# Patient Record
Sex: FEMALE | Race: WHITE | NOT HISPANIC OR LATINO | URBAN - METROPOLITAN AREA
[De-identification: names, ages, dates, MRNs, and addresses within clinical notes are randomized per-mention and may not be internally consistent; named-entity substitution may affect disease eponyms.]

---

## 2017-06-16 ENCOUNTER — OUTPATIENT (OUTPATIENT)
Dept: OUTPATIENT SERVICES | Facility: HOSPITAL | Age: 82
LOS: 1 days | Discharge: HOME | End: 2017-06-16

## 2017-06-16 DIAGNOSIS — I10 ESSENTIAL (PRIMARY) HYPERTENSION: ICD-10-CM

## 2017-06-16 DIAGNOSIS — I48.91 UNSPECIFIED ATRIAL FIBRILLATION: ICD-10-CM

## 2017-06-16 DIAGNOSIS — L03.119 CELLULITIS OF UNSPECIFIED PART OF LIMB: ICD-10-CM

## 2017-06-16 DIAGNOSIS — J44.9 CHRONIC OBSTRUCTIVE PULMONARY DISEASE, UNSPECIFIED: ICD-10-CM

## 2017-06-16 DIAGNOSIS — I89.0 LYMPHEDEMA, NOT ELSEWHERE CLASSIFIED: ICD-10-CM

## 2017-06-23 ENCOUNTER — OUTPATIENT (OUTPATIENT)
Dept: OUTPATIENT SERVICES | Facility: HOSPITAL | Age: 82
LOS: 1 days | Discharge: HOME | End: 2017-06-23

## 2017-06-23 DIAGNOSIS — I89.0 LYMPHEDEMA, NOT ELSEWHERE CLASSIFIED: ICD-10-CM

## 2017-06-23 DIAGNOSIS — I10 ESSENTIAL (PRIMARY) HYPERTENSION: ICD-10-CM

## 2017-06-23 DIAGNOSIS — L03.119 CELLULITIS OF UNSPECIFIED PART OF LIMB: ICD-10-CM

## 2017-06-23 DIAGNOSIS — I48.91 UNSPECIFIED ATRIAL FIBRILLATION: ICD-10-CM

## 2017-06-23 DIAGNOSIS — J44.9 CHRONIC OBSTRUCTIVE PULMONARY DISEASE, UNSPECIFIED: ICD-10-CM

## 2017-06-28 DIAGNOSIS — J44.9 CHRONIC OBSTRUCTIVE PULMONARY DISEASE, UNSPECIFIED: ICD-10-CM

## 2017-06-28 DIAGNOSIS — H25.89 OTHER AGE-RELATED CATARACT: ICD-10-CM

## 2017-06-28 DIAGNOSIS — N18.9 CHRONIC KIDNEY DISEASE, UNSPECIFIED: ICD-10-CM

## 2017-06-28 DIAGNOSIS — E66.9 OBESITY, UNSPECIFIED: ICD-10-CM

## 2017-06-28 DIAGNOSIS — I12.9 HYPERTENSIVE CHRONIC KIDNEY DISEASE WITH STAGE 1 THROUGH STAGE 4 CHRONIC KIDNEY DISEASE, OR UNSPECIFIED CHRONIC KIDNEY DISEASE: ICD-10-CM

## 2017-06-28 DIAGNOSIS — I50.9 HEART FAILURE, UNSPECIFIED: ICD-10-CM

## 2018-08-17 ENCOUNTER — OUTPATIENT (OUTPATIENT)
Dept: OUTPATIENT SERVICES | Facility: HOSPITAL | Age: 83
LOS: 1 days | Discharge: HOME | End: 2018-08-17

## 2018-08-17 VITALS
HEART RATE: 78 BPM | OXYGEN SATURATION: 97 % | RESPIRATION RATE: 16 BRPM | TEMPERATURE: 98 F | WEIGHT: 268.96 LBS | DIASTOLIC BLOOD PRESSURE: 75 MMHG | SYSTOLIC BLOOD PRESSURE: 186 MMHG | HEIGHT: 61 IN

## 2018-08-17 DIAGNOSIS — E01.8 OTHER IODINE-DEFICIENCY RELATED THYROID DISORDERS AND ALLIED CONDITIONS: ICD-10-CM

## 2018-08-17 DIAGNOSIS — D21.6 BENIGN NEOPLASM OF CONNECTIVE AND OTHER SOFT TISSUE OF TRUNK, UNSPECIFIED: ICD-10-CM

## 2018-08-17 DIAGNOSIS — F41.9 ANXIETY DISORDER, UNSPECIFIED: ICD-10-CM

## 2018-08-17 DIAGNOSIS — Z98.890 OTHER SPECIFIED POSTPROCEDURAL STATES: Chronic | ICD-10-CM

## 2018-08-17 DIAGNOSIS — Z01.818 ENCOUNTER FOR OTHER PREPROCEDURAL EXAMINATION: ICD-10-CM

## 2018-08-17 DIAGNOSIS — Z90.710 ACQUIRED ABSENCE OF BOTH CERVIX AND UTERUS: Chronic | ICD-10-CM

## 2018-08-17 DIAGNOSIS — J44.9 CHRONIC OBSTRUCTIVE PULMONARY DISEASE, UNSPECIFIED: ICD-10-CM

## 2018-08-17 DIAGNOSIS — I48.91 UNSPECIFIED ATRIAL FIBRILLATION: ICD-10-CM

## 2018-08-17 DIAGNOSIS — E66.9 OBESITY, UNSPECIFIED: ICD-10-CM

## 2018-08-17 DIAGNOSIS — I73.9 PERIPHERAL VASCULAR DISEASE, UNSPECIFIED: ICD-10-CM

## 2018-08-17 DIAGNOSIS — I10 ESSENTIAL (PRIMARY) HYPERTENSION: ICD-10-CM

## 2018-08-17 DIAGNOSIS — Z96.653 PRESENCE OF ARTIFICIAL KNEE JOINT, BILATERAL: Chronic | ICD-10-CM

## 2018-08-17 LAB
ALBUMIN SERPL ELPH-MCNC: 4.1 G/DL — SIGNIFICANT CHANGE UP (ref 3.5–5.2)
ALP SERPL-CCNC: 119 U/L — HIGH (ref 30–115)
ALT FLD-CCNC: 12 U/L — SIGNIFICANT CHANGE UP (ref 0–41)
ANION GAP SERPL CALC-SCNC: 16 MMOL/L — HIGH (ref 7–14)
APTT BLD: 32.6 SEC — SIGNIFICANT CHANGE UP (ref 27–39.2)
AST SERPL-CCNC: 18 U/L — SIGNIFICANT CHANGE UP (ref 0–41)
BASOPHILS # BLD AUTO: 0.04 K/UL — SIGNIFICANT CHANGE UP (ref 0–0.2)
BASOPHILS NFR BLD AUTO: 0.5 % — SIGNIFICANT CHANGE UP (ref 0–1)
BILIRUB SERPL-MCNC: 0.4 MG/DL — SIGNIFICANT CHANGE UP (ref 0.2–1.2)
BUN SERPL-MCNC: 23 MG/DL — HIGH (ref 10–20)
CALCIUM SERPL-MCNC: 9.3 MG/DL — SIGNIFICANT CHANGE UP (ref 8.5–10.1)
CHLORIDE SERPL-SCNC: 97 MMOL/L — LOW (ref 98–110)
CO2 SERPL-SCNC: 27 MMOL/L — SIGNIFICANT CHANGE UP (ref 17–32)
CREAT SERPL-MCNC: 1.2 MG/DL — SIGNIFICANT CHANGE UP (ref 0.7–1.5)
EOSINOPHIL # BLD AUTO: 0.09 K/UL — SIGNIFICANT CHANGE UP (ref 0–0.7)
EOSINOPHIL NFR BLD AUTO: 1.1 % — SIGNIFICANT CHANGE UP (ref 0–8)
GLUCOSE SERPL-MCNC: 93 MG/DL — SIGNIFICANT CHANGE UP (ref 70–99)
HCT VFR BLD CALC: 32.5 % — LOW (ref 37–47)
HGB BLD-MCNC: 10.3 G/DL — LOW (ref 12–16)
IMM GRANULOCYTES NFR BLD AUTO: 0.5 % — HIGH (ref 0.1–0.3)
INR BLD: 1.06 RATIO — SIGNIFICANT CHANGE UP (ref 0.65–1.3)
LYMPHOCYTES # BLD AUTO: 1.13 K/UL — LOW (ref 1.2–3.4)
LYMPHOCYTES # BLD AUTO: 14 % — LOW (ref 20.5–51.1)
MCHC RBC-ENTMCNC: 27 PG — SIGNIFICANT CHANGE UP (ref 27–31)
MCHC RBC-ENTMCNC: 31.7 G/DL — LOW (ref 32–37)
MCV RBC AUTO: 85.3 FL — SIGNIFICANT CHANGE UP (ref 81–99)
MONOCYTES # BLD AUTO: 0.71 K/UL — HIGH (ref 0.1–0.6)
MONOCYTES NFR BLD AUTO: 8.8 % — SIGNIFICANT CHANGE UP (ref 1.7–9.3)
NEUTROPHILS # BLD AUTO: 6.05 K/UL — SIGNIFICANT CHANGE UP (ref 1.4–6.5)
NEUTROPHILS NFR BLD AUTO: 75.1 % — SIGNIFICANT CHANGE UP (ref 42.2–75.2)
NRBC # BLD: 0 /100 WBCS — SIGNIFICANT CHANGE UP (ref 0–0)
PLATELET # BLD AUTO: 213 K/UL — SIGNIFICANT CHANGE UP (ref 130–400)
POTASSIUM SERPL-MCNC: 5 MMOL/L — SIGNIFICANT CHANGE UP (ref 3.5–5)
POTASSIUM SERPL-SCNC: 5 MMOL/L — SIGNIFICANT CHANGE UP (ref 3.5–5)
PROT SERPL-MCNC: 7.5 G/DL — SIGNIFICANT CHANGE UP (ref 6–8)
PROTHROM AB SERPL-ACNC: 11.4 SEC — SIGNIFICANT CHANGE UP (ref 9.95–12.87)
RBC # BLD: 3.81 M/UL — LOW (ref 4.2–5.4)
RBC # FLD: 13.8 % — SIGNIFICANT CHANGE UP (ref 11.5–14.5)
SODIUM SERPL-SCNC: 140 MMOL/L — SIGNIFICANT CHANGE UP (ref 135–146)
WBC # BLD: 8.06 K/UL — SIGNIFICANT CHANGE UP (ref 4.8–10.8)
WBC # FLD AUTO: 8.06 K/UL — SIGNIFICANT CHANGE UP (ref 4.8–10.8)

## 2018-08-17 NOTE — H&P PST ADULT - REASON FOR ADMISSION
84 yo female presents w/ c/o "cyst on my buttock" when pt asked to confirm side, she states its in the middle. advised to f/u w/ dr. luke prior to surgery. last abx ~3-4 mo ago  denies chest pain, palpitations, shortness of breath, dyspnea, or dysuria. exercise tolerance: 1/2  blocks/ flights of stairs w/o so, per pt limited dt leg pain (cellulitis) per pt has home nursing 3x/wk for drsg changes denies any open areas; "just a couple blisters"

## 2018-08-17 NOTE — H&P PST ADULT - EXTREMITIES COMMENTS
b/l lower extremity chronic cellulitis& vascular changes noted, no open areas, a couple blisters noter right, per pt vna treating 3x/week& no worsening has been noted

## 2018-08-17 NOTE — H&P PST ADULT - PMH
Afib  s/p ablation 2001  Anxiety    Cellulitis  chronic  COPD (chronic obstructive pulmonary disease)    Goiter  no meds  HTN (hypertension)    OA (osteoarthritis)    Obesity    PVD (peripheral vascular disease)

## 2018-08-18 PROBLEM — E04.9 NONTOXIC GOITER, UNSPECIFIED: Chronic | Status: ACTIVE | Noted: 2018-08-17

## 2018-08-18 PROBLEM — L03.90 CELLULITIS, UNSPECIFIED: Chronic | Status: ACTIVE | Noted: 2018-08-17

## 2018-08-18 LAB — TSH SERPL-MCNC: 1.33 UIU/ML — SIGNIFICANT CHANGE UP (ref 0.27–4.2)

## 2018-08-24 ENCOUNTER — RESULT REVIEW (OUTPATIENT)
Age: 83
End: 2018-08-24

## 2018-08-24 ENCOUNTER — OUTPATIENT (OUTPATIENT)
Dept: OUTPATIENT SERVICES | Facility: HOSPITAL | Age: 83
LOS: 1 days | Discharge: HOME | End: 2018-08-24

## 2018-08-24 VITALS — RESPIRATION RATE: 18 BRPM | HEART RATE: 60 BPM | SYSTOLIC BLOOD PRESSURE: 148 MMHG | DIASTOLIC BLOOD PRESSURE: 69 MMHG

## 2018-08-24 VITALS
TEMPERATURE: 99 F | WEIGHT: 268.08 LBS | RESPIRATION RATE: 20 BRPM | SYSTOLIC BLOOD PRESSURE: 151 MMHG | HEART RATE: 76 BPM | HEIGHT: 62 IN | DIASTOLIC BLOOD PRESSURE: 78 MMHG

## 2018-08-24 DIAGNOSIS — Z98.890 OTHER SPECIFIED POSTPROCEDURAL STATES: Chronic | ICD-10-CM

## 2018-08-24 DIAGNOSIS — Z90.710 ACQUIRED ABSENCE OF BOTH CERVIX AND UTERUS: Chronic | ICD-10-CM

## 2018-08-24 DIAGNOSIS — Z96.653 PRESENCE OF ARTIFICIAL KNEE JOINT, BILATERAL: Chronic | ICD-10-CM

## 2018-08-24 RX ORDER — SODIUM CHLORIDE 9 MG/ML
1000 INJECTION, SOLUTION INTRAVENOUS
Qty: 0 | Refills: 0 | Status: DISCONTINUED | OUTPATIENT
Start: 2018-08-24 | End: 2018-08-24

## 2018-08-24 RX ORDER — ACETAMINOPHEN 500 MG
325 TABLET ORAL EVERY 4 HOURS
Qty: 0 | Refills: 0 | Status: DISCONTINUED | OUTPATIENT
Start: 2018-08-24 | End: 2018-08-24

## 2018-08-24 NOTE — CHART NOTE - NSCHARTNOTEFT_GEN_A_CORE
PACU ANESTHESIA ADMISSION NOTE      Procedure: Excision    Post op diagnosis:  Cyst of buttocks      ____  Intubated  TV:______       Rate: ______      FiO2: ______    ____  Patent Airway    ____  Full return of protective reflexes    ___x_  Full recovery from anesthesia / back to baseline     Vitals:   T: 97.9          R:   10               /85:                  Sat:   96                P: 71      Mental Status:  _x___ Awake   ____x_ Alert   _____ Drowsy   _____ Sedated    Nausea/Vomiting:  __x__ NO  ______Yes,   See Post - Op Orders          Pain Scale (0-10):  _____    Treatment: ____ None    _x___ See Post - Op/PCA Orders    Post - Operative Fluids:   ____ Oral   x____ See Post - Op Orders    Plan: Discharge:   ___x_Home       _____Floor     _____Critical Care    _____  Other:_________________    Comments:

## 2018-08-27 LAB — SURGICAL PATHOLOGY STUDY: SIGNIFICANT CHANGE UP

## 2018-08-29 DIAGNOSIS — L72.0 EPIDERMAL CYST: ICD-10-CM

## 2018-08-29 DIAGNOSIS — Z88.0 ALLERGY STATUS TO PENICILLIN: ICD-10-CM

## 2018-08-29 DIAGNOSIS — Z88.2 ALLERGY STATUS TO SULFONAMIDES: ICD-10-CM

## 2018-08-29 DIAGNOSIS — I10 ESSENTIAL (PRIMARY) HYPERTENSION: ICD-10-CM

## 2018-08-29 DIAGNOSIS — E66.9 OBESITY, UNSPECIFIED: ICD-10-CM

## 2018-08-29 DIAGNOSIS — F41.9 ANXIETY DISORDER, UNSPECIFIED: ICD-10-CM

## 2018-08-29 DIAGNOSIS — I48.91 UNSPECIFIED ATRIAL FIBRILLATION: ICD-10-CM

## 2019-01-16 ENCOUNTER — TRANSCRIPTION ENCOUNTER (OUTPATIENT)
Age: 84
End: 2019-01-16

## 2019-05-23 ENCOUNTER — TRANSCRIPTION ENCOUNTER (OUTPATIENT)
Age: 84
End: 2019-05-23

## 2019-07-16 NOTE — H&P PST ADULT - PSH
Action 2: Continue
Detail Level: Detailed
Samples Given: Duobrii QD until clear
H/O abdominal hysterectomy    H/O discectomy    H/O total knee replacement, bilateral

## 2019-09-20 ENCOUNTER — TRANSCRIPTION ENCOUNTER (OUTPATIENT)
Age: 84
End: 2019-09-20

## 2020-05-18 ENCOUNTER — INPATIENT (INPATIENT)
Facility: HOSPITAL | Age: 85
LOS: 3 days | Discharge: HOME HEALTH | End: 2020-05-22
Payer: MEDICARE

## 2020-05-18 ENCOUNTER — TRANSCRIPTION ENCOUNTER (OUTPATIENT)
Age: 85
End: 2020-05-18

## 2020-05-18 VITALS
HEART RATE: 108 BPM | SYSTOLIC BLOOD PRESSURE: 144 MMHG | DIASTOLIC BLOOD PRESSURE: 81 MMHG | RESPIRATION RATE: 19 BRPM | TEMPERATURE: 98 F | WEIGHT: 270.07 LBS | OXYGEN SATURATION: 97 %

## 2020-05-18 DIAGNOSIS — I10 ESSENTIAL (PRIMARY) HYPERTENSION: ICD-10-CM

## 2020-05-18 DIAGNOSIS — Z90.710 ACQUIRED ABSENCE OF BOTH CERVIX AND UTERUS: Chronic | ICD-10-CM

## 2020-05-18 DIAGNOSIS — Z96.653 PRESENCE OF ARTIFICIAL KNEE JOINT, BILATERAL: Chronic | ICD-10-CM

## 2020-05-18 DIAGNOSIS — I48.91 UNSPECIFIED ATRIAL FIBRILLATION: ICD-10-CM

## 2020-05-18 DIAGNOSIS — J44.9 CHRONIC OBSTRUCTIVE PULMONARY DISEASE, UNSPECIFIED: ICD-10-CM

## 2020-05-18 DIAGNOSIS — L02.91 CUTANEOUS ABSCESS, UNSPECIFIED: ICD-10-CM

## 2020-05-18 DIAGNOSIS — Z98.890 OTHER SPECIFIED POSTPROCEDURAL STATES: Chronic | ICD-10-CM

## 2020-05-18 PROBLEM — I73.9 PERIPHERAL VASCULAR DISEASE, UNSPECIFIED: Chronic | Status: ACTIVE | Noted: 2018-08-17

## 2020-05-18 PROBLEM — F41.9 ANXIETY DISORDER, UNSPECIFIED: Chronic | Status: ACTIVE | Noted: 2018-08-17

## 2020-05-18 PROBLEM — E66.9 OBESITY, UNSPECIFIED: Chronic | Status: ACTIVE | Noted: 2018-08-17

## 2020-05-18 PROBLEM — M19.90 UNSPECIFIED OSTEOARTHRITIS, UNSPECIFIED SITE: Chronic | Status: ACTIVE | Noted: 2018-08-17

## 2020-05-18 LAB
ALBUMIN SERPL ELPH-MCNC: 3.8 G/DL — SIGNIFICANT CHANGE UP (ref 3.5–5.2)
ALP SERPL-CCNC: 122 U/L — HIGH (ref 30–115)
ALT FLD-CCNC: 10 U/L — SIGNIFICANT CHANGE UP (ref 0–41)
ANION GAP SERPL CALC-SCNC: 14 MMOL/L — SIGNIFICANT CHANGE UP (ref 7–14)
APTT BLD: 30 SEC — SIGNIFICANT CHANGE UP (ref 27–39.2)
AST SERPL-CCNC: 18 U/L — SIGNIFICANT CHANGE UP (ref 0–41)
BASOPHILS # BLD AUTO: 0.03 K/UL — SIGNIFICANT CHANGE UP (ref 0–0.2)
BASOPHILS NFR BLD AUTO: 0.4 % — SIGNIFICANT CHANGE UP (ref 0–1)
BILIRUB SERPL-MCNC: 0.4 MG/DL — SIGNIFICANT CHANGE UP (ref 0.2–1.2)
BUN SERPL-MCNC: 17 MG/DL — SIGNIFICANT CHANGE UP (ref 10–20)
CALCIUM SERPL-MCNC: 9.4 MG/DL — SIGNIFICANT CHANGE UP (ref 8.5–10.1)
CHLORIDE SERPL-SCNC: 100 MMOL/L — SIGNIFICANT CHANGE UP (ref 98–110)
CO2 SERPL-SCNC: 27 MMOL/L — SIGNIFICANT CHANGE UP (ref 17–32)
CREAT SERPL-MCNC: 1.4 MG/DL — SIGNIFICANT CHANGE UP (ref 0.7–1.5)
EOSINOPHIL # BLD AUTO: 0.04 K/UL — SIGNIFICANT CHANGE UP (ref 0–0.7)
EOSINOPHIL NFR BLD AUTO: 0.5 % — SIGNIFICANT CHANGE UP (ref 0–8)
GLUCOSE SERPL-MCNC: 106 MG/DL — HIGH (ref 70–99)
HCT VFR BLD CALC: 33.6 % — LOW (ref 37–47)
HGB BLD-MCNC: 10.6 G/DL — LOW (ref 12–16)
IMM GRANULOCYTES NFR BLD AUTO: 0.4 % — HIGH (ref 0.1–0.3)
INR BLD: 1.77 RATIO — HIGH (ref 0.65–1.3)
LACTATE SERPL-SCNC: 1.7 MMOL/L — SIGNIFICANT CHANGE UP (ref 0.7–2)
LYMPHOCYTES # BLD AUTO: 1.04 K/UL — LOW (ref 1.2–3.4)
LYMPHOCYTES # BLD AUTO: 12.3 % — LOW (ref 20.5–51.1)
MCHC RBC-ENTMCNC: 26.4 PG — LOW (ref 27–31)
MCHC RBC-ENTMCNC: 31.5 G/DL — LOW (ref 32–37)
MCV RBC AUTO: 83.6 FL — SIGNIFICANT CHANGE UP (ref 81–99)
MONOCYTES # BLD AUTO: 0.71 K/UL — HIGH (ref 0.1–0.6)
MONOCYTES NFR BLD AUTO: 8.4 % — SIGNIFICANT CHANGE UP (ref 1.7–9.3)
NEUTROPHILS # BLD AUTO: 6.63 K/UL — HIGH (ref 1.4–6.5)
NEUTROPHILS NFR BLD AUTO: 78 % — HIGH (ref 42.2–75.2)
NRBC # BLD: 0 /100 WBCS — SIGNIFICANT CHANGE UP (ref 0–0)
PLATELET # BLD AUTO: 238 K/UL — SIGNIFICANT CHANGE UP (ref 130–400)
POTASSIUM SERPL-MCNC: 4.6 MMOL/L — SIGNIFICANT CHANGE UP (ref 3.5–5)
POTASSIUM SERPL-SCNC: 4.6 MMOL/L — SIGNIFICANT CHANGE UP (ref 3.5–5)
PROT SERPL-MCNC: 7.3 G/DL — SIGNIFICANT CHANGE UP (ref 6–8)
PROTHROM AB SERPL-ACNC: 20.3 SEC — HIGH (ref 9.95–12.87)
RBC # BLD: 4.02 M/UL — LOW (ref 4.2–5.4)
RBC # FLD: 14 % — SIGNIFICANT CHANGE UP (ref 11.5–14.5)
SARS-COV-2 RNA SPEC QL NAA+PROBE: SIGNIFICANT CHANGE UP
SODIUM SERPL-SCNC: 141 MMOL/L — SIGNIFICANT CHANGE UP (ref 135–146)
WBC # BLD: 8.48 K/UL — SIGNIFICANT CHANGE UP (ref 4.8–10.8)
WBC # FLD AUTO: 8.48 K/UL — SIGNIFICANT CHANGE UP (ref 4.8–10.8)

## 2020-05-18 PROCEDURE — 99221 1ST HOSP IP/OBS SF/LOW 40: CPT

## 2020-05-18 PROCEDURE — 99285 EMERGENCY DEPT VISIT HI MDM: CPT

## 2020-05-18 PROCEDURE — 72192 CT PELVIS W/O DYE: CPT | Mod: 26

## 2020-05-18 RX ORDER — AMIODARONE HYDROCHLORIDE 400 MG/1
100 TABLET ORAL DAILY
Refills: 0 | Status: DISCONTINUED | OUTPATIENT
Start: 2020-05-18 | End: 2020-05-19

## 2020-05-18 RX ORDER — CHOLECALCIFEROL (VITAMIN D3) 125 MCG
1000 CAPSULE ORAL DAILY
Refills: 0 | Status: DISCONTINUED | OUTPATIENT
Start: 2020-05-18 | End: 2020-05-22

## 2020-05-18 RX ORDER — APIXABAN 2.5 MG/1
5 TABLET, FILM COATED ORAL EVERY 12 HOURS
Refills: 0 | Status: DISCONTINUED | OUTPATIENT
Start: 2020-05-19 | End: 2020-05-22

## 2020-05-18 RX ORDER — VANCOMYCIN HCL 1 G
1000 VIAL (EA) INTRAVENOUS ONCE
Refills: 0 | Status: COMPLETED | OUTPATIENT
Start: 2020-05-18 | End: 2020-05-18

## 2020-05-18 RX ORDER — ALBUTEROL 90 UG/1
1 AEROSOL, METERED ORAL
Refills: 0 | Status: DISCONTINUED | OUTPATIENT
Start: 2020-05-18 | End: 2020-05-22

## 2020-05-18 RX ORDER — FUROSEMIDE 40 MG
20 TABLET ORAL DAILY
Refills: 0 | Status: DISCONTINUED | OUTPATIENT
Start: 2020-05-18 | End: 2020-05-19

## 2020-05-18 RX ORDER — DILTIAZEM HCL 120 MG
180 CAPSULE, EXT RELEASE 24 HR ORAL DAILY
Refills: 0 | Status: DISCONTINUED | OUTPATIENT
Start: 2020-05-18 | End: 2020-05-22

## 2020-05-18 RX ADMIN — Medication 250 MILLIGRAM(S): at 16:46

## 2020-05-18 NOTE — CONSULT NOTE ADULT - ASSESSMENT
---------------------------------------------------------------------------------------    ASSESSMENT:  84y Female with extensive PMH significant for morbid obesity, anxiety, COPD, PVD, OA, atrial fibrillation (on eliquis), HTN, s/p excision of cystic mass of buttock on 08/2018 by Dr. Bean, presents with chronic left gluteal abscess, subcutaneous inflammation    PLAN:   ·	Left gluteal abscess appears chronic  ·	No appreciable drainable collection  ·	Given comorbidities, elevated INR, recommend conservative management  ·	Continue IV antibiotics  ·	Sitz baths  ·	Surgery will follow

## 2020-05-18 NOTE — ED PROVIDER NOTE - PROGRESS NOTE DETAILS
surgery saw pt, will follow, states admit to medical service and will follow. stephanie aware. roshan aware, mindy gonzáles aware.

## 2020-05-18 NOTE — ED ADULT NURSE NOTE - NSIMPLEMENTINTERV_GEN_ALL_ED
Implemented All Fall Risk Interventions:  [place] to call system. Call bell, personal items and telephone within reach. Instruct patient to call for assistance. Room bathroom lighting operational. Non-slip footwear when patient is off stretcher. Physically safe environment: no spills, clutter or unnecessary equipment. Stretcher in lowest position, wheels locked, appropriate side rails in place. Provide visual cue, wrist band, yellow gown, etc. Monitor gait and stability. Monitor for mental status changes and reorient to person, place, and time. Review medications for side effects contributing to fall risk. Reinforce activity limits and safety measures with patient and family.

## 2020-05-18 NOTE — H&P ADULT - ASSESSMENT
84y Female with extensive PMH significant for morbid obesity, anxiety, COPD, PVD, OA, atrial fibrillation (on eliquis), HTN, s/p excision of cystic mass of buttock on 08/2018 by Dr. Bean, presents with complaints of left gluteal pain, rubor, and induration x 6 months. Patient states that it has been progressively uncomfortable which prompted her to come to the ED for evaluation. She endorses purulent drainage from the indurated area. It is located kelly-rectally along the inferior portion of the gluteal cleft. She denies chest pain, shortness of breath, abdominal pain, changes in bowel movements, diarrhea or constipation, nausea or vomiting.

## 2020-05-18 NOTE — ED PROVIDER NOTE - OBJECTIVE STATEMENT
83 yo female, pmh of afib on eliquis, htn, hld, copd, presents to ed for abscess to left buttocks. Pt states on and off issue for several years, had surgery in past by ti, most recent abscess to left buttock present for several days, mild, aching, no specific radiation. Denies fever, chills, cp, sob, le swelling, abd pain, nvd.

## 2020-05-18 NOTE — H&P ADULT - NSICDXPASTSURGICALHX_GEN_ALL_CORE_FT
PAST SURGICAL HISTORY:  H/O abdominal hysterectomy     H/O discectomy     H/O total knee replacement, bilateral

## 2020-05-18 NOTE — CONSULT NOTE ADULT - ATTENDING COMMENTS
Pt seen and examined in ED with surgical team and daughter present.  H&P as noted above.  Process is chronic, and she is stable and afebrile, no leukocystosis.  Area of interest in indurated but not fluctuant, no streaking or crepitus noted, no regional adenopathy.  Several small and superficial sinus tracts with some purulent d/c.  Especially in light of chronicity, would Rx with IV axbx and warm soaks/sitz baths, etc.  No plans for surgery now, especially in light of multiple comorbidities, but will follow with you

## 2020-05-18 NOTE — ED PROVIDER NOTE - PHYSICAL EXAMINATION
Problem: Patient Care Overview  Goal: Plan of Care Review  Outcome: Ongoing (interventions implemented as appropriate)   10/19/19 7871   Coping/Psychosocial   Plan of Care Reviewed With patient   Plan of Care Review   Progress no change   OTHER   Outcome Summary Treatment for hyperkalemia, continue to monitor labs, continue steroids and hydration. No further complaints.      Goal: Individualization and Mutuality  Outcome: Ongoing (interventions implemented as appropriate)    Goal: Discharge Needs Assessment  Outcome: Ongoing (interventions implemented as appropriate)    Goal: Interprofessional Rounds/Family Conf  Outcome: Ongoing (interventions implemented as appropriate)      Problem: Oncology Care (Adult)  Goal: Signs and Symptoms of Listed Potential Problems Will be Absent, Minimized or Managed (Oncology Care)  Outcome: Ongoing (interventions implemented as appropriate)      Problem: Kidney Disease, Chronic/End Stage Renal Disease (Adult)  Goal: Signs and Symptoms of Listed Potential Problems Will be Absent, Minimized or Managed (Kidney Disease, Chronic/End Stage Renal Disease)  Outcome: Ongoing (interventions implemented as appropriate)         Physical Exam    Vital Signs: I have reviewed the initial vital signs.  Constitutional: well-nourished, appears stated age, no acute distress  Eyes: Conjunctiva pink, Sclera clear,   Cardiovascular: S1 and S2, regular rate, regular rhythm, well-perfused extremities, radial pulses equal and 2+  Respiratory: unlabored respiratory effort, clear to auscultation bilaterally no wheezing, rales and rhonchi  Gastrointestinal: soft, non-tender abdomen, no pulsatile mass, normal bowl sounds  Musculoskeletal: supple neck, no lower extremity edema, no midline tenderness  Integumentary: larger indurated abscess to left buttock extends to gluteal cleft and questionable into rectum.   Neurologic: awake, alert, nvi

## 2020-05-18 NOTE — H&P ADULT - NSICDXPASTMEDICALHX_GEN_ALL_CORE_FT
PAST MEDICAL HISTORY:  Afib s/p ablation 2001    Anxiety     Cellulitis chronic    COPD (chronic obstructive pulmonary disease)     Goiter no meds    HTN (hypertension)     OA (osteoarthritis)     Obesity     PVD (peripheral vascular disease)

## 2020-05-18 NOTE — ED PROVIDER NOTE - ATTENDING CONTRIBUTION TO CARE
84 F to ED with buttock redness.  ? fevers, no sick contacts, no travels, no trauma.  ho I/D by TJ and over last few months sx worsening.  At home pain worsening an unable to sit or lie on bottom so to ED for eval.

## 2020-05-18 NOTE — ED PROVIDER NOTE - NS ED ROS FT
Constitutional: (-) fever, (-) chills  Eyes: (-) visual changes  ENT: (-) nasal congestions  Cardiovascular: (-) chest pain, (-) syncope  Respiratory: (-) cough, (-) shortness of breath, (-) dyspnea,   Gastrointestinal: (-) vomiting, (-) diarrhea, (-)nausea,  Musculoskeletal: (-) neck pain, (-) back pain, (-) joint pain,  Integumentary: (-) rash, (-) edema, (-) bruises, (+) abscess  Neurological: (-) headache, (-) loc, (-) dizziness, (-) tingling, (-)numbness,  Peripheral Vascular: (-) leg swelling  :  (-)dysuria,  (-) hematuria  Allergic/Immunologic: (-) pruritus

## 2020-05-18 NOTE — CONSULT NOTE ADULT - SUBJECTIVE AND OBJECTIVE BOX
Consultation Note  =====================================================    HPI: 84y Female with extensive PMH significant for morbid obesity, anxiety, COPD, PVD, OA, atrial fibrillation (on eliquis), HTN, s/p excision of cystic mass of buttock on 08/2018 by Dr. Bean, presents with complaints of left gluteal pain, rubor, and induration x 6 months. Patient states that it has been progressively uncomfortable which prompted her to come to the ED for evaluation. She endorses purulent drainage from the indurated area. It is located kelly-rectally along the inferior portion of the gluteal cleft. She denies chest pain, shortness of breath, abdominal pain, changes in bowel movements, diarrhea or constipation, nausea or vomiting.     PAST MEDICAL & SURGICAL HISTORY:  Obesity  Anxiety  COPD (chronic obstructive pulmonary disease)  PVD (peripheral vascular disease)  OA (osteoarthritis)  Cellulitis: chronic  Goiter: no meds  Afib: s/p ablation 2001  HTN (hypertension)  H/O total knee replacement, bilateral  H/O discectomy  H/O abdominal hysterectomy    Home Meds: Home Medications:  Align 4 mg oral capsule: 1 cap(s) orally once a day (18 May 2020 16:57)  amiodarone 100 mg oral tablet:  (18 May 2020 16:57)  dilTIAZem 180 mg/24 hours oral capsule, extended release: 1 cap(s) orally once a day (18 May 2020 16:57)  furosemide 20 mg oral tablet: 1 tab(s) orally once a day (18 May 2020 16:57)  ProAir HFA 90 mcg/inh inhalation aerosol: 2 puff(s) inhaled 2 times a day (18 May 2020 16:57)  Vitamin D3 5000 intl units oral capsule: 1 cap(s) orally once a day (18 May 2020 16:57)    Allergies: Allergies  aspirin (Other)  codeine (Other)  contrast media (iodine-based) (Other)  penicillin (Other)  sulfa drugs (Hives; Other)    ROS:    REVIEW OF SYSTEMS    [X] A ten-point review of systems was otherwise negative except as noted.  [ ] Due to altered mental status/intubation, subjective information were not able to be obtained from the patient. History was obtained, to the extent possible, from review of the chart and collateral sources of information.    --------------------------------------------------------------------------------------  VITAL SIGNS, INS/OUTS (last 24 hours):  --------------------------------------------------------------------------------------  T(C): 37.8 (18 May 2020 19:10), Max: 37.8 (18 May 2020 19:10)  T(F): 100 (18 May 2020 19:10), Max: 100 (18 May 2020 19:10)  HR: 107 (18 May 2020 19:10) (107 - 108)  BP: 143/63 (18 May 2020 19:10) (143/63 - 144/81)  RR: 18 (18 May 2020 19:10) (18 - 19)  SpO2: 96% (18 May 2020 19:10) (96% - 97%)    --------------------------------------------------------------------------------------  PHYSICAL EXAM  General: NAD, AAOx3, calm and cooperative  HEENT: NCAT, KAT, EOMI, Trachea ML, Neck supple  Cardiac: RRR S1, S2, no Murmurs, rubs or gallops  Respiratory: CTAB, normal respiratory effort, breath sounds equal BL, no wheeze, rhonchi or crackles  Abdomen: Soft, non-distended, non-tender, +bowel sounds  Rectal: Kelly rectal gluteal subcutaneous inflammation on the left, indurated, painful to touch, no drainable collection   Vascular: Pulses 2+ throughout, extremities well perfused  Skin: Warm/dry, normal color, no jaundice      LABS  --------------------------------------------------------------------------------------  Labs:  CAPILLARY BLOOD GLUCOSE                              10.6   8.48  )-----------( 238      ( 18 May 2020 16:34 )             33.6       Auto Neutrophil %: 78.0 % (05-18-20 @ 16:34)  Auto Immature Granulocyte %: 0.4 % (05-18-20 @ 16:34)    05-18    141  |  100  |  17  ----------------------------<  106<H>  4.6   |  27  |  1.4      Calcium, Total Serum: 9.4 mg/dL (05-18-20 @ 16:34)      LFTs:             7.3  | 0.4  | 18       ------------------[122     ( 18 May 2020 16:34 )  3.8  | x    | 10           Lactate, Blood: 1.7 mmol/L (05-18-20 @ 16:34)      Coags:     20.30  ----< 1.77    ( 18 May 2020 16:34 )     30.0     --------------------------------------------------------------------------------------  IMAGING RESULTS    < from: CT Pelvis No Cont (05.18.20 @ 17:17) >  1. Subcutaneous inflammation in the left posterior gluteal subcutaneous with more focal collection in the left gluteal cleft as detailed above. No drainable collection.  2. Questionable communication to the rectum. If clinically warranted, nonemergent MRI may be obtained for further evaluation.    < end of copied text >        --------------------------------------------------------------------------------------

## 2020-05-19 LAB
ALBUMIN SERPL ELPH-MCNC: 3.5 G/DL — SIGNIFICANT CHANGE UP (ref 3.5–5.2)
ALP SERPL-CCNC: 109 U/L — SIGNIFICANT CHANGE UP (ref 30–115)
ALT FLD-CCNC: 8 U/L — SIGNIFICANT CHANGE UP (ref 0–41)
ANION GAP SERPL CALC-SCNC: 12 MMOL/L — SIGNIFICANT CHANGE UP (ref 7–14)
AST SERPL-CCNC: 16 U/L — SIGNIFICANT CHANGE UP (ref 0–41)
BILIRUB SERPL-MCNC: 0.5 MG/DL — SIGNIFICANT CHANGE UP (ref 0.2–1.2)
BUN SERPL-MCNC: 15 MG/DL — SIGNIFICANT CHANGE UP (ref 10–20)
CALCIUM SERPL-MCNC: 8.8 MG/DL — SIGNIFICANT CHANGE UP (ref 8.5–10.1)
CHLORIDE SERPL-SCNC: 98 MMOL/L — SIGNIFICANT CHANGE UP (ref 98–110)
CO2 SERPL-SCNC: 26 MMOL/L — SIGNIFICANT CHANGE UP (ref 17–32)
CREAT SERPL-MCNC: 1.1 MG/DL — SIGNIFICANT CHANGE UP (ref 0.7–1.5)
GLUCOSE SERPL-MCNC: 106 MG/DL — HIGH (ref 70–99)
HCT VFR BLD CALC: 31 % — LOW (ref 37–47)
HGB BLD-MCNC: 9.9 G/DL — LOW (ref 12–16)
MCHC RBC-ENTMCNC: 26.6 PG — LOW (ref 27–31)
MCHC RBC-ENTMCNC: 31.9 G/DL — LOW (ref 32–37)
MCV RBC AUTO: 83.3 FL — SIGNIFICANT CHANGE UP (ref 81–99)
NRBC # BLD: 0 /100 WBCS — SIGNIFICANT CHANGE UP (ref 0–0)
PLATELET # BLD AUTO: 224 K/UL — SIGNIFICANT CHANGE UP (ref 130–400)
POTASSIUM SERPL-MCNC: 3.9 MMOL/L — SIGNIFICANT CHANGE UP (ref 3.5–5)
POTASSIUM SERPL-SCNC: 3.9 MMOL/L — SIGNIFICANT CHANGE UP (ref 3.5–5)
PROT SERPL-MCNC: 6.5 G/DL — SIGNIFICANT CHANGE UP (ref 6–8)
RBC # BLD: 3.72 M/UL — LOW (ref 4.2–5.4)
RBC # FLD: 14.1 % — SIGNIFICANT CHANGE UP (ref 11.5–14.5)
SODIUM SERPL-SCNC: 136 MMOL/L — SIGNIFICANT CHANGE UP (ref 135–146)
WBC # BLD: 8.14 K/UL — SIGNIFICANT CHANGE UP (ref 4.8–10.8)
WBC # FLD AUTO: 8.14 K/UL — SIGNIFICANT CHANGE UP (ref 4.8–10.8)

## 2020-05-19 PROCEDURE — 99231 SBSQ HOSP IP/OBS SF/LOW 25: CPT

## 2020-05-19 RX ORDER — FUROSEMIDE 40 MG
20 TABLET ORAL
Refills: 0 | Status: DISCONTINUED | OUTPATIENT
Start: 2020-05-19 | End: 2020-05-22

## 2020-05-19 RX ORDER — ACETAMINOPHEN 500 MG
650 TABLET ORAL EVERY 6 HOURS
Refills: 0 | Status: DISCONTINUED | OUTPATIENT
Start: 2020-05-19 | End: 2020-05-22

## 2020-05-19 RX ORDER — LORATADINE 10 MG/1
10 TABLET ORAL DAILY
Refills: 0 | Status: DISCONTINUED | OUTPATIENT
Start: 2020-05-19 | End: 2020-05-22

## 2020-05-19 RX ORDER — LACTOBACILLUS ACIDOPHILUS 100MM CELL
1 CAPSULE ORAL DAILY
Refills: 0 | Status: DISCONTINUED | OUTPATIENT
Start: 2020-05-19 | End: 2020-05-22

## 2020-05-19 RX ORDER — TEMAZEPAM 15 MG/1
15 CAPSULE ORAL AT BEDTIME
Refills: 0 | Status: DISCONTINUED | OUTPATIENT
Start: 2020-05-19 | End: 2020-05-22

## 2020-05-19 RX ORDER — VANCOMYCIN HCL 1 G
1000 VIAL (EA) INTRAVENOUS EVERY 12 HOURS
Refills: 0 | Status: DISCONTINUED | OUTPATIENT
Start: 2020-05-18 | End: 2020-05-20

## 2020-05-19 RX ADMIN — APIXABAN 5 MILLIGRAM(S): 2.5 TABLET, FILM COATED ORAL at 17:08

## 2020-05-19 RX ADMIN — Medication 250 MILLIGRAM(S): at 05:12

## 2020-05-19 RX ADMIN — LORATADINE 10 MILLIGRAM(S): 10 TABLET ORAL at 11:04

## 2020-05-19 RX ADMIN — Medication 1 TABLET(S): at 11:04

## 2020-05-19 RX ADMIN — APIXABAN 5 MILLIGRAM(S): 2.5 TABLET, FILM COATED ORAL at 05:12

## 2020-05-19 RX ADMIN — TEMAZEPAM 15 MILLIGRAM(S): 15 CAPSULE ORAL at 00:39

## 2020-05-19 RX ADMIN — Medication 250 MILLIGRAM(S): at 17:08

## 2020-05-19 RX ADMIN — ALBUTEROL 1 PUFF(S): 90 AEROSOL, METERED ORAL at 07:52

## 2020-05-19 RX ADMIN — Medication 1000 UNIT(S): at 11:04

## 2020-05-19 RX ADMIN — Medication 650 MILLIGRAM(S): at 21:17

## 2020-05-19 RX ADMIN — Medication 180 MILLIGRAM(S): at 05:12

## 2020-05-19 NOTE — PROGRESS NOTE ADULT - SUBJECTIVE AND OBJECTIVE BOX
Name: GIOVANNY BUCK  Age: 84y  Gender: Female    Pt was seen and examined.   c/o:  feels better    Allergies:  aspirin (Other)  codeine (Other)  contrast media (iodine-based) (Other)  penicillin (Other)  sulfa drugs (Hives; Other)      PHYSICAL EXAM:    Vitals:  T(C): 36.9 (05-19-20 @ 20:54), Max: 37.3 (05-19-20 @ 05:06)  HR: 96 (05-19-20 @ 20:54) (89 - 98)  BP: 134/59 (05-19-20 @ 20:54) (134/59 - 143/59)  RR: 16 (05-19-20 @ 20:54) (16 - 18)  SpO2: --  Wt(kg): --Vital Signs Last 24 Hrs  T(C): 36.9 (19 May 2020 20:54), Max: 37.3 (19 May 2020 05:06)  T(F): 98.4 (19 May 2020 20:54), Max: 99.1 (19 May 2020 05:06)  HR: 96 (19 May 2020 20:54) (89 - 98)  BP: 134/59 (19 May 2020 20:54) (134/59 - 143/59)  BP(mean): --  RR: 16 (19 May 2020 20:54) (16 - 18)  SpO2: --      NECK: Supple, No JVD  CHEST/LUNG: CTA, B/L, No rales, rhonchi, wheezing, or rubs  HEART: S1,S2, N1 Regular rate and rhythm; No murmurs, rubs, or gallops  ABDOMEN: Soft, Nontender, Nondistended; Bowel sounds present  EXTREMITIES:  2+ Peripheral Pulses, No clubbing, cyanosis, or edema  L gluteal area about same      LABS:                        9.9    8.14  )-----------( 224      ( 19 May 2020 11:00 )             31.0     05-19    136  |  98  |  15  ----------------------------<  106<H>  3.9   |  26  |  1.1    Ca    8.8      19 May 2020 11:00    TPro  6.5  /  Alb  3.5  /  TBili  0.5  /  DBili  x   /  AST  16  /  ALT  8   /  AlkPhos  109  05-19    LIVER FUNCTIONS - ( 19 May 2020 11:00 )  Alb: 3.5 g/dL / Pro: 6.5 g/dL / ALK PHOS: 109 U/L / ALT: 8 U/L / AST: 16 U/L / GGT: x                 MEDICATIONS  (STANDING):  ALBUTerol    90 MICROgram(s) HFA Inhaler 1 Puff(s) Inhalation two times a day  apixaban 5 milliGRAM(s) Oral every 12 hours  cholecalciferol 1000 Unit(s) Oral daily  diltiazem    milliGRAM(s) Oral daily  furosemide    Tablet 20 milliGRAM(s) Oral <User Schedule>  lactobacillus acidophilus 1 Tablet(s) Oral daily  loratadine 10 milliGRAM(s) Oral daily  vancomycin  IVPB 1000 milliGRAM(s) IV Intermittent every 12 hours        RADIOLOGY & ADDITIONAL TESTS:    Imaging Personally Reviewed:  [ ] YES  [ ] NO    A/P:  Assessment and Plan:    Assessment:  · Assessment		  84y Female with extensive PMH significant for morbid obesity, anxiety, COPD, PVD, OA, atrial fibrillation (on eliquis), HTN, s/p excision of cystic mass of buttock on 08/2018 by Dr. Bean, presents with complaints of left gluteal pain, rubor, and induration x 6 months. Patient states that it has been progressively uncomfortable which prompted her to come to the ED for evaluation. She endorses purulent drainage from the indurated area. It is located kelly-rectally along the inferior portion of the gluteal cleft. She denies chest pain, shortness of breath, abdominal pain, changes in bowel movements, diarrhea or constipation, nausea or vomiting.      Problem/Plan - 1:  ·  Problem: Abscess.   Surgical Consult appreciated  cont abx for now  will cont to monitor and hope to d/c in 1 to 2 days     Problem/Plan - 2:  ·  Problem: COPD (chronic obstructive pulmonary disease).  Plan: cont MDI  no evidence of exacerbation.     Problem/Plan - 3:  ·  Problem: Afib.  Plan: Cont med; On Eliquis 5mg.      Problem/Plan - 4:  ·  Problem: HTN (hypertension).  Plan: VS  cont meds.

## 2020-05-19 NOTE — CHART NOTE - NSCHARTNOTEFT_GEN_A_CORE
Patient seen at bedside - resting comfortably.    Pt seen by surgery team again this AM. They are recommending to continue w/ IV abx treatment as there is no drainable collection. Information explained to patient.     Patient without questions or concerns at this time.     Patient encouraged to contact PA with any further questions or concerns.

## 2020-05-19 NOTE — PROGRESS NOTE ADULT - SUBJECTIVE AND OBJECTIVE BOX
S: Pt stated that buttock pain is minimally improved  O; Vital Signs Last 24 Hrs  T(C): 37.3 (19 May 2020 05:06), Max: 37.8 (18 May 2020 19:10)  T(F): 99.1 (19 May 2020 05:06), Max: 100 (18 May 2020 19:10)  HR: 98 (19 May 2020 05:06) (98 - 108)  BP: 135/61 (19 May 2020 05:06) (135/61 - 149/67)  BP(mean): --  RR: 18 (19 May 2020 05:06) (18 - 19)  SpO2: 96% (18 May 2020 19:10) (96% - 97%)    EXAM:    Rectal: Ivy rectal gluteal subcutaneous inflammation on the left, mildly indurated and tender with multiple draining sinuses (seropurulent); no fluctuance appreciated    Labs:  CAPILLARY BLOOD GLUCOSE                          9.9    8.14  )-----------( 224      ( 19 May 2020 11:00 )             31.0       Auto Neutrophil %: 78.0 % (05-18-20 @ 16:34)  Auto Immature Granulocyte %: 0.4 % (05-18-20 @ 16:34)    05-18    141  |  100  |  17  ----------------------------<  106<H>  4.6   |  27  |  1.4      Calcium, Total Serum: 9.4 mg/dL (05-18-20 @ 16:34)      LFTs:             7.3  | 0.4  | 18       ------------------[122     ( 18 May 2020 16:34 )  3.8  | x    | 10          Lipase:x      Amylase:x         Lactate, Blood: 1.7 mmol/L (05-18-20 @ 16:34)      Coags:     20.30  ----< 1.77    ( 18 May 2020 16:34 )     30.0

## 2020-05-19 NOTE — PROGRESS NOTE ADULT - ASSESSMENT
84y Female with extensive PMH significant for morbid obesity, anxiety, COPD, PVD, OA, atrial fibrillation (on eliquis), HTN, s/p excision of cystic mass of buttock on 08/2018 by Dr. Bean, presents with chronic left gluteal abscess, subcutaneous inflammation    PLAN:   ·	No appreciable drainable collection; + draining SQ sinuses  ·	Continue IV antibiotics  ·	Sitz baths  ·	Offered burn consult to patient but she prefers to stay with Dr. Bean. Will D/W with Dr. Bean re: outpt f/u

## 2020-05-19 NOTE — PROGRESS NOTE ADULT - ATTENDING COMMENTS
Patient seen and examined on a.m. rounds with surgical PA.  She feels somewhat better compared to yesterday but still has pain when she puts weight on the left buttock.  Does not complain of fevers or chills.  Indurated area of medial aspect of left buttock seems less tender but is otherwise unchanged on exam and not fluctuant. No cellulitis or lymphangitic streaking. No crepitus. Scant seropurulent drainage from multiple small sinus tracts as noted previously. No obvious perianal disease otherwise.    The patient will continue on IV antibiotics for now and be discharged home on oral meds. She prefers to followup with her prior treating surgeon, Dr. Bean, but was also offered the option of wound care via the burn service. She also understands the need for a pelvic MRI to evaluate for a perianal fistula as a point of origin.  All her questions were answered.

## 2020-05-20 LAB
ALBUMIN SERPL ELPH-MCNC: 3.7 G/DL — SIGNIFICANT CHANGE UP (ref 3.5–5.2)
ALP SERPL-CCNC: 112 U/L — SIGNIFICANT CHANGE UP (ref 30–115)
ALT FLD-CCNC: 8 U/L — SIGNIFICANT CHANGE UP (ref 0–41)
ANION GAP SERPL CALC-SCNC: 12 MMOL/L — SIGNIFICANT CHANGE UP (ref 7–14)
AST SERPL-CCNC: 16 U/L — SIGNIFICANT CHANGE UP (ref 0–41)
BILIRUB SERPL-MCNC: 0.4 MG/DL — SIGNIFICANT CHANGE UP (ref 0.2–1.2)
BUN SERPL-MCNC: 17 MG/DL — SIGNIFICANT CHANGE UP (ref 10–20)
CALCIUM SERPL-MCNC: 9.1 MG/DL — SIGNIFICANT CHANGE UP (ref 8.5–10.1)
CHLORIDE SERPL-SCNC: 100 MMOL/L — SIGNIFICANT CHANGE UP (ref 98–110)
CO2 SERPL-SCNC: 28 MMOL/L — SIGNIFICANT CHANGE UP (ref 17–32)
CREAT SERPL-MCNC: 1.2 MG/DL — SIGNIFICANT CHANGE UP (ref 0.7–1.5)
GLUCOSE SERPL-MCNC: 110 MG/DL — HIGH (ref 70–99)
HCT VFR BLD CALC: 32.5 % — LOW (ref 37–47)
HGB BLD-MCNC: 10.4 G/DL — LOW (ref 12–16)
MCHC RBC-ENTMCNC: 26.5 PG — LOW (ref 27–31)
MCHC RBC-ENTMCNC: 32 G/DL — SIGNIFICANT CHANGE UP (ref 32–37)
MCV RBC AUTO: 82.9 FL — SIGNIFICANT CHANGE UP (ref 81–99)
NRBC # BLD: 0 /100 WBCS — SIGNIFICANT CHANGE UP (ref 0–0)
PLATELET # BLD AUTO: 234 K/UL — SIGNIFICANT CHANGE UP (ref 130–400)
POTASSIUM SERPL-MCNC: 4.3 MMOL/L — SIGNIFICANT CHANGE UP (ref 3.5–5)
POTASSIUM SERPL-SCNC: 4.3 MMOL/L — SIGNIFICANT CHANGE UP (ref 3.5–5)
PROT SERPL-MCNC: 6.9 G/DL — SIGNIFICANT CHANGE UP (ref 6–8)
RBC # BLD: 3.92 M/UL — LOW (ref 4.2–5.4)
RBC # FLD: 14.1 % — SIGNIFICANT CHANGE UP (ref 11.5–14.5)
SODIUM SERPL-SCNC: 140 MMOL/L — SIGNIFICANT CHANGE UP (ref 135–146)
VANCOMYCIN TROUGH SERPL-MCNC: 17.7 UG/ML — HIGH (ref 5–10)
WBC # BLD: 7.36 K/UL — SIGNIFICANT CHANGE UP (ref 4.8–10.8)
WBC # FLD AUTO: 7.36 K/UL — SIGNIFICANT CHANGE UP (ref 4.8–10.8)

## 2020-05-20 RX ADMIN — Medication 20 MILLIGRAM(S): at 05:34

## 2020-05-20 RX ADMIN — APIXABAN 5 MILLIGRAM(S): 2.5 TABLET, FILM COATED ORAL at 05:34

## 2020-05-20 RX ADMIN — Medication 180 MILLIGRAM(S): at 05:34

## 2020-05-20 RX ADMIN — LORATADINE 10 MILLIGRAM(S): 10 TABLET ORAL at 11:20

## 2020-05-20 RX ADMIN — Medication 250 MILLIGRAM(S): at 05:34

## 2020-05-20 RX ADMIN — APIXABAN 5 MILLIGRAM(S): 2.5 TABLET, FILM COATED ORAL at 17:13

## 2020-05-20 RX ADMIN — Medication 1 TABLET(S): at 11:20

## 2020-05-20 RX ADMIN — ALBUTEROL 1 PUFF(S): 90 AEROSOL, METERED ORAL at 19:33

## 2020-05-20 RX ADMIN — Medication 1000 UNIT(S): at 11:20

## 2020-05-20 RX ADMIN — Medication 250 MILLIGRAM(S): at 17:30

## 2020-05-20 RX ADMIN — ALBUTEROL 1 PUFF(S): 90 AEROSOL, METERED ORAL at 07:27

## 2020-05-20 NOTE — PHYSICAL THERAPY INITIAL EVALUATION ADULT - ADDITIONAL COMMENTS
Pt lives alone in private home, +3 steps to enter, stays on 1st floor in recliner chair, +chair lift to 2nd floor if needed, uses SC

## 2020-05-20 NOTE — PHYSICAL THERAPY INITIAL EVALUATION ADULT - CRITERIA FOR SKILLED THERAPEUTIC INTERVENTIONS
therapy frequency/rehab potential/functional limitations in following categories/predicted duration of therapy intervention/impairments found

## 2020-05-20 NOTE — CHART NOTE - NSCHARTNOTEFT_GEN_A_CORE
Patient seen and examined throughout the course of the day.    Results of Labs/Imaging discussed as well as patient's plan.      -wbc 7    -hgb stable 10.4  -pt afebrile   -pt tender in left gluteal area  -continue IV abx  -case discussed with the attending   All patient's/family questions answered.  Patient and family encouraged to contact PA with any further issues. Patient seen and examined throughout the course of the day.    Results of Labs/Imaging discussed as well as patient's plan.      -wbc 7    -hgb stable 10.4  -pt afebrile   -pt tender in left gluteal area  -continue IV vanco, vanco trough tonight before 4 th dose   -case discussed with the attending   All patient's/family questions answered.  Patient and family encouraged to contact PA with any further issues.

## 2020-05-20 NOTE — PHYSICAL THERAPY INITIAL EVALUATION ADULT - PLANNED THERAPY INTERVENTIONS, PT EVAL
strengthening/bed mobility training/gait training/postural re-education/balance training/transfer training

## 2020-05-20 NOTE — PROGRESS NOTE ADULT - SUBJECTIVE AND OBJECTIVE BOX
Name: GIOVANNY BUCK  Age: 84y  Gender: Female    Pt was seen and examined.   c/o:  left buttock pain    Allergies:  aspirin (Other)  codeine (Other)  contrast media (iodine-based) (Other)  penicillin (Other)  sulfa drugs (Hives; Other)      PHYSICAL EXAM:    Vitals:  T(C): 37 (05-20-20 @ 21:35), Max: 37 (05-20-20 @ 21:35)  HR: 95 (05-20-20 @ 21:35) (92 - 98)  BP: 139/65 (05-20-20 @ 21:35) (134/61 - 139/65)  RR: 16 (05-20-20 @ 21:35) (16 - 16)  SpO2: --  Wt(kg): --Vital Signs Last 24 Hrs  T(C): 37 (20 May 2020 21:35), Max: 37 (20 May 2020 21:35)  T(F): 98.6 (20 May 2020 21:35), Max: 98.6 (20 May 2020 21:35)  HR: 95 (20 May 2020 21:35) (92 - 98)  BP: 139/65 (20 May 2020 21:35) (134/61 - 139/65)  BP(mean): --  RR: 16 (20 May 2020 21:35) (16 - 16)  SpO2: --      NECK: Supple, No JVD  CHEST/LUNG: CTA, B/L, No rales, rhonchi, wheezing, or rubs  HEART: S1,S2, N1 Regular rate and rhythm; No murmurs, rubs, or gallops  ABDOMEN: Soft, Nontender, Nondistended; Bowel sounds present  EXTREMITIES:  2+ Peripheral Pulses, No clubbing, cyanosis, or edema  L buttock eryth, induration      LABS:                        10.4   7.36  )-----------( 234      ( 20 May 2020 05:45 )             32.5     05-20    140  |  100  |  17  ----------------------------<  110<H>  4.3   |  28  |  1.2    Ca    9.1      20 May 2020 05:45    TPro  6.9  /  Alb  3.7  /  TBili  0.4  /  DBili  x   /  AST  16  /  ALT  8   /  AlkPhos  112  05-20    LIVER FUNCTIONS - ( 20 May 2020 05:45 )  Alb: 3.7 g/dL / Pro: 6.9 g/dL / ALK PHOS: 112 U/L / ALT: 8 U/L / AST: 16 U/L / GGT: x                 MEDICATIONS  (STANDING):  ALBUTerol    90 MICROgram(s) HFA Inhaler 1 Puff(s) Inhalation two times a day  apixaban 5 milliGRAM(s) Oral every 12 hours  cholecalciferol 1000 Unit(s) Oral daily  diltiazem    milliGRAM(s) Oral daily  furosemide    Tablet 20 milliGRAM(s) Oral <User Schedule>  lactobacillus acidophilus 1 Tablet(s) Oral daily  loratadine 10 milliGRAM(s) Oral daily  vancomycin  IVPB 1000 milliGRAM(s) IV Intermittent every 12 hours        RADIOLOGY & ADDITIONAL TESTS:    Imaging Personally Reviewed:  [ ] YES  [ ] NO    A/P:  Assessment and Plan:    Assessment:  · Assessment		  84y Female with extensive PMH significant for morbid obesity, anxiety, COPD, PVD, OA, atrial fibrillation (on eliquis), HTN, s/p excision of cystic mass of buttock on 08/2018 by Dr. Bean, presents with complaints of left gluteal pain, rubor, and induration x 6 months. Patient states that it has been progressively uncomfortable which prompted her to come to the ED for evaluation. She endorses purulent drainage from the indurated area. It is located kelly-rectally along the inferior portion of the gluteal cleft. She denies chest pain, shortness of breath, abdominal pain, changes in bowel movements, diarrhea or constipation, nausea or vomiting.      Problem/Plan - 1:  ·  Problem: Abscess.   Surgical Consult appreciated  cont abx for now, check Vanco trough  will start on Clindamycin 300mg po q8 in AM  will cont to monitor and hope to d/c in 1 to 2 days     Problem/Plan - 2:  ·  Problem: COPD (chronic obstructive pulmonary disease).  Plan: cont MDI  no evidence of exacerbation.     Problem/Plan - 3:  ·  Problem: Afib.  Plan: Cont med; On Eliquis 5mg.      Problem/Plan - 4:  ·  Problem: HTN (hypertension).  Plan: VS  cont meds.

## 2020-05-21 ENCOUNTER — TRANSCRIPTION ENCOUNTER (OUTPATIENT)
Age: 85
End: 2020-05-21

## 2020-05-21 LAB
ANION GAP SERPL CALC-SCNC: 11 MMOL/L — SIGNIFICANT CHANGE UP (ref 7–14)
BUN SERPL-MCNC: 22 MG/DL — HIGH (ref 10–20)
CALCIUM SERPL-MCNC: 8.9 MG/DL — SIGNIFICANT CHANGE UP (ref 8.5–10.1)
CHLORIDE SERPL-SCNC: 102 MMOL/L — SIGNIFICANT CHANGE UP (ref 98–110)
CO2 SERPL-SCNC: 27 MMOL/L — SIGNIFICANT CHANGE UP (ref 17–32)
CREAT SERPL-MCNC: 1.4 MG/DL — SIGNIFICANT CHANGE UP (ref 0.7–1.5)
GLUCOSE SERPL-MCNC: 100 MG/DL — HIGH (ref 70–99)
HCT VFR BLD CALC: 29.9 % — LOW (ref 37–47)
HGB BLD-MCNC: 9.5 G/DL — LOW (ref 12–16)
MCHC RBC-ENTMCNC: 26.2 PG — LOW (ref 27–31)
MCHC RBC-ENTMCNC: 31.8 G/DL — LOW (ref 32–37)
MCV RBC AUTO: 82.4 FL — SIGNIFICANT CHANGE UP (ref 81–99)
NRBC # BLD: 0 /100 WBCS — SIGNIFICANT CHANGE UP (ref 0–0)
PLATELET # BLD AUTO: 225 K/UL — SIGNIFICANT CHANGE UP (ref 130–400)
POTASSIUM SERPL-MCNC: 4.2 MMOL/L — SIGNIFICANT CHANGE UP (ref 3.5–5)
POTASSIUM SERPL-SCNC: 4.2 MMOL/L — SIGNIFICANT CHANGE UP (ref 3.5–5)
RBC # BLD: 3.63 M/UL — LOW (ref 4.2–5.4)
RBC # FLD: 14.2 % — SIGNIFICANT CHANGE UP (ref 11.5–14.5)
SODIUM SERPL-SCNC: 140 MMOL/L — SIGNIFICANT CHANGE UP (ref 135–146)
WBC # BLD: 7.8 K/UL — SIGNIFICANT CHANGE UP (ref 4.8–10.8)
WBC # FLD AUTO: 7.8 K/UL — SIGNIFICANT CHANGE UP (ref 4.8–10.8)

## 2020-05-21 RX ORDER — HYDROCORTISONE 1 %
1 OINTMENT (GRAM) TOPICAL THREE TIMES A DAY
Refills: 0 | Status: DISCONTINUED | OUTPATIENT
Start: 2020-05-21 | End: 2020-05-22

## 2020-05-21 RX ADMIN — APIXABAN 5 MILLIGRAM(S): 2.5 TABLET, FILM COATED ORAL at 17:10

## 2020-05-21 RX ADMIN — Medication 300 MILLIGRAM(S): at 21:03

## 2020-05-21 RX ADMIN — APIXABAN 5 MILLIGRAM(S): 2.5 TABLET, FILM COATED ORAL at 05:38

## 2020-05-21 RX ADMIN — Medication 300 MILLIGRAM(S): at 14:22

## 2020-05-21 RX ADMIN — Medication 180 MILLIGRAM(S): at 05:38

## 2020-05-21 RX ADMIN — ALBUTEROL 1 PUFF(S): 90 AEROSOL, METERED ORAL at 06:43

## 2020-05-21 RX ADMIN — Medication 650 MILLIGRAM(S): at 02:16

## 2020-05-21 RX ADMIN — ALBUTEROL 1 PUFF(S): 90 AEROSOL, METERED ORAL at 21:02

## 2020-05-21 RX ADMIN — Medication 1 APPLICATION(S): at 21:03

## 2020-05-21 RX ADMIN — Medication 5 MILLIGRAM(S): at 11:04

## 2020-05-21 RX ADMIN — Medication 1 TABLET(S): at 10:40

## 2020-05-21 RX ADMIN — Medication 300 MILLIGRAM(S): at 10:37

## 2020-05-21 RX ADMIN — LORATADINE 10 MILLIGRAM(S): 10 TABLET ORAL at 10:40

## 2020-05-21 RX ADMIN — Medication 1000 UNIT(S): at 11:04

## 2020-05-21 NOTE — CHART NOTE - NSCHARTNOTEFT_GEN_A_CORE
Patient seen and examined throughout the course of the day.    Results of Labs/Imaging discussed as well as patient's plan.      -pt afebrile, wbc 7.8  -vanco trough elevated yesterday 17.7 , IV vanco discontinued, pt started on PO clinda  -pt anticipated for discharge for tomorrow   All patient's/family questions answered.  Patient and family encouraged to contact PA with any further issues.

## 2020-05-21 NOTE — DISCHARGE NOTE PROVIDER - NSDCCPCAREPLAN_GEN_ALL_CORE_FT
PRINCIPAL DISCHARGE DIAGNOSIS  Diagnosis: Abscess  Assessment and Plan of Treatment:       SECONDARY DISCHARGE DIAGNOSES  Diagnosis: Cellulitis  Assessment and Plan of Treatment: chronic

## 2020-05-21 NOTE — PROGRESS NOTE ADULT - SUBJECTIVE AND OBJECTIVE BOX
Name: GIOVANNY BUCK  Age: 84y  Gender: Female    Pt was seen and examined.   c/o:  feels much better she says.    Allergies:  aspirin (Other)  codeine (Other)  contrast media (iodine-based) (Other)  penicillin (Other)  sulfa drugs (Hives; Other)      PHYSICAL EXAM:    Vitals:  T(C): 36.7 (05-21-20 @ 20:40), Max: 36.7 (05-21-20 @ 20:40)  HR: 89 (05-21-20 @ 20:40) (89 - 92)  BP: 135/71 (05-21-20 @ 20:40) (131/62 - 135/71)  RR: 16 (05-21-20 @ 20:40) (16 - 16)  SpO2: 97% (05-21-20 @ 06:47) (97% - 97%)  Wt(kg): --Vital Signs Last 24 Hrs  T(C): 36.7 (21 May 2020 20:40), Max: 36.7 (21 May 2020 20:40)  T(F): 98.1 (21 May 2020 20:40), Max: 98.1 (21 May 2020 20:40)  HR: 89 (21 May 2020 20:40) (89 - 92)  BP: 135/71 (21 May 2020 20:40) (131/62 - 135/71)  BP(mean): --  RR: 16 (21 May 2020 20:40) (16 - 16)  SpO2: 97% (21 May 2020 06:47) (97% - 97%)      NECK: Supple, No JVD  CHEST/LUNG: CTA, B/L, No rales, rhonchi, wheezing, or rubs  HEART: S1,S2, N1 Regular rate and rhythm; No murmurs, rubs, or gallops  ABDOMEN: Soft, Nontender, Nondistended; Bowel sounds present  EXTREMITIES:  2+ Peripheral Pulses, No clubbing, cyanosis, or edema      LABS:                        9.5    7.80  )-----------( 225      ( 21 May 2020 06:05 )             29.9     05-21    140  |  102  |  22<H>  ----------------------------<  100<H>  4.2   |  27  |  1.4    Ca    8.9      21 May 2020 06:05    TPro  6.9  /  Alb  3.7  /  TBili  0.4  /  DBili  x   /  AST  16  /  ALT  8   /  AlkPhos  112  05-20    LIVER FUNCTIONS - ( 20 May 2020 05:45 )  Alb: 3.7 g/dL / Pro: 6.9 g/dL / ALK PHOS: 112 U/L / ALT: 8 U/L / AST: 16 U/L / GGT: x                 MEDICATIONS  (STANDING):  ALBUTerol    90 MICROgram(s) HFA Inhaler 1 Puff(s) Inhalation two times a day  apixaban 5 milliGRAM(s) Oral every 12 hours  cholecalciferol 1000 Unit(s) Oral daily  clindamycin   Capsule 300 milliGRAM(s) Oral every 8 hours  diltiazem    milliGRAM(s) Oral daily  furosemide    Tablet 20 milliGRAM(s) Oral <User Schedule>  hydrocortisone 1% Cream 1 Application(s) Topical three times a day  lactobacillus acidophilus 1 Tablet(s) Oral daily  loratadine 10 milliGRAM(s) Oral daily        RADIOLOGY & ADDITIONAL TESTS:    Imaging Personally Reviewed:  [ ] YES  [ ] NO    A/P:  Assessment and Plan:    Assessment:  · Assessment		  84y Female with extensive PMH significant for morbid obesity, anxiety, COPD, PVD, OA, atrial fibrillation (on eliquis), HTN, s/p excision of cystic mass of buttock on 08/2018 by Dr. Bean, presents with complaints of left gluteal pain, rubor, and induration x 6 months. Patient states that it has been progressively uncomfortable which prompted her to come to the ED for evaluation. She endorses purulent drainage from the indurated area. It is located kelly-rectally along the inferior portion of the gluteal cleft. She denies chest pain, shortness of breath, abdominal pain, changes in bowel movements, diarrhea or constipation, nausea or vomiting.      Problem/Plan - 1:  ·  Problem: Abscess.   Surgical Consult appreciated  cont abx for now, check Vanco trough  will start on Clindamycin 300mg po q8 in AM  will cont to monitor and hope to d/c in 1 to 2 days     Problem/Plan - 2:  ·  Problem: COPD (chronic obstructive pulmonary disease).  Plan: cont MDI  no evidence of exacerbation.     Problem/Plan - 3:  ·  Problem: Afib.  Plan: Cont med; On Eliquis 5mg.      Problem/Plan - 4:  ·  Problem: HTN (hypertension).  Plan: VS  cont meds.     Case d/w pt and her daughter at length, will d/c in AM.

## 2020-05-21 NOTE — DISCHARGE NOTE PROVIDER - HOSPITAL COURSE
84y Female with extensive PMH significant for morbid obesity, anxiety, COPD, PVD, OA, atrial fibrillation (on eliquis), HTN, s/p excision of cystic mass of buttock on 08/2018 by Dr. Bean, presents with complaints of left gluteal pain, rubor, and induration x 6 months. Patient states that it has been progressively uncomfortable which prompted her to come to the ED for evaluation. She endorses purulent drainage from the indurated area. It is located kelly-rectally along the inferior portion of the gluteal cleft. She denies chest pain, shortness of breath, abdominal pain, changes in bowel movements, diarrhea or constipation, nausea or vomiting.     Pt was seen by surgery - no acute surgical intervention     pt to follow up with surgery as outpt with Dr. Bean    Pt was treated with IV vanco then switched to PO clinda , pt given acidophilus with abx    pt seen by PT     pt to follow up with PCP as outpt     continue sitz bath at home     pt afebrile, wbc wnl

## 2020-05-21 NOTE — DISCHARGE NOTE PROVIDER - PROVIDER TOKENS
PROVIDER:[TOKEN:[[id number]],FOLLOWUP:[1 week]],PROVIDER:[TOKEN:[[id number]],FOLLOWUP:[1 week]]

## 2020-05-21 NOTE — DISCHARGE NOTE PROVIDER - NSDCMRMEDTOKEN_GEN_ALL_CORE_FT
Align 4 mg oral capsule: 1 cap(s) orally once a day  amiodarone 100 mg oral tablet:   dilTIAZem 180 mg/24 hours oral capsule, extended release: 1 cap(s) orally once a day  furosemide 20 mg oral tablet: 1 tab(s) orally once a day  ProAir HFA 90 mcg/inh inhalation aerosol: 2 puff(s) inhaled 2 times a day  Vitamin D3 5000 intl units oral capsule: 1 cap(s) orally once a day Align 4 mg oral capsule: 1 cap(s) orally once a day  apixaban 5 mg oral tablet: 1 tab(s) orally every 12 hours  clindamycin 300 mg oral capsule: 1 cap(s) orally every 8 hours  dilTIAZem 180 mg/24 hours oral capsule, extended release: 1 cap(s) orally once a day  furosemide 20 mg oral tablet: 1 tab(s) orally once a day  ProAir HFA 90 mcg/inh inhalation aerosol: 2 puff(s) inhaled 2 times a day  Vitamin D3 5000 intl units oral capsule: 1 cap(s) orally once a day

## 2020-05-21 NOTE — DISCHARGE NOTE PROVIDER - CARE PROVIDER_API CALL
John Lezama [facility]  [address] [place] [address]  Phone: [phone]  Fax: [phone]  Follow Up Time: 1 week    Nohemi Bean  Surgery  [address] [place] [address]  Phone: [phone]  Fax: [phone]  Follow Up Time: 1 week

## 2020-05-22 ENCOUNTER — TRANSCRIPTION ENCOUNTER (OUTPATIENT)
Age: 85
End: 2020-05-22

## 2020-05-22 VITALS
RESPIRATION RATE: 16 BRPM | SYSTOLIC BLOOD PRESSURE: 139 MMHG | DIASTOLIC BLOOD PRESSURE: 74 MMHG | TEMPERATURE: 97 F | HEART RATE: 92 BPM

## 2020-05-22 LAB
ANION GAP SERPL CALC-SCNC: 11 MMOL/L — SIGNIFICANT CHANGE UP (ref 7–14)
BUN SERPL-MCNC: 22 MG/DL — HIGH (ref 10–20)
CALCIUM SERPL-MCNC: 8.8 MG/DL — SIGNIFICANT CHANGE UP (ref 8.5–10.1)
CHLORIDE SERPL-SCNC: 102 MMOL/L — SIGNIFICANT CHANGE UP (ref 98–110)
CO2 SERPL-SCNC: 27 MMOL/L — SIGNIFICANT CHANGE UP (ref 17–32)
CREAT SERPL-MCNC: 1.1 MG/DL — SIGNIFICANT CHANGE UP (ref 0.7–1.5)
GLUCOSE SERPL-MCNC: 104 MG/DL — HIGH (ref 70–99)
HCT VFR BLD CALC: 30.1 % — LOW (ref 37–47)
HGB BLD-MCNC: 9.5 G/DL — LOW (ref 12–16)
MCHC RBC-ENTMCNC: 26 PG — LOW (ref 27–31)
MCHC RBC-ENTMCNC: 31.6 G/DL — LOW (ref 32–37)
MCV RBC AUTO: 82.5 FL — SIGNIFICANT CHANGE UP (ref 81–99)
NRBC # BLD: 0 /100 WBCS — SIGNIFICANT CHANGE UP (ref 0–0)
PLATELET # BLD AUTO: 210 K/UL — SIGNIFICANT CHANGE UP (ref 130–400)
POTASSIUM SERPL-MCNC: 4.3 MMOL/L — SIGNIFICANT CHANGE UP (ref 3.5–5)
POTASSIUM SERPL-SCNC: 4.3 MMOL/L — SIGNIFICANT CHANGE UP (ref 3.5–5)
RBC # BLD: 3.65 M/UL — LOW (ref 4.2–5.4)
RBC # FLD: 14.1 % — SIGNIFICANT CHANGE UP (ref 11.5–14.5)
SODIUM SERPL-SCNC: 140 MMOL/L — SIGNIFICANT CHANGE UP (ref 135–146)
WBC # BLD: 7.01 K/UL — SIGNIFICANT CHANGE UP (ref 4.8–10.8)
WBC # FLD AUTO: 7.01 K/UL — SIGNIFICANT CHANGE UP (ref 4.8–10.8)

## 2020-05-22 RX ORDER — APIXABAN 2.5 MG/1
1 TABLET, FILM COATED ORAL
Qty: 0 | Refills: 0 | DISCHARGE
Start: 2020-05-22

## 2020-05-22 RX ORDER — AMIODARONE HYDROCHLORIDE 400 MG/1
0 TABLET ORAL
Qty: 0 | Refills: 0 | DISCHARGE

## 2020-05-22 RX ADMIN — Medication 180 MILLIGRAM(S): at 05:32

## 2020-05-22 RX ADMIN — Medication 300 MILLIGRAM(S): at 05:32

## 2020-05-22 RX ADMIN — ALBUTEROL 1 PUFF(S): 90 AEROSOL, METERED ORAL at 10:00

## 2020-05-22 RX ADMIN — Medication 20 MILLIGRAM(S): at 05:32

## 2020-05-22 RX ADMIN — APIXABAN 5 MILLIGRAM(S): 2.5 TABLET, FILM COATED ORAL at 05:32

## 2020-05-22 RX ADMIN — Medication 1 APPLICATION(S): at 05:32

## 2020-05-22 NOTE — CHART NOTE - NSCHARTNOTEFT_GEN_A_CORE
Patient seen and examined this morning.    Results of Labs/Imaging discussed as well as patient's plan.      -pt afebrile, wbc 7.1  -patient tolerating PO Clindamycin  -discharge today will outpatient PMD and general surgery follow up  All patient's/family questions answered.  Patient and family encouraged to contact PA with any further issues. PA notified to discharge patient as per attending.  Case discussed with Dr. Lezama  Patient stable no changes.  Patient agreeable to discharge and family notified.   Patient discharged with PO Clindamycin.  Outpatient PMD and general surgery follow up.        T(C): 36.3 (05-22-20 @ 05:53), Max: 36.7 (05-21-20 @ 20:40)  HR: 92 (05-22-20 @ 05:53) (89 - 92)  BP: 139/74 (05-22-20 @ 05:53) (133/77 - 139/74)  RR: 16 (05-22-20 @ 05:53) (16 - 16)  SpO2: --        Patient discharged home. RN aware and will manage.

## 2020-05-22 NOTE — DISCHARGE NOTE NURSING/CASE MANAGEMENT/SOCIAL WORK - PATIENT PORTAL LINK FT
You can access the FollowMyHealth Patient Portal offered by [facility] by registering at the following website: [email or web address]. By joining [facility]’s FollowMyHealth portal, you will also be able to view your health information using other applications (apps) compatible with our system.

## 2020-05-24 LAB
CULTURE RESULTS: SIGNIFICANT CHANGE UP
CULTURE RESULTS: SIGNIFICANT CHANGE UP
SPECIMEN SOURCE: SIGNIFICANT CHANGE UP
SPECIMEN SOURCE: SIGNIFICANT CHANGE UP

## 2020-05-26 DIAGNOSIS — E66.01 MORBID (SEVERE) OBESITY DUE TO EXCESS CALORIES: ICD-10-CM

## 2020-05-26 DIAGNOSIS — Z90.710 ACQUIRED ABSENCE OF BOTH CERVIX AND UTERUS: ICD-10-CM

## 2020-05-26 DIAGNOSIS — Z88.5 ALLERGY STATUS TO NARCOTIC AGENT: ICD-10-CM

## 2020-05-26 DIAGNOSIS — J44.9 CHRONIC OBSTRUCTIVE PULMONARY DISEASE, UNSPECIFIED: ICD-10-CM

## 2020-05-26 DIAGNOSIS — Z91.041 RADIOGRAPHIC DYE ALLERGY STATUS: ICD-10-CM

## 2020-05-26 DIAGNOSIS — L02.31 CUTANEOUS ABSCESS OF BUTTOCK: ICD-10-CM

## 2020-05-26 DIAGNOSIS — M19.90 UNSPECIFIED OSTEOARTHRITIS, UNSPECIFIED SITE: ICD-10-CM

## 2020-05-26 DIAGNOSIS — I10 ESSENTIAL (PRIMARY) HYPERTENSION: ICD-10-CM

## 2020-05-26 DIAGNOSIS — Z96.653 PRESENCE OF ARTIFICIAL KNEE JOINT, BILATERAL: ICD-10-CM

## 2020-05-26 DIAGNOSIS — Z88.0 ALLERGY STATUS TO PENICILLIN: ICD-10-CM

## 2020-05-26 DIAGNOSIS — I73.9 PERIPHERAL VASCULAR DISEASE, UNSPECIFIED: ICD-10-CM

## 2020-05-26 DIAGNOSIS — Z79.01 LONG TERM (CURRENT) USE OF ANTICOAGULANTS: ICD-10-CM

## 2020-05-26 DIAGNOSIS — Z88.6 ALLERGY STATUS TO ANALGESIC AGENT: ICD-10-CM

## 2020-05-26 DIAGNOSIS — I48.91 UNSPECIFIED ATRIAL FIBRILLATION: ICD-10-CM

## 2020-05-26 DIAGNOSIS — Z88.2 ALLERGY STATUS TO SULFONAMIDES: ICD-10-CM

## 2020-05-26 DIAGNOSIS — F41.9 ANXIETY DISORDER, UNSPECIFIED: ICD-10-CM

## 2020-06-23 NOTE — DISCHARGE NOTE PROVIDER - NSDCQMSTROKE_NEU_ALL_CORE
Received call from BOBBI Nunez asking after patient's visit today with Dr. Patterson should patient continue with patient's warfarin, reviewed Dr. Patterson's office note and yes patient should continue. Conveyed to Mayra to please contact clinic if patient has another nose bleed.    No

## 2020-07-14 NOTE — BRIEF OPERATIVE NOTE - OPERATION/FINDINGS
excision cystic mass of buttock will pus Simple / Intermediate / Complex Repair - Final Wound Length In Cm: 4

## 2020-10-12 ENCOUNTER — APPOINTMENT (OUTPATIENT)
Dept: VASCULAR SURGERY | Facility: CLINIC | Age: 85
End: 2020-10-12
Payer: MEDICARE

## 2020-10-12 VITALS
DIASTOLIC BLOOD PRESSURE: 83 MMHG | SYSTOLIC BLOOD PRESSURE: 132 MMHG | TEMPERATURE: 97.5 F | HEIGHT: 62 IN | WEIGHT: 252 LBS | BODY MASS INDEX: 46.38 KG/M2

## 2020-10-12 DIAGNOSIS — Z87.891 PERSONAL HISTORY OF NICOTINE DEPENDENCE: ICD-10-CM

## 2020-10-12 PROCEDURE — 99203 OFFICE O/P NEW LOW 30 MIN: CPT

## 2020-10-12 RX ORDER — MUPIROCIN 20 MG/G
2 OINTMENT TOPICAL
Qty: 66 | Refills: 0 | Status: DISCONTINUED | COMMUNITY
Start: 2020-10-11

## 2020-10-12 RX ORDER — TRIAMCINOLONE ACETONIDE 1 MG/G
0.1 OINTMENT TOPICAL
Qty: 80 | Refills: 0 | Status: DISCONTINUED | COMMUNITY
Start: 2020-10-11

## 2020-10-12 RX ORDER — DOXYCYCLINE HYCLATE 100 MG/1
100 CAPSULE ORAL
Qty: 60 | Refills: 0 | Status: DISCONTINUED | COMMUNITY
Start: 2020-08-07

## 2020-10-12 RX ORDER — LIDOCAINE AND PRILOCAINE 25; 25 MG/G; MG/G
2.5-2.5 CREAM TOPICAL
Qty: 60 | Refills: 0 | Status: DISCONTINUED | COMMUNITY
Start: 2020-10-12

## 2020-10-12 RX ORDER — CLINDAMYCIN PHOSPHATE 10 MG/ML
1 SOLUTION TOPICAL
Qty: 60 | Refills: 0 | Status: DISCONTINUED | COMMUNITY
Start: 2020-10-12 | End: 2020-10-12

## 2020-10-12 RX ORDER — NYSTATIN 100000 [USP'U]/G
100000 CREAM TOPICAL
Qty: 30 | Refills: 0 | Status: DISCONTINUED | COMMUNITY
Start: 2020-05-07

## 2020-10-12 NOTE — ASSESSMENT
[FreeTextEntry1] : 86 y/o female with h/o left buttock abscess, drained by Dr. Bean 2 years ago, never healed and now developed new lesions around the left buttock, also has h/o chronic lymphedema, and now with draining wounds.\par \par I am referring her to [facility] for daily wound care with Mupirocin, Adaptic, Kerlix and ace bandage daily. I am also referring her to Dr. Saeed and Dr. Glass (Colorectal surgeon) for further evaluation and treatment of the left buttock chronic abscess.\par \par I will see her back in 4 weeks for follow up of the lower extremity wounds.

## 2020-10-12 NOTE — HISTORY OF PRESENT ILLNESS
[FreeTextEntry1] : 84 y/o female with h/o left buttock abscess, drained by Dr. Bean 2 years ago, never healed and now developed new lesions around the left buttock, also has h/o chronic lymphedema, and now with draining wounds.

## 2020-10-29 ENCOUNTER — OUTPATIENT (OUTPATIENT)
Dept: OUTPATIENT SERVICES | Facility: HOSPITAL | Age: 85
LOS: 1 days | Discharge: HOME | End: 2020-10-29

## 2020-10-29 ENCOUNTER — APPOINTMENT (OUTPATIENT)
Dept: BURN CARE | Facility: CLINIC | Age: 85
End: 2020-10-29
Payer: MEDICARE

## 2020-10-29 DIAGNOSIS — Z90.710 ACQUIRED ABSENCE OF BOTH CERVIX AND UTERUS: Chronic | ICD-10-CM

## 2020-10-29 DIAGNOSIS — Z98.890 OTHER SPECIFIED POSTPROCEDURAL STATES: Chronic | ICD-10-CM

## 2020-10-29 DIAGNOSIS — Z96.653 PRESENCE OF ARTIFICIAL KNEE JOINT, BILATERAL: Chronic | ICD-10-CM

## 2020-10-29 PROCEDURE — 99202 OFFICE O/P NEW SF 15 MIN: CPT

## 2020-11-01 LAB — BACTERIA SPEC CULT: ABNORMAL

## 2020-11-12 ENCOUNTER — APPOINTMENT (OUTPATIENT)
Dept: BURN CARE | Facility: CLINIC | Age: 85
End: 2020-11-12
Payer: MEDICARE

## 2020-11-12 ENCOUNTER — OUTPATIENT (OUTPATIENT)
Dept: OUTPATIENT SERVICES | Facility: HOSPITAL | Age: 85
LOS: 1 days | Discharge: HOME | End: 2020-11-12

## 2020-11-12 DIAGNOSIS — Z96.653 PRESENCE OF ARTIFICIAL KNEE JOINT, BILATERAL: Chronic | ICD-10-CM

## 2020-11-12 DIAGNOSIS — Z90.710 ACQUIRED ABSENCE OF BOTH CERVIX AND UTERUS: Chronic | ICD-10-CM

## 2020-11-12 DIAGNOSIS — Z98.890 OTHER SPECIFIED POSTPROCEDURAL STATES: Chronic | ICD-10-CM

## 2020-11-12 PROCEDURE — 99213 OFFICE O/P EST LOW 20 MIN: CPT | Mod: 25

## 2020-11-12 PROCEDURE — 16025 DRESS/DEBRID P-THICK BURN M: CPT

## 2020-11-14 LAB — BACTERIA SPEC CULT: ABNORMAL

## 2020-11-23 ENCOUNTER — APPOINTMENT (OUTPATIENT)
Dept: VASCULAR SURGERY | Facility: CLINIC | Age: 85
End: 2020-11-23
Payer: MEDICARE

## 2020-11-23 VITALS — TEMPERATURE: 97.6 F

## 2020-11-23 PROCEDURE — 99212 OFFICE O/P EST SF 10 MIN: CPT

## 2020-11-23 NOTE — ASSESSMENT
[FreeTextEntry1] : 86 y/o female with h/o chronic left buttock abscess, drained by Dr. Saeed few weeks ago, has bilateral lower extremity lymphedema with draining wounds, has [facility] for wound care daily and the wounds are almost healed.\par \par She should continue on daily wound care until the wounds are completely healed.\par \par I will see her back in 6 months time for follow up.

## 2020-11-23 NOTE — HISTORY OF PRESENT ILLNESS
[FreeTextEntry1] : 86 y/o female with h/o chronic left buttock abscess, drained by Dr. Saeed few weeks ago, has bilateral lower extremity lymphedema with draining wounds, has [facility] for wound care daily and the wounds are almost healed.

## 2020-12-03 ENCOUNTER — APPOINTMENT (OUTPATIENT)
Dept: BURN CARE | Facility: CLINIC | Age: 85
End: 2020-12-03
Payer: MEDICARE

## 2020-12-03 ENCOUNTER — OUTPATIENT (OUTPATIENT)
Dept: OUTPATIENT SERVICES | Facility: HOSPITAL | Age: 85
LOS: 1 days | Discharge: HOME | End: 2020-12-03

## 2020-12-03 DIAGNOSIS — Z98.890 OTHER SPECIFIED POSTPROCEDURAL STATES: Chronic | ICD-10-CM

## 2020-12-03 DIAGNOSIS — Z96.653 PRESENCE OF ARTIFICIAL KNEE JOINT, BILATERAL: Chronic | ICD-10-CM

## 2020-12-03 DIAGNOSIS — Z90.710 ACQUIRED ABSENCE OF BOTH CERVIX AND UTERUS: Chronic | ICD-10-CM

## 2020-12-03 PROCEDURE — 99213 OFFICE O/P EST LOW 20 MIN: CPT | Mod: 25

## 2020-12-03 PROCEDURE — 97597 DBRDMT OPN WND 1ST 20 CM/<: CPT

## 2020-12-08 LAB — BACTERIA SPEC CULT: ABNORMAL
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
## 2020-12-09 DIAGNOSIS — S31.829A UNSPECIFIED OPEN WOUND OF LEFT BUTTOCK, INITIAL ENCOUNTER: ICD-10-CM
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
## 2021-01-07 ENCOUNTER — APPOINTMENT (OUTPATIENT)
Dept: BURN CARE | Facility: CLINIC | Age: 86
End: 2021-01-07
Payer: MEDICARE

## 2021-01-07 ENCOUNTER — OUTPATIENT (OUTPATIENT)
Dept: OUTPATIENT SERVICES | Facility: HOSPITAL | Age: 86
LOS: 1 days | Discharge: HOME | End: 2021-01-07

## 2021-01-07 DIAGNOSIS — Z98.890 OTHER SPECIFIED POSTPROCEDURAL STATES: Chronic | ICD-10-CM

## 2021-01-07 DIAGNOSIS — Z96.653 PRESENCE OF ARTIFICIAL KNEE JOINT, BILATERAL: Chronic | ICD-10-CM

## 2021-01-07 DIAGNOSIS — Z90.710 ACQUIRED ABSENCE OF BOTH CERVIX AND UTERUS: Chronic | ICD-10-CM

## 2021-01-07 PROCEDURE — 97597 DBRDMT OPN WND 1ST 20 CM/<: CPT

## 2021-01-07 PROCEDURE — 99213 OFFICE O/P EST LOW 20 MIN: CPT | Mod: 25

## 2021-01-09 LAB — BACTERIA SPEC CULT: NORMAL

## 2021-02-04 ENCOUNTER — OUTPATIENT (OUTPATIENT)
Dept: OUTPATIENT SERVICES | Facility: HOSPITAL | Age: 86
LOS: 1 days | Discharge: HOME | End: 2021-02-04

## 2021-02-04 ENCOUNTER — APPOINTMENT (OUTPATIENT)
Dept: BURN CARE | Facility: CLINIC | Age: 86
End: 2021-02-04
Payer: MEDICARE

## 2021-02-04 DIAGNOSIS — Z90.710 ACQUIRED ABSENCE OF BOTH CERVIX AND UTERUS: Chronic | ICD-10-CM

## 2021-02-04 DIAGNOSIS — Z98.890 OTHER SPECIFIED POSTPROCEDURAL STATES: Chronic | ICD-10-CM

## 2021-02-04 DIAGNOSIS — Z96.653 PRESENCE OF ARTIFICIAL KNEE JOINT, BILATERAL: Chronic | ICD-10-CM

## 2021-02-04 PROCEDURE — 99213 OFFICE O/P EST LOW 20 MIN: CPT | Mod: 25

## 2021-02-04 PROCEDURE — 97597 DBRDMT OPN WND 1ST 20 CM/<: CPT

## 2021-02-06 LAB — BACTERIA SPEC CULT: ABNORMAL

## 2021-02-07 ENCOUNTER — INPATIENT (INPATIENT)
Facility: HOSPITAL | Age: 86
LOS: 15 days | Discharge: HOME HEALTH | End: 2021-02-23
Attending: PLASTIC SURGERY | Admitting: PLASTIC SURGERY
Payer: MEDICARE

## 2021-02-07 VITALS
HEART RATE: 98 BPM | SYSTOLIC BLOOD PRESSURE: 137 MMHG | HEIGHT: 62 IN | DIASTOLIC BLOOD PRESSURE: 65 MMHG | WEIGHT: 248.02 LBS | OXYGEN SATURATION: 99 % | RESPIRATION RATE: 20 BRPM | TEMPERATURE: 98 F

## 2021-02-07 DIAGNOSIS — L72.0 EPIDERMAL CYST: ICD-10-CM

## 2021-02-07 DIAGNOSIS — I10 ESSENTIAL (PRIMARY) HYPERTENSION: ICD-10-CM

## 2021-02-07 DIAGNOSIS — L02.31 CUTANEOUS ABSCESS OF BUTTOCK: ICD-10-CM

## 2021-02-07 DIAGNOSIS — Z96.653 PRESENCE OF ARTIFICIAL KNEE JOINT, BILATERAL: Chronic | ICD-10-CM

## 2021-02-07 DIAGNOSIS — Z79.01 LONG TERM (CURRENT) USE OF ANTICOAGULANTS: ICD-10-CM

## 2021-02-07 DIAGNOSIS — E83.42 HYPOMAGNESEMIA: ICD-10-CM

## 2021-02-07 DIAGNOSIS — Z88.2 ALLERGY STATUS TO SULFONAMIDES: ICD-10-CM

## 2021-02-07 DIAGNOSIS — Z90.710 ACQUIRED ABSENCE OF BOTH CERVIX AND UTERUS: Chronic | ICD-10-CM

## 2021-02-07 DIAGNOSIS — I34.0 NONRHEUMATIC MITRAL (VALVE) INSUFFICIENCY: ICD-10-CM

## 2021-02-07 DIAGNOSIS — Z88.0 ALLERGY STATUS TO PENICILLIN: ICD-10-CM

## 2021-02-07 DIAGNOSIS — E04.9 NONTOXIC GOITER, UNSPECIFIED: ICD-10-CM

## 2021-02-07 DIAGNOSIS — Z88.5 ALLERGY STATUS TO NARCOTIC AGENT: ICD-10-CM

## 2021-02-07 DIAGNOSIS — R19.7 DIARRHEA, UNSPECIFIED: ICD-10-CM

## 2021-02-07 DIAGNOSIS — Z96.653 PRESENCE OF ARTIFICIAL KNEE JOINT, BILATERAL: ICD-10-CM

## 2021-02-07 DIAGNOSIS — Z88.6 ALLERGY STATUS TO ANALGESIC AGENT: ICD-10-CM

## 2021-02-07 DIAGNOSIS — K59.00 CONSTIPATION, UNSPECIFIED: ICD-10-CM

## 2021-02-07 DIAGNOSIS — E66.01 MORBID (SEVERE) OBESITY DUE TO EXCESS CALORIES: ICD-10-CM

## 2021-02-07 DIAGNOSIS — J44.9 CHRONIC OBSTRUCTIVE PULMONARY DISEASE, UNSPECIFIED: ICD-10-CM

## 2021-02-07 DIAGNOSIS — B96.6 BACTEROIDES FRAGILIS [B. FRAGILIS] AS THE CAUSE OF DISEASES CLASSIFIED ELSEWHERE: ICD-10-CM

## 2021-02-07 DIAGNOSIS — F41.9 ANXIETY DISORDER, UNSPECIFIED: ICD-10-CM

## 2021-02-07 DIAGNOSIS — Z98.890 OTHER SPECIFIED POSTPROCEDURAL STATES: Chronic | ICD-10-CM

## 2021-02-07 DIAGNOSIS — Z87.891 PERSONAL HISTORY OF NICOTINE DEPENDENCE: ICD-10-CM

## 2021-02-07 DIAGNOSIS — I48.92 UNSPECIFIED ATRIAL FLUTTER: ICD-10-CM

## 2021-02-07 DIAGNOSIS — I48.91 UNSPECIFIED ATRIAL FIBRILLATION: ICD-10-CM

## 2021-02-07 DIAGNOSIS — Z90.710 ACQUIRED ABSENCE OF BOTH CERVIX AND UTERUS: ICD-10-CM

## 2021-02-07 DIAGNOSIS — I73.9 PERIPHERAL VASCULAR DISEASE, UNSPECIFIED: ICD-10-CM

## 2021-02-07 DIAGNOSIS — H02.531 EYELID RETRACTION RIGHT UPPER EYELID: ICD-10-CM

## 2021-02-07 DIAGNOSIS — M19.90 UNSPECIFIED OSTEOARTHRITIS, UNSPECIFIED SITE: ICD-10-CM

## 2021-02-07 DIAGNOSIS — Z91.041 RADIOGRAPHIC DYE ALLERGY STATUS: ICD-10-CM

## 2021-02-07 DIAGNOSIS — T14.90XA INJURY, UNSPECIFIED, INITIAL ENCOUNTER: ICD-10-CM

## 2021-02-07 LAB
ALBUMIN SERPL ELPH-MCNC: 3.4 G/DL — LOW (ref 3.5–5.2)
ALP SERPL-CCNC: 129 U/L — HIGH (ref 30–115)
ALT FLD-CCNC: 8 U/L — SIGNIFICANT CHANGE UP (ref 0–41)
ANION GAP SERPL CALC-SCNC: 11 MMOL/L — SIGNIFICANT CHANGE UP (ref 7–14)
APTT BLD: 32.7 SEC — SIGNIFICANT CHANGE UP (ref 27–39.2)
AST SERPL-CCNC: 15 U/L — SIGNIFICANT CHANGE UP (ref 0–41)
BASOPHILS # BLD AUTO: 0.03 K/UL — SIGNIFICANT CHANGE UP (ref 0–0.2)
BASOPHILS NFR BLD AUTO: 0.3 % — SIGNIFICANT CHANGE UP (ref 0–1)
BILIRUB SERPL-MCNC: 0.5 MG/DL — SIGNIFICANT CHANGE UP (ref 0.2–1.2)
BLD GP AB SCN SERPL QL: SIGNIFICANT CHANGE UP
BLD GP AB SCN SERPL QL: SIGNIFICANT CHANGE UP
BUN SERPL-MCNC: 19 MG/DL — SIGNIFICANT CHANGE UP (ref 10–20)
CALCIUM SERPL-MCNC: 8.7 MG/DL — SIGNIFICANT CHANGE UP (ref 8.5–10.1)
CHLORIDE SERPL-SCNC: 100 MMOL/L — SIGNIFICANT CHANGE UP (ref 98–110)
CO2 SERPL-SCNC: 25 MMOL/L — SIGNIFICANT CHANGE UP (ref 17–32)
CREAT SERPL-MCNC: 1.2 MG/DL — SIGNIFICANT CHANGE UP (ref 0.7–1.5)
EOSINOPHIL # BLD AUTO: 0.06 K/UL — SIGNIFICANT CHANGE UP (ref 0–0.7)
EOSINOPHIL NFR BLD AUTO: 0.6 % — SIGNIFICANT CHANGE UP (ref 0–8)
GLUCOSE SERPL-MCNC: 99 MG/DL — SIGNIFICANT CHANGE UP (ref 70–99)
HCT VFR BLD CALC: 29.6 % — LOW (ref 37–47)
HGB BLD-MCNC: 8.9 G/DL — LOW (ref 12–16)
IMM GRANULOCYTES NFR BLD AUTO: 0.5 % — HIGH (ref 0.1–0.3)
INR BLD: 1.23 RATIO — SIGNIFICANT CHANGE UP (ref 0.65–1.3)
LYMPHOCYTES # BLD AUTO: 1.07 K/UL — LOW (ref 1.2–3.4)
LYMPHOCYTES # BLD AUTO: 11.5 % — LOW (ref 20.5–51.1)
MAGNESIUM SERPL-MCNC: 1.9 MG/DL — SIGNIFICANT CHANGE UP (ref 1.8–2.4)
MCHC RBC-ENTMCNC: 24.1 PG — LOW (ref 27–31)
MCHC RBC-ENTMCNC: 30.1 G/DL — LOW (ref 32–37)
MCV RBC AUTO: 80 FL — LOW (ref 81–99)
MONOCYTES # BLD AUTO: 0.72 K/UL — HIGH (ref 0.1–0.6)
MONOCYTES NFR BLD AUTO: 7.8 % — SIGNIFICANT CHANGE UP (ref 1.7–9.3)
NEUTROPHILS # BLD AUTO: 7.36 K/UL — HIGH (ref 1.4–6.5)
NEUTROPHILS NFR BLD AUTO: 79.3 % — HIGH (ref 42.2–75.2)
NRBC # BLD: 0 /100 WBCS — SIGNIFICANT CHANGE UP (ref 0–0)
PLATELET # BLD AUTO: 242 K/UL — SIGNIFICANT CHANGE UP (ref 130–400)
POTASSIUM SERPL-MCNC: 4.1 MMOL/L — SIGNIFICANT CHANGE UP (ref 3.5–5)
POTASSIUM SERPL-SCNC: 4.1 MMOL/L — SIGNIFICANT CHANGE UP (ref 3.5–5)
PROT SERPL-MCNC: 6.7 G/DL — SIGNIFICANT CHANGE UP (ref 6–8)
PROTHROM AB SERPL-ACNC: 14.1 SEC — HIGH (ref 9.95–12.87)
RBC # BLD: 3.7 M/UL — LOW (ref 4.2–5.4)
RBC # FLD: 15.5 % — HIGH (ref 11.5–14.5)
SARS-COV-2 RNA SPEC QL NAA+PROBE: SIGNIFICANT CHANGE UP
SODIUM SERPL-SCNC: 136 MMOL/L — SIGNIFICANT CHANGE UP (ref 135–146)
WBC # BLD: 9.29 K/UL — SIGNIFICANT CHANGE UP (ref 4.8–10.8)
WBC # FLD AUTO: 9.29 K/UL — SIGNIFICANT CHANGE UP (ref 4.8–10.8)

## 2021-02-07 PROCEDURE — 93010 ELECTROCARDIOGRAM REPORT: CPT

## 2021-02-07 PROCEDURE — 99285 EMERGENCY DEPT VISIT HI MDM: CPT

## 2021-02-07 PROCEDURE — 71045 X-RAY EXAM CHEST 1 VIEW: CPT | Mod: 26

## 2021-02-07 RX ORDER — DILTIAZEM HCL 120 MG
180 CAPSULE, EXT RELEASE 24 HR ORAL DAILY
Refills: 0 | Status: DISCONTINUED | OUTPATIENT
Start: 2021-02-07 | End: 2021-02-08

## 2021-02-07 RX ORDER — HEPARIN SODIUM 5000 [USP'U]/ML
5000 INJECTION INTRAVENOUS; SUBCUTANEOUS EVERY 8 HOURS
Refills: 0 | Status: DISCONTINUED | OUTPATIENT
Start: 2021-02-07 | End: 2021-02-08

## 2021-02-07 RX ORDER — ALBUTEROL 90 UG/1
2 AEROSOL, METERED ORAL EVERY 6 HOURS
Refills: 0 | Status: DISCONTINUED | OUTPATIENT
Start: 2021-02-07 | End: 2021-02-08

## 2021-02-07 RX ORDER — CHLORHEXIDINE GLUCONATE 213 G/1000ML
1 SOLUTION TOPICAL
Refills: 0 | Status: DISCONTINUED | OUTPATIENT
Start: 2021-02-07 | End: 2021-02-08

## 2021-02-07 RX ORDER — ACETAMINOPHEN 500 MG
650 TABLET ORAL EVERY 6 HOURS
Refills: 0 | Status: DISCONTINUED | OUTPATIENT
Start: 2021-02-07 | End: 2021-02-08

## 2021-02-07 RX ORDER — ENOXAPARIN SODIUM 100 MG/ML
40 INJECTION SUBCUTANEOUS DAILY
Refills: 0 | Status: DISCONTINUED | OUTPATIENT
Start: 2021-02-07 | End: 2021-02-07

## 2021-02-07 RX ORDER — ALBUTEROL 90 UG/1
1.25 AEROSOL, METERED ORAL EVERY 4 HOURS
Refills: 0 | Status: DISCONTINUED | OUTPATIENT
Start: 2021-02-07 | End: 2021-02-08

## 2021-02-07 RX ORDER — SODIUM CHLORIDE 9 MG/ML
1000 INJECTION INTRAMUSCULAR; INTRAVENOUS; SUBCUTANEOUS
Refills: 0 | Status: DISCONTINUED | OUTPATIENT
Start: 2021-02-08 | End: 2021-02-08

## 2021-02-07 RX ORDER — PANTOPRAZOLE SODIUM 20 MG/1
40 TABLET, DELAYED RELEASE ORAL
Refills: 0 | Status: DISCONTINUED | OUTPATIENT
Start: 2021-02-07 | End: 2021-02-08

## 2021-02-07 RX ORDER — SENNA PLUS 8.6 MG/1
2 TABLET ORAL AT BEDTIME
Refills: 0 | Status: DISCONTINUED | OUTPATIENT
Start: 2021-02-07 | End: 2021-02-08

## 2021-02-07 RX ORDER — ALBUTEROL 90 UG/1
2 AEROSOL, METERED ORAL
Refills: 0 | Status: DISCONTINUED | OUTPATIENT
Start: 2021-02-07 | End: 2021-02-07

## 2021-02-07 RX ORDER — SODIUM CHLORIDE 9 MG/ML
1000 INJECTION INTRAMUSCULAR; INTRAVENOUS; SUBCUTANEOUS
Refills: 0 | Status: DISCONTINUED | OUTPATIENT
Start: 2021-02-07 | End: 2021-02-07

## 2021-02-07 RX ORDER — FUROSEMIDE 40 MG
20 TABLET ORAL DAILY
Refills: 0 | Status: DISCONTINUED | OUTPATIENT
Start: 2021-02-08 | End: 2021-02-08

## 2021-02-07 RX ORDER — VANCOMYCIN HCL 1 G
1000 VIAL (EA) INTRAVENOUS ONCE
Refills: 0 | Status: COMPLETED | OUTPATIENT
Start: 2021-02-07 | End: 2021-02-07

## 2021-02-07 RX ORDER — AZTREONAM 2 G
2000 VIAL (EA) INJECTION ONCE
Refills: 0 | Status: COMPLETED | OUTPATIENT
Start: 2021-02-07 | End: 2021-02-07

## 2021-02-07 RX ADMIN — Medication 250 MILLIGRAM(S): at 13:29

## 2021-02-07 RX ADMIN — Medication 100 MILLIGRAM(S): at 22:01

## 2021-02-07 RX ADMIN — Medication 650 MILLIGRAM(S): at 22:02

## 2021-02-07 RX ADMIN — SODIUM CHLORIDE 50 MILLILITER(S): 9 INJECTION INTRAMUSCULAR; INTRAVENOUS; SUBCUTANEOUS at 15:39

## 2021-02-07 RX ADMIN — Medication 100 MILLIGRAM(S): at 15:39

## 2021-02-07 RX ADMIN — HEPARIN SODIUM 5000 UNIT(S): 5000 INJECTION INTRAVENOUS; SUBCUTANEOUS at 22:02

## 2021-02-07 NOTE — H&P ADULT - NSHPPHYSICALEXAM_GEN_ALL_CORE
PHYSICAL EXAM:  GENERAL: NAD, well-developed   Left Buttock wound: Swelling and Erythema noted to left Buttock, Partial thickness wound to left buttock with some serous drainage noted on draining, no cosmo blood or purulent drainage noted.

## 2021-02-07 NOTE — H&P ADULT - HISTORY OF PRESENT ILLNESS
84y Female PMH of morbid obesity, anxiety, COPD, PVD, OA, atrial fibrillation (on eliquis), HTN, s/p excision of cystic mass of buttock on 08/2018 with Dr. Bean, presents with complaints of non healing left buttock wound. Patient has had wound to left Buttock since surgery in 2018. Patient lives alone and has been following up at outpatient Burn clinic with Dr. Saeed for wound care. Patient was seen by Dr. Saeed on Thursday 2/4 and was recommended to come in for surgical debridement of Left buttock with possible skin grafting. Patient has visiting nurse to do wound care and has been treating wound with lidocaine cream, antibiotic ointment and Abdominal Pads. Patient now complains of pain and swelling to Left buttock but denies fevers, chills, HA, dizziness, SOB, CP, N/V/D.

## 2021-02-07 NOTE — ED PROVIDER NOTE - PHYSICAL EXAMINATION
CONST: NAD  EYES: Sclera and conjunctiva clear.   ENT: No nasal discharge. Oropharynx normal appearing  NECK: Non-tender, no meningeal signs. normal ROM. supple   CARD: S1 S2; No jvd  RESP: Equal BS B/L, No wheezes, rhonchi or rales. No distress  GI: Soft, non-tender, non-distended. no cva tenderness. normal BS  MS: Normal ROM in all extremities. pulses 2 +. no calf tenderness or swelling  SKIN: Large area of erythema, induration and dc to L buttock  NEURO: A&Ox3, No focal deficits. Strength 5/5 with no sensory deficits.

## 2021-02-07 NOTE — PHYSICAL THERAPY INITIAL EVALUATION ADULT - RANGE OF MOTION EXAMINATION, REHAB EVAL
B/L knee flexion < WFL causing waddling gait pattern, L shoulder < WFL 2/2 frozen shoulder/deficits as listed below

## 2021-02-07 NOTE — PHYSICAL THERAPY INITIAL EVALUATION ADULT - ADDITIONAL COMMENTS
PTA: pt lives in [facility] (but has a chair lift). Everything is on one level. Pt used SC and RW PTA, IND. Pt lives alone, and is able to perform most ADL's and IADL's. Children assist c cooking. Pt cleans, grooms, toilets herself. Does her own laundry. Does not drive. Has tub c tub bench and 5 grab bars. Does not have a leg - would benefit, but did not seem interested when PT showed pt what it looks like.

## 2021-02-07 NOTE — ED PROVIDER NOTE - CLINICAL SUMMARY MEDICAL DECISION MAKING FREE TEXT BOX
85yoF with h/o afib on Eliquis, COPD, obesity, PVD, presents with L buttock growth x 2 yrs that has worsened x 3-4 months, has seen multiple doctors, seen in office by ?Dr. Saeed on Thursday and told to come for admission for debridement and IV abx. Some discharge from area. Denies fever and all other symptoms. On exam, afebrile, hemodynamically stable, saturating well, NAD, well appearing, laying comfortably in bed, head NCAT, EOMI grossly, anicteric, breathing comfortably on RA, AAO, CN's 3-12 grossly intact, LAY spontaneously, no leg cyanosis or edema, skin warm, well perfused, noted L buttock heaped-up growth with superficial medial ulceration and overlying warmth, erythema. Localized to buttock. Appearance of cellulitis. Low suspicion for nec fasc. Patient well appearing, hemodynamically stable. Given abx. Admitted to burn for further monitoring, w/u, and care.

## 2021-02-07 NOTE — PHYSICAL THERAPY INITIAL EVALUATION ADULT - IMPAIRMENTS FOUND, PT EVAL
aerobic capacity/endurance/ergonomics and body mechanics/gait, locomotion, and balance/gross motor/integumentary integrity/muscle strength/posture/ROM

## 2021-02-07 NOTE — ED ADULT NURSE NOTE - OBJECTIVE STATEMENT
Patient reports she has had a left butt wound for 2 years the last 3 months it was worsened and began to leak. Dr. Saeed sent patient in for debridement tomorrow.

## 2021-02-07 NOTE — PHYSICAL THERAPY INITIAL EVALUATION ADULT - GENERAL OBSERVATIONS, REHAB EVAL
Pt laying semifowler in bed in NAD. Agreeable to PT IE. + IV locked to LUE. Disconnected by RN Mimi.

## 2021-02-07 NOTE — ED ADULT TRIAGE NOTE - CHIEF COMPLAINT QUOTE
pt sent to ED by MD Saeed for wound to her left buttock     Karishma Lili (daughter) [phone]   Erlinda Vanessa (daughter) [phone]

## 2021-02-07 NOTE — ED PROVIDER NOTE - OBJECTIVE STATEMENT
85y F pmh COPD, PVD, OA, HTN, Afib on eliquis, L buttock abscess presents for eval of abscess. Pt states she has visiting wound care at home for L buttocks abscess that she has had x2yrs but continues to progressive with moderate discomfort. No aggravating or relieving factors. Pt followed by Dr. Saeed with plan for surgical intervention. Denies fever, ha, cp, sob, weakness, numbness, dysuria, hematuria, n/v/d/c

## 2021-02-07 NOTE — PHYSICAL THERAPY INITIAL EVALUATION ADULT - PLANNED THERAPY INTERVENTIONS, PT EVAL
balance training/bed mobility training/gait training/lumbar stabilization/manual therapy techniques/neuromuscular re-education/postural re-education/ROM/strengthening/stretching/transfer training

## 2021-02-07 NOTE — H&P ADULT - ASSESSMENT
84y Female PMH of morbid obesity, anxiety, COPD, PVD, OA, atrial fibrillation (on eliquis), HTN, s/p excision of cystic mass of buttock on 08/2018 with Dr. Bean, presents with complaints of non healing left buttock wound.     Left Buttock Wound   - Admit to Burn for Iv abx and LWC  - Plan for OR tomorrow for debridement   - LWC: baci/adaptic/ABD   - DVT ppx  -GI ppx  - PT/OT     HTN  - cont w/ diltiazem     A fib  - cont w/ Eliquis   - eliquis recently held by cardiologist per patient, will f/u     COPD  - cont w/ inhaler   84y Female PMH of morbid obesity, anxiety, COPD, PVD, OA, atrial fibrillation (on eliquis), HTN, s/p excision of cystic mass of buttock on 08/2018 with Dr. Bean, presents with complaints of non healing left buttock wound.     Left Buttock Wound   - Admit to Burn for Iv abx and LWC  - Plan for OR tomorrow for debridement   - LWC: baci/adaptic/ABD   - DVT ppx  -GI ppx  - PT/OT     HTN  - cont w/ diltiazem     A fib  - cont w/ Eliquis   - eliquis currently held for OR    COPD  - cont w/ inhaler   84y Female PMH of morbid obesity, anxiety, COPD, PVD, OA, atrial fibrillation (on eliquis), HTN, s/p excision of cystic mass of buttock on 08/2018 with Dr. Bean, presents with complaints of non healing left buttock wound.     Left Buttock Wound   - Admit to Burn for Iv abx and LWC  - Plan for OR tomorrow for debridement   - LWC: baci/adaptic/ABD   - DVT ppx  -GI ppx  - PT/OT     HTN  - cont w/ diltiazem   - cont lasix     A fib  - cont w/ Eliquis   - eliquis currently held for OR    COPD  - cont w/ inhaler

## 2021-02-07 NOTE — ED ADULT NURSE NOTE - NSIMPLEMENTINTERV_GEN_ALL_ED
Implemented All Fall with Harm Risk Interventions:  Kim to call system. Call bell, personal items and telephone within reach. Instruct patient to call for assistance. Room bathroom lighting operational. Non-slip footwear when patient is off stretcher. Physically safe environment: no spills, clutter or unnecessary equipment. Stretcher in lowest position, wheels locked, appropriate side rails in place. Provide visual cue, wrist band, yellow gown, etc. Monitor gait and stability. Monitor for mental status changes and reorient to person, place, and time. Review medications for side effects contributing to fall risk. Reinforce activity limits and safety measures with patient and family. Provide visual clues: red socks.

## 2021-02-07 NOTE — ED ADULT NURSE NOTE - CHIEF COMPLAINT QUOTE
pt sent to ED by MD Saeed for wound to her left buttock     Karishma Lili (daughter) [phone]   Erlinda Vanessa (daughter) [phone]

## 2021-02-07 NOTE — ED PROVIDER NOTE - NS ED ROS FT
Constitutional: (-) fever  Eyes/ENT: (-) blurry vision, (-) epistaxis  Cardiovascular: (-) chest pain, (-) syncope  Respiratory: (-) cough, (-) shortness of breath  Gastrointestinal: (-) vomiting, (-) diarrhea  : (-) dysuria, (-) hematuria  Musculoskeletal: (-) neck pain, (-) back pain, (-) joint pain  Integumentary: (+) abscess, (-) rash, (-) edema  Neurological: (-) headache, (-) altered mental status  Allergic/Immunologic: (-) pruritus

## 2021-02-08 ENCOUNTER — RESULT REVIEW (OUTPATIENT)
Age: 86
End: 2021-02-08

## 2021-02-08 LAB
ANION GAP SERPL CALC-SCNC: 7 MMOL/L — SIGNIFICANT CHANGE UP (ref 7–14)
BASOPHILS # BLD AUTO: 0.03 K/UL — SIGNIFICANT CHANGE UP (ref 0–0.2)
BASOPHILS NFR BLD AUTO: 0.4 % — SIGNIFICANT CHANGE UP (ref 0–1)
BUN SERPL-MCNC: 16 MG/DL — SIGNIFICANT CHANGE UP (ref 10–20)
CALCIUM SERPL-MCNC: 8.5 MG/DL — SIGNIFICANT CHANGE UP (ref 8.5–10.1)
CHLORIDE SERPL-SCNC: 103 MMOL/L — SIGNIFICANT CHANGE UP (ref 98–110)
CO2 SERPL-SCNC: 28 MMOL/L — SIGNIFICANT CHANGE UP (ref 17–32)
CREAT SERPL-MCNC: 1.2 MG/DL — SIGNIFICANT CHANGE UP (ref 0.7–1.5)
EOSINOPHIL # BLD AUTO: 0.05 K/UL — SIGNIFICANT CHANGE UP (ref 0–0.7)
EOSINOPHIL NFR BLD AUTO: 0.6 % — SIGNIFICANT CHANGE UP (ref 0–8)
GLUCOSE SERPL-MCNC: 110 MG/DL — HIGH (ref 70–99)
HCT VFR BLD CALC: 28.4 % — LOW (ref 37–47)
HGB BLD-MCNC: 8.4 G/DL — LOW (ref 12–16)
IMM GRANULOCYTES NFR BLD AUTO: 0.4 % — HIGH (ref 0.1–0.3)
LYMPHOCYTES # BLD AUTO: 0.86 K/UL — LOW (ref 1.2–3.4)
LYMPHOCYTES # BLD AUTO: 11.1 % — LOW (ref 20.5–51.1)
MAGNESIUM SERPL-MCNC: 1.9 MG/DL — SIGNIFICANT CHANGE UP (ref 1.8–2.4)
MCHC RBC-ENTMCNC: 24.1 PG — LOW (ref 27–31)
MCHC RBC-ENTMCNC: 29.6 G/DL — LOW (ref 32–37)
MCV RBC AUTO: 81.4 FL — SIGNIFICANT CHANGE UP (ref 81–99)
MONOCYTES # BLD AUTO: 0.8 K/UL — HIGH (ref 0.1–0.6)
MONOCYTES NFR BLD AUTO: 10.3 % — HIGH (ref 1.7–9.3)
NEUTROPHILS # BLD AUTO: 5.96 K/UL — SIGNIFICANT CHANGE UP (ref 1.4–6.5)
NEUTROPHILS NFR BLD AUTO: 77.2 % — HIGH (ref 42.2–75.2)
NRBC # BLD: 0 /100 WBCS — SIGNIFICANT CHANGE UP (ref 0–0)
PHOSPHATE SERPL-MCNC: 4.3 MG/DL — SIGNIFICANT CHANGE UP (ref 2.1–4.9)
PLATELET # BLD AUTO: 223 K/UL — SIGNIFICANT CHANGE UP (ref 130–400)
POTASSIUM SERPL-MCNC: 4.3 MMOL/L — SIGNIFICANT CHANGE UP (ref 3.5–5)
POTASSIUM SERPL-SCNC: 4.3 MMOL/L — SIGNIFICANT CHANGE UP (ref 3.5–5)
RBC # BLD: 3.49 M/UL — LOW (ref 4.2–5.4)
RBC # FLD: 15.6 % — HIGH (ref 11.5–14.5)
SODIUM SERPL-SCNC: 138 MMOL/L — SIGNIFICANT CHANGE UP (ref 135–146)
WBC # BLD: 7.73 K/UL — SIGNIFICANT CHANGE UP (ref 4.8–10.8)
WBC # FLD AUTO: 7.73 K/UL — SIGNIFICANT CHANGE UP (ref 4.8–10.8)

## 2021-02-08 PROCEDURE — 88304 TISSUE EXAM BY PATHOLOGIST: CPT | Mod: 26

## 2021-02-08 PROCEDURE — 11042 DBRDMT SUBQ TIS 1ST 20SQCM/<: CPT

## 2021-02-08 PROCEDURE — 10061 I&D ABSCESS COMP/MULTIPLE: CPT | Mod: 59

## 2021-02-08 PROCEDURE — 11045 DBRDMT SUBQ TISS EACH ADDL: CPT

## 2021-02-08 RX ORDER — HEPARIN SODIUM 5000 [USP'U]/ML
5000 INJECTION INTRAVENOUS; SUBCUTANEOUS EVERY 8 HOURS
Refills: 0 | Status: DISCONTINUED | OUTPATIENT
Start: 2021-02-08 | End: 2021-02-08

## 2021-02-08 RX ORDER — PANTOPRAZOLE SODIUM 20 MG/1
40 TABLET, DELAYED RELEASE ORAL
Refills: 0 | Status: DISCONTINUED | OUTPATIENT
Start: 2021-02-08 | End: 2021-02-11

## 2021-02-08 RX ORDER — APIXABAN 2.5 MG/1
2.5 TABLET, FILM COATED ORAL
Refills: 0 | Status: COMPLETED | OUTPATIENT
Start: 2021-02-08 | End: 2021-02-09

## 2021-02-08 RX ORDER — CHLORHEXIDINE GLUCONATE 213 G/1000ML
1 SOLUTION TOPICAL
Refills: 0 | Status: DISCONTINUED | OUTPATIENT
Start: 2021-02-08 | End: 2021-02-11

## 2021-02-08 RX ORDER — FUROSEMIDE 40 MG
20 TABLET ORAL DAILY
Refills: 0 | Status: DISCONTINUED | OUTPATIENT
Start: 2021-02-08 | End: 2021-02-11

## 2021-02-08 RX ORDER — SENNA PLUS 8.6 MG/1
2 TABLET ORAL AT BEDTIME
Refills: 0 | Status: DISCONTINUED | OUTPATIENT
Start: 2021-02-08 | End: 2021-02-11

## 2021-02-08 RX ORDER — DILTIAZEM HCL 120 MG
180 CAPSULE, EXT RELEASE 24 HR ORAL DAILY
Refills: 0 | Status: DISCONTINUED | OUTPATIENT
Start: 2021-02-08 | End: 2021-02-11

## 2021-02-08 RX ORDER — ONDANSETRON 8 MG/1
4 TABLET, FILM COATED ORAL ONCE
Refills: 0 | Status: DISCONTINUED | OUTPATIENT
Start: 2021-02-08 | End: 2021-02-08

## 2021-02-08 RX ORDER — MORPHINE SULFATE 50 MG/1
2 CAPSULE, EXTENDED RELEASE ORAL EVERY 6 HOURS
Refills: 0 | Status: DISCONTINUED | OUTPATIENT
Start: 2021-02-08 | End: 2021-02-10

## 2021-02-08 RX ORDER — GENTAMICIN SULFATE 0.1 %
1 OINTMENT (GRAM) TOPICAL
Refills: 0 | Status: DISCONTINUED | OUTPATIENT
Start: 2021-02-08 | End: 2021-02-11

## 2021-02-08 RX ORDER — ALBUTEROL 90 UG/1
1.25 AEROSOL, METERED ORAL EVERY 4 HOURS
Refills: 0 | Status: DISCONTINUED | OUTPATIENT
Start: 2021-02-08 | End: 2021-02-09

## 2021-02-08 RX ORDER — SODIUM CHLORIDE 9 MG/ML
1000 INJECTION, SOLUTION INTRAVENOUS
Refills: 0 | Status: DISCONTINUED | OUTPATIENT
Start: 2021-02-08 | End: 2021-02-08

## 2021-02-08 RX ORDER — ACETAMINOPHEN 500 MG
650 TABLET ORAL EVERY 6 HOURS
Refills: 0 | Status: DISCONTINUED | OUTPATIENT
Start: 2021-02-08 | End: 2021-02-09

## 2021-02-08 RX ORDER — MORPHINE SULFATE 50 MG/1
2 CAPSULE, EXTENDED RELEASE ORAL
Refills: 0 | Status: DISCONTINUED | OUTPATIENT
Start: 2021-02-08 | End: 2021-02-10

## 2021-02-08 RX ORDER — ALBUTEROL 90 UG/1
2 AEROSOL, METERED ORAL EVERY 6 HOURS
Refills: 0 | Status: DISCONTINUED | OUTPATIENT
Start: 2021-02-08 | End: 2021-02-11

## 2021-02-08 RX ORDER — SODIUM CHLORIDE 9 MG/ML
1000 INJECTION INTRAMUSCULAR; INTRAVENOUS; SUBCUTANEOUS
Refills: 0 | Status: DISCONTINUED | OUTPATIENT
Start: 2021-02-08 | End: 2021-02-09

## 2021-02-08 RX ADMIN — Medication 180 MILLIGRAM(S): at 05:32

## 2021-02-08 RX ADMIN — Medication 100 MILLIGRAM(S): at 05:31

## 2021-02-08 RX ADMIN — SODIUM CHLORIDE 50 MILLILITER(S): 9 INJECTION INTRAMUSCULAR; INTRAVENOUS; SUBCUTANEOUS at 00:01

## 2021-02-08 RX ADMIN — SENNA PLUS 2 TABLET(S): 8.6 TABLET ORAL at 22:44

## 2021-02-08 RX ADMIN — PANTOPRAZOLE SODIUM 40 MILLIGRAM(S): 20 TABLET, DELAYED RELEASE ORAL at 05:32

## 2021-02-08 RX ADMIN — APIXABAN 2.5 MILLIGRAM(S): 2.5 TABLET, FILM COATED ORAL at 19:04

## 2021-02-08 RX ADMIN — Medication 650 MILLIGRAM(S): at 22:53

## 2021-02-08 RX ADMIN — HEPARIN SODIUM 5000 UNIT(S): 5000 INJECTION INTRAVENOUS; SUBCUTANEOUS at 05:32

## 2021-02-08 RX ADMIN — Medication 100 MILLIGRAM(S): at 22:43

## 2021-02-08 RX ADMIN — SODIUM CHLORIDE 50 MILLILITER(S): 9 INJECTION INTRAMUSCULAR; INTRAVENOUS; SUBCUTANEOUS at 22:43

## 2021-02-08 RX ADMIN — SODIUM CHLORIDE 75 MILLILITER(S): 9 INJECTION, SOLUTION INTRAVENOUS at 17:00

## 2021-02-08 RX ADMIN — HEPARIN SODIUM 5000 UNIT(S): 5000 INJECTION INTRAVENOUS; SUBCUTANEOUS at 12:33

## 2021-02-08 RX ADMIN — Medication 20 MILLIGRAM(S): at 05:32

## 2021-02-08 NOTE — CHART NOTE - NSCHARTNOTEFT_GEN_A_CORE
Spoke with pt's cardiologist, Dr. Kaushik Pacheco [phone] who states that he has cleared patient for surgery since last week, and asks that patient's eliquis be held since the night before OR.     will fax over pts clearance and copy of most recent echo to burn unit. Spoke with pt's cardiologist, Dr. Kaushik Pacheco [phone] who states that he has cleared patient for surgery since last week, and asks that patient's Eliquis be held since the night before OR.    Clearance form faxed to burn unit and received. Will place in pt's chart. Echo report from 7/28/2020 also faxed and reads as follows:  1. EF 60-65%  2. Mild mitral valve regurgitation  3. No aortic valve stenosis  4. No aortic valve regurgitation

## 2021-02-08 NOTE — BRIEF OPERATIVE NOTE - NSICDXBRIEFPROCEDURE_GEN_ALL_CORE_FT
PROCEDURES:  Selective debridement 08-Feb-2021 15:57:01 debridement left buttock and abscess drainage Seng Saeed

## 2021-02-08 NOTE — CHART NOTE - NSCHARTNOTEFT_GEN_A_CORE
PACU ANESTHESIA ADMISSION NOTE      Procedure: Selective debridement  debridement left buttock and abscess drainage      Post op diagnosis:  Abscess        ____  Intubated  TV:______       Rate: ______      FiO2: ______    __x__  Patent Airway    __x__  Full return of protective reflexes    __x__  Full recovery from anesthesia / back to baseline     Vitals:   See Anesthesia record  T- 97.7 P - 99 R-18 B/P- 119/66 SpO2- 95% on 2L NC    Mental Status:  __x__ Awake   __x___ Alert   _____ Drowsy   _____ Sedated    Nausea/Vomiting:  __x__ NO  ______Yes,   See Post - Op Orders          Pain Scale (0-10):  ___0__    Treatment: ____ None    ____ See Post - Op/PCA Orders    Post - Operative Fluids:   ____ Oral   __x__ See Post - Op Orders    Plan: Discharge:   ____Home       __x___Floor     _____Critical Care    _____  Other:_________________    Comments: No anesthesia complications/issues noted. Discharge to floor when criteria met.

## 2021-02-09 LAB
GRAM STN FLD: SIGNIFICANT CHANGE UP
SPECIMEN SOURCE: SIGNIFICANT CHANGE UP

## 2021-02-09 RX ORDER — METRONIDAZOLE 500 MG
500 TABLET ORAL EVERY 8 HOURS
Refills: 0 | Status: DISCONTINUED | OUTPATIENT
Start: 2021-02-09 | End: 2021-02-11

## 2021-02-09 RX ORDER — SODIUM CHLORIDE 9 MG/ML
1000 INJECTION INTRAMUSCULAR; INTRAVENOUS; SUBCUTANEOUS
Refills: 0 | Status: DISCONTINUED | OUTPATIENT
Start: 2021-02-09 | End: 2021-02-10

## 2021-02-09 RX ORDER — AZTREONAM 2 G
1000 VIAL (EA) INJECTION EVERY 8 HOURS
Refills: 0 | Status: DISCONTINUED | OUTPATIENT
Start: 2021-02-09 | End: 2021-02-11

## 2021-02-09 RX ORDER — ACETAMINOPHEN 500 MG
650 TABLET ORAL EVERY 4 HOURS
Refills: 0 | Status: DISCONTINUED | OUTPATIENT
Start: 2021-02-09 | End: 2021-02-11

## 2021-02-09 RX ORDER — HEPARIN SODIUM 5000 [USP'U]/ML
800 INJECTION INTRAVENOUS; SUBCUTANEOUS
Qty: 25000 | Refills: 0 | Status: DISCONTINUED | OUTPATIENT
Start: 2021-02-09 | End: 2021-02-09

## 2021-02-09 RX ORDER — LINEZOLID 600 MG/300ML
600 INJECTION, SOLUTION INTRAVENOUS EVERY 12 HOURS
Refills: 0 | Status: DISCONTINUED | OUTPATIENT
Start: 2021-02-09 | End: 2021-02-11

## 2021-02-09 RX ADMIN — Medication 1 APPLICATION(S): at 06:17

## 2021-02-09 RX ADMIN — Medication 650 MILLIGRAM(S): at 14:24

## 2021-02-09 RX ADMIN — APIXABAN 2.5 MILLIGRAM(S): 2.5 TABLET, FILM COATED ORAL at 05:37

## 2021-02-09 RX ADMIN — ALBUTEROL 2 PUFF(S): 90 AEROSOL, METERED ORAL at 22:53

## 2021-02-09 RX ADMIN — MORPHINE SULFATE 2 MILLIGRAM(S): 50 CAPSULE, EXTENDED RELEASE ORAL at 08:17

## 2021-02-09 RX ADMIN — Medication 1 APPLICATION(S): at 19:18

## 2021-02-09 RX ADMIN — Medication 650 MILLIGRAM(S): at 10:35

## 2021-02-09 RX ADMIN — ALBUTEROL 2 PUFF(S): 90 AEROSOL, METERED ORAL at 05:39

## 2021-02-09 RX ADMIN — Medication 20 MILLIGRAM(S): at 05:36

## 2021-02-09 RX ADMIN — Medication 50 MILLIGRAM(S): at 21:13

## 2021-02-09 RX ADMIN — CHLORHEXIDINE GLUCONATE 1 APPLICATION(S): 213 SOLUTION TOPICAL at 05:30

## 2021-02-09 RX ADMIN — PANTOPRAZOLE SODIUM 40 MILLIGRAM(S): 20 TABLET, DELAYED RELEASE ORAL at 08:04

## 2021-02-09 RX ADMIN — Medication 100 MILLIGRAM(S): at 21:11

## 2021-02-09 RX ADMIN — Medication 180 MILLIGRAM(S): at 05:36

## 2021-02-09 RX ADMIN — Medication 100 MILLIGRAM(S): at 14:21

## 2021-02-09 RX ADMIN — Medication 100 MILLIGRAM(S): at 05:29

## 2021-02-09 RX ADMIN — SODIUM CHLORIDE 50 MILLILITER(S): 9 INJECTION INTRAMUSCULAR; INTRAVENOUS; SUBCUTANEOUS at 20:41

## 2021-02-09 RX ADMIN — LINEZOLID 300 MILLIGRAM(S): 600 INJECTION, SOLUTION INTRAVENOUS at 19:19

## 2021-02-09 NOTE — PROGRESS NOTE ADULT - ASSESSMENT
84y Female PMH of morbid obesity, anxiety, COPD, PVD, OA, atrial fibrillation (on eliquis), HTN, s/p excision of cystic mass of buttock on 08/2018 with Dr. Bean, presents with complaints of non healing left buttock wound.     Left Buttock Wound   - POD #1 s/p maday L buttock  - IIv abx and LWC  - Plan for OR tomorrow 2/10 for more debridement   - ID c/s placed, f/u  - Wcx sent 2/9- f/u results  - LWC: baci/adaptic/ABD   - DVT ppx  -GI ppx  - PT/OT     HTN  - cont w/ diltiazem   - cont lasix     A fib  - cont w/ Eliquis   - eliquis currently held for OR  - Need to discuss with outpatient cardiologist plan for anticoagulation as pt may require multiple surgeries    COPD  - cont w/ inhaler   - Will discuss with outpatient pulmonologist as pt may require multiple debridements

## 2021-02-09 NOTE — PROVIDER CONTACT NOTE (OTHER) - BACKGROUND
pt's vital  signs   after transfusion  started are  temp  of 100.6     pulse 111      bp is  105/53 -  pt feels "fine"

## 2021-02-09 NOTE — PROGRESS NOTE ADULT - SUBJECTIVE AND OBJECTIVE BOX
Patient is a 85y old  Female who presents with a chief complaint of Left Buttock Wound. (07 Feb 2021 13:24)      INTERVAL HPI/OVERNIGHT EVENTS:    -POD#1 s/p maday left buttock  - no complaints    Vital Signs Last 24 Hrs  T(C): 38.1 (09 Feb 2021 09:35), Max: 38.1 (09 Feb 2021 09:35)  T(F): 100.6 (09 Feb 2021 09:35), Max: 100.6 (09 Feb 2021 09:35)  HR: 113 (09 Feb 2021 09:35) (92 - 113)  BP: 105/53 (09 Feb 2021 09:35) (96/60 - 166/71)  RR: 18 (09 Feb 2021 09:35) (18 - 27)  SpO2: 95% (09 Feb 2021 05:38) (93% - 100%)    I&O's Summary    08 Feb 2021 07:01  -  09 Feb 2021 07:00  --------------------------------------------------------  IN: 1125 mL / OUT: 650 mL / NET: 475 mL    09 Feb 2021 07:01  -  09 Feb 2021 10:48  --------------------------------------------------------  IN: 0 mL / OUT: 500 mL / NET: -500 mL      Allergies    aspirin (Other)  codeine (Other)  contrast media (iodine-based) (Other)  penicillin (Other)  sulfa drugs (Hives; Other)    Intolerances        Lab Results:                        8.4    7.73  )-----------( 223      ( 08 Feb 2021 20:00 )             28.4     02-08    138  |  103  |  16  ----------------------------<  110<H>  4.3   |  28  |  1.2    Ca    8.5      08 Feb 2021 20:00  Phos  4.3     02-08  Mg     1.9     02-08    TPro  6.7  /  Alb  3.4<L>  /  TBili  0.5  /  DBili  x   /  AST  15  /  ALT  8   /  AlkPhos  129<H>  02-07    PT/INR - ( 07 Feb 2021 12:30 )   PT: 14.10 sec;   INR: 1.23 ratio         PTT - ( 07 Feb 2021 12:30 )  PTT:32.7 sec    LIVER FUNCTIONS - ( 07 Feb 2021 12:30 )  Alb: 3.4 g/dL / Pro: 6.7 g/dL / ALK PHOS: 129 U/L / ALT: 8 U/L / AST: 15 U/L / GGT: x           CAPILLARY BLOOD GLUCOSE                MICROBIOLOGY:      02-08 @ 15:00  Culture  Gram stain   Rare polymorphonuclear leukocytes per low power field  Few Gram Negative Rods per oil power field  Rare Gram Variable Cocci per oil power field      IMAGING STUDIES:     Patient discussed with ICU team,] for []minutes.    Consultant(s) Notes Reviewed:  [x ] YES  [ ] NO      MEDICATIONS  (STANDING):  chlorhexidine 4% Liquid 1 Application(s) Topical <User Schedule>  clindamycin IVPB 300 milliGRAM(s) IV Intermittent every 8 hours  diltiazem    milliGRAM(s) Oral daily  furosemide    Tablet 20 milliGRAM(s) Oral daily  gentamicin 0.1% Ointment 1 Application(s) Topical two times a day  pantoprazole    Tablet 40 milliGRAM(s) Oral before breakfast  senna 2 Tablet(s) Oral at bedtime  sodium chloride 0.9%. 1000 milliLiter(s) (50 mL/Hr) IV Continuous <Continuous>    MEDICATIONS  (PRN):  acetaminophen   Tablet .. 650 milliGRAM(s) Oral every 6 hours PRN Mild Pain (1 - 3)  ALBUTerol    90 MICROgram(s) HFA Inhaler 2 Puff(s) Inhalation every 6 hours PRN Shortness of Breath  ALBUTerol   0.042% 1.25 milliGRAM(s) Nebulizer every 4 hours PRN Shortness of Breath  morphine  - Injectable 2 milliGRAM(s) IV Push every 6 hours PRN Severe Pain (7 - 10)  morphine  - Injectable 2 milliGRAM(s) IV Push two times a day PRN wound care        PHYSICAL EXAM:  GENERAL: well appearing, NAD, lying comfortably in bed  Wound: left buttock with large open wound down to subq fat, clean, no obvious purulent drainage, some evidence of fat necrosis; no bleeding

## 2021-02-09 NOTE — CONSULT NOTE ADULT - SUBJECTIVE AND OBJECTIVE BOX
GIOVANNY BUCK  85y, Female  Allergy: aspirin (Other)  codeine (Other)  contrast media (iodine-based) (Other)  penicillin (Other)  sulfa drugs (Hives; Other)      CHIEF COMPLAINT: Left Buttock Wound. (09 Feb 2021 10:47)      HPI:  85yFemale with a past medical history of morbid obesity, anxiety, COPD, PVD, OA, atrial fibrillation (on eliquis), HTN, s/p excision of cystic mass of buttock on 08/2018 with Dr. Bean, presents with complaints of non healing left buttock wound. Patient has had wound to left Buttock since surgery in 2018. Patient lives alone and has been following up at outpatient Burn clinic with Dr. Saeed for wound care. Patient was seen by Dr. Saeed on Thursday 2/4 and was recommended to come in for surgical debridement of Left buttock and abcess drainage which was done on 2/8/21. When at home, Patient has visiting nurse that does wound care and has been treating her wound with lidocaine cream, antibiotic ointment and Abdominal Pads.  The patient currently denies any left buttock pain, but endorses fatigue and fever.           Infectious Diseases History:  Old Micro Data/Cultures:     FAMILY HISTORY:    PAST MEDICAL & SURGICAL HISTORY:  Obesity    Anxiety    COPD (chronic obstructive pulmonary disease)    PVD (peripheral vascular disease)    OA (osteoarthritis)    Cellulitis  chronic    Goiter  no meds    Afib  s/p ablation 2001    HTN (hypertension)    H/O total knee replacement, bilateral    H/O discectomy    H/O abdominal hysterectomy        SOCIAL HISTORY  Social History:  Smoke, drug, etoh: Denies (18 May 2020 20:30)      Recent Travel:  Other Exposures:     ROS  General: fatigue, fever  HEENT: Denies headache, rhinorrhea, sore throat, eye pain  CV: Denies CP, palpitations  PULM: Denies wheezing, hemoptysis  GI: Denies hematemesis, hematochezia, melena  : Denies discharge, hematuria  MSK: Denies arthralgias, myalgias  SKIN: b/l lower leg cellulitis   NEURO: Denies paresthesias, weakness  PSYCH: Denies depression, anxiety    VITALS:  T(F): 100.1, Max: 100.7 (02-09-21 @ 13:25)  HR: 107  BP: 99/52  RR: 18Vital Signs Last 24 Hrs  T(C): 37.8 (09 Feb 2021 15:03), Max: 38.2 (09 Feb 2021 13:25)  T(F): 100.1 (09 Feb 2021 15:03), Max: 100.7 (09 Feb 2021 13:25)  HR: 107 (09 Feb 2021 13:25) (92 - 113)  BP: 99/52 (09 Feb 2021 13:25) (96/60 - 166/71)  BP(mean): --  RR: 18 (09 Feb 2021 13:25) (18 - 27)  SpO2: 95% (09 Feb 2021 05:38) (93% - 100%)    PHYSICAL EXAM:  Gen: NAD, slightly fatigues   HEENT: Normocephalic, atraumatic  Neck: supple, no lymphadenopathy  CV: Regular rate & regular rhythm  Lungs: CTAB  Abdomen: Soft, BS present  Ext: b/l lower leg edema   Neuro: non focal, awake  Skin: b/l lower leg cellulitis, painful upon palpation    Lines: no phlebitis    TESTS & MEASUREMENTS:                        8.4    7.73  )-----------( 223      ( 08 Feb 2021 20:00 )             28.4     02-08    138  |  103  |  16  ----------------------------<  110<H>  4.3   |  28  |  1.2    Ca    8.5      08 Feb 2021 20:00  Phos  4.3     02-08  Mg     1.9     02-08      eGFR if Non African American: 41 mL/min/1.73M2 (02-08-21 @ 20:00)  eGFR if : 48 mL/min/1.73M2 (02-08-21 @ 20:00)          Culture - Tissue with Gram Stain (collected 02-08-21 @ 15:00)  Source: .Tissue None  Gram Stain (02-09-21 @ 04:09):    Rare polymorphonuclear leukocytes per low power field    Few Gram Negative Rods per oil power field    Rare Gram Variable Cocci per oil power field    Culture - Blood (collected 02-07-21 @ 12:30)  Source: .Blood Blood  Preliminary Report (02-08-21 @ 18:02):    No growth to date.    Culture - Blood (collected 02-07-21 @ 12:30)  Source: .Blood Blood  Preliminary Report (02-08-21 @ 18:02):    No growth to date.            INFECTIOUS DISEASES TESTING      RADIOLOGY & ADDITIONAL TESTS:  I have personally reviewed the last available Chest xray  CXR  Xray Chest 1 View- PORTABLE-Urgent:   EXAM:  XR CHEST PORTABLE URGENT 1V            PROCEDURE DATE:  02/07/2021            INTERPRETATION:  Clinical History/Reason For Exam: preop    Technique: XR CHEST URGENT    Comparison: XR CHEST 9/20/2019.    Findings:    Support devices: None.    Cardiac/mediastinum/hilum: Stable cardiomegaly. Aortic calcifications.    Lung parenchyma/Pleura: Stable prominent interstitial markings. No focal consolidation, effusion, or pneumothorax.    Skeleton/soft tissues: Degenerative changes of the spine and shoulders.    Impression:    Stable prominent interstitial markings. No focal consolidation, effusion, or pneumothorax.    Stable cardiomegaly.              MYRA GARCIA MD; Attending Radiologist  This document has been electronically signed. Feb 7 2021  2:32PM (02-07-21 @ 14:30)      CT      CARDIOLOGY TESTING  12 Lead ECG:   Ventricular Rate 99 BPM    Atrial Rate 115 BPM    QRS Duration 74 ms    Q-T Interval 340 ms    QTC Calculation(Bazett) 436 ms    R Axis 85 degrees    T Axis 62 degrees    Diagnosis Line Atrial fibrillation  Low voltage QRS  Septal infarct , age undetermined  Abnormal ECG    Confirmed by Imani Schumacher MD ([id number]) on 2/7/2021 12:58:43 PM (02-07-21 @ 12:26)      All available historical records have been reviewed    MEDICATIONS  chlorhexidine 4% Liquid 1  clindamycin IVPB 300  diltiazem     furosemide    Tablet 20  gentamicin 0.1% Ointment 1  pantoprazole    Tablet 40  senna 2  sodium chloride 0.9%. 1000      ANTIBIOTICS:  clindamycin IVPB 300 milliGRAM(s) IV Intermittent every 8 hours      All available historical data has been reviewed    ASSESSMENT  85yFemale with a PMH of morbid obesity, anxiety, COPD, PVD, OA, atrial fibrillation (on eliquis), HTN, s/p excision of cystic mass of buttock on 08/2018 with Dr. Bean, presents with complaints of non healing left buttock wound. Patient has had wound to left Buttock since surgery in 2018. Patient lives alone and has been following up at outpatient Burn clinic with Dr. Saeed for wound care. Patient was seen by Dr. Saeed on Thursday 2/4 and was recommended to come in for surgical debridement of Left buttock and abcess drainage which was done on 2/8/21. When at home, Patient has visiting nurse that does wound care and has been treating her wound with lidocaine cream, antibiotic ointment and Abdominal Pads.  The patient currently denies any left buttock pain, but endorses fatigue and fever.     IMPRESSION  #large rectal mass starting from left gluteal fold. r/o rectal cutaneus fistula, r/o malignancy    - recurring large mass w/ erythematous base with clearly demarcated borders, no surrounding erythema, w/ purulent drainage     RECOMMENDATIONS  -zivox 600mg q 12, cefepime 1g q 12, flagyl 500mg q 8  - pelvic MRI  - recommend diverting colostomy     This is a pended note. All final recommendations to follow pending discussion with ID Attending    GIOVANNY BUCK  85y, Female  Allergy: aspirin (Other)  codeine (Other)  contrast media (iodine-based) (Other)  penicillin (Other)  sulfa drugs (Hives; Other)      CHIEF COMPLAINT: Left Buttock Wound. (09 Feb 2021 10:47)      HPI:  85yFemale with a past medical history of morbid obesity, anxiety, COPD, PVD, OA, atrial fibrillation (on eliquis), HTN, s/p excision of cystic mass of buttock on 08/2018 with Dr. Bean, presents with complaints of non healing left buttock wound. Patient has had wound to left Buttock since surgery in 2018. Patient lives alone and has been following up at outpatient Burn clinic with Dr. Saeed for wound care. Patient was seen by Dr. Saeed on Thursday 2/4 and was recommended to come in for surgical debridement of Left buttock and abcess drainage which was done on 2/8/21. When at home, Patient has visiting nurse that does wound care and has been treating her wound with lidocaine cream, antibiotic ointment and Abdominal Pads.  The patient currently denies any left buttock pain, but endorses fatigue and fever.           Infectious Diseases History:  Old Micro Data/Cultures:     FAMILY HISTORY:    PAST MEDICAL & SURGICAL HISTORY:  Obesity    Anxiety    COPD (chronic obstructive pulmonary disease)    PVD (peripheral vascular disease)    OA (osteoarthritis)    Cellulitis  chronic    Goiter  no meds    Afib  s/p ablation 2001    HTN (hypertension)    H/O total knee replacement, bilateral    H/O discectomy    H/O abdominal hysterectomy        SOCIAL HISTORY  Social History:  Smoke, drug, etoh: Denies (18 May 2020 20:30)      Recent Travel:  Other Exposures:     ROS  General: fatigue, fever  HEENT: Denies headache, rhinorrhea, sore throat, eye pain  CV: Denies CP, palpitations  PULM: Denies wheezing, hemoptysis  GI: Denies hematemesis, hematochezia, melena  : Denies discharge, hematuria  MSK: Denies arthralgias, myalgias  SKIN: b/l lower leg cellulitis   NEURO: Denies paresthesias, weakness  PSYCH: Denies depression, anxiety    VITALS:  T(F): 100.1, Max: 100.7 (02-09-21 @ 13:25)  HR: 107  BP: 99/52  RR: 18Vital Signs Last 24 Hrs  T(C): 37.8 (09 Feb 2021 15:03), Max: 38.2 (09 Feb 2021 13:25)  T(F): 100.1 (09 Feb 2021 15:03), Max: 100.7 (09 Feb 2021 13:25)  HR: 107 (09 Feb 2021 13:25) (92 - 113)  BP: 99/52 (09 Feb 2021 13:25) (96/60 - 166/71)  BP(mean): --  RR: 18 (09 Feb 2021 13:25) (18 - 27)  SpO2: 95% (09 Feb 2021 05:38) (93% - 100%)    PHYSICAL EXAM:  Gen: NAD, slightly fatigues   HEENT: Normocephalic, atraumatic  Neck: supple, no lymphadenopathy  CV: Regular rate & regular rhythm  Lungs: CTAB  Abdomen: Soft, BS present  Ext: b/l lower leg edema   Neuro: non focal, awake  Skin: b/l lower leg cellulitis, painful upon palpation    Lines: no phlebitis    TESTS & MEASUREMENTS:                        8.4    7.73  )-----------( 223      ( 08 Feb 2021 20:00 )             28.4     02-08    138  |  103  |  16  ----------------------------<  110<H>  4.3   |  28  |  1.2    Ca    8.5      08 Feb 2021 20:00  Phos  4.3     02-08  Mg     1.9     02-08      eGFR if Non African American: 41 mL/min/1.73M2 (02-08-21 @ 20:00)  eGFR if : 48 mL/min/1.73M2 (02-08-21 @ 20:00)          Culture - Tissue with Gram Stain (collected 02-08-21 @ 15:00)  Source: .Tissue None  Gram Stain (02-09-21 @ 04:09):    Rare polymorphonuclear leukocytes per low power field    Few Gram Negative Rods per oil power field    Rare Gram Variable Cocci per oil power field    Culture - Blood (collected 02-07-21 @ 12:30)  Source: .Blood Blood  Preliminary Report (02-08-21 @ 18:02):    No growth to date.    Culture - Blood (collected 02-07-21 @ 12:30)  Source: .Blood Blood  Preliminary Report (02-08-21 @ 18:02):    No growth to date.            INFECTIOUS DISEASES TESTING      RADIOLOGY & ADDITIONAL TESTS:  I have personally reviewed the last available Chest xray  CXR  Xray Chest 1 View- PORTABLE-Urgent:   EXAM:  XR CHEST PORTABLE URGENT 1V            PROCEDURE DATE:  02/07/2021            INTERPRETATION:  Clinical History/Reason For Exam: preop    Technique: XR CHEST URGENT    Comparison: XR CHEST 9/20/2019.    Findings:    Support devices: None.    Cardiac/mediastinum/hilum: Stable cardiomegaly. Aortic calcifications.    Lung parenchyma/Pleura: Stable prominent interstitial markings. No focal consolidation, effusion, or pneumothorax.    Skeleton/soft tissues: Degenerative changes of the spine and shoulders.    Impression:    Stable prominent interstitial markings. No focal consolidation, effusion, or pneumothorax.    Stable cardiomegaly.              MYRA GARCIA MD; Attending Radiologist  This document has been electronically signed. Feb 7 2021  2:32PM (02-07-21 @ 14:30)      CT      CARDIOLOGY TESTING  12 Lead ECG:   Ventricular Rate 99 BPM    Atrial Rate 115 BPM    QRS Duration 74 ms    Q-T Interval 340 ms    QTC Calculation(Bazett) 436 ms    R Axis 85 degrees    T Axis 62 degrees    Diagnosis Line Atrial fibrillation  Low voltage QRS  Septal infarct , age undetermined  Abnormal ECG    Confirmed by Imani Schumacher MD ([id number]) on 2/7/2021 12:58:43 PM (02-07-21 @ 12:26)      All available historical records have been reviewed    MEDICATIONS  chlorhexidine 4% Liquid 1  clindamycin IVPB 300  diltiazem     furosemide    Tablet 20  gentamicin 0.1% Ointment 1  pantoprazole    Tablet 40  senna 2  sodium chloride 0.9%. 1000      ANTIBIOTICS:  clindamycin IVPB 300 milliGRAM(s) IV Intermittent every 8 hours      All available historical data has been reviewed    ASSESSMENT  85yFemale with a PMH of morbid obesity, anxiety, COPD, PVD, OA, atrial fibrillation (on eliquis), HTN, s/p excision of cystic mass of buttock on 08/2018 with Dr. Bean, presents with complaints of non healing left buttock wound. Patient has had wound to left Buttock since surgery in 2018. Patient lives alone and has been following up at outpatient Burn clinic with Dr. Saeed for wound care. Patient was seen by Dr. Saeed on Thursday 2/4 and was recommended to come in for surgical debridement of Left buttock and abcess drainage which was done on 2/8/21. When at home, Patient has visiting nurse that does wound care and has been treating her wound with lidocaine cream, antibiotic ointment and Abdominal Pads.  The patient currently denies any left buttock pain, but endorses fatigue and fever.     IMPRESSION  #large rectal mass starting from left gluteal fold. r/o rectal cutaneus fistula, r/o malignancy    - recurring (since 2018 excision of left buttock cystic mass ) large left buttock mass w/ erythematous base with clearly demarcated borders, no surrounding erythema, w/ purulent drainage   - tissue cx grew : Rare polymorphonuclear leukocytes,  Few Gram Negative Rods, & Rare Gram Variable Cocci       RECOMMENDATIONS  -zivox 600mg q 12, cefepime 1g q 12, flagyl 500mg q 8  - pelvic MRI  , consult radiology   - recommend diverting colostomy     This is a pended note. All final recommendations to follow pending discussion with ID Attending    GIOVANNY BUCK  85y, Female  Allergy: aspirin (Other)  codeine (Other)  contrast media (iodine-based) (Other)  penicillin (Other)  sulfa drugs (Hives; Other)      CHIEF COMPLAINT: Left Buttock Wound. (09 Feb 2021 10:47)      HPI:  85yFemale with a past medical history of morbid obesity, anxiety, COPD, PVD, OA, atrial fibrillation (on eliquis), HTN, s/p excision of cystic mass of buttock on 08/2018 with Dr. Bean, presents with complaints of non healing left buttock wound. Patient has had wound to left Buttock since surgery in 2018. Patient lives alone and has been following up at outpatient Burn clinic with Dr. Saeed for wound care. Patient was seen by Dr. Saeed on Thursday 2/4 and was recommended to come in for surgical debridement of Left buttock and abcess drainage which was done on 2/8/21. When at home, Patient has visiting nurse that does wound care and has been treating her wound with lidocaine cream, antibiotic ointment and Abdominal Pads.  The patient currently denies any left buttock pain, but endorses fatigue and fever.           Infectious Diseases History:  Old Micro Data/Cultures:     FAMILY HISTORY:    PAST MEDICAL & SURGICAL HISTORY:  Obesity    Anxiety    COPD (chronic obstructive pulmonary disease)    PVD (peripheral vascular disease)    OA (osteoarthritis)    Cellulitis  chronic    Goiter  no meds    Afib  s/p ablation 2001    HTN (hypertension)    H/O total knee replacement, bilateral    H/O discectomy    H/O abdominal hysterectomy        SOCIAL HISTORY  Social History:  Smoke, drug, etoh: Denies (18 May 2020 20:30)      Recent Travel:  Other Exposures:     ROS  General: fatigue, fever  HEENT: Denies headache, rhinorrhea, sore throat, eye pain  CV: Denies CP, palpitations  PULM: Denies wheezing, hemoptysis  GI: Denies hematemesis, hematochezia, melena  : Denies discharge, hematuria  MSK: Denies arthralgias, myalgias  SKIN: b/l lower leg cellulitis   NEURO: Denies paresthesias, weakness  PSYCH: Denies depression, anxiety    VITALS:  T(F): 100.1, Max: 100.7 (02-09-21 @ 13:25)  HR: 107  BP: 99/52  RR: 18Vital Signs Last 24 Hrs  T(C): 37.8 (09 Feb 2021 15:03), Max: 38.2 (09 Feb 2021 13:25)  T(F): 100.1 (09 Feb 2021 15:03), Max: 100.7 (09 Feb 2021 13:25)  HR: 107 (09 Feb 2021 13:25) (92 - 113)  BP: 99/52 (09 Feb 2021 13:25) (96/60 - 166/71)  BP(mean): --  RR: 18 (09 Feb 2021 13:25) (18 - 27)  SpO2: 95% (09 Feb 2021 05:38) (93% - 100%)    PHYSICAL EXAM:  Gen: NAD, slightly fatigues   HEENT: Normocephalic, atraumatic  Neck: supple, no lymphadenopathy  CV: Regular rate & regular rhythm  Lungs: CTAB  Abdomen: Soft, BS present  Ext: b/l lower leg edema   Neuro: non focal, awake  Skin: b/l lower leg cellulitis, painful upon palpation    Lines: no phlebitis    TESTS & MEASUREMENTS:                        8.4    7.73  )-----------( 223      ( 08 Feb 2021 20:00 )             28.4     02-08    138  |  103  |  16  ----------------------------<  110<H>  4.3   |  28  |  1.2    Ca    8.5      08 Feb 2021 20:00  Phos  4.3     02-08  Mg     1.9     02-08      eGFR if Non African American: 41 mL/min/1.73M2 (02-08-21 @ 20:00)  eGFR if : 48 mL/min/1.73M2 (02-08-21 @ 20:00)          Culture - Tissue with Gram Stain (collected 02-08-21 @ 15:00)  Source: .Tissue None  Gram Stain (02-09-21 @ 04:09):    Rare polymorphonuclear leukocytes per low power field    Few Gram Negative Rods per oil power field    Rare Gram Variable Cocci per oil power field    Culture - Blood (collected 02-07-21 @ 12:30)  Source: .Blood Blood  Preliminary Report (02-08-21 @ 18:02):    No growth to date.    Culture - Blood (collected 02-07-21 @ 12:30)  Source: .Blood Blood  Preliminary Report (02-08-21 @ 18:02):    No growth to date.            INFECTIOUS DISEASES TESTING      RADIOLOGY & ADDITIONAL TESTS:  I have personally reviewed the last available Chest xray  CXR  Xray Chest 1 View- PORTABLE-Urgent:   EXAM:  XR CHEST PORTABLE URGENT 1V            PROCEDURE DATE:  02/07/2021            INTERPRETATION:  Clinical History/Reason For Exam: preop    Technique: XR CHEST URGENT    Comparison: XR CHEST 9/20/2019.    Findings:    Support devices: None.    Cardiac/mediastinum/hilum: Stable cardiomegaly. Aortic calcifications.    Lung parenchyma/Pleura: Stable prominent interstitial markings. No focal consolidation, effusion, or pneumothorax.    Skeleton/soft tissues: Degenerative changes of the spine and shoulders.    Impression:    Stable prominent interstitial markings. No focal consolidation, effusion, or pneumothorax.    Stable cardiomegaly.              MYRA GARCIA MD; Attending Radiologist  This document has been electronically signed. Feb 7 2021  2:32PM (02-07-21 @ 14:30)      CT      CARDIOLOGY TESTING  12 Lead ECG:   Ventricular Rate 99 BPM    Atrial Rate 115 BPM    QRS Duration 74 ms    Q-T Interval 340 ms    QTC Calculation(Bazett) 436 ms    R Axis 85 degrees    T Axis 62 degrees    Diagnosis Line Atrial fibrillation  Low voltage QRS  Septal infarct , age undetermined  Abnormal ECG    Confirmed by Imani Schumacher MD ([id number]) on 2/7/2021 12:58:43 PM (02-07-21 @ 12:26)      All available historical records have been reviewed    MEDICATIONS  chlorhexidine 4% Liquid 1  clindamycin IVPB 300  diltiazem     furosemide    Tablet 20  gentamicin 0.1% Ointment 1  pantoprazole    Tablet 40  senna 2  sodium chloride 0.9%. 1000      ANTIBIOTICS:  clindamycin IVPB 300 milliGRAM(s) IV Intermittent every 8 hours      All available historical data has been reviewed    ASSESSMENT  85yFemale with a PMH of morbid obesity, anxiety, COPD, PVD, OA, atrial fibrillation (on eliquis), HTN, s/p excision of cystic mass of buttock on 08/2018 with Dr. Bean, presents with complaints of non healing left buttock wound. Patient has had wound to left Buttock since surgery in 2018. Patient lives alone and has been following up at outpatient Burn clinic with Dr. Saeed for wound care. Patient was seen by Dr. Saeed on Thursday 2/4 and was recommended to come in for surgical debridement of Left buttock and abcess drainage which was done on 2/8/21. When at home, Patient has visiting nurse that does wound care and has been treating her wound with lidocaine cream, antibiotic ointment and Abdominal Pads.  The patient currently denies any left buttock pain, but endorses fatigue and fever.     IMPRESSION  Left gluteus with large abscess  S/p debridement  Would be concerned about a possible rectal malignancy with a possible fistula  Recurring (since 2018 excision of left buttock cystic mass )  Tissue cx grew : Rare polymorphonuclear leukocytes,  Few Gram Negative Rods, & Rare Gram Variable Cocci       RECOMMENDATIONS  Zyvox 600 mg iv q12h  cefepime 1g iv q12h  flagyl 500mg q 8  pelvic MRI  , consult radiology   Consider a temporary diverting colostomy     This is a pended note. All final recommendations to follow pending discussion with ID Attending

## 2021-02-10 LAB
ANION GAP SERPL CALC-SCNC: 10 MMOL/L — SIGNIFICANT CHANGE UP (ref 7–14)
ANION GAP SERPL CALC-SCNC: 11 MMOL/L — SIGNIFICANT CHANGE UP (ref 7–14)
APPEARANCE UR: CLEAR — SIGNIFICANT CHANGE UP
BACTERIA # UR AUTO: NEGATIVE — SIGNIFICANT CHANGE UP
BILIRUB UR-MCNC: NEGATIVE — SIGNIFICANT CHANGE UP
BUN SERPL-MCNC: 16 MG/DL — SIGNIFICANT CHANGE UP (ref 10–20)
BUN SERPL-MCNC: 17 MG/DL — SIGNIFICANT CHANGE UP (ref 10–20)
CALCIUM SERPL-MCNC: 8.1 MG/DL — LOW (ref 8.5–10.1)
CALCIUM SERPL-MCNC: 8.2 MG/DL — LOW (ref 8.5–10.1)
CHLORIDE SERPL-SCNC: 101 MMOL/L — SIGNIFICANT CHANGE UP (ref 98–110)
CHLORIDE SERPL-SCNC: 103 MMOL/L — SIGNIFICANT CHANGE UP (ref 98–110)
CO2 SERPL-SCNC: 23 MMOL/L — SIGNIFICANT CHANGE UP (ref 17–32)
CO2 SERPL-SCNC: 26 MMOL/L — SIGNIFICANT CHANGE UP (ref 17–32)
COLOR SPEC: YELLOW — SIGNIFICANT CHANGE UP
CREAT SERPL-MCNC: 1.2 MG/DL — SIGNIFICANT CHANGE UP (ref 0.7–1.5)
CREAT SERPL-MCNC: 1.3 MG/DL — SIGNIFICANT CHANGE UP (ref 0.7–1.5)
CULTURE RESULTS: NO GROWTH — SIGNIFICANT CHANGE UP
CULTURE RESULTS: SIGNIFICANT CHANGE UP
DIFF PNL FLD: ABNORMAL
EPI CELLS # UR: 1 /HPF — SIGNIFICANT CHANGE UP (ref 0–5)
GLUCOSE SERPL-MCNC: 101 MG/DL — HIGH (ref 70–99)
GLUCOSE SERPL-MCNC: 89 MG/DL — SIGNIFICANT CHANGE UP (ref 70–99)
GLUCOSE UR QL: NEGATIVE — SIGNIFICANT CHANGE UP
HCT VFR BLD CALC: 32.9 % — LOW (ref 37–47)
HCT VFR BLD CALC: 33.8 % — LOW (ref 37–47)
HGB BLD-MCNC: 10 G/DL — LOW (ref 12–16)
HGB BLD-MCNC: 9.8 G/DL — LOW (ref 12–16)
HYALINE CASTS # UR AUTO: 2 /LPF — SIGNIFICANT CHANGE UP (ref 0–7)
KETONES UR-MCNC: NEGATIVE — SIGNIFICANT CHANGE UP
LEUKOCYTE ESTERASE UR-ACNC: ABNORMAL
MAGNESIUM SERPL-MCNC: 1.8 MG/DL — SIGNIFICANT CHANGE UP (ref 1.8–2.4)
MAGNESIUM SERPL-MCNC: 1.9 MG/DL — SIGNIFICANT CHANGE UP (ref 1.8–2.4)
MCHC RBC-ENTMCNC: 24.4 PG — LOW (ref 27–31)
MCHC RBC-ENTMCNC: 24.4 PG — LOW (ref 27–31)
MCHC RBC-ENTMCNC: 29.6 G/DL — LOW (ref 32–37)
MCHC RBC-ENTMCNC: 29.8 G/DL — LOW (ref 32–37)
MCV RBC AUTO: 81.8 FL — SIGNIFICANT CHANGE UP (ref 81–99)
MCV RBC AUTO: 82.4 FL — SIGNIFICANT CHANGE UP (ref 81–99)
NITRITE UR-MCNC: NEGATIVE — SIGNIFICANT CHANGE UP
NRBC # BLD: 0 /100 WBCS — SIGNIFICANT CHANGE UP (ref 0–0)
NRBC # BLD: 0 /100 WBCS — SIGNIFICANT CHANGE UP (ref 0–0)
PH UR: 6 — SIGNIFICANT CHANGE UP (ref 5–8)
PHOSPHATE SERPL-MCNC: 3.1 MG/DL — SIGNIFICANT CHANGE UP (ref 2.1–4.9)
PHOSPHATE SERPL-MCNC: 3.5 MG/DL — SIGNIFICANT CHANGE UP (ref 2.1–4.9)
PLATELET # BLD AUTO: 224 K/UL — SIGNIFICANT CHANGE UP (ref 130–400)
PLATELET # BLD AUTO: 234 K/UL — SIGNIFICANT CHANGE UP (ref 130–400)
POTASSIUM SERPL-MCNC: 4 MMOL/L — SIGNIFICANT CHANGE UP (ref 3.5–5)
POTASSIUM SERPL-MCNC: 4.1 MMOL/L — SIGNIFICANT CHANGE UP (ref 3.5–5)
POTASSIUM SERPL-SCNC: 4 MMOL/L — SIGNIFICANT CHANGE UP (ref 3.5–5)
POTASSIUM SERPL-SCNC: 4.1 MMOL/L — SIGNIFICANT CHANGE UP (ref 3.5–5)
PROT UR-MCNC: ABNORMAL
RBC # BLD: 4.02 M/UL — LOW (ref 4.2–5.4)
RBC # BLD: 4.1 M/UL — LOW (ref 4.2–5.4)
RBC # FLD: 15.9 % — HIGH (ref 11.5–14.5)
RBC # FLD: 15.9 % — HIGH (ref 11.5–14.5)
RBC CASTS # UR COMP ASSIST: 4 /HPF — SIGNIFICANT CHANGE UP (ref 0–4)
SODIUM SERPL-SCNC: 137 MMOL/L — SIGNIFICANT CHANGE UP (ref 135–146)
SODIUM SERPL-SCNC: 137 MMOL/L — SIGNIFICANT CHANGE UP (ref 135–146)
SP GR SPEC: 1.02 — SIGNIFICANT CHANGE UP (ref 1.01–1.03)
SPECIMEN SOURCE: SIGNIFICANT CHANGE UP
SPECIMEN SOURCE: SIGNIFICANT CHANGE UP
UROBILINOGEN FLD QL: SIGNIFICANT CHANGE UP
WBC # BLD: 8.05 K/UL — SIGNIFICANT CHANGE UP (ref 4.8–10.8)
WBC # BLD: 8.67 K/UL — SIGNIFICANT CHANGE UP (ref 4.8–10.8)
WBC # FLD AUTO: 8.05 K/UL — SIGNIFICANT CHANGE UP (ref 4.8–10.8)
WBC # FLD AUTO: 8.67 K/UL — SIGNIFICANT CHANGE UP (ref 4.8–10.8)
WBC UR QL: 4 /HPF — SIGNIFICANT CHANGE UP (ref 0–5)

## 2021-02-10 RX ORDER — ALPRAZOLAM 0.25 MG
0.5 TABLET ORAL ONCE
Refills: 0 | Status: DISCONTINUED | OUTPATIENT
Start: 2021-02-10 | End: 2021-02-10

## 2021-02-10 RX ORDER — OFLOXACIN 0.3 %
1 DROPS OPHTHALMIC (EYE)
Refills: 0 | Status: DISCONTINUED | OUTPATIENT
Start: 2021-02-10 | End: 2021-02-11

## 2021-02-10 RX ADMIN — Medication 650 MILLIGRAM(S): at 05:50

## 2021-02-10 RX ADMIN — Medication 100 MILLIGRAM(S): at 14:22

## 2021-02-10 RX ADMIN — Medication 100 MILLIGRAM(S): at 05:49

## 2021-02-10 RX ADMIN — Medication 50 MILLIGRAM(S): at 13:02

## 2021-02-10 RX ADMIN — Medication 20 MILLIGRAM(S): at 05:49

## 2021-02-10 RX ADMIN — SENNA PLUS 2 TABLET(S): 8.6 TABLET ORAL at 22:07

## 2021-02-10 RX ADMIN — LINEZOLID 300 MILLIGRAM(S): 600 INJECTION, SOLUTION INTRAVENOUS at 18:08

## 2021-02-10 RX ADMIN — Medication 1 DROP(S): at 22:07

## 2021-02-10 RX ADMIN — CHLORHEXIDINE GLUCONATE 1 APPLICATION(S): 213 SOLUTION TOPICAL at 05:50

## 2021-02-10 RX ADMIN — Medication 50 MILLIGRAM(S): at 22:06

## 2021-02-10 RX ADMIN — Medication 100 MILLIGRAM(S): at 22:06

## 2021-02-10 RX ADMIN — Medication 1 APPLICATION(S): at 18:06

## 2021-02-10 RX ADMIN — Medication 1 APPLICATION(S): at 06:01

## 2021-02-10 RX ADMIN — LINEZOLID 300 MILLIGRAM(S): 600 INJECTION, SOLUTION INTRAVENOUS at 05:50

## 2021-02-10 RX ADMIN — PANTOPRAZOLE SODIUM 40 MILLIGRAM(S): 20 TABLET, DELAYED RELEASE ORAL at 05:49

## 2021-02-10 RX ADMIN — Medication 50 MILLIGRAM(S): at 06:00

## 2021-02-10 RX ADMIN — Medication 0.5 MILLIGRAM(S): at 22:06

## 2021-02-10 RX ADMIN — Medication 180 MILLIGRAM(S): at 05:49

## 2021-02-10 NOTE — PROGRESS NOTE ADULT - ASSESSMENT
ASSESSMENT  85yFemale with a PMH of morbid obesity, anxiety, COPD, PVD, OA, atrial fibrillation (on eliquis), HTN, s/p excision of cystic mass of buttock on 08/2018 with Dr. Bean, presents with complaints of non healing left buttock wound. Patient has had wound to left Buttock since surgery in 2018. Patient lives alone and has been following up at outpatient Burn clinic with Dr. Saeed for wound care. Patient was seen by Dr. Saeed on Thursday 2/4 and was recommended to come in for surgical debridement of Left buttock and abcess drainage which was done on 2/8/21. When at home, Patient has visiting nurse that does wound care and has been treating her wound with lidocaine cream, antibiotic ointment and Abdominal Pads.  The patient currently denies any left buttock pain, but endorses fatigue and fever.     IMPRESSION  Left gluteus with large abscess  S/p debridement  Concerned about a possible rectal malignancy with a possible fistula  Recurring (since 2018 excision of left buttock cystic mass )  Tissue cx  : Rare polymorphonuclear leukocytes,  Few Gram Negative Rods, & Rare Gram Variable Cocci       RECOMMENDATIONS  Zyvox 600 mg iv q12h  cefepime 1g iv q12h  flagyl 500mg q 8  Diagnostic imaging to r/o fistula / underlying malignancy

## 2021-02-10 NOTE — PROGRESS NOTE ADULT - SUBJECTIVE AND OBJECTIVE BOX
GIOVANNY BUCK  85y, Female    All available historical data reviewed    OVERNIGHT EVENTS:  fevers  feels well and has no complaints    ROS:  General: Denies rigors, nightsweats  HEENT: Denies headache, rhinorrhea, sore throat, eye pain  CV: Denies CP, palpitations  PULM: Denies wheezing, hemoptysis  GI: Denies hematemesis, hematochezia, melena  : Denies discharge, hematuria  MSK: Denies arthralgias, myalgias  SKIN: Denies rash, lesions  NEURO: Denies paresthesias, weakness  PSYCH: Denies depression, anxiety    VITALS:  T(F): 98.4, Max: 102 (02-10-21 @ 04:56)  HR: 107  BP: 106/59  RR: 18Vital Signs Last 24 Hrs  T(C): 36.9 (10 Feb 2021 13:40), Max: 38.9 (10 Feb 2021 04:56)  T(F): 98.4 (10 Feb 2021 13:40), Max: 102 (10 Feb 2021 04:56)  HR: 107 (10 Feb 2021 13:40) (98 - 115)  BP: 106/59 (10 Feb 2021 13:40) (105/53 - 131/61)  BP(mean): --  RR: 18 (10 Feb 2021 13:40) (18 - 19)  SpO2: 96% (09 Feb 2021 20:01) (96% - 96%)    TESTS & MEASUREMENTS:                        9.8    8.05  )-----------( 234      ( 10 Feb 2021 11:36 )             32.9     02-10    137  |  101  |  17  ----------------------------<  101<H>  4.0   |  26  |  1.3    Ca    8.2<L>      10 Feb 2021 11:36  Phos  3.5     02-10  Mg     1.9     02-10          Culture - Other (collected 02-08-21 @ 15:00)  Source: .Other None  Preliminary Report (02-09-21 @ 19:44):    No growth    Culture - Tissue with Gram Stain (collected 02-08-21 @ 15:00)  Source: .Tissue None  Gram Stain (02-09-21 @ 04:09):    Rare polymorphonuclear leukocytes per low power field    Few Gram Negative Rods per oil power field    Rare Gram Variable Cocci per oil power field  Preliminary Report (02-09-21 @ 20:39):    Insufficient growth-culture reincubated    Culture - Blood (collected 02-07-21 @ 12:30)  Source: .Blood Blood  Preliminary Report (02-08-21 @ 18:02):    No growth to date.    Culture - Blood (collected 02-07-21 @ 12:30)  Source: .Blood Blood  Preliminary Report (02-08-21 @ 18:02):    No growth to date.            RADIOLOGY & ADDITIONAL TESTS:  Personal review of radiological diagnostics performed  Echo and EKG results noted when applicable.     MEDICATIONS:  acetaminophen   Tablet .. 650 milliGRAM(s) Oral every 4 hours PRN  ALBUTerol    90 MICROgram(s) HFA Inhaler 2 Puff(s) Inhalation every 6 hours PRN  aztreonam  IVPB 1000 milliGRAM(s) IV Intermittent every 8 hours  chlorhexidine 4% Liquid 1 Application(s) Topical <User Schedule>  diltiazem    milliGRAM(s) Oral daily  furosemide    Tablet 20 milliGRAM(s) Oral daily  gentamicin 0.1% Ointment 1 Application(s) Topical two times a day  linezolid  IVPB 600 milliGRAM(s) IV Intermittent every 12 hours  metroNIDAZOLE  IVPB 500 milliGRAM(s) IV Intermittent every 8 hours  morphine  - Injectable 2 milliGRAM(s) IV Push every 6 hours PRN  morphine  - Injectable 2 milliGRAM(s) IV Push two times a day PRN  pantoprazole    Tablet 40 milliGRAM(s) Oral before breakfast  senna 2 Tablet(s) Oral at bedtime  sodium chloride 0.9%. 1000 milliLiter(s) IV Continuous <Continuous>      ANTIBIOTICS:  aztreonam  IVPB 1000 milliGRAM(s) IV Intermittent every 8 hours  linezolid  IVPB 600 milliGRAM(s) IV Intermittent every 12 hours  metroNIDAZOLE  IVPB 500 milliGRAM(s) IV Intermittent every 8 hours

## 2021-02-11 LAB
ANION GAP SERPL CALC-SCNC: 13 MMOL/L — SIGNIFICANT CHANGE UP (ref 7–14)
BUN SERPL-MCNC: 18 MG/DL — SIGNIFICANT CHANGE UP (ref 10–20)
CALCIUM SERPL-MCNC: 9.5 MG/DL — SIGNIFICANT CHANGE UP (ref 8.5–10.1)
CHLORIDE SERPL-SCNC: 98 MMOL/L — SIGNIFICANT CHANGE UP (ref 98–110)
CO2 SERPL-SCNC: 26 MMOL/L — SIGNIFICANT CHANGE UP (ref 17–32)
CREAT SERPL-MCNC: 1.2 MG/DL — SIGNIFICANT CHANGE UP (ref 0.7–1.5)
GLUCOSE SERPL-MCNC: 95 MG/DL — SIGNIFICANT CHANGE UP (ref 70–99)
HCT VFR BLD CALC: 34.7 % — LOW (ref 37–47)
HGB BLD-MCNC: 10.6 G/DL — LOW (ref 12–16)
MAGNESIUM SERPL-MCNC: 1.6 MG/DL — LOW (ref 1.8–2.4)
MCHC RBC-ENTMCNC: 24.9 PG — LOW (ref 27–31)
MCHC RBC-ENTMCNC: 30.5 G/DL — LOW (ref 32–37)
MCV RBC AUTO: 81.5 FL — SIGNIFICANT CHANGE UP (ref 81–99)
NRBC # BLD: 0 /100 WBCS — SIGNIFICANT CHANGE UP (ref 0–0)
PHOSPHATE SERPL-MCNC: 3 MG/DL — SIGNIFICANT CHANGE UP (ref 2.1–4.9)
PLATELET # BLD AUTO: 241 K/UL — SIGNIFICANT CHANGE UP (ref 130–400)
POTASSIUM SERPL-MCNC: 4 MMOL/L — SIGNIFICANT CHANGE UP (ref 3.5–5)
POTASSIUM SERPL-SCNC: 4 MMOL/L — SIGNIFICANT CHANGE UP (ref 3.5–5)
RAPID RVP RESULT: SIGNIFICANT CHANGE UP
RBC # BLD: 4.26 M/UL — SIGNIFICANT CHANGE UP (ref 4.2–5.4)
RBC # FLD: 15.9 % — HIGH (ref 11.5–14.5)
SARS-COV-2 RNA SPEC QL NAA+PROBE: SIGNIFICANT CHANGE UP
SODIUM SERPL-SCNC: 137 MMOL/L — SIGNIFICANT CHANGE UP (ref 135–146)
SURGICAL PATHOLOGY STUDY: SIGNIFICANT CHANGE UP
WBC # BLD: 12.31 K/UL — HIGH (ref 4.8–10.8)
WBC # FLD AUTO: 12.31 K/UL — HIGH (ref 4.8–10.8)

## 2021-02-11 PROCEDURE — 11042 DBRDMT SUBQ TIS 1ST 20SQCM/<: CPT

## 2021-02-11 PROCEDURE — 99222 1ST HOSP IP/OBS MODERATE 55: CPT

## 2021-02-11 PROCEDURE — 11045 DBRDMT SUBQ TISS EACH ADDL: CPT

## 2021-02-11 RX ORDER — ACETAMINOPHEN 500 MG
650 TABLET ORAL EVERY 6 HOURS
Refills: 0 | Status: DISCONTINUED | OUTPATIENT
Start: 2021-02-11 | End: 2021-02-16

## 2021-02-11 RX ORDER — DILTIAZEM HCL 120 MG
180 CAPSULE, EXT RELEASE 24 HR ORAL DAILY
Refills: 0 | Status: DISCONTINUED | OUTPATIENT
Start: 2021-02-11 | End: 2021-02-16

## 2021-02-11 RX ORDER — SENNA PLUS 8.6 MG/1
2 TABLET ORAL AT BEDTIME
Refills: 0 | Status: DISCONTINUED | OUTPATIENT
Start: 2021-02-11 | End: 2021-02-16

## 2021-02-11 RX ORDER — CEFTRIAXONE 500 MG/1
2000 INJECTION, POWDER, FOR SOLUTION INTRAMUSCULAR; INTRAVENOUS EVERY 24 HOURS
Refills: 0 | Status: DISCONTINUED | OUTPATIENT
Start: 2021-02-11 | End: 2021-02-11

## 2021-02-11 RX ORDER — FUROSEMIDE 40 MG
20 TABLET ORAL DAILY
Refills: 0 | Status: DISCONTINUED | OUTPATIENT
Start: 2021-02-11 | End: 2021-02-16

## 2021-02-11 RX ORDER — SODIUM CHLORIDE 9 MG/ML
1000 INJECTION INTRAMUSCULAR; INTRAVENOUS; SUBCUTANEOUS
Refills: 0 | Status: DISCONTINUED | OUTPATIENT
Start: 2021-02-11 | End: 2021-02-11

## 2021-02-11 RX ORDER — PANTOPRAZOLE SODIUM 20 MG/1
40 TABLET, DELAYED RELEASE ORAL
Refills: 0 | Status: DISCONTINUED | OUTPATIENT
Start: 2021-02-11 | End: 2021-02-16

## 2021-02-11 RX ORDER — OFLOXACIN 0.3 %
1 DROPS OPHTHALMIC (EYE)
Refills: 0 | Status: DISCONTINUED | OUTPATIENT
Start: 2021-02-11 | End: 2021-02-16

## 2021-02-11 RX ORDER — OFLOXACIN 0.3 %
1 DROPS OPHTHALMIC (EYE)
Refills: 0 | Status: DISCONTINUED | OUTPATIENT
Start: 2021-02-11 | End: 2021-02-11

## 2021-02-11 RX ORDER — APIXABAN 2.5 MG/1
2.5 TABLET, FILM COATED ORAL EVERY 12 HOURS
Refills: 0 | Status: DISCONTINUED | OUTPATIENT
Start: 2021-02-11 | End: 2021-02-15

## 2021-02-11 RX ORDER — CHLORHEXIDINE GLUCONATE 213 G/1000ML
1 SOLUTION TOPICAL
Refills: 0 | Status: DISCONTINUED | OUTPATIENT
Start: 2021-02-11 | End: 2021-02-16

## 2021-02-11 RX ORDER — METRONIDAZOLE 500 MG
500 TABLET ORAL EVERY 8 HOURS
Refills: 0 | Status: DISCONTINUED | OUTPATIENT
Start: 2021-02-11 | End: 2021-02-16

## 2021-02-11 RX ORDER — CEFTRIAXONE 500 MG/1
2000 INJECTION, POWDER, FOR SOLUTION INTRAMUSCULAR; INTRAVENOUS EVERY 24 HOURS
Refills: 0 | Status: DISCONTINUED | OUTPATIENT
Start: 2021-02-11 | End: 2021-02-16

## 2021-02-11 RX ORDER — LINEZOLID 600 MG/300ML
600 INJECTION, SOLUTION INTRAVENOUS EVERY 12 HOURS
Refills: 0 | Status: DISCONTINUED | OUTPATIENT
Start: 2021-02-11 | End: 2021-02-16

## 2021-02-11 RX ORDER — ERYTHROMYCIN BASE 5 MG/GRAM
1 OINTMENT (GRAM) OPHTHALMIC (EYE)
Refills: 0 | Status: DISCONTINUED | OUTPATIENT
Start: 2021-02-11 | End: 2021-02-16

## 2021-02-11 RX ORDER — SODIUM CHLORIDE 9 MG/ML
1000 INJECTION, SOLUTION INTRAVENOUS
Refills: 0 | Status: DISCONTINUED | OUTPATIENT
Start: 2021-02-11 | End: 2021-02-11

## 2021-02-11 RX ORDER — ACETAMINOPHEN 500 MG
650 TABLET ORAL EVERY 4 HOURS
Refills: 0 | Status: DISCONTINUED | OUTPATIENT
Start: 2021-02-11 | End: 2021-02-16

## 2021-02-11 RX ORDER — CEFTRIAXONE 500 MG/1
2 INJECTION, POWDER, FOR SOLUTION INTRAMUSCULAR; INTRAVENOUS EVERY 24 HOURS
Refills: 0 | Status: DISCONTINUED | OUTPATIENT
Start: 2021-02-11 | End: 2021-02-11

## 2021-02-11 RX ORDER — ALBUTEROL 90 UG/1
2 AEROSOL, METERED ORAL EVERY 6 HOURS
Refills: 0 | Status: DISCONTINUED | OUTPATIENT
Start: 2021-02-11 | End: 2021-02-16

## 2021-02-11 RX ORDER — MAGNESIUM SULFATE 500 MG/ML
2 VIAL (ML) INJECTION ONCE
Refills: 0 | Status: COMPLETED | OUTPATIENT
Start: 2021-02-11 | End: 2021-02-11

## 2021-02-11 RX ORDER — GENTAMICIN SULFATE 0.1 %
1 OINTMENT (GRAM) TOPICAL
Refills: 0 | Status: DISCONTINUED | OUTPATIENT
Start: 2021-02-11 | End: 2021-02-11

## 2021-02-11 RX ADMIN — CEFTRIAXONE 100 MILLIGRAM(S): 500 INJECTION, POWDER, FOR SOLUTION INTRAMUSCULAR; INTRAVENOUS at 18:36

## 2021-02-11 RX ADMIN — LINEZOLID 300 MILLIGRAM(S): 600 INJECTION, SOLUTION INTRAVENOUS at 17:13

## 2021-02-11 RX ADMIN — Medication 100 MILLIGRAM(S): at 04:59

## 2021-02-11 RX ADMIN — Medication 20 MILLIGRAM(S): at 05:00

## 2021-02-11 RX ADMIN — Medication 100 MILLIGRAM(S): at 21:33

## 2021-02-11 RX ADMIN — Medication 650 MILLIGRAM(S): at 22:00

## 2021-02-11 RX ADMIN — Medication 1 DROP(S): at 05:02

## 2021-02-11 RX ADMIN — CHLORHEXIDINE GLUCONATE 1 APPLICATION(S): 213 SOLUTION TOPICAL at 05:02

## 2021-02-11 RX ADMIN — SENNA PLUS 2 TABLET(S): 8.6 TABLET ORAL at 21:33

## 2021-02-11 RX ADMIN — Medication 180 MILLIGRAM(S): at 05:00

## 2021-02-11 RX ADMIN — PANTOPRAZOLE SODIUM 40 MILLIGRAM(S): 20 TABLET, DELAYED RELEASE ORAL at 05:00

## 2021-02-11 RX ADMIN — Medication 100 MILLIGRAM(S): at 15:28

## 2021-02-11 RX ADMIN — APIXABAN 2.5 MILLIGRAM(S): 2.5 TABLET, FILM COATED ORAL at 18:00

## 2021-02-11 RX ADMIN — Medication 1 DROP(S): at 17:13

## 2021-02-11 RX ADMIN — Medication 1 APPLICATION(S): at 05:02

## 2021-02-11 RX ADMIN — Medication 50 MILLIGRAM(S): at 05:00

## 2021-02-11 RX ADMIN — LINEZOLID 300 MILLIGRAM(S): 600 INJECTION, SOLUTION INTRAVENOUS at 05:00

## 2021-02-11 NOTE — CONSULT NOTE ADULT - ASSESSMENT
1. Right upper lid retraction with corneal punctate staining 2/2 exposure   - recommend decrease ofloxacin to bid OD, start preservative free artificial tears q2h OD and erythromycin ointment bid OD 1. 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

## 2021-02-11 NOTE — CONSULT NOTE ADULT - SUBJECTIVE AND OBJECTIVE BOX
GIOVANNY BUCK  MRN-[id number]  85y Female      Patient is a 85y old  Female who presents with a chief complaint of Left Buttock Wound. (11 Feb 2021 08:58). Ophthalmology consulted for right eye swollen, red, tearing. Patient reports right eye irritation, gritty sensation. She reports this has been longstanding for ~2 years since she had cataract surgery OU. Denies h/o bell's palsy, no visual changes or diplopia, no variability in lid position throughout the day or with fatigue. Lids have been stable for several years. History of thyroid disease.    HEALTH ISSUES - R/O PROBLEM Dx:    HPI:  84y Female PMH of morbid obesity, anxiety, COPD, PVD, OA, atrial fibrillation (on eliquis), HTN, s/p excision of cystic mass of buttock on 08/2018 with Dr. Bean, presents with complaints of non healing left buttock wound. Patient has had wound to left Buttock since surgery in 2018. Patient lives alone and has been following up at outpatient Burn clinic with Dr. Saeed for wound care. Patient was seen by Dr. Saeed on Thursday 2/4 and was recommended to come in for surgical debridement of Left buttock with possible skin grafting. Patient has visiting nurse to do wound care and has been treating wound with lidocaine cream, antibiotic ointment and Abdominal Pads. Patient now complains of pain and swelling to Left buttock but denies fevers, chills, HA, dizziness, SOB, CP, N/V/D.  (07 Feb 2021 13:24)      Extended HPI:  (-)burning, itching, redness, tearing OD/OS  (-)flashes, floaters, eye pain OD/OS    PAST MEDICAL & SURGICAL HISTORY:  Obesity    Anxiety    COPD (chronic obstructive pulmonary disease)    PVD (peripheral vascular disease)    OA (osteoarthritis)    Cellulitis  chronic    Goiter  no meds    Afib  s/p ablation 2001    HTN (hypertension)    H/O total knee replacement, bilateral    H/O discectomy    H/O abdominal hysterectomy      MEDICATIONS  (STANDING):  apixaban 2.5 milliGRAM(s) Oral every 12 hours  cefTRIAXone   IVPB 2 milliGRAM(s) IV Intermittent every 24 hours  chlorhexidine 4% Liquid 1 Application(s) Topical <User Schedule>  diltiazem    milliGRAM(s) Oral daily  furosemide    Tablet 20 milliGRAM(s) Oral daily  linezolid  IVPB 600 milliGRAM(s) IV Intermittent every 12 hours  metroNIDAZOLE  IVPB 500 milliGRAM(s) IV Intermittent every 8 hours  ofloxacin 0.3% Solution 1 Drop(s) Right EYE four times a day  pantoprazole    Tablet 40 milliGRAM(s) Oral before breakfast  senna 2 Tablet(s) Oral at bedtime    MEDICATIONS  (PRN):  acetaminophen   Tablet .. 650 milliGRAM(s) Oral every 4 hours PRN Temp greater or equal to 38C (100.4F)  ALBUTerol    90 MICROgram(s) HFA Inhaler 2 Puff(s) Inhalation every 6 hours PRN Shortness of Breath    Allergies    aspirin (Other)  codeine (Other)  contrast media (iodine-based) (Other)  penicillin (Other)  sulfa drugs (Hives; Other)    Intolerances        POHx:  GET: 2 years ago   (+) s/p CE/PCIOL OU   (-)injuries/diseases    Ocular Medications: ofloxacin qid OD     FOHx: Non-contributory    VISUAL ACUITY  OD: 20/40 sc   OS 20/30 sc    NEURO TESTING  Pupils: 	PERRL, (-) APD OD/OS  EOMS: 	FULL OD/OS      ANTERIOR SEGMENT EVALUATION:   lids/lashes: 	upper lid retraction, capped glands OD; capped glands OS (aperture asymmetry OS>OD with RUL appearing retracted and ANGELITO appearing ptotic however ANGELITO ptosis likely 2/2 age-related changes, no drooping of lid with sustained upgaze, )  conjunctiva: 	tr injection OD; clear OS  cornea: 	diffuse punctate staining inferior>superior OD; clear OS  anterior chamber: deep & quiet OD/OS  iris: 	flat and even OD/OS    INTRAOCULAR PRESSURE  [employer]  OD: 17 mmHg  OS: 20 mmHg  @ 4:50pm     Negative

## 2021-02-11 NOTE — PROGRESS NOTE ADULT - SUBJECTIVE AND OBJECTIVE BOX
GIOVANNY BUCK  85y, Female    All available historical data reviewed    OVERNIGHT EVENTS:  no fevers      ROS:  General: Denies rigors, nightsweats  HEENT: Denies headache, rhinorrhea, sore throat, eye pain  CV: Denies CP, palpitations  PULM: Denies wheezing, hemoptysis  GI: Denies hematemesis, hematochezia, melena  : Denies discharge, hematuria  MSK: Denies arthralgias, myalgias  SKIN: Denies rash, lesions  NEURO: Denies paresthesias, weakness  PSYCH: Denies depression, anxiety    VITALS:  T(F): 99.2, Max: 99.6 (21 @ 01:00)  HR: 100  BP: 142/65  RR: 18Vital Signs Last 24 Hrs  T(C): 37.3 (2021 05:04), Max: 37.6 (2021 01:00)  T(F): 99.2 (2021 05:04), Max: 99.6 (2021 01:00)  HR: 100 (2021 05:04) (96 - 113)  BP: 142/65 (2021 05:04) (106/59 - 142/65)  BP(mean): --  RR: 18 (2021 05:04) (18 - 20)  SpO2: 93% (2021 01:32) (93% - 93%)    TESTS & MEASUREMENTS:                        10.0   8.67  )-----------( 224      ( 10 Feb 2021 16:00 )             33.8     02-10    137  |  103  |  16  ----------------------------<  89  4.1   |  23  |  1.2    Ca    8.1<L>      10 Feb 2021 16:00  Phos  3.1     02-10  Mg     1.8     02-10          Culture - Other (collected 21 @ 15:00)  Source: .Other None  Final Report (02-10-21 @ 19:21):    No growth    Culture - Tissue with Gram Stain (collected 21 @ 15:00)  Source: .Tissue None  Gram Stain (21 @ 04:09):    Rare polymorphonuclear leukocytes per low power field    Few Gram Negative Rods per oil power field    Rare Gram Variable Cocci per oil power field  Final Report (02-10-21 @ 19:54):    Moderate Bacteroides thetaiotamcron group "Susceptibilities not performed"    Few Corynebacterium species "Susceptibilities not performed"    Rare Alpha hemolytic strep "Susceptibilities not performed"    Culture - Blood (collected 21 @ 12:30)  Source: .Blood Blood  Preliminary Report (21 @ 18:02):    No growth to date.    Culture - Blood (collected 21 @ 12:30)  Source: .Blood Blood  Preliminary Report (21 @ 18:02):    No growth to date.      Urinalysis Basic - ( 10 Feb 2021 21:00 )    Color: Yellow / Appearance: Clear / S.022 / pH: x  Gluc: x / Ketone: Negative  / Bili: Negative / Urobili: <2 mg/dL   Blood: x / Protein: 30 mg/dL / Nitrite: Negative   Leuk Esterase: Small / RBC: 4 /HPF / WBC 4 /HPF   Sq Epi: x / Non Sq Epi: 1 /HPF / Bacteria: Negative          RADIOLOGY & ADDITIONAL TESTS:  Personal review of radiological diagnostics performed  Echo and EKG results noted when applicable.     MEDICATIONS:  acetaminophen   Tablet .. 650 milliGRAM(s) Oral every 4 hours PRN  ALBUTerol    90 MICROgram(s) HFA Inhaler 2 Puff(s) Inhalation every 6 hours PRN  aztreonam  IVPB 1000 milliGRAM(s) IV Intermittent every 8 hours  chlorhexidine 4% Liquid 1 Application(s) Topical <User Schedule>  diltiazem    milliGRAM(s) Oral daily  furosemide    Tablet 20 milliGRAM(s) Oral daily  gentamicin 0.1% Ointment 1 Application(s) Topical two times a day  linezolid  IVPB 600 milliGRAM(s) IV Intermittent every 12 hours  metroNIDAZOLE  IVPB 500 milliGRAM(s) IV Intermittent every 8 hours  ofloxacin 0.3% Solution 1 Drop(s) Right EYE four times a day  pantoprazole    Tablet 40 milliGRAM(s) Oral before breakfast  senna 2 Tablet(s) Oral at bedtime  sodium chloride 0.9%. 1000 milliLiter(s) IV Continuous <Continuous>      ANTIBIOTICS:  aztreonam  IVPB 1000 milliGRAM(s) IV Intermittent every 8 hours  linezolid  IVPB 600 milliGRAM(s) IV Intermittent every 12 hours  metroNIDAZOLE  IVPB 500 milliGRAM(s) IV Intermittent every 8 hours

## 2021-02-11 NOTE — PROGRESS NOTE ADULT - ASSESSMENT
ASSESSMENT  85yFemale with a PMH of morbid obesity, anxiety, COPD, PVD, OA, atrial fibrillation (on eliquis), HTN, s/p excision of cystic mass of buttock on 08/2018 with Dr. Bean, presents with complaints of non healing left buttock wound. Patient has had wound to left Buttock since surgery in 2018. Patient lives alone and has been following up at outpatient Burn clinic with Dr. Saeed for wound care. Patient was seen by Dr. Saeed on Thursday 2/4 and was recommended to come in for surgical debridement of Left buttock and abcess drainage which was done on 2/8/21. When at home, Patient has visiting nurse that does wound care and has been treating her wound with lidocaine cream, antibiotic ointment and Abdominal Pads.  The patient currently denies any left buttock pain, but endorses fatigue and fever.     IMPRESSION  Left gluteus with large abscess  S/p debridement  Concerned about a possible rectal malignancy with a possible fistula  Recurring (since 2018 excision of left buttock cystic mass )  Tissue cx  : Rare polymorphonuclear leukocytes,  Bacteroides alpha hem strep, corynebacterium  Sx fot today      RECOMMENDATIONS  Zyvox 600 mg iv q12h  d/c cefepime   rocephin 2 gm iv q24h  flagyl 500mg q 8  Diagnostic imaging to r/o fistula / underlying malignancy pending

## 2021-02-11 NOTE — BRIEF OPERATIVE NOTE - NSICDXBRIEFPROCEDURE_GEN_ALL_CORE_FT
PROCEDURES:  Selective debridement 08-Feb-2021 15:57:01 debridement left buttock and abscess drainage Seng Saeed   PROCEDURES:  Negative pressure wound therapy, greater than 50 sq cm 11-Feb-2021 14:01:55 left buttock Long, Jw  Closure, wound, torso, delayed, primary 11-Feb-2021 14:00:45 left buttock, layered closure 20 x 16 cm Long, Jw  Irrigation and debridement of perineum or buttock 11-Feb-2021 13:58:37 excisional debridement and pulsatile irrigation left buttock including subcutis ~ 28 x 16 x 4 cm Long, Jw

## 2021-02-12 PROCEDURE — 73030 X-RAY EXAM OF SHOULDER: CPT | Mod: 26,RT

## 2021-02-12 PROCEDURE — 99231 SBSQ HOSP IP/OBS SF/LOW 25: CPT

## 2021-02-12 PROCEDURE — 73080 X-RAY EXAM OF ELBOW: CPT | Mod: 26,RT

## 2021-02-12 RX ORDER — LIDOCAINE 4 G/100G
1 CREAM TOPICAL EVERY 24 HOURS
Refills: 0 | Status: DISCONTINUED | OUTPATIENT
Start: 2021-02-12 | End: 2021-02-16

## 2021-02-12 RX ORDER — POLYETHYLENE GLYCOL 3350 17 G/17G
17 POWDER, FOR SOLUTION ORAL ONCE
Refills: 0 | Status: COMPLETED | OUTPATIENT
Start: 2021-02-12 | End: 2021-02-12

## 2021-02-12 RX ADMIN — Medication 20 MILLIGRAM(S): at 05:41

## 2021-02-12 RX ADMIN — Medication 180 MILLIGRAM(S): at 05:41

## 2021-02-12 RX ADMIN — SENNA PLUS 2 TABLET(S): 8.6 TABLET ORAL at 21:26

## 2021-02-12 RX ADMIN — APIXABAN 2.5 MILLIGRAM(S): 2.5 TABLET, FILM COATED ORAL at 17:53

## 2021-02-12 RX ADMIN — APIXABAN 2.5 MILLIGRAM(S): 2.5 TABLET, FILM COATED ORAL at 05:41

## 2021-02-12 RX ADMIN — Medication 100 MILLIGRAM(S): at 21:18

## 2021-02-12 RX ADMIN — Medication 1 DROP(S): at 14:58

## 2021-02-12 RX ADMIN — Medication 650 MILLIGRAM(S): at 14:58

## 2021-02-12 RX ADMIN — CHLORHEXIDINE GLUCONATE 1 APPLICATION(S): 213 SOLUTION TOPICAL at 11:06

## 2021-02-12 RX ADMIN — Medication 50 GRAM(S): at 00:17

## 2021-02-12 RX ADMIN — Medication 1 APPLICATION(S): at 17:52

## 2021-02-12 RX ADMIN — Medication 1 DROP(S): at 11:05

## 2021-02-12 RX ADMIN — Medication 1 DROP(S): at 16:20

## 2021-02-12 RX ADMIN — Medication 1 DROP(S): at 11:08

## 2021-02-12 RX ADMIN — Medication 100 MILLIGRAM(S): at 15:04

## 2021-02-12 RX ADMIN — Medication 1 DROP(S): at 23:39

## 2021-02-12 RX ADMIN — PANTOPRAZOLE SODIUM 40 MILLIGRAM(S): 20 TABLET, DELAYED RELEASE ORAL at 05:41

## 2021-02-12 RX ADMIN — POLYETHYLENE GLYCOL 3350 17 GRAM(S): 17 POWDER, FOR SOLUTION ORAL at 11:09

## 2021-02-12 RX ADMIN — Medication 1 DROP(S): at 21:22

## 2021-02-12 RX ADMIN — Medication 1 DROP(S): at 05:43

## 2021-02-12 RX ADMIN — Medication 1 APPLICATION(S): at 05:42

## 2021-02-12 RX ADMIN — Medication 1 DROP(S): at 04:00

## 2021-02-12 RX ADMIN — Medication 1 DROP(S): at 11:06

## 2021-02-12 RX ADMIN — Medication 1 DROP(S): at 17:51

## 2021-02-12 RX ADMIN — Medication 1 DROP(S): at 02:02

## 2021-02-12 RX ADMIN — Medication 100 MILLIGRAM(S): at 05:40

## 2021-02-12 RX ADMIN — Medication 650 MILLIGRAM(S): at 21:26

## 2021-02-12 RX ADMIN — Medication 1 DROP(S): at 17:52

## 2021-02-12 RX ADMIN — CEFTRIAXONE 100 MILLIGRAM(S): 500 INJECTION, POWDER, FOR SOLUTION INTRAMUSCULAR; INTRAVENOUS at 17:53

## 2021-02-12 RX ADMIN — LINEZOLID 300 MILLIGRAM(S): 600 INJECTION, SOLUTION INTRAVENOUS at 05:40

## 2021-02-12 RX ADMIN — LIDOCAINE 1 PATCH: 4 CREAM TOPICAL at 21:17

## 2021-02-12 RX ADMIN — Medication 1 DROP(S): at 05:41

## 2021-02-12 RX ADMIN — LINEZOLID 300 MILLIGRAM(S): 600 INJECTION, SOLUTION INTRAVENOUS at 17:51

## 2021-02-12 NOTE — PROGRESS NOTE ADULT - SUBJECTIVE AND OBJECTIVE BOX
GIOVANNY BUCK  85y, Female    All available historical data reviewed    OVERNIGHT EVENTS:  no fevers  constipated  right arm pain    ROS:  General: Denies rigors, nightsweats  HEENT: Denies headache, rhinorrhea, sore throat, eye pain  CV: Denies CP, palpitations  PULM: Denies wheezing, hemoptysis  GI: Denies hematemesis, hematochezia, melena  : Denies discharge, hematuria  MSK: Denies arthralgias, myalgias  SKIN: Denies rash, lesions  NEURO: Denies paresthesias, weakness  PSYCH: Denies depression, anxiety    VITALS:  T(F): 97, Max: 99.9 (21 @ 21:02)  HR: 103  BP: 121/61  RR: 20Vital Signs Last 24 Hrs  T(C): 36.1 (2021 05:00), Max: 37.7 (2021 21:02)  T(F): 97 (2021 05:00), Max: 99.9 (2021 21:02)  HR: 103 (2021 05:38) (100 - 123)  BP: 121/61 (2021 05:00) (98/57 - 151/67)  BP(mean): --  RR: 20 (2021 05:38) (18 - 26)  SpO2: 93% (2021 05:38) (88% - 97%)    TESTS & MEASUREMENTS:                        10.6   12.31 )-----------( 241      ( 2021 17:07 )             34.7         137  |  98  |  18  ----------------------------<  95  4.0   |  26  |  1.2    Ca    9.5      2021 17:07  Phos  3.0       Mg     1.6               Culture - Blood (collected 02-10-21 @ 11:36)  Source: .Blood None  Preliminary Report (21 @ 23:02):    No growth to date.    Culture - Blood (collected 02-10-21 @ 11:36)  Source: .Blood None  Preliminary Report (21 @ 23:02):    No growth to date.    Culture - Other (collected 21 @ 15:00)  Source: .Other None  Final Report (02-10-21 @ 19:21):    No growth    Culture - Tissue with Gram Stain (collected 21 @ 15:00)  Source: .Tissue None  Gram Stain (21 @ 04:09):    Rare polymorphonuclear leukocytes per low power field    Few Gram Negative Rods per oil power field    Rare Gram Variable Cocci per oil power field  Final Report (02-10-21 @ 19:54):    Moderate Bacteroides thetaiotamcron group "Susceptibilities not performed"    Few Corynebacterium species "Susceptibilities not performed"    Rare Alpha hemolytic strep "Susceptibilities not performed"    Culture - Blood (collected 21 @ 12:30)  Source: .Blood Blood  Preliminary Report (21 @ 18:02):    No growth to date.    Culture - Blood (collected 21 @ 12:30)  Source: .Blood Blood  Preliminary Report (21 @ 18:02):    No growth to date.      Urinalysis Basic - ( 10 Feb 2021 21:00 )    Color: Yellow / Appearance: Clear / S.022 / pH: x  Gluc: x / Ketone: Negative  / Bili: Negative / Urobili: <2 mg/dL   Blood: x / Protein: 30 mg/dL / Nitrite: Negative   Leuk Esterase: Small / RBC: 4 /HPF / WBC 4 /HPF   Sq Epi: x / Non Sq Epi: 1 /HPF / Bacteria: Negative          RADIOLOGY & ADDITIONAL TESTS:  Personal review of radiological diagnostics performed  Echo and EKG results noted when applicable.     MEDICATIONS:  acetaminophen   Tablet .. 650 milliGRAM(s) Oral every 4 hours PRN  acetaminophen   Tablet .. 650 milliGRAM(s) Oral every 6 hours PRN  ALBUTerol    90 MICROgram(s) HFA Inhaler 2 Puff(s) Inhalation every 6 hours PRN  apixaban 2.5 milliGRAM(s) Oral every 12 hours  artificial tears (preservative free) Ophthalmic Solution 1 Drop(s) Right EYE every 2 hours  cefTRIAXone   IVPB 2000 milliGRAM(s) IV Intermittent every 24 hours  chlorhexidine 4% Liquid 1 Application(s) Topical <User Schedule>  diltiazem    milliGRAM(s) Oral daily  erythromycin   Ointment 1 Application(s) Right EYE two times a day  furosemide    Tablet 20 milliGRAM(s) Oral daily  linezolid  IVPB 600 milliGRAM(s) IV Intermittent every 12 hours  metroNIDAZOLE  IVPB 500 milliGRAM(s) IV Intermittent every 8 hours  ofloxacin 0.3% Solution 1 Drop(s) Right EYE two times a day  pantoprazole    Tablet 40 milliGRAM(s) Oral before breakfast  polyethylene glycol 3350 17 Gram(s) Oral once  senna 2 Tablet(s) Oral at bedtime      ANTIBIOTICS:  cefTRIAXone   IVPB 2000 milliGRAM(s) IV Intermittent every 24 hours  linezolid  IVPB 600 milliGRAM(s) IV Intermittent every 12 hours  metroNIDAZOLE  IVPB 500 milliGRAM(s) IV Intermittent every 8 hours

## 2021-02-12 NOTE — PROGRESS NOTE ADULT - SUBJECTIVE AND OBJECTIVE BOX
Patient is a 85y old  Female who presents with a chief complaint of Left Buttock non healing Wound. (2021 08:35)  POD#1 s/p debridement left buttock and partial closure with wound vac placement.      AM rounds:  pt seen on bedside, alert and oriented, not in distress, cooperative, afebrile overnight    Vital Signs Last 24 Hrs  T(C): 37.3 (2021 13:17), Max: 37.7 (2021 21:02)  T(F): 99.1 (2021 13:17), Max: 99.9 (2021 21:02)  HR: 110 (2021 13:17) (100 - 123)  BP: 111/65 (2021 13:17) (108/51 - 146/67)  RR: 20 (2021 13:17) (18 - 20)  SpO2: 93% (2021 05:38) (93% - 93%)    I&O's Summary    2021 07:01  -  2021 07:00  --------------------------------------------------------  IN: 70 mL / OUT: 1710 mL / NET: -1640 mL    2021 07:01  -  2021 16:39  --------------------------------------------------------  IN: 0 mL / OUT: 605 mL / NET: -605 mL      Allergies  aspirin (Other)  codeine (Other)  contrast media (iodine-based) (Other)  penicillin (Other)  sulfa drugs (Hives; Other)      Lab Results:                        10.6   12.31 )-----------( 241      ( 2021 17:07 )             34.7     02-11    137  |  98  |  18  ----------------------------<  95  4.0   |  26  |  1.2    Ca    9.5      2021 17:07  Phos  3.0     02-11  Mg     1.6     02-11    Urinalysis Basic - ( 10 Feb 2021 21:00 )  Color: Yellow / Appearance: Clear / S.022 / pH: x  Gluc: x / Ketone: Negative  / Bili: Negative / Urobili: <2 mg/dL   Blood: x / Protein: 30 mg/dL / Nitrite: Negative   Leuk Esterase: Small / RBC: 4 /HPF / WBC 4 /HPF   Sq Epi: x / Non Sq Epi: 1 /HPF / Bacteria: Negative      MEDICATIONS  (STANDING):  apixaban 2.5 milliGRAM(s) Oral every 12 hours  artificial tears (preservative free) Ophthalmic Solution 1 Drop(s) Right EYE every 2 hours  cefTRIAXone   IVPB 2000 milliGRAM(s) IV Intermittent every 24 hours  chlorhexidine 4% Liquid 1 Application(s) Topical <User Schedule>  diltiazem    milliGRAM(s) Oral daily  erythromycin   Ointment 1 Application(s) Right EYE two times a day  furosemide    Tablet 20 milliGRAM(s) Oral daily  linezolid  IVPB 600 milliGRAM(s) IV Intermittent every 12 hours  metroNIDAZOLE  IVPB 500 milliGRAM(s) IV Intermittent every 8 hours  ofloxacin 0.3% Solution 1 Drop(s) Right EYE two times a day  pantoprazole    Tablet 40 milliGRAM(s) Oral before breakfast  senna 2 Tablet(s) Oral at bedtime    MEDICATIONS  (PRN):  acetaminophen   Tablet .. 650 milliGRAM(s) Oral every 4 hours PRN Temp greater or equal to 38C (100.4F)  acetaminophen   Tablet .. 650 milliGRAM(s) Oral every 6 hours PRN Mild Pain (1 - 3)  ALBUTerol    90 MICROgram(s) HFA Inhaler 2 Puff(s) Inhalation every 6 hours PRN Shortness of Breath        PHYSICAL EXAM:  GENERAL: well built, well nourished, not in distress, alert and oriented, cooperative  Wound:   Left buttock with wound vac in place, intact dressing, no bleeding,    Patient is a 85y old  Female who presents with a chief complaint of Left Buttock non healing Wound. (2021 08:35)  POD#1 s/p debridement left buttock and partial closure with wound vac placement.      AM rounds:  pt seen at bedside, alert and oriented, not in distress, cooperative, afebrile overnight    Vital Signs Last 24 Hrs  T(C): 37.3 (2021 13:17), Max: 37.7 (2021 21:02)  T(F): 99.1 (2021 13:17), Max: 99.9 (2021 21:02)  HR: 110 (2021 13:17) (100 - 123)  BP: 111/65 (2021 13:17) (108/51 - 146/67)  RR: 20 (2021 13:17) (18 - 20)  SpO2: 93% (2021 05:38) (93% - 93%)    I&O's Summary    2021 07:01  -  2021 07:00  --------------------------------------------------------  IN: 70 mL / OUT: 1710 mL / NET: -1640 mL    2021 07:01  -  2021 16:39  --------------------------------------------------------  IN: 0 mL / OUT: 605 mL / NET: -605 mL      Allergies  aspirin (Other)  codeine (Other)  contrast media (iodine-based) (Other)  penicillin (Other)  sulfa drugs (Hives; Other)      Lab Results:                        10.6   12.31 )-----------( 241      ( 2021 17:07 )             34.7     02-11    137  |  98  |  18  ----------------------------<  95  4.0   |  26  |  1.2    Ca    9.5      2021 17:07  Phos  3.0     02-11  Mg     1.6     02-11    Urinalysis Basic - ( 10 Feb 2021 21:00 )  Color: Yellow / Appearance: Clear / S.022 / pH: x  Gluc: x / Ketone: Negative  / Bili: Negative / Urobili: <2 mg/dL   Blood: x / Protein: 30 mg/dL / Nitrite: Negative   Leuk Esterase: Small / RBC: 4 /HPF / WBC 4 /HPF   Sq Epi: x / Non Sq Epi: 1 /HPF / Bacteria: Negative      MEDICATIONS  (STANDING):  apixaban 2.5 milliGRAM(s) Oral every 12 hours  artificial tears (preservative free) Ophthalmic Solution 1 Drop(s) Right EYE every 2 hours  cefTRIAXone   IVPB 2000 milliGRAM(s) IV Intermittent every 24 hours  chlorhexidine 4% Liquid 1 Application(s) Topical <User Schedule>  diltiazem    milliGRAM(s) Oral daily  erythromycin   Ointment 1 Application(s) Right EYE two times a day  furosemide    Tablet 20 milliGRAM(s) Oral daily  linezolid  IVPB 600 milliGRAM(s) IV Intermittent every 12 hours  metroNIDAZOLE  IVPB 500 milliGRAM(s) IV Intermittent every 8 hours  ofloxacin 0.3% Solution 1 Drop(s) Right EYE two times a day  pantoprazole    Tablet 40 milliGRAM(s) Oral before breakfast  senna 2 Tablet(s) Oral at bedtime    MEDICATIONS  (PRN):  acetaminophen   Tablet .. 650 milliGRAM(s) Oral every 4 hours PRN Temp greater or equal to 38C (100.4F)  acetaminophen   Tablet .. 650 milliGRAM(s) Oral every 6 hours PRN Mild Pain (1 - 3)  ALBUTerol    90 MICROgram(s) HFA Inhaler 2 Puff(s) Inhalation every 6 hours PRN Shortness of Breath        PHYSICAL EXAM:  GENERAL: well built, well nourished, not in distress, alert and oriented, cooperative  Wound:   Left buttock with wound vac in place- minimal drainage, intact dressing, no bleeding,   LAWANDA drain - serosanguineous output - 15cc

## 2021-02-12 NOTE — PROGRESS NOTE ADULT - ASSESSMENT
ASSESSMENT  85yFemale with a PMH of morbid obesity, anxiety, COPD, PVD, OA, atrial fibrillation (on eliquis), HTN, s/p excision of cystic mass of buttock on 08/2018 with Dr. Bean, presents with complaints of non healing left buttock wound. Patient has had wound to left Buttock since surgery in 2018. Patient lives alone and has been following up at outpatient Burn clinic with Dr. Saeed for wound care. Patient was seen by Dr. Saeed on Thursday 2/4 and was recommended to come in for surgical debridement of Left buttock and abcess drainage which was done on 2/8/21. When at home, Patient has visiting nurse that does wound care and has been treating her wound with lidocaine cream, antibiotic ointment and Abdominal Pads.  The patient currently denies any left buttock pain, but endorses fatigue and fever.     IMPRESSION  Left gluteus with large abscess  S/p debridement  Concerned about a possible rectal malignancy with a possible fistula  Recurring (since 2018 excision of left buttock cystic mass )  Tissue cx  : Rare polymorphonuclear leukocytes,  Bacteroides alpha hem strep, corynebacterium  Sx 2/11 : wound vac  Constipation : no BM since admission  R arm pain ( trauma )      RECOMMENDATIONS  Zyvox 600 mg iv q12h  rocephin 2 gm iv q24h  flagyl 500mg q 8  Diagnostic imaging to r/o fistula / underlying malignancy pending  XR right arm  Treat constipation  OOB> chair if possible

## 2021-02-12 NOTE — PROGRESS NOTE ADULT - ASSESSMENT
84y Female PMH of morbid obesity, anxiety, COPD, PVD, OA, atrial fibrillation (on eliquis), HTN, s/p excision of cystic mass of buttock on 08/2018 with Dr. Bean, presents with complaints of non healing left buttock wound.   POD#1 s/p debridement left buttock and partial closure with wound vac placement.      1. Left Buttock Wound   - POD#1 s/p debridement left buttock and partial closure with wound vac placement.  - continue wound vac therapy-> plan to change wound vac dressing tomorrow Sat 2/13  - WCx 2/8: mod bacteroides, few corynebacter, rare alpha hemolytic strep, f/u WCx 2/9  - MRI of pelvic pending   - as per ID recommendations  Zyvox 600 mg iv q12h, Rocephin 2 gm iv q24h, Flagyl 500mg q 8  - pain management  - Plan for skin graft on Tuesday 2/16, need COVID test pre-op, hold Eliquis Monday 2/15 hs    2. Ophthalmology:   - seen by ophtho for Right upper lid retraction with corneal punctate staining 2/2 long standing exposure   - Recommend order acetylcholine receptor antibodies (binding, blocking, and modulating)  - Recommend ofloxacin bid OD, start preservative free artificial tears q2h OD and erythromycin ointment bid OD  - Follow-up as outpatient for dilated fundus examination     3.  Hx HTN  - monitor vitals  - cont w/ Diltiazem   - cont home dose Lasix     4. Hx A fib  - cont Diltiazem 180 daily  - cont w/ Eliquis 2.5mg bid    5. hx COPD  - stable  - cont w/ inhaler       - DVT ppx with   -GI ppx  - PT/OT

## 2021-02-13 LAB
ANION GAP SERPL CALC-SCNC: 10 MMOL/L — SIGNIFICANT CHANGE UP (ref 7–14)
ANION GAP SERPL CALC-SCNC: 10 MMOL/L — SIGNIFICANT CHANGE UP (ref 7–14)
ANION GAP SERPL CALC-SCNC: 9 MMOL/L — SIGNIFICANT CHANGE UP (ref 7–14)
BUN SERPL-MCNC: 18 MG/DL — SIGNIFICANT CHANGE UP (ref 10–20)
BUN SERPL-MCNC: 19 MG/DL — SIGNIFICANT CHANGE UP (ref 10–20)
BUN SERPL-MCNC: 20 MG/DL — SIGNIFICANT CHANGE UP (ref 10–20)
CALCIUM SERPL-MCNC: 8.2 MG/DL — LOW (ref 8.5–10.1)
CALCIUM SERPL-MCNC: 8.4 MG/DL — LOW (ref 8.5–10.1)
CALCIUM SERPL-MCNC: 8.5 MG/DL — SIGNIFICANT CHANGE UP (ref 8.5–10.1)
CHLORIDE SERPL-SCNC: 100 MMOL/L — SIGNIFICANT CHANGE UP (ref 98–110)
CHLORIDE SERPL-SCNC: 96 MMOL/L — LOW (ref 98–110)
CHLORIDE SERPL-SCNC: 96 MMOL/L — LOW (ref 98–110)
CO2 SERPL-SCNC: 25 MMOL/L — SIGNIFICANT CHANGE UP (ref 17–32)
CO2 SERPL-SCNC: 27 MMOL/L — SIGNIFICANT CHANGE UP (ref 17–32)
CO2 SERPL-SCNC: 28 MMOL/L — SIGNIFICANT CHANGE UP (ref 17–32)
CREAT SERPL-MCNC: 1 MG/DL — SIGNIFICANT CHANGE UP (ref 0.7–1.5)
CREAT SERPL-MCNC: 1 MG/DL — SIGNIFICANT CHANGE UP (ref 0.7–1.5)
CREAT SERPL-MCNC: 1.1 MG/DL — SIGNIFICANT CHANGE UP (ref 0.7–1.5)
GLUCOSE SERPL-MCNC: 118 MG/DL — HIGH (ref 70–99)
GLUCOSE SERPL-MCNC: 138 MG/DL — HIGH (ref 70–99)
GLUCOSE SERPL-MCNC: 94 MG/DL — SIGNIFICANT CHANGE UP (ref 70–99)
HCT VFR BLD CALC: 31.5 % — LOW (ref 37–47)
HCT VFR BLD CALC: 32 % — LOW (ref 37–47)
HGB BLD-MCNC: 10 G/DL — LOW (ref 12–16)
HGB BLD-MCNC: 10.1 G/DL — LOW (ref 12–16)
MAGNESIUM SERPL-MCNC: 1.8 MG/DL — SIGNIFICANT CHANGE UP (ref 1.8–2.4)
MAGNESIUM SERPL-MCNC: 1.9 MG/DL — SIGNIFICANT CHANGE UP (ref 1.8–2.4)
MCHC RBC-ENTMCNC: 24.6 PG — LOW (ref 27–31)
MCHC RBC-ENTMCNC: 24.8 PG — LOW (ref 27–31)
MCHC RBC-ENTMCNC: 31.6 G/DL — LOW (ref 32–37)
MCHC RBC-ENTMCNC: 31.7 G/DL — LOW (ref 32–37)
MCV RBC AUTO: 77.9 FL — LOW (ref 81–99)
MCV RBC AUTO: 78.2 FL — LOW (ref 81–99)
NRBC # BLD: 0 /100 WBCS — SIGNIFICANT CHANGE UP (ref 0–0)
NRBC # BLD: 0 /100 WBCS — SIGNIFICANT CHANGE UP (ref 0–0)
PHOSPHATE SERPL-MCNC: 2.7 MG/DL — SIGNIFICANT CHANGE UP (ref 2.1–4.9)
PHOSPHATE SERPL-MCNC: 2.8 MG/DL — SIGNIFICANT CHANGE UP (ref 2.1–4.9)
PLATELET # BLD AUTO: 244 K/UL — SIGNIFICANT CHANGE UP (ref 130–400)
PLATELET # BLD AUTO: 274 K/UL — SIGNIFICANT CHANGE UP (ref 130–400)
POTASSIUM SERPL-MCNC: 3.3 MMOL/L — LOW (ref 3.5–5)
POTASSIUM SERPL-MCNC: 3.7 MMOL/L — SIGNIFICANT CHANGE UP (ref 3.5–5)
POTASSIUM SERPL-MCNC: 4 MMOL/L — SIGNIFICANT CHANGE UP (ref 3.5–5)
POTASSIUM SERPL-SCNC: 3.3 MMOL/L — LOW (ref 3.5–5)
POTASSIUM SERPL-SCNC: 3.7 MMOL/L — SIGNIFICANT CHANGE UP (ref 3.5–5)
POTASSIUM SERPL-SCNC: 4 MMOL/L — SIGNIFICANT CHANGE UP (ref 3.5–5)
RBC # BLD: 4.03 M/UL — LOW (ref 4.2–5.4)
RBC # BLD: 4.11 M/UL — LOW (ref 4.2–5.4)
RBC # FLD: 16.1 % — HIGH (ref 11.5–14.5)
RBC # FLD: 16.6 % — HIGH (ref 11.5–14.5)
SODIUM SERPL-SCNC: 131 MMOL/L — LOW (ref 135–146)
SODIUM SERPL-SCNC: 132 MMOL/L — LOW (ref 135–146)
SODIUM SERPL-SCNC: 138 MMOL/L — SIGNIFICANT CHANGE UP (ref 135–146)
WBC # BLD: 9.05 K/UL — SIGNIFICANT CHANGE UP (ref 4.8–10.8)
WBC # BLD: 9.74 K/UL — SIGNIFICANT CHANGE UP (ref 4.8–10.8)
WBC # FLD AUTO: 9.05 K/UL — SIGNIFICANT CHANGE UP (ref 4.8–10.8)
WBC # FLD AUTO: 9.74 K/UL — SIGNIFICANT CHANGE UP (ref 4.8–10.8)

## 2021-02-13 PROCEDURE — 99231 SBSQ HOSP IP/OBS SF/LOW 25: CPT

## 2021-02-13 RX ORDER — POTASSIUM CHLORIDE 20 MEQ
10 PACKET (EA) ORAL
Refills: 0 | Status: DISCONTINUED | OUTPATIENT
Start: 2021-02-13 | End: 2021-02-13

## 2021-02-13 RX ORDER — POTASSIUM CHLORIDE 20 MEQ
20 PACKET (EA) ORAL
Refills: 0 | Status: COMPLETED | OUTPATIENT
Start: 2021-02-13 | End: 2021-02-13

## 2021-02-13 RX ORDER — ALPRAZOLAM 0.25 MG
0.5 TABLET ORAL ONCE
Refills: 0 | Status: DISCONTINUED | OUTPATIENT
Start: 2021-02-13 | End: 2021-02-13

## 2021-02-13 RX ADMIN — APIXABAN 2.5 MILLIGRAM(S): 2.5 TABLET, FILM COATED ORAL at 06:32

## 2021-02-13 RX ADMIN — LIDOCAINE 1 PATCH: 4 CREAM TOPICAL at 07:39

## 2021-02-13 RX ADMIN — CHLORHEXIDINE GLUCONATE 1 APPLICATION(S): 213 SOLUTION TOPICAL at 05:34

## 2021-02-13 RX ADMIN — Medication 0.5 MILLIGRAM(S): at 22:35

## 2021-02-13 RX ADMIN — Medication 1 DROP(S): at 21:26

## 2021-02-13 RX ADMIN — Medication 100 MILLIGRAM(S): at 05:38

## 2021-02-13 RX ADMIN — SENNA PLUS 2 TABLET(S): 8.6 TABLET ORAL at 21:26

## 2021-02-13 RX ADMIN — Medication 1 DROP(S): at 09:04

## 2021-02-13 RX ADMIN — Medication 1 DROP(S): at 03:59

## 2021-02-13 RX ADMIN — LINEZOLID 300 MILLIGRAM(S): 600 INJECTION, SOLUTION INTRAVENOUS at 05:33

## 2021-02-13 RX ADMIN — Medication 1 DROP(S): at 06:32

## 2021-02-13 RX ADMIN — Medication 50 MILLIEQUIVALENT(S): at 10:59

## 2021-02-13 RX ADMIN — Medication 180 MILLIGRAM(S): at 05:36

## 2021-02-13 RX ADMIN — Medication 100 MILLIGRAM(S): at 13:08

## 2021-02-13 RX ADMIN — Medication 1 APPLICATION(S): at 17:34

## 2021-02-13 RX ADMIN — Medication 1 DROP(S): at 15:25

## 2021-02-13 RX ADMIN — Medication 100 MILLIGRAM(S): at 21:24

## 2021-02-13 RX ADMIN — Medication 1 DROP(S): at 11:00

## 2021-02-13 RX ADMIN — LINEZOLID 300 MILLIGRAM(S): 600 INJECTION, SOLUTION INTRAVENOUS at 17:34

## 2021-02-13 RX ADMIN — Medication 1 DROP(S): at 17:32

## 2021-02-13 RX ADMIN — Medication 1 DROP(S): at 08:15

## 2021-02-13 RX ADMIN — PANTOPRAZOLE SODIUM 40 MILLIGRAM(S): 20 TABLET, DELAYED RELEASE ORAL at 05:38

## 2021-02-13 RX ADMIN — LIDOCAINE 1 PATCH: 4 CREAM TOPICAL at 09:10

## 2021-02-13 RX ADMIN — CEFTRIAXONE 100 MILLIGRAM(S): 500 INJECTION, POWDER, FOR SOLUTION INTRAMUSCULAR; INTRAVENOUS at 18:35

## 2021-02-13 RX ADMIN — APIXABAN 2.5 MILLIGRAM(S): 2.5 TABLET, FILM COATED ORAL at 17:32

## 2021-02-13 RX ADMIN — Medication 1 APPLICATION(S): at 05:36

## 2021-02-13 RX ADMIN — Medication 50 MILLIEQUIVALENT(S): at 09:04

## 2021-02-13 RX ADMIN — Medication 1 DROP(S): at 05:33

## 2021-02-13 RX ADMIN — LIDOCAINE 1 PATCH: 4 CREAM TOPICAL at 21:25

## 2021-02-13 RX ADMIN — Medication 1 DROP(S): at 05:38

## 2021-02-13 RX ADMIN — Medication 1 DROP(S): at 20:39

## 2021-02-13 RX ADMIN — Medication 1 DROP(S): at 13:09

## 2021-02-13 NOTE — PROGRESS NOTE ADULT - SUBJECTIVE AND OBJECTIVE BOX
Patient is a 85y old  Female who presents with a chief complaint of Left Buttock Wound. (12 Feb 2021 16:39)    No acute events overnight    Vital Signs Last 24 Hrs  T(C): 37 (13 Feb 2021 12:01), Max: 37.1 (12 Feb 2021 17:05)  T(F): 98.6 (13 Feb 2021 12:01), Max: 98.8 (12 Feb 2021 17:05)  HR: 96 (13 Feb 2021 12:01) (96 - 114)  BP: 150/72 (13 Feb 2021 12:01) (117/58 - 150/72)  BP(mean): --  RR: 20 (13 Feb 2021 12:01) (18 - 20)  SpO2: --    I&O's Summary    12 Feb 2021 07:01  -  13 Feb 2021 07:00  --------------------------------------------------------  IN: 0 mL / OUT: 605 mL / NET: -605 mL    13 Feb 2021 07:01  -  13 Feb 2021 15:24  --------------------------------------------------------  IN: 300 mL / OUT: 1 mL / NET: 299 mL        Meds:  MEDICATIONS  (STANDING):  apixaban 2.5 milliGRAM(s) Oral every 12 hours  artificial tears (preservative free) Ophthalmic Solution 1 Drop(s) Right EYE every 2 hours  cefTRIAXone   IVPB 2000 milliGRAM(s) IV Intermittent every 24 hours  chlorhexidine 4% Liquid 1 Application(s) Topical <User Schedule>  diltiazem    milliGRAM(s) Oral daily  erythromycin   Ointment 1 Application(s) Right EYE two times a day  furosemide    Tablet 20 milliGRAM(s) Oral daily  lidocaine   Patch 1 Patch Transdermal every 24 hours  linezolid  IVPB 600 milliGRAM(s) IV Intermittent every 12 hours  metroNIDAZOLE  IVPB 500 milliGRAM(s) IV Intermittent every 8 hours  ofloxacin 0.3% Solution 1 Drop(s) Right EYE two times a day  pantoprazole    Tablet 40 milliGRAM(s) Oral before breakfast  senna 2 Tablet(s) Oral at bedtime    MEDICATIONS  (PRN):  acetaminophen   Tablet .. 650 milliGRAM(s) Oral every 4 hours PRN Temp greater or equal to 38C (100.4F)  acetaminophen   Tablet .. 650 milliGRAM(s) Oral every 6 hours PRN Mild Pain (1 - 3)  ALBUTerol    90 MICROgram(s) HFA Inhaler 2 Puff(s) Inhalation every 6 hours PRN Shortness of Breath    Labs:                        10.0   9.05  )-----------( 244      ( 12 Feb 2021 23:30 )             31.5     02-13    131<L>  |  96<L>  |  19  ----------------------------<  118<H>  4.0   |  25  |  1.0    Ca    8.5      13 Feb 2021 12:28  Phos  2.7     02-12  Mg     1.8     02-12          PE: AAO x 3  left buttock VAC dressing in place, pt was on chair so refused VAC dressing change for now, will attempt later

## 2021-02-13 NOTE — PROVIDER CONTACT NOTE (OTHER) - BACKGROUND
Dressing to be changed when patient back in bed, per burn team, instructed to turn off wound vac until then, called burn to notify that patient was back in bed at 3pm, burn aware that dressing needs

## 2021-02-13 NOTE — PROGRESS NOTE ADULT - ASSESSMENT
84y Female PMH of morbid obesity, anxiety, COPD, PVD, OA, atrial fibrillation (on eliquis), HTN, s/p excision of cystic mass of buttock on 08/2018 with Dr. Bean, presents with complaints of non healing left buttock wound.   POD#2 s/p debridement left buttock and partial closure with wound vac placement.      1. Left Buttock Wound   - POD#2 s/p debridement left buttock and partial closure with wound vac placement.  - continue wound vac therapy-> plan to change wound vac dressing today  - WCx 2/8: mod bacteroides, few corynebacter, rare alpha hemolytic strep, f/u WCx 2/9  - MRI of pelvic pending   - as per ID recommendations  Zyvox 600 mg iv q12h, Rocephin 2 gm iv q24h, Flagyl 500mg q 8  - pain management  - Plan for skin graft on Tuesday 2/16, need COVID test pre-op, hold Eliquis Monday 2/15 hs    2. Ophthalmology:   - seen by ophtho for Right upper lid retraction with corneal punctate staining 2/2 long standing exposure   - Recommend order acetylcholine receptor antibodies (binding, blocking, and modulating)  - Recommend ofloxacin bid OD, start preservative free artificial tears q2h OD and erythromycin ointment bid OD  - Follow-up as outpatient for dilated fundus examination     3.  Hx HTN  - monitor vitals  - cont w/ Diltiazem   - cont home dose Lasix     4. Hx A fib  - cont Diltiazem 180 daily  - cont w/ Eliquis 2.5mg bid    5. hx COPD  - stable  - cont w/ inhaler       - DVT ppx with   -GI ppx  - PT/OT

## 2021-02-14 LAB
ANION GAP SERPL CALC-SCNC: 11 MMOL/L — SIGNIFICANT CHANGE UP (ref 7–14)
BUN SERPL-MCNC: 16 MG/DL — SIGNIFICANT CHANGE UP (ref 10–20)
CALCIUM SERPL-MCNC: 8.6 MG/DL — SIGNIFICANT CHANGE UP (ref 8.5–10.1)
CHLORIDE SERPL-SCNC: 99 MMOL/L — SIGNIFICANT CHANGE UP (ref 98–110)
CO2 SERPL-SCNC: 26 MMOL/L — SIGNIFICANT CHANGE UP (ref 17–32)
CREAT SERPL-MCNC: 1 MG/DL — SIGNIFICANT CHANGE UP (ref 0.7–1.5)
GLUCOSE SERPL-MCNC: 126 MG/DL — HIGH (ref 70–99)
HCT VFR BLD CALC: 31.6 % — LOW (ref 37–47)
HGB BLD-MCNC: 9.9 G/DL — LOW (ref 12–16)
MAGNESIUM SERPL-MCNC: 1.7 MG/DL — LOW (ref 1.8–2.4)
MCHC RBC-ENTMCNC: 24.9 PG — LOW (ref 27–31)
MCHC RBC-ENTMCNC: 31.3 G/DL — LOW (ref 32–37)
MCV RBC AUTO: 79.4 FL — LOW (ref 81–99)
NRBC # BLD: 0 /100 WBCS — SIGNIFICANT CHANGE UP (ref 0–0)
PHOSPHATE SERPL-MCNC: 3.1 MG/DL — SIGNIFICANT CHANGE UP (ref 2.1–4.9)
PLATELET # BLD AUTO: 268 K/UL — SIGNIFICANT CHANGE UP (ref 130–400)
POTASSIUM SERPL-MCNC: 3.9 MMOL/L — SIGNIFICANT CHANGE UP (ref 3.5–5)
POTASSIUM SERPL-SCNC: 3.9 MMOL/L — SIGNIFICANT CHANGE UP (ref 3.5–5)
RBC # BLD: 3.98 M/UL — LOW (ref 4.2–5.4)
RBC # FLD: 16.7 % — HIGH (ref 11.5–14.5)
SODIUM SERPL-SCNC: 136 MMOL/L — SIGNIFICANT CHANGE UP (ref 135–146)
WBC # BLD: 8.54 K/UL — SIGNIFICANT CHANGE UP (ref 4.8–10.8)
WBC # FLD AUTO: 8.54 K/UL — SIGNIFICANT CHANGE UP (ref 4.8–10.8)

## 2021-02-14 PROCEDURE — 72197 MRI PELVIS W/O & W/DYE: CPT | Mod: 26

## 2021-02-14 PROCEDURE — 99231 SBSQ HOSP IP/OBS SF/LOW 25: CPT

## 2021-02-14 RX ORDER — ALPRAZOLAM 0.25 MG
0.25 TABLET ORAL ONCE
Refills: 0 | Status: DISCONTINUED | OUTPATIENT
Start: 2021-02-14 | End: 2021-02-14

## 2021-02-14 RX ORDER — MAGNESIUM SULFATE 500 MG/ML
2 VIAL (ML) INJECTION ONCE
Refills: 0 | Status: COMPLETED | OUTPATIENT
Start: 2021-02-14 | End: 2021-02-15

## 2021-02-14 RX ADMIN — Medication 1 DROP(S): at 13:05

## 2021-02-14 RX ADMIN — Medication 1 DROP(S): at 23:41

## 2021-02-14 RX ADMIN — Medication 1 DROP(S): at 17:03

## 2021-02-14 RX ADMIN — APIXABAN 2.5 MILLIGRAM(S): 2.5 TABLET, FILM COATED ORAL at 05:39

## 2021-02-14 RX ADMIN — Medication 20 MILLIGRAM(S): at 05:39

## 2021-02-14 RX ADMIN — CHLORHEXIDINE GLUCONATE 1 APPLICATION(S): 213 SOLUTION TOPICAL at 05:39

## 2021-02-14 RX ADMIN — Medication 1 DROP(S): at 07:48

## 2021-02-14 RX ADMIN — Medication 1 APPLICATION(S): at 05:39

## 2021-02-14 RX ADMIN — Medication 1 APPLICATION(S): at 17:03

## 2021-02-14 RX ADMIN — Medication 100 MILLIGRAM(S): at 21:56

## 2021-02-14 RX ADMIN — Medication 1 DROP(S): at 05:41

## 2021-02-14 RX ADMIN — Medication 100 MILLIGRAM(S): at 05:40

## 2021-02-14 RX ADMIN — SENNA PLUS 2 TABLET(S): 8.6 TABLET ORAL at 21:56

## 2021-02-14 RX ADMIN — LIDOCAINE 1 PATCH: 4 CREAM TOPICAL at 21:56

## 2021-02-14 RX ADMIN — LINEZOLID 300 MILLIGRAM(S): 600 INJECTION, SOLUTION INTRAVENOUS at 05:40

## 2021-02-14 RX ADMIN — APIXABAN 2.5 MILLIGRAM(S): 2.5 TABLET, FILM COATED ORAL at 17:03

## 2021-02-14 RX ADMIN — Medication 1 DROP(S): at 05:40

## 2021-02-14 RX ADMIN — Medication 1 DROP(S): at 16:04

## 2021-02-14 RX ADMIN — Medication 0.25 MILLIGRAM(S): at 20:05

## 2021-02-14 RX ADMIN — LINEZOLID 300 MILLIGRAM(S): 600 INJECTION, SOLUTION INTRAVENOUS at 17:02

## 2021-02-14 RX ADMIN — Medication 1 DROP(S): at 11:01

## 2021-02-14 RX ADMIN — CEFTRIAXONE 100 MILLIGRAM(S): 500 INJECTION, POWDER, FOR SOLUTION INTRAMUSCULAR; INTRAVENOUS at 18:05

## 2021-02-14 RX ADMIN — LIDOCAINE 1 PATCH: 4 CREAM TOPICAL at 09:22

## 2021-02-14 RX ADMIN — Medication 180 MILLIGRAM(S): at 05:39

## 2021-02-14 RX ADMIN — Medication 1 DROP(S): at 21:57

## 2021-02-14 RX ADMIN — Medication 1 DROP(S): at 09:24

## 2021-02-14 RX ADMIN — Medication 100 MILLIGRAM(S): at 13:05

## 2021-02-14 RX ADMIN — LIDOCAINE 1 PATCH: 4 CREAM TOPICAL at 07:42

## 2021-02-14 RX ADMIN — PANTOPRAZOLE SODIUM 40 MILLIGRAM(S): 20 TABLET, DELAYED RELEASE ORAL at 05:39

## 2021-02-14 RX ADMIN — Medication 1 DROP(S): at 05:11

## 2021-02-14 NOTE — DIETITIAN INITIAL EVALUATION ADULT. - RD TO REMAIN AVAILABLE
Intervention: 1.Meals and Snacks 2.Medical Food Supplement    Monitor/Evaluate: Diet order, energy intake, nutrition focused physical findings, Na, CL, renal and anemia profile/yes

## 2021-02-14 NOTE — DIETITIAN INITIAL EVALUATION ADULT. - FACTORS AFF FOOD INTAKE
The patient reports consuming <50% of meals secondary to poor appetite and swallowing difficulty due to an enlarged thyroid. However, the patient declined an offer of an oral supplement and a downgrade in textured food items. The patient reports having a bowel movement (2/13).

## 2021-02-14 NOTE — PROGRESS NOTE ADULT - SUBJECTIVE AND OBJECTIVE BOX
Patient is a 85y old  Female who presents with a chief complaint of Left Buttock Wound. (13 Feb 2021 15:24)    No acute events overnight. VAC placed yesterday    Vital Signs Last 24 Hrs  T(C): 37 (14 Feb 2021 12:12), Max: 37.3 (13 Feb 2021 20:34)  T(F): 98.6 (14 Feb 2021 12:12), Max: 99.1 (13 Feb 2021 20:34)  HR: 95 (14 Feb 2021 12:12) (95 - 110)  BP: 141/87 (14 Feb 2021 12:12) (137/65 - 141/87)  BP(mean): --  RR: 18 (14 Feb 2021 12:12) (18 - 18)  SpO2: --    I&O's Summary    13 Feb 2021 07:01  -  14 Feb 2021 07:00  --------------------------------------------------------  IN: 650 mL / OUT: 2078 mL / NET: -1428 mL    14 Feb 2021 07:01  -  14 Feb 2021 12:36  --------------------------------------------------------  IN: 360 mL / OUT: 0 mL / NET: 360 mL        Meds:  MEDICATIONS  (STANDING):  apixaban 2.5 milliGRAM(s) Oral every 12 hours  artificial tears (preservative free) Ophthalmic Solution 1 Drop(s) Right EYE every 2 hours  cefTRIAXone   IVPB 2000 milliGRAM(s) IV Intermittent every 24 hours  chlorhexidine 4% Liquid 1 Application(s) Topical <User Schedule>  diltiazem    milliGRAM(s) Oral daily  erythromycin   Ointment 1 Application(s) Right EYE two times a day  furosemide    Tablet 20 milliGRAM(s) Oral daily  lidocaine   Patch 1 Patch Transdermal every 24 hours  linezolid  IVPB 600 milliGRAM(s) IV Intermittent every 12 hours  metroNIDAZOLE  IVPB 500 milliGRAM(s) IV Intermittent every 8 hours  ofloxacin 0.3% Solution 1 Drop(s) Right EYE two times a day  pantoprazole    Tablet 40 milliGRAM(s) Oral before breakfast  senna 2 Tablet(s) Oral at bedtime    MEDICATIONS  (PRN):  acetaminophen   Tablet .. 650 milliGRAM(s) Oral every 4 hours PRN Temp greater or equal to 38C (100.4F)  acetaminophen   Tablet .. 650 milliGRAM(s) Oral every 6 hours PRN Mild Pain (1 - 3)  ALBUTerol    90 MICROgram(s) HFA Inhaler 2 Puff(s) Inhalation every 6 hours PRN Shortness of Breath          Labs:                        10.1   9.74  )-----------( 274      ( 13 Feb 2021 21:31 )             32.0     02-13    132<L>  |  96<L>  |  18  ----------------------------<  94  3.7   |  27  |  1.0    Ca    8.2<L>      13 Feb 2021 21:31  Phos  2.8     02-13  Mg     1.9     02-13          PE: AAO x 3    VAC in place, serosang dc

## 2021-02-14 NOTE — PROGRESS NOTE ADULT - ASSESSMENT
84y Female PMH of morbid obesity, anxiety, COPD, PVD, OA, atrial fibrillation (on eliquis), HTN, s/p excision of cystic mass of buttock on 08/2018 with Dr. Bean, presents with complaints of non healing left buttock wound.   POD#2 s/p debridement left buttock and partial closure with wound vac placement.      1. Left Buttock Wound   - POD#3 s/p debridement left buttock and partial closure with wound vac placement.  - continue wound vac therapy-> VAC Placed yesterday  - WCx 2/8: mod bacteroides, few corynebacter, rare alpha hemolytic strep, f/u WCx 2/9  - MRI of pelvic pending   - as per ID recommendations  Zyvox 600 mg iv q12h, Rocephin 2 gm iv q24h, Flagyl 500mg q 8  - pain management  - Plan for skin graft on Tuesday 2/16, need COVID test pre-op, hold Eliquis Monday 2/15 hs    2. Ophthalmology:   - seen by ophtho for Right upper lid retraction with corneal punctate staining 2/2 long standing exposure   - Recommend order acetylcholine receptor antibodies (binding, blocking, and modulating)  - Recommend ofloxacin bid OD, start preservative free artificial tears q2h OD and erythromycin ointment bid OD  - Follow-up as outpatient for dilated fundus examination     3.  Hx HTN  - monitor vitals  - cont w/ Diltiazem   - cont home dose Lasix     4. Hx A fib  - cont Diltiazem 180 daily  - cont w/ Eliquis 2.5mg bid    5. hx COPD  - stable  - cont w/ inhaler       - DVT ppx with   -GI ppx  - PT/OT

## 2021-02-14 NOTE — DIETITIAN INITIAL EVALUATION ADULT. - OTHER INFO
84y/o female with pmhx noted above presented with complaints of non healing left buttock wound. Admitted with left gluteus large abscess. S/p debridement and abscess drainage of the left buttock (2/8). No pressure injury is noted (Misael Score-17).

## 2021-02-14 NOTE — DIETITIAN INITIAL EVALUATION ADULT. - WEIGHT CHANGE
Marquis Dia   MRN: [id number]    Department:  [facility] Emergency Department   Date of Visit:  10/13/2017           Disclosure     Insurance plans vary and the physician(s) referred by the ER may not be covered by your plan.  Please contact your in If you have been prescribed any medication(s), please fill your prescription right away and begin taking the medication(s) as directed    If the emergency physician has read X-rays, these will be re-interpreted by a radiologist.  If there is a significant yes

## 2021-02-14 NOTE — DIETITIAN INITIAL EVALUATION ADULT. - PERTINENT LABORATORY DATA
(2/13) Na-132, K-3.7, CL-96, BUN-18, Cr-1, GFR-52, Glucose-94mg/dL, Corrected Ca-8.7, H/H-10.1/32, MCV-77.9

## 2021-02-14 NOTE — DIETITIAN INITIAL EVALUATION ADULT. - OTHER CALCULATIONS
Estimated Calorie Needs: [id number] AF 1-1.2=[id number]/day -Due to obesity;  Estimated Protein Needs: 62-73grams/day (1-1.2grams/kg of IBW-61kg) -Due to age and obesity;  Estimated Fluid Needs: 1592-1751mL/day (1mL/kcal)

## 2021-02-15 LAB
ANION GAP SERPL CALC-SCNC: 12 MMOL/L — SIGNIFICANT CHANGE UP (ref 7–14)
BLD GP AB SCN SERPL QL: SIGNIFICANT CHANGE UP
BUN SERPL-MCNC: 17 MG/DL — SIGNIFICANT CHANGE UP (ref 10–20)
CALCIUM SERPL-MCNC: 8.4 MG/DL — LOW (ref 8.5–10.1)
CHLORIDE SERPL-SCNC: 98 MMOL/L — SIGNIFICANT CHANGE UP (ref 98–110)
CO2 SERPL-SCNC: 26 MMOL/L — SIGNIFICANT CHANGE UP (ref 17–32)
CREAT SERPL-MCNC: 1 MG/DL — SIGNIFICANT CHANGE UP (ref 0.7–1.5)
CULTURE RESULTS: SIGNIFICANT CHANGE UP
CULTURE RESULTS: SIGNIFICANT CHANGE UP
GLUCOSE SERPL-MCNC: 97 MG/DL — SIGNIFICANT CHANGE UP (ref 70–99)
HCT VFR BLD CALC: 32.9 % — LOW (ref 37–47)
HGB BLD-MCNC: 10.1 G/DL — LOW (ref 12–16)
MAGNESIUM SERPL-MCNC: 1.9 MG/DL — SIGNIFICANT CHANGE UP (ref 1.8–2.4)
MCHC RBC-ENTMCNC: 24.2 PG — LOW (ref 27–31)
MCHC RBC-ENTMCNC: 30.7 G/DL — LOW (ref 32–37)
MCV RBC AUTO: 78.9 FL — LOW (ref 81–99)
NRBC # BLD: 0 /100 WBCS — SIGNIFICANT CHANGE UP (ref 0–0)
PHOSPHATE SERPL-MCNC: 3.3 MG/DL — SIGNIFICANT CHANGE UP (ref 2.1–4.9)
PLATELET # BLD AUTO: 262 K/UL — SIGNIFICANT CHANGE UP (ref 130–400)
POTASSIUM SERPL-MCNC: 4.1 MMOL/L — SIGNIFICANT CHANGE UP (ref 3.5–5)
POTASSIUM SERPL-SCNC: 4.1 MMOL/L — SIGNIFICANT CHANGE UP (ref 3.5–5)
RBC # BLD: 4.17 M/UL — LOW (ref 4.2–5.4)
RBC # FLD: 16.7 % — HIGH (ref 11.5–14.5)
SARS-COV-2 RNA SPEC QL NAA+PROBE: SIGNIFICANT CHANGE UP
SODIUM SERPL-SCNC: 136 MMOL/L — SIGNIFICANT CHANGE UP (ref 135–146)
SPECIMEN SOURCE: SIGNIFICANT CHANGE UP
SPECIMEN SOURCE: SIGNIFICANT CHANGE UP
WBC # BLD: 11.36 K/UL — HIGH (ref 4.8–10.8)
WBC # FLD AUTO: 11.36 K/UL — HIGH (ref 4.8–10.8)

## 2021-02-15 PROCEDURE — 99231 SBSQ HOSP IP/OBS SF/LOW 25: CPT

## 2021-02-15 RX ORDER — SODIUM CHLORIDE 9 MG/ML
1000 INJECTION INTRAMUSCULAR; INTRAVENOUS; SUBCUTANEOUS
Refills: 0 | Status: DISCONTINUED | OUTPATIENT
Start: 2021-02-15 | End: 2021-02-16

## 2021-02-15 RX ADMIN — Medication 1 DROP(S): at 13:19

## 2021-02-15 RX ADMIN — Medication 1 APPLICATION(S): at 04:54

## 2021-02-15 RX ADMIN — CHLORHEXIDINE GLUCONATE 1 APPLICATION(S): 213 SOLUTION TOPICAL at 08:47

## 2021-02-15 RX ADMIN — Medication 1 DROP(S): at 18:20

## 2021-02-15 RX ADMIN — LIDOCAINE 1 PATCH: 4 CREAM TOPICAL at 08:46

## 2021-02-15 RX ADMIN — Medication 1 APPLICATION(S): at 18:17

## 2021-02-15 RX ADMIN — Medication 1 DROP(S): at 18:18

## 2021-02-15 RX ADMIN — Medication 100 MILLIGRAM(S): at 13:23

## 2021-02-15 RX ADMIN — CEFTRIAXONE 100 MILLIGRAM(S): 500 INJECTION, POWDER, FOR SOLUTION INTRAMUSCULAR; INTRAVENOUS at 20:10

## 2021-02-15 RX ADMIN — LIDOCAINE 1 PATCH: 4 CREAM TOPICAL at 21:01

## 2021-02-15 RX ADMIN — Medication 20 MILLIGRAM(S): at 04:52

## 2021-02-15 RX ADMIN — Medication 1 DROP(S): at 08:01

## 2021-02-15 RX ADMIN — Medication 1 DROP(S): at 05:05

## 2021-02-15 RX ADMIN — Medication 1 DROP(S): at 04:55

## 2021-02-15 RX ADMIN — Medication 100 MILLIGRAM(S): at 04:51

## 2021-02-15 RX ADMIN — PANTOPRAZOLE SODIUM 40 MILLIGRAM(S): 20 TABLET, DELAYED RELEASE ORAL at 05:05

## 2021-02-15 RX ADMIN — Medication 1 DROP(S): at 18:17

## 2021-02-15 RX ADMIN — Medication 1 DROP(S): at 00:20

## 2021-02-15 RX ADMIN — LINEZOLID 300 MILLIGRAM(S): 600 INJECTION, SOLUTION INTRAVENOUS at 04:51

## 2021-02-15 RX ADMIN — Medication 50 GRAM(S): at 00:19

## 2021-02-15 RX ADMIN — Medication 1 DROP(S): at 21:01

## 2021-02-15 RX ADMIN — SODIUM CHLORIDE 50 MILLILITER(S): 9 INJECTION INTRAMUSCULAR; INTRAVENOUS; SUBCUTANEOUS at 23:56

## 2021-02-15 RX ADMIN — Medication 1 DROP(S): at 23:42

## 2021-02-15 RX ADMIN — Medication 1 DROP(S): at 14:27

## 2021-02-15 RX ADMIN — APIXABAN 2.5 MILLIGRAM(S): 2.5 TABLET, FILM COATED ORAL at 04:51

## 2021-02-15 RX ADMIN — LINEZOLID 300 MILLIGRAM(S): 600 INJECTION, SOLUTION INTRAVENOUS at 18:18

## 2021-02-15 RX ADMIN — Medication 1 DROP(S): at 20:10

## 2021-02-15 RX ADMIN — Medication 180 MILLIGRAM(S): at 04:54

## 2021-02-15 RX ADMIN — Medication 100 MILLIGRAM(S): at 21:01

## 2021-02-15 RX ADMIN — Medication 1 DROP(S): at 10:06

## 2021-02-15 RX ADMIN — Medication 1 DROP(S): at 02:00

## 2021-02-15 RX ADMIN — LIDOCAINE 1 PATCH: 4 CREAM TOPICAL at 08:27

## 2021-02-15 NOTE — PROGRESS NOTE ADULT - SUBJECTIVE AND OBJECTIVE BOX
Patient is a 85y old  Female who presents with a chief complaint of Left Buttock Wound. (14 Feb 2021 16:24)  AM Rounds     Vital Signs Last 24 Hrs  T(C): 37.3 (15 Feb 2021 04:54), Max: 37.3 (15 Feb 2021 04:54)  T(F): 99.2 (15 Feb 2021 04:54), Max: 99.2 (15 Feb 2021 04:54)  HR: 94 (15 Feb 2021 04:54) (94 - 95)  BP: 114/59 (15 Feb 2021 04:54) (114/59 - 141/87)  RR: 19 (15 Feb 2021 04:54) (18 - 19)  SpO2: 93% (15 Feb 2021 08:48) (93% - 93%)    LABS:                        9.9    8.54  )-----------( 268      ( 14 Feb 2021 16:00 )             31.6     02-14    136  |  99  |  16  ----------------------------<  126<H>  3.9   |  26  |  1.0    Ca    8.6      14 Feb 2021 16:00  Phos  3.1     02-14  Mg     1.7     02-14    MEDICATIONS  (STANDING):  artificial tears (preservative free) Ophthalmic Solution 1 Drop(s) Right EYE every 2 hours  cefTRIAXone   IVPB 2000 milliGRAM(s) IV Intermittent every 24 hours  chlorhexidine 4% Liquid 1 Application(s) Topical <User Schedule>  diltiazem    milliGRAM(s) Oral daily  erythromycin   Ointment 1 Application(s) Right EYE two times a day  furosemide    Tablet 20 milliGRAM(s) Oral daily  lidocaine   Patch 1 Patch Transdermal every 24 hours  linezolid  IVPB 600 milliGRAM(s) IV Intermittent every 12 hours  metroNIDAZOLE  IVPB 500 milliGRAM(s) IV Intermittent every 8 hours  ofloxacin 0.3% Solution 1 Drop(s) Right EYE two times a day  pantoprazole    Tablet 40 milliGRAM(s) Oral before breakfast  senna 2 Tablet(s) Oral at bedtime    MEDICATIONS  (PRN):  acetaminophen   Tablet .. 650 milliGRAM(s) Oral every 4 hours PRN Temp greater or equal to 38C (100.4F)  acetaminophen   Tablet .. 650 milliGRAM(s) Oral every 6 hours PRN Mild Pain (1 - 3)  ALBUTerol    90 MICROgram(s) HFA Inhaler 2 Puff(s) Inhalation every 6 hours PRN Shortness of Breath    Physical Exam:   General:   Skin:       Patient is a 85y old  Female who presents with a chief complaint of Left Buttock Wound. (14 Feb 2021 16:24)  AM Rounds     Vital Signs Last 24 Hrs  T(C): 37.3 (15 Feb 2021 04:54), Max: 37.3 (15 Feb 2021 04:54)  T(F): 99.2 (15 Feb 2021 04:54), Max: 99.2 (15 Feb 2021 04:54)  HR: 94 (15 Feb 2021 04:54) (94 - 95)  BP: 114/59 (15 Feb 2021 04:54) (114/59 - 141/87)  RR: 19 (15 Feb 2021 04:54) (18 - 19)  SpO2: 93% (15 Feb 2021 08:48) (93% - 93%)    LABS:                        9.9    8.54  )-----------( 268      ( 14 Feb 2021 16:00 )             31.6     02-14    136  |  99  |  16  ----------------------------<  126<H>  3.9   |  26  |  1.0    Ca    8.6      14 Feb 2021 16:00  Phos  3.1     02-14  Mg     1.7     02-14    MEDICATIONS  (STANDING):  artificial tears (preservative free) Ophthalmic Solution 1 Drop(s) Right EYE every 2 hours  cefTRIAXone   IVPB 2000 milliGRAM(s) IV Intermittent every 24 hours  chlorhexidine 4% Liquid 1 Application(s) Topical <User Schedule>  diltiazem    milliGRAM(s) Oral daily  erythromycin   Ointment 1 Application(s) Right EYE two times a day  furosemide    Tablet 20 milliGRAM(s) Oral daily  lidocaine   Patch 1 Patch Transdermal every 24 hours  linezolid  IVPB 600 milliGRAM(s) IV Intermittent every 12 hours  metroNIDAZOLE  IVPB 500 milliGRAM(s) IV Intermittent every 8 hours  ofloxacin 0.3% Solution 1 Drop(s) Right EYE two times a day  pantoprazole    Tablet 40 milliGRAM(s) Oral before breakfast  senna 2 Tablet(s) Oral at bedtime    MEDICATIONS  (PRN):  acetaminophen   Tablet .. 650 milliGRAM(s) Oral every 4 hours PRN Temp greater or equal to 38C (100.4F)  acetaminophen   Tablet .. 650 milliGRAM(s) Oral every 6 hours PRN Mild Pain (1 - 3)  ALBUTerol    90 MICROgram(s) HFA Inhaler 2 Puff(s) Inhalation every 6 hours PRN Shortness of Breath    Physical Exam:   General: Pt sitting in chair comfortable, A&O x3  Skin: Wound VAC in place, wound bed not visualized       Patient is a 85y old  Female who presents with a chief complaint of Left Buttock Wound. (14 Feb 2021 16:24)  AM Rounds   Pt OOBin chair . No complaints     Vital Signs Last 24 Hrs  T(C): 37.3 (15 Feb 2021 04:54), Max: 37.3 (15 Feb 2021 04:54)  T(F): 99.2 (15 Feb 2021 04:54), Max: 99.2 (15 Feb 2021 04:54)  HR: 94 (15 Feb 2021 04:54) (94 - 95)  BP: 114/59 (15 Feb 2021 04:54) (114/59 - 141/87)  RR: 19 (15 Feb 2021 04:54) (18 - 19)  SpO2: 93% (15 Feb 2021 08:48) (93% - 93%)    LABS:                        9.9    8.54  )-----------( 268      ( 14 Feb 2021 16:00 )             31.6     02-14    136  |  99  |  16  ----------------------------<  126<H>  3.9   |  26  |  1.0    Ca    8.6      14 Feb 2021 16:00  Phos  3.1     02-14  Mg     1.7     02-14    MEDICATIONS  (STANDING):  artificial tears (preservative free) Ophthalmic Solution 1 Drop(s) Right EYE every 2 hours  cefTRIAXone   IVPB 2000 milliGRAM(s) IV Intermittent every 24 hours  chlorhexidine 4% Liquid 1 Application(s) Topical <User Schedule>  diltiazem    milliGRAM(s) Oral daily  erythromycin   Ointment 1 Application(s) Right EYE two times a day  furosemide    Tablet 20 milliGRAM(s) Oral daily  lidocaine   Patch 1 Patch Transdermal every 24 hours  linezolid  IVPB 600 milliGRAM(s) IV Intermittent every 12 hours  metroNIDAZOLE  IVPB 500 milliGRAM(s) IV Intermittent every 8 hours  ofloxacin 0.3% Solution 1 Drop(s) Right EYE two times a day  pantoprazole    Tablet 40 milliGRAM(s) Oral before breakfast  senna 2 Tablet(s) Oral at bedtime    MEDICATIONS  (PRN):  acetaminophen   Tablet .. 650 milliGRAM(s) Oral every 4 hours PRN Temp greater or equal to 38C (100.4F)  acetaminophen   Tablet .. 650 milliGRAM(s) Oral every 6 hours PRN Mild Pain (1 - 3)  ALBUTerol    90 MICROgram(s) HFA Inhaler 2 Puff(s) Inhalation every 6 hours PRN Shortness of Breath    Physical Exam:   General: Pt sitting in chair comfortable, A&O x3  Skin: Wound VAC in place, small amount of bloody drainage noted.

## 2021-02-15 NOTE — PROGRESS NOTE ADULT - ASSESSMENT
84y Female PMH of morbid obesity, anxiety, COPD, PVD, OA, atrial fibrillation (on eliquis), HTN, s/p excision of cystic mass of buttock on 08/2018 with Dr. Bean, presents with complaints of non healing left buttock wound.   POD#4 s/p debridement left buttock and partial closure with wound vac placement.      1. Left Buttock Wound   - POD#4 debridement left buttock and partial closure with wound vac placement.  - continue wound vac therapy  - WCx 2/8: mod bacteroides, few corynebacter, rare alpha hemolytic strep, WCx 2/9  - MRI of pelvic pending   - as per ID recommendations  Zyvox 600 mg iv q12h, Rocephin 2 gm iv q24h, Flagyl 500mg q 8  - pain management  - Plan for skin graft on Tuesday 2/16, need COVID test pre-op, hold Eliquis Monday 2/15 hs    2. Ophthalmology:   - seen by ophtho for Right upper lid retraction with corneal punctate staining 2/2 long standing exposure   - Recommend order acetylcholine receptor antibodies (binding, blocking, and modulating)  - Recommend ofloxacin bid OD, start preservative free artificial tears q2h OD and erythromycin ointment bid OD  - Follow-up as outpatient for dilated fundus examination     3.  Hx HTN  - monitor vitals  - cont w/ Diltiazem   - cont home dose Lasix     4. Hx A fib  - cont Diltiazem 180 daily  - cont w/ Eliquis 2.5mg bid    5. hx COPD  - stable  - cont w/ inhaler       - DVT ppx with   -GI ppx  - PT/OT  84y Female PMH of morbid obesity, anxiety, COPD, PVD, OA, atrial fibrillation (on eliquis), HTN, s/p excision of cystic mass of buttock on 08/2018 with Dr. Bean, presents with complaints of non healing left buttock wound.   POD#4 s/p debridement left buttock and partial closure with wound vac placement.      1. Left Buttock Wound   - POD#4 debridement left buttock and partial closure with wound vac placement.  - VAC d/c'ed as patient became soiled, WTD kerlix as pt is going to OR 2/16  - WCx 2/8: mod bacteroides, few corynebacter, rare alpha hemolytic strep, WCx 2/9  - MRI of pelvis- no acute findings, post debridement  - as per ID recommendations  Zyvox 600 mg iv q12h, Rocephin 2 gm iv q24h, Flagyl 500mg q 8  - pain management  - Plan for skin graft on Tuesday 2/16, COVID negative 2/14, Eliquis held    2. Ophthalmology:   - seen by ophtho for Right upper lid retraction with corneal punctate staining 2/2 long standing exposure   - Recommend order acetylcholine receptor antibodies (binding, blocking, and modulating)  - Recommend ofloxacin bid OD, start preservative free artificial tears q2h OD and erythromycin ointment bid OD  - Follow-up as outpatient for dilated fundus examination     3.  Hx HTN  - monitor vitals  - cont w/ Diltiazem   - cont home dose Lasix     4. Hx A fib  - cont Diltiazem 180 daily  - cont w/ Eliquis 2.5mg bid    5. hx COPD  - stable  - cont w/ inhaler       - DVT ppx with   -GI ppx  - PT/OT

## 2021-02-16 LAB
ANION GAP SERPL CALC-SCNC: 13 MMOL/L — SIGNIFICANT CHANGE UP (ref 7–14)
BUN SERPL-MCNC: 18 MG/DL — SIGNIFICANT CHANGE UP (ref 10–20)
CALCIUM SERPL-MCNC: 8.4 MG/DL — LOW (ref 8.5–10.1)
CHLORIDE SERPL-SCNC: 97 MMOL/L — LOW (ref 98–110)
CO2 SERPL-SCNC: 27 MMOL/L — SIGNIFICANT CHANGE UP (ref 17–32)
CREAT SERPL-MCNC: 1 MG/DL — SIGNIFICANT CHANGE UP (ref 0.7–1.5)
GLUCOSE SERPL-MCNC: 145 MG/DL — HIGH (ref 70–99)
HCT VFR BLD CALC: 34.7 % — LOW (ref 37–47)
HGB BLD-MCNC: 10.7 G/DL — LOW (ref 12–16)
MAGNESIUM SERPL-MCNC: 1.8 MG/DL — SIGNIFICANT CHANGE UP (ref 1.8–2.4)
MCHC RBC-ENTMCNC: 24.5 PG — LOW (ref 27–31)
MCHC RBC-ENTMCNC: 30.8 G/DL — LOW (ref 32–37)
MCV RBC AUTO: 79.6 FL — LOW (ref 81–99)
NRBC # BLD: 0 /100 WBCS — SIGNIFICANT CHANGE UP (ref 0–0)
PHOSPHATE SERPL-MCNC: 4.7 MG/DL — SIGNIFICANT CHANGE UP (ref 2.1–4.9)
PLATELET # BLD AUTO: 248 K/UL — SIGNIFICANT CHANGE UP (ref 130–400)
POTASSIUM SERPL-MCNC: 4.1 MMOL/L — SIGNIFICANT CHANGE UP (ref 3.5–5)
POTASSIUM SERPL-SCNC: 4.1 MMOL/L — SIGNIFICANT CHANGE UP (ref 3.5–5)
RBC # BLD: 4.36 M/UL — SIGNIFICANT CHANGE UP (ref 4.2–5.4)
RBC # FLD: 16.6 % — HIGH (ref 11.5–14.5)
SODIUM SERPL-SCNC: 137 MMOL/L — SIGNIFICANT CHANGE UP (ref 135–146)
WBC # BLD: 11.32 K/UL — HIGH (ref 4.8–10.8)
WBC # FLD AUTO: 11.32 K/UL — HIGH (ref 4.8–10.8)

## 2021-02-16 PROCEDURE — 15100 SPLT AGRFT T/A/L 1ST 100SQCM: CPT

## 2021-02-16 PROCEDURE — 15002 WOUND PREP TRK/ARM/LEG: CPT

## 2021-02-16 RX ORDER — APIXABAN 2.5 MG/1
2.5 TABLET, FILM COATED ORAL
Refills: 0 | Status: DISCONTINUED | OUTPATIENT
Start: 2021-02-16 | End: 2021-02-18

## 2021-02-16 RX ORDER — ACETAMINOPHEN 500 MG
650 TABLET ORAL EVERY 4 HOURS
Refills: 0 | Status: DISCONTINUED | OUTPATIENT
Start: 2021-02-16 | End: 2021-02-23

## 2021-02-16 RX ORDER — ONDANSETRON 8 MG/1
4 TABLET, FILM COATED ORAL ONCE
Refills: 0 | Status: DISCONTINUED | OUTPATIENT
Start: 2021-02-16 | End: 2021-02-16

## 2021-02-16 RX ORDER — CEFTRIAXONE 500 MG/1
1000 INJECTION, POWDER, FOR SOLUTION INTRAMUSCULAR; INTRAVENOUS EVERY 24 HOURS
Refills: 0 | Status: DISCONTINUED | OUTPATIENT
Start: 2021-02-16 | End: 2021-02-19

## 2021-02-16 RX ORDER — BUPIVACAINE 13.3 MG/ML
20 INJECTION, SUSPENSION, LIPOSOMAL INFILTRATION ONCE
Refills: 0 | Status: COMPLETED | OUTPATIENT
Start: 2021-02-16 | End: 2021-02-16

## 2021-02-16 RX ORDER — ACETAMINOPHEN 500 MG
650 TABLET ORAL EVERY 6 HOURS
Refills: 0 | Status: DISCONTINUED | OUTPATIENT
Start: 2021-02-16 | End: 2021-02-23

## 2021-02-16 RX ORDER — SODIUM CHLORIDE 9 MG/ML
1000 INJECTION INTRAMUSCULAR; INTRAVENOUS; SUBCUTANEOUS
Refills: 0 | Status: DISCONTINUED | OUTPATIENT
Start: 2021-02-16 | End: 2021-02-16

## 2021-02-16 RX ORDER — DILTIAZEM HCL 120 MG
180 CAPSULE, EXT RELEASE 24 HR ORAL DAILY
Refills: 0 | Status: DISCONTINUED | OUTPATIENT
Start: 2021-02-16 | End: 2021-02-23

## 2021-02-16 RX ORDER — PANTOPRAZOLE SODIUM 20 MG/1
40 TABLET, DELAYED RELEASE ORAL
Refills: 0 | Status: DISCONTINUED | OUTPATIENT
Start: 2021-02-16 | End: 2021-02-23

## 2021-02-16 RX ORDER — LIDOCAINE 4 G/100G
1 CREAM TOPICAL EVERY 24 HOURS
Refills: 0 | Status: DISCONTINUED | OUTPATIENT
Start: 2021-02-16 | End: 2021-02-23

## 2021-02-16 RX ORDER — ACETAMINOPHEN 500 MG
1000 TABLET ORAL ONCE
Refills: 0 | Status: COMPLETED | OUTPATIENT
Start: 2021-02-16 | End: 2021-02-16

## 2021-02-16 RX ORDER — OFLOXACIN 0.3 %
1 DROPS OPHTHALMIC (EYE)
Refills: 0 | Status: DISCONTINUED | OUTPATIENT
Start: 2021-02-16 | End: 2021-02-23

## 2021-02-16 RX ORDER — BUPIVACAINE 13.3 MG/ML
20 INJECTION, SUSPENSION, LIPOSOMAL INFILTRATION ONCE
Refills: 0 | Status: DISCONTINUED | OUTPATIENT
Start: 2021-02-16 | End: 2021-02-16

## 2021-02-16 RX ORDER — ERYTHROMYCIN BASE 5 MG/GRAM
1 OINTMENT (GRAM) OPHTHALMIC (EYE)
Refills: 0 | Status: DISCONTINUED | OUTPATIENT
Start: 2021-02-16 | End: 2021-02-23

## 2021-02-16 RX ORDER — ALBUTEROL 90 UG/1
2 AEROSOL, METERED ORAL EVERY 6 HOURS
Refills: 0 | Status: DISCONTINUED | OUTPATIENT
Start: 2021-02-16 | End: 2021-02-23

## 2021-02-16 RX ORDER — FUROSEMIDE 40 MG
20 TABLET ORAL DAILY
Refills: 0 | Status: DISCONTINUED | OUTPATIENT
Start: 2021-02-16 | End: 2021-02-23

## 2021-02-16 RX ORDER — METRONIDAZOLE 500 MG
500 TABLET ORAL EVERY 8 HOURS
Refills: 0 | Status: DISCONTINUED | OUTPATIENT
Start: 2021-02-16 | End: 2021-02-23

## 2021-02-16 RX ORDER — SENNA PLUS 8.6 MG/1
2 TABLET ORAL AT BEDTIME
Refills: 0 | Status: DISCONTINUED | OUTPATIENT
Start: 2021-02-16 | End: 2021-02-21

## 2021-02-16 RX ADMIN — Medication 100 MILLIGRAM(S): at 21:38

## 2021-02-16 RX ADMIN — Medication 1 DROP(S): at 18:55

## 2021-02-16 RX ADMIN — Medication 400 MILLIGRAM(S): at 14:00

## 2021-02-16 RX ADMIN — Medication 1 DROP(S): at 21:39

## 2021-02-16 RX ADMIN — Medication 1 DROP(S): at 05:33

## 2021-02-16 RX ADMIN — PANTOPRAZOLE SODIUM 40 MILLIGRAM(S): 20 TABLET, DELAYED RELEASE ORAL at 05:29

## 2021-02-16 RX ADMIN — SODIUM CHLORIDE 50 MILLILITER(S): 9 INJECTION INTRAMUSCULAR; INTRAVENOUS; SUBCUTANEOUS at 14:27

## 2021-02-16 RX ADMIN — CEFTRIAXONE 100 MILLIGRAM(S): 500 INJECTION, POWDER, FOR SOLUTION INTRAMUSCULAR; INTRAVENOUS at 21:39

## 2021-02-16 RX ADMIN — PANTOPRAZOLE SODIUM 40 MILLIGRAM(S): 20 TABLET, DELAYED RELEASE ORAL at 21:37

## 2021-02-16 RX ADMIN — Medication 1 DROP(S): at 04:00

## 2021-02-16 RX ADMIN — BUPIVACAINE 20 MILLILITER(S): 13.3 INJECTION, SUSPENSION, LIPOSOMAL INFILTRATION at 11:30

## 2021-02-16 RX ADMIN — LIDOCAINE 1 PATCH: 4 CREAM TOPICAL at 21:38

## 2021-02-16 RX ADMIN — Medication 1 DROP(S): at 17:22

## 2021-02-16 RX ADMIN — Medication 1 APPLICATION(S): at 05:29

## 2021-02-16 RX ADMIN — Medication 1 DROP(S): at 02:00

## 2021-02-16 RX ADMIN — APIXABAN 2.5 MILLIGRAM(S): 2.5 TABLET, FILM COATED ORAL at 17:21

## 2021-02-16 RX ADMIN — Medication 100 MILLIGRAM(S): at 05:32

## 2021-02-16 RX ADMIN — Medication 1 DROP(S): at 10:00

## 2021-02-16 RX ADMIN — SENNA PLUS 2 TABLET(S): 8.6 TABLET ORAL at 21:37

## 2021-02-16 RX ADMIN — LINEZOLID 300 MILLIGRAM(S): 600 INJECTION, SOLUTION INTRAVENOUS at 05:32

## 2021-02-16 RX ADMIN — Medication 180 MILLIGRAM(S): at 21:37

## 2021-02-16 RX ADMIN — Medication 1 DROP(S): at 17:23

## 2021-02-16 RX ADMIN — Medication 1 DROP(S): at 20:00

## 2021-02-16 RX ADMIN — Medication 20 MILLIGRAM(S): at 21:37

## 2021-02-16 RX ADMIN — Medication 180 MILLIGRAM(S): at 05:29

## 2021-02-16 RX ADMIN — Medication 20 MILLIGRAM(S): at 05:29

## 2021-02-16 RX ADMIN — Medication 1 DROP(S): at 05:29

## 2021-02-16 RX ADMIN — Medication 1 DROP(S): at 08:08

## 2021-02-16 RX ADMIN — Medication 1 APPLICATION(S): at 17:21

## 2021-02-16 NOTE — CHART NOTE - NSCHARTNOTEFT_GEN_A_CORE
PACU ANESTHESIA ADMISSION NOTE      Procedure: Split-thickness skin graft to trunk    Negative pressure wound therapy, greater than 50 sq cm  left buttock    Closure, wound, torso, delayed, primary  left buttock, layered closure 20 x 16 cm    Irrigation and debridement of perineum or buttock  excisional debridement and pulsatile irrigation left buttock including subcutis ~ 28 x 16 x 4 cm    Selective debridement  debridement left buttock and abscess drainage      Post op diagnosis:  Open wound of left buttock    Abscess        ____  Intubated  TV:______       Rate: ______      FiO2: ______    _x___  Patent Airway    _x___  Full return of protective reflexes    ___  Full recovery from anesthesia / back to baseline status    Vitals:  T(C): 36.8 (02-16-21 @ 11:03), Max: 37.6 (02-15-21 @ 20:30)  HR: 97 (02-16-21 @ 11:03) (93 - 108)  BP: 146/66 (02-16-21 @ 11:03) (109/63 - 146/66)  RR: 14 (02-16-21 @ 11:03) (14 - 18)  SpO2: 95% (02-16-21 @ 11:03) (95% - 97%)    Mental Status:  _x___ Awake   _____ Alert   _____ Drowsy   _____ Sedated    Nausea/Vomiting:  _x___  NO       ______Yes,   See Post - Op Orders         Pain Scale (0-10):  __0___    Treatment: _x___ None    ____ See Post - Op/PCA Orders    Post - Operative Fluids:   __x__ Oral   ____ See Post - Op Orders    Plan: Discharge: ___Home       __x___Floor     _____Critical Care    _____  Other:_________________    Comments:  No anesthesia issues or complications noted.  Discharge when criteria met.

## 2021-02-16 NOTE — BRIEF OPERATIVE NOTE - NSICDXBRIEFPREOP_GEN_ALL_CORE_FT
PRE-OP DIAGNOSIS:  Abscess 08-Feb-2021 15:58:01 abscess and lesion left buttock Seng Saeed  
PRE-OP DIAGNOSIS:  Open wound of left buttock 11-Feb-2021 14:02:37 s/p abscess draiange and debridement Jw Solorzano  
PRE-OP DIAGNOSIS:  Open wound of left buttock 11-Feb-2021 14:02:37 s/p abscess draiange and debridement Jw Solorzano

## 2021-02-16 NOTE — BRIEF OPERATIVE NOTE - NSICDXBRIEFPOSTOP_GEN_ALL_CORE_FT
POST-OP DIAGNOSIS:  Abscess 08-Feb-2021 16:00:10 absess and lesion left buttock Seng Saeed  
POST-OP DIAGNOSIS:  Open wound of left buttock 11-Feb-2021 14:03:05 s/p drainage of abscess and debridement Jw Solorzano  
POST-OP DIAGNOSIS:  Open wound of left buttock 11-Feb-2021 14:03:05 s/p drainage of abscess and debridement Jw Solorzano

## 2021-02-16 NOTE — PRE-OP CHECKLIST - SELECT TESTS ORDERED
BMP/CBC/CMP/PT/PTT/INR/Type and Cross/Type and Screen/UCG/CXR/Results in MD note/COVID
Results in MD note
COVID

## 2021-02-16 NOTE — PROGRESS NOTE ADULT - SUBJECTIVE AND OBJECTIVE BOX
GIOVANNY BUCK  85y, Female    All available historical data reviewed    OVERNIGHT EVENTS:    none  ROS:  General: Denies rigors, nightsweats  HEENT: Denies headache, rhinorrhea, sore throat, eye pain  CV: Denies CP, palpitations  PULM: Denies wheezing, hemoptysis  GI: Denies hematemesis, hematochezia, melena  : Denies discharge, hematuria  MSK: Denies arthralgias, myalgias  SKIN: Denies rash, lesions  NEURO: Denies paresthesias, weakness  PSYCH: Denies depression, anxiety    VITALS:  T(F): 97.9, Max: 99.6 (02-15-21 @ 20:30)  HR: 93  BP: 134/62  RR: 18Vital Signs Last 24 Hrs  T(C): 36.6 (16 Feb 2021 05:33), Max: 37.6 (15 Feb 2021 20:30)  T(F): 97.9 (16 Feb 2021 05:33), Max: 99.6 (15 Feb 2021 20:30)  HR: 93 (16 Feb 2021 05:33) (93 - 110)  BP: 134/62 (16 Feb 2021 05:33) (115/54 - 137/66)  BP(mean): --  RR: 18 (16 Feb 2021 05:33) (18 - 18)  SpO2: 97% (15 Feb 2021 15:08) (93% - 97%)    TESTS & MEASUREMENTS:                        10.1   11.36 )-----------( 262      ( 15 Feb 2021 16:00 )             32.9     02-15    136  |  98  |  17  ----------------------------<  97  4.1   |  26  |  1.0    Ca    8.4<L>      15 Feb 2021 16:00  Phos  3.3     02-15  Mg     1.9     02-15          Culture - Blood (collected 02-10-21 @ 11:36)  Source: .Blood None  Final Report (02-15-21 @ 23:01):    No Growth Final    Culture - Blood (collected 02-10-21 @ 11:36)  Source: .Blood None  Final Report (02-15-21 @ 23:01):    No Growth Final            RADIOLOGY & ADDITIONAL TESTS:  Personal review of radiological diagnostics performed  Echo and EKG results noted when applicable.     MEDICATIONS:  acetaminophen   Tablet .. 650 milliGRAM(s) Oral every 4 hours PRN  acetaminophen   Tablet .. 650 milliGRAM(s) Oral every 6 hours PRN  ALBUTerol    90 MICROgram(s) HFA Inhaler 2 Puff(s) Inhalation every 6 hours PRN  artificial tears (preservative free) Ophthalmic Solution 1 Drop(s) Right EYE every 2 hours  cefTRIAXone   IVPB 2000 milliGRAM(s) IV Intermittent every 24 hours  chlorhexidine 4% Liquid 1 Application(s) Topical <User Schedule>  diltiazem    milliGRAM(s) Oral daily  erythromycin   Ointment 1 Application(s) Right EYE two times a day  furosemide    Tablet 20 milliGRAM(s) Oral daily  lidocaine   Patch 1 Patch Transdermal every 24 hours  linezolid  IVPB 600 milliGRAM(s) IV Intermittent every 12 hours  metroNIDAZOLE  IVPB 500 milliGRAM(s) IV Intermittent every 8 hours  ofloxacin 0.3% Solution 1 Drop(s) Right EYE two times a day  pantoprazole    Tablet 40 milliGRAM(s) Oral before breakfast  senna 2 Tablet(s) Oral at bedtime  sodium chloride 0.9%. 1000 milliLiter(s) IV Continuous <Continuous>      ANTIBIOTICS:  cefTRIAXone   IVPB 2000 milliGRAM(s) IV Intermittent every 24 hours  linezolid  IVPB 600 milliGRAM(s) IV Intermittent every 12 hours  metroNIDAZOLE  IVPB 500 milliGRAM(s) IV Intermittent every 8 hours

## 2021-02-16 NOTE — PRE-ANESTHESIA EVALUATION ADULT - MALLAMPATI CLASS
Class II - visualization of the soft palate, fauces, and uvula
Class II - visualization of the soft palate, fauces, and uvula
Class III - visualization of the soft palate and the base of the uvula
Class III - visualization of the soft palate and the base of the uvula

## 2021-02-16 NOTE — BRIEF OPERATIVE NOTE - OPERATION/FINDINGS
Incision proximal and distal to open portion of wound healing appropriately with staples in, placement of split thickness skin graft from left thigh harvest site on open portion of wound, held with staples, placement of negative pressure wound vac Incision proximal and distal to open portion of wound healing appropriately with staples in,  clean open wound with healthy early granulation and thin exudate

## 2021-02-16 NOTE — PROGRESS NOTE ADULT - ASSESSMENT
85yFemale with a PMH of morbid obesity, anxiety, COPD, PVD, OA, atrial fibrillation (on eliquis), HTN, s/p excision of cystic mass of buttock on 08/2018 with Dr. Bean, presents with complaints of non healing left buttock wound. Patient has had wound to left Buttock since surgery in 2018. Patient lives alone and has been following up at outpatient Burn clinic with Dr. Saeed for wound care. Patient was seen by Dr. Saeed on Thursday 2/4 and was recommended to come in for surgical debridement of Left buttock and abcess drainage which was done on 2/8/21. When at home, Patient has visiting nurse that does wound care and has been treating her wound with lidocaine cream, antibiotic ointment and Abdominal Pads.  The patient currently denies any left buttock pain, but endorses fatigue and fever.     IMPRESSION  Left gluteus with resolved  large abscess  S/p debridement   Tissue cx  : Rare polymorphonuclear leukocytes,  Bacteroides alpha hem strep, corynebacterium  Sx 2/11 : wound vac with partial closure  MRI 2/14 : no fistula, no underlying malignancy      RECOMMENDATIONS  D/c Zyvox 600 mg iv q12h  rocephin 2 gm iv q24h  flagyl 500mg q 8

## 2021-02-16 NOTE — BRIEF OPERATIVE NOTE - NSICDXBRIEFPROCEDURE_GEN_ALL_CORE_FT
PROCEDURES:  Split-thickness skin graft to trunk 16-Feb-2021 13:19:45  Natalia Chris  Selective debridement 08-Feb-2021 15:57:01 debridement left buttock and abscess drainage Seng Saeed   PROCEDURES:  Negative pressure wound therapy, greater than 50 sq cm 11-Feb-2021 14:01:55 left buttock skin graft site Long, Jw  Irrigation and debridement of perineum or buttock 11-Feb-2021 13:58:37 excisional debridement and pulsatile irrigation left buttock including subcutis  10 x 8 cm Long, Jw  Split-thickness skin graft to trunk 16-Feb-2021 13:19:45 left buttock 84 sq cm Natalia Chris

## 2021-02-17 LAB
ANION GAP SERPL CALC-SCNC: 14 MMOL/L — SIGNIFICANT CHANGE UP (ref 7–14)
BUN SERPL-MCNC: 34 MG/DL — HIGH (ref 10–20)
CALCIUM SERPL-MCNC: 8.7 MG/DL — SIGNIFICANT CHANGE UP (ref 8.5–10.1)
CHLORIDE SERPL-SCNC: 99 MMOL/L — SIGNIFICANT CHANGE UP (ref 98–110)
CO2 SERPL-SCNC: 24 MMOL/L — SIGNIFICANT CHANGE UP (ref 17–32)
CREAT SERPL-MCNC: 1.3 MG/DL — SIGNIFICANT CHANGE UP (ref 0.7–1.5)
GLUCOSE SERPL-MCNC: 128 MG/DL — HIGH (ref 70–99)
HCT VFR BLD CALC: 32.3 % — LOW (ref 37–47)
HGB BLD-MCNC: 10.2 G/DL — LOW (ref 12–16)
MAGNESIUM SERPL-MCNC: 2 MG/DL — SIGNIFICANT CHANGE UP (ref 1.8–2.4)
MCHC RBC-ENTMCNC: 25.1 PG — LOW (ref 27–31)
MCHC RBC-ENTMCNC: 31.6 G/DL — LOW (ref 32–37)
MCV RBC AUTO: 79.4 FL — LOW (ref 81–99)
NRBC # BLD: 0 /100 WBCS — SIGNIFICANT CHANGE UP (ref 0–0)
PHOSPHATE SERPL-MCNC: 4 MG/DL — SIGNIFICANT CHANGE UP (ref 2.1–4.9)
PLATELET # BLD AUTO: 304 K/UL — SIGNIFICANT CHANGE UP (ref 130–400)
POTASSIUM SERPL-MCNC: 4.4 MMOL/L — SIGNIFICANT CHANGE UP (ref 3.5–5)
POTASSIUM SERPL-SCNC: 4.4 MMOL/L — SIGNIFICANT CHANGE UP (ref 3.5–5)
RBC # BLD: 4.07 M/UL — LOW (ref 4.2–5.4)
RBC # FLD: 16.6 % — HIGH (ref 11.5–14.5)
SODIUM SERPL-SCNC: 137 MMOL/L — SIGNIFICANT CHANGE UP (ref 135–146)
WBC # BLD: 13.39 K/UL — HIGH (ref 4.8–10.8)
WBC # FLD AUTO: 13.39 K/UL — HIGH (ref 4.8–10.8)

## 2021-02-17 RX ADMIN — Medication 100 MILLIGRAM(S): at 04:34

## 2021-02-17 RX ADMIN — Medication 1 DROP(S): at 02:00

## 2021-02-17 RX ADMIN — LIDOCAINE 1 PATCH: 4 CREAM TOPICAL at 07:31

## 2021-02-17 RX ADMIN — Medication 1 DROP(S): at 22:02

## 2021-02-17 RX ADMIN — Medication 1 DROP(S): at 13:01

## 2021-02-17 RX ADMIN — Medication 1 APPLICATION(S): at 04:33

## 2021-02-17 RX ADMIN — Medication 1 DROP(S): at 04:32

## 2021-02-17 RX ADMIN — PANTOPRAZOLE SODIUM 40 MILLIGRAM(S): 20 TABLET, DELAYED RELEASE ORAL at 04:35

## 2021-02-17 RX ADMIN — Medication 650 MILLIGRAM(S): at 11:40

## 2021-02-17 RX ADMIN — Medication 1 DROP(S): at 20:00

## 2021-02-17 RX ADMIN — Medication 1 DROP(S): at 10:32

## 2021-02-17 RX ADMIN — Medication 1 DROP(S): at 06:39

## 2021-02-17 RX ADMIN — Medication 1 DROP(S): at 11:40

## 2021-02-17 RX ADMIN — LIDOCAINE 1 PATCH: 4 CREAM TOPICAL at 22:01

## 2021-02-17 RX ADMIN — Medication 1 APPLICATION(S): at 17:01

## 2021-02-17 RX ADMIN — Medication 180 MILLIGRAM(S): at 04:33

## 2021-02-17 RX ADMIN — Medication 1 DROP(S): at 17:00

## 2021-02-17 RX ADMIN — LIDOCAINE 1 PATCH: 4 CREAM TOPICAL at 10:51

## 2021-02-17 RX ADMIN — Medication 1 DROP(S): at 08:07

## 2021-02-17 RX ADMIN — SENNA PLUS 2 TABLET(S): 8.6 TABLET ORAL at 22:02

## 2021-02-17 RX ADMIN — Medication 100 MILLIGRAM(S): at 13:01

## 2021-02-17 RX ADMIN — Medication 1 DROP(S): at 04:34

## 2021-02-17 RX ADMIN — Medication 1 DROP(S): at 01:38

## 2021-02-17 RX ADMIN — Medication 1 DROP(S): at 16:02

## 2021-02-17 RX ADMIN — APIXABAN 2.5 MILLIGRAM(S): 2.5 TABLET, FILM COATED ORAL at 04:32

## 2021-02-17 RX ADMIN — CEFTRIAXONE 100 MILLIGRAM(S): 500 INJECTION, POWDER, FOR SOLUTION INTRAMUSCULAR; INTRAVENOUS at 22:00

## 2021-02-17 RX ADMIN — APIXABAN 2.5 MILLIGRAM(S): 2.5 TABLET, FILM COATED ORAL at 17:00

## 2021-02-17 RX ADMIN — Medication 20 MILLIGRAM(S): at 04:35

## 2021-02-17 NOTE — PROGRESS NOTE ADULT - SUBJECTIVE AND OBJECTIVE BOX
AM rounds     POD # 1 s/p debridement + STSG to left buttock   POD # 6 s/p debridement + partial closure + LAWANDA drain to left buttock    Pt: no complaints  No acute events o/n    Vital Signs Last 24 Hrs  T(C): 35.7 (17 Feb 2021 05:18), Max: 36.8 (16 Feb 2021 10:57)  T(F): 96.3 (17 Feb 2021 05:18), Max: 98.2 (16 Feb 2021 10:57)  HR: 88 (17 Feb 2021 05:18) (86 - 100)  BP: 116/56 (17 Feb 2021 05:18) (109/53 - 146/66)  RR: 18 (17 Feb 2021 05:18) (14 - 19)  SpO2: 98% (16 Feb 2021 14:50) (95% - 98%)        02-16    137  |  97<L>  |  18  ----------------------------<  145<H>  4.1   |  27  |  1.0    Ca    8.4<L>      16 Feb 2021 17:04  Phos  4.7     02-16  Mg     1.8     02-16                            10.7   11.32 )-----------( 248      ( 16 Feb 2021 17:04 )             34.7         EXAM:   General: NAD  Neuro: awake, a&ox3, speaking in full sentences  Resp: Breathing comfortably on room air, equal chest rise and fall bilaterally  Wound: Left buttock partial closure site dry, clean and intact   VAC to left buttock in place with good seal, minimal serosanguinous drainage   Donor site to left thigh with duoderm in place --> minimal serosanguinous drainge

## 2021-02-17 NOTE — PROGRESS NOTE ADULT - ASSESSMENT
84y Female PMH of morbid obesity, anxiety, COPD, PVD, OA, atrial fibrillation (on eliquis), HTN, s/p excision of cystic mass of buttock on 08/2018 with Dr. Bean, presents with complaints of non healing left buttock wound.     POD #1  s/p debridement + STSG to left buttock   POD#6 s/p debridement left buttock and partial closure with wound vac placement.      1. Left Buttock Wound , s/p partial closure + STSG + LAWANDA drain  - VAC to be removed on Friday 2/19 for first take down of STSG  - LAWANDA drain 3cc overnight  - WCx 2/8 #1: mod bacteroides, few corynebacter, rare alpha hemolytic strep, WCx #2 2/8 NGTD  - MRI of pelvis- no acute findings, post debridement changes  - as per ID recommendations  c/w Rocephin 2 gm iv q24h, Flagyl 500mg q 8. Stop Zyvox 600 mg iv q12h,.  Need to f/u for DC recommendations   - pain management  - Eliquis restarted 2/16 post op    2. Ophthalmology:   - seen by ophtho for Right upper lid retraction with corneal punctate staining 2/2 long standing exposure   - Recommend order acetylcholine receptor antibodies (binding, blocking, and modulating) --> sent, f/u results  - Recommend ofloxacin bid OD, start preservative free artificial tears q2h OD and erythromycin ointment bid OD  - Follow-up as outpatient for dilated fundus examination     3.  Hx HTN  - monitor vitals  - cont w/ Diltiazem   - cont home dose Lasix     4. Hx A fib  - cont Diltiazem 180 daily  - cont w/ Eliquis 2.5mg bid    5. hx COPD  - stable  - cont w/ inhaler       - DVT ppx with   -GI ppx  - PT/OT

## 2021-02-17 NOTE — CHART NOTE - NSCHARTNOTEFT_GEN_A_CORE
Registered Dietitian Follow-Up     Patient Profile Reviewed                           Yes [x]   No []     Nutrition History Previously Obtained        Yes [x]  No []       Pertinent Subjective Information: Spoke w/ pt who reports significant improvement in appetite/po intake, consuming most of her meals w/o any issues. Swallowing function has also improved and reports no issues currently.      Pertinent Medical Interventions: Left buttock wound s/p partial closure + STSG + LAWANDA drain. VAC to be removed on Friday 2/19 for first take down of STSG.      Diet order: DASH/TLC      Anthropometrics:  - Ht. 67"   - Wt. 243#/110kg--no new wts   - %wt change  - BMI: 38.1   - IBW: 135#      Pertinent Lab Data: (2/16): H/H 10.7/34.7, Gluc 145, GFR 52      Pertinent Meds: eliquis, abx, albuterol, cardizem, lasix, protonix, senna     Physical Findings:  - Appearance: alert and oriented; no edema noted   - GI function: LBM 2/15   - Oral/Mouth cavity: significant improvement in swallowing, currently no complaints   - Skin: surgical incision      Nutrition Requirements  Weight Used: 243#/110kg; needs continued from RD note on 2/14      Estimated Calorie Needs: [id number] AF 1-1.0=2295-7080tedj/day -Due to obesity Estimated Protein Needs: 62-73grams/day (1-1.2grams/kg of IBW-61kg) -Due to age and obesity  Estimated Fluid Needs: 1592-1751mL/day (1mL/kcal)     Nutrient Intake: meeting kcal/pro needs at this time      Previous Nutrition Diagnosis: Inadequate Oral Intake (resolved)     Nutrition Intervention: meals and snacks     Recommendations:  1. Continue current diet order      Goal/Expected Outcome: Pt to maintain >75% po intake of meals and snacks over the next 7 days      Indicator/Monitoring: RD to monitor energy intake, glucose profile, nutrition focused physical findings, body composition

## 2021-02-18 LAB
ANION GAP SERPL CALC-SCNC: 10 MMOL/L — SIGNIFICANT CHANGE UP (ref 7–14)
BUN SERPL-MCNC: 27 MG/DL — HIGH (ref 10–20)
CALCIUM SERPL-MCNC: 8.6 MG/DL — SIGNIFICANT CHANGE UP (ref 8.5–10.1)
CHLORIDE SERPL-SCNC: 100 MMOL/L — SIGNIFICANT CHANGE UP (ref 98–110)
CO2 SERPL-SCNC: 28 MMOL/L — SIGNIFICANT CHANGE UP (ref 17–32)
CREAT SERPL-MCNC: 1 MG/DL — SIGNIFICANT CHANGE UP (ref 0.7–1.5)
GLUCOSE SERPL-MCNC: 99 MG/DL — SIGNIFICANT CHANGE UP (ref 70–99)
HCT VFR BLD CALC: 31.8 % — LOW (ref 37–47)
HGB BLD-MCNC: 9.7 G/DL — LOW (ref 12–16)
MAGNESIUM SERPL-MCNC: 1.9 MG/DL — SIGNIFICANT CHANGE UP (ref 1.8–2.4)
MCHC RBC-ENTMCNC: 24.7 PG — LOW (ref 27–31)
MCHC RBC-ENTMCNC: 30.5 G/DL — LOW (ref 32–37)
MCV RBC AUTO: 80.9 FL — LOW (ref 81–99)
NRBC # BLD: 0 /100 WBCS — SIGNIFICANT CHANGE UP (ref 0–0)
PHOSPHATE SERPL-MCNC: 3.5 MG/DL — SIGNIFICANT CHANGE UP (ref 2.1–4.9)
PLATELET # BLD AUTO: 274 K/UL — SIGNIFICANT CHANGE UP (ref 130–400)
POTASSIUM SERPL-MCNC: 3.8 MMOL/L — SIGNIFICANT CHANGE UP (ref 3.5–5)
POTASSIUM SERPL-SCNC: 3.8 MMOL/L — SIGNIFICANT CHANGE UP (ref 3.5–5)
RBC # BLD: 3.93 M/UL — LOW (ref 4.2–5.4)
RBC # FLD: 16.5 % — HIGH (ref 11.5–14.5)
SODIUM SERPL-SCNC: 138 MMOL/L — SIGNIFICANT CHANGE UP (ref 135–146)
WBC # BLD: 10.23 K/UL — SIGNIFICANT CHANGE UP (ref 4.8–10.8)
WBC # FLD AUTO: 10.23 K/UL — SIGNIFICANT CHANGE UP (ref 4.8–10.8)

## 2021-02-18 RX ORDER — APIXABAN 2.5 MG/1
5 TABLET, FILM COATED ORAL
Refills: 0 | Status: DISCONTINUED | OUTPATIENT
Start: 2021-02-19 | End: 2021-02-19

## 2021-02-18 RX ADMIN — Medication 100 MILLIGRAM(S): at 05:23

## 2021-02-18 RX ADMIN — APIXABAN 2.5 MILLIGRAM(S): 2.5 TABLET, FILM COATED ORAL at 06:13

## 2021-02-18 RX ADMIN — LIDOCAINE 1 PATCH: 4 CREAM TOPICAL at 07:50

## 2021-02-18 RX ADMIN — Medication 1 DROP(S): at 21:29

## 2021-02-18 RX ADMIN — Medication 100 MILLIGRAM(S): at 14:14

## 2021-02-18 RX ADMIN — PANTOPRAZOLE SODIUM 40 MILLIGRAM(S): 20 TABLET, DELAYED RELEASE ORAL at 05:23

## 2021-02-18 RX ADMIN — Medication 180 MILLIGRAM(S): at 05:24

## 2021-02-18 RX ADMIN — Medication 20 MILLIGRAM(S): at 05:23

## 2021-02-18 RX ADMIN — Medication 100 MILLIGRAM(S): at 20:37

## 2021-02-18 RX ADMIN — CEFTRIAXONE 100 MILLIGRAM(S): 500 INJECTION, POWDER, FOR SOLUTION INTRAMUSCULAR; INTRAVENOUS at 20:39

## 2021-02-18 RX ADMIN — Medication 1 DROP(S): at 08:08

## 2021-02-18 RX ADMIN — Medication 1 DROP(S): at 14:14

## 2021-02-18 RX ADMIN — Medication 1 DROP(S): at 17:48

## 2021-02-18 RX ADMIN — Medication 100 MILLIGRAM(S): at 00:10

## 2021-02-18 RX ADMIN — LIDOCAINE 1 PATCH: 4 CREAM TOPICAL at 20:39

## 2021-02-18 RX ADMIN — APIXABAN 2.5 MILLIGRAM(S): 2.5 TABLET, FILM COATED ORAL at 17:48

## 2021-02-18 RX ADMIN — Medication 1 DROP(S): at 11:27

## 2021-02-18 RX ADMIN — Medication 650 MILLIGRAM(S): at 01:13

## 2021-02-18 RX ADMIN — LIDOCAINE 1 PATCH: 4 CREAM TOPICAL at 10:41

## 2021-02-18 RX ADMIN — Medication 1 DROP(S): at 10:41

## 2021-02-18 RX ADMIN — Medication 1 DROP(S): at 00:10

## 2021-02-18 RX ADMIN — Medication 1 APPLICATION(S): at 05:24

## 2021-02-18 RX ADMIN — Medication 1 DROP(S): at 20:38

## 2021-02-18 RX ADMIN — Medication 1 DROP(S): at 05:25

## 2021-02-18 RX ADMIN — Medication 1 DROP(S): at 17:44

## 2021-02-18 NOTE — PROGRESS NOTE ADULT - ASSESSMENT
· Assessment	  85yFemale with a PMH of morbid obesity, anxiety, COPD, PVD, OA, atrial fibrillation (on eliquis), HTN, s/p excision of cystic mass of buttock on 08/2018 with Dr. Bean, presents with complaints of non healing left buttock wound. Patient has had wound to left Buttock since surgery in 2018. Patient lives alone and has been following up at outpatient Burn clinic with Dr. Saeed for wound care. Patient was seen by Dr. Saeed on Thursday 2/4 and was recommended to come in for surgical debridement of Left buttock and abcess drainage which was done on 2/8/21. When at home, Patient has visiting nurse that does wound care and has been treating her wound with lidocaine cream, antibiotic ointment and Abdominal Pads.  The patient currently denies any left buttock pain, but endorses fatigue and fever.     IMPRESSION  Left gluteus with resolved  large abscess  S/p debridement   Tissue cx  : Rare polymorphonuclear leukocytes,  Bacteroides alpha hem strep, corynebacterium  Sx 2/11 : wound vac with partial closure  MRI 2/14 : no fistula, no underlying malignancy      RECOMMENDATIONS  rocephin 2 gm iv q24h  flagyl 500mg q 8Once cleared by BURN d/c on  Rocephin 2 gm iv q24h  Po Flagyl 500 mg q8h  Duration 2 more weeks starting 2/18  f/u with Dr Salgado [id number] at [address] [facility] on tuesday via telehealth on 3/3   between 10-12 am  recall prn please

## 2021-02-18 NOTE — PROGRESS NOTE ADULT - SUBJECTIVE AND OBJECTIVE BOX
GIOVANNY BUCK  85y, Female    All available historical data reviewed    OVERNIGHT EVENTS:  no fevers      ROS:  General: Denies rigors, nightsweats  HEENT: Denies headache, rhinorrhea, sore throat, eye pain  CV: Denies CP, palpitations  PULM: Denies wheezing, hemoptysis  GI: Denies hematemesis, hematochezia, melena  : Denies discharge, hematuria  MSK: Denies arthralgias, myalgias  SKIN: Denies rash, lesions  NEURO: Denies paresthesias, weakness  PSYCH: Denies depression, anxiety    VITALS:  T(F): 96.6, Max: 97.8 (02-17-21 @ 17:00)  HR: 84  BP: 128/59  RR: 18Vital Signs Last 24 Hrs  T(C): 35.9 (18 Feb 2021 05:10), Max: 36.6 (17 Feb 2021 17:00)  T(F): 96.6 (18 Feb 2021 05:10), Max: 97.8 (17 Feb 2021 17:00)  HR: 84 (18 Feb 2021 05:10) (80 - 107)  BP: 128/59 (18 Feb 2021 05:10) (107/65 - 128/59)  BP(mean): --  RR: 18 (18 Feb 2021 05:10) (18 - 18)  SpO2: --    TESTS & MEASUREMENTS:                        10.2   13.39 )-----------( 304      ( 17 Feb 2021 16:00 )             32.3     02-17    137  |  99  |  34<H>  ----------------------------<  128<H>  4.4   |  24  |  1.3    Ca    8.7      17 Feb 2021 16:00  Phos  4.0     02-17  Mg     2.0     02-17                RADIOLOGY & ADDITIONAL TESTS:  Personal review of radiological diagnostics performed  Echo and EKG results noted when applicable.     MEDICATIONS:  acetaminophen   Tablet .. 650 milliGRAM(s) Oral every 4 hours PRN  acetaminophen   Tablet .. 650 milliGRAM(s) Oral every 6 hours PRN  ALBUTerol    90 MICROgram(s) HFA Inhaler 2 Puff(s) Inhalation every 6 hours PRN  apixaban 2.5 milliGRAM(s) Oral two times a day  artificial tears (preservative free) Ophthalmic Solution 1 Drop(s) Right EYE every 2 hours  cefTRIAXone   IVPB 1000 milliGRAM(s) IV Intermittent every 24 hours  diltiazem    milliGRAM(s) Oral daily  erythromycin   Ointment 1 Application(s) Right EYE two times a day  furosemide    Tablet 20 milliGRAM(s) Oral daily  lidocaine   Patch 1 Patch Transdermal every 24 hours  metroNIDAZOLE  IVPB 500 milliGRAM(s) IV Intermittent every 8 hours  ofloxacin 0.3% Solution 1 Drop(s) Right EYE two times a day  pantoprazole    Tablet 40 milliGRAM(s) Oral before breakfast  senna 2 Tablet(s) Oral at bedtime      ANTIBIOTICS:  cefTRIAXone   IVPB 1000 milliGRAM(s) IV Intermittent every 24 hours  metroNIDAZOLE  IVPB 500 milliGRAM(s) IV Intermittent every 8 hours

## 2021-02-18 NOTE — PROGRESS NOTE ADULT - SUBJECTIVE AND OBJECTIVE BOX
AM rounds     POD # 2 s/p debridement + STSG to left buttock   POD # 7 s/p debridement + partial closure + LAWANDA drain to left buttock      Pt: no complaints  No acute events o/n    Vital Signs Last 24 Hrs  T(C): 35.9 (18 Feb 2021 05:10), Max: 36.6 (17 Feb 2021 17:00)  T(F): 96.6 (18 Feb 2021 05:10), Max: 97.8 (17 Feb 2021 17:00)  HR: 84 (18 Feb 2021 05:10) (80 - 107)  BP: 128/59 (18 Feb 2021 05:10) (107/65 - 128/59)  RR: 18 (18 Feb 2021 05:10) (18 - 18)          02-17    137  |  99  |  34<H>  ----------------------------<  128<H>  4.4   |  24  |  1.3    Ca    8.7      17 Feb 2021 16:00  Phos  4.0     02-17  Mg     2.0     02-17                            10.2   13.39 )-----------( 304      ( 17 Feb 2021 16:00 )             32.3       EXAM:   General: NAD  Neuro: awake, a&ox3, speaking in full sentences  Resp: Breathing comfortably on room air, equal chest rise and fall bilaterally  Wound: Left buttock partial closure site dry, clean and intact   VAC to left buttock in place with good seal, minimal serosanguinous drainage   Donor site to left thigh with duoderm in place --> minimal serosanguinous drainge  Wound

## 2021-02-19 LAB
ACRM MODULATING ANTIBODY: 0.07 NMOL/L — SIGNIFICANT CHANGE UP (ref 0–0.24)
ANION GAP SERPL CALC-SCNC: 12 MMOL/L — SIGNIFICANT CHANGE UP (ref 7–14)
BUN SERPL-MCNC: 28 MG/DL — HIGH (ref 10–20)
CALCIUM SERPL-MCNC: 8.5 MG/DL — SIGNIFICANT CHANGE UP (ref 8.5–10.1)
CHLORIDE SERPL-SCNC: 98 MMOL/L — SIGNIFICANT CHANGE UP (ref 98–110)
CO2 SERPL-SCNC: 27 MMOL/L — SIGNIFICANT CHANGE UP (ref 17–32)
CREAT SERPL-MCNC: 1.1 MG/DL — SIGNIFICANT CHANGE UP (ref 0.7–1.5)
GLUCOSE SERPL-MCNC: 92 MG/DL — SIGNIFICANT CHANGE UP (ref 70–99)
HCT VFR BLD CALC: 32.5 % — LOW (ref 37–47)
HGB BLD-MCNC: 10.3 G/DL — LOW (ref 12–16)
MAGNESIUM SERPL-MCNC: 1.7 MG/DL — LOW (ref 1.8–2.4)
MCHC RBC-ENTMCNC: 25.4 PG — LOW (ref 27–31)
MCHC RBC-ENTMCNC: 31.7 G/DL — LOW (ref 32–37)
MCV RBC AUTO: 80 FL — LOW (ref 81–99)
NRBC # BLD: 0 /100 WBCS — SIGNIFICANT CHANGE UP (ref 0–0)
PHOSPHATE SERPL-MCNC: 2.8 MG/DL — SIGNIFICANT CHANGE UP (ref 2.1–4.9)
PLATELET # BLD AUTO: 290 K/UL — SIGNIFICANT CHANGE UP (ref 130–400)
POTASSIUM SERPL-MCNC: 4 MMOL/L — SIGNIFICANT CHANGE UP (ref 3.5–5)
POTASSIUM SERPL-SCNC: 4 MMOL/L — SIGNIFICANT CHANGE UP (ref 3.5–5)
RBC # BLD: 4.06 M/UL — LOW (ref 4.2–5.4)
RBC # FLD: 17 % — HIGH (ref 11.5–14.5)
SODIUM SERPL-SCNC: 137 MMOL/L — SIGNIFICANT CHANGE UP (ref 135–146)
WBC # BLD: 11.42 K/UL — HIGH (ref 4.8–10.8)
WBC # FLD AUTO: 11.42 K/UL — HIGH (ref 4.8–10.8)

## 2021-02-19 RX ORDER — QUETIAPINE FUMARATE 200 MG/1
25 TABLET, FILM COATED ORAL ONCE
Refills: 0 | Status: COMPLETED | OUTPATIENT
Start: 2021-02-19 | End: 2021-02-19

## 2021-02-19 RX ORDER — NYSTATIN CREAM 100000 [USP'U]/G
1 CREAM TOPICAL
Refills: 0 | Status: DISCONTINUED | OUTPATIENT
Start: 2021-02-19 | End: 2021-02-23

## 2021-02-19 RX ORDER — APIXABAN 2.5 MG/1
5 TABLET, FILM COATED ORAL EVERY 12 HOURS
Refills: 0 | Status: COMPLETED | OUTPATIENT
Start: 2021-02-19 | End: 2021-02-21

## 2021-02-19 RX ORDER — APIXABAN 2.5 MG/1
2.5 TABLET, FILM COATED ORAL EVERY 12 HOURS
Refills: 0 | Status: DISCONTINUED | OUTPATIENT
Start: 2021-02-21 | End: 2021-02-21

## 2021-02-19 RX ORDER — DIAZEPAM 5 MG
2 TABLET ORAL ONCE
Refills: 0 | Status: DISCONTINUED | OUTPATIENT
Start: 2021-02-19 | End: 2021-02-19

## 2021-02-19 RX ORDER — CEFTRIAXONE 500 MG/1
2 INJECTION, POWDER, FOR SOLUTION INTRAMUSCULAR; INTRAVENOUS EVERY 24 HOURS
Refills: 0 | Status: DISCONTINUED | OUTPATIENT
Start: 2021-02-19 | End: 2021-02-19

## 2021-02-19 RX ORDER — CEFTRIAXONE 500 MG/1
2000 INJECTION, POWDER, FOR SOLUTION INTRAMUSCULAR; INTRAVENOUS EVERY 24 HOURS
Refills: 0 | Status: DISCONTINUED | OUTPATIENT
Start: 2021-02-19 | End: 2021-02-23

## 2021-02-19 RX ADMIN — Medication 180 MILLIGRAM(S): at 04:58

## 2021-02-19 RX ADMIN — APIXABAN 5 MILLIGRAM(S): 2.5 TABLET, FILM COATED ORAL at 16:46

## 2021-02-19 RX ADMIN — Medication 100 MILLIGRAM(S): at 04:59

## 2021-02-19 RX ADMIN — Medication 1 APPLICATION(S): at 04:56

## 2021-02-19 RX ADMIN — Medication 1 DROP(S): at 04:56

## 2021-02-19 RX ADMIN — Medication 1 DROP(S): at 03:23

## 2021-02-19 RX ADMIN — Medication 1 DROP(S): at 16:46

## 2021-02-19 RX ADMIN — CEFTRIAXONE 100 MILLIGRAM(S): 500 INJECTION, POWDER, FOR SOLUTION INTRAMUSCULAR; INTRAVENOUS at 16:45

## 2021-02-19 RX ADMIN — Medication 100 MILLIGRAM(S): at 22:43

## 2021-02-19 RX ADMIN — PANTOPRAZOLE SODIUM 40 MILLIGRAM(S): 20 TABLET, DELAYED RELEASE ORAL at 04:57

## 2021-02-19 RX ADMIN — Medication 1 DROP(S): at 11:37

## 2021-02-19 RX ADMIN — Medication 1 DROP(S): at 11:46

## 2021-02-19 RX ADMIN — Medication 1 DROP(S): at 05:00

## 2021-02-19 RX ADMIN — LIDOCAINE 1 PATCH: 4 CREAM TOPICAL at 22:38

## 2021-02-19 RX ADMIN — APIXABAN 5 MILLIGRAM(S): 2.5 TABLET, FILM COATED ORAL at 04:57

## 2021-02-19 RX ADMIN — Medication 1 DROP(S): at 20:04

## 2021-02-19 RX ADMIN — Medication 20 MILLIGRAM(S): at 04:58

## 2021-02-19 RX ADMIN — Medication 1 DROP(S): at 18:27

## 2021-02-19 RX ADMIN — Medication 1 DROP(S): at 02:10

## 2021-02-19 RX ADMIN — Medication 1 DROP(S): at 15:35

## 2021-02-19 RX ADMIN — NYSTATIN CREAM 1 APPLICATION(S): 100000 CREAM TOPICAL at 16:48

## 2021-02-19 RX ADMIN — LIDOCAINE 1 PATCH: 4 CREAM TOPICAL at 08:10

## 2021-02-19 RX ADMIN — Medication 1 APPLICATION(S): at 16:47

## 2021-02-19 RX ADMIN — Medication 1 DROP(S): at 16:45

## 2021-02-19 RX ADMIN — Medication 100 MILLIGRAM(S): at 16:44

## 2021-02-19 RX ADMIN — LIDOCAINE 1 PATCH: 4 CREAM TOPICAL at 16:50

## 2021-02-19 RX ADMIN — Medication 650 MILLIGRAM(S): at 00:46

## 2021-02-19 RX ADMIN — SENNA PLUS 2 TABLET(S): 8.6 TABLET ORAL at 22:38

## 2021-02-19 RX ADMIN — Medication 1 DROP(S): at 13:18

## 2021-02-19 NOTE — PROGRESS NOTE ADULT - SUBJECTIVE AND OBJECTIVE BOX
GIOVANNY BUCK  85y, Female    All available historical data reviewed    OVERNIGHT EVENTS:    no fevers    ROS:  General: Denies rigors, nightsweats  HEENT: Denies headache, rhinorrhea, sore throat, eye pain  CV: Denies CP, palpitations  PULM: Denies wheezing, hemoptysis  GI: Denies hematemesis, hematochezia, melena  : Denies discharge, hematuria  MSK: Denies arthralgias, myalgias  SKIN: Denies rash, lesions  NEURO: Denies paresthesias, weakness  PSYCH: Denies depression, anxiety    VITALS:  T(F): 97.9, Max: 97.9 (02-19-21 @ 04:54)  HR: 97  BP: 136/61  RR: 18Vital Signs Last 24 Hrs  T(C): 36.6 (19 Feb 2021 04:54), Max: 36.6 (18 Feb 2021 13:52)  T(F): 97.9 (19 Feb 2021 04:54), Max: 97.9 (19 Feb 2021 04:54)  HR: 97 (19 Feb 2021 04:54) (74 - 97)  BP: 136/61 (19 Feb 2021 04:54) (122/57 - 136/61)  BP(mean): --  RR: 18 (19 Feb 2021 04:54) (18 - 18)  SpO2: --    TESTS & MEASUREMENTS:                        9.7    10.23 )-----------( 274      ( 18 Feb 2021 17:04 )             31.8     02-18    138  |  100  |  27<H>  ----------------------------<  99  3.8   |  28  |  1.0    Ca    8.6      18 Feb 2021 17:04  Phos  3.5     02-18  Mg     1.9     02-18                RADIOLOGY & ADDITIONAL TESTS:  Personal review of radiological diagnostics performed  Echo and EKG results noted when applicable.     MEDICATIONS:  acetaminophen   Tablet .. 650 milliGRAM(s) Oral every 4 hours PRN  acetaminophen   Tablet .. 650 milliGRAM(s) Oral every 6 hours PRN  ALBUTerol    90 MICROgram(s) HFA Inhaler 2 Puff(s) Inhalation every 6 hours PRN  apixaban 5 milliGRAM(s) Oral two times a day  artificial tears (preservative free) Ophthalmic Solution 1 Drop(s) Right EYE every 2 hours  cefTRIAXone   IVPB 1000 milliGRAM(s) IV Intermittent every 24 hours  diltiazem    milliGRAM(s) Oral daily  erythromycin   Ointment 1 Application(s) Right EYE two times a day  furosemide    Tablet 20 milliGRAM(s) Oral daily  lidocaine   Patch 1 Patch Transdermal every 24 hours  metroNIDAZOLE  IVPB 500 milliGRAM(s) IV Intermittent every 8 hours  ofloxacin 0.3% Solution 1 Drop(s) Right EYE two times a day  pantoprazole    Tablet 40 milliGRAM(s) Oral before breakfast  senna 2 Tablet(s) Oral at bedtime      ANTIBIOTICS:  cefTRIAXone   IVPB 1000 milliGRAM(s) IV Intermittent every 24 hours  metroNIDAZOLE  IVPB 500 milliGRAM(s) IV Intermittent every 8 hours

## 2021-02-19 NOTE — PROGRESS NOTE ADULT - ASSESSMENT
· Assessment	  85yFemale with a PMH of morbid obesity, anxiety, COPD, PVD, OA, atrial fibrillation (on eliquis), HTN, s/p excision of cystic mass of buttock on 08/2018 with Dr. Bean, presents with complaints of non healing left buttock wound. Patient has had wound to left Buttock since surgery in 2018. Patient lives alone and has been following up at outpatient Burn clinic with Dr. Saeed for wound care. Patient was seen by Dr. Saeed on Thursday 2/4 and was recommended to come in for surgical debridement of Left buttock and abcess drainage which was done on 2/8/21. When at home, Patient has visiting nurse that does wound care and has been treating her wound with lidocaine cream, antibiotic ointment and Abdominal Pads.  The patient currently denies any left buttock pain, but endorses fatigue and fever.     IMPRESSION  Left gluteus with resolved  large abscess  S/p debridement   Tissue cx  : Rare polymorphonuclear leukocytes,  Bacteroides alpha hem strep, corynebacterium  Sx 2/11 : wound vac with partial closure  Graft not taking  MRI 2/14 : no fistula, no underlying malignancy      RECOMMENDATIONS  rocephin 2 gm iv q24h  flagyl 500mg q 8  Pt adamnt on not getting iv ABx  Graft is rejected and will need iv ABx

## 2021-02-19 NOTE — PROGRESS NOTE ADULT - ASSESSMENT
84y Female PMH of morbid obesity, anxiety, COPD, PVD, OA, atrial fibrillation (on eliquis), HTN, s/p excision of cystic mass of buttock on 08/2018 with Dr. Bean, presents with complaints of non healing left buttock wound.     2/8 s/p maday of L buttock  2/11 s/p Left buttock debridement and partial closure + LAWANDA + NPWT   2/16 maday + STSG + VAC      1. Left Buttock Wound , s/p partial closure + STSG + Wound Vac placement  - VAC removed today for first take down of STSG, skin graft looks pale, will start dressing with Aquacel ag dressing bid.   - WCx 2/8 #1: mod bacteroides, few corynebacter, rare alpha hemolytic strep, WCx #2 2/8 NGTD, Bcx 2/10 NGTD  - MRI of pelvis- no acute findings, post debridement changes  - as per ID recommendations  c/w Rocephin 2 gm iv q24h, Flagyl 500mg q 8,  - plan to discharge home on following abx:              * Rocephin 2 gm iv q24h --> will need PICC or midline depending on home care agency --> will f/u              * Po Flagyl 500 mg q8h             *  Duration 2 more weeks starting 2/18              * f/u with Dr Salgado [id number] at [address] [facility] on tuesday via telehealth on 3/3   between 10-12 am  - pain management  - Eliquis restarted 2/16 post op    2. Ophthalmology:   - seen by ophtho for Right upper lid retraction with corneal punctate staining 2/2 long standing exposure   - Recommend order acetylcholine receptor antibodies (binding, blocking, and modulating) --> sent, f/u results  - Recommend ofloxacin bid OD, start preservative free artificial tears q2h OD and erythromycin ointment bid OD  - Follow-up as outpatient for dilated fundus examination     3.  Hx HTN  - monitor vitals  - cont w/ Diltiazem   - cont home dose Lasix     4. Hx A fib  - cont Diltiazem 180 daily  - cont w/ Eliquis 2.5mg bid    5. hx COPD  - stable  - cont w/ inhaler       - DVT ppx with   -GI ppx  - PT/OT      84y Female PMH of morbid obesity, anxiety, COPD, PVD, OA, atrial fibrillation (on eliquis), HTN, s/p excision of cystic mass of buttock on 08/2018 with Dr. Bean, presents with complaints of non healing left buttock wound.     2/8 s/p maday of L buttock  2/11 s/p Left buttock debridement and partial closure + LAWANDA + NPWT   2/16 maday + STSG + VAC      1. Left Buttock Wound , s/p partial closure + STSG + Wound Vac placement  - VAC removed today for first take down of STSG, skin graft looks pale, will start dressing with Aquacel ag dressing bid.   - WCx 2/8 #1: mod bacteroides, few corynebacter, rare alpha hemolytic strep, WCx #2 2/8 NGTD, Bcx 2/10 NGTD  - MRI of pelvis- no acute findings, post debridement changes  - as per ID recommendations  c/w Rocephin 2 gm iv q24h, Flagyl 500mg q 8,  - plan to discharge home on following abx:              * Rocephin 2 gm iv q24h --> will need PICC or midline depending on home care agency --> will f/u              * Po Flagyl 500 mg q8h             * Duration 2 more weeks starting 2/18             * f/u with Dr Salgado [id number] at [address] [place] [address] on tuesday via telehealth on 3/3   between 10-12 am  - pain management  - Eliquis restarted 2/16 post op    2. Ophthalmology:   - seen by ophtho for Right upper lid retraction with corneal punctate staining 2/2 long standing exposure   - Recommend order acetylcholine receptor antibodies (binding, blocking, and modulating) --> sent, f/u results  - Recommend ofloxacin bid OD, start preservative free artificial tears q2h OD and erythromycin ointment bid OD  - Follow-up as outpatient for dilated fundus examination     3.  Hx HTN  - monitor vitals  - cont w/ Diltiazem   - cont home dose Lasix     4. Hx A fib  - cont Diltiazem 180 daily  - cont w/ Eliquis 2.5mg bid    5. hx COPD  - stable  - cont w/ inhaler       - DVT ppx ->pt on Eliquis  -GI ppx  - PT/OT   Discharge planning with [facility] and on IV abx.  Plan discussed with pt's daughter Miranda.

## 2021-02-19 NOTE — PROGRESS NOTE ADULT - SUBJECTIVE AND OBJECTIVE BOX
Patient is a 85y old  Female who presents with a chief complaint of Left Buttock Wound. (19 Feb 2021 09:38)  2/8 s/p maday of L buttock  2/11 s/p Left buttock debridement and partial closure + LAWANDA + NPWT   2/16 maday + STSG + VAC    INTERVAL HPI/OVERNIGHT EVENTS:  seen on bedside, not in distress, afebrile overnight    Vital Signs Last 24 Hrs  T(C): 37 (19 Feb 2021 13:03), Max: 37 (19 Feb 2021 13:03)  T(F): 98.6 (19 Feb 2021 13:03), Max: 98.6 (19 Feb 2021 13:03)  HR: 101 (19 Feb 2021 13:03) (89 - 101)  BP: 134/63 (19 Feb 2021 13:03) (127/57 - 136/61)  RR: 18 (19 Feb 2021 13:03) (18 - 18)      I&O's Summary    18 Feb 2021 07:01  -  19 Feb 2021 07:00  --------------------------------------------------------  IN: 670 mL / OUT: 1800 mL / NET: -1130 mL      Allergies  aspirin (Other)  codeine (Other)  contrast media (iodine-based) (Other)  penicillin (Other)  sulfa drugs (Hives; Other)      Lab Results:                        9.7    10.23 )-----------( 274      ( 18 Feb 2021 17:04 )             31.8     02-18    138  |  100  |  27<H>  ----------------------------<  99  3.8   |  28  |  1.0    Ca    8.6      18 Feb 2021 17:04  Phos  3.5     02-18  Mg     1.9     02-18      MEDICATIONS  (STANDING):  apixaban 5 milliGRAM(s) Oral every 12 hours  artificial tears (preservative free) Ophthalmic Solution 1 Drop(s) Right EYE every 2 hours  cefTRIAXone   IVPB 2000 milliGRAM(s) IV Intermittent every 24 hours  diltiazem    milliGRAM(s) Oral daily  erythromycin   Ointment 1 Application(s) Right EYE two times a day  furosemide    Tablet 20 milliGRAM(s) Oral daily  lidocaine   Patch 1 Patch Transdermal every 24 hours  metroNIDAZOLE  IVPB 500 milliGRAM(s) IV Intermittent every 8 hours  nystatin Cream 1 Application(s) Topical two times a day  ofloxacin 0.3% Solution 1 Drop(s) Right EYE two times a day  pantoprazole    Tablet 40 milliGRAM(s) Oral before breakfast  senna 2 Tablet(s) Oral at bedtime    MEDICATIONS  (PRN):  acetaminophen   Tablet .. 650 milliGRAM(s) Oral every 4 hours PRN Temp greater or equal to 38C (100.4F)  acetaminophen   Tablet .. 650 milliGRAM(s) Oral every 6 hours PRN Mild Pain (1 - 3)  ALBUTerol    90 MICROgram(s) HFA Inhaler 2 Puff(s) Inhalation every 6 hours PRN Shortness of Breath    PHYSICAL EXAM:  General: seen on bedside, not in distress, cooperative   Neuro: awake, a&ox3, speaking in full sentences  Resp: Breathing comfortably on room air, equal chest rise and fall bilaterally  Wound: Left buttock with partial closure site dry, VAC to left buttock removed, skin graft looks pale, staples in place, no bleeding, no pus. Dressing with Allevyn applied.     Donor site to left thigh with duoderm in place Patient is a 85y old  Female who presents with a chief complaint of Left Buttock Wound. (19 Feb 2021 09:38)    2/8 s/p maday of L buttock  2/11 s/p Left buttock debridement and partial closure + LAWANDA + NPWT   2/16 maday + STSG + VAC    INTERVAL HPI/OVERNIGHT EVENTS:  seen on bedside, not in distress, afebrile overnight    Vital Signs Last 24 Hrs  T(C): 37 (19 Feb 2021 13:03), Max: 37 (19 Feb 2021 13:03)  T(F): 98.6 (19 Feb 2021 13:03), Max: 98.6 (19 Feb 2021 13:03)  HR: 101 (19 Feb 2021 13:03) (89 - 101)  BP: 134/63 (19 Feb 2021 13:03) (127/57 - 136/61)  RR: 18 (19 Feb 2021 13:03) (18 - 18)      I&O's Summary    18 Feb 2021 07:01  -  19 Feb 2021 07:00  --------------------------------------------------------  IN: 670 mL / OUT: 1800 mL / NET: -1130 mL      Allergies  aspirin (Other)  codeine (Other)  contrast media (iodine-based) (Other)  penicillin (Other)  sulfa drugs (Hives; Other)      Lab Results:                        9.7    10.23 )-----------( 274      ( 18 Feb 2021 17:04 )             31.8     02-18    138  |  100  |  27<H>  ----------------------------<  99  3.8   |  28  |  1.0    Ca    8.6      18 Feb 2021 17:04  Phos  3.5     02-18  Mg     1.9     02-18      MEDICATIONS  (STANDING):  apixaban 5 milliGRAM(s) Oral every 12 hours  artificial tears (preservative free) Ophthalmic Solution 1 Drop(s) Right EYE every 2 hours  cefTRIAXone   IVPB 2000 milliGRAM(s) IV Intermittent every 24 hours  diltiazem    milliGRAM(s) Oral daily  erythromycin   Ointment 1 Application(s) Right EYE two times a day  furosemide    Tablet 20 milliGRAM(s) Oral daily  lidocaine   Patch 1 Patch Transdermal every 24 hours  metroNIDAZOLE  IVPB 500 milliGRAM(s) IV Intermittent every 8 hours  nystatin Cream 1 Application(s) Topical two times a day  ofloxacin 0.3% Solution 1 Drop(s) Right EYE two times a day  pantoprazole    Tablet 40 milliGRAM(s) Oral before breakfast  senna 2 Tablet(s) Oral at bedtime    MEDICATIONS  (PRN):  acetaminophen   Tablet .. 650 milliGRAM(s) Oral every 4 hours PRN Temp greater or equal to 38C (100.4F)  acetaminophen   Tablet .. 650 milliGRAM(s) Oral every 6 hours PRN Mild Pain (1 - 3)  ALBUTerol    90 MICROgram(s) HFA Inhaler 2 Puff(s) Inhalation every 6 hours PRN Shortness of Breath    PHYSICAL EXAM:  General: seen on bedside, not in distress, cooperative   Neuro: awake, a&ox3, speaking in full sentences  Resp: Breathing comfortably on room air, equal chest rise and fall bilaterally  Wound: Left buttock with partial closure site dry, VAC to left buttock removed, skin graft looks pale, staples in place, no bleeding, no pus. Dressing with Allevyn applied.     Donor site to left thigh with duoderm in place Patient is a 85y old  Female who presents with a chief complaint of Left Buttock Wound. (19 Feb 2021 09:38)    2/8 s/p maday of L buttock  2/11 s/p Left buttock debridement and partial closure + LAWANDA + NPWT   2/16 maday + STSG + VAC    INTERVAL HPI/OVERNIGHT EVENTS:  seen at bedside, not in distress, afebrile overnight  POD # 3     Vital Signs Last 24 Hrs  T(C): 37 (19 Feb 2021 13:03), Max: 37 (19 Feb 2021 13:03)  T(F): 98.6 (19 Feb 2021 13:03), Max: 98.6 (19 Feb 2021 13:03)  HR: 101 (19 Feb 2021 13:03) (89 - 101)  BP: 134/63 (19 Feb 2021 13:03) (127/57 - 136/61)  RR: 18 (19 Feb 2021 13:03) (18 - 18)      I&O's Summary    18 Feb 2021 07:01  -  19 Feb 2021 07:00  --------------------------------------------------------  IN: 670 mL / OUT: 1800 mL / NET: -1130 mL      Allergies  aspirin (Other)  codeine (Other)  contrast media (iodine-based) (Other)  penicillin (Other)  sulfa drugs (Hives; Other)      Lab Results:                        9.7    10.23 )-----------( 274      ( 18 Feb 2021 17:04 )             31.8     02-18    138  |  100  |  27<H>  ----------------------------<  99  3.8   |  28  |  1.0    Ca    8.6      18 Feb 2021 17:04  Phos  3.5     02-18  Mg     1.9     02-18      MEDICATIONS  (STANDING):  apixaban 5 milliGRAM(s) Oral every 12 hours  artificial tears (preservative free) Ophthalmic Solution 1 Drop(s) Right EYE every 2 hours  cefTRIAXone   IVPB 2000 milliGRAM(s) IV Intermittent every 24 hours  diltiazem    milliGRAM(s) Oral daily  erythromycin   Ointment 1 Application(s) Right EYE two times a day  furosemide    Tablet 20 milliGRAM(s) Oral daily  lidocaine   Patch 1 Patch Transdermal every 24 hours  metroNIDAZOLE  IVPB 500 milliGRAM(s) IV Intermittent every 8 hours  nystatin Cream 1 Application(s) Topical two times a day  ofloxacin 0.3% Solution 1 Drop(s) Right EYE two times a day  pantoprazole    Tablet 40 milliGRAM(s) Oral before breakfast  senna 2 Tablet(s) Oral at bedtime    MEDICATIONS  (PRN):  acetaminophen   Tablet .. 650 milliGRAM(s) Oral every 4 hours PRN Temp greater or equal to 38C (100.4F)  acetaminophen   Tablet .. 650 milliGRAM(s) Oral every 6 hours PRN Mild Pain (1 - 3)  ALBUTerol    90 MICROgram(s) HFA Inhaler 2 Puff(s) Inhalation every 6 hours PRN Shortness of Breath    PHYSICAL EXAM:  General: seen on bedside, not in distress, cooperative   Neuro: awake, a&ox3, speaking in full sentences  Resp: Breathing comfortably on room air, equal chest rise and fall bilaterally  Wound: Left buttock with partial closure sites dry, VAC to left buttock removed, skin graft looks pale and mostly non adherent ; most staples in place, no bleeding, no pus. Dressing with Allevyn applied.     Donor site to left thigh with duoderm in place

## 2021-02-20 LAB
ACHR BLOCK AB SER-ACNC: 16 % — SIGNIFICANT CHANGE UP (ref 0–25)
ANION GAP SERPL CALC-SCNC: 13 MMOL/L — SIGNIFICANT CHANGE UP (ref 7–14)
APTT BLD: 35.5 SEC — SIGNIFICANT CHANGE UP (ref 27–39.2)
BUN SERPL-MCNC: 23 MG/DL — HIGH (ref 10–20)
CALCIUM SERPL-MCNC: 9.1 MG/DL — SIGNIFICANT CHANGE UP (ref 8.5–10.1)
CHLORIDE SERPL-SCNC: 97 MMOL/L — LOW (ref 98–110)
CO2 SERPL-SCNC: 28 MMOL/L — SIGNIFICANT CHANGE UP (ref 17–32)
CREAT SERPL-MCNC: 1.1 MG/DL — SIGNIFICANT CHANGE UP (ref 0.7–1.5)
GLUCOSE SERPL-MCNC: 129 MG/DL — HIGH (ref 70–99)
HCT VFR BLD CALC: 38.9 % — SIGNIFICANT CHANGE UP (ref 37–47)
HGB BLD-MCNC: 11.8 G/DL — LOW (ref 12–16)
INR BLD: 1.45 RATIO — HIGH (ref 0.65–1.3)
MAGNESIUM SERPL-MCNC: 1.8 MG/DL — SIGNIFICANT CHANGE UP (ref 1.8–2.4)
MCHC RBC-ENTMCNC: 24.8 PG — LOW (ref 27–31)
MCHC RBC-ENTMCNC: 30.3 G/DL — LOW (ref 32–37)
MCV RBC AUTO: 81.7 FL — SIGNIFICANT CHANGE UP (ref 81–99)
NRBC # BLD: 0 /100 WBCS — SIGNIFICANT CHANGE UP (ref 0–0)
PHOSPHATE SERPL-MCNC: 2.9 MG/DL — SIGNIFICANT CHANGE UP (ref 2.1–4.9)
PLATELET # BLD AUTO: 340 K/UL — SIGNIFICANT CHANGE UP (ref 130–400)
POTASSIUM SERPL-MCNC: 4.4 MMOL/L — SIGNIFICANT CHANGE UP (ref 3.5–5)
POTASSIUM SERPL-SCNC: 4.4 MMOL/L — SIGNIFICANT CHANGE UP (ref 3.5–5)
PROTHROM AB SERPL-ACNC: 16.7 SEC — HIGH (ref 9.95–12.87)
RBC # BLD: 4.76 M/UL — SIGNIFICANT CHANGE UP (ref 4.2–5.4)
RBC # FLD: 17.1 % — HIGH (ref 11.5–14.5)
SODIUM SERPL-SCNC: 138 MMOL/L — SIGNIFICANT CHANGE UP (ref 135–146)
WBC # BLD: 12.26 K/UL — HIGH (ref 4.8–10.8)
WBC # FLD AUTO: 12.26 K/UL — HIGH (ref 4.8–10.8)

## 2021-02-20 PROCEDURE — 99231 SBSQ HOSP IP/OBS SF/LOW 25: CPT

## 2021-02-20 RX ORDER — MAGNESIUM SULFATE 500 MG/ML
2 VIAL (ML) INJECTION ONCE
Refills: 0 | Status: COMPLETED | OUTPATIENT
Start: 2021-02-20 | End: 2021-02-21

## 2021-02-20 RX ADMIN — SENNA PLUS 2 TABLET(S): 8.6 TABLET ORAL at 21:22

## 2021-02-20 RX ADMIN — Medication 1 DROP(S): at 13:18

## 2021-02-20 RX ADMIN — Medication 180 MILLIGRAM(S): at 05:18

## 2021-02-20 RX ADMIN — NYSTATIN CREAM 1 APPLICATION(S): 100000 CREAM TOPICAL at 07:30

## 2021-02-20 RX ADMIN — Medication 1 DROP(S): at 08:08

## 2021-02-20 RX ADMIN — Medication 1 DROP(S): at 17:03

## 2021-02-20 RX ADMIN — APIXABAN 5 MILLIGRAM(S): 2.5 TABLET, FILM COATED ORAL at 17:10

## 2021-02-20 RX ADMIN — Medication 1 DROP(S): at 05:17

## 2021-02-20 RX ADMIN — Medication 20 MILLIGRAM(S): at 05:18

## 2021-02-20 RX ADMIN — LIDOCAINE 1 PATCH: 4 CREAM TOPICAL at 08:08

## 2021-02-20 RX ADMIN — Medication 1 DROP(S): at 11:22

## 2021-02-20 RX ADMIN — Medication 100 MILLIGRAM(S): at 05:19

## 2021-02-20 RX ADMIN — PANTOPRAZOLE SODIUM 40 MILLIGRAM(S): 20 TABLET, DELAYED RELEASE ORAL at 05:18

## 2021-02-20 RX ADMIN — APIXABAN 5 MILLIGRAM(S): 2.5 TABLET, FILM COATED ORAL at 05:18

## 2021-02-20 RX ADMIN — Medication 1 DROP(S): at 10:51

## 2021-02-20 RX ADMIN — LIDOCAINE 1 PATCH: 4 CREAM TOPICAL at 11:22

## 2021-02-20 RX ADMIN — Medication 1 DROP(S): at 00:30

## 2021-02-20 RX ADMIN — NYSTATIN CREAM 1 APPLICATION(S): 100000 CREAM TOPICAL at 17:12

## 2021-02-20 RX ADMIN — Medication 1 DROP(S): at 01:52

## 2021-02-20 RX ADMIN — Medication 100 MILLIGRAM(S): at 21:22

## 2021-02-20 RX ADMIN — Medication 100 MILLIGRAM(S): at 13:50

## 2021-02-20 RX ADMIN — LIDOCAINE 1 PATCH: 4 CREAM TOPICAL at 21:21

## 2021-02-20 RX ADMIN — Medication 1 DROP(S): at 05:19

## 2021-02-20 RX ADMIN — Medication 1 APPLICATION(S): at 05:18

## 2021-02-20 RX ADMIN — QUETIAPINE FUMARATE 25 MILLIGRAM(S): 200 TABLET, FILM COATED ORAL at 00:06

## 2021-02-20 RX ADMIN — CEFTRIAXONE 100 MILLIGRAM(S): 500 INJECTION, POWDER, FOR SOLUTION INTRAMUSCULAR; INTRAVENOUS at 17:10

## 2021-02-20 NOTE — PROGRESS NOTE ADULT - ASSESSMENT
84y Female PMH of morbid obesity, anxiety, COPD, PVD, OA, atrial fibrillation (on eliquis), HTN, s/p excision of cystic mass of buttock on 08/2018 with Dr. Bean, presents with complaints of non healing left buttock wound.     2/8 s/p maday of L buttock  2/11 s/p Left buttock debridement and partial closure + LAWANDA + NPWT   2/16 maday + STSG + VAC      1. Left Buttock Wound , s/p partial closure + STSG + Wound Vac placement  - FTD yesterday, looks pale, c/w dressing with Aquacel ag dressing bid.   - WCx 2/8 #1: mod bacteroides, few corynebacter, rare alpha hemolytic strep, WCx #2 2/8 NGTD, Bcx 2/10 NGTD  - MRI of pelvis- no acute findings, post debridement changes  - as per ID recommendations  c/w Rocephin 2 gm iv q24h, Flagyl 500mg q 8,  - plan to discharge home on following abx:              * Rocephin 2 gm iv q24h --> will need PICC or midline depending on home care agency --> will f/u              * Po Flagyl 500 mg q8h             * Duration 2 more weeks starting 2/18             * f/u with Dr Salgado [id number] at [address] [facility] [place] on tuesday via telehealth on 3/3   between 10-12 am  - pain management  - Eliquis restarted 2/16 post op    2. Ophthalmology:   - seen by ophtho for Right upper lid retraction with corneal punctate staining 2/2 long standing exposure   - Recommend order acetylcholine receptor antibodies (binding, blocking, and modulating) --> sent, f/u results  - Recommend ofloxacin bid OD, start preservative free artificial tears q2h OD and erythromycin ointment bid OD  - Follow-up as outpatient for dilated fundus examination     3.  Hx HTN  - monitor vitals  - cont w/ Diltiazem   - cont home dose Lasix     4. Hx A fib  - cont Diltiazem 180 daily  - cont w/ Eliquis 2.5mg bid    5. hx COPD  - stable  - cont w/ inhaler       - DVT ppx ->pt on Eliquis (5mg until 2/21 since pt bedrest, then switch to home 2.5mg)  -GI ppx  - PT/OT   Discharge planning with [facility] and on IV abx.  Plan discussed with pt's daughter Miranda.      84y Female PMH of morbid obesity, anxiety, COPD, PVD, OA, atrial fibrillation (on eliquis), HTN, s/p excision of cystic mass of buttock on 08/2018 with Dr. Bean, presents with complaints of non healing left buttock wound.     2/8 s/p maday of L buttock  2/11 s/p Left buttock debridement and partial closure + LAWANDA + NPWT   2/16 maday + STSG + VAC      1. Left Buttock Wound , s/p partial closure + STSG + Wound Vac placement  - FTD yesterday, looks pale, c/w dressing with Aquacel ag dressing bid.   - WCx 2/8 #1: mod bacteroides, few corynebacter, rare alpha hemolytic strep, WCx #2 2/8 NGTD, Bcx 2/10 NGTD  - MRI of pelvis- no acute findings, post debridement changes  - as per ID recommendations  c/w Rocephin 2 gm iv q24h, Flagyl 500mg q 8,  - plan to discharge home on following abx:              * Rocephin 2 gm iv q24h --> will need PICC or midline depending on home care agency --> will f/u              * Po Flagyl 500 mg q8h             * Duration 2 more weeks starting 2/18             * f/u with Dr Salgado [phone] at [address] [facility] on tuesday via telehealth on 3/3   between 10-12 am  - pain management  - Eliquis restarted 2/16 post op    2. Ophthalmology:   - seen by ophtho for Right upper lid retraction with corneal punctate staining 2/2 long standing exposure   - Recommend order acetylcholine receptor antibodies (binding, blocking, and modulating) --> sent, f/u results  - Recommend ofloxacin bid OD, start preservative free artificial tears q2h OD and erythromycin ointment bid OD  - Follow-up as outpatient for dilated fundus examination     3.  Hx HTN  - monitor vitals  - cont w/ Diltiazem   - cont home dose Lasix     4. Hx A fib  - cont Diltiazem 180 daily  - cont w/ Eliquis 2.5mg bid    5. hx COPD  - stable  - cont w/ inhaler       - DVT ppx ->pt on Eliquis (5mg until 2/21 since pt bedrest, then switch to home 2.5mg)  -GI ppx  - PT/OT - pt ambulating with walker   Discharge planning with [facility] and on IV abx.  Plan discussed with pt's daughter Miranda.

## 2021-02-20 NOTE — PROGRESS NOTE ADULT - SUBJECTIVE AND OBJECTIVE BOX
Patient is a 85y old  Female who presents with a chief complaint of Left Buttock Wound. (19 Feb 2021 09:38)    2/8 s/p maday of L buttock  2/11 s/p Left buttock debridement and partial closure + LAWANDA + NPWT   2/16 maday + STSG + VAC    INTERVAL HPI/OVERNIGHT EVENTS:  - seen at bedside, not in distress, afebrile overnight  - repleted Mag with 2g Mg this morning  - POD # 4     Vital Signs Last 24 Hrs  T(C): 35.7 (20 Feb 2021 04:38), Max: 37 (19 Feb 2021 13:03)  T(F): 96.3 (20 Feb 2021 04:38), Max: 98.6 (19 Feb 2021 13:03)  HR: 99 (20 Feb 2021 04:38) (91 - 106)  BP: 142/81 (20 Feb 2021 04:38) (121/55 - 142/81)  RR: 18 (20 Feb 2021 04:38) (18 - 18)    I&O's Summary    19 Feb 2021 07:01  -  20 Feb 2021 07:00  --------------------------------------------------------  IN: 0 mL / OUT: 550 mL / NET: -550 mL    20 Feb 2021 07:01  -  20 Feb 2021 09:54  --------------------------------------------------------  IN: 0 mL / OUT: 1200 mL / NET: -1200 mL        Allergies  aspirin (Other)  codeine (Other)  contrast media (iodine-based) (Other)  penicillin (Other)  sulfa drugs (Hives; Other)      Lab Results:                                   10.3   11.42 )-----------( 290      ( 19 Feb 2021 16:00 )             32.5       02-19    137  |  98  |  28<H>  ----------------------------<  92  4.0   |  27  |  1.1    Ca    8.5      19 Feb 2021 16:00  Phos  2.8     02-19  Mg     1.7     02-19      MEDICATIONS  (STANDING):  apixaban 5 milliGRAM(s) Oral every 12 hours  artificial tears (preservative free) Ophthalmic Solution 1 Drop(s) Right EYE every 2 hours  cefTRIAXone   IVPB 2000 milliGRAM(s) IV Intermittent every 24 hours  diltiazem    milliGRAM(s) Oral daily  erythromycin   Ointment 1 Application(s) Right EYE two times a day  furosemide    Tablet 20 milliGRAM(s) Oral daily  lidocaine   Patch 1 Patch Transdermal every 24 hours  magnesium sulfate  IVPB 2 Gram(s) IV Intermittent once  metroNIDAZOLE  IVPB 500 milliGRAM(s) IV Intermittent every 8 hours  nystatin Cream 1 Application(s) Topical two times a day  ofloxacin 0.3% Solution 1 Drop(s) Right EYE two times a day  pantoprazole    Tablet 40 milliGRAM(s) Oral before breakfast  senna 2 Tablet(s) Oral at bedtime    MEDICATIONS  (PRN):  acetaminophen   Tablet .. 650 milliGRAM(s) Oral every 4 hours PRN Temp greater or equal to 38C (100.4F)  acetaminophen   Tablet .. 650 milliGRAM(s) Oral every 6 hours PRN Mild Pain (1 - 3)  ALBUTerol    90 MICROgram(s) HFA Inhaler 2 Puff(s) Inhalation every 6 hours PRN Shortness of Breath      PHYSICAL EXAM:  General: seen on bedside, not in distress, cooperative   Neuro: awake, a&ox3, speaking in full sentences  Resp: Breathing comfortably on room air, equal chest rise and fall bilaterally  Wound: *dressing in place, from prior*   Left buttock with partial closure sites dry; skin graft looks pale and mostly non adherent ; most staples in place, no bleeding, no pus. Dressing with Allevyn applied.     Donor site to left thigh with duoderm in place Patient is a 85y old  Female who presents with a chief complaint of Left Buttock Wound. (19 Feb 2021 09:38)    2/8 s/p maday of L buttock  2/11 s/p Left buttock debridement and partial closure + LAWANDA + NPWT   2/16 maday + STSG + VAC    INTERVAL HPI/OVERNIGHT EVENTS:  - seen at bedside, not in distress, afebrile overnight  - repleted Mag with 2g Mg this morning  - POD # 4     Vital Signs Last 24 Hrs  T(C): 35.7 (20 Feb 2021 04:38), Max: 37 (19 Feb 2021 13:03)  T(F): 96.3 (20 Feb 2021 04:38), Max: 98.6 (19 Feb 2021 13:03)  HR: 99 (20 Feb 2021 04:38) (91 - 106)  BP: 142/81 (20 Feb 2021 04:38) (121/55 - 142/81)  RR: 18 (20 Feb 2021 04:38) (18 - 18)    I&O's Summary    19 Feb 2021 07:01  -  20 Feb 2021 07:00  --------------------------------------------------------  IN: 0 mL / OUT: 550 mL / NET: -550 mL    20 Feb 2021 07:01  -  20 Feb 2021 09:54  --------------------------------------------------------  IN: 0 mL / OUT: 1200 mL / NET: -1200 mL        Allergies  aspirin (Other)  codeine (Other)  contrast media (iodine-based) (Other)  penicillin (Other)  sulfa drugs (Hives; Other)      Lab Results:                                   10.3   11.42 )-----------( 290      ( 19 Feb 2021 16:00 )             32.5       02-19    137  |  98  |  28<H>  ----------------------------<  92  4.0   |  27  |  1.1    Ca    8.5      19 Feb 2021 16:00  Phos  2.8     02-19  Mg     1.7     02-19      MEDICATIONS  (STANDING):  apixaban 5 milliGRAM(s) Oral every 12 hours  artificial tears (preservative free) Ophthalmic Solution 1 Drop(s) Right EYE every 2 hours  cefTRIAXone   IVPB 2000 milliGRAM(s) IV Intermittent every 24 hours  diltiazem    milliGRAM(s) Oral daily  erythromycin   Ointment 1 Application(s) Right EYE two times a day  furosemide    Tablet 20 milliGRAM(s) Oral daily  lidocaine   Patch 1 Patch Transdermal every 24 hours  magnesium sulfate  IVPB 2 Gram(s) IV Intermittent once  metroNIDAZOLE  IVPB 500 milliGRAM(s) IV Intermittent every 8 hours  nystatin Cream 1 Application(s) Topical two times a day  ofloxacin 0.3% Solution 1 Drop(s) Right EYE two times a day  pantoprazole    Tablet 40 milliGRAM(s) Oral before breakfast  senna 2 Tablet(s) Oral at bedtime    MEDICATIONS  (PRN):  acetaminophen   Tablet .. 650 milliGRAM(s) Oral every 4 hours PRN Temp greater or equal to 38C (100.4F)  acetaminophen   Tablet .. 650 milliGRAM(s) Oral every 6 hours PRN Mild Pain (1 - 3)  ALBUTerol    90 MICROgram(s) HFA Inhaler 2 Puff(s) Inhalation every 6 hours PRN Shortness of Breath      PHYSICAL EXAM:  General: sitting in chair, not in distress, cooperative   Neuro: awake, a&ox3, speaking in full sentences  Resp: Breathing comfortably on room air, equal chest rise and fall bilaterally  Wound: *dressing in place, from prior*   Left buttock with partial closure sites dry; skin graft looks pale and mostly non adherent ; most staples in place, no bleeding, no pus. Dressing with Allevyn applied.     Donor site to left thigh with duoderm in place  Nursing okay to do dressing change after pt moves to bed

## 2021-02-21 LAB
ANION GAP SERPL CALC-SCNC: 12 MMOL/L — SIGNIFICANT CHANGE UP (ref 7–14)
BUN SERPL-MCNC: 18 MG/DL — SIGNIFICANT CHANGE UP (ref 10–20)
CALCIUM SERPL-MCNC: 9.8 MG/DL — SIGNIFICANT CHANGE UP (ref 8.5–10.1)
CHLORIDE SERPL-SCNC: 95 MMOL/L — LOW (ref 98–110)
CO2 SERPL-SCNC: 31 MMOL/L — SIGNIFICANT CHANGE UP (ref 17–32)
CREAT SERPL-MCNC: 1.1 MG/DL — SIGNIFICANT CHANGE UP (ref 0.7–1.5)
GLUCOSE SERPL-MCNC: 132 MG/DL — HIGH (ref 70–99)
HCT VFR BLD CALC: 39.7 % — SIGNIFICANT CHANGE UP (ref 37–47)
HGB BLD-MCNC: 11.9 G/DL — LOW (ref 12–16)
MAGNESIUM SERPL-MCNC: 1.7 MG/DL — LOW (ref 1.8–2.4)
MCHC RBC-ENTMCNC: 24.4 PG — LOW (ref 27–31)
MCHC RBC-ENTMCNC: 30 G/DL — LOW (ref 32–37)
MCV RBC AUTO: 81.5 FL — SIGNIFICANT CHANGE UP (ref 81–99)
NRBC # BLD: 0 /100 WBCS — SIGNIFICANT CHANGE UP (ref 0–0)
PHOSPHATE SERPL-MCNC: 3.4 MG/DL — SIGNIFICANT CHANGE UP (ref 2.1–4.9)
PLATELET # BLD AUTO: 441 K/UL — HIGH (ref 130–400)
POTASSIUM SERPL-MCNC: 3.5 MMOL/L — SIGNIFICANT CHANGE UP (ref 3.5–5)
POTASSIUM SERPL-SCNC: 3.5 MMOL/L — SIGNIFICANT CHANGE UP (ref 3.5–5)
RBC # BLD: 4.87 M/UL — SIGNIFICANT CHANGE UP (ref 4.2–5.4)
RBC # FLD: 17.1 % — HIGH (ref 11.5–14.5)
SODIUM SERPL-SCNC: 138 MMOL/L — SIGNIFICANT CHANGE UP (ref 135–146)
WBC # BLD: 15 K/UL — HIGH (ref 4.8–10.8)
WBC # FLD AUTO: 15 K/UL — HIGH (ref 4.8–10.8)

## 2021-02-21 PROCEDURE — 99231 SBSQ HOSP IP/OBS SF/LOW 25: CPT

## 2021-02-21 RX ORDER — POTASSIUM CHLORIDE 20 MEQ
40 PACKET (EA) ORAL ONCE
Refills: 0 | Status: COMPLETED | OUTPATIENT
Start: 2021-02-21 | End: 2021-02-21

## 2021-02-21 RX ORDER — SACCHAROMYCES BOULARDII 250 MG
250 POWDER IN PACKET (EA) ORAL
Refills: 0 | Status: DISCONTINUED | OUTPATIENT
Start: 2021-02-21 | End: 2021-02-23

## 2021-02-21 RX ORDER — MAGNESIUM SULFATE 500 MG/ML
2 VIAL (ML) INJECTION ONCE
Refills: 0 | Status: COMPLETED | OUTPATIENT
Start: 2021-02-21 | End: 2021-02-21

## 2021-02-21 RX ADMIN — Medication 1 DROP(S): at 05:58

## 2021-02-21 RX ADMIN — LIDOCAINE 1 PATCH: 4 CREAM TOPICAL at 22:19

## 2021-02-21 RX ADMIN — Medication 1 DROP(S): at 09:46

## 2021-02-21 RX ADMIN — Medication 1 DROP(S): at 12:48

## 2021-02-21 RX ADMIN — Medication 1 DROP(S): at 06:31

## 2021-02-21 RX ADMIN — NYSTATIN CREAM 1 APPLICATION(S): 100000 CREAM TOPICAL at 17:31

## 2021-02-21 RX ADMIN — Medication 1 DROP(S): at 22:21

## 2021-02-21 RX ADMIN — NYSTATIN CREAM 1 APPLICATION(S): 100000 CREAM TOPICAL at 05:57

## 2021-02-21 RX ADMIN — Medication 1 DROP(S): at 11:31

## 2021-02-21 RX ADMIN — Medication 1 DROP(S): at 20:20

## 2021-02-21 RX ADMIN — PANTOPRAZOLE SODIUM 40 MILLIGRAM(S): 20 TABLET, DELAYED RELEASE ORAL at 05:56

## 2021-02-21 RX ADMIN — Medication 1 DROP(S): at 17:38

## 2021-02-21 RX ADMIN — Medication 100 MILLIGRAM(S): at 22:18

## 2021-02-21 RX ADMIN — Medication 650 MILLIGRAM(S): at 09:44

## 2021-02-21 RX ADMIN — Medication 250 MILLIGRAM(S): at 17:37

## 2021-02-21 RX ADMIN — LIDOCAINE 1 PATCH: 4 CREAM TOPICAL at 08:12

## 2021-02-21 RX ADMIN — Medication 180 MILLIGRAM(S): at 05:56

## 2021-02-21 RX ADMIN — Medication 100 MILLIGRAM(S): at 12:48

## 2021-02-21 RX ADMIN — Medication 20 MILLIGRAM(S): at 05:56

## 2021-02-21 RX ADMIN — LIDOCAINE 1 PATCH: 4 CREAM TOPICAL at 09:15

## 2021-02-21 RX ADMIN — Medication 1 DROP(S): at 14:12

## 2021-02-21 RX ADMIN — Medication 100 MILLIGRAM(S): at 05:57

## 2021-02-21 RX ADMIN — CEFTRIAXONE 100 MILLIGRAM(S): 500 INJECTION, POWDER, FOR SOLUTION INTRAMUSCULAR; INTRAVENOUS at 17:37

## 2021-02-21 RX ADMIN — APIXABAN 5 MILLIGRAM(S): 2.5 TABLET, FILM COATED ORAL at 05:56

## 2021-02-21 RX ADMIN — Medication 40 MILLIEQUIVALENT(S): at 22:19

## 2021-02-21 RX ADMIN — Medication 50 GRAM(S): at 22:48

## 2021-02-21 NOTE — PROGRESS NOTE ADULT - ASSESSMENT
84y Female with PMH of morbid obesity, anxiety, COPD, PVD, OA, atrial fibrillation (on eliquis), HTN, s/p excision and closure of left buttock wound.      1. Left Buttock Wound , partial closure with staple and skin graft  - Loss of skin graft  - Dressing changes with silvercel/allevyn twice a day  - WCx 2/8 #1: mod bacteroides, few corynebacter, rare alpha hemolytic strep, WCx #2 2/8 NGTD, Bcx 2/10 NGTD  - MRI of pelvis- no acute findings, post debridement changes  - as per ID recommendations  c/w Rocephin 2 gm iv q24h, Flagyl 500mg q 8,  - plan to discharge home on IV rocephin and PO flagyl for 2 weeks, f/u with ID outpt  - pain management  - Eliquis held 2/21 pm for midline or PICC placement 2/22/2021    2. Ophthalmology:   - seen by ophtho for Right upper lid retraction with corneal punctate staining 2/2 long standing exposure   - Recommend order acetylcholine receptor antibodies, results within normal limits  - Continue ofloxacin bid OD, artificial tears q2h OD and erythromycin ointment bid OD  - Follow-up as outpatient for dilated fundus examination     3.  Hx HTN  - monitor vitals  - cont w/ Diltiazem   - cont home dose Lasix     4. Hx A fib  - cont Diltiazem 180 daily  - cont w/ Eliquis 2.5mg bid    5. hx COPD  - stable  - cont w/ inhaler     6. GI  -Complaining of diarrheas, senna was discontinued  -Monitor      -DVT ppx ->pt on Eliquis  -GI ppx  - PT/OT   Discharge planning with [facility] and on IV abx.  Plan discussed with pt's daughter Miranda.  84y Female with PMH of morbid obesity, anxiety, COPD, PVD, OA, atrial fibrillation (on eliquis), HTN, s/p excision and closure of left buttock wound.      1. Left Buttock Wound , partial closure with staple and skin graft  - Loss of skin graft  - Dressing changes with silvercel/allevyn twice a day  - WCx 2/8 #1: mod bacteroides, few corynebacter, rare alpha hemolytic strep, WCx #2 2/8 NGTD, Bcx 2/10 NGTD  - MRI of pelvis- no acute findings, post debridement changes  - as per ID recommendations  c/w Rocephin 2 gm iv q24h, Flagyl 500mg q 8,  - plan to discharge home on IV rocephin and PO flagyl for 2 weeks, f/u with ID outpt  - pain management  - Eliquis held 2/21 pm for midline or PICC placement 2/22/2021    2. Ophthalmology:   - seen by ophtho for Right upper lid retraction with corneal punctate staining 2/2 long standing exposure   - Recommend order acetylcholine receptor antibodies, results within normal limits  - Continue ofloxacin bid OD, artificial tears q2h OD and erythromycin ointment bid OD  - Follow-up as outpatient for dilated fundus examination     3.  Hx HTN  - monitor vitals  - cont w/ Diltiazem   - cont home dose Lasix     4. Hx A fib  - cont Diltiazem 180 daily  - cont w/ Eliquis 2.5mg bid    5. hx COPD  - stable  - cont w/ inhaler     6. GI  -Complaining of diarrheas, senna was discontinued  -Monitor    7. Electrolytes  - hypomagnesemia corrected       -DVT ppx ->pt on Eliquis  -GI ppx  - PT/OT   Discharge planning with [facility] and on IV abx.  Plan discussed with pt's daughter Miranda.

## 2021-02-21 NOTE — PROGRESS NOTE ADULT - SUBJECTIVE AND OBJECTIVE BOX
Patient is a 85y old  Female who presents with a chief complaint of left buttock wound. Pt seen at bedside c/o frequent and loose bowel movements. No acute event overnight. POD #5, s/p debridement and skin graft of left buttock. Graft with poor take due to stool contamination.    2/8 s/p maday of L buttock  2/11 s/p Left buttock debridement and partial closure + LAWANDA + NPWT   2/16 maday + STSG + VAC      INTERVAL HPI/OVERNIGHT EVENTS:  ICU Vital Signs Last 24 Hrs  T(C): 36.1 (21 Feb 2021 12:00), Max: 36.6 (20 Feb 2021 21:00)  T(F): 97 (21 Feb 2021 12:00), Max: 97.9 (20 Feb 2021 21:00)  HR: 94 (21 Feb 2021 12:00) (94 - 103)  BP: 122/56 (21 Feb 2021 12:00) (114/53 - 153/73)  RR: 18 (21 Feb 2021 12:00) (18 - 19)      I&O's Summary    20 Feb 2021 07:01  -  21 Feb 2021 07:00  --------------------------------------------------------  IN: 360 mL / OUT: 1200 mL / NET: -840 mL    21 Feb 2021 07:01  -  21 Feb 2021 14:19  --------------------------------------------------------  IN: 0 mL / OUT: 1100 mL / NET: -1100 mL          LABS:                        11.8   12.26 )-----------( 340      ( 20 Feb 2021 16:00 )             38.9     02-20    138  |  97<L>  |  23<H>  ----------------------------<  129<H>  4.4   |  28  |  1.1    Ca    9.1      20 Feb 2021 16:00  Phos  2.9     02-20  Mg     1.8     02-20      PT/INR - ( 20 Feb 2021 16:00 )   PT: 16.70 sec;   INR: 1.45 ratio         PTT - ( 20 Feb 2021 16:00 )  PTT:35.5 sec    Left buttock wound cultures 2/8  Gram Stain:   Rare polymorphonuclear leukocytes per low power field  Few Gram Negative Rods per oil power field  Rare Gram Variable Cocci per oil power field (02.08.21 @ 15:00)      Culture Results:   Moderate Bacteroides thetaiotamcron group "Susceptibilities not performed"  Few Corynebacterium species "Susceptibilities not performed"  Rare Alpha hemolytic strep "Susceptibilities not performed" (02.08.21 @ 15:00)      MEDICATIONS  (STANDING):  artificial tears (preservative free) Ophthalmic Solution 1 Drop(s) Right EYE every 2 hours  cefTRIAXone   IVPB 2000 milliGRAM(s) IV Intermittent every 24 hours  diltiazem    milliGRAM(s) Oral daily  erythromycin   Ointment 1 Application(s) Right EYE two times a day  furosemide    Tablet 20 milliGRAM(s) Oral daily  lidocaine   Patch 1 Patch Transdermal every 24 hours  magnesium sulfate  IVPB 2 Gram(s) IV Intermittent once  metroNIDAZOLE  IVPB 500 milliGRAM(s) IV Intermittent every 8 hours  nystatin Cream 1 Application(s) Topical two times a day  ofloxacin 0.3% Solution 1 Drop(s) Right EYE two times a day  pantoprazole    Tablet 40 milliGRAM(s) Oral before breakfast    MEDICATIONS  (PRN):  acetaminophen   Tablet .. 650 milliGRAM(s) Oral every 4 hours PRN Temp greater or equal to 38C (100.4F)  acetaminophen   Tablet .. 650 milliGRAM(s) Oral every 6 hours PRN Mild Pain (1 - 3)  ALBUTerol    90 MICROgram(s) HFA Inhaler 2 Puff(s) Inhalation every 6 hours PRN Shortness of Breath      PHYSICAL EXAM:  General: seen at bedside, not in distress, cooperative   Neuro: A&Ox3, speaking in full sentences  Resp: Breathing comfortably on room air, equal chest rise and fall bilaterally  Wound: Left buttock with primarily closed areas, staples in place, skin graft looks is mostly gone now, staples form skin grafted site all removed. Stool noted in the open wound. Wound looks mostly pink with small area of yellow residue forming close to the rectum. Wound irrigated and dressing with silvercell/allevyn applied by burn team. Patient is a 85y old  Female who presents with a chief complaint of left buttock wound. Pt seen at bedside c/o frequent and loose bowel movements.   No acute event overnight.   POD #5, s/p debridement and skin graft of left buttock. Graft with poor take due to stool contamination/ and movement / shearing .    2/8 s/p maday of L buttock  2/11 s/p Left buttock debridement and partial closure + LAWANDA + NPWT   2/16 maday + STSG + VAC      INTERVAL HPI/OVERNIGHT EVENTS:  ICU Vital Signs Last 24 Hrs  T(C): 36.1 (21 Feb 2021 12:00), Max: 36.6 (20 Feb 2021 21:00)  T(F): 97 (21 Feb 2021 12:00), Max: 97.9 (20 Feb 2021 21:00)  HR: 94 (21 Feb 2021 12:00) (94 - 103)  BP: 122/56 (21 Feb 2021 12:00) (114/53 - 153/73)  RR: 18 (21 Feb 2021 12:00) (18 - 19)      I&O's Summary    20 Feb 2021 07:01  -  21 Feb 2021 07:00  --------------------------------------------------------  IN: 360 mL / OUT: 1200 mL / NET: -840 mL    21 Feb 2021 07:01  -  21 Feb 2021 14:19  --------------------------------------------------------  IN: 0 mL / OUT: 1100 mL / NET: -1100 mL          LABS:                        11.8   12.26 )-----------( 340      ( 20 Feb 2021 16:00 )             38.9     02-20    138  |  97<L>  |  23<H>  ----------------------------<  129<H>  4.4   |  28  |  1.1    Ca    9.1      20 Feb 2021 16:00  Phos  2.9     02-20  Mg     1.8     02-20      PT/INR - ( 20 Feb 2021 16:00 )   PT: 16.70 sec;   INR: 1.45 ratio  /PTT - ( 20 Feb 2021 16:00 )  PTT:35.5 sec    Left buttock wound cultures 2/8  Gram Stain:   Rare polymorphonuclear leukocytes per low power field  Few Gram Negative Rods per oil power field  Rare Gram Variable Cocci per oil power field (02.08.21 @ 15:00)      Culture Results:   Moderate Bacteroides thetaiotamcron group "Susceptibilities not performed"  Few Corynebacterium species "Susceptibilities not performed"  Rare Alpha hemolytic strep "Susceptibilities not performed" (02.08.21 @ 15:00)      MEDICATIONS  (STANDING):  artificial tears (preservative free) Ophthalmic Solution 1 Drop(s) Right EYE every 2 hours  cefTRIAXone   IVPB 2000 milliGRAM(s) IV Intermittent every 24 hours  diltiazem    milliGRAM(s) Oral daily  erythromycin   Ointment 1 Application(s) Right EYE two times a day  furosemide    Tablet 20 milliGRAM(s) Oral daily  lidocaine   Patch 1 Patch Transdermal every 24 hours  magnesium sulfate  IVPB 2 Gram(s) IV Intermittent once  metroNIDAZOLE  IVPB 500 milliGRAM(s) IV Intermittent every 8 hours  nystatin Cream 1 Application(s) Topical two times a day  ofloxacin 0.3% Solution 1 Drop(s) Right EYE two times a day  pantoprazole    Tablet 40 milliGRAM(s) Oral before breakfast    MEDICATIONS  (PRN):  acetaminophen   Tablet .. 650 milliGRAM(s) Oral every 4 hours PRN Temp greater or equal to 38C (100.4F)  acetaminophen   Tablet .. 650 milliGRAM(s) Oral every 6 hours PRN Mild Pain (1 - 3)  ALBUTerol    90 MICROgram(s) HFA Inhaler 2 Puff(s) Inhalation every 6 hours PRN Shortness of Breath      PHYSICAL EXAM:  General: seen at bedside, not in distress, cooperative   Neuro: A&Ox3, speaking in full sentences  Resp: Breathing comfortably on room air, equal chest rise and fall bilaterally  Wound: Left buttock with primarily closed areas- dry and healing    staples in place, staples form skin grafted site all removed. Stool noted in the open wound. Wound looks mostly pink with small area of yellow residue forming close to the rectum.   Wound irrigated and dressing with silvercel/allevyn applied by burn team.

## 2021-02-22 LAB
ANION GAP SERPL CALC-SCNC: 10 MMOL/L — SIGNIFICANT CHANGE UP (ref 7–14)
BUN SERPL-MCNC: 16 MG/DL — SIGNIFICANT CHANGE UP (ref 10–20)
CALCIUM SERPL-MCNC: 9 MG/DL — SIGNIFICANT CHANGE UP (ref 8.5–10.1)
CHLORIDE SERPL-SCNC: 99 MMOL/L — SIGNIFICANT CHANGE UP (ref 98–110)
CO2 SERPL-SCNC: 31 MMOL/L — SIGNIFICANT CHANGE UP (ref 17–32)
CREAT SERPL-MCNC: 1.1 MG/DL — SIGNIFICANT CHANGE UP (ref 0.7–1.5)
GLUCOSE SERPL-MCNC: 100 MG/DL — HIGH (ref 70–99)
HCT VFR BLD CALC: 34.8 % — LOW (ref 37–47)
HGB BLD-MCNC: 10.6 G/DL — LOW (ref 12–16)
MAGNESIUM SERPL-MCNC: 2 MG/DL — SIGNIFICANT CHANGE UP (ref 1.8–2.4)
MCHC RBC-ENTMCNC: 24.4 PG — LOW (ref 27–31)
MCHC RBC-ENTMCNC: 30.5 G/DL — LOW (ref 32–37)
MCV RBC AUTO: 80.2 FL — LOW (ref 81–99)
NRBC # BLD: 0 /100 WBCS — SIGNIFICANT CHANGE UP (ref 0–0)
PHOSPHATE SERPL-MCNC: 3.6 MG/DL — SIGNIFICANT CHANGE UP (ref 2.1–4.9)
PLATELET # BLD AUTO: 370 K/UL — SIGNIFICANT CHANGE UP (ref 130–400)
POTASSIUM SERPL-MCNC: 4.1 MMOL/L — SIGNIFICANT CHANGE UP (ref 3.5–5)
POTASSIUM SERPL-SCNC: 4.1 MMOL/L — SIGNIFICANT CHANGE UP (ref 3.5–5)
RBC # BLD: 4.34 M/UL — SIGNIFICANT CHANGE UP (ref 4.2–5.4)
RBC # FLD: 17 % — HIGH (ref 11.5–14.5)
SODIUM SERPL-SCNC: 140 MMOL/L — SIGNIFICANT CHANGE UP (ref 135–146)
URATE SERPL-MCNC: 8.1 MG/DL — HIGH (ref 2.5–7)
WBC # BLD: 14.38 K/UL — HIGH (ref 4.8–10.8)
WBC # FLD AUTO: 14.38 K/UL — HIGH (ref 4.8–10.8)

## 2021-02-22 PROCEDURE — 76937 US GUIDE VASCULAR ACCESS: CPT | Mod: 26

## 2021-02-22 PROCEDURE — 36410 VNPNXR 3YR/> PHY/QHP DX/THER: CPT

## 2021-02-22 RX ORDER — APIXABAN 2.5 MG/1
5 TABLET, FILM COATED ORAL EVERY 12 HOURS
Refills: 0 | Status: DISCONTINUED | OUTPATIENT
Start: 2021-02-22 | End: 2021-02-23

## 2021-02-22 RX ADMIN — Medication 1 DROP(S): at 21:00

## 2021-02-22 RX ADMIN — LIDOCAINE 1 PATCH: 4 CREAM TOPICAL at 23:05

## 2021-02-22 RX ADMIN — NYSTATIN CREAM 1 APPLICATION(S): 100000 CREAM TOPICAL at 17:01

## 2021-02-22 RX ADMIN — Medication 1 DROP(S): at 09:41

## 2021-02-22 RX ADMIN — Medication 20 MILLIGRAM(S): at 04:59

## 2021-02-22 RX ADMIN — Medication 1 DROP(S): at 04:58

## 2021-02-22 RX ADMIN — Medication 1 DROP(S): at 11:47

## 2021-02-22 RX ADMIN — Medication 1 DROP(S): at 16:05

## 2021-02-22 RX ADMIN — Medication 40 MILLIGRAM(S): at 23:04

## 2021-02-22 RX ADMIN — Medication 1 DROP(S): at 17:01

## 2021-02-22 RX ADMIN — Medication 1 DROP(S): at 22:00

## 2021-02-22 RX ADMIN — CEFTRIAXONE 100 MILLIGRAM(S): 500 INJECTION, POWDER, FOR SOLUTION INTRAMUSCULAR; INTRAVENOUS at 17:00

## 2021-02-22 RX ADMIN — Medication 100 MILLIGRAM(S): at 13:00

## 2021-02-22 RX ADMIN — Medication 100 MILLIGRAM(S): at 04:58

## 2021-02-22 RX ADMIN — Medication 1 DROP(S): at 04:59

## 2021-02-22 RX ADMIN — Medication 180 MILLIGRAM(S): at 04:59

## 2021-02-22 RX ADMIN — LIDOCAINE 1 PATCH: 4 CREAM TOPICAL at 07:56

## 2021-02-22 RX ADMIN — Medication 250 MILLIGRAM(S): at 04:58

## 2021-02-22 RX ADMIN — PANTOPRAZOLE SODIUM 40 MILLIGRAM(S): 20 TABLET, DELAYED RELEASE ORAL at 05:02

## 2021-02-22 RX ADMIN — LIDOCAINE 1 PATCH: 4 CREAM TOPICAL at 09:42

## 2021-02-22 RX ADMIN — Medication 1 DROP(S): at 08:11

## 2021-02-22 RX ADMIN — Medication 250 MILLIGRAM(S): at 17:00

## 2021-02-22 RX ADMIN — Medication 1 DROP(S): at 13:00

## 2021-02-22 RX ADMIN — Medication 100 MILLIGRAM(S): at 23:04

## 2021-02-22 RX ADMIN — APIXABAN 5 MILLIGRAM(S): 2.5 TABLET, FILM COATED ORAL at 17:01

## 2021-02-22 RX ADMIN — NYSTATIN CREAM 1 APPLICATION(S): 100000 CREAM TOPICAL at 05:00

## 2021-02-22 NOTE — PROGRESS NOTE ADULT - SUBJECTIVE AND OBJECTIVE BOX
GIOVANNY BUCK  85y, Female    All available historical data reviewed    OVERNIGHT EVENTS:  no fevers  buttock pain    ROS:  General: Denies rigors, nightsweats  HEENT: Denies headache, rhinorrhea, sore throat, eye pain  CV: Denies CP, palpitations  PULM: Denies wheezing, hemoptysis  GI: Denies hematemesis, hematochezia, melena  : Denies discharge, hematuria  MSK: Denies arthralgias, myalgias  SKIN: Denies rash, lesions  NEURO: Denies paresthesias, weakness  PSYCH: Denies depression, anxiety    VITALS:  T(F): 97.7, Max: 99.8 (02-21-21 @ 20:45)  HR: 98  BP: 118/64  RR: 18Vital Signs Last 24 Hrs  T(C): 36.5 (22 Feb 2021 04:43), Max: 37.7 (21 Feb 2021 20:45)  T(F): 97.7 (22 Feb 2021 04:43), Max: 99.8 (21 Feb 2021 20:45)  HR: 98 (22 Feb 2021 04:43) (94 - 101)  BP: 118/64 (22 Feb 2021 04:43) (110/66 - 122/56)  BP(mean): --  RR: 18 (22 Feb 2021 04:43) (18 - 18)  SpO2: --    TESTS & MEASUREMENTS:                        11.9   15.00 )-----------( 441      ( 21 Feb 2021 17:48 )             39.7     02-21    138  |  95<L>  |  18  ----------------------------<  132<H>  3.5   |  31  |  1.1    Ca    9.8      21 Feb 2021 17:48  Phos  3.4     02-21  Mg     1.7     02-21                RADIOLOGY & ADDITIONAL TESTS:  Personal review of radiological diagnostics performed  Echo and EKG results noted when applicable.     MEDICATIONS:  acetaminophen   Tablet .. 650 milliGRAM(s) Oral every 4 hours PRN  acetaminophen   Tablet .. 650 milliGRAM(s) Oral every 6 hours PRN  ALBUTerol    90 MICROgram(s) HFA Inhaler 2 Puff(s) Inhalation every 6 hours PRN  artificial tears (preservative free) Ophthalmic Solution 1 Drop(s) Right EYE every 2 hours  cefTRIAXone   IVPB 2000 milliGRAM(s) IV Intermittent every 24 hours  diltiazem    milliGRAM(s) Oral daily  erythromycin   Ointment 1 Application(s) Right EYE two times a day  furosemide    Tablet 20 milliGRAM(s) Oral daily  lidocaine   Patch 1 Patch Transdermal every 24 hours  metroNIDAZOLE  IVPB 500 milliGRAM(s) IV Intermittent every 8 hours  nystatin Cream 1 Application(s) Topical two times a day  ofloxacin 0.3% Solution 1 Drop(s) Right EYE two times a day  pantoprazole    Tablet 40 milliGRAM(s) Oral before breakfast  saccharomyces boulardii 250 milliGRAM(s) Oral two times a day      ANTIBIOTICS:  cefTRIAXone   IVPB 2000 milliGRAM(s) IV Intermittent every 24 hours  metroNIDAZOLE  IVPB 500 milliGRAM(s) IV Intermittent every 8 hours

## 2021-02-22 NOTE — PROGRESS NOTE ADULT - SUBJECTIVE AND OBJECTIVE BOX
Patient is a 85y old  Female who presents with a chief complaint of left buttock wound. Pt seen at bedside c/o frequent and loose bowel movements.   No acute event overnight.   POD #6, s/p debridement and skin graft of left buttock. Graft with poor take due to stool contamination/ and movement / shearing .    2/8 s/p maday of L buttock  2/11 s/p Left buttock debridement and partial closure + LAWANDA + NPWT   2/16 maday + STSG + VAC      PAST MEDICAL & SURGICAL HISTORY:  Obesity  Anxiety  COPD (chronic obstructive pulmonary disease)  PVD (peripheral vascular disease)  OA (osteoarthritis)  Cellulitis  chronic  Goiter  no meds  Afib  s/p ablation 2001  HTN (hypertension)  H/O total knee replacement, bilateral  H/O discectomy  H/O abdominal hysterectomy      Allergies  aspirin (Other)  codeine (Other)  contrast media (iodine-based) (Other)  penicillin (Other)  sulfa drugs (Hives; Other)    Intolerances      FAMILY HISTORY:    Medications:  cefTRIAXone   IVPB 2000 milliGRAM(s) IV Intermittent every 24 hours  metroNIDAZOLE  IVPB 500 milliGRAM(s) IV Intermittent every 8 hours  diltiazem    milliGRAM(s) Oral daily  furosemide    Tablet 20 milliGRAM(s) Oral daily  ALBUTerol    90 MICROgram(s) HFA Inhaler 2 Puff(s) Inhalation every 6 hours PRN  acetaminophen   Tablet .. 650 milliGRAM(s) Oral every 4 hours PRN  acetaminophen   Tablet .. 650 milliGRAM(s) Oral every 6 hours PRN  apixaban 5 milliGRAM(s) Oral every 12 hours  pantoprazole    Tablet 40 milliGRAM(s) Oral before breakfast  artificial tears (preservative free) Ophthalmic Solution 1 Drop(s) Right EYE every 2 hours  erythromycin   Ointment 1 Application(s) Right EYE two times a day  lidocaine   Patch 1 Patch Transdermal every 24 hours  nystatin Cream 1 Application(s) Topical two times a day  ofloxacin 0.3% Solution 1 Drop(s) Right EYE two times a day  saccharomyces boulardii 250 milliGRAM(s) Oral two times a day    Vital Signs Last 24 Hrs  T(C): 36.5 (22 Feb 2021 04:43), Max: 37.7 (21 Feb 2021 20:45)  T(F): 97.7 (22 Feb 2021 04:43), Max: 99.8 (21 Feb 2021 20:45)  HR: 98 (22 Feb 2021 04:43) (98 - 101)  BP: 118/64 (22 Feb 2021 04:43) (110/66 - 118/64)  RR: 18 (22 Feb 2021 04:43) (18 - 18)      I&O's Detail    21 Feb 2021 07:01  -  22 Feb 2021 07:00  --------------------------------------------------------  IN:  Total IN: 0 mL    OUT:    Voided (mL): 1400 mL  Total OUT: 1400 mL    Total NET: -1400 mL      LABS:                        11.9   15.00 )-----------( 441      ( 21 Feb 2021 17:48 )             39.7     02-21    138  |  95<L>  |  18  ----------------------------<  132<H>  3.5   |  31  |  1.1    Ca    9.8      21 Feb 2021 17:48  Phos  3.4     02-21  Mg     1.7     02-21      CAPILLARY BLOOD GLUCOSE        PT/INR - ( 20 Feb 2021 16:00 )   PT: 16.70 sec;   INR: 1.45 ratio    PTT - ( 20 Feb 2021 16:00 )  PTT:35.5 sec    CULTURES:      Exam:  General: seen at bedside, not in distress, cooperative   Neuro: A&Ox3, speaking in full sentences  Resp: Breathing comfortably on room air, equal chest rise and fall bilaterally  Wound: Left buttock with primarily closed areas- dry and healing    staples in place. Wound looks mostly pink with small area of yellow residue forming close to the rectum.   Wound irrigated and dressing with silvercel/allevyn applied by burn team.

## 2021-02-22 NOTE — PROGRESS NOTE ADULT - ASSESSMENT
· Assessment	  85yFemale with a PMH of morbid obesity, anxiety, COPD, PVD, OA, atrial fibrillation (on eliquis), HTN, s/p excision of cystic mass of buttock on 08/2018 with Dr. Bean, presents with complaints of non healing left buttock wound. Patient has had wound to left Buttock since surgery in 2018. Patient lives alone and has been following up at outpatient Burn clinic with Dr. Saeed for wound care. Patient was seen by Dr. Saeed on Thursday 2/4 and was recommended to come in for surgical debridement of Left buttock and abcess drainage which was done on 2/8/21. When at home, Patient has visiting nurse that does wound care and has been treating her wound with lidocaine cream, antibiotic ointment and Abdominal Pads.  The patient currently denies any left buttock pain, but endorses fatigue and fever.     IMPRESSION  Left gluteus graft failedent   Tissue cx  : Rare polymorphonuclear leukocytes,  Bacteroides alpha hem strep, corynebacterium  Sx 2/11 : wound vac with partial closure  MRI 2/14 : no fistula, no underlying malignancy      RECOMMENDATIONS  rocephin 2 gm iv q24h  flagyl 500mg q 8  Further Sx planned

## 2021-02-22 NOTE — PROGRESS NOTE ADULT - ASSESSMENT
84y Female with PMH of morbid obesity, anxiety, COPD, PVD, OA, atrial fibrillation (on eliquis), HTN, s/p excision and closure of left buttock wound.      1. Left Buttock Wound , partial closure with staple and skin graft  - Loss of skin graft  - Dressing changes with silvercel/allevyn twice a day  - WCx 2/8 #1: mod bacteroides, few corynebacter, rare alpha hemolytic strep, WCx #2 2/8 NGTD, Bcx 2/10 NGTD  - MRI of pelvis- no acute findings, post debridement changes  - as per ID recommendations  c/w Rocephin 2 gm iv q24h, Flagyl 500mg q 8,  - plan to discharge home on IV rocephin and PO flagyl for 2 weeks, f/u with ID outpt  - pain management  - Eliquis held 2/21 pm for midline placement on 2/22/2021  - Eliquis 5mg was restarted after placement today    2. Ophthalmology:   - seen by ophtho for Right upper lid retraction with corneal punctate staining 2/2 long standing exposure   - Recommend order acetylcholine receptor antibodies, results within normal limits  - Continue ofloxacin bid OD, artificial tears q2h OD and erythromycin ointment bid OD  - Follow-up as outpatient for dilated fundus examination     3.  Hx HTN  - monitor vitals  - cont w/ Diltiazem   - cont home dose Lasix     4. Hx A fib  - cont Diltiazem 180 daily  - cont w/ Eliquis 2.5mg bid @ home    5. hx COPD  - stable  - cont w/ inhaler     6. GI  -Complaining of diarrheas, senna was discontinued  -Monitor    7. Electrolytes  - hypomagnesemia corrected       -DVT ppx ->pt on Eliquis- dose increased from 2.5mg to 5mg  -GI ppx  - PT/OT   Discharge planning with [facility] and on IV abx.  Plan discussed with pt's daughter Miranda.

## 2021-02-22 NOTE — PROGRESS NOTE ADULT - NEUROLOGICAL
Alert & oriented; no sensory, motor or coordination deficits, normal reflexes

## 2021-02-23 ENCOUNTER — TRANSCRIPTION ENCOUNTER (OUTPATIENT)
Age: 86
End: 2021-02-23

## 2021-02-23 VITALS
RESPIRATION RATE: 18 BRPM | SYSTOLIC BLOOD PRESSURE: 136 MMHG | DIASTOLIC BLOOD PRESSURE: 62 MMHG | HEART RATE: 99 BPM | TEMPERATURE: 98 F

## 2021-02-23 RX ORDER — ACETAMINOPHEN 500 MG
2 TABLET ORAL
Qty: 0 | Refills: 0 | DISCHARGE
Start: 2021-02-23

## 2021-02-23 RX ORDER — CEFTRIAXONE 500 MG/1
2 INJECTION, POWDER, FOR SOLUTION INTRAMUSCULAR; INTRAVENOUS
Qty: 28 | Refills: 0
Start: 2021-02-23 | End: 2021-03-08

## 2021-02-23 RX ORDER — METRONIDAZOLE 500 MG
1 TABLET ORAL
Qty: 42 | Refills: 0
Start: 2021-02-23 | End: 2021-03-08

## 2021-02-23 RX ORDER — CEFTRIAXONE 500 MG/1
2000 INJECTION, POWDER, FOR SOLUTION INTRAMUSCULAR; INTRAVENOUS ONCE
Refills: 0 | Status: COMPLETED | OUTPATIENT
Start: 2021-02-23 | End: 2021-02-23

## 2021-02-23 RX ADMIN — Medication 1 DROP(S): at 00:00

## 2021-02-23 RX ADMIN — Medication 250 MILLIGRAM(S): at 05:58

## 2021-02-23 RX ADMIN — Medication 100 MILLIGRAM(S): at 13:14

## 2021-02-23 RX ADMIN — Medication 1 APPLICATION(S): at 05:58

## 2021-02-23 RX ADMIN — Medication 180 MILLIGRAM(S): at 05:58

## 2021-02-23 RX ADMIN — PANTOPRAZOLE SODIUM 40 MILLIGRAM(S): 20 TABLET, DELAYED RELEASE ORAL at 05:58

## 2021-02-23 RX ADMIN — Medication 1 DROP(S): at 15:04

## 2021-02-23 RX ADMIN — NYSTATIN CREAM 1 APPLICATION(S): 100000 CREAM TOPICAL at 06:00

## 2021-02-23 RX ADMIN — Medication 1 DROP(S): at 11:54

## 2021-02-23 RX ADMIN — APIXABAN 5 MILLIGRAM(S): 2.5 TABLET, FILM COATED ORAL at 05:58

## 2021-02-23 RX ADMIN — Medication 1 DROP(S): at 05:59

## 2021-02-23 RX ADMIN — Medication 100 MILLIGRAM(S): at 05:57

## 2021-02-23 RX ADMIN — Medication 1 DROP(S): at 12:00

## 2021-02-23 RX ADMIN — LIDOCAINE 1 PATCH: 4 CREAM TOPICAL at 11:54

## 2021-02-23 RX ADMIN — Medication 1 DROP(S): at 13:08

## 2021-02-23 RX ADMIN — Medication 1 DROP(S): at 02:00

## 2021-02-23 RX ADMIN — Medication 20 MILLIGRAM(S): at 05:58

## 2021-02-23 RX ADMIN — CEFTRIAXONE 100 MILLIGRAM(S): 500 INJECTION, POWDER, FOR SOLUTION INTRAMUSCULAR; INTRAVENOUS at 12:00

## 2021-02-23 RX ADMIN — LIDOCAINE 1 PATCH: 4 CREAM TOPICAL at 06:00

## 2021-02-23 NOTE — PROVIDER CONTACT NOTE (OTHER) - SITUATION
as above/ Pt refusing eye ointment "It makes my eye goopy all day" Pt educated in rationale for med but pt still refuses
spoke with PA to inform that pt has been tachycardic mostly all day and current hr is 106 with a pulse ox of 93% on room air. Pt has no complaints at this time
pt  as temp of  100. before  blood transfusion - transfusion started   per  TERRA Ibanez's instruction
Patient ambulated from bed to bathroom to chair, Wound vac seal alarm sounded, Assessed wound vac taping, and reset alarm per burn, burn instructed that they will come to change dressing today
patient tachycardic
patient tachycardic at 108. Blood pressure is 142/71

## 2021-02-23 NOTE — DISCHARGE NOTE NURSING/CASE MANAGEMENT/SOCIAL WORK - NSDPACMPNY_GEN_ALL_CORE
Constitutional: denies fevers, chills, night sweats, weight loss  HEENT: Blurry vision, hearing changes, rhinitis, odynophagia, or dysphagia  Cardiovascular: denies palpitations, chest pain, edema  Respiratory: denies SOB, wheezing  Gastrointestinal: denies N/V/D, abdominal pain, hematochezia, melena  : denies dysuria, hematuria  MSK: denies weakness, joint pain  Skin: denies new rashes or masses daughter taking patient home/Family Constitutional: denies fevers, chills, night sweats, weight loss  HEENT: Blurry vision, hearing changes, rhinitis, odynophagia, or dysphagia  Cardiovascular: denies palpitations, chest pain, edema  Respiratory: denies SOB, wheezing  Gastrointestinal: denies N/V/D, abdominal pain, hematochezia, melena  : denies dysuria, hematuria  MSK: denies weakness, joint pain  Skin: denies new rashes or masses  Psych- denies depression  Hem-denies nose or gum bleeds

## 2021-02-23 NOTE — PROVIDER CONTACT NOTE (OTHER) - RECOMMENDATIONS
continue to monitor pt
EKG
TERRA Martin [phone] made aware.
Assessed wound vac seal and reset still did not work

## 2021-02-23 NOTE — DISCHARGE NOTE PROVIDER - CARE PROVIDER_API CALL
Seng Saeed)  Plastic Surgery  [address] [place] [address]  Phone: [phone]  Fax: [phone]  Follow Up Time:     Jw Solorzano)  Plastic Surgery  [address] [facility] [address]  [place] [address]  Phone: [phone]  Fax: [phone]  Follow Up Time:     Cyril Salgado  Infectious Disease  [address] [place] [address]  Phone: [phone]  Fax: [phone]  Follow Up Time:

## 2021-02-23 NOTE — PROGRESS NOTE ADULT - SUBJECTIVE AND OBJECTIVE BOX
Patient is a 85y old  Female who presents with a chief complaint of left buttock wound. Pt seen at bedside c/o frequent and loose bowel movements.   No acute event overnight.   POD #7, s/p debridement and skin graft of left buttock. Graft with poor take due to stool contamination/ and movement / shearing .    2/8 s/p maday of L buttock  2/11 s/p Left buttock debridement and partial closure + LAWANDA + NPWT   2/16 maday + STSG + VAC    Interval HPI:  - Pt had no BMs throughout day/overnight; will dc C. diff order      Vital Signs Last 24 Hrs  T(C): 36.4 (23 Feb 2021 05:06), Max: 37.9 (22 Feb 2021 20:45)  T(F): 97.6 (23 Feb 2021 05:06), Max: 100.2 (22 Feb 2021 20:45)  HR: 99 (23 Feb 2021 05:06) (97 - 108)  BP: 136/62 (23 Feb 2021 05:06) (116/58 - 142/71)  RR: 18 (23 Feb 2021 05:06) (18 - 18)      I&O's Summary    22 Feb 2021 07:01  -  23 Feb 2021 07:00  --------------------------------------------------------  IN: 250 mL / OUT: 1200 mL / NET: -950 mL    23 Feb 2021 07:01  -  23 Feb 2021 12:16  --------------------------------------------------------  IN: 50 mL / OUT: 0 mL / NET: 50 mL        LABS:                                   10.6   14.38 )-----------( 370      ( 22 Feb 2021 17:08 )             34.8       02-22    140  |  99  |  16  ----------------------------<  100<H>  4.1   |  31  |  1.1    Ca    9.0      22 Feb 2021 17:08  Phos  3.6     02-22  Mg     2.0     02-22      CULTURES:  Culture Results:   Moderate Bacteroides thetaiotamcron group "Susceptibilities not performed"  Few Corynebacterium species "Susceptibilities not performed"  Rare Alpha hemolytic strep "Susceptibilities not performed" (02.08.21 @ 15:00)        MEDICATIONS  (STANDING):  apixaban 5 milliGRAM(s) Oral every 12 hours  artificial tears (preservative free) Ophthalmic Solution 1 Drop(s) Right EYE every 2 hours  diltiazem    milliGRAM(s) Oral daily  erythromycin   Ointment 1 Application(s) Right EYE two times a day  furosemide    Tablet 20 milliGRAM(s) Oral daily  lidocaine   Patch 1 Patch Transdermal every 24 hours  metroNIDAZOLE  IVPB 500 milliGRAM(s) IV Intermittent every 8 hours  nystatin Cream 1 Application(s) Topical two times a day  ofloxacin 0.3% Solution 1 Drop(s) Right EYE two times a day  pantoprazole    Tablet 40 milliGRAM(s) Oral before breakfast  predniSONE   Tablet 40 milliGRAM(s) Oral daily  saccharomyces boulardii 250 milliGRAM(s) Oral two times a day    MEDICATIONS  (PRN):  acetaminophen   Tablet .. 650 milliGRAM(s) Oral every 4 hours PRN Temp greater or equal to 38C (100.4F)  acetaminophen   Tablet .. 650 milliGRAM(s) Oral every 6 hours PRN Mild Pain (1 - 3)  ALBUTerol    90 MICROgram(s) HFA Inhaler 2 Puff(s) Inhalation every 6 hours PRN Shortness of Breath    Exam:  General: NAD, pt lying in bed comfortablly  Neuro: A&Ox3, speaking in full sentences  Resp: Breathing comfortably on room air, equal chest rise and fall bilaterally  Wound: Left buttock with primarily closed areas- dry and healing    staples in place. Wound looks mostly pink with scattered areas of yellow film over wound   Wound irrigated and dressing with silvercel/allevyn applied by burn team.

## 2021-02-23 NOTE — DISCHARGE NOTE PROVIDER - NSDCMRMEDTOKEN_GEN_ALL_CORE_FT
Align 4 mg oral capsule: 1 cap(s) orally once a day  clindamycin 300 mg oral capsule: 1 cap(s) orally every 8 hours  dilTIAZem 180 mg/24 hours oral capsule, extended release: 1 cap(s) orally once a day  Eliquis 2.5 mg oral tablet: 1 tab(s) orally 2 times a day  furosemide 20 mg oral tablet: 1 tab(s) orally once a day  ProAir HFA 90 mcg/inh inhalation aerosol: 2 puff(s) inhaled 2 times a day  Vitamin D3 5000 intl units oral capsule: 1 cap(s) orally once a day   acetaminophen 325 mg oral tablet: 2 tab(s) orally every 6 hours, As needed, Mild Pain (1 - 3)  Align 4 mg oral capsule: 1 cap(s) orally once a day  cefTRIAXone: 2 gram(s) intravenous once a day   dilTIAZem 180 mg/24 hours oral capsule, extended release: 1 cap(s) orally once a day  Eliquis 2.5 mg oral tablet: 1 tab(s) orally 2 times a day  furosemide 20 mg oral tablet: 1 tab(s) orally once a day  metroNIDAZOLE 500 mg oral tablet: 1 tab(s) orally every 8 hours   predniSONE 20 mg oral tablet: 2 tab(s) orally once a day  ProAir HFA 90 mcg/inh inhalation aerosol: 2 puff(s) inhaled 2 times a day  Vitamin D3 5000 intl units oral capsule: 1 cap(s) orally once a day

## 2021-02-23 NOTE — DISCHARGE NOTE PROVIDER - NSDCFUADDAPPT_GEN_ALL_CORE_FT
Please call [phone] to make a follow up appointment within 1 week with Dr. Saeed or Dr. Solorzano. Clinic is located at [address] [place] [address] on Tuesdays (2-4pm) or Thursdays (9am-1pm).

## 2021-02-23 NOTE — DISCHARGE NOTE NURSING/CASE MANAGEMENT/SOCIAL WORK - NSDCFUADDAPPT_GEN_ALL_CORE_FT
Please call [phone] to make a follow up appointment within 1 week with Dr. Saeed or Dr. Solorzano. Clinic is located at [address] [place] [address] on Tuesdays (2-4pm) or Thursdays (9am-1pm).

## 2021-02-23 NOTE — PROVIDER CONTACT NOTE (OTHER) - DATE AND TIME:
11-Feb-2021 01:34
13-Feb-2021 08:03
15-Feb-2021 15:12
21-Feb-2021 06:00
23-Feb-2021 01:30
09-Feb-2021 16:17

## 2021-02-23 NOTE — DISCHARGE NOTE PROVIDER - NSFOLLOWUPCLINICS_GEN_ALL_ED_FT
[facility]  Burn  [address], [address]  [place] [address]  Phone: [phone]  Fax:   Follow Up Time: 1 week    [facility] Infectious Disease Clinic  Infectious Disease  [address] [place] [id number]  Phone: [phone]  Fax:   Follow Up Time: 1 week     [facility]  Burn  [address], [address]  [place] [address]  Phone: [phone]  Fax:   Follow Up Time: 1 week    [facility] Infectious Disease Clinic  Infectious Disease  [address] [place] [address]  Phone: [phone]  Fax:   Follow Up Time: 1 week    [facility] Ophthalmolgy Clinic  Ophthalmolgy  [address], [address]  [place] [address]  Phone: [phone]  Fax:   Follow Up Time: 1 week
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
## 2021-02-23 NOTE — DISCHARGE NOTE NURSING/CASE MANAGEMENT/SOCIAL WORK - PATIENT PORTAL LINK FT
You can access the FollowMyHealth Patient Portal offered by [facility] by registering at the following website: [email or web address]. By joining [facility]’s FollowMyHealth portal, you will also be able to view your health information using other applications (apps) compatible with our system.

## 2021-02-23 NOTE — PROGRESS NOTE ADULT - REASON FOR ADMISSION
Left Buttock Wound.
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
## 2021-02-23 NOTE — PROVIDER CONTACT NOTE (OTHER) - REASON
Pt refusing Erthromycin eye ointment
Tachycardia MEWS 7
Wound vac Seal alarm
patient tachycardic
pt having elevated temp before blood transfusion
tachycardia

## 2021-02-23 NOTE — DISCHARGE NOTE PROVIDER - PROVIDER TOKENS
PROVIDER:[TOKEN:[[id number]]],PROVIDER:[TOKEN:[[id number]]],PROVIDER:[TOKEN:[[id number]]]
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
## 2021-02-24 LAB — ACHR MOD AB SER-ACNC: <12 % — SIGNIFICANT CHANGE UP (ref 0–20)

## 2021-03-02 ENCOUNTER — OUTPATIENT (OUTPATIENT)
Dept: OUTPATIENT SERVICES | Facility: HOSPITAL | Age: 86
LOS: 1 days | Discharge: HOME | End: 2021-03-02

## 2021-03-02 ENCOUNTER — APPOINTMENT (OUTPATIENT)
Dept: BURN CARE | Facility: CLINIC | Age: 86
End: 2021-03-02
Payer: MEDICARE

## 2021-03-02 DIAGNOSIS — Z90.710 ACQUIRED ABSENCE OF BOTH CERVIX AND UTERUS: Chronic | ICD-10-CM

## 2021-03-02 DIAGNOSIS — Z98.890 OTHER SPECIFIED POSTPROCEDURAL STATES: Chronic | ICD-10-CM

## 2021-03-02 DIAGNOSIS — Z96.653 PRESENCE OF ARTIFICIAL KNEE JOINT, BILATERAL: Chronic | ICD-10-CM

## 2021-03-02 PROCEDURE — 99213 OFFICE O/P EST LOW 20 MIN: CPT | Mod: 25

## 2021-03-02 PROCEDURE — 97597 DBRDMT OPN WND 1ST 20 CM/<: CPT
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
## 2021-03-04 LAB — BACTERIA SPEC CULT: ABNORMAL

## 2021-03-09 ENCOUNTER — OUTPATIENT (OUTPATIENT)
Dept: OUTPATIENT SERVICES | Facility: HOSPITAL | Age: 86
LOS: 1 days | Discharge: HOME | End: 2021-03-09

## 2021-03-09 ENCOUNTER — APPOINTMENT (OUTPATIENT)
Dept: BURN CARE | Facility: CLINIC | Age: 86
End: 2021-03-09
Payer: MEDICARE

## 2021-03-09 DIAGNOSIS — Z90.710 ACQUIRED ABSENCE OF BOTH CERVIX AND UTERUS: Chronic | ICD-10-CM

## 2021-03-09 DIAGNOSIS — Z96.653 PRESENCE OF ARTIFICIAL KNEE JOINT, BILATERAL: Chronic | ICD-10-CM

## 2021-03-09 DIAGNOSIS — Z98.890 OTHER SPECIFIED POSTPROCEDURAL STATES: Chronic | ICD-10-CM

## 2021-03-09 PROCEDURE — 99213 OFFICE O/P EST LOW 20 MIN: CPT | Mod: 25

## 2021-03-09 PROCEDURE — 97597 DBRDMT OPN WND 1ST 20 CM/<: CPT
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
## 2021-03-15 DIAGNOSIS — S31.829A UNSPECIFIED OPEN WOUND OF LEFT BUTTOCK, INITIAL ENCOUNTER: ICD-10-CM
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
## 2021-03-23 ENCOUNTER — APPOINTMENT (OUTPATIENT)
Dept: BURN CARE | Facility: CLINIC | Age: 86
End: 2021-03-23
Payer: MEDICARE

## 2021-03-23 ENCOUNTER — OUTPATIENT (OUTPATIENT)
Dept: OUTPATIENT SERVICES | Facility: HOSPITAL | Age: 86
LOS: 1 days | Discharge: HOME | End: 2021-03-23

## 2021-03-23 DIAGNOSIS — Z98.890 OTHER SPECIFIED POSTPROCEDURAL STATES: Chronic | ICD-10-CM

## 2021-03-23 DIAGNOSIS — Z96.653 PRESENCE OF ARTIFICIAL KNEE JOINT, BILATERAL: Chronic | ICD-10-CM

## 2021-03-23 DIAGNOSIS — Z90.710 ACQUIRED ABSENCE OF BOTH CERVIX AND UTERUS: Chronic | ICD-10-CM

## 2021-03-23 PROCEDURE — 99213 OFFICE O/P EST LOW 20 MIN: CPT | Mod: 25

## 2021-03-23 PROCEDURE — 97597 DBRDMT OPN WND 1ST 20 CM/<: CPT

## 2021-03-25 LAB — BACTERIA SPEC CULT: NORMAL

## 2021-04-06 ENCOUNTER — APPOINTMENT (OUTPATIENT)
Dept: BURN CARE | Facility: CLINIC | Age: 86
End: 2021-04-06
Payer: MEDICARE

## 2021-04-06 ENCOUNTER — OUTPATIENT (OUTPATIENT)
Dept: OUTPATIENT SERVICES | Facility: HOSPITAL | Age: 86
LOS: 1 days | Discharge: HOME | End: 2021-04-06

## 2021-04-06 DIAGNOSIS — Z90.710 ACQUIRED ABSENCE OF BOTH CERVIX AND UTERUS: Chronic | ICD-10-CM

## 2021-04-06 DIAGNOSIS — Z96.653 PRESENCE OF ARTIFICIAL KNEE JOINT, BILATERAL: Chronic | ICD-10-CM

## 2021-04-06 DIAGNOSIS — Z98.890 OTHER SPECIFIED POSTPROCEDURAL STATES: Chronic | ICD-10-CM

## 2021-04-06 PROCEDURE — 99213 OFFICE O/P EST LOW 20 MIN: CPT | Mod: 25

## 2021-04-06 PROCEDURE — 97597 DBRDMT OPN WND 1ST 20 CM/<: CPT | Mod: 78
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
## 2021-04-20 DIAGNOSIS — S31.829A UNSPECIFIED OPEN WOUND OF LEFT BUTTOCK, INITIAL ENCOUNTER: ICD-10-CM
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
## 2021-04-20 DIAGNOSIS — Z94.5 SKIN TRANSPLANT STATUS: ICD-10-CM

## 2021-04-27 ENCOUNTER — APPOINTMENT (OUTPATIENT)
Dept: BURN CARE | Facility: CLINIC | Age: 86
End: 2021-04-27
Payer: MEDICARE

## 2021-04-27 ENCOUNTER — OUTPATIENT (OUTPATIENT)
Dept: OUTPATIENT SERVICES | Facility: HOSPITAL | Age: 86
LOS: 1 days | Discharge: HOME | End: 2021-04-27

## 2021-04-27 DIAGNOSIS — Z90.710 ACQUIRED ABSENCE OF BOTH CERVIX AND UTERUS: Chronic | ICD-10-CM

## 2021-04-27 DIAGNOSIS — Z98.890 OTHER SPECIFIED POSTPROCEDURAL STATES: Chronic | ICD-10-CM

## 2021-04-27 DIAGNOSIS — Z96.653 PRESENCE OF ARTIFICIAL KNEE JOINT, BILATERAL: Chronic | ICD-10-CM

## 2021-04-27 PROCEDURE — 99213 OFFICE O/P EST LOW 20 MIN: CPT | Mod: 25,24

## 2021-04-27 PROCEDURE — 10160 PNXR ASPIR ABSC HMTMA BULLA: CPT | Mod: 58
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
## 2021-04-30 NOTE — PHYSICAL EXAM
[Healing] : healing [Size%: ______] : Size: [unfilled]% [3] : 3 out of 10 [Abnormal] : abnormal [Large] : medium [Infected?] : Infected: No [] : no [de-identified] : left buttock- small superficial open area pink wound \par sl ecchymotic fluctuant area with peeling epidermis \par \par 2x2cm area of fluctuance,\par  [TWNoteComboBox1] : ABD pad

## 2021-04-30 NOTE — ASSESSMENT
[Wound Care] : wound care [FreeTextEntry1] : left buttock wound site - healing --.  continue Gentamicin ointment to small open area\par \par with pt 's consent - needle aspiration performed in area of fluctuance \par after 2cc of 1 lidocaine with epinephrine injected\par   sanguinous aspirate -  no pus noted. \par \par advised pt and daughters present - likely hematoma due to friction or trauma with pt on AC \par \par Follow up in 3 weeks for wound care management
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
## 2021-05-06 DIAGNOSIS — S31.829A UNSPECIFIED OPEN WOUND OF LEFT BUTTOCK, INITIAL ENCOUNTER: ICD-10-CM
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
## 2021-05-06 DIAGNOSIS — Z94.5 SKIN TRANSPLANT STATUS: ICD-10-CM

## 2021-05-18 ENCOUNTER — OUTPATIENT (OUTPATIENT)
Dept: OUTPATIENT SERVICES | Facility: HOSPITAL | Age: 86
LOS: 1 days | Discharge: HOME | End: 2021-05-18

## 2021-05-18 ENCOUNTER — APPOINTMENT (OUTPATIENT)
Dept: BURN CARE | Facility: CLINIC | Age: 86
End: 2021-05-18
Payer: MEDICARE

## 2021-05-18 DIAGNOSIS — Z96.653 PRESENCE OF ARTIFICIAL KNEE JOINT, BILATERAL: Chronic | ICD-10-CM

## 2021-05-18 DIAGNOSIS — Z98.890 OTHER SPECIFIED POSTPROCEDURAL STATES: Chronic | ICD-10-CM

## 2021-05-18 DIAGNOSIS — Z90.710 ACQUIRED ABSENCE OF BOTH CERVIX AND UTERUS: Chronic | ICD-10-CM

## 2021-05-18 PROCEDURE — 99213 OFFICE O/P EST LOW 20 MIN: CPT | Mod: 25

## 2021-05-18 PROCEDURE — 97597 DBRDMT OPN WND 1ST 20 CM/<: CPT

## 2021-05-21 LAB — BACTERIA SPEC CULT: ABNORMAL

## 2021-06-08 ENCOUNTER — OUTPATIENT (OUTPATIENT)
Dept: OUTPATIENT SERVICES | Facility: HOSPITAL | Age: 86
LOS: 1 days | Discharge: HOME | End: 2021-06-08

## 2021-06-08 ENCOUNTER — APPOINTMENT (OUTPATIENT)
Dept: BURN CARE | Facility: CLINIC | Age: 86
End: 2021-06-08
Payer: MEDICARE
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
## 2021-06-08 DIAGNOSIS — L02.91 CUTANEOUS ABSCESS, UNSPECIFIED: ICD-10-CM

## 2021-06-08 DIAGNOSIS — Z96.653 PRESENCE OF ARTIFICIAL KNEE JOINT, BILATERAL: Chronic | ICD-10-CM

## 2021-06-08 DIAGNOSIS — Z90.710 ACQUIRED ABSENCE OF BOTH CERVIX AND UTERUS: Chronic | ICD-10-CM

## 2021-06-08 DIAGNOSIS — T14.90XA INJURY, UNSPECIFIED, INITIAL ENCOUNTER: ICD-10-CM

## 2021-06-08 DIAGNOSIS — Z98.890 OTHER SPECIFIED POSTPROCEDURAL STATES: Chronic | ICD-10-CM
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
## 2021-06-08 DIAGNOSIS — S31.809A UNSPECIFIED OPEN WOUND OF UNSPECIFIED BUTTOCK, INITIAL ENCOUNTER: ICD-10-CM

## 2021-06-08 PROCEDURE — 99213 OFFICE O/P EST LOW 20 MIN: CPT | Mod: 25

## 2021-06-12 LAB — BACTERIA SPEC CULT: ABNORMAL

## 2021-06-22 ENCOUNTER — APPOINTMENT (OUTPATIENT)
Dept: VASCULAR SURGERY | Facility: CLINIC | Age: 86
End: 2021-06-22

## 2021-07-06 ENCOUNTER — APPOINTMENT (OUTPATIENT)
Dept: VASCULAR SURGERY | Facility: CLINIC | Age: 86
End: 2021-07-06
Payer: MEDICARE

## 2021-07-06 PROCEDURE — 99213 OFFICE O/P EST LOW 20 MIN: CPT

## 2021-07-06 NOTE — HISTORY OF PRESENT ILLNESS
[FreeTextEntry1] : 85 y/o female with h/o chronic left buttock abscess, drained by Dr. Saeed few weeks ago, has bilateral lower extremity lymphedema with draining wounds, has VNS for wound care daily and the wounds are healed.

## 2021-07-06 NOTE — ASSESSMENT
[FreeTextEntry1] : 84 y/o female with h/o chronic left buttock abscess, drained by Dr. Saeed few weeks ago, has bilateral lower extremity lymphedema, now with healed lower extremity wounds.\par \par She should continue on compression therapy.\par \par I will see her back in 3 months time for follow up.

## 2021-07-13 ENCOUNTER — OUTPATIENT (OUTPATIENT)
Dept: OUTPATIENT SERVICES | Facility: HOSPITAL | Age: 86
LOS: 1 days | Discharge: HOME | End: 2021-07-13

## 2021-07-13 ENCOUNTER — APPOINTMENT (OUTPATIENT)
Dept: BURN CARE | Facility: CLINIC | Age: 86
End: 2021-07-13
Payer: MEDICARE

## 2021-07-13 DIAGNOSIS — Z90.710 ACQUIRED ABSENCE OF BOTH CERVIX AND UTERUS: Chronic | ICD-10-CM

## 2021-07-13 DIAGNOSIS — Z98.890 OTHER SPECIFIED POSTPROCEDURAL STATES: Chronic | ICD-10-CM

## 2021-07-13 DIAGNOSIS — Z96.653 PRESENCE OF ARTIFICIAL KNEE JOINT, BILATERAL: Chronic | ICD-10-CM

## 2021-07-13 PROCEDURE — 99213 OFFICE O/P EST LOW 20 MIN: CPT

## 2021-07-16 NOTE — PHYSICAL THERAPY INITIAL EVALUATION ADULT - GENERAL OBSERVATIONS, REHAB EVAL
Pt encountered sitting in recliner bedside, NAD, ok to be seen by PT as confirmed by RN and pt agreeable to PT.
show

## 2021-08-17 ENCOUNTER — OUTPATIENT (OUTPATIENT)
Dept: OUTPATIENT SERVICES | Facility: HOSPITAL | Age: 86
LOS: 1 days | Discharge: HOME | End: 2021-08-17

## 2021-08-17 ENCOUNTER — APPOINTMENT (OUTPATIENT)
Dept: BURN CARE | Facility: CLINIC | Age: 86
End: 2021-08-17
Payer: MEDICARE

## 2021-08-17 DIAGNOSIS — Z90.710 ACQUIRED ABSENCE OF BOTH CERVIX AND UTERUS: Chronic | ICD-10-CM

## 2021-08-17 DIAGNOSIS — Z96.653 PRESENCE OF ARTIFICIAL KNEE JOINT, BILATERAL: Chronic | ICD-10-CM

## 2021-08-17 DIAGNOSIS — Z98.890 OTHER SPECIFIED POSTPROCEDURAL STATES: Chronic | ICD-10-CM

## 2021-08-17 PROCEDURE — 99213 OFFICE O/P EST LOW 20 MIN: CPT

## 2021-10-05 ENCOUNTER — EMERGENCY (EMERGENCY)
Facility: HOSPITAL | Age: 86
LOS: 0 days | Discharge: HOME | End: 2021-10-05
Attending: EMERGENCY MEDICINE | Admitting: EMERGENCY MEDICINE
Payer: MEDICARE

## 2021-10-05 VITALS
HEART RATE: 122 BPM | SYSTOLIC BLOOD PRESSURE: 162 MMHG | OXYGEN SATURATION: 96 % | RESPIRATION RATE: 20 BRPM | DIASTOLIC BLOOD PRESSURE: 72 MMHG | HEIGHT: 62 IN | TEMPERATURE: 100 F

## 2021-10-05 VITALS
HEART RATE: 118 BPM | RESPIRATION RATE: 20 BRPM | TEMPERATURE: 101 F | DIASTOLIC BLOOD PRESSURE: 77 MMHG | SYSTOLIC BLOOD PRESSURE: 126 MMHG | OXYGEN SATURATION: 95 %

## 2021-10-05 DIAGNOSIS — Z88.0 ALLERGY STATUS TO PENICILLIN: ICD-10-CM

## 2021-10-05 DIAGNOSIS — Z91.041 RADIOGRAPHIC DYE ALLERGY STATUS: ICD-10-CM

## 2021-10-05 DIAGNOSIS — E66.2 MORBID (SEVERE) OBESITY WITH ALVEOLAR HYPOVENTILATION: ICD-10-CM

## 2021-10-05 DIAGNOSIS — Z20.822 CONTACT WITH AND (SUSPECTED) EXPOSURE TO COVID-19: ICD-10-CM

## 2021-10-05 DIAGNOSIS — Z88.6 ALLERGY STATUS TO ANALGESIC AGENT: ICD-10-CM

## 2021-10-05 DIAGNOSIS — Z96.653 PRESENCE OF ARTIFICIAL KNEE JOINT, BILATERAL: Chronic | ICD-10-CM

## 2021-10-05 DIAGNOSIS — M19.90 UNSPECIFIED OSTEOARTHRITIS, UNSPECIFIED SITE: ICD-10-CM

## 2021-10-05 DIAGNOSIS — I48.91 UNSPECIFIED ATRIAL FIBRILLATION: ICD-10-CM

## 2021-10-05 DIAGNOSIS — I73.9 PERIPHERAL VASCULAR DISEASE, UNSPECIFIED: ICD-10-CM

## 2021-10-05 DIAGNOSIS — F41.9 ANXIETY DISORDER, UNSPECIFIED: ICD-10-CM

## 2021-10-05 DIAGNOSIS — I10 ESSENTIAL (PRIMARY) HYPERTENSION: ICD-10-CM

## 2021-10-05 DIAGNOSIS — Z98.890 OTHER SPECIFIED POSTPROCEDURAL STATES: Chronic | ICD-10-CM

## 2021-10-05 DIAGNOSIS — Z88.8 ALLERGY STATUS TO OTHER DRUGS, MEDICAMENTS AND BIOLOGICAL SUBSTANCES: ICD-10-CM

## 2021-10-05 DIAGNOSIS — R19.7 DIARRHEA, UNSPECIFIED: ICD-10-CM

## 2021-10-05 DIAGNOSIS — Z90.710 ACQUIRED ABSENCE OF BOTH CERVIX AND UTERUS: Chronic | ICD-10-CM

## 2021-10-05 DIAGNOSIS — K52.9 NONINFECTIVE GASTROENTERITIS AND COLITIS, UNSPECIFIED: ICD-10-CM

## 2021-10-05 DIAGNOSIS — Z96.653 PRESENCE OF ARTIFICIAL KNEE JOINT, BILATERAL: ICD-10-CM

## 2021-10-05 DIAGNOSIS — J44.9 CHRONIC OBSTRUCTIVE PULMONARY DISEASE, UNSPECIFIED: ICD-10-CM

## 2021-10-05 DIAGNOSIS — Z90.710 ACQUIRED ABSENCE OF BOTH CERVIX AND UTERUS: ICD-10-CM

## 2021-10-05 DIAGNOSIS — Z79.01 LONG TERM (CURRENT) USE OF ANTICOAGULANTS: ICD-10-CM

## 2021-10-05 DIAGNOSIS — Z88.2 ALLERGY STATUS TO SULFONAMIDES: ICD-10-CM

## 2021-10-05 LAB
ALBUMIN SERPL ELPH-MCNC: 3.5 G/DL — SIGNIFICANT CHANGE UP (ref 3.5–5.2)
ALP SERPL-CCNC: 130 U/L — HIGH (ref 30–115)
ALT FLD-CCNC: 5 U/L — SIGNIFICANT CHANGE UP (ref 0–41)
ANION GAP SERPL CALC-SCNC: 16 MMOL/L — HIGH (ref 7–14)
AST SERPL-CCNC: 14 U/L — SIGNIFICANT CHANGE UP (ref 0–41)
BASOPHILS # BLD AUTO: 0.03 K/UL — SIGNIFICANT CHANGE UP (ref 0–0.2)
BASOPHILS NFR BLD AUTO: 0.3 % — SIGNIFICANT CHANGE UP (ref 0–1)
BILIRUB DIRECT SERPL-MCNC: 0.2 MG/DL — SIGNIFICANT CHANGE UP (ref 0–0.2)
BILIRUB INDIRECT FLD-MCNC: 0.4 MG/DL — SIGNIFICANT CHANGE UP (ref 0.2–1.2)
BILIRUB SERPL-MCNC: 0.6 MG/DL — SIGNIFICANT CHANGE UP (ref 0.2–1.2)
BUN SERPL-MCNC: 15 MG/DL — SIGNIFICANT CHANGE UP (ref 10–20)
C DIFF BY PCR RESULT: POSITIVE
C DIFF TOX GENS STL QL NAA+PROBE: SIGNIFICANT CHANGE UP
CALCIUM SERPL-MCNC: 8.4 MG/DL — LOW (ref 8.5–10.1)
CHLORIDE SERPL-SCNC: 101 MMOL/L — SIGNIFICANT CHANGE UP (ref 98–110)
CO2 SERPL-SCNC: 22 MMOL/L — SIGNIFICANT CHANGE UP (ref 17–32)
CREAT SERPL-MCNC: 1.1 MG/DL — SIGNIFICANT CHANGE UP (ref 0.7–1.5)
EOSINOPHIL # BLD AUTO: 0.01 K/UL — SIGNIFICANT CHANGE UP (ref 0–0.7)
EOSINOPHIL NFR BLD AUTO: 0.1 % — SIGNIFICANT CHANGE UP (ref 0–8)
GLUCOSE SERPL-MCNC: 95 MG/DL — SIGNIFICANT CHANGE UP (ref 70–99)
HCT VFR BLD CALC: 31.1 % — LOW (ref 37–47)
HGB BLD-MCNC: 9.9 G/DL — LOW (ref 12–16)
IMM GRANULOCYTES NFR BLD AUTO: 0.6 % — HIGH (ref 0.1–0.3)
LACTATE SERPL-SCNC: 1.1 MMOL/L — SIGNIFICANT CHANGE UP (ref 0.7–2)
LIDOCAIN IGE QN: 10 U/L — SIGNIFICANT CHANGE UP (ref 7–60)
LYMPHOCYTES # BLD AUTO: 0.55 K/UL — LOW (ref 1.2–3.4)
LYMPHOCYTES # BLD AUTO: 5.5 % — LOW (ref 20.5–51.1)
MCHC RBC-ENTMCNC: 26.3 PG — LOW (ref 27–31)
MCHC RBC-ENTMCNC: 31.8 G/DL — LOW (ref 32–37)
MCV RBC AUTO: 82.5 FL — SIGNIFICANT CHANGE UP (ref 81–99)
MONOCYTES # BLD AUTO: 0.56 K/UL — SIGNIFICANT CHANGE UP (ref 0.1–0.6)
MONOCYTES NFR BLD AUTO: 5.6 % — SIGNIFICANT CHANGE UP (ref 1.7–9.3)
NEUTROPHILS # BLD AUTO: 8.75 K/UL — HIGH (ref 1.4–6.5)
NEUTROPHILS NFR BLD AUTO: 87.9 % — HIGH (ref 42.2–75.2)
NRBC # BLD: 0 /100 WBCS — SIGNIFICANT CHANGE UP (ref 0–0)
PLATELET # BLD AUTO: 206 K/UL — SIGNIFICANT CHANGE UP (ref 130–400)
POTASSIUM SERPL-MCNC: 3.7 MMOL/L — SIGNIFICANT CHANGE UP (ref 3.5–5)
POTASSIUM SERPL-SCNC: 3.7 MMOL/L — SIGNIFICANT CHANGE UP (ref 3.5–5)
PROT SERPL-MCNC: 7.1 G/DL — SIGNIFICANT CHANGE UP (ref 6–8)
RBC # BLD: 3.77 M/UL — LOW (ref 4.2–5.4)
RBC # FLD: 16.5 % — HIGH (ref 11.5–14.5)
SARS-COV-2 RNA SPEC QL NAA+PROBE: SIGNIFICANT CHANGE UP
SODIUM SERPL-SCNC: 139 MMOL/L — SIGNIFICANT CHANGE UP (ref 135–146)
WBC # BLD: 9.96 K/UL — SIGNIFICANT CHANGE UP (ref 4.8–10.8)
WBC # FLD AUTO: 9.96 K/UL — SIGNIFICANT CHANGE UP (ref 4.8–10.8)

## 2021-10-05 PROCEDURE — 99285 EMERGENCY DEPT VISIT HI MDM: CPT

## 2021-10-05 PROCEDURE — 74176 CT ABD & PELVIS W/O CONTRAST: CPT | Mod: 26,MA

## 2021-10-05 RX ORDER — SACCHAROMYCES BOULARDII 250 MG
1 POWDER IN PACKET (EA) ORAL
Qty: 30 | Refills: 0
Start: 2021-10-05

## 2021-10-05 RX ORDER — ACETAMINOPHEN 500 MG
650 TABLET ORAL ONCE
Refills: 0 | Status: COMPLETED | OUTPATIENT
Start: 2021-10-05 | End: 2021-10-05

## 2021-10-05 RX ORDER — VANCOMYCIN HCL 1 G
1 VIAL (EA) INTRAVENOUS
Qty: 28 | Refills: 0
Start: 2021-10-05

## 2021-10-05 RX ORDER — SODIUM CHLORIDE 9 MG/ML
1000 INJECTION, SOLUTION INTRAVENOUS ONCE
Refills: 0 | Status: COMPLETED | OUTPATIENT
Start: 2021-10-05 | End: 2021-10-05

## 2021-10-05 RX ORDER — VANCOMYCIN HCL 1 G
125 VIAL (EA) INTRAVENOUS ONCE
Refills: 0 | Status: COMPLETED | OUTPATIENT
Start: 2021-10-05 | End: 2021-10-05

## 2021-10-05 RX ADMIN — Medication 650 MILLIGRAM(S): at 16:19

## 2021-10-05 RX ADMIN — SODIUM CHLORIDE 1000 MILLILITER(S): 9 INJECTION, SOLUTION INTRAVENOUS at 15:32

## 2021-10-05 RX ADMIN — SODIUM CHLORIDE 1000 MILLILITER(S): 9 INJECTION, SOLUTION INTRAVENOUS at 21:28

## 2021-10-05 RX ADMIN — Medication 650 MILLIGRAM(S): at 21:28

## 2021-10-05 RX ADMIN — Medication 125 MILLIGRAM(S): at 21:26

## 2021-10-05 NOTE — ED PROVIDER NOTE - CARE PROVIDER_API CALL
Sandra Mariscal  MEDICINE  [address] [place] [address]  Phone: [phone]  Fax: [phone]  Follow Up Time:     Kathy Foreman (DO)  Geriatric Medicine; Internal Medicine  [address] [place] [address]  Phone: [phone]  Fax: [phone]  Follow Up Time:

## 2021-10-05 NOTE — ED PROVIDER NOTE - PHYSICAL EXAMINATION
CONSTITUTIONAL: Well-developed; well-nourished; in no acute distress.   SKIN: warm, dry  HEAD: Normocephalic; atraumatic.  EYES: no conjunctival injection. EOMI.   ENT: No nasal discharge; airway clear.  NECK: Supple; non tender.  CARD: S1, S2 normal; Regular rate and rhythm.   RESP: No wheezes, rales or rhonchi.  ABD: soft, nondistended, +lower abd TTP, no CVA TTP   EXT: Normal ROM.  No lower extremity edema.   LYMPH: No acute cervical adenopathy.  NEURO: Alert, oriented, grossly unremarkable. No FND.   PSYCH: Cooperative, appropriate.

## 2021-10-05 NOTE — ED PROVIDER NOTE - NS ED ROS FT
Review of Systems:  CONSTITUTIONAL: No fever, No diaphoresis   SKIN: No rash  HEMATOLOGIC: No abnormal bleeding   EYES: No eye pain, No blurred vision  ENT: No sore throat, No neck pain, No rhinorrhea   RESPIRATORY: No shortness of breath, No cough  CARDIAC: No chest pain, No palpitations  GI: +abdominal pain, +nausea, No vomiting, +diarrhea, No constipation, No bright red blood per rectum or melena. No flank pain.   : No dysuria, frequency, hematuria.   MUSCULOSKELETAL: No joint paint, No swelling, No back pain  NEUROLOGIC: No numbness, No focal weakness, No headache, No dizziness  All other systems negative, unless specified in HPI

## 2021-10-05 NOTE — ED ADULT NURSE REASSESSMENT NOTE - NS ED NURSE REASSESS COMMENT FT1
Patient refusing to get out of her wheelchair, states she cannot get into stretcher. Fall risk education provided. daughter at bedside.

## 2021-10-05 NOTE — ED PROVIDER NOTE - CLINICAL SUMMARY MEDICAL DECISION MAKING FREE TEXT BOX
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  After long discussion with patient and family, they preferred a trial of outpatient treatment and oral rehydration.  She will return for any new or worsening symptoms.

## 2021-10-05 NOTE — ED PROVIDER NOTE - CARE PROVIDERS DIRECT ADDRESSES
[email or web address]
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
## 2021-10-05 NOTE — ED PROVIDER NOTE - PATIENT PORTAL LINK FT
You can access the FollowMyHealth Patient Portal offered by [facility] by registering at the following website: [email or web address]. By joining [facility]’s FollowMyHealth portal, you will also be able to view your health information using other applications (apps) compatible with our system.

## 2021-10-05 NOTE — ED PROVIDER NOTE - OBJECTIVE STATEMENT
87 y/o F PMHx morbid obesity, anxiety, COPD, PVD, OA, atrial fibrillation (on Eliquis), HTN presents to ED with intermittent diffuse watery non-bloody diarrhea x5 days. Pt reports recent use of Azithromycin last week for URI. Denies fevers. Reports mild nausea., no vomiting. 87 y/o F PMHx morbid obesity, anxiety, COPD, PVD, OA, atrial fibrillation (on Eliquis), HTN presents to ED with intermittent diffuse watery non-bloody diarrhea x5 days. Pt reports recent use of Azithromycin last week for URI. Denies fevers. Reports mild nausea, no vomiting. Endorses mild lower abdominal pain bilaterally. No CP or SOB.

## 2021-10-06 NOTE — ED POST DISCHARGE NOTE - DETAILS
PRIOR AUTHORIZATION DONE WITH [employer] FOR VANCOMYCIN 125 MG QID, #28 TABS:  [phone]: PATIENT ID # [id number]: REFERENCE # [id number]. STATUS CHECK tele- [phone]

## 2021-10-26 ENCOUNTER — APPOINTMENT (OUTPATIENT)
Dept: VASCULAR SURGERY | Facility: CLINIC | Age: 86
End: 2021-10-26

## 2021-11-02 ENCOUNTER — APPOINTMENT (OUTPATIENT)
Dept: VASCULAR SURGERY | Facility: CLINIC | Age: 86
End: 2021-11-02
Payer: MEDICARE

## 2021-11-02 PROCEDURE — 29580 STRAPPING UNNA BOOT: CPT | Mod: 50

## 2021-11-02 NOTE — HISTORY OF PRESENT ILLNESS
[FreeTextEntry1] : 87 y/o female with h/o  bilateral lower extremity lymphedema with draining wounds, her wounds have recurred. he has weeping edema present.

## 2021-11-02 NOTE — ASSESSMENT
[FreeTextEntry1] : .\par 87 y/o female with h/o  bilateral lower extremity lymphedema with draining wounds, her wounds have recurred. he has weeping edema present.\par I recommend reinstating bilateral ulna boot therapy. I will reinstate visiting nurse as well. The patient isn't limited in mobility. I would like to see her back in my office in one week's time for her initial followup visit from placement of unna boots

## 2021-11-09 ENCOUNTER — APPOINTMENT (OUTPATIENT)
Dept: VASCULAR SURGERY | Facility: CLINIC | Age: 86
End: 2021-11-09
Payer: MEDICARE

## 2021-11-09 PROCEDURE — 29580 STRAPPING UNNA BOOT: CPT | Mod: 50

## 2021-11-09 NOTE — HISTORY OF PRESENT ILLNESS
[FreeTextEntry1] : 85 y/o female with h/o  bilateral lower extremity lymphedema with draining wounds, her wounds have recurred. he has weeping edema present.

## 2021-11-09 NOTE — ASSESSMENT
[FreeTextEntry1] : .\par 85 y/o female with h/o  bilateral lower extremity lymphedema with draining wounds, her wounds have recurred. he has weeping edema present.\par I recommend reinstating bilateral ulna boot therapy. I will reinstate visiting nurse as well. The patient isn't limited in mobility. I would like to see her back in my office in one week's time for her initial followup visit from placement of unna boots

## 2021-12-07 ENCOUNTER — APPOINTMENT (OUTPATIENT)
Dept: VASCULAR SURGERY | Facility: CLINIC | Age: 86
End: 2021-12-07

## 2021-12-14 ENCOUNTER — APPOINTMENT (OUTPATIENT)
Age: 86
End: 2021-12-14
Payer: MEDICARE

## 2021-12-14 VITALS
DIASTOLIC BLOOD PRESSURE: 90 MMHG | WEIGHT: 213 LBS | HEIGHT: 62 IN | SYSTOLIC BLOOD PRESSURE: 134 MMHG | BODY MASS INDEX: 39.2 KG/M2 | HEART RATE: 129 BPM | RESPIRATION RATE: 14 BRPM | OXYGEN SATURATION: 98 %

## 2021-12-14 PROCEDURE — 99214 OFFICE O/P EST MOD 30 MIN: CPT | Mod: 25

## 2021-12-14 PROCEDURE — 71046 X-RAY EXAM CHEST 2 VIEWS: CPT

## 2021-12-14 NOTE — ASSESSMENT
[FreeTextEntry1] : MIld COPD stable\par Possible LAWRENCE refusing testing \par Small left pleural effusion likely volume overload

## 2021-12-14 NOTE — HISTORY OF PRESENT ILLNESS
[Stable] : stable [Adherent] : the patient is adherent with ~his/her~ medication regimen [Goals--Doing Well] : the patient is doing well with ~his/her~ COPD goals [PFTs] : pulmonary function tests [Follow-Up - Routine Clinic] : a routine clinic follow-up of [Snoring] : snoring [Unrefreshing Sleep] : unrefreshing sleep [Currently Experiencing] : The patient is currently experiencing symptoms. [None] : No associated symptoms are reported

## 2022-04-28 NOTE — DISCHARGE NOTE PROVIDER - DISCHARGE DATE
22-May-2020 Administered By (Optional): Zoe Young Detail Level: None Consent: The risks of atrophy were reviewed with the patient. Concentration (Mg/Ml) Of Additional Medication: 2.5 Add Option For Additional Mediation: No Total Volume (Ccs): 2 Kenalog Preparation: kenalog Concentration (Mg/Ml): 40.0

## 2022-06-14 ENCOUNTER — APPOINTMENT (OUTPATIENT)
Age: 87
End: 2022-06-14
Payer: MEDICARE

## 2022-06-14 VITALS
SYSTOLIC BLOOD PRESSURE: 120 MMHG | HEIGHT: 62 IN | WEIGHT: 217 LBS | OXYGEN SATURATION: 97 % | RESPIRATION RATE: 14 BRPM | HEART RATE: 92 BPM | DIASTOLIC BLOOD PRESSURE: 80 MMHG | BODY MASS INDEX: 39.93 KG/M2

## 2022-06-14 PROCEDURE — 99214 OFFICE O/P EST MOD 30 MIN: CPT | Mod: 25

## 2022-06-14 PROCEDURE — 71046 X-RAY EXAM CHEST 2 VIEWS: CPT

## 2022-06-14 NOTE — ASSESSMENT
[FreeTextEntry1] : MIld COPD stable\par Possible LAWRENCE refusing testing \par Small left pleural effusion likely volume overload \par SOB likely cardiac in origin ( walking few feet, pulse 150 and puilse ox 96%)\par RIght neck mass possibly thyroid cancer \par HO Afib\par

## 2022-06-14 NOTE — PROCEDURE
[FreeTextEntry1] : CXR PA and Lateral \par The costophrenic and cardiophrenic angles are sharp\par The fanny parenchyma shows no infiltrates, consolidations, or nodules \par The Mediastinum is within normal limits\par Small left pleural effusions\par Unchanged

## 2022-06-28 ENCOUNTER — OUTPATIENT (OUTPATIENT)
Dept: OUTPATIENT SERVICES | Facility: HOSPITAL | Age: 87
LOS: 1 days | Discharge: HOME | End: 2022-06-28

## 2022-06-28 ENCOUNTER — RESULT REVIEW (OUTPATIENT)
Age: 87
End: 2022-06-28

## 2022-06-28 DIAGNOSIS — Z96.653 PRESENCE OF ARTIFICIAL KNEE JOINT, BILATERAL: Chronic | ICD-10-CM

## 2022-06-28 DIAGNOSIS — R91.1 SOLITARY PULMONARY NODULE: ICD-10-CM

## 2022-06-28 DIAGNOSIS — Z98.890 OTHER SPECIFIED POSTPROCEDURAL STATES: Chronic | ICD-10-CM

## 2022-06-28 DIAGNOSIS — Z90.710 ACQUIRED ABSENCE OF BOTH CERVIX AND UTERUS: Chronic | ICD-10-CM

## 2022-06-28 PROCEDURE — 70490 CT SOFT TISSUE NECK W/O DYE: CPT | Mod: 26

## 2022-06-28 PROCEDURE — 71250 CT THORAX DX C-: CPT | Mod: 26

## 2022-07-27 ENCOUNTER — APPOINTMENT (OUTPATIENT)
Dept: OTOLARYNGOLOGY | Facility: CLINIC | Age: 87
End: 2022-07-27

## 2022-07-27 VITALS — WEIGHT: 217 LBS | BODY MASS INDEX: 39.69 KG/M2

## 2022-07-27 DIAGNOSIS — E04.1 NONTOXIC SINGLE THYROID NODULE: ICD-10-CM

## 2022-07-27 DIAGNOSIS — E04.9 NONTOXIC GOITER, UNSPECIFIED: ICD-10-CM

## 2022-07-27 DIAGNOSIS — R13.10 DYSPHAGIA, UNSPECIFIED: ICD-10-CM

## 2022-07-27 PROCEDURE — 31575 DIAGNOSTIC LARYNGOSCOPY: CPT

## 2022-07-27 PROCEDURE — 99204 OFFICE O/P NEW MOD 45 MIN: CPT | Mod: 25

## 2022-07-27 NOTE — PHYSICAL EXAM
[de-identified] : enlarged right thyroid lobe  [Normal] : mucosa is normal [Midline] : trachea located in midline position

## 2022-07-27 NOTE — HISTORY OF PRESENT ILLNESS
[de-identified] : Patient presents today c/o enlarged thyroid .  Patient states she had a CT chest done 06/28/22 .  It showed a enlarged thyroid.  Patient denies any pain.

## 2022-07-27 NOTE — DATA REVIEWED
[de-identified] : PROCEDURE DATE:  06/28/2022\par \par \par \par INTERPRETATION:  Clinical History / Reason for exam: Neck mass, upper airway\par \par TECHNIQUE: Axial noncontrast contiguous images obtained through the neck without intravenous contrast with sagittal and coronal reformations\par \par Comparison studies: None\par \par FINDINGS:\par \par There is a 6.5 x 5.5 x 8.5 cm mass in the right neck (ap x tvs x cc) extending from level 2 to the thoracic inlet. The mass is heterogeneous with areas of calcium interspersed near the periphery. The mass likely arises from the right lobe of the thyroid, displacing the thyroid medially. The airways displaced but widely patent.\par \par There is displacement of the supraglottic larynx without intrinsic mass.\par \par On this noncontrast study there is no evidence to suggest additional dominant mass or lymphadenopathy.\par \par Visualized brain parenchyma unremarkable.\par \par Upper lung fields demonstrate emphysematous changes with scattered areas of lucency.\par \par There is a small left pleural effusion\par \par Soft tissue opacification noted peripherally within the left sphenoid sinus. Paranasal sinuses, orbits and soft tissues of the skull base otherwise unremarkable.\par \par Parotid and submandibular glands are intrinsically normal. Right submandibular gland is displaced anteriorly by the mass.\par \par Cervical spine demonstrates degenerative changes with multilevel severe loss of disc space height. There is complete loss of the C3-4 disc space which may be congenital segmentation anomaly or fusion\par \par IMPRESSION:\par \par 1.  6.5 x 5.5 x 8.5 cm mass in the right neck, possibly thyroid in origin\par \par 2.  Airway displacement patent. Otherwise no imaging evidence for pathologic lymphadenopathy on this noncontrast study\par \par 3.  Consider a contrast follow-up study for better delineation\par \par 4.  Small left effusion\par

## 2022-08-01 ENCOUNTER — APPOINTMENT (OUTPATIENT)
Age: 87
End: 2022-08-01

## 2022-08-09 ENCOUNTER — LABORATORY RESULT (OUTPATIENT)
Age: 87
End: 2022-08-09

## 2022-08-09 LAB
T4 FREE SERPL-MCNC: 1.2 NG/DL
TSH SERPL-ACNC: 0.07 UIU/ML

## 2022-08-10 LAB
THYROGLOB AB SERPL-ACNC: <20 IU/ML
THYROPEROXIDASE AB SERPL IA-ACNC: <10 IU/ML

## 2023-01-01 ENCOUNTER — APPOINTMENT (OUTPATIENT)
Dept: HOME HEALTH SERVICES | Facility: HOME HEALTH | Age: 88
End: 2023-01-01
Payer: MEDICARE

## 2023-01-01 ENCOUNTER — INPATIENT (INPATIENT)
Facility: HOSPITAL | Age: 88
LOS: 10 days | Discharge: NURSING, REHAB OR LONG-TERM CARE | DRG: 871 | End: 2023-05-04
Attending: HOSPITALIST | Admitting: HOSPITALIST
Payer: MEDICARE

## 2023-01-01 ENCOUNTER — APPOINTMENT (OUTPATIENT)
Dept: HEMATOLOGY ONCOLOGY | Facility: CLINIC | Age: 88
End: 2023-01-01

## 2023-01-01 ENCOUNTER — APPOINTMENT (OUTPATIENT)
Dept: HOME HEALTH SERVICES | Facility: HOME HEALTH | Age: 88
End: 2023-01-01

## 2023-01-01 ENCOUNTER — NON-APPOINTMENT (OUTPATIENT)
Age: 88
End: 2023-01-01

## 2023-01-01 ENCOUNTER — TRANSCRIPTION ENCOUNTER (OUTPATIENT)
Age: 88
End: 2023-01-01

## 2023-01-01 ENCOUNTER — INPATIENT (INPATIENT)
Facility: HOSPITAL | Age: 88
LOS: 17 days | Discharge: NURSING, REHAB OR LONG-TERM CARE | DRG: 871 | End: 2023-11-10
Attending: STUDENT IN AN ORGANIZED HEALTH CARE EDUCATION/TRAINING PROGRAM | Admitting: FAMILY MEDICINE
Payer: MEDICARE

## 2023-01-01 ENCOUNTER — LABORATORY RESULT (OUTPATIENT)
Age: 88
End: 2023-01-01

## 2023-01-01 ENCOUNTER — INPATIENT (INPATIENT)
Facility: HOSPITAL | Age: 88
LOS: 16 days | Discharge: HOSPICE | DRG: 207 | End: 2023-07-19
Attending: HOSPITALIST | Admitting: INTERNAL MEDICINE
Payer: MEDICARE

## 2023-01-01 ENCOUNTER — OUTPATIENT (OUTPATIENT)
Dept: OUTPATIENT SERVICES | Facility: HOSPITAL | Age: 88
LOS: 1 days | End: 2023-01-01
Payer: MEDICARE

## 2023-01-01 ENCOUNTER — RESULT REVIEW (OUTPATIENT)
Age: 88
End: 2023-01-01

## 2023-01-01 ENCOUNTER — APPOINTMENT (OUTPATIENT)
Dept: PULMONOLOGY | Facility: CLINIC | Age: 88
End: 2023-01-01
Payer: MEDICARE

## 2023-01-01 ENCOUNTER — INPATIENT (INPATIENT)
Facility: HOSPITAL | Age: 88
LOS: 13 days | Discharge: NURSING, REHAB OR LONG-TERM CARE | DRG: 843 | End: 2023-03-13
Attending: INTERNAL MEDICINE | Admitting: INTERNAL MEDICINE
Payer: MEDICARE

## 2023-01-01 ENCOUNTER — FORM ENCOUNTER (OUTPATIENT)
Age: 88
End: 2023-01-01

## 2023-01-01 ENCOUNTER — APPOINTMENT (OUTPATIENT)
Dept: PULMONOLOGY | Facility: CLINIC | Age: 88
End: 2023-01-01

## 2023-01-01 ENCOUNTER — INPATIENT (INPATIENT)
Facility: HOSPITAL | Age: 88
LOS: 32 days | Discharge: NURSING, REHAB OR LONG-TERM CARE | DRG: 870 | End: 2023-09-30
Attending: INTERNAL MEDICINE | Admitting: FAMILY MEDICINE
Payer: MEDICARE

## 2023-01-01 ENCOUNTER — EMERGENCY (EMERGENCY)
Facility: HOSPITAL | Age: 88
LOS: 0 days | Discharge: HOME | End: 2023-08-21
Attending: EMERGENCY MEDICINE
Payer: MEDICARE

## 2023-01-01 ENCOUNTER — INPATIENT (INPATIENT)
Facility: HOSPITAL | Age: 88
LOS: 24 days | Discharge: NURSING, REHAB OR LONG-TERM CARE | DRG: 177 | End: 2023-12-19
Attending: HOSPITALIST | Admitting: INTERNAL MEDICINE
Payer: MEDICARE

## 2023-01-01 VITALS
WEIGHT: 160.06 LBS | RESPIRATION RATE: 24 BRPM | OXYGEN SATURATION: 98 % | HEART RATE: 91 BPM | RESPIRATION RATE: 18 BRPM | SYSTOLIC BLOOD PRESSURE: 141 MMHG | HEIGHT: 67 IN | DIASTOLIC BLOOD PRESSURE: 56 MMHG | DIASTOLIC BLOOD PRESSURE: 66 MMHG | OXYGEN SATURATION: 95 % | HEART RATE: 110 BPM | TEMPERATURE: 98 F | HEIGHT: 62 IN | TEMPERATURE: 98 F | SYSTOLIC BLOOD PRESSURE: 113 MMHG | WEIGHT: 190.04 LBS

## 2023-01-01 VITALS
DIASTOLIC BLOOD PRESSURE: 92 MMHG | SYSTOLIC BLOOD PRESSURE: 178 MMHG | HEART RATE: 100 BPM | RESPIRATION RATE: 20 BRPM | TEMPERATURE: 98 F | OXYGEN SATURATION: 93 %

## 2023-01-01 VITALS
RESPIRATION RATE: 18 BRPM | TEMPERATURE: 98 F | DIASTOLIC BLOOD PRESSURE: 66 MMHG | SYSTOLIC BLOOD PRESSURE: 140 MMHG | HEART RATE: 111 BPM | TEMPERATURE: 100 F | DIASTOLIC BLOOD PRESSURE: 68 MMHG | SYSTOLIC BLOOD PRESSURE: 116 MMHG | HEART RATE: 99 BPM | RESPIRATION RATE: 20 BRPM | OXYGEN SATURATION: 94 %

## 2023-01-01 VITALS
SYSTOLIC BLOOD PRESSURE: 168 MMHG | DIASTOLIC BLOOD PRESSURE: 67 MMHG | TEMPERATURE: 99 F | HEART RATE: 100 BPM | OXYGEN SATURATION: 96 % | RESPIRATION RATE: 14 BRPM

## 2023-01-01 VITALS
SYSTOLIC BLOOD PRESSURE: 120 MMHG | HEIGHT: 62 IN | DIASTOLIC BLOOD PRESSURE: 60 MMHG | OXYGEN SATURATION: 92 % | WEIGHT: 213 LBS | HEART RATE: 123 BPM | BODY MASS INDEX: 39.2 KG/M2 | RESPIRATION RATE: 12 BRPM

## 2023-01-01 VITALS
DIASTOLIC BLOOD PRESSURE: 68 MMHG | BODY MASS INDEX: 34.96 KG/M2 | HEART RATE: 97 BPM | OXYGEN SATURATION: 99 % | WEIGHT: 190 LBS | RESPIRATION RATE: 20 BRPM | SYSTOLIC BLOOD PRESSURE: 120 MMHG | TEMPERATURE: 99 F | HEIGHT: 62 IN

## 2023-01-01 VITALS
DIASTOLIC BLOOD PRESSURE: 71 MMHG | TEMPERATURE: 99 F | SYSTOLIC BLOOD PRESSURE: 131 MMHG | OXYGEN SATURATION: 100 % | HEART RATE: 87 BPM | RESPIRATION RATE: 18 BRPM

## 2023-01-01 VITALS
HEART RATE: 129 BPM | HEIGHT: 62 IN | SYSTOLIC BLOOD PRESSURE: 155 MMHG | TEMPERATURE: 99 F | DIASTOLIC BLOOD PRESSURE: 75 MMHG | OXYGEN SATURATION: 100 % | RESPIRATION RATE: 26 BRPM

## 2023-01-01 VITALS
SYSTOLIC BLOOD PRESSURE: 119 MMHG | RESPIRATION RATE: 18 BRPM | DIASTOLIC BLOOD PRESSURE: 56 MMHG | HEART RATE: 100 BPM | OXYGEN SATURATION: 99 % | TEMPERATURE: 96 F

## 2023-01-01 VITALS
DIASTOLIC BLOOD PRESSURE: 72 MMHG | TEMPERATURE: 97.6 F | SYSTOLIC BLOOD PRESSURE: 120 MMHG | OXYGEN SATURATION: 95 % | RESPIRATION RATE: 20 BRPM | HEART RATE: 89 BPM

## 2023-01-01 VITALS
HEIGHT: 62 IN | WEIGHT: 180 LBS | RESPIRATION RATE: 14 BRPM | HEART RATE: 121 BPM | BODY MASS INDEX: 33.13 KG/M2 | OXYGEN SATURATION: 92 %

## 2023-01-01 VITALS
OXYGEN SATURATION: 93 % | DIASTOLIC BLOOD PRESSURE: 59 MMHG | HEART RATE: 100 BPM | TEMPERATURE: 97 F | SYSTOLIC BLOOD PRESSURE: 117 MMHG | RESPIRATION RATE: 18 BRPM

## 2023-01-01 VITALS
SYSTOLIC BLOOD PRESSURE: 135 MMHG | TEMPERATURE: 97 F | OXYGEN SATURATION: 89 % | RESPIRATION RATE: 24 BRPM | WEIGHT: 199.08 LBS | DIASTOLIC BLOOD PRESSURE: 70 MMHG | HEART RATE: 120 BPM | HEIGHT: 62 IN

## 2023-01-01 VITALS
OXYGEN SATURATION: 99 % | DIASTOLIC BLOOD PRESSURE: 60 MMHG | HEART RATE: 71 BPM | RESPIRATION RATE: 20 BRPM | SYSTOLIC BLOOD PRESSURE: 110 MMHG | TEMPERATURE: 98.4 F

## 2023-01-01 VITALS
HEART RATE: 108 BPM | RESPIRATION RATE: 16 BRPM | TEMPERATURE: 97 F | OXYGEN SATURATION: 99 % | DIASTOLIC BLOOD PRESSURE: 67 MMHG | SYSTOLIC BLOOD PRESSURE: 111 MMHG

## 2023-01-01 VITALS
RESPIRATION RATE: 18 BRPM | DIASTOLIC BLOOD PRESSURE: 52 MMHG | SYSTOLIC BLOOD PRESSURE: 146 MMHG | HEART RATE: 89 BPM | OXYGEN SATURATION: 95 %

## 2023-01-01 VITALS
HEART RATE: 92 BPM | RESPIRATION RATE: 19 BRPM | DIASTOLIC BLOOD PRESSURE: 76 MMHG | OXYGEN SATURATION: 96 % | TEMPERATURE: 98.3 F | SYSTOLIC BLOOD PRESSURE: 124 MMHG

## 2023-01-01 VITALS
SYSTOLIC BLOOD PRESSURE: 138 MMHG | TEMPERATURE: 105 F | DIASTOLIC BLOOD PRESSURE: 87 MMHG | HEART RATE: 160 BPM | OXYGEN SATURATION: 89 % | RESPIRATION RATE: 26 BRPM | HEIGHT: 67 IN

## 2023-01-01 DIAGNOSIS — J90 PLEURAL EFFUSION, NOT ELSEWHERE CLASSIFIED: ICD-10-CM

## 2023-01-01 DIAGNOSIS — J96.21 ACUTE AND CHRONIC RESPIRATORY FAILURE WITH HYPOXIA: ICD-10-CM

## 2023-01-01 DIAGNOSIS — B96.89 OTHER SPECIFIED BACTERIAL AGENTS AS THE CAUSE OF DISEASES CLASSIFIED ELSEWHERE: ICD-10-CM

## 2023-01-01 DIAGNOSIS — Z98.890 OTHER SPECIFIED POSTPROCEDURAL STATES: Chronic | ICD-10-CM

## 2023-01-01 DIAGNOSIS — I73.9 PERIPHERAL VASCULAR DISEASE, UNSPECIFIED: ICD-10-CM

## 2023-01-01 DIAGNOSIS — J69.0 PNEUMONITIS DUE TO INHALATION OF FOOD AND VOMIT: ICD-10-CM

## 2023-01-01 DIAGNOSIS — Z90.710 ACQUIRED ABSENCE OF BOTH CERVIX AND UTERUS: Chronic | ICD-10-CM

## 2023-01-01 DIAGNOSIS — Z88.6 ALLERGY STATUS TO ANALGESIC AGENT: ICD-10-CM

## 2023-01-01 DIAGNOSIS — E83.42 HYPOMAGNESEMIA: ICD-10-CM

## 2023-01-01 DIAGNOSIS — N30.00 ACUTE CYSTITIS WITHOUT HEMATURIA: ICD-10-CM

## 2023-01-01 DIAGNOSIS — J91.0 MALIGNANT PLEURAL EFFUSION: ICD-10-CM

## 2023-01-01 DIAGNOSIS — D72.829 ELEVATED WHITE BLOOD CELL COUNT, UNSPECIFIED: ICD-10-CM

## 2023-01-01 DIAGNOSIS — R58 HEMORRHAGE, NOT ELSEWHERE CLASSIFIED: ICD-10-CM

## 2023-01-01 DIAGNOSIS — I50.22 CHRONIC SYSTOLIC (CONGESTIVE) HEART FAILURE: ICD-10-CM

## 2023-01-01 DIAGNOSIS — F41.9 ANXIETY DISORDER, UNSPECIFIED: ICD-10-CM

## 2023-01-01 DIAGNOSIS — I08.1 RHEUMATIC DISORDERS OF BOTH MITRAL AND TRICUSPID VALVES: ICD-10-CM

## 2023-01-01 DIAGNOSIS — E04.9 NONTOXIC GOITER, UNSPECIFIED: ICD-10-CM

## 2023-01-01 DIAGNOSIS — A41.9 SEPSIS, UNSPECIFIED ORGANISM: ICD-10-CM

## 2023-01-01 DIAGNOSIS — Z96.653 PRESENCE OF ARTIFICIAL KNEE JOINT, BILATERAL: Chronic | ICD-10-CM
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
## 2023-01-01 DIAGNOSIS — Z99.81 DEPENDENCE ON SUPPLEMENTAL OXYGEN: ICD-10-CM

## 2023-01-01 DIAGNOSIS — L03.116 CELLULITIS OF LEFT LOWER LIMB: ICD-10-CM

## 2023-01-01 DIAGNOSIS — E87.0 HYPEROSMOLALITY AND HYPERNATREMIA: ICD-10-CM

## 2023-01-01 DIAGNOSIS — G47.33 OBSTRUCTIVE SLEEP APNEA (ADULT) (PEDIATRIC): ICD-10-CM

## 2023-01-01 DIAGNOSIS — I89.0 LYMPHEDEMA, NOT ELSEWHERE CLASSIFIED: ICD-10-CM

## 2023-01-01 DIAGNOSIS — C80.1 MALIGNANT (PRIMARY) NEOPLASM, UNSPECIFIED: ICD-10-CM

## 2023-01-01 DIAGNOSIS — Z96.653 PRESENCE OF ARTIFICIAL KNEE JOINT, BILATERAL: ICD-10-CM

## 2023-01-01 DIAGNOSIS — L97.929 VARICOSE VEINS OF RIGHT LOWER EXTREMITY WITH ULCER OF UNSPECIFIED SITE: ICD-10-CM

## 2023-01-01 DIAGNOSIS — I83.029 VARICOSE VEINS OF RIGHT LOWER EXTREMITY WITH ULCER OF UNSPECIFIED SITE: ICD-10-CM

## 2023-01-01 DIAGNOSIS — J15.69 PNEUMONIA DUE TO OTHER GRAM-NEGATIVE BACTERIA: ICD-10-CM

## 2023-01-01 DIAGNOSIS — I11.0 HYPERTENSIVE HEART DISEASE WITH HEART FAILURE: ICD-10-CM

## 2023-01-01 DIAGNOSIS — G93.41 METABOLIC ENCEPHALOPATHY: ICD-10-CM

## 2023-01-01 DIAGNOSIS — I87.2 VENOUS INSUFFICIENCY (CHRONIC) (PERIPHERAL): ICD-10-CM

## 2023-01-01 DIAGNOSIS — E66.9 OBESITY, UNSPECIFIED: ICD-10-CM

## 2023-01-01 DIAGNOSIS — J18.9 PNEUMONIA, UNSPECIFIED ORGANISM: ICD-10-CM

## 2023-01-01 DIAGNOSIS — J44.1 CHRONIC OBSTRUCTIVE PULMONARY DISEASE WITH (ACUTE) EXACERBATION: ICD-10-CM

## 2023-01-01 DIAGNOSIS — J96.02 ACUTE RESPIRATORY FAILURE WITH HYPERCAPNIA: ICD-10-CM

## 2023-01-01 DIAGNOSIS — I87.8 OTHER SPECIFIED DISORDERS OF VEINS: ICD-10-CM

## 2023-01-01 DIAGNOSIS — Z71.89 OTHER SPECIFIED COUNSELING: ICD-10-CM

## 2023-01-01 DIAGNOSIS — D64.9 ANEMIA, UNSPECIFIED: ICD-10-CM

## 2023-01-01 DIAGNOSIS — L03.314 CELLULITIS OF GROIN: ICD-10-CM

## 2023-01-01 DIAGNOSIS — D50.9 IRON DEFICIENCY ANEMIA, UNSPECIFIED: ICD-10-CM

## 2023-01-01 DIAGNOSIS — Z85.3 PERSONAL HISTORY OF MALIGNANT NEOPLASM OF BREAST: ICD-10-CM

## 2023-01-01 DIAGNOSIS — I48.20 CHRONIC ATRIAL FIBRILLATION, UNSPECIFIED: ICD-10-CM

## 2023-01-01 DIAGNOSIS — Z16.12 EXTENDED SPECTRUM BETA LACTAMASE (ESBL) RESISTANCE: ICD-10-CM

## 2023-01-01 DIAGNOSIS — E11.621 TYPE 2 DIABETES MELLITUS WITH FOOT ULCER: ICD-10-CM

## 2023-01-01 DIAGNOSIS — I83.019 VARICOSE VEINS OF RIGHT LOWER EXTREMITY WITH ULCER OF UNSPECIFIED SITE: ICD-10-CM

## 2023-01-01 DIAGNOSIS — L97.919 VARICOSE VEINS OF RIGHT LOWER EXTREMITY WITH ULCER OF UNSPECIFIED SITE: ICD-10-CM

## 2023-01-01 DIAGNOSIS — K59.00 CONSTIPATION, UNSPECIFIED: ICD-10-CM

## 2023-01-01 DIAGNOSIS — Z16.35 RESISTANCE TO MULTIPLE ANTIMICROBIAL DRUGS: ICD-10-CM

## 2023-01-01 DIAGNOSIS — Z96.89 PRESENCE OF OTHER SPECIFIED FUNCTIONAL IMPLANTS: ICD-10-CM

## 2023-01-01 DIAGNOSIS — I70.245 ATHEROSCLEROSIS OF NATIVE ARTERIES OF LEFT LEG WITH ULCERATION OF OTHER PART OF FOOT: ICD-10-CM

## 2023-01-01 DIAGNOSIS — Z91.041 RADIOGRAPHIC DYE ALLERGY STATUS: ICD-10-CM

## 2023-01-01 DIAGNOSIS — Z90.710 ACQUIRED ABSENCE OF BOTH CERVIX AND UTERUS: ICD-10-CM

## 2023-01-01 DIAGNOSIS — S31.809A UNSPECIFIED OPEN WOUND OF UNSPECIFIED BUTTOCK, INITIAL ENCOUNTER: ICD-10-CM

## 2023-01-01 DIAGNOSIS — Z66 DO NOT RESUSCITATE: ICD-10-CM

## 2023-01-01 DIAGNOSIS — A41.51 SEPSIS DUE TO ESCHERICHIA COLI [E. COLI]: ICD-10-CM

## 2023-01-01 DIAGNOSIS — Z86.718 PERSONAL HISTORY OF OTHER VENOUS THROMBOSIS AND EMBOLISM: ICD-10-CM

## 2023-01-01 DIAGNOSIS — B95.62 METHICILLIN RESISTANT STAPHYLOCOCCUS AUREUS INFECTION AS THE CAUSE OF DISEASES CLASSIFIED ELSEWHERE: ICD-10-CM

## 2023-01-01 DIAGNOSIS — S30.0XXA CONTUSION OF LOWER BACK AND PELVIS, INITIAL ENCOUNTER: ICD-10-CM

## 2023-01-01 DIAGNOSIS — S40.022A CONTUSION OF LEFT UPPER ARM, INITIAL ENCOUNTER: ICD-10-CM

## 2023-01-01 DIAGNOSIS — I50.33 ACUTE ON CHRONIC DIASTOLIC (CONGESTIVE) HEART FAILURE: ICD-10-CM

## 2023-01-01 DIAGNOSIS — J96.01 ACUTE RESPIRATORY FAILURE WITH HYPOXIA: ICD-10-CM

## 2023-01-01 DIAGNOSIS — E87.3 ALKALOSIS: ICD-10-CM

## 2023-01-01 DIAGNOSIS — Z86.14 PERSONAL HISTORY OF METHICILLIN RESISTANT STAPHYLOCOCCUS AUREUS INFECTION: ICD-10-CM

## 2023-01-01 DIAGNOSIS — I48.91 UNSPECIFIED ATRIAL FIBRILLATION: ICD-10-CM

## 2023-01-01 DIAGNOSIS — Z87.09 PERSONAL HISTORY OF OTHER DISEASES OF THE RESPIRATORY SYSTEM: ICD-10-CM

## 2023-01-01 DIAGNOSIS — I13.0 HYPERTENSIVE HEART AND CHRONIC KIDNEY DISEASE WITH HEART FAILURE AND STAGE 1 THROUGH STAGE 4 CHRONIC KIDNEY DISEASE, OR UNSPECIFIED CHRONIC KIDNEY DISEASE: ICD-10-CM

## 2023-01-01 DIAGNOSIS — L89.321 PRESSURE ULCER OF LEFT BUTTOCK, STAGE 1: ICD-10-CM

## 2023-01-01 DIAGNOSIS — R93.89 ABNORMAL FINDINGS ON DIAGNOSTIC IMAGING OF OTHER SPECIFIED BODY STRUCTURES: ICD-10-CM

## 2023-01-01 DIAGNOSIS — I82.621 ACUTE EMBOLISM AND THROMBOSIS OF DEEP VEINS OF RIGHT UPPER EXTREMITY: ICD-10-CM

## 2023-01-01 DIAGNOSIS — N17.9 ACUTE KIDNEY FAILURE, UNSPECIFIED: ICD-10-CM

## 2023-01-01 DIAGNOSIS — Z88.5 ALLERGY STATUS TO NARCOTIC AGENT: ICD-10-CM

## 2023-01-01 DIAGNOSIS — E43 UNSPECIFIED SEVERE PROTEIN-CALORIE MALNUTRITION: ICD-10-CM

## 2023-01-01 DIAGNOSIS — R06.2 WHEEZING: ICD-10-CM

## 2023-01-01 DIAGNOSIS — M72.2 PLANTAR FASCIAL FIBROMATOSIS: ICD-10-CM

## 2023-01-01 DIAGNOSIS — N39.0 URINARY TRACT INFECTION, SITE NOT SPECIFIED: ICD-10-CM

## 2023-01-01 DIAGNOSIS — M19.072 PRIMARY OSTEOARTHRITIS, LEFT ANKLE AND FOOT: ICD-10-CM

## 2023-01-01 DIAGNOSIS — I10 ESSENTIAL (PRIMARY) HYPERTENSION: ICD-10-CM

## 2023-01-01 DIAGNOSIS — Z87.891 PERSONAL HISTORY OF NICOTINE DEPENDENCE: ICD-10-CM

## 2023-01-01 DIAGNOSIS — L97.529 NON-PRESSURE CHRONIC ULCER OF OTHER PART OF LEFT FOOT WITH UNSPECIFIED SEVERITY: ICD-10-CM

## 2023-01-01 DIAGNOSIS — Z88.2 ALLERGY STATUS TO SULFONAMIDES: ICD-10-CM

## 2023-01-01 DIAGNOSIS — Z79.01 LONG TERM (CURRENT) USE OF ANTICOAGULANTS: ICD-10-CM

## 2023-01-01 DIAGNOSIS — Z79.52 LONG TERM (CURRENT) USE OF SYSTEMIC STEROIDS: ICD-10-CM

## 2023-01-01 DIAGNOSIS — J44.0 CHRONIC OBSTRUCTIVE PULMONARY DISEASE WITH (ACUTE) LOWER RESPIRATORY INFECTION: ICD-10-CM

## 2023-01-01 DIAGNOSIS — J44.9 CHRONIC OBSTRUCTIVE PULMONARY DISEASE, UNSPECIFIED: ICD-10-CM

## 2023-01-01 DIAGNOSIS — R65.21 SEVERE SEPSIS WITH SEPTIC SHOCK: ICD-10-CM

## 2023-01-01 DIAGNOSIS — E87.6 HYPOKALEMIA: ICD-10-CM

## 2023-01-01 DIAGNOSIS — Z88.0 ALLERGY STATUS TO PENICILLIN: ICD-10-CM

## 2023-01-01 DIAGNOSIS — I50.23 ACUTE ON CHRONIC SYSTOLIC (CONGESTIVE) HEART FAILURE: ICD-10-CM

## 2023-01-01 DIAGNOSIS — I50.32 CHRONIC DIASTOLIC (CONGESTIVE) HEART FAILURE: ICD-10-CM

## 2023-01-01 DIAGNOSIS — Z00.8 ENCOUNTER FOR OTHER GENERAL EXAMINATION: ICD-10-CM

## 2023-01-01 DIAGNOSIS — Z51.5 ENCOUNTER FOR PALLIATIVE CARE: ICD-10-CM

## 2023-01-01 DIAGNOSIS — Z74.01 BED CONFINEMENT STATUS: ICD-10-CM

## 2023-01-01 DIAGNOSIS — Z88.8 ALLERGY STATUS TO OTHER DRUGS, MEDICAMENTS AND BIOLOGICAL SUBSTANCES: ICD-10-CM

## 2023-01-01 DIAGNOSIS — I27.20 PULMONARY HYPERTENSION, UNSPECIFIED: ICD-10-CM

## 2023-01-01 DIAGNOSIS — L03.115 CELLULITIS OF RIGHT LOWER LIMB: ICD-10-CM

## 2023-01-01 DIAGNOSIS — Z87.2 PERSONAL HISTORY OF DISEASES OF THE SKIN AND SUBCUTANEOUS TISSUE: ICD-10-CM

## 2023-01-01 DIAGNOSIS — R74.8 ABNORMAL LEVELS OF OTHER SERUM ENZYMES: ICD-10-CM

## 2023-01-01 DIAGNOSIS — R41.82 ALTERED MENTAL STATUS, UNSPECIFIED: ICD-10-CM

## 2023-01-01 DIAGNOSIS — D50.8 OTHER IRON DEFICIENCY ANEMIAS: ICD-10-CM

## 2023-01-01 DIAGNOSIS — R06.02 SHORTNESS OF BREATH: ICD-10-CM

## 2023-01-01 DIAGNOSIS — I24.8 OTHER FORMS OF ACUTE ISCHEMIC HEART DISEASE: ICD-10-CM

## 2023-01-01 DIAGNOSIS — R78.81 BACTEREMIA: ICD-10-CM

## 2023-01-01 DIAGNOSIS — R10.33 PERIUMBILICAL PAIN: ICD-10-CM

## 2023-01-01 DIAGNOSIS — A41.59 OTHER GRAM-NEGATIVE SEPSIS: ICD-10-CM

## 2023-01-01 DIAGNOSIS — Z16.24 RESISTANCE TO MULTIPLE ANTIBIOTICS: ICD-10-CM

## 2023-01-01 DIAGNOSIS — Z86.79 PERSONAL HISTORY OF OTHER DISEASES OF THE CIRCULATORY SYSTEM: ICD-10-CM

## 2023-01-01 DIAGNOSIS — E87.29 OTHER ACIDOSIS: ICD-10-CM

## 2023-01-01 DIAGNOSIS — N18.31 CHRONIC KIDNEY DISEASE, STAGE 3A: ICD-10-CM

## 2023-01-01 DIAGNOSIS — L02.612 CUTANEOUS ABSCESS OF LEFT FOOT: ICD-10-CM

## 2023-01-01 DIAGNOSIS — R65.20 SEVERE SEPSIS WITHOUT SEPTIC SHOCK: ICD-10-CM

## 2023-01-01 DIAGNOSIS — N18.30 CHRONIC KIDNEY DISEASE, STAGE 3 UNSPECIFIED: ICD-10-CM

## 2023-01-01 DIAGNOSIS — J95.851 VENTILATOR ASSOCIATED PNEUMONIA: ICD-10-CM

## 2023-01-01 DIAGNOSIS — C79.9 SECONDARY MALIGNANT NEOPLASM OF UNSPECIFIED SITE: ICD-10-CM

## 2023-01-01 DIAGNOSIS — L89.159 PRESSURE ULCER OF SACRAL REGION, UNSPECIFIED STAGE: ICD-10-CM

## 2023-01-01 DIAGNOSIS — E11.628 TYPE 2 DIABETES MELLITUS WITH OTHER SKIN COMPLICATIONS: ICD-10-CM

## 2023-01-01 DIAGNOSIS — R19.5 OTHER FECAL ABNORMALITIES: ICD-10-CM

## 2023-01-01 DIAGNOSIS — H57.89 OTHER SPECIFIED DISORDERS OF EYE AND ADNEXA: ICD-10-CM

## 2023-01-01 DIAGNOSIS — E11.51 TYPE 2 DIABETES MELLITUS WITH DIABETIC PERIPHERAL ANGIOPATHY WITHOUT GANGRENE: ICD-10-CM

## 2023-01-01 DIAGNOSIS — L02.214 CUTANEOUS ABSCESS OF GROIN: ICD-10-CM

## 2023-01-01 DIAGNOSIS — R06.89 OTHER ABNORMALITIES OF BREATHING: ICD-10-CM

## 2023-01-01 DIAGNOSIS — Z29.9 ENCOUNTER FOR PROPHYLACTIC MEASURES, UNSPECIFIED: ICD-10-CM

## 2023-01-01 DIAGNOSIS — Z98.1 ARTHRODESIS STATUS: ICD-10-CM
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
## 2023-01-01 DIAGNOSIS — J81.0 ACUTE PULMONARY EDEMA: ICD-10-CM

## 2023-01-01 DIAGNOSIS — J96.22 ACUTE AND CHRONIC RESPIRATORY FAILURE WITH HYPERCAPNIA: ICD-10-CM

## 2023-01-01 DIAGNOSIS — F32.A ANXIETY DISORDER, UNSPECIFIED: ICD-10-CM

## 2023-01-01 DIAGNOSIS — M19.90 UNSPECIFIED OSTEOARTHRITIS, UNSPECIFIED SITE: ICD-10-CM

## 2023-01-01 DIAGNOSIS — Z79.899 OTHER LONG TERM (CURRENT) DRUG THERAPY: ICD-10-CM

## 2023-01-01 DIAGNOSIS — D62 ACUTE POSTHEMORRHAGIC ANEMIA: ICD-10-CM

## 2023-01-01 DIAGNOSIS — J44.0 CHRONIC OBSTRUCTIVE PULMONARY DISEASE WITH ACUTE LOWER RESPIRATORY INFECTION: ICD-10-CM

## 2023-01-01 DIAGNOSIS — R33.9 RETENTION OF URINE, UNSPECIFIED: ICD-10-CM

## 2023-01-01 LAB
-  AMIKACIN: SIGNIFICANT CHANGE UP
-  AMOXICILLIN/CLAVULANIC ACID: SIGNIFICANT CHANGE UP
-  AMPICILLIN/SULBACTAM: SIGNIFICANT CHANGE UP
-  AMPICILLIN: SIGNIFICANT CHANGE UP
-  AZTREONAM: SIGNIFICANT CHANGE UP
-  CARBAPENEM RESISTANCE: SIGNIFICANT CHANGE UP
-  CARBAPENEM RESISTANCE: SIGNIFICANT CHANGE UP
-  CEFAZOLIN: SIGNIFICANT CHANGE UP
-  CEFEPIME: SIGNIFICANT CHANGE UP
-  CEFOXITIN: SIGNIFICANT CHANGE UP
-  CEFTAZIDIME/AVIBACTAM: SIGNIFICANT CHANGE UP
-  CEFTAZIDIME/AVIBACTAM: SIGNIFICANT CHANGE UP
-  CEFTOLOZANE/TAZOBACTAM: SIGNIFICANT CHANGE UP
-  CEFTOLOZANE/TAZOBACTAM: SIGNIFICANT CHANGE UP
-  CEFTRIAXONE: SIGNIFICANT CHANGE UP
-  CEFUROXIME: SIGNIFICANT CHANGE UP
-  CIPROFLOXACIN: SIGNIFICANT CHANGE UP
-  CLINDAMYCIN: SIGNIFICANT CHANGE UP
-  CLINDAMYCIN: SIGNIFICANT CHANGE UP
-  CTX-M RESISTANCE MARKER: SIGNIFICANT CHANGE UP
-  DAPTOMYCIN: SIGNIFICANT CHANGE UP
-  DAPTOMYCIN: SIGNIFICANT CHANGE UP
-  ERTAPENEM: SIGNIFICANT CHANGE UP
-  ERYTHROMYCIN: SIGNIFICANT CHANGE UP
-  ERYTHROMYCIN: SIGNIFICANT CHANGE UP
-  ESBL: SIGNIFICANT CHANGE UP
-  GENTAMICIN: SIGNIFICANT CHANGE UP
-  IMIPENEM: SIGNIFICANT CHANGE UP
-  K. PNEUMONIAE GROUP: SIGNIFICANT CHANGE UP
-  K. PNEUMONIAE GROUP: SIGNIFICANT CHANGE UP
-  KPC RESISTANCE GENE: SIGNIFICANT CHANGE UP
-  KPC RESISTANCE GENE: SIGNIFICANT CHANGE UP
-  LEVOFLOXACIN: SIGNIFICANT CHANGE UP
-  LINEZOLID: SIGNIFICANT CHANGE UP
-  LINEZOLID: SIGNIFICANT CHANGE UP
-  MEROPENEM/VABORBACTAM: SIGNIFICANT CHANGE UP
-  MEROPENEM/VABORBACTAM: SIGNIFICANT CHANGE UP
-  MEROPENEM: SIGNIFICANT CHANGE UP
-  NITROFURANTOIN: SIGNIFICANT CHANGE UP
-  OXACILLIN: SIGNIFICANT CHANGE UP
-  OXACILLIN: SIGNIFICANT CHANGE UP
-  PENICILLIN: SIGNIFICANT CHANGE UP
-  PENICILLIN: SIGNIFICANT CHANGE UP
-  PIPERACILLIN/TAZOBACTAM: SIGNIFICANT CHANGE UP
-  RESISTANCE GENE KPC: SIGNIFICANT CHANGE UP
-  RESISTANCE GENE KPC: SIGNIFICANT CHANGE UP
-  RIFAMPIN: SIGNIFICANT CHANGE UP
-  RIFAMPIN: SIGNIFICANT CHANGE UP
-  TETRACYCLINE: SIGNIFICANT CHANGE UP
-  TOBRAMYCIN: SIGNIFICANT CHANGE UP
-  TRIMETHOPRIM/SULFAMETHOXAZOLE: SIGNIFICANT CHANGE UP
-  VANCOMYCIN: SIGNIFICANT CHANGE UP
A1C WITH ESTIMATED AVERAGE GLUCOSE RESULT: 5.2 % — SIGNIFICANT CHANGE UP (ref 4–5.6)
A1C WITH ESTIMATED AVERAGE GLUCOSE RESULT: 6 % — HIGH (ref 4–5.6)
A1C WITH ESTIMATED AVERAGE GLUCOSE RESULT: 6.1 % — HIGH (ref 4–5.6)
ALBUMIN FLD-MCNC: 1.2 G/DL — SIGNIFICANT CHANGE UP
ALBUMIN FLD-MCNC: 1.2 G/DL — SIGNIFICANT CHANGE UP
ALBUMIN FLD-MCNC: 1.7 G/DL — SIGNIFICANT CHANGE UP
ALBUMIN FLD-MCNC: 1.7 G/DL — SIGNIFICANT CHANGE UP
ALBUMIN SERPL ELPH-MCNC: 2.6 G/DL — LOW (ref 3.5–5.2)
ALBUMIN SERPL ELPH-MCNC: 2.7 G/DL — LOW (ref 3.5–5.2)
ALBUMIN SERPL ELPH-MCNC: 2.8 G/DL — LOW (ref 3.5–5.2)
ALBUMIN SERPL ELPH-MCNC: 2.9 G/DL — LOW (ref 3.5–5.2)
ALBUMIN SERPL ELPH-MCNC: 3 G/DL — LOW (ref 3.5–5.2)
ALBUMIN SERPL ELPH-MCNC: 3.1 G/DL — LOW (ref 3.5–5.2)
ALBUMIN SERPL ELPH-MCNC: 3.2 G/DL
ALBUMIN SERPL ELPH-MCNC: 3.2 G/DL — LOW (ref 3.5–5.2)
ALBUMIN SERPL ELPH-MCNC: 3.3 G/DL
ALBUMIN SERPL ELPH-MCNC: 3.3 G/DL — LOW (ref 3.5–5.2)
ALBUMIN SERPL ELPH-MCNC: 3.3 G/DL — LOW (ref 3.5–5.2)
ALBUMIN SERPL ELPH-MCNC: 3.4 G/DL
ALBUMIN SERPL ELPH-MCNC: 3.4 G/DL — LOW (ref 3.5–5.2)
ALBUMIN SERPL ELPH-MCNC: 3.5 G/DL — SIGNIFICANT CHANGE UP (ref 3.5–5.2)
ALBUMIN SERPL ELPH-MCNC: 3.6 G/DL
ALBUMIN SERPL ELPH-MCNC: 3.7 G/DL — SIGNIFICANT CHANGE UP (ref 3.5–5.2)
ALBUMIN SERPL ELPH-MCNC: 3.8 G/DL
ALBUMIN SERPL ELPH-MCNC: 3.8 G/DL — SIGNIFICANT CHANGE UP (ref 3.5–5.2)
ALBUMIN SERPL ELPH-MCNC: 3.8 G/DL — SIGNIFICANT CHANGE UP (ref 3.5–5.2)
ALP BLD-CCNC: 147 U/L
ALP BLD-CCNC: 155 U/L
ALP BLD-CCNC: 163 U/L
ALP BLD-CCNC: 209 U/L
ALP BLD-CCNC: 224 U/L
ALP SERPL-CCNC: 110 U/L — SIGNIFICANT CHANGE UP (ref 30–115)
ALP SERPL-CCNC: 120 U/L — HIGH (ref 30–115)
ALP SERPL-CCNC: 123 U/L — HIGH (ref 30–115)
ALP SERPL-CCNC: 125 U/L — HIGH (ref 30–115)
ALP SERPL-CCNC: 129 U/L — HIGH (ref 30–115)
ALP SERPL-CCNC: 131 U/L — HIGH (ref 30–115)
ALP SERPL-CCNC: 137 U/L — HIGH (ref 30–115)
ALP SERPL-CCNC: 137 U/L — HIGH (ref 30–115)
ALP SERPL-CCNC: 138 U/L — HIGH (ref 30–115)
ALP SERPL-CCNC: 138 U/L — HIGH (ref 30–115)
ALP SERPL-CCNC: 141 U/L — HIGH (ref 30–115)
ALP SERPL-CCNC: 142 U/L — HIGH (ref 30–115)
ALP SERPL-CCNC: 143 U/L — HIGH (ref 30–115)
ALP SERPL-CCNC: 144 U/L — HIGH (ref 30–115)
ALP SERPL-CCNC: 145 U/L — HIGH (ref 30–115)
ALP SERPL-CCNC: 148 U/L — HIGH (ref 30–115)
ALP SERPL-CCNC: 152 U/L — HIGH (ref 30–115)
ALP SERPL-CCNC: 153 U/L — HIGH (ref 30–115)
ALP SERPL-CCNC: 154 U/L — HIGH (ref 30–115)
ALP SERPL-CCNC: 156 U/L — HIGH (ref 30–115)
ALP SERPL-CCNC: 157 U/L — HIGH (ref 30–115)
ALP SERPL-CCNC: 157 U/L — HIGH (ref 30–115)
ALP SERPL-CCNC: 159 U/L — HIGH (ref 30–115)
ALP SERPL-CCNC: 159 U/L — HIGH (ref 30–115)
ALP SERPL-CCNC: 160 U/L — HIGH (ref 30–115)
ALP SERPL-CCNC: 161 U/L — HIGH (ref 30–115)
ALP SERPL-CCNC: 162 U/L — HIGH (ref 30–115)
ALP SERPL-CCNC: 164 U/L — HIGH (ref 30–115)
ALP SERPL-CCNC: 165 U/L — HIGH (ref 30–115)
ALP SERPL-CCNC: 166 U/L — HIGH (ref 30–115)
ALP SERPL-CCNC: 166 U/L — HIGH (ref 30–115)
ALP SERPL-CCNC: 167 U/L — HIGH (ref 30–115)
ALP SERPL-CCNC: 168 U/L — HIGH (ref 30–115)
ALP SERPL-CCNC: 168 U/L — HIGH (ref 30–115)
ALP SERPL-CCNC: 170 U/L — HIGH (ref 30–115)
ALP SERPL-CCNC: 170 U/L — HIGH (ref 30–115)
ALP SERPL-CCNC: 174 U/L — HIGH (ref 30–115)
ALP SERPL-CCNC: 175 U/L — HIGH (ref 30–115)
ALP SERPL-CCNC: 176 U/L — HIGH (ref 30–115)
ALP SERPL-CCNC: 179 U/L — HIGH (ref 30–115)
ALP SERPL-CCNC: 179 U/L — HIGH (ref 30–115)
ALP SERPL-CCNC: 181 U/L — HIGH (ref 30–115)
ALP SERPL-CCNC: 181 U/L — HIGH (ref 30–115)
ALP SERPL-CCNC: 182 U/L — HIGH (ref 30–115)
ALP SERPL-CCNC: 184 U/L — HIGH (ref 30–115)
ALP SERPL-CCNC: 187 U/L — HIGH (ref 30–115)
ALP SERPL-CCNC: 188 U/L — HIGH (ref 30–115)
ALP SERPL-CCNC: 195 U/L — HIGH (ref 30–115)
ALP SERPL-CCNC: 195 U/L — HIGH (ref 30–115)
ALP SERPL-CCNC: 196 U/L — HIGH (ref 30–115)
ALP SERPL-CCNC: 196 U/L — HIGH (ref 30–115)
ALP SERPL-CCNC: 207 U/L — HIGH (ref 30–115)
ALP SERPL-CCNC: 212 U/L — HIGH (ref 30–115)
ALP SERPL-CCNC: 215 U/L — HIGH (ref 30–115)
ALP SERPL-CCNC: 222 U/L — HIGH (ref 30–115)
ALP SERPL-CCNC: 243 U/L — HIGH (ref 30–115)
ALP SERPL-CCNC: 246 U/L — HIGH (ref 30–115)
ALP SERPL-CCNC: 250 U/L — HIGH (ref 30–115)
ALP SERPL-CCNC: 289 U/L — HIGH (ref 30–115)
ALP SERPL-CCNC: 289 U/L — HIGH (ref 30–115)
ALP SERPL-CCNC: 297 U/L — HIGH (ref 30–115)
ALP SERPL-CCNC: 297 U/L — HIGH (ref 30–115)
ALP SERPL-CCNC: 321 U/L — HIGH (ref 30–115)
ALP SERPL-CCNC: 321 U/L — HIGH (ref 30–115)
ALP SERPL-CCNC: 330 U/L — HIGH (ref 30–115)
ALP SERPL-CCNC: 330 U/L — HIGH (ref 30–115)
ALP SERPL-CCNC: 333 U/L — HIGH (ref 30–115)
ALP SERPL-CCNC: 333 U/L — HIGH (ref 30–115)
ALP SERPL-CCNC: 337 U/L — HIGH (ref 30–115)
ALP SERPL-CCNC: 337 U/L — HIGH (ref 30–115)
ALP SERPL-CCNC: 345 U/L — HIGH (ref 30–115)
ALP SERPL-CCNC: 345 U/L — HIGH (ref 30–115)
ALP SERPL-CCNC: 359 U/L — HIGH (ref 30–115)
ALP SERPL-CCNC: 359 U/L — HIGH (ref 30–115)
ALP SERPL-CCNC: 363 U/L — HIGH (ref 30–115)
ALP SERPL-CCNC: 363 U/L — HIGH (ref 30–115)
ALP SERPL-CCNC: 364 U/L — HIGH (ref 30–115)
ALP SERPL-CCNC: 364 U/L — HIGH (ref 30–115)
ALP SERPL-CCNC: 370 U/L — HIGH (ref 30–115)
ALP SERPL-CCNC: 370 U/L — HIGH (ref 30–115)
ALP SERPL-CCNC: 373 U/L — HIGH (ref 30–115)
ALP SERPL-CCNC: 379 U/L — HIGH (ref 30–115)
ALP SERPL-CCNC: 379 U/L — HIGH (ref 30–115)
ALP SERPL-CCNC: 397 U/L — HIGH (ref 30–115)
ALP SERPL-CCNC: 397 U/L — HIGH (ref 30–115)
ALP SERPL-CCNC: 427 U/L — HIGH (ref 30–115)
ALP SERPL-CCNC: 427 U/L — HIGH (ref 30–115)
ALP SERPL-CCNC: 78 U/L — SIGNIFICANT CHANGE UP (ref 30–115)
ALP SERPL-CCNC: 78 U/L — SIGNIFICANT CHANGE UP (ref 30–115)
ALP SERPL-CCNC: 89 U/L — SIGNIFICANT CHANGE UP (ref 30–115)
ALP SERPL-CCNC: 94 U/L — SIGNIFICANT CHANGE UP (ref 30–115)
ALT FLD-CCNC: 10 U/L — SIGNIFICANT CHANGE UP (ref 0–41)
ALT FLD-CCNC: 11 U/L — SIGNIFICANT CHANGE UP (ref 0–41)
ALT FLD-CCNC: 12 U/L — SIGNIFICANT CHANGE UP (ref 0–41)
ALT FLD-CCNC: 13 U/L — SIGNIFICANT CHANGE UP (ref 0–41)
ALT FLD-CCNC: 14 U/L — SIGNIFICANT CHANGE UP (ref 0–41)
ALT FLD-CCNC: 15 U/L — SIGNIFICANT CHANGE UP (ref 0–41)
ALT FLD-CCNC: 16 U/L — SIGNIFICANT CHANGE UP (ref 0–41)
ALT FLD-CCNC: 16 U/L — SIGNIFICANT CHANGE UP (ref 0–41)
ALT FLD-CCNC: 17 U/L — SIGNIFICANT CHANGE UP (ref 0–41)
ALT FLD-CCNC: 17 U/L — SIGNIFICANT CHANGE UP (ref 0–41)
ALT FLD-CCNC: 18 U/L — SIGNIFICANT CHANGE UP (ref 0–41)
ALT FLD-CCNC: 18 U/L — SIGNIFICANT CHANGE UP (ref 0–41)
ALT FLD-CCNC: 19 U/L — SIGNIFICANT CHANGE UP (ref 0–41)
ALT FLD-CCNC: 20 U/L — SIGNIFICANT CHANGE UP (ref 0–41)
ALT FLD-CCNC: 22 U/L — SIGNIFICANT CHANGE UP (ref 0–41)
ALT FLD-CCNC: 23 U/L — SIGNIFICANT CHANGE UP (ref 0–41)
ALT FLD-CCNC: 24 U/L — SIGNIFICANT CHANGE UP (ref 0–41)
ALT FLD-CCNC: 25 U/L — SIGNIFICANT CHANGE UP (ref 0–41)
ALT FLD-CCNC: 27 U/L — SIGNIFICANT CHANGE UP (ref 0–41)
ALT FLD-CCNC: 27 U/L — SIGNIFICANT CHANGE UP (ref 0–41)
ALT FLD-CCNC: 30 U/L — SIGNIFICANT CHANGE UP (ref 0–41)
ALT FLD-CCNC: 30 U/L — SIGNIFICANT CHANGE UP (ref 0–41)
ALT FLD-CCNC: 31 U/L — SIGNIFICANT CHANGE UP (ref 0–41)
ALT FLD-CCNC: 32 U/L — SIGNIFICANT CHANGE UP (ref 0–41)
ALT FLD-CCNC: 33 U/L — SIGNIFICANT CHANGE UP (ref 0–41)
ALT FLD-CCNC: 37 U/L — SIGNIFICANT CHANGE UP (ref 0–41)
ALT FLD-CCNC: 37 U/L — SIGNIFICANT CHANGE UP (ref 0–41)
ALT FLD-CCNC: 38 U/L — SIGNIFICANT CHANGE UP (ref 0–41)
ALT FLD-CCNC: 38 U/L — SIGNIFICANT CHANGE UP (ref 0–41)
ALT FLD-CCNC: 40 U/L — SIGNIFICANT CHANGE UP (ref 0–41)
ALT FLD-CCNC: 42 U/L — HIGH (ref 0–41)
ALT FLD-CCNC: 43 U/L — HIGH (ref 0–41)
ALT FLD-CCNC: 43 U/L — HIGH (ref 0–41)
ALT FLD-CCNC: 49 U/L — HIGH (ref 0–41)
ALT FLD-CCNC: 49 U/L — HIGH (ref 0–41)
ALT FLD-CCNC: 5 U/L — SIGNIFICANT CHANGE UP (ref 0–41)
ALT FLD-CCNC: 6 U/L — SIGNIFICANT CHANGE UP (ref 0–41)
ALT FLD-CCNC: 7 U/L — SIGNIFICANT CHANGE UP (ref 0–41)
ALT FLD-CCNC: 8 U/L — SIGNIFICANT CHANGE UP (ref 0–41)
ALT FLD-CCNC: 9 U/L — SIGNIFICANT CHANGE UP (ref 0–41)
ALT FLD-CCNC: <5 U/L — SIGNIFICANT CHANGE UP (ref 0–41)
ALT SERPL-CCNC: 10 U/L
ALT SERPL-CCNC: 13 U/L
ALT SERPL-CCNC: 6 U/L
ANION GAP SERPL CALC-SCNC: 10 MMOL/L — SIGNIFICANT CHANGE UP (ref 7–14)
ANION GAP SERPL CALC-SCNC: 11 MMOL/L
ANION GAP SERPL CALC-SCNC: 11 MMOL/L — SIGNIFICANT CHANGE UP (ref 7–14)
ANION GAP SERPL CALC-SCNC: 12 MMOL/L — SIGNIFICANT CHANGE UP (ref 7–14)
ANION GAP SERPL CALC-SCNC: 13 MMOL/L
ANION GAP SERPL CALC-SCNC: 13 MMOL/L — SIGNIFICANT CHANGE UP (ref 7–14)
ANION GAP SERPL CALC-SCNC: 13 MMOL/L — SIGNIFICANT CHANGE UP (ref 7–14)
ANION GAP SERPL CALC-SCNC: 14 MMOL/L
ANION GAP SERPL CALC-SCNC: 14 MMOL/L
ANION GAP SERPL CALC-SCNC: 14 MMOL/L — SIGNIFICANT CHANGE UP (ref 7–14)
ANION GAP SERPL CALC-SCNC: 14 MMOL/L — SIGNIFICANT CHANGE UP (ref 7–14)
ANION GAP SERPL CALC-SCNC: 15 MMOL/L
ANION GAP SERPL CALC-SCNC: 15 MMOL/L — HIGH (ref 7–14)
ANION GAP SERPL CALC-SCNC: 17 MMOL/L — HIGH (ref 7–14)
ANION GAP SERPL CALC-SCNC: 17 MMOL/L — HIGH (ref 7–14)
ANION GAP SERPL CALC-SCNC: 18 MMOL/L — HIGH (ref 7–14)
ANION GAP SERPL CALC-SCNC: 18 MMOL/L — HIGH (ref 7–14)
ANION GAP SERPL CALC-SCNC: 2 MMOL/L — LOW (ref 7–14)
ANION GAP SERPL CALC-SCNC: 4 MMOL/L — LOW (ref 7–14)
ANION GAP SERPL CALC-SCNC: 5 MMOL/L — LOW (ref 7–14)
ANION GAP SERPL CALC-SCNC: 5 MMOL/L — LOW (ref 7–14)
ANION GAP SERPL CALC-SCNC: 6 MMOL/L — LOW (ref 7–14)
ANION GAP SERPL CALC-SCNC: 7 MMOL/L — SIGNIFICANT CHANGE UP (ref 7–14)
ANION GAP SERPL CALC-SCNC: 8 MMOL/L — SIGNIFICANT CHANGE UP (ref 7–14)
ANION GAP SERPL CALC-SCNC: 9 MMOL/L — SIGNIFICANT CHANGE UP (ref 7–14)
ANISOCYTOSIS BLD QL: SLIGHT — SIGNIFICANT CHANGE UP
APPEARANCE UR: ABNORMAL
APPEARANCE UR: CLEAR — SIGNIFICANT CHANGE UP
APTT BLD: 119.2 SEC — CRITICAL HIGH (ref 27–39.2)
APTT BLD: 124.7 SEC — CRITICAL HIGH (ref 27–39.2)
APTT BLD: 124.7 SEC — CRITICAL HIGH (ref 27–39.2)
APTT BLD: 133.3 SEC — CRITICAL HIGH (ref 27–39.2)
APTT BLD: 192.9 SEC — CRITICAL HIGH (ref 27–39.2)
APTT BLD: 25.4 SEC — LOW (ref 27–39.2)
APTT BLD: 27.2 SEC — SIGNIFICANT CHANGE UP (ref 27–39.2)
APTT BLD: 27.9 SEC — SIGNIFICANT CHANGE UP (ref 27–39.2)
APTT BLD: 31.1 SEC — SIGNIFICANT CHANGE UP (ref 27–39.2)
APTT BLD: 31.3 SEC — SIGNIFICANT CHANGE UP (ref 27–39.2)
APTT BLD: 31.8 SEC — SIGNIFICANT CHANGE UP (ref 27–39.2)
APTT BLD: 33.1 SEC — SIGNIFICANT CHANGE UP (ref 27–39.2)
APTT BLD: 33.5 SEC — SIGNIFICANT CHANGE UP (ref 27–39.2)
APTT BLD: 33.7 SEC — SIGNIFICANT CHANGE UP (ref 27–39.2)
APTT BLD: 33.9 SEC — SIGNIFICANT CHANGE UP (ref 27–39.2)
APTT BLD: 34.9 SEC — SIGNIFICANT CHANGE UP (ref 27–39.2)
APTT BLD: 34.9 SEC — SIGNIFICANT CHANGE UP (ref 27–39.2)
APTT BLD: 36.7 SEC — SIGNIFICANT CHANGE UP (ref 27–39.2)
APTT BLD: 38.1 SEC — SIGNIFICANT CHANGE UP (ref 27–39.2)
APTT BLD: 38.1 SEC — SIGNIFICANT CHANGE UP (ref 27–39.2)
APTT BLD: 38.2 SEC — SIGNIFICANT CHANGE UP (ref 27–39.2)
APTT BLD: 38.2 SEC — SIGNIFICANT CHANGE UP (ref 27–39.2)
APTT BLD: 53.8 SEC — HIGH (ref 27–39.2)
APTT BLD: 53.8 SEC — HIGH (ref 27–39.2)
APTT BLD: 55.6 SEC — HIGH (ref 27–39.2)
APTT BLD: 56 SEC — HIGH (ref 27–39.2)
APTT BLD: 56.8 SEC — HIGH (ref 27–39.2)
APTT BLD: 57.4 SEC — HIGH (ref 27–39.2)
APTT BLD: 57.7 SEC — HIGH (ref 27–39.2)
APTT BLD: 61.3 SEC — HIGH (ref 27–39.2)
APTT BLD: 61.3 SEC — HIGH (ref 27–39.2)
APTT BLD: 65 SEC — HIGH (ref 27–39.2)
APTT BLD: 70.3 SEC — CRITICAL HIGH (ref 27–39.2)
APTT BLD: 88.8 SEC — CRITICAL HIGH (ref 27–39.2)
APTT BLD: 88.8 SEC — CRITICAL HIGH (ref 27–39.2)
APTT BLD: 89.9 SEC — CRITICAL HIGH (ref 27–39.2)
AST SERPL-CCNC: 10 U/L — SIGNIFICANT CHANGE UP (ref 0–41)
AST SERPL-CCNC: 10 U/L — SIGNIFICANT CHANGE UP (ref 0–41)
AST SERPL-CCNC: 11 U/L — SIGNIFICANT CHANGE UP (ref 0–41)
AST SERPL-CCNC: 12 U/L — SIGNIFICANT CHANGE UP (ref 0–41)
AST SERPL-CCNC: 13 U/L — SIGNIFICANT CHANGE UP (ref 0–41)
AST SERPL-CCNC: 14 U/L
AST SERPL-CCNC: 14 U/L — SIGNIFICANT CHANGE UP (ref 0–41)
AST SERPL-CCNC: 15 U/L — SIGNIFICANT CHANGE UP (ref 0–41)
AST SERPL-CCNC: 15 U/L — SIGNIFICANT CHANGE UP (ref 0–41)
AST SERPL-CCNC: 16 U/L
AST SERPL-CCNC: 16 U/L — SIGNIFICANT CHANGE UP (ref 0–41)
AST SERPL-CCNC: 17 U/L
AST SERPL-CCNC: 17 U/L — SIGNIFICANT CHANGE UP (ref 0–41)
AST SERPL-CCNC: 18 U/L
AST SERPL-CCNC: 18 U/L — SIGNIFICANT CHANGE UP (ref 0–41)
AST SERPL-CCNC: 19 U/L — SIGNIFICANT CHANGE UP (ref 0–41)
AST SERPL-CCNC: 20 U/L — SIGNIFICANT CHANGE UP (ref 0–41)
AST SERPL-CCNC: 20 U/L — SIGNIFICANT CHANGE UP (ref 0–41)
AST SERPL-CCNC: 21 U/L
AST SERPL-CCNC: 21 U/L — SIGNIFICANT CHANGE UP (ref 0–41)
AST SERPL-CCNC: 22 U/L — SIGNIFICANT CHANGE UP (ref 0–41)
AST SERPL-CCNC: 23 U/L — SIGNIFICANT CHANGE UP (ref 0–41)
AST SERPL-CCNC: 24 U/L — SIGNIFICANT CHANGE UP (ref 0–41)
AST SERPL-CCNC: 24 U/L — SIGNIFICANT CHANGE UP (ref 0–41)
AST SERPL-CCNC: 25 U/L — SIGNIFICANT CHANGE UP (ref 0–41)
AST SERPL-CCNC: 25 U/L — SIGNIFICANT CHANGE UP (ref 0–41)
AST SERPL-CCNC: 28 U/L — SIGNIFICANT CHANGE UP (ref 0–41)
AST SERPL-CCNC: 28 U/L — SIGNIFICANT CHANGE UP (ref 0–41)
AST SERPL-CCNC: 29 U/L — SIGNIFICANT CHANGE UP (ref 0–41)
AST SERPL-CCNC: 29 U/L — SIGNIFICANT CHANGE UP (ref 0–41)
AST SERPL-CCNC: 30 U/L — SIGNIFICANT CHANGE UP (ref 0–41)
AST SERPL-CCNC: 30 U/L — SIGNIFICANT CHANGE UP (ref 0–41)
AST SERPL-CCNC: 32 U/L — SIGNIFICANT CHANGE UP (ref 0–41)
AST SERPL-CCNC: 33 U/L — SIGNIFICANT CHANGE UP (ref 0–41)
AST SERPL-CCNC: 35 U/L — SIGNIFICANT CHANGE UP (ref 0–41)
AST SERPL-CCNC: 35 U/L — SIGNIFICANT CHANGE UP (ref 0–41)
AST SERPL-CCNC: 37 U/L — SIGNIFICANT CHANGE UP (ref 0–41)
AST SERPL-CCNC: 38 U/L — SIGNIFICANT CHANGE UP (ref 0–41)
AST SERPL-CCNC: 40 U/L — SIGNIFICANT CHANGE UP (ref 0–41)
AST SERPL-CCNC: 40 U/L — SIGNIFICANT CHANGE UP (ref 0–41)
AST SERPL-CCNC: 45 U/L — HIGH (ref 0–41)
AST SERPL-CCNC: 46 U/L — HIGH (ref 0–41)
AST SERPL-CCNC: 50 U/L — HIGH (ref 0–41)
AST SERPL-CCNC: 50 U/L — HIGH (ref 0–41)
AST SERPL-CCNC: 51 U/L — HIGH (ref 0–41)
AST SERPL-CCNC: 53 U/L — HIGH (ref 0–41)
AST SERPL-CCNC: 54 U/L — HIGH (ref 0–41)
AST SERPL-CCNC: 81 U/L — HIGH (ref 0–41)
B PERT IGG+IGM PNL SER: ABNORMAL
B PERT IGG+IGM PNL SER: ABNORMAL
B PERT IGG+IGM PNL SER: CLEAR — SIGNIFICANT CHANGE UP
B PERT IGG+IGM PNL SER: CLEAR — SIGNIFICANT CHANGE UP
BACTERIA # UR AUTO: ABNORMAL
BACTERIA # UR AUTO: ABNORMAL /HPF
BASE EXCESS BLDA CALC-SCNC: 10.2 MMOL/L — HIGH (ref -2–3)
BASE EXCESS BLDA CALC-SCNC: 10.5 MMOL/L — HIGH (ref -2–3)
BASE EXCESS BLDA CALC-SCNC: 11 MMOL/L — HIGH (ref -2–3)
BASE EXCESS BLDA CALC-SCNC: 11 MMOL/L — HIGH (ref -2–3)
BASE EXCESS BLDA CALC-SCNC: 11.6 MMOL/L — HIGH (ref -2–3)
BASE EXCESS BLDA CALC-SCNC: 11.6 MMOL/L — HIGH (ref -2–3)
BASE EXCESS BLDA CALC-SCNC: 12.3 MMOL/L — HIGH (ref -2–3)
BASE EXCESS BLDA CALC-SCNC: 14.9 MMOL/L — HIGH (ref -2–3)
BASE EXCESS BLDA CALC-SCNC: 16 MMOL/L — HIGH (ref -2–3)
BASE EXCESS BLDA CALC-SCNC: 16.5 MMOL/L — HIGH (ref -2–3)
BASE EXCESS BLDA CALC-SCNC: 16.9 MMOL/L — HIGH (ref -2–3)
BASE EXCESS BLDA CALC-SCNC: 4 MMOL/L — HIGH (ref -2–3)
BASE EXCESS BLDA CALC-SCNC: 5 MMOL/L — HIGH (ref -2–3)
BASE EXCESS BLDA CALC-SCNC: 6 MMOL/L — HIGH (ref -2–3)
BASE EXCESS BLDA CALC-SCNC: 6.4 MMOL/L — HIGH (ref -2–3)
BASE EXCESS BLDA CALC-SCNC: 6.4 MMOL/L — HIGH (ref -2–3)
BASE EXCESS BLDA CALC-SCNC: 7.2 MMOL/L — HIGH (ref -2–3)
BASE EXCESS BLDA CALC-SCNC: 7.2 MMOL/L — HIGH (ref -2–3)
BASE EXCESS BLDA CALC-SCNC: 7.5 MMOL/L — HIGH (ref -2–3)
BASE EXCESS BLDA CALC-SCNC: 7.6 MMOL/L — HIGH (ref -2–3)
BASE EXCESS BLDA CALC-SCNC: 8.2 MMOL/L — HIGH (ref -2–3)
BASE EXCESS BLDA CALC-SCNC: 8.3 MMOL/L — HIGH (ref -2–3)
BASE EXCESS BLDA CALC-SCNC: 8.3 MMOL/L — HIGH (ref -2–3)
BASE EXCESS BLDA CALC-SCNC: 9.7 MMOL/L — HIGH (ref -2–3)
BASE EXCESS BLDA CALC-SCNC: 9.7 MMOL/L — HIGH (ref -2–3)
BASE EXCESS BLDV CALC-SCNC: 10.4 MMOL/L — HIGH (ref -2–3)
BASE EXCESS BLDV CALC-SCNC: 12.2 MMOL/L — HIGH (ref -2–3)
BASE EXCESS BLDV CALC-SCNC: 3.3 MMOL/L — HIGH (ref -2–3)
BASE EXCESS BLDV CALC-SCNC: 4.3 MMOL/L — HIGH (ref -2–3)
BASE EXCESS BLDV CALC-SCNC: 4.3 MMOL/L — HIGH (ref -2–3)
BASE EXCESS BLDV CALC-SCNC: 4.6 MMOL/L — HIGH (ref -2–3)
BASE EXCESS BLDV CALC-SCNC: 4.6 MMOL/L — HIGH (ref -2–3)
BASE EXCESS BLDV CALC-SCNC: 5.6 MMOL/L — HIGH (ref -2–3)
BASE EXCESS BLDV CALC-SCNC: 7.7 MMOL/L — HIGH (ref -2–3)
BASOPHILS # BLD AUTO: 0 K/UL — SIGNIFICANT CHANGE UP (ref 0–0.2)
BASOPHILS # BLD AUTO: 0.01 K/UL — SIGNIFICANT CHANGE UP (ref 0–0.2)
BASOPHILS # BLD AUTO: 0.02 K/UL — SIGNIFICANT CHANGE UP (ref 0–0.2)
BASOPHILS # BLD AUTO: 0.03 K/UL — SIGNIFICANT CHANGE UP (ref 0–0.2)
BASOPHILS # BLD AUTO: 0.04 K/UL — SIGNIFICANT CHANGE UP (ref 0–0.2)
BASOPHILS # BLD AUTO: 0.05 K/UL — SIGNIFICANT CHANGE UP (ref 0–0.2)
BASOPHILS # BLD AUTO: 0.06 K/UL — SIGNIFICANT CHANGE UP (ref 0–0.2)
BASOPHILS # BLD AUTO: 0.1 K/UL — SIGNIFICANT CHANGE UP (ref 0–0.2)
BASOPHILS # BLD AUTO: 0.1 K/UL — SIGNIFICANT CHANGE UP (ref 0–0.2)
BASOPHILS # BLD AUTO: 0.12 K/UL — SIGNIFICANT CHANGE UP (ref 0–0.2)
BASOPHILS # BLD AUTO: 0.12 K/UL — SIGNIFICANT CHANGE UP (ref 0–0.2)
BASOPHILS # BLD AUTO: 0.17 K/UL — SIGNIFICANT CHANGE UP (ref 0–0.2)
BASOPHILS # BLD AUTO: 0.17 K/UL — SIGNIFICANT CHANGE UP (ref 0–0.2)
BASOPHILS NFR BLD AUTO: 0 % — SIGNIFICANT CHANGE UP (ref 0–1)
BASOPHILS NFR BLD AUTO: 0.1 % — SIGNIFICANT CHANGE UP (ref 0–1)
BASOPHILS NFR BLD AUTO: 0.2 % — SIGNIFICANT CHANGE UP (ref 0–1)
BASOPHILS NFR BLD AUTO: 0.3 % — SIGNIFICANT CHANGE UP (ref 0–1)
BASOPHILS NFR BLD AUTO: 0.4 % — SIGNIFICANT CHANGE UP (ref 0–1)
BASOPHILS NFR BLD AUTO: 0.5 % — SIGNIFICANT CHANGE UP (ref 0–1)
BASOPHILS NFR BLD AUTO: 0.7 % — SIGNIFICANT CHANGE UP (ref 0–1)
BCR/ABL BY RT - PCR QUANTITATIVE: SIGNIFICANT CHANGE UP
BCR/ABL BY RT - PCR QUANTITATIVE: SIGNIFICANT CHANGE UP
BILIRUB DIRECT SERPL-MCNC: 0.2 MG/DL — SIGNIFICANT CHANGE UP (ref 0–0.3)
BILIRUB DIRECT SERPL-MCNC: 0.2 MG/DL — SIGNIFICANT CHANGE UP (ref 0–0.3)
BILIRUB DIRECT SERPL-MCNC: <0.2 MG/DL — SIGNIFICANT CHANGE UP (ref 0–0.3)
BILIRUB DIRECT SERPL-MCNC: <0.2 MG/DL — SIGNIFICANT CHANGE UP (ref 0–0.3)
BILIRUB INDIRECT FLD-MCNC: 0.3 MG/DL — SIGNIFICANT CHANGE UP (ref 0.2–1.2)
BILIRUB INDIRECT FLD-MCNC: 0.3 MG/DL — SIGNIFICANT CHANGE UP (ref 0.2–1.2)
BILIRUB INDIRECT FLD-MCNC: >0.1 MG/DL — LOW (ref 0.2–1.2)
BILIRUB INDIRECT FLD-MCNC: >0.1 MG/DL — LOW (ref 0.2–1.2)
BILIRUB SERPL-MCNC: 0.2 MG/DL — SIGNIFICANT CHANGE UP (ref 0.2–1.2)
BILIRUB SERPL-MCNC: 0.3 MG/DL
BILIRUB SERPL-MCNC: 0.3 MG/DL — SIGNIFICANT CHANGE UP (ref 0.2–1.2)
BILIRUB SERPL-MCNC: 0.4 MG/DL
BILIRUB SERPL-MCNC: 0.4 MG/DL
BILIRUB SERPL-MCNC: 0.4 MG/DL — SIGNIFICANT CHANGE UP (ref 0.2–1.2)
BILIRUB SERPL-MCNC: 0.5 MG/DL — SIGNIFICANT CHANGE UP (ref 0.2–1.2)
BILIRUB SERPL-MCNC: 0.6 MG/DL — SIGNIFICANT CHANGE UP (ref 0.2–1.2)
BILIRUB SERPL-MCNC: 0.7 MG/DL — SIGNIFICANT CHANGE UP (ref 0.2–1.2)
BILIRUB SERPL-MCNC: 0.8 MG/DL — SIGNIFICANT CHANGE UP (ref 0.2–1.2)
BILIRUB SERPL-MCNC: 0.9 MG/DL — SIGNIFICANT CHANGE UP (ref 0.2–1.2)
BILIRUB SERPL-MCNC: 1 MG/DL — SIGNIFICANT CHANGE UP (ref 0.2–1.2)
BILIRUB SERPL-MCNC: 1.2 MG/DL — SIGNIFICANT CHANGE UP (ref 0.2–1.2)
BILIRUB SERPL-MCNC: 6 MG/DL — HIGH (ref 0.2–1.2)
BILIRUB SERPL-MCNC: <0.2 MG/DL — SIGNIFICANT CHANGE UP (ref 0.2–1.2)
BILIRUB SERPL-MCNC: <0.2 MG/DL — SIGNIFICANT CHANGE UP (ref 0.2–1.2)
BILIRUB SERPL-MCNC: SIGNIFICANT CHANGE UP MG/DL (ref 0.2–1.2)
BILIRUB SERPL-MCNC: SIGNIFICANT CHANGE UP MG/DL (ref 0.2–1.2)
BILIRUB UR-MCNC: NEGATIVE — SIGNIFICANT CHANGE UP
BLANDM BLD POS QL PROBE: SIGNIFICANT CHANGE UP
BLANDM BLD POS QL PROBE: SIGNIFICANT CHANGE UP
BLD GP AB SCN SERPL QL: SIGNIFICANT CHANGE UP
BUN SERPL-MCNC: 19 MG/DL — SIGNIFICANT CHANGE UP (ref 10–20)
BUN SERPL-MCNC: 19 MG/DL — SIGNIFICANT CHANGE UP (ref 10–20)
BUN SERPL-MCNC: 20 MG/DL — SIGNIFICANT CHANGE UP (ref 10–20)
BUN SERPL-MCNC: 20 MG/DL — SIGNIFICANT CHANGE UP (ref 10–20)
BUN SERPL-MCNC: 21 MG/DL
BUN SERPL-MCNC: 21 MG/DL — HIGH (ref 10–20)
BUN SERPL-MCNC: 22 MG/DL — HIGH (ref 10–20)
BUN SERPL-MCNC: 22 MG/DL — HIGH (ref 10–20)
BUN SERPL-MCNC: 23 MG/DL
BUN SERPL-MCNC: 23 MG/DL
BUN SERPL-MCNC: 23 MG/DL — HIGH (ref 10–20)
BUN SERPL-MCNC: 24 MG/DL
BUN SERPL-MCNC: 24 MG/DL — HIGH (ref 10–20)
BUN SERPL-MCNC: 25 MG/DL — HIGH (ref 10–20)
BUN SERPL-MCNC: 26 MG/DL — HIGH (ref 10–20)
BUN SERPL-MCNC: 27 MG/DL — HIGH (ref 10–20)
BUN SERPL-MCNC: 28 MG/DL — HIGH (ref 10–20)
BUN SERPL-MCNC: 29 MG/DL — HIGH (ref 10–20)
BUN SERPL-MCNC: 30 MG/DL
BUN SERPL-MCNC: 30 MG/DL — HIGH (ref 10–20)
BUN SERPL-MCNC: 31 MG/DL — HIGH (ref 10–20)
BUN SERPL-MCNC: 32 MG/DL — HIGH (ref 10–20)
BUN SERPL-MCNC: 33 MG/DL — HIGH (ref 10–20)
BUN SERPL-MCNC: 34 MG/DL — HIGH (ref 10–20)
BUN SERPL-MCNC: 35 MG/DL — HIGH (ref 10–20)
BUN SERPL-MCNC: 36 MG/DL — HIGH (ref 10–20)
BUN SERPL-MCNC: 37 MG/DL — HIGH (ref 10–20)
BUN SERPL-MCNC: 38 MG/DL — HIGH (ref 10–20)
BUN SERPL-MCNC: 39 MG/DL — HIGH (ref 10–20)
BUN SERPL-MCNC: 39 MG/DL — HIGH (ref 10–20)
BUN SERPL-MCNC: 40 MG/DL — HIGH (ref 10–20)
BUN SERPL-MCNC: 41 MG/DL — HIGH (ref 10–20)
BUN SERPL-MCNC: 42 MG/DL — HIGH (ref 10–20)
BUN SERPL-MCNC: 42 MG/DL — HIGH (ref 10–20)
BUN SERPL-MCNC: 43 MG/DL — HIGH (ref 10–20)
BUN SERPL-MCNC: 43 MG/DL — HIGH (ref 10–20)
BUN SERPL-MCNC: 44 MG/DL — HIGH (ref 10–20)
BUN SERPL-MCNC: 45 MG/DL — HIGH (ref 10–20)
BUN SERPL-MCNC: 45 MG/DL — HIGH (ref 10–20)
BUN SERPL-MCNC: 46 MG/DL — HIGH (ref 10–20)
BUN SERPL-MCNC: 47 MG/DL — HIGH (ref 10–20)
BUN SERPL-MCNC: 48 MG/DL — HIGH (ref 10–20)
BUN SERPL-MCNC: 49 MG/DL — HIGH (ref 10–20)
BUN SERPL-MCNC: 49 MG/DL — HIGH (ref 10–20)
BUN SERPL-MCNC: 50 MG/DL — HIGH (ref 10–20)
BUN SERPL-MCNC: 54 MG/DL — HIGH (ref 10–20)
BUN SERPL-MCNC: 54 MG/DL — HIGH (ref 10–20)
BUN SERPL-MCNC: 55 MG/DL — HIGH (ref 10–20)
BUN SERPL-MCNC: 55 MG/DL — HIGH (ref 10–20)
BUN SERPL-MCNC: 56 MG/DL — HIGH (ref 10–20)
BUN SERPL-MCNC: 59 MG/DL — HIGH (ref 10–20)
BUN SERPL-MCNC: 60 MG/DL — HIGH (ref 10–20)
BUN SERPL-MCNC: 65 MG/DL — CRITICAL HIGH (ref 10–20)
BURR CELLS BLD QL SMEAR: PRESENT — SIGNIFICANT CHANGE UP
BURR CELLS BLD QL SMEAR: PRESENT — SIGNIFICANT CHANGE UP
CA-I SERPL-SCNC: 1.09 MMOL/L — LOW (ref 1.15–1.33)
CA-I SERPL-SCNC: 1.12 MMOL/L — LOW (ref 1.15–1.33)
CA-I SERPL-SCNC: 1.15 MMOL/L — SIGNIFICANT CHANGE UP (ref 1.15–1.33)
CA-I SERPL-SCNC: 1.16 MMOL/L — SIGNIFICANT CHANGE UP (ref 1.15–1.33)
CA-I SERPL-SCNC: 1.16 MMOL/L — SIGNIFICANT CHANGE UP (ref 1.15–1.33)
CA-I SERPL-SCNC: 1.19 MMOL/L — SIGNIFICANT CHANGE UP (ref 1.15–1.33)
CA-I SERPL-SCNC: 1.21 MMOL/L — SIGNIFICANT CHANGE UP (ref 1.15–1.33)
CALCIUM SERPL-MCNC: 8.2 MG/DL — LOW (ref 8.4–10.4)
CALCIUM SERPL-MCNC: 8.2 MG/DL — LOW (ref 8.4–10.4)
CALCIUM SERPL-MCNC: 8.2 MG/DL — LOW (ref 8.4–10.5)
CALCIUM SERPL-MCNC: 8.3 MG/DL — LOW (ref 8.4–10.4)
CALCIUM SERPL-MCNC: 8.3 MG/DL — LOW (ref 8.4–10.4)
CALCIUM SERPL-MCNC: 8.3 MG/DL — LOW (ref 8.4–10.5)
CALCIUM SERPL-MCNC: 8.4 MG/DL — SIGNIFICANT CHANGE UP (ref 8.4–10.5)
CALCIUM SERPL-MCNC: 8.5 MG/DL — SIGNIFICANT CHANGE UP (ref 8.4–10.4)
CALCIUM SERPL-MCNC: 8.5 MG/DL — SIGNIFICANT CHANGE UP (ref 8.4–10.5)
CALCIUM SERPL-MCNC: 8.6 MG/DL — SIGNIFICANT CHANGE UP (ref 8.4–10.4)
CALCIUM SERPL-MCNC: 8.6 MG/DL — SIGNIFICANT CHANGE UP (ref 8.4–10.4)
CALCIUM SERPL-MCNC: 8.6 MG/DL — SIGNIFICANT CHANGE UP (ref 8.4–10.5)
CALCIUM SERPL-MCNC: 8.7 MG/DL — SIGNIFICANT CHANGE UP (ref 8.4–10.5)
CALCIUM SERPL-MCNC: 8.8 MG/DL — SIGNIFICANT CHANGE UP (ref 8.4–10.4)
CALCIUM SERPL-MCNC: 8.8 MG/DL — SIGNIFICANT CHANGE UP (ref 8.4–10.5)
CALCIUM SERPL-MCNC: 8.9 MG/DL
CALCIUM SERPL-MCNC: 8.9 MG/DL — SIGNIFICANT CHANGE UP (ref 8.4–10.4)
CALCIUM SERPL-MCNC: 8.9 MG/DL — SIGNIFICANT CHANGE UP (ref 8.4–10.5)
CALCIUM SERPL-MCNC: 9 MG/DL
CALCIUM SERPL-MCNC: 9 MG/DL — SIGNIFICANT CHANGE UP (ref 8.4–10.4)
CALCIUM SERPL-MCNC: 9 MG/DL — SIGNIFICANT CHANGE UP (ref 8.4–10.5)
CALCIUM SERPL-MCNC: 9.1 MG/DL — SIGNIFICANT CHANGE UP (ref 8.4–10.5)
CALCIUM SERPL-MCNC: 9.2 MG/DL — SIGNIFICANT CHANGE UP (ref 8.4–10.4)
CALCIUM SERPL-MCNC: 9.2 MG/DL — SIGNIFICANT CHANGE UP (ref 8.4–10.5)
CALCIUM SERPL-MCNC: 9.3 MG/DL
CALCIUM SERPL-MCNC: 9.3 MG/DL — SIGNIFICANT CHANGE UP (ref 8.4–10.5)
CALCIUM SERPL-MCNC: 9.3 MG/DL — SIGNIFICANT CHANGE UP (ref 8.4–10.5)
CALCIUM SERPL-MCNC: 9.4 MG/DL — SIGNIFICANT CHANGE UP (ref 8.4–10.5)
CALCIUM SERPL-MCNC: 9.5 MG/DL — SIGNIFICANT CHANGE UP (ref 8.4–10.5)
CALCIUM SERPL-MCNC: 9.6 MG/DL
CALCIUM SERPL-MCNC: 9.7 MG/DL
CALCIUM SERPL-MCNC: 9.7 MG/DL — SIGNIFICANT CHANGE UP (ref 8.4–10.5)
CANCER AG27-29 SERPL-ACNC: 73.2 U/ML — HIGH (ref 0–38.6)
CAST: 30 /LPF — HIGH (ref 0–4)
CAST: 30 /LPF — HIGH (ref 0–4)
CHLORIDE SERPL-SCNC: 100 MMOL/L — SIGNIFICANT CHANGE UP (ref 98–110)
CHLORIDE SERPL-SCNC: 101 MMOL/L — SIGNIFICANT CHANGE UP (ref 98–110)
CHLORIDE SERPL-SCNC: 102 MMOL/L — SIGNIFICANT CHANGE UP (ref 98–110)
CHLORIDE SERPL-SCNC: 103 MMOL/L — SIGNIFICANT CHANGE UP (ref 98–110)
CHLORIDE SERPL-SCNC: 104 MMOL/L — SIGNIFICANT CHANGE UP (ref 98–110)
CHLORIDE SERPL-SCNC: 105 MMOL/L — SIGNIFICANT CHANGE UP (ref 98–110)
CHLORIDE SERPL-SCNC: 106 MMOL/L — SIGNIFICANT CHANGE UP (ref 98–110)
CHLORIDE SERPL-SCNC: 107 MMOL/L — SIGNIFICANT CHANGE UP (ref 98–110)
CHLORIDE SERPL-SCNC: 107 MMOL/L — SIGNIFICANT CHANGE UP (ref 98–110)
CHLORIDE SERPL-SCNC: 108 MMOL/L — SIGNIFICANT CHANGE UP (ref 98–110)
CHLORIDE SERPL-SCNC: 109 MMOL/L — SIGNIFICANT CHANGE UP (ref 98–110)
CHLORIDE SERPL-SCNC: 91 MMOL/L — LOW (ref 98–110)
CHLORIDE SERPL-SCNC: 91 MMOL/L — LOW (ref 98–110)
CHLORIDE SERPL-SCNC: 94 MMOL/L
CHLORIDE SERPL-SCNC: 94 MMOL/L — LOW (ref 98–110)
CHLORIDE SERPL-SCNC: 95 MMOL/L
CHLORIDE SERPL-SCNC: 95 MMOL/L — LOW (ref 98–110)
CHLORIDE SERPL-SCNC: 96 MMOL/L — LOW (ref 98–110)
CHLORIDE SERPL-SCNC: 97 MMOL/L
CHLORIDE SERPL-SCNC: 97 MMOL/L
CHLORIDE SERPL-SCNC: 97 MMOL/L — LOW (ref 98–110)
CHLORIDE SERPL-SCNC: 98 MMOL/L — SIGNIFICANT CHANGE UP (ref 98–110)
CHLORIDE SERPL-SCNC: 99 MMOL/L
CHLORIDE SERPL-SCNC: 99 MMOL/L — SIGNIFICANT CHANGE UP (ref 98–110)
CHOLEST SERPL-MCNC: 131 MG/DL — SIGNIFICANT CHANGE UP
CK MB CFR SERPL CALC: 1.3 NG/ML — SIGNIFICANT CHANGE UP (ref 0.6–6.3)
CK MB CFR SERPL CALC: 1.8 NG/ML — SIGNIFICANT CHANGE UP (ref 0.6–6.3)
CK SERPL-CCNC: 55 U/L — SIGNIFICANT CHANGE UP (ref 0–225)
CK SERPL-CCNC: 94 U/L — SIGNIFICANT CHANGE UP (ref 0–225)
CO2 SERPL-SCNC: 24 MMOL/L — SIGNIFICANT CHANGE UP (ref 17–32)
CO2 SERPL-SCNC: 25 MMOL/L — SIGNIFICANT CHANGE UP (ref 17–32)
CO2 SERPL-SCNC: 26 MMOL/L — SIGNIFICANT CHANGE UP (ref 17–32)
CO2 SERPL-SCNC: 27 MMOL/L — SIGNIFICANT CHANGE UP (ref 17–32)
CO2 SERPL-SCNC: 28 MMOL/L — SIGNIFICANT CHANGE UP (ref 17–32)
CO2 SERPL-SCNC: 29 MMOL/L
CO2 SERPL-SCNC: 29 MMOL/L — SIGNIFICANT CHANGE UP (ref 17–32)
CO2 SERPL-SCNC: 30 MMOL/L
CO2 SERPL-SCNC: 30 MMOL/L — SIGNIFICANT CHANGE UP (ref 17–32)
CO2 SERPL-SCNC: 31 MMOL/L
CO2 SERPL-SCNC: 31 MMOL/L — SIGNIFICANT CHANGE UP (ref 17–32)
CO2 SERPL-SCNC: 32 MMOL/L — SIGNIFICANT CHANGE UP (ref 17–32)
CO2 SERPL-SCNC: 33 MMOL/L — HIGH (ref 17–32)
CO2 SERPL-SCNC: 34 MMOL/L — HIGH (ref 17–32)
CO2 SERPL-SCNC: 35 MMOL/L — HIGH (ref 17–32)
CO2 SERPL-SCNC: 36 MMOL/L — HIGH (ref 17–32)
CO2 SERPL-SCNC: 37 MMOL/L — HIGH (ref 17–32)
CO2 SERPL-SCNC: 38 MMOL/L — HIGH (ref 17–32)
CO2 SERPL-SCNC: 39 MMOL/L — HIGH (ref 17–32)
CO2 SERPL-SCNC: 40 MMOL/L — HIGH (ref 17–32)
CO2 SERPL-SCNC: SIGNIFICANT CHANGE UP MMOL/L (ref 17–32)
CO2 SERPL-SCNC: SIGNIFICANT CHANGE UP MMOL/L (ref 17–32)
COLOR FLD: SIGNIFICANT CHANGE UP
COLOR FLD: YELLOW — SIGNIFICANT CHANGE UP
COLOR SPEC: ABNORMAL
COLOR SPEC: ABNORMAL
COLOR SPEC: SIGNIFICANT CHANGE UP
COLOR SPEC: SIGNIFICANT CHANGE UP
COLOR SPEC: YELLOW — SIGNIFICANT CHANGE UP
CREAT ?TM UR-MCNC: 29 MG/DL — SIGNIFICANT CHANGE UP
CREAT ?TM UR-MCNC: 29 MG/DL — SIGNIFICANT CHANGE UP
CREAT SERPL-MCNC: 0.7 MG/DL — SIGNIFICANT CHANGE UP (ref 0.7–1.5)
CREAT SERPL-MCNC: 0.8 MG/DL — SIGNIFICANT CHANGE UP (ref 0.7–1.5)
CREAT SERPL-MCNC: 0.9 MG/DL — SIGNIFICANT CHANGE UP (ref 0.7–1.5)
CREAT SERPL-MCNC: 0.91 MG/DL
CREAT SERPL-MCNC: 0.94 MG/DL
CREAT SERPL-MCNC: 0.97 MG/DL
CREAT SERPL-MCNC: 1 MG/DL
CREAT SERPL-MCNC: 1 MG/DL — SIGNIFICANT CHANGE UP (ref 0.7–1.5)
CREAT SERPL-MCNC: 1.07 MG/DL
CREAT SERPL-MCNC: 1.1 MG/DL — SIGNIFICANT CHANGE UP (ref 0.7–1.5)
CREAT SERPL-MCNC: 1.2 MG/DL — SIGNIFICANT CHANGE UP (ref 0.7–1.5)
CREAT SERPL-MCNC: 1.3 MG/DL — SIGNIFICANT CHANGE UP (ref 0.7–1.5)
CREAT SERPL-MCNC: 1.4 MG/DL — SIGNIFICANT CHANGE UP (ref 0.7–1.5)
CREAT SERPL-MCNC: 1.5 MG/DL — SIGNIFICANT CHANGE UP (ref 0.7–1.5)
CREAT SERPL-MCNC: 1.5 MG/DL — SIGNIFICANT CHANGE UP (ref 0.7–1.5)
CREAT SERPL-MCNC: 1.6 MG/DL — HIGH (ref 0.7–1.5)
CREAT SERPL-MCNC: 1.7 MG/DL — HIGH (ref 0.7–1.5)
CREAT SERPL-MCNC: 1.7 MG/DL — HIGH (ref 0.7–1.5)
CRP SERPL-MCNC: 31.5 MG/L — HIGH
CRP SERPL-MCNC: 31.5 MG/L — HIGH
CRP SERPL-MCNC: 65.9 MG/L — HIGH
CULTURE RESULTS: ABNORMAL
CULTURE RESULTS: NO GROWTH — SIGNIFICANT CHANGE UP
CULTURE RESULTS: NO GROWTH — SIGNIFICANT CHANGE UP
CULTURE RESULTS: SIGNIFICANT CHANGE UP
D DIMER BLD IA.RAPID-MCNC: 531 NG/ML DDU — HIGH
DEPRECATED S PNEUM 1 IGG SER-MCNC: 0.1 MCG/ML — SIGNIFICANT CHANGE UP
DEPRECATED S PNEUM 1 IGG SER-MCNC: 0.1 MCG/ML — SIGNIFICANT CHANGE UP
DEPRECATED S PNEUM12 IGG SER-MCNC: <0.1 MCG/ML — SIGNIFICANT CHANGE UP
DEPRECATED S PNEUM12 IGG SER-MCNC: <0.1 MCG/ML — SIGNIFICANT CHANGE UP
DEPRECATED S PNEUM14 IGG SER-MCNC: 0.1 MCG/ML — SIGNIFICANT CHANGE UP
DEPRECATED S PNEUM14 IGG SER-MCNC: 0.1 MCG/ML — SIGNIFICANT CHANGE UP
DEPRECATED S PNEUM17 IGG SER-MCNC: 1.7 MCG/ML — SIGNIFICANT CHANGE UP
DEPRECATED S PNEUM17 IGG SER-MCNC: 1.7 MCG/ML — SIGNIFICANT CHANGE UP
DEPRECATED S PNEUM19 IGG SER-MCNC: 0.3 MCG/ML — SIGNIFICANT CHANGE UP
DEPRECATED S PNEUM2 IGG SER-MCNC: 0.5 MCG/ML — SIGNIFICANT CHANGE UP
DEPRECATED S PNEUM2 IGG SER-MCNC: 0.5 MCG/ML — SIGNIFICANT CHANGE UP
DEPRECATED S PNEUM20 IGG SER-MCNC: 0.4 MCG/ML — SIGNIFICANT CHANGE UP
DEPRECATED S PNEUM20 IGG SER-MCNC: 0.4 MCG/ML — SIGNIFICANT CHANGE UP
DEPRECATED S PNEUM22 IGG SER-MCNC: 0.3 MCG/ML — SIGNIFICANT CHANGE UP
DEPRECATED S PNEUM22 IGG SER-MCNC: 0.3 MCG/ML — SIGNIFICANT CHANGE UP
DEPRECATED S PNEUM23 IGG SER-MCNC: 0.2 MCG/ML — SIGNIFICANT CHANGE UP
DEPRECATED S PNEUM23 IGG SER-MCNC: 0.2 MCG/ML — SIGNIFICANT CHANGE UP
DEPRECATED S PNEUM3 IGG SER-MCNC: <0.1 MCG/ML — SIGNIFICANT CHANGE UP
DEPRECATED S PNEUM3 IGG SER-MCNC: <0.1 MCG/ML — SIGNIFICANT CHANGE UP
DEPRECATED S PNEUM4 IGG SER-MCNC: 0.1 MCG/ML — SIGNIFICANT CHANGE UP
DEPRECATED S PNEUM4 IGG SER-MCNC: 0.1 MCG/ML — SIGNIFICANT CHANGE UP
DEPRECATED S PNEUM5 IGG SER-MCNC: <0.1 MCG/ML — SIGNIFICANT CHANGE UP
DEPRECATED S PNEUM5 IGG SER-MCNC: <0.1 MCG/ML — SIGNIFICANT CHANGE UP
DEPRECATED S PNEUM8 IGG SER-MCNC: 0.2 MCG/ML — SIGNIFICANT CHANGE UP
DEPRECATED S PNEUM8 IGG SER-MCNC: 0.2 MCG/ML — SIGNIFICANT CHANGE UP
DEPRECATED S PNEUM9 IGG SER-MCNC: 0.1 MCG/ML — SIGNIFICANT CHANGE UP
DEPRECATED S PNEUM9 IGG SER-MCNC: 0.1 MCG/ML — SIGNIFICANT CHANGE UP
DEPRECATED S PNEUM9 IGG SER-MCNC: <0.1 MCG/ML — SIGNIFICANT CHANGE UP
DEPRECATED S PNEUM9 IGG SER-MCNC: <0.1 MCG/ML — SIGNIFICANT CHANGE UP
DIFF PNL FLD: ABNORMAL
DIFF PNL FLD: NEGATIVE — SIGNIFICANT CHANGE UP
DIR ANTIGLOB POLYSPECIFIC INTERPRETATION: SIGNIFICANT CHANGE UP
E COLI DNA BLD POS QL NAA+NON-PROBE: SIGNIFICANT CHANGE UP
EGFR: 29 ML/MIN/1.73M2 — LOW
EGFR: 29 ML/MIN/1.73M2 — LOW
EGFR: 31 ML/MIN/1.73M2 — LOW
EGFR: 34 ML/MIN/1.73M2 — LOW
EGFR: 34 ML/MIN/1.73M2 — LOW
EGFR: 37 ML/MIN/1.73M2 — LOW
EGFR: 40 ML/MIN/1.73M2 — LOW
EGFR: 44 ML/MIN/1.73M2 — LOW
EGFR: 49 ML/MIN/1.73M2 — LOW
EGFR: 50 ML/MIN/1.73M2
EGFR: 55 ML/MIN/1.73M2
EGFR: 55 ML/MIN/1.73M2 — LOW
EGFR: 57 ML/MIN/1.73M2
EGFR: 59 ML/MIN/1.73M2
EGFR: 61 ML/MIN/1.73M2
EGFR: 62 ML/MIN/1.73M2 — SIGNIFICANT CHANGE UP
EGFR: 71 ML/MIN/1.73M2 — SIGNIFICANT CHANGE UP
EGFR: 72 ML/MIN/1.73M2 — SIGNIFICANT CHANGE UP
EGFR: 84 ML/MIN/1.73M2 — SIGNIFICANT CHANGE UP
EOSINOPHIL # BLD AUTO: 0 K/UL — SIGNIFICANT CHANGE UP (ref 0–0.7)
EOSINOPHIL # BLD AUTO: 0.01 K/UL — SIGNIFICANT CHANGE UP (ref 0–0.7)
EOSINOPHIL # BLD AUTO: 0.03 K/UL — SIGNIFICANT CHANGE UP (ref 0–0.7)
EOSINOPHIL # BLD AUTO: 0.04 K/UL — SIGNIFICANT CHANGE UP (ref 0–0.7)
EOSINOPHIL # BLD AUTO: 0.05 K/UL — SIGNIFICANT CHANGE UP (ref 0–0.7)
EOSINOPHIL # BLD AUTO: 0.06 K/UL — SIGNIFICANT CHANGE UP (ref 0–0.7)
EOSINOPHIL # BLD AUTO: 0.07 K/UL — SIGNIFICANT CHANGE UP (ref 0–0.7)
EOSINOPHIL # BLD AUTO: 0.07 K/UL — SIGNIFICANT CHANGE UP (ref 0–0.7)
EOSINOPHIL # BLD AUTO: 0.08 K/UL — SIGNIFICANT CHANGE UP (ref 0–0.7)
EOSINOPHIL # BLD AUTO: 0.09 K/UL — SIGNIFICANT CHANGE UP (ref 0–0.7)
EOSINOPHIL # BLD AUTO: 0.1 K/UL — SIGNIFICANT CHANGE UP (ref 0–0.7)
EOSINOPHIL # BLD AUTO: 0.11 K/UL — SIGNIFICANT CHANGE UP (ref 0–0.7)
EOSINOPHIL # BLD AUTO: 0.12 K/UL — SIGNIFICANT CHANGE UP (ref 0–0.7)
EOSINOPHIL # BLD AUTO: 0.13 K/UL — SIGNIFICANT CHANGE UP (ref 0–0.7)
EOSINOPHIL # BLD AUTO: 0.14 K/UL — SIGNIFICANT CHANGE UP (ref 0–0.7)
EOSINOPHIL # BLD AUTO: 0.15 K/UL — SIGNIFICANT CHANGE UP (ref 0–0.7)
EOSINOPHIL # BLD AUTO: 0.16 K/UL — SIGNIFICANT CHANGE UP (ref 0–0.7)
EOSINOPHIL # BLD AUTO: 0.17 K/UL — SIGNIFICANT CHANGE UP (ref 0–0.7)
EOSINOPHIL # BLD AUTO: 0.2 K/UL — SIGNIFICANT CHANGE UP (ref 0–0.7)
EOSINOPHIL # BLD AUTO: 0.21 K/UL — SIGNIFICANT CHANGE UP (ref 0–0.7)
EOSINOPHIL # BLD AUTO: 0.23 K/UL — SIGNIFICANT CHANGE UP (ref 0–0.7)
EOSINOPHIL # BLD AUTO: 0.23 K/UL — SIGNIFICANT CHANGE UP (ref 0–0.7)
EOSINOPHIL # BLD AUTO: 0.25 K/UL — SIGNIFICANT CHANGE UP (ref 0–0.7)
EOSINOPHIL # BLD AUTO: 0.31 K/UL — SIGNIFICANT CHANGE UP (ref 0–0.7)
EOSINOPHIL # BLD AUTO: 0.35 K/UL — SIGNIFICANT CHANGE UP (ref 0–0.7)
EOSINOPHIL # FLD: 5 % — SIGNIFICANT CHANGE UP
EOSINOPHIL NFR BLD AUTO: 0 % — SIGNIFICANT CHANGE UP (ref 0–8)
EOSINOPHIL NFR BLD AUTO: 0.1 % — SIGNIFICANT CHANGE UP (ref 0–8)
EOSINOPHIL NFR BLD AUTO: 0.2 % — SIGNIFICANT CHANGE UP (ref 0–8)
EOSINOPHIL NFR BLD AUTO: 0.3 % — SIGNIFICANT CHANGE UP (ref 0–8)
EOSINOPHIL NFR BLD AUTO: 0.4 % — SIGNIFICANT CHANGE UP (ref 0–8)
EOSINOPHIL NFR BLD AUTO: 0.5 % — SIGNIFICANT CHANGE UP (ref 0–8)
EOSINOPHIL NFR BLD AUTO: 0.6 % — SIGNIFICANT CHANGE UP (ref 0–8)
EOSINOPHIL NFR BLD AUTO: 0.7 % — SIGNIFICANT CHANGE UP (ref 0–8)
EOSINOPHIL NFR BLD AUTO: 0.8 % — SIGNIFICANT CHANGE UP (ref 0–8)
EOSINOPHIL NFR BLD AUTO: 0.8 % — SIGNIFICANT CHANGE UP (ref 0–8)
EOSINOPHIL NFR BLD AUTO: 0.9 % — SIGNIFICANT CHANGE UP (ref 0–8)
EOSINOPHIL NFR BLD AUTO: 1 % — SIGNIFICANT CHANGE UP (ref 0–8)
EOSINOPHIL NFR BLD AUTO: 1 % — SIGNIFICANT CHANGE UP (ref 0–8)
EOSINOPHIL NFR BLD AUTO: 1.1 % — SIGNIFICANT CHANGE UP (ref 0–8)
EOSINOPHIL NFR BLD AUTO: 1.1 % — SIGNIFICANT CHANGE UP (ref 0–8)
EOSINOPHIL NFR BLD AUTO: 1.2 % — SIGNIFICANT CHANGE UP (ref 0–8)
EOSINOPHIL NFR BLD AUTO: 1.3 % — SIGNIFICANT CHANGE UP (ref 0–8)
EOSINOPHIL NFR BLD AUTO: 1.5 % — SIGNIFICANT CHANGE UP (ref 0–8)
EOSINOPHIL NFR BLD AUTO: 1.5 % — SIGNIFICANT CHANGE UP (ref 0–8)
EOSINOPHIL NFR BLD AUTO: 1.6 % — SIGNIFICANT CHANGE UP (ref 0–8)
EOSINOPHIL NFR BLD AUTO: 1.7 % — SIGNIFICANT CHANGE UP (ref 0–8)
EOSINOPHIL NFR BLD AUTO: 1.7 % — SIGNIFICANT CHANGE UP (ref 0–8)
EOSINOPHIL NFR BLD AUTO: 2.1 % — SIGNIFICANT CHANGE UP (ref 0–8)
EOSINOPHIL NFR BLD AUTO: 2.1 % — SIGNIFICANT CHANGE UP (ref 0–8)
EOSINOPHIL NFR BLD AUTO: 2.2 % — SIGNIFICANT CHANGE UP (ref 0–8)
EOSINOPHIL NFR BLD AUTO: 2.7 % — SIGNIFICANT CHANGE UP (ref 0–8)
EOSINOPHIL NFR BLD AUTO: 3.2 % — SIGNIFICANT CHANGE UP (ref 0–8)
EPI CELLS # UR: NEGATIVE — SIGNIFICANT CHANGE UP
EPI CELLS # UR: SIGNIFICANT CHANGE UP
EPI CELLS # UR: SIGNIFICANT CHANGE UP
ERYTHROCYTE [SEDIMENTATION RATE] IN BLOOD BY WESTERGREN METHOD: 47 MM/HR
ERYTHROCYTE [SEDIMENTATION RATE] IN BLOOD: 56 MM/HR — HIGH (ref 0–20)
ERYTHROCYTE [SEDIMENTATION RATE] IN BLOOD: 56 MM/HR — HIGH (ref 0–20)
ESTIMATED AVERAGE GLUCOSE: 103 MG/DL — SIGNIFICANT CHANGE UP (ref 68–114)
ESTIMATED AVERAGE GLUCOSE: 126 MG/DL — HIGH (ref 68–114)
ESTIMATED AVERAGE GLUCOSE: 128 MG/DL — HIGH (ref 68–114)
FERRITIN SERPL-MCNC: 110 NG/ML
FERRITIN SERPL-MCNC: 130 NG/ML — SIGNIFICANT CHANGE UP (ref 13–330)
FIBRINOGEN PPP-MCNC: 550 MG/DL — SIGNIFICANT CHANGE UP (ref 204.4–570.6)
FLUAV AG NPH QL: SIGNIFICANT CHANGE UP
FLUBV AG NPH QL: SIGNIFICANT CHANGE UP
FLUID INTAKE SUBSTANCE CLASS: SIGNIFICANT CHANGE UP
FOLATE SERPL-MCNC: 5.8 NG/ML — SIGNIFICANT CHANGE UP
FOLATE SERPL-MCNC: 5.8 NG/ML — SIGNIFICANT CHANGE UP
FOLATE SERPL-MCNC: 8 NG/ML — SIGNIFICANT CHANGE UP
FOLATE+VIT B12 SERBLD-IMP: 7 % — SIGNIFICANT CHANGE UP
GAS PNL BLDA: SIGNIFICANT CHANGE UP
GAS PNL BLDV: 135 MMOL/L — LOW (ref 136–145)
GAS PNL BLDV: 136 MMOL/L — SIGNIFICANT CHANGE UP (ref 136–145)
GAS PNL BLDV: 137 MMOL/L — SIGNIFICANT CHANGE UP (ref 136–145)
GAS PNL BLDV: 138 MMOL/L — SIGNIFICANT CHANGE UP (ref 136–145)
GAS PNL BLDV: 143 MMOL/L — SIGNIFICANT CHANGE UP (ref 136–145)
GAS PNL BLDV: SIGNIFICANT CHANGE UP
GGT SERPL-CCNC: 372 U/L — HIGH (ref 1–40)
GGT SERPL-CCNC: 372 U/L — HIGH (ref 1–40)
GGT SERPL-CCNC: 98 U/L — HIGH (ref 1–40)
GLUCOSE BLDC GLUCOMTR-MCNC: 100 MG/DL — HIGH (ref 70–99)
GLUCOSE BLDC GLUCOMTR-MCNC: 101 MG/DL — HIGH (ref 70–99)
GLUCOSE BLDC GLUCOMTR-MCNC: 102 MG/DL — HIGH (ref 70–99)
GLUCOSE BLDC GLUCOMTR-MCNC: 103 MG/DL — HIGH (ref 70–99)
GLUCOSE BLDC GLUCOMTR-MCNC: 104 MG/DL — HIGH (ref 70–99)
GLUCOSE BLDC GLUCOMTR-MCNC: 104 MG/DL — HIGH (ref 70–99)
GLUCOSE BLDC GLUCOMTR-MCNC: 105 MG/DL — HIGH (ref 70–99)
GLUCOSE BLDC GLUCOMTR-MCNC: 106 MG/DL — HIGH (ref 70–99)
GLUCOSE BLDC GLUCOMTR-MCNC: 107 MG/DL — HIGH (ref 70–99)
GLUCOSE BLDC GLUCOMTR-MCNC: 108 MG/DL — HIGH (ref 70–99)
GLUCOSE BLDC GLUCOMTR-MCNC: 108 MG/DL — HIGH (ref 70–99)
GLUCOSE BLDC GLUCOMTR-MCNC: 109 MG/DL — HIGH (ref 70–99)
GLUCOSE BLDC GLUCOMTR-MCNC: 110 MG/DL — HIGH (ref 70–99)
GLUCOSE BLDC GLUCOMTR-MCNC: 112 MG/DL — HIGH (ref 70–99)
GLUCOSE BLDC GLUCOMTR-MCNC: 113 MG/DL — HIGH (ref 70–99)
GLUCOSE BLDC GLUCOMTR-MCNC: 113 MG/DL — HIGH (ref 70–99)
GLUCOSE BLDC GLUCOMTR-MCNC: 114 MG/DL — HIGH (ref 70–99)
GLUCOSE BLDC GLUCOMTR-MCNC: 115 MG/DL — HIGH (ref 70–99)
GLUCOSE BLDC GLUCOMTR-MCNC: 117 MG/DL — HIGH (ref 70–99)
GLUCOSE BLDC GLUCOMTR-MCNC: 118 MG/DL — HIGH (ref 70–99)
GLUCOSE BLDC GLUCOMTR-MCNC: 118 MG/DL — HIGH (ref 70–99)
GLUCOSE BLDC GLUCOMTR-MCNC: 119 MG/DL — HIGH (ref 70–99)
GLUCOSE BLDC GLUCOMTR-MCNC: 120 MG/DL — HIGH (ref 70–99)
GLUCOSE BLDC GLUCOMTR-MCNC: 121 MG/DL — HIGH (ref 70–99)
GLUCOSE BLDC GLUCOMTR-MCNC: 122 MG/DL — HIGH (ref 70–99)
GLUCOSE BLDC GLUCOMTR-MCNC: 123 MG/DL — HIGH (ref 70–99)
GLUCOSE BLDC GLUCOMTR-MCNC: 124 MG/DL — HIGH (ref 70–99)
GLUCOSE BLDC GLUCOMTR-MCNC: 124 MG/DL — HIGH (ref 70–99)
GLUCOSE BLDC GLUCOMTR-MCNC: 125 MG/DL — HIGH (ref 70–99)
GLUCOSE BLDC GLUCOMTR-MCNC: 125 MG/DL — HIGH (ref 70–99)
GLUCOSE BLDC GLUCOMTR-MCNC: 126 MG/DL — HIGH (ref 70–99)
GLUCOSE BLDC GLUCOMTR-MCNC: 126 MG/DL — HIGH (ref 70–99)
GLUCOSE BLDC GLUCOMTR-MCNC: 127 MG/DL — HIGH (ref 70–99)
GLUCOSE BLDC GLUCOMTR-MCNC: 128 MG/DL — HIGH (ref 70–99)
GLUCOSE BLDC GLUCOMTR-MCNC: 129 MG/DL — HIGH (ref 70–99)
GLUCOSE BLDC GLUCOMTR-MCNC: 131 MG/DL — HIGH (ref 70–99)
GLUCOSE BLDC GLUCOMTR-MCNC: 132 MG/DL — HIGH (ref 70–99)
GLUCOSE BLDC GLUCOMTR-MCNC: 132 MG/DL — HIGH (ref 70–99)
GLUCOSE BLDC GLUCOMTR-MCNC: 134 MG/DL — HIGH (ref 70–99)
GLUCOSE BLDC GLUCOMTR-MCNC: 135 MG/DL — HIGH (ref 70–99)
GLUCOSE BLDC GLUCOMTR-MCNC: 137 MG/DL — HIGH (ref 70–99)
GLUCOSE BLDC GLUCOMTR-MCNC: 138 MG/DL — HIGH (ref 70–99)
GLUCOSE BLDC GLUCOMTR-MCNC: 138 MG/DL — HIGH (ref 70–99)
GLUCOSE BLDC GLUCOMTR-MCNC: 140 MG/DL — HIGH (ref 70–99)
GLUCOSE BLDC GLUCOMTR-MCNC: 140 MG/DL — HIGH (ref 70–99)
GLUCOSE BLDC GLUCOMTR-MCNC: 141 MG/DL — HIGH (ref 70–99)
GLUCOSE BLDC GLUCOMTR-MCNC: 144 MG/DL — HIGH (ref 70–99)
GLUCOSE BLDC GLUCOMTR-MCNC: 145 MG/DL — HIGH (ref 70–99)
GLUCOSE BLDC GLUCOMTR-MCNC: 146 MG/DL — HIGH (ref 70–99)
GLUCOSE BLDC GLUCOMTR-MCNC: 146 MG/DL — HIGH (ref 70–99)
GLUCOSE BLDC GLUCOMTR-MCNC: 148 MG/DL — HIGH (ref 70–99)
GLUCOSE BLDC GLUCOMTR-MCNC: 148 MG/DL — HIGH (ref 70–99)
GLUCOSE BLDC GLUCOMTR-MCNC: 149 MG/DL — HIGH (ref 70–99)
GLUCOSE BLDC GLUCOMTR-MCNC: 149 MG/DL — HIGH (ref 70–99)
GLUCOSE BLDC GLUCOMTR-MCNC: 151 MG/DL — HIGH (ref 70–99)
GLUCOSE BLDC GLUCOMTR-MCNC: 152 MG/DL — HIGH (ref 70–99)
GLUCOSE BLDC GLUCOMTR-MCNC: 153 MG/DL — HIGH (ref 70–99)
GLUCOSE BLDC GLUCOMTR-MCNC: 153 MG/DL — HIGH (ref 70–99)
GLUCOSE BLDC GLUCOMTR-MCNC: 155 MG/DL — HIGH (ref 70–99)
GLUCOSE BLDC GLUCOMTR-MCNC: 157 MG/DL — HIGH (ref 70–99)
GLUCOSE BLDC GLUCOMTR-MCNC: 158 MG/DL — HIGH (ref 70–99)
GLUCOSE BLDC GLUCOMTR-MCNC: 160 MG/DL — HIGH (ref 70–99)
GLUCOSE BLDC GLUCOMTR-MCNC: 161 MG/DL — HIGH (ref 70–99)
GLUCOSE BLDC GLUCOMTR-MCNC: 162 MG/DL — HIGH (ref 70–99)
GLUCOSE BLDC GLUCOMTR-MCNC: 164 MG/DL — HIGH (ref 70–99)
GLUCOSE BLDC GLUCOMTR-MCNC: 165 MG/DL — HIGH (ref 70–99)
GLUCOSE BLDC GLUCOMTR-MCNC: 165 MG/DL — HIGH (ref 70–99)
GLUCOSE BLDC GLUCOMTR-MCNC: 166 MG/DL — HIGH (ref 70–99)
GLUCOSE BLDC GLUCOMTR-MCNC: 166 MG/DL — HIGH (ref 70–99)
GLUCOSE BLDC GLUCOMTR-MCNC: 169 MG/DL — HIGH (ref 70–99)
GLUCOSE BLDC GLUCOMTR-MCNC: 169 MG/DL — HIGH (ref 70–99)
GLUCOSE BLDC GLUCOMTR-MCNC: 172 MG/DL — HIGH (ref 70–99)
GLUCOSE BLDC GLUCOMTR-MCNC: 174 MG/DL — HIGH (ref 70–99)
GLUCOSE BLDC GLUCOMTR-MCNC: 174 MG/DL — HIGH (ref 70–99)
GLUCOSE BLDC GLUCOMTR-MCNC: 175 MG/DL — HIGH (ref 70–99)
GLUCOSE BLDC GLUCOMTR-MCNC: 178 MG/DL — HIGH (ref 70–99)
GLUCOSE BLDC GLUCOMTR-MCNC: 180 MG/DL — HIGH (ref 70–99)
GLUCOSE BLDC GLUCOMTR-MCNC: 184 MG/DL — HIGH (ref 70–99)
GLUCOSE BLDC GLUCOMTR-MCNC: 185 MG/DL — HIGH (ref 70–99)
GLUCOSE BLDC GLUCOMTR-MCNC: 197 MG/DL — HIGH (ref 70–99)
GLUCOSE BLDC GLUCOMTR-MCNC: 206 MG/DL — HIGH (ref 70–99)
GLUCOSE BLDC GLUCOMTR-MCNC: 207 MG/DL — HIGH (ref 70–99)
GLUCOSE BLDC GLUCOMTR-MCNC: 217 MG/DL — HIGH (ref 70–99)
GLUCOSE BLDC GLUCOMTR-MCNC: 227 MG/DL — HIGH (ref 70–99)
GLUCOSE BLDC GLUCOMTR-MCNC: 233 MG/DL — HIGH (ref 70–99)
GLUCOSE BLDC GLUCOMTR-MCNC: 247 MG/DL — HIGH (ref 70–99)
GLUCOSE BLDC GLUCOMTR-MCNC: 431 MG/DL — HIGH (ref 70–99)
GLUCOSE BLDC GLUCOMTR-MCNC: 83 MG/DL — SIGNIFICANT CHANGE UP (ref 70–99)
GLUCOSE BLDC GLUCOMTR-MCNC: 85 MG/DL — SIGNIFICANT CHANGE UP (ref 70–99)
GLUCOSE BLDC GLUCOMTR-MCNC: 87 MG/DL — SIGNIFICANT CHANGE UP (ref 70–99)
GLUCOSE BLDC GLUCOMTR-MCNC: 91 MG/DL — SIGNIFICANT CHANGE UP (ref 70–99)
GLUCOSE BLDC GLUCOMTR-MCNC: 93 MG/DL — SIGNIFICANT CHANGE UP (ref 70–99)
GLUCOSE BLDC GLUCOMTR-MCNC: 93 MG/DL — SIGNIFICANT CHANGE UP (ref 70–99)
GLUCOSE BLDC GLUCOMTR-MCNC: 94 MG/DL — SIGNIFICANT CHANGE UP (ref 70–99)
GLUCOSE BLDC GLUCOMTR-MCNC: 96 MG/DL — SIGNIFICANT CHANGE UP (ref 70–99)
GLUCOSE BLDC GLUCOMTR-MCNC: 96 MG/DL — SIGNIFICANT CHANGE UP (ref 70–99)
GLUCOSE BLDC GLUCOMTR-MCNC: 97 MG/DL — SIGNIFICANT CHANGE UP (ref 70–99)
GLUCOSE BLDC GLUCOMTR-MCNC: 97 MG/DL — SIGNIFICANT CHANGE UP (ref 70–99)
GLUCOSE BLDC GLUCOMTR-MCNC: 98 MG/DL — SIGNIFICANT CHANGE UP (ref 70–99)
GLUCOSE BLDC GLUCOMTR-MCNC: 98 MG/DL — SIGNIFICANT CHANGE UP (ref 70–99)
GLUCOSE BLDC GLUCOMTR-MCNC: 99 MG/DL — SIGNIFICANT CHANGE UP (ref 70–99)
GLUCOSE FLD-MCNC: 112 MG/DL — SIGNIFICANT CHANGE UP
GLUCOSE FLD-MCNC: 118 MG/DL — SIGNIFICANT CHANGE UP
GLUCOSE FLD-MCNC: 119 MG/DL — SIGNIFICANT CHANGE UP
GLUCOSE FLD-MCNC: 119 MG/DL — SIGNIFICANT CHANGE UP
GLUCOSE SERPL-MCNC: 100 MG/DL — HIGH (ref 70–99)
GLUCOSE SERPL-MCNC: 100 MG/DL — HIGH (ref 70–99)
GLUCOSE SERPL-MCNC: 103 MG/DL — HIGH (ref 70–99)
GLUCOSE SERPL-MCNC: 103 MG/DL — HIGH (ref 70–99)
GLUCOSE SERPL-MCNC: 104 MG/DL — HIGH (ref 70–99)
GLUCOSE SERPL-MCNC: 104 MG/DL — HIGH (ref 70–99)
GLUCOSE SERPL-MCNC: 105 MG/DL — HIGH (ref 70–99)
GLUCOSE SERPL-MCNC: 106 MG/DL — HIGH (ref 70–99)
GLUCOSE SERPL-MCNC: 106 MG/DL — HIGH (ref 70–99)
GLUCOSE SERPL-MCNC: 107 MG/DL — HIGH (ref 70–99)
GLUCOSE SERPL-MCNC: 108 MG/DL
GLUCOSE SERPL-MCNC: 108 MG/DL — HIGH (ref 70–99)
GLUCOSE SERPL-MCNC: 111 MG/DL — HIGH (ref 70–99)
GLUCOSE SERPL-MCNC: 112 MG/DL — HIGH (ref 70–99)
GLUCOSE SERPL-MCNC: 113 MG/DL — HIGH (ref 70–99)
GLUCOSE SERPL-MCNC: 114 MG/DL — HIGH (ref 70–99)
GLUCOSE SERPL-MCNC: 115 MG/DL — HIGH (ref 70–99)
GLUCOSE SERPL-MCNC: 115 MG/DL — HIGH (ref 70–99)
GLUCOSE SERPL-MCNC: 118 MG/DL — HIGH (ref 70–99)
GLUCOSE SERPL-MCNC: 119 MG/DL
GLUCOSE SERPL-MCNC: 119 MG/DL — HIGH (ref 70–99)
GLUCOSE SERPL-MCNC: 122 MG/DL — HIGH (ref 70–99)
GLUCOSE SERPL-MCNC: 122 MG/DL — HIGH (ref 70–99)
GLUCOSE SERPL-MCNC: 123 MG/DL — HIGH (ref 70–99)
GLUCOSE SERPL-MCNC: 125 MG/DL — HIGH (ref 70–99)
GLUCOSE SERPL-MCNC: 129 MG/DL — HIGH (ref 70–99)
GLUCOSE SERPL-MCNC: 129 MG/DL — HIGH (ref 70–99)
GLUCOSE SERPL-MCNC: 131 MG/DL — HIGH (ref 70–99)
GLUCOSE SERPL-MCNC: 132 MG/DL — HIGH (ref 70–99)
GLUCOSE SERPL-MCNC: 134 MG/DL — HIGH (ref 70–99)
GLUCOSE SERPL-MCNC: 134 MG/DL — HIGH (ref 70–99)
GLUCOSE SERPL-MCNC: 137 MG/DL — HIGH (ref 70–99)
GLUCOSE SERPL-MCNC: 138 MG/DL — HIGH (ref 70–99)
GLUCOSE SERPL-MCNC: 143 MG/DL — HIGH (ref 70–99)
GLUCOSE SERPL-MCNC: 143 MG/DL — HIGH (ref 70–99)
GLUCOSE SERPL-MCNC: 144 MG/DL — HIGH (ref 70–99)
GLUCOSE SERPL-MCNC: 145 MG/DL — HIGH (ref 70–99)
GLUCOSE SERPL-MCNC: 148 MG/DL — HIGH (ref 70–99)
GLUCOSE SERPL-MCNC: 149 MG/DL — HIGH (ref 70–99)
GLUCOSE SERPL-MCNC: 149 MG/DL — HIGH (ref 70–99)
GLUCOSE SERPL-MCNC: 151 MG/DL — HIGH (ref 70–99)
GLUCOSE SERPL-MCNC: 152 MG/DL — HIGH (ref 70–99)
GLUCOSE SERPL-MCNC: 155 MG/DL — HIGH (ref 70–99)
GLUCOSE SERPL-MCNC: 155 MG/DL — HIGH (ref 70–99)
GLUCOSE SERPL-MCNC: 157 MG/DL — HIGH (ref 70–99)
GLUCOSE SERPL-MCNC: 158 MG/DL — HIGH (ref 70–99)
GLUCOSE SERPL-MCNC: 162 MG/DL — HIGH (ref 70–99)
GLUCOSE SERPL-MCNC: 162 MG/DL — HIGH (ref 70–99)
GLUCOSE SERPL-MCNC: 182 MG/DL — HIGH (ref 70–99)
GLUCOSE SERPL-MCNC: 185 MG/DL — HIGH (ref 70–99)
GLUCOSE SERPL-MCNC: 188 MG/DL — HIGH (ref 70–99)
GLUCOSE SERPL-MCNC: 188 MG/DL — HIGH (ref 70–99)
GLUCOSE SERPL-MCNC: 220 MG/DL — HIGH (ref 70–99)
GLUCOSE SERPL-MCNC: 220 MG/DL — HIGH (ref 70–99)
GLUCOSE SERPL-MCNC: 233 MG/DL — HIGH (ref 70–99)
GLUCOSE SERPL-MCNC: 41 MG/DL — CRITICAL LOW (ref 70–99)
GLUCOSE SERPL-MCNC: 41 MG/DL — CRITICAL LOW (ref 70–99)
GLUCOSE SERPL-MCNC: 51 MG/DL — CRITICAL LOW (ref 70–99)
GLUCOSE SERPL-MCNC: 51 MG/DL — CRITICAL LOW (ref 70–99)
GLUCOSE SERPL-MCNC: 55 MG/DL — LOW (ref 70–99)
GLUCOSE SERPL-MCNC: 55 MG/DL — LOW (ref 70–99)
GLUCOSE SERPL-MCNC: 65 MG/DL — LOW (ref 70–99)
GLUCOSE SERPL-MCNC: 65 MG/DL — LOW (ref 70–99)
GLUCOSE SERPL-MCNC: 66 MG/DL — LOW (ref 70–99)
GLUCOSE SERPL-MCNC: 66 MG/DL — LOW (ref 70–99)
GLUCOSE SERPL-MCNC: 69 MG/DL — LOW (ref 70–99)
GLUCOSE SERPL-MCNC: 69 MG/DL — LOW (ref 70–99)
GLUCOSE SERPL-MCNC: 71 MG/DL — SIGNIFICANT CHANGE UP (ref 70–99)
GLUCOSE SERPL-MCNC: 72 MG/DL — SIGNIFICANT CHANGE UP (ref 70–99)
GLUCOSE SERPL-MCNC: 72 MG/DL — SIGNIFICANT CHANGE UP (ref 70–99)
GLUCOSE SERPL-MCNC: 73 MG/DL — SIGNIFICANT CHANGE UP (ref 70–99)
GLUCOSE SERPL-MCNC: 73 MG/DL — SIGNIFICANT CHANGE UP (ref 70–99)
GLUCOSE SERPL-MCNC: 75 MG/DL — SIGNIFICANT CHANGE UP (ref 70–99)
GLUCOSE SERPL-MCNC: 76 MG/DL — SIGNIFICANT CHANGE UP (ref 70–99)
GLUCOSE SERPL-MCNC: 76 MG/DL — SIGNIFICANT CHANGE UP (ref 70–99)
GLUCOSE SERPL-MCNC: 77 MG/DL — SIGNIFICANT CHANGE UP (ref 70–99)
GLUCOSE SERPL-MCNC: 77 MG/DL — SIGNIFICANT CHANGE UP (ref 70–99)
GLUCOSE SERPL-MCNC: 78 MG/DL — SIGNIFICANT CHANGE UP (ref 70–99)
GLUCOSE SERPL-MCNC: 78 MG/DL — SIGNIFICANT CHANGE UP (ref 70–99)
GLUCOSE SERPL-MCNC: 80 MG/DL — SIGNIFICANT CHANGE UP (ref 70–99)
GLUCOSE SERPL-MCNC: 81 MG/DL — SIGNIFICANT CHANGE UP (ref 70–99)
GLUCOSE SERPL-MCNC: 81 MG/DL — SIGNIFICANT CHANGE UP (ref 70–99)
GLUCOSE SERPL-MCNC: 82 MG/DL
GLUCOSE SERPL-MCNC: 82 MG/DL — SIGNIFICANT CHANGE UP (ref 70–99)
GLUCOSE SERPL-MCNC: 84 MG/DL — SIGNIFICANT CHANGE UP (ref 70–99)
GLUCOSE SERPL-MCNC: 84 MG/DL — SIGNIFICANT CHANGE UP (ref 70–99)
GLUCOSE SERPL-MCNC: 85 MG/DL — SIGNIFICANT CHANGE UP (ref 70–99)
GLUCOSE SERPL-MCNC: 85 MG/DL — SIGNIFICANT CHANGE UP (ref 70–99)
GLUCOSE SERPL-MCNC: 86 MG/DL — SIGNIFICANT CHANGE UP (ref 70–99)
GLUCOSE SERPL-MCNC: 86 MG/DL — SIGNIFICANT CHANGE UP (ref 70–99)
GLUCOSE SERPL-MCNC: 87 MG/DL — SIGNIFICANT CHANGE UP (ref 70–99)
GLUCOSE SERPL-MCNC: 88 MG/DL — SIGNIFICANT CHANGE UP (ref 70–99)
GLUCOSE SERPL-MCNC: 89 MG/DL — SIGNIFICANT CHANGE UP (ref 70–99)
GLUCOSE SERPL-MCNC: 89 MG/DL — SIGNIFICANT CHANGE UP (ref 70–99)
GLUCOSE SERPL-MCNC: 90 MG/DL — SIGNIFICANT CHANGE UP (ref 70–99)
GLUCOSE SERPL-MCNC: 91 MG/DL — SIGNIFICANT CHANGE UP (ref 70–99)
GLUCOSE SERPL-MCNC: 92 MG/DL — SIGNIFICANT CHANGE UP (ref 70–99)
GLUCOSE SERPL-MCNC: 94 MG/DL — SIGNIFICANT CHANGE UP (ref 70–99)
GLUCOSE SERPL-MCNC: 94 MG/DL — SIGNIFICANT CHANGE UP (ref 70–99)
GLUCOSE SERPL-MCNC: 95 MG/DL — SIGNIFICANT CHANGE UP (ref 70–99)
GLUCOSE SERPL-MCNC: 95 MG/DL — SIGNIFICANT CHANGE UP (ref 70–99)
GLUCOSE SERPL-MCNC: 96 MG/DL
GLUCOSE SERPL-MCNC: 96 MG/DL — SIGNIFICANT CHANGE UP (ref 70–99)
GLUCOSE SERPL-MCNC: 97 MG/DL — SIGNIFICANT CHANGE UP (ref 70–99)
GLUCOSE SERPL-MCNC: 98 MG/DL
GLUCOSE SERPL-MCNC: 99 MG/DL — SIGNIFICANT CHANGE UP (ref 70–99)
GLUCOSE SERPL-MCNC: SIGNIFICANT CHANGE UP MG/DL (ref 70–99)
GLUCOSE SERPL-MCNC: SIGNIFICANT CHANGE UP MG/DL (ref 70–99)
GLUCOSE UR QL: NEGATIVE MG/DL — SIGNIFICANT CHANGE UP
GRAM STN FLD: ABNORMAL
GRAM STN FLD: SIGNIFICANT CHANGE UP
HAPTOGLOB SERPL-MCNC: 120 MG/DL — SIGNIFICANT CHANGE UP (ref 34–200)
HAPTOGLOB SERPL-MCNC: 161 MG/DL — SIGNIFICANT CHANGE UP (ref 34–200)
HAPTOGLOB SERPL-MCNC: 161 MG/DL — SIGNIFICANT CHANGE UP (ref 34–200)
HCO3 BLDA-SCNC: 30 MMOL/L — HIGH (ref 21–28)
HCO3 BLDA-SCNC: 31 MMOL/L — HIGH (ref 21–28)
HCO3 BLDA-SCNC: 32 MMOL/L — HIGH (ref 21–28)
HCO3 BLDA-SCNC: 32 MMOL/L — HIGH (ref 21–28)
HCO3 BLDA-SCNC: 33 MMOL/L — HIGH (ref 21–28)
HCO3 BLDA-SCNC: 34 MMOL/L — HIGH (ref 21–28)
HCO3 BLDA-SCNC: 35 MMOL/L — HIGH (ref 21–28)
HCO3 BLDA-SCNC: 35 MMOL/L — HIGH (ref 21–28)
HCO3 BLDA-SCNC: 36 MMOL/L — HIGH (ref 21–28)
HCO3 BLDA-SCNC: 40 MMOL/L — HIGH (ref 21–28)
HCO3 BLDA-SCNC: 41 MMOL/L — HIGH (ref 21–28)
HCO3 BLDA-SCNC: 43 MMOL/L — HIGH (ref 21–28)
HCO3 BLDA-SCNC: 43 MMOL/L — HIGH (ref 21–28)
HCO3 BLDA-SCNC: 44 MMOL/L — HIGH (ref 21–28)
HCO3 BLDV-SCNC: 29 MMOL/L — SIGNIFICANT CHANGE UP (ref 22–29)
HCO3 BLDV-SCNC: 31 MMOL/L — HIGH (ref 22–29)
HCO3 BLDV-SCNC: 31 MMOL/L — HIGH (ref 22–29)
HCO3 BLDV-SCNC: 33 MMOL/L — HIGH (ref 22–29)
HCO3 BLDV-SCNC: 38 MMOL/L — HIGH (ref 22–29)
HCO3 BLDV-SCNC: 41 MMOL/L — HIGH (ref 22–29)
HCT VFR BLD CALC: 18.9 % — LOW (ref 37–47)
HCT VFR BLD CALC: 19.4 % — LOW (ref 37–47)
HCT VFR BLD CALC: 19.7 % — LOW (ref 37–47)
HCT VFR BLD CALC: 20.3 % — LOW (ref 37–47)
HCT VFR BLD CALC: 21.2 % — LOW (ref 37–47)
HCT VFR BLD CALC: 21.2 % — LOW (ref 37–47)
HCT VFR BLD CALC: 21.3 % — LOW (ref 37–47)
HCT VFR BLD CALC: 22.2 % — LOW (ref 37–47)
HCT VFR BLD CALC: 23.1 % — LOW (ref 37–47)
HCT VFR BLD CALC: 23.1 % — LOW (ref 37–47)
HCT VFR BLD CALC: 23.3 % — LOW (ref 37–47)
HCT VFR BLD CALC: 23.4 % — LOW (ref 37–47)
HCT VFR BLD CALC: 23.4 % — LOW (ref 37–47)
HCT VFR BLD CALC: 23.7 % — LOW (ref 37–47)
HCT VFR BLD CALC: 23.7 % — LOW (ref 37–47)
HCT VFR BLD CALC: 24.1 % — LOW (ref 37–47)
HCT VFR BLD CALC: 24.1 % — LOW (ref 37–47)
HCT VFR BLD CALC: 25.1 % — LOW (ref 37–47)
HCT VFR BLD CALC: 25.4 % — LOW (ref 37–47)
HCT VFR BLD CALC: 25.4 % — LOW (ref 37–47)
HCT VFR BLD CALC: 25.7 % — LOW (ref 37–47)
HCT VFR BLD CALC: 26.4 % — LOW (ref 37–47)
HCT VFR BLD CALC: 26.5 % — LOW (ref 37–47)
HCT VFR BLD CALC: 27 % — LOW (ref 37–47)
HCT VFR BLD CALC: 27.1 % — LOW (ref 37–47)
HCT VFR BLD CALC: 27.2 % — LOW (ref 37–47)
HCT VFR BLD CALC: 27.2 % — LOW (ref 37–47)
HCT VFR BLD CALC: 27.4 % — LOW (ref 37–47)
HCT VFR BLD CALC: 27.7 % — LOW (ref 37–47)
HCT VFR BLD CALC: 27.8 % — LOW (ref 37–47)
HCT VFR BLD CALC: 27.8 % — LOW (ref 37–47)
HCT VFR BLD CALC: 28 % — LOW (ref 37–47)
HCT VFR BLD CALC: 28.1 % — LOW (ref 37–47)
HCT VFR BLD CALC: 28.2 % — LOW (ref 37–47)
HCT VFR BLD CALC: 28.2 % — LOW (ref 37–47)
HCT VFR BLD CALC: 28.3 % — LOW (ref 37–47)
HCT VFR BLD CALC: 28.3 % — LOW (ref 37–47)
HCT VFR BLD CALC: 28.4 % — LOW (ref 37–47)
HCT VFR BLD CALC: 28.4 % — LOW (ref 37–47)
HCT VFR BLD CALC: 28.5 % — LOW (ref 37–47)
HCT VFR BLD CALC: 28.6 % — LOW (ref 37–47)
HCT VFR BLD CALC: 28.6 % — LOW (ref 37–47)
HCT VFR BLD CALC: 28.7 % — LOW (ref 37–47)
HCT VFR BLD CALC: 28.7 % — LOW (ref 37–47)
HCT VFR BLD CALC: 28.8 % — LOW (ref 37–47)
HCT VFR BLD CALC: 29 % — LOW (ref 37–47)
HCT VFR BLD CALC: 29.1 % — LOW (ref 37–47)
HCT VFR BLD CALC: 29.2 % — LOW (ref 37–47)
HCT VFR BLD CALC: 29.3 % — LOW (ref 37–47)
HCT VFR BLD CALC: 29.4 % — LOW (ref 37–47)
HCT VFR BLD CALC: 29.8 % — LOW (ref 37–47)
HCT VFR BLD CALC: 29.9 % — LOW (ref 37–47)
HCT VFR BLD CALC: 29.9 % — LOW (ref 37–47)
HCT VFR BLD CALC: 30 % — LOW (ref 37–47)
HCT VFR BLD CALC: 30.1 % — LOW (ref 37–47)
HCT VFR BLD CALC: 30.2 % — LOW (ref 37–47)
HCT VFR BLD CALC: 30.3 % — LOW (ref 37–47)
HCT VFR BLD CALC: 30.6 % — LOW (ref 37–47)
HCT VFR BLD CALC: 30.7 % — LOW (ref 37–47)
HCT VFR BLD CALC: 30.7 % — LOW (ref 37–47)
HCT VFR BLD CALC: 30.9 % — LOW (ref 37–47)
HCT VFR BLD CALC: 31.1 % — LOW (ref 37–47)
HCT VFR BLD CALC: 31.2 % — LOW (ref 37–47)
HCT VFR BLD CALC: 31.3 % — LOW (ref 37–47)
HCT VFR BLD CALC: 31.4 % — LOW (ref 37–47)
HCT VFR BLD CALC: 31.4 % — LOW (ref 37–47)
HCT VFR BLD CALC: 31.6 % — LOW (ref 37–47)
HCT VFR BLD CALC: 31.7 % — LOW (ref 37–47)
HCT VFR BLD CALC: 31.8 % — LOW (ref 37–47)
HCT VFR BLD CALC: 32 % — LOW (ref 37–47)
HCT VFR BLD CALC: 32 % — LOW (ref 37–47)
HCT VFR BLD CALC: 32.1 % — LOW (ref 37–47)
HCT VFR BLD CALC: 32.1 % — LOW (ref 37–47)
HCT VFR BLD CALC: 32.2 % — LOW (ref 37–47)
HCT VFR BLD CALC: 32.2 % — LOW (ref 37–47)
HCT VFR BLD CALC: 32.5 % — LOW (ref 37–47)
HCT VFR BLD CALC: 32.5 % — LOW (ref 37–47)
HCT VFR BLD CALC: 32.6 % — LOW (ref 37–47)
HCT VFR BLD CALC: 32.8 % — LOW (ref 37–47)
HCT VFR BLD CALC: 32.8 % — LOW (ref 37–47)
HCT VFR BLD CALC: 32.9 % — LOW (ref 37–47)
HCT VFR BLD CALC: 33 % — LOW (ref 37–47)
HCT VFR BLD CALC: 33.1 % — LOW (ref 37–47)
HCT VFR BLD CALC: 33.2 % — LOW (ref 37–47)
HCT VFR BLD CALC: 33.3 % — LOW (ref 37–47)
HCT VFR BLD CALC: 33.3 % — LOW (ref 37–47)
HCT VFR BLD CALC: 33.5 % — LOW (ref 37–47)
HCT VFR BLD CALC: 33.8 % — LOW (ref 37–47)
HCT VFR BLD CALC: 34 % — LOW (ref 37–47)
HCT VFR BLD CALC: 34.2 % — LOW (ref 37–47)
HCT VFR BLD CALC: 34.7 % — LOW (ref 37–47)
HCT VFR BLD CALC: 35.6 % — LOW (ref 37–47)
HCT VFR BLD CALC: 36.7 % — LOW (ref 37–47)
HCT VFR BLD CALC: 36.7 % — LOW (ref 37–47)
HCT VFR BLD CALC: 37 % — SIGNIFICANT CHANGE UP (ref 37–47)
HCT VFR BLD CALC: 37.2 % — SIGNIFICANT CHANGE UP (ref 37–47)
HCT VFR BLD CALC: 37.2 % — SIGNIFICANT CHANGE UP (ref 37–47)
HCT VFR BLD CALC: 37.4 % — SIGNIFICANT CHANGE UP (ref 37–47)
HCT VFR BLD CALC: 37.4 % — SIGNIFICANT CHANGE UP (ref 37–47)
HCT VFR BLDA CALC: 23 % — LOW (ref 39–51)
HCT VFR BLDA CALC: 26 % — LOW (ref 39–51)
HCT VFR BLDA CALC: 29 % — LOW (ref 39–51)
HCT VFR BLDA CALC: 30 % — LOW (ref 39–51)
HCT VFR BLDA CALC: 30 % — LOW (ref 39–51)
HCT VFR BLDA CALC: 32 % — LOW (ref 39–51)
HCT VFR BLDA CALC: 32 % — LOW (ref 39–51)
HCT VFR BLDA CALC: 44 % — SIGNIFICANT CHANGE UP (ref 39–51)
HCT VFR BLDA CALC: 47 % — SIGNIFICANT CHANGE UP (ref 39–51)
HCT VFR BLDA CALC: 49 % — SIGNIFICANT CHANGE UP (ref 39–51)
HCT VFR BLDA CALC: 49 % — SIGNIFICANT CHANGE UP (ref 39–51)
HDLC SERPL-MCNC: 58 MG/DL — SIGNIFICANT CHANGE UP
HEPARIN-PF4 AB RESULT: <0.6 U/ML — SIGNIFICANT CHANGE UP (ref 0–0.9)
HGB BLD CALC-MCNC: 10.1 G/DL — LOW (ref 12.6–17.4)
HGB BLD CALC-MCNC: 10.1 G/DL — LOW (ref 12.6–17.4)
HGB BLD CALC-MCNC: 10.6 G/DL — LOW (ref 12.6–17.4)
HGB BLD CALC-MCNC: 10.6 G/DL — LOW (ref 12.6–17.4)
HGB BLD CALC-MCNC: 14.6 G/DL — SIGNIFICANT CHANGE UP (ref 12.6–17.4)
HGB BLD CALC-MCNC: 15.8 G/DL — SIGNIFICANT CHANGE UP (ref 12.6–17.4)
HGB BLD CALC-MCNC: 16.3 G/DL — SIGNIFICANT CHANGE UP (ref 12.6–17.4)
HGB BLD CALC-MCNC: 16.3 G/DL — SIGNIFICANT CHANGE UP (ref 12.6–17.4)
HGB BLD CALC-MCNC: 7.8 G/DL — LOW (ref 12.6–17.4)
HGB BLD CALC-MCNC: 8.6 G/DL — LOW (ref 12.6–17.4)
HGB BLD CALC-MCNC: 9.6 G/DL — LOW (ref 12.6–17.4)
HGB BLD-MCNC: 10 G/DL — LOW (ref 12–16)
HGB BLD-MCNC: 10.1 G/DL — LOW (ref 12–16)
HGB BLD-MCNC: 10.2 G/DL — LOW (ref 12–16)
HGB BLD-MCNC: 10.2 G/DL — LOW (ref 12–16)
HGB BLD-MCNC: 10.3 G/DL — LOW (ref 12–16)
HGB BLD-MCNC: 10.3 G/DL — LOW (ref 12–16)
HGB BLD-MCNC: 10.4 G/DL — LOW (ref 12–16)
HGB BLD-MCNC: 10.6 G/DL — LOW (ref 12–16)
HGB BLD-MCNC: 10.8 G/DL — LOW (ref 12–16)
HGB BLD-MCNC: 10.8 G/DL — LOW (ref 12–16)
HGB BLD-MCNC: 11 G/DL — LOW (ref 12–16)
HGB BLD-MCNC: 11 G/DL — LOW (ref 12–16)
HGB BLD-MCNC: 5.5 G/DL — CRITICAL LOW (ref 12–16)
HGB BLD-MCNC: 6 G/DL — CRITICAL LOW (ref 12–16)
HGB BLD-MCNC: 6.1 G/DL — CRITICAL LOW (ref 12–16)
HGB BLD-MCNC: 6.5 G/DL — CRITICAL LOW (ref 12–16)
HGB BLD-MCNC: 6.7 G/DL — CRITICAL LOW (ref 12–16)
HGB BLD-MCNC: 6.7 G/DL — CRITICAL LOW (ref 12–16)
HGB BLD-MCNC: 7 G/DL — LOW (ref 12–16)
HGB BLD-MCNC: 7.1 G/DL — LOW (ref 12–16)
HGB BLD-MCNC: 7.2 G/DL — LOW (ref 12–16)
HGB BLD-MCNC: 7.3 G/DL — LOW (ref 12–16)
HGB BLD-MCNC: 7.4 G/DL — LOW (ref 12–16)
HGB BLD-MCNC: 7.4 G/DL — LOW (ref 12–16)
HGB BLD-MCNC: 7.5 G/DL — LOW (ref 12–16)
HGB BLD-MCNC: 7.7 G/DL — LOW (ref 12–16)
HGB BLD-MCNC: 7.8 G/DL — LOW (ref 12–16)
HGB BLD-MCNC: 7.8 G/DL — LOW (ref 12–16)
HGB BLD-MCNC: 8 G/DL — LOW (ref 12–16)
HGB BLD-MCNC: 8.1 G/DL — LOW (ref 12–16)
HGB BLD-MCNC: 8.2 G/DL — LOW (ref 12–16)
HGB BLD-MCNC: 8.3 G/DL — LOW (ref 12–16)
HGB BLD-MCNC: 8.4 G/DL — LOW (ref 12–16)
HGB BLD-MCNC: 8.5 G/DL — LOW (ref 12–16)
HGB BLD-MCNC: 8.6 G/DL — LOW (ref 12–16)
HGB BLD-MCNC: 8.7 G/DL — LOW (ref 12–16)
HGB BLD-MCNC: 8.8 G/DL — LOW (ref 12–16)
HGB BLD-MCNC: 8.9 G/DL — LOW (ref 12–16)
HGB BLD-MCNC: 9 G/DL — LOW (ref 12–16)
HGB BLD-MCNC: 9.1 G/DL — LOW (ref 12–16)
HGB BLD-MCNC: 9.1 G/DL — LOW (ref 12–16)
HGB BLD-MCNC: 9.2 G/DL — LOW (ref 12–16)
HGB BLD-MCNC: 9.3 G/DL — LOW (ref 12–16)
HGB BLD-MCNC: 9.4 G/DL — LOW (ref 12–16)
HGB BLD-MCNC: 9.5 G/DL — LOW (ref 12–16)
HGB BLD-MCNC: 9.6 G/DL — LOW (ref 12–16)
HGB BLD-MCNC: 9.7 G/DL — LOW (ref 12–16)
HGB BLD-MCNC: 9.8 G/DL — LOW (ref 12–16)
HGB BLD-MCNC: 9.9 G/DL — LOW (ref 12–16)
HOROWITZ INDEX BLDA+IHG-RTO: 100 — SIGNIFICANT CHANGE UP
HOROWITZ INDEX BLDA+IHG-RTO: 30 — SIGNIFICANT CHANGE UP
HOROWITZ INDEX BLDA+IHG-RTO: 30 — SIGNIFICANT CHANGE UP
HOROWITZ INDEX BLDA+IHG-RTO: 32 — SIGNIFICANT CHANGE UP
HOROWITZ INDEX BLDA+IHG-RTO: 35 — SIGNIFICANT CHANGE UP
HOROWITZ INDEX BLDA+IHG-RTO: 40 — SIGNIFICANT CHANGE UP
HOROWITZ INDEX BLDA+IHG-RTO: 50 — SIGNIFICANT CHANGE UP
HOROWITZ INDEX BLDA+IHG-RTO: 60 — SIGNIFICANT CHANGE UP
HYPOCHROMIA BLD QL: SLIGHT — SIGNIFICANT CHANGE UP
IMM GRANULOCYTES NFR BLD AUTO: 0.3 % — SIGNIFICANT CHANGE UP (ref 0.1–0.3)
IMM GRANULOCYTES NFR BLD AUTO: 0.4 % — HIGH (ref 0.1–0.3)
IMM GRANULOCYTES NFR BLD AUTO: 0.5 % — HIGH (ref 0.1–0.3)
IMM GRANULOCYTES NFR BLD AUTO: 0.6 % — HIGH (ref 0.1–0.3)
IMM GRANULOCYTES NFR BLD AUTO: 0.7 % — HIGH (ref 0.1–0.3)
IMM GRANULOCYTES NFR BLD AUTO: 0.8 % — HIGH (ref 0.1–0.3)
IMM GRANULOCYTES NFR BLD AUTO: 0.9 % — HIGH (ref 0.1–0.3)
IMM GRANULOCYTES NFR BLD AUTO: 1 % — HIGH (ref 0.1–0.3)
IMM GRANULOCYTES NFR BLD AUTO: 1.1 % — HIGH (ref 0.1–0.3)
IMM GRANULOCYTES NFR BLD AUTO: 1.1 % — HIGH (ref 0.1–0.3)
IMM GRANULOCYTES NFR BLD AUTO: 1.2 % — HIGH (ref 0.1–0.3)
IMM GRANULOCYTES NFR BLD AUTO: 1.3 % — HIGH (ref 0.1–0.3)
IMM GRANULOCYTES NFR BLD AUTO: 1.4 % — HIGH (ref 0.1–0.3)
IMM GRANULOCYTES NFR BLD AUTO: 1.4 % — HIGH (ref 0.1–0.3)
IMM GRANULOCYTES NFR BLD AUTO: 1.5 % — HIGH (ref 0.1–0.3)
IMM GRANULOCYTES NFR BLD AUTO: 1.6 % — HIGH (ref 0.1–0.3)
IMM GRANULOCYTES NFR BLD AUTO: 1.9 % — HIGH (ref 0.1–0.3)
IMM GRANULOCYTES NFR BLD AUTO: 1.9 % — HIGH (ref 0.1–0.3)
IMM GRANULOCYTES NFR BLD AUTO: 2.5 % — HIGH (ref 0.1–0.3)
IMM GRANULOCYTES NFR BLD AUTO: 2.6 % — HIGH (ref 0.1–0.3)
IMM GRANULOCYTES NFR BLD AUTO: 2.6 % — HIGH (ref 0.1–0.3)
IMM GRANULOCYTES NFR BLD AUTO: 5.2 % — HIGH (ref 0.1–0.3)
IMM GRANULOCYTES NFR BLD AUTO: 5.2 % — HIGH (ref 0.1–0.3)
IMM GRANULOCYTES NFR BLD AUTO: 6.9 % — HIGH (ref 0.1–0.3)
IMM GRANULOCYTES NFR BLD AUTO: 6.9 % — HIGH (ref 0.1–0.3)
IMM GRANULOCYTES NFR BLD AUTO: 8 % — HIGH (ref 0.1–0.3)
IMM GRANULOCYTES NFR BLD AUTO: 8 % — HIGH (ref 0.1–0.3)
IMM GRANULOCYTES NFR BLD AUTO: 8.1 % — HIGH (ref 0.1–0.3)
IMM GRANULOCYTES NFR BLD AUTO: 8.1 % — HIGH (ref 0.1–0.3)
IMMUNOLOGIST REVIEW: SIGNIFICANT CHANGE UP
IMMUNOLOGIST REVIEW: SIGNIFICANT CHANGE UP
INR BLD: 1.02 RATIO — SIGNIFICANT CHANGE UP (ref 0.65–1.3)
INR BLD: 1.11 RATIO — SIGNIFICANT CHANGE UP (ref 0.65–1.3)
INR BLD: 1.13 RATIO — SIGNIFICANT CHANGE UP (ref 0.65–1.3)
INR BLD: 1.13 RATIO — SIGNIFICANT CHANGE UP (ref 0.65–1.3)
INR BLD: 1.36 RATIO — HIGH (ref 0.65–1.3)
INR BLD: 1.37 RATIO — HIGH (ref 0.65–1.3)
INR BLD: 1.4 RATIO — HIGH (ref 0.65–1.3)
INR BLD: 1.42 RATIO — HIGH (ref 0.65–1.3)
INR BLD: 1.43 RATIO — HIGH (ref 0.65–1.3)
INR BLD: 1.46 RATIO — HIGH (ref 0.65–1.3)
INR BLD: 1.47 RATIO — HIGH (ref 0.65–1.3)
INR BLD: 1.81 RATIO — HIGH (ref 0.65–1.3)
INR BLD: 1.81 RATIO — HIGH (ref 0.65–1.3)
INR BLD: 1.87 RATIO — HIGH (ref 0.65–1.3)
INR BLD: 1.92 RATIO — HIGH (ref 0.65–1.3)
INR BLD: 2.32 RATIO — HIGH (ref 0.65–1.3)
INR BLD: 2.32 RATIO — HIGH (ref 0.65–1.3)
INR BLD: 3.18 RATIO — HIGH (ref 0.65–1.3)
INR BLD: 3.18 RATIO — HIGH (ref 0.65–1.3)
INR BLD: 3.8 RATIO — HIGH (ref 0.65–1.3)
INR BLD: 3.8 RATIO — HIGH (ref 0.65–1.3)
IRON SATN MFR SERPL: 10 %
IRON SATN MFR SERPL: 12 % — LOW (ref 15–50)
IRON SATN MFR SERPL: 32 UG/DL — LOW (ref 35–150)
IRON SATN MFR SERPL: 32 UG/DL — LOW (ref 35–150)
IRON SERPL-MCNC: 30 UG/DL
JAK2 P.V617F BLD/T QL: SIGNIFICANT CHANGE UP
JAK2 P.V617F BLD/T QL: SIGNIFICANT CHANGE UP
KETONES UR-MCNC: ABNORMAL MG/DL
KETONES UR-MCNC: ABNORMAL MG/DL
KETONES UR-MCNC: NEGATIVE MG/DL — SIGNIFICANT CHANGE UP
KETONES UR-MCNC: NEGATIVE — SIGNIFICANT CHANGE UP
LACTATE BLDV-MCNC: 1 MMOL/L — SIGNIFICANT CHANGE UP (ref 0.5–2)
LACTATE BLDV-MCNC: 1.1 MMOL/L — SIGNIFICANT CHANGE UP (ref 0.5–2)
LACTATE BLDV-MCNC: 1.2 MMOL/L — SIGNIFICANT CHANGE UP (ref 0.5–2)
LACTATE BLDV-MCNC: 1.2 MMOL/L — SIGNIFICANT CHANGE UP (ref 0.5–2)
LACTATE BLDV-MCNC: 1.3 MMOL/L — SIGNIFICANT CHANGE UP (ref 0.5–2)
LACTATE BLDV-MCNC: 1.8 MMOL/L — SIGNIFICANT CHANGE UP (ref 0.5–2)
LACTATE BLDV-MCNC: 1.8 MMOL/L — SIGNIFICANT CHANGE UP (ref 0.5–2)
LACTATE BLDV-MCNC: 4 MMOL/L — CRITICAL HIGH (ref 0.5–2)
LACTATE BLDV-MCNC: 4 MMOL/L — CRITICAL HIGH (ref 0.5–2)
LACTATE BLDV-MCNC: 4.4 MMOL/L — CRITICAL HIGH (ref 0.5–2)
LACTATE BLDV-MCNC: 4.4 MMOL/L — CRITICAL HIGH (ref 0.5–2)
LACTATE SERPL-SCNC: 0.9 MMOL/L — SIGNIFICANT CHANGE UP (ref 0.7–2)
LACTATE SERPL-SCNC: 1.1 MMOL/L — SIGNIFICANT CHANGE UP (ref 0.7–2)
LACTATE SERPL-SCNC: 1.1 MMOL/L — SIGNIFICANT CHANGE UP (ref 0.7–2)
LACTATE SERPL-SCNC: 1.3 MMOL/L — SIGNIFICANT CHANGE UP (ref 0.7–2)
LACTATE SERPL-SCNC: 1.3 MMOL/L — SIGNIFICANT CHANGE UP (ref 0.7–2)
LACTATE SERPL-SCNC: 1.7 MMOL/L — SIGNIFICANT CHANGE UP (ref 0.7–2)
LACTATE SERPL-SCNC: 1.7 MMOL/L — SIGNIFICANT CHANGE UP (ref 0.7–2)
LACTATE SERPL-SCNC: 4.2 MMOL/L — CRITICAL HIGH (ref 0.7–2)
LACTATE SERPL-SCNC: 4.2 MMOL/L — CRITICAL HIGH (ref 0.7–2)
LDH SERPL L TO P-CCNC: 108 U/L — SIGNIFICANT CHANGE UP
LDH SERPL L TO P-CCNC: 108 U/L — SIGNIFICANT CHANGE UP
LDH SERPL L TO P-CCNC: 158 U/L — SIGNIFICANT CHANGE UP
LDH SERPL L TO P-CCNC: 211 — SIGNIFICANT CHANGE UP (ref 50–242)
LDH SERPL L TO P-CCNC: 234 — SIGNIFICANT CHANGE UP (ref 50–242)
LDH SERPL L TO P-CCNC: 234 — SIGNIFICANT CHANGE UP (ref 50–242)
LDH SERPL L TO P-CCNC: 312 — HIGH (ref 50–242)
LDH SERPL L TO P-CCNC: 312 — HIGH (ref 50–242)
LDH SERPL L TO P-CCNC: 398 — HIGH (ref 50–242)
LDH SERPL L TO P-CCNC: 544 U/L — SIGNIFICANT CHANGE UP
LEGIONELLA AG UR QL: NEGATIVE — SIGNIFICANT CHANGE UP
LEUKOCYTE ESTERASE UR-ACNC: ABNORMAL
LEUKOCYTE ESTERASE UR-ACNC: NEGATIVE — SIGNIFICANT CHANGE UP
LG PLATELETS BLD QL AUTO: SLIGHT — SIGNIFICANT CHANGE UP
LG PLATELETS BLD QL AUTO: SLIGHT — SIGNIFICANT CHANGE UP
LIDOCAIN IGE QN: 7 U/L — SIGNIFICANT CHANGE UP (ref 7–60)
LIDOCAIN IGE QN: 7 U/L — SIGNIFICANT CHANGE UP (ref 7–60)
LIPID PNL WITH DIRECT LDL SERPL: 64 MG/DL — SIGNIFICANT CHANGE UP
LYMPHOCYTES # BLD AUTO: 0.27 K/UL — LOW (ref 1.2–3.4)
LYMPHOCYTES # BLD AUTO: 0.34 K/UL — LOW (ref 1.2–3.4)
LYMPHOCYTES # BLD AUTO: 0.38 K/UL — LOW (ref 1.2–3.4)
LYMPHOCYTES # BLD AUTO: 0.38 K/UL — LOW (ref 1.2–3.4)
LYMPHOCYTES # BLD AUTO: 0.41 K/UL — LOW (ref 1.2–3.4)
LYMPHOCYTES # BLD AUTO: 0.43 K/UL — LOW (ref 1.2–3.4)
LYMPHOCYTES # BLD AUTO: 0.45 K/UL — LOW (ref 1.2–3.4)
LYMPHOCYTES # BLD AUTO: 0.51 K/UL — LOW (ref 1.2–3.4)
LYMPHOCYTES # BLD AUTO: 0.52 K/UL — LOW (ref 1.2–3.4)
LYMPHOCYTES # BLD AUTO: 0.52 K/UL — LOW (ref 1.2–3.4)
LYMPHOCYTES # BLD AUTO: 0.53 K/UL — LOW (ref 1.2–3.4)
LYMPHOCYTES # BLD AUTO: 0.54 K/UL — LOW (ref 1.2–3.4)
LYMPHOCYTES # BLD AUTO: 0.57 K/UL — LOW (ref 1.2–3.4)
LYMPHOCYTES # BLD AUTO: 0.58 K/UL — LOW (ref 1.2–3.4)
LYMPHOCYTES # BLD AUTO: 0.59 K/UL — LOW (ref 1.2–3.4)
LYMPHOCYTES # BLD AUTO: 0.59 K/UL — LOW (ref 1.2–3.4)
LYMPHOCYTES # BLD AUTO: 0.64 K/UL — LOW (ref 1.2–3.4)
LYMPHOCYTES # BLD AUTO: 0.66 K/UL — LOW (ref 1.2–3.4)
LYMPHOCYTES # BLD AUTO: 0.7 K/UL — LOW (ref 1.2–3.4)
LYMPHOCYTES # BLD AUTO: 0.7 K/UL — LOW (ref 1.2–3.4)
LYMPHOCYTES # BLD AUTO: 0.72 K/UL — LOW (ref 1.2–3.4)
LYMPHOCYTES # BLD AUTO: 0.75 K/UL — LOW (ref 1.2–3.4)
LYMPHOCYTES # BLD AUTO: 0.76 K/UL — LOW (ref 1.2–3.4)
LYMPHOCYTES # BLD AUTO: 0.77 K/UL — LOW (ref 1.2–3.4)
LYMPHOCYTES # BLD AUTO: 0.81 K/UL — LOW (ref 1.2–3.4)
LYMPHOCYTES # BLD AUTO: 0.81 K/UL — LOW (ref 1.2–3.4)
LYMPHOCYTES # BLD AUTO: 0.83 K/UL — LOW (ref 1.2–3.4)
LYMPHOCYTES # BLD AUTO: 0.84 K/UL — LOW (ref 1.2–3.4)
LYMPHOCYTES # BLD AUTO: 0.85 K/UL — LOW (ref 1.2–3.4)
LYMPHOCYTES # BLD AUTO: 0.9 K/UL — LOW (ref 1.2–3.4)
LYMPHOCYTES # BLD AUTO: 0.91 K/UL — LOW (ref 1.2–3.4)
LYMPHOCYTES # BLD AUTO: 0.91 K/UL — LOW (ref 1.2–3.4)
LYMPHOCYTES # BLD AUTO: 0.92 K/UL — LOW (ref 1.2–3.4)
LYMPHOCYTES # BLD AUTO: 0.96 K/UL — LOW (ref 1.2–3.4)
LYMPHOCYTES # BLD AUTO: 1 K/UL — LOW (ref 1.2–3.4)
LYMPHOCYTES # BLD AUTO: 1 K/UL — LOW (ref 1.2–3.4)
LYMPHOCYTES # BLD AUTO: 1.02 K/UL — LOW (ref 1.2–3.4)
LYMPHOCYTES # BLD AUTO: 1.04 K/UL — LOW (ref 1.2–3.4)
LYMPHOCYTES # BLD AUTO: 1.04 K/UL — LOW (ref 1.2–3.4)
LYMPHOCYTES # BLD AUTO: 1.05 K/UL — LOW (ref 1.2–3.4)
LYMPHOCYTES # BLD AUTO: 1.07 K/UL — LOW (ref 1.2–3.4)
LYMPHOCYTES # BLD AUTO: 1.07 K/UL — LOW (ref 1.2–3.4)
LYMPHOCYTES # BLD AUTO: 1.08 K/UL — LOW (ref 1.2–3.4)
LYMPHOCYTES # BLD AUTO: 1.13 K/UL — LOW (ref 1.2–3.4)
LYMPHOCYTES # BLD AUTO: 1.13 K/UL — LOW (ref 1.2–3.4)
LYMPHOCYTES # BLD AUTO: 1.15 K/UL — LOW (ref 1.2–3.4)
LYMPHOCYTES # BLD AUTO: 1.16 K/UL — LOW (ref 1.2–3.4)
LYMPHOCYTES # BLD AUTO: 1.16 K/UL — LOW (ref 1.2–3.4)
LYMPHOCYTES # BLD AUTO: 1.17 K/UL — LOW (ref 1.2–3.4)
LYMPHOCYTES # BLD AUTO: 1.18 K/UL — LOW (ref 1.2–3.4)
LYMPHOCYTES # BLD AUTO: 1.18 K/UL — LOW (ref 1.2–3.4)
LYMPHOCYTES # BLD AUTO: 1.19 K/UL — LOW (ref 1.2–3.4)
LYMPHOCYTES # BLD AUTO: 1.2 K/UL — SIGNIFICANT CHANGE UP (ref 1.2–3.4)
LYMPHOCYTES # BLD AUTO: 1.23 K/UL — SIGNIFICANT CHANGE UP (ref 1.2–3.4)
LYMPHOCYTES # BLD AUTO: 1.27 K/UL — SIGNIFICANT CHANGE UP (ref 1.2–3.4)
LYMPHOCYTES # BLD AUTO: 1.27 K/UL — SIGNIFICANT CHANGE UP (ref 1.2–3.4)
LYMPHOCYTES # BLD AUTO: 1.3 K/UL — SIGNIFICANT CHANGE UP (ref 1.2–3.4)
LYMPHOCYTES # BLD AUTO: 1.34 K/UL — SIGNIFICANT CHANGE UP (ref 1.2–3.4)
LYMPHOCYTES # BLD AUTO: 1.35 K/UL — SIGNIFICANT CHANGE UP (ref 1.2–3.4)
LYMPHOCYTES # BLD AUTO: 1.37 K/UL — SIGNIFICANT CHANGE UP (ref 1.2–3.4)
LYMPHOCYTES # BLD AUTO: 1.37 K/UL — SIGNIFICANT CHANGE UP (ref 1.2–3.4)
LYMPHOCYTES # BLD AUTO: 1.39 K/UL — SIGNIFICANT CHANGE UP (ref 1.2–3.4)
LYMPHOCYTES # BLD AUTO: 1.4 K/UL — SIGNIFICANT CHANGE UP (ref 1.2–3.4)
LYMPHOCYTES # BLD AUTO: 1.4 K/UL — SIGNIFICANT CHANGE UP (ref 1.2–3.4)
LYMPHOCYTES # BLD AUTO: 1.48 K/UL — SIGNIFICANT CHANGE UP (ref 1.2–3.4)
LYMPHOCYTES # BLD AUTO: 1.5 K/UL — SIGNIFICANT CHANGE UP (ref 1.2–3.4)
LYMPHOCYTES # BLD AUTO: 1.56 K/UL — SIGNIFICANT CHANGE UP (ref 1.2–3.4)
LYMPHOCYTES # BLD AUTO: 1.57 K/UL — SIGNIFICANT CHANGE UP (ref 1.2–3.4)
LYMPHOCYTES # BLD AUTO: 1.58 K/UL — SIGNIFICANT CHANGE UP (ref 1.2–3.4)
LYMPHOCYTES # BLD AUTO: 1.65 K/UL — SIGNIFICANT CHANGE UP (ref 1.2–3.4)
LYMPHOCYTES # BLD AUTO: 1.65 K/UL — SIGNIFICANT CHANGE UP (ref 1.2–3.4)
LYMPHOCYTES # BLD AUTO: 1.67 K/UL — SIGNIFICANT CHANGE UP (ref 1.2–3.4)
LYMPHOCYTES # BLD AUTO: 1.67 K/UL — SIGNIFICANT CHANGE UP (ref 1.2–3.4)
LYMPHOCYTES # BLD AUTO: 1.72 K/UL — SIGNIFICANT CHANGE UP (ref 1.2–3.4)
LYMPHOCYTES # BLD AUTO: 1.82 K/UL — SIGNIFICANT CHANGE UP (ref 1.2–3.4)
LYMPHOCYTES # BLD AUTO: 10.1 % — LOW (ref 20.5–51.1)
LYMPHOCYTES # BLD AUTO: 10.5 % — LOW (ref 20.5–51.1)
LYMPHOCYTES # BLD AUTO: 10.5 % — LOW (ref 20.5–51.1)
LYMPHOCYTES # BLD AUTO: 10.7 % — LOW (ref 20.5–51.1)
LYMPHOCYTES # BLD AUTO: 10.9 % — LOW (ref 20.5–51.1)
LYMPHOCYTES # BLD AUTO: 11.5 % — LOW (ref 20.5–51.1)
LYMPHOCYTES # BLD AUTO: 11.6 % — LOW (ref 20.5–51.1)
LYMPHOCYTES # BLD AUTO: 11.6 % — LOW (ref 20.5–51.1)
LYMPHOCYTES # BLD AUTO: 12.3 % — LOW (ref 20.5–51.1)
LYMPHOCYTES # BLD AUTO: 13.4 % — LOW (ref 20.5–51.1)
LYMPHOCYTES # BLD AUTO: 13.5 % — LOW (ref 20.5–51.1)
LYMPHOCYTES # BLD AUTO: 13.5 % — LOW (ref 20.5–51.1)
LYMPHOCYTES # BLD AUTO: 15.6 % — LOW (ref 20.5–51.1)
LYMPHOCYTES # BLD AUTO: 2.1 % — LOW (ref 20.5–51.1)
LYMPHOCYTES # BLD AUTO: 2.12 K/UL — SIGNIFICANT CHANGE UP (ref 1.2–3.4)
LYMPHOCYTES # BLD AUTO: 2.12 K/UL — SIGNIFICANT CHANGE UP (ref 1.2–3.4)
LYMPHOCYTES # BLD AUTO: 2.5 % — LOW (ref 20.5–51.1)
LYMPHOCYTES # BLD AUTO: 2.5 % — LOW (ref 20.5–51.1)
LYMPHOCYTES # BLD AUTO: 2.8 % — LOW (ref 20.5–51.1)
LYMPHOCYTES # BLD AUTO: 2.8 K/UL — SIGNIFICANT CHANGE UP (ref 1.2–3.4)
LYMPHOCYTES # BLD AUTO: 2.8 K/UL — SIGNIFICANT CHANGE UP (ref 1.2–3.4)
LYMPHOCYTES # BLD AUTO: 3 % — LOW (ref 20.5–51.1)
LYMPHOCYTES # BLD AUTO: 3.2 % — LOW (ref 20.5–51.1)
LYMPHOCYTES # BLD AUTO: 3.3 % — LOW (ref 20.5–51.1)
LYMPHOCYTES # BLD AUTO: 3.6 % — LOW (ref 20.5–51.1)
LYMPHOCYTES # BLD AUTO: 3.9 % — LOW (ref 20.5–51.1)
LYMPHOCYTES # BLD AUTO: 4.3 % — LOW (ref 20.5–51.1)
LYMPHOCYTES # BLD AUTO: 4.3 % — LOW (ref 20.5–51.1)
LYMPHOCYTES # BLD AUTO: 4.9 % — LOW (ref 20.5–51.1)
LYMPHOCYTES # BLD AUTO: 5 % — LOW (ref 20.5–51.1)
LYMPHOCYTES # BLD AUTO: 5.2 % — LOW (ref 20.5–51.1)
LYMPHOCYTES # BLD AUTO: 5.4 % — LOW (ref 20.5–51.1)
LYMPHOCYTES # BLD AUTO: 5.5 % — LOW (ref 20.5–51.1)
LYMPHOCYTES # BLD AUTO: 5.7 % — LOW (ref 20.5–51.1)
LYMPHOCYTES # BLD AUTO: 5.8 % — LOW (ref 20.5–51.1)
LYMPHOCYTES # BLD AUTO: 6.2 % — LOW (ref 20.5–51.1)
LYMPHOCYTES # BLD AUTO: 6.2 % — LOW (ref 20.5–51.1)
LYMPHOCYTES # BLD AUTO: 6.4 % — LOW (ref 20.5–51.1)
LYMPHOCYTES # BLD AUTO: 6.7 % — LOW (ref 20.5–51.1)
LYMPHOCYTES # BLD AUTO: 6.7 % — LOW (ref 20.5–51.1)
LYMPHOCYTES # BLD AUTO: 6.8 % — LOW (ref 20.5–51.1)
LYMPHOCYTES # BLD AUTO: 6.9 % — LOW (ref 20.5–51.1)
LYMPHOCYTES # BLD AUTO: 7 % — LOW (ref 20.5–51.1)
LYMPHOCYTES # BLD AUTO: 7.1 % — LOW (ref 20.5–51.1)
LYMPHOCYTES # BLD AUTO: 7.1 % — LOW (ref 20.5–51.1)
LYMPHOCYTES # BLD AUTO: 7.4 % — LOW (ref 20.5–51.1)
LYMPHOCYTES # BLD AUTO: 7.4 % — LOW (ref 20.5–51.1)
LYMPHOCYTES # BLD AUTO: 7.5 % — LOW (ref 20.5–51.1)
LYMPHOCYTES # BLD AUTO: 7.8 % — LOW (ref 20.5–51.1)
LYMPHOCYTES # BLD AUTO: 7.8 % — LOW (ref 20.5–51.1)
LYMPHOCYTES # BLD AUTO: 8 % — LOW (ref 20.5–51.1)
LYMPHOCYTES # BLD AUTO: 8.1 % — LOW (ref 20.5–51.1)
LYMPHOCYTES # BLD AUTO: 8.1 % — LOW (ref 20.5–51.1)
LYMPHOCYTES # BLD AUTO: 8.3 % — LOW (ref 20.5–51.1)
LYMPHOCYTES # BLD AUTO: 8.4 % — LOW (ref 20.5–51.1)
LYMPHOCYTES # BLD AUTO: 8.9 % — LOW (ref 20.5–51.1)
LYMPHOCYTES # BLD AUTO: 8.9 % — LOW (ref 20.5–51.1)
LYMPHOCYTES # BLD AUTO: 9 % — LOW (ref 20.5–51.1)
LYMPHOCYTES # BLD AUTO: 9 % — LOW (ref 20.5–51.1)
LYMPHOCYTES # BLD AUTO: 9.4 % — LOW (ref 20.5–51.1)
LYMPHOCYTES # BLD AUTO: 9.6 % — LOW (ref 20.5–51.1)
LYMPHOCYTES # BLD AUTO: 9.8 % — LOW (ref 20.5–51.1)
LYMPHOCYTES # BLD AUTO: 9.9 % — LOW (ref 20.5–51.1)
LYMPHOCYTES # FLD: 10 — SIGNIFICANT CHANGE UP
LYMPHOCYTES # FLD: 10 — SIGNIFICANT CHANGE UP
LYMPHOCYTES # FLD: 14 — SIGNIFICANT CHANGE UP
LYMPHOCYTES # FLD: 50 % — SIGNIFICANT CHANGE UP
MACROCYTES BLD QL: SLIGHT — SIGNIFICANT CHANGE UP
MAGNESIUM SERPL-MCNC: 1.4 MG/DL — LOW (ref 1.8–2.4)
MAGNESIUM SERPL-MCNC: 1.4 MG/DL — LOW (ref 1.8–2.4)
MAGNESIUM SERPL-MCNC: 1.5 MG/DL — LOW (ref 1.8–2.4)
MAGNESIUM SERPL-MCNC: 1.6 MG/DL — LOW (ref 1.8–2.4)
MAGNESIUM SERPL-MCNC: 1.7 MG/DL — LOW (ref 1.8–2.4)
MAGNESIUM SERPL-MCNC: 1.8 MG/DL — SIGNIFICANT CHANGE UP (ref 1.8–2.4)
MAGNESIUM SERPL-MCNC: 1.9 MG/DL — SIGNIFICANT CHANGE UP (ref 1.8–2.4)
MAGNESIUM SERPL-MCNC: 2 MG/DL — SIGNIFICANT CHANGE UP (ref 1.8–2.4)
MAGNESIUM SERPL-MCNC: 2.1 MG/DL — SIGNIFICANT CHANGE UP (ref 1.8–2.4)
MAGNESIUM SERPL-MCNC: 2.2 MG/DL — SIGNIFICANT CHANGE UP (ref 1.8–2.4)
MAGNESIUM SERPL-MCNC: 2.3 MG/DL — SIGNIFICANT CHANGE UP (ref 1.8–2.4)
MAGNESIUM SERPL-MCNC: 2.4 MG/DL — SIGNIFICANT CHANGE UP (ref 1.8–2.4)
MAGNESIUM SERPL-MCNC: 2.4 MG/DL — SIGNIFICANT CHANGE UP (ref 1.8–2.4)
MAGNESIUM SERPL-MCNC: 2.5 MG/DL — HIGH (ref 1.8–2.4)
MAGNESIUM SERPL-MCNC: SIGNIFICANT CHANGE UP MG/DL (ref 1.8–2.4)
MAGNESIUM SERPL-MCNC: SIGNIFICANT CHANGE UP MG/DL (ref 1.8–2.4)
MANUAL SMEAR VERIFICATION: SIGNIFICANT CHANGE UP
MCHC RBC-ENTMCNC: 22.7 PG — LOW (ref 27–31)
MCHC RBC-ENTMCNC: 22.8 PG — LOW (ref 27–31)
MCHC RBC-ENTMCNC: 22.8 PG — LOW (ref 27–31)
MCHC RBC-ENTMCNC: 22.9 PG — LOW (ref 27–31)
MCHC RBC-ENTMCNC: 23 PG — LOW (ref 27–31)
MCHC RBC-ENTMCNC: 23.1 PG — LOW (ref 27–31)
MCHC RBC-ENTMCNC: 23.2 PG — LOW (ref 27–31)
MCHC RBC-ENTMCNC: 23.2 PG — LOW (ref 27–31)
MCHC RBC-ENTMCNC: 23.3 PG — LOW (ref 27–31)
MCHC RBC-ENTMCNC: 23.4 PG — LOW (ref 27–31)
MCHC RBC-ENTMCNC: 23.5 PG — LOW (ref 27–31)
MCHC RBC-ENTMCNC: 23.6 PG — LOW (ref 27–31)
MCHC RBC-ENTMCNC: 23.8 PG — LOW (ref 27–31)
MCHC RBC-ENTMCNC: 24.6 PG — LOW (ref 27–31)
MCHC RBC-ENTMCNC: 24.7 PG — LOW (ref 27–31)
MCHC RBC-ENTMCNC: 24.8 PG — LOW (ref 27–31)
MCHC RBC-ENTMCNC: 24.9 PG — LOW (ref 27–31)
MCHC RBC-ENTMCNC: 25 PG — LOW (ref 27–31)
MCHC RBC-ENTMCNC: 25.1 PG — LOW (ref 27–31)
MCHC RBC-ENTMCNC: 25.2 PG — LOW (ref 27–31)
MCHC RBC-ENTMCNC: 25.2 PG — LOW (ref 27–31)
MCHC RBC-ENTMCNC: 25.3 PG — LOW (ref 27–31)
MCHC RBC-ENTMCNC: 25.4 PG — LOW (ref 27–31)
MCHC RBC-ENTMCNC: 25.6 PG — LOW (ref 27–31)
MCHC RBC-ENTMCNC: 25.8 PG — LOW (ref 27–31)
MCHC RBC-ENTMCNC: 25.9 PG — LOW (ref 27–31)
MCHC RBC-ENTMCNC: 26 PG — LOW (ref 27–31)
MCHC RBC-ENTMCNC: 26.9 PG — LOW (ref 27–31)
MCHC RBC-ENTMCNC: 27.3 PG — SIGNIFICANT CHANGE UP (ref 27–31)
MCHC RBC-ENTMCNC: 27.4 PG — SIGNIFICANT CHANGE UP (ref 27–31)
MCHC RBC-ENTMCNC: 27.6 PG — SIGNIFICANT CHANGE UP (ref 27–31)
MCHC RBC-ENTMCNC: 27.7 PG — SIGNIFICANT CHANGE UP (ref 27–31)
MCHC RBC-ENTMCNC: 27.8 PG — SIGNIFICANT CHANGE UP (ref 27–31)
MCHC RBC-ENTMCNC: 27.8 PG — SIGNIFICANT CHANGE UP (ref 27–31)
MCHC RBC-ENTMCNC: 27.9 G/DL — LOW (ref 32–37)
MCHC RBC-ENTMCNC: 27.9 PG — SIGNIFICANT CHANGE UP (ref 27–31)
MCHC RBC-ENTMCNC: 28 G/DL — LOW (ref 32–37)
MCHC RBC-ENTMCNC: 28 G/DL — LOW (ref 32–37)
MCHC RBC-ENTMCNC: 28 PG — SIGNIFICANT CHANGE UP (ref 27–31)
MCHC RBC-ENTMCNC: 28.1 PG — SIGNIFICANT CHANGE UP (ref 27–31)
MCHC RBC-ENTMCNC: 28.2 PG — SIGNIFICANT CHANGE UP (ref 27–31)
MCHC RBC-ENTMCNC: 28.3 G/DL — LOW (ref 32–37)
MCHC RBC-ENTMCNC: 28.4 G/DL — LOW (ref 32–37)
MCHC RBC-ENTMCNC: 28.4 PG — SIGNIFICANT CHANGE UP (ref 27–31)
MCHC RBC-ENTMCNC: 28.5 PG — SIGNIFICANT CHANGE UP (ref 27–31)
MCHC RBC-ENTMCNC: 28.6 G/DL — LOW (ref 32–37)
MCHC RBC-ENTMCNC: 28.6 G/DL — LOW (ref 32–37)
MCHC RBC-ENTMCNC: 28.6 PG — SIGNIFICANT CHANGE UP (ref 27–31)
MCHC RBC-ENTMCNC: 28.7 G/DL — LOW (ref 32–37)
MCHC RBC-ENTMCNC: 28.7 PG — SIGNIFICANT CHANGE UP (ref 27–31)
MCHC RBC-ENTMCNC: 28.7 PG — SIGNIFICANT CHANGE UP (ref 27–31)
MCHC RBC-ENTMCNC: 28.8 G/DL — LOW (ref 32–37)
MCHC RBC-ENTMCNC: 28.8 PG — SIGNIFICANT CHANGE UP (ref 27–31)
MCHC RBC-ENTMCNC: 28.9 G/DL — LOW (ref 32–37)
MCHC RBC-ENTMCNC: 28.9 PG — SIGNIFICANT CHANGE UP (ref 27–31)
MCHC RBC-ENTMCNC: 28.9 PG — SIGNIFICANT CHANGE UP (ref 27–31)
MCHC RBC-ENTMCNC: 29 G/DL — LOW (ref 32–37)
MCHC RBC-ENTMCNC: 29.1 G/DL — LOW (ref 32–37)
MCHC RBC-ENTMCNC: 29.1 PG — SIGNIFICANT CHANGE UP (ref 27–31)
MCHC RBC-ENTMCNC: 29.2 G/DL — LOW (ref 32–37)
MCHC RBC-ENTMCNC: 29.3 G/DL — LOW (ref 32–37)
MCHC RBC-ENTMCNC: 29.3 PG — SIGNIFICANT CHANGE UP (ref 27–31)
MCHC RBC-ENTMCNC: 29.3 PG — SIGNIFICANT CHANGE UP (ref 27–31)
MCHC RBC-ENTMCNC: 29.4 G/DL — LOW (ref 32–37)
MCHC RBC-ENTMCNC: 29.4 PG — SIGNIFICANT CHANGE UP (ref 27–31)
MCHC RBC-ENTMCNC: 29.4 PG — SIGNIFICANT CHANGE UP (ref 27–31)
MCHC RBC-ENTMCNC: 29.5 G/DL — LOW (ref 32–37)
MCHC RBC-ENTMCNC: 29.5 PG — SIGNIFICANT CHANGE UP (ref 27–31)
MCHC RBC-ENTMCNC: 29.5 PG — SIGNIFICANT CHANGE UP (ref 27–31)
MCHC RBC-ENTMCNC: 29.6 G/DL — LOW (ref 32–37)
MCHC RBC-ENTMCNC: 29.6 PG — SIGNIFICANT CHANGE UP (ref 27–31)
MCHC RBC-ENTMCNC: 29.7 G/DL — LOW (ref 32–37)
MCHC RBC-ENTMCNC: 29.8 G/DL — LOW (ref 32–37)
MCHC RBC-ENTMCNC: 29.9 G/DL — LOW (ref 32–37)
MCHC RBC-ENTMCNC: 29.9 PG — SIGNIFICANT CHANGE UP (ref 27–31)
MCHC RBC-ENTMCNC: 30 G/DL — LOW (ref 32–37)
MCHC RBC-ENTMCNC: 30.1 G/DL — LOW (ref 32–37)
MCHC RBC-ENTMCNC: 30.1 G/DL — LOW (ref 32–37)
MCHC RBC-ENTMCNC: 30.2 G/DL — LOW (ref 32–37)
MCHC RBC-ENTMCNC: 30.2 PG — SIGNIFICANT CHANGE UP (ref 27–31)
MCHC RBC-ENTMCNC: 30.3 G/DL — LOW (ref 32–37)
MCHC RBC-ENTMCNC: 30.4 G/DL — LOW (ref 32–37)
MCHC RBC-ENTMCNC: 30.5 G/DL — LOW (ref 32–37)
MCHC RBC-ENTMCNC: 30.6 G/DL — LOW (ref 32–37)
MCHC RBC-ENTMCNC: 30.7 G/DL — LOW (ref 32–37)
MCHC RBC-ENTMCNC: 30.8 G/DL — LOW (ref 32–37)
MCHC RBC-ENTMCNC: 30.9 G/DL — LOW (ref 32–37)
MCHC RBC-ENTMCNC: 31 G/DL — LOW (ref 32–37)
MCHC RBC-ENTMCNC: 31.1 G/DL — LOW (ref 32–37)
MCHC RBC-ENTMCNC: 31.2 G/DL — LOW (ref 32–37)
MCHC RBC-ENTMCNC: 31.3 G/DL — LOW (ref 32–37)
MCHC RBC-ENTMCNC: 31.4 G/DL — LOW (ref 32–37)
MCHC RBC-ENTMCNC: 31.4 G/DL — LOW (ref 32–37)
MCHC RBC-ENTMCNC: 31.5 G/DL — LOW (ref 32–37)
MCHC RBC-ENTMCNC: 31.6 G/DL — LOW (ref 32–37)
MCHC RBC-ENTMCNC: 31.7 G/DL — LOW (ref 32–37)
MCHC RBC-ENTMCNC: 31.8 G/DL — LOW (ref 32–37)
MCHC RBC-ENTMCNC: 31.8 G/DL — LOW (ref 32–37)
MCHC RBC-ENTMCNC: 32 G/DL — SIGNIFICANT CHANGE UP (ref 32–37)
MCHC RBC-ENTMCNC: 32 G/DL — SIGNIFICANT CHANGE UP (ref 32–37)
MCHC RBC-ENTMCNC: 32.1 G/DL — SIGNIFICANT CHANGE UP (ref 32–37)
MCHC RBC-ENTMCNC: 32.1 G/DL — SIGNIFICANT CHANGE UP (ref 32–37)
MCHC RBC-ENTMCNC: 32.2 G/DL — SIGNIFICANT CHANGE UP (ref 32–37)
MCHC RBC-ENTMCNC: 32.2 G/DL — SIGNIFICANT CHANGE UP (ref 32–37)
MCHC RBC-ENTMCNC: 32.3 G/DL — SIGNIFICANT CHANGE UP (ref 32–37)
MCHC RBC-ENTMCNC: 32.3 G/DL — SIGNIFICANT CHANGE UP (ref 32–37)
MCHC RBC-ENTMCNC: 32.5 G/DL — SIGNIFICANT CHANGE UP (ref 32–37)
MCHC RBC-ENTMCNC: 32.5 G/DL — SIGNIFICANT CHANGE UP (ref 32–37)
MCV RBC AUTO: 77.2 FL — LOW (ref 81–99)
MCV RBC AUTO: 77.3 FL — LOW (ref 81–99)
MCV RBC AUTO: 78.1 FL — LOW (ref 81–99)
MCV RBC AUTO: 78.2 FL — LOW (ref 81–99)
MCV RBC AUTO: 78.2 FL — LOW (ref 81–99)
MCV RBC AUTO: 78.4 FL — LOW (ref 81–99)
MCV RBC AUTO: 78.6 FL — LOW (ref 81–99)
MCV RBC AUTO: 78.7 FL — LOW (ref 81–99)
MCV RBC AUTO: 78.8 FL — LOW (ref 81–99)
MCV RBC AUTO: 78.9 FL — LOW (ref 81–99)
MCV RBC AUTO: 79 FL — LOW (ref 81–99)
MCV RBC AUTO: 79.1 FL — LOW (ref 81–99)
MCV RBC AUTO: 79.3 FL — LOW (ref 81–99)
MCV RBC AUTO: 79.4 FL — LOW (ref 81–99)
MCV RBC AUTO: 79.7 FL — LOW (ref 81–99)
MCV RBC AUTO: 79.8 FL — LOW (ref 81–99)
MCV RBC AUTO: 79.8 FL — LOW (ref 81–99)
MCV RBC AUTO: 79.9 FL — LOW (ref 81–99)
MCV RBC AUTO: 79.9 FL — LOW (ref 81–99)
MCV RBC AUTO: 80 FL — LOW (ref 81–99)
MCV RBC AUTO: 80.3 FL — LOW (ref 81–99)
MCV RBC AUTO: 80.4 FL — LOW (ref 81–99)
MCV RBC AUTO: 80.6 FL — LOW (ref 81–99)
MCV RBC AUTO: 80.7 FL — LOW (ref 81–99)
MCV RBC AUTO: 80.9 FL — LOW (ref 81–99)
MCV RBC AUTO: 80.9 FL — LOW (ref 81–99)
MCV RBC AUTO: 81 FL — SIGNIFICANT CHANGE UP (ref 81–99)
MCV RBC AUTO: 81.3 FL — SIGNIFICANT CHANGE UP (ref 81–99)
MCV RBC AUTO: 81.4 FL — SIGNIFICANT CHANGE UP (ref 81–99)
MCV RBC AUTO: 81.4 FL — SIGNIFICANT CHANGE UP (ref 81–99)
MCV RBC AUTO: 81.5 FL — SIGNIFICANT CHANGE UP (ref 81–99)
MCV RBC AUTO: 81.5 FL — SIGNIFICANT CHANGE UP (ref 81–99)
MCV RBC AUTO: 81.6 FL — SIGNIFICANT CHANGE UP (ref 81–99)
MCV RBC AUTO: 82 FL — SIGNIFICANT CHANGE UP (ref 81–99)
MCV RBC AUTO: 82 FL — SIGNIFICANT CHANGE UP (ref 81–99)
MCV RBC AUTO: 82.1 FL — SIGNIFICANT CHANGE UP (ref 81–99)
MCV RBC AUTO: 82.1 FL — SIGNIFICANT CHANGE UP (ref 81–99)
MCV RBC AUTO: 82.3 FL — SIGNIFICANT CHANGE UP (ref 81–99)
MCV RBC AUTO: 82.5 FL — SIGNIFICANT CHANGE UP (ref 81–99)
MCV RBC AUTO: 82.9 FL — SIGNIFICANT CHANGE UP (ref 81–99)
MCV RBC AUTO: 83.1 FL — SIGNIFICANT CHANGE UP (ref 81–99)
MCV RBC AUTO: 83.2 FL — SIGNIFICANT CHANGE UP (ref 81–99)
MCV RBC AUTO: 83.3 FL — SIGNIFICANT CHANGE UP (ref 81–99)
MCV RBC AUTO: 83.5 FL — SIGNIFICANT CHANGE UP (ref 81–99)
MCV RBC AUTO: 83.6 FL — SIGNIFICANT CHANGE UP (ref 81–99)
MCV RBC AUTO: 83.7 FL — SIGNIFICANT CHANGE UP (ref 81–99)
MCV RBC AUTO: 83.8 FL — SIGNIFICANT CHANGE UP (ref 81–99)
MCV RBC AUTO: 83.9 FL — SIGNIFICANT CHANGE UP (ref 81–99)
MCV RBC AUTO: 84 FL — SIGNIFICANT CHANGE UP (ref 81–99)
MCV RBC AUTO: 84.1 FL — SIGNIFICANT CHANGE UP (ref 81–99)
MCV RBC AUTO: 84.2 FL — SIGNIFICANT CHANGE UP (ref 81–99)
MCV RBC AUTO: 84.3 FL — SIGNIFICANT CHANGE UP (ref 81–99)
MCV RBC AUTO: 84.4 FL — SIGNIFICANT CHANGE UP (ref 81–99)
MCV RBC AUTO: 84.5 FL — SIGNIFICANT CHANGE UP (ref 81–99)
MCV RBC AUTO: 84.9 FL — SIGNIFICANT CHANGE UP (ref 81–99)
MCV RBC AUTO: 85.1 FL — SIGNIFICANT CHANGE UP (ref 81–99)
MCV RBC AUTO: 85.3 FL — SIGNIFICANT CHANGE UP (ref 81–99)
MCV RBC AUTO: 85.4 FL — SIGNIFICANT CHANGE UP (ref 81–99)
MCV RBC AUTO: 85.7 FL — SIGNIFICANT CHANGE UP (ref 81–99)
MCV RBC AUTO: 86.7 FL — SIGNIFICANT CHANGE UP (ref 81–99)
MCV RBC AUTO: 87.3 FL — SIGNIFICANT CHANGE UP (ref 81–99)
MCV RBC AUTO: 87.3 FL — SIGNIFICANT CHANGE UP (ref 81–99)
MCV RBC AUTO: 87.5 FL — SIGNIFICANT CHANGE UP (ref 81–99)
MCV RBC AUTO: 89.2 FL — SIGNIFICANT CHANGE UP (ref 81–99)
MCV RBC AUTO: 89.7 FL — SIGNIFICANT CHANGE UP (ref 81–99)
MCV RBC AUTO: 89.7 FL — SIGNIFICANT CHANGE UP (ref 81–99)
MCV RBC AUTO: 89.8 FL — SIGNIFICANT CHANGE UP (ref 81–99)
MCV RBC AUTO: 90 FL — SIGNIFICANT CHANGE UP (ref 81–99)
MCV RBC AUTO: 90.9 FL — SIGNIFICANT CHANGE UP (ref 81–99)
MCV RBC AUTO: 91 FL — SIGNIFICANT CHANGE UP (ref 81–99)
MCV RBC AUTO: 91 FL — SIGNIFICANT CHANGE UP (ref 81–99)
MCV RBC AUTO: 91.2 FL — SIGNIFICANT CHANGE UP (ref 81–99)
MCV RBC AUTO: 91.4 FL — SIGNIFICANT CHANGE UP (ref 81–99)
MCV RBC AUTO: 91.4 FL — SIGNIFICANT CHANGE UP (ref 81–99)
MCV RBC AUTO: 91.5 FL — SIGNIFICANT CHANGE UP (ref 81–99)
MCV RBC AUTO: 91.6 FL — SIGNIFICANT CHANGE UP (ref 81–99)
MCV RBC AUTO: 91.9 FL — SIGNIFICANT CHANGE UP (ref 81–99)
MCV RBC AUTO: 91.9 FL — SIGNIFICANT CHANGE UP (ref 81–99)
MCV RBC AUTO: 92 FL — SIGNIFICANT CHANGE UP (ref 81–99)
MCV RBC AUTO: 92.1 FL — SIGNIFICANT CHANGE UP (ref 81–99)
MCV RBC AUTO: 92.1 FL — SIGNIFICANT CHANGE UP (ref 81–99)
MCV RBC AUTO: 92.2 FL — SIGNIFICANT CHANGE UP (ref 81–99)
MCV RBC AUTO: 92.2 FL — SIGNIFICANT CHANGE UP (ref 81–99)
MCV RBC AUTO: 92.6 FL — SIGNIFICANT CHANGE UP (ref 81–99)
MCV RBC AUTO: 92.7 FL — SIGNIFICANT CHANGE UP (ref 81–99)
MCV RBC AUTO: 92.7 FL — SIGNIFICANT CHANGE UP (ref 81–99)
MCV RBC AUTO: 92.9 FL — SIGNIFICANT CHANGE UP (ref 81–99)
MCV RBC AUTO: 92.9 FL — SIGNIFICANT CHANGE UP (ref 81–99)
MCV RBC AUTO: 93.1 FL — SIGNIFICANT CHANGE UP (ref 81–99)
MCV RBC AUTO: 93.1 FL — SIGNIFICANT CHANGE UP (ref 81–99)
MCV RBC AUTO: 93.2 FL — SIGNIFICANT CHANGE UP (ref 81–99)
MCV RBC AUTO: 93.3 FL — SIGNIFICANT CHANGE UP (ref 81–99)
MCV RBC AUTO: 93.5 FL — SIGNIFICANT CHANGE UP (ref 81–99)
MCV RBC AUTO: 93.6 FL — SIGNIFICANT CHANGE UP (ref 81–99)
MCV RBC AUTO: 93.6 FL — SIGNIFICANT CHANGE UP (ref 81–99)
MCV RBC AUTO: 93.7 FL — SIGNIFICANT CHANGE UP (ref 81–99)
MCV RBC AUTO: 93.7 FL — SIGNIFICANT CHANGE UP (ref 81–99)
MCV RBC AUTO: 93.8 FL — SIGNIFICANT CHANGE UP (ref 81–99)
MCV RBC AUTO: 94 FL — SIGNIFICANT CHANGE UP (ref 81–99)
MCV RBC AUTO: 94 FL — SIGNIFICANT CHANGE UP (ref 81–99)
MCV RBC AUTO: 94.4 FL — SIGNIFICANT CHANGE UP (ref 81–99)
MCV RBC AUTO: 94.7 FL — SIGNIFICANT CHANGE UP (ref 81–99)
MCV RBC AUTO: 94.7 FL — SIGNIFICANT CHANGE UP (ref 81–99)
MCV RBC AUTO: 94.8 FL — SIGNIFICANT CHANGE UP (ref 81–99)
MCV RBC AUTO: 94.8 FL — SIGNIFICANT CHANGE UP (ref 81–99)
MCV RBC AUTO: 95 FL — SIGNIFICANT CHANGE UP (ref 81–99)
MCV RBC AUTO: 95 FL — SIGNIFICANT CHANGE UP (ref 81–99)
MCV RBC AUTO: 95.1 FL — SIGNIFICANT CHANGE UP (ref 81–99)
MCV RBC AUTO: 95.4 FL — SIGNIFICANT CHANGE UP (ref 81–99)
MCV RBC AUTO: 95.7 FL — SIGNIFICANT CHANGE UP (ref 81–99)
MCV RBC AUTO: 95.7 FL — SIGNIFICANT CHANGE UP (ref 81–99)
MCV RBC AUTO: 95.9 FL — SIGNIFICANT CHANGE UP (ref 81–99)
MCV RBC AUTO: 95.9 FL — SIGNIFICANT CHANGE UP (ref 81–99)
MCV RBC AUTO: 96.1 FL — SIGNIFICANT CHANGE UP (ref 81–99)
MCV RBC AUTO: 96.1 FL — SIGNIFICANT CHANGE UP (ref 81–99)
MCV RBC AUTO: 96.2 FL — SIGNIFICANT CHANGE UP (ref 81–99)
MCV RBC AUTO: 96.2 FL — SIGNIFICANT CHANGE UP (ref 81–99)
MCV RBC AUTO: 96.5 FL — SIGNIFICANT CHANGE UP (ref 81–99)
MCV RBC AUTO: 96.5 FL — SIGNIFICANT CHANGE UP (ref 81–99)
MCV RBC AUTO: 96.6 FL — SIGNIFICANT CHANGE UP (ref 81–99)
MCV RBC AUTO: 96.6 FL — SIGNIFICANT CHANGE UP (ref 81–99)
MCV RBC AUTO: 97.1 FL — SIGNIFICANT CHANGE UP (ref 81–99)
MCV RBC AUTO: 97.1 FL — SIGNIFICANT CHANGE UP (ref 81–99)
MCV RBC AUTO: 98.2 FL — SIGNIFICANT CHANGE UP (ref 81–99)
MCV RBC AUTO: 98.2 FL — SIGNIFICANT CHANGE UP (ref 81–99)
MCV RBC AUTO: 99.7 FL — HIGH (ref 81–99)
MCV RBC AUTO: 99.7 FL — HIGH (ref 81–99)
MESOTHL CELL # FLD: 30 % — SIGNIFICANT CHANGE UP
MESOTHL CELL # FLD: 30 % — SIGNIFICANT CHANGE UP
MESOTHL CELL # FLD: 6 % — SIGNIFICANT CHANGE UP
MESOTHL CELL # FLD: 9 % — SIGNIFICANT CHANGE UP
METAMYELOCYTES # FLD: 1 % — HIGH (ref 0–0)
METAMYELOCYTES # FLD: 1 % — HIGH (ref 0–0)
METHOD TYPE: SIGNIFICANT CHANGE UP
MONOCYTES # BLD AUTO: 0.08 K/UL — LOW (ref 0.1–0.6)
MONOCYTES # BLD AUTO: 0.08 K/UL — LOW (ref 0.1–0.6)
MONOCYTES # BLD AUTO: 0.12 K/UL — SIGNIFICANT CHANGE UP (ref 0.1–0.6)
MONOCYTES # BLD AUTO: 0.16 K/UL — SIGNIFICANT CHANGE UP (ref 0.1–0.6)
MONOCYTES # BLD AUTO: 0.29 K/UL — SIGNIFICANT CHANGE UP (ref 0.1–0.6)
MONOCYTES # BLD AUTO: 0.32 K/UL — SIGNIFICANT CHANGE UP (ref 0.1–0.6)
MONOCYTES # BLD AUTO: 0.42 K/UL — SIGNIFICANT CHANGE UP (ref 0.1–0.6)
MONOCYTES # BLD AUTO: 0.43 K/UL — SIGNIFICANT CHANGE UP (ref 0.1–0.6)
MONOCYTES # BLD AUTO: 0.52 K/UL — SIGNIFICANT CHANGE UP (ref 0.1–0.6)
MONOCYTES # BLD AUTO: 0.52 K/UL — SIGNIFICANT CHANGE UP (ref 0.1–0.6)
MONOCYTES # BLD AUTO: 0.56 K/UL — SIGNIFICANT CHANGE UP (ref 0.1–0.6)
MONOCYTES # BLD AUTO: 0.59 K/UL — SIGNIFICANT CHANGE UP (ref 0.1–0.6)
MONOCYTES # BLD AUTO: 0.59 K/UL — SIGNIFICANT CHANGE UP (ref 0.1–0.6)
MONOCYTES # BLD AUTO: 0.6 K/UL — SIGNIFICANT CHANGE UP (ref 0.1–0.6)
MONOCYTES # BLD AUTO: 0.6 K/UL — SIGNIFICANT CHANGE UP (ref 0.1–0.6)
MONOCYTES # BLD AUTO: 0.66 K/UL — HIGH (ref 0.1–0.6)
MONOCYTES # BLD AUTO: 0.66 K/UL — HIGH (ref 0.1–0.6)
MONOCYTES # BLD AUTO: 0.7 K/UL — HIGH (ref 0.1–0.6)
MONOCYTES # BLD AUTO: 0.7 K/UL — HIGH (ref 0.1–0.6)
MONOCYTES # BLD AUTO: 0.74 K/UL — HIGH (ref 0.1–0.6)
MONOCYTES # BLD AUTO: 0.75 K/UL — HIGH (ref 0.1–0.6)
MONOCYTES # BLD AUTO: 0.76 K/UL — HIGH (ref 0.1–0.6)
MONOCYTES # BLD AUTO: 0.78 K/UL — HIGH (ref 0.1–0.6)
MONOCYTES # BLD AUTO: 0.79 K/UL — HIGH (ref 0.1–0.6)
MONOCYTES # BLD AUTO: 0.8 K/UL — HIGH (ref 0.1–0.6)
MONOCYTES # BLD AUTO: 0.8 K/UL — HIGH (ref 0.1–0.6)
MONOCYTES # BLD AUTO: 0.83 K/UL — HIGH (ref 0.1–0.6)
MONOCYTES # BLD AUTO: 0.83 K/UL — HIGH (ref 0.1–0.6)
MONOCYTES # BLD AUTO: 0.84 K/UL — HIGH (ref 0.1–0.6)
MONOCYTES # BLD AUTO: 0.84 K/UL — HIGH (ref 0.1–0.6)
MONOCYTES # BLD AUTO: 0.85 K/UL — HIGH (ref 0.1–0.6)
MONOCYTES # BLD AUTO: 0.86 K/UL — HIGH (ref 0.1–0.6)
MONOCYTES # BLD AUTO: 0.87 K/UL — HIGH (ref 0.1–0.6)
MONOCYTES # BLD AUTO: 0.88 K/UL — HIGH (ref 0.1–0.6)
MONOCYTES # BLD AUTO: 0.89 K/UL — HIGH (ref 0.1–0.6)
MONOCYTES # BLD AUTO: 0.92 K/UL — HIGH (ref 0.1–0.6)
MONOCYTES # BLD AUTO: 0.94 K/UL — HIGH (ref 0.1–0.6)
MONOCYTES # BLD AUTO: 0.94 K/UL — HIGH (ref 0.1–0.6)
MONOCYTES # BLD AUTO: 0.95 K/UL — HIGH (ref 0.1–0.6)
MONOCYTES # BLD AUTO: 0.95 K/UL — HIGH (ref 0.1–0.6)
MONOCYTES # BLD AUTO: 0.96 K/UL — HIGH (ref 0.1–0.6)
MONOCYTES # BLD AUTO: 0.98 K/UL — HIGH (ref 0.1–0.6)
MONOCYTES # BLD AUTO: 1 K/UL — HIGH (ref 0.1–0.6)
MONOCYTES # BLD AUTO: 1.04 K/UL — HIGH (ref 0.1–0.6)
MONOCYTES # BLD AUTO: 1.1 K/UL — HIGH (ref 0.1–0.6)
MONOCYTES # BLD AUTO: 1.11 K/UL — HIGH (ref 0.1–0.6)
MONOCYTES # BLD AUTO: 1.13 K/UL — HIGH (ref 0.1–0.6)
MONOCYTES # BLD AUTO: 1.14 K/UL — HIGH (ref 0.1–0.6)
MONOCYTES # BLD AUTO: 1.15 K/UL — HIGH (ref 0.1–0.6)
MONOCYTES # BLD AUTO: 1.16 K/UL — HIGH (ref 0.1–0.6)
MONOCYTES # BLD AUTO: 1.16 K/UL — HIGH (ref 0.1–0.6)
MONOCYTES # BLD AUTO: 1.19 K/UL — HIGH (ref 0.1–0.6)
MONOCYTES # BLD AUTO: 1.22 K/UL — HIGH (ref 0.1–0.6)
MONOCYTES # BLD AUTO: 1.26 K/UL — HIGH (ref 0.1–0.6)
MONOCYTES # BLD AUTO: 1.27 K/UL — HIGH (ref 0.1–0.6)
MONOCYTES # BLD AUTO: 1.31 K/UL — HIGH (ref 0.1–0.6)
MONOCYTES # BLD AUTO: 1.32 K/UL — HIGH (ref 0.1–0.6)
MONOCYTES # BLD AUTO: 1.34 K/UL — HIGH (ref 0.1–0.6)
MONOCYTES # BLD AUTO: 1.34 K/UL — HIGH (ref 0.1–0.6)
MONOCYTES # BLD AUTO: 1.38 K/UL — HIGH (ref 0.1–0.6)
MONOCYTES # BLD AUTO: 1.4 K/UL — HIGH (ref 0.1–0.6)
MONOCYTES # BLD AUTO: 1.41 K/UL — HIGH (ref 0.1–0.6)
MONOCYTES # BLD AUTO: 1.43 K/UL — HIGH (ref 0.1–0.6)
MONOCYTES # BLD AUTO: 1.47 K/UL — HIGH (ref 0.1–0.6)
MONOCYTES # BLD AUTO: 1.52 K/UL — HIGH (ref 0.1–0.6)
MONOCYTES # BLD AUTO: 1.52 K/UL — HIGH (ref 0.1–0.6)
MONOCYTES # BLD AUTO: 1.54 K/UL — HIGH (ref 0.1–0.6)
MONOCYTES # BLD AUTO: 1.58 K/UL — HIGH (ref 0.1–0.6)
MONOCYTES # BLD AUTO: 1.58 K/UL — HIGH (ref 0.1–0.6)
MONOCYTES # BLD AUTO: 1.59 K/UL — HIGH (ref 0.1–0.6)
MONOCYTES # BLD AUTO: 1.59 K/UL — HIGH (ref 0.1–0.6)
MONOCYTES # BLD AUTO: 1.71 K/UL — HIGH (ref 0.1–0.6)
MONOCYTES # BLD AUTO: 1.72 K/UL — HIGH (ref 0.1–0.6)
MONOCYTES # BLD AUTO: 1.81 K/UL — HIGH (ref 0.1–0.6)
MONOCYTES # BLD AUTO: 1.84 K/UL — HIGH (ref 0.1–0.6)
MONOCYTES # BLD AUTO: 1.84 K/UL — HIGH (ref 0.1–0.6)
MONOCYTES # BLD AUTO: 1.85 K/UL — HIGH (ref 0.1–0.6)
MONOCYTES # BLD AUTO: 1.98 K/UL — HIGH (ref 0.1–0.6)
MONOCYTES # BLD AUTO: 2.08 K/UL — HIGH (ref 0.1–0.6)
MONOCYTES # BLD AUTO: 2.1 K/UL — HIGH (ref 0.1–0.6)
MONOCYTES # BLD AUTO: 2.1 K/UL — HIGH (ref 0.1–0.6)
MONOCYTES # BLD AUTO: 2.38 K/UL — HIGH (ref 0.1–0.6)
MONOCYTES NFR BLD AUTO: 1 % — LOW (ref 1.7–9.3)
MONOCYTES NFR BLD AUTO: 1 % — LOW (ref 1.7–9.3)
MONOCYTES NFR BLD AUTO: 1.5 % — LOW (ref 1.7–9.3)
MONOCYTES NFR BLD AUTO: 1.8 % — SIGNIFICANT CHANGE UP (ref 1.7–9.3)
MONOCYTES NFR BLD AUTO: 10 % — HIGH (ref 1.7–9.3)
MONOCYTES NFR BLD AUTO: 10.1 % — HIGH (ref 1.7–9.3)
MONOCYTES NFR BLD AUTO: 10.2 % — HIGH (ref 1.7–9.3)
MONOCYTES NFR BLD AUTO: 10.2 % — HIGH (ref 1.7–9.3)
MONOCYTES NFR BLD AUTO: 10.4 % — HIGH (ref 1.7–9.3)
MONOCYTES NFR BLD AUTO: 10.7 % — HIGH (ref 1.7–9.3)
MONOCYTES NFR BLD AUTO: 10.7 % — HIGH (ref 1.7–9.3)
MONOCYTES NFR BLD AUTO: 10.8 % — HIGH (ref 1.7–9.3)
MONOCYTES NFR BLD AUTO: 10.9 % — HIGH (ref 1.7–9.3)
MONOCYTES NFR BLD AUTO: 11.2 % — HIGH (ref 1.7–9.3)
MONOCYTES NFR BLD AUTO: 11.2 % — HIGH (ref 1.7–9.3)
MONOCYTES NFR BLD AUTO: 11.3 % — HIGH (ref 1.7–9.3)
MONOCYTES NFR BLD AUTO: 11.6 % — HIGH (ref 1.7–9.3)
MONOCYTES NFR BLD AUTO: 11.8 % — HIGH (ref 1.7–9.3)
MONOCYTES NFR BLD AUTO: 11.9 % — HIGH (ref 1.7–9.3)
MONOCYTES NFR BLD AUTO: 12.5 % — HIGH (ref 1.7–9.3)
MONOCYTES NFR BLD AUTO: 14 % — HIGH (ref 1.7–9.3)
MONOCYTES NFR BLD AUTO: 2.3 % — SIGNIFICANT CHANGE UP (ref 1.7–9.3)
MONOCYTES NFR BLD AUTO: 2.3 % — SIGNIFICANT CHANGE UP (ref 1.7–9.3)
MONOCYTES NFR BLD AUTO: 2.9 % — SIGNIFICANT CHANGE UP (ref 1.7–9.3)
MONOCYTES NFR BLD AUTO: 3 % — SIGNIFICANT CHANGE UP (ref 1.7–9.3)
MONOCYTES NFR BLD AUTO: 3 % — SIGNIFICANT CHANGE UP (ref 1.7–9.3)
MONOCYTES NFR BLD AUTO: 3.5 % — SIGNIFICANT CHANGE UP (ref 1.7–9.3)
MONOCYTES NFR BLD AUTO: 3.6 % — SIGNIFICANT CHANGE UP (ref 1.7–9.3)
MONOCYTES NFR BLD AUTO: 3.7 % — SIGNIFICANT CHANGE UP (ref 1.7–9.3)
MONOCYTES NFR BLD AUTO: 3.7 % — SIGNIFICANT CHANGE UP (ref 1.7–9.3)
MONOCYTES NFR BLD AUTO: 4 % — SIGNIFICANT CHANGE UP (ref 1.7–9.3)
MONOCYTES NFR BLD AUTO: 4 % — SIGNIFICANT CHANGE UP (ref 1.7–9.3)
MONOCYTES NFR BLD AUTO: 4.2 % — SIGNIFICANT CHANGE UP (ref 1.7–9.3)
MONOCYTES NFR BLD AUTO: 4.2 % — SIGNIFICANT CHANGE UP (ref 1.7–9.3)
MONOCYTES NFR BLD AUTO: 4.9 % — SIGNIFICANT CHANGE UP (ref 1.7–9.3)
MONOCYTES NFR BLD AUTO: 5.1 % — SIGNIFICANT CHANGE UP (ref 1.7–9.3)
MONOCYTES NFR BLD AUTO: 5.1 % — SIGNIFICANT CHANGE UP (ref 1.7–9.3)
MONOCYTES NFR BLD AUTO: 5.5 % — SIGNIFICANT CHANGE UP (ref 1.7–9.3)
MONOCYTES NFR BLD AUTO: 5.7 % — SIGNIFICANT CHANGE UP (ref 1.7–9.3)
MONOCYTES NFR BLD AUTO: 5.8 % — SIGNIFICANT CHANGE UP (ref 1.7–9.3)
MONOCYTES NFR BLD AUTO: 6 % — SIGNIFICANT CHANGE UP (ref 1.7–9.3)
MONOCYTES NFR BLD AUTO: 6 % — SIGNIFICANT CHANGE UP (ref 1.7–9.3)
MONOCYTES NFR BLD AUTO: 6.2 % — SIGNIFICANT CHANGE UP (ref 1.7–9.3)
MONOCYTES NFR BLD AUTO: 6.2 % — SIGNIFICANT CHANGE UP (ref 1.7–9.3)
MONOCYTES NFR BLD AUTO: 6.3 % — SIGNIFICANT CHANGE UP (ref 1.7–9.3)
MONOCYTES NFR BLD AUTO: 6.6 % — SIGNIFICANT CHANGE UP (ref 1.7–9.3)
MONOCYTES NFR BLD AUTO: 6.8 % — SIGNIFICANT CHANGE UP (ref 1.7–9.3)
MONOCYTES NFR BLD AUTO: 6.8 % — SIGNIFICANT CHANGE UP (ref 1.7–9.3)
MONOCYTES NFR BLD AUTO: 6.9 % — SIGNIFICANT CHANGE UP (ref 1.7–9.3)
MONOCYTES NFR BLD AUTO: 7.1 % — SIGNIFICANT CHANGE UP (ref 1.7–9.3)
MONOCYTES NFR BLD AUTO: 7.2 % — SIGNIFICANT CHANGE UP (ref 1.7–9.3)
MONOCYTES NFR BLD AUTO: 7.3 % — SIGNIFICANT CHANGE UP (ref 1.7–9.3)
MONOCYTES NFR BLD AUTO: 7.3 % — SIGNIFICANT CHANGE UP (ref 1.7–9.3)
MONOCYTES NFR BLD AUTO: 7.5 % — SIGNIFICANT CHANGE UP (ref 1.7–9.3)
MONOCYTES NFR BLD AUTO: 7.6 % — SIGNIFICANT CHANGE UP (ref 1.7–9.3)
MONOCYTES NFR BLD AUTO: 7.6 % — SIGNIFICANT CHANGE UP (ref 1.7–9.3)
MONOCYTES NFR BLD AUTO: 8 % — SIGNIFICANT CHANGE UP (ref 1.7–9.3)
MONOCYTES NFR BLD AUTO: 8.2 % — SIGNIFICANT CHANGE UP (ref 1.7–9.3)
MONOCYTES NFR BLD AUTO: 8.4 % — SIGNIFICANT CHANGE UP (ref 1.7–9.3)
MONOCYTES NFR BLD AUTO: 8.5 % — SIGNIFICANT CHANGE UP (ref 1.7–9.3)
MONOCYTES NFR BLD AUTO: 8.6 % — SIGNIFICANT CHANGE UP (ref 1.7–9.3)
MONOCYTES NFR BLD AUTO: 8.7 % — SIGNIFICANT CHANGE UP (ref 1.7–9.3)
MONOCYTES NFR BLD AUTO: 8.8 % — SIGNIFICANT CHANGE UP (ref 1.7–9.3)
MONOCYTES NFR BLD AUTO: 9 % — SIGNIFICANT CHANGE UP (ref 1.7–9.3)
MONOCYTES NFR BLD AUTO: 9 % — SIGNIFICANT CHANGE UP (ref 1.7–9.3)
MONOCYTES NFR BLD AUTO: 9.3 % — SIGNIFICANT CHANGE UP (ref 1.7–9.3)
MONOCYTES NFR BLD AUTO: 9.4 % — HIGH (ref 1.7–9.3)
MONOCYTES NFR BLD AUTO: 9.5 % — HIGH (ref 1.7–9.3)
MONOCYTES NFR BLD AUTO: 9.6 % — HIGH (ref 1.7–9.3)
MONOCYTES NFR BLD AUTO: 9.7 % — HIGH (ref 1.7–9.3)
MONOCYTES NFR BLD AUTO: 9.8 % — HIGH (ref 1.7–9.3)
MONOCYTES NFR BLD AUTO: 9.9 % — HIGH (ref 1.7–9.3)
MONOCYTES NFR BLD AUTO: 9.9 % — HIGH (ref 1.7–9.3)
MONOS+MACROS # FLD: 10 % — SIGNIFICANT CHANGE UP
MONOS+MACROS # FLD: 56 % — SIGNIFICANT CHANGE UP
MONOS+MACROS # FLD: 56 % — SIGNIFICANT CHANGE UP
MONOS+MACROS # FLD: 65 % — SIGNIFICANT CHANGE UP
MRSA PCR RESULT.: NEGATIVE — SIGNIFICANT CHANGE UP
MRSA PCR RESULT.: POSITIVE
NEUTROPHILS # BLD AUTO: 10.06 K/UL — HIGH (ref 1.4–6.5)
NEUTROPHILS # BLD AUTO: 10.09 K/UL — HIGH (ref 1.4–6.5)
NEUTROPHILS # BLD AUTO: 10.12 K/UL — HIGH (ref 1.4–6.5)
NEUTROPHILS # BLD AUTO: 10.15 K/UL — HIGH (ref 1.4–6.5)
NEUTROPHILS # BLD AUTO: 10.24 K/UL — HIGH (ref 1.4–6.5)
NEUTROPHILS # BLD AUTO: 10.25 K/UL — HIGH (ref 1.4–6.5)
NEUTROPHILS # BLD AUTO: 10.5 K/UL — HIGH (ref 1.4–6.5)
NEUTROPHILS # BLD AUTO: 10.72 K/UL — HIGH (ref 1.4–6.5)
NEUTROPHILS # BLD AUTO: 10.73 K/UL — HIGH (ref 1.4–6.5)
NEUTROPHILS # BLD AUTO: 10.73 K/UL — HIGH (ref 1.4–6.5)
NEUTROPHILS # BLD AUTO: 10.84 K/UL — HIGH (ref 1.4–6.5)
NEUTROPHILS # BLD AUTO: 11.19 K/UL — HIGH (ref 1.4–6.5)
NEUTROPHILS # BLD AUTO: 11.19 K/UL — HIGH (ref 1.4–6.5)
NEUTROPHILS # BLD AUTO: 11.26 K/UL — HIGH (ref 1.4–6.5)
NEUTROPHILS # BLD AUTO: 11.59 K/UL — HIGH (ref 1.4–6.5)
NEUTROPHILS # BLD AUTO: 11.6 K/UL — HIGH (ref 1.4–6.5)
NEUTROPHILS # BLD AUTO: 11.8 K/UL — HIGH (ref 1.4–6.5)
NEUTROPHILS # BLD AUTO: 11.81 K/UL — HIGH (ref 1.4–6.5)
NEUTROPHILS # BLD AUTO: 11.86 K/UL — HIGH (ref 1.4–6.5)
NEUTROPHILS # BLD AUTO: 12.65 K/UL — HIGH (ref 1.4–6.5)
NEUTROPHILS # BLD AUTO: 12.65 K/UL — HIGH (ref 1.4–6.5)
NEUTROPHILS # BLD AUTO: 12.68 K/UL — HIGH (ref 1.4–6.5)
NEUTROPHILS # BLD AUTO: 12.79 K/UL — HIGH (ref 1.4–6.5)
NEUTROPHILS # BLD AUTO: 12.8 K/UL — HIGH (ref 1.4–6.5)
NEUTROPHILS # BLD AUTO: 12.8 K/UL — HIGH (ref 1.4–6.5)
NEUTROPHILS # BLD AUTO: 13.33 K/UL — HIGH (ref 1.4–6.5)
NEUTROPHILS # BLD AUTO: 13.44 K/UL — HIGH (ref 1.4–6.5)
NEUTROPHILS # BLD AUTO: 13.74 K/UL — HIGH (ref 1.4–6.5)
NEUTROPHILS # BLD AUTO: 14.49 K/UL — HIGH (ref 1.4–6.5)
NEUTROPHILS # BLD AUTO: 15.06 K/UL — HIGH (ref 1.4–6.5)
NEUTROPHILS # BLD AUTO: 15.06 K/UL — HIGH (ref 1.4–6.5)
NEUTROPHILS # BLD AUTO: 15.09 K/UL — HIGH (ref 1.4–6.5)
NEUTROPHILS # BLD AUTO: 15.18 K/UL — HIGH (ref 1.4–6.5)
NEUTROPHILS # BLD AUTO: 15.24 K/UL — HIGH (ref 1.4–6.5)
NEUTROPHILS # BLD AUTO: 15.65 K/UL — HIGH (ref 1.4–6.5)
NEUTROPHILS # BLD AUTO: 15.7 K/UL — HIGH (ref 1.4–6.5)
NEUTROPHILS # BLD AUTO: 16.21 K/UL — HIGH (ref 1.4–6.5)
NEUTROPHILS # BLD AUTO: 16.29 K/UL — HIGH (ref 1.4–6.5)
NEUTROPHILS # BLD AUTO: 16.29 K/UL — HIGH (ref 1.4–6.5)
NEUTROPHILS # BLD AUTO: 16.62 K/UL — HIGH (ref 1.4–6.5)
NEUTROPHILS # BLD AUTO: 16.62 K/UL — HIGH (ref 1.4–6.5)
NEUTROPHILS # BLD AUTO: 18.69 K/UL — HIGH (ref 1.4–6.5)
NEUTROPHILS # BLD AUTO: 19.1 K/UL — HIGH (ref 1.4–6.5)
NEUTROPHILS # BLD AUTO: 19.1 K/UL — HIGH (ref 1.4–6.5)
NEUTROPHILS # BLD AUTO: 19.17 K/UL — HIGH (ref 1.4–6.5)
NEUTROPHILS # BLD AUTO: 19.17 K/UL — HIGH (ref 1.4–6.5)
NEUTROPHILS # BLD AUTO: 19.49 K/UL — HIGH (ref 1.4–6.5)
NEUTROPHILS # BLD AUTO: 24.39 K/UL — HIGH (ref 1.4–6.5)
NEUTROPHILS # BLD AUTO: 24.39 K/UL — HIGH (ref 1.4–6.5)
NEUTROPHILS # BLD AUTO: 26.69 K/UL — HIGH (ref 1.4–6.5)
NEUTROPHILS # BLD AUTO: 26.69 K/UL — HIGH (ref 1.4–6.5)
NEUTROPHILS # BLD AUTO: 29.75 K/UL — HIGH (ref 1.4–6.5)
NEUTROPHILS # BLD AUTO: 29.75 K/UL — HIGH (ref 1.4–6.5)
NEUTROPHILS # BLD AUTO: 36.74 K/UL — HIGH (ref 1.4–6.5)
NEUTROPHILS # BLD AUTO: 36.74 K/UL — HIGH (ref 1.4–6.5)
NEUTROPHILS # BLD AUTO: 6.22 K/UL — SIGNIFICANT CHANGE UP (ref 1.4–6.5)
NEUTROPHILS # BLD AUTO: 6.22 K/UL — SIGNIFICANT CHANGE UP (ref 1.4–6.5)
NEUTROPHILS # BLD AUTO: 6.23 K/UL — SIGNIFICANT CHANGE UP (ref 1.4–6.5)
NEUTROPHILS # BLD AUTO: 6.42 K/UL — SIGNIFICANT CHANGE UP (ref 1.4–6.5)
NEUTROPHILS # BLD AUTO: 6.87 K/UL — HIGH (ref 1.4–6.5)
NEUTROPHILS # BLD AUTO: 6.87 K/UL — HIGH (ref 1.4–6.5)
NEUTROPHILS # BLD AUTO: 6.94 K/UL — HIGH (ref 1.4–6.5)
NEUTROPHILS # BLD AUTO: 7.02 K/UL — HIGH (ref 1.4–6.5)
NEUTROPHILS # BLD AUTO: 7.02 K/UL — HIGH (ref 1.4–6.5)
NEUTROPHILS # BLD AUTO: 7.03 K/UL — HIGH (ref 1.4–6.5)
NEUTROPHILS # BLD AUTO: 7.11 K/UL — HIGH (ref 1.4–6.5)
NEUTROPHILS # BLD AUTO: 7.31 K/UL — HIGH (ref 1.4–6.5)
NEUTROPHILS # BLD AUTO: 7.34 K/UL — HIGH (ref 1.4–6.5)
NEUTROPHILS # BLD AUTO: 7.36 K/UL — HIGH (ref 1.4–6.5)
NEUTROPHILS # BLD AUTO: 7.48 K/UL — HIGH (ref 1.4–6.5)
NEUTROPHILS # BLD AUTO: 7.49 K/UL — HIGH (ref 1.4–6.5)
NEUTROPHILS # BLD AUTO: 7.83 K/UL — HIGH (ref 1.4–6.5)
NEUTROPHILS # BLD AUTO: 7.98 K/UL — HIGH (ref 1.4–6.5)
NEUTROPHILS # BLD AUTO: 8.2 K/UL — HIGH (ref 1.4–6.5)
NEUTROPHILS # BLD AUTO: 8.3 K/UL — HIGH (ref 1.4–6.5)
NEUTROPHILS # BLD AUTO: 8.33 K/UL — HIGH (ref 1.4–6.5)
NEUTROPHILS # BLD AUTO: 8.39 K/UL — HIGH (ref 1.4–6.5)
NEUTROPHILS # BLD AUTO: 8.39 K/UL — HIGH (ref 1.4–6.5)
NEUTROPHILS # BLD AUTO: 8.41 K/UL — HIGH (ref 1.4–6.5)
NEUTROPHILS # BLD AUTO: 8.44 K/UL — HIGH (ref 1.4–6.5)
NEUTROPHILS # BLD AUTO: 8.44 K/UL — HIGH (ref 1.4–6.5)
NEUTROPHILS # BLD AUTO: 8.48 K/UL — HIGH (ref 1.4–6.5)
NEUTROPHILS # BLD AUTO: 8.49 K/UL — HIGH (ref 1.4–6.5)
NEUTROPHILS # BLD AUTO: 8.49 K/UL — HIGH (ref 1.4–6.5)
NEUTROPHILS # BLD AUTO: 8.64 K/UL — HIGH (ref 1.4–6.5)
NEUTROPHILS # BLD AUTO: 8.81 K/UL — HIGH (ref 1.4–6.5)
NEUTROPHILS # BLD AUTO: 8.84 K/UL — HIGH (ref 1.4–6.5)
NEUTROPHILS # BLD AUTO: 8.98 K/UL — HIGH (ref 1.4–6.5)
NEUTROPHILS # BLD AUTO: 9.02 K/UL — HIGH (ref 1.4–6.5)
NEUTROPHILS # BLD AUTO: 9.18 K/UL — HIGH (ref 1.4–6.5)
NEUTROPHILS # BLD AUTO: 9.26 K/UL — HIGH (ref 1.4–6.5)
NEUTROPHILS # BLD AUTO: 9.32 K/UL — HIGH (ref 1.4–6.5)
NEUTROPHILS # BLD AUTO: 9.35 K/UL — HIGH (ref 1.4–6.5)
NEUTROPHILS # BLD AUTO: 9.39 K/UL — HIGH (ref 1.4–6.5)
NEUTROPHILS # BLD AUTO: 9.51 K/UL — HIGH (ref 1.4–6.5)
NEUTROPHILS # BLD AUTO: 9.79 K/UL — HIGH (ref 1.4–6.5)
NEUTROPHILS # BLD AUTO: 9.84 K/UL — HIGH (ref 1.4–6.5)
NEUTROPHILS # BLD AUTO: 9.84 K/UL — HIGH (ref 1.4–6.5)
NEUTROPHILS NFR BLD AUTO: 38 % — LOW (ref 42.2–75.2)
NEUTROPHILS NFR BLD AUTO: 38 % — LOW (ref 42.2–75.2)
NEUTROPHILS NFR BLD AUTO: 72 % — SIGNIFICANT CHANGE UP (ref 42.2–75.2)
NEUTROPHILS NFR BLD AUTO: 72 % — SIGNIFICANT CHANGE UP (ref 42.2–75.2)
NEUTROPHILS NFR BLD AUTO: 73.1 % — SIGNIFICANT CHANGE UP (ref 42.2–75.2)
NEUTROPHILS NFR BLD AUTO: 73.2 % — SIGNIFICANT CHANGE UP (ref 42.2–75.2)
NEUTROPHILS NFR BLD AUTO: 74.1 % — SIGNIFICANT CHANGE UP (ref 42.2–75.2)
NEUTROPHILS NFR BLD AUTO: 74.3 % — SIGNIFICANT CHANGE UP (ref 42.2–75.2)
NEUTROPHILS NFR BLD AUTO: 74.4 % — SIGNIFICANT CHANGE UP (ref 42.2–75.2)
NEUTROPHILS NFR BLD AUTO: 74.4 % — SIGNIFICANT CHANGE UP (ref 42.2–75.2)
NEUTROPHILS NFR BLD AUTO: 74.9 % — SIGNIFICANT CHANGE UP (ref 42.2–75.2)
NEUTROPHILS NFR BLD AUTO: 75.4 % — HIGH (ref 42.2–75.2)
NEUTROPHILS NFR BLD AUTO: 75.9 % — HIGH (ref 42.2–75.2)
NEUTROPHILS NFR BLD AUTO: 76.3 % — HIGH (ref 42.2–75.2)
NEUTROPHILS NFR BLD AUTO: 76.9 % — HIGH (ref 42.2–75.2)
NEUTROPHILS NFR BLD AUTO: 77.6 % — HIGH (ref 42.2–75.2)
NEUTROPHILS NFR BLD AUTO: 77.8 % — HIGH (ref 42.2–75.2)
NEUTROPHILS NFR BLD AUTO: 78 % — HIGH (ref 42.2–75.2)
NEUTROPHILS NFR BLD AUTO: 78 % — HIGH (ref 42.2–75.2)
NEUTROPHILS NFR BLD AUTO: 78.1 % — HIGH (ref 42.2–75.2)
NEUTROPHILS NFR BLD AUTO: 78.2 % — HIGH (ref 42.2–75.2)
NEUTROPHILS NFR BLD AUTO: 78.2 % — HIGH (ref 42.2–75.2)
NEUTROPHILS NFR BLD AUTO: 78.4 % — HIGH (ref 42.2–75.2)
NEUTROPHILS NFR BLD AUTO: 78.7 % — HIGH (ref 42.2–75.2)
NEUTROPHILS NFR BLD AUTO: 78.9 % — HIGH (ref 42.2–75.2)
NEUTROPHILS NFR BLD AUTO: 78.9 % — HIGH (ref 42.2–75.2)
NEUTROPHILS NFR BLD AUTO: 79 % — HIGH (ref 42.2–75.2)
NEUTROPHILS NFR BLD AUTO: 79.2 % — HIGH (ref 42.2–75.2)
NEUTROPHILS NFR BLD AUTO: 79.3 % — HIGH (ref 42.2–75.2)
NEUTROPHILS NFR BLD AUTO: 79.4 % — HIGH (ref 42.2–75.2)
NEUTROPHILS NFR BLD AUTO: 79.6 % — HIGH (ref 42.2–75.2)
NEUTROPHILS NFR BLD AUTO: 79.6 % — HIGH (ref 42.2–75.2)
NEUTROPHILS NFR BLD AUTO: 79.7 % — HIGH (ref 42.2–75.2)
NEUTROPHILS NFR BLD AUTO: 80 % — HIGH (ref 42.2–75.2)
NEUTROPHILS NFR BLD AUTO: 80.2 % — HIGH (ref 42.2–75.2)
NEUTROPHILS NFR BLD AUTO: 80.4 % — HIGH (ref 42.2–75.2)
NEUTROPHILS NFR BLD AUTO: 80.5 % — HIGH (ref 42.2–75.2)
NEUTROPHILS NFR BLD AUTO: 80.5 % — HIGH (ref 42.2–75.2)
NEUTROPHILS NFR BLD AUTO: 80.7 % — HIGH (ref 42.2–75.2)
NEUTROPHILS NFR BLD AUTO: 80.9 % — HIGH (ref 42.2–75.2)
NEUTROPHILS NFR BLD AUTO: 80.9 % — HIGH (ref 42.2–75.2)
NEUTROPHILS NFR BLD AUTO: 81 % — HIGH (ref 42.2–75.2)
NEUTROPHILS NFR BLD AUTO: 81 % — HIGH (ref 42.2–75.2)
NEUTROPHILS NFR BLD AUTO: 81.1 % — HIGH (ref 42.2–75.2)
NEUTROPHILS NFR BLD AUTO: 81.4 % — HIGH (ref 42.2–75.2)
NEUTROPHILS NFR BLD AUTO: 81.4 % — HIGH (ref 42.2–75.2)
NEUTROPHILS NFR BLD AUTO: 81.5 % — HIGH (ref 42.2–75.2)
NEUTROPHILS NFR BLD AUTO: 81.6 % — HIGH (ref 42.2–75.2)
NEUTROPHILS NFR BLD AUTO: 81.7 % — HIGH (ref 42.2–75.2)
NEUTROPHILS NFR BLD AUTO: 82 % — HIGH (ref 42.2–75.2)
NEUTROPHILS NFR BLD AUTO: 82.4 % — HIGH (ref 42.2–75.2)
NEUTROPHILS NFR BLD AUTO: 82.5 % — HIGH (ref 42.2–75.2)
NEUTROPHILS NFR BLD AUTO: 82.5 % — HIGH (ref 42.2–75.2)
NEUTROPHILS NFR BLD AUTO: 83 % — HIGH (ref 42.2–75.2)
NEUTROPHILS NFR BLD AUTO: 83.1 % — HIGH (ref 42.2–75.2)
NEUTROPHILS NFR BLD AUTO: 83.1 % — HIGH (ref 42.2–75.2)
NEUTROPHILS NFR BLD AUTO: 83.3 % — HIGH (ref 42.2–75.2)
NEUTROPHILS NFR BLD AUTO: 84 % — HIGH (ref 42.2–75.2)
NEUTROPHILS NFR BLD AUTO: 84 % — HIGH (ref 42.2–75.2)
NEUTROPHILS NFR BLD AUTO: 84.3 % — HIGH (ref 42.2–75.2)
NEUTROPHILS NFR BLD AUTO: 85 % — HIGH (ref 42.2–75.2)
NEUTROPHILS NFR BLD AUTO: 85 % — HIGH (ref 42.2–75.2)
NEUTROPHILS NFR BLD AUTO: 85.1 % — HIGH (ref 42.2–75.2)
NEUTROPHILS NFR BLD AUTO: 85.8 % — HIGH (ref 42.2–75.2)
NEUTROPHILS NFR BLD AUTO: 86.1 % — HIGH (ref 42.2–75.2)
NEUTROPHILS NFR BLD AUTO: 86.2 % — HIGH (ref 42.2–75.2)
NEUTROPHILS NFR BLD AUTO: 87 % — HIGH (ref 42.2–75.2)
NEUTROPHILS NFR BLD AUTO: 87 % — HIGH (ref 42.2–75.2)
NEUTROPHILS NFR BLD AUTO: 87.5 % — HIGH (ref 42.2–75.2)
NEUTROPHILS NFR BLD AUTO: 87.6 % — HIGH (ref 42.2–75.2)
NEUTROPHILS NFR BLD AUTO: 87.8 % — HIGH (ref 42.2–75.2)
NEUTROPHILS NFR BLD AUTO: 88.4 % — HIGH (ref 42.2–75.2)
NEUTROPHILS NFR BLD AUTO: 89.1 % — HIGH (ref 42.2–75.2)
NEUTROPHILS NFR BLD AUTO: 89.2 % — HIGH (ref 42.2–75.2)
NEUTROPHILS NFR BLD AUTO: 89.8 % — HIGH (ref 42.2–75.2)
NEUTROPHILS NFR BLD AUTO: 91 % — HIGH (ref 42.2–75.2)
NEUTROPHILS NFR BLD AUTO: 91 % — HIGH (ref 42.2–75.2)
NEUTROPHILS NFR BLD AUTO: 91.2 % — HIGH (ref 42.2–75.2)
NEUTROPHILS NFR BLD AUTO: 91.3 % — HIGH (ref 42.2–75.2)
NEUTROPHILS NFR BLD AUTO: 92.5 % — HIGH (ref 42.2–75.2)
NEUTROPHILS NFR BLD AUTO: 92.5 % — HIGH (ref 42.2–75.2)
NEUTROPHILS NFR BLD AUTO: 92.6 % — HIGH (ref 42.2–75.2)
NEUTROPHILS NFR BLD AUTO: 92.6 % — HIGH (ref 42.2–75.2)
NEUTROPHILS NFR BLD AUTO: 92.7 % — HIGH (ref 42.2–75.2)
NEUTROPHILS NFR BLD AUTO: 93.5 % — HIGH (ref 42.2–75.2)
NEUTROPHILS NFR BLD AUTO: 93.9 % — HIGH (ref 42.2–75.2)
NEUTROPHILS NFR BLD AUTO: 93.9 % — HIGH (ref 42.2–75.2)
NEUTROPHILS-BODY FLUID: 10 % — SIGNIFICANT CHANGE UP
NEUTROPHILS-BODY FLUID: 24 % — SIGNIFICANT CHANGE UP
NEUTROPHILS-BODY FLUID: 4 % — SIGNIFICANT CHANGE UP
NEUTROPHILS-BODY FLUID: 4 % — SIGNIFICANT CHANGE UP
NEUTS BAND # BLD: 1 % — SIGNIFICANT CHANGE UP (ref 0–6)
NEUTS BAND # BLD: 21 % — HIGH (ref 0–6)
NEUTS BAND # BLD: 21 % — HIGH (ref 0–6)
NEUTS BAND # BLD: 54 % — HIGH (ref 0–6)
NEUTS BAND # BLD: 54 % — HIGH (ref 0–6)
NITRITE UR-MCNC: NEGATIVE — SIGNIFICANT CHANGE UP
NITRITE UR-MCNC: POSITIVE
NON HDL CHOLESTEROL: 73 MG/DL — SIGNIFICANT CHANGE UP
NON-GYNECOLOGICAL CYTOLOGY STUDY: SIGNIFICANT CHANGE UP
NRBC # BLD: 0 /100 WBCS — SIGNIFICANT CHANGE UP (ref 0–0)
NRBC # BLD: 0 /100 — SIGNIFICANT CHANGE UP (ref 0–0)
NRBC # BLD: SIGNIFICANT CHANGE UP /100 WBCS (ref 0–0)
NT-PROBNP SERPL-MCNC: 3262 PG/ML
NT-PROBNP SERPL-SCNC: 2953 PG/ML — HIGH (ref 0–300)
NT-PROBNP SERPL-SCNC: 3203 PG/ML — HIGH (ref 0–300)
NT-PROBNP SERPL-SCNC: 3955 PG/ML — HIGH (ref 0–300)
NT-PROBNP SERPL-SCNC: 9985 PG/ML — HIGH (ref 0–300)
NT-PROBNP SERPL-SCNC: HIGH PG/ML (ref 0–300)
OB PNL STL: NEGATIVE — SIGNIFICANT CHANGE UP
ORGANISM # SPEC MICROSCOPIC CNT: ABNORMAL
ORGANISM # SPEC MICROSCOPIC CNT: SIGNIFICANT CHANGE UP
OSMOLALITY UR: 326 MOS/KG — SIGNIFICANT CHANGE UP (ref 50–1200)
OSMOLALITY UR: 326 MOS/KG — SIGNIFICANT CHANGE UP (ref 50–1200)
OVALOCYTES BLD QL SMEAR: SLIGHT — SIGNIFICANT CHANGE UP
PCO2 BLDA: 41 MMHG — SIGNIFICANT CHANGE UP (ref 32–45)
PCO2 BLDA: 41 MMHG — SIGNIFICANT CHANGE UP (ref 32–45)
PCO2 BLDA: 41 MMHG — SIGNIFICANT CHANGE UP (ref 35–48)
PCO2 BLDA: 41 MMHG — SIGNIFICANT CHANGE UP (ref 35–48)
PCO2 BLDA: 43 MMHG — SIGNIFICANT CHANGE UP (ref 35–48)
PCO2 BLDA: 43 MMHG — SIGNIFICANT CHANGE UP (ref 35–48)
PCO2 BLDA: 44 MMHG — SIGNIFICANT CHANGE UP (ref 35–48)
PCO2 BLDA: 47 MMHG — SIGNIFICANT CHANGE UP (ref 35–48)
PCO2 BLDA: 48 MMHG — SIGNIFICANT CHANGE UP (ref 35–48)
PCO2 BLDA: 51 MMHG — HIGH (ref 35–48)
PCO2 BLDA: 51 MMHG — HIGH (ref 35–48)
PCO2 BLDA: 52 MMHG — HIGH (ref 35–48)
PCO2 BLDA: 55 MMHG — HIGH (ref 35–48)
PCO2 BLDA: 56 MMHG — HIGH (ref 35–48)
PCO2 BLDA: 66 MMHG — HIGH (ref 25–48)
PCO2 BLDA: 66 MMHG — HIGH (ref 25–48)
PCO2 BLDA: 66 MMHG — HIGH (ref 35–48)
PCO2 BLDA: 69 MMHG — HIGH (ref 25–48)
PCO2 BLDA: 69 MMHG — HIGH (ref 25–48)
PCO2 BLDA: 70 MMHG — CRITICAL HIGH (ref 35–48)
PCO2 BLDA: 70 MMHG — CRITICAL HIGH (ref 35–48)
PCO2 BLDA: 72 MMHG — CRITICAL HIGH (ref 25–48)
PCO2 BLDA: 72 MMHG — CRITICAL HIGH (ref 25–48)
PCO2 BLDV: 47 MMHG — HIGH (ref 39–42)
PCO2 BLDV: 51 MMHG — HIGH (ref 39–42)
PCO2 BLDV: 56 MMHG — HIGH (ref 39–42)
PCO2 BLDV: 56 MMHG — HIGH (ref 39–42)
PCO2 BLDV: 70 MMHG — HIGH (ref 39–42)
PCO2 BLDV: 81 MMHG — HIGH (ref 39–42)
PCO2 BLDV: 96 MMHG — HIGH (ref 39–42)
PCO2 BLDV: 98 MMHG — HIGH (ref 39–42)
PCO2 BLDV: 98 MMHG — HIGH (ref 39–42)
PF4 HEPARIN CMPLX AB SER-ACNC: NEGATIVE — SIGNIFICANT CHANGE UP
PH BLDA: 7.31 — LOW (ref 7.35–7.45)
PH BLDA: 7.31 — LOW (ref 7.35–7.45)
PH BLDA: 7.35 — SIGNIFICANT CHANGE UP (ref 7.35–7.45)
PH BLDA: 7.37 — SIGNIFICANT CHANGE UP (ref 7.35–7.45)
PH BLDA: 7.4 — SIGNIFICANT CHANGE UP (ref 7.35–7.45)
PH BLDA: 7.42 — SIGNIFICANT CHANGE UP (ref 7.35–7.45)
PH BLDA: 7.42 — SIGNIFICANT CHANGE UP (ref 7.35–7.45)
PH BLDA: 7.43 — SIGNIFICANT CHANGE UP (ref 7.35–7.45)
PH BLDA: 7.45 — SIGNIFICANT CHANGE UP (ref 7.35–7.45)
PH BLDA: 7.45 — SIGNIFICANT CHANGE UP (ref 7.35–7.45)
PH BLDA: 7.46 — HIGH (ref 7.35–7.45)
PH BLDA: 7.48 — HIGH (ref 7.35–7.45)
PH BLDA: 7.49 — HIGH (ref 7.35–7.45)
PH BLDA: 7.5 — HIGH (ref 7.35–7.45)
PH BLDA: 7.52 — HIGH (ref 7.35–7.45)
PH BLDA: 7.52 — HIGH (ref 7.35–7.45)
PH BLDA: 7.53 — HIGH (ref 7.35–7.45)
PH BLDA: 7.55 — HIGH (ref 7.35–7.45)
PH BLDV: 7.2 — LOW (ref 7.32–7.43)
PH BLDV: 7.28 — LOW (ref 7.32–7.43)
PH BLDV: 7.28 — LOW (ref 7.32–7.43)
PH BLDV: 7.31 — LOW (ref 7.32–7.43)
PH BLDV: 7.34 — SIGNIFICANT CHANGE UP (ref 7.32–7.43)
PH BLDV: 7.35 — SIGNIFICANT CHANGE UP (ref 7.32–7.43)
PH BLDV: 7.35 — SIGNIFICANT CHANGE UP (ref 7.32–7.43)
PH BLDV: 7.4 — SIGNIFICANT CHANGE UP (ref 7.32–7.43)
PH BLDV: 7.42 — SIGNIFICANT CHANGE UP (ref 7.32–7.43)
PH FLD: 7.7 — SIGNIFICANT CHANGE UP
PH FLD: 7.8 — SIGNIFICANT CHANGE UP
PH FLD: 8.4 — SIGNIFICANT CHANGE UP
PH FLD: 8.4 — SIGNIFICANT CHANGE UP
PH UR: 6 — SIGNIFICANT CHANGE UP (ref 5–8)
PH UR: 6.5 — SIGNIFICANT CHANGE UP (ref 5–8)
PH UR: 7 — SIGNIFICANT CHANGE UP (ref 5–8)
PH UR: 7 — SIGNIFICANT CHANGE UP (ref 5–8)
PHOSPHATE SERPL-MCNC: 1.9 MG/DL — LOW (ref 2.1–4.9)
PHOSPHATE SERPL-MCNC: 1.9 MG/DL — LOW (ref 2.1–4.9)
PHOSPHATE SERPL-MCNC: 2.2 MG/DL — SIGNIFICANT CHANGE UP (ref 2.1–4.9)
PHOSPHATE SERPL-MCNC: 2.2 MG/DL — SIGNIFICANT CHANGE UP (ref 2.1–4.9)
PHOSPHATE SERPL-MCNC: 2.4 MG/DL — SIGNIFICANT CHANGE UP (ref 2.1–4.9)
PHOSPHATE SERPL-MCNC: 2.7 MG/DL — SIGNIFICANT CHANGE UP (ref 2.1–4.9)
PHOSPHATE SERPL-MCNC: 2.7 MG/DL — SIGNIFICANT CHANGE UP (ref 2.1–4.9)
PHOSPHATE SERPL-MCNC: 2.9 MG/DL — SIGNIFICANT CHANGE UP (ref 2.1–4.9)
PHOSPHATE SERPL-MCNC: 3 MG/DL — SIGNIFICANT CHANGE UP (ref 2.1–4.9)
PHOSPHATE SERPL-MCNC: 3.1 MG/DL — SIGNIFICANT CHANGE UP (ref 2.1–4.9)
PHOSPHATE SERPL-MCNC: 3.1 MG/DL — SIGNIFICANT CHANGE UP (ref 2.1–4.9)
PHOSPHATE SERPL-MCNC: 3.2 MG/DL — SIGNIFICANT CHANGE UP (ref 2.1–4.9)
PHOSPHATE SERPL-MCNC: 3.3 MG/DL — SIGNIFICANT CHANGE UP (ref 2.1–4.9)
PHOSPHATE SERPL-MCNC: 3.3 MG/DL — SIGNIFICANT CHANGE UP (ref 2.1–4.9)
PHOSPHATE SERPL-MCNC: 3.4 MG/DL — SIGNIFICANT CHANGE UP (ref 2.1–4.9)
PHOSPHATE SERPL-MCNC: 3.5 MG/DL — SIGNIFICANT CHANGE UP (ref 2.1–4.9)
PHOSPHATE SERPL-MCNC: 3.5 MG/DL — SIGNIFICANT CHANGE UP (ref 2.1–4.9)
PHOSPHATE SERPL-MCNC: 3.6 MG/DL — SIGNIFICANT CHANGE UP (ref 2.1–4.9)
PHOSPHATE SERPL-MCNC: 3.7 MG/DL — SIGNIFICANT CHANGE UP (ref 2.1–4.9)
PHOSPHATE SERPL-MCNC: 4 MG/DL — SIGNIFICANT CHANGE UP (ref 2.1–4.9)
PLAT MORPH BLD: NORMAL — SIGNIFICANT CHANGE UP
PLATELET # BLD AUTO: 130 K/UL — SIGNIFICANT CHANGE UP (ref 130–400)
PLATELET # BLD AUTO: 130 K/UL — SIGNIFICANT CHANGE UP (ref 130–400)
PLATELET # BLD AUTO: 135 K/UL — SIGNIFICANT CHANGE UP (ref 130–400)
PLATELET # BLD AUTO: 142 K/UL — SIGNIFICANT CHANGE UP (ref 130–400)
PLATELET # BLD AUTO: 142 K/UL — SIGNIFICANT CHANGE UP (ref 130–400)
PLATELET # BLD AUTO: 161 K/UL — SIGNIFICANT CHANGE UP (ref 130–400)
PLATELET # BLD AUTO: 163 K/UL — SIGNIFICANT CHANGE UP (ref 130–400)
PLATELET # BLD AUTO: 166 K/UL — SIGNIFICANT CHANGE UP (ref 130–400)
PLATELET # BLD AUTO: 170 K/UL — SIGNIFICANT CHANGE UP (ref 130–400)
PLATELET # BLD AUTO: 175 K/UL — SIGNIFICANT CHANGE UP (ref 130–400)
PLATELET # BLD AUTO: 184 K/UL — SIGNIFICANT CHANGE UP (ref 130–400)
PLATELET # BLD AUTO: 185 K/UL — SIGNIFICANT CHANGE UP (ref 130–400)
PLATELET # BLD AUTO: 191 K/UL — SIGNIFICANT CHANGE UP (ref 130–400)
PLATELET # BLD AUTO: 192 K/UL — SIGNIFICANT CHANGE UP (ref 130–400)
PLATELET # BLD AUTO: 193 K/UL — SIGNIFICANT CHANGE UP (ref 130–400)
PLATELET # BLD AUTO: 194 K/UL — SIGNIFICANT CHANGE UP (ref 130–400)
PLATELET # BLD AUTO: 195 K/UL — SIGNIFICANT CHANGE UP (ref 130–400)
PLATELET # BLD AUTO: 202 K/UL — SIGNIFICANT CHANGE UP (ref 130–400)
PLATELET # BLD AUTO: 203 K/UL — SIGNIFICANT CHANGE UP (ref 130–400)
PLATELET # BLD AUTO: 204 K/UL — SIGNIFICANT CHANGE UP (ref 130–400)
PLATELET # BLD AUTO: 206 K/UL — SIGNIFICANT CHANGE UP (ref 130–400)
PLATELET # BLD AUTO: 206 K/UL — SIGNIFICANT CHANGE UP (ref 130–400)
PLATELET # BLD AUTO: 208 K/UL — SIGNIFICANT CHANGE UP (ref 130–400)
PLATELET # BLD AUTO: 208 K/UL — SIGNIFICANT CHANGE UP (ref 130–400)
PLATELET # BLD AUTO: 210 K/UL — SIGNIFICANT CHANGE UP (ref 130–400)
PLATELET # BLD AUTO: 210 K/UL — SIGNIFICANT CHANGE UP (ref 130–400)
PLATELET # BLD AUTO: 216 K/UL — SIGNIFICANT CHANGE UP (ref 130–400)
PLATELET # BLD AUTO: 218 K/UL — SIGNIFICANT CHANGE UP (ref 130–400)
PLATELET # BLD AUTO: 219 K/UL — SIGNIFICANT CHANGE UP (ref 130–400)
PLATELET # BLD AUTO: 220 K/UL — SIGNIFICANT CHANGE UP (ref 130–400)
PLATELET # BLD AUTO: 221 K/UL — SIGNIFICANT CHANGE UP (ref 130–400)
PLATELET # BLD AUTO: 221 K/UL — SIGNIFICANT CHANGE UP (ref 130–400)
PLATELET # BLD AUTO: 222 K/UL — SIGNIFICANT CHANGE UP (ref 130–400)
PLATELET # BLD AUTO: 223 K/UL — SIGNIFICANT CHANGE UP (ref 130–400)
PLATELET # BLD AUTO: 224 K/UL — SIGNIFICANT CHANGE UP (ref 130–400)
PLATELET # BLD AUTO: 225 K/UL — SIGNIFICANT CHANGE UP (ref 130–400)
PLATELET # BLD AUTO: 226 K/UL — SIGNIFICANT CHANGE UP (ref 130–400)
PLATELET # BLD AUTO: 227 K/UL — SIGNIFICANT CHANGE UP (ref 130–400)
PLATELET # BLD AUTO: 227 K/UL — SIGNIFICANT CHANGE UP (ref 130–400)
PLATELET # BLD AUTO: 231 K/UL — SIGNIFICANT CHANGE UP (ref 130–400)
PLATELET # BLD AUTO: 231 K/UL — SIGNIFICANT CHANGE UP (ref 130–400)
PLATELET # BLD AUTO: 233 K/UL — SIGNIFICANT CHANGE UP (ref 130–400)
PLATELET # BLD AUTO: 233 K/UL — SIGNIFICANT CHANGE UP (ref 130–400)
PLATELET # BLD AUTO: 238 K/UL — SIGNIFICANT CHANGE UP (ref 130–400)
PLATELET # BLD AUTO: 238 K/UL — SIGNIFICANT CHANGE UP (ref 130–400)
PLATELET # BLD AUTO: 239 K/UL — SIGNIFICANT CHANGE UP (ref 130–400)
PLATELET # BLD AUTO: 239 K/UL — SIGNIFICANT CHANGE UP (ref 130–400)
PLATELET # BLD AUTO: 243 K/UL — SIGNIFICANT CHANGE UP (ref 130–400)
PLATELET # BLD AUTO: 243 K/UL — SIGNIFICANT CHANGE UP (ref 130–400)
PLATELET # BLD AUTO: 245 K/UL — SIGNIFICANT CHANGE UP (ref 130–400)
PLATELET # BLD AUTO: 247 K/UL — SIGNIFICANT CHANGE UP (ref 130–400)
PLATELET # BLD AUTO: 247 K/UL — SIGNIFICANT CHANGE UP (ref 130–400)
PLATELET # BLD AUTO: 249 K/UL — SIGNIFICANT CHANGE UP (ref 130–400)
PLATELET # BLD AUTO: 249 K/UL — SIGNIFICANT CHANGE UP (ref 130–400)
PLATELET # BLD AUTO: 251 K/UL — SIGNIFICANT CHANGE UP (ref 130–400)
PLATELET # BLD AUTO: 252 K/UL — SIGNIFICANT CHANGE UP (ref 130–400)
PLATELET # BLD AUTO: 255 K/UL — SIGNIFICANT CHANGE UP (ref 130–400)
PLATELET # BLD AUTO: 258 K/UL — SIGNIFICANT CHANGE UP (ref 130–400)
PLATELET # BLD AUTO: 259 K/UL — SIGNIFICANT CHANGE UP (ref 130–400)
PLATELET # BLD AUTO: 261 K/UL — SIGNIFICANT CHANGE UP (ref 130–400)
PLATELET # BLD AUTO: 261 K/UL — SIGNIFICANT CHANGE UP (ref 130–400)
PLATELET # BLD AUTO: 263 K/UL — SIGNIFICANT CHANGE UP (ref 130–400)
PLATELET # BLD AUTO: 265 K/UL — SIGNIFICANT CHANGE UP (ref 130–400)
PLATELET # BLD AUTO: 266 K/UL — SIGNIFICANT CHANGE UP (ref 130–400)
PLATELET # BLD AUTO: 266 K/UL — SIGNIFICANT CHANGE UP (ref 130–400)
PLATELET # BLD AUTO: 272 K/UL — SIGNIFICANT CHANGE UP (ref 130–400)
PLATELET # BLD AUTO: 273 K/UL — SIGNIFICANT CHANGE UP (ref 130–400)
PLATELET # BLD AUTO: 274 K/UL — SIGNIFICANT CHANGE UP (ref 130–400)
PLATELET # BLD AUTO: 277 K/UL — SIGNIFICANT CHANGE UP (ref 130–400)
PLATELET # BLD AUTO: 278 K/UL — SIGNIFICANT CHANGE UP (ref 130–400)
PLATELET # BLD AUTO: 282 K/UL — SIGNIFICANT CHANGE UP (ref 130–400)
PLATELET # BLD AUTO: 282 K/UL — SIGNIFICANT CHANGE UP (ref 130–400)
PLATELET # BLD AUTO: 283 K/UL — SIGNIFICANT CHANGE UP (ref 130–400)
PLATELET # BLD AUTO: 287 K/UL — SIGNIFICANT CHANGE UP (ref 130–400)
PLATELET # BLD AUTO: 288 K/UL — SIGNIFICANT CHANGE UP (ref 130–400)
PLATELET # BLD AUTO: 291 K/UL — SIGNIFICANT CHANGE UP (ref 130–400)
PLATELET # BLD AUTO: 295 K/UL — SIGNIFICANT CHANGE UP (ref 130–400)
PLATELET # BLD AUTO: 298 K/UL — SIGNIFICANT CHANGE UP (ref 130–400)
PLATELET # BLD AUTO: 298 K/UL — SIGNIFICANT CHANGE UP (ref 130–400)
PLATELET # BLD AUTO: 299 K/UL — SIGNIFICANT CHANGE UP (ref 130–400)
PLATELET # BLD AUTO: 299 K/UL — SIGNIFICANT CHANGE UP (ref 130–400)
PLATELET # BLD AUTO: 301 K/UL — SIGNIFICANT CHANGE UP (ref 130–400)
PLATELET # BLD AUTO: 304 K/UL — SIGNIFICANT CHANGE UP (ref 130–400)
PLATELET # BLD AUTO: 306 K/UL — SIGNIFICANT CHANGE UP (ref 130–400)
PLATELET # BLD AUTO: 306 K/UL — SIGNIFICANT CHANGE UP (ref 130–400)
PLATELET # BLD AUTO: 307 K/UL — SIGNIFICANT CHANGE UP (ref 130–400)
PLATELET # BLD AUTO: 308 K/UL — SIGNIFICANT CHANGE UP (ref 130–400)
PLATELET # BLD AUTO: 309 K/UL — SIGNIFICANT CHANGE UP (ref 130–400)
PLATELET # BLD AUTO: 315 K/UL — SIGNIFICANT CHANGE UP (ref 130–400)
PLATELET # BLD AUTO: 316 K/UL — SIGNIFICANT CHANGE UP (ref 130–400)
PLATELET # BLD AUTO: 320 K/UL — SIGNIFICANT CHANGE UP (ref 130–400)
PLATELET # BLD AUTO: 330 K/UL — SIGNIFICANT CHANGE UP (ref 130–400)
PLATELET # BLD AUTO: 334 K/UL — SIGNIFICANT CHANGE UP (ref 130–400)
PLATELET # BLD AUTO: 336 K/UL — SIGNIFICANT CHANGE UP (ref 130–400)
PLATELET # BLD AUTO: 338 K/UL — SIGNIFICANT CHANGE UP (ref 130–400)
PLATELET # BLD AUTO: 343 K/UL — SIGNIFICANT CHANGE UP (ref 130–400)
PLATELET # BLD AUTO: 345 K/UL — SIGNIFICANT CHANGE UP (ref 130–400)
PLATELET # BLD AUTO: 350 K/UL — SIGNIFICANT CHANGE UP (ref 130–400)
PLATELET # BLD AUTO: 352 K/UL — SIGNIFICANT CHANGE UP (ref 130–400)
PLATELET # BLD AUTO: 359 K/UL — SIGNIFICANT CHANGE UP (ref 130–400)
PLATELET # BLD AUTO: 359 K/UL — SIGNIFICANT CHANGE UP (ref 130–400)
PLATELET # BLD AUTO: 364 K/UL — SIGNIFICANT CHANGE UP (ref 130–400)
PLATELET # BLD AUTO: 366 K/UL — SIGNIFICANT CHANGE UP (ref 130–400)
PLATELET # BLD AUTO: 372 K/UL — SIGNIFICANT CHANGE UP (ref 130–400)
PLATELET # BLD AUTO: 377 K/UL — SIGNIFICANT CHANGE UP (ref 130–400)
PLATELET # BLD AUTO: 378 K/UL — SIGNIFICANT CHANGE UP (ref 130–400)
PLATELET # BLD AUTO: 378 K/UL — SIGNIFICANT CHANGE UP (ref 130–400)
PLATELET # BLD AUTO: 380 K/UL — SIGNIFICANT CHANGE UP (ref 130–400)
PLATELET # BLD AUTO: 380 K/UL — SIGNIFICANT CHANGE UP (ref 130–400)
PLATELET # BLD AUTO: 384 K/UL — SIGNIFICANT CHANGE UP (ref 130–400)
PLATELET # BLD AUTO: 386 K/UL — SIGNIFICANT CHANGE UP (ref 130–400)
PLATELET # BLD AUTO: 386 K/UL — SIGNIFICANT CHANGE UP (ref 130–400)
PLATELET # BLD AUTO: 391 K/UL — SIGNIFICANT CHANGE UP (ref 130–400)
PLATELET # BLD AUTO: 429 K/UL — HIGH (ref 130–400)
PLATELET CLUMP BLD QL SMEAR: ABNORMAL
PLATELET CLUMP BLD QL SMEAR: ABNORMAL
PLATELET COUNT - ESTIMATE: NORMAL — SIGNIFICANT CHANGE UP
PMV BLD: 10 FL — SIGNIFICANT CHANGE UP (ref 7.4–10.4)
PMV BLD: 10.1 FL — SIGNIFICANT CHANGE UP (ref 7.4–10.4)
PMV BLD: 10.2 FL — SIGNIFICANT CHANGE UP (ref 7.4–10.4)
PMV BLD: 10.3 FL — SIGNIFICANT CHANGE UP (ref 7.4–10.4)
PMV BLD: 10.4 FL — SIGNIFICANT CHANGE UP (ref 7.4–10.4)
PMV BLD: 10.5 FL — HIGH (ref 7.4–10.4)
PMV BLD: 10.6 FL — HIGH (ref 7.4–10.4)
PMV BLD: 10.7 FL — HIGH (ref 7.4–10.4)
PMV BLD: 10.8 FL — HIGH (ref 7.4–10.4)
PMV BLD: 10.9 FL — HIGH (ref 7.4–10.4)
PMV BLD: 11 FL — HIGH (ref 7.4–10.4)
PMV BLD: 11.1 FL — HIGH (ref 7.4–10.4)
PMV BLD: 11.2 FL — HIGH (ref 7.4–10.4)
PMV BLD: 11.3 FL — HIGH (ref 7.4–10.4)
PMV BLD: 11.4 FL — HIGH (ref 7.4–10.4)
PMV BLD: 11.4 FL — HIGH (ref 7.4–10.4)
PMV BLD: 11.5 FL — HIGH (ref 7.4–10.4)
PMV BLD: 11.6 FL — HIGH (ref 7.4–10.4)
PMV BLD: 11.7 FL — HIGH (ref 7.4–10.4)
PMV BLD: 11.8 FL — HIGH (ref 7.4–10.4)
PMV BLD: 11.8 FL — HIGH (ref 7.4–10.4)
PMV BLD: 11.9 FL — HIGH (ref 7.4–10.4)
PMV BLD: 12 FL — HIGH (ref 7.4–10.4)
PMV BLD: 12.1 FL — HIGH (ref 7.4–10.4)
PMV BLD: 9.5 FL — SIGNIFICANT CHANGE UP (ref 7.4–10.4)
PMV BLD: 9.7 FL — SIGNIFICANT CHANGE UP (ref 7.4–10.4)
PMV BLD: 9.7 FL — SIGNIFICANT CHANGE UP (ref 7.4–10.4)
PMV BLD: 9.8 FL — SIGNIFICANT CHANGE UP (ref 7.4–10.4)
PO2 BLDA: 102 MMHG — SIGNIFICANT CHANGE UP (ref 83–108)
PO2 BLDA: 108 MMHG — SIGNIFICANT CHANGE UP (ref 83–108)
PO2 BLDA: 118 MMHG — HIGH (ref 83–108)
PO2 BLDA: 123 MMHG — HIGH (ref 83–108)
PO2 BLDA: 123 MMHG — HIGH (ref 83–108)
PO2 BLDA: 126 MMHG — HIGH (ref 83–108)
PO2 BLDA: 129 MMHG — HIGH (ref 83–108)
PO2 BLDA: 129 MMHG — HIGH (ref 83–108)
PO2 BLDA: 135 MMHG — HIGH (ref 83–108)
PO2 BLDA: 149 MMHG — HIGH (ref 83–108)
PO2 BLDA: 150 MMHG — HIGH (ref 83–108)
PO2 BLDA: 154 MMHG — HIGH (ref 83–108)
PO2 BLDA: 156 MMHG — HIGH (ref 83–108)
PO2 BLDA: 156 MMHG — HIGH (ref 83–108)
PO2 BLDA: 171 MMHG — HIGH (ref 83–108)
PO2 BLDA: 171 MMHG — HIGH (ref 83–108)
PO2 BLDA: 199 MMHG — HIGH (ref 83–108)
PO2 BLDA: 68 MMHG — LOW (ref 83–108)
PO2 BLDA: 79 MMHG — LOW (ref 83–108)
PO2 BLDA: 79 MMHG — LOW (ref 83–108)
PO2 BLDA: 83 MMHG — SIGNIFICANT CHANGE UP (ref 83–108)
PO2 BLDA: 84 MMHG — SIGNIFICANT CHANGE UP (ref 83–108)
PO2 BLDA: 96 MMHG — SIGNIFICANT CHANGE UP (ref 83–108)
PO2 BLDA: 96 MMHG — SIGNIFICANT CHANGE UP (ref 83–108)
PO2 BLDA: 99 MMHG — SIGNIFICANT CHANGE UP (ref 83–108)
PO2 BLDV: 28 MMHG — SIGNIFICANT CHANGE UP
PO2 BLDV: 28 MMHG — SIGNIFICANT CHANGE UP
PO2 BLDV: 31 MMHG — SIGNIFICANT CHANGE UP
PO2 BLDV: 31 MMHG — SIGNIFICANT CHANGE UP
PO2 BLDV: 34 MMHG — SIGNIFICANT CHANGE UP
PO2 BLDV: 34 MMHG — SIGNIFICANT CHANGE UP
PO2 BLDV: 35 MMHG — SIGNIFICANT CHANGE UP
PO2 BLDV: 35 MMHG — SIGNIFICANT CHANGE UP
PO2 BLDV: 43 MMHG — SIGNIFICANT CHANGE UP
PO2 BLDV: 43 MMHG — SIGNIFICANT CHANGE UP
PO2 BLDV: 45 MMHG — SIGNIFICANT CHANGE UP
POLYCHROMASIA BLD QL SMEAR: SLIGHT — SIGNIFICANT CHANGE UP
POLYCHROMASIA BLD QL SMEAR: SLIGHT — SIGNIFICANT CHANGE UP
POTASSIUM BLDV-SCNC: 2.9 MMOL/L — CRITICAL LOW (ref 3.5–5.1)
POTASSIUM BLDV-SCNC: 3.1 MMOL/L — LOW (ref 3.5–5.1)
POTASSIUM BLDV-SCNC: 3.4 MMOL/L — LOW (ref 3.5–5.1)
POTASSIUM BLDV-SCNC: 3.7 MMOL/L — SIGNIFICANT CHANGE UP (ref 3.5–5.1)
POTASSIUM BLDV-SCNC: 3.8 MMOL/L — SIGNIFICANT CHANGE UP (ref 3.5–5.1)
POTASSIUM BLDV-SCNC: 3.9 MMOL/L — SIGNIFICANT CHANGE UP (ref 3.5–5.1)
POTASSIUM BLDV-SCNC: 4.4 MMOL/L — SIGNIFICANT CHANGE UP (ref 3.5–5.1)
POTASSIUM BLDV-SCNC: 4.4 MMOL/L — SIGNIFICANT CHANGE UP (ref 3.5–5.1)
POTASSIUM SERPL-MCNC: 2.7 MMOL/L — CRITICAL LOW (ref 3.5–5)
POTASSIUM SERPL-MCNC: 2.7 MMOL/L — CRITICAL LOW (ref 3.5–5)
POTASSIUM SERPL-MCNC: 3.2 MMOL/L — LOW (ref 3.5–5)
POTASSIUM SERPL-MCNC: 3.2 MMOL/L — LOW (ref 3.5–5)
POTASSIUM SERPL-MCNC: 3.3 MMOL/L — LOW (ref 3.5–5)
POTASSIUM SERPL-MCNC: 3.4 MMOL/L — LOW (ref 3.5–5)
POTASSIUM SERPL-MCNC: 3.5 MMOL/L — SIGNIFICANT CHANGE UP (ref 3.5–5)
POTASSIUM SERPL-MCNC: 3.6 MMOL/L — SIGNIFICANT CHANGE UP (ref 3.5–5)
POTASSIUM SERPL-MCNC: 3.7 MMOL/L — SIGNIFICANT CHANGE UP (ref 3.5–5)
POTASSIUM SERPL-MCNC: 3.8 MMOL/L — SIGNIFICANT CHANGE UP (ref 3.5–5)
POTASSIUM SERPL-MCNC: 3.9 MMOL/L — SIGNIFICANT CHANGE UP (ref 3.5–5)
POTASSIUM SERPL-MCNC: 4 MMOL/L — SIGNIFICANT CHANGE UP (ref 3.5–5)
POTASSIUM SERPL-MCNC: 4.1 MMOL/L — SIGNIFICANT CHANGE UP (ref 3.5–5)
POTASSIUM SERPL-MCNC: 4.2 MMOL/L — SIGNIFICANT CHANGE UP (ref 3.5–5)
POTASSIUM SERPL-MCNC: 4.2 MMOL/L — SIGNIFICANT CHANGE UP (ref 3.5–5)
POTASSIUM SERPL-MCNC: 4.3 MMOL/L — SIGNIFICANT CHANGE UP (ref 3.5–5)
POTASSIUM SERPL-MCNC: 4.4 MMOL/L — SIGNIFICANT CHANGE UP (ref 3.5–5)
POTASSIUM SERPL-MCNC: 4.5 MMOL/L — SIGNIFICANT CHANGE UP (ref 3.5–5)
POTASSIUM SERPL-MCNC: 4.6 MMOL/L — SIGNIFICANT CHANGE UP (ref 3.5–5)
POTASSIUM SERPL-MCNC: 4.7 MMOL/L — SIGNIFICANT CHANGE UP (ref 3.5–5)
POTASSIUM SERPL-MCNC: 4.8 MMOL/L — SIGNIFICANT CHANGE UP (ref 3.5–5)
POTASSIUM SERPL-MCNC: 4.8 MMOL/L — SIGNIFICANT CHANGE UP (ref 3.5–5)
POTASSIUM SERPL-MCNC: 4.9 MMOL/L — SIGNIFICANT CHANGE UP (ref 3.5–5)
POTASSIUM SERPL-MCNC: 5 MMOL/L — SIGNIFICANT CHANGE UP (ref 3.5–5)
POTASSIUM SERPL-MCNC: 5.1 MMOL/L — HIGH (ref 3.5–5)
POTASSIUM SERPL-MCNC: 5.2 MMOL/L — HIGH (ref 3.5–5)
POTASSIUM SERPL-MCNC: 5.3 MMOL/L — HIGH (ref 3.5–5)
POTASSIUM SERPL-MCNC: 5.4 MMOL/L — HIGH (ref 3.5–5)
POTASSIUM SERPL-MCNC: 5.5 MMOL/L — HIGH (ref 3.5–5)
POTASSIUM SERPL-MCNC: 5.9 MMOL/L — HIGH (ref 3.5–5)
POTASSIUM SERPL-MCNC: 5.9 MMOL/L — HIGH (ref 3.5–5)
POTASSIUM SERPL-SCNC: 2.7 MMOL/L — CRITICAL LOW (ref 3.5–5)
POTASSIUM SERPL-SCNC: 2.7 MMOL/L — CRITICAL LOW (ref 3.5–5)
POTASSIUM SERPL-SCNC: 3 MMOL/L
POTASSIUM SERPL-SCNC: 3.2 MMOL/L — LOW (ref 3.5–5)
POTASSIUM SERPL-SCNC: 3.2 MMOL/L — LOW (ref 3.5–5)
POTASSIUM SERPL-SCNC: 3.3 MMOL/L — LOW (ref 3.5–5)
POTASSIUM SERPL-SCNC: 3.4 MMOL/L
POTASSIUM SERPL-SCNC: 3.4 MMOL/L
POTASSIUM SERPL-SCNC: 3.4 MMOL/L — LOW (ref 3.5–5)
POTASSIUM SERPL-SCNC: 3.5 MMOL/L
POTASSIUM SERPL-SCNC: 3.5 MMOL/L — SIGNIFICANT CHANGE UP (ref 3.5–5)
POTASSIUM SERPL-SCNC: 3.6 MMOL/L — SIGNIFICANT CHANGE UP (ref 3.5–5)
POTASSIUM SERPL-SCNC: 3.7 MMOL/L
POTASSIUM SERPL-SCNC: 3.7 MMOL/L — SIGNIFICANT CHANGE UP (ref 3.5–5)
POTASSIUM SERPL-SCNC: 3.8 MMOL/L — SIGNIFICANT CHANGE UP (ref 3.5–5)
POTASSIUM SERPL-SCNC: 3.9 MMOL/L — SIGNIFICANT CHANGE UP (ref 3.5–5)
POTASSIUM SERPL-SCNC: 4 MMOL/L — SIGNIFICANT CHANGE UP (ref 3.5–5)
POTASSIUM SERPL-SCNC: 4.1 MMOL/L — SIGNIFICANT CHANGE UP (ref 3.5–5)
POTASSIUM SERPL-SCNC: 4.2 MMOL/L — SIGNIFICANT CHANGE UP (ref 3.5–5)
POTASSIUM SERPL-SCNC: 4.2 MMOL/L — SIGNIFICANT CHANGE UP (ref 3.5–5)
POTASSIUM SERPL-SCNC: 4.3 MMOL/L — SIGNIFICANT CHANGE UP (ref 3.5–5)
POTASSIUM SERPL-SCNC: 4.4 MMOL/L — SIGNIFICANT CHANGE UP (ref 3.5–5)
POTASSIUM SERPL-SCNC: 4.5 MMOL/L — SIGNIFICANT CHANGE UP (ref 3.5–5)
POTASSIUM SERPL-SCNC: 4.6 MMOL/L — SIGNIFICANT CHANGE UP (ref 3.5–5)
POTASSIUM SERPL-SCNC: 4.7 MMOL/L — SIGNIFICANT CHANGE UP (ref 3.5–5)
POTASSIUM SERPL-SCNC: 4.8 MMOL/L — SIGNIFICANT CHANGE UP (ref 3.5–5)
POTASSIUM SERPL-SCNC: 4.8 MMOL/L — SIGNIFICANT CHANGE UP (ref 3.5–5)
POTASSIUM SERPL-SCNC: 4.9 MMOL/L — SIGNIFICANT CHANGE UP (ref 3.5–5)
POTASSIUM SERPL-SCNC: 5 MMOL/L — SIGNIFICANT CHANGE UP (ref 3.5–5)
POTASSIUM SERPL-SCNC: 5.1 MMOL/L — HIGH (ref 3.5–5)
POTASSIUM SERPL-SCNC: 5.2 MMOL/L — HIGH (ref 3.5–5)
POTASSIUM SERPL-SCNC: 5.3 MMOL/L — HIGH (ref 3.5–5)
POTASSIUM SERPL-SCNC: 5.4 MMOL/L — HIGH (ref 3.5–5)
POTASSIUM SERPL-SCNC: 5.5 MMOL/L — HIGH (ref 3.5–5)
POTASSIUM SERPL-SCNC: 5.9 MMOL/L — HIGH (ref 3.5–5)
POTASSIUM SERPL-SCNC: 5.9 MMOL/L — HIGH (ref 3.5–5)
PROCALCITONIN SERPL-MCNC: 0.17 NG/ML — HIGH (ref 0.02–0.1)
PROCALCITONIN SERPL-MCNC: 0.19 NG/ML — HIGH (ref 0.02–0.1)
PROCALCITONIN SERPL-MCNC: 0.2 NG/ML — HIGH (ref 0.02–0.1)
PROCALCITONIN SERPL-MCNC: 0.2 NG/ML — HIGH (ref 0.02–0.1)
PROCALCITONIN SERPL-MCNC: 0.34 NG/ML — HIGH (ref 0.02–0.1)
PROCALCITONIN SERPL-MCNC: 0.34 NG/ML — HIGH (ref 0.02–0.1)
PROCALCITONIN SERPL-MCNC: 0.57 NG/ML — HIGH (ref 0.02–0.1)
PROCALCITONIN SERPL-MCNC: 0.68 NG/ML — HIGH (ref 0.02–0.1)
PROCALCITONIN SERPL-MCNC: 0.9 NG/ML — HIGH (ref 0.02–0.1)
PROCALCITONIN SERPL-MCNC: 8.81 NG/ML — HIGH (ref 0.02–0.1)
PROT FLD-MCNC: 2.4 G/DL — SIGNIFICANT CHANGE UP
PROT FLD-MCNC: 2.4 G/DL — SIGNIFICANT CHANGE UP
PROT FLD-MCNC: 3.3 G/DL — SIGNIFICANT CHANGE UP
PROT FLD-MCNC: 3.5 G/DL — SIGNIFICANT CHANGE UP
PROT SERPL-MCNC: 4.4 G/DL — LOW (ref 6–8)
PROT SERPL-MCNC: 4.8 G/DL — LOW (ref 6–8)
PROT SERPL-MCNC: 4.9 G/DL — LOW (ref 6–8)
PROT SERPL-MCNC: 5 G/DL — LOW (ref 6–8)
PROT SERPL-MCNC: 5.1 G/DL — LOW (ref 6–8)
PROT SERPL-MCNC: 5.2 G/DL — LOW (ref 6–8)
PROT SERPL-MCNC: 5.3 G/DL — LOW (ref 6–8)
PROT SERPL-MCNC: 5.4 G/DL — LOW (ref 6–8)
PROT SERPL-MCNC: 5.4 G/DL — LOW (ref 6–8)
PROT SERPL-MCNC: 5.5 G/DL — LOW (ref 6–8)
PROT SERPL-MCNC: 5.6 G/DL — LOW (ref 6–8)
PROT SERPL-MCNC: 5.7 G/DL — LOW (ref 6–8)
PROT SERPL-MCNC: 5.7 G/DL — LOW (ref 6–8)
PROT SERPL-MCNC: 5.8 G/DL — LOW (ref 6–8)
PROT SERPL-MCNC: 5.9 G/DL — LOW (ref 6–8)
PROT SERPL-MCNC: 6 G/DL — SIGNIFICANT CHANGE UP (ref 6–8)
PROT SERPL-MCNC: 6.1 G/DL
PROT SERPL-MCNC: 6.1 G/DL — SIGNIFICANT CHANGE UP (ref 6–8)
PROT SERPL-MCNC: 6.2 G/DL
PROT SERPL-MCNC: 6.2 G/DL — SIGNIFICANT CHANGE UP (ref 6–8)
PROT SERPL-MCNC: 6.2 G/DL — SIGNIFICANT CHANGE UP (ref 6–8)
PROT SERPL-MCNC: 6.3 G/DL — SIGNIFICANT CHANGE UP (ref 6–8)
PROT SERPL-MCNC: 6.4 G/DL — SIGNIFICANT CHANGE UP (ref 6–8)
PROT SERPL-MCNC: 6.5 G/DL — SIGNIFICANT CHANGE UP (ref 6–8)
PROT SERPL-MCNC: 6.6 G/DL — SIGNIFICANT CHANGE UP (ref 6–8)
PROT SERPL-MCNC: 6.7 G/DL — SIGNIFICANT CHANGE UP (ref 6–8)
PROT SERPL-MCNC: 6.8 G/DL
PROT SERPL-MCNC: 6.8 G/DL — SIGNIFICANT CHANGE UP (ref 6–8)
PROT SERPL-MCNC: 6.9 G/DL — SIGNIFICANT CHANGE UP (ref 6–8)
PROT SERPL-MCNC: 7 G/DL — SIGNIFICANT CHANGE UP (ref 6–8)
PROT SERPL-MCNC: 7 G/DL — SIGNIFICANT CHANGE UP (ref 6–8)
PROT SERPL-MCNC: 7.1 G/DL — SIGNIFICANT CHANGE UP (ref 6–8)
PROT SERPL-MCNC: 7.1 G/DL — SIGNIFICANT CHANGE UP (ref 6–8)
PROT SERPL-MCNC: 7.2 G/DL
PROT SERPL-MCNC: 7.2 G/DL — SIGNIFICANT CHANGE UP (ref 6–8)
PROT SERPL-MCNC: 7.5 G/DL — SIGNIFICANT CHANGE UP (ref 6–8)
PROT SERPL-MCNC: 7.7 G/DL
PROT SERPL-MCNC: SIGNIFICANT CHANGE UP G/DL (ref 6–8)
PROT SERPL-MCNC: SIGNIFICANT CHANGE UP G/DL (ref 6–8)
PROT UR-MCNC: 100 MG/DL
PROT UR-MCNC: 300 MG/DL
PROT UR-MCNC: NEGATIVE MG/DL — SIGNIFICANT CHANGE UP
PROTHROM AB SERPL-ACNC: 11.6 SEC — SIGNIFICANT CHANGE UP (ref 9.95–12.87)
PROTHROM AB SERPL-ACNC: 12.7 SEC — SIGNIFICANT CHANGE UP (ref 9.95–12.87)
PROTHROM AB SERPL-ACNC: 12.9 SEC — HIGH (ref 9.95–12.87)
PROTHROM AB SERPL-ACNC: 12.9 SEC — HIGH (ref 9.95–12.87)
PROTHROM AB SERPL-ACNC: 15.6 SEC — HIGH (ref 9.95–12.87)
PROTHROM AB SERPL-ACNC: 15.8 SEC — HIGH (ref 9.95–12.87)
PROTHROM AB SERPL-ACNC: 16.1 SEC — HIGH (ref 9.95–12.87)
PROTHROM AB SERPL-ACNC: 16.4 SEC — HIGH (ref 9.95–12.87)
PROTHROM AB SERPL-ACNC: 16.5 SEC — HIGH (ref 9.95–12.87)
PROTHROM AB SERPL-ACNC: 16.8 SEC — HIGH (ref 9.95–12.87)
PROTHROM AB SERPL-ACNC: 17 SEC — HIGH (ref 9.95–12.87)
PROTHROM AB SERPL-ACNC: 21 SEC — HIGH (ref 9.95–12.87)
PROTHROM AB SERPL-ACNC: 21 SEC — HIGH (ref 9.95–12.87)
PROTHROM AB SERPL-ACNC: 21.7 SEC — HIGH (ref 9.95–12.87)
PROTHROM AB SERPL-ACNC: 22.3 SEC — HIGH (ref 9.95–12.87)
PROTHROM AB SERPL-ACNC: 27.2 SEC — HIGH (ref 9.95–12.87)
PROTHROM AB SERPL-ACNC: 27.2 SEC — HIGH (ref 9.95–12.87)
PROTHROM AB SERPL-ACNC: 37.6 SEC — HIGH (ref 9.95–12.87)
PROTHROM AB SERPL-ACNC: 37.6 SEC — HIGH (ref 9.95–12.87)
PROTHROM AB SERPL-ACNC: >40 SEC — HIGH (ref 9.95–12.87)
PROTHROM AB SERPL-ACNC: >40 SEC — HIGH (ref 9.95–12.87)
RAPID RVP RESULT: SIGNIFICANT CHANGE UP
RBC # BLD: 2.26 M/UL — LOW (ref 4.2–5.4)
RBC # BLD: 2.3 M/UL — LOW (ref 4.2–5.4)
RBC # BLD: 2.3 M/UL — LOW (ref 4.2–5.4)
RBC # BLD: 2.32 M/UL — LOW (ref 4.2–5.4)
RBC # BLD: 2.36 M/UL — LOW (ref 4.2–5.4)
RBC # BLD: 2.37 M/UL — LOW (ref 4.2–5.4)
RBC # BLD: 2.42 M/UL — LOW (ref 4.2–5.4)
RBC # BLD: 2.46 M/UL — LOW (ref 4.2–5.4)
RBC # BLD: 2.46 M/UL — LOW (ref 4.2–5.4)
RBC # BLD: 2.51 M/UL — LOW (ref 4.2–5.4)
RBC # BLD: 2.51 M/UL — LOW (ref 4.2–5.4)
RBC # BLD: 2.52 M/UL — LOW (ref 4.2–5.4)
RBC # BLD: 2.59 M/UL — LOW (ref 4.2–5.4)
RBC # BLD: 2.6 M/UL — LOW (ref 4.2–5.4)
RBC # BLD: 2.64 M/UL — LOW (ref 4.2–5.4)
RBC # BLD: 2.67 M/UL — LOW (ref 4.2–5.4)
RBC # BLD: 2.78 M/UL — LOW (ref 4.2–5.4)
RBC # BLD: 2.83 M/UL — LOW (ref 4.2–5.4)
RBC # BLD: 2.88 M/UL — LOW (ref 4.2–5.4)
RBC # BLD: 2.88 M/UL — LOW (ref 4.2–5.4)
RBC # BLD: 2.9 M/UL — LOW (ref 4.2–5.4)
RBC # BLD: 2.9 M/UL — LOW (ref 4.2–5.4)
RBC # BLD: 2.93 M/UL — LOW (ref 4.2–5.4)
RBC # BLD: 2.98 M/UL — LOW (ref 4.2–5.4)
RBC # BLD: 2.98 M/UL — LOW (ref 4.2–5.4)
RBC # BLD: 2.99 M/UL — LOW (ref 4.2–5.4)
RBC # BLD: 2.99 M/UL — LOW (ref 4.2–5.4)
RBC # BLD: 3 M/UL — LOW (ref 4.2–5.4)
RBC # BLD: 3.01 M/UL — LOW (ref 4.2–5.4)
RBC # BLD: 3.01 M/UL — LOW (ref 4.2–5.4)
RBC # BLD: 3.02 M/UL — LOW (ref 4.2–5.4)
RBC # BLD: 3.03 M/UL — LOW (ref 4.2–5.4)
RBC # BLD: 3.03 M/UL — LOW (ref 4.2–5.4)
RBC # BLD: 3.08 M/UL — LOW (ref 4.2–5.4)
RBC # BLD: 3.1 M/UL — LOW (ref 4.2–5.4)
RBC # BLD: 3.1 M/UL — LOW (ref 4.2–5.4)
RBC # BLD: 3.12 M/UL — LOW (ref 4.2–5.4)
RBC # BLD: 3.16 M/UL — LOW (ref 4.2–5.4)
RBC # BLD: 3.17 M/UL — LOW (ref 4.2–5.4)
RBC # BLD: 3.19 M/UL — LOW (ref 4.2–5.4)
RBC # BLD: 3.2 M/UL — LOW (ref 4.2–5.4)
RBC # BLD: 3.21 M/UL — LOW (ref 4.2–5.4)
RBC # BLD: 3.21 M/UL — LOW (ref 4.2–5.4)
RBC # BLD: 3.23 M/UL — LOW (ref 4.2–5.4)
RBC # BLD: 3.23 M/UL — LOW (ref 4.2–5.4)
RBC # BLD: 3.24 M/UL — LOW (ref 4.2–5.4)
RBC # BLD: 3.24 M/UL — LOW (ref 4.2–5.4)
RBC # BLD: 3.25 M/UL — LOW (ref 4.2–5.4)
RBC # BLD: 3.28 M/UL — LOW (ref 4.2–5.4)
RBC # BLD: 3.3 M/UL — LOW (ref 4.2–5.4)
RBC # BLD: 3.3 M/UL — LOW (ref 4.2–5.4)
RBC # BLD: 3.33 M/UL — LOW (ref 4.2–5.4)
RBC # BLD: 3.34 M/UL — LOW (ref 4.2–5.4)
RBC # BLD: 3.34 M/UL — LOW (ref 4.2–5.4)
RBC # BLD: 3.35 M/UL — LOW (ref 4.2–5.4)
RBC # BLD: 3.35 M/UL — LOW (ref 4.2–5.4)
RBC # BLD: 3.38 M/UL — LOW (ref 4.2–5.4)
RBC # BLD: 3.39 M/UL — LOW (ref 4.2–5.4)
RBC # BLD: 3.41 M/UL — LOW (ref 4.2–5.4)
RBC # BLD: 3.44 M/UL — LOW (ref 4.2–5.4)
RBC # BLD: 3.44 M/UL — LOW (ref 4.2–5.4)
RBC # BLD: 3.45 M/UL — LOW (ref 4.2–5.4)
RBC # BLD: 3.46 M/UL — LOW (ref 4.2–5.4)
RBC # BLD: 3.47 M/UL — LOW (ref 4.2–5.4)
RBC # BLD: 3.47 M/UL — LOW (ref 4.2–5.4)
RBC # BLD: 3.48 M/UL — LOW (ref 4.2–5.4)
RBC # BLD: 3.52 M/UL — LOW (ref 4.2–5.4)
RBC # BLD: 3.53 M/UL — LOW (ref 4.2–5.4)
RBC # BLD: 3.54 M/UL — LOW (ref 4.2–5.4)
RBC # BLD: 3.54 M/UL — LOW (ref 4.2–5.4)
RBC # BLD: 3.58 M/UL — LOW (ref 4.2–5.4)
RBC # BLD: 3.58 M/UL — LOW (ref 4.2–5.4)
RBC # BLD: 3.59 M/UL — LOW (ref 4.2–5.4)
RBC # BLD: 3.6 M/UL — LOW (ref 4.2–5.4)
RBC # BLD: 3.6 M/UL — LOW (ref 4.2–5.4)
RBC # BLD: 3.63 M/UL — LOW (ref 4.2–5.4)
RBC # BLD: 3.64 M/UL — LOW (ref 4.2–5.4)
RBC # BLD: 3.65 M/UL — LOW (ref 4.2–5.4)
RBC # BLD: 3.66 M/UL — LOW (ref 4.2–5.4)
RBC # BLD: 3.68 M/UL — LOW (ref 4.2–5.4)
RBC # BLD: 3.68 M/UL — LOW (ref 4.2–5.4)
RBC # BLD: 3.7 M/UL — LOW (ref 4.2–5.4)
RBC # BLD: 3.7 M/UL — LOW (ref 4.2–5.4)
RBC # BLD: 3.71 M/UL — LOW (ref 4.2–5.4)
RBC # BLD: 3.75 M/UL — LOW (ref 4.2–5.4)
RBC # BLD: 3.76 M/UL — LOW (ref 4.2–5.4)
RBC # BLD: 3.77 M/UL — LOW (ref 4.2–5.4)
RBC # BLD: 3.79 M/UL — LOW (ref 4.2–5.4)
RBC # BLD: 3.81 M/UL — LOW (ref 4.2–5.4)
RBC # BLD: 3.83 M/UL — LOW (ref 4.2–5.4)
RBC # BLD: 3.83 M/UL — LOW (ref 4.2–5.4)
RBC # BLD: 3.85 M/UL — LOW (ref 4.2–5.4)
RBC # BLD: 3.87 M/UL — LOW (ref 4.2–5.4)
RBC # BLD: 3.88 M/UL — LOW (ref 4.2–5.4)
RBC # BLD: 3.89 M/UL — LOW (ref 4.2–5.4)
RBC # BLD: 3.91 M/UL — LOW (ref 4.2–5.4)
RBC # BLD: 3.92 M/UL — LOW (ref 4.2–5.4)
RBC # BLD: 3.93 M/UL — LOW (ref 4.2–5.4)
RBC # BLD: 3.95 M/UL — LOW (ref 4.2–5.4)
RBC # BLD: 3.96 M/UL — LOW (ref 4.2–5.4)
RBC # BLD: 3.98 M/UL — LOW (ref 4.2–5.4)
RBC # BLD: 3.99 M/UL — LOW (ref 4.2–5.4)
RBC # BLD: 4 M/UL — LOW (ref 4.2–5.4)
RBC # BLD: 4.01 M/UL — LOW (ref 4.2–5.4)
RBC # BLD: 4.02 M/UL — LOW (ref 4.2–5.4)
RBC # BLD: 4.03 M/UL — LOW (ref 4.2–5.4)
RBC # BLD: 4.06 M/UL — LOW (ref 4.2–5.4)
RBC # BLD: 4.07 M/UL — LOW (ref 4.2–5.4)
RBC # BLD: 4.07 M/UL — LOW (ref 4.2–5.4)
RBC # BLD: 4.14 M/UL — LOW (ref 4.2–5.4)
RBC # BLD: 4.15 M/UL — LOW (ref 4.2–5.4)
RBC # BLD: 4.19 M/UL — LOW (ref 4.2–5.4)
RBC # BLD: 4.24 M/UL — SIGNIFICANT CHANGE UP (ref 4.2–5.4)
RBC # BLD: 4.35 M/UL — SIGNIFICANT CHANGE UP (ref 4.2–5.4)
RBC # BLD: 4.51 M/UL — SIGNIFICANT CHANGE UP (ref 4.2–5.4)
RBC # FLD: 15.5 % — HIGH (ref 11.5–14.5)
RBC # FLD: 15.9 % — HIGH (ref 11.5–14.5)
RBC # FLD: 16 % — HIGH (ref 11.5–14.5)
RBC # FLD: 16.1 % — HIGH (ref 11.5–14.5)
RBC # FLD: 16.1 % — HIGH (ref 11.5–14.5)
RBC # FLD: 16.2 % — HIGH (ref 11.5–14.5)
RBC # FLD: 16.4 % — HIGH (ref 11.5–14.5)
RBC # FLD: 16.5 % — HIGH (ref 11.5–14.5)
RBC # FLD: 16.6 % — HIGH (ref 11.5–14.5)
RBC # FLD: 16.6 % — HIGH (ref 11.5–14.5)
RBC # FLD: 16.7 % — HIGH (ref 11.5–14.5)
RBC # FLD: 16.8 % — HIGH (ref 11.5–14.5)
RBC # FLD: 16.9 % — HIGH (ref 11.5–14.5)
RBC # FLD: 17 % — HIGH (ref 11.5–14.5)
RBC # FLD: 17.1 % — HIGH (ref 11.5–14.5)
RBC # FLD: 17.2 % — HIGH (ref 11.5–14.5)
RBC # FLD: 17.3 % — HIGH (ref 11.5–14.5)
RBC # FLD: 17.4 % — HIGH (ref 11.5–14.5)
RBC # FLD: 17.5 % — HIGH (ref 11.5–14.5)
RBC # FLD: 17.6 % — HIGH (ref 11.5–14.5)
RBC # FLD: 17.7 % — HIGH (ref 11.5–14.5)
RBC # FLD: 17.8 % — HIGH (ref 11.5–14.5)
RBC # FLD: 17.9 % — HIGH (ref 11.5–14.5)
RBC # FLD: 18 % — HIGH (ref 11.5–14.5)
RBC # FLD: 18.1 % — HIGH (ref 11.5–14.5)
RBC # FLD: 18.2 % — HIGH (ref 11.5–14.5)
RBC # FLD: 18.3 % — HIGH (ref 11.5–14.5)
RBC # FLD: 18.5 % — HIGH (ref 11.5–14.5)
RBC # FLD: 18.5 % — HIGH (ref 11.5–14.5)
RBC # FLD: 18.6 % — HIGH (ref 11.5–14.5)
RBC # FLD: 18.7 % — HIGH (ref 11.5–14.5)
RBC # FLD: 18.9 % — HIGH (ref 11.5–14.5)
RBC # FLD: 19 % — HIGH (ref 11.5–14.5)
RBC # FLD: 19.1 % — HIGH (ref 11.5–14.5)
RBC # FLD: 19.1 % — HIGH (ref 11.5–14.5)
RBC # FLD: 19.7 % — HIGH (ref 11.5–14.5)
RBC # FLD: 19.7 % — HIGH (ref 11.5–14.5)
RBC # FLD: 19.8 % — HIGH (ref 11.5–14.5)
RBC # FLD: 19.8 % — HIGH (ref 11.5–14.5)
RBC # FLD: 19.9 % — HIGH (ref 11.5–14.5)
RBC # FLD: 19.9 % — HIGH (ref 11.5–14.5)
RBC # FLD: 20 % — HIGH (ref 11.5–14.5)
RBC # FLD: 20 % — HIGH (ref 11.5–14.5)
RBC # FLD: 20.3 % — HIGH (ref 11.5–14.5)
RBC # FLD: 20.7 % — HIGH (ref 11.5–14.5)
RBC # FLD: 20.8 % — HIGH (ref 11.5–14.5)
RBC # FLD: 21.1 % — HIGH (ref 11.5–14.5)
RBC # FLD: 21.1 % — HIGH (ref 11.5–14.5)
RBC # FLD: 21.3 % — HIGH (ref 11.5–14.5)
RBC BLD AUTO: ABNORMAL
RBC BLD AUTO: NORMAL — SIGNIFICANT CHANGE UP
RBC CASTS # UR COMP ASSIST: 364 /HPF — HIGH (ref 0–4)
RBC CASTS # UR COMP ASSIST: 364 /HPF — HIGH (ref 0–4)
RBC CASTS # UR COMP ASSIST: 8 /HPF — HIGH (ref 0–4)
RBC CASTS # UR COMP ASSIST: 8 /HPF — HIGH (ref 0–4)
RBC CASTS # UR COMP ASSIST: ABNORMAL /HPF
RCV VOL RI: 0 /UL — SIGNIFICANT CHANGE UP (ref 0–0)
RCV VOL RI: 0 /UL — SIGNIFICANT CHANGE UP (ref 0–0)
RCV VOL RI: 4000 /UL — HIGH (ref 0–0)
RCV VOL RI: HIGH /UL (ref 0–0)
RETICS #: 66.2 K/UL — SIGNIFICANT CHANGE UP (ref 25–125)
RETICS #: 66.2 K/UL — SIGNIFICANT CHANGE UP (ref 25–125)
RETICS #: 77.2 K/UL — SIGNIFICANT CHANGE UP (ref 25–125)
RETICS/RBC NFR: 2.7 % — HIGH (ref 0.5–1.5)
RETICS/RBC NFR: 2.9 % — HIGH (ref 0.5–1.5)
RETICS/RBC NFR: 2.9 % — HIGH (ref 0.5–1.5)
RHEUMATOID FACT SERPL-ACNC: <10 IU/ML — SIGNIFICANT CHANGE UP (ref 0–13)
RSV RNA NPH QL NAA+NON-PROBE: SIGNIFICANT CHANGE UP
S PNEUM SEROTYPE IGG SER-IMP: 0.1 MCG/ML — SIGNIFICANT CHANGE UP
S PNEUM SEROTYPE IGG SER-IMP: 0.2 MCG/ML — SIGNIFICANT CHANGE UP
S PNEUM SEROTYPE IGG SER-IMP: 0.2 MCG/ML — SIGNIFICANT CHANGE UP
S PNEUM SEROTYPE IGG SER-IMP: 0.4 MCG/ML — SIGNIFICANT CHANGE UP
S PNEUM SEROTYPE IGG SER-IMP: 0.4 MCG/ML — SIGNIFICANT CHANGE UP
S PNEUM SEROTYPE IGG SER-IMP: 0.6 MCG/ML — SIGNIFICANT CHANGE UP
S PNEUM SEROTYPE IGG SER-IMP: 0.6 MCG/ML — SIGNIFICANT CHANGE UP
S PNEUM SEROTYPE IGG SER-IMP: 2 MCG/ML — SIGNIFICANT CHANGE UP
S PNEUM SEROTYPE IGG SER-IMP: 2 MCG/ML — SIGNIFICANT CHANGE UP
S PNEUM SEROTYPE IGG SER-IMP: <0.1 MCG/ML — SIGNIFICANT CHANGE UP
S PNEUM SEROTYPE IGG SER-IMP: <0.1 MCG/ML — SIGNIFICANT CHANGE UP
SAO2 % BLDA: 100 % — HIGH (ref 94–98)
SAO2 % BLDA: 95.8 % — SIGNIFICANT CHANGE UP (ref 94–98)
SAO2 % BLDA: 97.4 % — SIGNIFICANT CHANGE UP (ref 94–98)
SAO2 % BLDA: 97.7 % — SIGNIFICANT CHANGE UP (ref 94–98)
SAO2 % BLDA: 97.7 % — SIGNIFICANT CHANGE UP (ref 94–98)
SAO2 % BLDA: 98.9 % — HIGH (ref 94–98)
SAO2 % BLDA: 99.1 % — HIGH (ref 94–98)
SAO2 % BLDA: 99.1 % — HIGH (ref 94–98)
SAO2 % BLDA: 99.2 % — HIGH (ref 94–98)
SAO2 % BLDA: 99.2 % — HIGH (ref 94–98)
SAO2 % BLDA: 99.4 % — HIGH (ref 94–98)
SAO2 % BLDA: 99.4 % — HIGH (ref 94–98)
SAO2 % BLDA: 99.5 % — HIGH (ref 94–98)
SAO2 % BLDA: 99.8 % — HIGH (ref 94–98)
SAO2 % BLDA: 99.8 % — HIGH (ref 94–98)
SAO2 % BLDA: 99.9 % — HIGH (ref 94–98)
SAO2 % BLDV: 40.2 % — SIGNIFICANT CHANGE UP
SAO2 % BLDV: 40.2 % — SIGNIFICANT CHANGE UP
SAO2 % BLDV: 41.6 % — SIGNIFICANT CHANGE UP
SAO2 % BLDV: 45.7 % — SIGNIFICANT CHANGE UP
SAO2 % BLDV: 46.7 % — SIGNIFICANT CHANGE UP
SAO2 % BLDV: 46.7 % — SIGNIFICANT CHANGE UP
SAO2 % BLDV: 57.4 % — SIGNIFICANT CHANGE UP
SAO2 % BLDV: 58.2 % — SIGNIFICANT CHANGE UP
SAO2 % BLDV: 58.2 % — SIGNIFICANT CHANGE UP
SAO2 % BLDV: 68.3 % — SIGNIFICANT CHANGE UP
SAO2 % BLDV: 72.8 % — SIGNIFICANT CHANGE UP
SARS-COV-2 RNA SPEC QL NAA+PROBE: SIGNIFICANT CHANGE UP
SCHISTOCYTES BLD QL AUTO: SIGNIFICANT CHANGE UP
SCHISTOCYTES BLD QL AUTO: SIGNIFICANT CHANGE UP
SODIUM SERPL-SCNC: 135 MMOL/L — SIGNIFICANT CHANGE UP (ref 135–146)
SODIUM SERPL-SCNC: 135 MMOL/L — SIGNIFICANT CHANGE UP (ref 135–146)
SODIUM SERPL-SCNC: 137 MMOL/L
SODIUM SERPL-SCNC: 137 MMOL/L — SIGNIFICANT CHANGE UP (ref 135–146)
SODIUM SERPL-SCNC: 138 MMOL/L
SODIUM SERPL-SCNC: 138 MMOL/L — SIGNIFICANT CHANGE UP (ref 135–146)
SODIUM SERPL-SCNC: 139 MMOL/L
SODIUM SERPL-SCNC: 139 MMOL/L
SODIUM SERPL-SCNC: 139 MMOL/L — SIGNIFICANT CHANGE UP (ref 135–146)
SODIUM SERPL-SCNC: 140 MMOL/L — SIGNIFICANT CHANGE UP (ref 135–146)
SODIUM SERPL-SCNC: 141 MMOL/L — SIGNIFICANT CHANGE UP (ref 135–146)
SODIUM SERPL-SCNC: 142 MMOL/L — SIGNIFICANT CHANGE UP (ref 135–146)
SODIUM SERPL-SCNC: 143 MMOL/L — SIGNIFICANT CHANGE UP (ref 135–146)
SODIUM SERPL-SCNC: 144 MMOL/L — SIGNIFICANT CHANGE UP (ref 135–146)
SODIUM SERPL-SCNC: 145 MMOL/L
SODIUM SERPL-SCNC: 145 MMOL/L — SIGNIFICANT CHANGE UP (ref 135–146)
SODIUM SERPL-SCNC: 146 MMOL/L — SIGNIFICANT CHANGE UP (ref 135–146)
SODIUM SERPL-SCNC: 147 MMOL/L — HIGH (ref 135–146)
SODIUM SERPL-SCNC: 148 MMOL/L — HIGH (ref 135–146)
SODIUM UR-SCNC: 100 MMOL/L — SIGNIFICANT CHANGE UP
SODIUM UR-SCNC: 100 MMOL/L — SIGNIFICANT CHANGE UP
SP GR SPEC: 1.01 — SIGNIFICANT CHANGE UP (ref 1–1.03)
SP GR SPEC: 1.02 — SIGNIFICANT CHANGE UP (ref 1.01–1.03)
SP GR SPEC: 1.02 — SIGNIFICANT CHANGE UP (ref 1–1.03)
SPECIMEN SOURCE: SIGNIFICANT CHANGE UP
SQUAMOUS # UR AUTO: 3 /HPF — SIGNIFICANT CHANGE UP (ref 0–5)
SQUAMOUS # UR AUTO: 3 /HPF — SIGNIFICANT CHANGE UP (ref 0–5)
STREPTOCOCCUS PNEUMONIAE IGG SEROTYPE INTERPRETATION: SIGNIFICANT CHANGE UP
STREPTOCOCCUS PNEUMONIAE IGG SEROTYPE INTERPRETATION: SIGNIFICANT CHANGE UP
T4 FREE SERPL-MCNC: 1.2 NG/DL — SIGNIFICANT CHANGE UP (ref 0.9–1.8)
TARGETS BLD QL SMEAR: SLIGHT — SIGNIFICANT CHANGE UP
TARGETS BLD QL SMEAR: SLIGHT — SIGNIFICANT CHANGE UP
TIBC SERPL-MCNC: 273 UG/DL — SIGNIFICANT CHANGE UP (ref 220–430)
TIBC SERPL-MCNC: 301 UG/DL
TOTAL NUCLEATED CELL COUNT, BODY FLUID: 3362 /UL — SIGNIFICANT CHANGE UP
TOTAL NUCLEATED CELL COUNT, BODY FLUID: 355 /UL — SIGNIFICANT CHANGE UP
TOTAL NUCLEATED CELL COUNT, BODY FLUID: 98 /UL — SIGNIFICANT CHANGE UP
TOTAL NUCLEATED CELL COUNT, BODY FLUID: 98 /UL — SIGNIFICANT CHANGE UP
TOXIC GRANULES BLD QL SMEAR: PRESENT — SIGNIFICANT CHANGE UP
TRIGL SERPL-MCNC: 43 MG/DL — SIGNIFICANT CHANGE UP
TROPONIN T SERPL-MCNC: 0.06 NG/ML — CRITICAL HIGH
TROPONIN T SERPL-MCNC: 0.06 NG/ML — CRITICAL HIGH
TROPONIN T SERPL-MCNC: 0.08 NG/ML — CRITICAL HIGH
TROPONIN T SERPL-MCNC: 0.08 NG/ML — CRITICAL HIGH
TROPONIN T SERPL-MCNC: 0.1 NG/ML — CRITICAL HIGH
TROPONIN T SERPL-MCNC: 0.1 NG/ML — CRITICAL HIGH
TROPONIN T SERPL-MCNC: 0.15 NG/ML — CRITICAL HIGH
TROPONIN T SERPL-MCNC: 0.17 NG/ML — CRITICAL HIGH
TROPONIN T SERPL-MCNC: 0.17 NG/ML — CRITICAL HIGH
TROPONIN T SERPL-MCNC: <0.01 NG/ML — SIGNIFICANT CHANGE UP
TSH SERPL-MCNC: 1.51 UIU/ML — SIGNIFICANT CHANGE UP (ref 0.27–4.2)
TUBE TYPE: SIGNIFICANT CHANGE UP
UIBC SERPL-MCNC: 241 UG/DL — SIGNIFICANT CHANGE UP (ref 110–370)
UIBC SERPL-MCNC: 271 UG/DL
URATE SERPL-MCNC: 8.7 MG/DL — HIGH (ref 2.5–7)
UROBILINOGEN FLD QL: 0.2 MG/DL — SIGNIFICANT CHANGE UP
UROBILINOGEN FLD QL: 0.2 MG/DL — SIGNIFICANT CHANGE UP (ref 0.2–1)
UROBILINOGEN FLD QL: 1 MG/DL — SIGNIFICANT CHANGE UP (ref 0.2–1)
UROBILINOGEN FLD QL: 1 MG/DL — SIGNIFICANT CHANGE UP (ref 0.2–1)
VANCOMYCIN FLD-MCNC: 36.7 UG/ML — HIGH (ref 5–10)
VANCOMYCIN TROUGH SERPL-MCNC: 14 UG/ML — HIGH (ref 5–10)
VANCOMYCIN TROUGH SERPL-MCNC: 21 UG/ML — HIGH (ref 5–10)
VANCOMYCIN TROUGH SERPL-MCNC: 21.8 UG/ML — HIGH (ref 5–10)
VANCOMYCIN TROUGH SERPL-MCNC: 22.6 UG/ML — HIGH (ref 5–10)
VANCOMYCIN TROUGH SERPL-MCNC: 34.5 UG/ML — HIGH (ref 5–10)
VANCOMYCIN TROUGH SERPL-MCNC: 35.1 UG/ML — HIGH (ref 5–10)
VANCOMYCIN TROUGH SERPL-MCNC: 9.4 UG/ML — SIGNIFICANT CHANGE UP (ref 5–10)
VARIANT LYMPHS # BLD: 2 % — SIGNIFICANT CHANGE UP (ref 0–5)
VARIANT LYMPHS # BLD: 2 % — SIGNIFICANT CHANGE UP (ref 0–5)
VIT B12 SERPL-MCNC: 1549 PG/ML — HIGH (ref 232–1245)
VIT B12 SERPL-MCNC: 1549 PG/ML — HIGH (ref 232–1245)
VIT B12 SERPL-MCNC: 849 PG/ML — SIGNIFICANT CHANGE UP (ref 232–1245)
WBC # BLD: 10.02 K/UL — SIGNIFICANT CHANGE UP (ref 4.8–10.8)
WBC # BLD: 10.05 K/UL — SIGNIFICANT CHANGE UP (ref 4.8–10.8)
WBC # BLD: 10.09 K/UL — SIGNIFICANT CHANGE UP (ref 4.8–10.8)
WBC # BLD: 10.11 K/UL — SIGNIFICANT CHANGE UP (ref 4.8–10.8)
WBC # BLD: 10.2 K/UL — SIGNIFICANT CHANGE UP (ref 4.8–10.8)
WBC # BLD: 10.22 K/UL — SIGNIFICANT CHANGE UP (ref 4.8–10.8)
WBC # BLD: 10.39 K/UL — SIGNIFICANT CHANGE UP (ref 4.8–10.8)
WBC # BLD: 10.39 K/UL — SIGNIFICANT CHANGE UP (ref 4.8–10.8)
WBC # BLD: 10.44 K/UL — SIGNIFICANT CHANGE UP (ref 4.8–10.8)
WBC # BLD: 10.44 K/UL — SIGNIFICANT CHANGE UP (ref 4.8–10.8)
WBC # BLD: 10.49 K/UL — SIGNIFICANT CHANGE UP (ref 4.8–10.8)
WBC # BLD: 10.49 K/UL — SIGNIFICANT CHANGE UP (ref 4.8–10.8)
WBC # BLD: 10.69 K/UL — SIGNIFICANT CHANGE UP (ref 4.8–10.8)
WBC # BLD: 10.69 K/UL — SIGNIFICANT CHANGE UP (ref 4.8–10.8)
WBC # BLD: 10.82 K/UL — HIGH (ref 4.8–10.8)
WBC # BLD: 10.82 K/UL — HIGH (ref 4.8–10.8)
WBC # BLD: 10.85 K/UL — HIGH (ref 4.8–10.8)
WBC # BLD: 10.92 K/UL — HIGH (ref 4.8–10.8)
WBC # BLD: 10.95 K/UL — HIGH (ref 4.8–10.8)
WBC # BLD: 10.96 K/UL — HIGH (ref 4.8–10.8)
WBC # BLD: 10.98 K/UL — HIGH (ref 4.8–10.8)
WBC # BLD: 11.02 K/UL — HIGH (ref 4.8–10.8)
WBC # BLD: 11.17 K/UL — HIGH (ref 4.8–10.8)
WBC # BLD: 11.21 K/UL — HIGH (ref 4.8–10.8)
WBC # BLD: 11.21 K/UL — HIGH (ref 4.8–10.8)
WBC # BLD: 11.32 K/UL — HIGH (ref 4.8–10.8)
WBC # BLD: 11.43 K/UL — HIGH (ref 4.8–10.8)
WBC # BLD: 11.44 K/UL — HIGH (ref 4.8–10.8)
WBC # BLD: 11.51 K/UL — HIGH (ref 4.8–10.8)
WBC # BLD: 11.53 K/UL — HIGH (ref 4.8–10.8)
WBC # BLD: 11.57 K/UL — HIGH (ref 4.8–10.8)
WBC # BLD: 11.58 K/UL — HIGH (ref 4.8–10.8)
WBC # BLD: 11.65 K/UL — HIGH (ref 4.8–10.8)
WBC # BLD: 11.69 K/UL — HIGH (ref 4.8–10.8)
WBC # BLD: 11.73 K/UL — HIGH (ref 4.8–10.8)
WBC # BLD: 11.79 K/UL — HIGH (ref 4.8–10.8)
WBC # BLD: 11.89 K/UL — HIGH (ref 4.8–10.8)
WBC # BLD: 11.92 K/UL — HIGH (ref 4.8–10.8)
WBC # BLD: 11.92 K/UL — HIGH (ref 4.8–10.8)
WBC # BLD: 11.95 K/UL — HIGH (ref 4.8–10.8)
WBC # BLD: 12.1 K/UL — HIGH (ref 4.8–10.8)
WBC # BLD: 12.17 K/UL — HIGH (ref 4.8–10.8)
WBC # BLD: 12.21 K/UL — HIGH (ref 4.8–10.8)
WBC # BLD: 12.3 K/UL — HIGH (ref 4.8–10.8)
WBC # BLD: 12.4 K/UL — HIGH (ref 4.8–10.8)
WBC # BLD: 12.54 K/UL — HIGH (ref 4.8–10.8)
WBC # BLD: 13.03 K/UL — HIGH (ref 4.8–10.8)
WBC # BLD: 13.28 K/UL — HIGH (ref 4.8–10.8)
WBC # BLD: 13.33 K/UL — HIGH (ref 4.8–10.8)
WBC # BLD: 13.33 K/UL — HIGH (ref 4.8–10.8)
WBC # BLD: 13.42 K/UL — HIGH (ref 4.8–10.8)
WBC # BLD: 13.42 K/UL — HIGH (ref 4.8–10.8)
WBC # BLD: 13.46 K/UL — HIGH (ref 4.8–10.8)
WBC # BLD: 13.46 K/UL — HIGH (ref 4.8–10.8)
WBC # BLD: 13.64 K/UL — HIGH (ref 4.8–10.8)
WBC # BLD: 13.68 K/UL — HIGH (ref 4.8–10.8)
WBC # BLD: 13.77 K/UL — HIGH (ref 4.8–10.8)
WBC # BLD: 13.8 K/UL — HIGH (ref 4.8–10.8)
WBC # BLD: 14.11 K/UL — HIGH (ref 4.8–10.8)
WBC # BLD: 14.11 K/UL — HIGH (ref 4.8–10.8)
WBC # BLD: 14.15 K/UL — HIGH (ref 4.8–10.8)
WBC # BLD: 14.42 K/UL — HIGH (ref 4.8–10.8)
WBC # BLD: 14.42 K/UL — HIGH (ref 4.8–10.8)
WBC # BLD: 14.49 K/UL — HIGH (ref 4.8–10.8)
WBC # BLD: 14.49 K/UL — HIGH (ref 4.8–10.8)
WBC # BLD: 14.55 K/UL — HIGH (ref 4.8–10.8)
WBC # BLD: 14.7 K/UL — HIGH (ref 4.8–10.8)
WBC # BLD: 14.7 K/UL — HIGH (ref 4.8–10.8)
WBC # BLD: 14.73 K/UL — HIGH (ref 4.8–10.8)
WBC # BLD: 14.73 K/UL — HIGH (ref 4.8–10.8)
WBC # BLD: 14.76 K/UL — HIGH (ref 4.8–10.8)
WBC # BLD: 15.3 K/UL — HIGH (ref 4.8–10.8)
WBC # BLD: 15.6 K/UL — HIGH (ref 4.8–10.8)
WBC # BLD: 15.74 K/UL — HIGH (ref 4.8–10.8)
WBC # BLD: 15.78 K/UL — HIGH (ref 4.8–10.8)
WBC # BLD: 15.85 K/UL — HIGH (ref 4.8–10.8)
WBC # BLD: 15.89 K/UL — HIGH (ref 4.8–10.8)
WBC # BLD: 15.89 K/UL — HIGH (ref 4.8–10.8)
WBC # BLD: 15.97 K/UL — HIGH (ref 4.8–10.8)
WBC # BLD: 16.14 K/UL — HIGH (ref 4.8–10.8)
WBC # BLD: 16.56 K/UL — HIGH (ref 4.8–10.8)
WBC # BLD: 16.56 K/UL — HIGH (ref 4.8–10.8)
WBC # BLD: 16.81 K/UL — HIGH (ref 4.8–10.8)
WBC # BLD: 17.59 K/UL — HIGH (ref 4.8–10.8)
WBC # BLD: 17.59 K/UL — HIGH (ref 4.8–10.8)
WBC # BLD: 17.69 K/UL — HIGH (ref 4.8–10.8)
WBC # BLD: 18.19 K/UL — HIGH (ref 4.8–10.8)
WBC # BLD: 18.28 K/UL — HIGH (ref 4.8–10.8)
WBC # BLD: 18.48 K/UL — HIGH (ref 4.8–10.8)
WBC # BLD: 18.63 K/UL — HIGH (ref 4.8–10.8)
WBC # BLD: 18.65 K/UL — HIGH (ref 4.8–10.8)
WBC # BLD: 18.72 K/UL — HIGH (ref 4.8–10.8)
WBC # BLD: 18.72 K/UL — HIGH (ref 4.8–10.8)
WBC # BLD: 19.09 K/UL — HIGH (ref 4.8–10.8)
WBC # BLD: 19.23 K/UL — HIGH (ref 4.8–10.8)
WBC # BLD: 19.23 K/UL — HIGH (ref 4.8–10.8)
WBC # BLD: 19.25 K/UL — HIGH (ref 4.8–10.8)
WBC # BLD: 19.25 K/UL — HIGH (ref 4.8–10.8)
WBC # BLD: 19.49 K/UL — HIGH (ref 4.8–10.8)
WBC # BLD: 20.44 K/UL — HIGH (ref 4.8–10.8)
WBC # BLD: 20.44 K/UL — HIGH (ref 4.8–10.8)
WBC # BLD: 21.34 K/UL — HIGH (ref 4.8–10.8)
WBC # BLD: 23.28 K/UL — HIGH (ref 4.8–10.8)
WBC # BLD: 23.79 K/UL — HIGH (ref 4.8–10.8)
WBC # BLD: 24.2 K/UL — HIGH (ref 4.8–10.8)
WBC # BLD: 24.2 K/UL — HIGH (ref 4.8–10.8)
WBC # BLD: 24.49 K/UL — HIGH (ref 4.8–10.8)
WBC # BLD: 24.49 K/UL — HIGH (ref 4.8–10.8)
WBC # BLD: 25.67 K/UL — HIGH (ref 4.8–10.8)
WBC # BLD: 25.97 K/UL — HIGH (ref 4.8–10.8)
WBC # BLD: 25.97 K/UL — HIGH (ref 4.8–10.8)
WBC # BLD: 28.26 K/UL — HIGH (ref 4.8–10.8)
WBC # BLD: 29 K/UL — HIGH (ref 4.8–10.8)
WBC # BLD: 32.52 K/UL — HIGH (ref 4.8–10.8)
WBC # BLD: 32.52 K/UL — HIGH (ref 4.8–10.8)
WBC # BLD: 35 K/UL — HIGH (ref 4.8–10.8)
WBC # BLD: 35 K/UL — HIGH (ref 4.8–10.8)
WBC # BLD: 39.51 K/UL — HIGH (ref 4.8–10.8)
WBC # BLD: 39.51 K/UL — HIGH (ref 4.8–10.8)
WBC # BLD: 6.69 K/UL — SIGNIFICANT CHANGE UP (ref 4.8–10.8)
WBC # BLD: 7.63 K/UL — SIGNIFICANT CHANGE UP (ref 4.8–10.8)
WBC # BLD: 7.63 K/UL — SIGNIFICANT CHANGE UP (ref 4.8–10.8)
WBC # BLD: 7.92 K/UL — SIGNIFICANT CHANGE UP (ref 4.8–10.8)
WBC # BLD: 8.02 K/UL — SIGNIFICANT CHANGE UP (ref 4.8–10.8)
WBC # BLD: 8.02 K/UL — SIGNIFICANT CHANGE UP (ref 4.8–10.8)
WBC # BLD: 8.09 K/UL — SIGNIFICANT CHANGE UP (ref 4.8–10.8)
WBC # BLD: 8.14 K/UL — SIGNIFICANT CHANGE UP (ref 4.8–10.8)
WBC # BLD: 8.25 K/UL — SIGNIFICANT CHANGE UP (ref 4.8–10.8)
WBC # BLD: 8.36 K/UL — SIGNIFICANT CHANGE UP (ref 4.8–10.8)
WBC # BLD: 8.36 K/UL — SIGNIFICANT CHANGE UP (ref 4.8–10.8)
WBC # BLD: 8.37 K/UL — SIGNIFICANT CHANGE UP (ref 4.8–10.8)
WBC # BLD: 8.42 K/UL — SIGNIFICANT CHANGE UP (ref 4.8–10.8)
WBC # BLD: 8.59 K/UL — SIGNIFICANT CHANGE UP (ref 4.8–10.8)
WBC # BLD: 8.71 K/UL — SIGNIFICANT CHANGE UP (ref 4.8–10.8)
WBC # BLD: 8.71 K/UL — SIGNIFICANT CHANGE UP (ref 4.8–10.8)
WBC # BLD: 8.93 K/UL — SIGNIFICANT CHANGE UP (ref 4.8–10.8)
WBC # BLD: 8.96 K/UL — SIGNIFICANT CHANGE UP (ref 4.8–10.8)
WBC # BLD: 9.23 K/UL — SIGNIFICANT CHANGE UP (ref 4.8–10.8)
WBC # BLD: 9.29 K/UL — SIGNIFICANT CHANGE UP (ref 4.8–10.8)
WBC # BLD: 9.36 K/UL — SIGNIFICANT CHANGE UP (ref 4.8–10.8)
WBC # BLD: 9.55 K/UL — SIGNIFICANT CHANGE UP (ref 4.8–10.8)
WBC # BLD: 9.55 K/UL — SIGNIFICANT CHANGE UP (ref 4.8–10.8)
WBC # BLD: 9.56 K/UL — SIGNIFICANT CHANGE UP (ref 4.8–10.8)
WBC # BLD: 9.59 K/UL — SIGNIFICANT CHANGE UP (ref 4.8–10.8)
WBC # BLD: 9.71 K/UL — SIGNIFICANT CHANGE UP (ref 4.8–10.8)
WBC # BLD: 9.71 K/UL — SIGNIFICANT CHANGE UP (ref 4.8–10.8)
WBC # BLD: 9.89 K/UL — SIGNIFICANT CHANGE UP (ref 4.8–10.8)
WBC # BLD: 9.99 K/UL — SIGNIFICANT CHANGE UP (ref 4.8–10.8)
WBC # FLD AUTO: 10.02 K/UL — SIGNIFICANT CHANGE UP (ref 4.8–10.8)
WBC # FLD AUTO: 10.05 K/UL — SIGNIFICANT CHANGE UP (ref 4.8–10.8)
WBC # FLD AUTO: 10.09 K/UL — SIGNIFICANT CHANGE UP (ref 4.8–10.8)
WBC # FLD AUTO: 10.11 K/UL — SIGNIFICANT CHANGE UP (ref 4.8–10.8)
WBC # FLD AUTO: 10.2 K/UL — SIGNIFICANT CHANGE UP (ref 4.8–10.8)
WBC # FLD AUTO: 10.22 K/UL — SIGNIFICANT CHANGE UP (ref 4.8–10.8)
WBC # FLD AUTO: 10.39 K/UL — SIGNIFICANT CHANGE UP (ref 4.8–10.8)
WBC # FLD AUTO: 10.39 K/UL — SIGNIFICANT CHANGE UP (ref 4.8–10.8)
WBC # FLD AUTO: 10.44 K/UL — SIGNIFICANT CHANGE UP (ref 4.8–10.8)
WBC # FLD AUTO: 10.44 K/UL — SIGNIFICANT CHANGE UP (ref 4.8–10.8)
WBC # FLD AUTO: 10.49 K/UL — SIGNIFICANT CHANGE UP (ref 4.8–10.8)
WBC # FLD AUTO: 10.49 K/UL — SIGNIFICANT CHANGE UP (ref 4.8–10.8)
WBC # FLD AUTO: 10.69 K/UL — SIGNIFICANT CHANGE UP (ref 4.8–10.8)
WBC # FLD AUTO: 10.69 K/UL — SIGNIFICANT CHANGE UP (ref 4.8–10.8)
WBC # FLD AUTO: 10.82 K/UL — HIGH (ref 4.8–10.8)
WBC # FLD AUTO: 10.82 K/UL — HIGH (ref 4.8–10.8)
WBC # FLD AUTO: 10.85 K/UL — HIGH (ref 4.8–10.8)
WBC # FLD AUTO: 10.92 K/UL — HIGH (ref 4.8–10.8)
WBC # FLD AUTO: 10.95 K/UL — HIGH (ref 4.8–10.8)
WBC # FLD AUTO: 10.96 K/UL — HIGH (ref 4.8–10.8)
WBC # FLD AUTO: 10.98 K/UL — HIGH (ref 4.8–10.8)
WBC # FLD AUTO: 11.02 K/UL — HIGH (ref 4.8–10.8)
WBC # FLD AUTO: 11.17 K/UL — HIGH (ref 4.8–10.8)
WBC # FLD AUTO: 11.21 K/UL — HIGH (ref 4.8–10.8)
WBC # FLD AUTO: 11.21 K/UL — HIGH (ref 4.8–10.8)
WBC # FLD AUTO: 11.32 K/UL — HIGH (ref 4.8–10.8)
WBC # FLD AUTO: 11.43 K/UL — HIGH (ref 4.8–10.8)
WBC # FLD AUTO: 11.44 K/UL — HIGH (ref 4.8–10.8)
WBC # FLD AUTO: 11.51 K/UL — HIGH (ref 4.8–10.8)
WBC # FLD AUTO: 11.53 K/UL — HIGH (ref 4.8–10.8)
WBC # FLD AUTO: 11.57 K/UL — HIGH (ref 4.8–10.8)
WBC # FLD AUTO: 11.58 K/UL — HIGH (ref 4.8–10.8)
WBC # FLD AUTO: 11.65 K/UL — HIGH (ref 4.8–10.8)
WBC # FLD AUTO: 11.69 K/UL — HIGH (ref 4.8–10.8)
WBC # FLD AUTO: 11.73 K/UL — HIGH (ref 4.8–10.8)
WBC # FLD AUTO: 11.79 K/UL — HIGH (ref 4.8–10.8)
WBC # FLD AUTO: 11.89 K/UL — HIGH (ref 4.8–10.8)
WBC # FLD AUTO: 11.92 K/UL — HIGH (ref 4.8–10.8)
WBC # FLD AUTO: 11.92 K/UL — HIGH (ref 4.8–10.8)
WBC # FLD AUTO: 11.95 K/UL — HIGH (ref 4.8–10.8)
WBC # FLD AUTO: 12.1 K/UL — HIGH (ref 4.8–10.8)
WBC # FLD AUTO: 12.17 K/UL — HIGH (ref 4.8–10.8)
WBC # FLD AUTO: 12.21 K/UL — HIGH (ref 4.8–10.8)
WBC # FLD AUTO: 12.3 K/UL — HIGH (ref 4.8–10.8)
WBC # FLD AUTO: 12.4 K/UL — HIGH (ref 4.8–10.8)
WBC # FLD AUTO: 12.54 K/UL — HIGH (ref 4.8–10.8)
WBC # FLD AUTO: 13.03 K/UL — HIGH (ref 4.8–10.8)
WBC # FLD AUTO: 13.28 K/UL — HIGH (ref 4.8–10.8)
WBC # FLD AUTO: 13.33 K/UL — HIGH (ref 4.8–10.8)
WBC # FLD AUTO: 13.33 K/UL — HIGH (ref 4.8–10.8)
WBC # FLD AUTO: 13.42 K/UL — HIGH (ref 4.8–10.8)
WBC # FLD AUTO: 13.42 K/UL — HIGH (ref 4.8–10.8)
WBC # FLD AUTO: 13.46 K/UL — HIGH (ref 4.8–10.8)
WBC # FLD AUTO: 13.46 K/UL — HIGH (ref 4.8–10.8)
WBC # FLD AUTO: 13.64 K/UL — HIGH (ref 4.8–10.8)
WBC # FLD AUTO: 13.68 K/UL — HIGH (ref 4.8–10.8)
WBC # FLD AUTO: 13.77 K/UL — HIGH (ref 4.8–10.8)
WBC # FLD AUTO: 13.8 K/UL — HIGH (ref 4.8–10.8)
WBC # FLD AUTO: 14.11 K/UL — HIGH (ref 4.8–10.8)
WBC # FLD AUTO: 14.11 K/UL — HIGH (ref 4.8–10.8)
WBC # FLD AUTO: 14.15 K/UL — HIGH (ref 4.8–10.8)
WBC # FLD AUTO: 14.42 K/UL — HIGH (ref 4.8–10.8)
WBC # FLD AUTO: 14.42 K/UL — HIGH (ref 4.8–10.8)
WBC # FLD AUTO: 14.49 K/UL — HIGH (ref 4.8–10.8)
WBC # FLD AUTO: 14.49 K/UL — HIGH (ref 4.8–10.8)
WBC # FLD AUTO: 14.55 K/UL — HIGH (ref 4.8–10.8)
WBC # FLD AUTO: 14.7 K/UL — HIGH (ref 4.8–10.8)
WBC # FLD AUTO: 14.7 K/UL — HIGH (ref 4.8–10.8)
WBC # FLD AUTO: 14.73 K/UL — HIGH (ref 4.8–10.8)
WBC # FLD AUTO: 14.73 K/UL — HIGH (ref 4.8–10.8)
WBC # FLD AUTO: 14.76 K/UL — HIGH (ref 4.8–10.8)
WBC # FLD AUTO: 15.3 K/UL — HIGH (ref 4.8–10.8)
WBC # FLD AUTO: 15.6 K/UL — HIGH (ref 4.8–10.8)
WBC # FLD AUTO: 15.74 K/UL — HIGH (ref 4.8–10.8)
WBC # FLD AUTO: 15.78 K/UL — HIGH (ref 4.8–10.8)
WBC # FLD AUTO: 15.85 K/UL — HIGH (ref 4.8–10.8)
WBC # FLD AUTO: 15.89 K/UL — HIGH (ref 4.8–10.8)
WBC # FLD AUTO: 15.89 K/UL — HIGH (ref 4.8–10.8)
WBC # FLD AUTO: 15.97 K/UL — HIGH (ref 4.8–10.8)
WBC # FLD AUTO: 16.14 K/UL — HIGH (ref 4.8–10.8)
WBC # FLD AUTO: 16.56 K/UL — HIGH (ref 4.8–10.8)
WBC # FLD AUTO: 16.56 K/UL — HIGH (ref 4.8–10.8)
WBC # FLD AUTO: 16.81 K/UL — HIGH (ref 4.8–10.8)
WBC # FLD AUTO: 17.59 K/UL — HIGH (ref 4.8–10.8)
WBC # FLD AUTO: 17.59 K/UL — HIGH (ref 4.8–10.8)
WBC # FLD AUTO: 17.69 K/UL — HIGH (ref 4.8–10.8)
WBC # FLD AUTO: 18.19 K/UL — HIGH (ref 4.8–10.8)
WBC # FLD AUTO: 18.28 K/UL — HIGH (ref 4.8–10.8)
WBC # FLD AUTO: 18.48 K/UL — HIGH (ref 4.8–10.8)
WBC # FLD AUTO: 18.63 K/UL — HIGH (ref 4.8–10.8)
WBC # FLD AUTO: 18.65 K/UL — HIGH (ref 4.8–10.8)
WBC # FLD AUTO: 18.72 K/UL — HIGH (ref 4.8–10.8)
WBC # FLD AUTO: 18.72 K/UL — HIGH (ref 4.8–10.8)
WBC # FLD AUTO: 19.09 K/UL — HIGH (ref 4.8–10.8)
WBC # FLD AUTO: 19.23 K/UL — HIGH (ref 4.8–10.8)
WBC # FLD AUTO: 19.23 K/UL — HIGH (ref 4.8–10.8)
WBC # FLD AUTO: 19.25 K/UL — HIGH (ref 4.8–10.8)
WBC # FLD AUTO: 19.25 K/UL — HIGH (ref 4.8–10.8)
WBC # FLD AUTO: 19.49 K/UL — HIGH (ref 4.8–10.8)
WBC # FLD AUTO: 20.44 K/UL — HIGH (ref 4.8–10.8)
WBC # FLD AUTO: 20.44 K/UL — HIGH (ref 4.8–10.8)
WBC # FLD AUTO: 21.34 K/UL — HIGH (ref 4.8–10.8)
WBC # FLD AUTO: 23.28 K/UL — HIGH (ref 4.8–10.8)
WBC # FLD AUTO: 23.79 K/UL — HIGH (ref 4.8–10.8)
WBC # FLD AUTO: 24.2 K/UL — HIGH (ref 4.8–10.8)
WBC # FLD AUTO: 24.2 K/UL — HIGH (ref 4.8–10.8)
WBC # FLD AUTO: 24.49 K/UL — HIGH (ref 4.8–10.8)
WBC # FLD AUTO: 24.49 K/UL — HIGH (ref 4.8–10.8)
WBC # FLD AUTO: 25.67 K/UL — HIGH (ref 4.8–10.8)
WBC # FLD AUTO: 25.97 K/UL — HIGH (ref 4.8–10.8)
WBC # FLD AUTO: 25.97 K/UL — HIGH (ref 4.8–10.8)
WBC # FLD AUTO: 28.26 K/UL — HIGH (ref 4.8–10.8)
WBC # FLD AUTO: 29 K/UL — HIGH (ref 4.8–10.8)
WBC # FLD AUTO: 32.52 K/UL — HIGH (ref 4.8–10.8)
WBC # FLD AUTO: 32.52 K/UL — HIGH (ref 4.8–10.8)
WBC # FLD AUTO: 35 K/UL — HIGH (ref 4.8–10.8)
WBC # FLD AUTO: 35 K/UL — HIGH (ref 4.8–10.8)
WBC # FLD AUTO: 39.51 K/UL — HIGH (ref 4.8–10.8)
WBC # FLD AUTO: 39.51 K/UL — HIGH (ref 4.8–10.8)
WBC # FLD AUTO: 6.69 K/UL — SIGNIFICANT CHANGE UP (ref 4.8–10.8)
WBC # FLD AUTO: 7.63 K/UL — SIGNIFICANT CHANGE UP (ref 4.8–10.8)
WBC # FLD AUTO: 7.63 K/UL — SIGNIFICANT CHANGE UP (ref 4.8–10.8)
WBC # FLD AUTO: 7.92 K/UL — SIGNIFICANT CHANGE UP (ref 4.8–10.8)
WBC # FLD AUTO: 8.02 K/UL — SIGNIFICANT CHANGE UP (ref 4.8–10.8)
WBC # FLD AUTO: 8.02 K/UL — SIGNIFICANT CHANGE UP (ref 4.8–10.8)
WBC # FLD AUTO: 8.09 K/UL — SIGNIFICANT CHANGE UP (ref 4.8–10.8)
WBC # FLD AUTO: 8.14 K/UL — SIGNIFICANT CHANGE UP (ref 4.8–10.8)
WBC # FLD AUTO: 8.25 K/UL — SIGNIFICANT CHANGE UP (ref 4.8–10.8)
WBC # FLD AUTO: 8.36 K/UL — SIGNIFICANT CHANGE UP (ref 4.8–10.8)
WBC # FLD AUTO: 8.36 K/UL — SIGNIFICANT CHANGE UP (ref 4.8–10.8)
WBC # FLD AUTO: 8.37 K/UL — SIGNIFICANT CHANGE UP (ref 4.8–10.8)
WBC # FLD AUTO: 8.42 K/UL — SIGNIFICANT CHANGE UP (ref 4.8–10.8)
WBC # FLD AUTO: 8.59 K/UL — SIGNIFICANT CHANGE UP (ref 4.8–10.8)
WBC # FLD AUTO: 8.71 K/UL — SIGNIFICANT CHANGE UP (ref 4.8–10.8)
WBC # FLD AUTO: 8.71 K/UL — SIGNIFICANT CHANGE UP (ref 4.8–10.8)
WBC # FLD AUTO: 8.93 K/UL — SIGNIFICANT CHANGE UP (ref 4.8–10.8)
WBC # FLD AUTO: 8.96 K/UL — SIGNIFICANT CHANGE UP (ref 4.8–10.8)
WBC # FLD AUTO: 9.23 K/UL — SIGNIFICANT CHANGE UP (ref 4.8–10.8)
WBC # FLD AUTO: 9.29 K/UL — SIGNIFICANT CHANGE UP (ref 4.8–10.8)
WBC # FLD AUTO: 9.36 K/UL — SIGNIFICANT CHANGE UP (ref 4.8–10.8)
WBC # FLD AUTO: 9.55 K/UL — SIGNIFICANT CHANGE UP (ref 4.8–10.8)
WBC # FLD AUTO: 9.55 K/UL — SIGNIFICANT CHANGE UP (ref 4.8–10.8)
WBC # FLD AUTO: 9.56 K/UL — SIGNIFICANT CHANGE UP (ref 4.8–10.8)
WBC # FLD AUTO: 9.59 K/UL — SIGNIFICANT CHANGE UP (ref 4.8–10.8)
WBC # FLD AUTO: 9.71 K/UL — SIGNIFICANT CHANGE UP (ref 4.8–10.8)
WBC # FLD AUTO: 9.71 K/UL — SIGNIFICANT CHANGE UP (ref 4.8–10.8)
WBC # FLD AUTO: 9.89 K/UL — SIGNIFICANT CHANGE UP (ref 4.8–10.8)
WBC # FLD AUTO: 9.99 K/UL — SIGNIFICANT CHANGE UP (ref 4.8–10.8)
WBC CASTS UR QL COMP ASSIST: PRESENT
WBC CASTS UR QL COMP ASSIST: PRESENT
WBC UR QL: 30 /HPF — HIGH (ref 0–5)
WBC UR QL: 30 /HPF — HIGH (ref 0–5)
WBC UR QL: >50 /HPF
WBC UR QL: >998 /HPF — HIGH (ref 0–5)
WBC UR QL: >998 /HPF — HIGH (ref 0–5)

## 2023-01-01 PROCEDURE — 97535 SELF CARE MNGMENT TRAINING: CPT | Mod: GO

## 2023-01-01 PROCEDURE — 83880 ASSAY OF NATRIURETIC PEPTIDE: CPT

## 2023-01-01 PROCEDURE — 84157 ASSAY OF PROTEIN OTHER: CPT

## 2023-01-01 PROCEDURE — 99223 1ST HOSP IP/OBS HIGH 75: CPT | Mod: 95

## 2023-01-01 PROCEDURE — 82042 OTHER SOURCE ALBUMIN QUAN EA: CPT

## 2023-01-01 PROCEDURE — 99233 SBSQ HOSP IP/OBS HIGH 50: CPT

## 2023-01-01 PROCEDURE — 88342 IMHCHEM/IMCYTCHM 1ST ANTB: CPT

## 2023-01-01 PROCEDURE — 71045 X-RAY EXAM CHEST 1 VIEW: CPT | Mod: 26

## 2023-01-01 PROCEDURE — 36556 INSERT NON-TUNNEL CV CATH: CPT

## 2023-01-01 PROCEDURE — 71045 X-RAY EXAM CHEST 1 VIEW: CPT

## 2023-01-01 PROCEDURE — 87640 STAPH A DNA AMP PROBE: CPT

## 2023-01-01 PROCEDURE — 83605 ASSAY OF LACTIC ACID: CPT

## 2023-01-01 PROCEDURE — 85610 PROTHROMBIN TIME: CPT

## 2023-01-01 PROCEDURE — 94760 N-INVAS EAR/PLS OXIMETRY 1: CPT

## 2023-01-01 PROCEDURE — 99497 ADVNCD CARE PLAN 30 MIN: CPT | Mod: 25

## 2023-01-01 PROCEDURE — 99231 SBSQ HOSP IP/OBS SF/LOW 25: CPT

## 2023-01-01 PROCEDURE — 84295 ASSAY OF SERUM SODIUM: CPT

## 2023-01-01 PROCEDURE — 99291 CRITICAL CARE FIRST HOUR: CPT

## 2023-01-01 PROCEDURE — 85025 COMPLETE CBC W/AUTO DIFF WBC: CPT

## 2023-01-01 PROCEDURE — 99232 SBSQ HOSP IP/OBS MODERATE 35: CPT

## 2023-01-01 PROCEDURE — 99285 EMERGENCY DEPT VISIT HI MDM: CPT | Mod: 25

## 2023-01-01 PROCEDURE — 93010 ELECTROCARDIOGRAM REPORT: CPT

## 2023-01-01 PROCEDURE — 99239 HOSP IP/OBS DSCHRG MGMT >30: CPT

## 2023-01-01 PROCEDURE — 84100 ASSAY OF PHOSPHORUS: CPT

## 2023-01-01 PROCEDURE — 87641 MR-STAPH DNA AMP PROBE: CPT

## 2023-01-01 PROCEDURE — 31500 INSERT EMERGENCY AIRWAY: CPT

## 2023-01-01 PROCEDURE — P9040: CPT

## 2023-01-01 PROCEDURE — 93970 EXTREMITY STUDY: CPT | Mod: 26

## 2023-01-01 PROCEDURE — 99222 1ST HOSP IP/OBS MODERATE 55: CPT

## 2023-01-01 PROCEDURE — 87205 SMEAR GRAM STAIN: CPT

## 2023-01-01 PROCEDURE — 82962 GLUCOSE BLOOD TEST: CPT

## 2023-01-01 PROCEDURE — 71250 CT THORAX DX C-: CPT | Mod: 26

## 2023-01-01 PROCEDURE — 82330 ASSAY OF CALCIUM: CPT

## 2023-01-01 PROCEDURE — 36430 TRANSFUSION BLD/BLD COMPNT: CPT

## 2023-01-01 PROCEDURE — 93971 EXTREMITY STUDY: CPT | Mod: 26,LT

## 2023-01-01 PROCEDURE — 82746 ASSAY OF FOLIC ACID SERUM: CPT

## 2023-01-01 PROCEDURE — 85027 COMPLETE CBC AUTOMATED: CPT

## 2023-01-01 PROCEDURE — 71250 CT THORAX DX C-: CPT

## 2023-01-01 PROCEDURE — 76705 ECHO EXAM OF ABDOMEN: CPT

## 2023-01-01 PROCEDURE — 93005 ELECTROCARDIOGRAM TRACING: CPT

## 2023-01-01 PROCEDURE — 71045 X-RAY EXAM CHEST 1 VIEW: CPT | Mod: 26,77

## 2023-01-01 PROCEDURE — 99214 OFFICE O/P EST MOD 30 MIN: CPT | Mod: 25

## 2023-01-01 PROCEDURE — 88341 IMHCHEM/IMCYTCHM EA ADD ANTB: CPT

## 2023-01-01 PROCEDURE — 73630 X-RAY EXAM OF FOOT: CPT | Mod: 26,LT

## 2023-01-01 PROCEDURE — 85014 HEMATOCRIT: CPT

## 2023-01-01 PROCEDURE — 84132 ASSAY OF SERUM POTASSIUM: CPT

## 2023-01-01 PROCEDURE — 86431 RHEUMATOID FACTOR QUANT: CPT

## 2023-01-01 PROCEDURE — 83010 ASSAY OF HAPTOGLOBIN QUANT: CPT

## 2023-01-01 PROCEDURE — 87086 URINE CULTURE/COLONY COUNT: CPT

## 2023-01-01 PROCEDURE — 80048 BASIC METABOLIC PNL TOTAL CA: CPT

## 2023-01-01 PROCEDURE — 71045 X-RAY EXAM CHEST 1 VIEW: CPT | Mod: 26,76

## 2023-01-01 PROCEDURE — 86850 RBC ANTIBODY SCREEN: CPT

## 2023-01-01 PROCEDURE — 87070 CULTURE OTHR SPECIMN AEROBIC: CPT

## 2023-01-01 PROCEDURE — 92610 EVALUATE SWALLOWING FUNCTION: CPT | Mod: GN

## 2023-01-01 PROCEDURE — 80053 COMPREHEN METABOLIC PANEL: CPT

## 2023-01-01 PROCEDURE — 93306 TTE W/DOPPLER COMPLETE: CPT | Mod: 26

## 2023-01-01 PROCEDURE — 87040 BLOOD CULTURE FOR BACTERIA: CPT

## 2023-01-01 PROCEDURE — 82945 GLUCOSE OTHER FLUID: CPT

## 2023-01-01 PROCEDURE — 93925 LOWER EXTREMITY STUDY: CPT | Mod: 26

## 2023-01-01 PROCEDURE — 88305 TISSUE EXAM BY PATHOLOGIST: CPT

## 2023-01-01 PROCEDURE — 87077 CULTURE AEROBIC IDENTIFY: CPT

## 2023-01-01 PROCEDURE — 99223 1ST HOSP IP/OBS HIGH 75: CPT

## 2023-01-01 PROCEDURE — 0225U NFCT DS DNA&RNA 21 SARSCOV2: CPT

## 2023-01-01 PROCEDURE — 74018 RADEX ABDOMEN 1 VIEW: CPT

## 2023-01-01 PROCEDURE — 86022 PLATELET ANTIBODIES: CPT

## 2023-01-01 PROCEDURE — 83615 LACTATE (LD) (LDH) ENZYME: CPT

## 2023-01-01 PROCEDURE — 97166 OT EVAL MOD COMPLEX 45 MIN: CPT | Mod: GO

## 2023-01-01 PROCEDURE — 87075 CULTR BACTERIA EXCEPT BLOOD: CPT

## 2023-01-01 PROCEDURE — 84145 PROCALCITONIN (PCT): CPT

## 2023-01-01 PROCEDURE — 82977 ASSAY OF GGT: CPT

## 2023-01-01 PROCEDURE — 83540 ASSAY OF IRON: CPT

## 2023-01-01 PROCEDURE — C9113: CPT

## 2023-01-01 PROCEDURE — 92526 ORAL FUNCTION THERAPY: CPT | Mod: GN

## 2023-01-01 PROCEDURE — 74176 CT ABD & PELVIS W/O CONTRAST: CPT

## 2023-01-01 PROCEDURE — 82570 ASSAY OF URINE CREATININE: CPT

## 2023-01-01 PROCEDURE — 83036 HEMOGLOBIN GLYCOSYLATED A1C: CPT

## 2023-01-01 PROCEDURE — 99291 CRITICAL CARE FIRST HOUR: CPT | Mod: 25

## 2023-01-01 PROCEDURE — 73701 CT LOWER EXTREMITY W/DYE: CPT | Mod: 50

## 2023-01-01 PROCEDURE — 73200 CT UPPER EXTREMITY W/O DYE: CPT | Mod: LT

## 2023-01-01 PROCEDURE — 80061 LIPID PANEL: CPT

## 2023-01-01 PROCEDURE — 88112 CYTOPATH CELL ENHANCE TECH: CPT

## 2023-01-01 PROCEDURE — 99221 1ST HOSP IP/OBS SF/LOW 40: CPT

## 2023-01-01 PROCEDURE — 97110 THERAPEUTIC EXERCISES: CPT | Mod: GO

## 2023-01-01 PROCEDURE — 88112 CYTOPATH CELL ENHANCE TECH: CPT | Mod: 26

## 2023-01-01 PROCEDURE — 80202 ASSAY OF VANCOMYCIN: CPT

## 2023-01-01 PROCEDURE — 85045 AUTOMATED RETICULOCYTE COUNT: CPT

## 2023-01-01 PROCEDURE — 85730 THROMBOPLASTIN TIME PARTIAL: CPT

## 2023-01-01 PROCEDURE — 99285 EMERGENCY DEPT VISIT HI MDM: CPT

## 2023-01-01 PROCEDURE — 88305 TISSUE EXAM BY PATHOLOGIST: CPT | Mod: 26

## 2023-01-01 PROCEDURE — 83550 IRON BINDING TEST: CPT

## 2023-01-01 PROCEDURE — 36415 COLL VENOUS BLD VENIPUNCTURE: CPT

## 2023-01-01 PROCEDURE — 82803 BLOOD GASES ANY COMBINATION: CPT

## 2023-01-01 PROCEDURE — 97165 OT EVAL LOW COMPLEX 30 MIN: CPT | Mod: GO

## 2023-01-01 PROCEDURE — 94003 VENT MGMT INPAT SUBQ DAY: CPT

## 2023-01-01 PROCEDURE — 87186 SC STD MICRODIL/AGAR DIL: CPT

## 2023-01-01 PROCEDURE — 83735 ASSAY OF MAGNESIUM: CPT

## 2023-01-01 PROCEDURE — 87102 FUNGUS ISOLATION CULTURE: CPT

## 2023-01-01 PROCEDURE — 93971 EXTREMITY STUDY: CPT | Mod: RT

## 2023-01-01 PROCEDURE — 81003 URINALYSIS AUTO W/O SCOPE: CPT

## 2023-01-01 PROCEDURE — 89051 BODY FLUID CELL COUNT: CPT

## 2023-01-01 PROCEDURE — 81270 JAK2 GENE: CPT

## 2023-01-01 PROCEDURE — 88341 IMHCHEM/IMCYTCHM EA ADD ANTB: CPT | Mod: 26

## 2023-01-01 PROCEDURE — G0452: CPT | Mod: 26

## 2023-01-01 PROCEDURE — 92612 ENDOSCOPY SWALLOW (FEES) VID: CPT | Mod: GN

## 2023-01-01 PROCEDURE — 97163 PT EVAL HIGH COMPLEX 45 MIN: CPT | Mod: GP

## 2023-01-01 PROCEDURE — 97110 THERAPEUTIC EXERCISES: CPT | Mod: GP

## 2023-01-01 PROCEDURE — 76705 ECHO EXAM OF ABDOMEN: CPT | Mod: 26

## 2023-01-01 PROCEDURE — 84484 ASSAY OF TROPONIN QUANT: CPT

## 2023-01-01 PROCEDURE — 86901 BLOOD TYPING SEROLOGIC RH(D): CPT

## 2023-01-01 PROCEDURE — 85018 HEMOGLOBIN: CPT

## 2023-01-01 PROCEDURE — 87635 SARS-COV-2 COVID-19 AMP PRB: CPT

## 2023-01-01 PROCEDURE — 88313 SPECIAL STAINS GROUP 2: CPT | Mod: 26

## 2023-01-01 PROCEDURE — 93970 EXTREMITY STUDY: CPT

## 2023-01-01 PROCEDURE — 86300 IMMUNOASSAY TUMOR CA 15-3: CPT

## 2023-01-01 PROCEDURE — 94002 VENT MGMT INPAT INIT DAY: CPT

## 2023-01-01 PROCEDURE — 76937 US GUIDE VASCULAR ACCESS: CPT | Mod: 26,59

## 2023-01-01 PROCEDURE — 73701 CT LOWER EXTREMITY W/DYE: CPT | Mod: 26,50

## 2023-01-01 PROCEDURE — 86880 COOMBS TEST DIRECT: CPT

## 2023-01-01 PROCEDURE — 99497 ADVNCD CARE PLAN 30 MIN: CPT | Mod: 95,25

## 2023-01-01 PROCEDURE — 82728 ASSAY OF FERRITIN: CPT

## 2023-01-01 PROCEDURE — 99291 CRITICAL CARE FIRST HOUR: CPT | Mod: GC

## 2023-01-01 PROCEDURE — 32554 ASPIRATE PLEURA W/O IMAGING: CPT | Mod: RT

## 2023-01-01 PROCEDURE — 93971 EXTREMITY STUDY: CPT | Mod: LT

## 2023-01-01 PROCEDURE — 81001 URINALYSIS AUTO W/SCOPE: CPT

## 2023-01-01 PROCEDURE — 99497 ADVNCD CARE PLAN 30 MIN: CPT

## 2023-01-01 PROCEDURE — 74018 RADEX ABDOMEN 1 VIEW: CPT | Mod: 26

## 2023-01-01 PROCEDURE — 99349 HOME/RES VST EST MOD MDM 40: CPT

## 2023-01-01 PROCEDURE — 86900 BLOOD TYPING SEROLOGIC ABO: CPT

## 2023-01-01 PROCEDURE — 99214 OFFICE O/P EST MOD 30 MIN: CPT

## 2023-01-01 PROCEDURE — 99232 SBSQ HOSP IP/OBS MODERATE 35: CPT | Mod: GC

## 2023-01-01 PROCEDURE — 93306 TTE W/DOPPLER COMPLETE: CPT

## 2023-01-01 PROCEDURE — 86923 COMPATIBILITY TEST ELECTRIC: CPT

## 2023-01-01 PROCEDURE — 93931 UPPER EXTREMITY STUDY: CPT | Mod: 26,LT

## 2023-01-01 PROCEDURE — 73721 MRI JNT OF LWR EXTRE W/O DYE: CPT | Mod: 26,LT

## 2023-01-01 PROCEDURE — 82270 OCCULT BLOOD FECES: CPT

## 2023-01-01 PROCEDURE — 88342 IMHCHEM/IMCYTCHM 1ST ANTB: CPT | Mod: 26

## 2023-01-01 PROCEDURE — 83986 ASSAY PH BODY FLUID NOS: CPT

## 2023-01-01 PROCEDURE — 84439 ASSAY OF FREE THYROXINE: CPT

## 2023-01-01 PROCEDURE — 99231 SBSQ HOSP IP/OBS SF/LOW 25: CPT | Mod: GC

## 2023-01-01 PROCEDURE — 83935 ASSAY OF URINE OSMOLALITY: CPT

## 2023-01-01 PROCEDURE — 76770 US EXAM ABDO BACK WALL COMP: CPT

## 2023-01-01 PROCEDURE — 95819 EEG AWAKE AND ASLEEP: CPT

## 2023-01-01 PROCEDURE — 95816 EEG AWAKE AND DROWSY: CPT | Mod: 26

## 2023-01-01 PROCEDURE — 94640 AIRWAY INHALATION TREATMENT: CPT

## 2023-01-01 PROCEDURE — 93931 UPPER EXTREMITY STUDY: CPT | Mod: RT

## 2023-01-01 PROCEDURE — 97116 GAIT TRAINING THERAPY: CPT | Mod: GP

## 2023-01-01 PROCEDURE — 99344 HOME/RES VST NEW MOD MDM 60: CPT | Mod: 25

## 2023-01-01 PROCEDURE — 84443 ASSAY THYROID STIM HORMONE: CPT

## 2023-01-01 PROCEDURE — 73200 CT UPPER EXTREMITY W/O DYE: CPT | Mod: 26,LT

## 2023-01-01 PROCEDURE — P9016: CPT

## 2023-01-01 PROCEDURE — 93971 EXTREMITY STUDY: CPT | Mod: 26,RT

## 2023-01-01 PROCEDURE — 97530 THERAPEUTIC ACTIVITIES: CPT | Mod: GP

## 2023-01-01 PROCEDURE — 74176 CT ABD & PELVIS W/O CONTRAST: CPT | Mod: 26

## 2023-01-01 PROCEDURE — 86609 BACTERIUM ANTIBODY: CPT

## 2023-01-01 PROCEDURE — 94660 CPAP INITIATION&MGMT: CPT

## 2023-01-01 PROCEDURE — 84300 ASSAY OF URINE SODIUM: CPT

## 2023-01-01 PROCEDURE — 82248 BILIRUBIN DIRECT: CPT

## 2023-01-01 PROCEDURE — 86140 C-REACTIVE PROTEIN: CPT

## 2023-01-01 PROCEDURE — 71046 X-RAY EXAM CHEST 2 VIEWS: CPT

## 2023-01-01 PROCEDURE — 82550 ASSAY OF CK (CPK): CPT

## 2023-01-01 PROCEDURE — 99233 SBSQ HOSP IP/OBS HIGH 50: CPT | Mod: 95

## 2023-01-01 PROCEDURE — 84550 ASSAY OF BLOOD/URIC ACID: CPT

## 2023-01-01 PROCEDURE — U0003: CPT

## 2023-01-01 PROCEDURE — 73630 X-RAY EXAM OF FOOT: CPT | Mod: LT

## 2023-01-01 PROCEDURE — 93925 LOWER EXTREMITY STUDY: CPT

## 2023-01-01 PROCEDURE — 71275 CT ANGIOGRAPHY CHEST: CPT | Mod: 26,MA

## 2023-01-01 PROCEDURE — 70450 CT HEAD/BRAIN W/O DYE: CPT | Mod: 26

## 2023-01-01 PROCEDURE — 81207 BCR/ABL1 GENE MINOR BP: CPT

## 2023-01-01 PROCEDURE — 88360 TUMOR IMMUNOHISTOCHEM/MANUAL: CPT | Mod: 26,59

## 2023-01-01 PROCEDURE — 99292 CRITICAL CARE ADDL 30 MIN: CPT | Mod: 25

## 2023-01-01 PROCEDURE — 73721 MRI JNT OF LWR EXTRE W/O DYE: CPT | Mod: LT

## 2023-01-01 PROCEDURE — ZZZZZ: CPT

## 2023-01-01 PROCEDURE — 88360 TUMOR IMMUNOHISTOCHEM/MANUAL: CPT

## 2023-01-01 PROCEDURE — 32550 INSERT PLEURAL CATH: CPT

## 2023-01-01 PROCEDURE — 81206 BCR/ABL1 GENE MAJOR BP: CPT

## 2023-01-01 PROCEDURE — 74178 CT ABD&PLV WO CNTR FLWD CNTR: CPT

## 2023-01-01 PROCEDURE — 97162 PT EVAL MOD COMPLEX 30 MIN: CPT | Mod: GP

## 2023-01-01 PROCEDURE — 36556 INSERT NON-TUNNEL CV CATH: CPT | Mod: RT

## 2023-01-01 PROCEDURE — 85379 FIBRIN DEGRADATION QUANT: CPT

## 2023-01-01 PROCEDURE — 80076 HEPATIC FUNCTION PANEL: CPT

## 2023-01-01 PROCEDURE — U0005: CPT

## 2023-01-01 PROCEDURE — 76770 US EXAM ABDO BACK WALL COMP: CPT | Mod: 26

## 2023-01-01 PROCEDURE — 86317 IMMUNOASSAY INFECTIOUS AGENT: CPT

## 2023-01-01 PROCEDURE — 87449 NOS EACH ORGANISM AG IA: CPT

## 2023-01-01 PROCEDURE — 43753 TX GASTRO INTUB W/ASP: CPT

## 2023-01-01 PROCEDURE — 85384 FIBRINOGEN ACTIVITY: CPT

## 2023-01-01 PROCEDURE — 85652 RBC SED RATE AUTOMATED: CPT

## 2023-01-01 PROCEDURE — C1729: CPT

## 2023-01-01 PROCEDURE — 70450 CT HEAD/BRAIN W/O DYE: CPT

## 2023-01-01 PROCEDURE — 82607 VITAMIN B-12: CPT

## 2023-01-01 PROCEDURE — 82247 BILIRUBIN TOTAL: CPT

## 2023-01-01 PROCEDURE — 88344 IMHCHEM/IMCYTCHM EA MLT ANTB: CPT

## 2023-01-01 PROCEDURE — 82553 CREATINE MB FRACTION: CPT

## 2023-01-01 PROCEDURE — 74178 CT ABD&PLV WO CNTR FLWD CNTR: CPT | Mod: 26

## 2023-01-01 RX ORDER — CEFEPIME 1 G/1
1000 INJECTION, POWDER, FOR SOLUTION INTRAMUSCULAR; INTRAVENOUS EVERY 12 HOURS
Refills: 0 | Status: DISCONTINUED | OUTPATIENT
Start: 2023-01-01 | End: 2023-01-01

## 2023-01-01 RX ORDER — NYSTATIN CREAM 100000 [USP'U]/G
1 CREAM TOPICAL
Qty: 2 | Refills: 0
Start: 2023-01-01 | End: 2023-12-27

## 2023-01-01 RX ORDER — METOPROLOL TARTRATE 50 MG
12.5 TABLET ORAL ONCE
Refills: 0 | Status: COMPLETED | OUTPATIENT
Start: 2023-01-01 | End: 2023-01-01

## 2023-01-01 RX ORDER — GLUCAGON INJECTION, SOLUTION 0.5 MG/.1ML
1 INJECTION, SOLUTION SUBCUTANEOUS ONCE
Refills: 0 | Status: DISCONTINUED | OUTPATIENT
Start: 2023-01-01 | End: 2023-01-01

## 2023-01-01 RX ORDER — MAGNESIUM SULFATE 500 MG/ML
1 VIAL (ML) INJECTION ONCE
Refills: 0 | Status: COMPLETED | OUTPATIENT
Start: 2023-01-01 | End: 2023-01-01

## 2023-01-01 RX ORDER — ENOXAPARIN SODIUM 100 MG/ML
70 INJECTION SUBCUTANEOUS EVERY 12 HOURS
Refills: 0 | Status: DISCONTINUED | OUTPATIENT
Start: 2023-01-01 | End: 2023-01-01

## 2023-01-01 RX ORDER — CHLORHEXIDINE GLUCONATE 213 G/1000ML
15 SOLUTION TOPICAL EVERY 12 HOURS
Refills: 0 | Status: DISCONTINUED | OUTPATIENT
Start: 2023-01-01 | End: 2023-01-01

## 2023-01-01 RX ORDER — FUROSEMIDE 40 MG
20 TABLET ORAL DAILY
Refills: 0 | Status: DISCONTINUED | OUTPATIENT
Start: 2023-01-01 | End: 2023-01-01

## 2023-01-01 RX ORDER — ALBUTEROL 90 UG/1
2 AEROSOL, METERED ORAL EVERY 6 HOURS
Refills: 0 | Status: DISCONTINUED | OUTPATIENT
Start: 2023-01-01 | End: 2023-01-01

## 2023-01-01 RX ORDER — GENTAMICIN SULFATE 1 MG/G
0.1 OINTMENT TOPICAL DAILY
Qty: 3 | Refills: 0 | Status: DISCONTINUED | COMMUNITY
Start: 2021-03-10 | End: 2023-01-01

## 2023-01-01 RX ORDER — VANCOMYCIN HCL 1 G
1000 VIAL (EA) INTRAVENOUS ONCE
Refills: 0 | Status: COMPLETED | OUTPATIENT
Start: 2023-01-01 | End: 2023-01-01

## 2023-01-01 RX ORDER — METOPROLOL TARTRATE 50 MG
25 TABLET ORAL ONCE
Refills: 0 | Status: COMPLETED | OUTPATIENT
Start: 2023-01-01 | End: 2023-01-01

## 2023-01-01 RX ORDER — ALBUTEROL 90 UG/1
2 AEROSOL, METERED ORAL
Qty: 0 | Refills: 0 | DISCHARGE

## 2023-01-01 RX ORDER — FUROSEMIDE 40 MG
40 TABLET ORAL ONCE
Refills: 0 | Status: COMPLETED | OUTPATIENT
Start: 2023-01-01 | End: 2023-01-01

## 2023-01-01 RX ORDER — METOPROLOL TARTRATE 50 MG
2.5 TABLET ORAL EVERY 6 HOURS
Refills: 0 | Status: DISCONTINUED | OUTPATIENT
Start: 2023-01-01 | End: 2023-01-01

## 2023-01-01 RX ORDER — SACCHAROMYCES BOULARDII 250 MG
250 POWDER IN PACKET (EA) ORAL
Refills: 0 | Status: DISCONTINUED | OUTPATIENT
Start: 2023-01-01 | End: 2023-01-01

## 2023-01-01 RX ORDER — DIPHENHYDRAMINE HCL 50 MG
50 CAPSULE ORAL ONCE
Refills: 0 | Status: COMPLETED | OUTPATIENT
Start: 2023-01-01 | End: 2023-01-01

## 2023-01-01 RX ORDER — METOPROLOL TARTRATE 50 MG
25 TABLET ORAL EVERY 8 HOURS
Refills: 0 | Status: DISCONTINUED | OUTPATIENT
Start: 2023-01-01 | End: 2023-01-01

## 2023-01-01 RX ORDER — HEPARIN SODIUM 5000 [USP'U]/ML
5000 INJECTION INTRAVENOUS; SUBCUTANEOUS EVERY 8 HOURS
Refills: 0 | Status: DISCONTINUED | OUTPATIENT
Start: 2023-01-01 | End: 2023-01-01

## 2023-01-01 RX ORDER — AZITHROMYCIN 500 MG/1
500 TABLET, FILM COATED ORAL ONCE
Refills: 0 | Status: COMPLETED | OUTPATIENT
Start: 2023-01-01 | End: 2023-01-01

## 2023-01-01 RX ORDER — CEFEPIME 1 G/1
INJECTION, POWDER, FOR SOLUTION INTRAMUSCULAR; INTRAVENOUS
Refills: 0 | Status: DISCONTINUED | OUTPATIENT
Start: 2023-01-01 | End: 2023-01-01

## 2023-01-01 RX ORDER — ACETAMINOPHEN 500 MG
1000 TABLET ORAL ONCE
Refills: 0 | Status: COMPLETED | OUTPATIENT
Start: 2023-01-01 | End: 2023-01-01

## 2023-01-01 RX ORDER — DEXTROSE 50 % IN WATER 50 %
25 SYRINGE (ML) INTRAVENOUS ONCE
Refills: 0 | Status: DISCONTINUED | OUTPATIENT
Start: 2023-01-01 | End: 2023-01-01

## 2023-01-01 RX ORDER — DEXTROSE 50 % IN WATER 50 %
15 SYRINGE (ML) INTRAVENOUS ONCE
Refills: 0 | Status: DISCONTINUED | OUTPATIENT
Start: 2023-01-01 | End: 2023-01-01

## 2023-01-01 RX ORDER — ACETAZOLAMIDE 250 MG/1
500 TABLET ORAL DAILY
Refills: 0 | Status: DISCONTINUED | OUTPATIENT
Start: 2023-01-01 | End: 2023-01-01

## 2023-01-01 RX ORDER — PROPOFOL 10 MG/ML
10 INJECTION, EMULSION INTRAVENOUS
Qty: 500 | Refills: 0 | Status: DISCONTINUED | OUTPATIENT
Start: 2023-01-01 | End: 2023-01-01

## 2023-01-01 RX ORDER — CEFEPIME 1 G/1
2000 INJECTION, POWDER, FOR SOLUTION INTRAMUSCULAR; INTRAVENOUS ONCE
Refills: 0 | Status: COMPLETED | OUTPATIENT
Start: 2023-01-01 | End: 2023-01-01

## 2023-01-01 RX ORDER — SODIUM ZIRCONIUM CYCLOSILICATE 10 G/10G
10 POWDER, FOR SUSPENSION ORAL ONCE
Refills: 0 | Status: COMPLETED | OUTPATIENT
Start: 2023-01-01 | End: 2023-01-01

## 2023-01-01 RX ORDER — BUDESONIDE AND FORMOTEROL FUMARATE DIHYDRATE 160; 4.5 UG/1; UG/1
2 AEROSOL RESPIRATORY (INHALATION)
Refills: 0 | Status: DISCONTINUED | OUTPATIENT
Start: 2023-01-01 | End: 2023-01-01

## 2023-01-01 RX ORDER — FENTANYL CITRATE 50 UG/ML
0.5 INJECTION INTRAVENOUS
Qty: 2500 | Refills: 0 | Status: DISCONTINUED | OUTPATIENT
Start: 2023-01-01 | End: 2023-01-01

## 2023-01-01 RX ORDER — SODIUM CHLORIDE 9 MG/ML
500 INJECTION INTRAMUSCULAR; INTRAVENOUS; SUBCUTANEOUS ONCE
Refills: 0 | Status: COMPLETED | OUTPATIENT
Start: 2023-01-01 | End: 2023-01-01

## 2023-01-01 RX ORDER — APIXABAN 2.5 MG/1
2.5 TABLET, FILM COATED ORAL
Refills: 0 | Status: DISCONTINUED | OUTPATIENT
Start: 2023-01-01 | End: 2023-01-01

## 2023-01-01 RX ORDER — CEFEPIME 1 G/1
2000 INJECTION, POWDER, FOR SOLUTION INTRAMUSCULAR; INTRAVENOUS EVERY 8 HOURS
Refills: 0 | Status: DISCONTINUED | OUTPATIENT
Start: 2023-01-01 | End: 2023-01-01

## 2023-01-01 RX ORDER — TIOTROPIUM BROMIDE 18 UG/1
1 CAPSULE ORAL; RESPIRATORY (INHALATION)
Qty: 1 | Refills: 0
Start: 2023-01-01 | End: 2023-01-01

## 2023-01-01 RX ORDER — HYDROCORTISONE 20 MG
100 TABLET ORAL EVERY 8 HOURS
Refills: 0 | Status: DISCONTINUED | OUTPATIENT
Start: 2023-01-01 | End: 2023-01-01

## 2023-01-01 RX ORDER — MAGNESIUM SULFATE 500 MG/ML
2 VIAL (ML) INJECTION
Refills: 0 | Status: COMPLETED | OUTPATIENT
Start: 2023-01-01 | End: 2023-01-01

## 2023-01-01 RX ORDER — MAGNESIUM OXIDE 400 MG ORAL TABLET 241.3 MG
400 TABLET ORAL
Refills: 0 | Status: DISCONTINUED | OUTPATIENT
Start: 2023-01-01 | End: 2023-01-01

## 2023-01-01 RX ORDER — METOPROLOL TARTRATE 50 MG
1 TABLET ORAL
Qty: 60 | Refills: 0
Start: 2023-01-01 | End: 2024-01-17

## 2023-01-01 RX ORDER — FUROSEMIDE 40 MG
20 TABLET ORAL ONCE
Refills: 0 | Status: COMPLETED | OUTPATIENT
Start: 2023-01-01 | End: 2023-01-01

## 2023-01-01 RX ORDER — FUROSEMIDE 40 MG
40 TABLET ORAL DAILY
Refills: 0 | Status: DISCONTINUED | OUTPATIENT
Start: 2023-01-01 | End: 2023-01-01

## 2023-01-01 RX ORDER — POTASSIUM CHLORIDE 20 MEQ
40 PACKET (EA) ORAL ONCE
Refills: 0 | Status: COMPLETED | OUTPATIENT
Start: 2023-01-01 | End: 2023-01-01

## 2023-01-01 RX ORDER — CEFTAZIDIME, AVIBACTAM 2; .5 G/1; G/1
0 POWDER, FOR SOLUTION INTRAVENOUS
Qty: 0 | Refills: 0 | DISCHARGE
Start: 2023-01-01 | End: 2023-01-01

## 2023-01-01 RX ORDER — FUROSEMIDE 20 MG/1
20 TABLET ORAL DAILY
Qty: 60 | Refills: 0 | Status: ACTIVE | COMMUNITY
Start: 2020-06-14

## 2023-01-01 RX ORDER — MUPIROCIN 20 MG/G
2 OINTMENT TOPICAL TWICE DAILY
Qty: 2 | Refills: 3 | Status: DISCONTINUED | COMMUNITY
Start: 2021-01-07 | End: 2023-01-01

## 2023-01-01 RX ORDER — CEFEPIME 1 G/1
INJECTION, POWDER, FOR SOLUTION INTRAMUSCULAR; INTRAVENOUS
Refills: 0 | Status: COMPLETED | OUTPATIENT
Start: 2023-01-01 | End: 2023-01-01

## 2023-01-01 RX ORDER — MIDAZOLAM HYDROCHLORIDE 1 MG/ML
4 INJECTION, SOLUTION INTRAMUSCULAR; INTRAVENOUS ONCE
Refills: 0 | Status: DISCONTINUED | OUTPATIENT
Start: 2023-01-01 | End: 2023-01-01

## 2023-01-01 RX ORDER — HEPARIN SODIUM 5000 [USP'U]/ML
5000 INJECTION INTRAVENOUS; SUBCUTANEOUS ONCE
Refills: 0 | Status: DISCONTINUED | OUTPATIENT
Start: 2023-01-01 | End: 2023-01-01

## 2023-01-01 RX ORDER — CEFTRIAXONE 500 MG/1
1000 INJECTION, POWDER, FOR SOLUTION INTRAMUSCULAR; INTRAVENOUS EVERY 24 HOURS
Refills: 0 | Status: DISCONTINUED | OUTPATIENT
Start: 2023-01-01 | End: 2023-01-01

## 2023-01-01 RX ORDER — ALBUTEROL 90 UG/1
2.5 AEROSOL, METERED ORAL EVERY 6 HOURS
Refills: 0 | Status: DISCONTINUED | OUTPATIENT
Start: 2023-01-01 | End: 2023-01-01

## 2023-01-01 RX ORDER — MIDAZOLAM HYDROCHLORIDE 1 MG/ML
0.02 INJECTION, SOLUTION INTRAMUSCULAR; INTRAVENOUS
Qty: 100 | Refills: 0 | Status: DISCONTINUED | OUTPATIENT
Start: 2023-01-01 | End: 2023-01-01

## 2023-01-01 RX ORDER — ACETAZOLAMIDE 250 MG/1
250 TABLET ORAL DAILY
Refills: 0 | Status: DISCONTINUED | OUTPATIENT
Start: 2023-01-01 | End: 2023-01-01

## 2023-01-01 RX ORDER — FUROSEMIDE 40 MG
40 TABLET ORAL
Refills: 0 | Status: DISCONTINUED | OUTPATIENT
Start: 2023-01-01 | End: 2023-01-01

## 2023-01-01 RX ORDER — LIDOCAINE 4 G/100G
1 CREAM TOPICAL
Qty: 15 | Refills: 0
Start: 2023-01-01 | End: 2023-01-01

## 2023-01-01 RX ORDER — POTASSIUM CHLORIDE 20 MEQ
20 PACKET (EA) ORAL
Refills: 0 | Status: COMPLETED | OUTPATIENT
Start: 2023-01-01 | End: 2023-01-01

## 2023-01-01 RX ORDER — LANOLIN ALCOHOL/MO/W.PET/CERES
3 CREAM (GRAM) TOPICAL AT BEDTIME
Refills: 0 | Status: DISCONTINUED | OUTPATIENT
Start: 2023-01-01 | End: 2023-01-01

## 2023-01-01 RX ORDER — MAGNESIUM SULFATE 500 MG/ML
2 VIAL (ML) INJECTION ONCE
Refills: 0 | Status: COMPLETED | OUTPATIENT
Start: 2023-01-01 | End: 2023-01-01

## 2023-01-01 RX ORDER — METOPROLOL TARTRATE 50 MG
1 TABLET ORAL
Refills: 0 | DISCHARGE

## 2023-01-01 RX ORDER — PANTOPRAZOLE SODIUM 20 MG/1
40 TABLET, DELAYED RELEASE ORAL
Refills: 0 | Status: DISCONTINUED | OUTPATIENT
Start: 2023-01-01 | End: 2023-01-01

## 2023-01-01 RX ORDER — APIXABAN 2.5 MG/1
2.5 TABLET, FILM COATED ORAL
Qty: 180 | Refills: 0 | Status: ACTIVE | COMMUNITY
Start: 2020-07-27

## 2023-01-01 RX ORDER — HYDROXYZINE HCL 10 MG
2 TABLET ORAL
Qty: 0 | Refills: 0 | DISCHARGE

## 2023-01-01 RX ORDER — LACTULOSE 10 G/15ML
20 SOLUTION ORAL THREE TIMES A DAY
Refills: 0 | Status: DISCONTINUED | OUTPATIENT
Start: 2023-01-01 | End: 2023-01-01

## 2023-01-01 RX ORDER — FUROSEMIDE 40 MG
60 TABLET ORAL
Refills: 0 | Status: DISCONTINUED | OUTPATIENT
Start: 2023-01-01 | End: 2023-01-01

## 2023-01-01 RX ORDER — ACETAMINOPHEN 500 MG
650 TABLET ORAL EVERY 6 HOURS
Refills: 0 | Status: DISCONTINUED | OUTPATIENT
Start: 2023-01-01 | End: 2023-01-01

## 2023-01-01 RX ORDER — TIOTROPIUM BROMIDE 18 UG/1
2 CAPSULE ORAL; RESPIRATORY (INHALATION) DAILY
Refills: 0 | Status: DISCONTINUED | OUTPATIENT
Start: 2023-01-01 | End: 2023-01-01

## 2023-01-01 RX ORDER — VANCOMYCIN HCL 1 G
750 VIAL (EA) INTRAVENOUS EVERY 12 HOURS
Refills: 0 | Status: DISCONTINUED | OUTPATIENT
Start: 2023-01-01 | End: 2023-01-01

## 2023-01-01 RX ORDER — GENTAMICIN SULFATE 1 MG/G
0.1 OINTMENT TOPICAL DAILY
Qty: 3 | Refills: 0 | Status: DISCONTINUED | COMMUNITY
Start: 2021-04-27 | End: 2023-01-01

## 2023-01-01 RX ORDER — CEFEPIME 1 G/1
2000 INJECTION, POWDER, FOR SOLUTION INTRAMUSCULAR; INTRAVENOUS EVERY 12 HOURS
Refills: 0 | Status: DISCONTINUED | OUTPATIENT
Start: 2023-01-01 | End: 2023-01-01

## 2023-01-01 RX ORDER — BISMUTH SUBSALICYLATE 525 MG/1
TABLET ORAL
Refills: 0 | Status: ACTIVE | COMMUNITY

## 2023-01-01 RX ORDER — POLYETHYLENE GLYCOL 3350 17 G/17G
17 POWDER, FOR SOLUTION ORAL EVERY 12 HOURS
Refills: 0 | Status: DISCONTINUED | OUTPATIENT
Start: 2023-01-01 | End: 2023-01-01

## 2023-01-01 RX ORDER — PROPOFOL 10 MG/ML
10 INJECTION, EMULSION INTRAVENOUS
Qty: 1000 | Refills: 0 | Status: DISCONTINUED | OUTPATIENT
Start: 2023-01-01 | End: 2023-01-01

## 2023-01-01 RX ORDER — CEFEPIME 1 G/1
1000 INJECTION, POWDER, FOR SOLUTION INTRAMUSCULAR; INTRAVENOUS DAILY
Refills: 0 | Status: DISCONTINUED | OUTPATIENT
Start: 2023-01-01 | End: 2023-01-01

## 2023-01-01 RX ORDER — MAGNESIUM OXIDE 400 MG ORAL TABLET 241.3 MG
400 TABLET ORAL ONCE
Refills: 0 | Status: COMPLETED | OUTPATIENT
Start: 2023-01-01 | End: 2023-01-01

## 2023-01-01 RX ORDER — MEROPENEM 1 G/30ML
1000 INJECTION INTRAVENOUS EVERY 8 HOURS
Refills: 0 | Status: COMPLETED | OUTPATIENT
Start: 2023-01-01 | End: 2023-01-01

## 2023-01-01 RX ORDER — ALBUTEROL 90 UG/1
2 AEROSOL, METERED ORAL
Refills: 0 | Status: DISCONTINUED | OUTPATIENT
Start: 2023-01-01 | End: 2023-01-01

## 2023-01-01 RX ORDER — NYSTATIN CREAM 100000 [USP'U]/G
1 CREAM TOPICAL
Refills: 0 | Status: DISCONTINUED | OUTPATIENT
Start: 2023-01-01 | End: 2023-01-01

## 2023-01-01 RX ORDER — PROPOFOL 10 MG/ML
29.99 INJECTION, EMULSION INTRAVENOUS
Qty: 1000 | Refills: 0 | Status: DISCONTINUED | OUTPATIENT
Start: 2023-01-01 | End: 2023-01-01

## 2023-01-01 RX ORDER — GABAPENTIN 400 MG/1
100 CAPSULE ORAL THREE TIMES A DAY
Refills: 0 | Status: DISCONTINUED | OUTPATIENT
Start: 2023-01-01 | End: 2023-01-01

## 2023-01-01 RX ORDER — APIXABAN 2.5 MG/1
5 TABLET, FILM COATED ORAL EVERY 12 HOURS
Refills: 0 | Status: DISCONTINUED | OUTPATIENT
Start: 2023-01-01 | End: 2023-01-01

## 2023-01-01 RX ORDER — METOPROLOL TARTRATE 50 MG
25 TABLET ORAL
Refills: 0 | Status: DISCONTINUED | OUTPATIENT
Start: 2023-01-01 | End: 2023-01-01

## 2023-01-01 RX ORDER — AZTREONAM 2 G
2000 VIAL (EA) INJECTION ONCE
Refills: 0 | Status: COMPLETED | OUTPATIENT
Start: 2023-01-01 | End: 2023-01-01

## 2023-01-01 RX ORDER — INSULIN LISPRO 100/ML
VIAL (ML) SUBCUTANEOUS
Refills: 0 | Status: DISCONTINUED | OUTPATIENT
Start: 2023-01-01 | End: 2023-01-01

## 2023-01-01 RX ORDER — FERROUS SULFATE 325(65) MG
325 TABLET ORAL DAILY
Refills: 0 | Status: DISCONTINUED | OUTPATIENT
Start: 2023-01-01 | End: 2023-01-01

## 2023-01-01 RX ORDER — ALPRAZOLAM 0.5 MG/1
0.5 TABLET ORAL
Qty: 30 | Refills: 0 | Status: DISCONTINUED | COMMUNITY
Start: 2020-05-28 | End: 2023-01-01

## 2023-01-01 RX ORDER — POTASSIUM CHLORIDE 20 MEQ
20 PACKET (EA) ORAL ONCE
Refills: 0 | Status: COMPLETED | OUTPATIENT
Start: 2023-01-01 | End: 2023-01-01

## 2023-01-01 RX ORDER — HEPARIN SODIUM 5000 [USP'U]/ML
950 INJECTION INTRAVENOUS; SUBCUTANEOUS
Qty: 25000 | Refills: 0 | Status: DISCONTINUED | OUTPATIENT
Start: 2023-01-01 | End: 2023-01-01

## 2023-01-01 RX ORDER — SODIUM CHLORIDE 9 MG/ML
10 INJECTION INTRAMUSCULAR; INTRAVENOUS; SUBCUTANEOUS
Refills: 0 | Status: DISCONTINUED | OUTPATIENT
Start: 2023-01-01 | End: 2023-01-01

## 2023-01-01 RX ORDER — HEPARIN SODIUM 5000 [USP'U]/ML
1400 INJECTION INTRAVENOUS; SUBCUTANEOUS
Qty: 25000 | Refills: 0 | Status: DISCONTINUED | OUTPATIENT
Start: 2023-01-01 | End: 2023-01-01

## 2023-01-01 RX ORDER — FUROSEMIDE 40 MG
1 TABLET ORAL
Qty: 0 | Refills: 0 | DISCHARGE

## 2023-01-01 RX ORDER — VANCOMYCIN HCL 1 G
1250 VIAL (EA) INTRAVENOUS ONCE
Refills: 0 | Status: COMPLETED | OUTPATIENT
Start: 2023-01-01 | End: 2023-01-01

## 2023-01-01 RX ORDER — ALUMINUM ZIRCONIUM TRICHLOROHYDREX GLY 0.2 G/G
1 STICK TOPICAL
Qty: 0 | Refills: 0 | DISCHARGE

## 2023-01-01 RX ORDER — CHLORHEXIDINE GLUCONATE 4 %
325 (65 FE) LIQUID (ML) TOPICAL DAILY
Qty: 90 | Refills: 3 | Status: DISCONTINUED | COMMUNITY
Start: 2023-01-01 | End: 2023-01-01

## 2023-01-01 RX ORDER — MIDAZOLAM HYDROCHLORIDE 1 MG/ML
2 INJECTION, SOLUTION INTRAMUSCULAR; INTRAVENOUS DAILY
Refills: 0 | Status: DISCONTINUED | OUTPATIENT
Start: 2023-01-01 | End: 2023-01-01

## 2023-01-01 RX ORDER — HEPARIN SODIUM 5000 [USP'U]/ML
3500 INJECTION INTRAVENOUS; SUBCUTANEOUS EVERY 6 HOURS
Refills: 0 | Status: DISCONTINUED | OUTPATIENT
Start: 2023-01-01 | End: 2023-01-01

## 2023-01-01 RX ORDER — ACETAZOLAMIDE 250 MG/1
250 TABLET ORAL ONCE
Refills: 0 | Status: COMPLETED | OUTPATIENT
Start: 2023-01-01 | End: 2023-01-01

## 2023-01-01 RX ORDER — FUROSEMIDE 40 MG
1 TABLET ORAL
Qty: 0 | Refills: 0 | DISCHARGE
Start: 2023-01-01

## 2023-01-01 RX ORDER — SENNA PLUS 8.6 MG/1
2 TABLET ORAL AT BEDTIME
Refills: 0 | Status: DISCONTINUED | OUTPATIENT
Start: 2023-01-01 | End: 2023-01-01

## 2023-01-01 RX ORDER — CIPROFLOXACIN HYDROCHLORIDE 500 MG/1
500 TABLET, FILM COATED ORAL
Qty: 14 | Refills: 0 | Status: DISCONTINUED | COMMUNITY
Start: 2021-06-15 | End: 2023-01-01

## 2023-01-01 RX ORDER — POLYETHYLENE GLYCOL 3350 17 G/17G
17 POWDER, FOR SOLUTION ORAL DAILY
Refills: 0 | Status: DISCONTINUED | OUTPATIENT
Start: 2023-01-01 | End: 2023-01-01

## 2023-01-01 RX ORDER — DILTIAZEM HCL 120 MG
10 CAPSULE, EXT RELEASE 24 HR ORAL ONCE
Refills: 0 | Status: COMPLETED | OUTPATIENT
Start: 2023-01-01 | End: 2023-01-01

## 2023-01-01 RX ORDER — SODIUM CHLORIDE 9 MG/ML
1000 INJECTION, SOLUTION INTRAVENOUS
Refills: 0 | Status: DISCONTINUED | OUTPATIENT
Start: 2023-01-01 | End: 2023-01-01

## 2023-01-01 RX ORDER — METOPROLOL TARTRATE 50 MG
5 TABLET ORAL ONCE
Refills: 0 | Status: COMPLETED | OUTPATIENT
Start: 2023-01-01 | End: 2023-01-01

## 2023-01-01 RX ORDER — POTASSIUM CHLORIDE 20 MEQ
40 PACKET (EA) ORAL ONCE
Refills: 0 | Status: DISCONTINUED | OUTPATIENT
Start: 2023-01-01 | End: 2023-01-01

## 2023-01-01 RX ORDER — ROCURONIUM BROMIDE 10 MG/ML
100 VIAL (ML) INTRAVENOUS ONCE
Refills: 0 | Status: COMPLETED | OUTPATIENT
Start: 2023-01-01 | End: 2023-01-01

## 2023-01-01 RX ORDER — ALBUTEROL 90 UG/1
2 AEROSOL, METERED ORAL EVERY 4 HOURS
Refills: 0 | Status: DISCONTINUED | OUTPATIENT
Start: 2023-01-01 | End: 2023-01-01

## 2023-01-01 RX ORDER — VANCOMYCIN HCL 1 G
750 VIAL (EA) INTRAVENOUS ONCE
Refills: 0 | Status: COMPLETED | OUTPATIENT
Start: 2023-01-01 | End: 2023-01-01

## 2023-01-01 RX ORDER — SODIUM CHLORIDE 9 MG/ML
500 INJECTION, SOLUTION INTRAVENOUS ONCE
Refills: 0 | Status: COMPLETED | OUTPATIENT
Start: 2023-01-01 | End: 2023-01-01

## 2023-01-01 RX ORDER — SENNA PLUS 8.6 MG/1
2 TABLET ORAL
Qty: 0 | Refills: 0 | DISCHARGE
Start: 2023-01-01

## 2023-01-01 RX ORDER — NOREPINEPHRINE BITARTRATE/D5W 8 MG/250ML
0.05 PLASTIC BAG, INJECTION (ML) INTRAVENOUS
Qty: 16 | Refills: 0 | Status: DISCONTINUED | OUTPATIENT
Start: 2023-01-01 | End: 2023-01-01

## 2023-01-01 RX ORDER — ENOXAPARIN SODIUM 100 MG/ML
90 INJECTION SUBCUTANEOUS EVERY 12 HOURS
Refills: 0 | Status: DISCONTINUED | OUTPATIENT
Start: 2023-01-01 | End: 2023-01-01

## 2023-01-01 RX ORDER — CLINDAMYCIN PHOSPHATE 10 MG/ML
1 SOLUTION TOPICAL TWICE DAILY
Qty: 1 | Refills: 0 | Status: DISCONTINUED | COMMUNITY
Start: 2020-12-03 | End: 2023-01-01

## 2023-01-01 RX ORDER — VANCOMYCIN HCL 1 G
1000 VIAL (EA) INTRAVENOUS EVERY 24 HOURS
Refills: 0 | Status: DISCONTINUED | OUTPATIENT
Start: 2023-01-01 | End: 2023-01-01

## 2023-01-01 RX ORDER — ALPRAZOLAM 0.25 MG
0.25 TABLET ORAL ONCE
Refills: 0 | Status: DISCONTINUED | OUTPATIENT
Start: 2023-01-01 | End: 2023-01-01

## 2023-01-01 RX ORDER — CHOLECALCIFEROL (VITAMIN D3) 125 MCG
1 CAPSULE ORAL
Qty: 0 | Refills: 0 | DISCHARGE

## 2023-01-01 RX ORDER — IPRATROPIUM/ALBUTEROL SULFATE 18-103MCG
3 AEROSOL WITH ADAPTER (GRAM) INHALATION EVERY 6 HOURS
Refills: 0 | Status: DISCONTINUED | OUTPATIENT
Start: 2023-01-01 | End: 2023-01-01

## 2023-01-01 RX ORDER — METOPROLOL SUCCINATE 25 MG/1
25 TABLET, EXTENDED RELEASE ORAL
Qty: 90 | Refills: 0 | Status: DISCONTINUED | COMMUNITY
Start: 2020-09-22 | End: 2023-01-01

## 2023-01-01 RX ORDER — POTASSIUM CHLORIDE 20 MEQ
40 PACKET (EA) ORAL EVERY 4 HOURS
Refills: 0 | Status: COMPLETED | OUTPATIENT
Start: 2023-01-01 | End: 2023-01-01

## 2023-01-01 RX ORDER — AZTREONAM 2 G
2000 VIAL (EA) INJECTION EVERY 8 HOURS
Refills: 0 | Status: DISCONTINUED | OUTPATIENT
Start: 2023-01-01 | End: 2023-01-01

## 2023-01-01 RX ORDER — METOPROLOL TARTRATE 50 MG
1 TABLET ORAL
Qty: 60 | Refills: 0
Start: 2023-01-01 | End: 2023-01-01

## 2023-01-01 RX ORDER — TIOTROPIUM BROMIDE 18 UG/1
1 CAPSULE ORAL; RESPIRATORY (INHALATION)
Refills: 0 | DISCHARGE

## 2023-01-01 RX ORDER — ACETAMINOPHEN 500 MG
975 TABLET ORAL ONCE
Refills: 0 | Status: COMPLETED | OUTPATIENT
Start: 2023-01-01 | End: 2023-01-01

## 2023-01-01 RX ORDER — KETAMINE HYDROCHLORIDE 100 MG/ML
100 INJECTION INTRAMUSCULAR; INTRAVENOUS ONCE
Refills: 0 | Status: DISCONTINUED | OUTPATIENT
Start: 2023-01-01 | End: 2023-01-01

## 2023-01-01 RX ORDER — CHLORHEXIDINE GLUCONATE 213 G/1000ML
1 SOLUTION TOPICAL
Refills: 0 | Status: DISCONTINUED | OUTPATIENT
Start: 2023-01-01 | End: 2023-01-01

## 2023-01-01 RX ORDER — POTASSIUM CHLORIDE 1.5 G/1.58G
20 POWDER, FOR SOLUTION ORAL
Qty: 6 | Refills: 0 | Status: ACTIVE | COMMUNITY
Start: 2023-01-01

## 2023-01-01 RX ORDER — IPRATROPIUM/ALBUTEROL SULFATE 18-103MCG
3 AEROSOL WITH ADAPTER (GRAM) INHALATION ONCE
Refills: 0 | Status: COMPLETED | OUTPATIENT
Start: 2023-01-01 | End: 2023-01-01

## 2023-01-01 RX ORDER — FENTANYL 50 UG/H
50 PATCH, EXTENDED RELEASE TRANSDERMAL
Qty: 5 | Refills: 0 | Status: DISCONTINUED | COMMUNITY
Start: 2021-03-09 | End: 2023-01-01

## 2023-01-01 RX ORDER — ALBUTEROL 90 UG/1
2 AEROSOL, METERED ORAL
Qty: 1 | Refills: 0
Start: 2023-01-01 | End: 2023-01-01

## 2023-01-01 RX ORDER — CEFTRIAXONE 500 MG/1
INJECTION, POWDER, FOR SOLUTION INTRAMUSCULAR; INTRAVENOUS
Refills: 0 | Status: DISCONTINUED | OUTPATIENT
Start: 2023-01-01 | End: 2023-01-01

## 2023-01-01 RX ORDER — GUAIFENESIN/DEXTROMETHORPHAN 600MG-30MG
15 TABLET, EXTENDED RELEASE 12 HR ORAL EVERY 8 HOURS
Refills: 0 | Status: DISCONTINUED | OUTPATIENT
Start: 2023-01-01 | End: 2023-01-01

## 2023-01-01 RX ORDER — GABAPENTIN 400 MG/1
1 CAPSULE ORAL
Qty: 0 | Refills: 0 | DISCHARGE

## 2023-01-01 RX ORDER — PROPOFOL 10 MG/ML
30 INJECTION, EMULSION INTRAVENOUS
Qty: 1000 | Refills: 0 | Status: DISCONTINUED | OUTPATIENT
Start: 2023-01-01 | End: 2023-01-01

## 2023-01-01 RX ORDER — ETOMIDATE 2 MG/ML
20 INJECTION INTRAVENOUS ONCE
Refills: 0 | Status: COMPLETED | OUTPATIENT
Start: 2023-01-01 | End: 2023-01-01

## 2023-01-01 RX ORDER — APIXABAN 2.5 MG/1
5 TABLET, FILM COATED ORAL
Refills: 0 | Status: DISCONTINUED | OUTPATIENT
Start: 2023-01-01 | End: 2023-01-01

## 2023-01-01 RX ORDER — NYSTATIN CREAM 100000 [USP'U]/G
1 CREAM TOPICAL
Qty: 1 | Refills: 0
Start: 2023-01-01 | End: 2023-01-01

## 2023-01-01 RX ORDER — PIPERACILLIN AND TAZOBACTAM 4; .5 G/20ML; G/20ML
3.38 INJECTION, POWDER, LYOPHILIZED, FOR SOLUTION INTRAVENOUS ONCE
Refills: 0 | Status: COMPLETED | OUTPATIENT
Start: 2023-01-01 | End: 2023-01-01

## 2023-01-01 RX ORDER — NYSTATIN CREAM 100000 [USP'U]/G
1 CREAM TOPICAL EVERY 12 HOURS
Refills: 0 | Status: DISCONTINUED | OUTPATIENT
Start: 2023-01-01 | End: 2023-01-01

## 2023-01-01 RX ORDER — FUROSEMIDE 40 MG
1 TABLET ORAL
Qty: 30 | Refills: 0
Start: 2023-01-01 | End: 2023-01-01

## 2023-01-01 RX ORDER — VANCOMYCIN HCL 1 G
VIAL (EA) INTRAVENOUS
Refills: 0 | Status: DISCONTINUED | OUTPATIENT
Start: 2023-01-01 | End: 2023-01-01

## 2023-01-01 RX ORDER — PANTOPRAZOLE SODIUM 20 MG/1
40 TABLET, DELAYED RELEASE ORAL DAILY
Refills: 0 | Status: DISCONTINUED | OUTPATIENT
Start: 2023-01-01 | End: 2023-01-01

## 2023-01-01 RX ORDER — DILTIAZEM HCL 120 MG
120 CAPSULE, EXT RELEASE 24 HR ORAL DAILY
Refills: 0 | Status: DISCONTINUED | OUTPATIENT
Start: 2023-01-01 | End: 2023-01-01

## 2023-01-01 RX ORDER — DILTIAZEM HCL 120 MG
1 CAPSULE, EXT RELEASE 24 HR ORAL
Qty: 30 | Refills: 0
Start: 2023-01-01 | End: 2023-01-01

## 2023-01-01 RX ORDER — DOXYCYCLINE HYCLATE 100 MG/1
100 CAPSULE ORAL DAILY
Qty: 7 | Refills: 0 | Status: DISCONTINUED | COMMUNITY
Start: 2020-11-12 | End: 2023-01-01

## 2023-01-01 RX ORDER — HYDROCORTISONE 20 MG
100 TABLET ORAL ONCE
Refills: 0 | Status: COMPLETED | OUTPATIENT
Start: 2023-01-01 | End: 2023-01-01

## 2023-01-01 RX ORDER — HEPARIN SODIUM 5000 [USP'U]/ML
7000 INJECTION INTRAVENOUS; SUBCUTANEOUS EVERY 6 HOURS
Refills: 0 | Status: DISCONTINUED | OUTPATIENT
Start: 2023-01-01 | End: 2023-01-01

## 2023-01-01 RX ORDER — CEFEPIME 1 G/1
2000 INJECTION, POWDER, FOR SOLUTION INTRAMUSCULAR; INTRAVENOUS EVERY 8 HOURS
Refills: 0 | Status: COMPLETED | OUTPATIENT
Start: 2023-01-01 | End: 2023-01-01

## 2023-01-01 RX ORDER — MAGNESIUM SULFATE 500 MG/ML
1 VIAL (ML) INJECTION ONCE
Refills: 0 | Status: DISCONTINUED | OUTPATIENT
Start: 2023-01-01 | End: 2023-01-01

## 2023-01-01 RX ORDER — APIXABAN 2.5 MG/1
2.5 TABLET, FILM COATED ORAL EVERY 12 HOURS
Refills: 0 | Status: DISCONTINUED | OUTPATIENT
Start: 2023-01-01 | End: 2023-01-01

## 2023-01-01 RX ORDER — MUPIROCIN 20 MG/G
1 OINTMENT TOPICAL
Refills: 0 | Status: COMPLETED | OUTPATIENT
Start: 2023-01-01 | End: 2023-01-01

## 2023-01-01 RX ORDER — FUROSEMIDE 40 MG
40 TABLET ORAL EVERY 12 HOURS
Refills: 0 | Status: DISCONTINUED | OUTPATIENT
Start: 2023-01-01 | End: 2023-01-01

## 2023-01-01 RX ORDER — AZITHROMYCIN 500 MG/1
500 TABLET, FILM COATED ORAL EVERY 24 HOURS
Refills: 0 | Status: DISCONTINUED | OUTPATIENT
Start: 2023-01-01 | End: 2023-01-01

## 2023-01-01 RX ORDER — BUDESONIDE AND FORMOTEROL FUMARATE DIHYDRATE 160; 4.5 UG/1; UG/1
2 AEROSOL RESPIRATORY (INHALATION)
Qty: 1 | Refills: 0
Start: 2023-01-01 | End: 2023-01-01

## 2023-01-01 RX ORDER — LIDOCAINE AND PRILOCAINE 25; 25 MG/G; MG/G
2.5-2.5 CREAM TOPICAL
Qty: 3 | Refills: 1 | Status: DISCONTINUED | COMMUNITY
Start: 2020-12-03 | End: 2023-01-01

## 2023-01-01 RX ORDER — HEPARIN SODIUM 5000 [USP'U]/ML
6500 INJECTION INTRAVENOUS; SUBCUTANEOUS EVERY 6 HOURS
Refills: 0 | Status: DISCONTINUED | OUTPATIENT
Start: 2023-01-01 | End: 2023-01-01

## 2023-01-01 RX ORDER — DEXTROSE 50 % IN WATER 50 %
12.5 SYRINGE (ML) INTRAVENOUS ONCE
Refills: 0 | Status: DISCONTINUED | OUTPATIENT
Start: 2023-01-01 | End: 2023-01-01

## 2023-01-01 RX ORDER — AZTREONAM 2 G
1000 VIAL (EA) INJECTION ONCE
Refills: 0 | Status: COMPLETED | OUTPATIENT
Start: 2023-01-01 | End: 2023-01-01

## 2023-01-01 RX ORDER — GABAPENTIN 400 MG/1
1 CAPSULE ORAL
Qty: 90 | Refills: 0
Start: 2023-01-01 | End: 2023-01-01

## 2023-01-01 RX ORDER — POTASSIUM CHLORIDE 20 MEQ
20 PACKET (EA) ORAL ONCE
Refills: 0 | Status: DISCONTINUED | OUTPATIENT
Start: 2023-01-01 | End: 2023-01-01

## 2023-01-01 RX ORDER — GABAPENTIN 400 MG/1
1 CAPSULE ORAL
Refills: 0 | DISCHARGE

## 2023-01-01 RX ORDER — MIDAZOLAM HYDROCHLORIDE 1 MG/ML
4 INJECTION, SOLUTION INTRAMUSCULAR; INTRAVENOUS EVERY 4 HOURS
Refills: 0 | Status: DISCONTINUED | OUTPATIENT
Start: 2023-01-01 | End: 2023-01-01

## 2023-01-01 RX ORDER — SODIUM CHLORIDE 9 MG/ML
500 INJECTION, SOLUTION INTRAVENOUS ONCE
Refills: 0 | Status: DISCONTINUED | OUTPATIENT
Start: 2023-01-01 | End: 2023-01-01

## 2023-01-01 RX ORDER — IRON SUCROSE 20 MG/ML
200 INJECTION, SOLUTION INTRAVENOUS EVERY 24 HOURS
Refills: 0 | Status: COMPLETED | OUTPATIENT
Start: 2023-01-01 | End: 2023-01-01

## 2023-01-01 RX ORDER — MEROPENEM 1 G/30ML
1000 INJECTION INTRAVENOUS EVERY 8 HOURS
Refills: 0 | Status: DISCONTINUED | OUTPATIENT
Start: 2023-01-01 | End: 2023-01-01

## 2023-01-01 RX ORDER — SACCHAROMYCES BOULARDII 250 MG
1 POWDER IN PACKET (EA) ORAL
Qty: 60 | Refills: 0
Start: 2023-01-01 | End: 2024-01-16

## 2023-01-01 RX ORDER — ENOXAPARIN SODIUM 100 MG/ML
80 INJECTION SUBCUTANEOUS ONCE
Refills: 0 | Status: COMPLETED | OUTPATIENT
Start: 2023-01-01 | End: 2023-01-01

## 2023-01-01 RX ORDER — METOPROLOL TARTRATE 50 MG
75 TABLET ORAL
Refills: 0 | Status: DISCONTINUED | OUTPATIENT
Start: 2023-01-01 | End: 2023-01-01

## 2023-01-01 RX ORDER — METOCLOPRAMIDE HCL 10 MG
10 TABLET ORAL ONCE
Refills: 0 | Status: COMPLETED | OUTPATIENT
Start: 2023-01-01 | End: 2023-01-01

## 2023-01-01 RX ORDER — FENTANYL CITRATE 50 UG/ML
0.5 INJECTION INTRAVENOUS
Qty: 5000 | Refills: 0 | Status: DISCONTINUED | OUTPATIENT
Start: 2023-01-01 | End: 2023-01-01

## 2023-01-01 RX ORDER — DOXYCYCLINE HYCLATE 100 MG/1
100 CAPSULE ORAL DAILY
Qty: 7 | Refills: 0 | Status: COMPLETED | COMMUNITY
Start: 2023-01-01 | End: 2023-01-01

## 2023-01-01 RX ORDER — POTASSIUM CHLORIDE 1.5 G/1.58G
20 POWDER, FOR SOLUTION ORAL TWICE DAILY
Qty: 4 | Refills: 0 | Status: ACTIVE | COMMUNITY
Start: 2023-01-01

## 2023-01-01 RX ORDER — METOPROLOL TARTRATE 50 MG
1 TABLET ORAL
Qty: 60 | Refills: 0
Start: 2023-01-01 | End: 2024-01-16

## 2023-01-01 RX ORDER — GUAIFENESIN/DEXTROMETHORPHAN 600MG-30MG
10 TABLET, EXTENDED RELEASE 12 HR ORAL EVERY 6 HOURS
Refills: 0 | Status: DISCONTINUED | OUTPATIENT
Start: 2023-01-01 | End: 2023-01-01

## 2023-01-01 RX ORDER — SODIUM CHLORIDE 9 MG/ML
1000 INJECTION INTRAMUSCULAR; INTRAVENOUS; SUBCUTANEOUS
Refills: 0 | Status: DISCONTINUED | OUTPATIENT
Start: 2023-01-01 | End: 2023-01-01

## 2023-01-01 RX ORDER — FERROUS SULFATE 325(65) MG
1 TABLET ORAL
Qty: 30 | Refills: 0
Start: 2023-01-01 | End: 2024-01-16

## 2023-01-01 RX ORDER — DIPHENHYDRAMINE HCL 50 MG
25 CAPSULE ORAL EVERY 6 HOURS
Refills: 0 | Status: DISCONTINUED | OUTPATIENT
Start: 2023-01-01 | End: 2023-01-01

## 2023-01-01 RX ORDER — POLYETHYLENE GLYCOL 3350 17 G/17G
17 POWDER, FOR SOLUTION ORAL
Qty: 0 | Refills: 0 | DISCHARGE
Start: 2023-01-01

## 2023-01-01 RX ORDER — FERROUS SULFATE 325(65) MG
1 TABLET ORAL
Qty: 30 | Refills: 0
Start: 2023-01-01 | End: 2023-01-01

## 2023-01-01 RX ORDER — LANOLIN ALCOHOL/MO/W.PET/CERES
5 CREAM (GRAM) TOPICAL AT BEDTIME
Refills: 0 | Status: DISCONTINUED | OUTPATIENT
Start: 2023-01-01 | End: 2023-01-01

## 2023-01-01 RX ORDER — MUPIROCIN 20 MG/G
2 OINTMENT TOPICAL DAILY
Qty: 1 | Refills: 2 | Status: DISCONTINUED | COMMUNITY
Start: 2020-10-12 | End: 2023-01-01

## 2023-01-01 RX ORDER — FAMOTIDINE 10 MG/ML
20 INJECTION INTRAVENOUS ONCE
Refills: 0 | Status: COMPLETED | OUTPATIENT
Start: 2023-01-01 | End: 2023-01-01

## 2023-01-01 RX ORDER — DILTIAZEM HCL 120 MG
180 CAPSULE, EXT RELEASE 24 HR ORAL DAILY
Refills: 0 | Status: DISCONTINUED | OUTPATIENT
Start: 2023-01-01 | End: 2023-01-01

## 2023-01-01 RX ORDER — VANCOMYCIN HCL 1 G
1000 VIAL (EA) INTRAVENOUS EVERY 12 HOURS
Refills: 0 | Status: DISCONTINUED | OUTPATIENT
Start: 2023-01-01 | End: 2023-01-01

## 2023-01-01 RX ORDER — VANCOMYCIN HCL 1 G
1250 VIAL (EA) INTRAVENOUS EVERY 12 HOURS
Refills: 0 | Status: DISCONTINUED | OUTPATIENT
Start: 2023-01-01 | End: 2023-01-01

## 2023-01-01 RX ORDER — HEPARIN SODIUM 5000 [USP'U]/ML
7000 INJECTION INTRAVENOUS; SUBCUTANEOUS ONCE
Refills: 0 | Status: COMPLETED | OUTPATIENT
Start: 2023-01-01 | End: 2023-01-01

## 2023-01-01 RX ORDER — HEPARIN SODIUM 5000 [USP'U]/ML
1300 INJECTION INTRAVENOUS; SUBCUTANEOUS
Qty: 25000 | Refills: 0 | Status: DISCONTINUED | OUTPATIENT
Start: 2023-01-01 | End: 2023-01-01

## 2023-01-01 RX ORDER — HEPARIN SODIUM 5000 [USP'U]/ML
INJECTION INTRAVENOUS; SUBCUTANEOUS
Qty: 25000 | Refills: 0 | Status: DISCONTINUED | OUTPATIENT
Start: 2023-01-01 | End: 2023-01-01

## 2023-01-01 RX ORDER — ERYTHROMYCIN BASE 5 MG/GRAM
1 OINTMENT (GRAM) OPHTHALMIC (EYE)
Refills: 0 | Status: DISCONTINUED | OUTPATIENT
Start: 2023-01-01 | End: 2023-01-01

## 2023-01-01 RX ORDER — POTASSIUM CHLORIDE 20 MEQ
40 PACKET (EA) ORAL EVERY 4 HOURS
Refills: 0 | Status: DISCONTINUED | OUTPATIENT
Start: 2023-01-01 | End: 2023-01-01

## 2023-01-01 RX ORDER — VASOPRESSIN 20 [USP'U]/ML
0.04 INJECTION INTRAVENOUS
Qty: 40 | Refills: 0 | Status: DISCONTINUED | OUTPATIENT
Start: 2023-01-01 | End: 2023-01-01

## 2023-01-01 RX ORDER — MEROPENEM 1 G/30ML
1000 INJECTION INTRAVENOUS EVERY 12 HOURS
Refills: 0 | Status: DISCONTINUED | OUTPATIENT
Start: 2023-01-01 | End: 2023-01-01

## 2023-01-01 RX ORDER — APIXABAN 2.5 MG/1
2 TABLET, FILM COATED ORAL
Qty: 0 | Refills: 0 | DISCHARGE
Start: 2023-01-01

## 2023-01-01 RX ORDER — POLYETHYLENE GLYCOL 3350 17 G/17G
17 POWDER, FOR SOLUTION ORAL
Refills: 0 | Status: DISCONTINUED | OUTPATIENT
Start: 2023-01-01 | End: 2023-01-01

## 2023-01-01 RX ORDER — DEXMEDETOMIDINE HYDROCHLORIDE IN 0.9% SODIUM CHLORIDE 4 UG/ML
0.05 INJECTION INTRAVENOUS
Qty: 400 | Refills: 0 | Status: DISCONTINUED | OUTPATIENT
Start: 2023-01-01 | End: 2023-01-01

## 2023-01-01 RX ORDER — ALBUTEROL 90 UG/1
2 AEROSOL, METERED ORAL EVERY 4 HOURS
Refills: 0 | Status: COMPLETED | OUTPATIENT
Start: 2023-01-01 | End: 2024-06-01

## 2023-01-01 RX ORDER — DILTIAZEM HCL 120 MG
1 CAPSULE, EXT RELEASE 24 HR ORAL
Qty: 0 | Refills: 0 | DISCHARGE

## 2023-01-01 RX ORDER — MIDAZOLAM HYDROCHLORIDE 1 MG/ML
2 INJECTION, SOLUTION INTRAMUSCULAR; INTRAVENOUS EVERY 4 HOURS
Refills: 0 | Status: DISCONTINUED | OUTPATIENT
Start: 2023-01-01 | End: 2023-01-01

## 2023-01-01 RX ORDER — ACETAMINOPHEN 500 MG
650 TABLET ORAL ONCE
Refills: 0 | Status: COMPLETED | OUTPATIENT
Start: 2023-01-01 | End: 2023-01-01

## 2023-01-01 RX ORDER — BUDESONIDE AND FORMOTEROL FUMARATE DIHYDRATE 160; 4.5 UG/1; UG/1
2 AEROSOL RESPIRATORY (INHALATION)
Qty: 0 | Refills: 0 | DISCHARGE
Start: 2023-01-01

## 2023-01-01 RX ORDER — ATROPINE SULFATE 0.1 MG/ML
0.5 SYRINGE (ML) INJECTION ONCE
Refills: 0 | Status: DISCONTINUED | OUTPATIENT
Start: 2023-01-01 | End: 2023-01-01

## 2023-01-01 RX ORDER — MAGNESIUM SULFATE 500 MG/ML
2 VIAL (ML) INJECTION
Refills: 0 | Status: DISCONTINUED | OUTPATIENT
Start: 2023-01-01 | End: 2023-01-01

## 2023-01-01 RX ORDER — POLYETHYLENE GLYCOL 3350 17 G/17G
17 POWDER, FOR SOLUTION ORAL DAILY
Qty: 10 | Refills: 0 | Status: ACTIVE | COMMUNITY
Start: 2023-01-01

## 2023-01-01 RX ORDER — HYDROXYZINE HCL 10 MG
25 TABLET ORAL ONCE
Refills: 0 | Status: COMPLETED | OUTPATIENT
Start: 2023-01-01 | End: 2023-01-01

## 2023-01-01 RX ORDER — ONDANSETRON 8 MG/1
4 TABLET, FILM COATED ORAL EVERY 8 HOURS
Refills: 0 | Status: DISCONTINUED | OUTPATIENT
Start: 2023-01-01 | End: 2023-01-01

## 2023-01-01 RX ORDER — HEPARIN SODIUM 5000 [USP'U]/ML
3500 INJECTION INTRAVENOUS; SUBCUTANEOUS ONCE
Refills: 0 | Status: COMPLETED | OUTPATIENT
Start: 2023-01-01 | End: 2023-01-01

## 2023-01-01 RX ORDER — METOPROLOL TARTRATE 50 MG
50 TABLET ORAL
Refills: 0 | Status: DISCONTINUED | OUTPATIENT
Start: 2023-01-01 | End: 2023-01-01

## 2023-01-01 RX ORDER — PIPERACILLIN AND TAZOBACTAM 4; .5 G/20ML; G/20ML
3.38 INJECTION, POWDER, LYOPHILIZED, FOR SOLUTION INTRAVENOUS EVERY 8 HOURS
Refills: 0 | Status: DISCONTINUED | OUTPATIENT
Start: 2023-01-01 | End: 2023-01-01

## 2023-01-01 RX ORDER — LANOLIN ALCOHOL/MO/W.PET/CERES
3 CREAM (GRAM) TOPICAL ONCE
Refills: 0 | Status: COMPLETED | OUTPATIENT
Start: 2023-01-01 | End: 2023-01-01

## 2023-01-01 RX ORDER — BUMETANIDE 0.25 MG/ML
2 INJECTION INTRAMUSCULAR; INTRAVENOUS DAILY
Refills: 0 | Status: DISCONTINUED | OUTPATIENT
Start: 2023-01-01 | End: 2023-01-01

## 2023-01-01 RX ORDER — HEPARIN SODIUM 5000 [USP'U]/ML
3000 INJECTION INTRAVENOUS; SUBCUTANEOUS EVERY 6 HOURS
Refills: 0 | Status: DISCONTINUED | OUTPATIENT
Start: 2023-01-01 | End: 2023-01-01

## 2023-01-01 RX ORDER — GABAPENTIN 400 MG/1
1 CAPSULE ORAL
Qty: 90 | Refills: 0
Start: 2023-01-01 | End: 2024-01-16

## 2023-01-01 RX ORDER — CIPROFLOXACIN HYDROCHLORIDE 500 MG/1
500 TABLET, FILM COATED ORAL
Qty: 20 | Refills: 0 | Status: DISCONTINUED | COMMUNITY
Start: 2020-11-03 | End: 2023-01-01

## 2023-01-01 RX ORDER — SACCHAROMYCES BOULARDII 250 MG
1 POWDER IN PACKET (EA) ORAL
Qty: 60 | Refills: 0
Start: 2023-01-01 | End: 2023-01-01

## 2023-01-01 RX ORDER — ALBUTEROL SULFATE 90 UG/1
108 (90 BASE) INHALANT RESPIRATORY (INHALATION)
Qty: 1 | Refills: 3 | Status: ACTIVE | COMMUNITY
Start: 2021-12-14

## 2023-01-01 RX ORDER — HYDROMORPHONE HYDROCHLORIDE 2 MG/ML
2 INJECTION INTRAMUSCULAR; INTRAVENOUS; SUBCUTANEOUS
Refills: 0 | Status: DISCONTINUED | OUTPATIENT
Start: 2023-01-01 | End: 2023-01-01

## 2023-01-01 RX ORDER — METOPROLOL TARTRATE 50 MG
5 TABLET ORAL EVERY 6 HOURS
Refills: 0 | Status: DISCONTINUED | OUTPATIENT
Start: 2023-01-01 | End: 2023-01-01

## 2023-01-01 RX ORDER — HEPARIN SODIUM 5000 [USP'U]/ML
2500 INJECTION INTRAVENOUS; SUBCUTANEOUS ONCE
Refills: 0 | Status: COMPLETED | OUTPATIENT
Start: 2023-01-01 | End: 2023-01-01

## 2023-01-01 RX ORDER — CEFEPIME 1 G/1
2000 INJECTION, POWDER, FOR SOLUTION INTRAMUSCULAR; INTRAVENOUS EVERY 24 HOURS
Refills: 0 | Status: DISCONTINUED | OUTPATIENT
Start: 2023-01-01 | End: 2023-01-01

## 2023-01-01 RX ORDER — CEFEPIME 1 G/1
2000 INJECTION, POWDER, FOR SOLUTION INTRAMUSCULAR; INTRAVENOUS EVERY 12 HOURS
Refills: 0 | Status: COMPLETED | OUTPATIENT
Start: 2023-01-01 | End: 2023-01-01

## 2023-01-01 RX ORDER — ALBUTEROL 90 UG/1
2 AEROSOL, METERED ORAL
Qty: 0 | Refills: 0 | DISCHARGE
Start: 2023-01-01

## 2023-01-01 RX ORDER — HEPARIN SODIUM 5000 [USP'U]/ML
5000 INJECTION INTRAVENOUS; SUBCUTANEOUS EVERY 12 HOURS
Refills: 0 | Status: DISCONTINUED | OUTPATIENT
Start: 2023-01-01 | End: 2023-01-01

## 2023-01-01 RX ORDER — IPRATROPIUM/ALBUTEROL SULFATE 18-103MCG
3 AEROSOL WITH ADAPTER (GRAM) INHALATION EVERY 4 HOURS
Refills: 0 | Status: DISCONTINUED | OUTPATIENT
Start: 2023-01-01 | End: 2023-01-01

## 2023-01-01 RX ORDER — FLUTICASONE FUROATE AND VILANTEROL TRIFENATATE 100; 25 UG/1; UG/1
100-25 POWDER RESPIRATORY (INHALATION)
Refills: 0 | Status: COMPLETED | COMMUNITY
Start: 2023-01-01 | End: 2023-01-01

## 2023-01-01 RX ORDER — VANCOMYCIN HCL 1 G
500 VIAL (EA) INTRAVENOUS EVERY 12 HOURS
Refills: 0 | Status: DISCONTINUED | OUTPATIENT
Start: 2023-01-01 | End: 2023-01-01

## 2023-01-01 RX ORDER — DILTIAZEM HCL 120 MG
1 CAPSULE, EXT RELEASE 24 HR ORAL
Qty: 0 | Refills: 0 | DISCHARGE
Start: 2023-01-01

## 2023-01-01 RX ORDER — APIXABAN 2.5 MG/1
1 TABLET, FILM COATED ORAL
Qty: 0 | Refills: 0 | DISCHARGE

## 2023-01-01 RX ORDER — DILTIAZEM HYDROCHLORIDE 180 MG/1
180 TABLET, EXTENDED RELEASE ORAL
Refills: 0 | Status: ACTIVE | COMMUNITY
Start: 2023-01-01

## 2023-01-01 RX ORDER — ENOXAPARIN SODIUM 100 MG/ML
80 INJECTION SUBCUTANEOUS EVERY 12 HOURS
Refills: 0 | Status: DISCONTINUED | OUTPATIENT
Start: 2023-01-01 | End: 2023-01-01

## 2023-01-01 RX ORDER — IBUPROFEN 200 MG
400 TABLET ORAL ONCE
Refills: 0 | Status: COMPLETED | OUTPATIENT
Start: 2023-01-01 | End: 2023-01-01

## 2023-01-01 RX ORDER — APIXABAN 2.5 MG/1
10 TABLET, FILM COATED ORAL EVERY 12 HOURS
Refills: 0 | Status: DISCONTINUED | OUTPATIENT
Start: 2023-01-01 | End: 2023-01-01

## 2023-01-01 RX ORDER — FUROSEMIDE 40 MG
40 TABLET ORAL ONCE
Refills: 0 | Status: DISCONTINUED | OUTPATIENT
Start: 2023-01-01 | End: 2023-01-01

## 2023-01-01 RX ORDER — TIOTROPIUM BROMIDE 18 UG/1
1 CAPSULE ORAL; RESPIRATORY (INHALATION)
Qty: 1 | Refills: 0
Start: 2023-01-01 | End: 2024-01-16

## 2023-01-01 RX ORDER — PANTOPRAZOLE SODIUM 20 MG/1
1 TABLET, DELAYED RELEASE ORAL
Qty: 30 | Refills: 0
Start: 2023-01-01 | End: 2024-01-16

## 2023-01-01 RX ORDER — INSULIN LISPRO 100/ML
VIAL (ML) SUBCUTANEOUS AT BEDTIME
Refills: 0 | Status: DISCONTINUED | OUTPATIENT
Start: 2023-01-01 | End: 2023-01-01

## 2023-01-01 RX ORDER — SODIUM CHLORIDE 9 MG/ML
1000 INJECTION INTRAMUSCULAR; INTRAVENOUS; SUBCUTANEOUS ONCE
Refills: 0 | Status: COMPLETED | OUTPATIENT
Start: 2023-01-01 | End: 2023-01-01

## 2023-01-01 RX ORDER — SENNA PLUS 8.6 MG/1
2 TABLET ORAL
Qty: 60 | Refills: 0
Start: 2023-01-01 | End: 2023-01-01

## 2023-01-01 RX ORDER — SACCHAROMYCES BOULARDII 50 MG
CAPSULE ORAL
Refills: 0 | Status: ACTIVE | COMMUNITY
Start: 2023-01-01

## 2023-01-01 RX ORDER — AZITHROMYCIN 500 MG/1
TABLET, FILM COATED ORAL
Refills: 0 | Status: DISCONTINUED | OUTPATIENT
Start: 2023-01-01 | End: 2023-01-01

## 2023-01-01 RX ORDER — ONDANSETRON 8 MG/1
4 TABLET, FILM COATED ORAL ONCE
Refills: 0 | Status: COMPLETED | OUTPATIENT
Start: 2023-01-01 | End: 2023-01-01

## 2023-01-01 RX ORDER — SODIUM CHLORIDE 9 MG/ML
2500 INJECTION INTRAMUSCULAR; INTRAVENOUS; SUBCUTANEOUS ONCE
Refills: 0 | Status: COMPLETED | OUTPATIENT
Start: 2023-01-01 | End: 2023-01-01

## 2023-01-01 RX ORDER — DOXYCYCLINE HYCLATE 100 MG/1
100 CAPSULE ORAL
Qty: 14 | Refills: 0 | Status: DISCONTINUED | COMMUNITY
Start: 2023-01-01 | End: 2023-01-01

## 2023-01-01 RX ORDER — APIXABAN 2.5 MG/1
1 TABLET, FILM COATED ORAL
Refills: 0 | DISCHARGE

## 2023-01-01 RX ORDER — MIDAZOLAM HYDROCHLORIDE 1 MG/ML
2 INJECTION, SOLUTION INTRAMUSCULAR; INTRAVENOUS
Refills: 0 | Status: DISCONTINUED | OUTPATIENT
Start: 2023-01-01 | End: 2023-01-01

## 2023-01-01 RX ORDER — DILTIAZEM HYDROCHLORIDE 180 MG/1
180 CAPSULE, EXTENDED RELEASE ORAL
Qty: 90 | Refills: 0 | Status: DISCONTINUED | COMMUNITY
Start: 2020-08-17 | End: 2023-01-01

## 2023-01-01 RX ORDER — GUAIFENESIN/DEXTROMETHORPHAN 600MG-30MG
10 TABLET, EXTENDED RELEASE 12 HR ORAL ONCE
Refills: 0 | Status: COMPLETED | OUTPATIENT
Start: 2023-01-01 | End: 2023-01-01

## 2023-01-01 RX ORDER — HEPARIN SODIUM 5000 [USP'U]/ML
1000 INJECTION INTRAVENOUS; SUBCUTANEOUS
Qty: 25000 | Refills: 0 | Status: DISCONTINUED | OUTPATIENT
Start: 2023-01-01 | End: 2023-01-01

## 2023-01-01 RX ORDER — FENTANYL CITRATE 50 UG/ML
0.47 INJECTION INTRAVENOUS
Qty: 2500 | Refills: 0 | Status: DISCONTINUED | OUTPATIENT
Start: 2023-01-01 | End: 2023-01-01

## 2023-01-01 RX ORDER — KETOROLAC TROMETHAMINE 30 MG/ML
15 SYRINGE (ML) INJECTION ONCE
Refills: 0 | Status: DISCONTINUED | OUTPATIENT
Start: 2023-01-01 | End: 2023-01-01

## 2023-01-01 RX ORDER — TRIAMCINOLONE ACETONIDE 1 MG/G
0.1 CREAM TOPICAL TWICE DAILY
Qty: 2 | Refills: 0 | Status: DISCONTINUED | COMMUNITY
Start: 2021-01-07 | End: 2023-01-01

## 2023-01-01 RX ORDER — MEROPENEM 1 G/30ML
1000 INJECTION INTRAVENOUS ONCE
Refills: 0 | Status: COMPLETED | OUTPATIENT
Start: 2023-01-01 | End: 2023-01-01

## 2023-01-01 RX ORDER — VANCOMYCIN HCL 1 G
1500 VIAL (EA) INTRAVENOUS ONCE
Refills: 0 | Status: COMPLETED | OUTPATIENT
Start: 2023-01-01 | End: 2023-01-01

## 2023-01-01 RX ORDER — SODIUM CHLORIDE 9 MG/ML
1500 INJECTION, SOLUTION INTRAVENOUS ONCE
Refills: 0 | Status: COMPLETED | OUTPATIENT
Start: 2023-01-01 | End: 2023-01-01

## 2023-01-01 RX ORDER — LIDOCAINE 4 G/100G
1 CREAM TOPICAL DAILY
Refills: 0 | Status: DISCONTINUED | OUTPATIENT
Start: 2023-01-01 | End: 2023-01-01

## 2023-01-01 RX ORDER — MAGNESIUM OXIDE 400 MG ORAL TABLET 241.3 MG
1 TABLET ORAL
Qty: 45 | Refills: 0
Start: 2023-01-01 | End: 2024-01-01

## 2023-01-01 RX ORDER — ALPRAZOLAM 0.25 MG/1
0.25 TABLET ORAL
Qty: 30 | Refills: 0 | Status: ACTIVE | COMMUNITY
Start: 2023-01-01

## 2023-01-01 RX ORDER — CEFTRIAXONE 500 MG/1
1000 INJECTION, POWDER, FOR SOLUTION INTRAMUSCULAR; INTRAVENOUS ONCE
Refills: 0 | Status: COMPLETED | OUTPATIENT
Start: 2023-01-01 | End: 2023-01-01

## 2023-01-01 RX ORDER — SODIUM CHLORIDE 9 MG/ML
1000 INJECTION, SOLUTION INTRAVENOUS ONCE
Refills: 0 | Status: COMPLETED | OUTPATIENT
Start: 2023-01-01 | End: 2023-01-01

## 2023-01-01 RX ADMIN — Medication 25 MILLIGRAM(S): at 05:07

## 2023-01-01 RX ADMIN — Medication 25 MILLIGRAM(S): at 20:49

## 2023-01-01 RX ADMIN — GABAPENTIN 100 MILLIGRAM(S): 400 CAPSULE ORAL at 06:47

## 2023-01-01 RX ADMIN — PANTOPRAZOLE SODIUM 40 MILLIGRAM(S): 20 TABLET, DELAYED RELEASE ORAL at 06:38

## 2023-01-01 RX ADMIN — GABAPENTIN 100 MILLIGRAM(S): 400 CAPSULE ORAL at 05:51

## 2023-01-01 RX ADMIN — PANTOPRAZOLE SODIUM 40 MILLIGRAM(S): 20 TABLET, DELAYED RELEASE ORAL at 06:08

## 2023-01-01 RX ADMIN — ALBUTEROL 2 PUFF(S): 90 AEROSOL, METERED ORAL at 11:03

## 2023-01-01 RX ADMIN — Medication 650 MILLIGRAM(S): at 01:09

## 2023-01-01 RX ADMIN — ACETAZOLAMIDE 250 MILLIGRAM(S): 250 TABLET ORAL at 12:51

## 2023-01-01 RX ADMIN — Medication 30 MILLILITER(S): at 05:47

## 2023-01-01 RX ADMIN — APIXABAN 5 MILLIGRAM(S): 2.5 TABLET, FILM COATED ORAL at 05:45

## 2023-01-01 RX ADMIN — Medication 40 MILLIGRAM(S): at 05:35

## 2023-01-01 RX ADMIN — Medication 650 MILLIGRAM(S): at 20:00

## 2023-01-01 RX ADMIN — Medication 1 APPLICATION(S): at 05:46

## 2023-01-01 RX ADMIN — Medication 650 MILLIGRAM(S): at 18:32

## 2023-01-01 RX ADMIN — Medication 40 MILLIGRAM(S): at 05:41

## 2023-01-01 RX ADMIN — ALBUTEROL 2 PUFF(S): 90 AEROSOL, METERED ORAL at 04:23

## 2023-01-01 RX ADMIN — Medication 325 MILLIGRAM(S): at 14:07

## 2023-01-01 RX ADMIN — GABAPENTIN 100 MILLIGRAM(S): 400 CAPSULE ORAL at 21:45

## 2023-01-01 RX ADMIN — FENTANYL CITRATE 4.1 MICROGRAM(S)/KG/HR: 50 INJECTION INTRAVENOUS at 13:11

## 2023-01-01 RX ADMIN — GABAPENTIN 100 MILLIGRAM(S): 400 CAPSULE ORAL at 17:25

## 2023-01-01 RX ADMIN — Medication 40 MILLIGRAM(S): at 21:24

## 2023-01-01 RX ADMIN — CHLORHEXIDINE GLUCONATE 1 APPLICATION(S): 213 SOLUTION TOPICAL at 06:48

## 2023-01-01 RX ADMIN — HEPARIN SODIUM 1600 UNIT(S)/HR: 5000 INJECTION INTRAVENOUS; SUBCUTANEOUS at 14:21

## 2023-01-01 RX ADMIN — Medication 100 MILLIGRAM(S): at 05:04

## 2023-01-01 RX ADMIN — Medication 25 MILLIGRAM(S): at 18:40

## 2023-01-01 RX ADMIN — DEXMEDETOMIDINE HYDROCHLORIDE IN 0.9% SODIUM CHLORIDE 1.1 MICROGRAM(S)/KG/HR: 4 INJECTION INTRAVENOUS at 21:50

## 2023-01-01 RX ADMIN — CHLORHEXIDINE GLUCONATE 1 APPLICATION(S): 213 SOLUTION TOPICAL at 05:14

## 2023-01-01 RX ADMIN — Medication 25 MILLIGRAM(S): at 05:38

## 2023-01-01 RX ADMIN — Medication 25 GRAM(S): at 12:08

## 2023-01-01 RX ADMIN — Medication 250 MILLIGRAM(S): at 18:11

## 2023-01-01 RX ADMIN — GABAPENTIN 100 MILLIGRAM(S): 400 CAPSULE ORAL at 05:03

## 2023-01-01 RX ADMIN — Medication 40 MILLIGRAM(S): at 11:39

## 2023-01-01 RX ADMIN — CHLORHEXIDINE GLUCONATE 1 APPLICATION(S): 213 SOLUTION TOPICAL at 05:16

## 2023-01-01 RX ADMIN — GABAPENTIN 100 MILLIGRAM(S): 400 CAPSULE ORAL at 06:35

## 2023-01-01 RX ADMIN — Medication 3 MILLILITER(S): at 14:30

## 2023-01-01 RX ADMIN — Medication 250 MILLIGRAM(S): at 05:51

## 2023-01-01 RX ADMIN — BUDESONIDE AND FORMOTEROL FUMARATE DIHYDRATE 2 PUFF(S): 160; 4.5 AEROSOL RESPIRATORY (INHALATION) at 19:48

## 2023-01-01 RX ADMIN — ONDANSETRON 4 MILLIGRAM(S): 8 TABLET, FILM COATED ORAL at 06:25

## 2023-01-01 RX ADMIN — BUDESONIDE AND FORMOTEROL FUMARATE DIHYDRATE 2 PUFF(S): 160; 4.5 AEROSOL RESPIRATORY (INHALATION) at 11:54

## 2023-01-01 RX ADMIN — Medication 30 MILLILITER(S): at 16:09

## 2023-01-01 RX ADMIN — Medication 125 MILLIGRAM(S): at 18:06

## 2023-01-01 RX ADMIN — Medication 250 MILLIGRAM(S): at 17:51

## 2023-01-01 RX ADMIN — MUPIROCIN 1 APPLICATION(S): 20 OINTMENT TOPICAL at 18:53

## 2023-01-01 RX ADMIN — Medication 250 MILLIGRAM(S): at 17:40

## 2023-01-01 RX ADMIN — MAGNESIUM OXIDE 400 MG ORAL TABLET 400 MILLIGRAM(S): 241.3 TABLET ORAL at 09:34

## 2023-01-01 RX ADMIN — Medication 120 MILLIGRAM(S): at 05:50

## 2023-01-01 RX ADMIN — DEXMEDETOMIDINE HYDROCHLORIDE IN 0.9% SODIUM CHLORIDE 1.1 MICROGRAM(S)/KG/HR: 4 INJECTION INTRAVENOUS at 21:19

## 2023-01-01 RX ADMIN — FENTANYL CITRATE 4.1 MICROGRAM(S)/KG/HR: 50 INJECTION INTRAVENOUS at 16:41

## 2023-01-01 RX ADMIN — BUDESONIDE AND FORMOTEROL FUMARATE DIHYDRATE 2 PUFF(S): 160; 4.5 AEROSOL RESPIRATORY (INHALATION) at 08:11

## 2023-01-01 RX ADMIN — Medication 40 MILLIGRAM(S): at 05:12

## 2023-01-01 RX ADMIN — Medication 1 APPLICATION(S): at 05:31

## 2023-01-01 RX ADMIN — Medication 25 MILLIGRAM(S): at 17:52

## 2023-01-01 RX ADMIN — Medication 4.04 MICROGRAM(S)/KG/MIN: at 00:09

## 2023-01-01 RX ADMIN — GABAPENTIN 100 MILLIGRAM(S): 400 CAPSULE ORAL at 05:22

## 2023-01-01 RX ADMIN — PANTOPRAZOLE SODIUM 40 MILLIGRAM(S): 20 TABLET, DELAYED RELEASE ORAL at 06:30

## 2023-01-01 RX ADMIN — PANTOPRAZOLE SODIUM 40 MILLIGRAM(S): 20 TABLET, DELAYED RELEASE ORAL at 17:12

## 2023-01-01 RX ADMIN — NYSTATIN CREAM 1 APPLICATION(S): 100000 CREAM TOPICAL at 06:09

## 2023-01-01 RX ADMIN — APIXABAN 2.5 MILLIGRAM(S): 2.5 TABLET, FILM COATED ORAL at 18:17

## 2023-01-01 RX ADMIN — Medication 40 MILLIGRAM(S): at 05:56

## 2023-01-01 RX ADMIN — Medication 1: at 08:00

## 2023-01-01 RX ADMIN — Medication 3 MILLILITER(S): at 07:58

## 2023-01-01 RX ADMIN — PANTOPRAZOLE SODIUM 40 MILLIGRAM(S): 20 TABLET, DELAYED RELEASE ORAL at 17:36

## 2023-01-01 RX ADMIN — PROPOFOL 4.62 MICROGRAM(S)/KG/MIN: 10 INJECTION, EMULSION INTRAVENOUS at 10:05

## 2023-01-01 RX ADMIN — GABAPENTIN 100 MILLIGRAM(S): 400 CAPSULE ORAL at 21:34

## 2023-01-01 RX ADMIN — CHLORHEXIDINE GLUCONATE 1 APPLICATION(S): 213 SOLUTION TOPICAL at 07:09

## 2023-01-01 RX ADMIN — ENOXAPARIN SODIUM 80 MILLIGRAM(S): 100 INJECTION SUBCUTANEOUS at 05:29

## 2023-01-01 RX ADMIN — Medication 25 MILLIGRAM(S): at 05:50

## 2023-01-01 RX ADMIN — ACETAZOLAMIDE 250 MILLIGRAM(S): 250 TABLET ORAL at 13:12

## 2023-01-01 RX ADMIN — GABAPENTIN 100 MILLIGRAM(S): 400 CAPSULE ORAL at 12:52

## 2023-01-01 RX ADMIN — Medication 25 MILLIGRAM(S): at 12:21

## 2023-01-01 RX ADMIN — Medication 40 MILLIGRAM(S): at 05:24

## 2023-01-01 RX ADMIN — PANTOPRAZOLE SODIUM 40 MILLIGRAM(S): 20 TABLET, DELAYED RELEASE ORAL at 05:57

## 2023-01-01 RX ADMIN — Medication 40 MILLIGRAM(S): at 06:15

## 2023-01-01 RX ADMIN — NYSTATIN CREAM 1 APPLICATION(S): 100000 CREAM TOPICAL at 17:18

## 2023-01-01 RX ADMIN — ENOXAPARIN SODIUM 90 MILLIGRAM(S): 100 INJECTION SUBCUTANEOUS at 06:35

## 2023-01-01 RX ADMIN — MIDAZOLAM HYDROCHLORIDE 4 MILLIGRAM(S): 1 INJECTION, SOLUTION INTRAMUSCULAR; INTRAVENOUS at 04:25

## 2023-01-01 RX ADMIN — Medication 3 MILLILITER(S): at 13:17

## 2023-01-01 RX ADMIN — ALBUTEROL 2 PUFF(S): 90 AEROSOL, METERED ORAL at 03:00

## 2023-01-01 RX ADMIN — GABAPENTIN 100 MILLIGRAM(S): 400 CAPSULE ORAL at 21:13

## 2023-01-01 RX ADMIN — TIOTROPIUM BROMIDE 2 PUFF(S): 18 CAPSULE ORAL; RESPIRATORY (INHALATION) at 08:05

## 2023-01-01 RX ADMIN — DEXMEDETOMIDINE HYDROCHLORIDE IN 0.9% SODIUM CHLORIDE 1.1 MICROGRAM(S)/KG/HR: 4 INJECTION INTRAVENOUS at 10:30

## 2023-01-01 RX ADMIN — GABAPENTIN 100 MILLIGRAM(S): 400 CAPSULE ORAL at 21:10

## 2023-01-01 RX ADMIN — Medication 650 MILLIGRAM(S): at 11:23

## 2023-01-01 RX ADMIN — Medication 25 MILLIGRAM(S): at 05:39

## 2023-01-01 RX ADMIN — CHLORHEXIDINE GLUCONATE 15 MILLILITER(S): 213 SOLUTION TOPICAL at 18:36

## 2023-01-01 RX ADMIN — MAGNESIUM OXIDE 400 MG ORAL TABLET 400 MILLIGRAM(S): 241.3 TABLET ORAL at 17:51

## 2023-01-01 RX ADMIN — BUDESONIDE AND FORMOTEROL FUMARATE DIHYDRATE 2 PUFF(S): 160; 4.5 AEROSOL RESPIRATORY (INHALATION) at 21:45

## 2023-01-01 RX ADMIN — Medication 25 MILLIGRAM(S): at 05:23

## 2023-01-01 RX ADMIN — SENNA PLUS 2 TABLET(S): 8.6 TABLET ORAL at 21:24

## 2023-01-01 RX ADMIN — Medication 25 MILLIGRAM(S): at 21:35

## 2023-01-01 RX ADMIN — Medication 10 MILLIGRAM(S): at 04:39

## 2023-01-01 RX ADMIN — ENOXAPARIN SODIUM 90 MILLIGRAM(S): 100 INJECTION SUBCUTANEOUS at 17:57

## 2023-01-01 RX ADMIN — ALBUTEROL 2 PUFF(S): 90 AEROSOL, METERED ORAL at 03:29

## 2023-01-01 RX ADMIN — Medication 250 MILLIGRAM(S): at 05:10

## 2023-01-01 RX ADMIN — Medication 20 MILLIGRAM(S): at 17:00

## 2023-01-01 RX ADMIN — SODIUM CHLORIDE 1000 MILLILITER(S): 9 INJECTION, SOLUTION INTRAVENOUS at 14:51

## 2023-01-01 RX ADMIN — Medication 40 MILLIGRAM(S): at 17:13

## 2023-01-01 RX ADMIN — Medication 40 MILLIGRAM(S): at 05:37

## 2023-01-01 RX ADMIN — SODIUM CHLORIDE 500 MILLILITER(S): 9 INJECTION, SOLUTION INTRAVENOUS at 05:25

## 2023-01-01 RX ADMIN — APIXABAN 5 MILLIGRAM(S): 2.5 TABLET, FILM COATED ORAL at 05:12

## 2023-01-01 RX ADMIN — BUDESONIDE AND FORMOTEROL FUMARATE DIHYDRATE 2 PUFF(S): 160; 4.5 AEROSOL RESPIRATORY (INHALATION) at 21:58

## 2023-01-01 RX ADMIN — FENTANYL CITRATE 4.1 MICROGRAM(S)/KG/HR: 50 INJECTION INTRAVENOUS at 21:02

## 2023-01-01 RX ADMIN — ENOXAPARIN SODIUM 80 MILLIGRAM(S): 100 INJECTION SUBCUTANEOUS at 17:36

## 2023-01-01 RX ADMIN — GABAPENTIN 100 MILLIGRAM(S): 400 CAPSULE ORAL at 21:11

## 2023-01-01 RX ADMIN — APIXABAN 2.5 MILLIGRAM(S): 2.5 TABLET, FILM COATED ORAL at 01:39

## 2023-01-01 RX ADMIN — CHLORHEXIDINE GLUCONATE 1 APPLICATION(S): 213 SOLUTION TOPICAL at 05:17

## 2023-01-01 RX ADMIN — Medication 100 MILLIGRAM(S): at 17:59

## 2023-01-01 RX ADMIN — APIXABAN 2.5 MILLIGRAM(S): 2.5 TABLET, FILM COATED ORAL at 06:22

## 2023-01-01 RX ADMIN — PROPOFOL 15.8 MICROGRAM(S)/KG/MIN: 10 INJECTION, EMULSION INTRAVENOUS at 20:20

## 2023-01-01 RX ADMIN — CHLORHEXIDINE GLUCONATE 1 APPLICATION(S): 213 SOLUTION TOPICAL at 05:50

## 2023-01-01 RX ADMIN — PANTOPRAZOLE SODIUM 40 MILLIGRAM(S): 20 TABLET, DELAYED RELEASE ORAL at 05:22

## 2023-01-01 RX ADMIN — GABAPENTIN 100 MILLIGRAM(S): 400 CAPSULE ORAL at 13:12

## 2023-01-01 RX ADMIN — PANTOPRAZOLE SODIUM 40 MILLIGRAM(S): 20 TABLET, DELAYED RELEASE ORAL at 05:15

## 2023-01-01 RX ADMIN — Medication 100 MILLIGRAM(S): at 21:14

## 2023-01-01 RX ADMIN — PROPOFOL 5.27 MICROGRAM(S)/KG/MIN: 10 INJECTION, EMULSION INTRAVENOUS at 13:33

## 2023-01-01 RX ADMIN — PIPERACILLIN AND TAZOBACTAM 25 GRAM(S): 4; .5 INJECTION, POWDER, LYOPHILIZED, FOR SOLUTION INTRAVENOUS at 05:37

## 2023-01-01 RX ADMIN — Medication 100 MILLIGRAM(S): at 05:14

## 2023-01-01 RX ADMIN — Medication 3 MILLILITER(S): at 15:31

## 2023-01-01 RX ADMIN — Medication 60 MILLIGRAM(S): at 19:42

## 2023-01-01 RX ADMIN — Medication 25 MILLIGRAM(S): at 17:11

## 2023-01-01 RX ADMIN — GABAPENTIN 100 MILLIGRAM(S): 400 CAPSULE ORAL at 21:19

## 2023-01-01 RX ADMIN — NYSTATIN CREAM 1 APPLICATION(S): 100000 CREAM TOPICAL at 17:14

## 2023-01-01 RX ADMIN — PANTOPRAZOLE SODIUM 40 MILLIGRAM(S): 20 TABLET, DELAYED RELEASE ORAL at 06:01

## 2023-01-01 RX ADMIN — Medication 40 MILLIGRAM(S): at 05:34

## 2023-01-01 RX ADMIN — Medication 40 MILLIEQUIVALENT(S): at 10:30

## 2023-01-01 RX ADMIN — ALBUTEROL 2 PUFF(S): 90 AEROSOL, METERED ORAL at 13:35

## 2023-01-01 RX ADMIN — Medication 650 MILLIGRAM(S): at 21:47

## 2023-01-01 RX ADMIN — GABAPENTIN 100 MILLIGRAM(S): 400 CAPSULE ORAL at 13:17

## 2023-01-01 RX ADMIN — LACTULOSE 20 GRAM(S): 10 SOLUTION ORAL at 22:55

## 2023-01-01 RX ADMIN — Medication 650 MILLIGRAM(S): at 06:30

## 2023-01-01 RX ADMIN — Medication 40 MILLIGRAM(S): at 06:49

## 2023-01-01 RX ADMIN — Medication 40 MILLIGRAM(S): at 06:21

## 2023-01-01 RX ADMIN — Medication 3 MILLILITER(S): at 21:11

## 2023-01-01 RX ADMIN — APIXABAN 2.5 MILLIGRAM(S): 2.5 TABLET, FILM COATED ORAL at 05:38

## 2023-01-01 RX ADMIN — Medication 3 MILLILITER(S): at 07:29

## 2023-01-01 RX ADMIN — Medication 25 MILLIGRAM(S): at 17:39

## 2023-01-01 RX ADMIN — Medication 250 MILLIGRAM(S): at 17:09

## 2023-01-01 RX ADMIN — Medication 100 MILLIGRAM(S): at 22:46

## 2023-01-01 RX ADMIN — BUDESONIDE AND FORMOTEROL FUMARATE DIHYDRATE 2 PUFF(S): 160; 4.5 AEROSOL RESPIRATORY (INHALATION) at 08:50

## 2023-01-01 RX ADMIN — Medication 40 MILLIGRAM(S): at 05:15

## 2023-01-01 RX ADMIN — Medication 25 MILLIGRAM(S): at 17:42

## 2023-01-01 RX ADMIN — CEFEPIME 100 MILLIGRAM(S): 1 INJECTION, POWDER, FOR SOLUTION INTRAMUSCULAR; INTRAVENOUS at 17:40

## 2023-01-01 RX ADMIN — Medication 650 MILLIGRAM(S): at 05:51

## 2023-01-01 RX ADMIN — GABAPENTIN 100 MILLIGRAM(S): 400 CAPSULE ORAL at 05:41

## 2023-01-01 RX ADMIN — GABAPENTIN 100 MILLIGRAM(S): 400 CAPSULE ORAL at 05:12

## 2023-01-01 RX ADMIN — ENOXAPARIN SODIUM 80 MILLIGRAM(S): 100 INJECTION SUBCUTANEOUS at 17:21

## 2023-01-01 RX ADMIN — APIXABAN 10 MILLIGRAM(S): 2.5 TABLET, FILM COATED ORAL at 05:41

## 2023-01-01 RX ADMIN — Medication 650 MILLIGRAM(S): at 06:21

## 2023-01-01 RX ADMIN — POLYETHYLENE GLYCOL 3350 17 GRAM(S): 17 POWDER, FOR SOLUTION ORAL at 11:32

## 2023-01-01 RX ADMIN — Medication 40 MILLIGRAM(S): at 06:00

## 2023-01-01 RX ADMIN — Medication 25 MILLIGRAM(S): at 05:54

## 2023-01-01 RX ADMIN — Medication 40 MILLIGRAM(S): at 05:22

## 2023-01-01 RX ADMIN — Medication 25 MILLIGRAM(S): at 17:54

## 2023-01-01 RX ADMIN — SENNA PLUS 2 TABLET(S): 8.6 TABLET ORAL at 21:12

## 2023-01-01 RX ADMIN — HEPARIN SODIUM 5000 UNIT(S): 5000 INJECTION INTRAVENOUS; SUBCUTANEOUS at 23:29

## 2023-01-01 RX ADMIN — CHLORHEXIDINE GLUCONATE 1 APPLICATION(S): 213 SOLUTION TOPICAL at 06:49

## 2023-01-01 RX ADMIN — AZITHROMYCIN 255 MILLIGRAM(S): 500 TABLET, FILM COATED ORAL at 11:50

## 2023-01-01 RX ADMIN — ENOXAPARIN SODIUM 90 MILLIGRAM(S): 100 INJECTION SUBCUTANEOUS at 01:00

## 2023-01-01 RX ADMIN — AZITHROMYCIN 255 MILLIGRAM(S): 500 TABLET, FILM COATED ORAL at 12:05

## 2023-01-01 RX ADMIN — GABAPENTIN 100 MILLIGRAM(S): 400 CAPSULE ORAL at 21:53

## 2023-01-01 RX ADMIN — Medication 3 MILLILITER(S): at 09:29

## 2023-01-01 RX ADMIN — BUDESONIDE AND FORMOTEROL FUMARATE DIHYDRATE 2 PUFF(S): 160; 4.5 AEROSOL RESPIRATORY (INHALATION) at 08:54

## 2023-01-01 RX ADMIN — Medication 25 GRAM(S): at 11:33

## 2023-01-01 RX ADMIN — HEPARIN SODIUM 1600 UNIT(S)/HR: 5000 INJECTION INTRAVENOUS; SUBCUTANEOUS at 09:21

## 2023-01-01 RX ADMIN — CHLORHEXIDINE GLUCONATE 1 APPLICATION(S): 213 SOLUTION TOPICAL at 05:28

## 2023-01-01 RX ADMIN — Medication 25 GRAM(S): at 15:06

## 2023-01-01 RX ADMIN — Medication 3 MILLILITER(S): at 19:34

## 2023-01-01 RX ADMIN — CHLORHEXIDINE GLUCONATE 15 MILLILITER(S): 213 SOLUTION TOPICAL at 18:41

## 2023-01-01 RX ADMIN — ENOXAPARIN SODIUM 80 MILLIGRAM(S): 100 INJECTION SUBCUTANEOUS at 05:49

## 2023-01-01 RX ADMIN — CHLORHEXIDINE GLUCONATE 1 APPLICATION(S): 213 SOLUTION TOPICAL at 05:01

## 2023-01-01 RX ADMIN — HEPARIN SODIUM 1400 UNIT(S)/HR: 5000 INJECTION INTRAVENOUS; SUBCUTANEOUS at 10:10

## 2023-01-01 RX ADMIN — Medication 100 MILLIGRAM(S): at 06:12

## 2023-01-01 RX ADMIN — IRON SUCROSE 110 MILLIGRAM(S): 20 INJECTION, SOLUTION INTRAVENOUS at 08:30

## 2023-01-01 RX ADMIN — PANTOPRAZOLE SODIUM 40 MILLIGRAM(S): 20 TABLET, DELAYED RELEASE ORAL at 11:56

## 2023-01-01 RX ADMIN — Medication 25 MILLIGRAM(S): at 05:22

## 2023-01-01 RX ADMIN — Medication 40 MILLIGRAM(S): at 05:10

## 2023-01-01 RX ADMIN — NYSTATIN CREAM 1 APPLICATION(S): 100000 CREAM TOPICAL at 17:53

## 2023-01-01 RX ADMIN — ALBUTEROL 2 PUFF(S): 90 AEROSOL, METERED ORAL at 09:40

## 2023-01-01 RX ADMIN — PANTOPRAZOLE SODIUM 40 MILLIGRAM(S): 20 TABLET, DELAYED RELEASE ORAL at 11:37

## 2023-01-01 RX ADMIN — Medication 25 MILLIGRAM(S): at 05:34

## 2023-01-01 RX ADMIN — Medication 125 MILLIGRAM(S): at 13:33

## 2023-01-01 RX ADMIN — MUPIROCIN 1 APPLICATION(S): 20 OINTMENT TOPICAL at 05:29

## 2023-01-01 RX ADMIN — Medication 50 MILLIGRAM(S): at 21:54

## 2023-01-01 RX ADMIN — HEPARIN SODIUM 1400 UNIT(S)/HR: 5000 INJECTION INTRAVENOUS; SUBCUTANEOUS at 16:40

## 2023-01-01 RX ADMIN — APIXABAN 2.5 MILLIGRAM(S): 2.5 TABLET, FILM COATED ORAL at 23:00

## 2023-01-01 RX ADMIN — PIPERACILLIN AND TAZOBACTAM 25 GRAM(S): 4; .5 INJECTION, POWDER, LYOPHILIZED, FOR SOLUTION INTRAVENOUS at 21:24

## 2023-01-01 RX ADMIN — GABAPENTIN 100 MILLIGRAM(S): 400 CAPSULE ORAL at 15:58

## 2023-01-01 RX ADMIN — Medication 40 MILLIGRAM(S): at 05:23

## 2023-01-01 RX ADMIN — Medication 40 MILLIGRAM(S): at 08:24

## 2023-01-01 RX ADMIN — SODIUM ZIRCONIUM CYCLOSILICATE 10 GRAM(S): 10 POWDER, FOR SUSPENSION ORAL at 14:10

## 2023-01-01 RX ADMIN — GABAPENTIN 100 MILLIGRAM(S): 400 CAPSULE ORAL at 14:27

## 2023-01-01 RX ADMIN — CHLORHEXIDINE GLUCONATE 15 MILLILITER(S): 213 SOLUTION TOPICAL at 05:27

## 2023-01-01 RX ADMIN — Medication 250 MILLIGRAM(S): at 17:42

## 2023-01-01 RX ADMIN — ALBUTEROL 2 PUFF(S): 90 AEROSOL, METERED ORAL at 20:12

## 2023-01-01 RX ADMIN — GABAPENTIN 100 MILLIGRAM(S): 400 CAPSULE ORAL at 15:00

## 2023-01-01 RX ADMIN — PANTOPRAZOLE SODIUM 40 MILLIGRAM(S): 20 TABLET, DELAYED RELEASE ORAL at 12:40

## 2023-01-01 RX ADMIN — SENNA PLUS 2 TABLET(S): 8.6 TABLET ORAL at 22:00

## 2023-01-01 RX ADMIN — CHLORHEXIDINE GLUCONATE 1 APPLICATION(S): 213 SOLUTION TOPICAL at 11:56

## 2023-01-01 RX ADMIN — NYSTATIN CREAM 1 APPLICATION(S): 100000 CREAM TOPICAL at 05:14

## 2023-01-01 RX ADMIN — CHLORHEXIDINE GLUCONATE 1 APPLICATION(S): 213 SOLUTION TOPICAL at 06:11

## 2023-01-01 RX ADMIN — MAGNESIUM OXIDE 400 MG ORAL TABLET 400 MILLIGRAM(S): 241.3 TABLET ORAL at 17:50

## 2023-01-01 RX ADMIN — Medication 650 MILLIGRAM(S): at 13:33

## 2023-01-01 RX ADMIN — Medication 40 MILLIGRAM(S): at 17:07

## 2023-01-01 RX ADMIN — APIXABAN 2.5 MILLIGRAM(S): 2.5 TABLET, FILM COATED ORAL at 22:52

## 2023-01-01 RX ADMIN — APIXABAN 2.5 MILLIGRAM(S): 2.5 TABLET, FILM COATED ORAL at 06:10

## 2023-01-01 RX ADMIN — Medication 100 MILLIEQUIVALENT(S): at 17:37

## 2023-01-01 RX ADMIN — PANTOPRAZOLE SODIUM 40 MILLIGRAM(S): 20 TABLET, DELAYED RELEASE ORAL at 05:39

## 2023-01-01 RX ADMIN — PANTOPRAZOLE SODIUM 40 MILLIGRAM(S): 20 TABLET, DELAYED RELEASE ORAL at 17:51

## 2023-01-01 RX ADMIN — Medication 650 MILLIGRAM(S): at 21:54

## 2023-01-01 RX ADMIN — Medication 40 MILLIGRAM(S): at 06:50

## 2023-01-01 RX ADMIN — Medication 50 MILLIGRAM(S): at 09:27

## 2023-01-01 RX ADMIN — DEXMEDETOMIDINE HYDROCHLORIDE IN 0.9% SODIUM CHLORIDE 1.1 MICROGRAM(S)/KG/HR: 4 INJECTION INTRAVENOUS at 14:38

## 2023-01-01 RX ADMIN — BUDESONIDE AND FORMOTEROL FUMARATE DIHYDRATE 2 PUFF(S): 160; 4.5 AEROSOL RESPIRATORY (INHALATION) at 10:37

## 2023-01-01 RX ADMIN — Medication 25 MILLIGRAM(S): at 05:15

## 2023-01-01 RX ADMIN — Medication 3 MILLILITER(S): at 19:18

## 2023-01-01 RX ADMIN — Medication 3 MILLILITER(S): at 08:07

## 2023-01-01 RX ADMIN — DEXMEDETOMIDINE HYDROCHLORIDE IN 0.9% SODIUM CHLORIDE 1.1 MICROGRAM(S)/KG/HR: 4 INJECTION INTRAVENOUS at 05:12

## 2023-01-01 RX ADMIN — Medication 250 MILLIGRAM(S): at 05:48

## 2023-01-01 RX ADMIN — Medication 650 MILLIGRAM(S): at 19:42

## 2023-01-01 RX ADMIN — CHLORHEXIDINE GLUCONATE 15 MILLILITER(S): 213 SOLUTION TOPICAL at 18:20

## 2023-01-01 RX ADMIN — LACTULOSE 20 GRAM(S): 10 SOLUTION ORAL at 14:13

## 2023-01-01 RX ADMIN — ENOXAPARIN SODIUM 70 MILLIGRAM(S): 100 INJECTION SUBCUTANEOUS at 23:02

## 2023-01-01 RX ADMIN — Medication 40 MILLIGRAM(S): at 18:07

## 2023-01-01 RX ADMIN — MEROPENEM 100 MILLIGRAM(S): 1 INJECTION INTRAVENOUS at 17:39

## 2023-01-01 RX ADMIN — MEROPENEM 100 MILLIGRAM(S): 1 INJECTION INTRAVENOUS at 05:34

## 2023-01-01 RX ADMIN — Medication 975 MILLIGRAM(S): at 10:09

## 2023-01-01 RX ADMIN — POLYETHYLENE GLYCOL 3350 17 GRAM(S): 17 POWDER, FOR SOLUTION ORAL at 17:31

## 2023-01-01 RX ADMIN — Medication 650 MILLIGRAM(S): at 12:04

## 2023-01-01 RX ADMIN — POLYETHYLENE GLYCOL 3350 17 GRAM(S): 17 POWDER, FOR SOLUTION ORAL at 05:13

## 2023-01-01 RX ADMIN — Medication 300 MILLIGRAM(S): at 12:17

## 2023-01-01 RX ADMIN — Medication 40 MILLIGRAM(S): at 05:46

## 2023-01-01 RX ADMIN — NYSTATIN CREAM 1 APPLICATION(S): 100000 CREAM TOPICAL at 05:56

## 2023-01-01 RX ADMIN — Medication 166.67 MILLIGRAM(S): at 23:04

## 2023-01-01 RX ADMIN — HEPARIN SODIUM 2500 UNIT(S): 5000 INJECTION INTRAVENOUS; SUBCUTANEOUS at 01:31

## 2023-01-01 RX ADMIN — Medication 40 MILLIGRAM(S): at 05:32

## 2023-01-01 RX ADMIN — Medication 20 MILLIEQUIVALENT(S): at 13:54

## 2023-01-01 RX ADMIN — PROPOFOL 15.8 MICROGRAM(S)/KG/MIN: 10 INJECTION, EMULSION INTRAVENOUS at 22:02

## 2023-01-01 RX ADMIN — POLYETHYLENE GLYCOL 3350 17 GRAM(S): 17 POWDER, FOR SOLUTION ORAL at 18:07

## 2023-01-01 RX ADMIN — MAGNESIUM OXIDE 400 MG ORAL TABLET 400 MILLIGRAM(S): 241.3 TABLET ORAL at 08:57

## 2023-01-01 RX ADMIN — Medication 40 MILLIGRAM(S): at 10:01

## 2023-01-01 RX ADMIN — ACETAZOLAMIDE 110 MILLIGRAM(S): 250 TABLET ORAL at 11:46

## 2023-01-01 RX ADMIN — Medication 4: at 17:28

## 2023-01-01 RX ADMIN — ENOXAPARIN SODIUM 90 MILLIGRAM(S): 100 INJECTION SUBCUTANEOUS at 12:41

## 2023-01-01 RX ADMIN — Medication 650 MILLIGRAM(S): at 10:59

## 2023-01-01 RX ADMIN — Medication 25 MILLIGRAM(S): at 17:57

## 2023-01-01 RX ADMIN — Medication 40 MILLIGRAM(S): at 05:45

## 2023-01-01 RX ADMIN — Medication 100 MILLIGRAM(S): at 06:11

## 2023-01-01 RX ADMIN — Medication 400 MILLIGRAM(S): at 10:55

## 2023-01-01 RX ADMIN — Medication 250 MILLIGRAM(S): at 06:07

## 2023-01-01 RX ADMIN — Medication 325 MILLIGRAM(S): at 13:14

## 2023-01-01 RX ADMIN — SODIUM CHLORIDE 1500 MILLILITER(S): 9 INJECTION INTRAMUSCULAR; INTRAVENOUS; SUBCUTANEOUS at 11:15

## 2023-01-01 RX ADMIN — Medication 180 MILLIGRAM(S): at 05:33

## 2023-01-01 RX ADMIN — PANTOPRAZOLE SODIUM 40 MILLIGRAM(S): 20 TABLET, DELAYED RELEASE ORAL at 11:40

## 2023-01-01 RX ADMIN — APIXABAN 2.5 MILLIGRAM(S): 2.5 TABLET, FILM COATED ORAL at 23:20

## 2023-01-01 RX ADMIN — MAGNESIUM OXIDE 400 MG ORAL TABLET 400 MILLIGRAM(S): 241.3 TABLET ORAL at 11:49

## 2023-01-01 RX ADMIN — APIXABAN 2.5 MILLIGRAM(S): 2.5 TABLET, FILM COATED ORAL at 17:40

## 2023-01-01 RX ADMIN — Medication 3 MILLILITER(S): at 13:34

## 2023-01-01 RX ADMIN — ENOXAPARIN SODIUM 90 MILLIGRAM(S): 100 INJECTION SUBCUTANEOUS at 17:38

## 2023-01-01 RX ADMIN — CEFEPIME 100 MILLIGRAM(S): 1 INJECTION, POWDER, FOR SOLUTION INTRAMUSCULAR; INTRAVENOUS at 17:23

## 2023-01-01 RX ADMIN — GABAPENTIN 100 MILLIGRAM(S): 400 CAPSULE ORAL at 21:40

## 2023-01-01 RX ADMIN — Medication 40 MILLIGRAM(S): at 14:04

## 2023-01-01 RX ADMIN — SODIUM CHLORIDE 500 MILLILITER(S): 9 INJECTION INTRAMUSCULAR; INTRAVENOUS; SUBCUTANEOUS at 23:23

## 2023-01-01 RX ADMIN — CHLORHEXIDINE GLUCONATE 1 APPLICATION(S): 213 SOLUTION TOPICAL at 10:51

## 2023-01-01 RX ADMIN — ENOXAPARIN SODIUM 70 MILLIGRAM(S): 100 INJECTION SUBCUTANEOUS at 21:57

## 2023-01-01 RX ADMIN — Medication 40 MILLIGRAM(S): at 06:22

## 2023-01-01 RX ADMIN — Medication 25 GRAM(S): at 09:53

## 2023-01-01 RX ADMIN — Medication 3 MILLIGRAM(S): at 21:33

## 2023-01-01 RX ADMIN — ENOXAPARIN SODIUM 80 MILLIGRAM(S): 100 INJECTION SUBCUTANEOUS at 18:26

## 2023-01-01 RX ADMIN — CHLORHEXIDINE GLUCONATE 1 APPLICATION(S): 213 SOLUTION TOPICAL at 05:40

## 2023-01-01 RX ADMIN — Medication 3 MILLILITER(S): at 14:01

## 2023-01-01 RX ADMIN — Medication 100 GRAM(S): at 17:43

## 2023-01-01 RX ADMIN — Medication 250 MILLIGRAM(S): at 17:35

## 2023-01-01 RX ADMIN — Medication 40 MILLIGRAM(S): at 05:04

## 2023-01-01 RX ADMIN — Medication 25 GRAM(S): at 10:40

## 2023-01-01 RX ADMIN — SENNA PLUS 2 TABLET(S): 8.6 TABLET ORAL at 21:44

## 2023-01-01 RX ADMIN — GABAPENTIN 100 MILLIGRAM(S): 400 CAPSULE ORAL at 21:17

## 2023-01-01 RX ADMIN — Medication 120 MILLIGRAM(S): at 05:04

## 2023-01-01 RX ADMIN — Medication 25 MILLIGRAM(S): at 05:14

## 2023-01-01 RX ADMIN — Medication 20 MILLIGRAM(S): at 15:37

## 2023-01-01 RX ADMIN — Medication 3 MILLILITER(S): at 14:52

## 2023-01-01 RX ADMIN — Medication 100 MILLIGRAM(S): at 21:48

## 2023-01-01 RX ADMIN — POLYETHYLENE GLYCOL 3350 17 GRAM(S): 17 POWDER, FOR SOLUTION ORAL at 06:50

## 2023-01-01 RX ADMIN — Medication 25 MILLIGRAM(S): at 17:05

## 2023-01-01 RX ADMIN — CHLORHEXIDINE GLUCONATE 1 APPLICATION(S): 213 SOLUTION TOPICAL at 05:27

## 2023-01-01 RX ADMIN — NYSTATIN CREAM 1 APPLICATION(S): 100000 CREAM TOPICAL at 17:26

## 2023-01-01 RX ADMIN — GABAPENTIN 100 MILLIGRAM(S): 400 CAPSULE ORAL at 22:12

## 2023-01-01 RX ADMIN — Medication 250 MILLIGRAM(S): at 05:15

## 2023-01-01 RX ADMIN — POLYETHYLENE GLYCOL 3350 17 GRAM(S): 17 POWDER, FOR SOLUTION ORAL at 07:06

## 2023-01-01 RX ADMIN — Medication 3 MILLILITER(S): at 13:55

## 2023-01-01 RX ADMIN — Medication 25 MILLIGRAM(S): at 06:49

## 2023-01-01 RX ADMIN — Medication 650 MILLIGRAM(S): at 05:43

## 2023-01-01 RX ADMIN — GABAPENTIN 100 MILLIGRAM(S): 400 CAPSULE ORAL at 13:28

## 2023-01-01 RX ADMIN — MAGNESIUM OXIDE 400 MG ORAL TABLET 400 MILLIGRAM(S): 241.3 TABLET ORAL at 08:46

## 2023-01-01 RX ADMIN — Medication 1: at 18:25

## 2023-01-01 RX ADMIN — ALBUTEROL 2 PUFF(S): 90 AEROSOL, METERED ORAL at 23:11

## 2023-01-01 RX ADMIN — Medication 650 MILLIGRAM(S): at 00:47

## 2023-01-01 RX ADMIN — GABAPENTIN 100 MILLIGRAM(S): 400 CAPSULE ORAL at 00:01

## 2023-01-01 RX ADMIN — GABAPENTIN 100 MILLIGRAM(S): 400 CAPSULE ORAL at 21:05

## 2023-01-01 RX ADMIN — Medication 25 MILLIGRAM(S): at 17:27

## 2023-01-01 RX ADMIN — Medication 650 MILLIGRAM(S): at 18:25

## 2023-01-01 RX ADMIN — CHLORHEXIDINE GLUCONATE 1 APPLICATION(S): 213 SOLUTION TOPICAL at 05:49

## 2023-01-01 RX ADMIN — PANTOPRAZOLE SODIUM 40 MILLIGRAM(S): 20 TABLET, DELAYED RELEASE ORAL at 17:05

## 2023-01-01 RX ADMIN — MAGNESIUM OXIDE 400 MG ORAL TABLET 400 MILLIGRAM(S): 241.3 TABLET ORAL at 01:47

## 2023-01-01 RX ADMIN — NYSTATIN CREAM 1 APPLICATION(S): 100000 CREAM TOPICAL at 05:59

## 2023-01-01 RX ADMIN — ENOXAPARIN SODIUM 70 MILLIGRAM(S): 100 INJECTION SUBCUTANEOUS at 19:08

## 2023-01-01 RX ADMIN — NYSTATIN CREAM 1 APPLICATION(S): 100000 CREAM TOPICAL at 05:22

## 2023-01-01 RX ADMIN — PROPOFOL 4.62 MICROGRAM(S)/KG/MIN: 10 INJECTION, EMULSION INTRAVENOUS at 00:36

## 2023-01-01 RX ADMIN — GABAPENTIN 100 MILLIGRAM(S): 400 CAPSULE ORAL at 06:50

## 2023-01-01 RX ADMIN — Medication 1: at 11:38

## 2023-01-01 RX ADMIN — NYSTATIN CREAM 1 APPLICATION(S): 100000 CREAM TOPICAL at 17:12

## 2023-01-01 RX ADMIN — Medication 25 MILLIGRAM(S): at 05:44

## 2023-01-01 RX ADMIN — MIDAZOLAM HYDROCHLORIDE 2 MILLIGRAM(S): 1 INJECTION, SOLUTION INTRAMUSCULAR; INTRAVENOUS at 08:53

## 2023-01-01 RX ADMIN — FAMOTIDINE 100 MILLIGRAM(S): 10 INJECTION INTRAVENOUS at 21:20

## 2023-01-01 RX ADMIN — CHLORHEXIDINE GLUCONATE 1 APPLICATION(S): 213 SOLUTION TOPICAL at 05:22

## 2023-01-01 RX ADMIN — MIDAZOLAM HYDROCHLORIDE 2 MILLIGRAM(S): 1 INJECTION, SOLUTION INTRAMUSCULAR; INTRAVENOUS at 05:34

## 2023-01-01 RX ADMIN — POLYETHYLENE GLYCOL 3350 17 GRAM(S): 17 POWDER, FOR SOLUTION ORAL at 11:52

## 2023-01-01 RX ADMIN — LACTULOSE 20 GRAM(S): 10 SOLUTION ORAL at 13:13

## 2023-01-01 RX ADMIN — Medication 40 MILLIGRAM(S): at 05:13

## 2023-01-01 RX ADMIN — ACETAZOLAMIDE 250 MILLIGRAM(S): 250 TABLET ORAL at 18:02

## 2023-01-01 RX ADMIN — PANTOPRAZOLE SODIUM 40 MILLIGRAM(S): 20 TABLET, DELAYED RELEASE ORAL at 12:18

## 2023-01-01 RX ADMIN — PANTOPRAZOLE SODIUM 40 MILLIGRAM(S): 20 TABLET, DELAYED RELEASE ORAL at 12:41

## 2023-01-01 RX ADMIN — GABAPENTIN 100 MILLIGRAM(S): 400 CAPSULE ORAL at 22:03

## 2023-01-01 RX ADMIN — GABAPENTIN 100 MILLIGRAM(S): 400 CAPSULE ORAL at 13:49

## 2023-01-01 RX ADMIN — CHLORHEXIDINE GLUCONATE 1 APPLICATION(S): 213 SOLUTION TOPICAL at 06:04

## 2023-01-01 RX ADMIN — PANTOPRAZOLE SODIUM 40 MILLIGRAM(S): 20 TABLET, DELAYED RELEASE ORAL at 05:20

## 2023-01-01 RX ADMIN — ALBUTEROL 2 PUFF(S): 90 AEROSOL, METERED ORAL at 08:07

## 2023-01-01 RX ADMIN — Medication 3 MILLILITER(S): at 08:55

## 2023-01-01 RX ADMIN — Medication 250 MILLIGRAM(S): at 05:21

## 2023-01-01 RX ADMIN — HEPARIN SODIUM 1400 UNIT(S)/HR: 5000 INJECTION INTRAVENOUS; SUBCUTANEOUS at 20:54

## 2023-01-01 RX ADMIN — CEFEPIME 100 MILLIGRAM(S): 1 INJECTION, POWDER, FOR SOLUTION INTRAMUSCULAR; INTRAVENOUS at 05:21

## 2023-01-01 RX ADMIN — Medication 100 MILLIGRAM(S): at 05:03

## 2023-01-01 RX ADMIN — GABAPENTIN 100 MILLIGRAM(S): 400 CAPSULE ORAL at 13:30

## 2023-01-01 RX ADMIN — BUDESONIDE AND FORMOTEROL FUMARATE DIHYDRATE 2 PUFF(S): 160; 4.5 AEROSOL RESPIRATORY (INHALATION) at 08:58

## 2023-01-01 RX ADMIN — Medication 25 MILLIGRAM(S): at 07:04

## 2023-01-01 RX ADMIN — NYSTATIN CREAM 1 APPLICATION(S): 100000 CREAM TOPICAL at 17:52

## 2023-01-01 RX ADMIN — GABAPENTIN 100 MILLIGRAM(S): 400 CAPSULE ORAL at 06:40

## 2023-01-01 RX ADMIN — CHLORHEXIDINE GLUCONATE 1 APPLICATION(S): 213 SOLUTION TOPICAL at 05:24

## 2023-01-01 RX ADMIN — Medication 100 MILLIGRAM(S): at 17:50

## 2023-01-01 RX ADMIN — Medication 25 MILLIGRAM(S): at 17:30

## 2023-01-01 RX ADMIN — PANTOPRAZOLE SODIUM 40 MILLIGRAM(S): 20 TABLET, DELAYED RELEASE ORAL at 13:31

## 2023-01-01 RX ADMIN — SENNA PLUS 2 TABLET(S): 8.6 TABLET ORAL at 21:59

## 2023-01-01 RX ADMIN — Medication 3 MILLILITER(S): at 20:13

## 2023-01-01 RX ADMIN — MAGNESIUM OXIDE 400 MG ORAL TABLET 400 MILLIGRAM(S): 241.3 TABLET ORAL at 12:35

## 2023-01-01 RX ADMIN — APIXABAN 5 MILLIGRAM(S): 2.5 TABLET, FILM COATED ORAL at 06:08

## 2023-01-01 RX ADMIN — ENOXAPARIN SODIUM 90 MILLIGRAM(S): 100 INJECTION SUBCUTANEOUS at 13:50

## 2023-01-01 RX ADMIN — CHLORHEXIDINE GLUCONATE 1 APPLICATION(S): 213 SOLUTION TOPICAL at 22:40

## 2023-01-01 RX ADMIN — Medication 40 MILLIGRAM(S): at 06:36

## 2023-01-01 RX ADMIN — IRON SUCROSE 110 MILLIGRAM(S): 20 INJECTION, SOLUTION INTRAVENOUS at 11:56

## 2023-01-01 RX ADMIN — Medication 5 MILLIGRAM(S): at 21:43

## 2023-01-01 RX ADMIN — SENNA PLUS 2 TABLET(S): 8.6 TABLET ORAL at 22:10

## 2023-01-01 RX ADMIN — GABAPENTIN 100 MILLIGRAM(S): 400 CAPSULE ORAL at 14:07

## 2023-01-01 RX ADMIN — Medication 25 GRAM(S): at 12:30

## 2023-01-01 RX ADMIN — ENOXAPARIN SODIUM 90 MILLIGRAM(S): 100 INJECTION SUBCUTANEOUS at 01:01

## 2023-01-01 RX ADMIN — CEFEPIME 100 MILLIGRAM(S): 1 INJECTION, POWDER, FOR SOLUTION INTRAMUSCULAR; INTRAVENOUS at 05:23

## 2023-01-01 RX ADMIN — Medication 40 MILLIGRAM(S): at 05:27

## 2023-01-01 RX ADMIN — SENNA PLUS 2 TABLET(S): 8.6 TABLET ORAL at 22:51

## 2023-01-01 RX ADMIN — NYSTATIN CREAM 1 APPLICATION(S): 100000 CREAM TOPICAL at 05:18

## 2023-01-01 RX ADMIN — Medication 100 MILLIGRAM(S): at 05:29

## 2023-01-01 RX ADMIN — GABAPENTIN 100 MILLIGRAM(S): 400 CAPSULE ORAL at 05:01

## 2023-01-01 RX ADMIN — NYSTATIN CREAM 1 APPLICATION(S): 100000 CREAM TOPICAL at 17:55

## 2023-01-01 RX ADMIN — MEROPENEM 100 MILLIGRAM(S): 1 INJECTION INTRAVENOUS at 05:06

## 2023-01-01 RX ADMIN — PANTOPRAZOLE SODIUM 40 MILLIGRAM(S): 20 TABLET, DELAYED RELEASE ORAL at 12:35

## 2023-01-01 RX ADMIN — MAGNESIUM OXIDE 400 MG ORAL TABLET 400 MILLIGRAM(S): 241.3 TABLET ORAL at 12:10

## 2023-01-01 RX ADMIN — Medication 3 MILLILITER(S): at 16:39

## 2023-01-01 RX ADMIN — Medication 250 MILLIGRAM(S): at 17:39

## 2023-01-01 RX ADMIN — POLYETHYLENE GLYCOL 3350 17 GRAM(S): 17 POWDER, FOR SOLUTION ORAL at 06:41

## 2023-01-01 RX ADMIN — GABAPENTIN 100 MILLIGRAM(S): 400 CAPSULE ORAL at 21:38

## 2023-01-01 RX ADMIN — Medication 250 MILLIGRAM(S): at 22:03

## 2023-01-01 RX ADMIN — Medication 50 MILLIGRAM(S): at 05:47

## 2023-01-01 RX ADMIN — Medication 650 MILLIGRAM(S): at 06:13

## 2023-01-01 RX ADMIN — GABAPENTIN 100 MILLIGRAM(S): 400 CAPSULE ORAL at 04:57

## 2023-01-01 RX ADMIN — BUDESONIDE AND FORMOTEROL FUMARATE DIHYDRATE 2 PUFF(S): 160; 4.5 AEROSOL RESPIRATORY (INHALATION) at 21:50

## 2023-01-01 RX ADMIN — CHLORHEXIDINE GLUCONATE 1 APPLICATION(S): 213 SOLUTION TOPICAL at 06:43

## 2023-01-01 RX ADMIN — PANTOPRAZOLE SODIUM 40 MILLIGRAM(S): 20 TABLET, DELAYED RELEASE ORAL at 11:38

## 2023-01-01 RX ADMIN — PANTOPRAZOLE SODIUM 40 MILLIGRAM(S): 20 TABLET, DELAYED RELEASE ORAL at 17:16

## 2023-01-01 RX ADMIN — Medication 3 MILLIGRAM(S): at 21:16

## 2023-01-01 RX ADMIN — Medication 3 MILLILITER(S): at 08:06

## 2023-01-01 RX ADMIN — BUDESONIDE AND FORMOTEROL FUMARATE DIHYDRATE 2 PUFF(S): 160; 4.5 AEROSOL RESPIRATORY (INHALATION) at 21:29

## 2023-01-01 RX ADMIN — Medication 100 MILLIGRAM(S): at 23:23

## 2023-01-01 RX ADMIN — PANTOPRAZOLE SODIUM 40 MILLIGRAM(S): 20 TABLET, DELAYED RELEASE ORAL at 05:32

## 2023-01-01 RX ADMIN — PANTOPRAZOLE SODIUM 40 MILLIGRAM(S): 20 TABLET, DELAYED RELEASE ORAL at 05:13

## 2023-01-01 RX ADMIN — Medication 3 MILLIGRAM(S): at 02:33

## 2023-01-01 RX ADMIN — FENTANYL CITRATE 4.1 MICROGRAM(S)/KG/HR: 50 INJECTION INTRAVENOUS at 07:58

## 2023-01-01 RX ADMIN — POLYETHYLENE GLYCOL 3350 17 GRAM(S): 17 POWDER, FOR SOLUTION ORAL at 13:27

## 2023-01-01 RX ADMIN — Medication 25 MILLIGRAM(S): at 05:41

## 2023-01-01 RX ADMIN — CHLORHEXIDINE GLUCONATE 15 MILLILITER(S): 213 SOLUTION TOPICAL at 06:11

## 2023-01-01 RX ADMIN — Medication 25 MILLIGRAM(S): at 18:03

## 2023-01-01 RX ADMIN — Medication 3 MILLILITER(S): at 13:53

## 2023-01-01 RX ADMIN — Medication 2.5 MILLIGRAM(S): at 12:57

## 2023-01-01 RX ADMIN — GABAPENTIN 100 MILLIGRAM(S): 400 CAPSULE ORAL at 05:21

## 2023-01-01 RX ADMIN — CHLORHEXIDINE GLUCONATE 15 MILLILITER(S): 213 SOLUTION TOPICAL at 06:04

## 2023-01-01 RX ADMIN — Medication 25 MILLIGRAM(S): at 17:29

## 2023-01-01 RX ADMIN — POLYETHYLENE GLYCOL 3350 17 GRAM(S): 17 POWDER, FOR SOLUTION ORAL at 18:16

## 2023-01-01 RX ADMIN — Medication 40 MILLIGRAM(S): at 05:18

## 2023-01-01 RX ADMIN — SENNA PLUS 2 TABLET(S): 8.6 TABLET ORAL at 21:39

## 2023-01-01 RX ADMIN — MAGNESIUM OXIDE 400 MG ORAL TABLET 400 MILLIGRAM(S): 241.3 TABLET ORAL at 12:33

## 2023-01-01 RX ADMIN — Medication 40 MILLIGRAM(S): at 18:08

## 2023-01-01 RX ADMIN — Medication 25 MILLIGRAM(S): at 17:49

## 2023-01-01 RX ADMIN — Medication 25 MILLIGRAM(S): at 06:08

## 2023-01-01 RX ADMIN — DEXMEDETOMIDINE HYDROCHLORIDE IN 0.9% SODIUM CHLORIDE 1.1 MICROGRAM(S)/KG/HR: 4 INJECTION INTRAVENOUS at 06:34

## 2023-01-01 RX ADMIN — DEXMEDETOMIDINE HYDROCHLORIDE IN 0.9% SODIUM CHLORIDE 1.1 MICROGRAM(S)/KG/HR: 4 INJECTION INTRAVENOUS at 18:31

## 2023-01-01 RX ADMIN — SENNA PLUS 2 TABLET(S): 8.6 TABLET ORAL at 23:02

## 2023-01-01 RX ADMIN — POLYETHYLENE GLYCOL 3350 17 GRAM(S): 17 POWDER, FOR SOLUTION ORAL at 12:03

## 2023-01-01 RX ADMIN — CEFEPIME 100 MILLIGRAM(S): 1 INJECTION, POWDER, FOR SOLUTION INTRAMUSCULAR; INTRAVENOUS at 14:12

## 2023-01-01 RX ADMIN — CHLORHEXIDINE GLUCONATE 1 APPLICATION(S): 213 SOLUTION TOPICAL at 06:38

## 2023-01-01 RX ADMIN — CEFEPIME 100 MILLIGRAM(S): 1 INJECTION, POWDER, FOR SOLUTION INTRAMUSCULAR; INTRAVENOUS at 21:39

## 2023-01-01 RX ADMIN — Medication 25 MILLIGRAM(S): at 05:43

## 2023-01-01 RX ADMIN — CHLORHEXIDINE GLUCONATE 15 MILLILITER(S): 213 SOLUTION TOPICAL at 05:12

## 2023-01-01 RX ADMIN — APIXABAN 2.5 MILLIGRAM(S): 2.5 TABLET, FILM COATED ORAL at 17:11

## 2023-01-01 RX ADMIN — Medication 40 MILLIEQUIVALENT(S): at 14:21

## 2023-01-01 RX ADMIN — Medication 25 MILLIGRAM(S): at 17:46

## 2023-01-01 RX ADMIN — BUDESONIDE AND FORMOTEROL FUMARATE DIHYDRATE 2 PUFF(S): 160; 4.5 AEROSOL RESPIRATORY (INHALATION) at 08:07

## 2023-01-01 RX ADMIN — MEROPENEM 100 MILLIGRAM(S): 1 INJECTION INTRAVENOUS at 18:26

## 2023-01-01 RX ADMIN — MEROPENEM 100 MILLIGRAM(S): 1 INJECTION INTRAVENOUS at 16:52

## 2023-01-01 RX ADMIN — MIDAZOLAM HYDROCHLORIDE 2 MILLIGRAM(S): 1 INJECTION, SOLUTION INTRAMUSCULAR; INTRAVENOUS at 16:30

## 2023-01-01 RX ADMIN — POLYETHYLENE GLYCOL 3350 17 GRAM(S): 17 POWDER, FOR SOLUTION ORAL at 06:13

## 2023-01-01 RX ADMIN — Medication 25 MILLIGRAM(S): at 17:55

## 2023-01-01 RX ADMIN — POLYETHYLENE GLYCOL 3350 17 GRAM(S): 17 POWDER, FOR SOLUTION ORAL at 17:48

## 2023-01-01 RX ADMIN — CHLORHEXIDINE GLUCONATE 1 APPLICATION(S): 213 SOLUTION TOPICAL at 05:32

## 2023-01-01 RX ADMIN — Medication 25 MILLIGRAM(S): at 05:57

## 2023-01-01 RX ADMIN — NYSTATIN CREAM 1 APPLICATION(S): 100000 CREAM TOPICAL at 05:52

## 2023-01-01 RX ADMIN — APIXABAN 2.5 MILLIGRAM(S): 2.5 TABLET, FILM COATED ORAL at 06:50

## 2023-01-01 RX ADMIN — CHLORHEXIDINE GLUCONATE 1 APPLICATION(S): 213 SOLUTION TOPICAL at 06:01

## 2023-01-01 RX ADMIN — PANTOPRAZOLE SODIUM 40 MILLIGRAM(S): 20 TABLET, DELAYED RELEASE ORAL at 06:35

## 2023-01-01 RX ADMIN — Medication 180 MILLIGRAM(S): at 05:18

## 2023-01-01 RX ADMIN — Medication 25 GRAM(S): at 13:45

## 2023-01-01 RX ADMIN — APIXABAN 5 MILLIGRAM(S): 2.5 TABLET, FILM COATED ORAL at 05:13

## 2023-01-01 RX ADMIN — Medication 40 MILLIGRAM(S): at 11:36

## 2023-01-01 RX ADMIN — Medication 30 MILLIGRAM(S): at 05:04

## 2023-01-01 RX ADMIN — HYDROMORPHONE HYDROCHLORIDE 2 MILLIGRAM(S): 2 INJECTION INTRAMUSCULAR; INTRAVENOUS; SUBCUTANEOUS at 10:38

## 2023-01-01 RX ADMIN — Medication 1: at 12:19

## 2023-01-01 RX ADMIN — LACTULOSE 20 GRAM(S): 10 SOLUTION ORAL at 11:36

## 2023-01-01 RX ADMIN — PROPOFOL 4.62 MICROGRAM(S)/KG/MIN: 10 INJECTION, EMULSION INTRAVENOUS at 09:02

## 2023-01-01 RX ADMIN — GABAPENTIN 100 MILLIGRAM(S): 400 CAPSULE ORAL at 21:36

## 2023-01-01 RX ADMIN — Medication 3 MILLILITER(S): at 15:26

## 2023-01-01 RX ADMIN — Medication 40 MILLIGRAM(S): at 05:20

## 2023-01-01 RX ADMIN — Medication 50 MILLIEQUIVALENT(S): at 08:47

## 2023-01-01 RX ADMIN — GABAPENTIN 100 MILLIGRAM(S): 400 CAPSULE ORAL at 21:27

## 2023-01-01 RX ADMIN — NYSTATIN CREAM 1 APPLICATION(S): 100000 CREAM TOPICAL at 18:26

## 2023-01-01 RX ADMIN — AZITHROMYCIN 255 MILLIGRAM(S): 500 TABLET, FILM COATED ORAL at 13:35

## 2023-01-01 RX ADMIN — CHLORHEXIDINE GLUCONATE 15 MILLILITER(S): 213 SOLUTION TOPICAL at 18:07

## 2023-01-01 RX ADMIN — MUPIROCIN 1 APPLICATION(S): 20 OINTMENT TOPICAL at 11:35

## 2023-01-01 RX ADMIN — GABAPENTIN 100 MILLIGRAM(S): 400 CAPSULE ORAL at 13:27

## 2023-01-01 RX ADMIN — PANTOPRAZOLE SODIUM 40 MILLIGRAM(S): 20 TABLET, DELAYED RELEASE ORAL at 05:01

## 2023-01-01 RX ADMIN — NYSTATIN CREAM 1 APPLICATION(S): 100000 CREAM TOPICAL at 05:21

## 2023-01-01 RX ADMIN — ENOXAPARIN SODIUM 80 MILLIGRAM(S): 100 INJECTION SUBCUTANEOUS at 17:39

## 2023-01-01 RX ADMIN — CEFEPIME 100 MILLIGRAM(S): 1 INJECTION, POWDER, FOR SOLUTION INTRAMUSCULAR; INTRAVENOUS at 13:33

## 2023-01-01 RX ADMIN — BUDESONIDE AND FORMOTEROL FUMARATE DIHYDRATE 2 PUFF(S): 160; 4.5 AEROSOL RESPIRATORY (INHALATION) at 09:06

## 2023-01-01 RX ADMIN — Medication 180 MILLIGRAM(S): at 05:48

## 2023-01-01 RX ADMIN — Medication 25 MILLIGRAM(S): at 18:07

## 2023-01-01 RX ADMIN — GABAPENTIN 100 MILLIGRAM(S): 400 CAPSULE ORAL at 15:42

## 2023-01-01 RX ADMIN — Medication 250 MILLIGRAM(S): at 02:08

## 2023-01-01 RX ADMIN — Medication 25 MILLIGRAM(S): at 18:24

## 2023-01-01 RX ADMIN — Medication 40 MILLIGRAM(S): at 06:12

## 2023-01-01 RX ADMIN — Medication 25 MILLIGRAM(S): at 05:20

## 2023-01-01 RX ADMIN — Medication 25 MILLIGRAM(S): at 17:13

## 2023-01-01 RX ADMIN — APIXABAN 5 MILLIGRAM(S): 2.5 TABLET, FILM COATED ORAL at 05:37

## 2023-01-01 RX ADMIN — GABAPENTIN 100 MILLIGRAM(S): 400 CAPSULE ORAL at 21:52

## 2023-01-01 RX ADMIN — ENOXAPARIN SODIUM 90 MILLIGRAM(S): 100 INJECTION SUBCUTANEOUS at 18:20

## 2023-01-01 RX ADMIN — Medication 650 MILLIGRAM(S): at 15:49

## 2023-01-01 RX ADMIN — Medication 100 GRAM(S): at 11:22

## 2023-01-01 RX ADMIN — Medication 40 MILLIGRAM(S): at 10:29

## 2023-01-01 RX ADMIN — GABAPENTIN 100 MILLIGRAM(S): 400 CAPSULE ORAL at 06:34

## 2023-01-01 RX ADMIN — APIXABAN 5 MILLIGRAM(S): 2.5 TABLET, FILM COATED ORAL at 16:42

## 2023-01-01 RX ADMIN — APIXABAN 10 MILLIGRAM(S): 2.5 TABLET, FILM COATED ORAL at 05:57

## 2023-01-01 RX ADMIN — Medication 650 MILLIGRAM(S): at 09:33

## 2023-01-01 RX ADMIN — APIXABAN 2.5 MILLIGRAM(S): 2.5 TABLET, FILM COATED ORAL at 05:06

## 2023-01-01 RX ADMIN — SENNA PLUS 2 TABLET(S): 8.6 TABLET ORAL at 21:34

## 2023-01-01 RX ADMIN — CHLORHEXIDINE GLUCONATE 1 APPLICATION(S): 213 SOLUTION TOPICAL at 05:05

## 2023-01-01 RX ADMIN — PANTOPRAZOLE SODIUM 40 MILLIGRAM(S): 20 TABLET, DELAYED RELEASE ORAL at 05:35

## 2023-01-01 RX ADMIN — Medication 25 MILLIGRAM(S): at 05:51

## 2023-01-01 RX ADMIN — SENNA PLUS 2 TABLET(S): 8.6 TABLET ORAL at 21:07

## 2023-01-01 RX ADMIN — KETAMINE HYDROCHLORIDE 100 MILLIGRAM(S): 100 INJECTION INTRAMUSCULAR; INTRAVENOUS at 19:20

## 2023-01-01 RX ADMIN — Medication 3 MILLILITER(S): at 14:35

## 2023-01-01 RX ADMIN — Medication 3 MILLIGRAM(S): at 21:23

## 2023-01-01 RX ADMIN — CHLORHEXIDINE GLUCONATE 1 APPLICATION(S): 213 SOLUTION TOPICAL at 06:44

## 2023-01-01 RX ADMIN — Medication 250 MILLIGRAM(S): at 05:55

## 2023-01-01 RX ADMIN — PROPOFOL 15.8 MICROGRAM(S)/KG/MIN: 10 INJECTION, EMULSION INTRAVENOUS at 05:13

## 2023-01-01 RX ADMIN — Medication 1: at 06:07

## 2023-01-01 RX ADMIN — PANTOPRAZOLE SODIUM 40 MILLIGRAM(S): 20 TABLET, DELAYED RELEASE ORAL at 05:38

## 2023-01-01 RX ADMIN — NYSTATIN CREAM 1 APPLICATION(S): 100000 CREAM TOPICAL at 17:58

## 2023-01-01 RX ADMIN — POLYETHYLENE GLYCOL 3350 17 GRAM(S): 17 POWDER, FOR SOLUTION ORAL at 05:35

## 2023-01-01 RX ADMIN — Medication 40 MILLIGRAM(S): at 21:30

## 2023-01-01 RX ADMIN — Medication 250 MILLIGRAM(S): at 06:49

## 2023-01-01 RX ADMIN — Medication 650 MILLIGRAM(S): at 16:11

## 2023-01-01 RX ADMIN — APIXABAN 2.5 MILLIGRAM(S): 2.5 TABLET, FILM COATED ORAL at 13:32

## 2023-01-01 RX ADMIN — MIDAZOLAM HYDROCHLORIDE 4 MILLIGRAM(S): 1 INJECTION, SOLUTION INTRAMUSCULAR; INTRAVENOUS at 22:29

## 2023-01-01 RX ADMIN — GABAPENTIN 100 MILLIGRAM(S): 400 CAPSULE ORAL at 23:02

## 2023-01-01 RX ADMIN — GABAPENTIN 100 MILLIGRAM(S): 400 CAPSULE ORAL at 13:21

## 2023-01-01 RX ADMIN — FENTANYL CITRATE 4.1 MICROGRAM(S)/KG/HR: 50 INJECTION INTRAVENOUS at 02:20

## 2023-01-01 RX ADMIN — GABAPENTIN 100 MILLIGRAM(S): 400 CAPSULE ORAL at 14:18

## 2023-01-01 RX ADMIN — CEFEPIME 100 MILLIGRAM(S): 1 INJECTION, POWDER, FOR SOLUTION INTRAMUSCULAR; INTRAVENOUS at 18:07

## 2023-01-01 RX ADMIN — SENNA PLUS 2 TABLET(S): 8.6 TABLET ORAL at 21:17

## 2023-01-01 RX ADMIN — Medication 100 MILLIGRAM(S): at 19:58

## 2023-01-01 RX ADMIN — POLYETHYLENE GLYCOL 3350 17 GRAM(S): 17 POWDER, FOR SOLUTION ORAL at 05:12

## 2023-01-01 RX ADMIN — Medication 3 MILLILITER(S): at 20:03

## 2023-01-01 RX ADMIN — PROPOFOL 4.62 MICROGRAM(S)/KG/MIN: 10 INJECTION, EMULSION INTRAVENOUS at 15:57

## 2023-01-01 RX ADMIN — Medication 250 MILLIGRAM(S): at 11:28

## 2023-01-01 RX ADMIN — PANTOPRAZOLE SODIUM 40 MILLIGRAM(S): 20 TABLET, DELAYED RELEASE ORAL at 17:07

## 2023-01-01 RX ADMIN — GABAPENTIN 100 MILLIGRAM(S): 400 CAPSULE ORAL at 05:44

## 2023-01-01 RX ADMIN — Medication 40 MILLIGRAM(S): at 17:40

## 2023-01-01 RX ADMIN — HEPARIN SODIUM 5000 UNIT(S): 5000 INJECTION INTRAVENOUS; SUBCUTANEOUS at 17:27

## 2023-01-01 RX ADMIN — Medication 25 MILLIGRAM(S): at 14:21

## 2023-01-01 RX ADMIN — Medication 250 MILLIGRAM(S): at 18:25

## 2023-01-01 RX ADMIN — CEFEPIME 100 MILLIGRAM(S): 1 INJECTION, POWDER, FOR SOLUTION INTRAMUSCULAR; INTRAVENOUS at 12:58

## 2023-01-01 RX ADMIN — Medication 5 MILLIGRAM(S): at 21:21

## 2023-01-01 RX ADMIN — GABAPENTIN 100 MILLIGRAM(S): 400 CAPSULE ORAL at 21:49

## 2023-01-01 RX ADMIN — TIOTROPIUM BROMIDE 2 PUFF(S): 18 CAPSULE ORAL; RESPIRATORY (INHALATION) at 08:54

## 2023-01-01 RX ADMIN — GABAPENTIN 100 MILLIGRAM(S): 400 CAPSULE ORAL at 16:17

## 2023-01-01 RX ADMIN — Medication 10 MILLIGRAM(S): at 06:18

## 2023-01-01 RX ADMIN — FENTANYL CITRATE 4.1 MICROGRAM(S)/KG/HR: 50 INJECTION INTRAVENOUS at 20:25

## 2023-01-01 RX ADMIN — ACETAZOLAMIDE 110 MILLIGRAM(S): 250 TABLET ORAL at 12:55

## 2023-01-01 RX ADMIN — Medication 40 MILLIGRAM(S): at 14:46

## 2023-01-01 RX ADMIN — POLYETHYLENE GLYCOL 3350 17 GRAM(S): 17 POWDER, FOR SOLUTION ORAL at 18:19

## 2023-01-01 RX ADMIN — Medication 25 MILLIGRAM(S): at 05:12

## 2023-01-01 RX ADMIN — CHLORHEXIDINE GLUCONATE 1 APPLICATION(S): 213 SOLUTION TOPICAL at 05:26

## 2023-01-01 RX ADMIN — CHLORHEXIDINE GLUCONATE 15 MILLILITER(S): 213 SOLUTION TOPICAL at 18:35

## 2023-01-01 RX ADMIN — ENOXAPARIN SODIUM 70 MILLIGRAM(S): 100 INJECTION SUBCUTANEOUS at 17:24

## 2023-01-01 RX ADMIN — Medication 25 MILLIGRAM(S): at 06:09

## 2023-01-01 RX ADMIN — PANTOPRAZOLE SODIUM 40 MILLIGRAM(S): 20 TABLET, DELAYED RELEASE ORAL at 13:03

## 2023-01-01 RX ADMIN — DEXMEDETOMIDINE HYDROCHLORIDE IN 0.9% SODIUM CHLORIDE 1.1 MICROGRAM(S)/KG/HR: 4 INJECTION INTRAVENOUS at 18:28

## 2023-01-01 RX ADMIN — Medication 100 MILLIGRAM(S): at 05:39

## 2023-01-01 RX ADMIN — Medication 650 MILLIGRAM(S): at 16:41

## 2023-01-01 RX ADMIN — PROPOFOL 5.5 MICROGRAM(S)/KG/MIN: 10 INJECTION, EMULSION INTRAVENOUS at 00:00

## 2023-01-01 RX ADMIN — Medication 100 MILLIGRAM(S): at 18:36

## 2023-01-01 RX ADMIN — Medication 25 MILLIGRAM(S): at 08:45

## 2023-01-01 RX ADMIN — CEFTRIAXONE 100 MILLIGRAM(S): 500 INJECTION, POWDER, FOR SOLUTION INTRAMUSCULAR; INTRAVENOUS at 14:26

## 2023-01-01 RX ADMIN — Medication 5 MILLIGRAM(S): at 15:38

## 2023-01-01 RX ADMIN — ENOXAPARIN SODIUM 90 MILLIGRAM(S): 100 INJECTION SUBCUTANEOUS at 06:06

## 2023-01-01 RX ADMIN — PANTOPRAZOLE SODIUM 40 MILLIGRAM(S): 20 TABLET, DELAYED RELEASE ORAL at 08:47

## 2023-01-01 RX ADMIN — ETOMIDATE 20 MILLIGRAM(S): 2 INJECTION INTRAVENOUS at 23:22

## 2023-01-01 RX ADMIN — HEPARIN SODIUM 1600 UNIT(S)/HR: 5000 INJECTION INTRAVENOUS; SUBCUTANEOUS at 10:29

## 2023-01-01 RX ADMIN — Medication 100 MILLIGRAM(S): at 18:42

## 2023-01-01 RX ADMIN — NYSTATIN CREAM 1 APPLICATION(S): 100000 CREAM TOPICAL at 16:43

## 2023-01-01 RX ADMIN — CHLORHEXIDINE GLUCONATE 1 APPLICATION(S): 213 SOLUTION TOPICAL at 05:31

## 2023-01-01 RX ADMIN — Medication 40 MILLIGRAM(S): at 07:04

## 2023-01-01 RX ADMIN — APIXABAN 5 MILLIGRAM(S): 2.5 TABLET, FILM COATED ORAL at 17:55

## 2023-01-01 RX ADMIN — APIXABAN 2.5 MILLIGRAM(S): 2.5 TABLET, FILM COATED ORAL at 18:46

## 2023-01-01 RX ADMIN — GABAPENTIN 100 MILLIGRAM(S): 400 CAPSULE ORAL at 21:44

## 2023-01-01 RX ADMIN — Medication 325 MILLIGRAM(S): at 11:32

## 2023-01-01 RX ADMIN — Medication 30 MILLIGRAM(S): at 05:11

## 2023-01-01 RX ADMIN — CHLORHEXIDINE GLUCONATE 1 APPLICATION(S): 213 SOLUTION TOPICAL at 07:08

## 2023-01-01 RX ADMIN — Medication 100 MILLIGRAM(S): at 05:48

## 2023-01-01 RX ADMIN — APIXABAN 2.5 MILLIGRAM(S): 2.5 TABLET, FILM COATED ORAL at 14:44

## 2023-01-01 RX ADMIN — Medication 3 MILLILITER(S): at 13:29

## 2023-01-01 RX ADMIN — POLYETHYLENE GLYCOL 3350 17 GRAM(S): 17 POWDER, FOR SOLUTION ORAL at 18:40

## 2023-01-01 RX ADMIN — ENOXAPARIN SODIUM 90 MILLIGRAM(S): 100 INJECTION SUBCUTANEOUS at 05:11

## 2023-01-01 RX ADMIN — GABAPENTIN 100 MILLIGRAM(S): 400 CAPSULE ORAL at 15:05

## 2023-01-01 RX ADMIN — Medication 3 MILLILITER(S): at 20:21

## 2023-01-01 RX ADMIN — CEFEPIME 100 MILLIGRAM(S): 1 INJECTION, POWDER, FOR SOLUTION INTRAMUSCULAR; INTRAVENOUS at 22:16

## 2023-01-01 RX ADMIN — BUDESONIDE AND FORMOTEROL FUMARATE DIHYDRATE 2 PUFF(S): 160; 4.5 AEROSOL RESPIRATORY (INHALATION) at 07:51

## 2023-01-01 RX ADMIN — SENNA PLUS 2 TABLET(S): 8.6 TABLET ORAL at 21:21

## 2023-01-01 RX ADMIN — CHLORHEXIDINE GLUCONATE 15 MILLILITER(S): 213 SOLUTION TOPICAL at 17:06

## 2023-01-01 RX ADMIN — ENOXAPARIN SODIUM 80 MILLIGRAM(S): 100 INJECTION SUBCUTANEOUS at 17:47

## 2023-01-01 RX ADMIN — ENOXAPARIN SODIUM 80 MILLIGRAM(S): 100 INJECTION SUBCUTANEOUS at 05:50

## 2023-01-01 RX ADMIN — HEPARIN SODIUM 1600 UNIT(S)/HR: 5000 INJECTION INTRAVENOUS; SUBCUTANEOUS at 04:52

## 2023-01-01 RX ADMIN — SENNA PLUS 2 TABLET(S): 8.6 TABLET ORAL at 21:16

## 2023-01-01 RX ADMIN — APIXABAN 10 MILLIGRAM(S): 2.5 TABLET, FILM COATED ORAL at 17:51

## 2023-01-01 RX ADMIN — GABAPENTIN 100 MILLIGRAM(S): 400 CAPSULE ORAL at 21:16

## 2023-01-01 RX ADMIN — CHLORHEXIDINE GLUCONATE 15 MILLILITER(S): 213 SOLUTION TOPICAL at 05:13

## 2023-01-01 RX ADMIN — APIXABAN 5 MILLIGRAM(S): 2.5 TABLET, FILM COATED ORAL at 17:21

## 2023-01-01 RX ADMIN — CHLORHEXIDINE GLUCONATE 1 APPLICATION(S): 213 SOLUTION TOPICAL at 05:20

## 2023-01-01 RX ADMIN — ALBUTEROL 2 PUFF(S): 90 AEROSOL, METERED ORAL at 13:33

## 2023-01-01 RX ADMIN — PANTOPRAZOLE SODIUM 40 MILLIGRAM(S): 20 TABLET, DELAYED RELEASE ORAL at 18:24

## 2023-01-01 RX ADMIN — CEFEPIME 100 MILLIGRAM(S): 1 INJECTION, POWDER, FOR SOLUTION INTRAMUSCULAR; INTRAVENOUS at 05:00

## 2023-01-01 RX ADMIN — PROPOFOL 4.62 MICROGRAM(S)/KG/MIN: 10 INJECTION, EMULSION INTRAVENOUS at 05:14

## 2023-01-01 RX ADMIN — BUDESONIDE AND FORMOTEROL FUMARATE DIHYDRATE 2 PUFF(S): 160; 4.5 AEROSOL RESPIRATORY (INHALATION) at 09:28

## 2023-01-01 RX ADMIN — NYSTATIN CREAM 1 APPLICATION(S): 100000 CREAM TOPICAL at 05:26

## 2023-01-01 RX ADMIN — NYSTATIN CREAM 1 APPLICATION(S): 100000 CREAM TOPICAL at 17:27

## 2023-01-01 RX ADMIN — Medication 100 MILLIGRAM(S): at 13:33

## 2023-01-01 RX ADMIN — FENTANYL CITRATE 4.1 MICROGRAM(S)/KG/HR: 50 INJECTION INTRAVENOUS at 21:15

## 2023-01-01 RX ADMIN — GABAPENTIN 100 MILLIGRAM(S): 400 CAPSULE ORAL at 14:00

## 2023-01-01 RX ADMIN — GABAPENTIN 100 MILLIGRAM(S): 400 CAPSULE ORAL at 05:45

## 2023-01-01 RX ADMIN — GABAPENTIN 100 MILLIGRAM(S): 400 CAPSULE ORAL at 22:00

## 2023-01-01 RX ADMIN — Medication 40 MILLIGRAM(S): at 05:40

## 2023-01-01 RX ADMIN — ALBUTEROL 2 PUFF(S): 90 AEROSOL, METERED ORAL at 02:07

## 2023-01-01 RX ADMIN — MEROPENEM 100 MILLIGRAM(S): 1 INJECTION INTRAVENOUS at 21:36

## 2023-01-01 RX ADMIN — SODIUM CHLORIDE 500 MILLILITER(S): 9 INJECTION INTRAMUSCULAR; INTRAVENOUS; SUBCUTANEOUS at 20:48

## 2023-01-01 RX ADMIN — Medication 325 MILLIGRAM(S): at 12:27

## 2023-01-01 RX ADMIN — PANTOPRAZOLE SODIUM 40 MILLIGRAM(S): 20 TABLET, DELAYED RELEASE ORAL at 05:50

## 2023-01-01 RX ADMIN — MUPIROCIN 1 APPLICATION(S): 20 OINTMENT TOPICAL at 05:26

## 2023-01-01 RX ADMIN — Medication 2: at 17:08

## 2023-01-01 RX ADMIN — Medication 40 MILLIGRAM(S): at 05:38

## 2023-01-01 RX ADMIN — HEPARIN SODIUM 5000 UNIT(S): 5000 INJECTION INTRAVENOUS; SUBCUTANEOUS at 00:52

## 2023-01-01 RX ADMIN — APIXABAN 2.5 MILLIGRAM(S): 2.5 TABLET, FILM COATED ORAL at 05:11

## 2023-01-01 RX ADMIN — Medication 50 MILLIGRAM(S): at 12:28

## 2023-01-01 RX ADMIN — SENNA PLUS 2 TABLET(S): 8.6 TABLET ORAL at 22:56

## 2023-01-01 RX ADMIN — MAGNESIUM OXIDE 400 MG ORAL TABLET 400 MILLIGRAM(S): 241.3 TABLET ORAL at 08:54

## 2023-01-01 RX ADMIN — IRON SUCROSE 110 MILLIGRAM(S): 20 INJECTION, SOLUTION INTRAVENOUS at 08:23

## 2023-01-01 RX ADMIN — Medication 1 APPLICATION(S): at 17:49

## 2023-01-01 RX ADMIN — Medication 100 MILLIGRAM(S): at 17:58

## 2023-01-01 RX ADMIN — Medication 25 MILLIGRAM(S): at 17:51

## 2023-01-01 RX ADMIN — Medication 2.5 MILLIGRAM(S): at 17:58

## 2023-01-01 RX ADMIN — Medication 100 MILLIGRAM(S): at 06:50

## 2023-01-01 RX ADMIN — PIPERACILLIN AND TAZOBACTAM 25 GRAM(S): 4; .5 INJECTION, POWDER, LYOPHILIZED, FOR SOLUTION INTRAVENOUS at 21:57

## 2023-01-01 RX ADMIN — BUDESONIDE AND FORMOTEROL FUMARATE DIHYDRATE 2 PUFF(S): 160; 4.5 AEROSOL RESPIRATORY (INHALATION) at 12:56

## 2023-01-01 RX ADMIN — PIPERACILLIN AND TAZOBACTAM 25 GRAM(S): 4; .5 INJECTION, POWDER, LYOPHILIZED, FOR SOLUTION INTRAVENOUS at 05:57

## 2023-01-01 RX ADMIN — GABAPENTIN 100 MILLIGRAM(S): 400 CAPSULE ORAL at 05:16

## 2023-01-01 RX ADMIN — Medication 3 MILLILITER(S): at 09:04

## 2023-01-01 RX ADMIN — Medication 40 MILLIGRAM(S): at 13:15

## 2023-01-01 RX ADMIN — DEXMEDETOMIDINE HYDROCHLORIDE IN 0.9% SODIUM CHLORIDE 1.1 MICROGRAM(S)/KG/HR: 4 INJECTION INTRAVENOUS at 08:40

## 2023-01-01 RX ADMIN — Medication 3 MILLIGRAM(S): at 20:00

## 2023-01-01 RX ADMIN — HEPARIN SODIUM 13 UNIT(S)/HR: 5000 INJECTION INTRAVENOUS; SUBCUTANEOUS at 01:22

## 2023-01-01 RX ADMIN — Medication 3 MILLILITER(S): at 19:57

## 2023-01-01 RX ADMIN — Medication 650 MILLIGRAM(S): at 03:54

## 2023-01-01 RX ADMIN — BUDESONIDE AND FORMOTEROL FUMARATE DIHYDRATE 2 PUFF(S): 160; 4.5 AEROSOL RESPIRATORY (INHALATION) at 21:02

## 2023-01-01 RX ADMIN — Medication 250 MILLIGRAM(S): at 05:16

## 2023-01-01 RX ADMIN — Medication 250 MILLIGRAM(S): at 17:50

## 2023-01-01 RX ADMIN — Medication 100 MILLIGRAM(S): at 06:42

## 2023-01-01 RX ADMIN — FENTANYL CITRATE 4.1 MICROGRAM(S)/KG/HR: 50 INJECTION INTRAVENOUS at 20:20

## 2023-01-01 RX ADMIN — Medication 40 MILLIGRAM(S): at 14:28

## 2023-01-01 RX ADMIN — GABAPENTIN 100 MILLIGRAM(S): 400 CAPSULE ORAL at 21:15

## 2023-01-01 RX ADMIN — Medication 25 MILLIGRAM(S): at 17:41

## 2023-01-01 RX ADMIN — Medication 50 MILLIGRAM(S): at 05:03

## 2023-01-01 RX ADMIN — Medication 40 MILLIGRAM(S): at 05:30

## 2023-01-01 RX ADMIN — GABAPENTIN 100 MILLIGRAM(S): 400 CAPSULE ORAL at 13:52

## 2023-01-01 RX ADMIN — APIXABAN 2.5 MILLIGRAM(S): 2.5 TABLET, FILM COATED ORAL at 05:47

## 2023-01-01 RX ADMIN — GABAPENTIN 100 MILLIGRAM(S): 400 CAPSULE ORAL at 06:06

## 2023-01-01 RX ADMIN — GABAPENTIN 100 MILLIGRAM(S): 400 CAPSULE ORAL at 06:31

## 2023-01-01 RX ADMIN — PANTOPRAZOLE SODIUM 40 MILLIGRAM(S): 20 TABLET, DELAYED RELEASE ORAL at 12:15

## 2023-01-01 RX ADMIN — GABAPENTIN 100 MILLIGRAM(S): 400 CAPSULE ORAL at 14:11

## 2023-01-01 RX ADMIN — POLYETHYLENE GLYCOL 3350 17 GRAM(S): 17 POWDER, FOR SOLUTION ORAL at 17:19

## 2023-01-01 RX ADMIN — GABAPENTIN 100 MILLIGRAM(S): 400 CAPSULE ORAL at 05:50

## 2023-01-01 RX ADMIN — FENTANYL CITRATE 4.1 MICROGRAM(S)/KG/HR: 50 INJECTION INTRAVENOUS at 12:56

## 2023-01-01 RX ADMIN — Medication 2: at 12:14

## 2023-01-01 RX ADMIN — CHLORHEXIDINE GLUCONATE 15 MILLILITER(S): 213 SOLUTION TOPICAL at 05:15

## 2023-01-01 RX ADMIN — ACETAZOLAMIDE 110 MILLIGRAM(S): 250 TABLET ORAL at 13:32

## 2023-01-01 RX ADMIN — DEXMEDETOMIDINE HYDROCHLORIDE IN 0.9% SODIUM CHLORIDE 1.1 MICROGRAM(S)/KG/HR: 4 INJECTION INTRAVENOUS at 00:45

## 2023-01-01 RX ADMIN — APIXABAN 5 MILLIGRAM(S): 2.5 TABLET, FILM COATED ORAL at 06:11

## 2023-01-01 RX ADMIN — Medication 325 MILLIGRAM(S): at 11:52

## 2023-01-01 RX ADMIN — APIXABAN 2.5 MILLIGRAM(S): 2.5 TABLET, FILM COATED ORAL at 17:43

## 2023-01-01 RX ADMIN — ENOXAPARIN SODIUM 90 MILLIGRAM(S): 100 INJECTION SUBCUTANEOUS at 05:28

## 2023-01-01 RX ADMIN — TIOTROPIUM BROMIDE 2 PUFF(S): 18 CAPSULE ORAL; RESPIRATORY (INHALATION) at 09:33

## 2023-01-01 RX ADMIN — PANTOPRAZOLE SODIUM 40 MILLIGRAM(S): 20 TABLET, DELAYED RELEASE ORAL at 06:49

## 2023-01-01 RX ADMIN — HEPARIN SODIUM 5000 UNIT(S): 5000 INJECTION INTRAVENOUS; SUBCUTANEOUS at 23:51

## 2023-01-01 RX ADMIN — CHLORHEXIDINE GLUCONATE 1 APPLICATION(S): 213 SOLUTION TOPICAL at 06:15

## 2023-01-01 RX ADMIN — PANTOPRAZOLE SODIUM 40 MILLIGRAM(S): 20 TABLET, DELAYED RELEASE ORAL at 05:00

## 2023-01-01 RX ADMIN — APIXABAN 2.5 MILLIGRAM(S): 2.5 TABLET, FILM COATED ORAL at 23:17

## 2023-01-01 RX ADMIN — DEXMEDETOMIDINE HYDROCHLORIDE IN 0.9% SODIUM CHLORIDE 1.1 MICROGRAM(S)/KG/HR: 4 INJECTION INTRAVENOUS at 20:00

## 2023-01-01 RX ADMIN — MAGNESIUM OXIDE 400 MG ORAL TABLET 400 MILLIGRAM(S): 241.3 TABLET ORAL at 13:40

## 2023-01-01 RX ADMIN — Medication 3 MILLILITER(S): at 20:15

## 2023-01-01 RX ADMIN — BUDESONIDE AND FORMOTEROL FUMARATE DIHYDRATE 2 PUFF(S): 160; 4.5 AEROSOL RESPIRATORY (INHALATION) at 11:29

## 2023-01-01 RX ADMIN — POLYETHYLENE GLYCOL 3350 17 GRAM(S): 17 POWDER, FOR SOLUTION ORAL at 05:52

## 2023-01-01 RX ADMIN — PANTOPRAZOLE SODIUM 40 MILLIGRAM(S): 20 TABLET, DELAYED RELEASE ORAL at 05:52

## 2023-01-01 RX ADMIN — Medication 120 MILLIGRAM(S): at 05:03

## 2023-01-01 RX ADMIN — Medication 25 GRAM(S): at 16:41

## 2023-01-01 RX ADMIN — PROPOFOL 15.8 MICROGRAM(S)/KG/MIN: 10 INJECTION, EMULSION INTRAVENOUS at 17:38

## 2023-01-01 RX ADMIN — Medication 25 MILLIGRAM(S): at 06:36

## 2023-01-01 RX ADMIN — LACTULOSE 20 GRAM(S): 10 SOLUTION ORAL at 06:50

## 2023-01-01 RX ADMIN — Medication 40 MILLIGRAM(S): at 05:47

## 2023-01-01 RX ADMIN — GABAPENTIN 100 MILLIGRAM(S): 400 CAPSULE ORAL at 13:25

## 2023-01-01 RX ADMIN — Medication 40 MILLIGRAM(S): at 06:10

## 2023-01-01 RX ADMIN — FENTANYL CITRATE 4.1 MICROGRAM(S)/KG/HR: 50 INJECTION INTRAVENOUS at 06:10

## 2023-01-01 RX ADMIN — Medication 25 MILLIGRAM(S): at 17:23

## 2023-01-01 RX ADMIN — Medication 100 MILLIGRAM(S): at 18:45

## 2023-01-01 RX ADMIN — CHLORHEXIDINE GLUCONATE 15 MILLILITER(S): 213 SOLUTION TOPICAL at 06:15

## 2023-01-01 RX ADMIN — TIOTROPIUM BROMIDE 2 PUFF(S): 18 CAPSULE ORAL; RESPIRATORY (INHALATION) at 08:08

## 2023-01-01 RX ADMIN — Medication 650 MILLIGRAM(S): at 15:56

## 2023-01-01 RX ADMIN — Medication 20 MILLIEQUIVALENT(S): at 14:02

## 2023-01-01 RX ADMIN — Medication 100 MILLIGRAM(S): at 18:27

## 2023-01-01 RX ADMIN — POLYETHYLENE GLYCOL 3350 17 GRAM(S): 17 POWDER, FOR SOLUTION ORAL at 17:29

## 2023-01-01 RX ADMIN — HEPARIN SODIUM 5000 UNIT(S): 5000 INJECTION INTRAVENOUS; SUBCUTANEOUS at 01:05

## 2023-01-01 RX ADMIN — CEFEPIME 100 MILLIGRAM(S): 1 INJECTION, POWDER, FOR SOLUTION INTRAMUSCULAR; INTRAVENOUS at 06:11

## 2023-01-01 RX ADMIN — APIXABAN 5 MILLIGRAM(S): 2.5 TABLET, FILM COATED ORAL at 17:26

## 2023-01-01 RX ADMIN — Medication 2.5 MILLIGRAM(S): at 23:48

## 2023-01-01 RX ADMIN — Medication 30 MILLILITER(S): at 08:09

## 2023-01-01 RX ADMIN — ALBUTEROL 2 PUFF(S): 90 AEROSOL, METERED ORAL at 23:26

## 2023-01-01 RX ADMIN — GABAPENTIN 100 MILLIGRAM(S): 400 CAPSULE ORAL at 22:15

## 2023-01-01 RX ADMIN — POLYETHYLENE GLYCOL 3350 17 GRAM(S): 17 POWDER, FOR SOLUTION ORAL at 18:25

## 2023-01-01 RX ADMIN — SENNA PLUS 2 TABLET(S): 8.6 TABLET ORAL at 21:40

## 2023-01-01 RX ADMIN — NYSTATIN CREAM 1 APPLICATION(S): 100000 CREAM TOPICAL at 06:49

## 2023-01-01 RX ADMIN — GABAPENTIN 100 MILLIGRAM(S): 400 CAPSULE ORAL at 07:04

## 2023-01-01 RX ADMIN — APIXABAN 2.5 MILLIGRAM(S): 2.5 TABLET, FILM COATED ORAL at 11:34

## 2023-01-01 RX ADMIN — GABAPENTIN 100 MILLIGRAM(S): 400 CAPSULE ORAL at 15:41

## 2023-01-01 RX ADMIN — NYSTATIN CREAM 1 APPLICATION(S): 100000 CREAM TOPICAL at 18:08

## 2023-01-01 RX ADMIN — Medication 100 MILLIGRAM(S): at 05:28

## 2023-01-01 RX ADMIN — Medication 40 MILLIGRAM(S): at 06:30

## 2023-01-01 RX ADMIN — Medication 50 MILLIGRAM(S): at 13:13

## 2023-01-01 RX ADMIN — BUDESONIDE AND FORMOTEROL FUMARATE DIHYDRATE 2 PUFF(S): 160; 4.5 AEROSOL RESPIRATORY (INHALATION) at 08:47

## 2023-01-01 RX ADMIN — Medication 50 MILLIEQUIVALENT(S): at 14:09

## 2023-01-01 RX ADMIN — APIXABAN 2.5 MILLIGRAM(S): 2.5 TABLET, FILM COATED ORAL at 11:47

## 2023-01-01 RX ADMIN — MIDAZOLAM HYDROCHLORIDE 1.64 MG/KG/HR: 1 INJECTION, SOLUTION INTRAMUSCULAR; INTRAVENOUS at 06:03

## 2023-01-01 RX ADMIN — Medication 40 MILLIGRAM(S): at 17:51

## 2023-01-01 RX ADMIN — Medication 5 MILLIGRAM(S): at 05:15

## 2023-01-01 RX ADMIN — POLYETHYLENE GLYCOL 3350 17 GRAM(S): 17 POWDER, FOR SOLUTION ORAL at 17:20

## 2023-01-01 RX ADMIN — BUDESONIDE AND FORMOTEROL FUMARATE DIHYDRATE 2 PUFF(S): 160; 4.5 AEROSOL RESPIRATORY (INHALATION) at 08:46

## 2023-01-01 RX ADMIN — ALBUTEROL 2 PUFF(S): 90 AEROSOL, METERED ORAL at 20:00

## 2023-01-01 RX ADMIN — BUDESONIDE AND FORMOTEROL FUMARATE DIHYDRATE 2 PUFF(S): 160; 4.5 AEROSOL RESPIRATORY (INHALATION) at 07:52

## 2023-01-01 RX ADMIN — Medication 3 MILLILITER(S): at 07:39

## 2023-01-01 RX ADMIN — HEPARIN SODIUM 1400 UNIT(S)/HR: 5000 INJECTION INTRAVENOUS; SUBCUTANEOUS at 21:37

## 2023-01-01 RX ADMIN — Medication 3 MILLILITER(S): at 16:41

## 2023-01-01 RX ADMIN — MAGNESIUM OXIDE 400 MG ORAL TABLET 400 MILLIGRAM(S): 241.3 TABLET ORAL at 11:32

## 2023-01-01 RX ADMIN — Medication 3 MILLILITER(S): at 20:51

## 2023-01-01 RX ADMIN — Medication 1 APPLICATION(S): at 21:49

## 2023-01-01 RX ADMIN — BUDESONIDE AND FORMOTEROL FUMARATE DIHYDRATE 2 PUFF(S): 160; 4.5 AEROSOL RESPIRATORY (INHALATION) at 09:49

## 2023-01-01 RX ADMIN — GABAPENTIN 100 MILLIGRAM(S): 400 CAPSULE ORAL at 06:12

## 2023-01-01 RX ADMIN — CHLORHEXIDINE GLUCONATE 1 APPLICATION(S): 213 SOLUTION TOPICAL at 05:15

## 2023-01-01 RX ADMIN — Medication 15 MILLILITER(S): at 19:53

## 2023-01-01 RX ADMIN — DEXMEDETOMIDINE HYDROCHLORIDE IN 0.9% SODIUM CHLORIDE 1.1 MICROGRAM(S)/KG/HR: 4 INJECTION INTRAVENOUS at 06:08

## 2023-01-01 RX ADMIN — GABAPENTIN 100 MILLIGRAM(S): 400 CAPSULE ORAL at 06:51

## 2023-01-01 RX ADMIN — CEFTRIAXONE 100 MILLIGRAM(S): 500 INJECTION, POWDER, FOR SOLUTION INTRAMUSCULAR; INTRAVENOUS at 05:18

## 2023-01-01 RX ADMIN — GABAPENTIN 100 MILLIGRAM(S): 400 CAPSULE ORAL at 21:41

## 2023-01-01 RX ADMIN — SENNA PLUS 2 TABLET(S): 8.6 TABLET ORAL at 21:31

## 2023-01-01 RX ADMIN — GABAPENTIN 100 MILLIGRAM(S): 400 CAPSULE ORAL at 13:37

## 2023-01-01 RX ADMIN — Medication 3 MILLILITER(S): at 07:31

## 2023-01-01 RX ADMIN — Medication 120 MILLIGRAM(S): at 05:30

## 2023-01-01 RX ADMIN — Medication 25 MILLIGRAM(S): at 17:36

## 2023-01-01 RX ADMIN — BUDESONIDE AND FORMOTEROL FUMARATE DIHYDRATE 2 PUFF(S): 160; 4.5 AEROSOL RESPIRATORY (INHALATION) at 22:38

## 2023-01-01 RX ADMIN — Medication 60 MILLIGRAM(S): at 05:21

## 2023-01-01 RX ADMIN — MUPIROCIN 1 APPLICATION(S): 20 OINTMENT TOPICAL at 06:19

## 2023-01-01 RX ADMIN — NYSTATIN CREAM 1 APPLICATION(S): 100000 CREAM TOPICAL at 05:06

## 2023-01-01 RX ADMIN — Medication 40 MILLIGRAM(S): at 06:47

## 2023-01-01 RX ADMIN — Medication 180 MILLIGRAM(S): at 05:30

## 2023-01-01 RX ADMIN — Medication 3 MILLILITER(S): at 02:51

## 2023-01-01 RX ADMIN — SENNA PLUS 2 TABLET(S): 8.6 TABLET ORAL at 21:20

## 2023-01-01 RX ADMIN — CHLORHEXIDINE GLUCONATE 15 MILLILITER(S): 213 SOLUTION TOPICAL at 17:53

## 2023-01-01 RX ADMIN — APIXABAN 10 MILLIGRAM(S): 2.5 TABLET, FILM COATED ORAL at 17:54

## 2023-01-01 RX ADMIN — APIXABAN 2.5 MILLIGRAM(S): 2.5 TABLET, FILM COATED ORAL at 05:50

## 2023-01-01 RX ADMIN — Medication 100 MILLIGRAM(S): at 21:58

## 2023-01-01 RX ADMIN — GABAPENTIN 100 MILLIGRAM(S): 400 CAPSULE ORAL at 05:23

## 2023-01-01 RX ADMIN — Medication 650 MILLIGRAM(S): at 00:16

## 2023-01-01 RX ADMIN — GABAPENTIN 100 MILLIGRAM(S): 400 CAPSULE ORAL at 07:09

## 2023-01-01 RX ADMIN — PIPERACILLIN AND TAZOBACTAM 25 GRAM(S): 4; .5 INJECTION, POWDER, LYOPHILIZED, FOR SOLUTION INTRAVENOUS at 14:18

## 2023-01-01 RX ADMIN — APIXABAN 2.5 MILLIGRAM(S): 2.5 TABLET, FILM COATED ORAL at 23:09

## 2023-01-01 RX ADMIN — DEXMEDETOMIDINE HYDROCHLORIDE IN 0.9% SODIUM CHLORIDE 1.1 MICROGRAM(S)/KG/HR: 4 INJECTION INTRAVENOUS at 12:56

## 2023-01-01 RX ADMIN — GABAPENTIN 100 MILLIGRAM(S): 400 CAPSULE ORAL at 05:40

## 2023-01-01 RX ADMIN — Medication 100 MILLIGRAM(S): at 13:13

## 2023-01-01 RX ADMIN — GABAPENTIN 100 MILLIGRAM(S): 400 CAPSULE ORAL at 22:39

## 2023-01-01 RX ADMIN — TIOTROPIUM BROMIDE 2 PUFF(S): 18 CAPSULE ORAL; RESPIRATORY (INHALATION) at 08:46

## 2023-01-01 RX ADMIN — Medication 3 MILLILITER(S): at 08:03

## 2023-01-01 RX ADMIN — GABAPENTIN 100 MILLIGRAM(S): 400 CAPSULE ORAL at 05:56

## 2023-01-01 RX ADMIN — Medication 3 MILLILITER(S): at 08:19

## 2023-01-01 RX ADMIN — Medication 100 MILLIGRAM(S): at 21:52

## 2023-01-01 RX ADMIN — Medication 25 MILLIGRAM(S): at 17:50

## 2023-01-01 RX ADMIN — CHLORHEXIDINE GLUCONATE 1 APPLICATION(S): 213 SOLUTION TOPICAL at 06:08

## 2023-01-01 RX ADMIN — SENNA PLUS 2 TABLET(S): 8.6 TABLET ORAL at 21:45

## 2023-01-01 RX ADMIN — Medication 250 MILLIGRAM(S): at 05:36

## 2023-01-01 RX ADMIN — Medication 166.67 MILLIGRAM(S): at 09:47

## 2023-01-01 RX ADMIN — Medication 5 MILLIGRAM(S): at 00:16

## 2023-01-01 RX ADMIN — CHLORHEXIDINE GLUCONATE 1 APPLICATION(S): 213 SOLUTION TOPICAL at 04:58

## 2023-01-01 RX ADMIN — Medication 120 MILLIGRAM(S): at 05:47

## 2023-01-01 RX ADMIN — CHLORHEXIDINE GLUCONATE 1 APPLICATION(S): 213 SOLUTION TOPICAL at 05:13

## 2023-01-01 RX ADMIN — Medication 3 MILLILITER(S): at 13:56

## 2023-01-01 RX ADMIN — ALBUTEROL 2 PUFF(S): 90 AEROSOL, METERED ORAL at 11:58

## 2023-01-01 RX ADMIN — DEXMEDETOMIDINE HYDROCHLORIDE IN 0.9% SODIUM CHLORIDE 1.1 MICROGRAM(S)/KG/HR: 4 INJECTION INTRAVENOUS at 20:19

## 2023-01-01 RX ADMIN — NYSTATIN CREAM 1 APPLICATION(S): 100000 CREAM TOPICAL at 06:14

## 2023-01-01 RX ADMIN — APIXABAN 10 MILLIGRAM(S): 2.5 TABLET, FILM COATED ORAL at 05:46

## 2023-01-01 RX ADMIN — BUDESONIDE AND FORMOTEROL FUMARATE DIHYDRATE 2 PUFF(S): 160; 4.5 AEROSOL RESPIRATORY (INHALATION) at 08:00

## 2023-01-01 RX ADMIN — SENNA PLUS 2 TABLET(S): 8.6 TABLET ORAL at 21:41

## 2023-01-01 RX ADMIN — Medication 40 MILLIGRAM(S): at 06:07

## 2023-01-01 RX ADMIN — NYSTATIN CREAM 1 APPLICATION(S): 100000 CREAM TOPICAL at 06:12

## 2023-01-01 RX ADMIN — GABAPENTIN 100 MILLIGRAM(S): 400 CAPSULE ORAL at 06:44

## 2023-01-01 RX ADMIN — Medication 250 MILLIGRAM(S): at 17:24

## 2023-01-01 RX ADMIN — Medication 180 MILLIGRAM(S): at 05:06

## 2023-01-01 RX ADMIN — PANTOPRAZOLE SODIUM 40 MILLIGRAM(S): 20 TABLET, DELAYED RELEASE ORAL at 07:08

## 2023-01-01 RX ADMIN — Medication 100 MILLIGRAM(S): at 15:52

## 2023-01-01 RX ADMIN — Medication 25 GRAM(S): at 17:37

## 2023-01-01 RX ADMIN — MAGNESIUM OXIDE 400 MG ORAL TABLET 400 MILLIGRAM(S): 241.3 TABLET ORAL at 08:52

## 2023-01-01 RX ADMIN — Medication 650 MILLIGRAM(S): at 21:23

## 2023-01-01 RX ADMIN — Medication 30 MILLIGRAM(S): at 06:22

## 2023-01-01 RX ADMIN — FENTANYL CITRATE 4.1 MICROGRAM(S)/KG/HR: 50 INJECTION INTRAVENOUS at 06:24

## 2023-01-01 RX ADMIN — POLYETHYLENE GLYCOL 3350 17 GRAM(S): 17 POWDER, FOR SOLUTION ORAL at 05:43

## 2023-01-01 RX ADMIN — GABAPENTIN 100 MILLIGRAM(S): 400 CAPSULE ORAL at 13:15

## 2023-01-01 RX ADMIN — PIPERACILLIN AND TAZOBACTAM 200 GRAM(S): 4; .5 INJECTION, POWDER, LYOPHILIZED, FOR SOLUTION INTRAVENOUS at 18:56

## 2023-01-01 RX ADMIN — Medication 650 MILLIGRAM(S): at 14:02

## 2023-01-01 RX ADMIN — Medication 20 MILLIGRAM(S): at 05:59

## 2023-01-01 RX ADMIN — PANTOPRAZOLE SODIUM 40 MILLIGRAM(S): 20 TABLET, DELAYED RELEASE ORAL at 11:36

## 2023-01-01 RX ADMIN — Medication 1: at 06:27

## 2023-01-01 RX ADMIN — Medication 25 MILLIGRAM(S): at 05:55

## 2023-01-01 RX ADMIN — GABAPENTIN 100 MILLIGRAM(S): 400 CAPSULE ORAL at 14:44

## 2023-01-01 RX ADMIN — CHLORHEXIDINE GLUCONATE 15 MILLILITER(S): 213 SOLUTION TOPICAL at 17:48

## 2023-01-01 RX ADMIN — CHLORHEXIDINE GLUCONATE 15 MILLILITER(S): 213 SOLUTION TOPICAL at 17:18

## 2023-01-01 RX ADMIN — CHLORHEXIDINE GLUCONATE 15 MILLILITER(S): 213 SOLUTION TOPICAL at 05:57

## 2023-01-01 RX ADMIN — GABAPENTIN 100 MILLIGRAM(S): 400 CAPSULE ORAL at 13:35

## 2023-01-01 RX ADMIN — CHLORHEXIDINE GLUCONATE 1 APPLICATION(S): 213 SOLUTION TOPICAL at 05:35

## 2023-01-01 RX ADMIN — Medication 25 MILLIGRAM(S): at 07:06

## 2023-01-01 RX ADMIN — Medication 100 MILLIGRAM(S): at 17:40

## 2023-01-01 RX ADMIN — CEFEPIME 100 MILLIGRAM(S): 1 INJECTION, POWDER, FOR SOLUTION INTRAMUSCULAR; INTRAVENOUS at 14:04

## 2023-01-01 RX ADMIN — Medication 1: at 06:18

## 2023-01-01 RX ADMIN — APIXABAN 2.5 MILLIGRAM(S): 2.5 TABLET, FILM COATED ORAL at 21:41

## 2023-01-01 RX ADMIN — MAGNESIUM OXIDE 400 MG ORAL TABLET 400 MILLIGRAM(S): 241.3 TABLET ORAL at 09:02

## 2023-01-01 RX ADMIN — Medication 3 MILLILITER(S): at 22:50

## 2023-01-01 RX ADMIN — PANTOPRAZOLE SODIUM 40 MILLIGRAM(S): 20 TABLET, DELAYED RELEASE ORAL at 11:50

## 2023-01-01 RX ADMIN — Medication 1: at 18:08

## 2023-01-01 RX ADMIN — Medication 100 MILLIGRAM(S): at 14:13

## 2023-01-01 RX ADMIN — SODIUM ZIRCONIUM CYCLOSILICATE 10 GRAM(S): 10 POWDER, FOR SUSPENSION ORAL at 10:43

## 2023-01-01 RX ADMIN — Medication 100 MILLIGRAM(S): at 17:18

## 2023-01-01 RX ADMIN — ENOXAPARIN SODIUM 80 MILLIGRAM(S): 100 INJECTION SUBCUTANEOUS at 05:24

## 2023-01-01 RX ADMIN — MAGNESIUM OXIDE 400 MG ORAL TABLET 400 MILLIGRAM(S): 241.3 TABLET ORAL at 12:54

## 2023-01-01 RX ADMIN — Medication 20 MILLIGRAM(S): at 05:45

## 2023-01-01 RX ADMIN — NYSTATIN CREAM 1 APPLICATION(S): 100000 CREAM TOPICAL at 18:17

## 2023-01-01 RX ADMIN — CEFEPIME 100 MILLIGRAM(S): 1 INJECTION, POWDER, FOR SOLUTION INTRAMUSCULAR; INTRAVENOUS at 12:31

## 2023-01-01 RX ADMIN — Medication 25 MILLIGRAM(S): at 17:21

## 2023-01-01 RX ADMIN — GABAPENTIN 100 MILLIGRAM(S): 400 CAPSULE ORAL at 14:13

## 2023-01-01 RX ADMIN — FENTANYL CITRATE 4.1 MICROGRAM(S)/KG/HR: 50 INJECTION INTRAVENOUS at 15:43

## 2023-01-01 RX ADMIN — CHLORHEXIDINE GLUCONATE 1 APPLICATION(S): 213 SOLUTION TOPICAL at 06:12

## 2023-01-01 RX ADMIN — Medication 3 MILLILITER(S): at 07:53

## 2023-01-01 RX ADMIN — CEFEPIME 100 MILLIGRAM(S): 1 INJECTION, POWDER, FOR SOLUTION INTRAMUSCULAR; INTRAVENOUS at 17:52

## 2023-01-01 RX ADMIN — BUDESONIDE AND FORMOTEROL FUMARATE DIHYDRATE 2 PUFF(S): 160; 4.5 AEROSOL RESPIRATORY (INHALATION) at 21:04

## 2023-01-01 RX ADMIN — ACETAZOLAMIDE 110 MILLIGRAM(S): 250 TABLET ORAL at 12:41

## 2023-01-01 RX ADMIN — PANTOPRAZOLE SODIUM 40 MILLIGRAM(S): 20 TABLET, DELAYED RELEASE ORAL at 17:30

## 2023-01-01 RX ADMIN — Medication 3 MILLILITER(S): at 07:20

## 2023-01-01 RX ADMIN — NYSTATIN CREAM 1 APPLICATION(S): 100000 CREAM TOPICAL at 17:25

## 2023-01-01 RX ADMIN — Medication 3 MILLILITER(S): at 20:46

## 2023-01-01 RX ADMIN — Medication 180 MILLIGRAM(S): at 05:34

## 2023-01-01 RX ADMIN — PANTOPRAZOLE SODIUM 40 MILLIGRAM(S): 20 TABLET, DELAYED RELEASE ORAL at 17:41

## 2023-01-01 RX ADMIN — Medication 100 MILLIGRAM(S): at 06:14

## 2023-01-01 RX ADMIN — GABAPENTIN 100 MILLIGRAM(S): 400 CAPSULE ORAL at 21:50

## 2023-01-01 RX ADMIN — Medication 400 MILLIGRAM(S): at 14:11

## 2023-01-01 RX ADMIN — Medication 25 GRAM(S): at 16:22

## 2023-01-01 RX ADMIN — Medication 3 MILLILITER(S): at 09:19

## 2023-01-01 RX ADMIN — ENOXAPARIN SODIUM 90 MILLIGRAM(S): 100 INJECTION SUBCUTANEOUS at 13:36

## 2023-01-01 RX ADMIN — GABAPENTIN 100 MILLIGRAM(S): 400 CAPSULE ORAL at 22:33

## 2023-01-01 RX ADMIN — Medication 100 MILLIGRAM(S): at 05:02

## 2023-01-01 RX ADMIN — Medication 250 MILLIGRAM(S): at 14:24

## 2023-01-01 RX ADMIN — Medication 50 MILLIGRAM(S): at 18:17

## 2023-01-01 RX ADMIN — POLYETHYLENE GLYCOL 3350 17 GRAM(S): 17 POWDER, FOR SOLUTION ORAL at 12:29

## 2023-01-01 RX ADMIN — GABAPENTIN 100 MILLIGRAM(S): 400 CAPSULE ORAL at 14:34

## 2023-01-01 RX ADMIN — Medication 40 MILLIGRAM(S): at 05:33

## 2023-01-01 RX ADMIN — Medication 60 MILLIGRAM(S): at 13:25

## 2023-01-01 RX ADMIN — FENTANYL CITRATE 4.1 MICROGRAM(S)/KG/HR: 50 INJECTION INTRAVENOUS at 19:36

## 2023-01-01 RX ADMIN — Medication 1 APPLICATION(S): at 06:11

## 2023-01-01 RX ADMIN — SENNA PLUS 2 TABLET(S): 8.6 TABLET ORAL at 22:15

## 2023-01-01 RX ADMIN — Medication 40 MILLIGRAM(S): at 05:17

## 2023-01-01 RX ADMIN — Medication 1000 MILLIGRAM(S): at 15:00

## 2023-01-01 RX ADMIN — Medication 25 MILLIGRAM(S): at 17:26

## 2023-01-01 RX ADMIN — ALBUTEROL 2 PUFF(S): 90 AEROSOL, METERED ORAL at 08:58

## 2023-01-01 RX ADMIN — CHLORHEXIDINE GLUCONATE 1 APPLICATION(S): 213 SOLUTION TOPICAL at 06:10

## 2023-01-01 RX ADMIN — MAGNESIUM OXIDE 400 MG ORAL TABLET 400 MILLIGRAM(S): 241.3 TABLET ORAL at 08:48

## 2023-01-01 RX ADMIN — PANTOPRAZOLE SODIUM 40 MILLIGRAM(S): 20 TABLET, DELAYED RELEASE ORAL at 05:14

## 2023-01-01 RX ADMIN — MAGNESIUM OXIDE 400 MG ORAL TABLET 400 MILLIGRAM(S): 241.3 TABLET ORAL at 12:15

## 2023-01-01 RX ADMIN — MIDAZOLAM HYDROCHLORIDE 2 MILLIGRAM(S): 1 INJECTION, SOLUTION INTRAMUSCULAR; INTRAVENOUS at 02:21

## 2023-01-01 RX ADMIN — ALBUTEROL 2 PUFF(S): 90 AEROSOL, METERED ORAL at 13:34

## 2023-01-01 RX ADMIN — Medication 100 MILLIGRAM(S): at 13:19

## 2023-01-01 RX ADMIN — Medication 400 MILLIGRAM(S): at 13:26

## 2023-01-01 RX ADMIN — APIXABAN 2.5 MILLIGRAM(S): 2.5 TABLET, FILM COATED ORAL at 11:52

## 2023-01-01 RX ADMIN — Medication 2: at 16:56

## 2023-01-01 RX ADMIN — NYSTATIN CREAM 1 APPLICATION(S): 100000 CREAM TOPICAL at 18:36

## 2023-01-01 RX ADMIN — Medication 3 MILLILITER(S): at 14:37

## 2023-01-01 RX ADMIN — MEROPENEM 100 MILLIGRAM(S): 1 INJECTION INTRAVENOUS at 05:33

## 2023-01-01 RX ADMIN — CHLORHEXIDINE GLUCONATE 1 APPLICATION(S): 213 SOLUTION TOPICAL at 11:52

## 2023-01-01 RX ADMIN — HEPARIN SODIUM 3500 UNIT(S): 5000 INJECTION INTRAVENOUS; SUBCUTANEOUS at 05:50

## 2023-01-01 RX ADMIN — Medication 25 MILLIGRAM(S): at 17:31

## 2023-01-01 RX ADMIN — MAGNESIUM OXIDE 400 MG ORAL TABLET 400 MILLIGRAM(S): 241.3 TABLET ORAL at 08:29

## 2023-01-01 RX ADMIN — Medication 40 MILLIGRAM(S): at 18:04

## 2023-01-01 RX ADMIN — GABAPENTIN 100 MILLIGRAM(S): 400 CAPSULE ORAL at 05:15

## 2023-01-01 RX ADMIN — Medication 250 MILLIGRAM(S): at 15:19

## 2023-01-01 RX ADMIN — Medication 40 MILLIGRAM(S): at 05:01

## 2023-01-01 RX ADMIN — GABAPENTIN 100 MILLIGRAM(S): 400 CAPSULE ORAL at 22:57

## 2023-01-01 RX ADMIN — Medication 3 MILLILITER(S): at 20:31

## 2023-01-01 RX ADMIN — Medication 5 MILLIGRAM(S): at 21:45

## 2023-01-01 RX ADMIN — APIXABAN 2.5 MILLIGRAM(S): 2.5 TABLET, FILM COATED ORAL at 23:13

## 2023-01-01 RX ADMIN — FENTANYL CITRATE 4.1 MICROGRAM(S)/KG/HR: 50 INJECTION INTRAVENOUS at 14:17

## 2023-01-01 RX ADMIN — POLYETHYLENE GLYCOL 3350 17 GRAM(S): 17 POWDER, FOR SOLUTION ORAL at 18:35

## 2023-01-01 RX ADMIN — ENOXAPARIN SODIUM 90 MILLIGRAM(S): 100 INJECTION SUBCUTANEOUS at 02:30

## 2023-01-01 RX ADMIN — CEFTRIAXONE 100 MILLIGRAM(S): 500 INJECTION, POWDER, FOR SOLUTION INTRAMUSCULAR; INTRAVENOUS at 12:04

## 2023-01-01 RX ADMIN — ENOXAPARIN SODIUM 70 MILLIGRAM(S): 100 INJECTION SUBCUTANEOUS at 11:01

## 2023-01-01 RX ADMIN — Medication 5 MILLIGRAM(S): at 17:35

## 2023-01-01 RX ADMIN — MAGNESIUM OXIDE 400 MG ORAL TABLET 400 MILLIGRAM(S): 241.3 TABLET ORAL at 17:11

## 2023-01-01 RX ADMIN — APIXABAN 5 MILLIGRAM(S): 2.5 TABLET, FILM COATED ORAL at 17:15

## 2023-01-01 RX ADMIN — SODIUM ZIRCONIUM CYCLOSILICATE 10 GRAM(S): 10 POWDER, FOR SUSPENSION ORAL at 13:47

## 2023-01-01 RX ADMIN — Medication 325 MILLIGRAM(S): at 11:36

## 2023-01-01 RX ADMIN — Medication 12.5 GRAM(S): at 17:50

## 2023-01-01 RX ADMIN — Medication 325 MILLIGRAM(S): at 11:51

## 2023-01-01 RX ADMIN — PANTOPRAZOLE SODIUM 40 MILLIGRAM(S): 20 TABLET, DELAYED RELEASE ORAL at 17:53

## 2023-01-01 RX ADMIN — NYSTATIN CREAM 1 APPLICATION(S): 100000 CREAM TOPICAL at 17:50

## 2023-01-01 RX ADMIN — Medication 25 MILLIGRAM(S): at 04:57

## 2023-01-01 RX ADMIN — Medication 100 MILLIGRAM(S): at 22:56

## 2023-01-01 RX ADMIN — Medication 1 APPLICATION(S): at 18:45

## 2023-01-01 RX ADMIN — PROPOFOL 15.8 MICROGRAM(S)/KG/MIN: 10 INJECTION, EMULSION INTRAVENOUS at 01:08

## 2023-01-01 RX ADMIN — Medication 250 MILLIGRAM(S): at 05:13

## 2023-01-01 RX ADMIN — Medication 250 MILLIGRAM(S): at 21:50

## 2023-01-01 RX ADMIN — PROPOFOL 4.62 MICROGRAM(S)/KG/MIN: 10 INJECTION, EMULSION INTRAVENOUS at 20:48

## 2023-01-01 RX ADMIN — Medication 1 APPLICATION(S): at 17:52

## 2023-01-01 RX ADMIN — SENNA PLUS 2 TABLET(S): 8.6 TABLET ORAL at 21:09

## 2023-01-01 RX ADMIN — Medication 25 MILLIGRAM(S): at 10:21

## 2023-01-01 RX ADMIN — Medication 325 MILLIGRAM(S): at 11:29

## 2023-01-01 RX ADMIN — Medication 650 MILLIGRAM(S): at 04:30

## 2023-01-01 RX ADMIN — SENNA PLUS 2 TABLET(S): 8.6 TABLET ORAL at 21:57

## 2023-01-01 RX ADMIN — POLYETHYLENE GLYCOL 3350 17 GRAM(S): 17 POWDER, FOR SOLUTION ORAL at 17:54

## 2023-01-01 RX ADMIN — APIXABAN 2.5 MILLIGRAM(S): 2.5 TABLET, FILM COATED ORAL at 11:53

## 2023-01-01 RX ADMIN — APIXABAN 2.5 MILLIGRAM(S): 2.5 TABLET, FILM COATED ORAL at 11:32

## 2023-01-01 RX ADMIN — POLYETHYLENE GLYCOL 3350 17 GRAM(S): 17 POWDER, FOR SOLUTION ORAL at 17:39

## 2023-01-01 RX ADMIN — APIXABAN 10 MILLIGRAM(S): 2.5 TABLET, FILM COATED ORAL at 05:07

## 2023-01-01 RX ADMIN — GABAPENTIN 100 MILLIGRAM(S): 400 CAPSULE ORAL at 05:14

## 2023-01-01 RX ADMIN — Medication 3 MILLIGRAM(S): at 22:34

## 2023-01-01 RX ADMIN — Medication 25 GRAM(S): at 14:02

## 2023-01-01 RX ADMIN — Medication 20 MILLIEQUIVALENT(S): at 16:52

## 2023-01-01 RX ADMIN — SODIUM CHLORIDE 500 MILLILITER(S): 9 INJECTION, SOLUTION INTRAVENOUS at 04:35

## 2023-01-01 RX ADMIN — Medication 650 MILLIGRAM(S): at 22:00

## 2023-01-01 RX ADMIN — SENNA PLUS 2 TABLET(S): 8.6 TABLET ORAL at 21:46

## 2023-01-01 RX ADMIN — Medication 40 MILLIGRAM(S): at 14:03

## 2023-01-01 RX ADMIN — BUDESONIDE AND FORMOTEROL FUMARATE DIHYDRATE 2 PUFF(S): 160; 4.5 AEROSOL RESPIRATORY (INHALATION) at 07:29

## 2023-01-01 RX ADMIN — PANTOPRAZOLE SODIUM 40 MILLIGRAM(S): 20 TABLET, DELAYED RELEASE ORAL at 06:23

## 2023-01-01 RX ADMIN — Medication 100 MILLIGRAM(S): at 06:18

## 2023-01-01 RX ADMIN — Medication 40 MILLIGRAM(S): at 13:35

## 2023-01-01 RX ADMIN — CHLORHEXIDINE GLUCONATE 15 MILLILITER(S): 213 SOLUTION TOPICAL at 17:46

## 2023-01-01 RX ADMIN — ENOXAPARIN SODIUM 90 MILLIGRAM(S): 100 INJECTION SUBCUTANEOUS at 05:13

## 2023-01-01 RX ADMIN — Medication 325 MILLIGRAM(S): at 11:53

## 2023-01-01 RX ADMIN — CEFEPIME 100 MILLIGRAM(S): 1 INJECTION, POWDER, FOR SOLUTION INTRAMUSCULAR; INTRAVENOUS at 21:58

## 2023-01-01 RX ADMIN — CEFEPIME 100 MILLIGRAM(S): 1 INJECTION, POWDER, FOR SOLUTION INTRAMUSCULAR; INTRAVENOUS at 05:17

## 2023-01-01 RX ADMIN — FENTANYL CITRATE 4.1 MICROGRAM(S)/KG/HR: 50 INJECTION INTRAVENOUS at 17:21

## 2023-01-01 RX ADMIN — MAGNESIUM OXIDE 400 MG ORAL TABLET 400 MILLIGRAM(S): 241.3 TABLET ORAL at 11:30

## 2023-01-01 RX ADMIN — Medication 25 MILLIGRAM(S): at 06:30

## 2023-01-01 RX ADMIN — APIXABAN 2.5 MILLIGRAM(S): 2.5 TABLET, FILM COATED ORAL at 12:27

## 2023-01-01 RX ADMIN — GABAPENTIN 100 MILLIGRAM(S): 400 CAPSULE ORAL at 05:48

## 2023-01-01 RX ADMIN — POLYETHYLENE GLYCOL 3350 17 GRAM(S): 17 POWDER, FOR SOLUTION ORAL at 13:13

## 2023-01-01 RX ADMIN — PANTOPRAZOLE SODIUM 40 MILLIGRAM(S): 20 TABLET, DELAYED RELEASE ORAL at 11:43

## 2023-01-01 RX ADMIN — Medication 40 MILLIGRAM(S): at 05:14

## 2023-01-01 RX ADMIN — SODIUM CHLORIDE 60 MILLILITER(S): 9 INJECTION INTRAMUSCULAR; INTRAVENOUS; SUBCUTANEOUS at 01:06

## 2023-01-01 RX ADMIN — BUDESONIDE AND FORMOTEROL FUMARATE DIHYDRATE 2 PUFF(S): 160; 4.5 AEROSOL RESPIRATORY (INHALATION) at 22:14

## 2023-01-01 RX ADMIN — Medication 3 MILLILITER(S): at 07:43

## 2023-01-01 RX ADMIN — Medication 650 MILLIGRAM(S): at 10:47

## 2023-01-01 RX ADMIN — Medication 100 MILLIGRAM(S): at 22:19

## 2023-01-01 RX ADMIN — Medication 25 MILLIGRAM(S): at 17:22

## 2023-01-01 RX ADMIN — Medication 975 MILLIGRAM(S): at 04:00

## 2023-01-01 RX ADMIN — Medication 25 GRAM(S): at 06:07

## 2023-01-01 RX ADMIN — BUDESONIDE AND FORMOTEROL FUMARATE DIHYDRATE 2 PUFF(S): 160; 4.5 AEROSOL RESPIRATORY (INHALATION) at 10:35

## 2023-01-01 RX ADMIN — GABAPENTIN 100 MILLIGRAM(S): 400 CAPSULE ORAL at 21:12

## 2023-01-01 RX ADMIN — MEROPENEM 100 MILLIGRAM(S): 1 INJECTION INTRAVENOUS at 21:25

## 2023-01-01 RX ADMIN — CEFEPIME 100 MILLIGRAM(S): 1 INJECTION, POWDER, FOR SOLUTION INTRAMUSCULAR; INTRAVENOUS at 17:41

## 2023-01-01 RX ADMIN — Medication 1 APPLICATION(S): at 18:08

## 2023-01-01 RX ADMIN — Medication 20 MILLIGRAM(S): at 11:26

## 2023-01-01 RX ADMIN — CEFEPIME 100 MILLIGRAM(S): 1 INJECTION, POWDER, FOR SOLUTION INTRAMUSCULAR; INTRAVENOUS at 05:14

## 2023-01-01 RX ADMIN — Medication 650 MILLIGRAM(S): at 12:47

## 2023-01-01 RX ADMIN — GABAPENTIN 100 MILLIGRAM(S): 400 CAPSULE ORAL at 16:18

## 2023-01-01 RX ADMIN — Medication 40 MILLIGRAM(S): at 05:48

## 2023-01-01 RX ADMIN — CHLORHEXIDINE GLUCONATE 1 APPLICATION(S): 213 SOLUTION TOPICAL at 06:50

## 2023-01-01 RX ADMIN — CHLORHEXIDINE GLUCONATE 1 APPLICATION(S): 213 SOLUTION TOPICAL at 06:41

## 2023-01-01 RX ADMIN — Medication 40 MILLIGRAM(S): at 05:07

## 2023-01-01 RX ADMIN — PROPOFOL 15.8 MICROGRAM(S)/KG/MIN: 10 INJECTION, EMULSION INTRAVENOUS at 14:58

## 2023-01-01 RX ADMIN — NYSTATIN CREAM 1 APPLICATION(S): 100000 CREAM TOPICAL at 18:47

## 2023-01-01 RX ADMIN — HEPARIN SODIUM 1400 UNIT(S)/HR: 5000 INJECTION INTRAVENOUS; SUBCUTANEOUS at 21:54

## 2023-01-01 RX ADMIN — PROPOFOL 4.62 MICROGRAM(S)/KG/MIN: 10 INJECTION, EMULSION INTRAVENOUS at 02:29

## 2023-01-01 RX ADMIN — Medication 50 MILLIEQUIVALENT(S): at 20:47

## 2023-01-01 RX ADMIN — CHLORHEXIDINE GLUCONATE 1 APPLICATION(S): 213 SOLUTION TOPICAL at 17:42

## 2023-01-01 RX ADMIN — Medication 25 GRAM(S): at 14:22

## 2023-01-01 RX ADMIN — ENOXAPARIN SODIUM 90 MILLIGRAM(S): 100 INJECTION SUBCUTANEOUS at 05:00

## 2023-01-01 RX ADMIN — CEFEPIME 100 MILLIGRAM(S): 1 INJECTION, POWDER, FOR SOLUTION INTRAMUSCULAR; INTRAVENOUS at 05:24

## 2023-01-01 RX ADMIN — ENOXAPARIN SODIUM 70 MILLIGRAM(S): 100 INJECTION SUBCUTANEOUS at 10:46

## 2023-01-01 RX ADMIN — HEPARIN SODIUM 5000 UNIT(S): 5000 INJECTION INTRAVENOUS; SUBCUTANEOUS at 07:56

## 2023-01-01 RX ADMIN — Medication 166.67 MILLIGRAM(S): at 10:21

## 2023-01-01 RX ADMIN — PROPOFOL 4.62 MICROGRAM(S)/KG/MIN: 10 INJECTION, EMULSION INTRAVENOUS at 19:46

## 2023-01-01 RX ADMIN — APIXABAN 2.5 MILLIGRAM(S): 2.5 TABLET, FILM COATED ORAL at 06:12

## 2023-01-01 RX ADMIN — PANTOPRAZOLE SODIUM 40 MILLIGRAM(S): 20 TABLET, DELAYED RELEASE ORAL at 18:17

## 2023-01-01 RX ADMIN — GABAPENTIN 100 MILLIGRAM(S): 400 CAPSULE ORAL at 12:28

## 2023-01-01 RX ADMIN — AZITHROMYCIN 255 MILLIGRAM(S): 500 TABLET, FILM COATED ORAL at 14:27

## 2023-01-01 RX ADMIN — SENNA PLUS 2 TABLET(S): 8.6 TABLET ORAL at 21:14

## 2023-01-01 RX ADMIN — Medication 250 MILLIGRAM(S): at 19:24

## 2023-01-01 RX ADMIN — POLYETHYLENE GLYCOL 3350 17 GRAM(S): 17 POWDER, FOR SOLUTION ORAL at 05:06

## 2023-01-01 RX ADMIN — Medication 250 MILLIGRAM(S): at 17:10

## 2023-01-01 RX ADMIN — HEPARIN SODIUM 1600 UNIT(S)/HR: 5000 INJECTION INTRAVENOUS; SUBCUTANEOUS at 22:26

## 2023-01-01 RX ADMIN — GABAPENTIN 100 MILLIGRAM(S): 400 CAPSULE ORAL at 21:21

## 2023-01-01 RX ADMIN — Medication 180 MILLIGRAM(S): at 05:10

## 2023-01-01 RX ADMIN — Medication 100 GRAM(S): at 16:26

## 2023-01-01 RX ADMIN — PANTOPRAZOLE SODIUM 40 MILLIGRAM(S): 20 TABLET, DELAYED RELEASE ORAL at 05:05

## 2023-01-01 RX ADMIN — PANTOPRAZOLE SODIUM 40 MILLIGRAM(S): 20 TABLET, DELAYED RELEASE ORAL at 12:01

## 2023-01-01 RX ADMIN — Medication 15 MILLIGRAM(S): at 16:15

## 2023-01-01 RX ADMIN — Medication 40 MILLIGRAM(S): at 21:44

## 2023-01-01 RX ADMIN — GABAPENTIN 100 MILLIGRAM(S): 400 CAPSULE ORAL at 05:27

## 2023-01-01 RX ADMIN — BUMETANIDE 2 MILLIGRAM(S): 0.25 INJECTION INTRAMUSCULAR; INTRAVENOUS at 06:36

## 2023-01-01 RX ADMIN — APIXABAN 2.5 MILLIGRAM(S): 2.5 TABLET, FILM COATED ORAL at 21:54

## 2023-01-01 RX ADMIN — Medication 1 ENEMA: at 17:53

## 2023-01-01 RX ADMIN — Medication 3 MILLILITER(S): at 21:14

## 2023-01-01 RX ADMIN — Medication 3 MILLILITER(S): at 21:00

## 2023-01-01 RX ADMIN — Medication 20 MILLIEQUIVALENT(S): at 14:12

## 2023-01-01 RX ADMIN — Medication 40 MILLIGRAM(S): at 05:16

## 2023-01-01 RX ADMIN — LACTULOSE 20 GRAM(S): 10 SOLUTION ORAL at 05:30

## 2023-01-01 RX ADMIN — SENNA PLUS 2 TABLET(S): 8.6 TABLET ORAL at 22:03

## 2023-01-01 RX ADMIN — CEFEPIME 100 MILLIGRAM(S): 1 INJECTION, POWDER, FOR SOLUTION INTRAMUSCULAR; INTRAVENOUS at 05:05

## 2023-01-01 RX ADMIN — DEXMEDETOMIDINE HYDROCHLORIDE IN 0.9% SODIUM CHLORIDE 1.1 MICROGRAM(S)/KG/HR: 4 INJECTION INTRAVENOUS at 00:49

## 2023-01-01 RX ADMIN — FENTANYL CITRATE 4.1 MICROGRAM(S)/KG/HR: 50 INJECTION INTRAVENOUS at 05:13

## 2023-01-01 RX ADMIN — ALBUTEROL 2 PUFF(S): 90 AEROSOL, METERED ORAL at 16:03

## 2023-01-01 RX ADMIN — GABAPENTIN 100 MILLIGRAM(S): 400 CAPSULE ORAL at 13:02

## 2023-01-01 RX ADMIN — Medication 3 MILLILITER(S): at 20:09

## 2023-01-01 RX ADMIN — MEROPENEM 100 MILLIGRAM(S): 1 INJECTION INTRAVENOUS at 05:15

## 2023-01-01 RX ADMIN — ENOXAPARIN SODIUM 90 MILLIGRAM(S): 100 INJECTION SUBCUTANEOUS at 12:44

## 2023-01-01 RX ADMIN — FENTANYL CITRATE 4.1 MICROGRAM(S)/KG/HR: 50 INJECTION INTRAVENOUS at 06:33

## 2023-01-01 RX ADMIN — APIXABAN 2.5 MILLIGRAM(S): 2.5 TABLET, FILM COATED ORAL at 00:09

## 2023-01-01 RX ADMIN — CEFEPIME 100 MILLIGRAM(S): 1 INJECTION, POWDER, FOR SOLUTION INTRAMUSCULAR; INTRAVENOUS at 05:16

## 2023-01-01 RX ADMIN — APIXABAN 5 MILLIGRAM(S): 2.5 TABLET, FILM COATED ORAL at 17:51

## 2023-01-01 RX ADMIN — DEXMEDETOMIDINE HYDROCHLORIDE IN 0.9% SODIUM CHLORIDE 1.1 MICROGRAM(S)/KG/HR: 4 INJECTION INTRAVENOUS at 04:52

## 2023-01-01 RX ADMIN — HEPARIN SODIUM 10 UNIT(S)/HR: 5000 INJECTION INTRAVENOUS; SUBCUTANEOUS at 09:03

## 2023-01-01 RX ADMIN — APIXABAN 2.5 MILLIGRAM(S): 2.5 TABLET, FILM COATED ORAL at 05:05

## 2023-01-01 RX ADMIN — Medication 3 MILLILITER(S): at 08:05

## 2023-01-01 RX ADMIN — MAGNESIUM OXIDE 400 MG ORAL TABLET 400 MILLIGRAM(S): 241.3 TABLET ORAL at 17:35

## 2023-01-01 RX ADMIN — PIPERACILLIN AND TAZOBACTAM 25 GRAM(S): 4; .5 INJECTION, POWDER, LYOPHILIZED, FOR SOLUTION INTRAVENOUS at 22:38

## 2023-01-01 RX ADMIN — ALBUTEROL 2 PUFF(S): 90 AEROSOL, METERED ORAL at 07:42

## 2023-01-01 RX ADMIN — PANTOPRAZOLE SODIUM 40 MILLIGRAM(S): 20 TABLET, DELAYED RELEASE ORAL at 06:51

## 2023-01-01 RX ADMIN — Medication 30 MILLILITER(S): at 21:24

## 2023-01-01 RX ADMIN — SENNA PLUS 2 TABLET(S): 8.6 TABLET ORAL at 21:23

## 2023-01-01 RX ADMIN — ACETAZOLAMIDE 110 MILLIGRAM(S): 250 TABLET ORAL at 12:52

## 2023-01-01 RX ADMIN — Medication 50 MILLIGRAM(S): at 17:39

## 2023-01-01 RX ADMIN — PANTOPRAZOLE SODIUM 40 MILLIGRAM(S): 20 TABLET, DELAYED RELEASE ORAL at 04:57

## 2023-01-01 RX ADMIN — HEPARIN SODIUM 1400 UNIT(S)/HR: 5000 INJECTION INTRAVENOUS; SUBCUTANEOUS at 22:20

## 2023-01-01 RX ADMIN — DEXMEDETOMIDINE HYDROCHLORIDE IN 0.9% SODIUM CHLORIDE 1.1 MICROGRAM(S)/KG/HR: 4 INJECTION INTRAVENOUS at 20:30

## 2023-01-01 RX ADMIN — APIXABAN 10 MILLIGRAM(S): 2.5 TABLET, FILM COATED ORAL at 05:51

## 2023-01-01 RX ADMIN — Medication 20 MILLIGRAM(S): at 13:21

## 2023-01-01 RX ADMIN — GABAPENTIN 100 MILLIGRAM(S): 400 CAPSULE ORAL at 06:22

## 2023-01-01 RX ADMIN — BUDESONIDE AND FORMOTEROL FUMARATE DIHYDRATE 2 PUFF(S): 160; 4.5 AEROSOL RESPIRATORY (INHALATION) at 08:36

## 2023-01-01 RX ADMIN — GABAPENTIN 100 MILLIGRAM(S): 400 CAPSULE ORAL at 21:43

## 2023-01-01 RX ADMIN — PANTOPRAZOLE SODIUM 40 MILLIGRAM(S): 20 TABLET, DELAYED RELEASE ORAL at 05:16

## 2023-01-01 RX ADMIN — Medication 180 MILLIGRAM(S): at 06:21

## 2023-01-01 RX ADMIN — HEPARIN SODIUM 13 UNIT(S)/HR: 5000 INJECTION INTRAVENOUS; SUBCUTANEOUS at 09:42

## 2023-01-01 RX ADMIN — Medication 650 MILLIGRAM(S): at 15:37

## 2023-01-01 RX ADMIN — ENOXAPARIN SODIUM 90 MILLIGRAM(S): 100 INJECTION SUBCUTANEOUS at 01:02

## 2023-01-01 RX ADMIN — GABAPENTIN 100 MILLIGRAM(S): 400 CAPSULE ORAL at 21:39

## 2023-01-01 RX ADMIN — PANTOPRAZOLE SODIUM 40 MILLIGRAM(S): 20 TABLET, DELAYED RELEASE ORAL at 06:31

## 2023-01-01 RX ADMIN — GABAPENTIN 100 MILLIGRAM(S): 400 CAPSULE ORAL at 21:58

## 2023-01-01 RX ADMIN — SENNA PLUS 2 TABLET(S): 8.6 TABLET ORAL at 21:11

## 2023-01-01 RX ADMIN — APIXABAN 2.5 MILLIGRAM(S): 2.5 TABLET, FILM COATED ORAL at 23:22

## 2023-01-01 RX ADMIN — Medication 3 MILLILITER(S): at 20:27

## 2023-01-01 RX ADMIN — Medication 3 MILLILITER(S): at 20:19

## 2023-01-01 RX ADMIN — BUDESONIDE AND FORMOTEROL FUMARATE DIHYDRATE 2 PUFF(S): 160; 4.5 AEROSOL RESPIRATORY (INHALATION) at 23:22

## 2023-01-01 RX ADMIN — POLYETHYLENE GLYCOL 3350 17 GRAM(S): 17 POWDER, FOR SOLUTION ORAL at 05:45

## 2023-01-01 RX ADMIN — Medication 40 MILLIEQUIVALENT(S): at 12:27

## 2023-01-01 RX ADMIN — ENOXAPARIN SODIUM 90 MILLIGRAM(S): 100 INJECTION SUBCUTANEOUS at 05:07

## 2023-01-01 RX ADMIN — SODIUM CHLORIDE 500 MILLILITER(S): 9 INJECTION INTRAMUSCULAR; INTRAVENOUS; SUBCUTANEOUS at 03:10

## 2023-01-01 RX ADMIN — BUDESONIDE AND FORMOTEROL FUMARATE DIHYDRATE 2 PUFF(S): 160; 4.5 AEROSOL RESPIRATORY (INHALATION) at 21:13

## 2023-01-01 RX ADMIN — NYSTATIN CREAM 1 APPLICATION(S): 100000 CREAM TOPICAL at 17:29

## 2023-01-01 RX ADMIN — GABAPENTIN 100 MILLIGRAM(S): 400 CAPSULE ORAL at 06:13

## 2023-01-01 RX ADMIN — FENTANYL CITRATE 4.1 MICROGRAM(S)/KG/HR: 50 INJECTION INTRAVENOUS at 19:30

## 2023-01-01 RX ADMIN — CHLORHEXIDINE GLUCONATE 15 MILLILITER(S): 213 SOLUTION TOPICAL at 06:07

## 2023-01-01 RX ADMIN — Medication 250 MILLIGRAM(S): at 05:09

## 2023-01-01 RX ADMIN — Medication 30 MILLIGRAM(S): at 06:41

## 2023-01-01 RX ADMIN — APIXABAN 2.5 MILLIGRAM(S): 2.5 TABLET, FILM COATED ORAL at 05:14

## 2023-01-01 RX ADMIN — APIXABAN 2.5 MILLIGRAM(S): 2.5 TABLET, FILM COATED ORAL at 11:01

## 2023-01-01 RX ADMIN — ALBUTEROL 2 PUFF(S): 90 AEROSOL, METERED ORAL at 04:26

## 2023-01-01 RX ADMIN — Medication 3 MILLILITER(S): at 19:55

## 2023-01-01 RX ADMIN — GABAPENTIN 100 MILLIGRAM(S): 400 CAPSULE ORAL at 13:06

## 2023-01-01 RX ADMIN — CEFEPIME 100 MILLIGRAM(S): 1 INJECTION, POWDER, FOR SOLUTION INTRAMUSCULAR; INTRAVENOUS at 17:31

## 2023-01-01 RX ADMIN — FENTANYL CITRATE 4.1 MICROGRAM(S)/KG/HR: 50 INJECTION INTRAVENOUS at 03:28

## 2023-01-01 RX ADMIN — GABAPENTIN 100 MILLIGRAM(S): 400 CAPSULE ORAL at 05:37

## 2023-01-01 RX ADMIN — Medication 250 MILLIGRAM(S): at 06:56

## 2023-01-01 RX ADMIN — Medication 40 MILLIGRAM(S): at 05:58

## 2023-01-01 RX ADMIN — Medication 25 MILLIGRAM(S): at 18:16

## 2023-01-01 RX ADMIN — Medication 25 MILLIGRAM(S): at 06:35

## 2023-01-01 RX ADMIN — Medication 20 MILLIGRAM(S): at 06:06

## 2023-01-01 RX ADMIN — Medication 250 MILLIGRAM(S): at 06:21

## 2023-01-01 RX ADMIN — Medication 100 MILLIGRAM(S): at 18:10

## 2023-01-01 RX ADMIN — SODIUM ZIRCONIUM CYCLOSILICATE 10 GRAM(S): 10 POWDER, FOR SUSPENSION ORAL at 17:31

## 2023-01-01 RX ADMIN — Medication 40 MILLIGRAM(S): at 17:42

## 2023-01-01 RX ADMIN — Medication 30 MILLIGRAM(S): at 06:08

## 2023-01-01 RX ADMIN — Medication 40 MILLIGRAM(S): at 06:34

## 2023-01-01 RX ADMIN — Medication 2.5 MILLIGRAM(S): at 06:03

## 2023-01-01 RX ADMIN — MAGNESIUM OXIDE 400 MG ORAL TABLET 400 MILLIGRAM(S): 241.3 TABLET ORAL at 12:48

## 2023-01-01 RX ADMIN — Medication 25 MILLIGRAM(S): at 05:21

## 2023-01-01 RX ADMIN — GABAPENTIN 100 MILLIGRAM(S): 400 CAPSULE ORAL at 17:11

## 2023-01-01 RX ADMIN — SENNA PLUS 2 TABLET(S): 8.6 TABLET ORAL at 21:19

## 2023-01-01 RX ADMIN — Medication 3 MILLILITER(S): at 05:44

## 2023-01-01 RX ADMIN — Medication 40 MILLIGRAM(S): at 05:02

## 2023-01-01 RX ADMIN — Medication 166.67 MILLIGRAM(S): at 21:32

## 2023-01-01 RX ADMIN — Medication 125 MILLIGRAM(S): at 20:51

## 2023-01-01 RX ADMIN — DEXMEDETOMIDINE HYDROCHLORIDE IN 0.9% SODIUM CHLORIDE 1.1 MICROGRAM(S)/KG/HR: 4 INJECTION INTRAVENOUS at 02:15

## 2023-01-01 RX ADMIN — SENNA PLUS 2 TABLET(S): 8.6 TABLET ORAL at 21:04

## 2023-01-01 RX ADMIN — Medication 100 MILLIGRAM(S): at 05:06

## 2023-01-01 RX ADMIN — Medication 25 MILLIGRAM(S): at 17:40

## 2023-01-01 RX ADMIN — Medication 25 MILLIGRAM(S): at 18:34

## 2023-01-01 RX ADMIN — Medication 1 APPLICATION(S): at 18:33

## 2023-01-01 RX ADMIN — Medication 60 MILLIGRAM(S): at 08:55

## 2023-01-01 RX ADMIN — GABAPENTIN 100 MILLIGRAM(S): 400 CAPSULE ORAL at 05:54

## 2023-01-01 RX ADMIN — Medication 1 APPLICATION(S): at 06:48

## 2023-01-01 RX ADMIN — PANTOPRAZOLE SODIUM 40 MILLIGRAM(S): 20 TABLET, DELAYED RELEASE ORAL at 05:21

## 2023-01-01 RX ADMIN — CHLORHEXIDINE GLUCONATE 1 APPLICATION(S): 213 SOLUTION TOPICAL at 06:32

## 2023-01-01 RX ADMIN — Medication 100 MILLIGRAM(S): at 18:35

## 2023-01-01 RX ADMIN — Medication 100 MILLIGRAM(S): at 14:23

## 2023-01-01 RX ADMIN — Medication 40 MILLIEQUIVALENT(S): at 01:28

## 2023-01-01 RX ADMIN — PANTOPRAZOLE SODIUM 40 MILLIGRAM(S): 20 TABLET, DELAYED RELEASE ORAL at 11:17

## 2023-01-01 RX ADMIN — CHLORHEXIDINE GLUCONATE 15 MILLILITER(S): 213 SOLUTION TOPICAL at 06:23

## 2023-01-01 RX ADMIN — Medication 15 MILLIGRAM(S): at 15:31

## 2023-01-01 RX ADMIN — Medication 650 MILLIGRAM(S): at 21:21

## 2023-01-01 RX ADMIN — Medication 40 MILLIGRAM(S): at 06:09

## 2023-01-01 RX ADMIN — APIXABAN 2.5 MILLIGRAM(S): 2.5 TABLET, FILM COATED ORAL at 11:23

## 2023-01-01 RX ADMIN — ACETAZOLAMIDE 250 MILLIGRAM(S): 250 TABLET ORAL at 17:05

## 2023-01-01 RX ADMIN — CHLORHEXIDINE GLUCONATE 15 MILLILITER(S): 213 SOLUTION TOPICAL at 05:04

## 2023-01-01 RX ADMIN — PANTOPRAZOLE SODIUM 40 MILLIGRAM(S): 20 TABLET, DELAYED RELEASE ORAL at 12:55

## 2023-01-01 RX ADMIN — GABAPENTIN 100 MILLIGRAM(S): 400 CAPSULE ORAL at 21:46

## 2023-01-01 RX ADMIN — Medication 325 MILLIGRAM(S): at 11:07

## 2023-01-01 RX ADMIN — Medication 3 MILLILITER(S): at 20:40

## 2023-01-01 RX ADMIN — Medication 250 MILLIGRAM(S): at 05:39

## 2023-01-01 RX ADMIN — APIXABAN 2.5 MILLIGRAM(S): 2.5 TABLET, FILM COATED ORAL at 17:15

## 2023-01-01 RX ADMIN — Medication 25 GRAM(S): at 13:29

## 2023-01-01 RX ADMIN — POLYETHYLENE GLYCOL 3350 17 GRAM(S): 17 POWDER, FOR SOLUTION ORAL at 06:07

## 2023-01-01 RX ADMIN — POLYETHYLENE GLYCOL 3350 17 GRAM(S): 17 POWDER, FOR SOLUTION ORAL at 17:51

## 2023-01-01 RX ADMIN — FENTANYL CITRATE 4.1 MICROGRAM(S)/KG/HR: 50 INJECTION INTRAVENOUS at 19:33

## 2023-01-01 RX ADMIN — Medication 40 MILLIGRAM(S): at 04:57

## 2023-01-01 RX ADMIN — Medication 3 MILLILITER(S): at 14:29

## 2023-01-01 RX ADMIN — APIXABAN 2.5 MILLIGRAM(S): 2.5 TABLET, FILM COATED ORAL at 13:14

## 2023-01-01 RX ADMIN — Medication 100 MILLIGRAM(S): at 06:48

## 2023-01-01 RX ADMIN — APIXABAN 2.5 MILLIGRAM(S): 2.5 TABLET, FILM COATED ORAL at 12:16

## 2023-01-01 RX ADMIN — Medication 250 MILLIGRAM(S): at 05:30

## 2023-01-01 RX ADMIN — PANTOPRAZOLE SODIUM 40 MILLIGRAM(S): 20 TABLET, DELAYED RELEASE ORAL at 05:07

## 2023-01-01 RX ADMIN — Medication 25 MILLIGRAM(S): at 18:11

## 2023-01-01 RX ADMIN — Medication 3 MILLILITER(S): at 14:15

## 2023-01-01 RX ADMIN — APIXABAN 2.5 MILLIGRAM(S): 2.5 TABLET, FILM COATED ORAL at 17:59

## 2023-01-01 RX ADMIN — Medication 40 MILLIGRAM(S): at 11:40

## 2023-01-01 RX ADMIN — Medication 12: at 07:57

## 2023-01-01 RX ADMIN — Medication 50 MILLIEQUIVALENT(S): at 12:09

## 2023-01-01 RX ADMIN — CHLORHEXIDINE GLUCONATE 15 MILLILITER(S): 213 SOLUTION TOPICAL at 17:43

## 2023-01-01 RX ADMIN — POLYETHYLENE GLYCOL 3350 17 GRAM(S): 17 POWDER, FOR SOLUTION ORAL at 17:36

## 2023-01-01 RX ADMIN — Medication 0.25 MILLIGRAM(S): at 21:53

## 2023-01-01 RX ADMIN — Medication 3 MILLILITER(S): at 07:55

## 2023-01-01 RX ADMIN — Medication 100 MILLIGRAM(S): at 18:07

## 2023-01-01 RX ADMIN — PROPOFOL 5.27 MICROGRAM(S)/KG/MIN: 10 INJECTION, EMULSION INTRAVENOUS at 20:00

## 2023-01-01 RX ADMIN — Medication 40 MILLIGRAM(S): at 05:21

## 2023-01-01 RX ADMIN — Medication 1: at 12:40

## 2023-01-01 RX ADMIN — DEXMEDETOMIDINE HYDROCHLORIDE IN 0.9% SODIUM CHLORIDE 1.1 MICROGRAM(S)/KG/HR: 4 INJECTION INTRAVENOUS at 14:17

## 2023-01-01 RX ADMIN — NYSTATIN CREAM 1 APPLICATION(S): 100000 CREAM TOPICAL at 06:31

## 2023-01-01 RX ADMIN — NYSTATIN CREAM 1 APPLICATION(S): 100000 CREAM TOPICAL at 05:00

## 2023-01-01 RX ADMIN — CHLORHEXIDINE GLUCONATE 1 APPLICATION(S): 213 SOLUTION TOPICAL at 07:39

## 2023-01-01 RX ADMIN — BUDESONIDE AND FORMOTEROL FUMARATE DIHYDRATE 2 PUFF(S): 160; 4.5 AEROSOL RESPIRATORY (INHALATION) at 07:34

## 2023-01-01 RX ADMIN — CEFEPIME 100 MILLIGRAM(S): 1 INJECTION, POWDER, FOR SOLUTION INTRAMUSCULAR; INTRAVENOUS at 06:15

## 2023-01-01 RX ADMIN — Medication 50 MILLIGRAM(S): at 05:46

## 2023-01-01 RX ADMIN — CHLORHEXIDINE GLUCONATE 1 APPLICATION(S): 213 SOLUTION TOPICAL at 05:39

## 2023-01-01 RX ADMIN — Medication 3 MILLILITER(S): at 01:54

## 2023-01-01 RX ADMIN — CEFEPIME 100 MILLIGRAM(S): 1 INJECTION, POWDER, FOR SOLUTION INTRAMUSCULAR; INTRAVENOUS at 17:02

## 2023-01-01 RX ADMIN — Medication 166.67 MILLIGRAM(S): at 10:59

## 2023-01-01 RX ADMIN — PROPOFOL 15.8 MICROGRAM(S)/KG/MIN: 10 INJECTION, EMULSION INTRAVENOUS at 00:49

## 2023-01-01 RX ADMIN — Medication 250 MILLIGRAM(S): at 05:11

## 2023-01-01 RX ADMIN — DEXMEDETOMIDINE HYDROCHLORIDE IN 0.9% SODIUM CHLORIDE 1.1 MICROGRAM(S)/KG/HR: 4 INJECTION INTRAVENOUS at 22:03

## 2023-01-01 RX ADMIN — Medication 650 MILLIGRAM(S): at 22:34

## 2023-01-01 RX ADMIN — Medication 3 MILLILITER(S): at 19:58

## 2023-01-01 RX ADMIN — GABAPENTIN 100 MILLIGRAM(S): 400 CAPSULE ORAL at 14:15

## 2023-01-01 RX ADMIN — APIXABAN 2.5 MILLIGRAM(S): 2.5 TABLET, FILM COATED ORAL at 05:04

## 2023-01-01 RX ADMIN — Medication 1: at 12:25

## 2023-01-01 RX ADMIN — BUDESONIDE AND FORMOTEROL FUMARATE DIHYDRATE 2 PUFF(S): 160; 4.5 AEROSOL RESPIRATORY (INHALATION) at 07:21

## 2023-01-01 RX ADMIN — Medication 3 MILLILITER(S): at 11:48

## 2023-01-01 RX ADMIN — TIOTROPIUM BROMIDE 2 PUFF(S): 18 CAPSULE ORAL; RESPIRATORY (INHALATION) at 11:28

## 2023-01-01 RX ADMIN — Medication 250 MILLIGRAM(S): at 05:34

## 2023-01-01 RX ADMIN — Medication 100 MILLIGRAM(S): at 21:38

## 2023-01-01 RX ADMIN — Medication 100 GRAM(S): at 00:36

## 2023-01-01 RX ADMIN — GABAPENTIN 100 MILLIGRAM(S): 400 CAPSULE ORAL at 13:13

## 2023-01-01 RX ADMIN — NYSTATIN CREAM 1 APPLICATION(S): 100000 CREAM TOPICAL at 05:15

## 2023-01-01 RX ADMIN — Medication 40 MILLIGRAM(S): at 17:58

## 2023-01-01 RX ADMIN — ALBUTEROL 2 PUFF(S): 90 AEROSOL, METERED ORAL at 13:15

## 2023-01-01 RX ADMIN — GABAPENTIN 100 MILLIGRAM(S): 400 CAPSULE ORAL at 13:26

## 2023-01-01 RX ADMIN — PANTOPRAZOLE SODIUM 40 MILLIGRAM(S): 20 TABLET, DELAYED RELEASE ORAL at 05:41

## 2023-01-01 RX ADMIN — PIPERACILLIN AND TAZOBACTAM 25 GRAM(S): 4; .5 INJECTION, POWDER, LYOPHILIZED, FOR SOLUTION INTRAVENOUS at 14:10

## 2023-01-01 RX ADMIN — CEFEPIME 100 MILLIGRAM(S): 1 INJECTION, POWDER, FOR SOLUTION INTRAMUSCULAR; INTRAVENOUS at 05:10

## 2023-01-01 RX ADMIN — VASOPRESSIN 6 UNIT(S)/MIN: 20 INJECTION INTRAVENOUS at 08:19

## 2023-01-01 RX ADMIN — Medication 40 MILLIGRAM(S): at 05:59

## 2023-01-01 RX ADMIN — HEPARIN SODIUM 1400 UNIT(S)/HR: 5000 INJECTION INTRAVENOUS; SUBCUTANEOUS at 15:00

## 2023-01-01 RX ADMIN — MEROPENEM 100 MILLIGRAM(S): 1 INJECTION INTRAVENOUS at 19:03

## 2023-01-01 RX ADMIN — GABAPENTIN 100 MILLIGRAM(S): 400 CAPSULE ORAL at 13:44

## 2023-01-01 RX ADMIN — Medication 0.5 MILLIGRAM(S): at 04:38

## 2023-01-01 RX ADMIN — Medication 3 MILLILITER(S): at 07:48

## 2023-01-01 RX ADMIN — CHLORHEXIDINE GLUCONATE 1 APPLICATION(S): 213 SOLUTION TOPICAL at 06:36

## 2023-01-01 RX ADMIN — Medication 25 MILLIGRAM(S): at 23:22

## 2023-01-01 RX ADMIN — Medication 3 MILLILITER(S): at 07:50

## 2023-01-01 RX ADMIN — BUDESONIDE AND FORMOTEROL FUMARATE DIHYDRATE 2 PUFF(S): 160; 4.5 AEROSOL RESPIRATORY (INHALATION) at 22:18

## 2023-01-01 RX ADMIN — APIXABAN 2.5 MILLIGRAM(S): 2.5 TABLET, FILM COATED ORAL at 11:36

## 2023-01-01 RX ADMIN — Medication 25 MILLIGRAM(S): at 05:32

## 2023-01-01 RX ADMIN — Medication 325 MILLIGRAM(S): at 14:44

## 2023-01-01 RX ADMIN — SENNA PLUS 2 TABLET(S): 8.6 TABLET ORAL at 21:48

## 2023-01-01 RX ADMIN — GABAPENTIN 100 MILLIGRAM(S): 400 CAPSULE ORAL at 13:14

## 2023-01-01 RX ADMIN — Medication 20 MILLIGRAM(S): at 06:40

## 2023-01-01 RX ADMIN — GABAPENTIN 100 MILLIGRAM(S): 400 CAPSULE ORAL at 05:36

## 2023-01-01 RX ADMIN — APIXABAN 5 MILLIGRAM(S): 2.5 TABLET, FILM COATED ORAL at 05:15

## 2023-01-01 RX ADMIN — Medication 100 MILLIGRAM(S): at 14:14

## 2023-01-01 RX ADMIN — SENNA PLUS 2 TABLET(S): 8.6 TABLET ORAL at 21:50

## 2023-01-01 RX ADMIN — FENTANYL CITRATE 4.1 MICROGRAM(S)/KG/HR: 50 INJECTION INTRAVENOUS at 19:13

## 2023-01-01 RX ADMIN — Medication 40 MILLIGRAM(S): at 12:34

## 2023-01-01 RX ADMIN — DEXMEDETOMIDINE HYDROCHLORIDE IN 0.9% SODIUM CHLORIDE 1.1 MICROGRAM(S)/KG/HR: 4 INJECTION INTRAVENOUS at 10:10

## 2023-01-01 RX ADMIN — ENOXAPARIN SODIUM 70 MILLIGRAM(S): 100 INJECTION SUBCUTANEOUS at 11:37

## 2023-01-01 RX ADMIN — Medication 100 MILLIGRAM(S): at 05:55

## 2023-01-01 RX ADMIN — Medication 3 MILLILITER(S): at 08:32

## 2023-01-01 RX ADMIN — CEFEPIME 100 MILLIGRAM(S): 1 INJECTION, POWDER, FOR SOLUTION INTRAMUSCULAR; INTRAVENOUS at 05:04

## 2023-01-01 RX ADMIN — GABAPENTIN 100 MILLIGRAM(S): 400 CAPSULE ORAL at 21:20

## 2023-01-01 RX ADMIN — HEPARIN SODIUM 1600 UNIT(S)/HR: 5000 INJECTION INTRAVENOUS; SUBCUTANEOUS at 20:50

## 2023-01-01 RX ADMIN — Medication 3 MILLILITER(S): at 04:15

## 2023-01-01 RX ADMIN — SODIUM CHLORIDE 500 MILLILITER(S): 9 INJECTION INTRAMUSCULAR; INTRAVENOUS; SUBCUTANEOUS at 02:10

## 2023-01-01 RX ADMIN — GABAPENTIN 100 MILLIGRAM(S): 400 CAPSULE ORAL at 21:48

## 2023-01-01 RX ADMIN — CHLORHEXIDINE GLUCONATE 1 APPLICATION(S): 213 SOLUTION TOPICAL at 05:55

## 2023-01-01 RX ADMIN — POLYETHYLENE GLYCOL 3350 17 GRAM(S): 17 POWDER, FOR SOLUTION ORAL at 17:05

## 2023-01-01 RX ADMIN — Medication 40 MILLIGRAM(S): at 06:44

## 2023-01-01 RX ADMIN — POLYETHYLENE GLYCOL 3350 17 GRAM(S): 17 POWDER, FOR SOLUTION ORAL at 13:23

## 2023-01-01 RX ADMIN — Medication 25 MILLIGRAM(S): at 05:01

## 2023-01-01 RX ADMIN — Medication 100 MILLIGRAM(S): at 05:52

## 2023-01-01 RX ADMIN — CHLORHEXIDINE GLUCONATE 15 MILLILITER(S): 213 SOLUTION TOPICAL at 18:34

## 2023-01-01 RX ADMIN — APIXABAN 2.5 MILLIGRAM(S): 2.5 TABLET, FILM COATED ORAL at 11:07

## 2023-01-01 RX ADMIN — MEROPENEM 100 MILLIGRAM(S): 1 INJECTION INTRAVENOUS at 15:12

## 2023-01-01 RX ADMIN — MIDAZOLAM HYDROCHLORIDE 4 MILLIGRAM(S): 1 INJECTION, SOLUTION INTRAMUSCULAR; INTRAVENOUS at 20:39

## 2023-01-01 RX ADMIN — Medication 25 MILLIGRAM(S): at 17:20

## 2023-01-01 RX ADMIN — APIXABAN 10 MILLIGRAM(S): 2.5 TABLET, FILM COATED ORAL at 17:11

## 2023-01-01 RX ADMIN — BUDESONIDE AND FORMOTEROL FUMARATE DIHYDRATE 2 PUFF(S): 160; 4.5 AEROSOL RESPIRATORY (INHALATION) at 08:38

## 2023-01-01 RX ADMIN — Medication 30 MILLILITER(S): at 13:54

## 2023-01-01 RX ADMIN — MEROPENEM 100 MILLIGRAM(S): 1 INJECTION INTRAVENOUS at 05:50

## 2023-01-01 RX ADMIN — PANTOPRAZOLE SODIUM 40 MILLIGRAM(S): 20 TABLET, DELAYED RELEASE ORAL at 06:11

## 2023-01-01 RX ADMIN — Medication 25 MILLIGRAM(S): at 17:58

## 2023-01-01 RX ADMIN — NYSTATIN CREAM 1 APPLICATION(S): 100000 CREAM TOPICAL at 18:37

## 2023-01-01 RX ADMIN — MAGNESIUM OXIDE 400 MG ORAL TABLET 400 MILLIGRAM(S): 241.3 TABLET ORAL at 11:38

## 2023-01-01 RX ADMIN — PIPERACILLIN AND TAZOBACTAM 25 GRAM(S): 4; .5 INJECTION, POWDER, LYOPHILIZED, FOR SOLUTION INTRAVENOUS at 05:45

## 2023-01-01 RX ADMIN — MAGNESIUM OXIDE 400 MG ORAL TABLET 400 MILLIGRAM(S): 241.3 TABLET ORAL at 13:07

## 2023-01-01 RX ADMIN — MUPIROCIN 1 APPLICATION(S): 20 OINTMENT TOPICAL at 17:33

## 2023-01-01 RX ADMIN — FENTANYL CITRATE 4.1 MICROGRAM(S)/KG/HR: 50 INJECTION INTRAVENOUS at 02:14

## 2023-01-01 RX ADMIN — GABAPENTIN 100 MILLIGRAM(S): 400 CAPSULE ORAL at 14:48

## 2023-01-01 RX ADMIN — POLYETHYLENE GLYCOL 3350 17 GRAM(S): 17 POWDER, FOR SOLUTION ORAL at 17:12

## 2023-01-01 RX ADMIN — GABAPENTIN 100 MILLIGRAM(S): 400 CAPSULE ORAL at 14:28

## 2023-01-01 RX ADMIN — Medication 120 MILLIGRAM(S): at 06:42

## 2023-01-01 RX ADMIN — Medication 40 MILLIGRAM(S): at 05:06

## 2023-01-01 RX ADMIN — SENNA PLUS 2 TABLET(S): 8.6 TABLET ORAL at 21:03

## 2023-01-01 RX ADMIN — ALBUTEROL 2 PUFF(S): 90 AEROSOL, METERED ORAL at 20:24

## 2023-01-01 RX ADMIN — NYSTATIN CREAM 1 APPLICATION(S): 100000 CREAM TOPICAL at 06:50

## 2023-01-01 RX ADMIN — APIXABAN 2.5 MILLIGRAM(S): 2.5 TABLET, FILM COATED ORAL at 17:58

## 2023-01-01 RX ADMIN — PANTOPRAZOLE SODIUM 40 MILLIGRAM(S): 20 TABLET, DELAYED RELEASE ORAL at 06:20

## 2023-01-01 RX ADMIN — GABAPENTIN 100 MILLIGRAM(S): 400 CAPSULE ORAL at 13:24

## 2023-01-01 RX ADMIN — POLYETHYLENE GLYCOL 3350 17 GRAM(S): 17 POWDER, FOR SOLUTION ORAL at 13:18

## 2023-01-01 RX ADMIN — Medication 250 MILLIGRAM(S): at 05:03

## 2023-01-01 RX ADMIN — Medication 3 MILLILITER(S): at 00:22

## 2023-01-01 RX ADMIN — CEFEPIME 100 MILLIGRAM(S): 1 INJECTION, POWDER, FOR SOLUTION INTRAMUSCULAR; INTRAVENOUS at 05:09

## 2023-01-01 RX ADMIN — GABAPENTIN 100 MILLIGRAM(S): 400 CAPSULE ORAL at 05:13

## 2023-01-01 RX ADMIN — Medication 25 MILLIGRAM(S): at 06:10

## 2023-01-01 RX ADMIN — Medication 100 MILLIGRAM(S): at 19:04

## 2023-01-01 RX ADMIN — Medication 50 MILLIGRAM(S): at 17:37

## 2023-01-01 RX ADMIN — ENOXAPARIN SODIUM 70 MILLIGRAM(S): 100 INJECTION SUBCUTANEOUS at 12:03

## 2023-01-01 RX ADMIN — GABAPENTIN 100 MILLIGRAM(S): 400 CAPSULE ORAL at 14:06

## 2023-01-01 RX ADMIN — Medication 40 MILLIGRAM(S): at 05:55

## 2023-01-01 RX ADMIN — Medication 3 MILLILITER(S): at 13:22

## 2023-01-01 RX ADMIN — GABAPENTIN 100 MILLIGRAM(S): 400 CAPSULE ORAL at 13:47

## 2023-01-01 RX ADMIN — MAGNESIUM OXIDE 400 MG ORAL TABLET 400 MILLIGRAM(S): 241.3 TABLET ORAL at 17:40

## 2023-01-01 RX ADMIN — LACTULOSE 20 GRAM(S): 10 SOLUTION ORAL at 21:48

## 2023-01-01 RX ADMIN — POLYETHYLENE GLYCOL 3350 17 GRAM(S): 17 POWDER, FOR SOLUTION ORAL at 12:51

## 2023-01-01 RX ADMIN — PANTOPRAZOLE SODIUM 40 MILLIGRAM(S): 20 TABLET, DELAYED RELEASE ORAL at 11:47

## 2023-01-01 RX ADMIN — NYSTATIN CREAM 1 APPLICATION(S): 100000 CREAM TOPICAL at 05:29

## 2023-01-01 RX ADMIN — Medication 250 MILLIGRAM(S): at 19:25

## 2023-01-01 RX ADMIN — POLYETHYLENE GLYCOL 3350 17 GRAM(S): 17 POWDER, FOR SOLUTION ORAL at 13:40

## 2023-01-01 RX ADMIN — Medication 650 MILLIGRAM(S): at 11:06

## 2023-01-01 RX ADMIN — HEPARIN SODIUM 5000 UNIT(S): 5000 INJECTION INTRAVENOUS; SUBCUTANEOUS at 08:56

## 2023-01-01 RX ADMIN — SENNA PLUS 2 TABLET(S): 8.6 TABLET ORAL at 22:48

## 2023-01-01 RX ADMIN — Medication 3 MILLILITER(S): at 11:54

## 2023-01-01 RX ADMIN — MIDAZOLAM HYDROCHLORIDE 4 MILLIGRAM(S): 1 INJECTION, SOLUTION INTRAMUSCULAR; INTRAVENOUS at 00:21

## 2023-01-01 RX ADMIN — Medication 166.67 MILLIGRAM(S): at 21:58

## 2023-01-01 RX ADMIN — CHLORHEXIDINE GLUCONATE 1 APPLICATION(S): 213 SOLUTION TOPICAL at 05:53

## 2023-01-01 RX ADMIN — Medication 100 MILLIGRAM(S): at 22:31

## 2023-01-01 RX ADMIN — Medication 40 MILLIGRAM(S): at 08:00

## 2023-01-01 RX ADMIN — DEXMEDETOMIDINE HYDROCHLORIDE IN 0.9% SODIUM CHLORIDE 1.1 MICROGRAM(S)/KG/HR: 4 INJECTION INTRAVENOUS at 17:18

## 2023-01-01 RX ADMIN — CHLORHEXIDINE GLUCONATE 1 APPLICATION(S): 213 SOLUTION TOPICAL at 06:13

## 2023-01-01 RX ADMIN — Medication 50 MILLIGRAM(S): at 17:26

## 2023-01-01 RX ADMIN — NYSTATIN CREAM 1 APPLICATION(S): 100000 CREAM TOPICAL at 05:35

## 2023-01-01 RX ADMIN — NYSTATIN CREAM 1 APPLICATION(S): 100000 CREAM TOPICAL at 18:52

## 2023-01-01 RX ADMIN — GABAPENTIN 100 MILLIGRAM(S): 400 CAPSULE ORAL at 15:27

## 2023-01-01 RX ADMIN — MUPIROCIN 1 APPLICATION(S): 20 OINTMENT TOPICAL at 05:06

## 2023-01-01 RX ADMIN — ALBUTEROL 2 PUFF(S): 90 AEROSOL, METERED ORAL at 14:44

## 2023-01-01 RX ADMIN — Medication 120 MILLIGRAM(S): at 05:06

## 2023-01-01 RX ADMIN — Medication 3 MILLILITER(S): at 15:11

## 2023-01-01 RX ADMIN — Medication 3 MILLILITER(S): at 19:41

## 2023-01-01 RX ADMIN — Medication 10 MILLILITER(S): at 16:39

## 2023-01-01 RX ADMIN — PANTOPRAZOLE SODIUM 40 MILLIGRAM(S): 20 TABLET, DELAYED RELEASE ORAL at 05:43

## 2023-01-01 RX ADMIN — Medication 25 MILLIGRAM(S): at 05:05

## 2023-01-01 RX ADMIN — Medication 3 MILLILITER(S): at 14:50

## 2023-01-01 RX ADMIN — CHLORHEXIDINE GLUCONATE 1 APPLICATION(S): 213 SOLUTION TOPICAL at 05:08

## 2023-01-01 RX ADMIN — Medication 250 MILLIGRAM(S): at 17:21

## 2023-01-01 RX ADMIN — Medication 3 MILLILITER(S): at 16:05

## 2023-01-01 RX ADMIN — Medication 25 MILLIGRAM(S): at 17:19

## 2023-01-01 RX ADMIN — ENOXAPARIN SODIUM 80 MILLIGRAM(S): 100 INJECTION SUBCUTANEOUS at 05:33

## 2023-01-01 RX ADMIN — NYSTATIN CREAM 1 APPLICATION(S): 100000 CREAM TOPICAL at 05:50

## 2023-01-01 RX ADMIN — Medication 40 MILLIGRAM(S): at 17:35

## 2023-01-01 RX ADMIN — PANTOPRAZOLE SODIUM 40 MILLIGRAM(S): 20 TABLET, DELAYED RELEASE ORAL at 06:28

## 2023-01-01 RX ADMIN — CHLORHEXIDINE GLUCONATE 1 APPLICATION(S): 213 SOLUTION TOPICAL at 05:45

## 2023-01-01 RX ADMIN — Medication 3 MILLILITER(S): at 13:10

## 2023-01-01 RX ADMIN — SENNA PLUS 2 TABLET(S): 8.6 TABLET ORAL at 22:14

## 2023-01-01 RX ADMIN — Medication 100 MILLIGRAM(S): at 13:26

## 2023-01-01 RX ADMIN — ACETAZOLAMIDE 110 MILLIGRAM(S): 250 TABLET ORAL at 15:50

## 2023-01-01 RX ADMIN — Medication 650 MILLIGRAM(S): at 13:37

## 2023-01-01 RX ADMIN — AZITHROMYCIN 255 MILLIGRAM(S): 500 TABLET, FILM COATED ORAL at 12:56

## 2023-01-01 RX ADMIN — NYSTATIN CREAM 1 APPLICATION(S): 100000 CREAM TOPICAL at 17:05

## 2023-01-01 RX ADMIN — Medication 650 MILLIGRAM(S): at 21:44

## 2023-01-01 RX ADMIN — SENNA PLUS 2 TABLET(S): 8.6 TABLET ORAL at 21:10

## 2023-01-01 RX ADMIN — Medication 2: at 11:55

## 2023-01-01 RX ADMIN — POLYETHYLENE GLYCOL 3350 17 GRAM(S): 17 POWDER, FOR SOLUTION ORAL at 05:30

## 2023-01-01 RX ADMIN — Medication 40 MILLIGRAM(S): at 11:55

## 2023-01-01 RX ADMIN — Medication 25 MILLIGRAM(S): at 05:45

## 2023-01-01 RX ADMIN — ENOXAPARIN SODIUM 80 MILLIGRAM(S): 100 INJECTION SUBCUTANEOUS at 18:33

## 2023-01-01 RX ADMIN — MIDAZOLAM HYDROCHLORIDE 4 MILLIGRAM(S): 1 INJECTION, SOLUTION INTRAMUSCULAR; INTRAVENOUS at 19:33

## 2023-01-01 RX ADMIN — Medication 25 MILLIGRAM(S): at 05:13

## 2023-01-01 RX ADMIN — SODIUM CHLORIDE 500 MILLILITER(S): 9 INJECTION, SOLUTION INTRAVENOUS at 07:28

## 2023-01-01 RX ADMIN — Medication 40 MILLIGRAM(S): at 06:31

## 2023-01-01 RX ADMIN — Medication 50 MILLIEQUIVALENT(S): at 16:05

## 2023-01-01 RX ADMIN — GABAPENTIN 100 MILLIGRAM(S): 400 CAPSULE ORAL at 22:19

## 2023-01-01 RX ADMIN — ACETAZOLAMIDE 110 MILLIGRAM(S): 250 TABLET ORAL at 12:49

## 2023-01-01 RX ADMIN — GABAPENTIN 100 MILLIGRAM(S): 400 CAPSULE ORAL at 05:05

## 2023-01-01 RX ADMIN — Medication 1: at 13:08

## 2023-01-01 RX ADMIN — Medication 50 MILLIGRAM(S): at 06:41

## 2023-01-01 RX ADMIN — MIDAZOLAM HYDROCHLORIDE 2 MILLIGRAM(S): 1 INJECTION, SOLUTION INTRAMUSCULAR; INTRAVENOUS at 08:25

## 2023-01-01 RX ADMIN — GABAPENTIN 100 MILLIGRAM(S): 400 CAPSULE ORAL at 05:04

## 2023-01-01 RX ADMIN — Medication 3 MILLILITER(S): at 14:46

## 2023-01-01 RX ADMIN — Medication 1 APPLICATION(S): at 17:25

## 2023-01-01 RX ADMIN — LACTULOSE 20 GRAM(S): 10 SOLUTION ORAL at 13:50

## 2023-01-01 RX ADMIN — BUDESONIDE AND FORMOTEROL FUMARATE DIHYDRATE 2 PUFF(S): 160; 4.5 AEROSOL RESPIRATORY (INHALATION) at 20:50

## 2023-01-01 RX ADMIN — CHLORHEXIDINE GLUCONATE 15 MILLILITER(S): 213 SOLUTION TOPICAL at 17:16

## 2023-01-01 RX ADMIN — Medication 1: at 11:50

## 2023-01-01 RX ADMIN — Medication 325 MILLIGRAM(S): at 11:47

## 2023-01-01 RX ADMIN — FENTANYL CITRATE 4.1 MICROGRAM(S)/KG/HR: 50 INJECTION INTRAVENOUS at 21:19

## 2023-01-01 RX ADMIN — MEROPENEM 100 MILLIGRAM(S): 1 INJECTION INTRAVENOUS at 17:46

## 2023-01-01 RX ADMIN — POLYETHYLENE GLYCOL 3350 17 GRAM(S): 17 POWDER, FOR SOLUTION ORAL at 17:16

## 2023-01-01 RX ADMIN — ALBUTEROL 2 PUFF(S): 90 AEROSOL, METERED ORAL at 09:26

## 2023-01-01 RX ADMIN — Medication 120 MILLIGRAM(S): at 05:11

## 2023-01-01 RX ADMIN — GABAPENTIN 100 MILLIGRAM(S): 400 CAPSULE ORAL at 13:34

## 2023-01-01 RX ADMIN — Medication 85 MILLIMOLE(S): at 10:50

## 2023-01-01 RX ADMIN — MIDAZOLAM HYDROCHLORIDE 2 MILLIGRAM(S): 1 INJECTION, SOLUTION INTRAMUSCULAR; INTRAVENOUS at 12:12

## 2023-01-01 RX ADMIN — Medication 325 MILLIGRAM(S): at 12:15

## 2023-01-01 RX ADMIN — DEXMEDETOMIDINE HYDROCHLORIDE IN 0.9% SODIUM CHLORIDE 1.1 MICROGRAM(S)/KG/HR: 4 INJECTION INTRAVENOUS at 19:11

## 2023-01-01 RX ADMIN — BUDESONIDE AND FORMOTEROL FUMARATE DIHYDRATE 2 PUFF(S): 160; 4.5 AEROSOL RESPIRATORY (INHALATION) at 09:33

## 2023-01-01 RX ADMIN — DEXMEDETOMIDINE HYDROCHLORIDE IN 0.9% SODIUM CHLORIDE 1.1 MICROGRAM(S)/KG/HR: 4 INJECTION INTRAVENOUS at 14:59

## 2023-01-01 RX ADMIN — MAGNESIUM OXIDE 400 MG ORAL TABLET 400 MILLIGRAM(S): 241.3 TABLET ORAL at 12:59

## 2023-01-01 RX ADMIN — NYSTATIN CREAM 1 APPLICATION(S): 100000 CREAM TOPICAL at 05:47

## 2023-01-01 RX ADMIN — Medication 0.25 MILLIGRAM(S): at 15:41

## 2023-01-01 RX ADMIN — Medication 60 MILLIGRAM(S): at 19:08

## 2023-01-01 RX ADMIN — GABAPENTIN 100 MILLIGRAM(S): 400 CAPSULE ORAL at 13:57

## 2023-01-01 RX ADMIN — NYSTATIN CREAM 1 APPLICATION(S): 100000 CREAM TOPICAL at 17:30

## 2023-01-01 RX ADMIN — Medication 40 MILLIGRAM(S): at 17:08

## 2023-01-01 RX ADMIN — TIOTROPIUM BROMIDE 2 PUFF(S): 18 CAPSULE ORAL; RESPIRATORY (INHALATION) at 12:36

## 2023-01-01 RX ADMIN — CEFEPIME 100 MILLIGRAM(S): 1 INJECTION, POWDER, FOR SOLUTION INTRAMUSCULAR; INTRAVENOUS at 06:27

## 2023-01-01 RX ADMIN — Medication 40 MILLIGRAM(S): at 18:44

## 2023-01-01 RX ADMIN — PROPOFOL 4.62 MICROGRAM(S)/KG/MIN: 10 INJECTION, EMULSION INTRAVENOUS at 11:55

## 2023-01-01 RX ADMIN — Medication 250 MILLIGRAM(S): at 17:36

## 2023-01-01 RX ADMIN — NYSTATIN CREAM 1 APPLICATION(S): 100000 CREAM TOPICAL at 05:28

## 2023-01-01 RX ADMIN — POLYETHYLENE GLYCOL 3350 17 GRAM(S): 17 POWDER, FOR SOLUTION ORAL at 18:51

## 2023-01-01 RX ADMIN — PANTOPRAZOLE SODIUM 40 MILLIGRAM(S): 20 TABLET, DELAYED RELEASE ORAL at 17:38

## 2023-01-01 RX ADMIN — APIXABAN 10 MILLIGRAM(S): 2.5 TABLET, FILM COATED ORAL at 06:31

## 2023-01-01 RX ADMIN — Medication 3 MILLILITER(S): at 20:10

## 2023-01-01 RX ADMIN — Medication 100 MILLIGRAM(S): at 05:49

## 2023-01-01 RX ADMIN — Medication 40 MILLIGRAM(S): at 13:59

## 2023-01-01 RX ADMIN — NYSTATIN CREAM 1 APPLICATION(S): 100000 CREAM TOPICAL at 05:13

## 2023-01-01 RX ADMIN — Medication 100 MILLIGRAM(S): at 18:17

## 2023-01-01 RX ADMIN — Medication 100 MILLIGRAM(S): at 18:51

## 2023-01-01 RX ADMIN — GABAPENTIN 100 MILLIGRAM(S): 400 CAPSULE ORAL at 06:28

## 2023-01-01 RX ADMIN — HEPARIN SODIUM 5000 UNIT(S): 5000 INJECTION INTRAVENOUS; SUBCUTANEOUS at 09:45

## 2023-01-01 RX ADMIN — Medication 3 MILLILITER(S): at 19:21

## 2023-01-01 RX ADMIN — POLYETHYLENE GLYCOL 3350 17 GRAM(S): 17 POWDER, FOR SOLUTION ORAL at 05:15

## 2023-01-01 RX ADMIN — Medication 5 MILLIGRAM(S): at 21:16

## 2023-01-01 RX ADMIN — Medication 20 MILLIEQUIVALENT(S): at 12:58

## 2023-01-01 RX ADMIN — GABAPENTIN 100 MILLIGRAM(S): 400 CAPSULE ORAL at 12:22

## 2023-01-01 RX ADMIN — Medication 100 MILLIGRAM(S): at 06:36

## 2023-01-01 RX ADMIN — Medication 50 MILLIGRAM(S): at 02:39

## 2023-01-01 RX ADMIN — POLYETHYLENE GLYCOL 3350 17 GRAM(S): 17 POWDER, FOR SOLUTION ORAL at 17:07

## 2023-01-01 RX ADMIN — Medication 40 MILLIGRAM(S): at 05:44

## 2023-01-01 RX ADMIN — MAGNESIUM OXIDE 400 MG ORAL TABLET 400 MILLIGRAM(S): 241.3 TABLET ORAL at 17:07

## 2023-01-01 RX ADMIN — Medication 100 MILLIGRAM(S): at 13:50

## 2023-01-01 RX ADMIN — Medication 10 MILLILITER(S): at 13:19

## 2023-01-01 RX ADMIN — CEFTRIAXONE 100 MILLIGRAM(S): 500 INJECTION, POWDER, FOR SOLUTION INTRAMUSCULAR; INTRAVENOUS at 11:43

## 2023-01-01 RX ADMIN — Medication 25 MILLIGRAM(S): at 05:52

## 2023-01-01 RX ADMIN — Medication 3 MILLILITER(S): at 20:47

## 2023-01-01 RX ADMIN — Medication 650 MILLIGRAM(S): at 12:45

## 2023-01-01 RX ADMIN — CHLORHEXIDINE GLUCONATE 15 MILLILITER(S): 213 SOLUTION TOPICAL at 17:57

## 2023-01-01 RX ADMIN — Medication 1000 MILLIGRAM(S): at 12:21

## 2023-01-01 RX ADMIN — Medication 3 MILLILITER(S): at 20:04

## 2023-01-01 RX ADMIN — APIXABAN 2.5 MILLIGRAM(S): 2.5 TABLET, FILM COATED ORAL at 23:51

## 2023-01-01 RX ADMIN — Medication 40 MILLIGRAM(S): at 06:13

## 2023-01-01 RX ADMIN — NYSTATIN CREAM 1 APPLICATION(S): 100000 CREAM TOPICAL at 17:32

## 2023-01-01 RX ADMIN — APIXABAN 2.5 MILLIGRAM(S): 2.5 TABLET, FILM COATED ORAL at 23:10

## 2023-01-01 RX ADMIN — APIXABAN 2.5 MILLIGRAM(S): 2.5 TABLET, FILM COATED ORAL at 06:42

## 2023-01-01 RX ADMIN — POLYETHYLENE GLYCOL 3350 17 GRAM(S): 17 POWDER, FOR SOLUTION ORAL at 11:01

## 2023-01-01 RX ADMIN — CHLORHEXIDINE GLUCONATE 1 APPLICATION(S): 213 SOLUTION TOPICAL at 06:30

## 2023-01-01 RX ADMIN — Medication 40 MILLIGRAM(S): at 05:05

## 2023-01-01 RX ADMIN — Medication 325 MILLIGRAM(S): at 13:33

## 2023-01-01 RX ADMIN — GABAPENTIN 100 MILLIGRAM(S): 400 CAPSULE ORAL at 14:12

## 2023-01-01 RX ADMIN — GABAPENTIN 100 MILLIGRAM(S): 400 CAPSULE ORAL at 22:30

## 2023-01-01 RX ADMIN — Medication 100 MILLIGRAM(S): at 21:41

## 2023-01-01 RX ADMIN — Medication 3 MILLILITER(S): at 20:00

## 2023-01-01 RX ADMIN — MEROPENEM 100 MILLIGRAM(S): 1 INJECTION INTRAVENOUS at 05:23

## 2023-01-01 RX ADMIN — GABAPENTIN 100 MILLIGRAM(S): 400 CAPSULE ORAL at 13:33

## 2023-01-01 RX ADMIN — PANTOPRAZOLE SODIUM 40 MILLIGRAM(S): 20 TABLET, DELAYED RELEASE ORAL at 05:44

## 2023-01-01 RX ADMIN — POLYETHYLENE GLYCOL 3350 17 GRAM(S): 17 POWDER, FOR SOLUTION ORAL at 18:36

## 2023-01-01 RX ADMIN — ENOXAPARIN SODIUM 90 MILLIGRAM(S): 100 INJECTION SUBCUTANEOUS at 06:26

## 2023-01-01 RX ADMIN — GABAPENTIN 100 MILLIGRAM(S): 400 CAPSULE ORAL at 21:54

## 2023-01-01 RX ADMIN — CEFEPIME 100 MILLIGRAM(S): 1 INJECTION, POWDER, FOR SOLUTION INTRAMUSCULAR; INTRAVENOUS at 23:23

## 2023-01-01 RX ADMIN — Medication 3 MILLILITER(S): at 12:39

## 2023-01-01 RX ADMIN — CEFTRIAXONE 100 MILLIGRAM(S): 500 INJECTION, POWDER, FOR SOLUTION INTRAMUSCULAR; INTRAVENOUS at 16:41

## 2023-01-01 RX ADMIN — PANTOPRAZOLE SODIUM 40 MILLIGRAM(S): 20 TABLET, DELAYED RELEASE ORAL at 07:04

## 2023-01-01 RX ADMIN — Medication 250 MILLIGRAM(S): at 18:39

## 2023-01-01 RX ADMIN — ALBUTEROL 2 PUFF(S): 90 AEROSOL, METERED ORAL at 19:22

## 2023-01-01 RX ADMIN — ENOXAPARIN SODIUM 90 MILLIGRAM(S): 100 INJECTION SUBCUTANEOUS at 18:25

## 2023-01-01 RX ADMIN — PROPOFOL 4.62 MICROGRAM(S)/KG/MIN: 10 INJECTION, EMULSION INTRAVENOUS at 06:22

## 2023-01-01 RX ADMIN — GABAPENTIN 100 MILLIGRAM(S): 400 CAPSULE ORAL at 22:10

## 2023-01-01 RX ADMIN — Medication 100 MILLIGRAM(S): at 17:26

## 2023-01-01 RX ADMIN — Medication 25 MILLIGRAM(S): at 16:42

## 2023-01-01 RX ADMIN — APIXABAN 2.5 MILLIGRAM(S): 2.5 TABLET, FILM COATED ORAL at 17:25

## 2023-01-01 RX ADMIN — Medication 3 MILLILITER(S): at 15:14

## 2023-01-01 RX ADMIN — PANTOPRAZOLE SODIUM 40 MILLIGRAM(S): 20 TABLET, DELAYED RELEASE ORAL at 06:46

## 2023-01-01 RX ADMIN — GABAPENTIN 100 MILLIGRAM(S): 400 CAPSULE ORAL at 13:10

## 2023-01-01 RX ADMIN — DEXMEDETOMIDINE HYDROCHLORIDE IN 0.9% SODIUM CHLORIDE 1.1 MICROGRAM(S)/KG/HR: 4 INJECTION INTRAVENOUS at 21:59

## 2023-01-01 RX ADMIN — PANTOPRAZOLE SODIUM 40 MILLIGRAM(S): 20 TABLET, DELAYED RELEASE ORAL at 18:16

## 2023-01-01 RX ADMIN — Medication 25 MILLIGRAM(S): at 05:35

## 2023-01-01 RX ADMIN — Medication 40 MILLIGRAM(S): at 22:40

## 2023-01-01 RX ADMIN — PANTOPRAZOLE SODIUM 40 MILLIGRAM(S): 20 TABLET, DELAYED RELEASE ORAL at 17:54

## 2023-01-01 RX ADMIN — Medication 180 MILLIGRAM(S): at 05:37

## 2023-01-01 RX ADMIN — FENTANYL CITRATE 4.1 MICROGRAM(S)/KG/HR: 50 INJECTION INTRAVENOUS at 17:56

## 2023-01-01 RX ADMIN — Medication 3 MILLILITER(S): at 20:25

## 2023-01-01 RX ADMIN — Medication 40 MILLIGRAM(S): at 05:51

## 2023-01-01 RX ADMIN — NYSTATIN CREAM 1 APPLICATION(S): 100000 CREAM TOPICAL at 06:11

## 2023-01-01 RX ADMIN — Medication 1: at 11:37

## 2023-01-01 RX ADMIN — Medication 1 APPLICATION(S): at 06:13

## 2023-01-01 RX ADMIN — Medication 100 GRAM(S): at 12:34

## 2023-01-01 RX ADMIN — Medication 250 MILLIGRAM(S): at 06:41

## 2023-01-01 RX ADMIN — PIPERACILLIN AND TAZOBACTAM 25 GRAM(S): 4; .5 INJECTION, POWDER, LYOPHILIZED, FOR SOLUTION INTRAVENOUS at 05:51

## 2023-01-01 RX ADMIN — SENNA PLUS 2 TABLET(S): 8.6 TABLET ORAL at 21:36

## 2023-01-01 RX ADMIN — SENNA PLUS 2 TABLET(S): 8.6 TABLET ORAL at 22:32

## 2023-01-01 RX ADMIN — Medication 250 MILLIGRAM(S): at 18:01

## 2023-01-01 RX ADMIN — Medication 30 MILLIGRAM(S): at 06:49

## 2023-01-01 RX ADMIN — APIXABAN 2.5 MILLIGRAM(S): 2.5 TABLET, FILM COATED ORAL at 05:03

## 2023-01-01 RX ADMIN — BUDESONIDE AND FORMOTEROL FUMARATE DIHYDRATE 2 PUFF(S): 160; 4.5 AEROSOL RESPIRATORY (INHALATION) at 12:35

## 2023-01-01 RX ADMIN — ALBUTEROL 2 PUFF(S): 90 AEROSOL, METERED ORAL at 23:06

## 2023-01-01 RX ADMIN — HEPARIN SODIUM 1600 UNIT(S)/HR: 5000 INJECTION INTRAVENOUS; SUBCUTANEOUS at 21:48

## 2023-01-01 RX ADMIN — APIXABAN 2.5 MILLIGRAM(S): 2.5 TABLET, FILM COATED ORAL at 17:50

## 2023-01-01 RX ADMIN — TIOTROPIUM BROMIDE 2 PUFF(S): 18 CAPSULE ORAL; RESPIRATORY (INHALATION) at 09:50

## 2023-01-01 RX ADMIN — Medication 325 MILLIGRAM(S): at 11:24

## 2023-01-01 RX ADMIN — NYSTATIN CREAM 1 APPLICATION(S): 100000 CREAM TOPICAL at 18:42

## 2023-01-01 RX ADMIN — DEXMEDETOMIDINE HYDROCHLORIDE IN 0.9% SODIUM CHLORIDE 1.1 MICROGRAM(S)/KG/HR: 4 INJECTION INTRAVENOUS at 09:49

## 2023-01-01 RX ADMIN — Medication 40 MILLIGRAM(S): at 06:28

## 2023-01-01 RX ADMIN — Medication 40 MILLIEQUIVALENT(S): at 13:31

## 2023-01-01 RX ADMIN — Medication 180 MILLIGRAM(S): at 05:55

## 2023-01-01 RX ADMIN — Medication 25 MILLIGRAM(S): at 06:15

## 2023-01-01 RX ADMIN — DEXMEDETOMIDINE HYDROCHLORIDE IN 0.9% SODIUM CHLORIDE 1.1 MICROGRAM(S)/KG/HR: 4 INJECTION INTRAVENOUS at 15:43

## 2023-01-01 RX ADMIN — CHLORHEXIDINE GLUCONATE 15 MILLILITER(S): 213 SOLUTION TOPICAL at 05:06

## 2023-01-01 RX ADMIN — Medication 3 MILLILITER(S): at 16:32

## 2023-01-01 RX ADMIN — Medication 2: at 11:17

## 2023-01-01 RX ADMIN — Medication 5 MILLIGRAM(S): at 22:41

## 2023-01-01 RX ADMIN — Medication 4.04 MICROGRAM(S)/KG/MIN: at 06:02

## 2023-01-01 RX ADMIN — GABAPENTIN 100 MILLIGRAM(S): 400 CAPSULE ORAL at 22:23

## 2023-01-01 RX ADMIN — Medication 40 MILLIGRAM(S): at 05:50

## 2023-01-01 RX ADMIN — NYSTATIN CREAM 1 APPLICATION(S): 100000 CREAM TOPICAL at 17:57

## 2023-01-01 RX ADMIN — Medication 3 MILLILITER(S): at 02:47

## 2023-01-01 RX ADMIN — GABAPENTIN 100 MILLIGRAM(S): 400 CAPSULE ORAL at 13:11

## 2023-01-01 RX ADMIN — Medication 25 MILLIGRAM(S): at 17:02

## 2023-01-01 RX ADMIN — GABAPENTIN 100 MILLIGRAM(S): 400 CAPSULE ORAL at 14:29

## 2023-01-01 RX ADMIN — GABAPENTIN 100 MILLIGRAM(S): 400 CAPSULE ORAL at 21:35

## 2023-01-01 RX ADMIN — MAGNESIUM OXIDE 400 MG ORAL TABLET 400 MILLIGRAM(S): 241.3 TABLET ORAL at 09:50

## 2023-01-01 RX ADMIN — PIPERACILLIN AND TAZOBACTAM 25 GRAM(S): 4; .5 INJECTION, POWDER, LYOPHILIZED, FOR SOLUTION INTRAVENOUS at 21:44

## 2023-01-01 RX ADMIN — Medication 3 MILLILITER(S): at 14:25

## 2023-01-01 RX ADMIN — PIPERACILLIN AND TAZOBACTAM 25 GRAM(S): 4; .5 INJECTION, POWDER, LYOPHILIZED, FOR SOLUTION INTRAVENOUS at 17:09

## 2023-01-01 RX ADMIN — Medication 650 MILLIGRAM(S): at 18:55

## 2023-01-01 RX ADMIN — Medication 25 MILLIGRAM(S): at 17:15

## 2023-01-01 RX ADMIN — GABAPENTIN 100 MILLIGRAM(S): 400 CAPSULE ORAL at 05:07

## 2023-01-01 RX ADMIN — NYSTATIN CREAM 1 APPLICATION(S): 100000 CREAM TOPICAL at 17:21

## 2023-01-01 RX ADMIN — APIXABAN 5 MILLIGRAM(S): 2.5 TABLET, FILM COATED ORAL at 06:41

## 2023-01-01 RX ADMIN — Medication 650 MILLIGRAM(S): at 21:06

## 2023-01-01 RX ADMIN — Medication 1: at 17:40

## 2023-01-01 RX ADMIN — DEXMEDETOMIDINE HYDROCHLORIDE IN 0.9% SODIUM CHLORIDE 1.1 MICROGRAM(S)/KG/HR: 4 INJECTION INTRAVENOUS at 16:44

## 2023-01-01 RX ADMIN — Medication 3 MILLILITER(S): at 14:08

## 2023-01-01 RX ADMIN — GABAPENTIN 100 MILLIGRAM(S): 400 CAPSULE ORAL at 21:23

## 2023-01-01 RX ADMIN — CHLORHEXIDINE GLUCONATE 1 APPLICATION(S): 213 SOLUTION TOPICAL at 05:54

## 2023-01-01 RX ADMIN — APIXABAN 10 MILLIGRAM(S): 2.5 TABLET, FILM COATED ORAL at 15:00

## 2023-01-01 RX ADMIN — Medication 180 MILLIGRAM(S): at 05:51

## 2023-01-01 RX ADMIN — CEFEPIME 100 MILLIGRAM(S): 1 INJECTION, POWDER, FOR SOLUTION INTRAMUSCULAR; INTRAVENOUS at 05:12

## 2023-01-01 RX ADMIN — CEFEPIME 100 MILLIGRAM(S): 1 INJECTION, POWDER, FOR SOLUTION INTRAMUSCULAR; INTRAVENOUS at 17:12

## 2023-01-01 RX ADMIN — APIXABAN 5 MILLIGRAM(S): 2.5 TABLET, FILM COATED ORAL at 05:01

## 2023-01-01 RX ADMIN — CEFEPIME 100 MILLIGRAM(S): 1 INJECTION, POWDER, FOR SOLUTION INTRAMUSCULAR; INTRAVENOUS at 14:28

## 2023-01-01 RX ADMIN — DEXMEDETOMIDINE HYDROCHLORIDE IN 0.9% SODIUM CHLORIDE 1.1 MICROGRAM(S)/KG/HR: 4 INJECTION INTRAVENOUS at 01:24

## 2023-01-01 RX ADMIN — BUDESONIDE AND FORMOTEROL FUMARATE DIHYDRATE 2 PUFF(S): 160; 4.5 AEROSOL RESPIRATORY (INHALATION) at 08:16

## 2023-01-01 RX ADMIN — GABAPENTIN 100 MILLIGRAM(S): 400 CAPSULE ORAL at 21:09

## 2023-01-01 RX ADMIN — GABAPENTIN 100 MILLIGRAM(S): 400 CAPSULE ORAL at 14:04

## 2023-01-01 RX ADMIN — CEFEPIME 100 MILLIGRAM(S): 1 INJECTION, POWDER, FOR SOLUTION INTRAMUSCULAR; INTRAVENOUS at 19:08

## 2023-01-01 RX ADMIN — GABAPENTIN 100 MILLIGRAM(S): 400 CAPSULE ORAL at 21:57

## 2023-01-01 RX ADMIN — Medication 3 MILLILITER(S): at 08:35

## 2023-01-01 RX ADMIN — Medication 25 MILLIGRAM(S): at 21:07

## 2023-01-01 RX ADMIN — APIXABAN 5 MILLIGRAM(S): 2.5 TABLET, FILM COATED ORAL at 17:07

## 2023-01-01 RX ADMIN — Medication 250 MILLIGRAM(S): at 05:37

## 2023-01-01 RX ADMIN — CEFEPIME 100 MILLIGRAM(S): 1 INJECTION, POWDER, FOR SOLUTION INTRAMUSCULAR; INTRAVENOUS at 10:54

## 2023-01-01 RX ADMIN — GABAPENTIN 100 MILLIGRAM(S): 400 CAPSULE ORAL at 21:04

## 2023-01-01 RX ADMIN — ENOXAPARIN SODIUM 90 MILLIGRAM(S): 100 INJECTION SUBCUTANEOUS at 18:09

## 2023-01-01 RX ADMIN — ENOXAPARIN SODIUM 70 MILLIGRAM(S): 100 INJECTION SUBCUTANEOUS at 21:36

## 2023-01-01 RX ADMIN — Medication 650 MILLIGRAM(S): at 22:15

## 2023-01-01 RX ADMIN — Medication 100 MILLIGRAM(S): at 05:51

## 2023-01-01 RX ADMIN — CHLORHEXIDINE GLUCONATE 1 APPLICATION(S): 213 SOLUTION TOPICAL at 05:44

## 2023-01-01 RX ADMIN — LIDOCAINE 1 PATCH: 4 CREAM TOPICAL at 15:41

## 2023-01-01 RX ADMIN — PROPOFOL 15.8 MICROGRAM(S)/KG/MIN: 10 INJECTION, EMULSION INTRAVENOUS at 19:30

## 2023-01-01 RX ADMIN — CHLORHEXIDINE GLUCONATE 1 APPLICATION(S): 213 SOLUTION TOPICAL at 06:21

## 2023-01-01 RX ADMIN — Medication 1 APPLICATION(S): at 17:59

## 2023-01-01 RX ADMIN — ACETAZOLAMIDE 110 MILLIGRAM(S): 250 TABLET ORAL at 11:57

## 2023-01-01 RX ADMIN — Medication 1 APPLICATION(S): at 05:37

## 2023-01-01 RX ADMIN — Medication 120 MILLIGRAM(S): at 06:50

## 2023-01-01 RX ADMIN — APIXABAN 2.5 MILLIGRAM(S): 2.5 TABLET, FILM COATED ORAL at 17:36

## 2023-01-01 RX ADMIN — SODIUM CHLORIDE 2500 MILLILITER(S): 9 INJECTION INTRAMUSCULAR; INTRAVENOUS; SUBCUTANEOUS at 13:14

## 2023-01-01 RX ADMIN — CHLORHEXIDINE GLUCONATE 1 APPLICATION(S): 213 SOLUTION TOPICAL at 05:21

## 2023-01-01 RX ADMIN — FENTANYL CITRATE 4.1 MICROGRAM(S)/KG/HR: 50 INJECTION INTRAVENOUS at 12:40

## 2023-01-01 RX ADMIN — PIPERACILLIN AND TAZOBACTAM 25 GRAM(S): 4; .5 INJECTION, POWDER, LYOPHILIZED, FOR SOLUTION INTRAVENOUS at 14:33

## 2023-01-01 RX ADMIN — ENOXAPARIN SODIUM 90 MILLIGRAM(S): 100 INJECTION SUBCUTANEOUS at 17:46

## 2023-01-01 RX ADMIN — CHLORHEXIDINE GLUCONATE 1 APPLICATION(S): 213 SOLUTION TOPICAL at 06:42

## 2023-01-01 RX ADMIN — GABAPENTIN 100 MILLIGRAM(S): 400 CAPSULE ORAL at 05:06

## 2023-01-01 RX ADMIN — GABAPENTIN 100 MILLIGRAM(S): 400 CAPSULE ORAL at 12:33

## 2023-01-01 RX ADMIN — AZITHROMYCIN 255 MILLIGRAM(S): 500 TABLET, FILM COATED ORAL at 11:42

## 2023-01-01 RX ADMIN — Medication 40 MILLIGRAM(S): at 09:29

## 2023-01-01 RX ADMIN — SENNA PLUS 2 TABLET(S): 8.6 TABLET ORAL at 21:37

## 2023-01-01 RX ADMIN — DEXMEDETOMIDINE HYDROCHLORIDE IN 0.9% SODIUM CHLORIDE 1.1 MICROGRAM(S)/KG/HR: 4 INJECTION INTRAVENOUS at 18:35

## 2023-01-01 RX ADMIN — SENNA PLUS 2 TABLET(S): 8.6 TABLET ORAL at 21:27

## 2023-01-01 RX ADMIN — Medication 40 MILLIGRAM(S): at 09:30

## 2023-01-01 RX ADMIN — PANTOPRAZOLE SODIUM 40 MILLIGRAM(S): 20 TABLET, DELAYED RELEASE ORAL at 17:59

## 2023-01-01 RX ADMIN — APIXABAN 2.5 MILLIGRAM(S): 2.5 TABLET, FILM COATED ORAL at 18:10

## 2023-01-01 RX ADMIN — Medication 250 MILLIGRAM(S): at 03:22

## 2023-01-01 RX ADMIN — GABAPENTIN 100 MILLIGRAM(S): 400 CAPSULE ORAL at 13:40

## 2023-01-01 RX ADMIN — PANTOPRAZOLE SODIUM 40 MILLIGRAM(S): 20 TABLET, DELAYED RELEASE ORAL at 13:28

## 2023-01-01 RX ADMIN — GABAPENTIN 100 MILLIGRAM(S): 400 CAPSULE ORAL at 06:08

## 2023-01-01 RX ADMIN — MUPIROCIN 1 APPLICATION(S): 20 OINTMENT TOPICAL at 17:31

## 2023-01-01 RX ADMIN — Medication 650 MILLIGRAM(S): at 10:38

## 2023-01-01 RX ADMIN — Medication 650 MILLIGRAM(S): at 19:00

## 2023-01-01 RX ADMIN — Medication 3 MILLILITER(S): at 16:21

## 2023-01-01 RX ADMIN — APIXABAN 5 MILLIGRAM(S): 2.5 TABLET, FILM COATED ORAL at 06:27

## 2023-01-01 RX ADMIN — Medication 100 MILLIGRAM(S): at 14:11

## 2023-01-01 RX ADMIN — POLYETHYLENE GLYCOL 3350 17 GRAM(S): 17 POWDER, FOR SOLUTION ORAL at 12:08

## 2023-01-01 RX ADMIN — ACETAZOLAMIDE 110 MILLIGRAM(S): 250 TABLET ORAL at 14:03

## 2023-01-01 RX ADMIN — BUDESONIDE AND FORMOTEROL FUMARATE DIHYDRATE 2 PUFF(S): 160; 4.5 AEROSOL RESPIRATORY (INHALATION) at 21:06

## 2023-01-01 RX ADMIN — BUMETANIDE 2 MILLIGRAM(S): 0.25 INJECTION INTRAMUSCULAR; INTRAVENOUS at 05:50

## 2023-01-01 RX ADMIN — SENNA PLUS 2 TABLET(S): 8.6 TABLET ORAL at 22:23

## 2023-01-01 RX ADMIN — NYSTATIN CREAM 1 APPLICATION(S): 100000 CREAM TOPICAL at 05:44

## 2023-01-01 RX ADMIN — BUDESONIDE AND FORMOTEROL FUMARATE DIHYDRATE 2 PUFF(S): 160; 4.5 AEROSOL RESPIRATORY (INHALATION) at 08:25

## 2023-01-01 RX ADMIN — Medication 250 MILLIGRAM(S): at 17:41

## 2023-01-01 RX ADMIN — BUDESONIDE AND FORMOTEROL FUMARATE DIHYDRATE 2 PUFF(S): 160; 4.5 AEROSOL RESPIRATORY (INHALATION) at 20:24

## 2023-01-01 RX ADMIN — GABAPENTIN 100 MILLIGRAM(S): 400 CAPSULE ORAL at 06:30

## 2023-01-01 RX ADMIN — ENOXAPARIN SODIUM 80 MILLIGRAM(S): 100 INJECTION SUBCUTANEOUS at 18:27

## 2023-01-01 RX ADMIN — CEFEPIME 100 MILLIGRAM(S): 1 INJECTION, POWDER, FOR SOLUTION INTRAMUSCULAR; INTRAVENOUS at 21:20

## 2023-01-01 RX ADMIN — SENNA PLUS 2 TABLET(S): 8.6 TABLET ORAL at 22:57

## 2023-01-01 RX ADMIN — BUMETANIDE 2 MILLIGRAM(S): 0.25 INJECTION INTRAMUSCULAR; INTRAVENOUS at 10:48

## 2023-01-01 RX ADMIN — GABAPENTIN 100 MILLIGRAM(S): 400 CAPSULE ORAL at 05:09

## 2023-01-01 RX ADMIN — ENOXAPARIN SODIUM 70 MILLIGRAM(S): 100 INJECTION SUBCUTANEOUS at 21:12

## 2023-01-01 RX ADMIN — PANTOPRAZOLE SODIUM 40 MILLIGRAM(S): 20 TABLET, DELAYED RELEASE ORAL at 05:59

## 2023-01-01 RX ADMIN — ENOXAPARIN SODIUM 90 MILLIGRAM(S): 100 INJECTION SUBCUTANEOUS at 18:34

## 2023-01-01 RX ADMIN — Medication 250 MILLIGRAM(S): at 05:50

## 2023-01-01 RX ADMIN — HEPARIN SODIUM 5000 UNIT(S): 5000 INJECTION INTRAVENOUS; SUBCUTANEOUS at 17:38

## 2023-01-01 RX ADMIN — NYSTATIN CREAM 1 APPLICATION(S): 100000 CREAM TOPICAL at 06:51

## 2023-01-01 RX ADMIN — Medication 40 MILLIGRAM(S): at 17:47

## 2023-01-01 RX ADMIN — NYSTATIN CREAM 1 APPLICATION(S): 100000 CREAM TOPICAL at 18:06

## 2023-01-01 RX ADMIN — HEPARIN SODIUM 1600 UNIT(S)/HR: 5000 INJECTION INTRAVENOUS; SUBCUTANEOUS at 16:37

## 2023-01-01 RX ADMIN — SENNA PLUS 2 TABLET(S): 8.6 TABLET ORAL at 21:25

## 2023-01-01 RX ADMIN — Medication 1 APPLICATION(S): at 05:36

## 2023-01-01 RX ADMIN — APIXABAN 5 MILLIGRAM(S): 2.5 TABLET, FILM COATED ORAL at 17:31

## 2023-01-01 RX ADMIN — Medication 650 MILLIGRAM(S): at 13:05

## 2023-01-01 RX ADMIN — Medication 120 MILLIGRAM(S): at 06:12

## 2023-01-01 RX ADMIN — Medication 3 MILLILITER(S): at 07:24

## 2023-01-01 RX ADMIN — GABAPENTIN 100 MILLIGRAM(S): 400 CAPSULE ORAL at 05:55

## 2023-01-01 RX ADMIN — MAGNESIUM OXIDE 400 MG ORAL TABLET 400 MILLIGRAM(S): 241.3 TABLET ORAL at 17:30

## 2023-01-01 RX ADMIN — APIXABAN 2.5 MILLIGRAM(S): 2.5 TABLET, FILM COATED ORAL at 05:30

## 2023-01-01 RX ADMIN — Medication 30 MILLILITER(S): at 00:07

## 2023-01-01 RX ADMIN — Medication 3 MILLILITER(S): at 19:54

## 2023-01-01 RX ADMIN — Medication 650 MILLIGRAM(S): at 21:36

## 2023-01-01 RX ADMIN — Medication 325 MILLIGRAM(S): at 11:34

## 2023-01-01 RX ADMIN — Medication 40 MILLIGRAM(S): at 16:01

## 2023-01-01 RX ADMIN — Medication 1: at 05:36

## 2023-01-01 RX ADMIN — Medication 25 MILLIGRAM(S): at 05:27

## 2023-01-01 RX ADMIN — Medication 2: at 17:37

## 2023-01-01 RX ADMIN — GABAPENTIN 100 MILLIGRAM(S): 400 CAPSULE ORAL at 05:30

## 2023-01-01 RX ADMIN — POLYETHYLENE GLYCOL 3350 17 GRAM(S): 17 POWDER, FOR SOLUTION ORAL at 05:07

## 2023-01-01 RX ADMIN — BUDESONIDE AND FORMOTEROL FUMARATE DIHYDRATE 2 PUFF(S): 160; 4.5 AEROSOL RESPIRATORY (INHALATION) at 21:40

## 2023-01-01 RX ADMIN — Medication 50 MILLIGRAM(S): at 10:26

## 2023-01-01 RX ADMIN — DEXMEDETOMIDINE HYDROCHLORIDE IN 0.9% SODIUM CHLORIDE 1.1 MICROGRAM(S)/KG/HR: 4 INJECTION INTRAVENOUS at 07:58

## 2023-01-01 RX ADMIN — GABAPENTIN 100 MILLIGRAM(S): 400 CAPSULE ORAL at 05:35

## 2023-01-01 RX ADMIN — Medication 25 MILLIGRAM(S): at 05:37

## 2023-01-01 RX ADMIN — APIXABAN 5 MILLIGRAM(S): 2.5 TABLET, FILM COATED ORAL at 17:32

## 2023-01-01 RX ADMIN — MEROPENEM 100 MILLIGRAM(S): 1 INJECTION INTRAVENOUS at 05:29

## 2023-01-01 RX ADMIN — APIXABAN 5 MILLIGRAM(S): 2.5 TABLET, FILM COATED ORAL at 05:32

## 2023-01-01 RX ADMIN — Medication 50 MILLIEQUIVALENT(S): at 08:00

## 2023-01-01 RX ADMIN — PANTOPRAZOLE SODIUM 40 MILLIGRAM(S): 20 TABLET, DELAYED RELEASE ORAL at 05:45

## 2023-01-01 RX ADMIN — Medication 5 MILLIGRAM(S): at 00:36

## 2023-01-01 RX ADMIN — APIXABAN 2.5 MILLIGRAM(S): 2.5 TABLET, FILM COATED ORAL at 18:07

## 2023-01-01 RX ADMIN — POLYETHYLENE GLYCOL 3350 17 GRAM(S): 17 POWDER, FOR SOLUTION ORAL at 05:01

## 2023-01-01 RX ADMIN — POLYETHYLENE GLYCOL 3350 17 GRAM(S): 17 POWDER, FOR SOLUTION ORAL at 18:42

## 2023-01-01 RX ADMIN — Medication 100 MILLIGRAM(S): at 00:11

## 2023-01-01 RX ADMIN — ENOXAPARIN SODIUM 90 MILLIGRAM(S): 100 INJECTION SUBCUTANEOUS at 12:55

## 2023-01-01 RX ADMIN — POLYETHYLENE GLYCOL 3350 17 GRAM(S): 17 POWDER, FOR SOLUTION ORAL at 12:59

## 2023-01-01 RX ADMIN — SENNA PLUS 2 TABLET(S): 8.6 TABLET ORAL at 22:30

## 2023-01-01 RX ADMIN — APIXABAN 5 MILLIGRAM(S): 2.5 TABLET, FILM COATED ORAL at 17:35

## 2023-01-01 RX ADMIN — Medication 10 MILLIGRAM(S): at 10:27

## 2023-01-01 RX ADMIN — POLYETHYLENE GLYCOL 3350 17 GRAM(S): 17 POWDER, FOR SOLUTION ORAL at 12:24

## 2023-01-01 RX ADMIN — Medication 3 MILLILITER(S): at 19:38

## 2023-01-01 RX ADMIN — PANTOPRAZOLE SODIUM 40 MILLIGRAM(S): 20 TABLET, DELAYED RELEASE ORAL at 13:08

## 2023-01-01 RX ADMIN — GABAPENTIN 100 MILLIGRAM(S): 400 CAPSULE ORAL at 17:52

## 2023-01-01 RX ADMIN — Medication 100 MILLIGRAM(S): at 05:25

## 2023-01-01 RX ADMIN — Medication 250 MILLIGRAM(S): at 06:27

## 2023-01-01 RX ADMIN — Medication 60 MILLIGRAM(S): at 06:28

## 2023-01-01 RX ADMIN — Medication 50 MILLIGRAM(S): at 13:29

## 2023-01-01 RX ADMIN — Medication 3 MILLILITER(S): at 14:36

## 2023-01-01 RX ADMIN — CHLORHEXIDINE GLUCONATE 15 MILLILITER(S): 213 SOLUTION TOPICAL at 05:01

## 2023-01-01 RX ADMIN — GABAPENTIN 100 MILLIGRAM(S): 400 CAPSULE ORAL at 05:38

## 2023-01-01 RX ADMIN — MAGNESIUM OXIDE 400 MG ORAL TABLET 400 MILLIGRAM(S): 241.3 TABLET ORAL at 17:21

## 2023-01-01 RX ADMIN — MAGNESIUM OXIDE 400 MG ORAL TABLET 400 MILLIGRAM(S): 241.3 TABLET ORAL at 17:32

## 2023-01-01 RX ADMIN — PROPOFOL 4.62 MICROGRAM(S)/KG/MIN: 10 INJECTION, EMULSION INTRAVENOUS at 18:36

## 2023-01-01 RX ADMIN — APIXABAN 2.5 MILLIGRAM(S): 2.5 TABLET, FILM COATED ORAL at 06:13

## 2023-01-01 RX ADMIN — HEPARIN SODIUM 5000 UNIT(S): 5000 INJECTION INTRAVENOUS; SUBCUTANEOUS at 17:49

## 2023-01-01 RX ADMIN — MUPIROCIN 1 APPLICATION(S): 20 OINTMENT TOPICAL at 17:44

## 2023-01-01 RX ADMIN — CEFEPIME 100 MILLIGRAM(S): 1 INJECTION, POWDER, FOR SOLUTION INTRAMUSCULAR; INTRAVENOUS at 07:03

## 2023-01-01 RX ADMIN — Medication 100 MILLIGRAM(S): at 05:11

## 2023-01-01 RX ADMIN — SODIUM CHLORIDE 1000 MILLILITER(S): 9 INJECTION INTRAMUSCULAR; INTRAVENOUS; SUBCUTANEOUS at 10:54

## 2023-01-01 RX ADMIN — ENOXAPARIN SODIUM 90 MILLIGRAM(S): 100 INJECTION SUBCUTANEOUS at 17:02

## 2023-01-01 RX ADMIN — FENTANYL CITRATE 4.1 MICROGRAM(S)/KG/HR: 50 INJECTION INTRAVENOUS at 22:22

## 2023-01-01 RX ADMIN — Medication 40 MILLIGRAM(S): at 18:36

## 2023-01-01 RX ADMIN — Medication 4.04 MICROGRAM(S)/KG/MIN: at 10:48

## 2023-01-01 RX ADMIN — Medication 20 MILLIGRAM(S): at 03:05

## 2023-01-01 RX ADMIN — Medication 40 MILLIEQUIVALENT(S): at 17:51

## 2023-01-01 RX ADMIN — HEPARIN SODIUM 1600 UNIT(S)/HR: 5000 INJECTION INTRAVENOUS; SUBCUTANEOUS at 05:49

## 2023-01-01 RX ADMIN — Medication 25 MILLIGRAM(S): at 06:34

## 2023-01-01 RX ADMIN — HEPARIN SODIUM 5000 UNIT(S): 5000 INJECTION INTRAVENOUS; SUBCUTANEOUS at 08:51

## 2023-01-01 RX ADMIN — Medication 3 MILLIGRAM(S): at 21:19

## 2023-01-01 RX ADMIN — Medication 3 MILLILITER(S): at 08:01

## 2023-01-01 RX ADMIN — APIXABAN 5 MILLIGRAM(S): 2.5 TABLET, FILM COATED ORAL at 17:39

## 2023-01-01 RX ADMIN — CHLORHEXIDINE GLUCONATE 15 MILLILITER(S): 213 SOLUTION TOPICAL at 06:50

## 2023-01-01 RX ADMIN — ALBUTEROL 2 PUFF(S): 90 AEROSOL, METERED ORAL at 20:49

## 2023-01-01 RX ADMIN — MEROPENEM 100 MILLIGRAM(S): 1 INJECTION INTRAVENOUS at 18:27

## 2023-01-01 RX ADMIN — Medication 3 MILLILITER(S): at 08:26

## 2023-01-01 RX ADMIN — FENTANYL CITRATE 4.1 MICROGRAM(S)/KG/HR: 50 INJECTION INTRAVENOUS at 20:39

## 2023-01-01 RX ADMIN — CHLORHEXIDINE GLUCONATE 15 MILLILITER(S): 213 SOLUTION TOPICAL at 06:08

## 2023-01-01 RX ADMIN — PANTOPRAZOLE SODIUM 40 MILLIGRAM(S): 20 TABLET, DELAYED RELEASE ORAL at 05:54

## 2023-01-01 RX ADMIN — Medication 3 MILLILITER(S): at 09:13

## 2023-01-01 RX ADMIN — PROPOFOL 4.62 MICROGRAM(S)/KG/MIN: 10 INJECTION, EMULSION INTRAVENOUS at 00:59

## 2023-01-01 RX ADMIN — Medication 40 MILLIGRAM(S): at 00:36

## 2023-01-01 RX ADMIN — PANTOPRAZOLE SODIUM 40 MILLIGRAM(S): 20 TABLET, DELAYED RELEASE ORAL at 17:50

## 2023-01-01 RX ADMIN — Medication 40 MILLIGRAM(S): at 05:25

## 2023-01-01 RX ADMIN — BUDESONIDE AND FORMOTEROL FUMARATE DIHYDRATE 2 PUFF(S): 160; 4.5 AEROSOL RESPIRATORY (INHALATION) at 09:21

## 2023-01-01 RX ADMIN — APIXABAN 10 MILLIGRAM(S): 2.5 TABLET, FILM COATED ORAL at 17:40

## 2023-01-01 RX ADMIN — HEPARIN SODIUM 10 UNIT(S)/HR: 5000 INJECTION INTRAVENOUS; SUBCUTANEOUS at 01:32

## 2023-01-01 RX ADMIN — Medication 25 MILLIGRAM(S): at 06:31

## 2023-01-01 RX ADMIN — GABAPENTIN 100 MILLIGRAM(S): 400 CAPSULE ORAL at 21:42

## 2023-01-01 RX ADMIN — NYSTATIN CREAM 1 APPLICATION(S): 100000 CREAM TOPICAL at 18:09

## 2023-01-01 RX ADMIN — GABAPENTIN 100 MILLIGRAM(S): 400 CAPSULE ORAL at 21:07

## 2023-01-01 RX ADMIN — Medication 400 MILLIGRAM(S): at 09:05

## 2023-01-01 RX ADMIN — GABAPENTIN 100 MILLIGRAM(S): 400 CAPSULE ORAL at 21:03

## 2023-01-01 RX ADMIN — Medication 250 MILLIGRAM(S): at 05:41

## 2023-01-01 RX ADMIN — DEXMEDETOMIDINE HYDROCHLORIDE IN 0.9% SODIUM CHLORIDE 1.1 MICROGRAM(S)/KG/HR: 4 INJECTION INTRAVENOUS at 21:15

## 2023-01-01 RX ADMIN — NYSTATIN CREAM 1 APPLICATION(S): 100000 CREAM TOPICAL at 05:46

## 2023-01-01 RX ADMIN — GABAPENTIN 100 MILLIGRAM(S): 400 CAPSULE ORAL at 06:10

## 2023-01-01 RX ADMIN — HEPARIN SODIUM 1400 UNIT(S)/HR: 5000 INJECTION INTRAVENOUS; SUBCUTANEOUS at 07:37

## 2023-01-01 RX ADMIN — Medication 325 MILLIGRAM(S): at 12:34

## 2023-01-01 RX ADMIN — GABAPENTIN 100 MILLIGRAM(S): 400 CAPSULE ORAL at 21:06

## 2023-01-01 RX ADMIN — ACETAZOLAMIDE 110 MILLIGRAM(S): 250 TABLET ORAL at 11:56

## 2023-01-01 RX ADMIN — CHLORHEXIDINE GLUCONATE 1 APPLICATION(S): 213 SOLUTION TOPICAL at 05:23

## 2023-01-01 RX ADMIN — CEFEPIME 100 MILLIGRAM(S): 1 INJECTION, POWDER, FOR SOLUTION INTRAMUSCULAR; INTRAVENOUS at 18:34

## 2023-01-01 RX ADMIN — Medication 25 MILLIGRAM(S): at 17:25

## 2023-01-01 RX ADMIN — TIOTROPIUM BROMIDE 2 PUFF(S): 18 CAPSULE ORAL; RESPIRATORY (INHALATION) at 08:47

## 2023-01-01 RX ADMIN — ENOXAPARIN SODIUM 90 MILLIGRAM(S): 100 INJECTION SUBCUTANEOUS at 17:48

## 2023-01-01 RX ADMIN — Medication 40 MILLIGRAM(S): at 12:35

## 2023-01-01 RX ADMIN — APIXABAN 5 MILLIGRAM(S): 2.5 TABLET, FILM COATED ORAL at 06:50

## 2023-01-01 RX ADMIN — ENOXAPARIN SODIUM 90 MILLIGRAM(S): 100 INJECTION SUBCUTANEOUS at 14:16

## 2023-01-01 RX ADMIN — NYSTATIN CREAM 1 APPLICATION(S): 100000 CREAM TOPICAL at 17:40

## 2023-01-01 RX ADMIN — MAGNESIUM OXIDE 400 MG ORAL TABLET 400 MILLIGRAM(S): 241.3 TABLET ORAL at 17:39

## 2023-01-01 RX ADMIN — Medication 1 APPLICATION(S): at 05:13

## 2023-01-01 RX ADMIN — APIXABAN 10 MILLIGRAM(S): 2.5 TABLET, FILM COATED ORAL at 17:41

## 2023-01-01 RX ADMIN — Medication 1 APPLICATION(S): at 06:01

## 2023-01-01 RX ADMIN — GABAPENTIN 100 MILLIGRAM(S): 400 CAPSULE ORAL at 13:22

## 2023-01-01 RX ADMIN — SENNA PLUS 2 TABLET(S): 8.6 TABLET ORAL at 22:09

## 2023-01-01 RX ADMIN — ENOXAPARIN SODIUM 70 MILLIGRAM(S): 100 INJECTION SUBCUTANEOUS at 06:16

## 2023-01-01 RX ADMIN — Medication 3 MILLILITER(S): at 08:40

## 2023-01-01 RX ADMIN — FENTANYL CITRATE 4.1 MICROGRAM(S)/KG/HR: 50 INJECTION INTRAVENOUS at 00:49

## 2023-01-01 RX ADMIN — CHLORHEXIDINE GLUCONATE 1 APPLICATION(S): 213 SOLUTION TOPICAL at 05:12

## 2023-01-01 RX ADMIN — Medication 40 MILLIGRAM(S): at 05:57

## 2023-01-01 RX ADMIN — NYSTATIN CREAM 1 APPLICATION(S): 100000 CREAM TOPICAL at 17:15

## 2023-01-01 RX ADMIN — GABAPENTIN 100 MILLIGRAM(S): 400 CAPSULE ORAL at 05:57

## 2023-01-01 RX ADMIN — APIXABAN 10 MILLIGRAM(S): 2.5 TABLET, FILM COATED ORAL at 21:57

## 2023-01-01 RX ADMIN — Medication 25 MILLIGRAM(S): at 18:13

## 2023-01-01 RX ADMIN — Medication 3 MILLILITER(S): at 08:28

## 2023-01-01 RX ADMIN — Medication 40 MILLIEQUIVALENT(S): at 13:09

## 2023-01-01 RX ADMIN — Medication 650 MILLIGRAM(S): at 15:14

## 2023-01-01 RX ADMIN — SENNA PLUS 2 TABLET(S): 8.6 TABLET ORAL at 21:47

## 2023-01-01 RX ADMIN — Medication 166.67 MILLIGRAM(S): at 21:39

## 2023-01-01 RX ADMIN — Medication 25 MILLIGRAM(S): at 17:12

## 2023-01-01 RX ADMIN — FENTANYL CITRATE 4.1 MICROGRAM(S)/KG/HR: 50 INJECTION INTRAVENOUS at 01:06

## 2023-01-01 RX ADMIN — ENOXAPARIN SODIUM 90 MILLIGRAM(S): 100 INJECTION SUBCUTANEOUS at 02:24

## 2023-01-01 RX ADMIN — HEPARIN SODIUM 7000 UNIT(S): 5000 INJECTION INTRAVENOUS; SUBCUTANEOUS at 16:37

## 2023-01-01 RX ADMIN — Medication 3 MILLILITER(S): at 08:16

## 2023-01-01 RX ADMIN — Medication 650 MILLIGRAM(S): at 21:33

## 2023-01-01 RX ADMIN — CEFEPIME 100 MILLIGRAM(S): 1 INJECTION, POWDER, FOR SOLUTION INTRAMUSCULAR; INTRAVENOUS at 06:25

## 2023-01-01 RX ADMIN — GABAPENTIN 100 MILLIGRAM(S): 400 CAPSULE ORAL at 05:59

## 2023-01-01 RX ADMIN — Medication 1 APPLICATION(S): at 18:07

## 2023-01-01 RX ADMIN — PROPOFOL 4.62 MICROGRAM(S)/KG/MIN: 10 INJECTION, EMULSION INTRAVENOUS at 16:38

## 2023-01-01 RX ADMIN — MUPIROCIN 1 APPLICATION(S): 20 OINTMENT TOPICAL at 19:19

## 2023-01-01 RX ADMIN — Medication 120 MILLIGRAM(S): at 05:15

## 2023-01-01 RX ADMIN — DEXMEDETOMIDINE HYDROCHLORIDE IN 0.9% SODIUM CHLORIDE 1.1 MICROGRAM(S)/KG/HR: 4 INJECTION INTRAVENOUS at 03:40

## 2023-01-01 RX ADMIN — GABAPENTIN 100 MILLIGRAM(S): 400 CAPSULE ORAL at 13:29

## 2023-01-01 RX ADMIN — Medication 3 MILLILITER(S): at 19:08

## 2023-01-01 RX ADMIN — PANTOPRAZOLE SODIUM 40 MILLIGRAM(S): 20 TABLET, DELAYED RELEASE ORAL at 06:16

## 2023-01-01 RX ADMIN — MAGNESIUM OXIDE 400 MG ORAL TABLET 400 MILLIGRAM(S): 241.3 TABLET ORAL at 08:08

## 2023-01-01 RX ADMIN — Medication 3 MILLILITER(S): at 19:09

## 2023-01-01 RX ADMIN — NYSTATIN CREAM 1 APPLICATION(S): 100000 CREAM TOPICAL at 17:20

## 2023-01-01 RX ADMIN — PANTOPRAZOLE SODIUM 40 MILLIGRAM(S): 20 TABLET, DELAYED RELEASE ORAL at 05:34

## 2023-01-01 RX ADMIN — Medication 250 MILLIGRAM(S): at 17:15

## 2023-01-01 RX ADMIN — Medication 250 MILLIGRAM(S): at 17:28

## 2023-01-01 RX ADMIN — Medication 25 MILLIGRAM(S): at 05:16

## 2023-01-01 RX ADMIN — CHLORHEXIDINE GLUCONATE 15 MILLILITER(S): 213 SOLUTION TOPICAL at 17:20

## 2023-01-01 RX ADMIN — Medication 30 MILLIGRAM(S): at 06:40

## 2023-01-01 RX ADMIN — Medication 50 MILLIGRAM(S): at 19:34

## 2023-01-01 RX ADMIN — PANTOPRAZOLE SODIUM 40 MILLIGRAM(S): 20 TABLET, DELAYED RELEASE ORAL at 05:23

## 2023-01-01 RX ADMIN — GABAPENTIN 100 MILLIGRAM(S): 400 CAPSULE ORAL at 06:41

## 2023-01-01 RX ADMIN — PANTOPRAZOLE SODIUM 40 MILLIGRAM(S): 20 TABLET, DELAYED RELEASE ORAL at 06:05

## 2023-01-01 RX ADMIN — MEROPENEM 100 MILLIGRAM(S): 1 INJECTION INTRAVENOUS at 17:35

## 2023-01-01 RX ADMIN — ENOXAPARIN SODIUM 90 MILLIGRAM(S): 100 INJECTION SUBCUTANEOUS at 05:05

## 2023-01-01 RX ADMIN — POLYETHYLENE GLYCOL 3350 17 GRAM(S): 17 POWDER, FOR SOLUTION ORAL at 12:05

## 2023-01-01 RX ADMIN — Medication 25 GRAM(S): at 11:01

## 2023-01-01 RX ADMIN — APIXABAN 2.5 MILLIGRAM(S): 2.5 TABLET, FILM COATED ORAL at 17:35

## 2023-01-01 RX ADMIN — Medication 3 MILLILITER(S): at 13:03

## 2023-01-01 RX ADMIN — POLYETHYLENE GLYCOL 3350 17 GRAM(S): 17 POWDER, FOR SOLUTION ORAL at 17:41

## 2023-01-01 RX ADMIN — Medication 40 MILLIGRAM(S): at 05:54

## 2023-01-01 RX ADMIN — GABAPENTIN 100 MILLIGRAM(S): 400 CAPSULE ORAL at 17:36

## 2023-01-01 RX ADMIN — GABAPENTIN 100 MILLIGRAM(S): 400 CAPSULE ORAL at 17:16

## 2023-01-01 RX ADMIN — NYSTATIN CREAM 1 APPLICATION(S): 100000 CREAM TOPICAL at 17:39

## 2023-01-01 RX ADMIN — PROPOFOL 15.8 MICROGRAM(S)/KG/MIN: 10 INJECTION, EMULSION INTRAVENOUS at 14:31

## 2023-01-01 RX ADMIN — SODIUM CHLORIDE 1500 MILLILITER(S): 9 INJECTION, SOLUTION INTRAVENOUS at 04:37

## 2023-01-01 RX ADMIN — CHLORHEXIDINE GLUCONATE 1 APPLICATION(S): 213 SOLUTION TOPICAL at 06:07

## 2023-01-01 RX ADMIN — CHLORHEXIDINE GLUCONATE 15 MILLILITER(S): 213 SOLUTION TOPICAL at 06:01

## 2023-01-01 RX ADMIN — Medication 12.5 MILLIGRAM(S): at 06:49

## 2023-01-01 RX ADMIN — Medication 1 APPLICATION(S): at 05:50

## 2023-01-01 RX ADMIN — PROPOFOL 4.62 MICROGRAM(S)/KG/MIN: 10 INJECTION, EMULSION INTRAVENOUS at 06:12

## 2023-01-01 RX ADMIN — CHLORHEXIDINE GLUCONATE 1 APPLICATION(S): 213 SOLUTION TOPICAL at 05:00

## 2023-01-01 RX ADMIN — GABAPENTIN 100 MILLIGRAM(S): 400 CAPSULE ORAL at 13:20

## 2023-01-01 RX ADMIN — PROPOFOL 4.62 MICROGRAM(S)/KG/MIN: 10 INJECTION, EMULSION INTRAVENOUS at 01:02

## 2023-01-01 RX ADMIN — BUDESONIDE AND FORMOTEROL FUMARATE DIHYDRATE 2 PUFF(S): 160; 4.5 AEROSOL RESPIRATORY (INHALATION) at 21:51

## 2023-01-01 RX ADMIN — Medication 166.67 MILLIGRAM(S): at 12:15

## 2023-01-01 RX ADMIN — POLYETHYLENE GLYCOL 3350 17 GRAM(S): 17 POWDER, FOR SOLUTION ORAL at 06:16

## 2023-01-01 RX ADMIN — Medication 100 MILLIGRAM(S): at 14:07

## 2023-01-01 RX ADMIN — Medication 650 MILLIGRAM(S): at 04:56

## 2023-01-01 RX ADMIN — NYSTATIN CREAM 1 APPLICATION(S): 100000 CREAM TOPICAL at 17:37

## 2023-01-01 RX ADMIN — HEPARIN SODIUM 5000 UNIT(S): 5000 INJECTION INTRAVENOUS; SUBCUTANEOUS at 17:21

## 2023-01-01 RX ADMIN — PROPOFOL 4.62 MICROGRAM(S)/KG/MIN: 10 INJECTION, EMULSION INTRAVENOUS at 21:42

## 2023-01-01 RX ADMIN — NYSTATIN CREAM 1 APPLICATION(S): 100000 CREAM TOPICAL at 18:07

## 2023-01-01 RX ADMIN — Medication 25 MILLIGRAM(S): at 05:19

## 2023-01-01 RX ADMIN — Medication 25 MILLIGRAM(S): at 05:59

## 2023-01-01 RX ADMIN — POLYETHYLENE GLYCOL 3350 17 GRAM(S): 17 POWDER, FOR SOLUTION ORAL at 05:27

## 2023-01-01 RX ADMIN — SENNA PLUS 2 TABLET(S): 8.6 TABLET ORAL at 21:58

## 2023-01-01 RX ADMIN — Medication 166.67 MILLIGRAM(S): at 21:41

## 2023-01-01 RX ADMIN — ACETAZOLAMIDE 110 MILLIGRAM(S): 250 TABLET ORAL at 13:50

## 2023-02-08 NOTE — ASSESSMENT
[FreeTextEntry1] : MIld COPD stable\par Possible LAWRENCE refusing testing \par Small left pleural effusion likely volume overload \par SOB likely cardiac in origin ( walking few feet, pulse 150 and puilse ox 96%)\par RIght neck mass possibly thyroid cancer SP FNA with Dr. Soliman \par Small lung nodules to be followed \par HO Afib\par

## 2023-02-27 NOTE — ED ADULT NURSE NOTE - NS ED NURSE REPORT GIVEN TO FT
Confirmed surgery with mom for tomorrow, Tues 7/23 at [facility] with Dr Pierson. Surgery time is 7:45a with an arrival of 6:45a. Also gave reminder nothing to eat/drink starting at 11:00p this evening. Surgery has been confirmed.     
Katarzyna

## 2023-02-27 NOTE — H&P ADULT - HISTORY OF PRESENT ILLNESS
88 yo female with a pmh of htn, afib on eliquis, pvd, and copd present c/o BLE pain and SOB. pt states to have chronic wounds to BLE and over the past month noted increased pain. pt states her wounds are being treated by a home nurse. pt also notes increased SOB over the past week. pt denies any other symptoms including fevers, chill, headache, recent illness/travel, cough, abdominal pain, chest pain.      Vital Signs Last 24 Hrs:  T(F): 98 (27 Feb 2023 17:31), Max: 98.7 (27 Feb 2023 16:20)  HR: 98 (27 Feb 2023 21:55) (97 - 100)  BP: 139/86 (27 Feb 2023 21:55) (139/86 - 168/67)  RR: 18 (27 Feb 2023 21:55) (14 - 18)  SpO2: 99% (27 Feb 2023 21:55) (96% - 99%)   86 yo female with a pmh of htn, afib on eliquis, pvd, chronic lymphedema and copd present c/o BLE pain and SOB.    Patient was in her usual state of health 4 weeks ago when she developed SOB which is new for her, mostly on exertion (after walking 2 feet), increases while lying down. Patient followed up with Dr. Pacheco 1 month ago and has TTE which was normal as per patient. Denies HA, dizziness, NVD, fever, chest pain, abdominal pain, change in urination and change in bowel habits.     3 days ago patient developed B/l lower extremity pain, Left more than right, 10/10 intensity, progressive, extending from toes to mid thigh, associated with increased redness and tenderness. Of note, patient was prescribed Clindamycin 1 week ago, not sure if taking or not.     In the ED, BNP 2952 (no baseline).  Vital Signs Last 24 Hrs:  T(F): 98 (27 Feb 2023 17:31), Max: 98.7 (27 Feb 2023 16:20)  HR: 98 (27 Feb 2023 21:55) (97 - 100)  BP: 139/86 (27 Feb 2023 21:55) (139/86 - 168/67)  RR: 18 (27 Feb 2023 21:55) (14 - 18)  SpO2: 99% (27 Feb 2023 21:55) (96% - 99%)

## 2023-02-27 NOTE — ED ADULT TRIAGE NOTE - CHIEF COMPLAINT QUOTE
pt bibems from home for bilateral leg swelling and pain per visiting nursing look infected ulcer recently removed from left foot pt on Eliquis. had a fever this

## 2023-02-27 NOTE — ED PROVIDER NOTE - OBJECTIVE STATEMENT
88 yo female with a pmh of htn, afib on eliquis, pvd, and copd present c/o BLE pain and SOB. pt states to have chronic wounds to BLE and over the past month noted increased pain. pt states her wounds are being treated by a home nurse. pt also notes increased SOB over the past week. pt denies any other symptoms including fevers, chill, headache, recent illness/travel, cough, abdominal pain, chest pain.

## 2023-02-27 NOTE — H&P ADULT - ATTENDING COMMENTS
Patient seen and examined at bedside independently of the residents. I read the resident's note and agree with the plan with the additions and corrections as noted below. My note supersedes the resident's note.     REVIEW OF SYSTEMS:  CONSTITUTIONAL: No weakness, fevers or chills  EYES/ENT: No visual changes;  No vertigo or throat pain   NECK: No pain or stiffness  RESPIRATORY: No cough, wheezing, hemoptysis; No shortness of breath  CARDIOVASCULAR: No chest pain or palpitations  GASTROINTESTINAL: No abdominal or epigastric pain. No nausea, vomiting, or hematemesis; No diarrhea or constipation. No melena or hematochezia.  GENITOURINARY: No dysuria, frequency or hematuria  NEUROLOGICAL: No numbness or weakness  MSK: No pain. No weakness.   SKIN: No itching, rashes.     PMH:     FHx: Reviewed. No fhx of asthma/copd, No fhx of liver and pulmonary disease. No fhx of hematological disorder.     Physical Exam:  GEN: No acute distress. Awake, Alert and oriented x 3.   Head: Atraumatic, Normocephalic.   Eye: PEERLA. No sclera icterus. EOMI.   ENT: Normal oropharynx, no thyromegaly, no mass, no lymphadenopathy.   LUNGS: Crackles b/l lung fields.   HEART: Normal. S1/S2 present. RRR. No murmur/gallops.   ABD: Soft, non-tender, non-distended. Bowel sounds present.   EXT: B/L LE swelling with chronic skin changes/induration. + erythema. + tenderness and warm to touch b/l LE.   Integumentary: No rash, No sore, No petechia.   NEURO: CN III-XII intact. Strength: 5/5 b/l ULE. Sensory intact b/l ULE.     Vital Signs Last 24 Hrs  T(C): 36.7 (2023 17:31), Max: 37.1 (2023 16:20)  T(F): 98 (2023 17:31), Max: 98.7 (2023 16:20)  HR: 98 (2023 21:55) (97 - 100)  BP: 139/86 (2023 21:55) (139/86 - 168/67)  BP(mean): --  RR: 18 (2023 21:55) (14 - 18)  SpO2: 99% (2023 21:55) (96% - 99%)    Parameters below as of 2023 16:20  Patient On (Oxygen Delivery Method): room air      Please see the above notes for Labs and radiology.     Assessment and Plan:     86 yo F with hx of HTN, A.fib (on eliquis), PAD, Chronic lymphedema and COPD presents to ED for b/L LE pain and dyspnea on exertion.     Dyspnea on exertion likely 2/2 acute new onset CHF exacerbation.   - CXR shows pulmonary vascular congestion and b/l interstitial opacity.   - c/w IV lasix 40mg BID  - check TTE  - monitor daily weight, Strict I & O, Low Na diet with fluid restriction.   - Cardiology consult.     B/L LE cellulitis in the setting of lymphedema   - No evidence of sepsis.   - start on Doxy (pt is allergic to penicillin)  - check venous duplex and TTE   - will get CT LE   - pain control prn.   - ID consult.     Chronic A.fib - on cardizem and eliquis  COPD - not in exacerbation. c/w home in [facility].   HTN - on cardizem    DVT ppx: eliquis   GI ppx: not indicated  Diet: DASH diet  Activity: as tolerated.     Date seen by the attendin2023  Total time spent: 75 minutes. Patient seen and examined at bedside independently of the residents. I read the resident's note and agree with the plan with the additions and corrections as noted below. My note supersedes the resident's note.     REVIEW OF SYSTEMS:  CONSTITUTIONAL: No weakness, fevers or chills  EYES/ENT: No visual changes;  No vertigo or throat pain   NECK: No pain or stiffness  RESPIRATORY: No cough, wheezing, hemoptysis; + shortness of breath on exertion.   CARDIOVASCULAR: No chest pain or palpitations  GASTROINTESTINAL: No abdominal or epigastric pain. No nausea, vomiting, or hematemesis; No diarrhea or constipation. No melena or hematochezia.  GENITOURINARY: No dysuria, frequency or hematuria  NEUROLOGICAL: No numbness or weakness  MSK: B/L LE pain and redness.   SKIN: No itching, rashes.     PMH: HTN, A.fib (on eliquis), PAD, Chronic lymphedema and COPD    FHx: Reviewed. No fhx of asthma/copd, No fhx of liver and pulmonary disease. No fhx of hematological disorder.     Physical Exam:  GEN: No acute distress. Awake, Alert and oriented x 3.   Head: Atraumatic, Normocephalic.   Eye: PEERLA. No sclera icterus. EOMI.   ENT: Normal oropharynx, no thyromegaly, no mass, no lymphadenopathy.   LUNGS: Crackles b/l lung fields.   HEART: Normal. S1/S2 present. RRR. No murmur/gallops.   ABD: Soft, non-tender, non-distended. Bowel sounds present.   EXT: B/L LE swelling with chronic skin changes/induration. + erythema. + tenderness and warm to touch b/l LE.   Integumentary: No rash, No sore, No petechia.   NEURO: CN III-XII intact. Strength: 5/5 b/l ULE. Sensory intact b/l ULE.     Vital Signs Last 24 Hrs  T(C): 36.7 (2023 17:31), Max: 37.1 (2023 16:20)  T(F): 98 (2023 17:31), Max: 98.7 (2023 16:20)  HR: 98 (2023 21:55) (97 - 100)  BP: 139/86 (2023 21:55) (139/86 - 168/67)  BP(mean): --  RR: 18 (2023 21:55) (14 - 18)  SpO2: 99% (2023 21:55) (96% - 99%)    Parameters below as of 2023 16:20  Patient On (Oxygen Delivery Method): room air      Please see the above notes for Labs and radiology.     Assessment and Plan:     86 yo F with hx of HTN, A.fib (on eliquis), PAD, Chronic lymphedema and COPD presents to ED for b/L LE pain and dyspnea on exertion.     Dyspnea on exertion likely 2/2 acute new onset CHF exacerbation.   - CXR shows pulmonary vascular congestion and b/l interstitial opacity.   - c/w IV lasix 40mg BID  - check TTE  - monitor daily weight, Strict I & O, Low Na diet with fluid restriction.   - Cardiology consult.     B/L LE cellulitis in the setting of lymphedema   - No evidence of sepsis.   - start on Doxy (pt is allergic to penicillin)  - check venous duplex and TTE   - will get CT LE   - pain control prn.   - ID consult.     Chronic A.fib - on cardizem and eliquis  COPD - not in exacerbation. c/w home inhalers.   HTN - on cardizem    DVT ppx: eliquis   GI ppx: not indicated  Diet: DASH diet  Activity: as tolerated.     Date seen by the attendin2023  Total time spent: 75 minutes.

## 2023-02-27 NOTE — ED PROVIDER NOTE - CLINICAL SUMMARY MEDICAL DECISION MAKING FREE TEXT BOX
87-year-old female past medical history hypertension A-fib on Eliquis COPD presents with bilateral lower extremity pain shortness of breath chronic wounds.  Well appearing, NAD, non toxic. NCAT PERRLA EOMI neck supple non tender normal wob cta bl rrr abdomen s nt nd no rebound no guarding WWPx4 neuro non focal.  Bilateral lower extremity edema without obvious infection will admit for chf exacerbation

## 2023-02-27 NOTE — ED ADULT NURSE NOTE - NSIMPLEMENTINTERV_GEN_ALL_ED
Implemented All Fall with Harm Risk Interventions:  [place] to call system. Call bell, personal items and telephone within reach. Instruct patient to call for assistance. Room bathroom lighting operational. Non-slip footwear when patient is off stretcher. Physically safe environment: no spills, clutter or unnecessary equipment. Stretcher in lowest position, wheels locked, appropriate side rails in place. Provide visual cue, wrist band, yellow gown, etc. Monitor gait and stability. Monitor for mental status changes and reorient to person, place, and time. Review medications for side effects contributing to fall risk. Reinforce activity limits and safety measures with patient and family. Provide visual clues: red socks.

## 2023-02-27 NOTE — H&P ADULT - ASSESSMENT
#SOB      #B/L LE wound 86 yo female with a pmh of htn, afib on eliquis, pvd, chronic lymphedema and copd present c/o BLE pain and SOB.      #SOB 2/2 Aortic stenosis (?), Heart failure  -TTE from 2021 showed EF 55-60% and normal Aortic valve, Moderate MV regurgitation  -BNP 2952, Trops -ve, No chest pain  -BP good, no concern for hypoperfusion  -Start Lasix 40mg IV BID  -Strict I&Os  -TTE  -Cardiology c/s- Dr. Pacheco    #B/L LE Cellulitis(?)- Left worse than right  #H/o Chronic Lymphedema  #H/o PVD (?)  -No signs of systemic infection  -Blood Cx, Procal, CRP, MRSA swab  -Start Ancef  -Arterial duplex, check A1C  -Podiatry c/s  -PT eval after improvement in lower exremities    #H/o COPD- Not in exacerbation- Switched Albuterol to Symbicort    #Unsure about med rec  -There is discrepancy between med rec obtained from [facility] pharmacy and from the patient, [facility] says patient is on gabapentin and hydroxyzine, which patient denies, patient says she is on cardizem, which [facility] denies. Med rec completed using my best judgement    #Misc  -Routine Lab frequency: Monitor electrolytes until on IV diuretics, Monitor for WBC elevation  -DVT prophylaxis: Heparin  -GI prophylaxis: None  -Diet: DASH  -Code status: Full code  -Activity: IAT  -Bowel regimen: Senns  -Dispo: Floor    -Med rec confirmed with patient and [facility] 86 yo female with a pmh of htn, afib on eliquis, pvd, chronic lymphedema and copd present c/o BLE pain and SOB.    #SOB 2/2 Aortic stenosis (?), Heart failure  -TTE from 2021 showed EF 55-60% and normal Aortic valve, Moderate MV regurgitation  -BNP 2952, Trops -ve, No chest pain, No wheezing on exam  -BP good, no concern for hypoperfusion  -Start Lasix 40mg IV BID  -Strict I&Os  -TTE  -Cardiology c/s- Dr. Pacheco    #B/L LE Cellulitis(?)- Left worse than right  #H/o Chronic Lymphedema  #H/o PVD (?)  -No signs of systemic infection  -Blood Cx, Procal, CRP, MRSA swab  -Start Doxycyline  -Arterial duplex, check A1C  -Podiatry c/s  -PT eval after improvement in lower exremities    #H/o COPD- Not in exacerbation- Switched Albuterol to Symbicort    #Unsure about med rec  -There is discrepancy between med rec obtained from [facility] and from the patient, [facility] says patient is on gabapentin and hydroxyzine, which patient denies, patient says she is on cardizem, which [facility] denies. Med rec completed using my best judgement    #Misc  -Routine Lab frequency: Monitor electrolytes until on IV diuretics, Monitor for WBC elevation  -DVT prophylaxis: Heparin  -GI prophylaxis: None  -Diet: DASH  -Code status: Full code  -Activity: IAT  -Bowel regimen: Senns  -Dispo: Floor    -Med rec confirmed with patient and [facility]

## 2023-02-27 NOTE — ED PROVIDER NOTE - PHYSICAL EXAMINATION
Gen: NAD, AOx3  Head: NCAT  HEENT: PERRL, oral mucosa moist, normal conjunctiva, oropharynx clear without exudate or erythema  Lung: CTAB, no respiratory distress, no wheezing, rales, rhonchi  CV: normal s1/s2, rrr, Normal perfusion, pulses 2+ throughout  Abd: soft, NTND, no CVA tenderness  Genitourinary: no pelvic tenderness  MSK: BLE edema w/ no active drainage/streaking/crepitus, no visible deformities  Neuro: No focal neurologic deficits  Skin: No rash   Psych: normal affect

## 2023-02-27 NOTE — H&P ADULT - NSHPPHYSICALEXAM_GEN_ALL_CORE
GENERAL: NAD, lying in bed comfortably  CHEST/LUNG: Clear to auscultation bilaterally; No rales, rhonchi, wheezing, or rubs. Unlabored respirations  HEART: irregular rate and rhythm; 5/6 systolic murmur  ABDOMEN: Soft, Nontender, Nondistended  EXTREMITIES:  B/L erythema, tenderness, exam limited by pain  NERVOUS SYSTEM:  Alert & Oriented X3, speech clear. No deficits

## 2023-02-28 NOTE — CONSULT NOTE ADULT - ASSESSMENT
Podiatry Consult Note    Subjective:  GIOVANNY BUCK  Seen Bedside 87y Female  .   Patient is a 87y old  Female who presents with a chief complaint of   HPI:  86 yo female with a pmh of htn, afib on eliquis, pvd, chronic lymphedema and copd present c/o BLE pain and SOB.    Patient was in her usual state of health 4 weeks ago when she developed SOB which is new for her, mostly on exertion (after walking 2 feet), increases while lying down. Patient followed up with Dr. Pacheco 1 month ago and has TTE which was normal as per patient. Denies HA, dizziness, NVD, fever, chest pain, abdominal pain, change in urination and change in bowel habits.     3 days ago patient developed B/l lower extremity pain, Left more than right, 10/10 intensity, progressive, extending from toes to mid thigh, associated with increased redness and tenderness. Of note, patient was prescribed Clindamycin 1 week ago, not sure if taking or not.     In the ED, BNP 2952 (no baseline).  Vital Signs Last 24 Hrs:  T(F): 98 (27 Feb 2023 17:31), Max: 98.7 (27 Feb 2023 16:20)  HR: 98 (27 Feb 2023 21:55) (97 - 100)  BP: 139/86 (27 Feb 2023 21:55) (139/86 - 168/67)  RR: 18 (27 Feb 2023 21:55) (14 - 18)  SpO2: 99% (27 Feb 2023 21:55) (96% - 99%)   (27 Feb 2023 23:23)      Past Medical History and Surgical History  PAST MEDICAL & SURGICAL HISTORY:  HTN (hypertension)      Afib  s/p ablation 2001      Goiter  no meds      Cellulitis  chronic      OA (osteoarthritis)      PVD (peripheral vascular disease)      COPD (chronic obstructive pulmonary disease)      Anxiety      Obesity      H/O abdominal hysterectomy      H/O discectomy      H/O total knee replacement, bilateral           Review of Systems:  [X] Ten point review of systems is otherwise negative except as noted     Objective:  Vital Signs Last 24 Hrs  T(C): 36.9 (28 Feb 2023 06:00), Max: 37.1 (27 Feb 2023 16:20)  T(F): 98.4 (28 Feb 2023 06:00), Max: 98.7 (27 Feb 2023 16:20)  HR: 100 (28 Feb 2023 06:00) (97 - 100)  BP: 134/69 (28 Feb 2023 06:00) (134/69 - 168/67)  BP(mean): --  RR: 18 (27 Feb 2023 21:55) (14 - 18)  SpO2: 88% (28 Feb 2023 06:00) (88% - 99%)    Parameters below as of 27 Feb 2023 16:20  Patient On (Oxygen Delivery Method): room air                            9.1    10.20 )-----------( 273      ( 27 Feb 2023 17:52 )             29.4                 02-27    137  |  101  |  22<H>  ----------------------------<  103<H>  4.1   |  25  |  1.0    Ca    8.8      27 Feb 2023 17:52    TPro  7.0  /  Alb  3.5  /  TBili  0.7  /  DBili  x   /  AST  24  /  ALT  13  /  AlkPhos  181<H>  02-27        Physical Exam - Lower Extremity Focused:   Derm:    Left foot lateral 5th metatarsal head ulceratio, probe to superficial tissue, 0.5cc purulent drainage, no surrounding erythema no malodor.   Vascular: DP and PT Pulses Diminished; Foot is Warm to Warm to the touch; Capillary Refill Time < 3 Seconds;    Neuro: Protective Sensation Diminished / Moderately Neuropathic   MSK: Pain On Palpation at Wound Site     Assessment:  Left foot diabetic foot ulcer.   Plan:  Chart reviewed and Patient evaluated. All Questions and Concerns Addressed and Answered   XR Imaging  Foot; Pending Results  Wound Culture Obtained; Sent to Microbiology; Pending Results   bedside debridement of left foot ulcer to superficial tissue, healthy bleeding noted.   Local Wound Care; Wound Flushed w/ NS; Wound Packed w/ Betadine Soaked Gauze / DSD / Kerlix   Weight Bearing Status; WBAT w/ Heel Touch w/ Surgical Shoe;   Recommend; Lower Extremity Arterial Duplex B/L;    Request ID Consult;  / Follow Up w/ Wound Culture  if xray left foot negative for OM, no surgical intervention indicated at this time.   Pt can follow up with Dr. Le at [address] [place] [address].   Discussed Plan w/ Dr. Le    Podiatry

## 2023-02-28 NOTE — PATIENT PROFILE ADULT - NSPROGENOTHERPROVIDER_GEN_A_NUR
(Inserted Image. Unable to display)       June 27, 2019      KAYLYN CLEMENTE  [address]  [place], [place] [id number]          Dear KAYLYN,      Thank you for selecting [facility] for your healthcare needs.     Our records indicate you are due for the following services:     Follow-up Office Visit    To schedule an appointment or if you have further questions, please contact your primary clinic:   [facility]       [phone]   [facility]       [phone]              [facility]     [phone]    Powered by [employer]    Sincerely,    Wilder Nieves MD  
none

## 2023-02-28 NOTE — CONSULT NOTE ADULT - ASSESSMENT
ASSESSMENT  Ms Mcconnell is a 86yo woman with a pmh of htn, afib on eliquis, pvd, chronic lymphedema and copd presenting with 4 wks of SOB on exertion and 4d BLE pain L>R with assc warmth, swelling, and superficial wound with clear malodorous drainage under the L foot.    IMPRESSION      RECOMMENDATIONS      This is a pended note. All final recommendations to follow pending discussion with ID Attending    ASSESSMENT  Ms Mcconnell is a 88yo woman with a pmh of htn, afib on eliquis, pvd, chronic lymphedema and copd presenting with 4 wks of SOB on exertion and 4d BLE pain L>R with assc warmth, swelling, and superficial skin wound with malodorous drainage under L 5th digit    IMPRESSION  - stasis dermatitis of b/l lower extremities   - extreme tenderness to palpation & with slight movement of L ankle, possibly gout   - superficial ulcer under L 5th digit with abscess and associated cellulitis extending from toes up to mid calf with no evidence of osteomyelitis   - SOB with BNP 2952 unlikely infectious process     RECOMMENDATIONS  - c/w doxycycline 1g IV q12h for cellulitis  - silvadene cream for lower extremities   - Prednisone 40mg PO qd     This is a pended note. All final recommendations to follow pending discussion with ID Attending    ASSESSMENT  Ms Mcconnell is a 86yo woman with a pmh of htn, afib on eliquis, pvd, chronic lymphedema and copd presenting with 4 wks of SOB on exertion and 4d BLE pain L>R with assc warmth, swelling, and superficial skin wound with malodorous drainage under L 5th digit    IMPRESSION  - stasis dermatitis of b/l lower extremities   - extreme tenderness to palpation & with slight movement of L ankle. Clinically cannot r/o inflammatory synovitis.  - superficial ulcer under L 5th digit with subcutaneous  abscess which was drained at the bedside  -chronic venous stasis changes LEs b/l  - SOB with BNP 2952 unlikely infectious process     RECOMMENDATIONS  - c/w doxycycline 1g IV q12h for cellulitis  - silvadene cream for lower extremities   - Prednisone 40mg PO qd

## 2023-02-28 NOTE — CONSULT NOTE ADULT - SUBJECTIVE AND OBJECTIVE BOX
GIOVANNY BUCK  87y, Female  Allergy: aspirin (Other)  codeine (Other)  contrast media (iodine-based) (Other)  penicillin (Other)  sulfa drugs (Hives; Other)    CHIEF COMPLAINT:   HPI:  86 yo female with a pmh of htn, afib on eliquis, pvd, chronic lymphedema and copd present c/o BLE pain and SOB.    Patient was in her usual state of health 4 weeks ago when she developed SOB which is new for her, mostly on exertion (after walking 2 feet), increases while lying down. Patient followed up with Dr. Pacheco 1 month ago and has TTE which was normal as per patient. Denies HA, dizziness, NVD, fever, chest pain, abdominal pain, change in urination and change in bowel habits.     3 days ago patient developed B/l lower extremity pain, Left more than right, 10/10 intensity, progressive, extending from toes to mid thigh, associated with increased redness and tenderness. Of note, patient was prescribed Clindamycin 1 week ago, not sure if taking or not.     In the ED, BNP 2952 (no baseline).  Vital Signs Last 24 Hrs:  T(F): 98 (27 Feb 2023 17:31), Max: 98.7 (27 Feb 2023 16:20)  HR: 98 (27 Feb 2023 21:55) (97 - 100)  BP: 139/86 (27 Feb 2023 21:55) (139/86 - 168/67)  RR: 18 (27 Feb 2023 21:55) (14 - 18)  SpO2: 99% (27 Feb 2023 21:55) (96% - 99%)   (27 Feb 2023 23:23)      Infectious Diseases History:  Upon approach today pt states for the past week she has felt increased pain and swelling in her legs, the left more than the right. She describes increased warmth and inability to walk on the left leg (usually ambulates with cane but due to lymphedema spends most time sitting up in house). She noticed an open wound under her L foot last week with assc clear malodorous drainage, denies any trauma or past foot wounds. She went to her doctor last week for the leg pain and was prescribed a medication (doesn't remember the name) that she took for two days before her admission. She denies any fever, headache, abd pain, n/v/d.     PAST MEDICAL & SURGICAL HISTORY:  HTN (hypertension)      Afib  s/p ablation 2001      Goiter  no meds      Cellulitis  chronic      OA (osteoarthritis)      PVD (peripheral vascular disease)      COPD (chronic obstructive pulmonary disease)      Anxiety      Obesity      H/O abdominal hysterectomy      H/O discectomy      H/O total knee replacement, bilateral          SOCIAL HISTORY  Social History:  Smoke, drug, etoh: Denies (18 May 2020 20:30)        ROS  General: Denies rigors, nightsweats, fevers   HEENT: Denies headache, rhinorrhea, sore throat, eye pain  CV: + occasional pleuritic CP, palpitations  PULM: Denies wheezing, hemoptysis, + SOB  GI: Denies hematemesis, hematochezia, melena  : Denies discharge, hematuria  MSK: Denies arthralgias, myalgias  SKIN: + pain of BLE, warmth   NEURO: Denies paresthesias, weakness  PSYCH: Denies depression, anxiety    VITALS:  T(F): 99, Max: 99 (02-28-23 @ 08:21)  HR: 110  BP: 128/60  RR: 18Vital Signs Last 24 Hrs  T(C): 37.2 (28 Feb 2023 08:21), Max: 37.2 (28 Feb 2023 08:21)  T(F): 99 (28 Feb 2023 08:21), Max: 99 (28 Feb 2023 08:21)  HR: 110 (28 Feb 2023 08:21) (97 - 110)  BP: 128/60 (28 Feb 2023 08:21) (128/60 - 168/67)  BP(mean): --  RR: 18 (28 Feb 2023 08:21) (14 - 18)  SpO2: 99% (28 Feb 2023 08:21) (88% - 99%)    Parameters below as of 28 Feb 2023 08:21  Patient On (Oxygen Delivery Method): nasal cannula  O2 Flow (L/min): 2      PHYSICAL EXAM:  Gen: NAD, resting in bed  HEENT: Normocephalic, atraumatic  Neck: supple, no lymphadenopathy  CV: S1, S2, systolic murmur   Lungs: CTAB  Abdomen: Soft, BS present  Ext: diminished DP pulse on right side, absent pulse on L foot   Neuro: non focal, awake  Skin: stasis dermatitis and lymphedema of BLE, increased warmth & swelling of LLE with dressings in place around L foot, TTP, no active discharge noted.   Lines: no phlebitis    TESTS & MEASUREMENTS:                        8.6    8.42  )-----------( 277      ( 28 Feb 2023 06:53 )             28.3     02-28    139  |  103  |  20  ----------------------------<  95  3.9   |  27  |  1.1    Ca    8.8      28 Feb 2023 06:53  Mg     1.9     02-28    TPro  6.5  /  Alb  3.2<L>  /  TBili  0.7  /  DBili  x   /  AST  23  /  ALT  12  /  AlkPhos  165<H>  02-28      LIVER FUNCTIONS - ( 28 Feb 2023 06:53 )  Alb: 3.2 g/dL / Pro: 6.5 g/dL / ALK PHOS: 165 U/L / ALT: 12 U/L / AST: 23 U/L / GGT: x                     INFECTIOUS DISEASES TESTING      RADIOLOGY & ADDITIONAL TESTS:  I have personally reviewed the last available Chest xray  CXR  Xray Chest 1 View- PORTABLE-Urgent:   ACC: [id number] EXAM:  XR CHEST PORTABLE URGENT 1V   ORDERED BY: NIKA SERVIN     PROCEDURE DATE:  02/27/2023          INTERPRETATION:  Clinical History / Reason for exam: Shortness of breath    Comparison : Chest radiograph dated 2/7/2021.    Technique/Positioning: AP view of the chest.    Findings:    Support devices: None.    Cardiac/mediastinum/hilum: Enlarged cardiac silhouette.    Lung parenchyma/Pleura: Bilateral interstitial opacities. No focal   consolidation seen. Small left and trace right pleural effusions. No   pneumothorax.    Skeleton/soft tissues: No interval change.    Impression:    Bilateral interstitial opacities possibly representing pulmonary edema.   Small left pleural effusion.        --- End of Report ---            GRIS FAIR MD; Attending Radiologist  This document has been electronically signed. Feb 28 2023  7:56AM (02-27-23 @ 18:46)      CT      CARDIOLOGY TESTING  12 Lead ECG:   Ventricular Rate 101 BPM    Atrial Rate 102 BPM    P-R Interval 264 ms    QRS Duration 82 ms    Q-T Interval 348 ms    QTC Calculation(Bazett) 451 ms    R Axis 115 degrees    T Axis 103 degrees    Diagnosis Line Atrial fibrillation with premature ventricular or aberrantly conducted  complexes  Septal infarct , age undetermined  Lateral infarct , age undetermined  Abnormal ECG    Confirmed by Micaela Chong ([id number]) on 2/28/2023 9:32:14 AM (02-27-23 @ 18:21)      All available historical records have been reviewed    MEDICATIONS  apixaban 2.5  budesonide  80 MICROgram(s)/formoterol 4.5 MICROgram(s) Inhaler 2  diltiazem     doxycycline IVPB   doxycycline IVPB 100  furosemide   Injectable 40  gabapentin 100  senna 2      ANTIBIOTICS:  doxycycline IVPB      doxycycline IVPB 100 milliGRAM(s) IV Intermittent every 12 hours      All available historical data has been reviewed     GIOVANNY BUCK  87y, Female  Allergy: aspirin (Other)  codeine (Other)  contrast media (iodine-based) (Other)  penicillin (Other)  sulfa drugs (Hives; Other)    CHIEF COMPLAINT:   HPI:  86 yo female with a pmh of htn, afib on eliquis, pvd, chronic lymphedema and copd present c/o BLE pain and SOB.    Patient was in her usual state of health 4 weeks ago when she developed SOB which is new for her, mostly on exertion (after walking 2 feet), increases while lying down. Patient followed up with Dr. Pacheco 1 month ago and has TTE which was normal as per patient. Denies HA, dizziness, NVD, fever, chest pain, abdominal pain, change in urination and change in bowel habits.     3 days ago patient developed B/l lower extremity pain, Left more than right, 10/10 intensity, progressive, extending from toes to mid thigh, associated with increased redness and tenderness. Of note, patient was prescribed Clindamycin 1 week ago, not sure if taking or not.     In the ED, BNP 2952 (no baseline).  Vital Signs Last 24 Hrs:  T(F): 98 (27 Feb 2023 17:31), Max: 98.7 (27 Feb 2023 16:20)  HR: 98 (27 Feb 2023 21:55) (97 - 100)  BP: 139/86 (27 Feb 2023 21:55) (139/86 - 168/67)  RR: 18 (27 Feb 2023 21:55) (14 - 18)  SpO2: 99% (27 Feb 2023 21:55) (96% - 99%)   (27 Feb 2023 23:23)      Infectious Diseases History:  Upon approach today pt states for the past week she has felt increased pain and swelling in her legs, the left more than the right. She describes increased warmth and inability to walk on the left leg (usually ambulates with cane but due to lymphedema spends most time sitting up in house). She noticed an open wound under her L foot last week with assc clear malodorous drainage, denies any trauma or past foot wounds. She went to her doctor last week for the leg pain and was prescribed a medication (doesn't remember the name) that she took for two days before her admission. She denies any fever, headache, abd pain, n/v/d.     PAST MEDICAL & SURGICAL HISTORY:  HTN (hypertension)      Afib  s/p ablation 2001      Goiter  no meds      Cellulitis  chronic      OA (osteoarthritis)      PVD (peripheral vascular disease)      COPD (chronic obstructive pulmonary disease)      Anxiety      Obesity      H/O abdominal hysterectomy      H/O discectomy      H/O total knee replacement, bilateral          SOCIAL HISTORY  Social History:  Smoke, drug, etoh: Denies (18 May 2020 20:30)        ROS  General: Denies rigors, nightsweats, fevers   HEENT: Denies headache, rhinorrhea, sore throat, eye pain  CV: + occasional pleuritic CP, palpitations  PULM: Denies wheezing, hemoptysis, + SOB  GI: Denies hematemesis, hematochezia, melena  : Denies discharge, hematuria  MSK: Denies arthralgias, myalgias  SKIN: + pain of BLE, warmth   NEURO: Denies paresthesias, weakness  PSYCH: Denies depression, anxiety    VITALS:  T(F): 99, Max: 99 (02-28-23 @ 08:21)  HR: 110  BP: 128/60  RR: 18Vital Signs Last 24 Hrs  T(C): 37.2 (28 Feb 2023 08:21), Max: 37.2 (28 Feb 2023 08:21)  T(F): 99 (28 Feb 2023 08:21), Max: 99 (28 Feb 2023 08:21)  HR: 110 (28 Feb 2023 08:21) (97 - 110)  BP: 128/60 (28 Feb 2023 08:21) (128/60 - 168/67)  BP(mean): --  RR: 18 (28 Feb 2023 08:21) (14 - 18)  SpO2: 99% (28 Feb 2023 08:21) (88% - 99%)    Parameters below as of 28 Feb 2023 08:21  Patient On (Oxygen Delivery Method): nasal cannula  O2 Flow (L/min): 2      PHYSICAL EXAM:  Gen: NAD, resting in bed  HEENT: Normocephalic, atraumatic  Neck: supple, no lymphadenopathy  CV: S1, S2, systolic murmur   Lungs: CTAB  Abdomen: Soft, BS present  Ext: diminished DP pulse on right side, absent pulse on L foot   Neuro: non focal, awake  Skin: stasis dermatitis and lymphedema of BLE, increased warmth & swelling of LLE with superficial lesion under 5th digit, extremely ttp around L ankle. L foot shiny and swollen with blue areas of cyanosis   Lines: no phlebitis    TESTS & MEASUREMENTS:                        8.6    8.42  )-----------( 277      ( 28 Feb 2023 06:53 )             28.3     02-28    139  |  103  |  20  ----------------------------<  95  3.9   |  27  |  1.1    Ca    8.8      28 Feb 2023 06:53  Mg     1.9     02-28    TPro  6.5  /  Alb  3.2<L>  /  TBili  0.7  /  DBili  x   /  AST  23  /  ALT  12  /  AlkPhos  165<H>  02-28      LIVER FUNCTIONS - ( 28 Feb 2023 06:53 )  Alb: 3.2 g/dL / Pro: 6.5 g/dL / ALK PHOS: 165 U/L / ALT: 12 U/L / AST: 23 U/L / GGT: x                     INFECTIOUS DISEASES TESTING      RADIOLOGY & ADDITIONAL TESTS:  I have personally reviewed the last available Chest xray  CXR  Xray Chest 1 View- PORTABLE-Urgent:   ACC: [id number] EXAM:  XR CHEST PORTABLE URGENT 1V   ORDERED BY: NIKA SERVIN     PROCEDURE DATE:  02/27/2023          INTERPRETATION:  Clinical History / Reason for exam: Shortness of breath    Comparison : Chest radiograph dated 2/7/2021.    Technique/Positioning: AP view of the chest.    Findings:    Support devices: None.    Cardiac/mediastinum/hilum: Enlarged cardiac silhouette.    Lung parenchyma/Pleura: Bilateral interstitial opacities. No focal   consolidation seen. Small left and trace right pleural effusions. No   pneumothorax.    Skeleton/soft tissues: No interval change.    Impression:    Bilateral interstitial opacities possibly representing pulmonary edema.   Small left pleural effusion.        --- End of Report ---            GRIS FAIR MD; Attending Radiologist  This document has been electronically signed. Feb 28 2023  7:56AM (02-27-23 @ 18:46)    < from: Xray Foot AP + Lateral + Oblique, Left (02.28.23 @ 09:00) >  IMPRESSION:    Diffuse osteopenia. No acute fracture or dislocation. Chronic fracture   deformity of the fourth and fifth metatarsal shafts. 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 Severe degenerative changes of   the talonavicular joint forefoot. No definite ulcer or radiographic   evidence of osteomyelitis is noted at the region of the fifth   metatarsophalangeal joint. Consider MRI of the left forefoot is   clinically warranted. Moderate degenerative changes of the hindfoot.   Plantar heel ulcer adjacent to the calcaneus, without definite   radiographic evidence of osteomyelitis.    --- End of Report ---    < end of copied text >        CARDIOLOGY TESTING  12 Lead ECG:   Ventricular Rate 101 BPM    Atrial Rate 102 BPM    P-R Interval 264 ms    QRS Duration 82 ms    Q-T Interval 348 ms    QTC Calculation(Bazett) 451 ms    R Axis 115 degrees    T Axis 103 degrees    Diagnosis Line Atrial fibrillation with premature ventricular or aberrantly conducted  complexes  Septal infarct , age undetermined  Lateral infarct , age undetermined  Abnormal ECG    Confirmed by Micaela Chong ([id number]) on 2/28/2023 9:32:14 AM (02-27-23 @ 18:21)      All available historical records have been reviewed    MEDICATIONS  apixaban 2.5  budesonide  80 MICROgram(s)/formoterol 4.5 MICROgram(s) Inhaler 2  diltiazem     doxycycline IVPB   doxycycline IVPB 100  furosemide   Injectable 40  gabapentin 100  senna 2      ANTIBIOTICS:  doxycycline IVPB      doxycycline IVPB 100 milliGRAM(s) IV Intermittent every 12 hours      All available historical data has been reviewed

## 2023-02-28 NOTE — DISCHARGE NOTE PROVIDER - CARE PROVIDERS DIRECT ADDRESSES
,[email or web address] [email or web address] Counseling:  I discussed with the patient the risks of golimumab including but not limited to myelosuppression, immunosuppression, autoimmune hepatitis, demyelinating diseases, lymphoma, and serious infections.  The patient understands that monitoring is required including a PPD at baseline and must alert us or the primary physician if symptoms of infection or other concerning signs are noted.

## 2023-03-01 NOTE — PROGRESS NOTE ADULT - SUBJECTIVE AND OBJECTIVE BOX
GIOVANNY BUCK  87y, Female    All available historical data reviewed    OVERNIGHT EVENTS:  left ankle with less pain  discomfort right ankle    ROS:  General: Denies rigors, nightsweats  HEENT: Denies headache, rhinorrhea, sore throat, eye pain  CV: Denies CP, palpitations  PULM: Denies wheezing, hemoptysis  GI: Denies hematemesis, hematochezia, melena  : Denies discharge, hematuria  MSK: Denies arthralgias, myalgias  SKIN: Denies rash, lesions  NEURO: Denies paresthesias, weakness  PSYCH: Denies depression, anxiety    VITALS:  T(F): 99.1, Max: 99.3 (02-28-23 @ 20:00)  HR: 116  BP: 133/63  RR: 18Vital Signs Last 24 Hrs  T(C): 37.3 (01 Mar 2023 05:00), Max: 37.4 (28 Feb 2023 20:00)  T(F): 99.1 (01 Mar 2023 05:00), Max: 99.3 (28 Feb 2023 20:00)  HR: 116 (01 Mar 2023 05:00) (99 - 116)  BP: 133/63 (01 Mar 2023 05:00) (132/58 - 136/58)  BP(mean): --  RR: 18 (01 Mar 2023 05:00) (18 - 18)  SpO2: 96% (28 Feb 2023 20:00) (96% - 97%)    Parameters below as of 28 Feb 2023 20:00  Patient On (Oxygen Delivery Method): nasal cannula        TESTS & MEASUREMENTS:                        8.6    8.42  )-----------( 277      ( 28 Feb 2023 06:53 )             28.3     02-28    139  |  103  |  20  ----------------------------<  95  3.9   |  27  |  1.1    Ca    8.8      28 Feb 2023 06:53  Mg     1.9     02-28    TPro  6.5  /  Alb  3.2<L>  /  TBili  0.7  /  DBili  x   /  AST  23  /  ALT  12  /  AlkPhos  165<H>  02-28    LIVER FUNCTIONS - ( 28 Feb 2023 06:53 )  Alb: 3.2 g/dL / Pro: 6.5 g/dL / ALK PHOS: 165 U/L / ALT: 12 U/L / AST: 23 U/L / GGT: x                   RADIOLOGY & ADDITIONAL TESTS:  Personal review of radiological diagnostics performed  Echo and EKG results noted when applicable.     MEDICATIONS:  apixaban 2.5 milliGRAM(s) Oral two times a day  budesonide  80 MICROgram(s)/formoterol 4.5 MICROgram(s) Inhaler 2 Puff(s) Inhalation two times a day  diltiazem    milliGRAM(s) Oral daily  doxycycline IVPB      doxycycline IVPB 100 milliGRAM(s) IV Intermittent every 12 hours  furosemide   Injectable 40 milliGRAM(s) IV Push every 12 hours  gabapentin 100 milliGRAM(s) Oral three times a day  senna 2 Tablet(s) Oral at bedtime      ANTIBIOTICS:  doxycycline IVPB      doxycycline IVPB 100 milliGRAM(s) IV Intermittent every 12 hours

## 2023-03-01 NOTE — CONSULT NOTE ADULT - ASSESSMENT
87 year old female with a history of chronic lymphedema bilateral legs, afib on eliquis, COPD, PVD who presented for dyspnea, worsening leg edema, pain and discharge. Rheumatology is asked to see patient for possible inflammatory arthritis of the left ankle. Patient has bilateral ankle and foot pain for 1 month and exam findings of tenderness to palpation of left ankle and foot with limited range of motion due to pain. She has chronic venous stasis as well. Her x-ray showed degenerative changes, neuropathic osteoarthropathy, chronic deformities to her 4th and 5th metatarsal shaft, plantar heel ulcer. CRP is also high but ulcer and abscess may be contributing to this.     Left ankle pain  -Difficult to assess left ankle for synovitis in setting of her chronic leg edema and chronic venous stasis  -Check MRI left ankle to evaluate for inflammatory arthritis   -Check RF, CCP, Uric acid, ESR  -Continue prednisone 40 mg daily given improvement in her symptoms and can taper on discharge 30 mg x 5 days, 20 mg x 5 days, 10 mg x 5 days

## 2023-03-01 NOTE — CONSULT NOTE ADULT - SUBJECTIVE AND OBJECTIVE BOX
Rheumatology consult     Patient is a 87y old  Female who presents with a chief complaint of     HPI:  88 yo female with a pmh of htn, afib on eliquis, pvd, chronic lymphedema and copd present c/o BLE pain and SOB.    Patient was in her usual state of health 4 weeks ago when she developed SOB which is new for her, mostly on exertion (after walking 2 feet), increases while lying down. Patient followed up with Dr. Pacheco 1 month ago and has TTE which was normal as per patient. Denies HA, dizziness, NVD, fever, chest pain, abdominal pain, change in urination and change in bowel habits.     3 days ago patient developed B/l lower extremity pain, Left more than right, 10/10 intensity, progressive, extending from toes to mid thigh, associated with increased redness and tenderness. Of note, patient was prescribed Clindamycin 1 week ago, not sure if taking or not.       Patient reports 4 weeks ago developing bilateral foot and ankle pain that's constant, associated with morning stiffness and joint swelling. States that prior to this she has been having pain in her feet "for a while". She does not ambulate much states only a few feet but this hurts her feet. Tylenol did not give relief of her pain, and walking and bearing weigh on her feet makes it worse. Denies trauma, injury or incident prior to symptoms. States that her whole body hurt. Her legs had increased discharge and swelling, along with dyspnea on exertion that led to her admission. Denies fevers, skin rash, mucositis, sicca symptoms. Her left foot x-ray suggested degenerative changes, neuropathic osteoarthropathy, plantar heel ulcer, chronic deformities 4th and 5th metatarsal shafts. CRP is elevated, venous doppler ultrasound legs negative for DVT and arterial doppler ultrasound of legs unable to visualize calf arteries due to body habitus. Prednisone 40 mg has helped        ROS:  Negative except for:    PAST MEDICAL & SURGICAL HISTORY:  HTN (hypertension)      Afib  s/p ablation 2001      Goiter  no meds      Cellulitis  chronic      OA (osteoarthritis)      PVD (peripheral vascular disease)      COPD (chronic obstructive pulmonary disease)      Anxiety      Obesity      H/O abdominal hysterectomy      H/O discectomy      H/O total knee replacement, bilateral          SOCIAL HISTORY:    FAMILY HISTORY:      MEDICATIONS  (STANDING):  apixaban 2.5 milliGRAM(s) Oral two times a day  budesonide  80 MICROgram(s)/formoterol 4.5 MICROgram(s) Inhaler 2 Puff(s) Inhalation two times a day  diltiazem    milliGRAM(s) Oral daily  doxycycline IVPB      doxycycline IVPB 100 milliGRAM(s) IV Intermittent every 12 hours  furosemide   Injectable 40 milliGRAM(s) IV Push every 12 hours  gabapentin 100 milliGRAM(s) Oral three times a day  senna 2 Tablet(s) Oral at bedtime    MEDICATIONS  (PRN):      Allergies    aspirin (Other)  codeine (Other)  contrast media (iodine-based) (Other)  penicillin (Other)  sulfa drugs (Hives; Other)    Intolerances        Vital Signs Last 24 Hrs  T(C): 38.1 (01 Mar 2023 12:00), Max: 38.1 (01 Mar 2023 12:00)  T(F): 100.6 (01 Mar 2023 12:00), Max: 100.6 (01 Mar 2023 12:00)  HR: 110 (01 Mar 2023 12:00) (108 - 116)  BP: 145/67 (01 Mar 2023 12:00) (132/58 - 145/67)  BP(mean): --  RR: 18 (01 Mar 2023 12:00) (18 - 18)  SpO2: 95% (01 Mar 2023 12:00) (95% - 96%)    Parameters below as of 28 Feb 2023 20:00  Patient On (Oxygen Delivery Method): nasal cannula        PHYSICAL EXAM  General: adult in NAD  HEENT: clear oropharynx, anicteric sclera, pink conjunctiva  Neck: supple  CV: normal S1/S2 with no murmur rubs or gallops  Lungs: positive air movement b/l ant lungs,clear to auscultation, no wheezes, no rales  Abdomen: soft non-tender non-distended, no hepatosplenomegaly  Msk: Bilateral ankles tender to palpation L > R, limited mobility of left and right ankles, both ankle and feet are edematous  Ext: no clubbing cyanosis or edema  Skin: no rashes and no petechiae  Neuro: alert and oriented X 4, no focal deficits      02-28-23 @ 07:01  -  03-01-23 @ 07:00  --------------------------------------------------------  IN: 280 mL / OUT: 1300 mL / NET: -1020 mL    03-01-23 @ 07:01  -  03-01-23 @ 14:22  --------------------------------------------------------  IN: 490 mL / OUT: 1200 mL / NET: -710 mL      LABS:                          8.6    8.42  )-----------( 277      ( 28 Feb 2023 06:53 )             28.3         Mean Cell Volume : 82.0 fL  Mean Cell Hemoglobin : 24.9 pg  Mean Cell Hemoglobin Concentration : 30.4 g/dL  Auto Neutrophil # : 6.42 K/uL  Auto Lymphocyte # : 0.92 K/uL  Auto Monocyte # : 0.92 K/uL  Auto Eosinophil # : 0.09 K/uL  Auto Basophil # : 0.02 K/uL  Auto Neutrophil % : 76.3 %  Auto Lymphocyte % : 10.9 %  Auto Monocyte % : 10.9 %  Auto Eosinophil % : 1.1 %  Auto Basophil % : 0.2 %      02-28    139  |  103  |  20  ----------------------------<  95  3.9   |  27  |  1.1    Ca    8.8      28 Feb 2023 06:53  Mg     1.9     02-28    TPro  6.5  /  Alb  3.2<L>  /  TBili  0.7  /  DBili  x   /  AST  23  /  ALT  12  /  AlkPhos  165<H>  02-28      PT/INR - ( 28 Feb 2023 06:53 )   PT: 16.10 sec;   INR: 1.40 ratio         PTT - ( 28 Feb 2023 06:53 )  PTT:33.7 sec                BLOOD SMEAR INTERPRETATION:       RADIOLOGY & ADDITIONAL STUDIES:

## 2023-03-01 NOTE — PROGRESS NOTE ADULT - ASSESSMENT
ASSESSMENT & PLAN:  Ms Mcconnell is an 87 ARABELLA w a PMH of PVD w chronic lymphedema and chronic cellulitis, OA, obesity  , COPD, HTN, Afib (s/p ablation on eliquis), goiter and a recent admission w dc on Feb 23rd for drainage of a left gluteal abscess presented to the ED w 4 weeks of worsening CARTAGENA (after 2-3 steps), orthopnea and 3 days of BL LE edema pain and swelling. Labs notable for BNP of 2953, uric acid of 8.1, alk Phos of 181, troponin negative. Patient had XR in ED which did not show any acute fracture or dislocation (positive for chronic degenerative changes), US negative for DVT HOWEVER POOR VISUALIZATION OF CLAF VASCULATURE secondary to edema and habitus. Patient admitted for progressive CARTAGENA and Left foot wound s/p debridement     Problem List    #CARTAGENA  #Orthopnea  #BNP 2953  #Ho COPD  #Ho Heart disease  #Ho PVD  likely multifactorial secondary to dynamic hyperinflation vs obesity hypoventilation syndrome vs PE vs anemia w deconditioning(respiratory and musculoskeletal) vs HF  patient has had multiple recent debridements and has been ambulating less secondary to left buttock pain from abscess, deconditioning and anemia can not be r/o  given vascular disease Hx as well as BNP and cephalization on cxr can not r/o HF however troponin negative and as per patient recent TTE normal, no BNP on file to compare, less likely   given BMI and somnolence OHS likley however no polycythemia that is generally seen   given COPD, hyperinflation on cxr and progressive dyspnia dynamic hyperinflation likely  VERY LOW SUSPICION COPD exacerbation given no increased sputum production or change in color or consolidation on CXR or signs of infection   TROPONIN NEGATIVE   -f/u ABG (to assess for hypoxia< 70, CO2>45)  -f/u Dr Iqra burleson for HF as he is her regular cardiologist  -f/u out patient PFTs for End Expiratory lung volume and inspiratory capacity  -ID recs appreciated   - c/w doxycycline 100 mg  IV q12h for cellulitis can change to po Doxycycline 100 mg q12h for 8 more days on dc      #BL LE pain and swelling   #Ho PVD w chronic lymphedema   #Ho chronic cellulitis  #Ho chronic venous stasis dermatitis  #Procal 0.19 and CRP 65.9  likely secondary to Osteoarthritis causing inflammatory changes and fibrosis of LE vessels vs tenosynovitis vs DVT vs worsening of venous stasis in setting of increased immobility  given BL LE pain and swelling less likely to be DVT  given improvement on steroids can not r/o tenosynovitis (though it usually presents in RA vs OA)  -duplex negative but poor visulaization of calf vessels  -f/u CT w IV contrast for assessment of calf vessels   -ID recs appreciated   - silvadene cream for lower extremities   - Prednisone 40mg PO qd          #Misc  - Prednisone 40mg PO once daily- GI Prophylaxis:  - Diet:  - Activity:  - IV Fluids:  - Code Status:    Dispo Ms Mcconnell is an 87 ARABELLA w a PMH of PVD w chronic lymphedema and chronic cellulitis, OA, obesity  , COPD, HTN, Afib (s/p ablation on eliquis), goiter and a recent admission w dc on Feb 23rd for drainage of a left gluteal abscess presented to the ED w 4 weeks of worsening CARTAGENA (after 2-3 steps), orthopnea and 3 days of BL LE edema pain and swelling. Labs notable for BNP of 2953, uric acid of 8.1, alk Phos of 181, troponin negative. Patient had XR in ED which did not show any acute fracture or dislocation (positive for chronic degenerative changes), US negative for DVT HOWEVER POOR VISUALIZATION OF CLAF VASCULATURE secondary to edema and habitus. Patient admitted for progressive CARTAGENA and Left foot wound s/p debridement     Problem List    #CARTAGENA  #Orthopnea  #BNP 2953  #Ho COPD  #Ho Heart disease  #Ho PVD  likely multifactorial secondary to dynamic hyperinflation vs obesity hypoventilation syndrome  vs anemia w deconditioning(respiratory and musculoskeletal) vs HF  patient has had multiple recent debridements and has been ambulating less secondary to left buttock pain from abscess, deconditioning and anemia can not be r/o  given vascular disease Hx as well as BNP and cephalization on cxr can not r/o HF however troponin negative and as per patient recent TTE normal, no BNP on file to compare, less likely   given BMI and somnolence OHS likley however no polycythemia that is generally seen   given COPD, hyperinflation on cxr and progressive dyspnia dynamic hyperinflation likely  VERY LOW SUSPICION COPD exacerbation given no increased sputum production or change in color or consolidation on CXR or signs of infection   TROPONIN NEGATIVE   -f/u ABG (to assess for hypoxia< 70, CO2>45)  -f/u Dr Iqra burleson for HF as he is her regular cardiologist  -f/u out patient PFTs for End Expiratory lung volume and inspiratory capacity  -ID recs appreciated   - c/w doxycycline 100 mg  IV q12h for cellulitis can change to po Doxycycline 100 mg q12h for 8 more days on dc  - c/w lasix 40 IV BID for now, watch Cr  -c/w symbicort for COPD      #BL LE pain and swelling   #Ho PVD w chronic lymphedema   #Ho chronic cellulitis  #Ho chronic venous stasis dermatitis  #Procal 0.19 and CRP 65.9  likely secondary to Osteoarthritis causing inflammatory changes and fibrosis of LE vessels vs tenosynovitis vs DVT vs worsening of venous stasis in setting of increased immobility  given BL LE pain and swelling less likely to be DVT  given improvement on steroids can not r/o tenosynovitis (though it usually presents in RA vs OA)  -duplex negative but poor visualization of calf vessels  -f/u CT w IV contrast for assessment of calf vessels   -ID recs appreciated   - silvadene cream for lower extremities   - Prednisone 40mg PO qd  - f/u rheum consult for duration of steroids   - f/u podiatry and WOUND CULTURES     #HTN   #Afib  -c/w home cardizem  -c/w home eliquis   -Lasix increased from home dose  -BP currently well controlled w/o meds          MISC  -DVT proph: eliquis 2.5  - Diet: DASH/TLC  - Activity: as tolerated  - IV Fluids: NA  - Code Status:    Dispo:acute Ms Mcconnell is an 87 ARABELLA w a PMH of PVD w chronic lymphedema and chronic cellulitis, OA, obesity  , COPD, HTN, Afib (s/p ablation on eliquis), goiter and a recent admission w dc on Feb 23rd for drainage of a left gluteal abscess presented to the ED w 4 weeks of worsening CARTAGENA (after 2-3 steps), orthopnea and 3 days of BL LE edema pain and swelling. Labs notable for BNP of 2953, uric acid of 8.1, alk Phos of 181, troponin negative. Patient had XR in ED which did not show any acute fracture or dislocation (positive for chronic degenerative changes), US negative for DVT HOWEVER POOR VISUALIZATION OF CLAF VASCULATURE secondary to edema and habitus. Patient admitted for progressive CARTAGENA and Left foot wound s/p debridement     Problem List    #CARTAGENA  #Orthopnea  #BNP 2953  #Ho COPD  #Ho Heart disease  #Ho PVD  likely multifactorial secondary to dynamic hyperinflation vs obesity hypoventilation syndrome  vs anemia w deconditioning(respiratory and musculoskeletal) vs HF  patient has had multiple recent debridements and has been ambulating less secondary to left buttock pain from abscess, deconditioning and anemia can not be r/o  given vascular disease Hx as well as BNP and cephalization on cxr can not r/o HF however troponin negative and as per patient recent TTE normal, no BNP on file to compare, less likely   given BMI and somnolence OHS likley however no polycythemia that is generally seen   given COPD, hyperinflation on cxr and progressive dyspnia dynamic hyperinflation likely  VERY LOW SUSPICION COPD exacerbation given no increased sputum production or change in color or consolidation on CXR or signs of infection   TROPONIN NEGATIVE   -f/u ABG (to assess for hypoxia< 70, CO2>45)  -f/u Dr Iqra burleson for HF as he is her regular cardiologist  -f/u out patient PFTs for End Expiratory lung volume and inspiratory capacity  -ID recs appreciated   - c/w lasix 40 IV BID for now, watch Cr  -c/w symbicort for COPD      #BL LE pain and swelling   #Ho PVD w chronic lymphedema   #Ho chronic cellulitis  #Ho chronic venous stasis dermatitis  #Procal 0.19 and CRP 65.9  likely secondary to Osteoarthritis causing inflammatory changes and fibrosis of LE vessels vs tenosynovitis vs DVT vs worsening of venous stasis in setting of increased immobility  given BL LE pain and swelling less likely to be DVT  given improvement on steroids can not r/o tenosynovitis (though it usually presents in RA vs OA)  -duplex negative but poor visualization of calf vessels  -f/u CT w IV contrast for assessment of calf vessels   -ID recs appreciated   - c/w doxycycline 100 mg  IV q12h for cellulitis can change to po Doxycycline 100 mg q12h for 8 more days on dc  - silvadene cream for lower extremities   - Prednisone 40mg PO qd  - f/u rheum consult for duration of steroids   -as per rheum continue w 40 mg PO prednisone in patient and then taper down by 10mg every 5 days   - given anaphylaxis to iodine: give 50mg prednisone at 7am, 50mg at 13:00 and another at 19:00 for an 20:00 CT w contrast  - f/u podiatry and WOUND CULTURES     #HTN   #Afib  -c/w home cardizem  -c/w home eliquis   -Lasix increased from home dose  -BP currently well controlled w/o meds    #GOC  meeting w daughter and patient at 17:00      MISC  -DVT proph: eliquis 2.5  - Diet: DASH/TLC  - Activity: as tolerated  - IV Fluids: NA  - Code Status:    Dispo:acute Ms Mcconnell is an 87 ARABELLA w a PMH of PVD w chronic lymphedema and chronic cellulitis, OA, obesity  , COPD, HTN, Afib (s/p ablation on eliquis), goiter and a recent admission w dc on Feb 23rd for drainage of a left gluteal abscess presented to the ED w 4 weeks of worsening CARTAGENA (after 2-3 steps), orthopnea and 3 days of BL LE edema pain and swelling. Labs notable for BNP of 2953, uric acid of 8.1, alk Phos of 181, troponin negative. Patient had XR in ED which did not show any acute fracture or dislocation (positive for chronic degenerative changes), US negative for DVT HOWEVER POOR VISUALIZATION OF CLAF VASCULATURE secondary to edema and habitus. Patient admitted for progressive CARTAGENA and Left foot wound s/p debridement     Problem List    #CARTAGENA  #Orthopnea  #BNP 2953  #Ho COPD  #Ho Heart disease  #Ho PVD  likely multifactorial secondary to dynamic hyperinflation vs obesity hypoventilation syndrome  vs anemia w deconditioning(respiratory and musculoskeletal) vs HF  patient has had multiple recent debridements and has been ambulating less secondary to left buttock pain from abscess, deconditioning and anemia can not be r/o  given vascular disease Hx as well as BNP and cephalization on cxr can not r/o HF however troponin negative and as per patient recent TTE normal, no BNP on file to compare, less likely   given BMI and somnolence OHS likley however no polycythemia that is generally seen   given COPD, hyperinflation on cxr and progressive dyspnia dynamic hyperinflation likely  VERY LOW SUSPICION COPD exacerbation given no increased sputum production or change in color or consolidation on CXR or signs of infection   TROPONIN NEGATIVE   -f/u ABG (to assess for hypoxia< 70, CO2>45)  -f/u Dr Iqra burleson for HF as he is her regular cardiologist  -f/u out patient PFTs for End Expiratory lung volume and inspiratory capacity  -ID recs appreciated   - c/w lasix 40 IV BID for now, watch Cr  -c/w symbicort for COPD      #BL LE pain and swelling   #Ho PVD w chronic lymphedema   #Ho chronic cellulitis  #Ho chronic venous stasis dermatitis  #Procal 0.19 and CRP 65.9  likely secondary to Osteoarthritis causing inflammatory changes and fibrosis of LE vessels vs tenosynovitis vs DVT vs worsening of venous stasis in setting of increased immobility  given BL LE pain and swelling less likely to be DVT  given improvement on steroids can not r/o tenosynovitis (though it usually presents in RA vs OA)  -duplex negative but poor visualization of calf vessels  -f/u CT w IV contrast for assessment of calf vessels   -ID recs appreciated   - c/w doxycycline 100 mg  IV q12h for cellulitis can change to po Doxycycline 100 mg q12h for 8 more days on dc  - silvadene cream for lower extremities   - Prednisone 40mg PO qd while in patient (will hold March 2 dose given prep for CT)   - f/u rheum out patient (Dr Rosales)  - as per rheum continue w 40 mg PO prednisone in patient and then taper down by 10mg every 5 days   - given anaphylaxis to iodine: give 50mg prednisone at 7am, 50mg at 13:00 and another at 19:00 for an 20:00 CT w contrast  - f/u podiatry and WOUND CULTURES   -NPO 5 hrs prior to CT scan as requested by radiology     #HTN   #Afib  -c/w home cardizem  -c/w home eliquis   -Lasix increased from home dose  -BP currently well controlled w/o meds    #GOC  meeting w daughter and patient at 17:00      MISC  -DVT proph: eliquis 2.5  - Diet: DASH/TLC  - Activity: as tolerated  - IV Fluids: NA  - Code Status:    Dispo:acute

## 2023-03-01 NOTE — DISCHARGE NOTE NURSING/CASE MANAGEMENT/SOCIAL WORK - NSDCPEFALRISK_GEN_ALL_CORE
For information on Fall & Injury Prevention, visit: [email or web address] OR  [email or web address] OR  https://www.cdc.gov/steadi/patient.html

## 2023-03-01 NOTE — PROGRESS NOTE ADULT - ASSESSMENT
ASSESSMENT  Ms Mcconnell is a 88yo woman with a pmh of htn, afib on eliquis, pvd, chronic lymphedema and copd presenting with 4 wks of SOB on exertion and 4d BLE pain L>R with assc warmth, swelling, and superficial skin wound with malodorous drainage under L 5th digit    IMPRESSION  - stasis dermatitis of b/l lower extremities   - extreme tenderness to palpation & with slight movement of L ankle. Clinically cannot r/o inflammatory synovitis. Has responded well to one doese of steroids 2/28  - superficial ulcer under L 5th digit with subcutaneous  abscess which was drained at the bedside  -chronic venous stasis changes LEs b/l  - SOB with BNP 2952 > CHF . Not infectious process     RECOMMENDATIONS  - c/w doxycycline 100 mg  IV q12h for cellulitis> change to po Doxycycline 100 mg q12h for 8 more days  - silvadene cream for lower extremities   - Prednisone 40mg PO qd > please have Rheumatology evaluate pt for further diagnostic/therapeutic recommendations before continuing with steroids   Please do not hesitate to recall ID if any questions arise either through [employer] or through [employer]

## 2023-03-01 NOTE — DISCHARGE NOTE NURSING/CASE MANAGEMENT/SOCIAL WORK - PATIENT PORTAL LINK FT
You can access the FollowMyHealth Patient Portal offered by [facility] by registering at the following website: [email or web address]. By joining [employer]’s FollowMyHealth portal, you will also be able to view your health information using other applications (apps) compatible with our system.

## 2023-03-01 NOTE — PROGRESS NOTE ADULT - SUBJECTIVE AND OBJECTIVE BOX
GIOVANNY BUCK 87y Female  MRN#: [id number]   Hospital Day: 2d    SUBJECTIVE  Patient is a 87y old Female who presents with a chief complaint of Currently admitted to medicine with the primary diagnosis of SOB (shortness of breath) and BL LE pain and edema       INTERVAL HPI AND OVERNIGHT EVENTS:  Patient was examined and seen at bedside. This morning she is resting comfortably in bed eating breakfast and reports a persistent dry cough     REVIEW OF SYMPTOMS:  CONSTITUTIONAL: improved pain; No headaches  EYES: No visual changes, eye pain, or discharge  ENT: No vertigo; No change in hearing; mild sore throat from coughing  NECK: No pain or stiffness  RESPIRATORY: cough as above, SOB stable but present    CARDIOVASCULAR: No chest pain or palpitations  GASTROINTESTINAL: No abdominal or epigastric pain; No nausea, vomiting, No diarrhea or constipation  GENITOURINARY: No dysuria  MUSCULOSKELETAL: No joint pain, pain in BL LE improved   NEUROLOGICAL: No numbness  SKIN: dry skin     OBJECTIVE  PAST MEDICAL & SURGICAL HISTORY  HTN (hypertension)    Afib  s/p ablation 2001    Goiter  no meds    Cellulitis  chronic    OA (osteoarthritis)    PVD (peripheral vascular disease)    COPD (chronic obstructive pulmonary disease)    Anxiety    Obesity    H/O abdominal hysterectomy    H/O discectomy    H/O total knee replacement, bilateral      ALLERGIES:  aspirin (Other)  codeine (Other)  contrast media (iodine-based) (Other)  penicillin (Other)  sulfa drugs (Hives; Other)    MEDICATIONS:  STANDING MEDICATIONS  apixaban 2.5 milliGRAM(s) Oral two times a day  budesonide  80 MICROgram(s)/formoterol 4.5 MICROgram(s) Inhaler 2 Puff(s) Inhalation two times a day  diltiazem    milliGRAM(s) Oral daily  doxycycline IVPB      doxycycline IVPB 100 milliGRAM(s) IV Intermittent every 12 hours  furosemide   Injectable 40 milliGRAM(s) IV Push every 12 hours  gabapentin 100 milliGRAM(s) Oral three times a day  senna 2 Tablet(s) Oral at bedtime    PRN MEDICATIONS      VITAL SIGNS: Last 24 Hours  T(C): 37.3 (01 Mar 2023 05:00), Max: 37.4 (28 Feb 2023 20:00)  T(F): 99.1 (01 Mar 2023 05:00), Max: 99.3 (28 Feb 2023 20:00)  HR: 116 (01 Mar 2023 05:00) (99 - 116)  BP: 133/63 (01 Mar 2023 05:00) (132/58 - 136/58)  BP(mean): --  RR: 18 (01 Mar 2023 05:00) (18 - 18)  SpO2: 96% (28 Feb 2023 20:00) (96% - 97%)    LABS:                        8.6    8.42  )-----------( 277      ( 28 Feb 2023 06:53 )             28.3     02-28    139  |  103  |  20  ----------------------------<  95  3.9   |  27  |  1.1    Ca    8.8      28 Feb 2023 06:53  Mg     1.9     02-28    TPro  6.5  /  Alb  3.2<L>  /  TBili  0.7  /  DBili  x   /  AST  23  /  ALT  12  /  AlkPhos  165<H>  02-28    PT/INR - ( 28 Feb 2023 06:53 )   PT: 16.10 sec;   INR: 1.40 ratio         PTT - ( 28 Feb 2023 06:53 )  PTT:33.7 sec          CARDIAC MARKERS ( 27 Feb 2023 17:52 )  x     / <0.01 ng/mL / x     / x     / x          Serum Pro-Brain Natriuretic Peptide: 2953 pg/mL (02.27.23 @ 17:52)   C-Reactive Protein, Serum: 65.9 mg/L (02.28.23 @ 06:53)     RADIOLOGY:  < from: [facility] Duplex Lower Ext Vein Scan, Bilat (02.28.23 @ 10:50) >  IMPRESSION:  No evidence of deep venous thrombosis in either lower extremity.  Bilateral calf veins are not visualized due to edema.    < end of copied text >  < from: Xray Foot AP + Lateral + Oblique, Left (02.28.23 @ 09:00) >  FINDINGS/  IMPRESSION:    Diffuse osteopenia. No acute fracture or dislocation. Chronic fracture   deformity of the fourth and fifth metatarsal shafts. 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 Severe degenerative changes of   the talonavicular joint forefoot. No definite ulcer or radiographic   evidence of osteomyelitis is noted at the region of the fifth   metatarsophalangeal joint. Consider MRI of the left forefoot is   clinically warranted. Moderate degenerative changes of the hindfoot.   Plantar heel ulcer adjacent to the calcaneus, without definite   radiographic evidence of osteomyelitis.    < end of copied text >  < from: Xray Chest 1 View- PORTABLE-Urgent (02.27.23 @ 18:46) >  Impression:    Bilateral interstitial opacities possibly representing pulmonary edema.   Small left pleural effusion.    < end of copied text >      PHYSICAL EXAM:  CONSTITUTIONAL: No acute distress, sitting up in bed eating breakfast, one eye is more closed then the other (normal for patient)  HEAD: Atraumatic, normocephalic  EYES: PERRLA, conjunctiva and sclera clear  ENT: Supple, no masses, thyromegaly present , no JVD; moist mucous membranes  PULMONARY: BL CTA w shallow breaths   CARDIOVASCULAR: Regular rate and rhythm; no murmurs, rubs, or gallops BL DECREASED DORSALIS PEDIS PULSES  GASTROINTESTINAL: Soft, non-tender, non-distended; bowel sounds present  MUSCULOSKELETAL: marked BL LE edema w chronic venous stasis dermatitis (see below)   NEUROLOGY: A&Ox3  SKIN: BL LE hyperpigmentation and lymphedema w swelling and warmth, tender to palpation of ankle, left foot> right swollen

## 2023-03-01 NOTE — PROGRESS NOTE ADULT - TIME BILLING
Counseled staff about diagnostic testing and treatment plan. All questions answered. Abnormal lab/radiographical/microbiological data reviewed.

## 2023-03-02 NOTE — PROGRESS NOTE ADULT - SUBJECTIVE AND OBJECTIVE BOX
GIOVANNY BUCK 87y Female  MRN#: [id number]   Hospital Day: 3 d    SUBJECTIVE  Patient is a 87y old Female who presents with a chief complaint of Currently admitted to medicine with the primary diagnosis of SOB (shortness of breath) and BL LE pain and edema       INTERVAL HPI AND OVERNIGHT EVENTS:  Patient was examined and seen at bedside no complaints today   Discussed GOC: Wants FULL CODE but NO FEEDING TUBE OR NGT     REVIEW OF SYMPTOMS:  CONSTITUTIONAL: improved pain; No headaches  EYES: No visual changes, eye pain, or discharge  ENT: No vertigo; No change in hearing; mild sore throat from coughing  NECK: No pain or stiffness  RESPIRATORY: cough as above, SOB stable but present    CARDIOVASCULAR: No chest pain or palpitations  GASTROINTESTINAL: No abdominal or epigastric pain; No nausea, vomiting, No diarrhea or constipation  GENITOURINARY: No dysuria  MUSCULOSKELETAL: No joint pain, pain in BL LE improved   NEUROLOGICAL: No numbness  SKIN: dry skin     OBJECTIVE  PAST MEDICAL & SURGICAL HISTORY  HTN (hypertension)    Afib  s/p ablation 2001    Goiter  no meds    Cellulitis  chronic    OA (osteoarthritis)    PVD (peripheral vascular disease)    COPD (chronic obstructive pulmonary disease)    Anxiety    Obesity    H/O abdominal hysterectomy    H/O discectomy    H/O total knee replacement, bilateral      ALLERGIES:  aspirin (Other)  codeine (Other)  contrast media (iodine-based) (Other)  penicillin (Other)  sulfa drugs (Hives; Other)    MEDICATIONS:  STANDING MEDICATIONS  apixaban 2.5 milliGRAM(s) Oral two times a day  budesonide  80 MICROgram(s)/formoterol 4.5 MICROgram(s) Inhaler 2 Puff(s) Inhalation two times a day  diltiazem    milliGRAM(s) Oral daily     doxycycline IVPB 100 milliGRAM(s) IV Intermittent every 12 hours  furosemide   Injectable 40 milliGRAM(s) IV Push every 12 hours  gabapentin 100 milliGRAM(s) Oral three times a day  senna 2 Tablet(s) Oral at bedtime    PRN MEDICATIONS      VITAL SIGNS: Last 24 Hours:    T(C): 37.1 (02 Mar 2023 14:00), Max: 37.7 (01 Mar 2023 20:00)  T(F): 98.7 (02 Mar 2023 14:00), Max: 99.8 (01 Mar 2023 20:00)  HR: 109 (02 Mar 2023 14:00) (91 - 109)  BP: 120/54 (02 Mar 2023 14:00) (110/65 - 138/62)  RR: 18 (02 Mar 2023 14:00) (18 - 20)  SpO2: 94% (02 Mar 2023 14:00) (93% - 95%)    Parameters below as of 02 Mar 2023 07:55  Patient On (Oxygen Delivery Method): nasal cannula      LABS:                        9.3    13.42 )-----------( 261      ( 02 Mar 2023 05:27 )             30.3     03-02    140  |  99  |  27<H>  ----------------------------<  97  3.6   |  29  |  1.3    Ca    8.4      02 Mar 2023 05:27    TPro  6.4  /  Alb  2.8<L>  /  TBili  0.8  /  DBili  x   /  AST  24  /  ALT  11  /  AlkPhos  166<H>  03-02                        8.6    8.42  )-----------( 277      ( 28 Feb 2023 06:53 )             28.3     02-28    139  |  103  |  20  ----------------------------<  95  3.9   |  27  |  1.1    Ca    8.8      28 Feb 2023 06:53  Mg     1.9     02-28    TPro  6.5  /  Alb  3.2<L>  /  TBili  0.7  /  DBili  x   /  AST  23  /  ALT  12  /  AlkPhos  165<H>  02-28    PT/INR - ( 28 Feb 2023 06:53 )   PT: 16.10 sec;   INR: 1.40 ratio      PTT - ( 28 Feb 2023 06:53 )  PTT:33.7 sec    CARDIAC MARKERS ( 27 Feb 2023 17:52 )    x     / <0.01 ng/mL / x     / x     / x        Serum Pro-Brain Natriuretic Peptide: 2953 pg/mL (02.27.23 @ 17:52)   C-Reactive Protein, Serum: 65.9 mg/L (02.28.23 @ 06:53)   RADIOLOGY:  < from: [facility] Duplex Lower Ext Vein Scan, Bilat (02.28.23 @ 10:50) >  IMPRESSION:  No evidence of deep venous thrombosis in either lower extremity.  Bilateral calf veins are not visualized due to edema.    < end of copied text >  < from: Xray Foot AP + Lateral + Oblique, Left (02.28.23 @ 09:00) >  FINDINGS/  IMPRESSION:    Diffuse osteopenia. No acute fracture or dislocation. Chronic fracture   deformity of the fourth and fifth metatarsal shafts. 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 Severe degenerative changes of   the talonavicular joint forefoot. No definite ulcer or radiographic   evidence of osteomyelitis is noted at the region of the fifth   metatarsophalangeal joint. Consider MRI of the left forefoot is   clinically warranted. Moderate degenerative changes of the hindfoot.   Plantar heel ulcer adjacent to the calcaneus, without definite   radiographic evidence of osteomyelitis.    < end of copied text >  < from: Xray Chest 1 View- PORTABLE-Urgent (02.27.23 @ 18:46) >  Impression:    Bilateral interstitial opacities possibly representing pulmonary edema.   Small left pleural effusion.    < end of copied text >      PHYSICAL EXAM:  CONSTITUTIONAL: No acute distress, sitting up in bed eating breakfast, one eye is more closed then the other (normal for patient)  HEAD: Atraumatic, normocephalic  EYES: PERRLA, conjunctiva and sclera clear  ENT: Supple, no masses, thyromegaly present , no JVD; moist mucous membranes  PULMONARY: BL CTA w shallow breaths   CARDIOVASCULAR: Regular rate and rhythm; no murmurs, rubs, or gallops BL DECREASED DORSALIS PEDIS PULSES  GASTROINTESTINAL: Soft, non-tender, non-distended; bowel sounds present  MUSCULOSKELETAL: marked BL LE edema w chronic venous stasis dermatitis (see below)   NEUROLOGY: A&Ox3  SKIN: BL LE hyperpigmentation and lymphedema w swelling and warmth, tender to palpation of ankle, left foot> right swollen

## 2023-03-03 NOTE — PROGRESS NOTE ADULT - ASSESSMENT
Ms Mcconnell is an 87 ARABELLA w a PMH of PVD w chronic lymphedema and chronic cellulitis, OA, obesity  , COPD, HTN, Afib (s/p ablation on eliquis), goiter and a recent admission w dc on Feb 23rd for drainage of a left gluteal abscess presented to the ED w 4 weeks of worsening CARTAGENA (after 2-3 steps), orthopnea and 3 days of BL LE edema pain and swelling. Labs notable for BNP of 2953, uric acid of 8.1, alk Phos of 181, troponin negative. Patient had XR in ED which did not show any acute fracture or dislocation (positive for chronic degenerative changes), US negative for DVT HOWEVER POOR VISUALIZATION OF CLAF VASCULATURE secondary to edema and habitus. Patient admitted for progressive CARTAGENA and Left foot wound s/p debridement     87F PMH: HTN, A.fib (on eliquis), PAD, Chronic lymphedema and COPD presents to ED for b/L LE pain and dyspnea on exertion.     Dyspnea on exertion likely 2/2 Acute on Chronic HFpEF   - CXR shows pulmonary vascular congestion and b/l interstitial opacity -> improving   - c/w IV lasix 40mg Daily   - f/u renal function daily Cr 1-> 1.3   - Echo Feb 2021  Summary:   1. Left ventricular ejection fraction, by visual estimation, is 55 to 60%    2. Left atrial enlargement.   3. Normal right atrial size.   4. Moderate mitral valve regurgitation.   5. Mild tricuspid regurgitation.    - f/u New Echo - TTE  - monitor daily weight, Strict I & O, Low Na diet with fluid restriction.   - Cardiology consult Dr Merida consult ordered     B/L LE cellulitis in the setting of lymphedema   - No evidence of sepsis  - start on Doxy (pt is allergic to penicillin)  - CT Legs no clots / ++ soft tissue edema   - Check TTE   - Pain control prn  - ID consult appreciated   - Rheumatology consult appreciated    Chronic A.fib - on cardizem and eliquis  COPD - not in exacerbation. c/w home inhalers.   HTN - on cardizem    DVT ppx: eliquis   GI ppx: not indicated  Diet: DASH diet  Activity: as tolerated.     GOC: FULL CODE / NO NGT OR PEG    Pending: clinical improvement / CV consult     Dispo: Acute / Lives at home mostly in bed    Ms Mcconnell is an 87 AR[employer]A w a PMH of PVD w chronic lymphedema and chronic cellulitis, OA, obesity  , COPD, HTN, Afib (s/p ablation on eliquis), goiter and a recent admission w dc on Feb 23rd for drainage of a left gluteal abscess presented to the ED w 4 weeks of worsening CARTAGENA (after 2-3 steps), orthopnea and 3 days of BL LE edema pain and swelling. Labs notable for BNP of 2953, uric acid of 8.1, alk Phos of 181, troponin negative. Patient had XR in ED which did not show any acute fracture or dislocation (positive for chronic degenerative changes), US negative for DVT HOWEVER POOR VISUALIZATION OF CLAF VASCULATURE secondary to edema and habitus. Patient admitted for progressive CARTAGENA and Left foot wound s/p debridement     87F PMH: HTN, A.fib (on eliquis), PAD, Chronic lymphedema and COPD presents to ED for b/L LE pain and dyspnea on exertion.     Dyspnea on exertion likely 2/2 Acute on Chronic HFpEF   - CXR shows pulmonary vascular congestion and b/l interstitial opacity -> improving   - c/w IV lasix 40mg Daily   - f/u renal function daily Cr 1-> 1.3   - Echo Feb 2021  Summary:   1. Left ventricular ejection fraction, by visual estimation, is 55 to 60%    2. Left atrial enlargement.   3. Normal right atrial size.   4. Moderate mitral valve regurgitation.   5. Mild tricuspid regurgitation.    - f/u New Echo - TTE  - monitor daily weight, Strict I & O, Low Na diet with fluid restriction.   - Cardiology consult Dr Merida consulted -> finally able to reach him today and case discussed will see pt this admission.     B/L LE cellulitis in the setting of lymphedema   - No evidence of sepsis  - start on Doxy (pt is allergic to penicillin)  - CT Legs no clots / ++ soft tissue edema   - f/u TTE   - Pain control prn  - ID consult appreciated   - Rheumatology consult appreciated    Chronic A.fib - on cardizem and eliquis  COPD - not in exacerbation. c/w home inhalers.   HTN - on cardizem    DVT ppx: eliquis   GI ppx: not indicated  Diet: DASH diet  Activity: as tolerated.     GOC: FULL CODE / NO NGT OR PEG    Pending: clinical improvement / CV consult Jackie aware / IV lasix / PT     Dispo: Acute / Lives at home mostly bed-bound

## 2023-03-03 NOTE — PROGRESS NOTE ADULT - ASSESSMENT
Ms Mcconnell is an 87 [employer]E[place]A w a PMH of PVD w chronic lymphedema and chronic cellulitis, OA, obesity  , COPD, HTN, Afib (s/p ablation on eliquis), goiter and a recent admission w dc on Feb 23rd for drainage of a left gluteal abscess presented to the ED w 4 weeks of worsening CARTAGENA (after 2-3 steps), orthopnea and 3 days of BL LE edema pain and swelling. Labs notable for BNP of 2953, uric acid of 8.1, alk Phos of 181, troponin negative. Patient had XR in ED which did not show any acute fracture or dislocation (positive for chronic degenerative changes), US negative for DVT HOWEVER POOR VISUALIZATION OF CLAF VASCULATURE secondary to edema and habitus. Patient admitted for progressive CARTAGENA and Left foot wound s/p debridement     Problem List    #CARTAGENA  #Orthopnea  #BNP 2953  #Ho COPD  #Ho Heart disease  #Ho PVD  likely multifactorial secondary to dynamic hyperinflation vs obesity hypoventilation syndrome  vs anemia w deconditioning(respiratory and musculoskeletal) vs HF  patient has had multiple recent debridements and has been ambulating less secondary to left buttock pain from abscess, deconditioning and anemia can not be r/o  given vascular disease Hx as well as BNP and cephalization on cxr can not r/o HF however troponin negative and as per patient recent TTE normal, no BNP on file to compare, less likely   given BMI and somnolence OHS likley however no polycythemia that is generally seen   given COPD, hyperinflation on cxr and progressive dyspnia dynamic hyperinflation likely  VERY LOW SUSPICION COPD exacerbation given no increased sputum production or change in color or consolidation on CXR or signs of infection   TROPONIN NEGATIVE   -f/u ABG (to assess for hypoxia< 70, CO2>45)  -f/u Dr Iqra burleson for HF as he is her regular cardiologist (Has been reconsulted)  -f/u out patient PFTs for End Expiratory lung volume and inspiratory capacity  -ID recs appreciated   - lasix 40mg IV BID held 3/2 given increase of Cr from 1.1 to 1.3.  -Cr remains at 1.3, restarting HOME LASIX 20mg PO qd  -c/w symbicort for COPD      #BL LE pain and swelling   #Ho PVD w chronic lymphedema   #Ho chronic cellulitis  #Ho chronic venous stasis dermatitis  #Procal 0.19 and CRP 65.9  likely secondary to Osteoarthritis causing inflammatory changes and fibrosis of LE vessels vs tenosynovitis vs DVT vs worsening of venous stasis in setting of increased immobility  given BL LE pain and swelling less likely to be DVT  given improvement on steroids can not r/o tenosynovitis (though it usually presents in RA vs OA)  -duplex negative but poor visualization of calf vessels  -f/u CT w IV contrast for assessment of calf vessels   -ID recs appreciated   - c/w doxycycline 100 mg  IV q12h for cellulitis can change to po Doxycycline 100 mg q12h for 8 more days on dc  - silvadene cream for lower extremities   - Prednisone 40mg PO qd while in patient (will hold March 2 dose given prep for CT)   - f/u rheum out patient (Dr Rosales)  - as per rheum continue w 40 mg PO prednisone in patient and then taper down by 10mg every 5 days   - CT BL LE: no CT evidence of DVT or OM, +cellulitis w/o gas   - WOUND CULTURES = MRSA and Coryne, sensitivities as above   -f/u PT        #HTN   #Afib  -c/w home cardizem  -c/w home eliquis   -Lasix reduced back down to home dose  -BP currently well controlled w/o meds    #GOC  full code  NO NG or PEG   see GOC document       MISC  -DVT proph: eliquis 2.5  - Diet: DASH/TLC  - Activity: as tolerated  - IV Fluids: NA  - Code Status: full    Ms Mcconnell is an 87 [place]ELLA w a PMH of PVD w chronic lymphedema and chronic cellulitis, OA, obesity  , COPD, HTN, Afib (s/p ablation on eliquis), goiter and a recent admission w dc on Feb 23rd for drainage of a left gluteal abscess presented to the ED w 4 weeks of worsening CARTAGENA (after 2-3 steps), orthopnea and 3 days of BL LE edema pain and swelling. Labs notable for BNP of 2953, uric acid of 8.1, alk Phos of 181, troponin negative. Patient had XR in ED which did not show any acute fracture or dislocation (positive for chronic degenerative changes), US negative for DVT HOWEVER POOR VISUALIZATION OF CLAF VASCULATURE secondary to edema and habitus. Patient admitted for progressive CARTAGENA and Left foot wound s/p debridement     Problem List    #CARTAGENA  #Orthopnea  #BNP 2953  #Ho COPD  #Ho Heart disease  #Ho PVD  likely multifactorial secondary to dynamic hyperinflation vs obesity hypoventilation syndrome  vs anemia w deconditioning(respiratory and musculoskeletal) vs HF  patient has had multiple recent debridements and has been ambulating less secondary to left buttock pain from abscess, deconditioning and anemia can not be r/o  given vascular disease Hx as well as BNP and cephalization on cxr can not r/o HF however troponin negative and as per patient recent TTE normal, no BNP on file to compare, less likely   given BMI and somnolence OHS likley however no polycythemia that is generally seen   given COPD, hyperinflation on cxr and progressive dyspnia dynamic hyperinflation likely  VERY LOW SUSPICION COPD exacerbation given no increased sputum production or change in color or consolidation on CXR or signs of infection   TROPONIN NEGATIVE   -f/u ABG (to assess for hypoxia< 70, CO2>45)  -f/u Dr Iqra burleson for HF as he is her regular cardiologist (Has been reconsulted)  -f/u out patient PFTs for End Expiratory lung volume and inspiratory capacity  -ID recs appreciated   - lasix 40mg IV BID held 3/2 given increase of Cr from 1.1 to 1.3.  -Cr remains at 1.3, restarting HOME LASIX 20mg PO qd  -c/w symbicort for COPD      #BL LE pain and swelling   #Ho PVD w chronic lymphedema   #Ho chronic cellulitis  #Ho chronic venous stasis dermatitis  #Procal 0.19 and CRP 65.9  likely secondary to Osteoarthritis causing inflammatory changes and fibrosis of LE vessels vs tenosynovitis vs DVT vs worsening of venous stasis in setting of increased immobility  given BL LE pain and swelling less likely to be DVT  given improvement on steroids can not r/o tenosynovitis (though it usually presents in RA vs OA)  -duplex negative but poor visualization of calf vessels  -f/u CT w IV contrast for assessment of calf vessels   -ID recs appreciated   - c/w doxycycline 100 mg  IV q12h for cellulitis can change to po Doxycycline 100 mg q12h for 8 more days on dc  - silvadene cream for lower extremities   - dc Prednisone 40mg PO (last day March 2)   - f/u rheum out patient (Dr Rosales)  - as per rheum continue w 40 mg PO prednisone in patient and then taper down by 10mg every 5 days   - CT BL LE: no CT evidence of DVT or OM, +cellulitis w/o gas   - WOUND CULTURES = MRSA and Coryne, sensitivities as above   -f/u PT        #HTN   #Afib  -c/w home cardizem  -c/w home eliquis   -Lasix reduced back down to home dose  -BP currently well controlled w/o meds    #GOC  full code  NO NG or PEG   see GOC document       MISC  -DVT proph: eliquis 2.5  - Diet: DASH/TLC  - Activity: as tolerated  - IV Fluids: NA  - Code Status: full

## 2023-03-03 NOTE — CONSULT NOTE ADULT - ASSESSMENT
History   copd      lymphedema         AFIB   on   eliquis      with      le edema     cellulitis               cont lasix   antibiotics     wound care

## 2023-03-03 NOTE — PROGRESS NOTE ADULT - SUBJECTIVE AND OBJECTIVE BOX
GIOVANNY BUCK 87y Female  MRN#: [id number]   Hospital Day: 4d    SUBJECTIVE  Patient is a 87y old Female who presents with a chief complaint of CARTAGENA (02 Mar 2023 17:45)  Currently admitted to medicine with the primary diagnosis of SOB (shortness of breath)      INTERVAL HPI AND OVERNIGHT EVENTS:  Patient was examined and seen at bedside. This morning she is resting comfortably in bed and reports no issues or overnight events.    REVIEW OF SYMPTOMS:  CONSTITUTIONAL: No weakness, fevers or chills; No headaches  EYES: No visual changes, eye pain  ENT: No vertigo; No change in hearing; mild sore throat  NECK: No pain or stiffness  RESPIRATORY: No cough, wheezing, or hemoptysis; No shortness of breath (even when noted to be desaturating to 70's by PT)   CARDIOVASCULAR: No chest pain or palpitations  GASTROINTESTINAL: No abdominal pain; No n/v; No diarrhea or constipation  GENITOURINARY: No dysuria, frequency   MUSCULOSKELETAL: No joint pain, BL LE feel heavy and weak   NEUROLOGICAL: No numbness   SKIN: unchanged chronic BL LE itching     OBJECTIVE  PAST MEDICAL & SURGICAL HISTORY  HTN (hypertension)    Afib  s/p ablation 2001    Goiter  no meds    Cellulitis  chronic    OA (osteoarthritis)    PVD (peripheral vascular disease)    COPD (chronic obstructive pulmonary disease)    Anxiety    Obesity    H/O abdominal hysterectomy    H/O discectomy    H/O total knee replacement, bilateral      ALLERGIES:  aspirin (Other)  codeine (Other)  contrast media (iodine-based) (Other)  penicillin (Other)  sulfa drugs (Hives; Other)    MEDICATIONS:  STANDING MEDICATIONS  apixaban 2.5 milliGRAM(s) Oral two times a day  budesonide  80 MICROgram(s)/formoterol 4.5 MICROgram(s) Inhaler 2 Puff(s) Inhalation two times a day  diltiazem    milliGRAM(s) Oral daily  doxycycline IVPB      doxycycline IVPB 100 milliGRAM(s) IV Intermittent every 12 hours  furosemide    Tablet 20 milliGRAM(s) Oral daily  gabapentin 100 milliGRAM(s) Oral three times a day  senna 2 Tablet(s) Oral at bedtime    PRN MEDICATIONS  acetaminophen     Tablet .. 650 milliGRAM(s) Oral every 6 hours PRN      VITAL SIGNS: Last 24 Hours  T(C): 36.9 (03 Mar 2023 04:42), Max: 37.1 (02 Mar 2023 14:00)  T(F): 98.4 (03 Mar 2023 04:42), Max: 98.7 (02 Mar 2023 14:00)  HR: 111 (03 Mar 2023 04:42) (107 - 111)  BP: 128/59 (03 Mar 2023 04:42) (113/57 - 128/59)  BP(mean): --  RR: 18 (03 Mar 2023 04:42) (18 - 18)  SpO2: 94% (03 Mar 2023 04:42) (93% - 94%)    LABS:                        9.4    18.28 )-----------( 309      ( 03 Mar 2023 08:40 )             31.1     03-03    139  |  98  |  37<H>  ----------------------------<  158<H>  3.9   |  29  |  1.3    Ca    8.8      03 Mar 2023 08:40    TPro  6.4  /  Alb  2.8<L>  /  TBili  0.8  /  DBili  x   /  AST  24  /  ALT  11  /  AlkPhos  166<H>  03-02        MRSA/MSSA PCR (03.02.23 @ 15:27)   MRSA PCR Result.: Negative    Culture - Abscess with Gram Stain (02.28.23 @ 07:20)   - Ampicillin/Sulbactam: R <=8/4   - Cefazolin: R <=4   - Clindamycin: R <=0.25 This isolate is presumed to be clindamycin resistant based on detection of inducible resistance. Clindamycin may still be effective in some patients.   - Daptomycin: S <=0.25   - Erythromycin: R >4   - Gentamicin: R >8 Should not be used as monotherapy   - Linezolid: S 2   - Oxacillin: R 2   - Penicillin: R >8   - Rifampin: S <=1 Should not be used as monotherapy   - Tetracycline: R >8   - Trimethoprim/Sulfamethoxazole: S <=0.5/9.5   - Vancomycin: S 1   Specimen Source: .Abscess foot   Culture Results:   Few Methicillin Resistant Staphylococcus aureus   Few Corynebacterium species "Susceptibilities not performed"   Organism Identification: Methicillin resistant Staphylococcus aureus   Organism: Methicillin resistant Staphylococcus aureus           RADIOLOGY:  < from: CT Lower Extremity w/ IV Cont, Bilateral (03.02.23 @ 22:23) >  FINDINGS:    No evidence of abscess or subcutaneous emphysema.    Diffuse subcutaneous edema. Bilateral lower extremity varicose veins,   with prominent dilated veins. No CT evidence of DVT.    Post bilateral knee arthroplasties. Degenerative change of bilateral hips   and interphalangeal joints of feet. Chronic fracture deformity of left   fourth and fifth metatarsal shafts. Bilateral neuropathic   osteoarthropathy of the midfoot joints.    Diffuse vascular calcifications. No pelvic ascites. Prominent bilateral   inguinal lymph nodes, measuring up to 2.7 x 1.6 cm on left, likely   reactive. Visualized bowel unremarkable. Post hysterectomy.      Impression:    Diffuse subcutaneous edema; superimposed cellulitis isn't excluded in the   appropriate clinical setting.    No evidence of abscess or subcutaneous emphysema.    < end of copied text >  < from: Xray Chest 1 View- PORTABLE-Routine (Xray Chest 1 View- PORTABLE-Routine .) (03.02.23 @ 10:27) >  IMPRESSION:    Increased bibasal effusion/opacities greater on the left. No pneumothorax.    Stable cardiomediastinal silhouette.    Unchanged osseous structures.    < end of copied text >        PHYSICAL EXAM:  CONSTITUTIONAL: No acute distress, well-developed, smiling eating breakfast   HEAD: Atraumatic, normocephalic  EYES: PERRLA, conjunctiva and sclera clear, right eye unable to close fully (unchanged from baseline)   ENT: Supple, no JVD; moist mucous membranes  PULMONARY: Clear to auscultation bilaterally; no wheezes, rales, or rhonchi  CARDIOVASCULAR: IRREGULAR RHYTHM, tachy to 102 manual; no murmurs, rubs, or gallops  GASTROINTESTINAL: Soft, non-tender, non-distended; bowel sounds present  MSK: marked BL LE edema w chronic venous stasis dermatitis  w lichinefied thickening, can not appreciated Dorsalis Pedis   NEUROLOGY: A&Ox3  SKIN: BL LE hyperpigmentation and lymphedema w swelling and warmth, tender to palpation of ankle, left foot> right swollen. No new or spreading erythema, warmth, tenderness or induratins

## 2023-03-03 NOTE — PROGRESS NOTE ADULT - SUBJECTIVE AND OBJECTIVE BOX
GIOVANNY BUCK 87y Female  MRN#: [id number]   Hospital Day: 4 d    SUBJECTIVE  Patient is a 87y old Female who presents with a chief complaint of Currently admitted to medicine with the primary diagnosis of SOB (shortness of breath) and BL LE pain and edema      OVERNIGHT EVENTS:  Patient was examined and seen at bedside complains of pain in the left lower leg where she currently has a wound     Discussed GOC: Wants FULL CODE but NO FEEDING TUBE OR NGT   MOLST Completed and GOC Documented     REVIEW OF SYMPTOMS:  CONSTITUTIONAL: improved pain; No headaches  EYES: No visual changes, eye pain, or discharge  ENT: No vertigo; No change in hearing; mild sore throat from coughing  NECK: No pain or stiffness  RESPIRATORY: cough as above, SOB stable but present    CARDIOVASCULAR: No chest pain or palpitations  GASTROINTESTINAL: No abdominal or epigastric pain; No nausea, vomiting, No diarrhea or constipation  GENITOURINARY: No dysuria  MUSCULOSKELETAL: No joint pain, pain in BL LE improved   NEUROLOGICAL: No numbness  SKIN: dry skin     OBJECTIVE  PAST MEDICAL & SURGICAL HISTORY  HTN (hypertension)    Afib  s/p ablation 2001    Goiter  no meds    Cellulitis  chronic    OA (osteoarthritis)    PVD (peripheral vascular disease)    COPD (chronic obstructive pulmonary disease)    Anxiety    Obesity    H/O abdominal hysterectomy    H/O discectomy    H/O total knee replacement, bilateral      ALLERGIES:  aspirin (Other)  codeine (Other)  contrast media (iodine-based) (Other)  penicillin (Other)  sulfa drugs (Hives; Other)    MEDICATIONS  (STANDING):  apixaban 2.5 milliGRAM(s) Oral two times a day  budesonide  80 MICROgram(s)/formoterol 4.5 MICROgram(s) Inhaler 2 Puff(s) Inhalation two times a day  diltiazem    milliGRAM(s) Oral daily   doxycycline IVPB 100 milliGRAM(s) IV Intermittent every 12 hours  furosemide   Injectable 40 milliGRAM(s) IV Push daily  gabapentin 100 milliGRAM(s) Oral three times a day  senna 2 Tablet(s) Oral at bedtime    MEDICATIONS  (PRN):  acetaminophen     Tablet .. 650 milliGRAM(s) Oral every 6 hours PRN Temp greater or equal to 38C (100.4F), Mild Pain (1 - 3)      VITAL SIGNS: Last 24 Hours:  T(C): 36.1 (03 Mar 2023 13:14), Max: 36.9 (03 Mar 2023 04:42)  T(F): 96.9 (03 Mar 2023 13:14), Max: 98.4 (03 Mar 2023 04:42)  HR: 101 (03 Mar 2023 13:14) (101 - 111)  BP: 145/61 (03 Mar 2023 13:14) (113/57 - 145/61)  RR: 18 (03 Mar 2023 13:14) (18 - 18)  SpO2: 95% (03 Mar 2023 13:14) (93% - 95%)    Parameters below as of 03 Mar 2023 04:42  Patient On (Oxygen Delivery Method): nasal cannula  O2 Flow (L/min): 3      LABS:                        9.4    18.28 )-----------( 309      ( 03 Mar 2023 08:40 )             31.1     03-03    139  |  98  |  37<H>  ----------------------------<  158<H>  3.9   |  29  |  1.3    Ca    8.8      03 Mar 2023 08:40    TPro  6.4  /  Alb  2.8<L>  /  TBili  0.8  /  DBili  x   /  AST  24  /  ALT  11  /  AlkPhos  166<H>  03-02                          9.3    13.42 )-----------( 261      ( 02 Mar 2023 05:27 )             30.3     03-02    140  |  99  |  27<H>  ----------------------------<  97  3.6   |  29  |  1.3    Ca    8.4      02 Mar 2023 05:27    TPro  6.4  /  Alb  2.8<L>  /  TBili  0.8  /  DBili  x   /  AST  24  /  ALT  11  /  AlkPhos  166<H>  03-02                        8.6    8.42  )-----------( 277      ( 28 Feb 2023 06:53 )             28.3     02-28    139  |  103  |  20  ----------------------------<  95  3.9   |  27  |  1.1    Ca    8.8      28 Feb 2023 06:53  Mg     1.9     02-28    TPro  6.5  /  Alb  3.2<L>  /  TBili  0.7  /  DBili  x   /  AST  23  /  ALT  12  /  AlkPhos  165<H>  02-28    PT/INR - ( 28 Feb 2023 06:53 )   PT: 16.10 sec;   INR: 1.40 ratio      PTT - ( 28 Feb 2023 06:53 )  PTT:33.7 sec    CARDIAC MARKERS ( 27 Feb 2023 17:52 )    x     / <0.01 ng/mL / x     / x     / x        Serum Pro-Brain Natriuretic Peptide: 2953 pg/mL (02.27.23 @ 17:52)   C-Reactive Protein, Serum: 65.9 mg/L (02.28.23 @ 06:53)   RADIOLOGY:  < from: VA Duplex Lower Ext Vein Scan, Bilat (02.28.23 @ 10:50) >  IMPRESSION:  No evidence of deep venous thrombosis in either lower extremity.  Bilateral calf veins are not visualized due to edema.    < end of copied text >  < from: Xray Foot AP + Lateral + Oblique, Left (02.28.23 @ 09:00) >  FINDINGS/  IMPRESSION:    Diffuse osteopenia. No acute fracture or dislocation. Chronic fracture   deformity of the fourth and fifth metatarsal shafts. 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 Severe degenerative changes of   the talonavicular joint forefoot. No definite ulcer or radiographic   evidence of osteomyelitis is noted at the region of the fifth   metatarsophalangeal joint. Consider MRI of the left forefoot is   clinically warranted. Moderate degenerative changes of the hindfoot.   Plantar heel ulcer adjacent to the calcaneus, without definite   radiographic evidence of osteomyelitis.    < end of copied text >  < from: Xray Chest 1 View- PORTABLE-Urgent (02.27.23 @ 18:46) >  Impression:    Bilateral interstitial opacities possibly representing pulmonary edema.   Small left pleural effusion.    < end of copied text >      PHYSICAL EXAM:  CONSTITUTIONAL: No acute distress, sitting up in bed eating breakfast, one eye is more closed then the other (normal for patient)  HEAD: Atraumatic, normocephalic  EYES: PERRLA, conjunctiva and sclera clear  ENT: Supple, no masses, thyromegaly present , no JVD; moist mucous membranes  PULMONARY: BL CTA w shallow breaths   CARDIOVASCULAR: Regular rate and rhythm; no murmurs, rubs, or gallops BL DECREASED DORSALIS PEDIS PULSES  GASTROINTESTINAL: Soft, non-tender, non-distended; bowel sounds present  MUSCULOSKELETAL: marked BL LE edema w chronic venous stasis dermatitis (see below)   NEUROLOGY: A&Ox3  SKIN: BL LE hyperpigmentation and lymphedema w swelling and warmth, tender to palpation of ankle, left foot> right swollen

## 2023-03-03 NOTE — GOALS OF CARE CONVERSATION - ADVANCED CARE PLANNING - CONVERSATION DETAILS
Discussion held 3/1 at 18:00 bedside   Patient deferred most questions to daughter. When daughter stepped out of room all answers were re-confirmed w patient  Code: full, would like "a couple of rounds" of cpr  Feeds: NO NG tube or PEG  IV: Ok w IV Abx, Hydration, blood draws  Surgical Intervention: ok at this time if need be   Hospitalizations: ok to be rehospitalized if becomes sick at home now  Palliative: not currently interested, benefits of Palliative care in chronic disease was discussed however declined at this time, open in the future.     Patient lives at home alone. She sponge baths herself (hasn't taken a full shower "in years".). Her son and daughter send her prepared foods as she is unable to cook for herself. They assist w light cleaning.    PATIENT WOULD BENEFIT FROM HOME HEALTH AID    Will repeat this conversation today w Nurse present and fill out MOLST
3/1:

## 2023-03-03 NOTE — CONSULT NOTE ADULT - SUBJECTIVE AND OBJECTIVE BOX
Patient is a 87y old  Female who presents with a chief complaint of Dyspnea (03 Mar 2023 16:23)      HPI:  86 yo female with a pmh of htn, afib on eliquis, pvd, chronic lymphedema and copd present c/o BLE pain and SOB.    Patient was in her usual state of health 4 weeks ago when she developed SOB which is new for her, mostly on exertion (after walking 2 feet), increases while lying down. Patient followed up with Dr. Pacheco 1 month ago and has TTE which was normal as per patient. Denies HA, dizziness, NVD, fever, chest pain, abdominal pain, change in urination and change in bowel habits.     3 days ago patient developed B/l lower extremity pain, Left more than right, 10/10 intensity, progressive, extending from toes to mid thigh, associated with increased redness and tenderness. Of note, patient was prescribed Clindamycin 1 week ago, not sure if taking or not.     In the ED, BNP 2952 (no baseline).  Vital Signs Last 24 Hrs:  T(F): 98 (27 Feb 2023 17:31), Max: 98.7 (27 Feb 2023 16:20)  HR: 98 (27 Feb 2023 21:55) (97 - 100)  BP: 139/86 (27 Feb 2023 21:55) (139/86 - 168/67)  RR: 18 (27 Feb 2023 21:55) (14 - 18)  SpO2: 99% (27 Feb 2023 21:55) (96% - 99%)   (27 Feb 2023 23:23)      PAST MEDICAL & SURGICAL HISTORY:  HTN (hypertension)      Afib  s/p ablation 2001      Goiter  no meds      Cellulitis  chronic      OA (osteoarthritis)      PVD (peripheral vascular disease)      COPD (chronic obstructive pulmonary disease)      Anxiety      Obesity      H/O abdominal hysterectomy      H/O discectomy      H/O total knee replacement, bilateral          PREVIOUS DIAGNOSTIC TESTING:      ECHO  FINDINGS:    STRESS  FINDINGS:    CATHETERIZATION  FINDINGS:    MEDICATIONS  (STANDING):  apixaban 2.5 milliGRAM(s) Oral two times a day  budesonide  80 MICROgram(s)/formoterol 4.5 MICROgram(s) Inhaler 2 Puff(s) Inhalation two times a day  diltiazem    milliGRAM(s) Oral daily  doxycycline IVPB      doxycycline IVPB 100 milliGRAM(s) IV Intermittent every 12 hours  furosemide   Injectable 40 milliGRAM(s) IV Push daily  gabapentin 100 milliGRAM(s) Oral three times a day  senna 2 Tablet(s) Oral at bedtime    MEDICATIONS  (PRN):  acetaminophen     Tablet .. 650 milliGRAM(s) Oral every 6 hours PRN Temp greater or equal to 38C (100.4F), Mild Pain (1 - 3)      FAMILY HISTORY:      SOCIAL HISTORY:  CIGARETTES:    ALCOHOL:    REVIEW OF SYSTEMS:  CONSTITUTIONAL: No fever, weight loss, or fatigue  NECK: No pain or stiffness  RESPIRATORY: No cough, wheezing, chills or hemoptysis; No shortness of breath  CARDIOVASCULAR: No chest pain, palpitations, dizziness, or leg swelling  GASTROINTESTINAL: No abdominal or epigastric pain. No nausea, vomiting, or hematemesis; No diarrhea or constipation. No melena or hematochezia.  GENITOURINARY: No dysuria, frequency, hematuria, or incontinence  NEUROLOGICAL: No headaches, memory loss, loss of strength, numbness, or tremors  SKIN: No itching, burning, rashes, or lesions   ENDOCRINE: No heat or cold intolerance; No hair loss  MUSCULOSKELETAL: No joint pain or swelling; No muscle, back, or extremity pain  HEME/LYMPH: No easy bruising, or bleeding gums          Vital Signs Last 24 Hrs  T(C): 36.1 (03 Mar 2023 13:14), Max: 36.9 (03 Mar 2023 04:42)  T(F): 96.9 (03 Mar 2023 13:14), Max: 98.4 (03 Mar 2023 04:42)  HR: 101 (03 Mar 2023 13:14) (101 - 111)  BP: 145/61 (03 Mar 2023 13:14) (113/57 - 145/61)  BP(mean): --  RR: 18 (03 Mar 2023 13:14) (18 - 18)  SpO2: 95% (03 Mar 2023 13:14) (93% - 95%)    Parameters below as of 03 Mar 2023 04:42  Patient On (Oxygen Delivery Method): nasal cannula  O2 Flow (L/min): 3          PHYSICAL EXAM:  GENERAL: NAD, well-groomed, well-developed  HEAD:  Atraumatic, Normocephalic  NECK: Supple, No JVD, Normal thyroid  NERVOUS SYSTEM:  Alert & Oriented X3, Good concentration  CHEST/LUNG: Clear to percussion bilaterally; No rales, rhonchi, wheezing, or rubs  HEART: Regular rate and rhythm; No murmurs, rubs, or gallops  ABDOMEN: Soft, Nontender, Nondistended; Bowel sounds present  EXTREMITIES:  2+ Peripheral Pulses, No clubbing, cyanosis, or edema  SKIN: No rashes or lesions    INTERPRETATION OF TELEMETRY:    ECG:    I&O's Detail    02 Mar 2023 07:01  -  03 Mar 2023 07:00  --------------------------------------------------------  IN:    Oral Fluid: 20 mL  Total IN: 20 mL    OUT:    Voided (mL): 1700 mL  Total OUT: 1700 mL    Total NET: -1680 mL          LABS:                        9.4    18.28 )-----------( 309      ( 03 Mar 2023 08:40 )             31.1     03-03    139  |  98  |  37<H>  ----------------------------<  158<H>  3.9   |  29  |  1.3    Ca    8.8      03 Mar 2023 08:40    TPro  6.4  /  Alb  2.8<L>  /  TBili  0.8  /  DBili  x   /  AST  24  /  ALT  11  /  AlkPhos  166<H>  03-02            I&O's Summary    02 Mar 2023 07:01  -  03 Mar 2023 07:00  --------------------------------------------------------  IN: 20 mL / OUT: 1700 mL / NET: -1680 mL        RADIOLOGY & ADDITIONAL STUDIES:

## 2023-03-03 NOTE — PROGRESS NOTE ADULT - TIME BILLING
direct pt care and interdisciplinary rounds  GOBELGICA discussed and MAEVE completed   Case d/w Dr Mejia her cardiologist called the office   f/u cr level

## 2023-03-04 NOTE — PROGRESS NOTE ADULT - ASSESSMENT
Ms Mcconnell is an 87 ARABELLA w a PMH of PVD w chronic lymphedema and chronic cellulitis, OA, obesity  , COPD, HTN, Afib (s/p ablation on eliquis), goiter and a recent admission w dc on Feb 23rd for drainage of a left gluteal abscess presented to the ED w 4 weeks of worsening CARTAGENA (after 2-3 steps), orthopnea and 3 days of BL LE edema pain and swelling. Labs notable for BNP of 2953, uric acid of 8.1, alk Phos of 181, troponin negative. Patient had XR in ED which did not show any acute fracture or dislocation (positive for chronic degenerative changes), US negative for DVT HOWEVER POOR VISUALIZATION OF CLAF VASCULATURE secondary to edema and habitus. Patient admitted for progressive CARTAGENA and Left foot wound s/p debridement     87F PMH: HTN, A.fib (on eliquis), PAD, Chronic lymphedema and COPD presents to ED for b/L LE pain and dyspnea on exertion.     Dyspnea on exertion likely 2/2 Acute on Chronic HFpEF   - CXR shows pulmonary vascular congestion and b/l interstitial opacity -> improving   - Change to oral lasix 40mg po qd (BUN 45 / cr 1.0, Bicarb 31)  - f/u renal function daily Cr 1-> 1.3 -> 1.0  - Echo Feb 2021  Summary:   1. Left ventricular ejection fraction, by visual estimation, is 55 to 60%    2. Left atrial enlargement.   3. Normal right atrial size.   4. Moderate mitral valve regurgitation.   5. Mild tricuspid regurgitation.    - f/u New Echo - TTE  - monitor daily weight, Strict I & O, Low Na diet with fluid restriction.   - Cardiology consult Dr Merida consulted ->note reviewed no additional recommendations from current care for HF     B/L LE cellulitis in the setting of lymphedema   - No evidence of sepsis  - d/c Doxy (pt is allergic to penicillin)  - start Vancomycin 1g IV q12h (WBC trending up)  - Check Vanc Troph level before 3rd dose   - CT Legs no clots / ++ soft tissue edema   - f/u TTE   - Pain control prn  - ID consult appreciated   - Rheumatology consult appreciated    Chronic A.fib - on cardizem and eliquis  COPD - not in exacerbation. c/w home inhalers.   HTN - on cardizem    DVT ppx: eliquis   GI ppx: not indicated  Diet: DASH diet  Activity: as tolerated.     GOC: FULL CODE / NO NGT OR PEG    Pending: clinical improvement / improvement of Infection wbc to trend down and neg cxs / f/u Sensitivities for wound cxs     Dispo: Acute / Lives at home mostly bed-bound

## 2023-03-04 NOTE — PROGRESS NOTE ADULT - SUBJECTIVE AND OBJECTIVE BOX
GIOVANNY BUCK 87y Female  MRN#: [id number]     Hospital Day: 5 d    SUBJECTIVE  Patient is a 87y old Female who presents with a chief complaint of Currently admitted to medicine with the primary diagnosis of SOB (shortness of breath) and BL LE pain and edema      OVERNIGHT EVENTS:  Patient was examined and seen at bedside complains of pain in the left lower leg where she currently has a wound     Discussed GOC: Wants FULL CODE but NO FEEDING TUBE OR NGT   MOLST Completed and GOC Documented     REVIEW OF SYMPTOMS:  CONSTITUTIONAL: improved pain; No headaches  EYES: No visual changes, eye pain, or discharge  ENT: No vertigo; No change in hearing; mild sore throat from coughing  NECK: No pain or stiffness  RESPIRATORY: cough as above, SOB stable but present    CARDIOVASCULAR: No chest pain or palpitations  GASTROINTESTINAL: No abdominal or epigastric pain; No nausea, vomiting, No diarrhea or constipation  GENITOURINARY: No dysuria  MUSCULOSKELETAL: No joint pain, pain in BL LE improved   NEUROLOGICAL: No numbness  SKIN: dry skin     OBJECTIVE  PAST MEDICAL & SURGICAL HISTORY  HTN (hypertension)    Afib  s/p ablation 2001    Goiter  no meds    Cellulitis  chronic    OA (osteoarthritis)    PVD (peripheral vascular disease)    COPD (chronic obstructive pulmonary disease)    Anxiety    Obesity    H/O abdominal hysterectomy    H/O discectomy    H/O total knee replacement, bilateral      ALLERGIES:  aspirin (Other)  codeine (Other)  contrast media (iodine-based) (Other)  penicillin (Other)  sulfa drugs (Hives; Other)    MEDICATIONS  (STANDING):  apixaban 2.5 milliGRAM(s) Oral two times a day  budesonide  80 MICROgram(s)/formoterol 4.5 MICROgram(s) Inhaler 2 Puff(s) Inhalation two times a day  diltiazem    milliGRAM(s) Oral daily   doxycycline IVPB 100 milliGRAM(s) IV Intermittent every 12 hours  furosemide   Injectable 40 milliGRAM(s) IV Push daily  gabapentin 100 milliGRAM(s) Oral three times a day  senna 2 Tablet(s) Oral at bedtime    MEDICATIONS  (PRN):  acetaminophen     Tablet .. 650 milliGRAM(s) Oral every 6 hours PRN Temp greater or equal to 38C (100.4F), Mild Pain (1 - 3)      VITAL SIGNS: Last 24 Hours:  T(C): 36.3 (04 Mar 2023 13:03), Max: 37.4 (03 Mar 2023 20:00)  T(F): 97.3 (04 Mar 2023 13:03), Max: 99.4 (03 Mar 2023 20:00)  HR: 104 (04 Mar 2023 13:03) (104 - 108)  BP: 136/64 (04 Mar 2023 13:03) (136/64 - 152/68)  RR: 18 (04 Mar 2023 13:03) (18 - 18)  SpO2: 92% (04 Mar 2023 13:03) (92% - 94%)    Parameters below as of 04 Mar 2023 05:23  Patient On (Oxygen Delivery Method): nasal cannula      LABS:                          9.1    13.03 )-----------( 287      ( 04 Mar 2023 06:57 )             30.1     03-04    141  |  99  |  45<H>  ----------------------------<  103<H>  3.5   |  31  |  1.1    Ca    8.8      04 Mar 2023 06:57    TPro  6.5  /  Alb  3.1<L>  /  TBili  0.3  /  DBili  x   /  AST  81<H>  /  ALT  40  /  AlkPhos  170<H>  03-04                          9.4    18.28 )-----------( 309      ( 03 Mar 2023 08:40 )             31.1     03-03    139  |  98  |  37<H>  ----------------------------<  158<H>  3.9   |  29  |  1.3    Ca    8.8      03 Mar 2023 08:40    TPro  6.4  /  Alb  2.8<L>  /  TBili  0.8  /  DBili  x   /  AST  24  /  ALT  11  /  AlkPhos  166<H>  03-02                          9.3    13.42 )-----------( 261      ( 02 Mar 2023 05:27 )             30.3     03-02    140  |  99  |  27<H>  ----------------------------<  97  3.6   |  29  |  1.3    Ca    8.4      02 Mar 2023 05:27    TPro  6.4  /  Alb  2.8<L>  /  TBili  0.8  /  DBili  x   /  AST  24  /  ALT  11  /  AlkPhos  166<H>  03-02                        8.6    8.42  )-----------( 277      ( 28 Feb 2023 06:53 )             28.3     02-28    139  |  103  |  20  ----------------------------<  95  3.9   |  27  |  1.1    Ca    8.8      28 Feb 2023 06:53  Mg     1.9     02-28    TPro  6.5  /  Alb  3.2<L>  /  TBili  0.7  /  DBili  x   /  AST  23  /  ALT  12  /  AlkPhos  165<H>  02-28    PT/INR - ( 28 Feb 2023 06:53 )   PT: 16.10 sec;   INR: 1.40 ratio      PTT - ( 28 Feb 2023 06:53 )  PTT:33.7 sec    CARDIAC MARKERS ( 27 Feb 2023 17:52 )    x     / <0.01 ng/mL / x     / x     / x        Serum Pro-Brain Natriuretic Peptide: 2953 pg/mL (02.27.23 @ 17:52)   C-Reactive Protein, Serum: 65.9 mg/L (02.28.23 @ 06:53)   RADIOLOGY:  < from: [facility] Duplex Lower Ext Vein Scan, Bilat (02.28.23 @ 10:50) >  IMPRESSION:  No evidence of deep venous thrombosis in either lower extremity.  Bilateral calf veins are not visualized due to edema.    < end of copied text >  < from: Xray Foot AP + Lateral + Oblique, Left (02.28.23 @ 09:00) >  FINDINGS/  IMPRESSION:    Diffuse osteopenia. No acute fracture or dislocation. Chronic fracture   deformity of the fourth and fifth metatarsal shafts. 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 Severe degenerative changes of   the talonavicular joint forefoot. No definite ulcer or radiographic   evidence of osteomyelitis is noted at the region of the fifth   metatarsophalangeal joint. Consider MRI of the left forefoot is   clinically warranted. Moderate degenerative changes of the hindfoot.   Plantar heel ulcer adjacent to the calcaneus, without definite   radiographic evidence of osteomyelitis.    < end of copied text >  < from: Xray Chest 1 View- PORTABLE-Urgent (02.27.23 @ 18:46) >  Impression:    Bilateral interstitial opacities possibly representing pulmonary edema.   Small left pleural effusion.    < end of copied text >      PHYSICAL EXAM:  CONSTITUTIONAL: No acute distress, sitting up in bed eating breakfast, one eye is more closed then the other (normal for patient)  HEAD: Atraumatic, normocephalic  EYES: PERRLA, conjunctiva and sclera clear  ENT: Supple, no masses, thyromegaly present , no JVD; moist mucous membranes  PULMONARY: BL CTA w shallow breaths   CARDIOVASCULAR: Regular rate and rhythm; no murmurs, rubs, or gallops BL DECREASED DORSALIS PEDIS PULSES  GASTROINTESTINAL: Soft, non-tender, non-distended; bowel sounds present  MUSCULOSKELETAL: marked BL LE edema w chronic venous stasis dermatitis (see below)   NEUROLOGY: A&Ox3  SKIN: BL LE hyperpigmentation and lymphedema w swelling and warmth, tender to palpation of ankle, left foot> right swollen

## 2023-03-05 NOTE — SWALLOW BEDSIDE ASSESSMENT ADULT - SLP GENERAL OBSERVATIONS
Received at bedside, alert/awake, o2 via NC. PT stated "I'm full" when presented with PO trials, limited trials accepted Parent

## 2023-03-05 NOTE — PROGRESS NOTE ADULT - SUBJECTIVE AND OBJECTIVE BOX
GIOVANNY BUCK 87y Female  MRN#: [id number]     Hospital Day: 6 d    SUBJECTIVE    Patient is a 87y old Female who presents with a chief complaint of Currently admitted to medicine with the primary diagnosis of SOB (shortness of breath) and BL LE pain and edema      OVERNIGHT EVENTS:  Patient was examined and seen at bedside complains of pain in the left lower leg where she currently has a wound     Discussed GOC: Wants FULL CODE but NO FEEDING TUBE OR NGT   MOLST Completed and GOC Documented     REVIEW OF SYMPTOMS:  CONSTITUTIONAL: improved pain; No headaches  EYES: No visual changes, eye pain, or discharge  ENT: No vertigo; No change in hearing; mild sore throat from coughing  NECK: No pain or stiffness  RESPIRATORY: cough as above, SOB stable but present    CARDIOVASCULAR: No chest pain or palpitations  GASTROINTESTINAL: No abdominal or epigastric pain; No nausea, vomiting, No diarrhea or constipation  GENITOURINARY: No dysuria  MUSCULOSKELETAL: No joint pain, pain in BL LE improved   NEUROLOGICAL: No numbness  SKIN: dry skin     OBJECTIVE  PAST MEDICAL & SURGICAL HISTORY  HTN (hypertension)    Afib  s/p ablation 2001    Goiter  no meds    Cellulitis  chronic    OA (osteoarthritis)    PVD (peripheral vascular disease)    COPD (chronic obstructive pulmonary disease)    Anxiety    Obesity    H/O abdominal hysterectomy    H/O discectomy    H/O total knee replacement, bilateral      ALLERGIES:  aspirin (Other)  codeine (Other)  contrast media (iodine-based) (Other)  penicillin (Other)  sulfa drugs (Hives; Other)    MEDICATIONS  (STANDING):  apixaban 2.5 milliGRAM(s) Oral two times a day  budesonide  80 MICROgram(s)/formoterol 4.5 MICROgram(s) Inhaler 2 Puff(s) Inhalation two times a day  diltiazem    milliGRAM(s) Oral daily   doxycycline IVPB 100 milliGRAM(s) IV Intermittent every 12 hours  furosemide   Injectable 40 milliGRAM(s) IV Push daily  gabapentin 100 milliGRAM(s) Oral three times a day  senna 2 Tablet(s) Oral at bedtime    MEDICATIONS  (PRN):  acetaminophen     Tablet .. 650 milliGRAM(s) Oral every 6 hours PRN Temp greater or equal to 38C (100.4F), Mild Pain (1 - 3)      VITAL SIGNS: Last 24 Hours:    T(C): 36.4 (05 Mar 2023 05:00), Max: 36.6 (04 Mar 2023 20:00)  T(F): 97.5 (05 Mar 2023 05:00), Max: 97.9 (04 Mar 2023 20:00)  HR: 107 (05 Mar 2023 05:00) (103 - 107)  BP: 170/78 (05 Mar 2023 05:00) (136/64 - 170/78)  RR: 18 (05 Mar 2023 05:00) (18 - 18)  SpO2: 97% (05 Mar 2023 05:00) (92% - 97%)      LABS:                        9.7    10.22 )-----------( 282      ( 05 Mar 2023 07:42 )             33.3     03-05    144  |  99  |  40<H>  ----------------------------<  115<H>  3.7   |  36<H>  |  0.9    Ca    8.8      05 Mar 2023 07:42    TPro  6.5  /  Alb  3.1<L>  /  TBili  0.3  /  DBili  x   /  AST  81<H>  /  ALT  40  /  AlkPhos  170<H>  03-04                          9.1    13.03 )-----------( 287      ( 04 Mar 2023 06:57 )             30.1     03-04    141  |  99  |  45<H>  ----------------------------<  103<H>  3.5   |  31  |  1.1    Ca    8.8      04 Mar 2023 06:57    TPro  6.5  /  Alb  3.1<L>  /  TBili  0.3  /  DBili  x   /  AST  81<H>  /  ALT  40  /  AlkPhos  170<H>  03-04                        9.4    18.28 )-----------( 309      ( 03 Mar 2023 08:40 )             31.1     03-03    139  |  98  |  37<H>  ----------------------------<  158<H>  3.9   |  29  |  1.3    Ca    8.8      03 Mar 2023 08:40    TPro  6.4  /  Alb  2.8<L>  /  TBili  0.8  /  DBili  x   /  AST  24  /  ALT  11  /  AlkPhos  166<H>  03-02                          9.3    13.42 )-----------( 261      ( 02 Mar 2023 05:27 )             30.3     03-02    140  |  99  |  27<H>  ----------------------------<  97  3.6   |  29  |  1.3    Ca    8.4      02 Mar 2023 05:27    TPro  6.4  /  Alb  2.8<L>  /  TBili  0.8  /  DBili  x   /  AST  24  /  ALT  11  /  AlkPhos  166<H>  03-02                        8.6    8.42  )-----------( 277      ( 28 Feb 2023 06:53 )             28.3     02-28    139  |  103  |  20  ----------------------------<  95  3.9   |  27  |  1.1    Ca    8.8      28 Feb 2023 06:53  Mg     1.9     02-28    TPro  6.5  /  Alb  3.2<L>  /  TBili  0.7  /  DBili  x   /  AST  23  /  ALT  12  /  AlkPhos  165<H>  02-28    PT/INR - ( 28 Feb 2023 06:53 )   PT: 16.10 sec;   INR: 1.40 ratio      PTT - ( 28 Feb 2023 06:53 )  PTT:33.7 sec    CARDIAC MARKERS ( 27 Feb 2023 17:52 )    x     / <0.01 ng/mL / x     / x     / x        Serum Pro-Brain Natriuretic Peptide: 2953 pg/mL (02.27.23 @ 17:52)   C-Reactive Protein, Serum: 65.9 mg/L (02.28.23 @ 06:53)   RADIOLOGY:  < from: [facility] Duplex Lower Ext Vein Scan, Bilat (02.28.23 @ 10:50) >  IMPRESSION:  No evidence of deep venous thrombosis in either lower extremity.  Bilateral calf veins are not visualized due to edema.    < end of copied text >  < from: Xray Foot AP + Lateral + Oblique, Left (02.28.23 @ 09:00) >  FINDINGS/  IMPRESSION:    Diffuse osteopenia. No acute fracture or dislocation. Chronic fracture   deformity of the fourth and fifth metatarsal shafts. 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 Severe degenerative changes of   the talonavicular joint forefoot. No definite ulcer or radiographic   evidence of osteomyelitis is noted at the region of the fifth   metatarsophalangeal joint. Consider MRI of the left forefoot is   clinically warranted. Moderate degenerative changes of the hindfoot.   Plantar heel ulcer adjacent to the calcaneus, without definite   radiographic evidence of osteomyelitis.    < end of copied text >  < from: Xray Chest 1 View- PORTABLE-Urgent (02.27.23 @ 18:46) >  Impression:    Bilateral interstitial opacities possibly representing pulmonary edema.   Small left pleural effusion.    < end of copied text >      PHYSICAL EXAM:  CONSTITUTIONAL: No acute distress, sitting up in bed eating breakfast, one eye is more closed then the other (normal for patient)  HEAD: Atraumatic, normocephalic  EYES: PERRLA, conjunctiva and sclera clear  ENT: Supple, no masses, thyromegaly present , no JVD; moist mucous membranes  PULMONARY: BL CTA w shallow breaths   CARDIOVASCULAR: Regular rate and rhythm; no murmurs, rubs, or gallops BL DECREASED DORSALIS PEDIS PULSES  GASTROINTESTINAL: Soft, non-tender, non-distended; bowel sounds present  MUSCULOSKELETAL: marked BL LE edema w chronic venous stasis dermatitis (see below)   NEUROLOGY: A&Ox3  SKIN: BL LE hyperpigmentation and lymphedema w swelling and warmth, tender to palpation of ankle, left foot> right swollen

## 2023-03-05 NOTE — PROGRESS NOTE ADULT - ASSESSMENT
Ms Mcconnell is an 87 ARABE[place]A w a PMH of PVD w chronic lymphedema and chronic cellulitis, OA, obesity  , COPD, HTN, Afib (s/p ablation on eliquis), goiter and a recent admission w dc on Feb 23rd for drainage of a left gluteal abscess presented to the ED w 4 weeks of worsening CARTAGENA (after 2-3 steps), orthopnea and 3 days of BL LE edema pain and swelling. Labs notable for BNP of 2953, uric acid of 8.1, alk Phos of 181, troponin negative. Patient had XR in ED which did not show any acute fracture or dislocation (positive for chronic degenerative changes), US negative for DVT HOWEVER POOR VISUALIZATION OF CLAF VASCULATURE secondary to edema and habitus. Patient admitted for progressive CARTAGENA and Left foot wound s/p debridement     87F PMH: HTN, A.fib (on eliquis), PAD, Chronic lymphedema and COPD presents to ED for b/L LE pain and dyspnea on exertion.     Dyspnea on exertion likely 2/2 Acute on Chronic HFpEF   - CXR shows pulmonary vascular congestion and b/l interstitial opacity -> improving   - Change to oral lasix 40mg po qd (BUN 45 / cr 1.0, Bicarb 31)  - f/u renal function daily Cr 1-> 1.3 -> 1.0 -> 0.9  - Lasix Induced Contraction Alkalosis (hold Lasix for now)   - Echo Feb 2021  Summary:   1. Left ventricular ejection fraction, by visual estimation, is 55 to 60%    2. Left atrial enlargement.   3. Normal right atrial size.   4. Moderate mitral valve regurgitation.   5. Mild tricuspid regurgitation.    - f/u New Echo - TTE  - monitor daily weight, Strict I & O, Low Na diet with fluid restriction.   - Cardiology consult Dr Merida consulted ->note reviewed no additional recommendations from current care for HF     B/L LE cellulitis in the setting of lymphedema   - Worsening Leukocytosis now wbc trending down   - Sepsis ruled out on admission and during hospitalization   - d/c Doxy (pt is allergic to penicillin)  - start Vancomycin 1g IV q12h (WBC trending up)  - Check Vanc Troph level before 3rd dose   - CT Legs no clots / ++ soft tissue edema   - Pain control prn  - ID consult appreciated   - Rheumatology consult appreciated  - MRSA Positive in wound in legs  - MRSA Negative in Nares B/L     Chronic A.fib - on cardizem and eliquis  COPD - not in exacerbation. c/w home inhalers.   HTN - on cardizem    DVT ppx: eliquis   GI ppx: not indicated  Diet: DASH diet  Activity: as tolerated.     GOC: FULL CODE / NO NGT OR PEG    Pending: Vanc troph level / 3rd dose tonight / PT dispo     Dispo: Acute / Lives at home mostly bed-bound / may need STR

## 2023-03-06 NOTE — PROGRESS NOTE ADULT - NS ATTEST RISK PROBLEM GEN_ALL_CORE FT
direct pt care and interdisciplinary rounds
direct pt care and interdisciplinary rounds
direct pt care and interdisciplinary rounds  As above documentation upgraded to ICU Step Down Unit
direct pt care and interdisciplinary rounds  IV Vanco f/u level   f/u cr level
direct pt care and interdisciplinary rounds  IV Vanco f/u level   f/u cr level
direct pt care and interdisciplinary rounds

## 2023-03-06 NOTE — CONSULT NOTE ADULT - ASSESSMENT
IMPRESSION:    Acute hypoxemic respiratory failure  Acute on chronic hypercapnic respiratory failure  COPD  Fluid overload  Left diabetic foot ulcer - MRSA +  HO Afib on Eliquis  HFpEF  Chronic lymphedema  HO Peripheral vascular disease    RECOMMENDATIONS:    Wean O2. Target POx 88%-92%  Duonebs Q4 and PRN  Solumedrol 40 mg IV Bid  Continue antibiotics per ID  BiPAP 16/8 four hours on and off and HS  Repeat Pro-BNP and Echo  Aggressive diureses, Daily CXR  HOB at 45, aspiration precautions  DVT ppx. Already on Eliquis  Ambulate as tolerates / PT / OT  Off loading and preventive measures to avoid pressure ulcers IMPRESSION:    Acute hypoxemic respiratory failure requiring BiPAP  Acute on chronic hypercapnic respiratory failure  Fluid overload  COPD  Left diabetic foot ulcer - MRSA +  HO Afib on Eliquis  HFpEF  Chronic lymphedema  HO Peripheral vascular disease    RECOMMENDATIONS:    Wean O2. Target POx 88%-92%  Duonebs Q4 and PRN  Solumedrol 40 mg IV Bid  Continue antibiotics per ID  BiPAP 16/8 four hours on and off and HS  Repeat Pro-BNP and Echo  Aggressive diureses, Daily CXR  HOB at 45, aspiration precautions  DVT ppx. Already on Eliquis  Ambulate as tolerates / PT / OT  Step Down IMPRESSION:    Acute hypoxemic respiratory failure requiring BiPAP  Acute on chronic hypercapnic respiratory failure  Fluid overload  COPD  LE cellulitis with ulcer and abscess MRSA +  HO Afib on Eliquis  HFpEF  Chronic lymphedema  HO Peripheral vascular disease    RECOMMENDATIONS:    Wean O2. Target POx 88%-92%  Duonebs Q4 and PRN  Solumedrol 40 mg IV Bid  Continue antibiotics per ID  BiPAP 16/8 four hours on and off and HS  Repeat procalcitonin, Pro-BNP and Echo  Chest US showed left large pleural effusion. Restart lasix, continue aggressive diureses, daily CXR, strict Is and Os. Hold Eliquis, will POCUS tomorrow, if not responding to lasix, may need thoracentesis.  HOB at 45, aspiration precautions  DVT ppx. Was on Eliquis  Ambulate as tolerates / PT / OT  Step Down IMPRESSION:    Acute hypoxemic respiratory failure requiring BiPAP  Acute on chronic hypercapnic respiratory failure  Fluid overload  COPD  l side opacity/ effusion worseing  LE cellulitis with ulcer and abscess MRSA +  HO Afib on Eliquis  HFpEF  Chronic lymphedema  HO Peripheral vascular disease    RECOMMENDATIONS:    Wean O2. Target POx 88%-92%  Duonebs Q4 and PRN  Solumedrol 40 mg IV Bid  Continue antibiotics per ID add cefepime  BiPAP 16/8 four hours on and off and HS repeat ABG  Repeat procalcitonin, Pro-BNP and Echo  Chest US showed left large pleural effusion. Restart lasix, continue aggressive diureses, daily CXR, strict Is and Os. Hold Eliquis, will POCUS tomorrow, if not responding to lasix, may need thoracentesis.  HOB at 45, aspiration precautions  DVT ppx. Was on Eliquis  Ambulate as tolerates / PT / OT  Step Down

## 2023-03-06 NOTE — CONSULT NOTE ADULT - SUBJECTIVE AND OBJECTIVE BOX
Patient is a 87y old  Female who presents with a chief complaint of Dyspnea (05 Mar 2023 11:52)      HPI:  88 yo female with a pmh of htn, afib on eliquis, pvd, chronic lymphedema and copd present c/o BLE pain and SOB.    Patient was in her usual state of health 4 weeks ago when she developed SOB which is new for her, mostly on exertion (after walking 2 feet), increases while lying down. Patient followed up with Dr. Pacheco 1 month ago and has TTE which was normal as per patient. Denies HA, dizziness, NVD, fever, chest pain, abdominal pain, change in urination and change in bowel habits.     3 days ago patient developed B/l lower extremity pain, Left more than right, 10/10 intensity, progressive, extending from toes to mid thigh, associated with increased redness and tenderness. Of note, patient was prescribed Clindamycin 1 week ago, not sure if taking or not.     In the ED, BNP 2952 (no baseline).  Vital Signs Last 24 Hrs:  T(F): 98 (27 Feb 2023 17:31), Max: 98.7 (27 Feb 2023 16:20)  HR: 98 (27 Feb 2023 21:55) (97 - 100)  BP: 139/86 (27 Feb 2023 21:55) (139/86 - 168/67)  RR: 18 (27 Feb 2023 21:55) (14 - 18)  SpO2: 99% (27 Feb 2023 21:55) (96% - 99%)   (27 Feb 2023 23:23)      PAST MEDICAL & SURGICAL HISTORY:  HTN (hypertension)      Afib  s/p ablation 2001      Goiter  no meds      Cellulitis  chronic      OA (osteoarthritis)      PVD (peripheral vascular disease)      COPD (chronic obstructive pulmonary disease)      Anxiety      Obesity      H/O abdominal hysterectomy      H/O discectomy      H/O total knee replacement, bilateral          SOCIAL HX:   Smoking                         ETOH                            Other    FAMILY HISTORY:  .  No cardiovascular or pulmonary family history     REVIEW OF SYSTEMS:    All ROS are negative exept per HPI       Allergies    aspirin (Other)  codeine (Other)  contrast media (iodine-based) (Other)  penicillin (Other)  sulfa drugs (Hives; Other)    Intolerances          PHYSICAL EXAM  Vital Signs Last 24 Hrs  T(C): 36.1 (06 Mar 2023 05:12), Max: 37.6 (05 Mar 2023 20:00)  T(F): 97 (06 Mar 2023 05:12), Max: 99.6 (05 Mar 2023 20:00)  HR: 119 (06 Mar 2023 05:12) (102 - 119)  BP: 142/65 (06 Mar 2023 05:12) (142/65 - 167/63)  BP(mean): --  RR: 18 (06 Mar 2023 05:12) (18 - 18)  SpO2: 96% (06 Mar 2023 05:12) (91% - 96%)        CONSTITUTIONAL:  In NAD    ENT:   Airway patent,     EYES:   Clear bilaterally,   pupils equal,   round and reactive to light.    CARDIAC:   Normal rate,   regular rhythm.    no edema    RESPIRATORY:   No wheezing   Normal chest expansion  Not tachypneic,  No use of accessory muscles    GASTROINTESTINAL:  Abdomen soft, non-tender,   No guarding,   Positive BS    MUSCULOSKELETAL:   No clubbing, cyanosis    NEUROLOGICAL:   Alert and oriented     SKIN:   Skin normal color for race,             LABS:                          10.6   11.69 )-----------( 350      ( 06 Mar 2023 07:15 )             37.0                                               03-06    143  |  99  |  36<H>  ----------------------------<  119<H>  4.5   |  33<H>  |  1.0    Ca    9.2      06 Mar 2023 07:15    TPro  7.0  /  Alb  3.1<L>  /  TBili  0.4  /  DBili  x   /  AST  46<H>  /  ALT  37  /  AlkPhos  195<H>  03-06                                                                                           LIVER FUNCTIONS - ( 06 Mar 2023 07:15 )  Alb: 3.1 g/dL / Pro: 7.0 g/dL / ALK PHOS: 195 U/L / ALT: 37 U/L / AST: 46 U/L / GGT: x                                                                                            ABG - ( 06 Mar 2023 08:26 )  pH, Arterial: 7.32  pH, Blood: x     /  pCO2: 77    /  pO2: 67    / HCO3: 40    / Base Excess: 10.9  /  SaO2: 94.0                MEDICATIONS  (STANDING):  albuterol/ipratropium for Nebulization 3 milliLiter(s) Nebulizer every 6 hours  apixaban 2.5 milliGRAM(s) Oral two times a day  budesonide  80 MICROgram(s)/formoterol 4.5 MICROgram(s) Inhaler 2 Puff(s) Inhalation two times a day  diltiazem    milliGRAM(s) Oral daily  furosemide   Injectable 40 milliGRAM(s) IV Push once  gabapentin 100 milliGRAM(s) Oral three times a day  methylPREDNISolone sodium succinate Injectable 60 milliGRAM(s) IV Push every 8 hours  saccharomyces boulardii 250 milliGRAM(s) Oral two times a day  senna 2 Tablet(s) Oral at bedtime  vancomycin  IVPB 1000 milliGRAM(s) IV Intermittent every 12 hours    MEDICATIONS  (PRN):  acetaminophen     Tablet .. 650 milliGRAM(s) Oral every 6 hours PRN Temp greater or equal to 38C (100.4F), Mild Pain (1 - 3)     Patient is a 87y old  Female who presents with a chief complaint of Dyspnea (05 Mar 2023 11:52)    88 yo female with a pmh of htn, afib on eliquis, pvd, chronic lymphedema and copd present c/o BLE pain and SOB.    Patient was in her usual state of health 4 weeks ago when she developed SOB which is new for her, mostly on exertion (after walking 2 feet), increases while lying down. Patient followed up with Dr. Pacheco 1 month ago and has TTE which was normal as per patient. Denies HA, dizziness, NVD, fever, chest pain, abdominal pain, change in urination and change in bowel habits.     3 days ago patient developed B/l lower extremity pain, Left more than right, 10/10 intensity, progressive, extending from toes to mid thigh, associated with increased redness and tenderness. Of note, patient was prescribed Clindamycin 1 week ago, not sure if taking or not.     In the ED, BNP 2952 (no baseline).  Vital Signs Last 24 Hrs:  T(F): 98 (27 Feb 2023 17:31), Max: 98.7 (27 Feb 2023 16:20)  HR: 98 (27 Feb 2023 21:55) (97 - 100)  BP: 139/86 (27 Feb 2023 21:55) (139/86 - 168/67)  RR: 18 (27 Feb 2023 21:55) (14 - 18)  SpO2: 99% (27 Feb 2023 21:55) (96% - 99%)    Admitted to [facility], Patient was diagnosed with CHF exacerbation and started on lasix. Lasix was discontinued due to uptrending Cr/BUN. On the morning of 3/6 alerted by RN that patient is desatting to high 70s on nasal canula. Patient lethargic appearing. Placed on NRB with improvement of SPO2 to mid 80s. Patient transitioned to BIPAP. StAT ABG shows CO2 79, ph 7.32, STAT CXR showed worsening fluid overload, L>R. Patient was given STAT dose lasix 40mg IV push given. Patient also noted to be in afib RVR.  called to evaluate, on BIPAP 40%      PAST MEDICAL & SURGICAL HISTORY:  HTN (hypertension)      Afib  s/p ablation 2001      Goiter  no meds      Cellulitis  chronic      OA (osteoarthritis)      PVD (peripheral vascular disease)      COPD (chronic obstructive pulmonary disease)      Anxiety      Obesity      H/O abdominal hysterectomy      H/O discectomy      H/O total knee replacement, bilateral          SOCIAL HX:   Smoking  -    FAMILY HISTORY:  .  No cardiovascular or pulmonary family history     REVIEW OF SYSTEMS:    All ROS are negative exept per HPI       Allergies    aspirin (Other)  codeine (Other)  contrast media (iodine-based) (Other)  penicillin (Other)  sulfa drugs (Hives; Other)    Intolerances          PHYSICAL EXAM  Vital Signs Last 24 Hrs  T(C): 36.1 (06 Mar 2023 05:12), Max: 37.6 (05 Mar 2023 20:00)  T(F): 97 (06 Mar 2023 05:12), Max: 99.6 (05 Mar 2023 20:00)  HR: 119 (06 Mar 2023 05:12) (102 - 119)  BP: 142/65 (06 Mar 2023 05:12) (142/65 - 167/63)  RR: 18 (06 Mar 2023 05:12) (18 - 18)  SpO2: 96% (06 Mar 2023 05:12) (91% - 96%)        CONSTITUTIONAL:  Ill looking, tachypneic    ENT:   Airway patent,     EYES:   Clear bilaterally,   pupils equal,   round and reactive to light.    CARDIAC:   SRI 2/6    RESPIRATORY:   dec bs both bases    GASTROINTESTINAL:  Abdomen soft, non-tender,   No guarding,   Positive BS    MUSCULOSKELETAL:   No clubbing, cyanosis    NEUROLOGICAL:   Alert and oriented               LABS:                          10.6   11.69 )-----------( 350      ( 06 Mar 2023 07:15 )             37.0                                               03-06    143  |  99  |  36<H>  ----------------------------<  119<H>  4.5   |  33<H>  |  1.0    Ca    9.2      06 Mar 2023 07:15    TPro  7.0  /  Alb  3.1<L>  /  TBili  0.4  /  DBili  x   /  AST  46<H>  /  ALT  37  /  AlkPhos  195<H>  03-06                                                                                           LIVER FUNCTIONS - ( 06 Mar 2023 07:15 )  Alb: 3.1 g/dL / Pro: 7.0 g/dL / ALK PHOS: 195 U/L / ALT: 37 U/L / AST: 46 U/L / GGT: x                                                                                            ABG - ( 06 Mar 2023 08:26 )  pH, Arterial: 7.32  pH, Blood: x     /  pCO2: 77    /  pO2: 67    / HCO3: 40    / Base Excess: 10.9  /  SaO2: 94.0                MEDICATIONS  (STANDING):  albuterol/ipratropium for Nebulization 3 milliLiter(s) Nebulizer every 6 hours  apixaban 2.5 milliGRAM(s) Oral two times a day  budesonide  80 MICROgram(s)/formoterol 4.5 MICROgram(s) Inhaler 2 Puff(s) Inhalation two times a day  diltiazem    milliGRAM(s) Oral daily  furosemide   Injectable 40 milliGRAM(s) IV Push once  gabapentin 100 milliGRAM(s) Oral three times a day  methylPREDNISolone sodium succinate Injectable 60 milliGRAM(s) IV Push every 8 hours  saccharomyces boulardii 250 milliGRAM(s) Oral two times a day  senna 2 Tablet(s) Oral at bedtime  vancomycin  IVPB 1000 milliGRAM(s) IV Intermittent every 12 hours    MEDICATIONS  (PRN):  acetaminophen     Tablet .. 650 milliGRAM(s) Oral every 6 hours PRN Temp greater or equal to 38C (100.4F), Mild Pain (1 - 3)

## 2023-03-06 NOTE — PHYSICAL THERAPY INITIAL EVALUATION ADULT - SPECIFY REASON(S)
10:34. Hold PT, pt currently going on BIPAP, Dr. Diaz and [facility] Resp therapist present at b/s. Will f/u with PT as appropriate.
pt with unstable vital signs as described below. will hold PT evaluation until pt is medically cleared.

## 2023-03-06 NOTE — PROGRESS NOTE ADULT - ASSESSMENT
Ms Mcconnell is an 87 ARABE[place]A w a PMH of PVD w chronic lymphedema and chronic cellulitis, OA, obesity  , COPD, HTN, Afib (s/p ablation on eliquis), goiter and a recent admission w dc on Feb 23rd for drainage of a left gluteal abscess presented to the ED w 4 weeks of worsening CARTAGENA (after 2-3 steps), orthopnea and 3 days of BL LE edema pain and swelling. Labs notable for BNP of 2953, uric acid of 8.1, alk Phos of 181, troponin negative. Patient had XR in ED which did not show any acute fracture or dislocation (positive for chronic degenerative changes), US negative for DVT HOWEVER POOR VISUALIZATION OF CLAF VASCULATURE secondary to edema and habitus. Patient admitted for progressive CARTAGENA and Left foot wound s/p debridement     87F PMH: HTN, A.fib (on eliquis), PAD, Chronic lymphedema and COPD presents to ED for b/L LE pain and dyspnea on exertion.     Dyspnea on exertion likely 2/2 Acute on Chronic HFpEF   - CXR shows pulmonary vascular congestion and b/l interstitial opacity with RLL Effusion (worsened since previously)   - Change to oral lasix 40mg po qd (BUN 45 / cr 1.0, Bicarb 31)  - f/u renal function daily Cr 1-> 1.3 -> 1.0 -> 0.9  - Lasix Induced Contraction Alkalosis (hold Lasix for now)   - Echo Feb 2021  Summary:   1. Left ventricular ejection fraction, by visual estimation, is 55 to 60%    2. Left atrial enlargement.   3. Normal right atrial size.   4. Moderate mitral valve regurgitation.   5. Mild tricuspid regurgitation.    - f/u New Echo - TTE  - monitor daily weight, Strict I & O, Low Na diet with fluid restriction.   - Cardiology consult Dr Merida consulted ->note reviewed no additional recommendations from current care for HF     AFib (Chronic) with RVR    - c/w cardizem and eliquis   - Need Cardiac Monitoring    - This is due to Acute Worsening Resp Failure     Acute Hypoxic and Hypercapnic Respiratory Failure  - COPD Exarc w/ HFpEF Exarc w/ Afib RVR   - Pulmonary Consult   - BiPAP Started since 8am   - f/u ABG  - Clinically improving   - [facility] Evaluation for Step Down Bed   - CXR: RLL Effusion / Pulm Edema  - PCO2 70's   - Solumedrol 60mg IV q8h  - Lasix 40mg IV q12h  - Strick I/Os   - Low Na diet  - 1L Fluid Restriction daily     LLE MRSA Cellulitis in the setting of Chronic Bilateral Lymphedema   - Worsening Leukocytosis now wbc trending down   - Sepsis ruled out on admission and during hospitalization   - d/c Doxy (pt is allergic to penicillin)  - Started: Vancomycin 1g IV q12h (WBC trending up) since 2days ago   - Check Vanc Troph level before 3rd dose   - CT Legs no clots / ++ soft tissue edema   - Pain control prn  - ID consult appreciated   - Rheumatology consult appreciated  - MRSA Positive in wound in legs  - MRSA Negative in Nares B/L     HTN   -> controlled on Cardizem     DVT ppx: eliquis   GI ppx: not indicated  Diet: DASH diet  Activity: as tolerated.     GOC: FULL CODE / NO NGT OR PEG  Guarded Prognosis     Pending: SDU Transfer -> Bed / Clinical Improvement / f/u Vanc troph level      Dispo: SDU / Acute / Lives at home mostly bed-bound / Will need STR

## 2023-03-06 NOTE — CHART NOTE - NSCHARTNOTEFT_GEN_A_CORE
Transfer Note - Upgrade to SDU     Transfer from: 3A  Transfer to:  SDU      Hospital COURSE:  86 yo female with a pmh of htn, afib on eliquis, pvd, chronic lymphedema and copd present c/o BLE pain and SOB.    Patient was in her usual state of health 4 weeks ago when she developed SOB which is new for her, mostly on exertion (after walking 2 feet), increases while lying down. Patient followed up with Dr. Pacheco 1 month ago and has TTE which was normal as per patient. Denies HA, dizziness, NVD, fever, chest pain, abdominal pain, change in urination and change in bowel habits.     Patient was diagnosed with CHF exacerbation and started on PO lasix. Lasix was discontinued due to uptrending Cr/BUN. On the morning of 3/6 alerted by RN that patient is desatting to high 70s on nasal canula. Patient lethargic appearing. Placed on NRB with improvement of SPO2 to mid 80s. Patient transitioned to BIPAP. StAT ABG shows CO2 79, reflecting CO2 retention, possible additional COPD exacerbation. STAT CXR showed worsening fluid overload, L>R. Patient was given STAT dose lasix 40mg IV push, started on solu-medrol, duonebs, and continued with BIPAP. Plan to continue with lasix 40mg IV BID, solu-medrol 60mg q8h, standing duonebs and BIPAP. Plan for repeat ABG. Patient also noted to be in afib RVR, known history of chronic afib. Now in the 110s HR.     Of note, for lower extremity cellulites, was previously on doxycycline per ID but changed to vancomycin given uptrending leukocytosis. Plan to follow up ID         ASSESSMENT & PLAN:     87F PMH: HTN, A.fib (on eliquis), PAD, Chronic lymphedema and COPD presents to ED for b/L LE pain and dyspnea on exertion.     #hypercapnic and hypoxic respiratory failure 2/2 to HFpEF and COPD exacerbation   - CXR 03/06 shows worsening b/l L>R   - ABG CO2 79   - Start lasix IV 40mg BID first dose given this AM   - Start solu-medrol 60mg q8h  - Start standing duonebs  - c/w Symbicort 2 puffs BID standing   - c/w BIPAP for now. FOllow up ABG   - monitor daily weight, Strict I & O, Low Na diet with fluid restriction.   - Cardiology consult Dr Merida following   - f/u TTE  - Upgrade to SDU   - was unable to perform LE duplex before, instead went for CT legs which showed no DVT    #B/L LE cellulitis in the setting of lymphedema   - initially on doxy per ID, changed to vancomycin   - f/u Vanc Troph level (previous troph inaccurate)  - CT Legs no clots / ++ soft tissue edema   - Pain control prn  - ID consult appreciated   - MRSA Positive in wound in legs  - MRSA Negative in Nares B/L   - Rheumatology consult appreciated - Recommend to Continue prednisone 40 mg daily given improvement in her symptoms and can taper on discharge 30 mg x 5 days, 20 mg x 5 days, 10 mg x 5 days. Started on solu-medrol for COPD exacerbation      #Chronic A.fib - on cardizem and eliquis  #HTN - on cardizem    DVT ppx: eliquis   GI ppx: not indicated  Diet: DASH diet  Activity: as tolerated.     GOC: FULL CODE / NO NGT OR PEG    For Follow-Up:  Monitor Diuresis on lasix   follow up repeat ABG  Taper steroids as tolerated, see previous rheum recs   closely follow respiratory status   follow up ID Transfer Note - Upgrade to SDU     Transfer from: 3A  Transfer to:  SDU      Hospital COURSE:  86 yo female with a pmh of htn, afib on eliquis, pvd, chronic lymphedema and copd present c/o BLE pain and SOB.    Patient was in her usual state of health 4 weeks ago when she developed SOB which is new for her, mostly on exertion (after walking 2 feet), increases while lying down. Patient followed up with Dr. Pacheco 1 month ago and has TTE which was normal as per patient. Denies HA, dizziness, NVD, fever, chest pain, abdominal pain, change in urination and change in bowel habits.     Patient was diagnosed with CHF exacerbation and started on PO lasix. Lasix was discontinued due to uptrending Cr/BUN. On the morning of 3/6 alerted by RN that patient is desatting to high 70s on nasal canula. Patient lethargic appearing. Placed on NRB with improvement of SPO2 to mid 80s. Patient transitioned to BIPAP. StAT ABG shows CO2 79, reflecting CO2 retention, possible additional COPD exacerbation. STAT CXR showed worsening fluid overload, L>R. Patient was given STAT dose lasix 40mg IV push, started on solu-medrol, duonebs, and continued with BIPAP. Plan to continue with lasix 40mg IV BID, solu-medrol 60mg q8h, standing duonebs and BIPAP. Plan for repeat ABG. Patient also noted to be in afib RVR, known history of chronic afib. Now in the 110s HR. Was seen by critical care team. Large pleural effusion was noted on bedside ultrasound. Will plan for continued diuresis, if does not respond will possibly go for therapeutic thoracentesis. WILL HOLD ELIQUIS FOR POSSIBLE THORACENTESIS IN AM. IF NO PLAN FOR THORA PLEASE RESUME ELIQUIS     Of note, for lower extremity cellulites, was previously on doxycycline per ID but changed to vancomycin given uptrending leukocytosis. Plan to follow up ID         ASSESSMENT & PLAN:     87F PMH: HTN, A.fib (on eliquis), PAD, Chronic lymphedema and COPD presents to ED for b/L LE pain and dyspnea on exertion.     #hypercapnic and hypoxic respiratory failure 2/2 to HFpEF and COPD exacerbation   - CXR 03/06 shows worsening b/l L>R   - ABG CO2 79   - Start lasix IV 40mg BID first dose given this AM   - Start solu-medrol 60mg q8h  - Start standing duonebs  - c/w Symbicort 2 puffs BID standing   - c/w BIPAP for now. FOllow up ABG   - monitor daily weight, Strict I & O, Low Na diet with fluid restriction.   - Cardiology consult Dr Merida following   - f/u TTE  - Upgrade to SDU   - was unable to perform LE duplex before, instead went for CT legs which showed no DVT    #B/L LE cellulitis in the setting of lymphedema   - initially on doxy per ID, changed to vancomycin   - f/u Vanc Troph level (previous troph inaccurate)  - CT Legs no clots / ++ soft tissue edema   - Pain control prn  - ID consult appreciated   - MRSA Positive in wound in legs  - MRSA Negative in Nares B/L   - Rheumatology consult appreciated - Recommend to Continue prednisone 40 mg daily given improvement in her symptoms and can taper on discharge 30 mg x 5 days, 20 mg x 5 days, 10 mg x 5 days. Started on solu-medrol for COPD exacerbation      #Chronic A.fib - on cardizem and eliquis  #HTN - on cardizem    DVT ppx: eliquis   GI ppx: not indicated  Diet: DASH diet  Activity: as tolerated.     GOC: FULL CODE / NO NGT OR PEG    For Follow-Up:  WILL HOLD ELIQUIS FOR POSSIBLE THORACENTESIS IN AM. IF NO PLAN FOR THORA PLEASE RESUME ELIQUIS  Monitor Diuresis on lasix   follow up repeat ABG  Taper steroids as tolerated, see previous rheum recs   closely follow respiratory status   follow up ID

## 2023-03-06 NOTE — SWALLOW BEDSIDE ASSESSMENT ADULT - SLP PERTINENT HISTORY OF CURRENT PROBLEM
86 yo female with PMH of HTN, Afib, PAD, Chronic lymphedema and COPD. Presents with a chief complain of SOB and BL LE pain and edema. CXR: increased bilateral opacities.
Patient is a 87y old Female who presents with a chief complaint of Currently admitted to medicine with the primary diagnosis of SOB (shortness of breath) and BL LE pain and edema

## 2023-03-06 NOTE — SWALLOW BEDSIDE ASSESSMENT ADULT - COMMENTS
Known to ST service during this admission, seen on 3/5/23 with recs for regular solids with thin liquids.  RN reports Pt. placed on continuous BIPAP this am, plan to transfer to ICU.
no prior SLP notes in chart

## 2023-03-06 NOTE — PROGRESS NOTE ADULT - SUBJECTIVE AND OBJECTIVE BOX
GIOVANNY BUCK 87y Female  MRN#: [id number]     Hospital Day: 7 d    SUBJECTIVE    Patient is a 87y old Female who presents with a chief complaint of Currently admitted to medicine with the primary diagnosis of SOB (shortness of breath) and BL LE pain and edema    OVERNIGHT EVENTS:  Patient was examined and seen at bedside complains of pain in the left lower leg where she currently has a wound     Discussed GOC: Wants FULL CODE but NO FEEDING TUBE OR NGT   MOLST Completed and GOC Documented     REVIEW OF SYMPTOMS:  CONSTITUTIONAL: improved pain; No headaches  EYES: No visual changes, eye pain, or discharge  ENT: No vertigo; No change in hearing; mild sore throat from coughing  NECK: No pain or stiffness  RESPIRATORY: cough as above, SOB stable but present    CARDIOVASCULAR: No chest pain or palpitations  GASTROINTESTINAL: No abdominal or epigastric pain; No nausea, vomiting, No diarrhea or constipation  GENITOURINARY: No dysuria  MUSCULOSKELETAL: No joint pain, pain in BL LE improved   NEUROLOGICAL: No numbness  SKIN: dry skin     OBJECTIVE  PAST MEDICAL & SURGICAL HISTORY  HTN (hypertension)    Afib s/p ablation 2001    Goiter no meds    Cellulitis chronic    OA (osteoarthritis)    PVD (peripheral vascular disease)    COPD (chronic obstructive pulmonary disease)    Anxiety    Obesity    H/O abdominal hysterectomy    H/O discectomy    H/O total knee replacement, bilateral    ALLERGIES:  aspirin (Other)  codeine (Other)  contrast media (iodine-based) (Other)  penicillin (Other)  sulfa drugs (Hives; Other)    MEDICATIONS  (STANDING):  apixaban 2.5 milliGRAM(s) Oral two times a day  budesonide  80 MICROgram(s)/formoterol 4.5 MICROgram(s) Inhaler 2 Puff(s) Inhalation two times a day  diltiazem    milliGRAM(s) Oral daily   doxycycline IVPB 100 milliGRAM(s) IV Intermittent every 12 hours  furosemide   Injectable 40 milliGRAM(s) IV Push daily  gabapentin 100 milliGRAM(s) Oral three times a day  senna 2 Tablet(s) Oral at bedtime    MEDICATIONS  (PRN):  acetaminophen     Tablet .. 650 milliGRAM(s) Oral every 6 hours PRN Temp greater or equal to 38C (100.4F), Mild Pain (1 - 3)    VITAL SIGNS: Last 24 Hours:  T(C): 36.6 (06 Mar 2023 14:08), Max: 37.6 (05 Mar 2023 20:00)  T(F): 97.8 (06 Mar 2023 14:08), Max: 99.6 (05 Mar 2023 20:00)  HR: 111 (06 Mar 2023 14:08) (111 - 124)  BP: 138/67 (06 Mar 2023 14:08) (138/67 - 167/63)  RR: 29 (06 Mar 2023 08:58) (18 - 132)  SpO2: 93% (06 Mar 2023 14:08) (78% - 96%)    LABS:                          10.6   11.69 )-----------( 350      ( 06 Mar 2023 07:15 )             37.0                           9.7    10.22 )-----------( 282      ( 05 Mar 2023 07:42 )             33.3     03-06    143  |  99  |  36<H>  ----------------------------<  119<H>  4.5   |  33<H>  |  1.0    Ca    9.2      06 Mar 2023 07:15    TPro  7.0  /  Alb  3.1<L>  /  TBili  0.4  /  DBili  x   /  AST  46<H>  /  ALT  37  /  AlkPhos  195<H>  03-06    03-05    144  |  99  |  40<H>  ----------------------------<  115<H>  3.7   |  36<H>  |  0.9    Ca    8.8      05 Mar 2023 07:42    TPro  6.5  /  Alb  3.1<L>  /  TBili  0.3  /  DBili  x   /  AST  81<H>  /  ALT  40  /  AlkPhos  170<H>  03-04                          9.1    13.03 )-----------( 287      ( 04 Mar 2023 06:57 )             30.1     03-04    141  |  99  |  45<H>  ----------------------------<  103<H>  3.5   |  31  |  1.1    Ca    8.8      04 Mar 2023 06:57    TPro  6.5  /  Alb  3.1<L>  /  TBili  0.3  /  DBili  x   /  AST  81<H>  /  ALT  40  /  AlkPhos  170<H>  03-04                        9.4    18.28 )-----------( 309      ( 03 Mar 2023 08:40 )             31.1     03-03    139  |  98  |  37<H>  ----------------------------<  158<H>  3.9   |  29  |  1.3    Ca    8.8      03 Mar 2023 08:40    TPro  6.4  /  Alb  2.8<L>  /  TBili  0.8  /  DBili  x   /  AST  24  /  ALT  11  /  AlkPhos  166<H>  03-02                          9.3    13.42 )-----------( 261      ( 02 Mar 2023 05:27 )             30.3     03-02    140  |  99  |  27<H>  ----------------------------<  97  3.6   |  29  |  1.3    Ca    8.4      02 Mar 2023 05:27    TPro  6.4  /  Alb  2.8<L>  /  TBili  0.8  /  DBili  x   /  AST  24  /  ALT  11  /  AlkPhos  166<H>  03-02                        8.6    8.42  )-----------( 277      ( 28 Feb 2023 06:53 )             28.3     02-28    139  |  103  |  20  ----------------------------<  95  3.9   |  27  |  1.1    Ca    8.8      28 Feb 2023 06:53  Mg     1.9     02-28    TPro  6.5  /  Alb  3.2<L>  /  TBili  0.7  /  DBili  x   /  AST  23  /  ALT  12  /  AlkPhos  165<H>  02-28    PT/INR - ( 28 Feb 2023 06:53 )   PT: 16.10 sec;   INR: 1.40 ratio      PTT - ( 28 Feb 2023 06:53 )  PTT:33.7 sec    CARDIAC MARKERS ( 27 Feb 2023 17:52 )    x     / <0.01 ng/mL / x     / x     / x        Serum Pro-Brain Natriuretic Peptide: 2953 pg/mL (02.27.23 @ 17:52)   C-Reactive Protein, Serum: 65.9 mg/L (02.28.23 @ 06:53)   CXR 3/5/23: RT Lung Effusion and Pulmonary edema (my interpretation)   ABG - ( 06 Mar 2023 14:57 -> pH, Arterial: 7.37  pH,  pCO2: 67    /  pO2: 70    / HCO3: 39    / Base Excess: 11.2  /  SaO2: 95.4      RADIOLOGY:  < from: VA Duplex Lower Ext Vein Scan, Bilat (02.28.23 @ 10:50) >  IMPRESSION:  No evidence of deep venous thrombosis in either lower extremity.  Bilateral calf veins are not visualized due to edema.    < end of copied text >  < from: Xray Foot AP + Lateral + Oblique, Left (02.28.23 @ 09:00) >  FINDINGS/  IMPRESSION:    Diffuse osteopenia. No acute fracture or dislocation. Chronic fracture   deformity of the fourth and fifth metatarsal shafts. 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 Severe degenerative changes of   the talonavicular joint forefoot. No definite ulcer or radiographic   evidence of osteomyelitis is noted at the region of the fifth   metatarsophalangeal joint. Consider MRI of the left forefoot is   clinically warranted. Moderate degenerative changes of the hindfoot.   Plantar heel ulcer adjacent to the calcaneus, without definite   radiographic evidence of osteomyelitis.    < end of copied text >  < from: Xray Chest 1 View- PORTABLE-Urgent (02.27.23 @ 18:46) >  Impression:    Bilateral interstitial opacities possibly representing pulmonary edema.   Small left pleural effusion.    < end of copied text >    PHYSICAL EXAM:  CONSTITUTIONAL: Lethargy / SOB this am / Improving on BiPAP   HEAD: Atraumatic, normocephalic  EYES: PERRLA, conjunctiva and sclera clear  ENT: Supple, no masses, thyromegaly present , no JVD; moist mucous membranes  PULMONARY: BL CTA w shallow breaths   CARDIOVASCULAR: Regular rate and rhythm; no murmurs, rubs, or gallops BL DECREASED DORSALIS PEDIS PULSES  GASTROINTESTINAL: Soft, non-tender, non-distended; bowel sounds present  MUSCULOSKELETAL: marked BL LE edema w chronic venous stasis dermatitis (see below)   NEUROLOGY: A&Ox3  SKIN: BL LE hyperpigmentation and chronic lymphedema w swelling and warmth, tender to palpation of ankle, left foot> right swollen

## 2023-03-06 NOTE — SWALLOW BEDSIDE ASSESSMENT ADULT - ADDITIONAL RECOMMENDATIONS
No further ST f/u warranted. Reconsult service once respiratory status and medical picture improves.

## 2023-03-06 NOTE — SWALLOW BEDSIDE ASSESSMENT ADULT - SWALLOW EVAL: DIAGNOSIS
PO trials are not appropriate at this time 2' high oxygen requirements via BIPAP.
limited acceptance of trials, no overt s/s of penetration/aspiration with these trials of  thin liquids and regular solids

## 2023-03-07 NOTE — PROGRESS NOTE ADULT - ASSESSMENT
87F PMHx HTN, AFib on eliquis, PVD, COPD here with acute hypoxic resp failure. AFib with RVR. LLE cellulitis.

## 2023-03-07 NOTE — PROGRESS NOTE ADULT - PROBLEM SELECTOR PLAN 2
L foot ulcer s/p debridement 2/28  wcx mrsa  bcx ntd  ct le with bl subq edema  vanco level elevated  dose reduced  f/u repeat trough

## 2023-03-07 NOTE — PROGRESS NOTE ADULT - SUBJECTIVE AND OBJECTIVE BOX
INTERVAL HPI/OVERNIGHT EVENTS:    SUBJECTIVE: Patient seen and examined at bedside.     cc: sob  no cp, sob, abd pain, fever  no sob, orthopnea, pnd, cough    OBJECTIVE:    VITAL SIGNS:  Vital Signs Last 24 Hrs  T(C): 36.6 (07 Mar 2023 12:00), Max: 36.6 (07 Mar 2023 12:00)  T(F): 97.9 (07 Mar 2023 12:00), Max: 97.9 (07 Mar 2023 12:00)  HR: 96 (07 Mar 2023 12:00) (79 - 102)  BP: 115/61 (07 Mar 2023 12:00) (115/61 - 147/69)  BP(mean): 82 (07 Mar 2023 12:00) (82 - 99)  RR: 18 (07 Mar 2023 12:00) (18 - 21)  SpO2: 95% (07 Mar 2023 12:00) (95% - 99%)    Parameters below as of 07 Mar 2023 04:00  Patient On (Oxygen Delivery Method): BiPAP/CPAP    O2 Concentration (%): 50      PHYSICAL EXAM:    General: NAD  HEENT: NC/AT; PERRL, clear conjunctiva  Neck: supple  Respiratory: decreased bs at bases  Cardiovascular: +S1/S2; RRR  Abdomen: soft, NT/ND; +BS x4  Extremities: WWP, 2+ peripheral pulses b/l; no LE edema  Skin: normal color and turgor; no rash  Neurological:    MEDICATIONS:  MEDICATIONS  (STANDING):  albuterol/ipratropium for Nebulization 3 milliLiter(s) Nebulizer every 6 hours  budesonide  80 MICROgram(s)/formoterol 4.5 MICROgram(s) Inhaler 2 Puff(s) Inhalation two times a day  cefepime   IVPB      cefepime   IVPB 2000 milliGRAM(s) IV Intermittent every 12 hours  diltiazem    milliGRAM(s) Oral daily  gabapentin 100 milliGRAM(s) Oral three times a day  methylPREDNISolone sodium succinate Injectable 40 milliGRAM(s) IV Push every 12 hours  potassium chloride   Powder 20 milliEquivalent(s) Oral once  saccharomyces boulardii 250 milliGRAM(s) Oral two times a day  senna 2 Tablet(s) Oral at bedtime  vancomycin  IVPB 750 milliGRAM(s) IV Intermittent every 12 hours  vancomycin  IVPB        MEDICATIONS  (PRN):  acetaminophen     Tablet .. 650 milliGRAM(s) Oral every 6 hours PRN Temp greater or equal to 38C (100.4F), Mild Pain (1 - 3)  guaifenesin/dextromethorphan Oral Liquid 10 milliLiter(s) Oral every 6 hours PRN Cough      ALLERGIES:  Allergies    aspirin (Other)  codeine (Other)  contrast media (iodine-based) (Other)  penicillin (Other)  sulfa drugs (Hives; Other)    Intolerances        LABS:                        10.1   9.59  )-----------( 263      ( 07 Mar 2023 05:46 )             34.7     Hemoglobin: 10.1 g/dL (03-07 @ 05:46)  Hemoglobin: 10.6 g/dL (03-06 @ 07:15)  Hemoglobin: 9.7 g/dL (03-05 @ 07:42)  Hemoglobin: 9.1 g/dL (03-04 @ 06:57)  Hemoglobin: 9.4 g/dL (03-03 @ 08:40)    CBC Full  -  ( 07 Mar 2023 05:46 )  WBC Count : 9.59 K/uL  RBC Count : 4.07 M/uL  Hemoglobin : 10.1 g/dL  Hematocrit : 34.7 %  Platelet Count - Automated : 263 K/uL  Mean Cell Volume : 85.3 fL  Mean Cell Hemoglobin : 24.8 pg  Mean Cell Hemoglobin Concentration : 29.1 g/dL  Auto Neutrophil # : 8.41 K/uL  Auto Lymphocyte # : 0.53 K/uL  Auto Monocyte # : 0.56 K/uL  Auto Eosinophil # : 0.00 K/uL  Auto Basophil # : 0.01 K/uL  Auto Neutrophil % : 87.8 %  Auto Lymphocyte % : 5.5 %  Auto Monocyte % : 5.8 %  Auto Eosinophil % : 0.0 %  Auto Basophil % : 0.1 %    03-07    148<H>  |  100  |  42<H>  ----------------------------<  148<H>  3.9   |  38<H>  |  1.0    Ca    8.9      07 Mar 2023 05:46  Mg     2.1     03-07    TPro  6.5  /  Alb  3.1<L>  /  TBili  6.0<H>  /  DBili  x   /  AST  28  /  ALT  31  /  AlkPhos  167<H>  03-07    Creatinine Trend: 1.0<--, 1.0<--, 0.9<--, 1.1<--, 1.3<--, 1.3<--  LIVER FUNCTIONS - ( 07 Mar 2023 05:46 )  Alb: 3.1 g/dL / Pro: 6.5 g/dL / ALK PHOS: 167 U/L / ALT: 31 U/L / AST: 28 U/L / GGT: x           PT/INR - ( 07 Mar 2023 05:46 )   PT: 16.40 sec;   INR: 1.42 ratio         PTT - ( 07 Mar 2023 05:46 )  PTT:31.3 sec    hs Troponin:    ABG - ( 06 Mar 2023 14:57 )  pH, Arterial: 7.37  pH, Blood: x     /  pCO2: 67    /  pO2: 70    / HCO3: 39    / Base Excess: 11.2  /  SaO2: 95.4                      CSF:                      EKG:   MICROBIOLOGY:    Culture - Blood (collected 05 Mar 2023 07:42)  Source: .Blood None  Preliminary Report (06 Mar 2023 22:01):    No growth to date.      IMAGING:      Labs, imaging, EKG personally reviewed    RADIOLOGY & ADDITIONAL TESTS: Reviewed.

## 2023-03-07 NOTE — DIETITIAN INITIAL EVALUATION ADULT - OTHER INFO
Pt presented with BLE pain and dyspnea, currently being treated for acute respiratory failure, fluid overload and LLE MRSA Cellulitis in the setting of Chronic Bilateral Lymphedema.

## 2023-03-07 NOTE — DIETITIAN INITIAL EVALUATION ADULT - PERTINENT MEDS FT
Patient calling with blood pressure readings. Please advise.    2/27/18: 149/92   Pulse: 83  3/1/18: 143/73     Pulse:90  3/3/18: 130/78     Pulse: 65  3/7/18: 137/84      Pulse: 70  3/13/18: 156/92    Pulse: 75  3/14/18: 161/98    Pulse: 80  3/16/18: 145/86    Pulse: 78  3/17/18: 146/80    Pulse: 71  3/19/18: 143/80    Pulse: 70  3/20/18: 149/94    Pulse: 70  3/21/18: 156/86    Pulse: 77  3/27/18: 154/95    Pulse: 89  3/28/18: 157/103  Pulse: 97  3/28/18: 158/88    Pulse: 97  4/1/18: 154/95      Pulse: 75   MEDICATIONS  (STANDING):  albuterol/ipratropium for Nebulization 3 milliLiter(s) Nebulizer every 6 hours  budesonide  80 MICROgram(s)/formoterol 4.5 MICROgram(s) Inhaler 2 Puff(s) Inhalation two times a day  cefepime   IVPB      cefepime   IVPB 2000 milliGRAM(s) IV Intermittent every 12 hours  diltiazem    milliGRAM(s) Oral daily  furosemide   Injectable 40 milliGRAM(s) IV Push two times a day  gabapentin 100 milliGRAM(s) Oral three times a day  *methylPREDNISolone sodium succinate Injectable 40 milliGRAM(s) IV Push every 12 hours  potassium chloride   Powder 20 milliEquivalent(s) Oral once  saccharomyces boulardii 250 milliGRAM(s) Oral two times a day  senna 2 Tablet(s) Oral at bedtime  vancomycin  IVPB 750 milliGRAM(s) IV Intermittent every 12 hours  vancomycin  IVPB        MEDICATIONS  (PRN):  acetaminophen     Tablet .. 650 milliGRAM(s) Oral every 6 hours PRN Temp greater or equal to 38C (100.4F), Mild Pain (1 - 3)  guaifenesin/dextromethorphan Oral Liquid 10 milliLiter(s) Oral every 6 hours PRN Cough

## 2023-03-07 NOTE — DIETITIAN INITIAL EVALUATION ADULT - NSFNSGIIOFT_GEN_A_CORE
Weight above using actual weight taken on 3/7 by nursing    I&O's Detail    06 Mar 2023 07:01  -  07 Mar 2023 07:00  --------------------------------------------------------  IN:    IV PiggyBack: 250 mL  Total IN: 250 mL    OUT:    Indwelling Catheter - Urethral (mL): 1510 mL  Total OUT: 1510 mL    Total NET: -1260 mL

## 2023-03-07 NOTE — DIETITIAN INITIAL EVALUATION ADULT - PERTINENT LABORATORY DATA
10.1   9.59  )-----------( 263      ( 07 Mar 2023 05:46 )             34.7     03-07    148<H>  |  100  |  42<H>  ----------------------------<  148<H>  3.9   |  38<H>  |  1.0    Ca    8.9      07 Mar 2023 05:46  Mg     2.1     03-07    TPro  6.5  /  Alb  3.1<L>  /  TBili  6.0<H>  /  DBili  x   /  AST  28  /  ALT  31  /  AlkPhos  167<H>  03-07  A1C with Estimated Average Glucose Result: 6.0 % (02-28-23 @ 06:53)

## 2023-03-07 NOTE — DIETITIAN INITIAL EVALUATION ADULT - ORAL INTAKE PTA/DIET HISTORY
Hx obtained from pt at bedside. Reports PO intake decreased by 50% from baseline for 1 week PTA due to illness. No food allergy/intolerance. No therapeutic diet or dietary restrictions. No swallowing issues. Eats soft meats such as meatloafs at home for easy swallowing. UBW 213lbs 6 months ago.

## 2023-03-07 NOTE — DIETITIAN INITIAL EVALUATION ADULT - NS FNS DIET ORDER
Diet, DASH/TLC:   Sodium & Cholesterol Restricted  1200mL Fluid Restriction ([id number]) (02-28-23 @ 03:48) [Active]

## 2023-03-07 NOTE — PROGRESS NOTE ADULT - SUBJECTIVE AND OBJECTIVE BOX
Patient is a 87y old  Female who presents with a chief complaint of Dyspnea (06 Mar 2023 16:41)        Over Night Events:  remains critically ill on BiPAP.  Off pressors.          ROS:     All ROS are negative except HPI         PHYSICAL EXAM    ICU Vital Signs Last 24 Hrs  T(C): 36.4 (07 Mar 2023 04:00), Max: 36.6 (06 Mar 2023 14:08)  T(F): 97.6 (07 Mar 2023 04:00), Max: 97.8 (06 Mar 2023 14:08)  HR: 79 (07 Mar 2023 04:00) (79 - 124)  BP: 132/63 (07 Mar 2023 04:00) (117/59 - 147/69)  BP(mean): 90 (07 Mar 2023 04:00) (82 - 99)  ABP: --  ABP(mean): --  RR: 21 (07 Mar 2023 04:00) (20 - 132)  SpO2: 99% (07 Mar 2023 04:00) (78% - 99%)    O2 Parameters below as of 07 Mar 2023 04:00  Patient On (Oxygen Delivery Method): BiPAP/CPAP    O2 Concentration (%): 50        CONSTITUTIONAL:  In NAD    ENT:   Airway patent,   Mouth with normal mucosa.       EYES:   Pupils equal,   Round and reactive to light.    CARDIAC:   Normal rate,   Irregular rhythm.    Edema      RESPIRATORY:   No wheezing  Dec BS left base   Normal chest expansion  Not tachypneic,  No use of accessory muscles    GASTROINTESTINAL:  Abdomen soft,   Non-tender,   No guarding,   + BS    MUSCULOSKELETAL:   Range of motion is not limited,  No clubbing, cyanosis    NEUROLOGICAL:   Alert  No motor  deficits.    SKIN:   Skin normal color for race,   No evidence of rash.    PSYCHIATRIC:   No apparent risk to self or others.          03-05-23 @ 07:01  -  03-06-23 @ 07:00  --------------------------------------------------------  IN:  Total IN: 0 mL    OUT:    Voided (mL): 1000 mL  Total OUT: 1000 mL    Total NET: -1000 mL      03-06-23 @ 07:01  -  03-07-23 @ 06:17  --------------------------------------------------------  IN:    IV PiggyBack: 250 mL  Total IN: 250 mL    OUT:    Indwelling Catheter - Urethral (mL): 1310 mL  Total OUT: 1310 mL    Total NET: -1060 mL          LABS:                            10.1   9.59  )-----------( 263      ( 07 Mar 2023 05:46 )             34.7                                               03-06    143  |  99  |  36<H>  ----------------------------<  119<H>  4.5   |  33<H>  |  1.0    Ca    9.2      06 Mar 2023 07:15    TPro  7.0  /  Alb  3.1<L>  /  TBili  0.4  /  DBili  x   /  AST  46<H>  /  ALT  37  /  AlkPhos  195<H>  03-06                                                                                           LIVER FUNCTIONS - ( 06 Mar 2023 07:15 )  Alb: 3.1 g/dL / Pro: 7.0 g/dL / ALK PHOS: 195 U/L / ALT: 37 U/L / AST: 46 U/L / GGT: x                                                  Culture - Blood (collected 05 Mar 2023 07:42)  Source: .Blood None  Preliminary Report (06 Mar 2023 22:01):    No growth to date.                                                                                       ABG - ( 06 Mar 2023 14:57 )  pH, Arterial: 7.37  pH, Blood: x     /  pCO2: 67    /  pO2: 70    / HCO3: 39    / Base Excess: 11.2  /  SaO2: 95.4                MEDICATIONS  (STANDING):  albuterol/ipratropium for Nebulization 3 milliLiter(s) Nebulizer every 6 hours  budesonide  80 MICROgram(s)/formoterol 4.5 MICROgram(s) Inhaler 2 Puff(s) Inhalation two times a day  diltiazem    milliGRAM(s) Oral daily  diltiazem Injectable 10 milliGRAM(s) IV Push once  furosemide   Injectable 40 milliGRAM(s) IV Push two times a day  gabapentin 100 milliGRAM(s) Oral three times a day  methylPREDNISolone sodium succinate Injectable 40 milliGRAM(s) IV Push every 12 hours  saccharomyces boulardii 250 milliGRAM(s) Oral two times a day  senna 2 Tablet(s) Oral at bedtime  vancomycin  IVPB 1000 milliGRAM(s) IV Intermittent every 12 hours    MEDICATIONS  (PRN):  acetaminophen     Tablet .. 650 milliGRAM(s) Oral every 6 hours PRN Temp greater or equal to 38C (100.4F), Mild Pain (1 - 3)      New X-rays reviewed:                                                                                  ECHO

## 2023-03-07 NOTE — PROGRESS NOTE ADULT - NSPROGADDITIONALINFOA_GEN_ALL_CORE
#Progress Note Handoff:  Pending (specify):  Consults_________, Tests________, Test Results_______, Other_____hypoxia____  Family discussion: d/w pt at bedside re: treatment plan, primary dx  Disposition: Home___/SNF___/Other________/Unknown at this time_____x___

## 2023-03-07 NOTE — PROGRESS NOTE ADULT - ASSESSMENT
IMPRESSION:    Acute hypoxemic respiratory failure  Acute on chronic hypercapnic respiratory failure  Fluid overload  COPD with mild exacerbation   Left pleural effusion   Probable LAWRENCE refused testing   Thyroid mass SP FNA   LE cellulitis with ulcer and abscess MRSA +  HO Afib on Eliquis  HFpEF  Chronic lymphedema  HO Peripheral vascular disease    PLAN:    CNS:  Avoid CNS depressants     HEENT: Oral care.  Speech and swallow     PULMONARY:  HOB @ 45 degrees.  Aspiration precautions.  NIV on and Off and during sleep.  Solumedrol 40 mg daily.  Nebs Q4 PRN.  TO repeat Chest US today.  Possible left thoracentesis.      CARDIOVASCULAR:  Continue maximizing cardiac therapy adn volume status.  FU repeat ECHo.  Repeat BNP.      GI: GI prophylaxis.  Feeding per speech outside NIV.  Bowel regimen     RENAL:  Follow up lytes.  Correct as needed.      INFECTIOUS DISEASE: Follow up cultures.  ABX per ID.      HEMATOLOGICAL:  DVT prophylaxis.  Hold AC for possible thoracentesis.  MOnitor CBC     ENDOCRINE:  Follow up FS.  Insulin protocol if needed.      MUSCULOSKELETAL:  Bed rest     SDU for now     GOC             Wean O2. Target POx 88%-92%  Duonebs Q4 and PRN  Solumedrol 40 mg IV Bid  Continue antibiotics per ID add cefepime  BiPAP 16/8 four hours on and off and HS repeat ABG  Repeat procalcitonin, Pro-BNP and Echo  Chest US showed left large pleural effusion. Restart lasix, continue aggressive diureses, daily CXR, strict Is and Os. Hold Eliquis, will POCUS tomorrow, if not responding to lasix, may need thoracentesis.  HOB at 45, aspiration precautions  DVT ppx. Was on Eliquis  Ambulate as tolerates / PT / OT  Step Down IMPRESSION:    Acute hypoxemic respiratory failure  Acute on chronic hypercapnic respiratory failure  Fluid overload  COPD with mild exacerbation   Left pleural effusion   Probable LAWRENCE refused testing   Thyroid mass SP FNA   LE cellulitis with ulcer and abscess MRSA +  HO Afib on Eliquis  HFpEF  Chronic lymphedema  HO Peripheral vascular disease    PLAN:    CNS:  Avoid CNS depressants     HEENT: Oral care.  Speech and swallow     PULMONARY:  HOB @ 45 degrees.  Aspiration precautions.  NIV on and Off and during sleep.  Solumedrol 40 mg daily.  Nebs Q4 PRN.  TO repeat Chest US today.  Possible left thoracentesis.      CARDIOVASCULAR:  Continue maximizing cardiac therapy adn volume status.  FU repeat ECHo.  Repeat BNP.      GI: GI prophylaxis.  Feeding per speech outside NIV.  Bowel regimen     RENAL:  Follow up lytes.  Correct as needed.      INFECTIOUS DISEASE: Follow up cultures.  ABX per ID.      HEMATOLOGICAL:  DVT prophylaxis.  Hold AC for possible thoracentesis.  MOnitor CBC     ENDOCRINE:  Follow up FS.  Insulin protocol if needed.      MUSCULOSKELETAL:  Bed rest     SDU for now     [facility]

## 2023-03-07 NOTE — DIETITIAN INITIAL EVALUATION ADULT - PERSON TAUGHT/METHOD
reviewed DASH diet and fluid restriction; downgrade diet to provide softer foods; PO supplements to aid intake/verbal instruction/patient instructed/daughter instructed

## 2023-03-07 NOTE — DIETITIAN INITIAL EVALUATION ADULT - NUTRITIONGOAL OUTCOME1
pt to meet >50% estimated needs in 3 days. RD to follow as per high risk protocol.  Monitor diet order, kcal intake, body composition, NFPE, labs  (lytes, BG, renal indices)

## 2023-03-07 NOTE — PROGRESS NOTE ADULT - PROBLEM SELECTOR PLAN 1
cxr bl opacities, L effusion  va duplex neg  possible thora with pulm  eliquis on hold  hold lasix  cont bipap  solumedrol 40 iv bid  empiric cefepime per pulm

## 2023-03-08 NOTE — PROGRESS NOTE ADULT - ASSESSMENT
IMPRESSION:    Acute hypoxemic respiratory failure resolved   Acute on chronic hypercapnic respiratory failure improved   Fluid overload   COPD with mild exacerbation   Transudative Left pleural effusion sp thoracentesis    Probable LAWRENCE refused testing   Thyroid mass SP FNA   LE cellulitis with ulcer and abscess MRSA +  HO Afib on Eliquis  HFpEF  Chronic lymphedema  HO Peripheral vascular disease    PLAN:    CNS:  Avoid CNS depressants     HEENT: Oral care.  Speech and swallow     PULMONARY:  HOB @ 45 degrees.  Aspiration precautions.  NIV on and Off and during sleep.  DC Solumedrol.  Few days of Prednisone. FU pleural fluid cytology.      CARDIOVASCULAR:  Continue maximizing cardiac therapy and volume status.  FU repeat ECHO.  Repeat BNP noted.      GI: GI prophylaxis.  Feeding per speech.  Bowel regimen     RENAL:  Follow up lytes.  Correct as needed.      INFECTIOUS DISEASE: Follow up cultures.  ABX per ID.      HEMATOLOGICAL:  DVT prophylaxis.  Can restart AC tonight.  Monitor CBC     ENDOCRINE:  Follow up FS.  Insulin protocol if needed.      MUSCULOSKELETAL:  Bed rest     DGTF    GOC

## 2023-03-08 NOTE — PROGRESS NOTE ADULT - SUBJECTIVE AND OBJECTIVE BOX
Patient is a 87y old  Female who presents with a chief complaint of Wheezing     (03-07-23)      Pt seen and examined at bedside. No CP or SOB.      ABG - ( 06 Mar 2023 14:57 )  pH: 7.37  /  pCO2: 67    /  pO2: 70    / HCO3: 39    / Base Excess: 11.2  /  SaO2: 95.4                PAST MEDICAL & SURGICAL HISTORY:  HTN (hypertension)    Afib  s/p ablation 2001    Goiter  no meds    Cellulitis  chronic    OA (osteoarthritis)    PVD (peripheral vascular disease)    COPD (chronic obstructive pulmonary disease)    Anxiety    Obesity    H/O abdominal hysterectomy    H/O discectomy    H/O total knee replacement, bilateral        VITAL SIGNS (Last 24 hrs):  T(C): 36.4 (03-08-23 @ 12:24), Max: 36.7 (03-08-23 @ 08:03)  HR: 115 (03-08-23 @ 12:24) (96 - 115)  BP: 138/61 (03-08-23 @ 12:24) (114/56 - 157/67)  RR: 18 (03-08-23 @ 12:24) (16 - 30)  SpO2: 95% (03-08-23 @ 12:24) (92% - 96%)  Wt(kg): --  Daily Height in cm: 157.48 (07 Mar 2023 15:06)    Daily     I&O's Summary    07 Mar 2023 07:01  -  08 Mar 2023 07:00  --------------------------------------------------------  IN: 0 mL / OUT: 2100 mL / NET: -2100 mL        PHYSICAL EXAM:  GENERAL: NAD, frail and elderly   HEAD:  Atraumatic, Normocephalic  EYES: EOMI, PERRLA, conjunctiva and sclera clear  NECK: Supple, No JVD  CHEST/LUNG: Clear to auscultation bilaterally; No wheeze  HEART: Regular rate and rhythm; No murmurs, rubs, or gallops  ABDOMEN: Soft, Nontender, Nondistended; Bowel sounds present  EXTREMITIES:  2+ Peripheral Pulses, No clubbing, cyanosis, or edema  PSYCH: AAOx3  NEUROLOGY: non-focal  SKIN: No rashes or lesions    Labs Reviewed  Spoke to patient in regards to abnormal labs.    CBC Full  -  ( 08 Mar 2023 04:54 )  WBC Count : 16.14 K/uL  Hemoglobin : 9.7 g/dL  Hematocrit : 33.0 %  Platelet Count - Automated : 278 K/uL  Mean Cell Volume : 84.0 fL  Mean Cell Hemoglobin : 24.7 pg  Mean Cell Hemoglobin Concentration : 29.4 g/dL  Auto Neutrophil # : 15.09 K/uL  Auto Lymphocyte # : 0.34 K/uL  Auto Monocyte # : 0.60 K/uL  Auto Eosinophil # : 0.00 K/uL  Auto Basophil # : 0.01 K/uL  Auto Neutrophil % : 93.5 %  Auto Lymphocyte % : 2.1 %  Auto Monocyte % : 3.7 %  Auto Eosinophil % : 0.0 %  Auto Basophil % : 0.1 %    BMP:    03-08 @ 04:54    Blood Urea Nitrogen - 48  Calcium - 8.9  Carbond Dioxide - 37  Chloride - 96  Creatinine - 1.0  Glucose - 149  Potassium - 4.0  Sodium - 143      Hemoglobin A1c -   PT/INR - ( 07 Mar 2023 05:46 )   PT: 16.40 sec;   INR: 1.42 ratio         PTT - ( 07 Mar 2023 05:46 )  PTT:31.3 sec  Urine Culture:  03-07 @ 17:00 Urine culture: --    Culture Results:   Testing in progress  Method Type: --  Organism: --  Organism Identification: --  Specimen Source: Pleural Fl Pleural Fluid  03-05 @ 07:42 Urine culture: --    Culture Results:   No growth to date.  Method Type: --  Organism: --  Organism Identification: --  Specimen Source: .Blood None        COVID Labs  CRP:  65.9 (02-28-23)    Procalcitonin, Serum: 0.19 ng/mL (02-28-23 @ 06:53)    D-Dimer:        Imaging reviewed independently and reviewed read    < from: Xray Chest 1 View- PORTABLE-Routine (Xray Chest 1 View- PORTABLE-Routine in AM.) (03.08.23 @ 07:14) >  Impression:    Bilateral opacities/effusions, slightly increasing on left.    < end of copied text >      MEDICATIONS  (STANDING):  albuterol/ipratropium for Nebulization 3 milliLiter(s) Nebulizer every 6 hours  budesonide  80 MICROgram(s)/formoterol 4.5 MICROgram(s) Inhaler 2 Puff(s) Inhalation two times a day  cefepime   IVPB      cefepime   IVPB 2000 milliGRAM(s) IV Intermittent every 12 hours  diltiazem    milliGRAM(s) Oral daily  gabapentin 100 milliGRAM(s) Oral three times a day  potassium chloride   Powder 20 milliEquivalent(s) Oral once  saccharomyces boulardii 250 milliGRAM(s) Oral two times a day  senna 2 Tablet(s) Oral at bedtime  vancomycin  IVPB      vancomycin  IVPB 750 milliGRAM(s) IV Intermittent every 12 hours    MEDICATIONS  (PRN):  acetaminophen     Tablet .. 650 milliGRAM(s) Oral every 6 hours PRN Temp greater or equal to 38C (100.4F), Mild Pain (1 - 3)  guaifenesin/dextromethorphan Oral Liquid 10 milliLiter(s) Oral every 6 hours PRN Cough

## 2023-03-08 NOTE — PROGRESS NOTE ADULT - ASSESSMENT
87F PMHx HTN, AFib on eliquis, PVD, COPD here with acute hypoxic resp failure. AFib with RVR. LLE cellulitis.    #Acute respiratory failure with hypoxia.   cxr bl opacities, L effusion  va duplex neg  possible thora with pulm  restart eliquis tonight as per DW CC s/p thora   hold lasix  cont bipap  solumedrol 40 iv bid  empiric cefepime per pulm.    #Cellulitis of left lower extremity.   L foot ulcer s/p debridement 2/28  wcx mrsa  bcx ntd  ct le with bl subq edema  vanco level 21.8 3/8, check trough 3/9   dose reduced 3/7  f/u repeat trough.    #Chronic atrial fibrillation with rapid ventricular response.   resolved  dilt 120  tte  eliquis on hold.    #Hypernatremia- reolved   possible overdiuresis  hold lasix  trend.     HTN (hypertension).   ·  Plan: outpt f/u.     PVD (peripheral vascular disease).   ·  Plan: outpt f/u.    On deep vein thrombosis (DVT) prophylaxis.   ·  Plan: eliquis restarted    #Progress Note Handoff  Pending (specify):  follow up as above  Family discussion: house staff updated pt family  Disposition: downgrade to rmf as per CC, dw CC  Decision to admit the pt is based on acuity as above   High risk given above acuity and comorbidities, labs reviewed

## 2023-03-08 NOTE — CHART NOTE - NSCHARTNOTEFT_GEN_A_CORE
[facility] Transfer Note    Transfer from: [facility]icu  Transfer to:  ( x ) Medicine    (  ) Telemetry    (  ) RCU    (  ) Palliative    (  ) Stroke Unit    (  ) ______________          MEDICATIONS:  STANDING MEDICATIONS  albuterol/ipratropium for Nebulization 3 milliLiter(s) Nebulizer every 6 hours  budesonide  80 MICROgram(s)/formoterol 4.5 MICROgram(s) Inhaler 2 Puff(s) Inhalation two times a day  cefepime   IVPB      cefepime   IVPB 2000 milliGRAM(s) IV Intermittent every 12 hours  diltiazem    milliGRAM(s) Oral daily  gabapentin 100 milliGRAM(s) Oral three times a day  potassium chloride   Powder 20 milliEquivalent(s) Oral once  saccharomyces boulardii 250 milliGRAM(s) Oral two times a day  senna 2 Tablet(s) Oral at bedtime  vancomycin  IVPB      vancomycin  IVPB 750 milliGRAM(s) IV Intermittent every 12 hours    PRN MEDICATIONS  acetaminophen     Tablet .. 650 milliGRAM(s) Oral every 6 hours PRN  guaifenesin/dextromethorphan Oral Liquid 10 milliLiter(s) Oral every 6 hours PRN      VITAL SIGNS: Last 24 Hours  T(C): 36.4 (08 Mar 2023 12:24), Max: 36.7 (08 Mar 2023 08:03)  T(F): 97.6 (08 Mar 2023 12:24), Max: 98 (08 Mar 2023 08:03)  HR: 115 (08 Mar 2023 12:24) (96 - 115)  BP: 138/61 (08 Mar 2023 12:24) (114/56 - 157/67)  BP(mean): 88 (08 Mar 2023 12:24) (78 - 102)  RR: 18 (08 Mar 2023 12:24) (16 - 30)  SpO2: 95% (08 Mar 2023 12:24) (92% - 96%)    LABS:                        9.7    16.14 )-----------( 278      ( 08 Mar 2023 04:54 )             33.0     03-08    143  |  96<L>  |  48<H>  ----------------------------<  149<H>  4.0   |  37<H>  |  1.0    Ca    8.9      08 Mar 2023 04:54  Mg     2.1     03-08    TPro  6.2  /  Alb  2.9<L>  /  TBili  0.3  /  DBili  x   /  AST  32  /  ALT  32  /  AlkPhos  152<H>  03-08    PT/INR - ( 07 Mar 2023 05:46 )   PT: 16.40 sec;   INR: 1.42 ratio         PTT - ( 07 Mar 2023 05:46 )  PTT:31.3 sec    ABG - ( 06 Mar 2023 14:57 )  pH, Arterial: 7.37  pH, Blood: x     /  pCO2: 67    /  pO2: 70    / HCO3: 39    / Base Excess: 11.2  /  SaO2: 95.4                Culture - Fungal, Body Fluid (collected 07 Mar 2023 17:00)  Source: Pleural Fl Pleural Fluid  Preliminary Report (08 Mar 2023 06:42):    Testing in progress    Culture - Body Fluid with Gram Stain (collected 07 Mar 2023 17:00)  Source: Pleural Fl Pleural Fluid  Gram Stain (08 Mar 2023 03:50):    Few polymorphonuclear leukocytes seen per low power field    No organisms seen per oil power field      Hosp course:  Hospital COURSE:  88 yo female with a pmh of htn, afib on eliquis, pvd, chronic lymphedema and copd present c/o BLE pain and SOB.    Patient was in her usual state of health 4 weeks ago when she developed SOB which is new for her, mostly on exertion (after walking 2 feet), increases while lying down. Patient followed up with Dr. Pacheco 1 month ago and has TTE which was normal as per patient. Denies HA, dizziness, NVD, fever, chest pain, abdominal pain, change in urination and change in bowel habits.     Patient was diagnosed with CHF exacerbation and started on PO lasix. Lasix was discontinued due to uptrending Cr/BUN. On the morning of 3/6 alerted by RN that patient is desatting to high 70s on nasal canula. Patient lethargic appearing. Placed on NRB with improvement of SPO2 to mid 80s. Patient transitioned to BIPAP. StAT ABG shows CO2 79, reflecting CO2 retention, possible additional COPD exacerbation. STAT CXR showed worsening fluid overload, L>R. Patient was given STAT dose lasix 40mg IV push, started on solu-medrol, duonebs, and continued with BIPAP. Plan to continue with lasix 40mg IV BID, solu-medrol 60mg q8h, standing duonebs and BIPAP. Plan for repeat ABG. Patient also noted to be in afib RVR, known history of chronic afib. Now in the 110s HR. Was seen by critical care team. Large pleural effusion was noted on bedside ultrasound.    SICU course:   pt was here from 03/06 to 03/08  - pt was continued on diuresis  - thoracentesis done on 03/07: exudative  - pt has clinically improved and stable for downgrade    87F PMH: HTN, A.fib (on eliquis), PAD, Chronic lymphedema and COPD presents to ED for b/L LE pain and dyspnea on exertion. Upgraded to SICU for hypoxia suspected due to volume overload, now being downgraded to floor.    #hypercapnic and hypoxic respiratory failure 2/2 to HFpEF and COPD exacerbation   - CXR 03/06 shows worsening b/l L>R   - ABG CO2 79   - holding lasix for now  - solumedrol dced, prednisone 40 for now, also taking prednisone for ankle, fup with rheum as well about steroids, otherwise can stop after a few days form pulm standpoint  - Start standing duonebs  - c/w Symbicort 2 puffs BID standing   - c/w BIPAP at night  - monitor daily weight, Strict I & O, Low Na diet with fluid restriction.   - Cardiology Dr Pacheco following   - f/u TTE  - va duplex neg    #B/L LE cellulitis in the setting of lymphedema   - initially on doxy per ID, changed to vancomycin   - f/u Vanc Troph level (previous troph inaccurate)  - CT Legs no clots / ++ soft tissue edema   - Pain control prn  - ID following: follow recs for abx and duration  - MRSA Positive in wound in legs  - MRSA Negative in Nares B/L   - Rheumatology consult appreciated - Recommend to Continue prednisone 40 mg daily given improvement in her symptoms and can taper on discharge 30 mg x 5 days, 20 mg x 5 days, 10 mg x 5 days. Started on solu-medrol for COPD exacerbation  - solumedrol dced, prednisone 40 for now, also taking prednisone for ankle, fup with rheum as well about steroids, otherwise can stop after a few days form pulm standpoint    #Chronic A.fib - on cardizem and eliquis  #HTN - on cardizem    DVT ppx: eliquis   GI ppx: not indicated  Diet: DASH diet  Activity: as tolerated.     GOC: FULL CODE / NO NGT OR PEG    For Follow-Up:  Taper steroids as tolerated, see previous rheum recs  closely follow respiratory status   follow up ID. [facility] Transfer Note    Transfer from: [facility]  Transfer to:  ( x ) Medicine    (  ) Telemetry    (  ) RCU    (  ) Palliative    (  ) Stroke Unit    (  ) ______________          MEDICATIONS:  STANDING MEDICATIONS  albuterol/ipratropium for Nebulization 3 milliLiter(s) Nebulizer every 6 hours  budesonide  80 MICROgram(s)/formoterol 4.5 MICROgram(s) Inhaler 2 Puff(s) Inhalation two times a day  cefepime   IVPB      cefepime   IVPB 2000 milliGRAM(s) IV Intermittent every 12 hours  diltiazem    milliGRAM(s) Oral daily  gabapentin 100 milliGRAM(s) Oral three times a day  potassium chloride   Powder 20 milliEquivalent(s) Oral once  saccharomyces boulardii 250 milliGRAM(s) Oral two times a day  senna 2 Tablet(s) Oral at bedtime  vancomycin  IVPB      vancomycin  IVPB 750 milliGRAM(s) IV Intermittent every 12 hours    PRN MEDICATIONS  acetaminophen     Tablet .. 650 milliGRAM(s) Oral every 6 hours PRN  guaifenesin/dextromethorphan Oral Liquid 10 milliLiter(s) Oral every 6 hours PRN      VITAL SIGNS: Last 24 Hours  T(C): 36.4 (08 Mar 2023 12:24), Max: 36.7 (08 Mar 2023 08:03)  T(F): 97.6 (08 Mar 2023 12:24), Max: 98 (08 Mar 2023 08:03)  HR: 115 (08 Mar 2023 12:24) (96 - 115)  BP: 138/61 (08 Mar 2023 12:24) (114/56 - 157/67)  BP(mean): 88 (08 Mar 2023 12:24) (78 - 102)  RR: 18 (08 Mar 2023 12:24) (16 - 30)  SpO2: 95% (08 Mar 2023 12:24) (92% - 96%)    LABS:                        9.7    16.14 )-----------( 278      ( 08 Mar 2023 04:54 )             33.0     03-08    143  |  96<L>  |  48<H>  ----------------------------<  149<H>  4.0   |  37<H>  |  1.0    Ca    8.9      08 Mar 2023 04:54  Mg     2.1     03-08    TPro  6.2  /  Alb  2.9<L>  /  TBili  0.3  /  DBili  x   /  AST  32  /  ALT  32  /  AlkPhos  152<H>  03-08    PT/INR - ( 07 Mar 2023 05:46 )   PT: 16.40 sec;   INR: 1.42 ratio         PTT - ( 07 Mar 2023 05:46 )  PTT:31.3 sec    ABG - ( 06 Mar 2023 14:57 )  pH, Arterial: 7.37  pH, Blood: x     /  pCO2: 67    /  pO2: 70    / HCO3: 39    / Base Excess: 11.2  /  SaO2: 95.4                Culture - Fungal, Body Fluid (collected 07 Mar 2023 17:00)  Source: Pleural Fl Pleural Fluid  Preliminary Report (08 Mar 2023 06:42):    Testing in progress    Culture - Body Fluid with Gram Stain (collected 07 Mar 2023 17:00)  Source: Pleural Fl Pleural Fluid  Gram Stain (08 Mar 2023 03:50):    Few polymorphonuclear leukocytes seen per low power field    No organisms seen per oil power field      Hosp course:  Hospital COURSE:  88 yo female with a pmh of htn, afib on eliquis, pvd, chronic lymphedema and copd present c/o BLE pain and SOB.    Patient was in her usual state of health 4 weeks ago when she developed SOB which is new for her, mostly on exertion (after walking 2 feet), increases while lying down. Patient followed up with Dr. Pacheco 1 month ago and has TTE which was normal as per patient. Denies HA, dizziness, NVD, fever, chest pain, abdominal pain, change in urination and change in bowel habits.     Patient was diagnosed with CHF exacerbation and started on PO lasix. Lasix was discontinued due to uptrending Cr/BUN. On the morning of 3/6 alerted by RN that patient is desatting to high 70s on nasal canula. Patient lethargic appearing. Placed on NRB with improvement of SPO2 to mid 80s. Patient transitioned to BIPAP. StAT ABG shows CO2 79, reflecting CO2 retention, possible additional COPD exacerbation. STAT CXR showed worsening fluid overload, L>R. Patient was given STAT dose lasix 40mg IV push, started on solu-medrol, duonebs, and continued with BIPAP. Plan to continue with lasix 40mg IV BID, solu-medrol 60mg q8h, standing duonebs and BIPAP. Plan for repeat ABG. Patient also noted to be in afib RVR, known history of chronic afib. Now in the 110s HR. Was seen by critical care team. Large pleural effusion was noted on bedside ultrasound.    SICU course:   pt was here from 03/06 to 03/08  - pt was continued on diuresis  - thoracentesis done on 03/07: exudative  - pt has clinically improved and stable for downgrade    87F PMH: HTN, A.fib (on eliquis), PAD, Chronic lymphedema and COPD presents to ED for b/L LE pain and dyspnea on exertion. Upgraded to SICU for hypoxia suspected due to volume overload, now being downgraded to floor.    #hypercapnic and hypoxic respiratory failure 2/2 to HFpEF and COPD exacerbation   - CXR 03/06 shows worsening b/l L>R   - ABG CO2 79   - holding lasix for now  - solumedrol dced, prednisone taper per rheum, otherwise can stop after a few days form pulm standpoint  - Start standing duonebs  - c/w Symbicort 2 puffs BID standing   - c/w BIPAP at night  - monitor daily weight, Strict I & O, Low Na diet with fluid restriction.   - Cardiology Dr Pacheco following   - f/u TTE  - va duplex neg    #B/L LE cellulitis in the setting of lymphedema   - initially on doxy per ID, changed to vancomycin   - f/u Vanc Troph level (previous troph inaccurate)  - CT Legs no clots / ++ soft tissue edema   - Pain control prn  - ID following: follow recs for abx and duration  - MRSA Positive in wound in legs  - MRSA Negative in Nares B/L   - Rheumatology consult appreciated - Recommend to Continue prednisone 40 mg daily given improvement in her symptoms and can taper on discharge 30 mg x 5 days, 20 mg x 5 days, 10 mg x 5 days.   - solumedrol dced, prednisone taper per rheum, otherwise can stop after a few days form pulm standpoint    #Chronic A.fib - on cardizem and eliquis  #HTN - on cardizem    DVT ppx: eliquis   GI ppx: not indicated  Diet: DASH diet  Activity: as tolerated.     GOC: FULL CODE / NO NGT OR PEG    For Follow-Up:  Taper steroids as tolerated, see previous rheum recs  closely follow respiratory status   follow up ID.

## 2023-03-08 NOTE — PROGRESS NOTE ADULT - SUBJECTIVE AND OBJECTIVE BOX
Patient is a 87y old  Female who presents with a chief complaint of Wheezing     (07 Mar 2023 15:06)        Over Night Events:  Looks and feels better.  SP left thoracentesis yesterday         ROS:     All ROS are negative except HPI         PHYSICAL EXAM    ICU Vital Signs Last 24 Hrs  T(C): 36.7 (08 Mar 2023 08:03), Max: 36.7 (08 Mar 2023 08:03)  T(F): 98 (08 Mar 2023 08:03), Max: 98 (08 Mar 2023 08:03)  HR: 96 (08 Mar 2023 08:03) (96 - 105)  BP: 149/71 (08 Mar 2023 08:03) (114/56 - 157/67)  BP(mean): 102 (08 Mar 2023 08:03) (78 - 102)  ABP: --  ABP(mean): --  RR: 18 (08 Mar 2023 08:03) (16 - 30)  SpO2: 95% (08 Mar 2023 08:03) (92% - 96%)    O2 Parameters below as of 08 Mar 2023 08:03  Patient On (Oxygen Delivery Method): nasal cannula  O2 Flow (L/min): 3          CONSTITUTIONAL:  IN NAD    ENT:   Airway patent,   Mouth with normal mucosa.       EYES:   Pupils equal,   Round and reactive to light.    CARDIAC:   Normal rate,   Irregular rhythm.        RESPIRATORY:   No wheezing  Bilateral BS  Normal chest expansion  Not tachypneic,  No use of accessory muscles    GASTROINTESTINAL:  Abdomen soft,   Non-tender,   No guarding,   + BS    MUSCULOSKELETAL:   Range of motion is not limited,  No clubbing, cyanosis    NEUROLOGICAL:   Alert   No motor  deficits.    SKIN:   Skin normal color for race,   No evidence of rash.    PSYCHIATRIC:   No apparent risk to self or others.        03-07-23 @ 07:01  -  03-08-23 @ 07:00  --------------------------------------------------------  IN:  Total IN: 0 mL    OUT:    Indwelling Catheter - Urethral (mL): 2100 mL  Total OUT: 2100 mL    Total NET: -2100 mL          LABS:                            9.7    16.14 )-----------( 278      ( 08 Mar 2023 04:54 )             33.0                                               03-08    143  |  96<L>  |  48<H>  ----------------------------<  149<H>  4.0   |  37<H>  |  1.0    Ca    8.9      08 Mar 2023 04:54  Mg     2.1     03-08    TPro  6.2  /  Alb  2.9<L>  /  TBili  0.3  /  DBili  x   /  AST  32  /  ALT  32  /  AlkPhos  152<H>  03-08      PT/INR - ( 07 Mar 2023 05:46 )   PT: 16.40 sec;   INR: 1.42 ratio         PTT - ( 07 Mar 2023 05:46 )  PTT:31.3 sec                                                                                     LIVER FUNCTIONS - ( 08 Mar 2023 04:54 )  Alb: 2.9 g/dL / Pro: 6.2 g/dL / ALK PHOS: 152 U/L / ALT: 32 U/L / AST: 32 U/L / GGT: x                                                  Culture - Fungal, Body Fluid (collected 07 Mar 2023 17:00)  Source: Pleural Fl Pleural Fluid  Preliminary Report (08 Mar 2023 06:42):    Testing in progress    Culture - Body Fluid with Gram Stain (collected 07 Mar 2023 17:00)  Source: Pleural Fl Pleural Fluid  Gram Stain (08 Mar 2023 03:50):    Few polymorphonuclear leukocytes seen per low power field    No organisms seen per oil power field                                                                                       ABG - ( 06 Mar 2023 14:57 )  pH, Arterial: 7.37  pH, Blood: x     /  pCO2: 67    /  pO2: 70    / HCO3: 39    / Base Excess: 11.2  /  SaO2: 95.4                MEDICATIONS  (STANDING):  albuterol/ipratropium for Nebulization 3 milliLiter(s) Nebulizer every 6 hours  budesonide  80 MICROgram(s)/formoterol 4.5 MICROgram(s) Inhaler 2 Puff(s) Inhalation two times a day  cefepime   IVPB      cefepime   IVPB 2000 milliGRAM(s) IV Intermittent every 12 hours  diltiazem    milliGRAM(s) Oral daily  gabapentin 100 milliGRAM(s) Oral three times a day  methylPREDNISolone sodium succinate Injectable 40 milliGRAM(s) IV Push every 12 hours  potassium chloride   Powder 20 milliEquivalent(s) Oral once  saccharomyces boulardii 250 milliGRAM(s) Oral two times a day  senna 2 Tablet(s) Oral at bedtime  vancomycin  IVPB      vancomycin  IVPB 750 milliGRAM(s) IV Intermittent every 12 hours    MEDICATIONS  (PRN):  acetaminophen     Tablet .. 650 milliGRAM(s) Oral every 6 hours PRN Temp greater or equal to 38C (100.4F), Mild Pain (1 - 3)  guaifenesin/dextromethorphan Oral Liquid 10 milliLiter(s) Oral every 6 hours PRN Cough      New X-rays reviewed:                                                                                  ECHO    CXR interpreted by me:

## 2023-03-09 NOTE — PROGRESS NOTE ADULT - SUBJECTIVE AND OBJECTIVE BOX
GIOVANNY BUCK 87y Female  MRN#: [id number]   Hospital Day: 10d    SUBJECTIVE  Patient is a 87y old Female who presents with a chief complaint of Wheezing     (07 Mar 2023 15:06)  Currently admitted to medicine with the primary diagnosis of SOB (shortness of breath)      INTERVAL HPI AND OVERNIGHT EVENTS:  Patient was examined and seen at bedside. This morning she is resting comfortably in bed. No issues or overnight events. Downgrade from SDU. lasix was stopped due to hyponatremia. s/p thoracentesis pending fluid studies.     OBJECTIVE  PAST MEDICAL & SURGICAL HISTORY  HTN (hypertension)    Afib  s/p ablation 2001    Goiter  no meds    Cellulitis  chronic    OA (osteoarthritis)    PVD (peripheral vascular disease)    COPD (chronic obstructive pulmonary disease)    Anxiety    Obesity    H/O abdominal hysterectomy    H/O discectomy    H/O total knee replacement, bilateral      ALLERGIES:  aspirin (Other)  codeine (Other)  contrast media (iodine-based) (Other)  penicillin (Other)  sulfa drugs (Hives; Other)    MEDICATIONS:  STANDING MEDICATIONS  albuterol/ipratropium for Nebulization 3 milliLiter(s) Nebulizer every 6 hours  apixaban 2.5 milliGRAM(s) Oral two times a day  budesonide  80 MICROgram(s)/formoterol 4.5 MICROgram(s) Inhaler 2 Puff(s) Inhalation two times a day  cefepime   IVPB      cefepime   IVPB 2000 milliGRAM(s) IV Intermittent every 12 hours  diltiazem    milliGRAM(s) Oral daily  gabapentin 100 milliGRAM(s) Oral three times a day  potassium chloride   Powder 20 milliEquivalent(s) Oral once  predniSONE   Tablet 30 milliGRAM(s) Oral daily  saccharomyces boulardii 250 milliGRAM(s) Oral two times a day  senna 2 Tablet(s) Oral at bedtime  vancomycin  IVPB 500 milliGRAM(s) IV Intermittent every 12 hours    PRN MEDICATIONS  acetaminophen     Tablet .. 650 milliGRAM(s) Oral every 6 hours PRN  guaifenesin/dextromethorphan Oral Liquid 10 milliLiter(s) Oral every 6 hours PRN      VITAL SIGNS: Last 24 Hours  T(C): 36.3 (09 Mar 2023 04:06), Max: 36.8 (08 Mar 2023 21:11)  T(F): 97.3 (09 Mar 2023 04:06), Max: 98.3 (08 Mar 2023 21:11)  HR: 97 (09 Mar 2023 04:06) (97 - 115)  BP: 136/71 (09 Mar 2023 04:06) (122/60 - 138/61)  BP(mean): 88 (08 Mar 2023 12:24) (86 - 88)  RR: 18 (09 Mar 2023 04:06) (18 - 26)  SpO2: 95% (08 Mar 2023 21:11) (95% - 99%)    LABS:                        9.7    16.14 )-----------( 278      ( 08 Mar 2023 04:54 )             33.0     03-08    143  |  96<L>  |  48<H>  ----------------------------<  149<H>  4.0   |  37<H>  |  1.0    Ca    8.9      08 Mar 2023 04:54  Mg     2.1     03-08    TPro  6.2  /  Alb  2.9<L>  /  TBili  0.3  /  DBili  x   /  AST  32  /  ALT  32  /  AlkPhos  152<H>  03-08              Culture - Fungal, Body Fluid (collected 07 Mar 2023 17:00)  Source: Pleural Fl Pleural Fluid  Preliminary Report (08 Mar 2023 06:42):    Testing in progress    Culture - Body Fluid with Gram Stain (collected 07 Mar 2023 17:00)  Source: Pleural Fl Pleural Fluid  Gram Stain (08 Mar 2023 03:50):    Few polymorphonuclear leukocytes seen per low power field    No organisms seen per oil power field  Preliminary Report (08 Mar 2023 20:27):    No growth          RADIOLOGY:      PHYSICAL EXAM:  CONSTITUTIONAL: No acute distress, well-developed, well-groomed, AAOx3  HEAD: Atraumatic, normocephalic  EYES: EOM intact, PERRLA, conjunctiva and sclera clear  ENT: Supple, no masses, no thyromegaly, no bruits, no JVD; moist mucous membranes  PULMONARY: Clear to auscultation bilaterally; no wheezes, rales, or rhonchi  CARDIOVASCULAR: Regular rate and rhythm; no murmurs, rubs, or gallops  GASTROINTESTINAL: Soft, non-tender, non-distended; bowel sounds present  MUSCULOSKELETAL: 2+ peripheral pulses; no clubbing, no cyanosis, no edema  NEUROLOGY: non-focal  SKIN: No rashes or lesions; warm and dry   GIOVANNY BUCK 87y Female  MRN#: [id number]   Hospital Day: 10d    SUBJECTIVE  Patient is a 87y old Female who presents with a chief complaint of Wheezing     (07 Mar 2023 15:06)  Currently admitted to medicine with the primary diagnosis of SOB (shortness of breath)      INTERVAL HPI AND OVERNIGHT EVENTS:  Patient was examined and seen at bedside. This morning she is resting comfortably in bed. No issues or overnight events. Downgrade from SDU. lasix was stopped due to hyponatremia. s/p thoracentesis pending fluid studies.  denies fever, chills, syncope, seizure, headache, cough, SOB, abd pain, N/V/D, urinary symptoms,      OBJECTIVE  PAST MEDICAL & SURGICAL HISTORY  HTN (hypertension)    Afib  s/p ablation 2001    Goiter  no meds    Cellulitis  chronic    OA (osteoarthritis)    PVD (peripheral vascular disease)    COPD (chronic obstructive pulmonary disease)    Anxiety    Obesity    H/O abdominal hysterectomy    H/O discectomy    H/O total knee replacement, bilateral      ALLERGIES:  aspirin (Other)  codeine (Other)  contrast media (iodine-based) (Other)  penicillin (Other)  sulfa drugs (Hives; Other)    MEDICATIONS:  STANDING MEDICATIONS  albuterol/ipratropium for Nebulization 3 milliLiter(s) Nebulizer every 6 hours  apixaban 2.5 milliGRAM(s) Oral two times a day  budesonide  80 MICROgram(s)/formoterol 4.5 MICROgram(s) Inhaler 2 Puff(s) Inhalation two times a day  cefepime   IVPB      cefepime   IVPB 2000 milliGRAM(s) IV Intermittent every 12 hours  diltiazem    milliGRAM(s) Oral daily  gabapentin 100 milliGRAM(s) Oral three times a day  potassium chloride   Powder 20 milliEquivalent(s) Oral once  predniSONE   Tablet 30 milliGRAM(s) Oral daily  saccharomyces boulardii 250 milliGRAM(s) Oral two times a day  senna 2 Tablet(s) Oral at bedtime  vancomycin  IVPB 500 milliGRAM(s) IV Intermittent every 12 hours    PRN MEDICATIONS  acetaminophen     Tablet .. 650 milliGRAM(s) Oral every 6 hours PRN  guaifenesin/dextromethorphan Oral Liquid 10 milliLiter(s) Oral every 6 hours PRN      VITAL SIGNS: Last 24 Hours  T(C): 36.3 (09 Mar 2023 04:06), Max: 36.8 (08 Mar 2023 21:11)  T(F): 97.3 (09 Mar 2023 04:06), Max: 98.3 (08 Mar 2023 21:11)  HR: 97 (09 Mar 2023 04:06) (97 - 115)  BP: 136/71 (09 Mar 2023 04:06) (122/60 - 138/61)  BP(mean): 88 (08 Mar 2023 12:24) (86 - 88)  RR: 18 (09 Mar 2023 04:06) (18 - 26)  SpO2: 95% (08 Mar 2023 21:11) (95% - 99%)    LABS:                        9.7    16.14 )-----------( 278      ( 08 Mar 2023 04:54 )             33.0     03-08    143  |  96<L>  |  48<H>  ----------------------------<  149<H>  4.0   |  37<H>  |  1.0    Ca    8.9      08 Mar 2023 04:54  Mg     2.1     03-08    TPro  6.2  /  Alb  2.9<L>  /  TBili  0.3  /  DBili  x   /  AST  32  /  ALT  32  /  AlkPhos  152<H>  03-08              Culture - Fungal, Body Fluid (collected 07 Mar 2023 17:00)  Source: Pleural Fl Pleural Fluid  Preliminary Report (08 Mar 2023 06:42):    Testing in progress    Culture - Body Fluid with Gram Stain (collected 07 Mar 2023 17:00)  Source: Pleural Fl Pleural Fluid  Gram Stain (08 Mar 2023 03:50):    Few polymorphonuclear leukocytes seen per low power field    No organisms seen per oil power field  Preliminary Report (08 Mar 2023 20:27):    No growth          RADIOLOGY:      PHYSICAL EXAM:  CONSTITUTIONAL: No acute distress, old lady chronically look ill   HEAD: Atraumatic, normocephalic  EYES: EOM intact, PERRLA, conjunctiva and sclera clear  ENT: Supple, no masses, no thyromegaly, no bruits, no JVD; moist mucous membranes  PULMONARY: dec air entry b/l, scattered wheeze and rhonchi   CARDIOVASCULAR: Regular rate and rhythm; no murmurs, rubs, or gallops  GASTROINTESTINAL: Soft, non-tender, non-distended; bowel sounds present  MUSCULOSKELETAL: 2+ peripheral pulses; no clubbing, no cyanosis, + b/l Lymphedema   NEUROLOGY: AAOx3, non-focal  SKIN: No rashes or lesions; warm and dry

## 2023-03-09 NOTE — PROGRESS NOTE ADULT - ATTENDING COMMENTS
87F PMH: HTN, A.fib (on eliquis), PAD, Chronic lymphedema and COPD presents to ED for b/L LE pain and dyspnea on exertion.     Dyspnea on exertion likely 2/2 acute new onset CHF exacerbation.   - CXR shows pulmonary vascular congestion and b/l interstitial opacity.   - c/w IV lasix 40mg BID  - f/u TTE  - monitor daily weight, Strict I & O, Low Na diet with fluid restriction.   - Cardiology consult Dr Merida consult ordered     B/L LE cellulitis in the setting of lymphedema   - No evidence of sepsis.   - start on Doxy (pt is allergic to penicillin)  - check venous duplex and TTE   - will get CT bilateral legs with IV contrast   - Pain control prn  - ID consult appreciated   - Rheumatology consult     Chronic A.fib - on cardizem and eliquis  COPD - not in exacerbation. c/w home inhalers.   HTN - on cardizem    DVT ppx: eliquis   GI ppx: not indicated  Diet: DASH diet  Activity: as tolerated.     Pending: clinical improvement / CV consult / CT B/L Legs with IV Contrast  - Allergy Protocol per Radiology / GOC Discussion   Dispo: Acute
agree w above

## 2023-03-09 NOTE — PROGRESS NOTE ADULT - ASSESSMENT
87F PMH: HTN, A.fib (on eliquis), PAD, Chronic lymphedema and COPD presents to ED for b/L LE pain and dyspnea on exertion. Upgraded to SICU for hypoxia suspected due to volume overload, now being downgraded to floor.    #hypercapnic and hypoxic respiratory failure 2/2 to HFpEF and COPD exacerbation   - CXR 03/06 shows worsening b/l L>R   - TTE EF 66%, no valvular disease   - ABG CO2 79   - was started on solumedrol --> now dced, prednisone taper per rheum, otherwise can stop after a few days form pulm standpoint  - s/p thoracentesis done on 03/07: transudative--> f/u fluid studies   - c/w duonebs PRN  - c/w Symbicort 2 puffs BID standing   - c/w BIPAP at night  - monitor daily weight, Strict I & O, Low Na diet with fluid restriction.   - Cardiology Dr Pacheco following   - va duplex neg  - lasix held due to hyponatremia   -->started on cefepime 3/07     #B/L LE cellulitis in the setting of lymphedema   - initially on doxy per ID, changed to vancomycin due to leukocytosis   - f/u Vanc Troph level  - CT Legs no clots / ++ soft tissue edema   - Pain control prn  - ID following: follow recs for abx and duration  - MRSA Positive in wound in legs  - MRSA Negative in Nares B/L     #ankle pain r/o inflamnatory arthritis   - Rheumatology consult appreciated - Difficult to assess -->Recommend to Continue prednisone given improvement in her symptoms and can taper on discharge 30 mg x 5 days, 20 mg x 5 days, 10 mg x 5 days.     #Chronic A.fib - on cardizem and eliquis  #HTN - on cardizem    DVT ppx: eliquis   GI ppx: not indicated  Diet: DASH diet  Activity: as tolerated.     GOC: FULL CODE / NO NGT OR PEG   87F PMH: HTN, A.fib (on eliquis), PAD, Chronic lymphedema and COPD presents to ED for b/L LE pain and dyspnea on exertion. Upgraded to SICU for hypoxia suspected due to volume overload, now being downgraded to floor.    #Transudative Left pleural effusion sp thoracentesiss/p IV steroid and IV lasix  #Acute on chronic hypercapnic respiratory failure improved   #Acute hypoxemic respiratory failure from fluid overload and COPD exacerbation resolved   #HFpEF  - on 2 LNC    #left Ankle arthritis      Probable [facility] refused testing   Thyroid mass SP FNA   LE cellulitis with ulcer and abscess MRSA +  HO Afib on Eliquis  HFpEF  Chronic lymphedema  HO Peripheral vascular disease 87F PMH: HTN, A.fib (on eliquis), PAD, Chronic lymphedema and COPD presents to ED for b/L LE pain and dyspnea on exertion. Upgraded to SICU for hypoxia suspected due to volume overload, now being downgraded to floor.    #Transudative Left pleural effusion sp thoracentesiss/p IV steroid and IV lasix  #Acute on chronic hypercapnic respiratory failure improved   #Acute hypoxemic respiratory failure from fluid overload and COPD exacerbation   #HFpEF  - on 2 LNC  - on taper steroid (also for left ankle arthritis)  - resume PO lasix        #left Ankle arthritis   - s/p rheum eval: unable to determine the cause given overlaying lymphedema   - partial response top steroid,  pending MRI,        Probable [facility] refused testing   Thyroid mass SP FNA   LE cellulitis with ulcer and abscess MRSA +  - c/w doxycyline and cefepime till 3/10, s/p vanco    HO Afib on Eliquis  Chronic lymphedema  HO Peripheral vascular disease    code status: see goc note, full code    live home a lone, will need HA / placement  pending: improvement of RS status, MRI left ankle, PT eval 87F PMH: HTN, A.fib (on eliquis), PAD, Chronic lymphedema and COPD presents to ED for b/L LE pain and dyspnea on exertion. Upgraded to SICU for hypoxia suspected due to volume overload, now being downgraded to floor.    #Transudative Left pleural effusion sp thoracentesiss/p IV steroid and IV lasix  #Acute on chronic hypercapnic respiratory failure improved   #Acute hypoxemic respiratory failure from fluid overload and COPD exacerbation   #HFpEF  - on 2 LNC  - on taper steroid (also for left ankle arthritis)  - resume PO lasix   - f/u pleural cytology        #left Ankle arthritis   - s/p rheum eval: unable to determine the cause given overlaying lymphedema   - partial response top steroid,  pending MRI,     leukocytosis: from steroid   Probable [facility] refused testing   Thyroid mass SP FNA   LE cellulitis with ulcer and abscess MRSA +  - c/w doxycyline and cefepime till 3/10, s/p vanco, Procal .35     HO Afib on Eliquis  Chronic lymphedema  HO Peripheral vascular disease    code status: see goc note, full code    live home a lone, will need HA / placement  pending: improvement of RS status, MRI left ankle, PT eval

## 2023-03-10 NOTE — PHYSICAL THERAPY INITIAL EVALUATION ADULT - WEIGHT-BEARING RESTRICTIONS: SIT/STAND, REHAB EVAL
x 3 trials with rest break in between. Recommend Brandie Burrell for OOB to chair. Benigno TERAN and Yashira ASHFORD notified

## 2023-03-10 NOTE — PHYSICAL THERAPY INITIAL EVALUATION ADULT - LEVEL OF INDEPENDENCE: GAIT, REHAB EVAL
pt was able to stand ~ 10 seconds x 3 trials and was able to slide foot sideways but difficulty lifting LE's to take step 2* to decreased strength

## 2023-03-10 NOTE — PHYSICAL THERAPY INITIAL EVALUATION ADULT - PERTINENT HX OF CURRENT PROBLEM, REHAB EVAL
admitted with worsening CARTAGENA after taking 2-3 steps in the past 4 weeks, and bilat LE's pain
6 yo female with a pmh of htn, afib on eliquis, pvd, chronic lymphedema and copd present c/o BLE pain and SOB.

## 2023-03-10 NOTE — DISCHARGE NOTE PROVIDER - NSDCMRMEDTOKEN_GEN_ALL_CORE_FT
dilTIAZem 180 mg/24 hours oral capsule, extended release: 1 cap(s) orally once a day  Eliquis 2.5 mg oral tablet: 1 tab(s) orally 2 times a day  furosemide 20 mg oral tablet: 1 tab(s) orally once a day  gabapentin 100 mg oral capsule: 1 cap(s) orally 3 times a day  hydrOXYzine hydrochloride 10 mg oral tablet: 2 tab(s) orally once a day (at bedtime)  ProAir HFA 90 mcg/inh inhalation aerosol: 2 puff(s) inhaled 2 times a day   Eliquis 2.5 mg oral tablet: 1 tab(s) orally 2 times a day  furosemide 20 mg oral tablet: 1 tab(s) orally once a day  gabapentin 100 mg oral capsule: 1 cap(s) orally 3 times a day   dilTIAZem 180 mg/24 hours oral capsule, extended release: 1 cap(s) orally once a day  Eliquis 2.5 mg oral tablet: 1 tab(s) orally 2 times a day  gabapentin 100 mg oral capsule: 1 cap(s) orally 3 times a day  polyethylene glycol 3350 oral powder for reconstitution: 17 gram(s) orally once a day  predniSONE 10 mg oral tablet: 1 tab(s) orally once a day  predniSONE 20 mg oral tablet: 1 tab(s) orally once a day for 5 days then start prednsione 10mg for 5 days then stop    dilTIAZem 180 mg/24 hours oral capsule, extended release: 1 cap(s) orally once a day  Eliquis 2.5 mg oral tablet: 1 tab(s) orally 2 times a day  furosemide 40 mg oral tablet: 1 tab(s) orally once a day  gabapentin 100 mg oral capsule: 1 cap(s) orally 3 times a day  polyethylene glycol 3350 oral powder for reconstitution: 17 gram(s) orally once a day  predniSONE 10 mg oral tablet: 1 tab(s) orally once a day  predniSONE 20 mg oral tablet: 1 tab(s) orally once a day for 5 days then start prednsione 10mg for 5 days then stop

## 2023-03-10 NOTE — PROGRESS NOTE ADULT - ASSESSMENT
87F PMH: HTN, A.fib (on eliquis), PAD, Chronic lymphedema and COPD presents to ED for b/L LE pain and dyspnea on exertion. Upgraded to SICU for hypoxia suspected due to volume overload, now being downgraded to floor.    #Transudative Left pleural effusion sp thoracentesiss/p IV steroid and IV lasix  #Acute on chronic hypercapnic respiratory failure improved   #Acute hypoxemic respiratory failure from fluid overload and COPD exacerbation   #HFpEF  - now on RA  - on taper steroid (also for left ankle arthritis)  - on PO lasix 40 qd  - neg pleural cytology but pending block study  - CXR today    #left Ankle arthritis   - s/p rheum eval: unable to determine the cause given overlaying lymphedema   - partial response top steroid,  pending MRI,   - PT eval     leukocytosis: from steroid   Probable [facility] refused testing   Thyroid mass SP FNA   LE cellulitis with ulcer and abscess MRSA +  - s/p doxycyline and cefepime ends 3/10, s/p vanco, Procal .35     HO Afib on Eliquis  Chronic lymphedema  HO Peripheral vascular disease    code status: see goc note, full code    live home a lone, will need HA / placement  pending: dispo plan/ PT eval 87F PMH: HTN, A.fib (on eliquis), PAD, Chronic lymphedema and COPD presents to ED for b/L LE pain and dyspnea on exertion. Upgraded to SICU for hypoxia suspected due to volume overload, now being downgraded to floor.    #Transudative Left pleural effusion sp thoracentesiss/p IV steroid and IV lasix  #Acute on chronic hypercapnic respiratory failure improved   #Acute hypoxemic respiratory failure from fluid overload and COPD exacerbation   #HFpEF  - now on RA  - on taper steroid (also for left ankle arthritis)  - on PO lasix 40 qd  - neg pleural cytology but pending block study  - last CXR improved pulm congestions     #left Ankle arthritis   - s/p rheum eval: unable to determine the cause given overlaying lymphedema   - partial response top steroid,  MRI, High-grade split tearing of the peroneus brevis. Severe insertional   tendinosis of the posterior tibialis and plantar fascitis   - Ortho eval   - on PO steroid taper,   - PT eval     leukocytosis: from steroid   Probable [facility] refused testing   Thyroid mass SP FNA   LE cellulitis with ulcer and abscess MRSA +  - s/p doxycyline and cefepime ends 3/10, s/p vanco, Procal .35     HO Afib on Eliquis  Chronic lymphedema  HO Peripheral vascular disease    code status: see goc note, full code    live home a lone, will need HA / placement  pending: dispo plan/ PT eval

## 2023-03-10 NOTE — CONSULT NOTE ADULT - ATTENDING COMMENTS
seen 3/13  c/c o left foot pain,  p/e: TTP of the peroneal tendon proximal to the lateral malleolus, TTP of the medial calcaneal tubercle   MRI: high-grade split tearing with distal reconstitution of the inframalleolar peroneus brevis, with trace associated peroneal tenosynovitis. insertional tendinosis of the posterior tibialis. No evidence of acute tear. Likely chronic high-grade tearing of the ATFL, CFL, and PTFL.  Severe naviculocuneiform arthrosis, most pronounced medially. Moderate arthrosis of the fourth and fifth tarsometatarsal joints. Ankylosis of the second and third tarsometatarsal joints. low-grade partial tearing of the central cord of plantar fascia.  No surgical intervention at this time   Physical therapy  NSAID's prn  OTC ankle gauntlet  WBAT    follow up outpatient at [address] [phone]
left foot superficial ulcer submetatarsal head 1  minimal purulent drainage expressed on compression-->sent for c&s  xrays reviewed. no OM  left foot ulcer excisional debridement of ulcer performed with 15 blade down to level of skin layer  local wound care with xerform and gauze dressing  no surgical intervention at this time  antibiotics as per primary-->f/u cultures
Counseled patient and her family at the bedside  about diagnostic testing and treatment plan. All questions answered. Abnormal lab/radiographical/microbiological data reviewed.
Events noted, acute on chronic hypoxemic/ hypercapnic resp failure/ a FIB, pul edema, COPD exacerbation, on Eliquis, IV lasix q 12, l side us, hold eliquis, abx, steroid, NIV, Poor prognosis, SDU, GOC

## 2023-03-10 NOTE — DISCHARGE NOTE PROVIDER - PROVIDER TOKENS
PROVIDER:[TOKEN:[[id number]],FOLLOWUP:[2 weeks]],PROVIDER:[TOKEN:[[id number]],FOLLOWUP:[2 weeks]],PROVIDER:[TOKEN:[[id number]],FOLLOWUP:[2 weeks]],PROVIDER:[TOKEN:[[id number]],FOLLOWUP:[2 weeks]]

## 2023-03-10 NOTE — PHYSICAL THERAPY INITIAL EVALUATION ADULT - GENERAL OBSERVATIONS, REHAB EVAL
11:20-11:52. Pt encountered semifowler in bed in NAD, +ACE dressing L LE, + primafit, no complaints, agreeable to PT.
PT checked vital signs and alerted RN/MD: /56, -130's irregular, O2 sats 78% on RA, temp 102.3F. pt placed on O2 n/c that pt previously removed > O2 improved to 90%. pt's dtr and son present during attempted evaluation.

## 2023-03-10 NOTE — DISCHARGE NOTE PROVIDER - HOSPITAL COURSE
86 yo female with a pmh of htn, afib on eliquis, pvd, chronic lymphedema and copd present c/o BLE pain and SOB.    Patient was in her usual state of health 4 weeks ago when she developed SOB which is new for her, mostly on exertion (after walking 2 feet), increases while lying down. Patient followed up with Dr. Pacheco 1 month ago and has TTE which was normal as per patient. Denies HA, dizziness, NVD, fever, chest pain, abdominal pain, change in urination and change in bowel habits.     Seen by ID for lower extremity cellulitis on lymphedema. Was started on doxycycline.     Patient was diagnosed with CHF exacerbation and started on PO lasix. Lasix was discontinued due to uptrending Cr/BUN. On the morning of 3/6 alerted by RN that patient is desatting to high 70s on nasal canula. Patient lethargic appearing. Placed on NRB with improvement of SPO2 to mid 80s. Patient transitioned to BIPAP. StAT ABG shows CO2 79, reflecting CO2 retention, possible additional COPD exacerbation. STAT CXR showed worsening fluid overload, L>R. Patient was given STAT dose lasix 40mg IV push, started on solu-medrol, duonebs, and continued with BIPAP. Plan to continue with lasix 40mg IV BID, solu-medrol 60mg q8h, standing duonebs and BIPAP. Plan for repeat ABG. Patient also noted to be in afib RVR, known history of chronic afib. Now in the 110s HR. Was seen by critical care team. Large pleural effusion was noted on bedside ultrasound. Upgraded to SDU.     In SDU patient was continued on diuresis. Thoracentesis done on 03/07 which revealed transudative effusion.  Patient clinically improved and downgraded to floor. patient remained stable on room air. Worker with PT who recommended PT. Plan for steroid tape rper Rheumatology, will follow up with Rheum outpatient.     Patient is medically stable and ready for discharge. 88 yo female with a pmh of htn, afib on eliquis, pvd, chronic lymphedema and copd present c/o BLE pain and SOB.    Patient was in her usual state of health 4 weeks ago when she developed SOB which is new for her, mostly on exertion (after walking 2 feet), increases while lying down. Patient followed up with Dr. Pacheco 1 month ago and has TTE which was normal as per patient. Denies HA, dizziness, NVD, fever, chest pain, abdominal pain, change in urination and change in bowel habits.     Seen by ID for lower extremity cellulitis on lymphedema. Was started on doxycycline.     Patient was diagnosed with CHF exacerbation and started on PO lasix. Lasix was discontinued due to uptrending Cr/BUN. On the morning of 3/6 alerted by RN that patient is desatting to high 70s on nasal canula. Patient lethargic appearing. Placed on NRB with improvement of SPO2 to mid 80s. Patient transitioned to BIPAP. StAT ABG shows CO2 79, reflecting CO2 retention, possible additional COPD exacerbation. STAT CXR showed worsening fluid overload, L>R. Patient was given STAT dose lasix 40mg IV push, started on solu-medrol, duonebs, and continued with BIPAP. Plan to continue with lasix 40mg IV BID, solu-medrol 60mg q8h, standing duonebs and BIPAP. Plan for repeat ABG. Patient also noted to be in afib RVR, known history of chronic afib. Now in the 110s HR. Was seen by critical care team. Large pleural effusion was noted on bedside ultrasound. Upgraded to SDU.     In SDU patient was continued on diuresis. Thoracentesis done on 03/07 which revealed transudative effusion.  Patient clinically improved and downgraded to floor. patient remained stable on room air. Worker with PT who recommended PT. Plan for steroid tape rper Rheumatology, will follow up with Rheum outpatient.   MRI ankle showed  1.  No evidence of active inflammatory arthritis.  2.  Severe midfoot arthrosis, most pronounced in the medial   naviculocuneiform joint.  3.  Probable talocalcaneal lateral hindfoot impingement and associated   sinus Tarsi syndrome.  4.  High-grade split tearing of the peroneus brevis. Severe insertional   tendinosis of the posterior tibialis.  5.  Mild-moderate acute on chronic plantar fasciitis including low-grade   partial tearing.    Pending PT and Podiatry.         Patient is medically stable and ready for discharge. 86 yo female with a pmh of htn, afib on eliquis, pvd, chronic lymphedema and copd present c/o BLE pain and SOB.    Patient was in her usual state of health 4 weeks ago when she developed SOB which is new for her, mostly on exertion (after walking 2 feet), increases while lying down. Patient followed up with Dr. Pacheco 1 month ago and has TTE which was normal as per patient. Denies HA, dizziness, NVD, fever, chest pain, abdominal pain, change in urination and change in bowel habits.     Seen by ID for lower extremity cellulitis on lymphedema. Was started on doxycycline.     Patient was diagnosed with CHF exacerbation and started on PO lasix. Lasix was discontinued due to uptrending Cr/BUN. On the morning of 3/6 alerted by RN that patient is desatting to high 70s on nasal canula. Patient lethargic appearing. Placed on NRB with improvement of SPO2 to mid 80s. Patient transitioned to BIPAP. StAT ABG shows CO2 79, reflecting CO2 retention, possible additional COPD exacerbation. STAT CXR showed worsening fluid overload, L>R. Patient was given STAT dose lasix 40mg IV push, started on solu-medrol, duonebs, and continued with BIPAP. Plan to continue with lasix 40mg IV BID, solu-medrol 60mg q8h, standing duonebs and BIPAP. Plan for repeat ABG. Patient also noted to be in afib RVR, known history of chronic afib. Now in the 110s HR. Was seen by critical care team. Large pleural effusion was noted on bedside ultrasound. Upgraded to SDU.     In SDU patient was continued on diuresis. Thoracentesis done on 03/07 which revealed transudative effusion.  Patient clinically improved and downgraded to floor. patient remained stable on room air. Worker with PT who recommended PT. Plan for steroid taper per Rheumatology, will follow up with Rheum outpatient.     MRI ankle showed  1.  No evidence of active inflammatory arthritis.  2.  Severe midfoot arthrosis, most pronounced in the medial   naviculocuneiform joint.  3.  Probable talocalcaneal lateral hindfoot impingement and associated   sinus Tarsi syndrome.  4.  High-grade split tearing of the peroneus brevis. Severe insertional   tendinosis of the posterior tibialis.  5.  Mild-moderate acute on chronic plantar fasciitis including low-grade   partial tearing.        Patient is medically stable and ready for discharge. 86 yo female with a pmh of htn, afib on eliquis, pvd, chronic lymphedema and copd present c/o BLE pain and SOB.    Patient was in her usual state of health 4 weeks ago when she developed SOB which is new for her, mostly on exertion (after walking 2 feet), increases while lying down. Patient followed up with Dr. Pacheco 1 month ago and has TTE which was normal as per patient. Denies HA, dizziness, NVD, fever, chest pain, abdominal pain, change in urination and change in bowel habits.     Seen by ID for lower extremity cellulitis on lymphedema. Was started on doxycycline.     Patient was diagnosed with CHF exacerbation and started on PO lasix. Lasix was discontinued due to uptrending Cr/BUN. On the morning of 3/6 alerted by RN that patient is desatting to high 70s on nasal canula. Patient lethargic appearing. Placed on NRB with improvement of SPO2 to mid 80s. Patient transitioned to BIPAP. StAT ABG shows CO2 79, reflecting CO2 retention, possible additional COPD exacerbation. STAT CXR showed worsening fluid overload, L>R. Patient was given STAT dose lasix 40mg IV push, started on solu-medrol, duonebs, and continued with BIPAP. Plan to continue with lasix 40mg IV BID, solu-medrol 60mg q8h, standing duonebs and BIPAP. Plan for repeat ABG. Patient also noted to be in afib RVR, known history of chronic afib. Now in the 110s HR. Was seen by critical care team. Large pleural effusion was noted on bedside ultrasound. Upgraded to SDU.     In SDU patient was continued on diuresis. Thoracentesis done on 03/07 which revealed transudative effusion.  Patient clinically improved and downgraded to floor. patient remained stable on room air. Worker with PT who recommended PT. Plan for steroid taper per Rheumatology, will follow up with Rheum outpatient.     87F PMH: HTN, A.fib (on eliquis), PAD, Chronic lymphedema and COPD presents to ED for b/L LE pain and dyspnea on exertion. Upgraded to SICU for hypoxia suspected due to volume overload, now being downgraded to floor.    #Transudative Left pleural effusion sp thoracentesiss/p IV steroid and IV lasix  #Acute on chronic hypercapnic respiratory failure improved   #Acute hypoxemic respiratory failure from fluid overload and COPD exacerbation   #HFpEF  - on and off 2 LNC  - on taper steroid (also for left ankle arthritis)  - on PO lasix 40 qd  - neg pleural cytology but pending block study  - last CXR improved pulm congestions     #left Ankle arthritis   - s/p rheum eval: unable to determine the cause given overlaying lymphedema   - partial response top steroid,  MRI, High-grade split tearing of the peroneus brevis. Severe insertional   tendinosis of the posterior tibialis and plantar fascitis   - Podiatry eval: needs ankle brace and Physical therapy for LLE  - on PO steroid taper,   - PT eval     leukocytosis: from steroid   Probable [facility] refused testing   Thyroid mass SP FNA 8/2022 FOLLICULAR LESION OF UNDETERMINED SIGNIFICANCE (BETHESDA CATEGORY III),  LE cellulitis with ulcer and abscess MRSA +  - s/p doxycyline and cefepime ends 3/10, s/p vanco, Procal .35     HO Afib on Eliquis  Chronic lymphedema  HO Peripheral vascular disease    code status: see goc note, full code    live home a lone, will need HA / placement  daugther updated over the phone 3/11  pending: dispo plan STR      Patient is medically stable and ready for discharge.

## 2023-03-10 NOTE — CONSULT NOTE ADULT - SUBJECTIVE AND OBJECTIVE BOX
Podiatry Consult Note    Subjective:  GIOVANNY BUCK  Seen Bedside 87y Female  .   Patient is a 87y old  Female who presents with a chief complaint of Wheezing     (07 Mar 2023 15:06)    HPI:  86 yo female with a pmh of htn, afib on eliquis, pvd, chronic lymphedema and copd present c/o BLE pain and SOB.    Patient was in her usual state of health 4 weeks ago when she developed SOB which is new for her, mostly on exertion (after walking 2 feet), increases while lying down. Patient followed up with Dr. Pacheco 1 month ago and has TTE which was normal as per patient. Denies HA, dizziness, NVD, fever, chest pain, abdominal pain, change in urination and change in bowel habits.     3 days ago patient developed B/l lower extremity pain, Left more than right, 10/10 intensity, progressive, extending from toes to mid thigh, associated with increased redness and tenderness. Of note, patient was prescribed Clindamycin 1 week ago, not sure if taking or not.     In the ED, BNP 2952 (no baseline).  Vital Signs Last 24 Hrs:  T(F): 98 (27 Feb 2023 17:31), Max: 98.7 (27 Feb 2023 16:20)  HR: 98 (27 Feb 2023 21:55) (97 - 100)  BP: 139/86 (27 Feb 2023 21:55) (139/86 - 168/67)  RR: 18 (27 Feb 2023 21:55) (14 - 18)  SpO2: 99% (27 Feb 2023 21:55) (96% - 99%)   (27 Feb 2023 23:23)      Past Medical History and Surgical History  PAST MEDICAL & SURGICAL HISTORY:  HTN (hypertension)      Afib  s/p ablation 2001      Goiter  no meds      Cellulitis  chronic      OA (osteoarthritis)      PVD (peripheral vascular disease)      COPD (chronic obstructive pulmonary disease)      Anxiety      Obesity      H/O abdominal hysterectomy      H/O discectomy      H/O total knee replacement, bilateral           Review of Systems:  [X] Ten point review of systems is otherwise negative except as noted     Objective:  Vital Signs Last 24 Hrs  T(C): 35.8 (10 Mar 2023 14:20), Max: 35.8 (09 Mar 2023 23:11)  T(F): 96.5 (10 Mar 2023 14:20), Max: 96.5 (09 Mar 2023 23:11)  HR: 99 (10 Mar 2023 14:20) (85 - 99)  BP: 116/67 (10 Mar 2023 14:20) (116/67 - 141/74)  BP(mean): --  RR: 18 (10 Mar 2023 14:20) (18 - 18)  SpO2: 95% (10 Mar 2023 14:20) (95% - 95%)    Parameters below as of 09 Mar 2023 23:11  Patient On (Oxygen Delivery Method): room air                            10.0   13.80 )-----------( 266      ( 10 Mar 2023 05:50 )             33.8                 03-10    139  |  95<L>  |  50<H>  ----------------------------<  89  4.2   |  36<H>  |  0.9    Ca    9.0      10 Mar 2023 05:50  Mg     2.1     03-10    TPro  6.0  /  Alb  2.9<L>  /  TBili  0.4  /  DBili  x   /  AST  38  /  ALT  42<H>  /  AlkPhos  160<H>  03-10    X-ray, Left foot, 2/28/23  IMPRESSION:  Diffuse osteopenia. No acute fracture or dislocation. Chronic fracture deformity of the fourth and fifth metatarsal shafts. There is neuropathic osteoarthropathy of the midfoot joints. Severe degenerative changes of the talonavicular joint forefoot. No definite ulcer or radiographic evidence of osteomyelitis is noted at the region of the fifth metatarsophalangeal joint. Consider MRI of the left forefoot is clinically warranted. Moderate degenerative changes of the hindfoot. Plantar heel ulcer adjacent to the calcaneus, without definite radiographic evidence of osteomyelitis.    MRI, Left ankle, 3/9/2023  IMPRESSION:  1. No evidence of active inflammatory arthritis.  2. Severe midfoot arthrosis, most pronounced in the medial naviculocuneiform joint.  3. Probable talocalcaneal lateral hindfoot impingement and associated sinus Tarsi syndrome.  4. High-grade split tearing of the peroneus brevis. Severe insertional tendinosis of the posterior tibialis.  5. Mild-moderate acute on chronic plantar fasciitis including low-grade partial tearing.        Physical Exam - Lower Extremity Focused:   Derm:  Skin dry and scaly b/l. Left foot; blue discoloration noted over the lesser digits at the level of MPJ, sub 1st met head hyperkeratosis, no pus note   Vascular: DP and PT Pulses Diminished; Foot is Warm to Warm to the touch; Capillary Refill Time < 3 Seconds;    Neuro: Protective Sensation Diminished / Moderately Neuropathic   MSK: Mild Pain On Palpation on the dorsal midfoot and medial ankle     Assessment:   - Plantar fasciitis  - Peroneus brevis tear  - Tibialis posterior tendinosis  - DFU, Left foot not infected     Plan:  Chart reviewed and Patient evaluated. All Questions and Concerns Addressed and Answered  XR Imaging Left Foot; Reviewed as above   MRI Left ankle; Reviewed as above  ACE bandage  Recommend ankle brace and Physical therapy for LLE  No surgical intervention necessary at this time     Discussed Plan w/ Dr. Le    Podiatry

## 2023-03-10 NOTE — PHYSICAL THERAPY INITIAL EVALUATION ADULT - ADDITIONAL COMMENTS
limited ambulation in the past 4 weeks 2/2 CARTAGENA after only 2-3 steps  owns SC and RW
Pt lives in  , 4 DEREK, 1 flight to bedroom/shower, + chairlift

## 2023-03-10 NOTE — PROGRESS NOTE ADULT - SUBJECTIVE AND OBJECTIVE BOX
Patient is a 87y old  Female who presents with a chief complaint of Wheezing     (07 Mar 2023 15:06)      OVERNIGHT EVENTS: remained stable, off O2 today  denies fever, chills, syncope, seizure, headache, cough, SOB, abd pain, N/V/D, urinary symptoms,      SUBJECTIVE / INTERVAL HPI: Patient seen and examined at bedside.     VITAL SIGNS:  Vital Signs Last 24 Hrs  T(C): 35.7 (10 Mar 2023 04:20), Max: 35.8 (09 Mar 2023 23:11)  T(F): 96.3 (10 Mar 2023 04:20), Max: 96.5 (09 Mar 2023 23:11)  HR: 85 (10 Mar 2023 04:20) (85 - 97)  BP: 136/58 (10 Mar 2023 04:20) (136/58 - 141/74)  BP(mean): --  RR: 18 (10 Mar 2023 04:20) (18 - 18)  SpO2: 95% (10 Mar 2023 04:20) (95% - 95%)    Parameters below as of 09 Mar 2023 23:11  Patient On (Oxygen Delivery Method): room air        PHYSICAL EXAM:    CONSTITUTIONAL: No acute distress, old lady chronically look ill   HEAD: Atraumatic, normocephalic  EYES: EOM intact, PERRLA, conjunctiva and sclera clear  ENT: Supple, no masses, no thyromegaly, no bruits, no JVD; moist mucous membranes  PULMONARY: good air entry b/l, no wheezing or rhonchi   CARDIOVASCULAR: Regular rate and rhythm; no murmurs, rubs, or gallops  GASTROINTESTINAL: Soft, non-tender, non-distended; bowel sounds present  MUSCULOSKELETAL: 2+ peripheral pulses; no clubbing, no cyanosis, + b/l Lymphedema   NEUROLOGY: AAOx3, non-focal  SKIN: No rashes or lesions; warm and dry    MEDICATIONS:  MEDICATIONS  (STANDING):  albuterol/ipratropium for Nebulization 3 milliLiter(s) Nebulizer every 6 hours  apixaban 2.5 milliGRAM(s) Oral two times a day  budesonide  80 MICROgram(s)/formoterol 4.5 MICROgram(s) Inhaler 2 Puff(s) Inhalation two times a day  cefepime   IVPB      cefepime   IVPB 2000 milliGRAM(s) IV Intermittent every 12 hours  diltiazem    milliGRAM(s) Oral daily  doxycycline IVPB 100 milliGRAM(s) IV Intermittent every 12 hours  furosemide    Tablet 40 milliGRAM(s) Oral daily  gabapentin 100 milliGRAM(s) Oral three times a day  predniSONE   Tablet 30 milliGRAM(s) Oral daily  saccharomyces boulardii 250 milliGRAM(s) Oral two times a day  senna 2 Tablet(s) Oral at bedtime    MEDICATIONS  (PRN):  acetaminophen     Tablet .. 650 milliGRAM(s) Oral every 6 hours PRN Temp greater or equal to 38C (100.4F), Mild Pain (1 - 3)  guaifenesin/dextromethorphan Oral Liquid 10 milliLiter(s) Oral every 6 hours PRN Cough      ALLERGIES:  Allergies    aspirin (Other)  codeine (Other)  contrast media (iodine-based) (Other)  penicillin (Other)  sulfa drugs (Hives; Other)    Intolerances        LABS:                        10.0   13.80 )-----------( 266      ( 10 Mar 2023 05:50 )             33.8     03-10    139  |  95<L>  |  50<H>  ----------------------------<  89  4.2   |  36<H>  |  0.9    Ca    9.0      10 Mar 2023 05:50  Mg     2.1     03-10    TPro  6.0  /  Alb  2.9<L>  /  TBili  0.4  /  DBili  x   /  AST  38  /  ALT  42<H>  /  AlkPhos  160<H>  03-10        CAPILLARY BLOOD GLUCOSE          RADIOLOGY & ADDITIONAL TESTS: Reviewed.    < from: MR Ankle No Cont, Left (03.09.23 @ 20:44) >  1.  No evidence of active inflammatory arthritis.  2.  Severe midfoot arthrosis, most pronounced in the medial   naviculocuneiform joint.  3.  Probable talocalcaneal lateral hindfoot impingement and associated   sinus Tarsi syndrome.  4.  High-grade split tearing of the peroneus brevis. Severe insertional   tendinosis of the posterior tibialis.  5.  Mild-moderate acute on chronic plantar fasciitis including low-grade   partial tearing.    < end of copied text >

## 2023-03-10 NOTE — DISCHARGE NOTE PROVIDER - NSDCCPCAREPLAN_GEN_ALL_CORE_FT
PRINCIPAL DISCHARGE DIAGNOSIS  Diagnosis: SOB (shortness of breath)  Assessment and Plan of Treatment: you were found to have heart failure and copd exacerbation. It improved with bipap, steroids, and diuresis. You required thoarcentesis, drianage of fluid from the lung cavity. Please follow up with pulmonology and cardiology. Please follow up with your PCP. Please take all medications as instructed.      SECONDARY DISCHARGE DIAGNOSES  Diagnosis: Cellulitis of left lower extremity  Assessment and Plan of Treatment: You were found to have skin infection on your legs. You were seen by ID. You were started on oral antibiotics. You took antibiotics to ocurse completion. Please follow up with your PCP.    Diagnosis: Arthritis of left ankle  Assessment and Plan of Treatment: Rheumatology consulted for possible inflamnatory arthritis of left ankle. you were started on steroid taper. Please take as instructed. You obtained MRI of the ankle. Please follow up with Rheumatology outpatient. please follow up with your PCP.

## 2023-03-10 NOTE — CONSULT NOTE ADULT - CONSULT REASON
Ankle MRI abnormal findings
Left ankle pain concern for inflammatory arthritis
dfu
Hypoxemia and COPD
b/l le cellulitis   in the setting of lymphedema
afib

## 2023-03-10 NOTE — CONSULT NOTE ADULT - CONSULT REQUESTED DATE/TIME
06-Mar-2023 09:11
10-Mar-2023 17:00
03-Mar-2023
28-Feb-2023 07:36
28-Feb-2023 10:14
01-Mar-2023 14:21

## 2023-03-10 NOTE — DISCHARGE NOTE PROVIDER - NSDCFUSCHEDAPPT_GEN_ALL_CORE_FT
Mitch Christian  [facility]  PULMMED [facility]  Scheduled Appointment: 03/22/2023

## 2023-03-10 NOTE — CHART NOTE - NSCHARTNOTEFT_GEN_A_CORE
Registered Dietitian Follow-Up  Patient Profile Reviewed                           Yes [x]   No []  Nutrition History Previously Obtained        Yes [x]  No []      Pertinent Medical Interventions:  86 yo female with a pmh of htn, afib on eliquis, pvd, chronic lymphedema and copd present c/o BLE pain and SOB.  Patient was in her usual state of health 4 weeks ago when she developed SOB     Nutrition Interval History:   - SLP 3/5/23 with recs for regular solids with thin liquids  - diet order with supplements and change of texture still pending  - patient with improved PO intake has been eating fairly, consuming 51-75% of meal trays  Nutrient Intake: Patient meeting ~75% of estimated energy needs in-house     Diet order:   Diet, DASH/TLC:   Sodium & Cholesterol Restricted  Easy to Chew (EASYTOCHEW)  1200mL Fluid Restriction ([id number])     Qty per Day:  MAGIC CUP X2 (23 @ 15:25) [Pending Verification By Attending]  Diet, DASH/TLC:   Sodium & Cholesterol Restricted  1200mL Fluid Restriction ([id number]) (23 @ 03:48) [Active]    Anthropometrics:  Height (cm): 157.5 (23 @ 15:06)  Weight (kg): 96.8 (23 @ 12:04)  BMI (kg/m2): 39 (23 @ 15:06)    OTHER WEIGHTS:   UBW 213lbs, 96.8kg  IBW: 49.8kg  Daily Weight in k (-), Weight in k.1 ()  % Weight Change 12.7% x6 months   weight changes during admission secondary to fluid shifts    MEDICATIONS  (STANDING):  albuterol/ipratropium for Nebulization 3 milliLiter(s) Nebulizer every 6 hours  apixaban 2.5 milliGRAM(s) Oral two times a day  budesonide  80 MICROgram(s)/formoterol 4.5 MICROgram(s) Inhaler 2 Puff(s) Inhalation two times a day  cefepime   IVPB      cefepime   IVPB 2000 milliGRAM(s) IV Intermittent every 12 hours  diltiazem    milliGRAM(s) Oral daily  doxycycline IVPB 100 milliGRAM(s) IV Intermittent every 12 hours  furosemide    Tablet 40 milliGRAM(s) Oral daily  gabapentin 100 milliGRAM(s) Oral three times a day  predniSONE   Tablet 30 milliGRAM(s) Oral daily  saccharomyces boulardii 250 milliGRAM(s) Oral two times a day  senna 2 Tablet(s) Oral at bedtime    MEDICATIONS  (PRN):  acetaminophen     Tablet .. 650 milliGRAM(s) Oral every 6 hours PRN Temp greater or equal to 38C (100.4F), Mild Pain (1 - 3)  guaifenesin/dextromethorphan Oral Liquid 10 milliLiter(s) Oral every 6 hours PRN Cough    Pertinent Labs: 03-10 @ 05:50: Na 139, BUN 50<H>, Cr 0.9, BG 89, K+ 4.2, Phos --, Mg 2.1, Alk Phos 160<H>, ALT/SGPT 42<H>, AST/SGOT 38, HbA1c -    Physical Findings:  - Cognition: A&Ox4, moderate muscle depletion  - GI function: Last BM 3/5 per flow sheets   - Tubes: n/a  - Oral/Mouth cavity: 3/5 SLP 3/5/23 with recs for regular solids with thin liquids  - Skin: L foot, maceration b/l buttock  - Edema: left leg; right leg 3+     Nutrition Requirements: with consideration for age, weight, BMI  Weight Used:     Estimated Energy Needs    Continue [x]  Adjust [] MSJ 1259*SF 1.0 and protein as above given BMI.   Estimated Protein Needs    Continue [x]  Adjust [] 86-90g/day (1-1.05g/kg)  Estimated Fluid Needs        Continue [x]  Adjust [] 1.2L fluid restriction     [x] Previous Nutrition Diagnosis:            [x] Ongoing          [] Resolved  #1 Malnutrition  Goal/Expected Outcome: meet >/=75% est energy needs within 5-7days    Nutrition Intervention: Meals and Snacks, Medical Food Supplement, Vitamin Supplement, Nutrition Related Medication, Coordination of Care  Indicator/Monitoring:  Monitor diet order, energy intake, food and nutrient intake, body composition, weight    Recommendations:  - DASH/TLC (sodium and cholesterol restricted diet), 1.2 L restriction   - Magic cup 2x/day (290kcal, 9g protein each)  - saccharomyces boulardii  - bowel regimen to promote regular bowel movements     Konrad Chaney, #[id number] or via TEAMS  Patient is at moderate risk, follow up x 7days Registered Dietitian Follow-Up  Patient Profile Reviewed                           Yes [x]   No []  Nutrition History Previously Obtained        Yes [x]  No []      Pertinent Medical Interventions:  88 yo female with a pmh of htn, afib on eliquis, pvd, chronic lymphedema and copd present c/o BLE pain and SOB.  Patient was in her usual state of health 4 weeks ago when she developed SOB     Nutrition Interval History:   - SLP 3/5/23 with recs for regular solids with thin liquids  - diet order with supplements and change of texture still pending  - patient with improved PO intake has been eating fairly, consuming 51-75% of meal trays  Nutrient Intake: Patient meeting ~75% of estimated energy needs in-house     Diet order:   Diet, DASH/TLC:   Sodium & Cholesterol Restricted  Easy to Chew (EASYTOCHEW)  1200mL Fluid Restriction ([id number])     Qty per Day:  MAGIC CUP X2 (23 @ 15:25) [Pending Verification By Attending]  Diet, DASH/TLC:   Sodium & Cholesterol Restricted  1200mL Fluid Restriction ([id number]) (23 @ 03:48) [Active]    Anthropometrics:  Height (cm): 157.5 (23 @ 15:06)  Weight (kg): 96.8 (23 @ 12:04)  BMI (kg/m2): 39 (23 @ 15:06)    OTHER WEIGHTS:   UBW 213lbs, 96.8kg  IBW: 49.8kg  Daily Weight in k (-), Weight in k.1 ()  % Weight Change 12.7% x6 months   weight changes during admission secondary to fluid shifts    MEDICATIONS  (STANDING):  albuterol/ipratropium for Nebulization 3 milliLiter(s) Nebulizer every 6 hours  apixaban 2.5 milliGRAM(s) Oral two times a day  budesonide  80 MICROgram(s)/formoterol 4.5 MICROgram(s) Inhaler 2 Puff(s) Inhalation two times a day  cefepime   IVPB      cefepime   IVPB 2000 milliGRAM(s) IV Intermittent every 12 hours  diltiazem    milliGRAM(s) Oral daily  doxycycline IVPB 100 milliGRAM(s) IV Intermittent every 12 hours  furosemide    Tablet 40 milliGRAM(s) Oral daily  gabapentin 100 milliGRAM(s) Oral three times a day  predniSONE   Tablet 30 milliGRAM(s) Oral daily  saccharomyces boulardii 250 milliGRAM(s) Oral two times a day  senna 2 Tablet(s) Oral at bedtime    MEDICATIONS  (PRN):  acetaminophen     Tablet .. 650 milliGRAM(s) Oral every 6 hours PRN Temp greater or equal to 38C (100.4F), Mild Pain (1 - 3)  guaifenesin/dextromethorphan Oral Liquid 10 milliLiter(s) Oral every 6 hours PRN Cough    Pertinent Labs: 03-10 @ 05:50: Na 139, BUN 50<H>, Cr 0.9, BG 89, K+ 4.2, Phos --, Mg 2.1, Alk Phos 160<H>, ALT/SGPT 42<H>, AST/SGOT 38, HbA1c -    Physical Findings:  - Cognition: A&Ox4, moderate muscle depletion  - GI function: Last BM 3/5 per flow sheets   - Tubes: n/a  - Oral/Mouth cavity: 3/5 SLP 3/5/23 with recs for regular solids with thin liquids  - Skin: L foot, maceration b/l buttock  - Edema: left leg; right leg 3+     Nutrition Requirements: with consideration for age, weight, BMI  Weight Used: 86kg     Estimated Energy Needs    Continue [x]  Adjust [] MSJ 1259*SF 1.0 and protein as above given BMI.   Estimated Protein Needs    Continue [x]  Adjust [] 86-90g/day (1-1.05g/kg)  Estimated Fluid Needs        Continue [x]  Adjust [] 1.2L fluid restriction     [x] Previous Nutrition Diagnosis:            [x] Ongoing          [] Resolved  #1 Malnutrition  Goal/Expected Outcome: meet >/=75% est energy needs within 5-7days    Nutrition Intervention: Meals and Snacks, Medical Food Supplement, Vitamin Supplement, Nutrition Related Medication, Coordination of Care  Indicator/Monitoring:  Monitor diet order, energy intake, food and nutrient intake, body composition, weight    Recommendations:  - DASH/TLC (sodium and cholesterol restricted diet), 1.2 L restriction   - Magic cup 2x/day (290kcal, 9g protein each)  - saccharomyces boulardii  - bowel regimen to promote regular bowel movements     Konrad Chaney, #[id number] or via TEAMS  Patient is at moderate risk, follow up x 7days

## 2023-03-10 NOTE — DISCHARGE NOTE PROVIDER - CARE PROVIDER_API CALL
Barron King (DO)  Infectious Disease; Internal Medicine  [address] [place] [address]  Phone: [phone]  Fax: [phone]  Follow Up Time: 2 weeks    Murphy Navarro (DO)  Internal Medicine; Rheumatology  [address] [place] [address]  Phone: [phone]  Fax: [phone]  Follow Up Time: 2 weeks    Wally Lundberg)  Critical Care Medicine; Internal Medicine; Pulmonary Disease; Sleep Medicine  [address] [place] [address]  Phone: [phone]  Fax: [phone]  Follow Up Time: 2 weeks    Kaushik Pacheco  CARDIOVASCULAR DISEASE  [address] [place] [address]  Phone: [phone]  Fax: [phone]  Follow Up Time: 2 weeks

## 2023-03-11 NOTE — PROGRESS NOTE ADULT - SUBJECTIVE AND OBJECTIVE BOX
Patient is a 87y old  Female who presents with a chief complaint of Wheezing     (07 Mar 2023 15:06)      OVERNIGHT EVENTS: remained stable     SUBJECTIVE / INTERVAL HPI: Patient seen and examined at bedside.     VITAL SIGNS:  Vital Signs Last 24 Hrs  T(C): 36.1 (11 Mar 2023 05:01), Max: 36.2 (10 Mar 2023 20:00)  T(F): 97 (11 Mar 2023 05:01), Max: 97.1 (10 Mar 2023 20:00)  HR: 99 (11 Mar 2023 05:01) (99 - 102)  BP: 106/58 (11 Mar 2023 05:01) (106/58 - 116/67)  BP(mean): --  RR: 18 (11 Mar 2023 05:01) (18 - 18)  SpO2: 96% (11 Mar 2023 05:01) (95% - 96%)    Parameters below as of 11 Mar 2023 05:01  Patient On (Oxygen Delivery Method): room air        PHYSICAL EXAM:    CONSTITUTIONAL: No acute distress, old lady chronically look ill   HEAD: Atraumatic, normocephalic  EYES: EOM intact, PERRLA, conjunctiva and sclera clear  ENT: Supple, no masses, no thyromegaly, no bruits, no JVD; moist mucous membranes  PULMONARY: good air entry b/l, no wheezing or rhonchi   CARDIOVASCULAR: Regular rate and rhythm; no murmurs, rubs, or gallops  GASTROINTESTINAL: Soft, non-tender, non-distended; bowel sounds present  MUSCULOSKELETAL: 2+ peripheral pulses; no clubbing, no cyanosis, + b/l Lymphedema   NEUROLOGY: AAOx3, non-focal  SKIN: No rashes or lesions; warm and dry    MEDICATIONS:  MEDICATIONS  (STANDING):  albuterol/ipratropium for Nebulization 3 milliLiter(s) Nebulizer every 6 hours  apixaban 2.5 milliGRAM(s) Oral two times a day  budesonide  80 MICROgram(s)/formoterol 4.5 MICROgram(s) Inhaler 2 Puff(s) Inhalation two times a day  diltiazem    milliGRAM(s) Oral daily  doxycycline IVPB 100 milliGRAM(s) IV Intermittent every 12 hours  furosemide    Tablet 40 milliGRAM(s) Oral daily  gabapentin 100 milliGRAM(s) Oral three times a day  predniSONE   Tablet 30 milliGRAM(s) Oral daily  saccharomyces boulardii 250 milliGRAM(s) Oral two times a day  senna 2 Tablet(s) Oral at bedtime    MEDICATIONS  (PRN):  acetaminophen     Tablet .. 650 milliGRAM(s) Oral every 6 hours PRN Temp greater or equal to 38C (100.4F), Mild Pain (1 - 3)  guaifenesin/dextromethorphan Oral Liquid 10 milliLiter(s) Oral every 6 hours PRN Cough      ALLERGIES:  Allergies    aspirin (Other)  codeine (Other)  contrast media (iodine-based) (Other)  penicillin (Other)  sulfa drugs (Hives; Other)    Intolerances        LABS:                        9.5    13.68 )-----------( 266      ( 11 Mar 2023 04:50 )             32.5     03-11    140  |  96<L>  |  49<H>  ----------------------------<  81  4.2   |  35<H>  |  1.0    Ca    8.5      11 Mar 2023 04:50  Mg     2.2     03-11    TPro  5.5<L>  /  Alb  2.7<L>  /  TBili  0.3  /  DBili  x   /  AST  33  /  ALT  38  /  AlkPhos  152<H>  03-11        CAPILLARY BLOOD GLUCOSE          RADIOLOGY & ADDITIONAL TESTS: Reviewed.     Patient is a 87y old  Female who presents with a chief complaint of Wheezing     (07 Mar 2023 15:06)      OVERNIGHT EVENTS: remained stable     SUBJECTIVE / INTERVAL HPI: Patient seen and examined at bedside.     VITAL SIGNS:  Vital Signs Last 24 Hrs  T(C): 36.1 (11 Mar 2023 05:01), Max: 36.2 (10 Mar 2023 20:00)  T(F): 97 (11 Mar 2023 05:01), Max: 97.1 (10 Mar 2023 20:00)  HR: 99 (11 Mar 2023 05:01) (99 - 102)  BP: 106/58 (11 Mar 2023 05:01) (106/58 - 116/67)  BP(mean): --  RR: 18 (11 Mar 2023 05:01) (18 - 18)  SpO2: 96% (11 Mar 2023 05:01) (95% - 96%)    Parameters below as of 11 Mar 2023 05:01  Patient On (Oxygen Delivery Method): room air        PHYSICAL EXAM:    CONSTITUTIONAL: No acute distress, old lady chronically look ill   HEAD: Atraumatic, normocephalic  EYES: EOM intact, PERRLA, conjunctiva and sclera clear  ENT: Supple, no masses, no thyromegaly, no bruits, no JVD; moist mucous membranes, right neck swelling, soft, non tender 2x3cm   PULMONARY: good air entry b/l, no wheezing or rhonchi   CARDIOVASCULAR: Regular rate and rhythm; no murmurs, rubs, or gallops  GASTROINTESTINAL: Soft, non-tender, non-distended; bowel sounds present  MUSCULOSKELETAL: 2+ peripheral pulses; no clubbing, no cyanosis, + b/l Lymphedema   NEUROLOGY: AAOx3, non-focal  SKIN: No rashes or lesions; warm and dry    MEDICATIONS:  MEDICATIONS  (STANDING):  albuterol/ipratropium for Nebulization 3 milliLiter(s) Nebulizer every 6 hours  apixaban 2.5 milliGRAM(s) Oral two times a day  budesonide  80 MICROgram(s)/formoterol 4.5 MICROgram(s) Inhaler 2 Puff(s) Inhalation two times a day  diltiazem    milliGRAM(s) Oral daily  doxycycline IVPB 100 milliGRAM(s) IV Intermittent every 12 hours  furosemide    Tablet 40 milliGRAM(s) Oral daily  gabapentin 100 milliGRAM(s) Oral three times a day  predniSONE   Tablet 30 milliGRAM(s) Oral daily  saccharomyces boulardii 250 milliGRAM(s) Oral two times a day  senna 2 Tablet(s) Oral at bedtime    MEDICATIONS  (PRN):  acetaminophen     Tablet .. 650 milliGRAM(s) Oral every 6 hours PRN Temp greater or equal to 38C (100.4F), Mild Pain (1 - 3)  guaifenesin/dextromethorphan Oral Liquid 10 milliLiter(s) Oral every 6 hours PRN Cough      ALLERGIES:  Allergies    aspirin (Other)  codeine (Other)  contrast media (iodine-based) (Other)  penicillin (Other)  sulfa drugs (Hives; Other)    Intolerances        LABS:                        9.5    13.68 )-----------( 266      ( 11 Mar 2023 04:50 )             32.5     03-11    140  |  96<L>  |  49<H>  ----------------------------<  81  4.2   |  35<H>  |  1.0    Ca    8.5      11 Mar 2023 04:50  Mg     2.2     03-11    TPro  5.5<L>  /  Alb  2.7<L>  /  TBili  0.3  /  DBili  x   /  AST  33  /  ALT  38  /  AlkPhos  152<H>  03-11        CAPILLARY BLOOD GLUCOSE          RADIOLOGY & ADDITIONAL TESTS: Reviewed.

## 2023-03-11 NOTE — PROGRESS NOTE ADULT - ASSESSMENT
87F PMH: HTN, A.fib (on eliquis), PAD, Chronic lymphedema and COPD presents to ED for b/L LE pain and dyspnea on exertion. Upgraded to SICU for hypoxia suspected due to volume overload, now being downgraded to floor.    #Transudative Left pleural effusion sp thoracentesiss/p IV steroid and IV lasix  #Acute on chronic hypercapnic respiratory failure improved   #Acute hypoxemic respiratory failure from fluid overload and COPD exacerbation   #HFpEF  - on 2klnc AT NIGHT , WEAN OFF o2  - on taper steroid (also for left ankle arthritis)  - on PO lasix 40 qd  - neg pleural cytology but pending block study  - last CXR improved pulm congestions     #left Ankle arthritis   - s/p rheum eval: unable to determine the cause given overlaying lymphedema   - partial response top steroid,  MRI, High-grade split tearing of the peroneus brevis. Severe insertional   tendinosis of the posterior tibialis and plantar fascitis   - Podiatry eval: needs ankle brace and Physical therapy for LLE  - on PO steroid taper,   - PT eval     leukocytosis: from steroid   Probable [facility] refused testing   Thyroid mass SP FNA   LE cellulitis with ulcer and abscess MRSA +  - s/p doxycyline and cefepime ends 3/10, s/p vanco, Procal .35     HO Afib on Eliquis  Chronic lymphedema  HO Peripheral vascular disease    code status: see goc note, full code    live home a lone, will need HA / placement  pending: dispo plan/ PT eval 87F PMH: HTN, A.fib (on eliquis), PAD, Chronic lymphedema and COPD presents to ED for b/L LE pain and dyspnea on exertion. Upgraded to SICU for hypoxia suspected due to volume overload, now being downgraded to floor.    #Transudative Left pleural effusion sp thoracentesiss/p IV steroid and IV lasix  #Acute on chronic hypercapnic respiratory failure improved   #Acute hypoxemic respiratory failure from fluid overload and COPD exacerbation   #HFpEF  - on 2klnc AT NIGHT , WEAN OFF o2  - on taper steroid (also for left ankle arthritis)  - on PO lasix 40 qd  - neg pleural cytology but pending block study  - last CXR improved pulm congestions     #left Ankle arthritis   - s/p rheum eval: unable to determine the cause given overlaying lymphedema   - partial response top steroid,  MRI, High-grade split tearing of the peroneus brevis. Severe insertional   tendinosis of the posterior tibialis and plantar fascitis   - Podiatry eval: needs ankle brace and Physical therapy for LLE  - on PO steroid taper,   - PT eval     leukocytosis: from steroid   Probable [facility] refused testing   Thyroid mass SP FNA   LE cellulitis with ulcer and abscess MRSA +  - s/p doxycyline and cefepime ends 3/10, s/p vanco, Procal .35     HO Afib on Eliquis  Chronic lymphedema  HO Peripheral vascular disease    code status: see goc note, full code    live home a lone, will need HA / placement  daugther updated over the phone   pending: dispo plan STR

## 2023-03-12 NOTE — PROGRESS NOTE ADULT - ASSESSMENT
87F PMH: HTN, A.fib (on eliquis), PAD, Chronic lymphedema and COPD presents to ED for b/L LE pain and dyspnea on exertion. Upgraded to SICU for hypoxia suspected due to volume overload, now being downgraded to floor.    #Transudative Left pleural effusion sp thoracentesiss/p IV steroid and IV lasix  #Acute on chronic hypercapnic respiratory failure improved   #Acute hypoxemic respiratory failure from fluid overload and COPD exacerbation   #HFpEF  - on 2klnc AT NIGHT , WEAN OFF o2  - on taper steroid (also for left ankle arthritis)  - on PO lasix 40 qd  - neg pleural cytology but pending block study  - last CXR improved pulm congestions     #left Ankle arthritis   - s/p rheum eval: unable to determine the cause given overlaying lymphedema   - partial response top steroid,  MRI, High-grade split tearing of the peroneus brevis. Severe insertional   tendinosis of the posterior tibialis and plantar fascitis   - Podiatry eval: needs ankle brace and Physical therapy for LLE  - on PO steroid taper,   - PT eval     leukocytosis: from steroid   Probable [facility] refused testing   Thyroid mass SP FNA   LE cellulitis with ulcer and abscess MRSA +  - s/p doxycyline and cefepime ends 3/10, s/p vanco, Procal .35     HO Afib on Eliquis  Chronic lymphedema  HO Peripheral vascular disease    code status: see goc note, full code    live home a lone, will need HA / placement  daugther updated over the phone 3/11  pending: dispo plan STR 87F PMH: HTN, A.fib (on eliquis), PAD, Chronic lymphedema and COPD presents to ED for b/L LE pain and dyspnea on exertion. Upgraded to SICU for hypoxia suspected due to volume overload, now being downgraded to floor.    #Transudative Left pleural effusion sp thoracentesiss/p IV steroid and IV lasix  #Acute on chronic hypercapnic respiratory failure improved   #Acute hypoxemic respiratory failure from fluid overload and COPD exacerbation   #HFpEF  - on 2klnc AT NIGHT , WEAN OFF o2  - on taper steroid (also for left ankle arthritis)  - on PO lasix 40 qd  - neg pleural cytology but pending block study  - last CXR improved pulm congestions     #left Ankle arthritis   - s/p rheum eval: unable to determine the cause given overlaying lymphedema   - partial response top steroid,  MRI, High-grade split tearing of the peroneus brevis. Severe insertional   tendinosis of the posterior tibialis and plantar fascitis   - Podiatry eval: needs ankle brace and Physical therapy for LLE  - on PO steroid taper,   - PT eval     leukocytosis: from steroid   Probable [facility] refused testing   Thyroid mass SP FNA 8/2022 FOLLICULAR LESION OF UNDETERMINED SIGNIFICANCE (BETHESDA CATEGORY III),  LE cellulitis with ulcer and abscess MRSA +  - s/p doxycyline and cefepime ends 3/10, s/p vanco, Procal .35     HO Afib on Eliquis  Chronic lymphedema  HO Peripheral vascular disease    code status: see [facility] note, full code    live home a lone, will need HA / placement  daugther updated over the phone 3/11  pending: dispo plan STR

## 2023-03-12 NOTE — PROGRESS NOTE ADULT - SUBJECTIVE AND OBJECTIVE BOX
Patient is a 87y old  Female who presents with a chief complaint of Wheezing     (07 Mar 2023 15:06)      OVERNIGHT EVENTS: remained stable, denies fever, chills, syncope, seizure, headache, cough, SOB, abd pain, N/V/D, urinary symptoms,    SUBJECTIVE / INTERVAL HPI: Patient seen and examined at bedside.     VITAL SIGNS:  Vital Signs Last 24 Hrs  T(C): 37 (12 Mar 2023 04:13), Max: 37 (12 Mar 2023 04:13)  T(F): 98.6 (12 Mar 2023 04:13), Max: 98.6 (12 Mar 2023 04:13)  HR: 101 (12 Mar 2023 04:13) (101 - 109)  BP: 136/63 (12 Mar 2023 04:13) (110/58 - 136/63)  BP(mean): --  RR: 19 (12 Mar 2023 04:13) (18 - 19)  SpO2: 93% (12 Mar 2023 09:06) (93% - 96%)    Parameters below as of 12 Mar 2023 09:06  Patient On (Oxygen Delivery Method): room air        PHYSICAL EXAM:    CONSTITUTIONAL: No acute distress, old lady chronically look ill   HEAD: Atraumatic, normocephalic  EYES: EOM intact, PERRLA, conjunctiva and sclera clear  ENT: Supple, no masses, no thyromegaly, no bruits, no JVD; moist mucous membranes, right neck swelling, soft, non tender 2x3cm   PULMONARY: good air entry b/l, no wheezing or rhonchi   CARDIOVASCULAR: Regular rate and rhythm; no murmurs, rubs, or gallops  GASTROINTESTINAL: Soft, non-tender, non-distended; bowel sounds present  MUSCULOSKELETAL: 2+ peripheral pulses; no clubbing, no cyanosis, + b/l Lymphedema   NEUROLOGY: AAOx3, non-focal  SKIN: No rashes or lesions; warm and dry    MEDICATIONS:  MEDICATIONS  (STANDING):  albuterol/ipratropium for Nebulization 3 milliLiter(s) Nebulizer every 6 hours  apixaban 2.5 milliGRAM(s) Oral two times a day  budesonide  80 MICROgram(s)/formoterol 4.5 MICROgram(s) Inhaler 2 Puff(s) Inhalation two times a day  diltiazem    milliGRAM(s) Oral daily  furosemide    Tablet 40 milliGRAM(s) Oral daily  gabapentin 100 milliGRAM(s) Oral three times a day  predniSONE   Tablet 30 milliGRAM(s) Oral daily  saccharomyces boulardii 250 milliGRAM(s) Oral two times a day  senna 2 Tablet(s) Oral at bedtime    MEDICATIONS  (PRN):  acetaminophen     Tablet .. 650 milliGRAM(s) Oral every 6 hours PRN Temp greater or equal to 38C (100.4F), Mild Pain (1 - 3)  guaifenesin/dextromethorphan Oral Liquid 10 milliLiter(s) Oral every 6 hours PRN Cough      ALLERGIES:  Allergies    aspirin (Other)  codeine (Other)  contrast media (iodine-based) (Other)  penicillin (Other)  sulfa drugs (Hives; Other)    Intolerances        LABS:                        10.0   15.97 )-----------( 304      ( 12 Mar 2023 05:57 )             32.9     03-12    140  |  96<L>  |  48<H>  ----------------------------<  90  4.3   |  36<H>  |  1.0    Ca    9.1      12 Mar 2023 05:57  Mg     2.2     03-12    TPro  5.9<L>  /  Alb  3.1<L>  /  TBili  0.3  /  DBili  x   /  AST  37  /  ALT  43<H>  /  AlkPhos  176<H>  03-12        CAPILLARY BLOOD GLUCOSE          RADIOLOGY & ADDITIONAL TESTS: Reviewed.

## 2023-03-12 NOTE — PROGRESS NOTE ADULT - PROVIDER SPECIALTY LIST ADULT
Critical Care
Hospitalist
Critical Care
Hospitalist
Hospitalist
Internal Medicine
Internal Medicine
Hospitalist
Hospitalist
Infectious Disease
Internal Medicine
Hospitalist
Internal Medicine

## 2023-03-12 NOTE — PROGRESS NOTE ADULT - NUTRITIONAL ASSESSMENT
This patient has been assessed with a concern for Malnutrition and has been determined to have a diagnosis/diagnoses of Severe protein-calorie malnutrition.    This patient is being managed with:   Diet DASH/TLC-  Sodium & Cholesterol Restricted  Easy to Chew (EASYTOCHEW)  1200mL Fluid Restriction ([id number])     Qty per Day:  MAGIC CUP X2  Entered: Mar  7 2023  3:25PM    Diet DASH/TLC-  Sodium & Cholesterol Restricted  1200mL Fluid Restriction ([id number])  Entered: Feb 28 2023  3:48AM    The following pending diet order is being considered for treatment of Severe protein-calorie malnutrition:null
This patient has been assessed with a concern for Malnutrition and has been determined to have a diagnosis/diagnoses of Severe protein-calorie malnutrition.    This patient is being managed with:   Diet DASH/TLC-  Sodium & Cholesterol Restricted  Easy to Chew (EASYTOCHEW)  1200mL Fluid Restriction ([id number])     Qty per Day:  MAGIC CUP X2  Entered: Mar  7 2023  3:25PM    Diet DASH/TLC-  Sodium & Cholesterol Restricted  1200mL Fluid Restriction ([id number])  Entered: Feb 28 2023  3:48AM    The following pending diet order is being considered for treatment of Severe protein-calorie malnutrition:null
This patient has been assessed with a concern for Malnutrition and has been determined to have a diagnosis/diagnoses of Severe protein-calorie malnutrition.    This patient is being managed with:   Diet DASH/TLC-  Sodium & Cholesterol Restricted  Easy to Chew (EASYTOCHEW)  1200mL Fluid Restriction ([id number])     Qty per Day:  MAGIC CUP X2  Entered: Mar  7 2023  3:25PM    Diet DASH/TLC-  Sodium & Cholesterol Restricted  1200mL Fluid Restriction ([id number])  Entered: Feb 28 2023  3:48AM    The following pending diet order is being considered for treatment of Severe protein-calorie malnutrition:null
This patient has been assessed with a concern for Malnutrition and has been determined to have a diagnosis/diagnoses of Severe protein-calorie malnutrition.    This patient is being managed with:   Diet DASH/TLC-  Sodium & Cholesterol Restricted  Easy to Chew (EASYTOCHEW)  1200mL Fluid Restriction ([id number])     Qty per Day:  MAGIC CUP X2  Entered: Mar  7 2023  3:25PM    Diet DASH/TLC-  Sodium & Cholesterol Restricted  1200mL Fluid Restriction ([id number])  Entered: Feb 28 2023  3:48AM    The following pending diet order is being considered for treatment of Severe protein-calorie malnutrition:null

## 2023-03-13 NOTE — CHART NOTE - NSCHARTNOTEFT_GEN_A_CORE
pt seen and examined    pt stable for dc, noted wbc 19, likley from steroid, exam not concerning for infection,   repeat CBC in 2-3 days after dc to SNF    PHYSICAL EXAM:    CONSTITUTIONAL: No acute distress, old lady chronically look ill   HEAD: Atraumatic, normocephalic  EYES: EOM intact, PERRLA, conjunctiva and sclera clear  ENT: Supple, no masses, no thyromegaly, no bruits, no JVD; moist mucous membranes, right neck swelling, soft, non tender 2x3cm   PULMONARY: good air entry b/l, no wheezing or rhonchi   CARDIOVASCULAR: Regular rate and rhythm; no murmurs, rubs, or gallops  GASTROINTESTINAL: Soft, non-tender, non-distended; bowel sounds present  MUSCULOSKELETAL: 2+ peripheral pulses; no clubbing, no cyanosis, + b/l Lymphedema   NEUROLOGY: AAOx3, non-focal  SKIN: No rashes or lesions; warm and dry

## 2023-03-16 NOTE — CHART NOTE - NSCHARTNOTEFT_GEN_A_CORE
called by Dr. Rock, cell block of left pleural fluid analysis is suspicious of carcinoma, possible etiology could be from breast  I called the pt and her Daughter Karishma but no one answered, left voice msg to call hospitalist office for very important result, will call to called by Dr. 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

## 2023-04-23 NOTE — ED PROVIDER NOTE - NS ED ATTENDING STATEMENT MOD
no This was a shared visit with the KEVIN. I reviewed and verified the documentation and independently performed the documented:

## 2023-04-23 NOTE — ED PROVIDER NOTE - ATTENDING APP SHARED VISIT CONTRIBUTION OF CARE
7-year-old female history of previous surgery of A-fib on Eliquis with recent left from standpoint or symptoms prescription for lethargy patient is on syndrome not likely placed last 2 days.  No exam patient distress diminished breath sounds left side S1-S2 regular soft nontender moving all extremities  Impression  Patient here with lethergy, pt  found to be febrile hypoxic on room air improved with nasal cannula.  X-ray demonstrates a large left pleural effusion however the patient's Aziza a respiratory status improved with supplemental oxygen.  Patient was found to UTI given antibiotics.  Due to the fact the patient has multi systems endometrial function abnormality patient discussed with ICU palpation emergency medical floor. 7-year-old female history of previous surgery of A-fib on Eliquis with recent left from standpoint or symptoms prescription for lethargy patient is on syndrome not likely placed last 2 days.  No exam patient distress diminished breath sounds left side S1-S2 regular soft nontender moving all extremities  Impression  Patient here with lethargy, pt  found to be febrile hypoxic on room air improved with nasal cannula.  X-ray demonstrates a large left pleural effusion however the patient's Aziza a respiratory status improved with supplemental oxygen.  Patient was found to UTI given antibiotics.  Due to the fact the patient has multi systems endometrial function abnormality patient discussed with ICU palpation emergency medical floor.

## 2023-04-23 NOTE — H&P ADULT - CONVERSATION DETAILS
I discussed with patient regarding her CODE STATUS and asked me to refer to her daughter, Karishma who is HCP. She would like to her mother to be FULL CODE.

## 2023-04-23 NOTE — ED PROVIDER NOTE - OBJECTIVE STATEMENT
88 y/o female former smoker, hx COPD, CHF, a.fib on eliquis, lymphedema, recent hospitalization s/p thoracentesis for left pleural effusion presents to the ED with increased lethargy as per family since last night. patient stopped taking her lasix 3 days ago as per family. no rashes. no vomiting or diarrhea. patient with low pulse ox as per family. no cough, congestion, hemoptysis. no neck pain , headaches. no back pain .

## 2023-04-23 NOTE — ED PROVIDER NOTE - CARE PLAN
1 Principal Discharge DX:	Sepsis  Secondary Diagnosis:	Acute UTI  Secondary Diagnosis:	Elevated troponin  Secondary Diagnosis:	Pleural effusion, left

## 2023-04-23 NOTE — CONSULT NOTE ADULT - NS ATTEND AMEND GEN_ALL_CORE FT
88 y/o f pmh Afib on Eliquis, HTN, PAD, Chronic lymphedema, COPD presenting with lethargy fever and cough admitted for sepsis found to have elevated troponin . Patient with sepsis . She  has probable demand ischemia. Culture antibiotics. CPK mb low. Continue meds. Follow up cxr. Consder in 4 - 6 weeks . Outpatient adenocard thallium

## 2023-04-23 NOTE — PATIENT PROFILE ADULT - FALL HARM RISK - PATIENT NEEDS ASSISTANCE
Telephone Encounter by Mackenzie Giraldo CMA at 7/9/2021  4:23 PM     Author: Mackenzie Giraldo CMA Service: -- Author Type: Certified Medical Assistant    Filed: 7/9/2021  4:29 PM Encounter Date: 7/9/2021 Status: Signed    : Mackenzie Giraldo CMA (Certified Medical Assistant)       Spoke with Samantha over the phone today [place] [facility] [place]: [place]    Patient was informed a pre op physical was required to be completed within 30 days prior to surgery date AND Covid testing was required 4 days before surgery.    Covid Test: Date: 7/30/2021 at 11:15am , Location: [facility]      Letter sent via [employer]    **Patient verbally acknowledged understanding of all above information**            Standing/Walking/Toileting/Moving from bed to chair

## 2023-04-23 NOTE — ED ADULT NURSE NOTE - NSIMPLEMENTINTERV_GEN_ALL_ED
Implemented All Fall Risk Interventions:  [place] to call system. Call bell, personal items and telephone within reach. Instruct patient to call for assistance. Room bathroom lighting operational. Non-slip footwear when patient is off stretcher. Physically safe environment: no spills, clutter or unnecessary equipment. Stretcher in lowest position, wheels locked, appropriate side rails in place. Provide visual cue, wrist band, yellow gown, etc. Monitor gait and stability. Monitor for mental status changes and reorient to person, place, and time. Review medications for side effects contributing to fall risk. Reinforce activity limits and safety measures with patient and family.

## 2023-04-23 NOTE — ED PROVIDER NOTE - CLINICAL SUMMARY MEDICAL DECISION MAKING FREE TEXT BOX
Patient here with lethargy, pt  found to be febrile hypoxic on room air improved with nasal cannula.  X-ray demonstrates a large left pleural effusion however the patient's Aziza a respiratory status improved with supplemental oxygen.  Patient was found to UTI given antibiotics.  Due to the fact the patient has multi systems endometrial function abnormality patient discussed with ICU palpation emergency medical floor.

## 2023-04-23 NOTE — H&P ADULT - NSHPPHYSICALEXAM_GEN_ALL_CORE
Vital Signs (24 Hrs):  T(C): 37.4 (04-23-23 @ 11:47), Max: 39.6 (04-23-23 @ 09:59)  HR: 101 (04-23-23 @ 11:47) (101 - 120)  BP: 127/60 (04-23-23 @ 11:47) (127/60 - 143/71)  RR: 20 (04-23-23 @ 11:47) (20 - 24)  SpO2: 97% (04-23-23 @ 11:47) (89% - 98%)    PHYSICAL EXAM:  GENERAL: NAD, well-developed  SKIN: No rashes or lesions  HEAD:  Atraumatic, Normocephalic  EYES: EOMI, PERRLA, conjunctiva and sclera clear  NECK: Supple, No JVD  CHEST/LUNG: distant breath sounds 3/4 up the left side of chest.  Right side without rhonchi  HEART: Irr Irr; No murmurs, rubs, or gallops  ABDOMEN: Soft, Nontender, Nondistended; Bowel sounds present  EXTREMITIES:  No clubbing, cyanosis.  +BLE lymphedema  CNS: AAOx3

## 2023-04-23 NOTE — CONSULT NOTE ADULT - ASSESSMENT
86 y/o f pmh Afib on Eliquis, HTN, PAD, Chronic lymphedema, COPD presenting with lethargy fever and cough admitted for sepsis found to have elevated troponin     Impression  # elevated troponin likely demand ischemia in setting of Sepsis    Plan  - Trend troponin  - Treat infection  - Heparin gtt for now switch to Eliquis when stable 88 y/o f pmh Afib on Eliquis, HTN, PAD, Chronic lymphedema, COPD presenting with lethargy fever and cough admitted for sepsis found to have elevated troponin     trop 0.15  pBNP 20k    Impression  # elevated troponin likely demand ischemia in setting of Sepsis  # Volume overload    Plan  - currently hd stable Afib hr controlled  - Repeat troponin  - c/w Lasix IVP  - Monitor I&O  - pulm eval for possible thoracentesis  - Replete K & Mg  - Heparin gtt for now switch to Eliquis when stable  - Treat infection

## 2023-04-23 NOTE — PATIENT PROFILE ADULT - FALL HARM RISK - HARM RISK INTERVENTIONS

## 2023-04-23 NOTE — ED ADULT TRIAGE NOTE - CHIEF COMPLAINT QUOTE
BIBA from home as per EMS pt recently dc from hospital c/o fever and shortness of breath, EMS placed o2

## 2023-04-23 NOTE — H&P ADULT - ASSESSMENT
87F w/PMHx of 87F of HTN, A.fib (on eliquis), PAD, Chronic lymphedema and COPD (not on home o2) presents to ED with her Dtr with reports of lethargy, admitted to medicine service for further management.    #AHRF 2/2 Volume Overload 2/2 HFpEF w/exacerbation w/worsening left pleural effusion  - admit to medicine service  - supplemental o2 to maintain adequate saturations, currently on 4L  - Furosemide 40mg IVP q12h (s/p 40mg IVP in ED)  - Pulmonology eval for eval for thoracentesis Left pleural effusion (cytology possible malignant)  - weights QD  - monitor I/O  - last ECHO 3/23, EF 66% and mild-moderate valve abnormalities.  No need to repeat    #Troponemia  - monitor on telemetry x 24h  - trend CE x 2 more sets (1900 and AM)  - Cardiology evaluation    #UTI  - start Rocephin 1gm IVPB q24h in AM (s/p Cefepime and Vanc in ED)  - FU UCx/BCx  - check procal (add to admit labs)  - trend WBC count/Fever curve    #AFIB w/RVR  - initially presented w/RVR, now rate controlled <110  - c/w Diltiazem QD  - c/w DOAC    #YAIMA  - baseline sCr approx 1.1  - s/p 1L NS bolus in ED  - trend sCr  - avoid Nephrotoxics  - consider renal c/s in worsening  - KCL 40meq PO x 1 now  - Mg 1gm IVPB x 1 now, recheck levels in AM    #COPD  - appears at baseline  - not on LAMA/ICS  - b2 PRN 87F w/PMHx of 87F of HTN, A.fib (on eliquis), PAD, Chronic lymphedema and COPD (not on home o2) presents to ED with her Dtr with reports of lethargy, admitted to medicine service for further management.    #AHRF 2/2 Volume Overload 2/2 HFpEF w/exacerbation w/worsening left pleural effusion  - admit to medicine service  - supplemental o2 to maintain adequate saturations, currently on 4L  - Furosemide 40mg IVP q12h (s/p 40mg IVP in ED)  - Pulmonology eval for eval for thoracentesis Left pleural effusion (cytology possible malignant)  - weights QD, monitor I/O  - last ECHO 3/23, EF 66% and mild-moderate valve abnormalities.  No need to repeat  - Will hold home Eliquis, start heparin gtt incase of thoracentesis in near future    #Troponemia likely demand ischemia  - monitor on telemetry x 24h, denies any chest pain, HD stable  - EKG reviewed - no ischemic changes  - trend CE x 2 more sets (1900 and AM)  - Cardiology evaluation    #UTI  - start Rocephin 1gm IVPB q24h in AM (s/p Cefepime and Vanc in ED)  - FU UCx/BCx  - check procal (add to admit labs)  - trend WBC count/Fever curve    #AFIB w/RVR  - initially presented w/RVR, now rate controlled <110  - c/w Diltiazem QD  - Hold DOAC for possible thoracentesis, start heparin gtt which can be switched off immediately incase for procedure    #YAIMA  - baseline sCr approx 1.1--> 1.4 today  - s/p 1L NS bolus in ED  - avoid Nephrotoxics  - consider renal c/s in worsening  - KCL 40meq PO x 1 now  - Mg 1gm IVPB x 1 now, recheck levels in AM    #COPD  - appears at baseline  - not on LAMA/ICS  - b2 PRN

## 2023-04-23 NOTE — ED PROVIDER NOTE - CONSIDERATION OF ADMISSION OBSERVATION
Patient with urosepsis as well as large left effusion and hypoxic requiring admission. Consideration of Admission/Observation

## 2023-04-23 NOTE — ED PROVIDER NOTE - PHYSICAL EXAMINATION
Vital Signs: I have reviewed the initial vital signs.  Constitutional: well-nourished, no acute distress,   Eyes: PERRLA, EOMI, clear conjunctiva  ENT: MMM, T  Cardiovascular: tachycardic,  irregular rhythm, no murmur appreciated  Respiratory: unlabored respiratory effort, diminished on left base  Gastrointestinal: soft, non-tender, non-distended  abdomen, no pulsatile mass  Musculoskeletal: supple neck, +b/l lymphedema lower extremity, no bony tenderness  Integumentary: warm, dry, no rash  Neurologic: awake, alert, cranial nerves II-XII grossly intact, extremities’ motor and sensory functions grossly intact, no focal deficits

## 2023-04-23 NOTE — CONSULT NOTE ADULT - SUBJECTIVE AND OBJECTIVE BOX
86 y/o f pmh Afib on Eliquis, HTN, PAD, Chronic lymphedema, COPD presenting with lethargy. Pt was recently admitted to [facility] for AHRF underwent thoracentesis for pleural effusion was discharged home on Prednisone taper and po Lasix. Pt was doing well since discharge then 2 days ago developed lethargy and decreased ambulation and po intake, subjective fever, cough and sob. Denies chest pain, palpitation, n/v, dizziness.       PAST MEDICAL & SURGICAL HISTORY  HTN (hypertension)    Afib  s/p ablation 2001    Goiter  no meds    Cellulitis  chronic    OA (osteoarthritis)    PVD (peripheral vascular disease)    COPD (chronic obstructive pulmonary disease)    Anxiety    Obesity    H/O abdominal hysterectomy    H/O discectomy    H/O total knee replacement, bilateral        FAMILY HISTORY:  FAMILY HISTORY:      SOCIAL HISTORY:  []smoker  []Alcohol  []Drug    ALLERGIES:  aspirin (Other)  contrast media (iodine-based) (Other)  penicillin (Other)  codeine (Other)  sulfa drugs (Hives; Other)      MEDICATIONS:  MEDICATIONS  (STANDING):  diltiazem    milliGRAM(s) Oral daily  furosemide   Injectable 40 milliGRAM(s) IV Push two times a day  heparin  Infusion.  Unit(s)/Hr (16 mL/Hr) IV Continuous <Continuous>  magnesium sulfate  IVPB 1 Gram(s) IV Intermittent once  potassium chloride    Tablet ER 40 milliEquivalent(s) Oral once    MEDICATIONS  (PRN):  acetaminophen     Tablet .. 650 milliGRAM(s) Oral every 6 hours PRN Temp greater or equal to 38C (100.4F), Mild Pain (1 - 3)  albuterol/ipratropium for Nebulization 3 milliLiter(s) Nebulizer every 6 hours PRN Bronchospasm  heparin   Injectable 3500 Unit(s) IV Push every 6 hours PRN For aPTT between 40 - 57  heparin   Injectable 7000 Unit(s) IV Push every 6 hours PRN For aPTT less than 40  melatonin 3 milliGRAM(s) Oral at bedtime PRN Insomnia  ondansetron Injectable 4 milliGRAM(s) IV Push every 8 hours PRN Nausea and/or Vomiting      HOME MEDICATIONS:  Home Medications:  dilTIAZem 180 mg/24 hours oral capsule, extended release: 1 cap(s) orally once a day (23 Apr 2023 13:48)  Eliquis 2.5 mg oral tablet: 1 tab(s) orally 2 times a day (23 Apr 2023 13:48)  furosemide 20 mg oral tablet: 1 tab(s) orally once a day (23 Apr 2023 13:48)      VITALS:   T(F): 98.3 (04-23 @ 15:31), Max: 103.3 (04-23 @ 09:59)  HR: 102 (04-23 @ 15:31) (95 - 120)  BP: 125/65 (04-23 @ 15:31) (117/58 - 143/71)  BP(mean): --  RR: 20 (04-23 @ 15:31) (20 - 24)  SpO2: 99% (04-23 @ 15:31) (89% - 99%)    I&O's Summary      REVIEW OF SYSTEMS:  CONSTITUTIONAL: No weakness, fevers or chills  EYES: No visual changes  ENT: No vertigo or throat pain   NECK: No pain or stiffness  RESPIRATORY: No cough, wheezing, hemoptysis; No shortness of breath  CARDIOVASCULAR: No chest pain or palpitations  GASTROINTESTINAL: No abdominal or epigastric pain. No nausea, vomiting, or hematemesis; No diarrhea or constipation. No melena or hematochezia.  GENITOURINARY: No dysuria, frequency or hematuria  NEUROLOGICAL: No numbness or weakness  SKIN: No itching, no rashes  MSK: no    PHYSICAL EXAM:  NEURO: patient is awake , alert and oriented  GEN: Not in acute distress  NECK: no thyroid enlargement, no JVD  LUNGS: Clear to auscultation bilaterally   CARDIOVASCULAR: S1/S2 present, RRR , no murmurs or rubs, no carotid bruits,  + PP bilaterally  ABD: Soft, non-tender, non-distended, +BS  EXT: No GET  SKIN: Intact    LABS:                        9.3    11.43 )-----------( 202      ( 23 Apr 2023 10:30 )             30.2     04-23    137  |  98  |  24<H>  ----------------------------<  106<H>  3.4<L>   |  27  |  1.4    Ca    8.7      23 Apr 2023 10:30  Mg     1.7     04-23    TPro  6.7  /  Alb  3.4<L>  /  TBili  0.7  /  DBili  x   /  AST  20  /  ALT  7   /  AlkPhos  161<H>  04-23    PT/INR - ( 23 Apr 2023 10:30 )   PT: 21.70 sec;   INR: 1.87 ratio         PTT - ( 23 Apr 2023 10:30 )  PTT:33.9 sec  Troponin T, Serum: 0.15 ng/mL *HH* (04-23-23 @ 10:30)    CARDIAC MARKERS ( 23 Apr 2023 10:30 )  x     / 0.15 ng/mL / x     / x     / x            Troponin trend:      02-28 Chol 131 LDL -- HDL 58 Trig 43      RADIOLOGY:  TTE  < from: TTE Echo Complete w/o Contrast w/ Doppler (03.08.23 @ 09:26) >  Summary:   1. Normal global left ventricular systolic function.   2. LV Ejection Fraction by Omer's Method with a biplane EF of 66 %.   3. Moderately increased LV wall thickness.   4. Normal left ventricular internal cavity size.   5. Normal left atrial size.   6. Normal right atrial size.   7. Mild mitral annular calcification.   8. Mild mitral valve regurgitation.   9. Mild thickening of the anterior and posterior mitral valve leaflets.  10. Mild-moderate tricuspid regurgitation.  11. Mild pulmonic valve regurgitation.    PHYSICIAN INTERPRETATION:  Left Ventricle: The left ventricular internal cavity size is normal. Left   ventricular wall thickness is moderately increased. Global LV systolic   function was normal.  Right Ventricle: The right ventricular size is normal. RV systolic   function is normal.  Left Atrium: Normal left atrial size.  Right Atrium: Normal right atrial size.  Pericardium: There is no evidence of pericardial effusion.  Mitral Valve: Mild thickening of the anterior and posterior mitral valve   leaflets. There is mild mitral annular calcification. Mild mitral valve   regurgitation is seen.  Tricuspid Valve: The tricuspid valve is normal in structure.   Mild-moderate tricuspid regurgitation is visualized.    < end of copied text >      ECG:  < from: 12 Lead ECG (04.23.23 @ 11:43) >  Ventricular Rate 105 BPM    Atrial Rate 108 BPM    QRS Duration 80 ms    Q-T Interval 366 ms    QTC Calculation(Bazett) 483 ms    R Axis 83 degrees    T Axis 63 degrees    Diagnosis Line Atrial fibrillation with rapid ventricular response  Low voltage QRS  NS T wave change   Abnormal ECG    Confirmed by DELLA BLACKWOOD MD ([id number]) on 4/23/2023 6:29:41 PM    < end of copied text >      TELEMETRY EVENTS:

## 2023-04-23 NOTE — H&P ADULT - HISTORY OF PRESENT ILLNESS
87F w/PMHx of 87F of HTN, A.fib (on eliquis), PAD, Chronic lymphedema and COPD (not on home o2) presents to ED with her Dtr with reports of lethargy.  Patient s/p recent hospital admission [facility] 2/2 AHRF (multifactorial) s/p thoracentesis 2/2 left pleural effusion.  Patient discharged home on Pred taper of PO Lasix.  Dtr reports patient was doing relatively well since discharge however 2 days night ago patient developed lethargy and decreased ambulation with decreased PO intake.  Dtr reports subjective fever with associated cough, no production.  Patient and Dtr report orthopnea and SOB at rest.  No worsening BLE noted.  Patient reports nausea without vomiting.  Patient reports frequency without cosmo dysuria.

## 2023-04-23 NOTE — H&P ADULT - NSHPLABSRESULTS_GEN_ALL_CORE
9.3    11.43 )-----------( 202      ( 2023 10:30 )             30.2           137  |  98  |  24<H>  ----------------------------<  106<H>  3.4<L>   |  27  |  1.4    Ca    8.7      2023 10:30  Mg     1.7         TPro  6.7  /  Alb  3.4<L>  /  TBili  0.7  /  DBili  x   /  AST  20  /  ALT  7   /  AlkPhos  161<H>            Magnesium, Serum: 1.7 mg/dL (23 @ 10:30)          Urinalysis Basic - ( 2023 12:53 )    Color: Yellow / Appearance: Slightly Cloudy / S.025 / pH: x  Gluc: x / Ketone: Negative  / Bili: Negative / Urobili: 0.2 mg/dL   Blood: x / Protein: 100 mg/dL / Nitrite: Positive   Leuk Esterase: Small / RBC: 11-25 /HPF / WBC >50 /HPF   Sq Epi: x / Non Sq Epi: x / Bacteria: Few    PT/INR - ( 2023 10:30 )   PT: 21.70 sec;   INR: 1.87 ratio         PTT - ( 2023 10:30 )  PTT:33.9 sec    Lactate Trend      CARDIAC MARKERS ( 2023 10:30 )  x     / 0.15 ng/mL / x     / x     / x    CXR  official read pending

## 2023-04-23 NOTE — CONSULT NOTE ADULT - SUBJECTIVE AND OBJECTIVE BOX
Patient is a 87y old  Female who presents with a chief complaint of sob, weakness    HPI: 86 y/o female former smoker, hx COPD, CHF, a.fib on eliquis, lymphedema, recent hospitalization s/p thoracentesis for left pleural effusion       PAST MEDICAL & SURGICAL HISTORY:  HTN (hypertension)      Afib  s/p ablation       Goiter  no meds      Cellulitis  chronic      OA (osteoarthritis)      PVD (peripheral vascular disease)      COPD (chronic obstructive pulmonary disease)      Anxiety      Obesity      H/O abdominal hysterectomy      H/O discectomy      H/O total knee replacement, bilateral        Allergies    aspirin (Other)  contrast media (iodine-based) (Other)  penicillin (Other)  codeine (Other)  sulfa drugs (Hives; Other)    Intolerances      FAMILY HISTORY:    Home Medications:  dilTIAZem 180 mg/24 hours oral capsule, extended release: 1 cap(s) orally once a day (13 Mar 2023 13:20)  Eliquis 2.5 mg oral tablet: 1 tab(s) orally 2 times a day (2023 00:56)  furosemide 20 mg oral tablet: 1 tab(s) orally once a day (13 Mar 2023 15:25)  gabapentin 100 mg oral capsule: 1 cap(s) orally 3 times a day (2023 00:56)  polyethylene glycol 3350 oral powder for reconstitution: 17 gram(s) orally once a day (13 Mar 2023 13:15)  predniSONE 10 mg oral tablet: 1 tab(s) orally once a day (13 Mar 2023 13:17)  predniSONE 20 mg oral tablet: 1 tab(s) orally once a day for 5 days then start prednsione 10mg for 5 days then stop  (13 Mar 2023 13:17)    Occupation:  Alochol: Denied  Smoking: Denied  Drug Use: Denied  Marital Status:         ROS: as in HPI; All other systems reviewed are negative    ICU Vital Signs Last 24 Hrs  T(C): 37.4 (2023 11:47), Max: 39.6 (2023 09:59)  T(F): 99.4 (2023 11:47), Max: 103.3 (2023 09:59)  HR: 101 (2023 11:47) (101 - 120)  BP: 127/60 (2023 11:47) (127/60 - 143/71)  BP(mean): --  ABP: --  ABP(mean): --  RR: 20 (2023 11:47) (20 - 24)  SpO2: 97% (2023 11:47) (89% - 98%)    O2 Parameters below as of 2023 11:47  Patient On (Oxygen Delivery Method): nasal cannula  O2 Flow (L/min): 4            Physical Examination:    General: No acute distress.  Alert, oriented, interactive, nonfocal    HEENT: Pupils equal, reactive to light.  Symmetric.    PULM: Clear to auscultation bilaterally, no significant sputum production    CVS: Regular rate and rhythm, no murmurs, rubs, or gallops    ABD: Soft, nondistended, nontender, normoactive bowel sounds, no masses    EXT: b/l le chronic edema/cellulitis    SKIN: Warm and well perfused, no rashes noted.              I&O's Detail        LABS:                        9.3    11.43 )-----------( 202      ( 2023 10:30 )             30.2     2023 10:30    137    |  98     |  24     ----------------------------<  106    3.4     |  27     |  1.4      Ca    8.7        2023 10:30  Mg     1.7       2023 10:30    TPro  6.7    /  Alb  3.4    /  TBili  0.7    /  DBili  x      /  AST  20     /  ALT  7      /  AlkPhos  161    2023 10:30  Amylase x     lipase x          CARDIAC MARKERS ( 2023 10:30 )  x     / 0.15 ng/mL / x     / x     / x          CAPILLARY BLOOD GLUCOSE        PT/INR - ( 2023 10:30 )   PT: 21.70 sec;   INR: 1.87 ratio         PTT - ( 2023 10:30 )  PTT:33.9 sec  Urinalysis Basic - ( 2023 12:53 )    Color: Yellow / Appearance: Slightly Cloudy / S.025 / pH: x  Gluc: x / Ketone: Negative  / Bili: Negative / Urobili: 0.2 mg/dL   Blood: x / Protein: 100 mg/dL / Nitrite: Positive   Leuk Esterase: Small / RBC: 11-25 /HPF / WBC >50 /HPF   Sq Epi: x / Non Sq Epi: x / Bacteria: Few      Culture        MEDICATIONS  (STANDING):    MEDICATIONS  (PRN):        RADIOLOGY: ***     CXR:  recurrent L effusion awaiting official report           ECHO:    Summary:   1. Normal global left ventricular systolic function.   2. LV Ejection Fraction by Omer's Method with a biplane EF of 66 %.   3. Moderately increased LV wall thickness.   4. Normal left ventricular internal cavity size.   5. Normal left atrial size.   6. Normal right atrial size.   7. Mild mitral annular calcification.   8. Mild mitral valve regurgitation.   9. Mild thickening of the anterior and posterior mitral valve leaflets.  10. Mild-moderate tricuspid regurgitation.  11. Mild pulmonic valve regurgitation.      IMPRESSION:        PLAN:    CNS:    HEENT:    PULMONARY:    CARDIOVASCULAR:    GI: GI prophylaxis.  Feeding     RENAL:    INFECTIOUS DISEASE:    HEMATOLOGICAL:  DVT prophylaxis.    ENDOCRINE:  Follow up FS.  Insulin protocol if needed.    MUSCULOSKELETAL:        CRITICAL CARE TIME SPENT: ***

## 2023-04-23 NOTE — CONSULT NOTE ADULT - ASSESSMENT
UTI  sob  L pleural effusion  elevated troponin    Continue abx for UTI. Check serial CE. continue supplemental O2. Pulmonary eval.  reconsult ICU prn.

## 2023-04-24 NOTE — PROGRESS NOTE ADULT - SUBJECTIVE AND OBJECTIVE BOX
Progress note    INTERVAL HPI/OVERNIGHT EVENTS:    Patient seen and examined at bedside. She is on 4L oxygen saturating 99%. She deies any acute distress.      REVIEW OF SYSTEMS:  All other 13 Review of systems were reviewed and are negative    FAMILY HISTORY:    T(C): 36.1 (04-24-23 @ 05:00), Max: 36.8 (04-23-23 @ 15:31)  HR: 103 (04-24-23 @ 05:00) (95 - 109)  BP: 134/79 (04-24-23 @ 05:00) (117/58 - 137/69)  RR: 16 (04-24-23 @ 05:00) (16 - 20)  SpO2: 98% (04-24-23 @ 11:56) (97% - 100%)  Wt(kg): --Vital Signs Last 24 Hrs  T(C): 36.1 (24 Apr 2023 05:00), Max: 36.8 (23 Apr 2023 15:31)  T(F): 96.9 (24 Apr 2023 05:00), Max: 98.3 (23 Apr 2023 15:31)  HR: 103 (24 Apr 2023 05:00) (95 - 109)  BP: 134/79 (24 Apr 2023 05:00) (117/58 - 137/69)  BP(mean): --  RR: 16 (24 Apr 2023 05:00) (16 - 20)  SpO2: 98% (24 Apr 2023 11:56) (97% - 100%)    Parameters below as of 24 Apr 2023 11:56  Patient On (Oxygen Delivery Method): nasal cannula  O2 Flow (L/min): 3    aspirin (Other)  contrast media (iodine-based) (Other)  penicillin (Other)  codeine (Other)  sulfa drugs (Hives; Other)      PHYSICAL EXAM:  GENERAL: NAD, well-groomed, well-developed  HEAD:  Atraumatic, Normocephalic  EYES: EOMI, PERRLA, conjunctiva and sclera clear  ENMT: No tonsillar erythema, exudates, or enlargement; Moist mucous membranes, Good dentition, No lesions  NECK: Supple, No JVD, Normal thyroid  NERVOUS SYSTEM:  Alert & Oriented X3, Good concentration; Motor Strength 5/5 B/L upper and lower extremities; DTRs 2+ intact and symmetric  CHEST/LUNG: Clear to percussion bilaterally; No rales, rhonchi, wheezing, or rubs  HEART: Regular rate and rhythm; No murmurs, rubs, or gallops  ABDOMEN: Soft, Nontender, Nondistended; Bowel sounds present  EXTREMITIES:  2+ Peripheral Pulses, No clubbing, cyanosis, or edema  LYMPH: No lymphadenopathy noted  SKIN: No rashes or lesions    Consultant(s) Notes Reviewed:  [x ] YES  [ ] NO  Care Discussed with Consultants/Other Providers [ x] YES  [ ] NO    LABS:      RADIOLOGY & ADDITIONAL TESTS:    Imaging Personally Reviewed:  [ ] YES  [ ] NO  acetaminophen     Tablet .. 650 milliGRAM(s) Oral every 6 hours PRN  albuterol/ipratropium for Nebulization 3 milliLiter(s) Nebulizer every 6 hours PRN  cefepime   IVPB 1000 milliGRAM(s) IV Intermittent daily  diltiazem    milliGRAM(s) Oral daily  furosemide   Injectable 40 milliGRAM(s) IV Push two times a day  heparin  Infusion 1300 Unit(s)/Hr IV Continuous <Continuous>  melatonin 3 milliGRAM(s) Oral at bedtime PRN  ondansetron Injectable 4 milliGRAM(s) IV Push every 8 hours PRN  potassium chloride    Tablet ER 20 milliEquivalent(s) Oral every 2 hours      HEALTH ISSUES - PROBLEM Dx:    87F w/PMHx of 87F of HTN, A.fib (on eliquis), PAD, Chronic lymphedema and COPD (not on home o2) presents to ED with her Dtr with reports of lethargy, admitted to medicine service for further management.    #AHRF 2/2 Volume Overload 2/2 HFpEF w/exacerbation w/worsening left pleural effusion  - admit to medicine service  - Wean off oxygen daily  - Furosemide 40mg IVP q12h  - Pulmonology eval for eval for thoracentesis Left pleural effusion (cytology possible malignant)  - weights QD, monitor I/O  - last ECHO 3/23, EF 66% and mild-moderate valve abnormalities.  No need to repeat  - Will hold home Eliquis, start heparin gtt incase of thoracentesis in near future  - Oncology c/s pending  - CXR in am    #Bacteremia  #UTI  - Dc rocephin, start Cefepime renally dosed  - Blood Cx GNR pending speciation,  - UCx pending    #AFIB w/RVR  - initially presented w/RVR, now rate controlled <110  - c/w Diltiazem QD  - Hold DOAC for possible thoracentesis, start heparin gtt which can be switched off immediately incase for procedure    #Troponemia likely demand ischemia  - monitor on telemetry x 24h, denies any chest pain, HD stable  - EKG reviewed - no ischemic changes  - CE 0.15-->0.1-->0.1  - Cardiology eval recs appreciated    #YAIMA  - baseline sCr approx 1.1--> 1.4-->1.3   - s/p 1L NS bolus  - avoid Nephrotoxics  - consider renal c/s in worsening    #COPD  - appears at baseline  - not on LAMA/ICS  - b2 PRN    Dispo: plan to dc telemetry in am if HR better controlled.    Total time spent to complete patient's bedside assessment, review medical chart, discuss medical plan of care with covering medical team was ___55_____ with > 50% of time spent face to face w/ patient, discussion with patient/family and/or coordination of care Progress note    INTERVAL HPI/OVERNIGHT EVENTS:    Patient seen and examined at bedside. She is on 4L oxygen saturating 99%. She deies any acute distress.      REVIEW OF SYSTEMS:  All other 13 Review of systems were reviewed and are negative    FAMILY HISTORY:    T(C): 36.1 (04-24-23 @ 05:00), Max: 36.8 (04-23-23 @ 15:31)  HR: 103 (04-24-23 @ 05:00) (95 - 109)  BP: 134/79 (04-24-23 @ 05:00) (117/58 - 137/69)  RR: 16 (04-24-23 @ 05:00) (16 - 20)  SpO2: 98% (04-24-23 @ 11:56) (97% - 100%)  Wt(kg): --Vital Signs Last 24 Hrs  T(C): 36.1 (24 Apr 2023 05:00), Max: 36.8 (23 Apr 2023 15:31)  T(F): 96.9 (24 Apr 2023 05:00), Max: 98.3 (23 Apr 2023 15:31)  HR: 103 (24 Apr 2023 05:00) (95 - 109)  BP: 134/79 (24 Apr 2023 05:00) (117/58 - 137/69)  BP(mean): --  RR: 16 (24 Apr 2023 05:00) (16 - 20)  SpO2: 98% (24 Apr 2023 11:56) (97% - 100%)    Parameters below as of 24 Apr 2023 11:56  Patient On (Oxygen Delivery Method): nasal cannula  O2 Flow (L/min): 3    aspirin (Other)  contrast media (iodine-based) (Other)  penicillin (Other)  codeine (Other)  sulfa drugs (Hives; Other)      PHYSICAL EXAM:  GENERAL: NAD, well-groomed, well-developed  HEAD:  Atraumatic, Normocephalic  EYES: EOMI, PERRLA, conjunctiva and sclera clear  ENMT: No tonsillar erythema, exudates, or enlargement; Moist mucous membranes, Good dentition, No lesions  NECK: Supple, No JVD, Normal thyroid  NERVOUS SYSTEM:  Alert & Oriented X3, Good concentration; Motor Strength 5/5 B/L upper and lower extremities; DTRs 2+ intact and symmetric  CHEST/LUNG: Clear to percussion bilaterally; No rales, rhonchi, wheezing, or rubs  HEART: Regular rate and rhythm; No murmurs, rubs, or gallops  ABDOMEN: Soft, Nontender, Nondistended; Bowel sounds present  EXTREMITIES:  2+ Peripheral Pulses, No clubbing, cyanosis, or edema  LYMPH: No lymphadenopathy noted  SKIN: No rashes or lesions    Consultant(s) Notes Reviewed:  [x ] YES  [ ] NO  Care Discussed with Consultants/Other Providers [ x] YES  [ ] NO    LABS:      RADIOLOGY & ADDITIONAL TESTS:    Imaging Personally Reviewed:  [ ] YES  [ ] NO  acetaminophen     Tablet .. 650 milliGRAM(s) Oral every 6 hours PRN  albuterol/ipratropium for Nebulization 3 milliLiter(s) Nebulizer every 6 hours PRN  cefepime   IVPB 1000 milliGRAM(s) IV Intermittent daily  diltiazem    milliGRAM(s) Oral daily  furosemide   Injectable 40 milliGRAM(s) IV Push two times a day  heparin  Infusion 1300 Unit(s)/Hr IV Continuous <Continuous>  melatonin 3 milliGRAM(s) Oral at bedtime PRN  ondansetron Injectable 4 milliGRAM(s) IV Push every 8 hours PRN  potassium chloride    Tablet ER 20 milliEquivalent(s) Oral every 2 hours      HEALTH ISSUES - PROBLEM Dx:    87F w/PMHx of 87F of HTN, A.fib (on eliquis), PAD, Chronic lymphedema and COPD (not on home o2) presents to ED with her Dtr with reports of lethargy, admitted to medicine service for further management.    #AHRF 2/2 Volume Overload 2/2 HFpEF w/exacerbation w/worsening left pleural effusion  - admit to medicine service  - Wean off oxygen daily  - Furosemide 40mg IVP q12h  - Pulmonology eval for eval for thoracentesis Left pleural effusion (cytology possible malignant)  - weights QD, monitor I/O  - last ECHO 3/23, EF 66% and mild-moderate valve abnormalities.  No need to repeat  - Will hold home Eliquis, start heparin gtt incase of thoracentesis in near future  - Oncology c/s pending  - CXR in am    #Bacteremia  #UTI  - Dc rocephin, start Cefepime renally dosed  - Blood Cx GNR pending speciation, will need repeat BlCx  - UCx pending  - ID pending    #AFIB w/RVR  - initially presented w/RVR, now rate controlled <110  - c/w Diltiazem QD  - Hold DOAC for possible thoracentesis, start heparin gtt which can be switched off immediately incase for procedure    #Troponemia likely demand ischemia  - monitor on telemetry x 24h, denies any chest pain, HD stable  - EKG reviewed - no ischemic changes  - CE 0.15-->0.1-->0.1  - Cardiology eval recs appreciated    #YAIMA  - baseline sCr approx 1.1--> 1.4-->1.3   - s/p 1L NS bolus  - avoid Nephrotoxics  - consider renal c/s in worsening    #COPD  - appears at baseline  - not on LAMA/ICS  - b2 PRN    Dispo: plan to dc telemetry in am if HR better controlled.    Total time spent to complete patient's bedside assessment, review medical chart, discuss medical plan of care with covering medical team was ___55_____ with > 50% of time spent face to face w/ patient, discussion with patient/family and/or coordination of care

## 2023-04-25 NOTE — PHYSICAL THERAPY INITIAL EVALUATION ADULT - WEIGHT-BEARING RESTRICTIONS: STAND/SIT, REHAB EVAL
Pt is coming in for fasting labs 7/22/20 for wellness exam. Please put lab order in. Thank you.   full weight-bearing

## 2023-04-25 NOTE — PROGRESS NOTE ADULT - SUBJECTIVE AND OBJECTIVE BOX
87F w/PMHx of 87F of HTN, A.fib (on eliquis), PAD, Chronic lymphedema and COPD (not on home o2) presents to ED with her Dtr with reports of lethargy, admitted to medicine service for further management.    #AHRF 2/2 Volume Overload 2/2 HFpEF w/exacerbation w/worsening left pleural effusion  - admit to medicine service  - Wean off oxygen daily  - Furosemide 40mg IVP q12h  - Pulmonology eval for eval for thoracentesis Left pleural effusion (cytology possible malignant)  - weights QD, monitor I/O  - last ECHO 3/23, EF 66% and mild-moderate valve abnormalities.  No need to repeat  - Will hold home Eliquis, start heparin gtt incase of thoracentesis in near future  - Oncology c/s pending  - CXR in am    #Bacteremia  #UTI  - Dc rocephin, start Cefepime renally dosed  - Blood Cx GNR pending speciation, will need repeat BlCx  - UCx pending  - ID pending    #AFIB w/RVR  - initially presented w/RVR, now rate controlled <110  - c/w Diltiazem QD  - Hold DOAC for possible thoracentesis, start heparin gtt which can be switched off immediately incase for procedure    #Troponemia likely demand ischemia  - monitor on telemetry x 24h, denies any chest pain, HD stable  - EKG reviewed - no ischemic changes  - CE 0.15-->0.1-->0.1  - Cardiology eval recs appreciated    #YAIMA  - baseline sCr approx 1.1--> 1.4-->1.3   - s/p 1L NS bolus  - avoid Nephrotoxics  - consider renal c/s in worsening    #COPD  - appears at baseline  - not on LAMA/ICS  - b2 PRN    Dispo: plan to dc telemetry in am if HR better controlled. Progress note    INTERVAL HPI/OVERNIGHT EVENTS:    Patient seen and examined at bedside. She deneis any acute complaints.    REVIEW OF SYSTEMS:  All other 13 Review of systems were reviewed and are negative    FAMILY HISTORY:    T(C): 36.1 (04-25-23 @ 14:37), Max: 36.5 (04-24-23 @ 21:04)  HR: 102 (04-25-23 @ 14:37) (88 - 103)  BP: 122/59 (04-25-23 @ 14:37) (112/58 - 123/65)  RR: 18 (04-25-23 @ 05:06) (16 - 18)  SpO2: 92% (04-25-23 @ 15:12) (92% - 100%)  Wt(kg): --Vital Signs Last 24 Hrs  T(C): 36.1 (25 Apr 2023 14:37), Max: 36.5 (24 Apr 2023 21:04)  T(F): 97 (25 Apr 2023 14:37), Max: 97.7 (24 Apr 2023 21:04)  HR: 102 (25 Apr 2023 14:37) (88 - 103)  BP: 122/59 (25 Apr 2023 14:37) (112/58 - 123/65)  BP(mean): --  RR: 18 (25 Apr 2023 05:06) (16 - 18)  SpO2: 92% (25 Apr 2023 15:12) (92% - 100%)    Parameters below as of 25 Apr 2023 15:12  Patient On (Oxygen Delivery Method): nasal cannula  O2 Flow (L/min): 1    aspirin (Other)  contrast media (iodine-based) (Other)  penicillin (Other)  codeine (Other)  sulfa drugs (Hives; Other)      PHYSICAL EXAM:  GENERAL: NAD, well-groomed, well-developed  HEAD:  Atraumatic, Normocephalic  EYES: EOMI, PERRLA, conjunctiva and sclera clear  ENMT: No tonsillar erythema, exudates, or enlargement; Moist mucous membranes, Good dentition, No lesions  NECK: Supple, No JVD, Normal thyroid  NERVOUS SYSTEM:  Alert & Oriented X3, Good concentration; Motor Strength 5/5 B/L upper and lower extremities; DTRs 2+ intact and symmetric  CHEST/LUNG: Clear to percussion bilaterally; No rales, rhonchi, wheezing, or rubs  HEART: Regular rate and rhythm; No murmurs, rubs, or gallops  ABDOMEN: Soft, Nontender, Nondistended; Bowel sounds present  EXTREMITIES:  2+ Peripheral Pulses, No clubbing, cyanosis, or edema  LYMPH: No lymphadenopathy noted  SKIN: No rashes or lesions    Consultant(s) Notes Reviewed:  [x ] YES  [ ] NO  Care Discussed with Consultants/Other Providers [ x] YES  [ ] NO    LABS:      RADIOLOGY & ADDITIONAL TESTS:    Imaging Personally Reviewed:  [ ] YES  [ ] NO  acetaminophen     Tablet .. 650 milliGRAM(s) Oral every 6 hours PRN  albuterol/ipratropium for Nebulization 3 milliLiter(s) Nebulizer every 6 hours PRN  diltiazem    milliGRAM(s) Oral daily  furosemide   Injectable 40 milliGRAM(s) IV Push two times a day  heparin  Infusion 1300 Unit(s)/Hr IV Continuous <Continuous>  melatonin 3 milliGRAM(s) Oral at bedtime PRN  meropenem  IVPB 1000 milliGRAM(s) IV Intermittent every 12 hours  ondansetron Injectable 4 milliGRAM(s) IV Push every 8 hours PRN      HEALTH ISSUES - PROBLEM Dx:    87F w/PMHx of 87F of HTN, Hx of prior malignant pleural effusion few months at [facility], A.fib (on eliquis), PAD, Chronic lymphedema and COPD (not on home o2) presents to ED with her Dtr with reports of lethargy, admitted to medicine service for further management.    #AHRF 2/2 Volume Overload 2/2 HFpEF w/exacerbation w/worsening left pleural effusion  #Recent hospitalization from [facility] w/ pleural effusion shown to be malignant cytology  - Wean off oxygen daily  - Furosemide 40mg IVP q12h  - Pulmonology eval - rec [facility]  - Please call IR to set for [facility] for recurrent pleural effusion  - weights QD, monitor I/O  - last ECHO 3/23, EF 66% and mild-moderate valve abnormalities.  No need to repeat  - D/w oncology - Obtain ER/LA cytology from [facility] (ordered). Obtain PET scan outpt and f/u w/ Dr. Carlotta Hyatt afterward    #ESBL bacteremia  #UTI  - C/w meropenem 1g q12hrs  - Blood Cx ESBL E.coli, F/u sensitivities  - Repeat Blood cultures pending  - UCx Ecoli, follow up sensitivities  - ID recs appreciated - may need Midline w/ Ertapenem on discharge  - CT urogram pending    #AFIB w/RVR  - initially presented w/RVR, now rate controlled <110  - c/w Diltiazem QD  - Hold DOAC, Start lovenox 80mg BID for possible [facility] w/ IR    #Troponemia likely demand ischemia  - monitor on telemetry x 24h, denies any chest pain, HD stable  - EKG reviewed - no ischemic changes  - CE 0.15-->0.1-->0.1  - Cardiology eval recs appreciated    #YAIMA, resolved  - baseline sCr approx 1.1--> 1.4-->1.2  - s/p 1L NS bolus  - avoid Nephrotoxics    #COPD  - appears at baseline  - not on LAMA/ICS  - b2 PRN    Dispo: Acute. Patient should receive outpatient PET scan and f/u w/ Dr. Carlotta Hyatt (oncology) afterward.  Handoff: Here for recurrent Pleural effusion and bacteremia. Please call IR for [facility] for recurrent pleural effusion. For bacteremia, f/u Repeat BLood cultures and sensitivities. Urine Sensitivities. CT urogram pending. May need Midline on discharge. Holding Doac for now for possible [place] [facility]. C/w therepeutic lovenox. Please f/u with lab to order ER/LA cytology for [facility].    D/w heme  labs reviewed      Total time spent to complete patient's bedside assessment, review medical chart, discuss medical plan of care with covering medical team was ___35_____ with > 50% of time spent face to face w/ patient, discussion with patient/family and/or coordination of care Progress note    INTERVAL HPI/OVERNIGHT EVENTS:    Patient seen and examined at bedside. She deneis any acute complaints.    REVIEW OF SYSTEMS:  All other 13 Review of systems were reviewed and are negative    FAMILY HISTORY:    T(C): 36.1 (04-25-23 @ 14:37), Max: 36.5 (04-24-23 @ 21:04)  HR: 102 (04-25-23 @ 14:37) (88 - 103)  BP: 122/59 (04-25-23 @ 14:37) (112/58 - 123/65)  RR: 18 (04-25-23 @ 05:06) (16 - 18)  SpO2: 92% (04-25-23 @ 15:12) (92% - 100%)  Wt(kg): --Vital Signs Last 24 Hrs  T(C): 36.1 (25 Apr 2023 14:37), Max: 36.5 (24 Apr 2023 21:04)  T(F): 97 (25 Apr 2023 14:37), Max: 97.7 (24 Apr 2023 21:04)  HR: 102 (25 Apr 2023 14:37) (88 - 103)  BP: 122/59 (25 Apr 2023 14:37) (112/58 - 123/65)  BP(mean): --  RR: 18 (25 Apr 2023 05:06) (16 - 18)  SpO2: 92% (25 Apr 2023 15:12) (92% - 100%)    Parameters below as of 25 Apr 2023 15:12  Patient On (Oxygen Delivery Method): nasal cannula  O2 Flow (L/min): 1    aspirin (Other)  contrast media (iodine-based) (Other)  penicillin (Other)  codeine (Other)  sulfa drugs (Hives; Other)      PHYSICAL EXAM:  GENERAL: NAD, well-groomed, well-developed  HEAD:  Atraumatic, Normocephalic  EYES: EOMI, PERRLA, conjunctiva and sclera clear  ENMT: No tonsillar erythema, exudates, or enlargement; Moist mucous membranes, Good dentition, No lesions  NECK: Supple, No JVD, Normal thyroid  NERVOUS SYSTEM:  Alert & Oriented X3, Good concentration; Motor Strength 5/5 B/L upper and lower extremities; DTRs 2+ intact and symmetric  CHEST/LUNG: Clear to percussion bilaterally; No rales, rhonchi, wheezing, or rubs  HEART: Regular rate and rhythm; No murmurs, rubs, or gallops  ABDOMEN: Soft, Nontender, Nondistended; Bowel sounds present  EXTREMITIES:  2+ Peripheral Pulses, No clubbing, cyanosis, or edema  LYMPH: No lymphadenopathy noted  SKIN: No rashes or lesions    Consultant(s) Notes Reviewed:  [x ] YES  [ ] NO  Care Discussed with Consultants/Other Providers [ x] YES  [ ] NO    LABS:      RADIOLOGY & ADDITIONAL TESTS:    Imaging Personally Reviewed:  [ ] YES  [ ] NO  acetaminophen     Tablet .. 650 milliGRAM(s) Oral every 6 hours PRN  albuterol/ipratropium for Nebulization 3 milliLiter(s) Nebulizer every 6 hours PRN  diltiazem    milliGRAM(s) Oral daily  furosemide   Injectable 40 milliGRAM(s) IV Push two times a day  heparin  Infusion 1300 Unit(s)/Hr IV Continuous <Continuous>  melatonin 3 milliGRAM(s) Oral at bedtime PRN  meropenem  IVPB 1000 milliGRAM(s) IV Intermittent every 12 hours  ondansetron Injectable 4 milliGRAM(s) IV Push every 8 hours PRN      HEALTH ISSUES - PROBLEM Dx:    87F w/PMHx of 87F of HTN, Hx of prior malignant pleural effusion few months at [facility], A.fib (on eliquis), PAD, Chronic lymphedema and COPD (not on home o2) presents to ED with her Dtr with reports of lethargy, admitted to medicine service for further management.    #AHRF 2/2 Volume Overload 2/2 HFpEF w/exacerbation w/worsening left pleural effusion  #Recent hospitalization from [facility] w/ pleural effusion shown to be malignant cytology  - Wean off oxygen daily  - Furosemide 40mg IVP q12h  - Pulmonology eval - rec [facility]  - Please call IR to set for [facility] for recurrent pleural effusion  - weights QD, monitor I/O  - last ECHO 3/23, EF 66% and mild-moderate valve abnormalities.  No need to repeat  - D/w oncology - Obtain ER/NE cytology from [facility] (ordered). Obtain PET scan outpt and f/u w/ Dr. Carlotta Hyatt afterward  -CXR in am    #ESBL bacteremia  #UTI  - C/w meropenem 1g q12hrs  - Blood Cx ESBL E.coli, F/u sensitivities  - Repeat Blood cultures pending  - UCx Ecoli, follow up sensitivities  - ID recs appreciated - may need Midline w/ Ertapenem on discharge  - CT urogram pending    #AFIB w/RVR  - initially presented w/RVR, now rate controlled <110  - c/w Diltiazem QD  - Hold DOAC, Start lovenox 80mg BID for possible [facility] w/ IR    #Troponemia likely demand ischemia  - monitor on telemetry x 24h, denies any chest pain, HD stable  - EKG reviewed - no ischemic changes  - CE 0.15-->0.1-->0.1  - Cardiology eval recs appreciated    #YAIMA, resolved  - baseline sCr approx 1.1--> 1.4-->1.2  - s/p 1L NS bolus  - avoid Nephrotoxics    #COPD  - appears at baseline  - not on LAMA/ICS  - b2 PRN    Dispo: Acute. Patient should receive outpatient PET scan and f/u w/ Dr. Carlotta Hyatt (oncology) afterward.  Handoff: Here for recurrent Pleural effusion and bacteremia. Please call IR for [place] [facility] for recurrent pleural effusion. For bacteremia, f/u Repeat BLood cultures and sensitivities. Urine Sensitivities. CT urogram pending. May need Midline on discharge. Holding Doac for now for possible [place] [facility]. C/w therepeutic lovenox. Please f/u with lab to order ER/NE cytology for [facility].    D/w heme  labs reviewed      Total time spent to complete patient's bedside assessment, review medical chart, discuss medical plan of care with covering medical team was ___35_____ with > 50% of time spent face to face w/ patient, discussion with patient/family and/or coordination of care Progress note    INTERVAL HPI/OVERNIGHT EVENTS:    Patient seen and examined at bedside. She deneis any acute complaints.    REVIEW OF SYSTEMS:  All other 13 Review of systems were reviewed and are negative    FAMILY HISTORY:    T(C): 36.1 (04-25-23 @ 14:37), Max: 36.5 (04-24-23 @ 21:04)  HR: 102 (04-25-23 @ 14:37) (88 - 103)  BP: 122/59 (04-25-23 @ 14:37) (112/58 - 123/65)  RR: 18 (04-25-23 @ 05:06) (16 - 18)  SpO2: 92% (04-25-23 @ 15:12) (92% - 100%)  Wt(kg): --Vital Signs Last 24 Hrs  T(C): 36.1 (25 Apr 2023 14:37), Max: 36.5 (24 Apr 2023 21:04)  T(F): 97 (25 Apr 2023 14:37), Max: 97.7 (24 Apr 2023 21:04)  HR: 102 (25 Apr 2023 14:37) (88 - 103)  BP: 122/59 (25 Apr 2023 14:37) (112/58 - 123/65)  BP(mean): --  RR: 18 (25 Apr 2023 05:06) (16 - 18)  SpO2: 92% (25 Apr 2023 15:12) (92% - 100%)    Parameters below as of 25 Apr 2023 15:12  Patient On (Oxygen Delivery Method): nasal cannula  O2 Flow (L/min): 1    aspirin (Other)  contrast media (iodine-based) (Other)  penicillin (Other)  codeine (Other)  sulfa drugs (Hives; Other)      PHYSICAL EXAM:  GENERAL: NAD, well-groomed, well-developed  HEAD:  Atraumatic, Normocephalic  EYES: EOMI, PERRLA, conjunctiva and sclera clear  ENMT: No tonsillar erythema, exudates, or enlargement; Moist mucous membranes, Good dentition, No lesions  NECK: Supple, No JVD, Normal thyroid  NERVOUS SYSTEM:  Alert & Oriented X3, Good concentration; Motor Strength 5/5 B/L upper and lower extremities; DTRs 2+ intact and symmetric  CHEST/LUNG: Clear to percussion bilaterally; No rales, rhonchi, wheezing, or rubs  HEART: Regular rate and rhythm; No murmurs, rubs, or gallops  ABDOMEN: Soft, Nontender, Nondistended; Bowel sounds present  EXTREMITIES:  2+ Peripheral Pulses, No clubbing, cyanosis, or edema  LYMPH: No lymphadenopathy noted  SKIN: No rashes or lesions    Consultant(s) Notes Reviewed:  [x ] YES  [ ] NO  Care Discussed with Consultants/Other Providers [ x] YES  [ ] NO    LABS:      RADIOLOGY & ADDITIONAL TESTS:    Imaging Personally Reviewed:  [ ] YES  [ ] NO  acetaminophen     Tablet .. 650 milliGRAM(s) Oral every 6 hours PRN  albuterol/ipratropium for Nebulization 3 milliLiter(s) Nebulizer every 6 hours PRN  diltiazem    milliGRAM(s) Oral daily  furosemide   Injectable 40 milliGRAM(s) IV Push two times a day  heparin  Infusion 1300 Unit(s)/Hr IV Continuous <Continuous>  melatonin 3 milliGRAM(s) Oral at bedtime PRN  meropenem  IVPB 1000 milliGRAM(s) IV Intermittent every 12 hours  ondansetron Injectable 4 milliGRAM(s) IV Push every 8 hours PRN      HEALTH ISSUES - PROBLEM Dx:    87F w/PMHx of 87F of HTN, Hx of prior malignant pleural effusion few months at [facility], A.fib (on eliquis), PAD, Chronic lymphedema and COPD (not on home o2) presents to ED with her Dtr with reports of lethargy, admitted to medicine service for further management.    #AHRF 2/2 Volume Overload 2/2 HFpEF w/exacerbation w/worsening left pleural effusion  #Recent hospitalization from [facility] w/ pleural effusion shown to be malignant cytology  - Wean off oxygen daily  - Furosemide 40mg IVP q12h  - Pulmonology eval - rec [facility]  - Please call IR to set for [facility] for recurrent pleural effusion  - weights QD, monitor I/O  - last ECHO 3/23, EF 66% and mild-moderate valve abnormalities.  No need to repeat  - D/w oncology - Obtain ER/CO cytology from [place] [facility] (ordered). Obtain PET scan outpt and f/u w/ Dr. Carlotta Hyatt afterward  -CXR in am    #ESBL bacteremia  #UTI  - C/w meropenem 1g q12hrs  - Blood Cx ESBL E.coli, F/u sensitivities  - Repeat Blood cultures pending  - UCx Ecoli, follow up sensitivities  - ID recs appreciated - may need Midline w/ Ertapenem on discharge  - CT urogram pending (Predmedicate w/ 50mg Prednisone x3, Benadryl x1)    #AFIB w/RVR  - initially presented w/RVR, now rate controlled <110  - c/w Diltiazem QD  - Hold DOAC, Start lovenox 80mg BID for possible [facility] w/ IR    #Troponemia likely demand ischemia  - monitor on telemetry x 24h, denies any chest pain, HD stable  - EKG reviewed - no ischemic changes  - CE 0.15-->0.1-->0.1  - Cardiology eval recs appreciated    #YAIMA, resolved  - baseline sCr approx 1.1--> 1.4-->1.2  - s/p 1L NS bolus  - avoid Nephrotoxics    #COPD  - appears at baseline  - not on LAMA/ICS  - b2 PRN    Dispo: Acute. Patient should receive outpatient PET scan and f/u w/ Dr. Carlotta Hyatt (oncology) afterward.  Handoff: Here for recurrent Pleural effusion and bacteremia. Please call IR for [facility] for recurrent pleural effusion. For bacteremia, f/u Repeat BLood cultures and sensitivities. Urine Sensitivities. CT urogram pending. May need Midline on discharge. Holding Doac for now for possible [facility] [facility]. C/w therepeutic lovenox. Please f/u with lab to order ER/CO cytology for [place] [facility].    D/w heme  labs reviewed      Total time spent to complete patient's bedside assessment, review medical chart, discuss medical plan of care with covering medical team was ___35_____ with > 50% of time spent face to face w/ patient, discussion with patient/family and/or coordination of care

## 2023-04-25 NOTE — CONSULT NOTE ADULT - SUBJECTIVE AND OBJECTIVE BOX
HPI:  87F w/PMHx of 87F of HTN, A.fib (on eliquis), PAD, Chronic lymphedema and COPD (not on home o2) presents to ED with her Dtr with reports of lethargy.  Patient s/p recent hospital admission [facility] 2/2 AHRF (multifactorial) s/p thoracentesis 2/2 left pleural effusion.  Patient discharged home on Pred taper of PO Lasix.  Dtr reports patient was doing relatively well since discharge however 2 days night ago patient developed lethargy and decreased ambulation with decreased PO intake.  Dtr reports subjective fever with associated cough, no production.  Patient and Dtr report orthopnea and SOB at rest.  No worsening BLE noted.  Patient reports nausea without vomiting.  Patient reports frequency without cosmo dysuria.   (2023 13:43)      I discussed the case  with the referring physician.     I reviewed the radiology tests and hospital record including the ED chart.    I reviewed the other consultants comments that are available in the chart.    CC/ HPI Patient is a 87y old  Female who presents with a chief complaint of SOB and lethargy x 2 days (2023 12:21)      Sepsis    Handoff    MEWS Score    HTN (hypertension)    Afib    Goiter    Cellulitis    OA (osteoarthritis)    PVD (peripheral vascular disease)    COPD (chronic obstructive pulmonary disease)    Anxiety    Obesity    Sepsis    H/O abdominal hysterectomy    H/O discectomy    H/O total knee replacement, bilateral    FEVER WEAKNESS    40    Acute UTI    Elevated troponin    Pleural effusion, left    SysAdmin_VisitLink        FAMILY HISTORY:      aspirin (Other)  contrast media (iodine-based) (Other)  penicillin (Other)  codeine (Other)  sulfa drugs (Hives; Other)      Soc:  see Hpi.    Home prescriptions  dilTIAZem 180 mg/24 hours oral capsule, extended release: 1 cap(s) orally once a day  Eliquis 2.5 mg oral tablet: 1 tab(s) orally 2 times a day  furosemide 20 mg oral tablet: 1 tab(s) orally once a day      MEDICATIONS  (STANDING):  diltiazem    milliGRAM(s) Oral daily  furosemide   Injectable 40 milliGRAM(s) IV Push two times a day  heparin  Infusion 1300 Unit(s)/Hr (13 mL/Hr) IV Continuous <Continuous>  meropenem  IVPB 1000 milliGRAM(s) IV Intermittent every 12 hours    MEDICATIONS  (PRN):  acetaminophen     Tablet .. 650 milliGRAM(s) Oral every 6 hours PRN Temp greater or equal to 38C (100.4F), Mild Pain (1 - 3)  albuterol/ipratropium for Nebulization 3 milliLiter(s) Nebulizer every 6 hours PRN Bronchospasm  melatonin 3 milliGRAM(s) Oral at bedtime PRN Insomnia  ondansetron Injectable 4 milliGRAM(s) IV Push every 8 hours PRN Nausea and/or Vomiting      sodium chloride 0.9% Bolus:   1000 milliLiter(s), IV Bolus, once, infuse over 60 Minute(s), Stop After 1 Doses  Provider's Contact #: [phone]      T(C): 36 (23 @ 05:06), Max: 36.9 (23 @ 13:35)  HR: 88 (23 @ 05:06) (88 - 103)  BP: 112/58 (23 @ 05:06) (112/58 - 123/65)  RR: 18 (23 @ 05:06) (16 - 18)  SpO2: 100% (23 @ 05:06) (95% - 100%)  ICU Vital Signs Last 24 Hrs  T(C): 36 (2023 05:06), Max: 36.9 (2023 13:35)  T(F): 96.8 (2023 05:06), Max: 98.5 (2023 13:35)  HR: 88 (2023 05:06) (88 - 103)  BP: 112/58 (2023 05:06) (112/58 - 123/65)  BP(mean): --  ABP: --  ABP(mean): --  RR: 18 (2023 05:06) (16 - 18)  SpO2: 100% (2023 05:06) (95% - 100%)    O2 Parameters below as of 2023 17:05  Patient On (Oxygen Delivery Method): nasal cannula  O2 Flow (L/min): 2          I&O's Summary    2023 07:01  -  2023 07:00  --------------------------------------------------------  IN: 0 mL / OUT: 600 mL / NET: -600 mL    2023 07:01  -  2023 06:24  --------------------------------------------------------  IN: 0 mL / OUT: 2200 mL / NET: -2200 mL        I&O's Detail    2023 07:01  -  2023 07:00  --------------------------------------------------------  IN:  Total IN: 0 mL    OUT:    Voided (mL): 600 mL  Total OUT: 600 mL    Total NET: -600 mL      2023 07:  -  2023 06:24  --------------------------------------------------------  IN:  Total IN: 0 mL    OUT:    Voided (mL): 2200 mL  Total OUT: 2200 mL    Total NET: -2200 mL          Drug Dosing Weight  Height (cm): 157.5 (2023 15:31)  Weight (kg): 82.6 (2023 15:31)  BMI (kg/m2): 33.3 (2023 15:31)  BSA (m2): 1.84 (2023 15:31)    LABS:                          8.4    8.14  )-----------( 175      ( 2023 06:33 )             27.7       04-24    141  |  101  |  25<H>  ----------------------------<  111<H>  3.4<L>   |  28  |  1.3    Ca    8.3<L>      2023 06:33  Mg     1.8         TPro  6.1  /  Alb  2.9<L>  /  TBili  0.4  /  DBili  x   /  AST  19  /  ALT  7   /  AlkPhos  141<H>        LIVER FUNCTIONS - ( 2023 06:33 )  Alb: 2.9 g/dL / Pro: 6.1 g/dL / ALK PHOS: 141 U/L / ALT: 7 U/L / AST: 19 U/L / GGT: x             CARDIAC MARKERS ( 2023 06:33 )  x     / 0.10 ng/mL / 55 U/L / x     / 1.3 ng/mL  CARDIAC MARKERS ( 2023 19:31 )  x     / 0.10 ng/mL / 94 U/L / x     / 1.8 ng/mL  CARDIAC MARKERS ( 2023 10:30 )  x     / 0.15 ng/mL / x     / x     / x                        Procalcitonin, Serum: 8.81 ng/mL (23 @ 14:27)      COVID-19 PCR: NotDetec (13 Mar 2023 14:15)  COVID-19 PCR: NotDetec (2023 20:59)      Urinalysis Basic - ( 2023 12:53 )    Color: Yellow / Appearance: Slightly Cloudy / S.025 / pH: x  Gluc: x / Ketone: Negative  / Bili: Negative / Urobili: 0.2 mg/dL   Blood: x / Protein: 100 mg/dL / Nitrite: Positive   Leuk Esterase: Small / RBC: 11-25 /HPF / WBC >50 /HPF   Sq Epi: x / Non Sq Epi: x / Bacteria: Few        meropenem  IVPB 1000 milliGRAM(s) IV Intermittent every 12 hours                  RADIOLOGY:  I have personally reviewed all chest and other pertinent radiology films.         REVIEW OF SYSTEMS:   see Hpi.    PHYSICAL EXAM:       · ENMT:   Airway patent,   Nasal mucosa clear.  Mouth with normal mucosa.   No thrush    · EYES:   Clear bilaterally,   pupils equal,   round and reactive to light.    · CARDIAC:   Normal rate,   regular rhythm.    Heart sounds S1, S2.   no thrills or bruits on palpitation  normal  cardiac impulse  No murmurs, no rubs or gallops on auscultation  no edema        CAROTID:   normal systolic impulse  no bruits    · RESPIRATORY:   no w/r/r/,   normal chest expansion  not tachypneic,  no retractions or use of accessory muscles  palpation of chest is normal with no fremitus  percussion of chest demonstrates dullness L base    · GASTROINTESTINAL:  Abdomen soft,   non-tender,   no guarding,   + BS  liver spleen not palpable      · MUSCULOSKELETAL:   range of motion is not limited,  no clubbing, cyanosis  no petechiae      · SKIN:   Skin normal color for race,   warm,   dry and intact.       · HEME LYMPH:   no splenomegaly.  No cervical  lymphadenopathy.  no inguinal lymphadenopathy         HPI:  87F w/PMHx of 87F of HTN, A.fib (on eliquis), PAD, Chronic lymphedema and COPD (not on home o2) presents to ED with her Dtr with reports of lethargy.  Patient s/p recent hospital admission [facility] 2/2 AHRF (multifactorial) s/p thoracentesis 2/2 left pleural effusion.  Patient discharged home on Pred taper of PO Lasix.  Dtr reports patient was doing relatively well since discharge however 2 days night ago patient developed lethargy and decreased ambulation with decreased PO intake.  Dtr reports subjective fever with associated cough, no production.  Patient and Dtr report orthopnea and SOB at rest.  No worsening BLE noted.  Patient reports nausea without vomiting.  Patient reports frequency without cosmo dysuria.   (2023 13:43)           I reviewed the radiology tests and hospital record including the ED chart.    I reviewed the other consultants comments that are available in the chart.    CC/ HPI Patient is a 87y old  Female who presents with a chief complaint of SOB and lethargy x 2 days (2023 12:21)      Sepsis    Handoff    MEWS Score    HTN (hypertension)    Afib    Goiter    Cellulitis    OA (osteoarthritis)    PVD (peripheral vascular disease)    COPD (chronic obstructive pulmonary disease)    Anxiety    Obesity    Sepsis    H/O abdominal hysterectomy    H/O discectomy    H/O total knee replacement, bilateral    FEVER WEAKNESS    40    Acute UTI    Elevated troponin    Pleural effusion, left    SysAdmin_VisitLink        FAMILY HISTORY:      aspirin (Other)  contrast media (iodine-based) (Other)  penicillin (Other)  codeine (Other)  sulfa drugs (Hives; Other)      Soc:  see Hpi.    Home prescriptions  dilTIAZem 180 mg/24 hours oral capsule, extended release: 1 cap(s) orally once a day  Eliquis 2.5 mg oral tablet: 1 tab(s) orally 2 times a day  furosemide 20 mg oral tablet: 1 tab(s) orally once a day      MEDICATIONS  (STANDING):  diltiazem    milliGRAM(s) Oral daily  furosemide   Injectable 40 milliGRAM(s) IV Push two times a day  heparin  Infusion 1300 Unit(s)/Hr (13 mL/Hr) IV Continuous <Continuous>  meropenem  IVPB 1000 milliGRAM(s) IV Intermittent every 12 hours    MEDICATIONS  (PRN):  acetaminophen     Tablet .. 650 milliGRAM(s) Oral every 6 hours PRN Temp greater or equal to 38C (100.4F), Mild Pain (1 - 3)  albuterol/ipratropium for Nebulization 3 milliLiter(s) Nebulizer every 6 hours PRN Bronchospasm  melatonin 3 milliGRAM(s) Oral at bedtime PRN Insomnia  ondansetron Injectable 4 milliGRAM(s) IV Push every 8 hours PRN Nausea and/or Vomiting      sodium chloride 0.9% Bolus:   1000 milliLiter(s), IV Bolus, once, infuse over 60 Minute(s), Stop After 1 Doses  Provider's Contact #: [phone]      T(C): 36 (23 @ 05:06), Max: 36.9 (23 @ 13:35)  HR: 88 (23 @ 05:06) (88 - 103)  BP: 112/58 (23 @ 05:06) (112/58 - 123/65)  RR: 18 (23 @ 05:06) (16 - 18)  SpO2: 100% (23 @ 05:06) (95% - 100%)  ICU Vital Signs Last 24 Hrs  T(C): 36 (2023 05:06), Max: 36.9 (2023 13:35)  T(F): 96.8 (2023 05:06), Max: 98.5 (2023 13:35)  HR: 88 (2023 05:06) (88 - 103)  BP: 112/58 (2023 05:06) (112/58 - 123/65)  BP(mean): --  ABP: --  ABP(mean): --  RR: 18 (2023 05:06) (16 - 18)  SpO2: 100% (2023 05:06) (95% - 100%)    O2 Parameters below as of 2023 17:05  Patient On (Oxygen Delivery Method): nasal cannula  O2 Flow (L/min): 2          I&O's Summary    2023 07:01  -  2023 07:00  --------------------------------------------------------  IN: 0 mL / OUT: 600 mL / NET: -600 mL    2023 07:01  -  2023 06:24  --------------------------------------------------------  IN: 0 mL / OUT: 2200 mL / NET: -2200 mL        I&O's Detail    2023 07:01  -  2023 07:00  --------------------------------------------------------  IN:  Total IN: 0 mL    OUT:    Voided (mL): 600 mL  Total OUT: 600 mL    Total NET: -600 mL      2023 07:  -  2023 06:24  --------------------------------------------------------  IN:  Total IN: 0 mL    OUT:    Voided (mL): 2200 mL  Total OUT: 2200 mL    Total NET: -2200 mL          Drug Dosing Weight  Height (cm): 157.5 (2023 15:31)  Weight (kg): 82.6 (2023 15:31)  BMI (kg/m2): 33.3 (2023 15:31)  BSA (m2): 1.84 (2023 15:31)    LABS:                          8.4    8.14  )-----------( 175      ( 2023 06:33 )             27.7       04-24    141  |  101  |  25<H>  ----------------------------<  111<H>  3.4<L>   |  28  |  1.3    Ca    8.3<L>      2023 06:33  Mg     1.8         TPro  6.1  /  Alb  2.9<L>  /  TBili  0.4  /  DBili  x   /  AST  19  /  ALT  7   /  AlkPhos  141<H>        LIVER FUNCTIONS - ( 2023 06:33 )  Alb: 2.9 g/dL / Pro: 6.1 g/dL / ALK PHOS: 141 U/L / ALT: 7 U/L / AST: 19 U/L / GGT: x             CARDIAC MARKERS ( 2023 06:33 )  x     / 0.10 ng/mL / 55 U/L / x     / 1.3 ng/mL  CARDIAC MARKERS ( 2023 19:31 )  x     / 0.10 ng/mL / 94 U/L / x     / 1.8 ng/mL  CARDIAC MARKERS ( 2023 10:30 )  x     / 0.15 ng/mL / x     / x     / x          Procalcitonin, Serum: 8.81 ng/mL (23 @ 14:27)      COVID-19 PCR: NotDetec (13 Mar 2023 14:15)  COVID-19 PCR: NotDetec (2023 20:59)      Urinalysis Basic - ( 2023 12:53 )    Color: Yellow / Appearance: Slightly Cloudy / S.025 / pH: x  Gluc: x / Ketone: Negative  / Bili: Negative / Urobili: 0.2 mg/dL   Blood: x / Protein: 100 mg/dL / Nitrite: Positive   Leuk Esterase: Small / RBC: 11-25 /HPF / WBC >50 /HPF   Sq Epi: x / Non Sq Epi: x / Bacteria: Few        meropenem  IVPB 1000 milliGRAM(s) IV Intermittent every 12 hours    RADIOLOGY:  I have personally reviewed all chest and other pertinent radiology films.         REVIEW OF SYSTEMS:   see Hpi.    PHYSICAL EXAM:       · ENMT:   Airway patent,   Nasal mucosa clear.  Mouth with normal mucosa.   No thrush    · EYES:   Clear bilaterally,   pupils equal,   round and reactive to light.    · CARDIAC:   Normal rate,   regular rhythm.    Heart sounds S1, S2.   no thrills or bruits on palpitation  normal  cardiac impulse  No murmurs, no rubs or gallops on auscultation  no edema        CAROTID:   normal systolic impulse  no bruits    · RESPIRATORY:   no w/r/r/,   normal chest expansion  not tachypneic,  no retractions or use of accessory muscles  palpation of chest is normal with no fremitus  percussion of chest demonstrates dullness L base    · GASTROINTESTINAL:  Abdomen soft,   non-tender,   no guarding,   + BS  liver spleen not palpable      · MUSCULOSKELETAL:   range of motion is not limited,  no clubbing, cyanosis  no petechiae      · SKIN:   Skin normal color for race,   warm,   dry and intact.       · HEME LYMPH:   no splenomegaly.  No cervical  lymphadenopathy.  no inguinal lymphadenopathy

## 2023-04-25 NOTE — CONSULT NOTE ADULT - SUBJECTIVE AND OBJECTIVE BOX
GIOVANNY BUCK  87y, Female  Allergy: aspirin (Other)  contrast media (iodine-based) (Other)  penicillin (Other)  codeine (Other)  sulfa drugs (Hives; Other)      CHIEF COMPLAINT:   SOB and lethargy x 2 days (2023 06:24)      LOS  2d    HPI  HPI:  87F w/PMHx of 87F of HTN, A.fib (on eliquis), PAD, Chronic lymphedema and COPD (not on home o2) presents to ED with her Dtr with reports of lethargy.  Patient s/p recent hospital admission [facility]/ AHRF (multifactorial) s/p thoracentesis / left pleural effusion.  Patient discharged home on Pred taper of PO Lasix.  Dtr reports patient was doing relatively well since discharge however 2 days night ago patient developed lethargy and decreased ambulation with decreased PO intake.  Dtr reports subjective fever with associated cough, no production.  Patient and Dtr report orthopnea and SOB at rest.  No worsening BLE noted.  Patient reports nausea without vomiting.  Patient reports frequency without cosmo dysuria.   (2023 13:43)      INFECTIOUS DISEASE HISTORY:  ID consulted for GNR bacteremia     Currently ordered for:  meropenem  IVPB 1000 milliGRAM(s) IV Intermittent every 12 hours      PMH  PAST MEDICAL & SURGICAL HISTORY:  HTN (hypertension)      Afib  s/p ablation       Goiter  no meds      Cellulitis  chronic      OA (osteoarthritis)      PVD (peripheral vascular disease)      COPD (chronic obstructive pulmonary disease)      Anxiety      Obesity      H/O abdominal hysterectomy      H/O discectomy      H/O total knee replacement, bilateral          FAMILY HISTORY  non-contributory     SOCIAL HISTORY  Social History:  Smoke, drug, etoh: Denies (18 May 2020 20:30)        ROS  ***    VITALS:  T(F): 96.8, Max: 98.5 (23 @ 13:35)  HR: 88  BP: 112/58  RR: 18Vital Signs Last 24 Hrs  T(C): 36 (2023 05:06), Max: 36.9 (2023 13:35)  T(F): 96.8 (2023 05:06), Max: 98.5 (2023 13:35)  HR: 88 (2023 05:06) (88 - 103)  BP: 112/58 (2023 05:06) (112/58 - 123/65)  BP(mean): --  RR: 18 (2023 05:06) (16 - 18)  SpO2: 100% (2023 05:06) (95% - 100%)    Parameters below as of 2023 17:05  Patient On (Oxygen Delivery Method): nasal cannula  O2 Flow (L/min): 2      PHYSICAL EXAM:  ***    TESTS & MEASUREMENTS:                        8.6    8.37  )-----------( 195      ( 2023 06:16 )             29.3         141  |  100  |  26<H>  ----------------------------<  119<H>  3.9   |  34<H>  |  1.2    Ca    8.4      2023 06:16  Mg     2.0         TPro  6.1  /  Alb  2.9<L>  /  TBili  0.4  /  DBili  x   /  AST  19  /  ALT  7   /  AlkPhos  141<H>        LIVER FUNCTIONS - ( 2023 06:33 )  Alb: 2.9 g/dL / Pro: 6.1 g/dL / ALK PHOS: 141 U/L / ALT: 7 U/L / AST: 19 U/L / GGT: x           Urinalysis Basic - ( 2023 12:53 )    Color: Yellow / Appearance: Slightly Cloudy / S.025 / pH: x  Gluc: x / Ketone: Negative  / Bili: Negative / Urobili: 0.2 mg/dL   Blood: x / Protein: 100 mg/dL / Nitrite: Positive   Leuk Esterase: Small / RBC: 11-25 /HPF / WBC >50 /HPF   Sq Epi: x / Non Sq Epi: x / Bacteria: Few        Culture - Blood (collected 23 @ 10:30)  Source: .Blood Blood  Gram Stain (23 @ 11:13):    Growth in aerobic bottle: Gram Negative Rods  Preliminary Report (23 @ 11:14):    Growth in aerobic bottle: Gram Negative Rods    ***Blood Panel PCR results on this specimen are available    approximately 3 hours after the Gram stain result.***    Gram stain, PCR, and/or culture results may not always    correspond due to difference in methodologies.    ************************************************************    This PCR assay was performed by multiplex PCR. This    Assay tests for 66 bacterial and resistance gene targets.    Please refer to the [facility] test directory    at [email or web address] for details.  Organism: Blood Culture PCR (23 @ 13:34)  Organism: Blood Culture PCR (23 @ 13:34)      Method Type: PCR      -  Escherichia coli: Detec      -  ESBL: Detec      -  CTX-M Resistance Marker: Detec    Culture - Fungal, Body Fluid (collected 23 @ 17:00)  Source: Pleural Fl Pleural Fluid  Final Report (23 @ 15:01):    No fungus isolated at 4 weeks.    Culture - Body Fluid with Gram Stain (collected 23 @ 17:00)  Source: Pleural Fl Pleural Fluid  Gram Stain (23 @ 03:50):    Few polymorphonuclear leukocytes seen per low power field    No organisms seen per oil power field  Final Report (23 @ 16:18):    No growth    Culture - Blood (collected 23 @ 07:42)  Source: .Blood None  Final Report (03-10-23 @ 22:00):    No Growth Final    Culture - Abscess with Gram Stain (collected 23 @ 07:20)  Source: .Abscess foot  Final Report (23 @ 11:11):    Few Methicillin Resistant Staphylococcus aureus    Few Porphyromonas somerae "Susceptibilities not performed"    Few Corynebacterium species "Susceptibilities not performed"  Organism: Methicillin resistant Staphylococcus aureus (23 @ 11:11)  Organism: Methicillin resistant Staphylococcus aureus (23 @ 11:11)      Method Type: DARIO      -  Ampicillin/Sulbactam: R <=8/4      -  Cefazolin: R <=4      -  Clindamycin: R <=0.25 This isolate is presumed to be clindamycin resistant based on detection of inducible resistance. Clindamycin may still be effective in some patients.      -  Daptomycin: S <=0.25      -  Erythromycin: R >4      -  Gentamicin: R >8 Should not be used as monotherapy      -  Linezolid: S 2      -  Oxacillin: R 2      -  Penicillin: R >8      -  Rifampin: S <=1 Should not be used as monotherapy      -  Tetracycline: R >8      -  Trimethoprim/Sulfamethoxazole: S <=0.5/9.5      -  Vancomycin: S 1    Culture - Blood (collected 23 @ 06:53)  Source: .Blood None  Final Report (23 @ 14:00):    No Growth Final        Blood Gas Venous - Lactate: 1.00 mmol/L (23 @ 11:28)      INFECTIOUS DISEASES TESTING  Procalcitonin, Serum: 8.81 ng/mL (23 @ 14:27)  COVID-19 PCR: NotDetec (23 @ 14:15)  Procalcitonin, Serum: 0.34 ng/mL (23 @ 17:16)  Procalcitonin, Serum: 0.34 ng/mL (23 @ 17:16)  MRSA PCR Result.: Negative (23 @ 15:27)  MRSA PCR Result.: Negative (23 @ 07:58)  Procalcitonin, Serum: 0.19 ng/mL (23 @ 06:53)  COVID-19 PCR: NotDetec (23 @ 20:59)      INFLAMMATORY MARKERS      RADIOLOGY & ADDITIONAL TESTS:  I have personally reviewed the last Chest xray  CXR  Xray Chest 1 View- PORTABLE-Urgent:   ACC: [id number] EXAM:  XR CHEST PORTABLE URGENT 1V   ORDERED BY: DOLORES LANDRUM     PROCEDURE DATE:  2023          INTERPRETATION:  Clinical History / Reason for exam: Pain.    Comparison : Chest radiograph 2023.    Technique/Positioning: Low lung volume.    Findings:    Support devices: None.    Cardiac/mediastinum/hilum: Obscured    Lung parenchyma/Pleura: Bilateral opacities (left greater than right). No   pneumothorax is seen.    Skeleton/soft tissues: Stable.    Impression:    Low lung volume.    Bilateral opacities (left greater than right), unchanged.    --- End of Report ---            NICOLA GRAHAM MD; Attending Radiologist  This document has been electronically signed. 2023  3:26PM (23 @ 10:21)      CT      CARDIOLOGY TESTING  12 Lead ECG:   Ventricular Rate 105 BPM    Atrial Rate 108 BPM    QRS Duration 80 ms    Q-T Interval 366 ms    QTC Calculation(Bazett) 483 ms    R Axis 83 degrees    T Axis 63 degrees    Diagnosis Line Atrial fibrillation with rapid ventricular response  Low voltage QRS  NS T wave change   Abnormal ECG    Confirmed by DELLA BLACKWOOD MD () on 2023 6:29:41 PM (23 @ 11:43)  12 Lead ECG:   Ventricular Rate 116 BPM    Atrial Rate 170 BPM    QRS Duration 96 ms    Q-T Interval 290 ms    QTC Calculation(Bazett) 403 ms    R Axis 105 degrees    T Axis 4 degrees    Diagnosis Line Baseline Variation   AF with RVR  Septal MI ? age          Confirmed by DELLA BLACKWOOD MD () on 2023 6:42:46 PM (23 @ 09:56)      MEDICATIONS  diltiazem    Oral daily  furosemide   Injectable 40 IV Push two times a day  heparin  Infusion 1300 IV Continuous <Continuous>  meropenem  IVPB 1000 IV Intermittent every 12 hours        ANTIBIOTICS:  meropenem  IVPB 1000 milliGRAM(s) IV Intermittent every 12 hours      ALLERGIES:  aspirin (Other)  contrast media (iodine-based) (Other)  penicillin (Other)  codeine (Other)  sulfa drugs (Hives; Other)         GIOVANNY BUCK  87y, Female  Allergy: aspirin (Other)  contrast media (iodine-based) (Other)  penicillin (Other)  codeine (Other)  sulfa drugs (Hives; Other)      CHIEF COMPLAINT:   SOB and lethargy x 2 days (2023 06:24)      LOS  2d    HPI  HPI:  87F w/PMHx of 87F of HTN, A.fib (on eliquis), PAD, Chronic lymphedema and COPD (not on home o2) presents to ED with her Dtr with reports of lethargy.  Patient s/p recent hospital admission [facility]/ AHRF (multifactorial) s/p thoracentesis 2/ left pleural effusion.  Patient discharged home on Pred taper of PO Lasix.  Dtr reports patient was doing relatively well since discharge however 2 days night ago patient developed lethargy and decreased ambulation with decreased PO intake.  Dtr reports subjective fever with associated cough, no production.  Patient and Dtr report orthopnea and SOB at rest.  No worsening BLE noted.  Patient reports nausea without vomiting.  Patient reports frequency without cosmo dysuria.   (2023 13:43)      INFECTIOUS DISEASE HISTORY:  ID consulted for GNR bacteremia   Pt denies dysuria, flank pain, diarrhea, abd pain  Has mild cough with whitish sputum  BCX ecoli esbl    Currently ordered for:  meropenem  IVPB 1000 milliGRAM(s) IV Intermittent every 12 hours      PMH  PAST MEDICAL & SURGICAL HISTORY:  HTN (hypertension)      Afib  s/p ablation       Goiter  no meds      Cellulitis  chronic      OA (osteoarthritis)      PVD (peripheral vascular disease)      COPD (chronic obstructive pulmonary disease)      Anxiety      Obesity      H/O abdominal hysterectomy      H/O discectomy      H/O total knee replacement, bilateral          FAMILY HISTORY  non-contributory     SOCIAL HISTORY  Social History:  Smoke, drug, etoh: Denies (18 May 2020 20:30)        ROS  General: Denies rigors, nightsweats  HEENT: Denies headache, rhinorrhea, sore throat, eye pain  CV: Denies CP, palpitations  PULM: as noted above   GI: Denies hematemesis, hematochezia, melena  : Denies discharge, hematuria  MSK: Denies arthralgias, myalgias  SKIN: Denies rash, lesions  NEURO: Denies paresthesias, weakness  PSYCH: Denies depression, anxiety     VITALS:  T(F): 96.8, Max: 98.5 (04-24-23 @ 13:35)  HR: 88  BP: 112/58  RR: 18Vital Signs Last 24 Hrs  T(C): 36 (2023 05:06), Max: 36.9 (2023 13:35)  T(F): 96.8 (2023 05:06), Max: 98.5 (2023 13:35)  HR: 88 (2023 05:06) (88 - 103)  BP: 112/58 (2023 05:06) (112/58 - 123/65)  BP(mean): --  RR: 18 (2023 05:06) (16 - 18)  SpO2: 100% (2023 05:06) (95% - 100%)    Parameters below as of 2023 17:05  Patient On (Oxygen Delivery Method): nasal cannula  O2 Flow (L/min): 2      PHYSICAL EXAM:  Gen: NAD, resting in bed  HEENT: Normocephalic, atraumatic NC  Neck: supple, no lymphadenopathy, R neck mass   CV: Regular rate & regular rhythm  Lungs: decreased BS at bases, no fremitus  Abdomen: Soft, BS present no suprapubic tenderness, no CVAT   Ext: Warm, well perfused  Neuro: non focal, awake  Skin: LE venous stasis bilateral , lymphedema, erythema  Lines: no phlebitis     TESTS & MEASUREMENTS:                        8.6    8.37  )-----------( 195      ( 2023 06:16 )             29.3     04-    141  |  100  |  26<H>  ----------------------------<  119<H>  3.9   |  34<H>  |  1.2    Ca    8.4      2023 06:16  Mg     2.0     -    TPro  6.1  /  Alb  2.9<L>  /  TBili  0.4  /  DBili  x   /  AST  19  /  ALT  7   /  AlkPhos  141<H>  04-24      LIVER FUNCTIONS - ( 2023 06:33 )  Alb: 2.9 g/dL / Pro: 6.1 g/dL / ALK PHOS: 141 U/L / ALT: 7 U/L / AST: 19 U/L / GGT: x           Urinalysis Basic - ( 2023 12:53 )    Color: Yellow / Appearance: Slightly Cloudy / S.025 / pH: x  Gluc: x / Ketone: Negative  / Bili: Negative / Urobili: 0.2 mg/dL   Blood: x / Protein: 100 mg/dL / Nitrite: Positive   Leuk Esterase: Small / RBC: 11-25 /HPF / WBC >50 /HPF   Sq Epi: x / Non Sq Epi: x / Bacteria: Few        Culture - Blood (collected 23 @ 10:30)  Source: .Blood Blood  Gram Stain (23 @ 11:13):    Growth in aerobic bottle: Gram Negative Rods  Preliminary Report (23 @ 11:14):    Growth in aerobic bottle: Gram Negative Rods    ***Blood Panel PCR results on this specimen are available    approximately 3 hours after the Gram stain result.***    Gram stain, PCR, and/or culture results may not always    correspond due to difference in methodologies.    ************************************************************    This PCR assay was performed by multiplex PCR. This    Assay tests for 66 bacterial and resistance gene targets.    Please refer to the [facility] test directory    at [email or web address] for details.  Organism: Blood Culture PCR (23 @ 13:34)  Organism: Blood Culture PCR (23 @ 13:34)      Method Type: PCR      -  Escherichia coli: Detec      -  ESBL: Detec      -  CTX-M Resistance Marker: Detec    Culture - Fungal, Body Fluid (collected 23 @ 17:00)  Source: Pleural Fl Pleural Fluid  Final Report (23 @ 15:01):    No fungus isolated at 4 weeks.    Culture - Body Fluid with Gram Stain (collected 23 @ 17:00)  Source: Pleural Fl Pleural Fluid  Gram Stain (23 @ 03:50):    Few polymorphonuclear leukocytes seen per low power field    No organisms seen per oil power field  Final Report (23 @ 16:18):    No growth    Culture - Blood (collected 23 @ 07:42)  Source: .Blood None  Final Report (03-10-23 @ 22:00):    No Growth Final    Culture - Abscess with Gram Stain (collected 23 @ 07:20)  Source: .Abscess foot  Final Report (23 @ 11:11):    Few Methicillin Resistant Staphylococcus aureus    Few Porphyromonas somerae "Susceptibilities not performed"    Few Corynebacterium species "Susceptibilities not performed"  Organism: Methicillin resistant Staphylococcus aureus (23 @ 11:11)  Organism: Methicillin resistant Staphylococcus aureus (23 @ 11:11)      Method Type: DARIO      -  Ampicillin/Sulbactam: R <=8/4      -  Cefazolin: R <=4      -  Clindamycin: R <=0.25 This isolate is presumed to be clindamycin resistant based on detection of inducible resistance. Clindamycin may still be effective in some patients.      -  Daptomycin: S <=0.25      -  Erythromycin: R >4      -  Gentamicin: R >8 Should not be used as monotherapy      -  Linezolid: S 2      -  Oxacillin: R 2      -  Penicillin: R >8      -  Rifampin: S <=1 Should not be used as monotherapy      -  Tetracycline: R >8      -  Trimethoprim/Sulfamethoxazole: S <=0.5/9.5      -  Vancomycin: S 1    Culture - Blood (collected 23 @ 06:53)  Source: .Blood None  Final Report (23 @ 14:00):    No Growth Final        Blood Gas Venous - Lactate: 1.00 mmol/L (23 @ 11:28)      INFECTIOUS DISEASES TESTING  Procalcitonin, Serum: 8.81 ng/mL (23 @ 14:27)  COVID-19 PCR: NotDetec (23 @ 14:15)  Procalcitonin, Serum: 0.34 ng/mL (23 @ 17:16)  Procalcitonin, Serum: 0.34 ng/mL (23 @ 17:16)  MRSA PCR Result.: Negative (23 @ 15:27)  MRSA PCR Result.: Negative (23 @ 07:58)  Procalcitonin, Serum: 0.19 ng/mL (23 @ 06:53)  COVID-19 PCR: NotDetec (23 @ 20:59)      INFLAMMATORY MARKERS      RADIOLOGY & ADDITIONAL TESTS:  I have personally reviewed the last Chest xray  CXR  Xray Chest 1 View- PORTABLE-Urgent:   ACC: [id number] EXAM:  XR CHEST PORTABLE URGENT 1V   ORDERED BY: DOLORES LANDRUM     PROCEDURE DATE:  2023          INTERPRETATION:  Clinical History / Reason for exam: Pain.    Comparison : Chest radiograph 2023.    Technique/Positioning: Low lung volume.    Findings:    Support devices: None.    Cardiac/mediastinum/hilum: Obscured    Lung parenchyma/Pleura: Bilateral opacities (left greater than right). No   pneumothorax is seen.    Skeleton/soft tissues: Stable.    Impression:    Low lung volume.    Bilateral opacities (left greater than right), unchanged.    --- End of Report ---            NICOLA GRAHAM MD; Attending Radiologist  This document has been electronically signed. 2023  3:26PM (23 @ 10:21)      CT      CARDIOLOGY TESTING  12 Lead ECG:   Ventricular Rate 105 BPM    Atrial Rate 108 BPM    QRS Duration 80 ms    Q-T Interval 366 ms    QTC Calculation(Bazett) 483 ms    R Axis 83 degrees    T Axis 63 degrees    Diagnosis Line Atrial fibrillation with rapid ventricular response  Low voltage QRS  NS T wave change   Abnormal ECG    Confirmed by DELLA BLACKWOOD MD () on 2023 6:29:41 PM (23 @ 11:43)  12 Lead ECG:   Ventricular Rate 116 BPM    Atrial Rate 170 BPM    QRS Duration 96 ms    Q-T Interval 290 ms    QTC Calculation(Bazett) 403 ms    R Axis 105 degrees    T Axis 4 degrees    Diagnosis Line Baseline Variation   AF with RVR  Septal MI ? age          Confirmed by DELLA BLACKWOOD MD () on 2023 6:42:46 PM (23 @ 09:56)      MEDICATIONS  diltiazem    Oral daily  furosemide   Injectable 40 IV Push two times a day  heparin  Infusion 1300 IV Continuous <Continuous>  meropenem  IVPB 1000 IV Intermittent every 12 hours        ANTIBIOTICS:  meropenem  IVPB 1000 milliGRAM(s) IV Intermittent every 12 hours      ALLERGIES:  aspirin (Other)  contrast media (iodine-based) (Other)  penicillin (Other)  codeine (Other)  sulfa drugs (Hives; Other)

## 2023-04-25 NOTE — PHYSICAL THERAPY INITIAL EVALUATION ADULT - PERTINENT HX OF CURRENT PROBLEM, REHAB EVAL
86 y/o female admitted with diagnosis of Sepsis, was having increasing SOB/lethargy at home, stopped taking Lasix at home; found to have large (L) pleural effusion on x-ray and started on supplemental O2 and found to have UTI and given antibiotics

## 2023-04-25 NOTE — PHYSICAL THERAPY INITIAL EVALUATION ADULT - GENERAL OBSERVATIONS, REHAB EVAL
13:25-13:50 Chart reviewed. Pt encountered sitting up in bed in chair position, may be seen by Physical Therapist as confirmed with Nurse. Patient denied pain at rest and ready to get up now; +tele; +O2 via NC; +IV LUE; +Primafit

## 2023-04-25 NOTE — CONSULT NOTE ADULT - ASSESSMENT
ASSESSMENT  87F w/PMHx of 87F of HTN, A.fib (on eliquis), PAD, Chronic lymphedema and COPD (not on home o2) presents to ED with her Dtr with reports of lethargy. Admitted 4/23, ID consulted for GNR bacteremia     IMPRESSION  #Escherichia coli ESBL bacteremia suspect  source (Severe sepsis on admission T>101 P>90 YAIMA)    4/23 BCX +     UA pyuria Blood: x / Protein: 100 mg/dL / Nitrite: Positive   Leuk Esterase: Small / RBC: 11-25 /HPF / WBC >50 /HPF   Sq Epi: x / Non Sq Epi: x / Bacteria: Few    CXR Bilateral opacities (left greater than right), unchanged.    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 All final recommendations to follow after interview and examination of the patient. Please follow recommendations noted below.    If any questions, please send a message or call on [employer] Teams  Please continue to update ID with any pertinent new laboratory or radiographic findings  #[id number]   ASSESSMENT  87F w/PMHx of 87F of HTN, A.fib (on eliquis), PAD, Chronic lymphedema and COPD (not on home o2) presents to ED with her Dtr with reports of lethargy. Admitted 4/23, ID consulted for GNR bacteremia     IMPRESSION  #Escherichia coli ESBL bacteremia suspect  source (Severe sepsis on admission T>101 P>90 YAIMA)    However pt denies any GI/  symptoms , has pyuria     4/23 BCX +     UA pyuria Blood: x / Protein: 100 mg/dL / Nitrite: Positive   Leuk Esterase: Small / RBC: 11-25 /HPF / WBC >50 /HPF   Sq Epi: x / Non Sq Epi: x / Bacteria: Few    CXR Bilateral opacities (left greater than right), unchanged.    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  #Hx 2/2023 foot wound MRSA  #HF  #COPD  #Lymphedema  #Obesity BMI (kg/m2): 33.3  #Abx allergy:   penicillin (Other)      Creatinine, Serum: 1.2 (04-25-23 @ 06:16)    Weight (kg): 82.6 (04-23-23 @ 15:31)    RECOMMENDATIONS  - Recommend  imaging CTAP vs US  - Meropenem IVPB 1000 milliGRAM(s) IV Intermittent every 12 hours  - repeat BCX as unclear source  - f/u ecoli sensitivities , suspect will need midline x ertapenem to complete course    If any questions, please send a message or call on [employer] Teams  Please continue to update ID with any pertinent new laboratory or radiographic findings  #[id number]

## 2023-04-25 NOTE — CONSULT NOTE ADULT - ASSESSMENT
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 Christian or [place] kinney as OP after [place] to f/u for catheter .

## 2023-04-25 NOTE — PHYSICAL THERAPY INITIAL EVALUATION ADULT - ADDITIONAL COMMENTS
Per patient, she lives alone ; has 4 steps with (R) rail going up to enter home then has chairlift inside home to get to bedroom; was using a rolling walker to ambulate, also has a cane

## 2023-04-25 NOTE — PHYSICAL THERAPY INITIAL EVALUATION ADULT - PATIENT/FAMILY/SIGNIFICANT OTHER GOALS STATEMENT, PT EVAL
Patient would like to get stronger and be able to get up and walk with her walker so she can go home

## 2023-04-25 NOTE — PHARMACY COMMUNICATION NOTE - COMMENTS
for iodinated contrast media allergic reaction prevention, nonurgent.
for iodinated contrast media allergic reaction prevention, nonurgent.

## 2023-04-25 NOTE — PHYSICAL THERAPY INITIAL EVALUATION ADULT - GAIT DEVIATIONS NOTED, PT EVAL
stooped posture, dec heel strike/pushoff/decreased jai/decreased step length/decreased weight-shifting ability

## 2023-04-26 NOTE — PROGRESS NOTE ADULT - ASSESSMENT
MEDICATIONS  (STANDING):  diltiazem    milliGRAM(s) Oral daily  enoxaparin Injectable 80 milliGRAM(s) SubCutaneous every 12 hours  furosemide   Injectable 40 milliGRAM(s) IV Push two times a day  meropenem  IVPB 1000 milliGRAM(s) IV Intermittent every 12 hours    MEDICATIONS  (PRN):  acetaminophen     Tablet .. 650 milliGRAM(s) Oral every 6 hours PRN Temp greater or equal to 38C (100.4F), Mild Pain (1 - 3)  albuterol/ipratropium for Nebulization 3 milliLiter(s) Nebulizer every 6 hours PRN Bronchospasm  melatonin 3 milliGRAM(s) Oral at bedtime PRN Insomnia  ondansetron Injectable 4 milliGRAM(s) IV Push every 8 hours PRN Nausea and/or Vomiting    87F w/PMHx of 87F of HTN, Hx of prior malignant pleural effusion few months at [facility], A.fib (on eliquis), PAD, Chronic lymphedema and COPD (not on home o2) presents to ED with her Dtr with reports of lethargy, admitted to medicine service for further management.    Assessment /Plan   Acute respiratory failure with hypoxia due to b/l malignant effusions, cytology c/f of breast origin.    - Recent hospitalization from [facility] w/ pleural effusion shown to be malignant cytology thoracentesis on 3/7/23 on Left side with 1100 cc removed.   - Wean off oxygen daily  - Furosemide 40mg IVP q12h  - Pulmonology eval - rec [place] PleurX Catheter, repeat Chest X-Ray today with b/l pleural effusion personally reviewed   - on CT Urethrogram revealed b/l moderate pleural effusions.   - weights QD, monitor I/O  - last ECHO 3/23, EF 66% and mild-moderate valve abnormalities.  No need to repeat  - D/w oncology - Obtain ER/VA cytology from [facility] (ordered).  - d/w  Dr. Carlotta Hyatt PET/CT scan not available while inpatient, will obtain OP , pt will need ambulette, family concern patient not very active with limited ambulation.   - IR consulted, will discuss in am.     #ESBL bacteremia due to E coli UTI   - C/w meropenem 1g q12hrs  - Blood Cx ESBL E.coli, F/u sensitivities  - Repeat Blood cultures 3/25 pending,   - UCx Ecoli, follow up sensitivities  - ID recs appreciated - may need Midline w/ Ertapenem on discharge  - CT urogram with iv contrast unremarkable.     Chronic atrial fibrillation s/p RVR   - initially presented w/RVR, now rate controlled <110  - c/w Diltiazem QD  - Hold DOAC, Start lovenox 80mg BID for possible [facility] w/ IR  - ECHO March 2023 EF 66%, at home on eliquis currently being held.     Elevated troponin likely demand ischemia, ruled out ACS.   - monitor on telemetry x 24h, denies any chest pain, HD stable  - EKG reviewed - no ischemic changes  - trop  0.15-->0.1-->0.1  - Cardiology eval recs appreciated    YAIMA, resolved  - baseline sCr approx 1.1--> 1.4-->1.2  - s/p 1L NS bolus  - avoid Nephrotoxics    COPD  - appears at baseline  - not on LAMA/ICS  - b2 PRN    Obesity BMI 33  - wt loss counseling per PCP     DVT/GI px  - Lovenox bid / on a diet.     Full code

## 2023-04-26 NOTE — CONSULT NOTE ADULT - SUBJECTIVE AND OBJECTIVE BOX
INTERVENTIONAL RADIOLOGY CONSULT:     Procedure Requested: Pleurx placement    HPI:  87F w/PMHx of 87F of HTN, A.fib (on eliquis), PAD, Chronic lymphedema and COPD (not on home o2) presents to ED with her Dtr with reports of lethargy.  Patient s/p recent hospital admission [facility] 2/2 AHRF (multifactorial) s/p thoracentesis 2/2 left pleural effusion.  Patient discharged home on Pred taper of PO Lasix.  Dtr reports patient was doing relatively well since discharge however 2 days night ago patient developed lethargy and decreased ambulation with decreased PO intake. CT demonstrates bilateral pleural effusions.      PAST MEDICAL & SURGICAL HISTORY:  HTN (hypertension)      Afib  s/p ablation 2001      Goiter  no meds      Cellulitis  chronic      OA (osteoarthritis)      PVD (peripheral vascular disease)      COPD (chronic obstructive pulmonary disease)      Anxiety      Obesity      H/O abdominal hysterectomy      H/O discectomy      H/O total knee replacement, bilateral          MEDICATIONS  (STANDING):  diltiazem    milliGRAM(s) Oral daily  enoxaparin Injectable 80 milliGRAM(s) SubCutaneous every 12 hours  furosemide   Injectable 40 milliGRAM(s) IV Push two times a day  meropenem  IVPB 1000 milliGRAM(s) IV Intermittent every 12 hours    MEDICATIONS  (PRN):  acetaminophen     Tablet .. 650 milliGRAM(s) Oral every 6 hours PRN Temp greater or equal to 38C (100.4F), Mild Pain (1 - 3)  albuterol/ipratropium for Nebulization 3 milliLiter(s) Nebulizer every 6 hours PRN Bronchospasm  melatonin 3 milliGRAM(s) Oral at bedtime PRN Insomnia  ondansetron Injectable 4 milliGRAM(s) IV Push every 8 hours PRN Nausea and/or Vomiting      Allergies    aspirin (Other)  contrast media (iodine-based) (Other)  penicillin (Other)  codeine (Other)  sulfa drugs (Hives; Other)    Intolerances        Social History:   Smoking: Yes [ ]  No [ ]   ______pk yrs  ETOH  Yes [ ]  No [ ]  Social [ ]  DRUGS:  Yes [ ]  No [ ]  if so what______________    FAMILY HISTORY:      Physical Exam:   Vital Signs Last 24 Hrs  T(C): 36.1 (26 Apr 2023 09:20), Max: 36.4 (25 Apr 2023 21:15)  T(F): 97 (26 Apr 2023 09:20), Max: 97.5 (25 Apr 2023 21:15)  HR: 92 (26 Apr 2023 09:20) (74 - 107)  BP: 122/65 (26 Apr 2023 09:20) (115/57 - 139/65)  BP(mean): --  RR: 20 (26 Apr 2023 09:20) (18 - 20)  SpO2: 91% (26 Apr 2023 09:20) (91% - 97%)    Parameters below as of 26 Apr 2023 09:20  Patient On (Oxygen Delivery Method): nasal cannula        Pertinent labs:                      9.2    6.69  )-----------( 220      ( 26 Apr 2023 05:59 )             31.1       04-26    143  |  96<L>  |  24<H>  ----------------------------<  157<H>  4.4   |  36<H>  |  1.1    Ca    9.2      26 Apr 2023 05:59  Mg     2.0     04-25        PTT - ( 25 Apr 2023 06:16 )  PTT:65.0 sec    Radiology & Additional Studies:     Radiology imaging reviewed.       ASSESSMENT/ PLAN:      87F w/PMHx of 87F of HTN, A.fib (on eliquis), PAD, Chronic lymphedema and COPD (not on home o2) presents to ED with her Dtr with reports of lethargy.  Patient s/p recent hospital admission [facility] 2/2 AHRF (multifactorial) s/p thoracentesis 2/2 left pleural effusion.  Patient discharged home on Pred taper of PO Lasix.  Dtr reports patient was doing relatively well since discharge however 2 days night ago patient developed lethargy and decreased ambulation with decreased PO intake. CT demonstrates bilateral pleural effusions.      - Bilateral symmetric pleural effusions on CT. No IR intervention at this time. Can consider drainage with pulmonology if indicated.

## 2023-04-26 NOTE — PROGRESS NOTE ADULT - SUBJECTIVE AND OBJECTIVE BOX
GIOVANNY [id number]    Subjective/Interval History   - SOB , on NC.     ROS  - no chest pain     PHYSICAL EXAM  Vital Signs Last 24 Hrs  T(C): 36.1 (26 Apr 2023 14:00), Max: 36.4 (25 Apr 2023 21:15)  T(F): 96.9 (26 Apr 2023 14:00), Max: 97.5 (25 Apr 2023 21:15)  HR: 104 (26 Apr 2023 17:54) (74 - 107)  BP: 129/60 (26 Apr 2023 17:54) (115/57 - 139/65)  BP(mean): --  RR: 18 (26 Apr 2023 14:00) (18 - 20)  SpO2: 91% (26 Apr 2023 09:20) (91% - 97%)    Parameters below as of 26 Apr 2023 09:20  Patient On (Oxygen Delivery Method): nasal cannula    GA : AAOX3, NAD   HEENT: PERRLA, EOMI  NECK: no JVD, no thyromegaly   CVS: S1 S2 no murmur no rubs no gallop  RESP: no wheeze, no rhonchi, bibasilar rales  ABD: Soft,epigastric tenderness, , ND, tympanic, no rebound or guarding   EXT; +1 b/l  pedal edema, no cyanosis  NEURO: AAOX3, no new focal deficits     LABS/ IMAGING                        9.2    6.69  )-----------( 220      ( 26 Apr 2023 05:59 )             31.1         04-26    143  |  96<L>  |  24<H>  ----------------------------<  157<H>  4.4   |  36<H>  |  1.1    Ca    9.2      26 Apr 2023 05:59  Mg     2.0     04-25      CAPILLARY BLOOD GLUCOSE      POCT Blood Glucose.: 166 mg/dL (26 Apr 2023 07:46)  POCT Blood Glucose.: 151 mg/dL (25 Apr 2023 21:33)

## 2023-04-26 NOTE — CONSULT NOTE ADULT - REASON FOR ADMISSION
SOB and lethargy x 2 days

## 2023-04-27 NOTE — PROGRESS NOTE ADULT - ASSESSMENT
87F w/PMHx of 87F of HTN, A.fib (on eliquis), PAD, Chronic lymphedema and COPD (not on home o2) presents to ED with her Dtr with reports of lethargy. Admitted 4/23, ID consulted for GNR bacteremia     IMPRESSION  #Escherichia coli ESBL bacteremia (R cipro/bactrim) suspect  source (Severe sepsis on admission T>101 P>90 YAIMA)    However pt denies any GI/  symptoms , has pyuria   4/23 Urine C&S also with ESBL E coli    4/23 BCX +     UA pyuria Blood: x / Protein: 100 mg/dL / Nitrite: Positive   Leuk Esterase: Small / RBC: 11-25 /HPF / WBC >50 /HPF   Sq Epi: x / Non Sq Epi: x / Bacteria: Few    CT with Punctate right renal calcification, difficult to differentiate vascular calcification from renal calculus. No   hydronephrosis. Bilateral renal cysts and hypodensities too small to characterize. Ureters are largely unopacified on delayed excretory phase   images limiting their assessment.  10 mm right middle lobe pulmonary nodule redemonstrated similar to prior chest CT, seen dating to 2012, increased in size since.   Partially imaged moderate pleural effusions with adjacent consolidations. Cardiomegaly. Coronary calcifications.  IR note reviewed      CXR Bilateral opacities (left greater than right), unchanged.    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

## 2023-04-27 NOTE — PROGRESS NOTE ADULT - ASSESSMENT
MEDICATIONS  (STANDING):  diltiazem    milliGRAM(s) Oral daily  enoxaparin Injectable 80 milliGRAM(s) SubCutaneous every 12 hours  furosemide   Injectable 40 milliGRAM(s) IV Push two times a day  meropenem  IVPB 1000 milliGRAM(s) IV Intermittent every 12 hours    MEDICATIONS  (PRN):  acetaminophen     Tablet .. 650 milliGRAM(s) Oral every 6 hours PRN Temp greater or equal to 38C (100.4F), Mild Pain (1 - 3)  albuterol/ipratropium for Nebulization 3 milliLiter(s) Nebulizer every 6 hours PRN Bronchospasm  melatonin 3 milliGRAM(s) Oral at bedtime PRN Insomnia  ondansetron Injectable 4 milliGRAM(s) IV Push every 8 hours PRN Nausea and/or Vomiting    87F w/PMHx of 86F of HTN, Hx of prior malignant pleural effusion few months at [facility], A.fib (on eliquis), PAD, Chronic lymphedema and COPD (not on home o2) presents to ED with her Dtr with reports of lethargy, admitted to medicine service for further management.    Assessment /Plan   Acute respiratory failure with hypoxia due to b/l malignant effusions, cytology c/f of breast origin.    - Recent hospitalization from [facility] w/ pleural effusion shown to be malignant cytology thoracentesis on 3/7/23 on Left side with 1100 cc removed.   - Wean off oxygen daily  - Furosemide 40mg IVP q12h  - Pulmonology eval - rec [place] PleurX Catheter, repeat Chest X-Ray today with b/l pleural effusion personally reviewed   - on CT Urethrogram revealed b/l moderate pleural effusions.   - weights QD, monitor I/O  - last ECHO 3/23, EF 66% and mild-moderate valve abnormalities.  No need to repeat  - D/w oncology - Obtain ER/IL cytology from [facility]   - d/w  Dr. Carlotta Hyatt PET/CT scan not available while inpatient, will obtain OP , pt will need ambulette, family concern patient not very active with limited ambulation.   - IR consulted, will discuss in am regarding pleurX catheter placement prior to DC.     ESBL bacteremia due to E coli UTI   - C/w meropenem 1g q12hrs  - Blood Cx ESBL E.coli, F/u sensitivities  - Repeat Blood cultures 3/25 NGTD   - UCx Ecoli, follow up sensitivities  - ID recs appreciated - may need Midline w/ Ertapenem on discharge, consider merrem until 5/1/23 (7 days from last negative bl cx )   - CT urogram with iv contrast unremarkable.     Chronic atrial fibrillation s/p RVR   - initially presented w/RVR, now rate controlled <110  - c/w Diltiazem QD  -  cont  lovenox 80mg BID for possible [facility] w/ IR  - ECHO March 2023 EF 66%, at home on eliquis currently being held.     Elevated troponin likely demand ischemia, ruled out ACS.   - monitor on telemetry x 24h, denies any chest pain, HD stable  - EKG reviewed - no ischemic changes  - trop  0.15-->0.1-->0.1  - Cardiology eval recs appreciated    Weakness  - uses RW at home   - may need STR     YAIMA, resolved  - baseline sCr approx 1.1--> 1.4-->1.2  - s/p 1L NS bolus  - avoid Nephrotoxics    COPD  - appears at baseline  - not on LAMA/ICS  - 02 PRN    Obesity BMI 33  - wt loss counseling per PCP     DVT/GI px  - Lovenox bid / on a diet.     Full code     Discussed plan of care with family.

## 2023-04-27 NOTE — PROGRESS NOTE ADULT - SUBJECTIVE AND OBJECTIVE BOX
GIOVANNY BUCK  87y, Female  Allergy: aspirin (Other)  contrast media (iodine-based) (Other)  penicillin (Other)  codeine (Other)  sulfa drugs (Hives; Other)      CHIEF COMPLAINT: SOB and lethargy x 2 days (26 Apr 2023 12:21)      INTERVAL EVENTS/HPI  - No acute events overnight  - T(F): , Max: 97.2 (04-27-23 @ 05:05)  - Denies any worsening symptoms  - Tolerating medication  - WBC Count: 14.55 K/uL (04-27-23 @ 06:18)      ROS  General: Denies fevers, chills, nightsweats, weight loss  HEENT: Denies headache, rhinorrhea, sore throat, eye pain  CV: Denies CP, palpitations  PULM: Denies SOB, cough  GI: Denies abdominal pain, diarrhea  : Denies dysuria, hematuria  MSK: Denies arthralgias  SKIN: Denies rash   NEURO: Denies paresthesias, weakness  PSYCH: Denies depression    FH non-contributory   Social Hx non-contributory    VITALS:  T(F): 97.2, Max: 97.2 (04-27-23 @ 05:05)  HR: 96  BP: 162/70  RR: 18Vital Signs Last 24 Hrs  T(C): 36.2 (27 Apr 2023 05:05), Max: 36.2 (27 Apr 2023 05:05)  T(F): 97.2 (27 Apr 2023 05:05), Max: 97.2 (27 Apr 2023 05:05)  HR: 96 (27 Apr 2023 05:05) (96 - 104)  BP: 162/70 (27 Apr 2023 05:05) (129/60 - 162/70)  BP(mean): --  RR: 18 (27 Apr 2023 05:05) (18 - 18)  SpO2: 89% (27 Apr 2023 05:05) (89% - 95%)    Parameters below as of 26 Apr 2023 21:15  Patient On (Oxygen Delivery Method): nasal cannula  O2 Flow (L/min): 1      PHYSICAL EXAM:  Gen: NAD, resting in bed  HEENT: Normocephalic, atraumatic  Neck: supple, no lymphadenopathy  CV: Regular rate & regular rhythm  Lungs: decreased BS at bases, no fremitus  Abdomen: Soft, BS present  Ext: Warm, well perfused  Neuro: non focal, awake  Skin: no rash, no erythema      TESTS & MEASUREMENTS:                        9.2    14.55 )-----------( 291      ( 27 Apr 2023 06:18 )             30.9     04-27    141  |  95<L>  |  32<H>  ----------------------------<  122<H>  3.5   |  38<H>  |  1.1    Ca    9.0      27 Apr 2023 06:18              Culture - Urine (collected 04-23-23 @ 14:27)  Source: Clean Catch Clean Catch (Midstream)  Final Report (04-26-23 @ 18:55):    50,000 - 99,000 CFU/mL Escherichia coli ESBL  Organism: Escherichia coli ESBL (04-26-23 @ 18:55)  Organism: Escherichia coli ESBL (04-26-23 @ 18:55)      Method Type: DARIO      -  Amikacin: S <=16      -  Amoxicillin/Clavulanic Acid: S <=8/4 Consider reserving for cystitis when ampicillin/sulbactam is resistant      -  Ampicillin: R >16 These ampicillin results predict results for amoxicillin      -  Ampicillin/Sulbactam: R >16/8 Enterobacter, Klebsiella aerogenes, Citrobacter, and Serratia may develop resistance during prolonged therapy (3-4 days)      -  Aztreonam: R >16      -  Cefazolin: R >16 For uncomplicated UTI with K. pneumoniae, E. coli, or P. mirablis: DARIO <=16 is sensitive and DARIO >=32 is resistant. This also predicts results for oral agents cefaclor, cefdinir, cefpodoxime, cefprozil, cefuroxime axetil, cephalexin and locarbef for uncomplicated UTI. Note that some isolates may be susceptible to these agents while testing resistant to cefazolin.      -  Cefepime: R >16      -  Ceftriaxone: R >32 Enterobacter, Klebsiella aerogenes, Citrobacter, and Serratia may develop resistance during prolonged therapy      -  Cefuroxime: R >16      -  Ciprofloxacin: R >2      -  Ertapenem: S <=0.5      -  Gentamicin: R >8      -  Imipenem: S <=1      -  Levofloxacin: R >4      -  Meropenem: S <=1      -  Nitrofurantoin: S <=32 Should not be used to treat pyelonephritis      -  Piperacillin/Tazobactam: S <=8      -  Tobramycin: I 8      -  Trimethoprim/Sulfamethoxazole: R >2/38    Culture - Blood (collected 04-23-23 @ 10:30)  Source: .Blood Blood  Gram Stain (04-24-23 @ 11:13):    Growth in aerobic bottle: Gram Negative Rods  Final Report (04-26-23 @ 07:21):    Growth in aerobic bottle: Escherichia coli ESBL    ***Blood Panel PCR results on this specimen are available    approximately 3 hours after the Gram stain result.***    Gram stain, PCR, and/or culture results may not always    correspond due to difference in methodologies.    ************************************************************    This PCR assay was performed by multiplex PCR. This    Assay tests for 66 bacterial and resistance gene targets.    Please refer to the [facility] test directory    at [email or web address] for details.  Organism: Blood Culture PCR  Escherichia coli ESBL (04-26-23 @ 07:21)  Organism: Escherichia coli ESBL (04-26-23 @ 07:21)      Method Type: DARIO      -  Amikacin: S <=16      -  Ampicillin: R >16 These ampicillin results predict results for amoxicillin      -  Ampicillin/Sulbactam: R >16/8 Enterobacter, Klebsiella aerogenes, Citrobacter, and Serratia may develop resistance during prolonged therapy (3-4 days)      -  Aztreonam: R >16      -  Cefazolin: R >16 Enterobacter, Klebsiella aerogenes, Citrobacter, and Serratia may develop resistance during prolonged therapy (3-4 days)      -  Cefepime: R >16      -  Ceftriaxone: R >32 Enterobacter, Klebsiella aerogenes, Citrobacter, and Serratia may develop resistance during prolonged therapy      -  Ciprofloxacin: R >2      -  Ertapenem: S <=0.5      -  Gentamicin: R >8      -  Imipenem: S <=1      -  Levofloxacin: R >4      -  Meropenem: S <=1      -  Piperacillin/Tazobactam: R <=8      -  Tobramycin: I 8      -  Trimethoprim/Sulfamethoxazole: R >2/38  Organism: Blood Culture PCR (04-26-23 @ 07:21)      Method Type: PCR      -  Escherichia coli: Detec      -  ESBL: Detec      -  CTX-M Resistance Marker: Detec        Blood Gas Venous - Lactate: 1.00 mmol/L (04-23-23 @ 11:28)      INFECTIOUS DISEASES TESTING  MRSA PCR Result.: Negative (03-02-23 @ 15:27)  MRSA PCR Result.: Negative (03-02-23 @ 07:58)      RADIOLOGY & ADDITIONAL TESTS:  I have personally reviewed the last Chest xray  CXR      CT      CARDIOLOGY TESTING  12 Lead ECG:   Ventricular Rate 105 BPM    Atrial Rate 108 BPM    QRS Duration 80 ms    Q-T Interval 366 ms    QTC Calculation(Bazett) 483 ms    R Axis 83 degrees    T Axis 63 degrees    Diagnosis Line Atrial fibrillation with rapid ventricular response  Low voltage QRS  NS T wave change   Abnormal ECG    Confirmed by DELLA BLACKWOOD MD ([id number]) on 4/23/2023 6:29:41 PM (04-23-23 @ 11:43)  12 Lead ECG:   Ventricular Rate 116 BPM    Atrial Rate 170 BPM    QRS Duration 96 ms    Q-T Interval 290 ms    QTC Calculation(Bazett) 403 ms    R Axis 105 degrees    T Axis 4 degrees    Diagnosis Line Baseline Variation   AF with RVR  Septal MI ? age          Confirmed by DELLA BLACKWOOD MD ([id number]) on 4/23/2023 6:42:46 PM (04-23-23 @ 09:56)      MEDICATIONS  diltiazem     enoxaparin Injectable 80  furosemide   Injectable 40  meropenem  IVPB 1000      ANTIBIOTICS:  meropenem  IVPB 1000 milliGRAM(s) IV Intermittent every 12 hours      All available historical data has been reviewed

## 2023-04-27 NOTE — PROGRESS NOTE ADULT - SUBJECTIVE AND OBJECTIVE BOX
GIOVANNY BUCK 87y    Subjective/Interval History   - denies SOB, on RA   - denies chest pain     ROS  - no fever.     PHYSICAL EXAM  Vital Signs Last 24 Hrs  T(C): 35.8 (27 Apr 2023 13:46), Max: 36.2 (27 Apr 2023 05:05)  T(F): 96.5 (27 Apr 2023 13:46), Max: 97.2 (27 Apr 2023 05:05)  HR: 123 (27 Apr 2023 13:46) (96 - 123)  BP: 101/57 (27 Apr 2023 13:46) (101/57 - 162/70)  BP(mean): --  RR: 17 (27 Apr 2023 13:46) (17 - 18)  SpO2: 89% (27 Apr 2023 05:05) (89% - 95%)    Parameters below as of 26 Apr 2023 21:15  Patient On (Oxygen Delivery Method): nasal cannula  O2 Flow (L/min): 1    GA : AAOX3, NAD   HEENT: PERRLA, EOMI  NECK: no JVD, no thyromegaly   CVS: S1 S2 no murmur no rubs no gallop  RESP:  no wheeze, no rhonchi, has LLL rales  ABD: Soft, NT, ND, tympanic, no rebound or guarding   EXT; +1 b/l  pedal edema with chronic venous stasis dermatitis , no cyanosis  NEURO: AAOX3, no new focal deficits     LABS/ IMAGING                        9.2    14.55 )-----------( 291      ( 27 Apr 2023 06:18 )             30.9         04-27    141  |  95<L>  |  32<H>  ----------------------------<  122<H>  3.5   |  38<H>  |  1.1    Ca    9.0      27 Apr 2023 06:18      CAPILLARY BLOOD GLUCOSE              Culture - Blood (collected 25 Apr 2023 19:36)  Source: .Blood None  Preliminary Report (27 Apr 2023 18:01):    No growth to date.

## 2023-04-28 NOTE — PROGRESS NOTE ADULT - ASSESSMENT
MEDICATIONS  (STANDING):  diltiazem    milliGRAM(s) Oral daily  enoxaparin Injectable 80 milliGRAM(s) SubCutaneous every 12 hours  meropenem  IVPB 1000 milliGRAM(s) IV Intermittent every 12 hours    MEDICATIONS  (PRN):  acetaminophen     Tablet .. 650 milliGRAM(s) Oral every 6 hours PRN Temp greater or equal to 38C (100.4F), Mild Pain (1 - 3)  albuterol/ipratropium for Nebulization 3 milliLiter(s) Nebulizer every 6 hours PRN Bronchospasm  melatonin 3 milliGRAM(s) Oral at bedtime PRN Insomnia  ondansetron Injectable 4 milliGRAM(s) IV Push every 8 hours PRN Nausea and/or Vomiting      87F w/PMHx of 87F of HTN, Hx of prior malignant pleural effusion few months at [facility], A.fib (on eliquis), PAD, Chronic lymphedema and COPD (not on home o2) presents to ED with her Dtr with reports of lethargy, admitted to medicine service for further management.    Assessment /Plan   Acute respiratory failure with hypoxia due to b/l malignant effusions, cytology c/f of breast origin.    - Recent hospitalization from [facility] w/ pleural effusion shown to be malignant cytology thoracentesis on 3/7/23 on Left side with 1100 cc removed.   - Weaned off oxygen daily  - Furosemide 40mg IVP q12h, taper to 40 mg po daily  - Pulmonology eval - rec [place] PleurX Catheter,   - on CT Urethrogram revealed b/l moderate pleural effusions.   - last ECHO 3/23, EF 66% and mild-moderate valve abnormalities.  No need to repeat  - D/w oncology - Obtain ER/OK cytology from [place] [facility]   - d/w  Dr. Carlotta Hyatt PET/CT scan not available while inpatient, will obtain OP , pt will need ambulette, family concern patient not very active with limited ambulation.   - pleurX catheter placement plan for Tuesday/Wednessday with pulmonology d/w fellow     ESBL bacteremia due to E coli UTI   - C/w meropenem 1g q12hrs  - Blood Cx ESBL E.coli, F/u sensitivities  - Repeat Blood cultures 3/25 NGTD   - UCx Ecoli, follow up sensitivities  - ID recs appreciated - may need Midline w/ Ertapenem on discharge, consider merrem until 5/1/23 (7 days from last negative bl cx )   - CT urogram with iv contrast unremarkable.     Chronic atrial fibrillation s/p RVR   - initially presented w/RVR, now rate controlled   - c/w Diltiazem QD  -  cont  lovenox 80mg BID for possible [place] [facility] w/ IR, hold on Monday.   - ECHO March 2023 EF 66%, at home on eliquis currently being held.     Elevated troponin likely demand ischemia, ruled out ACS.   - monitor on telemetry x 24h, denies any chest pain, HD stable  - EKG reviewed - no ischemic changes  - trop  0.15-->0.1-->0.1  - Cardiology eval recs appreciated    Weakness  - uses RW at home   - may need STR     YAIMA, resolved  - baseline sCr approx 1.1--> 1.4-->1.2  - s/p 1L NS bolus  - avoid Nephrotoxics    COPD  - appears at baseline  - not on LAMA/ICS  - 02 PRN    Obesity BMI 33  - wt loss counseling per PCP     DVT/GI px  - Lovenox bid / on a diet.     Full code     Discussed plan of care with family.     DISPO: STR or home health

## 2023-04-28 NOTE — PROGRESS NOTE ADULT - ASSESSMENT
IMPRESSION:  Recurrent bilateral pleural effusion  Thoracentesis 3/7/23 exudative/ suspicious for malignancy likely breast origin  HfpEF, Diastolic dysn, MR  Pulm nodules on prior CT   COPD  UTI  Bacteremia      SUGGEST:  Plan PleurX cath next week tentative Tuesday/Wednesday  Avoid volume overload  Continue O2 as necessary to maintain sats > 90%<94  Continue home inhaler therapy  Abx per ID  DVT ppx  Continue O2 as necessary to maintain sats > 90%<94  Oncology follow up

## 2023-04-28 NOTE — PROGRESS NOTE ADULT - SUBJECTIVE AND OBJECTIVE BOX
Patient is a 87y old  Female who presents with a chief complaint of SOB and lethargy x 2 days (27 Apr 2023 18:31)        SUBJECTIVE:  Patient is laying in bed      REVIEW OF SYSTEMS:  All Pertinent ROS are negative except per HPI       PHYSICAL EXAM  Vital Signs Last 24 Hrs  T(C): 35.8 (28 Apr 2023 05:21), Max: 35.8 (28 Apr 2023 05:21)  T(F): 96.4 (28 Apr 2023 05:21), Max: 96.4 (28 Apr 2023 05:21)  HR: 95 (28 Apr 2023 05:21) (95 - 99)  BP: 162/78 (28 Apr 2023 05:21) (139/65 - 162/78)  BP(mean): --  RR: 18 (28 Apr 2023 05:21) (18 - 18)  SpO2: 99% (28 Apr 2023 08:00) (95% - 99%)    Parameters below as of 28 Apr 2023 08:00  Patient On (Oxygen Delivery Method): nasal cannula  O2 Flow (L/min): 2      CONSTITUTIONAL:  NAD    ENT:   Airway patent,   No thrush    CARDIAC:   rate controlled    RESPIRATORY:   decreased breath sounds bilateral bases    GASTROINTESTINAL:  Abdomen soft,   non-tender,   no guarding,   + BS    MUSCULOSKELETAL:   range of motion is not limited,  no clubbing, cyanosis    NEUROLOGICAL:   Alert and oriented   no motor or deficits.    SKIN:   no decub        04-27-23 @ 07:01  -  04-28-23 @ 07:00  --------------------------------------------------------  IN:  Total IN: 0 mL    OUT:    Voided (mL): 150 mL  Total OUT: 150 mL    Total NET: -150 mL          LABS:                          9.6    10.96 )-----------( 315      ( 28 Apr 2023 06:04 )             32.0                                                   04-28      146  |  97<L>  |  33<H>  ----------------------------<  97  3.8   |  40<H>  |  1.0      Ca    9.2      28 Apr 2023 06:04                                                       Culture - Blood (collected 25 Apr 2023 19:36)  Source: .Blood None  Preliminary Report (27 Apr 2023 18:01):  No growth to date.                                                    MEDICATIONS  (STANDING):  diltiazem    milliGRAM(s) Oral daily  enoxaparin Injectable 80 milliGRAM(s) SubCutaneous every 12 hours  furosemide Injectable 40 milliGRAM(s) IV Push two times a day  meropenem IVPB 1000 milliGRAM(s) IV Intermittent every 12 hours    MEDICATIONS  (PRN):  acetaminophen     Tablet .. 650 milliGRAM(s) Oral every 6 hours PRN Temp greater or equal to 38C (100.4F), Mild Pain (1 - 3)  albuterol/ipratropium for Nebulization 3 milliLiter(s) Nebulizer every 6 hours PRN Bronchospasm  melatonin 3 milliGRAM(s) Oral at bedtime PRN Insomnia  ondansetron Injectable 4 milliGRAM(s) IV Push every 8 hours PRN Nausea and/or Vomiting        CXR

## 2023-04-28 NOTE — PROGRESS NOTE ADULT - SUBJECTIVE AND OBJECTIVE BOX
GIOVANNY [id number]    Subjective/Interval History   - on RA     ROS  -denies SOB.     PHYSICAL EXAM  Vital Signs Last 24 Hrs  T(C): 35.8 (28 Apr 2023 05:21), Max: 35.8 (28 Apr 2023 05:21)  T(F): 96.4 (28 Apr 2023 05:21), Max: 96.4 (28 Apr 2023 05:21)  HR: 95 (28 Apr 2023 05:21) (95 - 99)  BP: 162/78 (28 Apr 2023 05:21) (139/65 - 162/78)  BP(mean): --  RR: 18 (28 Apr 2023 05:21) (18 - 18)  SpO2: 99% (28 Apr 2023 08:00) (95% - 99%)    Parameters below as of 28 Apr 2023 08:00  Patient On (Oxygen Delivery Method): nasal cannula  O2 Flow (L/min): 2    GA : AAOX3, NAD   HEENT: PERRLA, EOMI  NECK: no JVD, R sided  thyromegaly   CVS: S1 S2 no murmur no rubs no gallop  RESP:  no wheeze, no rhonchi, bibasilar rales  ABD: Soft, NT, ND, tympanic, no rebound or guarding   : No Ba, No CVA tenderness   EXT; no pedal edema, no cyanosis  NEURO: AAOX3, no new focal deficits     LABS/ IMAGING                        9.6    10.96 )-----------( 315      ( 28 Apr 2023 06:04 )             32.0         04-28    146  |  97<L>  |  33<H>  ----------------------------<  97  3.8   |  40<H>  |  1.0    Ca    9.2      28 Apr 2023 06:04      CAPILLARY BLOOD GLUCOSE              Culture - Blood (collected 25 Apr 2023 19:36)  Source: .Blood None  Preliminary Report (27 Apr 2023 18:01):    No growth to date.

## 2023-04-29 NOTE — PROGRESS NOTE ADULT - ASSESSMENT
MEDICATIONS  (STANDING):  diltiazem    milliGRAM(s) Oral daily  enoxaparin Injectable 80 milliGRAM(s) SubCutaneous every 12 hours  furosemide    Tablet 40 milliGRAM(s) Oral daily  meropenem  IVPB 1000 milliGRAM(s) IV Intermittent every 12 hours    MEDICATIONS  (PRN):  acetaminophen     Tablet .. 650 milliGRAM(s) Oral every 6 hours PRN Temp greater or equal to 38C (100.4F), Mild Pain (1 - 3)  albuterol/ipratropium for Nebulization 3 milliLiter(s) Nebulizer every 6 hours PRN Bronchospasm  melatonin 3 milliGRAM(s) Oral at bedtime PRN Insomnia  ondansetron Injectable 4 milliGRAM(s) IV Push every 8 hours PRN Nausea and/or Vomiting        87F w/PMHx of 87F of HTN, Hx of prior malignant pleural effusion few months at [facility], A.fib (on eliquis), PAD, Chronic lymphedema and COPD (not on home o2) presents to ED with her Dtr with reports of lethargy, admitted to medicine service for further management.    Assessment /Plan   Acute respiratory failure with hypoxia due to b/l malignant effusions, cytology c/f of breast origin.    - Recent hospitalization from [facility] w/ pleural effusion shown to be malignant cytology thoracentesis on 3/7/23 on Left side with 1100 cc removed.   - Weaned off oxygen daily  - Furosemide 40mg IVP q12h, taperEd to 40 mg po daily  - Pulmonology eval - rec [place] PleurX Catheter,   - on CT Urethrogram revealed b/l moderate pleural effusions.   - last ECHO 3/23, EF 66% and mild-moderate valve abnormalities.  No need to repeat  - D/w oncology - Obtain ER/SD cytology from [facility]   - d/w  Dr. Carlotta Hyatt PET/CT scan not available while inpatient, will obtain OP , pt will need ambulette, family concern patient not very active with limited ambulation.   - pleurX catheter placement plan for Tuesday/Wednessday with pulmonology d/w fellow     ESBL bacteremia due to E coli UTI   - C/w meropenem 1g q12hrs  - Blood Cx ESBL E.coli, F/u sensitivities  - Repeat Blood cultures 3/25 NGTD   - UCx Ecoli, follow up sensitivities  - ID recs appreciated - may need Midline w/ Ertapenem on discharge, consider merrem until 5/1/23 (7 days from last negative bl cx )   - CT urogram with iv contrast unremarkable.     Chronic atrial fibrillation s/p RVR   - initially presented w/RVR, now rate controlled   - c/w Diltiazem QD  -  cont  lovenox 80mg BID for possible [place] [facility] w/ IR, hold on Monday.   - ECHO March 2023 EF 66%, at home on eliquis currently being held.     Elevated troponin likely demand ischemia, ruled out ACS.   - monitor on telemetry x 24h, denies any chest pain, HD stable  - EKG reviewed - no ischemic changes  - trop  0.15-->0.1-->0.1  - Cardiology eval recs appreciated    Weakness  - uses RW at home   - may need STR     AYIMA, resolved  - baseline sCr approx 1.1--> 1.4-->1.2  - s/p 1L NS bolus  - avoid Nephrotoxics    COPD  - appears at baseline  - not on LAMA/ICS  - 02 PRN    Obesity BMI 33  - wt loss counseling per PCP     DVT/GI px  - Lovenox 1 mg/kg SQ bid / on a diet.     Full code     Discussed plan of care with family.     DISPO: STR or home health

## 2023-04-29 NOTE — PROGRESS NOTE ADULT - SUBJECTIVE AND OBJECTIVE BOX
GIOVANNY [id number]    Subjective/Interval History   - no SOB, however on NC.     ROS  - denies abd pain     PHYSICAL EXAM  Vital Signs Last 24 Hrs  T(C): 35.7 (29 Apr 2023 13:00), Max: 36 (29 Apr 2023 05:12)  T(F): 96.3 (29 Apr 2023 13:00), Max: 96.8 (29 Apr 2023 05:12)  HR: 91 (29 Apr 2023 13:00) (91 - 98)  BP: 138/67 (29 Apr 2023 13:00) (129/62 - 147/76)  BP(mean): --  RR: 18 (29 Apr 2023 13:00) (18 - 18)  SpO2: 95% (29 Apr 2023 08:12) (95% - 97%)    Parameters below as of 29 Apr 2023 08:12  Patient On (Oxygen Delivery Method): nasal cannula  O2 Flow (L/min): 2    GA : AAOX3, NAD, obese   HEENT: PERRLA, EOMI  NECK: no JVD, no thyromegaly   CVS: S1 S2 no murmur no rubs no gallop  RESP: CTAB no wheeze, no rhonchi no rales  ABD: Soft, NT, ND, tympanic, no rebound or guarding    EXT; +2 BLE pedal edema with chronic venous stasis dermatitis,  no cyanosis  NEURO: AAOX3, no new focal deficits     LABS/ IMAGING                        9.6    9.99  )-----------( 320      ( 29 Apr 2023 06:41 )             32.6         04-29    147<H>  |  98  |  25<H>  ----------------------------<  95  4.1   |  39<H>  |  0.8    Ca    8.9      29 Apr 2023 06:41      CAPILLARY BLOOD GLUCOSE

## 2023-04-30 NOTE — PROGRESS NOTE ADULT - SUBJECTIVE AND OBJECTIVE BOX
GIOVANNY [id number]    Subjective/Interval History       ROS    PHYSICAL EXAM  Vital Signs Last 24 Hrs  T(C): 35.9 (30 Apr 2023 05:01), Max: 36.1 (29 Apr 2023 21:00)  T(F): 96.6 (30 Apr 2023 05:01), Max: 96.9 (29 Apr 2023 21:00)  HR: 88 (30 Apr 2023 05:01) (88 - 102)  BP: 140/78 (30 Apr 2023 05:01) (138/67 - 152/67)  BP(mean): --  RR: 18 (30 Apr 2023 05:01) (18 - 18)  SpO2: 93% (30 Apr 2023 05:01) (93% - 93%)    Parameters below as of 30 Apr 2023 05:01  Patient On (Oxygen Delivery Method): nasal cannula      GA : AAOX3, NAD   HEENT: PERRLA, EOMI  NECK: no JVD, thyroid goiter R sided.   CVS: S1 S2 no murmur no rubs no gallop  RESP: CTAB no wheeze, no rhonchi no rales  ABD: Soft, NT, ND, tympanic, no rebound or guarding   : No Ba, No CVA tenderness   EXT; no pedal edema, no cyanosis  MSK: No ML spinal tenderness, normal ROM   NEURO: AAOX3, no new focal deficits     LABS/ IMAGING                        9.6    11.73 )-----------( 386      ( 30 Apr 2023 06:09 )             32.8         04-30    141  |  96<L>  |  20  ----------------------------<  90  3.9   |  36<H>  |  0.7    Ca    9.1      30 Apr 2023 06:09      CAPILLARY BLOOD GLUCOSE              Culture - Blood (collected 28 Apr 2023 06:04)  Source: .Blood None  Preliminary Report (29 Apr 2023 18:01):    No growth to date.           GIOVANNY [id number]    Subjective/Interval History   -sob stable   - on o2     ROS  - denies any chest pain     PHYSICAL EXAM  Vital Signs Last 24 Hrs  T(C): 35.9 (30 Apr 2023 05:01), Max: 36.1 (29 Apr 2023 21:00)  T(F): 96.6 (30 Apr 2023 05:01), Max: 96.9 (29 Apr 2023 21:00)  HR: 88 (30 Apr 2023 05:01) (88 - 102)  BP: 140/78 (30 Apr 2023 05:01) (138/67 - 152/67)  BP(mean): --  RR: 18 (30 Apr 2023 05:01) (18 - 18)  SpO2: 93% (30 Apr 2023 05:01) (93% - 93%)    Parameters below as of 30 Apr 2023 05:01  Patient On (Oxygen Delivery Method): nasal cannula      GA : AAOX3, NAD, obese   HEENT: PERRLA, EOMI  NECK: no JVD, thyroid goiter R sided.   CVS: S1 S2 no murmur no rubs no gallop  RESP: no wheeze, no rhonchi, bibasilar rales  ABD: Soft, NT, ND, tympanic, no rebound or guarding   EXT; +2 pedal edema with chronic venous stasis dermatitis with chronic wounds. , no cyanosis  NEURO: AAOX3, no new focal deficits     LABS/ IMAGING                        9.6    11.73 )-----------( 386      ( 30 Apr 2023 06:09 )             32.8         04-30    141  |  96<L>  |  20  ----------------------------<  90  3.9   |  36<H>  |  0.7    Ca    9.1      30 Apr 2023 06:09      CAPILLARY BLOOD GLUCOSE              Culture - Blood (collected 28 Apr 2023 06:04)  Source: .Blood None  Preliminary Report (29 Apr 2023 18:01):    No growth to date.

## 2023-04-30 NOTE — PROGRESS NOTE ADULT - ASSESSMENT
MEDICATIONS  (STANDING):  diltiazem    milliGRAM(s) Oral daily  enoxaparin Injectable 80 milliGRAM(s) SubCutaneous every 12 hours  furosemide    Tablet 40 milliGRAM(s) Oral daily  meropenem  IVPB 1000 milliGRAM(s) IV Intermittent every 12 hours    MEDICATIONS  (PRN):  acetaminophen     Tablet .. 650 milliGRAM(s) Oral every 6 hours PRN Temp greater or equal to 38C (100.4F), Mild Pain (1 - 3)  albuterol/ipratropium for Nebulization 3 milliLiter(s) Nebulizer every 6 hours PRN Bronchospasm  melatonin 3 milliGRAM(s) Oral at bedtime PRN Insomnia  ondansetron Injectable 4 milliGRAM(s) IV Push every 8 hours PRN Nausea and/or Vomiting        87F w/PMHx of 87F of HTN, Hx of prior malignant pleural effusion few months at [facility], A.fib (on eliquis), PAD, Chronic lymphedema and COPD (not on home o2) presents to ED with her Dtr with reports of lethargy, admitted to medicine service for further management.    Assessment /Plan   Acute respiratory failure with hypoxia due to b/l malignant effusions, cytology c/f of breast origin.    - Recent hospitalization from [facility] w/ pleural effusion shown to be malignant cytology thoracentesis on 3/7/23 on Left side with 1100 cc removed.   - Weaned off oxygen daily  - Furosemide 40mg IVP q12h, taperEd to 40 mg po daily  - Pulmonology eval - rec [place] PleurX Catheter,   - on CT Urethrogram revealed b/l moderate pleural effusions.   - last ECHO 3/23, EF 66% and mild-moderate valve abnormalities.  No need to repeat  - D/w oncology - Obtain ER/PA cytology from [facility]   - d/w  Dr. Carlotta Hyatt PET/CT scan not available while inpatient, will obtain OP , pt will need ambulette, family concern patient not very active with limited ambulation.   - pleurX catheter placement plan for Tuesday/Wednessday with pulmonology d/w fellow     ESBL bacteremia due to E coli UTI   - C/w meropenem 1g q12hrs  - Blood Cx ESBL E.coli, F/u sensitivities  - Repeat Blood cultures 3/25 NGTD   - UCx Ecoli, follow up sensitivities  - ID recs appreciated - may need Midline w/ Ertapenem on discharge, consider merrem until 5/1/23 (7 days from last negative bl cx )   - CT urogram with iv contrast unremarkable.     Chronic atrial fibrillation s/p RVR   - initially presented w/RVR, now rate controlled   - c/w Diltiazem QD  -  cont  lovenox 80mg BID for possible [place] Cath w/ IR, hold on Monday.   - ECHO March 2023 EF 66%, at home on eliquis currently being held.     Elevated troponin likely demand ischemia, ruled out ACS.   - monitor on telemetry x 24h, denies any chest pain, HD stable  - EKG reviewed - no ischemic changes  - trop  0.15-->0.1-->0.1  - Cardiology eval recs appreciated    R sided thyroid goiter  - hx/o biopsies negative    Weakness  - uses RW at home   - may need STR     YAIMA, resolved  - baseline sCr approx 1.1--> 1.4-->1.2  - s/p 1L NS bolus  - avoid Nephrotoxics      COPD  - appears at baseline  - not on LAMA/ICS, not   - 02 PRN    Obesity BMI 33  - wt loss counseling per PCP     DVT/GI px  - Lovenox 1 mg/kg SQ bid / on a diet.     Full code     Discussed plan of care with family.     DISPO: STR or home health  MEDICATIONS  (STANDING):  diltiazem    milliGRAM(s) Oral daily  enoxaparin Injectable 80 milliGRAM(s) SubCutaneous every 12 hours  furosemide    Tablet 40 milliGRAM(s) Oral daily  meropenem  IVPB 1000 milliGRAM(s) IV Intermittent every 12 hours    MEDICATIONS  (PRN):  acetaminophen     Tablet .. 650 milliGRAM(s) Oral every 6 hours PRN Temp greater or equal to 38C (100.4F), Mild Pain (1 - 3)  albuterol/ipratropium for Nebulization 3 milliLiter(s) Nebulizer every 6 hours PRN Bronchospasm  melatonin 3 milliGRAM(s) Oral at bedtime PRN Insomnia  ondansetron Injectable 4 milliGRAM(s) IV Push every 8 hours PRN Nausea and/or Vomiting        87F w/PMHx of 87F of HTN, Hx of prior malignant pleural effusion few months at [facility], A.fib (on eliquis), PAD, Chronic lymphedema and COPD (not on home o2) presents to ED with her Dtr with reports of lethargy, admitted to medicine service for further management.    Assessment /Plan   Acute respiratory failure with hypoxia due to b/l malignant effusions, cytology c/f of breast origin.    - Recent hospitalization from [facility] w/ pleural effusion shown to be malignant cytology thoracentesis on 3/7/23 on Left side with 1100 cc removed.   - Weaned off oxygen daily  - Furosemide 40mg IVP q12h, tapered to 40 mg po daily  - on CT Urethrogram revealed b/l moderate pleural effusions.   - last ECHO 3/23, EF 66% and mild-moderate valve abnormalities.  No need to repeat  - d/w  Dr. Carlotta Hyatt PET/CT scan not available while inpatient, will obtain OP , pt will need ambulette,  - Pulmonology eval - rec [place] PleurX Catheter,  pleurX catheter placement plan for Tuesday/Wednessday with pulmonology d/w fellow    D/w oncology - Obtain ER/FL cytology from [place] [facility]     ESBL bacteremia due to E coli UTI   - C/w meropenem 1g q12hrs  - Blood Cx ESBL E.coli, F/u sensitivities  - Repeat Blood cultures 3/25 NGTD   - UCx Ecoli, follow up sensitivities  - ID recs appreciated - may need Midline w/ Ertapenem on discharge, consider merrem until 5/1/23 (7 days from last negative bl cx )   - CT urogram with iv contrast unremarkable.     Chronic atrial fibrillation s/p RVR   - initially presented w/RVR, now rate controlled   - c/w Diltiazem QD  -  cont  lovenox 80mg BID for possible [place] Cath w/ IR, hold on monday night.    - ECHO March 2023 EF 66%, at home on eliquis currently being held.     Elevated troponin likely demand ischemia, ruled out ACS.   - monitor on telemetry x 24h, denies any chest pain, HD stable  - EKG reviewed - no ischemic changes  - trop  0.15-->0.1-->0.1  - Cardiology eval recs appreciated    R sided thyroid goiter  - hx/o biopsies negative    Weakness  - uses RW at home   - may need STR     YAIMA, resolved  - baseline sCr approx 1.1--> 1.4-->1.2> 0.7   - s/p 1L NS bolus  - avoid Nephrotoxics    COPD  - appears at baseline  - not on LAMA/ICS, not   - 02 PRN    Obesity BMI 33  - wt loss counseling per PCP     DVT/GI px  - Lovenox 1 mg/kg SQ bid / on a diet.     Full code     Discussed plan of care with family.     DISPO: STR or home health

## 2023-05-01 NOTE — PHARMACOTHERAPY INTERVENTION NOTE - COMMENTS
Pt is on meropenem 1g IV q12h for ESBL bacteremia. Recommended to increase dosing to 1g IV q8h as patient's creatinine clearance has improved and is now 57mL/min using adjusted BW of 63kg.
Pt's blood culture resulted today as ESBL E. coli. Patient was on cefepime which does not cover ESBL resistant E. coli. Therefore, recommended to switch cefepime to meropenem 1g IV q12h based on creatinine clearance of 31 mL/min using adjusted BW of 63kg. Contacted Dr. Retana who provided approval for meropenem. Placed meropenem order as STAT.
Updated patient's penicillin allergy to reflect that pt tolerated meropenem in 4/2023 admission.
Updated patient's penicillin allergy to reflect that pt tolerated ceftriaxone and cefepime during this admission.

## 2023-05-01 NOTE — PROGRESS NOTE ADULT - ASSESSMENT
MEDICATIONS  (STANDING):  diltiazem    milliGRAM(s) Oral daily  enoxaparin Injectable 80 milliGRAM(s) SubCutaneous every 12 hours  furosemide    Tablet 40 milliGRAM(s) Oral daily  meropenem  IVPB 1000 milliGRAM(s) IV Intermittent every 12 hours    MEDICATIONS  (PRN):  acetaminophen     Tablet .. 650 milliGRAM(s) Oral every 6 hours PRN Temp greater or equal to 38C (100.4F), Mild Pain (1 - 3)  albuterol/ipratropium for Nebulization 3 milliLiter(s) Nebulizer every 6 hours PRN Bronchospasm  melatonin 3 milliGRAM(s) Oral at bedtime PRN Insomnia  ondansetron Injectable 4 milliGRAM(s) IV Push every 8 hours PRN Nausea and/or Vomiting        87F w/PMHx of 86F of HTN, Hx of prior malignant pleural effusion few months at [facility], A.fib (on eliquis), PAD, Chronic lymphedema and COPD (not on home o2) presents to ED with her Dtr with reports of lethargy, admitted to medicine service for further management.    Assessment /Plan   Acute respiratory failure with hypoxia due to b/l malignant effusions, cytology c/f of breast origin.    - Recent hospitalization from [facility] w/ pleural effusion shown to be malignant cytology thoracentesis on 3/7/23 on Left side with 1100 cc removed.   - Weaned off oxygen daily  - Furosemide 40mg IVP q12h, tapered to 40 mg po daily  - on CT Urethrogram revealed b/l moderate pleural effusions.   - last ECHO 3/23, EF 66% and mild-moderate valve abnormalities.  No need to repeat  - d/w  Dr. Carlotta Hyatt PET/CT scan not available while inpatient, will obtain OP , pt will need ambulette,  - Pulmonology eval - rec [place] PleurX Catheter,  pleurX catheter placement plan for Tuesday/Wednessday with pulmonology d/w fellow    D/w oncology - Obtain ER/TN cytology from [facility]     ESBL bacteremia due to E coli UTI   - C/w meropenem 1g q12hrs  - Blood Cx ESBL E.coli, F/u sensitivities  - Repeat Blood cultures 3/25 NGTD   - UCx Ecoli, follow up sensitivities  - ID recs appreciated - may need Midline w/ Ertapenem on discharge, consider merrem until 5/1/23 (7 days from last negative bl cx )   - CT urogram with iv contrast unremarkable.     Chronic atrial fibrillation s/p RVR   - initially presented w/RVR, now rate controlled   - c/w Diltiazem QD  -  cont  lovenox 80mg BID for possible [place] [facility] w/ IR, hold on monday night.    - ECHO March 2023 EF 66%, at home on eliquis currently being held.     Elevated troponin likely demand ischemia, ruled out ACS.   - monitor on telemetry x 24h, denies any chest pain, HD stable  - EKG reviewed - no ischemic changes  - trop  0.15-->0.1-->0.1  - Cardiology eval recs appreciated    R sided thyroid goiter  - hx/o biopsies negative    Weakness  - uses RW at home   - may need STR     YAIMA, resolved  - baseline sCr approx 1.1--> 1.4-->1.2> 0.7   - s/p 1L NS bolus  - avoid Nephrotoxics    COPD  - appears at baseline  - not on LAMA/ICS, not   - 02 PRN    Obesity BMI 33  - wt loss counseling per PCP     DVT/GI px  - Lovenox 1 mg/kg SQ bid / on a diet.     Full code     Discussed plan of care with family.     DISPO: STR or home health  MEDICATIONS  (STANDING):  diltiazem    milliGRAM(s) Oral daily  furosemide    Tablet 40 milliGRAM(s) Oral daily  meropenem  IVPB 1000 milliGRAM(s) IV Intermittent every 8 hours  saccharomyces boulardii 250 milliGRAM(s) Oral two times a day    MEDICATIONS  (PRN):  acetaminophen     Tablet .. 650 milliGRAM(s) Oral every 6 hours PRN Temp greater or equal to 38C (100.4F), Mild Pain (1 - 3)  albuterol/ipratropium for Nebulization 3 milliLiter(s) Nebulizer every 6 hours PRN Bronchospasm  aluminum hydroxide/magnesium hydroxide/simethicone Suspension 30 milliLiter(s) Oral every 6 hours PRN Dyspepsia  melatonin 3 milliGRAM(s) Oral at bedtime PRN Insomnia  ondansetron Injectable 4 milliGRAM(s) IV Push every 8 hours PRN Nausea and/or Vomiting        87F w/PMHx of 86F of HTN, Hx of prior malignant pleural effusion few months at [facility], A.fib (on eliquis), PAD, Chronic lymphedema and COPD (not on home o2) presents to ED with her Dtr with reports of lethargy, admitted to medicine service for further management.    Assessment /Plan   Acute respiratory failure with hypoxia due to b/l malignant effusions, cytology c/f of breast origin.    - Recent hospitalization from [facility] w/ pleural effusion shown to be malignant cytology thoracentesis on 3/7/23 on Left side with 1100 cc removed.   - Weaned off oxygen daily  - Furosemide 40mg IVP q12h, tapered to 40 mg po daily  - on CT Urethrogram revealed b/l moderate pleural effusions.   - last ECHO 3/23, EF 66% and mild-moderate valve abnormalities.  No need to repeat  - d/w  Dr. Carlotta Hyatt PET/CT scan not available while inpatient, will obtain OP   - Pulmonology eval - rec [place] PleurX Catheter,  pleurX catheter placement plan for Tuesday with pulmonology d/w fellow    D/w oncology - Obtain ER/ID cytology from [facility]     ESBL bacteremia due to E coli UTI   - C/w meropenem 1g q12hrs  - Blood Cx ESBL E.coli, F/u sensitivities  - Repeat Blood cultures 3/25 NGTD   - UCx Ecoli, follow up sensitivities  - ID recs appreciated - may need Midline w/ Ertapenem on discharge, merrem until 5/1/23 (7 days from last negative bl cx )   - CT urogram with iv contrast unremarkable except moderate pleural effusions.      Chronic atrial fibrillation s/p RVR   - initially presented w/RVR, now rate controlled   - c/w Diltiazem QD  -  cont  lovenox 80mg BID for possible [facility] w/ IR, hold on monday night.    - ECHO March 2023 EF 66%, at home on eliquis currently being held.     Elevated troponin likely demand ischemia, ruled out ACS.   - monitor on telemetry x 24h, denies any chest pain, HD stable  - EKG reviewed - no ischemic changes  - trop  0.15-->0.1-->0.1  - Cardiology eval recs appreciated    R sided thyroid goiter  - hx/o biopsies negative    Weakness  - uses RW at home   -  STR     YAIMA, resolved  - baseline sCr approx 1.1--> 1.4-->1.2> 0.7   - s/p 1L NS bolus  - avoid Nephrotoxics    COPD  - appears at baseline  - not on LAMA/ICS, not   - 02 PRN    Obesity BMI 33  - wt loss counseling per PCP     DVT/GI px  - Lovenox 1 mg/kg SQ bid / on a diet.     Full code     Discussed plan of care with family.     DISPO: STR or home health  MEDICATIONS  (STANDING):  diltiazem    milliGRAM(s) Oral daily  furosemide    Tablet 40 milliGRAM(s) Oral daily  meropenem  IVPB 1000 milliGRAM(s) IV Intermittent every 8 hours  saccharomyces boulardii 250 milliGRAM(s) Oral two times a day    MEDICATIONS  (PRN):  acetaminophen     Tablet .. 650 milliGRAM(s) Oral every 6 hours PRN Temp greater or equal to 38C (100.4F), Mild Pain (1 - 3)  albuterol/ipratropium for Nebulization 3 milliLiter(s) Nebulizer every 6 hours PRN Bronchospasm  aluminum hydroxide/magnesium hydroxide/simethicone Suspension 30 milliLiter(s) Oral every 6 hours PRN Dyspepsia  melatonin 3 milliGRAM(s) Oral at bedtime PRN Insomnia  ondansetron Injectable 4 milliGRAM(s) IV Push every 8 hours PRN Nausea and/or Vomiting        87F w/PMHx of 86F of HTN, Hx of prior malignant pleural effusion few months at [facility], A.fib (on eliquis), PAD, Chronic lymphedema and COPD (not on home o2) presents to ED with her Dtr with reports of lethargy, admitted to medicine service for further management.    Assessment /Plan   Acute respiratory failure with hypoxia due to b/l malignant effusions, cytology c/f of breast origin.    - Recent hospitalization from [facility] w/ pleural effusion shown to be malignant cytology thoracentesis on 3/7/23 on Left side with 1100 cc removed.   - Weaned off oxygen daily  - Furosemide 40mg IVP q12h, tapered to 40 mg po daily  - on CT Urethrogram revealed b/l moderate pleural effusions.   - last ECHO 3/23, EF 66% and mild-moderate valve abnormalities.  No need to repeat  - d/w  Dr. Carlotta Hyatt PET/CT scan not available while inpatient, will obtain OP   - Pulmonology eval - rec [place] PleurX Catheter,  pleurX catheter placement plan for Tuesday with pulmonology d/w fellow    D/w oncology - Obtain ER/AR cytology from [facility]     ESBL bacteremia due to E coli UTI, severe sepsis ruled in .   - C/w meropenem 1g q12hrs  - Blood Cx ESBL E.coli, F/u sensitivities  - Repeat Blood cultures 3/25 NGTD   - UCx Ecoli, follow up sensitivities  - ID recs appreciated - may need Midline w/ Ertapenem on discharge, merrem until 5/1/23 (7 days from last negative bl cx )   - CT urogram with iv contrast unremarkable except moderate pleural effusions.      Chronic atrial fibrillation s/p RVR   - initially presented w/RVR, now rate controlled   - c/w Diltiazem QD  -  cont  lovenox 80mg BID for possible [place] [facility] w/ IR, hold on monday night.    - ECHO March 2023 EF 66%, at home on eliquis currently being held.     Elevated troponin likely demand ischemia, ruled out ACS.   - monitor on telemetry x 24h, denies any chest pain, HD stable  - EKG reviewed - no ischemic changes  - trop  0.15-->0.1-->0.1  - Cardiology eval recs appreciated    R sided thyroid goiter  - hx/o biopsies negative    Weakness  - uses RW at home   -  STR     YAIMA, resolved  - baseline sCr approx 1.1--> 1.4-->1.2> 0.7   - s/p 1L NS bolus  - avoid Nephrotoxics    COPD  - appears at baseline  - not on LAMA/ICS, not   - 02 PRN    Obesity BMI 33  - wt loss counseling per PCP     DVT/GI px  - Lovenox 1 mg/kg SQ bid / on a diet.     Full code     Discussed plan of care with family.     DISPO: STR or home health

## 2023-05-01 NOTE — PROGRESS NOTE ADULT - ASSESSMENT
ASSESSMENT  87F w/PMHx of 86F of HTN, A.fib (on eliquis), PAD, Chronic lymphedema and COPD (not on home o2) presents to ED with her Dtr with reports of lethargy. Admitted 4/23, ID consulted for GNR bacteremia     IMPRESSION  #Escherichia coli ESBL bacteremia suspect  source (Severe sepsis on admission T>101 P>90 YAIMA)    However pt denies any GI/  symptoms , has pyuria     4/23 BCX +     UA pyuria Blood: x / Protein: 100 mg/dL / Nitrite: Positive   Leuk Esterase: Small / RBC: 11-25 /HPF / WBC >50 /HPF   Sq Epi: x / Non Sq Epi: x / Bacteria: Few    CXR Bilateral opacities (left greater than right), unchanged.    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  Spectra [phone]

## 2023-05-01 NOTE — PROGRESS NOTE ADULT - TIME BILLING
Bacteremia
I have personally seen and examined this patient.    I have reviewed all pertinent clinical information and reviewed all relevant imaging and diagnostic studies personally.   I counseled the patient about diagnostic testing and treatment plan. All questions were answered.   I discussed recommendations with the primary team.

## 2023-05-01 NOTE — PROGRESS NOTE ADULT - SUBJECTIVE AND OBJECTIVE BOX
GIOVANNY BUCK  87y, Female  Allergy: aspirin (Other)  contrast media (iodine-based) (Other)  penicillin (Other)  codeine (Other)  sulfa drugs (Hives; Other)      LOS  8d    CHIEF COMPLAINT: SOB and lethargy x 2 days (01 May 2023 14:24)      INTERVAL EVENTS/HPI  - No acute events overnight  - T(F): , Max: 97 (05-01-23 @ 14:10)  - reports antibiotics are making her nauseated   - denies abdominal pain, diarrhea   - WBC Count: 11.79 (05-01-23 @ 06:27)  WBC Count: 11.73 (04-30-23 @ 06:09)     - Creatinine, Serum: 0.7 (04-30-23 @ 06:09)       ROS  General: Denies rigors, nightsweats  HEENT: Denies headache, rhinorrhea, sore throat, eye pain  CV: Denies CP, palpitations  PULM: Denies wheezing, hemoptysis  GI: Denies hematemesis, hematochezia, melena  : Denies discharge, hematuria  MSK: Denies arthralgias, myalgias  SKIN: Denies rash, lesions  NEURO: Denies paresthesias, weakness  PSYCH: Denies depression, anxiety    VITALS:  T(F): 97, Max: 97 (05-01-23 @ 14:10)  HR: 95  BP: 111/63  RR: 18Vital Signs Last 24 Hrs  T(C): 36.1 (01 May 2023 14:10), Max: 36.1 (01 May 2023 14:10)  T(F): 97 (01 May 2023 14:10), Max: 97 (01 May 2023 14:10)  HR: 95 (01 May 2023 14:10) (92 - 99)  BP: 111/63 (01 May 2023 14:10) (111/63 - 156/69)  BP(mean): --  RR: 18 (01 May 2023 14:10) (18 - 18)  SpO2: 92% (01 May 2023 14:10) (91% - 98%)    Parameters below as of 01 May 2023 07:55  Patient On (Oxygen Delivery Method): nasal cannula  O2 Flow (L/min): 2      PHYSICAL EXAM:  Gen: NAD, resting in bed  HEENT: Normocephalic, atraumatic  Neck: supple, no lymphadenopathy  CV: Regular rate & regular rhythm  Lungs: decreased BS at bases, no fremitus  Abdomen: Soft, BS present  Ext: Warm, well perfused  Neuro: non focal, awake  Skin: no rash, no erythema  Lines: no phlebitis    FH: Non-contributory  Social Hx: Non-contributory    TESTS & MEASUREMENTS:                        10.1   11.79 )-----------( 429      ( 01 May 2023 06:27 )             34.2     04-30    141  |  96<L>  |  20  ----------------------------<  90  3.9   |  36<H>  |  0.7    Ca    9.1      30 Apr 2023 06:09              Culture - Blood (collected 04-28-23 @ 06:04)  Source: .Blood None  Preliminary Report (04-29-23 @ 18:01):    No growth to date.    Culture - Blood (collected 04-25-23 @ 19:36)  Source: .Blood None  Final Report (05-01-23 @ 18:00):    No Growth Final    Culture - Urine (collected 04-23-23 @ 14:27)  Source: Clean Catch Clean Catch (Midstream)  Final Report (04-26-23 @ 18:55):    50,000 - 99,000 CFU/mL Escherichia coli ESBL  Organism: Escherichia coli ESBL (04-26-23 @ 18:55)  Organism: Escherichia coli ESBL (04-26-23 @ 18:55)      Method Type: DARIO      -  Amikacin: S <=16      -  Amoxicillin/Clavulanic Acid: S <=8/4 Consider reserving for cystitis when ampicillin/sulbactam is resistant      -  Ampicillin: R >16 These ampicillin results predict results for amoxicillin      -  Ampicillin/Sulbactam: R >16/8 Enterobacter, Klebsiella aerogenes, Citrobacter, and Serratia may develop resistance during prolonged therapy (3-4 days)      -  Aztreonam: R >16      -  Cefazolin: R >16 For uncomplicated UTI with K. pneumoniae, E. coli, or P. mirablis: DARIO <=16 is sensitive and DARIO >=32 is resistant. This also predicts results for oral agents cefaclor, cefdinir, cefpodoxime, cefprozil, cefuroxime axetil, cephalexin and locarbef for uncomplicated UTI. Note that some isolates may be susceptible to these agents while testing resistant to cefazolin.      -  Cefepime: R >16      -  Ceftriaxone: R >32 Enterobacter, Klebsiella aerogenes, Citrobacter, and Serratia may develop resistance during prolonged therapy      -  Cefuroxime: R >16      -  Ciprofloxacin: R >2      -  Ertapenem: S <=0.5      -  Gentamicin: R >8      -  Imipenem: S <=1      -  Levofloxacin: R >4      -  Meropenem: S <=1      -  Nitrofurantoin: S <=32 Should not be used to treat pyelonephritis      -  Piperacillin/Tazobactam: S <=8      -  Tobramycin: I 8      -  Trimethoprim/Sulfamethoxazole: R >2/38    Culture - Blood (collected 04-23-23 @ 10:30)  Source: .Blood Blood  Gram Stain (04-24-23 @ 11:13):    Growth in aerobic bottle: Gram Negative Rods  Final Report (04-26-23 @ 07:21):    Growth in aerobic bottle: Escherichia coli ESBL    ***Blood Panel PCR results on this specimen are available    approximately 3 hours after the Gram stain result.***    Gram stain, PCR, and/or culture results may not always    correspond due to difference in methodologies.    ************************************************************    This PCR assay was performed by multiplex PCR. This    Assay tests for 66 bacterial and resistance gene targets.    Please refer to the [facility] test directory    at [email or web address] for details.  Organism: Blood Culture PCR  Escherichia coli ESBL (04-26-23 @ 07:21)  Organism: Escherichia coli ESBL (04-26-23 @ 07:21)      Method Type: DARIO      -  Amikacin: S <=16      -  Ampicillin: R >16 These ampicillin results predict results for amoxicillin      -  Ampicillin/Sulbactam: R >16/8 Enterobacter, Klebsiella aerogenes, Citrobacter, and Serratia may develop resistance during prolonged therapy (3-4 days)      -  Aztreonam: R >16      -  Cefazolin: R >16 Enterobacter, Klebsiella aerogenes, Citrobacter, and Serratia may develop resistance during prolonged therapy (3-4 days)      -  Cefepime: R >16      -  Ceftriaxone: R >32 Enterobacter, Klebsiella aerogenes, Citrobacter, and Serratia may develop resistance during prolonged therapy      -  Ciprofloxacin: R >2      -  Ertapenem: S <=0.5      -  Gentamicin: R >8      -  Imipenem: S <=1      -  Levofloxacin: R >4      -  Meropenem: S <=1      -  Piperacillin/Tazobactam: R <=8      -  Tobramycin: I 8      -  Trimethoprim/Sulfamethoxazole: R >2/38  Organism: Blood Culture PCR (04-26-23 @ 07:21)      Method Type: PCR      -  Escherichia coli: Detec      -  ESBL: Detec      -  CTX-M Resistance Marker: Detec            INFECTIOUS DISEASES TESTING  Procalcitonin, Serum: 8.81 (04-23-23 @ 14:27)  COVID-19 PCR: NotDetec (03-13-23 @ 14:15)  Procalcitonin, Serum: 0.34 (03-07-23 @ 17:16)  Procalcitonin, Serum: 0.34 (03-07-23 @ 17:16)  MRSA PCR Result.: Negative (03-02-23 @ 15:27)  MRSA PCR Result.: Negative (03-02-23 @ 07:58)  Procalcitonin, Serum: 0.19 (02-28-23 @ 06:53)  COVID-19 PCR: NotDetec (02-27-23 @ 20:59)      INFLAMMATORY MARKERS  C-Reactive Protein, Serum: 65.9 mg/L (02-28-23 @ 06:53)      RADIOLOGY & ADDITIONAL TESTS:  I have personally reviewed the last available Chest xray  CXR      CT      CARDIOLOGY TESTING  12 Lead ECG:   Ventricular Rate 105 BPM    Atrial Rate 108 BPM    QRS Duration 80 ms    Q-T Interval 366 ms    QTC Calculation(Bazett) 483 ms    R Axis 83 degrees    T Axis 63 degrees    Diagnosis Line Atrial fibrillation with rapid ventricular response  Low voltage QRS  NS T wave change   Abnormal ECG    Confirmed by DELLA BLACKWOOD MD ([id number]) on 4/23/2023 6:29:41 PM (04-23-23 @ 11:43)  12 Lead ECG:   Ventricular Rate 116 BPM    Atrial Rate 170 BPM    QRS Duration 96 ms    Q-T Interval 290 ms    QTC Calculation(Bazett) 403 ms    R Axis 105 degrees    T Axis 4 degrees    Diagnosis Line Baseline Variation   AF with RVR  Septal MI ? age          Confirmed by JAISON CRAWFORD, DELLA (2010) on 4/23/2023 6:42:46 PM (04-23-23 @ 09:56)      MEDICATIONS  diltiazem    Oral daily  furosemide    Tablet 40 Oral daily  meropenem  IVPB 1000 IV Intermittent every 8 hours  saccharomyces boulardii 250 Oral two times a day      WEIGHT  Weight (kg): 82.6 (04-23-23 @ 15:31)      ANTIBIOTICS:  meropenem  IVPB 1000 milliGRAM(s) IV Intermittent every 8 hours      All available historical records have been reviewed

## 2023-05-01 NOTE — PROGRESS NOTE ADULT - SUBJECTIVE AND OBJECTIVE BOX
GIOVANNY [id number]    Subjective/Interval History       ROS    PHYSICAL EXAM  Vital Signs Last 24 Hrs  T(C): 36.1 (01 May 2023 14:10), Max: 36.1 (01 May 2023 14:10)  T(F): 97 (01 May 2023 14:10), Max: 97 (01 May 2023 14:10)  HR: 95 (01 May 2023 14:10) (92 - 99)  BP: 111/63 (01 May 2023 14:10) (111/63 - 156/69)  BP(mean): --  RR: 18 (01 May 2023 14:10) (18 - 18)  SpO2: 92% (01 May 2023 14:10) (91% - 98%)    Parameters below as of 01 May 2023 07:55  Patient On (Oxygen Delivery Method): nasal cannula  O2 Flow (L/min): 2    GA : AAOX3, NAD   HEENT: PERRLA, EOMI  NECK: no JVD, no thyromegaly   CVS: S1 S2 no murmur no rubs no gallop  RESP: CTAB no wheeze, no rhonchi no rales  ABD: Soft, NT, ND, tympanic, no rebound or guarding   : No Ba, No CVA tenderness   EXT; no pedal edema, no cyanosis  MSK: No ML spinal tenderness, normal ROM   NEURO: AAOX3, no new focal deficits     LABS/ IMAGING                        10.1   11.79 )-----------( 429      ( 01 May 2023 06:27 )             34.2         04-30    141  |  96<L>  |  20  ----------------------------<  90  3.9   |  36<H>  |  0.7    Ca    9.1      30 Apr 2023 06:09      CAPILLARY BLOOD GLUCOSE                   GIOVANNY [id number]    Subjective/Interval History   on RA, denies SOB     ROS  - no chest pain    PHYSICAL EXAM  Vital Signs Last 24 Hrs  T(C): 36.1 (01 May 2023 14:10), Max: 36.1 (01 May 2023 14:10)  T(F): 97 (01 May 2023 14:10), Max: 97 (01 May 2023 14:10)  HR: 95 (01 May 2023 14:10) (92 - 99)  BP: 111/63 (01 May 2023 14:10) (111/63 - 156/69)  BP(mean): --  RR: 18 (01 May 2023 14:10) (18 - 18)  SpO2: 92% (01 May 2023 14:10) (91% - 98%)    Parameters below as of 01 May 2023 07:55  Patient On (Oxygen Delivery Method): nasal cannula  O2 Flow (L/min): 2    GA : AAOX3, NAD   HEENT: PERRLA, EOMI  NECK: no JVD, no thyromegaly   CVS: S1 S2 no murmur no rubs no gallop  RESP: no wheeze, no rhonchi no rales  ABD: Soft, NT, ND, tympanic, no rebound or guarding   : No Ba, No CVA tenderness   EXT; no pedal edema, no cyanosis  MSK: No ML spinal tenderness, normal ROM   NEURO: AAOX3, no new focal deficits     LABS/ IMAGING                        10.1   11.79 )-----------( 429      ( 01 May 2023 06:27 )             34.2         04-30    141  |  96<L>  |  20  ----------------------------<  90  3.9   |  36<H>  |  0.7    Ca    9.1      30 Apr 2023 06:09      CAPILLARY BLOOD GLUCOSE

## 2023-05-02 NOTE — PROCEDURE NOTE - NSAPPROACH_GEN_A_CORE
What Type Of Note Output Would You Prefer (Optional)?: Standard Output
How Severe Is Your Rash?: moderate
Is This A New Presentation, Or A Follow-Up?: Rash
percutaneous

## 2023-05-02 NOTE — CDI QUERY NOTE - NSCDIOTHERTXTBX_GEN_ALL_CORE_HH
Query     Based on your professional judgment and consideration of the below clinical  indicators, please clarify if sepsis can be further specified as:  *Sepsis was evaluated, treated, and resolved  *Sepsis was evaluated and ruled out   *Other (please specify)   *Unable to further specify sepsis     Clinical Indicators     4/23 ED ADULT Triage Note: Vital Signs: 135/70   120   24   97.2   89% on O2 at  4L/m    4/23 ED Provider Note: ...88 y/o female former smoker, hx COPD, CHF, a.fib on  eliquis, lymphedema, recent hospitalization s/p thoracentesis for left pleural  effusion; ... Principal Discharge DX: Sepsis Secondary Diagnosis: Acute UTI; ...    4/25 Consult Note Adult-Infectious Disease Attending: ...Escherichia coli ESBL  bacteremia suspect  source (Severe sepsis on admission T>101 P>90 YAIMA); ...    5/1 Progress Note Adult-Infectious Disease Attending: ...Escherichia coli ESBL  bacteremia suspect  source (Severe sepsis on admission T>101 P>90 YAIMA) However  pt denies any GI/  symptoms , has pyuria 4/23 BCX +; ...    Diagnostics  Procalcitonin, Serum: 8.81 ng/mL (04-23-23 @ 14:27); 4/23 WBC 11.43    Treatment  meropenem  IVPB 1000 milligram(s) IV Intermittent every 12 hours for bacteremia  X 13 doses; 1000 milliliter(s) X one bolus on 4/23    Thank you,  Krissy Tang  [phone]

## 2023-05-02 NOTE — PROCEDURE NOTE - NSPROCDETAILS_GEN_ALL_CORE
dressing applied/secured in place/sterile dressing applied/supine position/percutaneous/thoracostomy tube placed percutaneously/ultrasound assessment of fluid (location)

## 2023-05-02 NOTE — PROGRESS NOTE ADULT - ASSESSMENT
MEDICATIONS  (STANDING):  diltiazem    milliGRAM(s) Oral daily  furosemide    Tablet 40 milliGRAM(s) Oral daily  saccharomyces boulardii 250 milliGRAM(s) Oral two times a day    MEDICATIONS  (PRN):  acetaminophen     Tablet .. 650 milliGRAM(s) Oral every 6 hours PRN Temp greater or equal to 38C (100.4F), Mild Pain (1 - 3)  albuterol/ipratropium for Nebulization 3 milliLiter(s) Nebulizer every 6 hours PRN Bronchospasm  aluminum hydroxide/magnesium hydroxide/simethicone Suspension 30 milliLiter(s) Oral every 6 hours PRN Dyspepsia  melatonin 3 milliGRAM(s) Oral at bedtime PRN Insomnia  ondansetron Injectable 4 milliGRAM(s) IV Push every 8 hours PRN Nausea and/or Vomiting          87F w/PMHx of 86F of HTN, Hx of prior malignant pleural effusion few months at [facility], A.fib (on eliquis), PAD, Chronic lymphedema and COPD (not on home o2) presents to ED with her Dtr with reports of lethargy, admitted to medicine service for further management.    Assessment /Plan   Acute respiratory failure with hypoxia due to b/l malignant effusions, cytology c/f malignancy of breast origin (hx/o pulmonary nodules).     - Recent hospitalization from [facility] w/ pleural effusion shown to be malignant cytology thoracentesis on 3/7/23 on Left side with 1100 cc removed.   - Weaned off oxygen daily  - Furosemide 40mg IVP q12h, tapered to 40 mg po daily  - on CT Urethrogram revealed b/l moderate pleural effusions, no urinary obs.    - last ECHO 3/23, EF 66% and mild-moderate valve abnormalities.  No need to repeat  - d/w  Dr. Carlotta Hyatt PET/CT scan not available while inpatient, will obtain OP   - Pulmonology eval - rec [place] PleurX Catheter,  pleurX catheter placement plan for today    - D/w oncology - Obtain ER/[place] cytology from [facility]     ESBL bacteremia due to E coli UTI, severe sepsis ruled in, tx'd    - Blood Cx ESBL E.coli,   - Repeat Blood cultures 3/25 NGTD   - UCx Ecoli, follow up sensitivities  - ID recs appreciated - may need Midline w/ Ertapenem on discharge, merrem 5/1/23 completed    - CT urogram with iv contrast unremarkable except moderate pleural effusions.      Chronic atrial fibrillation s/p RVR   - initially presented w/RVR, now rate controlled   - c/w Diltiazem QD  -held  lovenox 80mg BID for pleurX catheter,  resume eliquis after pleurX catheter placed.   - ECHO March 2023 EF 66%, at home on eliquis currently being held.     Elevated troponin likely demand ischemia, ruled out ACS.   - monitor on telemetry x 24h, denies any chest pain, HD stable  - EKG reviewed - no ischemic changes  - trop  0.15-->0.1-->0.1  - Cardiology eval recs appreciated    R sided thyroid goiter  - hx/o biopsies negative    Weakness  - uses RW at home   -  STR     YAIMA, resolved  - baseline sCr approx 1.1--> 1.4-->1.2> 0.7 > 1   - s/p 1L NS bolus  - avoid Nephrotoxics    COPD  - appears at baseline  - not on LAMA/ICS, not   - 02 PRN    Obesity BMI 33  - wt loss counseling per PCP     DVT/GI px  - Lovenox held for pleurX catheter today/ on a diet.     Full code     Discussed plan of care with family.     DISPO: STR or home health     D/W son, family needs to tell patient regarding her cancer diagnosis.  Family concerned that patient wouldnt be able to handle diagnosis.

## 2023-05-02 NOTE — PROGRESS NOTE ADULT - SUBJECTIVE AND OBJECTIVE BOX
GIOVANNY [id number]    Subjective/Interval History   -SOB improved  - no cough     ROS  - no fever.     PHYSICAL EXAM  Vital Signs Last 24 Hrs  T(C): 35.6 (02 May 2023 04:28), Max: 36.1 (01 May 2023 14:10)  T(F): 96 (02 May 2023 04:28), Max: 97 (01 May 2023 14:10)  HR: 67 (02 May 2023 04:28) (67 - 95)  BP: 131/67 (02 May 2023 04:28) (111/63 - 141/56)  BP(mean): --  RR: 18 (02 May 2023 04:28) (18 - 18)  SpO2: 91% (02 May 2023 04:28) (91% - 92%)    Parameters below as of 02 May 2023 04:28  Patient On (Oxygen Delivery Method): nasal cannula      GA : AAOX3, NAD, obese   HEENT: PERRLA, EOMI  NECK: no JVD, no thyromegaly   CVS: S1 S2 no murmur no rubs no gallop  RESP:  no wheeze, no rhonchi bibsilar rales  ABD: Soft, NT, ND, tympanic, no rebound or guarding   EXT; +2 B/L pedal edema with chronic venous stasis changes,  no cyanosis  MSK: No ML spinal tenderness, normal ROM   NEURO: AAOX3, no new focal deficits     LABS/ IMAGING                        9.8    11.65 )-----------( 391      ( 02 May 2023 07:18 )             32.9         05-02    143  |  97<L>  |  21<H>  ----------------------------<  92  4.7   |  40<H>  |  1.0    Ca    9.3      02 May 2023 07:18      CAPILLARY BLOOD GLUCOSE

## 2023-05-03 NOTE — PROGRESS NOTE ADULT - ASSESSMENT
MEDICATIONS  (STANDING):  diltiazem    milliGRAM(s) Oral daily  furosemide    Tablet 40 milliGRAM(s) Oral daily  saccharomyces boulardii 250 milliGRAM(s) Oral two times a day    MEDICATIONS  (PRN):  acetaminophen     Tablet .. 650 milliGRAM(s) Oral every 6 hours PRN Temp greater or equal to 38C (100.4F), Mild Pain (1 - 3)  albuterol/ipratropium for Nebulization 3 milliLiter(s) Nebulizer every 6 hours PRN Bronchospasm  aluminum hydroxide/magnesium hydroxide/simethicone Suspension 30 milliLiter(s) Oral every 6 hours PRN Dyspepsia  melatonin 3 milliGRAM(s) Oral at bedtime PRN Insomnia  ondansetron Injectable 4 milliGRAM(s) IV Push every 8 hours PRN Nausea and/or Vomiting          87F w/PMHx of 86F of HTN, Hx of prior malignant pleural effusion few months at [facility], A.fib (on eliquis), PAD, Chronic lymphedema and COPD (not on home o2) presents to ED with her Dtr with reports of lethargy, admitted to medicine service for further management.    Assessment /Plan   Acute respiratory failure with hypoxia due to b/l malignant effusions, cytology c/f malignancy of breast origin (hx/o pulmonary nodules).     - Recent hospitalization from [facility] w/ pleural effusion shown to be malignant cytology thoracentesis on 3/7/23 on Left side with 1100 cc removed.   - Weaned off oxygen daily  - Furosemide 40mg IVP q12h, tapered to 40 mg po daily  - on CT Urethrogram revealed b/l moderate pleural effusions, no urinary obs.    - last ECHO 3/23, EF 66% and mild-moderate valve abnormalities.  No need to repeat  - d/w  Dr. Carlotta Hyatt PET/CT scan not available while inpatient, will obtain OP   - Pulmonology eval - rec [place] PleurX Catheter,  pleurX catheter placement plan for today    - D/w oncology - Obtain ER/[place] cytology from [facility]     ESBL bacteremia due to E coli UTI, severe sepsis ruled in, tx'd    - Blood Cx ESBL E.coli,   - Repeat Blood cultures 3/25 NGTD   - UCx Ecoli, follow up sensitivities  - ID recs appreciated - may need Midline w/ Ertapenem on discharge, merrem 5/1/23 completed    - CT urogram with iv contrast unremarkable except moderate pleural effusions.      Chronic atrial fibrillation s/p RVR   - initially presented w/RVR, now rate controlled   - c/w Diltiazem QD  -held  lovenox 80mg BID for pleurX catheter,  resume eliquis after pleurX catheter placed.   - ECHO March 2023 EF 66%, at home on eliquis currently being held.     Elevated troponin likely demand ischemia, ruled out ACS.   - monitor on telemetry x 24h, denies any chest pain, HD stable  - EKG reviewed - no ischemic changes  - trop  0.15-->0.1-->0.1  - Cardiology eval recs appreciated    R sided thyroid goiter  - hx/o biopsies negative    Weakness  - uses RW at home   -  STR     YAIMA, resolved  - baseline sCr approx 1.1--> 1.4-->1.2> 0.7 > 1   - s/p 1L NS bolus  - avoid Nephrotoxics    COPD  - appears at baseline  - not on LAMA/ICS, not   - 02 PRN    Obesity BMI 33  - wt loss counseling per PCP     DVT/GI px  - Lovenox held for pleurX catheter today/ on a diet.     Full code     Discussed plan of care with family.     DISPO: STR or home health     D/W son, family needs to tell patient regarding her cancer diagnosis.  Family concerned that patient wouldnt be able to handle diagnosis.  MEDICATIONS  (STANDING):  apixaban 2.5 milliGRAM(s) Oral two times a day  diltiazem    milliGRAM(s) Oral daily  furosemide    Tablet 40 milliGRAM(s) Oral daily  lidocaine   4% Patch 1 Patch Transdermal daily  saccharomyces boulardii 250 milliGRAM(s) Oral two times a day    MEDICATIONS  (PRN):  acetaminophen     Tablet .. 650 milliGRAM(s) Oral every 6 hours PRN Temp greater or equal to 38C (100.4F), Mild Pain (1 - 3)  albuterol/ipratropium for Nebulization 3 milliLiter(s) Nebulizer every 6 hours PRN Bronchospasm  aluminum hydroxide/magnesium hydroxide/simethicone Suspension 30 milliLiter(s) Oral every 6 hours PRN Dyspepsia  melatonin 3 milliGRAM(s) Oral at bedtime PRN Insomnia  ondansetron Injectable 4 milliGRAM(s) IV Push every 8 hours PRN Nausea and/or Vomiting      87F w/PMHx of 86F of HTN, Hx of prior malignant pleural effusion few months at [facility], A.fib (on eliquis), PAD, Chronic lymphedema and COPD (not on home o2) presents to ED with her Dtr with reports of lethargy, admitted to medicine service for further management.    Assessment /Plan   Acute respiratory failure with hypoxia due to b/l malignant effusions, cytology c/f malignancy of breast origin (hx/o pulmonary nodules) s/p left sided pleurX catheter.   - Recent hospitalization from [facility] w/ pleural effusion shown to be malignant cytology thoracentesis on 3/7/23 on Left side with 1100 cc removed.   - Weaned off of o2 on RA   - Furosemide 40mg IVP q12h, tapered to 40 mg po daily  - on CT Urethrogram revealed b/l moderate pleural effusions, no urinary obs.    - last ECHO 3/23, EF 66% and mild-moderate valve abnormalities.  No need to repeat  - d/w  Dr. Carlotta Hyatt PET/CT scan not available while inpatient, will obtain OP   - Pulmonology consulted.     - D/w oncology - Obtain ER/[place] cytology from [facility]   - lidocaine patch for pleurX catheter pain     ESBL bacteremia due to E coli UTI, severe sepsis ruled in, tx'd    - Blood Cx ESBL E.coli,   - Repeat Blood cultures 3/25 NGTD   - UCx Ecoli, follow up sensitivities  - ID recs appreciated - may need Midline w/ Ertapenem on discharge, merrem 5/1/23 completed    - CT urogram with iv contrast unremarkable except moderate pleural effusions.      Chronic atrial fibrillation s/p RVR   - initially presented w/RVR, now rate controlled   - c/w Diltiazem QD     - ECHO March 2023 EF 66%, restarted eliquis     Elevated troponin likely demand ischemia, ruled out ACS.   - monitor on telemetry x 24h, denies any chest pain, HD stable  - EKG reviewed - no ischemic changes  - trop  0.15-->0.1-->0.1  - Cardiology eval recs appreciated    R sided thyroid goiter  - hx/o biopsies negative    Weakness  - uses RW at home   -  STR     YAIMA, resolved  - baseline sCr approx 1.1--> 1.4-->1.2> 0.7 > 1   - s/p 1L NS bolus  - avoid Nephrotoxics    COPD  - appears at baseline  - not on LAMA/ICS, not   - 02 PRN    Obesity BMI 33  - wt loss counseling per PCP     DVT/GI px  - on eliquis 2.5 MG BID/ on a diet.     Full code     Discussed plan of care with family.     DISPO: STR pending authorization.       D/W son, family needs to tell patient regarding her cancer diagnosis.  Family concerned that patient wouldnt be able to handle diagnosis.   MEDICATIONS  (STANDING):  apixaban 2.5 milliGRAM(s) Oral two times a day  diltiazem    milliGRAM(s) Oral daily  furosemide    Tablet 40 milliGRAM(s) Oral daily  lidocaine   4% Patch 1 Patch Transdermal daily  saccharomyces boulardii 250 milliGRAM(s) Oral two times a day    MEDICATIONS  (PRN):  acetaminophen     Tablet .. 650 milliGRAM(s) Oral every 6 hours PRN Temp greater or equal to 38C (100.4F), Mild Pain (1 - 3)  albuterol/ipratropium for Nebulization 3 milliLiter(s) Nebulizer every 6 hours PRN Bronchospasm  aluminum hydroxide/magnesium hydroxide/simethicone Suspension 30 milliLiter(s) Oral every 6 hours PRN Dyspepsia  melatonin 3 milliGRAM(s) Oral at bedtime PRN Insomnia  ondansetron Injectable 4 milliGRAM(s) IV Push every 8 hours PRN Nausea and/or Vomiting      87F w/PMHx of 86F of HTN, Hx of prior malignant pleural effusion few months at [facility], A.fib (on eliquis), PAD, Chronic lymphedema and COPD (not on home o2) presents to ED with her Dtr with reports of lethargy, admitted to medicine service for further management.    Assessment /Plan   Acute respiratory failure with hypoxia due to b/l malignant effusions, cytology c/f malignancy of breast origin (hx/o pulmonary nodules) s/p left sided pleurX catheter.   - Recent hospitalization from [facility] w/ pleural effusion shown to be malignant cytology thoracentesis on 3/7/23 on Left side with 1100 cc removed.   - Weaned off of o2 on RA   - Furosemide 40mg IVP q12h, tapered to 40 mg po daily  - on CT Urethrogram revealed b/l moderate pleural effusions, no urinary obs.    - last ECHO 3/23, EF 66% and mild-moderate valve abnormalities.  No need to repeat  - d/w  Dr. Carlotta Hyatt PET/CT scan not available while inpatient, will obtain OP   - Pulmonology consulted.     - D/w oncology - Obtain ER/[place] cytology from [facility]   - lidocaine patch for pleurX catheter pain     ESBL bacteremia due to E coli UTI, severe sepsis ruled in, tx'd    - Blood Cx ESBL E.coli,   - Repeat Blood cultures 3/25 NGTD   - UCx Ecoli, follow up sensitivities  - ID recs appreciated - merrem 5/1/23 completed    - CT urethrogram with iv contrast unremarkable except moderate pleural effusions.      Chronic atrial fibrillation s/p RVR   - initially presented w/RVR, now rate controlled   - c/w Diltiazem QD     - ECHO March 2023 EF 66%, restarted eliquis     Elevated troponin likely demand ischemia, ruled out ACS.   - monitor on telemetry x 24h, denies any chest pain, HD stable  - EKG reviewed - no ischemic changes  - trop  0.15-->0.1-->0.1  - Cardiology eval recs appreciated    R sided thyroid goiter  - hx/o biopsies negative    Weakness  - uses RW at home   -  STR     YAIMA, resolved  - baseline sCr approx 1.1--> 1.4-->1.2> 0.7 > 1   - s/p 1L NS bolus  - avoid Nephrotoxics    COPD  - appears at baseline  - not on LAMA/ICS, not   - 02 PRN    Obesity BMI 33  - wt loss counseling per PCP     DVT/GI px  - on eliquis 2.5 MG BID/ on a diet.     Full code     Discussed plan of care with family.     DISPO: STR pending authorization.       D/W son, family needs to tell patient regarding her cancer diagnosis.  Family concerned that patient wouldnt be able to handle diagnosis.

## 2023-05-03 NOTE — PROGRESS NOTE ADULT - PROVIDER SPECIALTY LIST ADULT
Hospitalist
Infectious Disease
Pulmonology
Hospitalist
Infectious Disease

## 2023-05-03 NOTE — PROGRESS NOTE ADULT - REASON FOR ADMISSION
SOB and lethargy x 2 days

## 2023-05-03 NOTE — PROGRESS NOTE ADULT - SUBJECTIVE AND OBJECTIVE BOX
GIOVANNY [id number]    Subjective/Interval History       ROS    PHYSICAL EXAM  Vital Signs Last 24 Hrs  T(C): 35.9 (03 May 2023 12:30), Max: 36.3 (03 May 2023 04:30)  T(F): 96.6 (03 May 2023 12:30), Max: 97.3 (03 May 2023 04:30)  HR: 93 (03 May 2023 12:30) (93 - 105)  BP: 109/47 (03 May 2023 12:30) (109/47 - 132/77)  BP(mean): --  RR: 18 (03 May 2023 12:30) (18 - 18)  SpO2: 91% (03 May 2023 12:30) (91% - 95%)    GA : AAOX3, NAD, obese   HEENT: PERRLA, EOMI  NECK: no JVD, no thyromegaly   CVS: S1 S2 no murmur no rubs no gallop  RESP:  no wheeze, no rhonchi bibsilar rales  ABD: Soft, NT, ND, tympanic, no rebound or guarding   EXT; +2 B/L pedal edema with chronic venous stasis changes,  no cyanosis  MSK: No ML spinal tenderness, normal ROM   NEURO: AAOX3, no new focal deficits     LABS/ IMAGING                        9.6    12.21 )-----------( 364      ( 03 May 2023 06:31 )             32.8         05-03    142  |  98  |  22<H>  ----------------------------<  99  4.6   |  36<H>  |  1.0    Ca    9.1      03 May 2023 06:31      CAPILLARY BLOOD GLUCOSE                   GIOVANNY JARALORETTAOFE 87y    Subjective/Interval History   -Left back pain at pleurX cath insertion site  on RA, no SOB  - no chest pain,     ROS  - no abdominal pain     PHYSICAL EXAM  Vital Signs Last 24 Hrs  T(C): 35.9 (03 May 2023 12:30), Max: 36.3 (03 May 2023 04:30)  T(F): 96.6 (03 May 2023 12:30), Max: 97.3 (03 May 2023 04:30)  HR: 93 (03 May 2023 12:30) (93 - 105)  BP: 109/47 (03 May 2023 12:30) (109/47 - 132/77)  BP(mean): --  RR: 18 (03 May 2023 12:30) (18 - 18)  SpO2: 91% (03 May 2023 12:30) (91% - 95%)    GA : AAOX3, NAD, obese   HEENT: PERRLA, EOMI  NECK: no JVD, no thyromegaly   CVS: S1 S2 no murmur no rubs no gallop  RESP:  no wheeze, no rhonchi, mild bibasilar rales  ABD: Soft, NT, ND, tympanic, no rebound or guarding   EXT; +2 B/L pedal edema with chronic venous stasis changes,  no cyanosis  MSK: No ML spinal tenderness, normal ROM   NEURO: AAOX3, no new focal deficits     LABS/ IMAGING                        9.6    12.21 )-----------( 364      ( 03 May 2023 06:31 )             32.8         05-03    142  |  98  |  22<H>  ----------------------------<  99  4.6   |  36<H>  |  1.0    Ca    9.1      03 May 2023 06:31      CAPILLARY BLOOD GLUCOSE

## 2023-05-04 NOTE — DISCHARGE NOTE PROVIDER - NSDCFUADDINST_GEN_ALL_CORE_FT
-do not submerge pleurX catheter in water, can take sponge bath.   Inspect your catheter every day.  Drain the fluid from your pleural space every day or as directed by your healthcare provider.  Change your dressing regularly, at least once a week. Your healthcare provider will tell you exactly how often to change your dressing.

## 2023-05-04 NOTE — GOALS OF CARE CONVERSATION - ADVANCED CARE PLANNING - TREATMENT GUIDELINES
Artificial nutrition trial/Antibiotic trial/IV fluid trial
No artificial nutrition/Artificial nutrition trial

## 2023-05-04 NOTE — DISCHARGE NOTE PROVIDER - HOSPITAL COURSE
87F w/PMHx of 86F of HTN, Hx of prior malignant pleural effusion few months at [facility], A.fib (on eliquis), PAD, Chronic lymphedema and COPD (not on home o2) presents to ED with her Dtr with reports of lethargy, admitted for Acute respiratory failure with hypoxia due to b/l malignant effusions, cytology c/f malignancy of breast origin (hx/o pulmonary nodules) s/p left sided pleurX catheter. Patient was recently hospitalized from [facility] w/ pleural effusion shown to be malignant cytology thoracentesis on 3/7/23 on Left side with 1100 cc removed. Patient  Weaned off of oxygen and is currently on RA. Patient was treated with Lasix and tapered to oral. Discussed with Dr. Carlotta Hyatt PET/CT scan not available while inpatient, will obtain Outpatient. Pulmonary was consulted. [place] cath was placed. Patient was tested positive for ESBL bacteremia due to E coli UTI, severe sepsis ruled in. Blood Cx revealed ESBL E.coli, Repeat blood culture on 3/25 was negative. ID recommended Meropenem and completed the course on 5/1. 87F w/PMHx of 86F of HTN, previous pleural effusion with thoracentesis in march 2023 cytology positive for malignant cells  of breast origin, A.fib (on eliquis), PAD, Obesity, Chronic lymphedema BLE and COPD (not on home o2) presented to ED  altered mental status.  Admitted for Acute respiratory failure with hypoxia due to b/l malignant effusions, cytology c/f malignancy   of breast origin (hx/o pulmonary nodules) and pleurX was placed on the left.  Patient was recently hospitalized from [facility] w/   pleural effusion shown to be malignant cytology thoracentesis on 3/7/23 on Left side with 1100 cc removed. Patient  Weaned   off of oxygen and is currently on RA. Patient was treated with Lasix and tapered to oral. Discussed with Dr. Carlotta Hyatt PET/CT   scan not available while inpatient, will obtain Outpatient.  Pulmonary was consulted.  Pt found to have severe sepsis due to ESBL E coli  with ESBL E coli bacteremia.  Repeat blood culture on 3/25  was negative.  Pt completed IV Meropenem on 5/1/2023.     Patients overall condition improved and patient is being discharged in stable condition to [facility].

## 2023-05-04 NOTE — DISCHARGE NOTE PROVIDER - ATTENDING DISCHARGE PHYSICAL EXAMINATION:
VITALS:   T(C): 36.2 (05-04-23 @ 13:30), Max: 36.6 (05-04-23 @ 04:10)  HR: 100 (05-04-23 @ 13:30) (97 - 111)  BP: 117/59 (05-04-23 @ 13:30) (117/59 - 155/67)  RR: 18 (05-04-23 @ 13:30) (18 - 18)  SpO2: 93% (05-04-23 @ 13:30) (92% - 99%)    GA : AAOX3, NAD, obese   HEENT: PERRLA, EOMI  NECK: no JVD, no thyromegaly   CVS: S1 S2 no murmur no rubs no gallop  RESP:  no wheeze, no rhonchi, mild bibasilar rales, left sided PleurX catheter   ABD: Soft, NT, ND, tympanic, no rebound or guarding   EXT; +2 B/L pedal edema with chronic venous stasis changes,  no cyanosis  MSK: No ML spinal tenderness, normal ROM   NEURO: AAOX3, no new focal deficits

## 2023-05-04 NOTE — DISCHARGE NOTE PROVIDER - REASON FOR ADMISSION
SOB and lethargy x 2 days SOB and lethargy x 2 days, pleural effusion, severe sepsis, ESBL E coli UTI.

## 2023-05-04 NOTE — DISCHARGE NOTE PROVIDER - CARE PROVIDER_API CALL
Barron King ()  Infectious Disease; Internal Medicine  [address] [place] [address]  Phone: [phone]  Fax: [phone]  Follow Up Time: 1-3 days    Carlotta Hyatt)  Hematology; Internal Medicine; Medical Oncology  [address] [place] [address]  Phone: [phone]  Fax: [phone]  Follow Up Time: 1 week    Mitch Christian)  Critical Care Medicine; Pulmonary Disease; Sleep Medicine  [address] [place] [address]  Phone: [phone]  Fax: [phone]  Follow Up Time: 2 weeks   Barron King ()  Infectious Disease; Internal Medicine  [address] [place] [address]  Phone: [phone]  Fax: [phone]  Follow Up Time: 1 week    Carlotta Hyatt)  Hematology; Internal Medicine; Medical Oncology  [address] [place] [address]  Phone: [phone]  Fax: [phone]  Follow Up Time: 1 week    Mitch Christian)  Critical Care Medicine; Pulmonary Disease; Sleep Medicine  [address] [place] [address]  Phone: [phone]  Fax: [phone]  Follow Up Time: 2 weeks

## 2023-05-04 NOTE — DISCHARGE NOTE NURSING/CASE MANAGEMENT/SOCIAL WORK - PATIENT PORTAL LINK FT
You can access the FollowMyHealth Patient Portal offered by [facility] by registering at the following website: [email or web address]. By joining [facility]’s FollowMyHealth portal, you will also be able to view your health information using other applications (apps) compatible with our system.

## 2023-05-04 NOTE — DISCHARGE NOTE PROVIDER - PROVIDER TOKENS
PROVIDER:[TOKEN:[[id number]],FOLLOWUP:[1-3 days]],PROVIDER:[TOKEN:[[id number]],FOLLOWUP:[1 week]],PROVIDER:[TOKEN:[[id number]],FOLLOWUP:[2 weeks]] PROVIDER:[TOKEN:[[id number]],FOLLOWUP:[1 week]],PROVIDER:[TOKEN:[[id number]],FOLLOWUP:[1 week]],PROVIDER:[TOKEN:[[id number]],FOLLOWUP:[2 weeks]]

## 2023-05-04 NOTE — GOALS OF CARE CONVERSATION - ADVANCED CARE PLANNING - CONVERSATION DETAILS
Patient p/w with acute respiratory failure with recurrent left pleural effusion, severe sepsis due to ESBL UTI with bacteremia treated with iv antibiotics, with acquired weakness.
Patient has recurrent pleural effusion likely malignant in origin.  Plan to possible test pleural fluid for hormone receptors, pt may be able to tolerate hormonal therapy, PET/CT OP, pt may need STR  - eliquis held, on lovenox 1 mg/kg bid.   - plan to place pleurX catheter d/w pulmonology,   - ID following     pt lives alone, uses RW at home.     Pt is full code, no feeding tube, no limitation to medication intervention.

## 2023-05-04 NOTE — DISCHARGE NOTE PROVIDER - NSDCMRMEDTOKEN_GEN_ALL_CORE_FT
dilTIAZem 180 mg/24 hours oral capsule, extended release: 1 cap(s) orally once a day  Eliquis 2.5 mg oral tablet: 1 tab(s) orally 2 times a day  furosemide 20 mg oral tablet: 1 tab(s) orally once a day   albuterol 90 mcg/inh inhalation aerosol: 2 puff(s) inhaled every 6 hours as needed for  shortness of breath and/or wheezing  budesonide-formoterol 80 mcg-4.5 mcg/inh inhalation aerosol: 2 puff(s) inhaled 2 times a day  dilTIAZem 180 mg/24 hours oral capsule, extended release: 1 cap(s) orally once a day  Eliquis 2.5 mg oral tablet: 1 tab(s) orally 2 times a day  furosemide 20 mg oral tablet: 1 tab(s) orally once a day  lidocaine 4% topical film: Apply topically to affected area once a day  saccharomyces boulardii lyo 250 mg oral capsule: 1 cap(s) orally 2 times a day  Senna 8.6 mg oral tablet: 2 tab(s) orally once a day (at bedtime)

## 2023-05-04 NOTE — DISCHARGE NOTE PROVIDER - NSDCCPCAREPLAN_GEN_ALL_CORE_FT
PRINCIPAL DISCHARGE DIAGNOSIS  Diagnosis: Malignant pleural effusion  Assessment and Plan of Treatment: -  - Pulmonary was consulted. [place] cath was placed. Needs outpatient PET scan.     PRINCIPAL DISCHARGE DIAGNOSIS  Diagnosis: Acute respiratory failure with hypoxia  Assessment and Plan of Treatment: Acute respiratory failure with hypoxia due to b/l malignant effusions, cytology c/f malignancy of breast origin (hx/o pulmonary nodules) s/p left sided pleurX catheter.   - Recent hospitalization from [facility] w/ pleural effusion shown to be malignant cytology thoracentesis on 3/7/23 on Left side with 1100 cc removed.   - Weaned off of o2 on RA   - Furosemide 40mg IVP q12h, tapered to 40 mg po daily, can continue 20 mg tablet daily.   - last ECHO 3/23, EF 66% and mild-moderate valve abnormalities.  No need to repeat  - follow up with Dr. Royal Hyatt outpatient, plan for outpatient PT/CET scan.   - Pulmonology consulted.     - D/w oncology -  pending ER/OH pleural fluid receptor testing., pt may be a candidate for hormonal therapy.      SECONDARY DISCHARGE DIAGNOSES  Diagnosis: Severe sepsis  Assessment and Plan of Treatment: ESBL bacteremia due to E coli UTI, severe sepsis ruled in, tx'd    - Blood Cx ESBL E.coli,   - Repeat Blood cultures 3/25 NGTD   - UCx Ecoli, follow up sensitivities  - ID recs appreciated - merrem 5/1/23 completed    - CT urethrogram with iv contrast unremarkable except moderate pleural effusions.        Diagnosis: Chronic atrial fibrillation  Assessment and Plan of Treatment: Chronic atrial fibrillation s/p RVR   - initially presented w/RVR, now rate controlled   - c/w Diltiazem QD     - ECHO March 2023 EF 66%, restarted eliquis    Diagnosis: Elevated troponin  Assessment and Plan of Treatment: Elevated troponin likely demand ischemia, ruled out ACS.   - monitor on telemetry x 24h, denies any chest pain, HD stable  - EKG reviewed - no ischemic changes  - trop  0.15-->0.1-->0.1  - Cardiology eval recs appreciated    Diagnosis: Thyroid goiter  Assessment and Plan of Treatment: R sided thyroid goiter  - hx/o biopsies negative      Diagnosis: Weakness  Assessment and Plan of Treatment: Weakness  - uses RW at home   -  STR    Diagnosis: Acute kidney failure  Assessment and Plan of Treatment: YAIMA, resolved  - baseline sCr approx 1.1--> 1.4-->1.2> 0.7 > 1   - s/p 1L NS bolus  - avoid Nephrotoxics    Diagnosis: Obesity due to excess calories  Assessment and Plan of Treatment: Obesity BMI 33  - wt loss counseling per PCP

## 2023-05-04 NOTE — DISCHARGE NOTE PROVIDER - CARE PROVIDERS DIRECT ADDRESSES
,DirectAddress_Unknown,[email or web address],DirectAddress_Unknown

## 2023-05-04 NOTE — DISCHARGE NOTE NURSING/CASE MANAGEMENT/SOCIAL WORK - NSDCPEFALRISK_GEN_ALL_CORE
For information on Fall & Injury Prevention, visit: [email or web address] OR  [email or web address] OR  https://www.cdc.gov/steadi/patient.html

## 2023-05-04 NOTE — DISCHARGE NOTE PROVIDER - NSDCFUADDAPPT_GEN_ALL_CORE_FT
APPTS ARE READY TO BE MADE: [x] YES    Best Family or Patient Contact (if needed): Karishma [phone]    Additional Information about above appointments (if needed):    1: Carlotta Hyatt, oncology in 1 week for follow up pleural fluid testing for ER/ME receptor positivity.   2: Barron King, primary care doctor in 1 week.   3:     Other comments or requests:    APPTS ARE READY TO BE MADE: [x] YES    Best Family or Patient Contact (if needed): Karishma [phone]    Additional Information about above appointments (if needed):    1: Carlotta Hyatt, oncology in 1 week for follow up pleural fluid testing for ER/NE receptor positivity.   2: Barron King, primary care doctor in 1 week.   3:     Other comments or requests:       Patient was provided with follow up request details and was advised to call to schedule follow up within specified time frame.

## 2023-05-15 NOTE — CHART NOTE - NSCHARTNOTEFT_GEN_A_CORE
BMP, PT^T, and Mg drawn and sent to lab STAT
Left 3 messages for patient in regards to follow up care with callback information.
Patient requested a callback on 05/12/2023.
Left 3 messages for patient in regards to follow up care with callback information.
PTT is 133, Hold drip for one hour, and start at 13 as per protocol, will repeat level in am   replaced potassium, and magnesium,  repeat level in am

## 2023-05-22 NOTE — PRE-ANESTHESIA EVALUATION ADULT - NSANTHPEFT_GEN_ALL_CORE
Stable    Continue chronic chlorpromazine, olanzapine, trazodone, valproic acid, duloxetine
Stable    Continue ongoing supportive care
Stable    Continue senna
Stable    Continue supportive care
W ARAM L

## 2023-05-25 NOTE — CDI QUERY NOTE - NSCDINOTEPOA_GEN_ALL_CORE_FT
Was the condition present on admission? If so, please document in the chart that “(the condition) was present on admission.”
No

## 2023-05-30 NOTE — CDI QUERY NOTE - NSCDIOTHERTXTBX_GEN_ALL_CORE_HH
Query:  Based on your professional judgment and the clinical indicators provided below, please clarify if the shortness of breath and pleural effusion can be further specified as:  • Shortness of breath possibly associated with pleural effusion suspicious for metastatic disease could not be excluded by diagnostic thoracentesis  • Other (please specify):  • Clinically unable to determine            ============================  Clinical Indicators:  ** ED Provider: pt states to have chronic wounds to BLE and over the past month noted increased pain. pt states her wounds are being treated by a home nurse. pt also notes increased SOB over the past week. pt denies any other symptoms including fevers, chill, headache, recent illness/travel, cough, abdominal pain, chest pain ... Principal Discharge Dx SOB (shortness of breath) ... Bilateral lower extremity edema without obvious infection will admit for chf exacerbation ...    ** H&P:        #SOB 2/2 Aortic stenosis (?), Heart failure         -TTE from 2021 showed EF 55-60% and normal Aortic valve, Moderate MV regurgitation        -BNP 2952, Trops -ve, No chest pain, No wheezing on exam        -BP good, no concern for hypoperfusion        -Start Lasix 40mg IV BID        -Strict I&Os        -TTE        -Cardiology c/s- Dr. Pacheco        #B/L LE Cellulitis(?)- Left worse than right    Attending Attestation:       Dyspnea on exertion likely 2/2 acute new onset CHF exacerbation.       - CXR shows pulmonary vascular congestion and b/l interstitial opacity.       - c/w IV lasix 40mg BID       - check TTE       - monitor daily weight, Strict I & O, Low Na diet with fluid restriction.       - Cardiology consult.        B/L LE cellulitis in the setting of lymphedema    ** 3/6 Consult Note Adult-Pulmonology Resident/Attending:      IMPRESSION:         Acute hypoxemic respiratory failure requiring BiPAP Acute on chronic hypercapnic respiratory failure         Fluid overload         COPD        l side opacity/ effusion worseing        LE cellulitis with ulcer and abscess MRSA +    Attending Attestation: Events noted, acute on chronic hypoxemic/ hypercapnic resp failure/ a FIB, pul edema, COPD exacerbation, on Eliquis, IV lasix q 12, l side us, hold eliquis, abx, steroid, NIV, Poor prognosis, SDU, GOC .     ** 3/6 Chart Note - Transfer Note Resident: Patient was diagnosed with CHF exacerbation and started on PO lasix. Lasix was discontinued due to uptrending Cr/BUN. On the morning of 3/6 alerted by RN that patient is desatting to high 70s on nasal canula. Patient lethargic appearing. Placed on NRB with improvement of SPO2 to mid 80s. Patient transitioned to BIPAP. StAT ABG shows CO2 79, reflecting CO2 retention, possible additional COPD exacerbation. STAT CXR showed worsening fluid overload, L>R. Patient was given STAT dose lasix 40mg IV push, started on solu-medrol, duonebs, and continued with BIPAP. Plan to continue with lasix 40mg IV BID, solu-medrol 60mg q8h, standing duonebs and BIPAP. Plan for repeat ABG. Patient also noted to be in afib RVR, known history of chronic afib. Now in the 110s HR. Was seen by critical care team. Large pleural effusion was noted on bedside ultrasound. Will plan for continued diuresis, if does not respond will possibly go for therapeutic thoracentesis.      ASSESSMENT & PLAN:       87F PMH: HTN, A.fib (on eliquis), PAD, Chronic lymphedema and COPD presents to ED for b/L LE pain and dyspnea on exertion.       #hypercapnic and hypoxic respiratory failure 2/2 to HFpEF and COPD exacerbation       - CXR 03/06 shows worsening b/l L>R    **3/7  Cytology Pathology Report:      PLEURAL FLUID, THORACENTESIS:       - SUSPICIOUS FOR MALIGNANCY.       - Atypical cells present in background of histiocytes and mesothelial cells, suspicious for metastatic carcinoma.       Comment: The cell block contains few singly distributed atypical cells in background of histiocytes and mesothelial cells. 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 Clinical correlation and follow-ups are recommended. The case is discussed with Dr. Suggs on 3/16/2023.
Query:  Do you agree with the pathology report findings of suspected malignant pleural effusion ?  • Yes, I concur with the pathology report that patient has suspected malignant pleural effusion with suspected metastatic carcinoma of the breast.  • No, disagree with pathology findings  • Other diagnosis (please clarify)  • Clinically unable to determine                 ===================================  Clinical Indicators:  ** 3/7 Procedure Note Thoracentesis:      · Indications: pleural effusion      · Amount Of Fluid Obtained (mL): 1100 milliLiter(s)      · Findings: serosanguineous fluid    ** 3/7 Cytopathology report:      Amendment: The report is amended to reflect findings in cell block and immunohistiochemical stains. Dr. Suggs is notified on 3/16/2023.      PLEURAL FLUID, THORACENTESIS:       - SUSPICIOUS FOR MALIGNANCY.       - Atypical cells present in background of histiocytes and mesothelial cells, suspicious for metastatic carcinoma.         Comment: The cell block contains few singly distributed atypical cells in background of histiocytes and mesothelial cells. 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 Clinical correlation and follow-ups are recommended.

## 2023-06-05 PROBLEM — G47.33 OBSTRUCTIVE SLEEP APNEA, ADULT: Status: ACTIVE | Noted: 2021-12-14

## 2023-06-05 PROBLEM — R93.89 ABNORMAL CHEST CT: Status: ACTIVE | Noted: 2023-01-01

## 2023-06-05 NOTE — ASSESSMENT
[FreeTextEntry1] : MIld COPD stable\par Possible LA[facility]ENCE refusing testing \par Recurrent left pleural effusion transudative.  SP IPC.  \par Cytology with suspicious malignant cells \par RIght neck mass possibly thyroid cancer SP FNA with Dr. Soliman \par Small lung nodules to be followed \par HO Afib\par

## 2023-06-05 NOTE — PROCEDURE
[FreeTextEntry1] : CXR PA and Lateral \par The costophrenic and cardiophrenic angles are sharp\par The fanny parenchyma shows no infiltrates, consolidations, or nodules \par The Mediastinum is within normal limits\par SMall left pleural effusion and pleural catheter \par

## 2023-06-12 PROBLEM — S31.809A BUTTOCK WOUND: Status: RESOLVED | Noted: 2021-03-09 | Resolved: 2023-01-01

## 2023-06-12 PROBLEM — R10.33 PERIUMBILICAL ABDOMINAL PAIN: Status: ACTIVE | Noted: 2023-01-01

## 2023-06-12 PROBLEM — R06.89 DIFFICULTY BREATHING: Status: RESOLVED | Noted: 2023-01-01 | Resolved: 2023-01-01

## 2023-06-12 PROBLEM — S30.0XXA TRAUMATIC HEMATOMA OF BUTTOCK: Status: RESOLVED | Noted: 2021-04-30 | Resolved: 2023-01-01

## 2023-06-12 PROBLEM — I83.019 VENOUS STASIS ULCERS OF BOTH LOWER EXTREMITIES: Status: RESOLVED | Noted: 2020-10-12 | Resolved: 2023-01-01

## 2023-06-12 PROBLEM — Z71.89 ADVANCED CARE PLANNING/COUNSELING DISCUSSION: Status: ACTIVE | Noted: 2023-01-01

## 2023-06-12 NOTE — REASON FOR VISIT
[Initial Evaluation] : an initial evaluation [Family Member] : family member [Pre-Visit Preparation] : pre-visit preparation was done [FreeTextEntry1] : PMH HTN, Lymphedema, atrial fibrillation, asthma and COPD [FreeTextEntry2] : chart review

## 2023-06-12 NOTE — HEALTH RISK ASSESSMENT
[Independent] : using telephone [Full assistance needed] : managing finances [No falls in past year] : Patient reported no falls in the past year [Yes] : The patient does have visual impairment [HRA Reviewed] : Health risk assessment reviewed [Some assistance needed] : managing medications [de-identified] : patient reports pain to left foot with ambulation, refusing at this time

## 2023-06-12 NOTE — COUNSELING
[Obese -  ( BMI  >29.9 )] : obese - ( BMI  >29.9 ) [DASH diet recommended] : DASH diet recommended [Continue diet as tolerated] : continue diet as tolerated based on goals of care [Non - Smoker] : non-smoker [Use assistive device to avoid falls] : use assistive device to avoid falls [Remove clutter and unsafe carpeting to avoid falls] : remove clutter and unsafe carpeting to avoid falls [Improve exercise tolerance] : improve exercise tolerance [Discussed disease trajectory with patient/caregiver] : discussed disease trajectory with patient/caregiver [Patient/Caregiver is unclear of wishes] : patient/caregiver is unclear of wishes [Completed Healthcare Proxy] : completed healthcare proxy [_____] : HCP: [unfilled] [] : foot exam

## 2023-06-12 NOTE — CHRONIC CARE ASSESSMENT
[PPS Score: ____] : Palliative Performance Scale (PPS) Score: [unfilled] [de-identified] : as tolerated [de-identified] : low sodium

## 2023-06-12 NOTE — PHYSICAL EXAM
[No Acute Distress] : no acute distress [Normal Voice/Communication] : normal voice communication [Normal Sclera/Conjunctiva] : normal sclera/conjunctiva [Normal Outer Ear/Nose] : the ears and nose were normal in appearance [No JVD] : no jugular venous distention [No Respiratory Distress] : no respiratory distress [No Accessory Muscle Use] : no accessory muscle use [Normal Rate] : heart rate was normal  [Pedal Pulses Present] : the pedal pulses are present [Normal Bowel Sounds] : normal bowel sounds [Normal Anterior Cervical Nodes] : no anterior cervical lymphadenopathy [No CVA Tenderness] : no ~M costovertebral angle tenderness [Patient Refused] : rectal exam was refused by the patient [No Clubbing, Cyanosis] : no clubbing  or cyanosis of the fingernails [Normal Strength/Tone] : muscle strength and tone were normal [No Rash] : no rash [No Motor Deficits] : the motor exam was normal [Oriented x3] : oriented to person, place, and time [Normal Affect] : the affect was normal [Normal Mood] : the mood was normal [de-identified] :  thyroid nodule noted [de-identified] : tenderness to unbilical area

## 2023-06-12 NOTE — HISTORY OF PRESENT ILLNESS
[Patient] : patient [Patient is stable] : patient is stable [LastPVisitDate] : 04/2023 [FreeTextEntry4] : Sr. Christian [LastSpecialistVisitDate] : 06/2023 [FreeTextEntry1] : lymphedema and COPD  [FreeTextEntry2] : 06/12/2023 COVID SCREEN:\par Patient or caregiver denies fever, cough, trouble breathing, rash or contact with someone who tested positive for COVID-19. \par \par N95 mask, gloves, eye wear and gown (if indicated) used during visit: YES\par \par Total face to face time with patient is 60 min. \par \par Jayesh Apodaca is a 87 year old female with HTN, atrial fibrillation, lymphedema and COPD on 3-4 L NC PRN presents today for initial home visit and evaluation of medical conditions.\par \par Today patient accompanied by son Cristi who reports patient was recently hospitalized for difficulty breathing and pleural effusion. Patient now with left pleural indwelling pleural catheter, dressing site clean and dry. Patient following pulmonology monthly. \par \par Left foot ulcer, patient recommended to follow up with podiatry, patient reports last podiatry in home visit performed one year ago and states " i will not go to see a podiatrist, i am dealing with my lungs now and that’s it. Patient educated on risks and offered podiatry referral. Contacted daughter Karishma HCP to discuss awaiting call back. \par Patient recommended to see vascular MD for lymphedema and lower extremity swelling, patient refusing, referral offered, patient refusing. \par \par Upon assessment patient reports abdominal tenderness to umbilical region, daughter Karishma called brother and reports abdominal pain x days.  Patient denies N/V/D. Reports last bowel movement today 6/12. Denies fever chills, blood in stool or difficulty breathing. Abdominal x-ray ordered. Will continue to monitor. \par \par Patient reports COVID vaccine x2 doses, but reports unknown dates. \par \par Patient refusing evaluation of history of buttock wound in 2021, patient states " its heal and no wound is there". \par Patient reports no weight loss >10 lbs in the past 6 months. No changes in dentition or swallowing reported, No changes in hearing or vision reported. No changes in Cognition reported. Patient denies any symptoms of depression or anxiety. Patient is ADL independent/dependent and IADL dependent. No changes in gait or falls reported. \par \par Patient's home environment is safe.\par \par

## 2023-06-12 NOTE — REVIEW OF SYSTEMS
[Lower Ext Edema] : lower extremity edema [Dyspnea on Exertion] : dyspnea on exertion [Abdominal Pain] : abdominal pain [Negative] : Heme/Lymph [Nausea] : no nausea [Constipation] : no constipation [Diarrhea] : diarrhea [Vomiting] : no vomiting [Melena] : no melena

## 2023-06-13 PROBLEM — K59.00 CONSTIPATION: Status: ACTIVE | Noted: 2023-01-01

## 2023-06-29 PROBLEM — Z99.81 OXYGEN DEPENDENT: Status: ACTIVE | Noted: 2023-01-01

## 2023-06-29 NOTE — COUNSELING
[Obese -  ( BMI  >29.9 )] : obese - ( BMI  >29.9 ) [DASH diet recommended] : DASH diet recommended [Continue diet as tolerated] : continue diet as tolerated based on goals of care [Non - Smoker] : non-smoker [Use assistive device to avoid falls] : use assistive device to avoid falls [Remove clutter and unsafe carpeting to avoid falls] : remove clutter and unsafe carpeting to avoid falls [] : foot exam [Improve exercise tolerance] : improve exercise tolerance [Discussed disease trajectory with patient/caregiver] : discussed disease trajectory with patient/caregiver [Patient/Caregiver is unclear of wishes] : patient/caregiver is unclear of wishes [Completed Healthcare Proxy] : completed healthcare proxy [_____] : HCP: [unfilled]

## 2023-06-29 NOTE — PHYSICAL EXAM
[No Acute Distress] : no acute distress [Normal Voice/Communication] : normal voice communication [Normal Sclera/Conjunctiva] : normal sclera/conjunctiva [Normal Outer Ear/Nose] : the ears and nose were normal in appearance [No JVD] : no jugular venous distention [No Respiratory Distress] : no respiratory distress [No Accessory Muscle Use] : no accessory muscle use [Normal Rate] : heart rate was normal  [Normal S1, S2] : normal S1 and S2 [Pedal Pulses Present] : the pedal pulses are present [Normal Bowel Sounds] : normal bowel sounds [Non Tender] : non-tender [Soft] : abdomen soft [Patient Refused] : rectal exam was refused by the patient [Normal Anterior Cervical Nodes] : no anterior cervical lymphadenopathy [No CVA Tenderness] : no ~M costovertebral angle tenderness [No Spinal Tenderness] : no spinal tenderness [No Clubbing, Cyanosis] : no clubbing  or cyanosis of the fingernails [No Rash] : no rash [No Motor Deficits] : the motor exam was normal [Oriented x3] : oriented to person, place, and time [Normal Affect] : the affect was normal [Normal Mood] : the mood was normal [de-identified] : right sided thyroid nodule noted [de-identified] : tenderness to unbilical area

## 2023-06-29 NOTE — REVIEW OF SYSTEMS
[Lower Ext Edema] : lower extremity edema [Dyspnea on Exertion] : dyspnea on exertion [Unsteady Walking] : ataxia [Negative] : Heme/Lymph

## 2023-06-29 NOTE — HEALTH RISK ASSESSMENT
[HRA Reviewed] : Health risk assessment reviewed [Independent] : using telephone [Some assistance needed] : managing medications [Full assistance needed] : managing finances [No falls in past year] : Patient reported no falls in the past year [Yes] : The patient does have visual impairment [de-identified] : patient reports pain to left foot with ambulation, refusing at this time

## 2023-06-29 NOTE — HISTORY OF PRESENT ILLNESS
[Patient is stable] : patient is stable [Patient] : patient [Family Member] : family member [LastPVisitDate] : 04/2023 [FreeTextEntry4] : Sr. Christian [LastSpecialistVisitDate] : 06/2023 [FreeTextEntry1] : lymphedema and COPD  [FreeTextEntry2] : 06/29/2023 COVID SCREEN:\par Patient or caregiver denies fever, cough, trouble breathing, rash or contact with someone who tested positive for COVID-19. \par \par N95 mask, gloves, eye wear and gown (if indicated) used during visit: YES\par \par Total face to face time with patient is 45 min. \par \par Constanza Mcconnell is a 88 year old female with HTN, A-fib, COPD, CKD3, lymphedema, anxiety and depression presents today for acute visit. Daughter Karishma reports patient was lethargic, confused and with dark urine output since this morning. \par \par Upon assessment patient is aox3, easily arouses to voice, patient reports taking .25 mg Alprazolam at 9:00 AM because she was feeling anxious. Since then she reports she felt sleepy, denies auditory or visual hallucinations. Denies suicidal or homicidal ideation. Patient advised to take Alprazolam at bedtime and if needed during day to maintain fall and safety precautions. Patient reports she was prescribed .5 mg Alprazolam but unable to tolerate high dose, instead cuts .5 mg tab in half. \par \par Patients Furosemide was recently increased to 40 mg x 4 days 6/26 - 6/29 by pulmonology for pleural effusion seen on 6/14/23 CT scan, patient was having SOB but since Lasix increase SOB improved.\par \par Today patient denies SOB or difficulty breathing SPO2 76% on room air, patient and son Cristi educated on importance of use of 3 L O2 NC and SPO2  monitoring. Oxygen saturation improved to 95% on 3 L O2 NC. \par Son reports patient is passing urine every 2 hours, noted urine to be dark in color, denies blood in urine or foul odor. Patient reports she has not felt to drink water over past 2 days, only soda. Patient educted on importance of hydration and PO Fluids encouraged. Chest X-ray to be done and labs ordered. \par \par Patient with stage 1 pressure ulcer to left buttock, no drainage noted. Patient advised to continue zinc oxide, maintain hygiene and will obtain DME offloading device. \par \par Family educated to call if symptoms change or worsen, house calls information provided. \par \par

## 2023-06-29 NOTE — CHRONIC CARE ASSESSMENT
[PPS Score: ____] : Palliative Performance Scale (PPS) Score: [unfilled] [de-identified] : as tolerated [de-identified] : low sodium

## 2023-07-02 NOTE — ED PROVIDER NOTE - OBJECTIVE STATEMENT
Patient is an 88-year-old female presents for evaluation of worsening shortness of breath and weakness accompanied by her son history is corroborated by the son the patient has a history of COPD she is on 2-1/2 L of nasal cannula 24 hours a day and noted worsening shortness of breath for 4 days with increasing cough productive of yellow-green sputum with periods of difficulty breathing denies any chest pain palpitations abdominal pain back pain rash dysuria hesitancy or frequency patient had never been intubated in the past

## 2023-07-02 NOTE — H&P ADULT - NSICDXPASTMEDICALHX_GEN_ALL_CORE_FT
PAST MEDICAL HISTORY:  Acute on chronic systolic congestive heart failure     Afib s/p ablation 2001    Anxiety     Cellulitis chronic    COPD (chronic obstructive pulmonary disease)     Edema of both lower legs     Goiter no meds    HTN (hypertension)     OA (osteoarthritis)     Obesity     PVD (peripheral vascular disease)

## 2023-07-02 NOTE — ED PROVIDER NOTE - ATTENDING CONTRIBUTION TO CARE
I have personally performed a history and physical exam on this patient and personally directed the management of the patient. Patient is an 88-year-old female presents for evaluation of worsening shortness of breath and weakness accompanied by her son history is corroborated by the son the patient has a history of COPD she is on 2-1/2 L of nasal cannula 24 hours a day and noted worsening shortness of breath for 4 days with increasing cough productive of yellow-green sputum with periods of difficulty breathing denies any chest pain palpitations abdominal pain back pain rash dysuria hesitancy or frequency patient had never been intubated in the past    On physical exam patient is using accessory muscles she is hypoxic tachypneic initially improved on nasal cannula however is requiring nonrebreather initially with increasing effort she has decreased lung sounds bilaterally worse on the right than left regular rate and rhythm noted radial pulses 2+ pedal pulse 2+ patient has extensive bilateral lower extremity swelling chronic venous stasis ulcerations abdomen soft nontender nondistended bowel sounds positive no guarding no rebound no focal deficits noted    a/p on initial exam patient has hypoxia tachypnea increased work of breathing is concerning patient given albuterol nebulizers Solu-Medrol she is requiring initially nonrebreather I am observe the patient initially for a period of 30 minutes on a nonrebreather with the son at bedside I have advised patient and son that the patient will benefit from BiPAP and will need it as she will "tire out" patient is aware of this refusing BiPAP secondary to claustrophobia I had a second conversation reiterating the above information patient still refusing to proceed to BiPAP at this time I spoke with the son privately advised proceeding to BiPAP he is actually in agreement patient still refusing he called his sister the patient's daughter who is present in the emergency department patient did not improve initially eventually agreed to BiPAP which were started she briefly improved upon reevaluation the patient did not significantly improve with tidal volume remains tachypneic at this point decision was made to intubate family is in agreement with plan of care patient was successfully intubated discussed case with ICU who agrees with admission patient was given IV antibiotics IV Lasix symptoms are likely multifactorial in nature we obtain EKG per my independent evaluation is not consistent with STEMI in addition chest x-ray which reveals increased vascular congestion and pleural effusion potential pneumonia patient was admitted to the ICU

## 2023-07-02 NOTE — H&P ADULT - NSHPLABSRESULTS_GEN_ALL_CORE
9.2    11.51 )-----------( 295      ( 02 Jul 2023 22:40 )             32.9       07-02    143  |  101  |  19  ----------------------------<  113<H>  3.9   |  36<H>  |  0.9    Ca    9.2      02 Jul 2023 19:55    TPro  7.5  /  Alb  3.7  /  TBili  0.5  /  DBili  x   /  AST  14  /  ALT  7   /  AlkPhos  164<H>  07-02              Urinalysis Basic - ( 02 Jul 2023 19:55 )    Color: x / Appearance: x / SG: x / pH: x  Gluc: 113 mg/dL / Ketone: x  / Bili: x / Urobili: x   Blood: x / Protein: x / Nitrite: x   Leuk Esterase: x / RBC: x / WBC x   Sq Epi: x / Non Sq Epi: x / Bacteria: x        PT/INR - ( 02 Jul 2023 19:55 )   PT: 17.00 sec;   INR: 1.47 ratio         PTT - ( 02 Jul 2023 19:55 )  PTT:33.5 sec    Lactate Trend  07-02 @ 19:55 Lactate:0.9       CARDIAC MARKERS ( 02 Jul 2023 19:55 )  x     / <0.01 ng/mL / x     / x     / x            CAPILLARY BLOOD GLUCOSE

## 2023-07-02 NOTE — H&P ADULT - ASSESSMENT
1. Respiratory distress (requiring intubation) secondary multiple factors: CHF, infectious process, acute on chronic COPD  2. hx: COPD, CHF, pneumonia, pleural effusion w/ indwelling left sided chest tube., afib    Plan:  admitted to ICU  Pt's resp. status worsen requiring intubation  f/u post intubation ABG  iliana MDI  propofol IV sedation  start IV heparin @ 950 u/h  IV solumedrol 40mg q 6  PTT in am  lasix 40mg  IV q 12  may need central line emergently

## 2023-07-02 NOTE — H&P ADULT - HISTORY OF PRESENT ILLNESS
89 yo W female w/ PMH as noted below.  Pt. comes to hospital w/ 3 days history of progressive respiratory distress.  pt has a left sided indwelling chest tube that was place approx 1 month ago when pt was @ [facility].  This tube was to be removed this  week as per  son.  Pt was placed on AVAPS  NIV  in ER .  Pt has bilat pleural effusion on CXR . L > R .  While waiting adm to ICU pt's respiratory status worsen & was orally intubated w/ size 7 ETT . 22cm @ lip line.

## 2023-07-02 NOTE — ED ADULT TRIAGE NOTE - AS TEMP SITE
Coronavirus (COVID-19) Notification    Lab Result   Lab test 2019-nCoV rRt-PCR OR SARS-COV-2 PCR    Nasopharyngeal AND/OR Oropharyngeal swab is NEGATIVE for 2019-nCoV RNA [OR] SARS-COV-2 RNA (COVID-19) RNA    Your result was negative. This means that we didn't find the virus that causes COVID-19 in your sample. A test may show negative when you do actually have the virus. This can happen when the virus is in the early stages of infection, before you feel illness symptoms.    After I informed the patient of the negative results, our phone connection dropped.     {Name]  Lynette Nissen RN  
oral

## 2023-07-02 NOTE — ED ADULT NURSE NOTE - NSFALLRISKINTERV_ED_ALL_ED

## 2023-07-02 NOTE — ED PROVIDER NOTE - PROGRESS NOTE DETAILS
patient is holding her saturation at 90% on nrb, I have adivsed bipap but she refuses multiple times in the presence of her son who is in agreement with me but the patient adamantly refuses she understands her choices but still refuses   I explained that she will likely tire out and be unable to breath patient is worsening despite bipap I will proceed to intubation family is udpated and agrees with the plan of care

## 2023-07-02 NOTE — ED ADULT NURSE REASSESSMENT NOTE - NS ED NURSE REASSESS COMMENT FT1
Pt intubated, 23 at lip line, 7.0P tube. POS cap color changes noted, bilateral breath sounds auscultated. Radiology at bedside for xray confirmation. Belongings and dentures given to daughter.
pt with sudden shortness of breath, O2 saturation deteriorated while patient was on NC O2, pt placed on NRB mask, ED physician and PA at bedside. Pt medicated as per orders. Respiratory called.

## 2023-07-02 NOTE — ED PROVIDER NOTE - NS ED ATTENDING STATEMENT MOD
This was a shared visit with the KEVIN. I reviewed and verified the documentation and independently performed the documented: I have personally provided the amount of critical care time documented below concurrently with the resident/fellow.  This time excludes time spent on separate procedures and time spent teaching. I have reviewed the resident’s / fellow’s documentation and I agree with the history, exam, and assessment and plan of care.

## 2023-07-02 NOTE — H&P ADULT - NSHPREVIEWOFSYSTEMS_GEN_ALL_CORE
pt w/ 3 days hx progressive resp. distress.  No reported : fevers, cough, chills, n/v Chest pain , hemetemesis, hemoptysis by family. pt w/ progressive weakness only

## 2023-07-02 NOTE — ED PROVIDER NOTE - CLINICAL SUMMARY MEDICAL DECISION MAKING FREE TEXT BOX
on initial exam patient has hypoxia tachypnea increased work of breathing is concerning patient given albuterol nebulizers Solu-Medrol she is requiring initially nonrebreather I am observe the patient initially for a period of 30 minutes on a nonrebreather with the son at bedside I have advised patient and son that the patient will benefit from BiPAP and will need it as she will "tire out" patient is aware of this refusing BiPAP secondary to claustrophobia I had a second conversation reiterating the above information patient still refusing to proceed to BiPAP at this time I spoke with the son privately advised proceeding to BiPAP he is actually in agreement patient still refusing he called his sister the patient's daughter who is present in the emergency department patient did not improve initially eventually agreed to BiPAP which were started she briefly improved upon reevaluation the patient did not significantly improve with tidal volume remains tachypneic at this point decision was made to intubate family is in agreement with plan of care patient was successfully intubated discussed case with ICU who agrees with admission patient was given IV antibiotics IV Lasix symptoms are likely multifactorial in nature we obtain EKG per my independent evaluation is not consistent with STEMI in addition chest x-ray which reveals increased vascular congestion and pleural effusion potential pneumonia patient was admitted to the ICU

## 2023-07-02 NOTE — ED PROVIDER NOTE - PHYSICAL EXAMINATION
VITAL SIGNS: I have reviewed nursing notes and confirm.  CONSTITUTIONAL: 87 yo F laying on stretcher in mild distress.  SKIN: Skin exam is warm and dry, no acute rash.  HEAD: Normocephalic; atraumatic.  EYES: Conjunctiva and sclera clear.  ENT: No nasal discharge; airway clear.   CARD: S1, S2 normal; no murmurs, gallops, or rubs. Regular rate and rhythm.  RESP: (+) decreased BS B/L, mild tachypnea  ABD: Normal bowel sounds; soft; non-distended; non-tender; No rebound or guarding. No CVA tenderness.  EXT: Normal ROM. No clubbing, cyanosis or edema. No calf TTP or swelling.   NEURO: Alert, oriented. Grossly unremarkable. No focal deficits.

## 2023-07-03 NOTE — CONSULT NOTE ADULT - SUBJECTIVE AND OBJECTIVE BOX
GIOVANNY BUCK  88y, Female  Allergy: penicillin (Other)  codeine (Other)  aspirin (Other)  sulfa drugs (Hives; Other)  contrast media (iodine-based) (Other)      CHIEF COMPLAINT: respiratory distress (03 Jul 2023 12:12)      LOS  1d    HPI:  89 yo W female w/ PMH as noted below.  Pt. comes to hospital w/ 3 days history of progressive respiratory distress.  pt has a left sided indwelling chest tube that was place approx 1 month ago when pt was @ [facility].  This tube was to be removed this  week as per  son.  Pt was placed on AVAPS  NIV  in ER .  Pt has bilat pleural effusion on CXR . L > R .  While waiting adm to ICU pt's respiratory status worsen & was orally intubated w/ size 7 ETT . 22cm @ lip line.  (02 Jul 2023 23:50)      INFECTIOUS DISEASE HISTORY:  History as above.  Family members report for past few days was having less and less drainage from left chest drain.   Intubated currently.   No fevers, nausea, vomiting, chest pain per family members.     PAST MEDICAL & SURGICAL HISTORY:  HTN (hypertension)      Afib  s/p ablation 2001      Goiter  no meds      Cellulitis  chronic      OA (osteoarthritis)      PVD (peripheral vascular disease)      COPD (chronic obstructive pulmonary disease)      Anxiety      Obesity      Acute on chronic systolic congestive heart failure      Edema of both lower legs      H/O abdominal hysterectomy      H/O discectomy      H/O total knee replacement, bilateral          FAMILY HISTORY      SOCIAL HISTORY  Social History:        ROS  unable to obtain history secondary to patient's mental status and/or sedation      VITALS:  T(F): 96.3, Max: 99.4 (07-02-23 @ 19:45)  HR: 69  BP: 128/63  RR: 15Vital Signs Last 24 Hrs  T(C): 35.7 (03 Jul 2023 15:03), Max: 37.4 (02 Jul 2023 19:45)  T(F): 96.3 (03 Jul 2023 15:03), Max: 99.4 (02 Jul 2023 19:45)  HR: 69 (03 Jul 2023 15:00) (62 - 126)  BP: 128/63 (03 Jul 2023 15:00) (89/55 - 191/92)  BP(mean): 89 (03 Jul 2023 15:00) (68 - 106)  RR: 15 (03 Jul 2023 15:03) (14 - 46)  SpO2: 100% (03 Jul 2023 15:00) (72% - 100%)    Parameters below as of 03 Jul 2023 15:00  Patient On (Oxygen Delivery Method): ventilator    O2 Concentration (%): 50    PHYSICAL EXAM:  Gen: NAD, resting in bed  HEENT: Normocephalic, atraumatic  Neck: supple, no lymphadenopathy  CV: Regular rate & regular rhythm  Lungs: decreased BS at bases, no fremitus  Abdomen: Soft, BS present  Ext: Warm, well perfused  Neuro: non focal, awake  Skin: no rash, no erythema  Lines: no phlebitis    TESTS & MEASUREMENTS:                        8.6    7.92  )-----------( 226      ( 03 Jul 2023 05:51 )             29.9     07-03    143  |  100  |  21<H>  ----------------------------<  149<H>  3.5   |  33<H>  |  0.9    Ca    8.7      03 Jul 2023 05:51    TPro  6.4  /  Alb  3.1<L>  /  TBili  0.8  /  DBili  x   /  AST  16  /  ALT  8   /  AlkPhos  137<H>  07-03      LIVER FUNCTIONS - ( 03 Jul 2023 05:51 )  Alb: 3.1 g/dL / Pro: 6.4 g/dL / ALK PHOS: 137 U/L / ALT: 8 U/L / AST: 16 U/L / GGT: x           Urinalysis Basic - ( 03 Jul 2023 05:51 )    Color: x / Appearance: x / SG: x / pH: x  Gluc: 149 mg/dL / Ketone: x  / Bili: x / Urobili: x   Blood: x / Protein: x / Nitrite: x   Leuk Esterase: x / RBC: x / WBC x   Sq Epi: x / Non Sq Epi: x / Bacteria: x        Culture - Blood (collected 04-28-23 @ 06:04)  Source: .Blood None  Final Report (05-03-23 @ 18:01):    No Growth Final    Culture - Blood (collected 04-25-23 @ 19:36)  Source: .Blood None  Final Report (05-01-23 @ 18:00):    No Growth Final    Culture - Urine (collected 04-23-23 @ 14:27)  Source: Clean Catch Clean Catch (Midstream)  Final Report (04-26-23 @ 18:55):    50,000 - 99,000 CFU/mL Escherichia coli ESBL  Organism: Escherichia coli ESBL (04-26-23 @ 18:55)  Organism: Escherichia coli ESBL (04-26-23 @ 18:55)      Method Type: DARIO      -  Amikacin: S <=16      -  Amoxicillin/Clavulanic Acid: S <=8/4 Consider reserving for cystitis when ampicillin/sulbactam is resistant      -  Ampicillin: R >16 These ampicillin results predict results for amoxicillin      -  Ampicillin/Sulbactam: R >16/8 Enterobacter, Klebsiella aerogenes, Citrobacter, and Serratia may develop resistance during prolonged therapy (3-4 days)      -  Aztreonam: R >16      -  Cefazolin: R >16 For uncomplicated UTI with K. pneumoniae, E. coli, or P. mirablis: DARIO <=16 is sensitive and DARIO >=32 is resistant. This also predicts results for oral agents cefaclor, cefdinir, cefpodoxime, cefprozil, cefuroxime axetil, cephalexin and locarbef for uncomplicated UTI. Note that some isolates may be susceptible to these agents while testing resistant to cefazolin.      -  Cefepime: R >16      -  Ceftriaxone: R >32 Enterobacter, Klebsiella aerogenes, Citrobacter, and Serratia may develop resistance during prolonged therapy      -  Cefuroxime: R >16      -  Ciprofloxacin: R >2      -  Ertapenem: S <=0.5      -  Gentamicin: R >8      -  Imipenem: S <=1      -  Levofloxacin: R >4      -  Meropenem: S <=1      -  Nitrofurantoin: S <=32 Should not be used to treat pyelonephritis      -  Piperacillin/Tazobactam: S <=8      -  Tobramycin: I 8      -  Trimethoprim/Sulfamethoxazole: R >2/38    Culture - Blood (collected 04-23-23 @ 10:30)  Source: .Blood Blood  Gram Stain (04-24-23 @ 11:13):    Growth in aerobic bottle: Gram Negative Rods  Final Report (04-26-23 @ 07:21):    Growth in aerobic bottle: Escherichia coli ESBL    ***Blood Panel PCR results on this specimen are available    approximately 3 hours after the Gram stain result.***    Gram stain, PCR, and/or culture results may not always    correspond due to difference in methodologies.    ************************************************************    This PCR assay was performed by multiplex PCR. This    Assay tests for 66 bacterial and resistance gene targets.    Please refer to the [facility] test directory    at [email or web address] for details.  Organism: Blood Culture PCR  Escherichia coli ESBL (04-26-23 @ 07:21)  Organism: Escherichia coli ESBL (04-26-23 @ 07:21)      Method Type: DARIO      -  Amikacin: S <=16      -  Ampicillin: R >16 These ampicillin results predict results for amoxicillin      -  Ampicillin/Sulbactam: R >16/8 Enterobacter, Klebsiella aerogenes, Citrobacter, and Serratia may develop resistance during prolonged therapy (3-4 days)      -  Aztreonam: R >16      -  Cefazolin: R >16 Enterobacter, Klebsiella aerogenes, Citrobacter, and Serratia may develop resistance during prolonged therapy (3-4 days)      -  Cefepime: R >16      -  Ceftriaxone: R >32 Enterobacter, Klebsiella aerogenes, Citrobacter, and Serratia may develop resistance during prolonged therapy      -  Ciprofloxacin: R >2      -  Ertapenem: S <=0.5      -  Gentamicin: R >8      -  Imipenem: S <=1      -  Levofloxacin: R >4      -  Meropenem: S <=1      -  Piperacillin/Tazobactam: R <=8      -  Tobramycin: I 8      -  Trimethoprim/Sulfamethoxazole: R >2/38  Organism: Blood Culture PCR (04-26-23 @ 07:21)      Method Type: PCR      -  Escherichia coli: Detec      -  ESBL: Detec      -  CTX-M Resistance Marker: Detec    Culture - Fungal, Body Fluid (collected 03-07-23 @ 17:00)  Source: Pleural Fl Pleural Fluid  Final Report (04-05-23 @ 15:01):    No fungus isolated at 4 weeks.    Culture - Body Fluid with Gram Stain (collected 03-07-23 @ 17:00)  Source: Pleural Fl Pleural Fluid  Gram Stain (03-08-23 @ 03:50):    Few polymorphonuclear leukocytes seen per low power field    No organisms seen per oil power field  Final Report (03-12-23 @ 16:18):    No growth    Culture - Blood (collected 03-05-23 @ 07:42)  Source: .Blood None  Final Report (03-10-23 @ 22:00):    No Growth Final    Culture - Abscess with Gram Stain (collected 02-28-23 @ 07:20)  Source: .Abscess foot  Final Report (03-05-23 @ 11:11):    Few Methicillin Resistant Staphylococcus aureus    Few Porphyromonas somerae "Susceptibilities not performed"    Few Corynebacterium species "Susceptibilities not performed"  Organism: Methicillin resistant Staphylococcus aureus (03-05-23 @ 11:11)  Organism: Methicillin resistant Staphylococcus aureus (03-05-23 @ 11:11)      Method Type: DARIO      -  Ampicillin/Sulbactam: R <=8/4      -  Cefazolin: R <=4      -  Clindamycin: R <=0.25 This isolate is presumed to be clindamycin resistant based on detection of inducible resistance. Clindamycin may still be effective in some patients.      -  Daptomycin: S <=0.25      -  Erythromycin: R >4      -  Gentamicin: R >8 Should not be used as monotherapy      -  Linezolid: S 2      -  Oxacillin: R 2      -  Penicillin: R >8      -  Rifampin: S <=1 Should not be used as monotherapy      -  Tetracycline: R >8      -  Trimethoprim/Sulfamethoxazole: S <=0.5/9.5      -  Vancomycin: S 1    Culture - Blood (collected 02-28-23 @ 06:53)  Source: .Blood None  Final Report (03-05-23 @ 14:00):    No Growth Final        Lactate, Blood: 1.3 mmol/L (07-03-23 @ 05:51)  Blood Gas Venous - Lactate: 1.10 mmol/L (07-02-23 @ 20:06)  Lactate, Blood: 0.9 mmol/L (07-02-23 @ 19:55)      INFECTIOUS DISEASES TESTING  COVID-19 PCR: NotDetec (05-03-23 @ 14:15)  Procalcitonin, Serum: 8.81 ng/mL (04-23-23 @ 14:27)  COVID-19 PCR: NotDetec (03-13-23 @ 14:15)  Procalcitonin, Serum: 0.34 ng/mL (03-07-23 @ 17:16)  Procalcitonin, Serum: 0.34 ng/mL (03-07-23 @ 17:16)  MRSA PCR Result.: Negative (03-02-23 @ 15:27)  MRSA PCR Result.: Negative (03-02-23 @ 07:58)  Procalcitonin, Serum: 0.19 ng/mL (02-28-23 @ 06:53)  COVID-19 PCR: NotDetec (02-27-23 @ 20:59)      RADIOLOGY & ADDITIONAL TESTS:  I have personally reviewed the last Chest xray  CXR  Xray Chest 1 View- PORTABLE-Urgent:   ACC: [id number] EXAM:  XR CHEST PORTABLE URGENT 1V   ORDERED BY: EZEKIEL FRENCH     PROCEDURE DATE:  07/02/2023          INTERPRETATION:  Clinical History / Reason for exam: Shortness of breath.    Comparison : Chest radiograph frontal view dated May 2, 2023 time 2:07 PM.    Technique/Positioning: AP portable upright time 7:19 PM.    Findings:    Support devices: There is a left-sided chest tube.    Large right pleural effusion obscures the right heart border.    Moderate left pleural effusion obscures the left heart border.    Diffuse prominence of the interstitial markings.    Proximal tracheal deviation to the left of midline.    Degenerative changes involving the glenohumeral joints.    Impression:    Bilateral pleural effusions.    --- End of Report ---            MARIA ELENA SIBLEY MD; Attending Radiologist  This document has been electronically signed. Jul  3 2023  8:57AM (07-02-23 @ 20:09)      CT      CARDIOLOGY TESTING  12 Lead ECG:   Ventricular Rate 67 BPM    Atrial Rate 68 BPM    QRS Duration 76 ms    Q-T Interval 454 ms    QTC Calculation(Bazett) 479 ms    R Axis 85 degrees    T Axis 27 degrees    Diagnosis Line Atrial fibrillation  Low voltage QRS  Abnormal ECG    Confirmedby JOYCE HERMAN MD ([id number]) on 7/3/2023 10:06:51 AM (07-03-23 @ 08:35)  12 Lead ECG:   Ventricular Rate 101 BPM    Atrial Rate 197 BPM    QRS Duration 80 ms    Q-T Interval 342 ms    QTC Calculation(Bazett) 443 ms    R Axis 138 degrees    T Axis 127 degrees    Diagnosis   reversal please repeat   Atrial fibrillation with rapid ventricular response  Abnormal ECG    Confirmed by DELLA BLACKWOOD MD ([id number]) on 7/2/2023 8:36:48 PM (07-02-23 @ 19:52)      MEDICATIONS  acetaZOLAMIDE  IVPB 500 IV Intermittent daily  albuterol    0.083% 2.5 Nebulizer every 6 hours  azithromycin  IVPB 500 IV Intermittent every 24 hours  aztreonam  IVPB 2000 IV Intermittent every 8 hours  budesonide 160 MICROgram(s)/formoterol 4.5 MICROgram(s) Inhaler 2 Inhalation two times a day  chlorhexidine 0.12% Liquid 15 Oral Mucosa every 12 hours  chlorhexidine 2% Cloths 1 Topical <User Schedule>  dextrose 5%. 1000 IV Continuous <Continuous>  dextrose 50% Injectable 25 IV Push once  enoxaparin Injectable 90 SubCutaneous every 12 hours  furosemide   Injectable 40 IV Push every 12 hours  glucagon  Injectable 1 IntraMuscular once  insulin lispro (ADMELOG) corrective regimen sliding scale  SubCutaneous three times a day before meals  nystatin Powder 1 Topical every 12 hours  pantoprazole  Injectable 40 IV Push daily  propofol Infusion 10 IV Continuous <Continuous>      Weight  Weight (kg): 91.7 (07-03-23 @ 00:02)    ANTIBIOTICS:  azithromycin  IVPB 500 milliGRAM(s) IV Intermittent every 24 hours  aztreonam  IVPB 2000 milliGRAM(s) IV Intermittent every 8 hours      ALLERGIES:  penicillin (Other)  codeine (Other)  aspirin (Other)  sulfa drugs (Hives; Other)  contrast media (iodine-based) (Other)

## 2023-07-03 NOTE — DIETITIAN INITIAL EVALUATION ADULT - NSFNSGIIOFT_GEN_A_CORE
07-02-23 @ 07:01  -  07-03-23 @ 07:00  --------------------------------------------------------  OUT:    Nasogastric/Oral tube (mL): 200 mL  Total OUT: 200 mL    Total NET: -200 mL

## 2023-07-03 NOTE — DIETITIAN INITIAL EVALUATION ADULT - OTHER INFO
pt is 88 year old female with hx of CHF, COPD, HTN, chronic cellulitis, afib s/p ablation, FLAVIO presents with 3 days of progressive respiratory distress. pt has L side indwelling chest tube placed one month for malignant effusion. pt found to have B/L pleural effusion L>R, intubation in ED warranted. pt presently sedated with propofol at 10 ml/hr (264 kcals).

## 2023-07-03 NOTE — PROGRESS NOTE ADULT - ASSESSMENT
89 YO F with a medical history  of HTN, malignant pleural effusion of breast origin, chronic A.fib, PAD, Obesity, Chronic lymphedema of  BLE and COPD on home oxygen 2 - 1/2 L nc, ESBL UTI presented to ED with worsening shortness of breath and weakness for 4 days with increasing cough productive of yellow-green sputum. In ED pt was in respiratory distress and intubated    acute respiratory failure / b/l pleural effusions      - continue IV Lasix and negative fluid balance   - check TTE   - supplement hypokalemia and re check lytes   - continue antibiotics and check procal   - consider stopping IV steroids if OK with pulmonary   - continue IV heparin for now - check ptt and adjust   - GI prophylaxis

## 2023-07-03 NOTE — PROGRESS NOTE ADULT - ASSESSMENT
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  presents to ED with SOB, intubated after not tolerating NIV    #acute hypoxemic and hypercapnic respiratory failure - s/p MV 07/03/2023  #pulmonary edema  #H/O left malignant effusion s/p pleurx - likely of breast origin as per path 03/2023  #chronic lymphedema  #suspected CAP  - SOB for 3 days duration  - intubated after not tolerating NIV  - CXR -> bilateral pleural effusion and vascular congestion  - s/p Meropenem on admission -> switched to aztreonam and Azithromycin 07/03  - c/w lasix 40 mg IV BID  - fu procal, DTA, blood and urine cultures  - fu urine and strep legionella  - TTE  - palliative eval     #COPD  - not on home O2  - solumedrol stopped on 07/03  - c/w albuterol neb  - restart solumedrol if respiratory status worsened    #AFIB  - heparin drip switched to lovenox 90 BID 07/03    #MISC  DVT PPX Lovenox therapeutic  GI PPX Protonix  DIET OG feeding  CODE FULL

## 2023-07-03 NOTE — PATIENT PROFILE ADULT - FALL HARM RISK - HARM RISK INTERVENTIONS

## 2023-07-03 NOTE — PATIENT PROFILE ADULT - FUNCTIONAL ASSESSMENT - DAILY ACTIVITY 2.
location identified, draped/prepped, sterile technique used/ultrasound assessment/supine position/sterile dressing applied/sterile technique, catheter placed/ultrasound guidance
1 = Total assistance

## 2023-07-03 NOTE — PROGRESS NOTE ADULT - SUBJECTIVE AND OBJECTIVE BOX
GIOVANNY BUCK 88y Female  MRN#: [id number]     Hospital Day: 1d    Pt is currently admitted with the primary diagnosis of acute hypoxemic and hypercapnic respiratory failure    SUBJECTIVE  Hospital Course    No Overnight events     Unable to assess Subjective complaints since patient is intubated    Present Today:   - Mejia:   Yes [ x ] : Indication: critically ill                                            ----------------------------------------------------------  OBJECTIVE  PAST MEDICAL & SURGICAL HISTORY  HTN (hypertension)    Afib  s/p ablation 2001    Goiter  no meds    Cellulitis  chronic    OA (osteoarthritis)    PVD (peripheral vascular disease)    COPD (chronic obstructive pulmonary disease)    Anxiety    Obesity    Acute on chronic systolic congestive heart failure    Edema of both lower legs    H/O abdominal hysterectomy    H/O discectomy    H/O total knee replacement, bilateral                                              -----------------------------------------------------------  ALLERGIES:  penicillin (Other)  codeine (Other)  aspirin (Other)  sulfa drugs (Hives; Other)  contrast media (iodine-based) (Other)                                            ------------------------------------------------------------    HOME MEDICATIONS  Home Medications:  albuterol 90 mcg/inh inhalation aerosol: 2 puff(s) inhaled every 6 hours as needed for  shortness of breath and/or wheezing (02 Jul 2023 23:05)  budesonide-formoterol 80 mcg-4.5 mcg/inh inhalation aerosol: 2 puff(s) inhaled 2 times a day (02 Jul 2023 23:05)  dilTIAZem 180 mg/24 hours oral capsule, extended release: 1 cap(s) orally once a day (02 Jul 2023 23:05)  Eliquis 2.5 mg oral tablet: 1 tab(s) orally 2 times a day (02 Jul 2023 23:05)  furosemide 20 mg oral tablet: 1 tab(s) orally once a day (02 Jul 2023 23:05)  Senna 8.6 mg oral tablet: 2 tab(s) orally once a day (at bedtime) (02 Jul 2023 23:05)                           MEDICATIONS:  STANDING MEDICATIONS  acetaZOLAMIDE  IVPB 500 milliGRAM(s) IV Intermittent daily  albuterol    0.083% 2.5 milliGRAM(s) Nebulizer every 6 hours  azithromycin  IVPB 500 milliGRAM(s) IV Intermittent every 24 hours  aztreonam  IVPB 2000 milliGRAM(s) IV Intermittent every 8 hours  budesonide 160 MICROgram(s)/formoterol 4.5 MICROgram(s) Inhaler 2 Puff(s) Inhalation two times a day  chlorhexidine 0.12% Liquid 15 milliLiter(s) Oral Mucosa every 12 hours  chlorhexidine 2% Cloths 1 Application(s) Topical <User Schedule>  dextrose 5%. 1000 milliLiter(s) IV Continuous <Continuous>  dextrose 50% Injectable 25 Gram(s) IV Push once  enoxaparin Injectable 90 milliGRAM(s) SubCutaneous every 12 hours  furosemide   Injectable 40 milliGRAM(s) IV Push every 12 hours  glucagon  Injectable 1 milliGRAM(s) IntraMuscular once  insulin lispro (ADMELOG) corrective regimen sliding scale   SubCutaneous three times a day before meals  nystatin Powder 1 Application(s) Topical every 12 hours  pantoprazole  Injectable 40 milliGRAM(s) IV Push daily  propofol Infusion 10 MICROgram(s)/kG/Min IV Continuous <Continuous>    PRN MEDICATIONS  dextrose Oral Gel 15 Gram(s) Oral once PRN  midazolam Injectable 2 milliGRAM(s) IV Push every 2 hours PRN                                            ------------------------------------------------------------  VITAL SIGNS: Last 24 Hours  T(C): 35.4 (03 Jul 2023 11:00), Max: 37.4 (02 Jul 2023 19:45)  T(F): 95.7 (03 Jul 2023 11:00), Max: 99.4 (02 Jul 2023 19:45)  HR: 64 (03 Jul 2023 11:00) (62 - 126)  BP: 126/60 (03 Jul 2023 11:00) (99/56 - 191/92)  BP(mean): 87 (03 Jul 2023 11:00) (72 - 106)  RR: 18 (03 Jul 2023 11:00) (18 - 46)  SpO2: 99% (03 Jul 2023 11:00) (72% - 100%)      07-02-23 @ 07:01  -  07-03-23 @ 07:00  --------------------------------------------------------  IN: 298 mL / OUT: 1400 mL / NET: -1102 mL    07-03-23 @ 07:01  -  07-03-23 @ 12:13  --------------------------------------------------------  IN: 105 mL / OUT: 450 mL / NET: -345 mL                                             --------------------------------------------------------------  LABS:                        8.6    7.92  )-----------( 226      ( 03 Jul 2023 05:51 )             29.9     07-03    143  |  100  |  21<H>  ----------------------------<  149<H>  3.5   |  33<H>  |  0.9    Ca    8.7      03 Jul 2023 05:51    TPro  6.4  /  Alb  3.1<L>  /  TBili  0.8  /  DBili  x   /  AST  16  /  ALT  8   /  AlkPhos  137<H>  07-03    PT/INR - ( 02 Jul 2023 19:55 )   PT: 17.00 sec;   INR: 1.47 ratio         PTT - ( 02 Jul 2023 19:55 )  PTT:33.5 sec  Urinalysis Basic - ( 03 Jul 2023 05:51 )    Color: x / Appearance: x / SG: x / pH: x  Gluc: 149 mg/dL / Ketone: x  / Bili: x / Urobili: x   Blood: x / Protein: x / Nitrite: x   Leuk Esterase: x / RBC: x / WBC x   Sq Epi: x / Non Sq Epi: x / Bacteria: x      ABG - ( 03 Jul 2023 05:00 )  pH, Arterial: 7.53  pH, Blood: x     /  pCO2: 44    /  pO2: 121   / HCO3: 37    / Base Excess: 12.8  /  SaO2: 99.7              Lactate, Blood: 1.3 mmol/L (07-03-23 @ 05:51)  Lactate, Blood: 0.9 mmol/L (07-02-23 @ 19:55)  Troponin T, Serum: <0.01 ng/mL (07-02-23 @ 19:55)          CARDIAC MARKERS ( 02 Jul 2023 19:55 )  x     / <0.01 ng/mL / x     / x     / x                        PHYSICAL EXAM:    GENERAL: intubated sedated  EYES: equal and reactive to light  ENT: tube present  NECK: Supple, No JVD  CHEST/LUNG: Clear to auscultation bilaterally; No rales, rhonchi, wheezing, or rubs. Unlabored respirations  HEART: Regular rate and rhythm; No murmurs, rubs, or gallops  ABDOMEN: BSx4; Soft, nontender, nondistended  EXTREMITIES: severe bilateral lower extremity up to hips  NERVOUS SYSTEM:  unable to assess due to intuabtion

## 2023-07-03 NOTE — PATIENT PROFILE ADULT - FUNCTIONAL ASSESSMENT - BASIC MOBILITY 6.
1-calculated by average/Not able to assess (calculate score using [facility] averaging method)

## 2023-07-03 NOTE — PROGRESS NOTE ADULT - SUBJECTIVE AND OBJECTIVE BOX
Patient is a 88y old  Female who presents with a chief complaint of respiratory diastress (03 Jul 2023 07:53)      T(F): 95.9 (07-03-23 @ 09:13), Max: 99.4 (07-02-23 @ 19:45)  HR: 70 (07-03-23 @ 08:00)  BP: 123/60 (07-03-23 @ 08:00)  RR: 18 (07-03-23 @ 09:13)  SpO2: 98% (07-03-23 @ 08:00) (72% - 100%)    PHYSICAL EXAM:  GENERAL: NAD  HEAD:  Atraumatic, Normocephalic  EYES: EOMI, PERRLA, conjunctiva and sclera clear  NERVOUS SYSTEM:  Alert & Oriented X3, no focal deficits   CHEST/LUNG: Clear to percussion bilaterally; No rales, rhonchi, wheezing, or rubs  HEART: Regular rate and rhythm; No murmurs, rubs, or gallops  ABDOMEN: Soft, Nontender, Nondistended; Bowel sounds present  EXTREMITIES:  2+ Peripheral Pulses, No clubbing, cyanosis, or edema    LABS  07-03    143  |  100  |  21<H>  ----------------------------<  149<H>  3.5   |  33<H>  |  0.9    Ca    8.7      03 Jul 2023 05:51    TPro  6.4  /  Alb  3.1<L>  /  TBili  0.8  /  DBili  x   /  AST  16  /  ALT  8   /  AlkPhos  137<H>  07-03                          8.6    7.92  )-----------( 226      ( 03 Jul 2023 05:51 )             29.9     PT/INR - ( 02 Jul 2023 19:55 )   PT: 17.00 sec;   INR: 1.47 ratio         PTT - ( 02 Jul 2023 19:55 )  PTT:33.5 sec  Mode: AC/ CMV (Assist Control/ Continuous Mandatory Ventilation)  RR (machine): 18  TV (machine): 400  FiO2: 50  PEEP: 5    CARDIAC ENZYMES    Troponin T, Serum: <0.01 ng/mL (07-02-23 @ 19:55)      RADIOLOGY  < from: Xray Chest 1 View- PORTABLE-Routine (07.03.23 @ 05:59) >  Lung parenchyma/Pleura: Stable bilateral opacities and effusions    < end of copied text >    MEDICATIONS  (STANDING):  albuterol/ipratropium for Nebulization 3 milliLiter(s) Nebulizer every 6 hours  budesonide 160 MICROgram(s)/formoterol 4.5 MICROgram(s) Inhaler 2 Puff(s) Inhalation two times a day  chlorhexidine 0.12% Liquid 15 milliLiter(s) Oral Mucosa every 12 hours  chlorhexidine 2% Cloths 1 Application(s) Topical <User Schedule>  dextrose 5%. 1000 milliLiter(s) (50 mL/Hr) IV Continuous <Continuous>  dextrose 50% Injectable 25 Gram(s) IV Push once  furosemide   Injectable 40 milliGRAM(s) IV Push every 12 hours  glucagon  Injectable 1 milliGRAM(s) IntraMuscular once  heparin  Infusion 1000 Unit(s)/Hr (10 mL/Hr) IV Continuous <Continuous>  insulin lispro (ADMELOG) corrective regimen sliding scale   SubCutaneous three times a day before meals  meropenem  IVPB 1000 milliGRAM(s) IV Intermittent every 8 hours  methylPREDNISolone sodium succinate Injectable 40 milliGRAM(s) IV Push every 6 hours  metoprolol tartrate Injectable 5 milliGRAM(s) IV Push every 6 hours  nystatin Powder 1 Application(s) Topical every 12 hours  pantoprazole  Injectable 40 milliGRAM(s) IV Push daily  propofol Infusion 10 MICROgram(s)/kG/Min (5.5 mL/Hr) IV Continuous <Continuous>  propofol Infusion 10 MICROgram(s)/kG/Min (4.62 mL/Hr) IV Continuous <Continuous>    MEDICATIONS  (PRN):  dextrose Oral Gel 15 Gram(s) Oral once PRN Blood Glucose LESS THAN 70 milliGRAM(s)/deciliter  midazolam Injectable 2 milliGRAM(s) IV Push every 2 hours PRN severe agitation      Patient seen and evaluated this am, sedated on ventilator with propofol       T(F): 95.9 (07-03-23 @ 09:13), Max: 99.4 (07-02-23 @ 19:45)  HR: 70 (07-03-23 @ 08:00)  BP: 123/60 (07-03-23 @ 08:00)  RR: 18 (07-03-23 @ 09:13)  SpO2: 98% (07-03-23 @ 08:00) (72% - 100%)    PHYSICAL EXAM:  GENERAL: NAD  HEAD:  Atraumatic, Normocephalic  EYES: EOMI, PERRLA, conjunctiva and sclera clear  NERVOUS SYSTEM:  no focal deficits   CHEST/LUNG:  bilateral rales  HEART: irregular   ABDOMEN: Soft, Nontender, Nondistended; Bowel sounds present  EXTREMITIES:  b/l edema    LABS  07-03    143  |  100  |  21<H>  ----------------------------<  149<H>  3.5   |  33<H>  |  0.9    Ca    8.7      03 Jul 2023 05:51    TPro  6.4  /  Alb  3.1<L>  /  TBili  0.8  /  DBili  x   /  AST  16  /  ALT  8   /  AlkPhos  137<H>  07-03                          8.6    7.92  )-----------( 226      ( 03 Jul 2023 05:51 )             29.9     PT/INR - ( 02 Jul 2023 19:55 )   PT: 17.00 sec;   INR: 1.47 ratio         PTT - ( 02 Jul 2023 19:55 )  PTT:33.5 sec  Mode: AC/ CMV (Assist Control/ Continuous Mandatory Ventilation)  RR (machine): 18  TV (machine): 400  FiO2: 50  PEEP: 5    CARDIAC ENZYMES    Troponin T, Serum: <0.01 ng/mL (07-02-23 @ 19:55)    < from: 12 Lead ECG (07.03.23 @ 08:35) >  Diagnosis Line Atrial fibrillation    < end of copied text >    RADIOLOGY  < from: Xray Chest 1 View- PORTABLE-Routine (07.03.23 @ 05:59) >  Lung parenchyma/Pleura: Stable bilateral opacities and effusions    < end of copied text >    MEDICATIONS  (STANDING):  albuterol/ipratropium for Nebulization 3 milliLiter(s) Nebulizer every 6 hours  budesonide 160 MICROgram(s)/formoterol 4.5 MICROgram(s) Inhaler 2 Puff(s) Inhalation two times a day  chlorhexidine 0.12% Liquid 15 milliLiter(s) Oral Mucosa every 12 hours  chlorhexidine 2% Cloths 1 Application(s) Topical <User Schedule>  dextrose 5%. 1000 milliLiter(s) (50 mL/Hr) IV Continuous <Continuous>  dextrose 50% Injectable 25 Gram(s) IV Push once  furosemide   Injectable 40 milliGRAM(s) IV Push every 12 hours  heparin  Infusion 1000 Unit(s)/Hr (10 mL/Hr) IV Continuous <Continuous>  insulin lispro (ADMELOG) corrective regimen sliding scale   SubCutaneous three times a day before meals  meropenem  IVPB 1000 milliGRAM(s) IV Intermittent every 8 hours  methylPREDNISolone sodium succinate Injectable 40 milliGRAM(s) IV Push every 6 hours  metoprolol tartrate Injectable 5 milliGRAM(s) IV Push every 6 hours  nystatin Powder 1 Application(s) Topical every 12 hours  pantoprazole  Injectable 40 milliGRAM(s) IV Push daily  propofol Infusion 10 MICROgram(s)/kG/Min (5.5 mL/Hr) IV Continuous <Continuous>  propofol Infusion 10 MICROgram(s)/kG/Min (4.62 mL/Hr) IV Continuous <Continuous>    MEDICATIONS  (PRN):  dextrose Oral Gel 15 Gram(s) Oral once PRN Blood Glucose LESS THAN 70 milliGRAM(s)/deciliter  midazolam Injectable 2 milliGRAM(s) IV Push every 2 hours PRN severe agitation

## 2023-07-03 NOTE — CONSULT NOTE ADULT - SUBJECTIVE AND OBJECTIVE BOX
Patient is a 88y old  Female who presents with a chief complaint of respiratory distress (03 Jul 2023 10:04)      HPI:  89 yo W female w/ PMH as noted below.  Pt. comes to hospital w/ 3 days history of progressive respiratory distress.  pt has a left sided indwelling chest tube that was place approx 1 month ago when pt was @ [facility].  This tube was to be removed this  week as per  son.  Pt was placed on AVAPS  NIV  in ER .  Pt has bilat pleural effusion on CXR . L > R .  While waiting adm to ICU pt's respiratory status worsen & was orally intubated w/ size 7 ETT . 22cm @ lip line.  (02 Jul 2023 23:50)    PAST MEDICAL & SURGICAL HISTORY:  HTN (hypertension)    Afib  s/p ablation 2001      Goiter  no meds      Cellulitis  chronic      OA (osteoarthritis)      PVD (peripheral vascular disease)      COPD (chronic obstructive pulmonary disease)      Anxiety      Obesity      Acute on chronic systolic congestive heart failure      Edema of both lower legs      H/O abdominal hysterectomy      H/O discectomy      H/O total knee replacement, bilateral          SOCIAL HX:   Smoking                         ETOH                            Other    FAMILY HISTORY:  :  No known cardiovacular family hisotry     Review Of Systems:     All ROS are negative except per HPI       Allergies    penicillin (Other)  codeine (Other)  aspirin (Other)  sulfa drugs (Hives; Other)  contrast media (iodine-based) (Other)    Intolerances          PHYSICAL EXAM    ICU Vital Signs Last 24 Hrs  T(C): 35.5 (03 Jul 2023 09:13), Max: 37.4 (02 Jul 2023 19:45)  T(F): 95.9 (03 Jul 2023 09:13), Max: 99.4 (02 Jul 2023 19:45)  HR: 62 (03 Jul 2023 10:00) (62 - 126)  BP: 117/57 (03 Jul 2023 10:00) (99/56 - 191/92)  BP(mean): 82 (03 Jul 2023 10:00) (72 - 106)  ABP: --  ABP(mean): --  RR: 18 (03 Jul 2023 10:00) (18 - 46)  SpO2: 97% (03 Jul 2023 10:00) (72% - 100%)    O2 Parameters below as of 03 Jul 2023 10:00  Patient On (Oxygen Delivery Method): ventilator    O2 Concentration (%): 50          ENT: Airway patent,  EYES: Pupils equal, Round and reactive to light.  CARDIAC: Normal rate, Regular rhythm.    RESPIRATORY: No wheezing, Bilateral BS, Not tachypneic, No use of accessory muscles  GASTROINTESTINAL: Abdomen soft, Non-tender, No guarding,   NEUROLOGICAL: Alert and oriented   SKIN: Skin normal color for race, Warm and dry and intact.       07-02-23 @ 07:01  -  07-03-23 @ 07:00  --------------------------------------------------------  IN:    Heparin: 70 mL    IV PiggyBack: 50 mL    Propofol: 178 mL  Total IN: 298 mL    OUT:    Indwelling Catheter - Urethral (mL): 1200 mL    Nasogastric/Oral tube (mL): 200 mL  Total OUT: 1400 mL    Total NET: -1102 mL      07-03-23 @ 07:01  -  07-03-23 @ 10:50  --------------------------------------------------------  IN:    Heparin: 30 mL    Propofol: 75 mL  Total IN: 105 mL    OUT:    Indwelling Catheter - Urethral (mL): 175 mL  Total OUT: 175 mL    Total NET: -70 mL          LABS:                          8.6    7.92  )-----------( 226      ( 03 Jul 2023 05:51 )             29.9                                               07-03    143  |  100  |  21<H>  ----------------------------<  149<H>  3.5   |  33<H>  |  0.9    Ca    8.7      03 Jul 2023 05:51    TPro  6.4  /  Alb  3.1<L>  /  TBili  0.8  /  DBili  x   /  AST  16  /  ALT  8   /  AlkPhos  137<H>  07-03      PT/INR - ( 02 Jul 2023 19:55 )   PT: 17.00 sec;   INR: 1.47 ratio         PTT - ( 02 Jul 2023 19:55 )  PTT:33.5 sec                                       Urinalysis Basic - ( 03 Jul 2023 05:51 )    Color: x / Appearance: x / SG: x / pH: x  Gluc: 149 mg/dL / Ketone: x  / Bili: x / Urobili: x   Blood: x / Protein: x / Nitrite: x   Leuk Esterase: x / RBC: x / WBC x   Sq Epi: x / Non Sq Epi: x / Bacteria: x        CARDIAC MARKERS ( 02 Jul 2023 19:55 )  x     / <0.01 ng/mL / x     / x     / x                                                LIVER FUNCTIONS - ( 03 Jul 2023 05:51 )  Alb: 3.1 g/dL / Pro: 6.4 g/dL / ALK PHOS: 137 U/L / ALT: 8 U/L / AST: 16 U/L / GGT: x                                                                                               Mode: AC/ CMV (Assist Control/ Continuous Mandatory Ventilation)  RR (machine): 18  TV (machine): 400  FiO2: 50  PEEP: 5  ITime: 50  MAP: 12  PIP: 38                                      ABG - ( 03 Jul 2023 05:00 )  pH, Arterial: 7.53  pH, Blood: x     /  pCO2: 44    /  pO2: 121   / HCO3: 37    / Base Excess: 12.8  /  SaO2: 99.7            MEDICATIONS  (STANDING):  albuterol/ipratropium for Nebulization 3 milliLiter(s) Nebulizer every 6 hours  budesonide 160 MICROgram(s)/formoterol 4.5 MICROgram(s) Inhaler 2 Puff(s) Inhalation two times a day  chlorhexidine 0.12% Liquid 15 milliLiter(s) Oral Mucosa every 12 hours  chlorhexidine 2% Cloths 1 Application(s) Topical <User Schedule>  dextrose 5%. 1000 milliLiter(s) (50 mL/Hr) IV Continuous <Continuous>  dextrose 50% Injectable 25 Gram(s) IV Push once  furosemide   Injectable 40 milliGRAM(s) IV Push every 12 hours  glucagon  Injectable 1 milliGRAM(s) IntraMuscular once  heparin  Infusion 1000 Unit(s)/Hr (10 mL/Hr) IV Continuous <Continuous>  insulin lispro (ADMELOG) corrective regimen sliding scale   SubCutaneous three times a day before meals  meropenem  IVPB 1000 milliGRAM(s) IV Intermittent every 8 hours  methylPREDNISolone sodium succinate Injectable 40 milliGRAM(s) IV Push every 6 hours  metoprolol tartrate Injectable 5 milliGRAM(s) IV Push every 6 hours  nystatin Powder 1 Application(s) Topical every 12 hours  pantoprazole  Injectable 40 milliGRAM(s) IV Push daily  propofol Infusion 10 MICROgram(s)/kG/Min (5.5 mL/Hr) IV Continuous <Continuous>  propofol Infusion 10 MICROgram(s)/kG/Min (4.62 mL/Hr) IV Continuous <Continuous>    MEDICATIONS  (PRN):  dextrose Oral Gel 15 Gram(s) Oral once PRN Blood Glucose LESS THAN 70 milliGRAM(s)/deciliter  midazolam Injectable 2 milliGRAM(s) IV Push every 2 hours PRN severe agitation

## 2023-07-03 NOTE — DIETITIAN INITIAL EVALUATION ADULT - PERTINENT LABORATORY DATA
07-03    143  |  100  |  21<H>  ----------------------------<  149<H>  3.5   |  33<H>  |  0.9    Ca    8.7      03 Jul 2023 05:51    TPro  6.4  /  Alb  3.1<L>  /  TBili  0.8  /  DBili  x   /  AST  16  /  ALT  8   /  AlkPhos  137<H>  07-03  POCT Blood Glucose.: 146 mg/dL (07-03-23 @ 13:44)  A1C with Estimated Average Glucose Result: 6.1 % (07-03-23 @ 05:51)  A1C with Estimated Average Glucose Result: 6.0 % (02-28-23 @ 06:53)                          8.6    7.92  )-----------( 226      ( 03 Jul 2023 05:51 )             29.9

## 2023-07-03 NOTE — CONSULT NOTE ADULT - SUBJECTIVE AND OBJECTIVE BOX
CARDIOLOGY CONSULT NOTE     CHIEF COMPLAINT/REASON FOR CONSULT:    HPI:  87 yo W female w/ PMH as noted below.  Pt. comes to hospital w/ 3 days history of progressive respiratory distress.  pt has a left sided indwelling chest tube that was place approx 1 month ago when pt was @ [facility].  This tube was to be removed this  week as per  son.  Pt was placed on AVAPS  NIV  in ER .  Pt has bilat pleural effusion on CXR . L > R .  While waiting adm to ICU pt's respiratory status worsen & was orally intubated w/ size 7 ETT . 22cm @ lip line.  (02 Jul 2023 23:50)      PAST MEDICAL & SURGICAL HISTORY:  HTN (hypertension)      Afib  s/p ablation 2001      Goiter  no meds      Cellulitis  chronic      OA (osteoarthritis)      PVD (peripheral vascular disease)      COPD (chronic obstructive pulmonary disease)      Anxiety      Obesity      Acute on chronic systolic congestive heart failure      Edema of both lower legs      H/O abdominal hysterectomy      H/O discectomy      H/O total knee replacement, bilateral          Cardiac Risks:   [x ]HTN, [ ] DM, [ ] Smoking, [ ] FH,  [ ] Lipids        MEDICATIONS:  MEDICATIONS  (STANDING):  albuterol/ipratropium for Nebulization 3 milliLiter(s) Nebulizer every 6 hours  budesonide 160 MICROgram(s)/formoterol 4.5 MICROgram(s) Inhaler 2 Puff(s) Inhalation two times a day  chlorhexidine 0.12% Liquid 15 milliLiter(s) Oral Mucosa every 12 hours  chlorhexidine 2% Cloths 1 Application(s) Topical <User Schedule>  dextrose 5%. 1000 milliLiter(s) (50 mL/Hr) IV Continuous <Continuous>  dextrose 50% Injectable 25 Gram(s) IV Push once  furosemide   Injectable 40 milliGRAM(s) IV Push every 12 hours  glucagon  Injectable 1 milliGRAM(s) IntraMuscular once  heparin  Infusion 1000 Unit(s)/Hr (10 mL/Hr) IV Continuous <Continuous>  insulin lispro (ADMELOG) corrective regimen sliding scale   SubCutaneous three times a day before meals  meropenem  IVPB 1000 milliGRAM(s) IV Intermittent every 8 hours  methylPREDNISolone sodium succinate Injectable 40 milliGRAM(s) IV Push every 6 hours  metoprolol tartrate Injectable 5 milliGRAM(s) IV Push every 6 hours  nystatin Powder 1 Application(s) Topical every 12 hours  pantoprazole  Injectable 40 milliGRAM(s) IV Push daily  propofol Infusion 10 MICROgram(s)/kG/Min (5.5 mL/Hr) IV Continuous <Continuous>  propofol Infusion 10 MICROgram(s)/kG/Min (4.62 mL/Hr) IV Continuous <Continuous>      FAMILY HISTORY:      SOCIAL HISTORY:        Allergies    penicillin (Other)  codeine (Other)  aspirin (Other)  sulfa drugs (Hives; Other)  contrast media (iodine-based) (Other)    Intolerances    	    REVIEW OF SYSTEMS:  CONSTITUTIONAL: Intubated sedated      PHYSICAL EXAM:  T(C): 35.8 (07-03-23 @ 03:00), Max: 37.4 (07-02-23 @ 19:45)  HR: 69 (07-03-23 @ 06:00) (69 - 126)  BP: 106/57 (07-03-23 @ 06:00) (106/57 - 191/92)  RR: 18 (07-03-23 @ 06:00) (18 - 46)  SpO2: 95% (07-03-23 @ 06:00) (72% - 100%)  Wt(kg): --  I&O's Summary    02 Jul 2023 07:01  -  03 Jul 2023 07:00  --------------------------------------------------------  IN: 298 mL / OUT: 1400 mL / NET: -1102 mL        Appearance: Normal	  Psychiatry: A & O x 3, Mood & affect appropriate  HEENT:   Normal oral mucosa, PERRL, EOMI	  Lymphatic: No lymphadenopathy  Cardiovascular: Normal S1 S2,RRR, No JVD, No murmurs  Respiratory: Lungs clear to auscultation	Decreased bs  Gastrointestinal:  Soft, Non-tender, + BS	  Skin: No rashes, No ecchymoses, No cyanosis	  Neurologic: Non-focal  Extremities: Normal range of motion, No clubbing, cyanosis or edema  Vascular: Peripheral pulses palpable 2+ bilaterally      ECG:  	< from: 12 Lead ECG (07.02.23 @ 19:52) >    Diagnosis   reversal please repeat   Atrial fibrillation with rapid ventricular response  Abnormal ECG    Confirmed by JAISON CRAWFORD, DELLA ([id number]) on 7/2/2023 8:36:48 PM    < end of copied text >      	  LABS:	 	    CARDIAC MARKERS:                                    8.6    7.92  )-----------( 226      ( 03 Jul 2023 05:51 )             29.9     07-02    143  |  101  |  19  ----------------------------<  113<H>  3.9   |  36<H>  |  0.9    Ca    9.2      02 Jul 2023 19:55    TPro  7.5  /  Alb  3.7  /  TBili  0.5  /  DBili  x   /  AST  14  /  ALT  7   /  AlkPhos  164<H>  07-02    PT/INR - ( 02 Jul 2023 19:55 )   PT: 17.00 sec;   INR: 1.47 ratio         PTT - ( 02 Jul 2023 19:55 )  PTT:33.5 sec          ECHO  < from: TTE Echo Complete w/o Contrast w/ Doppler (03.08.23 @ 09:26) >    Summary:   1. Normal global left ventricular systolic function.   2. LV Ejection Fraction by Omer's Method with a biplane EF of 66 %.   3. Moderately increased LV wall thickness.   4. Normal left ventricular internal cavity size.   5. Normal left atrial size.   6. Normal right atrial size.   7. Mild mitral annular calcification.   8. Mild mitral valve regurgitation.   9. Mild thickening of the anterior and posterior mitral valve leaflets.  10. Mild-moderate tricuspid regurgitation.  11. Mild pulmonic valve regurgitation.    < end of copied text >

## 2023-07-03 NOTE — DIETITIAN INITIAL EVALUATION ADULT - NS FNS DIET ORDER
Diet, NPO with Tube Feed:   Tube Feeding Modality: Orogastric  Jevity 1.2 Orion  Total Volume for 24 Hours (mL): 480  Bolus  Total Volume of Bolus (mL):  120  Tube Feed Frequency: Every 6 hours   Tube Feed Start Time: 20:00  Bolus Feed Rate (mL per Hour): 160   Bolus Feed Duration (in Hours): 1  Bolus   Total Volume per Flush (mL): 200   Frequency: Every 6 Hours  Free Water Flush Instructions:  100 mL before and 100 mL after feeds (07-03-23 @ 14:17)

## 2023-07-03 NOTE — DIETITIAN INITIAL EVALUATION ADULT - ORAL INTAKE PTA/DIET HISTORY
unable to assess pt intubated to vent, no family at bedside    presently NPO with EN order for jevity 1.2 bolus feeds of 120 ml q 6 hrs via OGT, not yet initiated

## 2023-07-03 NOTE — DIETITIAN INITIAL EVALUATION ADULT - ENTERAL
recommend to change EN to Vital HP at 20 ml/hr increase as tolerated to goal rate of 50 ml/hr will provide pt with 1200 kcals+264 kcals from propofol, 103.5g protein and 1200 ml total fluid meeting >80% of estimated nutrient needs

## 2023-07-04 NOTE — PROGRESS NOTE ADULT - SUBJECTIVE AND OBJECTIVE BOX
Patient is a 88y old  Female who presents with a chief complaint of Acute respiratory failure with hypoxia     (03 Jul 2023 16:42)        Over Night Events:    Intubated   Sedated   Afebrile         ROS:  See HPI    PHYSICAL EXAM    ICU Vital Signs Last 24 Hrs  T(C): 35.3 (04 Jul 2023 06:00), Max: 36.2 (04 Jul 2023 01:00)  T(F): 95.5 (04 Jul 2023 06:00), Max: 97.2 (04 Jul 2023 01:00)  HR: 71 (04 Jul 2023 06:00) (62 - 84)  BP: 106/58 (04 Jul 2023 06:00) (88/50 - 128/63)  BP(mean): 77 (04 Jul 2023 06:00) (64 - 89)  ABP: --  ABP(mean): --  RR: 14 (04 Jul 2023 06:00) (14 - 26)  SpO2: 100% (04 Jul 2023 06:00) (97% - 100%)    O2 Parameters below as of 04 Jul 2023 06:00  Patient On (Oxygen Delivery Method): ventilator            General: NAD  HEENT: KAT             Lymphatic system: No cervical LN   Lungs: Bilateral BS, decreased  Cardiovascular: Regular   Gastrointestinal: Soft, Positive BS  Extremities: No clubbing.  No cyanosis   Skin: Warm, intact  Neurological: sedated now       07-03-23 @ 07:01  -  07-04-23 @ 07:00  --------------------------------------------------------  IN:    Heparin: 40 mL    IV PiggyBack: 600 mL    Propofol: 368 mL    Vital High Protein: 400 mL  Total IN: 1408 mL    OUT:    Indwelling Catheter - Urethral (mL): 1275 mL  Total OUT: 1275 mL    Total NET: 133 mL          LABS:                            8.4    10.98 )-----------( 233      ( 04 Jul 2023 05:54 )             29.1                                               07-03    143  |  100  |  21<H>  ----------------------------<  149<H>  3.5   |  33<H>  |  0.9    Ca    8.7      03 Jul 2023 05:51    TPro  6.4  /  Alb  3.1<L>  /  TBili  0.8  /  DBili  x   /  AST  16  /  ALT  8   /  AlkPhos  137<H>  07-03      PT/INR - ( 02 Jul 2023 19:55 )   PT: 17.00 sec;   INR: 1.47 ratio         PTT - ( 02 Jul 2023 19:55 )  PTT:33.5 sec                                       Urinalysis Basic - ( 03 Jul 2023 05:51 )    Color: x / Appearance: x / SG: x / pH: x  Gluc: 149 mg/dL / Ketone: x  / Bili: x / Urobili: x   Blood: x / Protein: x / Nitrite: x   Leuk Esterase: x / RBC: x / WBC x   Sq Epi: x / Non Sq Epi: x / Bacteria: x        CARDIAC MARKERS ( 02 Jul 2023 19:55 )  x     / <0.01 ng/mL / x     / x     / x                                                LIVER FUNCTIONS - ( 03 Jul 2023 05:51 )  Alb: 3.1 g/dL / Pro: 6.4 g/dL / ALK PHOS: 137 U/L / ALT: 8 U/L / AST: 16 U/L / GGT: x                                                  Culture - Blood (collected 02 Jul 2023 19:55)  Source: .Blood Blood-Peripheral  Preliminary Report (04 Jul 2023 04:01):    No growth at 24 hours    Culture - Blood (collected 02 Jul 2023 19:55)  Source: .Blood Blood-Peripheral  Preliminary Report (04 Jul 2023 04:01):    No growth at 24 hours                                                   Mode: AC/ CMV (Assist Control/ Continuous Mandatory Ventilation)  RR (machine): 14  TV (machine): 360  FiO2: 40  PEEP: 8  ITime: 50  MAP: 13  PIP: 34                                      ABG - ( 04 Jul 2023 04:28 )  pH, Arterial: 7.44  pH, Blood: x     /  pCO2: 57    /  pO2: 121   / HCO3: 39    / Base Excess: 12.9  /  SaO2: 99.6                MEDICATIONS  (STANDING):  acetaZOLAMIDE  IVPB 500 milliGRAM(s) IV Intermittent daily  albuterol    0.083% 2.5 milliGRAM(s) Nebulizer every 6 hours  azithromycin  IVPB 500 milliGRAM(s) IV Intermittent every 24 hours  aztreonam  IVPB 2000 milliGRAM(s) IV Intermittent every 8 hours  budesonide 160 MICROgram(s)/formoterol 4.5 MICROgram(s) Inhaler 2 Puff(s) Inhalation two times a day  chlorhexidine 0.12% Liquid 15 milliLiter(s) Oral Mucosa every 12 hours  chlorhexidine 2% Cloths 1 Application(s) Topical <User Schedule>  dextrose 5%. 1000 milliLiter(s) (50 mL/Hr) IV Continuous <Continuous>  dextrose 50% Injectable 25 Gram(s) IV Push once  enoxaparin Injectable 90 milliGRAM(s) SubCutaneous every 12 hours  furosemide   Injectable 40 milliGRAM(s) IV Push every 12 hours  glucagon  Injectable 1 milliGRAM(s) IntraMuscular once  insulin lispro (ADMELOG) corrective regimen sliding scale   SubCutaneous three times a day before meals  nystatin Powder 1 Application(s) Topical every 12 hours  pantoprazole  Injectable 40 milliGRAM(s) IV Push daily  propofol Infusion 10 MICROgram(s)/kG/Min (4.62 mL/Hr) IV Continuous <Continuous>    MEDICATIONS  (PRN):  dextrose Oral Gel 15 Gram(s) Oral once PRN Blood Glucose LESS THAN 70 milliGRAM(s)/deciliter  midazolam Injectable 2 milliGRAM(s) IV Push every 2 hours PRN severe agitation      Xrays: Bilateral opacities, ETT low                                                                                     ECHO

## 2023-07-04 NOTE — PROGRESS NOTE ADULT - SUBJECTIVE AND OBJECTIVE BOX
Patient seen and evaluated this am, sedated comfortably on ventilator       T(F): 96.1 (07-04-23 @ 15:30), Max: 97.2 (07-04-23 @ 01:00)  HR: 92 (07-04-23 @ 15:30)  BP: 87/52 (07-04-23 @ 15:30)  RR: 20  SpO2: 99% (07-04-23 @ 15:30) (97% - 100%)    PHYSICAL EXAM:  GENERAL: NAD  HEAD:  Atraumatic, Normocephalic  EYES: EOMI, PERRLA, conjunctiva and sclera clear  NERVOUS SYSTEM:  no focal deficits   CHEST/LUNG:  bilateral rales  HEART: Regular rate and rhythm; No murmurs, rubs, or gallops  ABDOMEN: Soft, Nontender, Nondistended; Bowel sounds present  EXTREMITIES:  2+ Peripheral Pulses, No clubbing, cyanosis, or edema    LABS  07-04    142  |  99  |  31<H>  ----------------------------<  143<H>  3.6   |  38<H>  |  1.1    Ca    8.6      04 Jul 2023 05:54  Mg     1.9     07-04    TPro  6.0  /  Alb  2.9<L>  /  TBili  0.3  /  DBili  x   /  AST  17  /  ALT  7   /  AlkPhos  123<H>  07-04                          8.4    10.98 )-----------( 233      ( 04 Jul 2023 05:54 )             29.1     Procalcitonin, Serum (07.03.23 @ 05:51)   Procalcitonin, Serum: 0.68      PT/INR - ( 02 Jul 2023 19:55 )   PT: 17.00 sec;   INR: 1.47 ratio         PTT - ( 02 Jul 2023 19:55 )  PTT:33.5 sec    Mode: AC/ CMV (Assist Control/ Continuous Mandatory Ventilation)  RR (machine): 14  TV (machine): 360  FiO2: 40      CARDIAC ENZYMES    Troponin T, Serum: <0.01 ng/mL (07-02-23 @ 19:55)    < from: TTE Echo Complete w/o Contrast w/ Doppler (07.03.23 @ 13:25) >  Summary:   1. Left ventricular ejection fraction, by visual estimation, is 55 to   60%.   2. Mildly enlarged left atrium.   3. Mildly enlarged right atrium.   4. Mild mitral annular calcification.   5. Mild mitral valve regurgitation.   6. Moderate tricuspid regurgitation.   7. Sclerotic aortic valve with normal opening.   8. Estimated pulmonary artery systolic pressure is 36.8 mmHg assuming a   right atrial pressure of 3 mmHg, which is consistent with borderline   pulmonary hypertension.    < end of copied text >    Culture Results:   No growth at 24 hours (07-02-23)  Culture Results:   No growth at 24 hours (07-02-23)    RADIOLOGY  < from: Xray Chest 1 View- PORTABLE-Routine (Xray Chest 1 View- PORTABLE-Routine in AM.) (07.04.23 @ 07:12) >  IMPRESSION:    Unchanged bibasilar opacities and effusions.    < end of copied text >    MEDICATIONS  (STANDING):  acetaZOLAMIDE  IVPB 500 milliGRAM(s) IV Intermittent daily  albuterol    90 MICROgram(s) HFA Inhaler 2 Puff(s) Inhalation every 4 hours  azithromycin  IVPB 500 milliGRAM(s) IV Intermittent every 24 hours  aztreonam  IVPB 2000 milliGRAM(s) IV Intermittent every 8 hours  budesonide 160 MICROgram(s)/formoterol 4.5 MICROgram(s) Inhaler 2 Puff(s) Inhalation two times a day  chlorhexidine 0.12% Liquid 15 milliLiter(s) Oral Mucosa every 12 hours  chlorhexidine 2% Cloths 1 Application(s) Topical <User Schedule>  enoxaparin Injectable 90 milliGRAM(s) SubCutaneous every 12 hours  furosemide   Injectable 40 milliGRAM(s) IV Push every 12 hours  insulin lispro (ADMELOG) corrective regimen sliding scale   SubCutaneous three times a day before meals  nystatin Powder 1 Application(s) Topical every 12 hours  pantoprazole  Injectable 40 milliGRAM(s) IV Push daily  propofol Infusion 10 MICROgram(s)/kG/Min (4.62 mL/Hr) IV Continuous <Continuous>    MEDICATIONS  (PRN):  dextrose Oral Gel 15 Gram(s) Oral once PRN Blood Glucose LESS THAN 70 milliGRAM(s)/deciliter  midazolam Injectable 2 milliGRAM(s) IV Push every 2 hours PRN severe agitation

## 2023-07-04 NOTE — PROGRESS NOTE ADULT - ASSESSMENT
89 YO F with a medical history  of HTN, malignant pleural effusion of breast origin, chronic A.fib, PAD, Obesity, Chronic lymphedema of  BLE and COPD on home oxygen 2 - 1/2 L nc, ESBL UTI presented to ED with worsening shortness of breath and weakness for 4 days with increasing cough productive of yellow-green sputum. In ED pt was in respiratory distress and intubated    acute respiratory failure / b/l pleural effusions      - continue IV Lasix and negative fluid balance   - drain fluid from L lung via pleurex catheter and send for culture   - supplement hypokalemia and re check lytes   - continue antibiotics    -  procal is elevated follow repeat   - steroids DC'd   - continue anticoagulation    - GI prophylaxis

## 2023-07-04 NOTE — PROGRESS NOTE ADULT - ASSESSMENT
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 Light sedation RASS -2 - 0    HEENT: Oral care. ET care Day 02    PULMONARY:  HOB @ 45 degrees. CXR noted with bilateral opacities slightly improved. Do bedside US; if large effusion then may need thoracentesis.  Albuterol Nebulizers. No change in vent settings. Inflate cuff due to leak of exhaled TV. ABG noted.     CARDIOVASCULAR:  Avoid volume overload. Strict I's and O's. Target MAP>65. Continue with lasix 40mg IV BID. Goal is negative balance. Echo with normal eF.     GI: GI prophylaxis.  Feeding: OG feeds as tolerated.     RENAL:  Follow up lytes. Correct as needed. Repeat VBG this afternoon.    INFECTIOUS DISEASE: RVP negative;. cx negative. Send DTA cx. Aztreonam and doxycycline for now. Nasal MRSA.     HEMATOLOGICAL:  Full AC LMWH. Moniot CBC and Coags. Hgb stable.     ENDOCRINE:  Follow up FS.  Insulin protocol if needed.    MUSCULOSKELETAL: Bedrest    Ba: 7/2  Lines: Periphers    Disposition: ICU  Palliative eval  Prognosis is poor overall

## 2023-07-05 NOTE — PROGRESS NOTE ADULT - ASSESSMENT
ASSESSMENT  89 yo W female who presents  w/ 3 days history of progressive respiratory distress    IMPRESSION  #Acute Hypoexmic respiratory failure  #Bilateral plerual effusions   - s/p left thoracentesis 3/2023 -- Cytology -PLEURAL FLUID, THORACENTESIS: - SUSPICIOUS FOR MALIGNANCY. - Atypical cells present in background of histiocytes and mesothelial  cells, suspicious for metastatic carcinoma.   - Pleurx Cx 7/4 - GPC in clusters, No PMNs  - s/p thoracentesis right thoracentesis 7/5     #Acute decompensated HF    #Obesity BMI (kg/m2): 32.6  #DM   #Abx allergy: penicillin (Other)  codeine (Other)  aspirin (Other)    RECOMMENDATIONS  - continue aztreonam 2g q 8 hours and doxycyline for now   - follow-up Right thoracentesis cell counts, Cx from 7/5   - Noted GPC in pleurx catheter gram stain, No PMNs -- difficult to interpret and would wait for thoracentesis from today   - trend WBC     Please call or message on [employer] Teams if with any questions.  Spectra [phone]

## 2023-07-05 NOTE — PROGRESS NOTE ADULT - SUBJECTIVE AND OBJECTIVE BOX
GIOVANNY BUCK  88y, Female  Allergy: penicillin (Other)  codeine (Other)  aspirin (Other)  sulfa drugs (Hives; Other)  contrast media (iodine-based) (Other)      LOS  3d    CHIEF COMPLAINT: respiratory diastress (05 Jul 2023 10:40)      INTERVAL EVENTS/HPI  - No acute events overnight  - T(F): , Max: 97.2 (07-05-23 @ 02:38)  - Denies any worsening symptoms  - Tolerating medication  - WBC Count: 11.44 (07-05-23 @ 06:04)  WBC Count: 10.98 (07-04-23 @ 05:54)     - Creatinine: 1.2 (07-05-23 @ 06:04)  Creatinine: 1.1 (07-04-23 @ 05:54)       ROS  General: Denies rigors, nightsweats  HEENT: Denies headache, rhinorrhea, sore throat, eye pain  CV: Denies CP, palpitations  PULM: Denies wheezing, hemoptysis  GI: Denies hematemesis, hematochezia, melena  : Denies discharge, hematuria  MSK: Denies arthralgias, myalgias  SKIN: Denies rash, lesions  NEURO: Denies paresthesias, weakness  PSYCH: Denies depression, anxiety    VITALS:  T(F): 96.6, Max: 97.2 (07-05-23 @ 02:38)  HR: 90  BP: 113/56  RR: 14Vital Signs Last 24 Hrs  T(C): 35.9 (05 Jul 2023 11:00), Max: 36.2 (05 Jul 2023 02:38)  T(F): 96.6 (05 Jul 2023 11:00), Max: 97.2 (05 Jul 2023 02:38)  HR: 90 (05 Jul 2023 09:11) (85 - 162)  BP: 113/56 (05 Jul 2023 09:11) (86/51 - 128/66)  BP(mean): 80 (05 Jul 2023 09:11) (64 - 85)  RR: 14 (05 Jul 2023 11:00) (14 - 28)  SpO2: 100% (05 Jul 2023 09:11) (98% - 100%)    Parameters below as of 05 Jul 2023 08:37  Patient On (Oxygen Delivery Method): ventilator    O2 Concentration (%): 40    PHYSICAL EXAM:  Gen: NAD, resting in bed  HEENT: Normocephalic, atraumatic  Neck: supple, no lymphadenopathy  CV: Regular rate & regular rhythm  Lungs: decreased BS at bases, no fremitus  Abdomen: Soft, BS present  Ext: Warm, well perfused  Neuro: non focal, awake  Skin: no rash, no erythema  Lines: no phlebitis    FH: Non-contributory  Social Hx: Non-contributory    TESTS & MEASUREMENTS:                        8.7    11.44 )-----------( 216      ( 05 Jul 2023 06:04 )             31.1     07-05    142  |  96<L>  |  46<H>  ----------------------------<  105<H>  3.5   |  38<H>  |  1.2    Ca    8.4      05 Jul 2023 06:04  Mg     2.1     07-05    TPro  6.2  /  Alb  3.0<L>  /  TBili  0.3  /  DBili  x   /  AST  17  /  ALT  7   /  AlkPhos  125<H>  07-05      LIVER FUNCTIONS - ( 05 Jul 2023 06:04 )  Alb: 3.0 g/dL / Pro: 6.2 g/dL / ALK PHOS: 125 U/L / ALT: 7 U/L / AST: 17 U/L / GGT: x           Urinalysis Basic - ( 05 Jul 2023 06:04 )    Color: x / Appearance: x / SG: x / pH: x  Gluc: 105 mg/dL / Ketone: x  / Bili: x / Urobili: x   Blood: x / Protein: x / Nitrite: x   Leuk Esterase: x / RBC: x / WBC x   Sq Epi: x / Non Sq Epi: x / Bacteria: x        Culture - Body Fluid with Gram Stain (collected 07-04-23 @ 19:56)  Source: .Body Fluid Thoracentesis Fluid  Gram Stain (07-05-23 @ 05:13):    No polymorphonuclear leukocytes seen    Gram Positive Cocci in Clusters seen    by cytocentrifuge    Culture - Sputum (collected 07-04-23 @ 08:00)  Source: Trach Asp Tracheal Aspirate  Gram Stain (07-04-23 @ 18:15):    Few Squamous epithelial cells per low power field    Few polymorphonuclear leukocytes per low power field    Few Yeast per oil power field    Culture - Blood (collected 07-02-23 @ 19:55)  Source: .Blood Blood-Peripheral  Preliminary Report (07-05-23 @ 04:01):    No growth at 48 Hours    Culture - Blood (collected 07-02-23 @ 19:55)  Source: .Blood Blood-Peripheral  Preliminary Report (07-05-23 @ 04:01):    No growth at 48 Hours        Lactate, Blood: 1.3 mmol/L (07-03-23 @ 05:51)  Blood Gas Venous - Lactate: 1.10 mmol/L (07-02-23 @ 20:06)  Lactate, Blood: 0.9 mmol/L (07-02-23 @ 19:55)      INFECTIOUS DISEASES TESTING  Procalcitonin, Serum: 0.90 (07-03-23 @ 15:48)  Procalcitonin, Serum: 0.68 (07-03-23 @ 05:51)  COVID-19 PCR: NotDetec (05-03-23 @ 14:15)  Procalcitonin, Serum: 8.81 (04-23-23 @ 14:27)  COVID-19 PCR: NotDetec (03-13-23 @ 14:15)  Procalcitonin, Serum: 0.34 (03-07-23 @ 17:16)  Procalcitonin, Serum: 0.34 (03-07-23 @ 17:16)  MRSA PCR Result.: Negative (03-02-23 @ 15:27)  MRSA PCR Result.: Negative (03-02-23 @ 07:58)  Procalcitonin, Serum: 0.19 (02-28-23 @ 06:53)  COVID-19 PCR: NotDetec (02-27-23 @ 20:59)      INFLAMMATORY MARKERS  C-Reactive Protein, Serum: 65.9 mg/L (02-28-23 @ 06:53)      RADIOLOGY & ADDITIONAL TESTS:  I have personally reviewed the last available Chest xray  CXR  Xray Chest 1 View- PORTABLE-Urgent:   ACC: [id number] EXAM:  XR CHEST PORTABLE URGENT 1V   ORDERED BY: EZEKIEL DOHERTY DATE:  07/02/2023          INTERPRETATION:  Clinical History / Reason for exam: Shortness of breath.    Comparison : Chest radiograph frontal view dated May 2, 2023 time 2:07 PM.    Technique/Positioning: AP portable upright time 7:19 PM.    Findings:    Support devices: There is a left-sided chest tube.    Large right pleural effusion obscures the right heart border.    Moderate left pleural effusion obscures the left heart border.    Diffuse prominence of the interstitial markings.    Proximal tracheal deviation to the left of midline.    Degenerative changes involving the glenohumeral joints.    Impression:    Bilateral pleural effusions.    --- End of Report ---            MARIA ELENA SIBLEY MD; Attending Radiologist  This document has been electronically signed. Jul  3 2023  8:57AM (07-02-23 @ 20:09)      CT      CARDIOLOGY TESTING  12 Lead ECG:   Ventricular Rate 77 BPM    Atrial Rate 75 BPM    QRS Duration 60 ms    Q-T Interval 336 ms    QTC Calculation(Bazett) 380 ms    R Axis 145 degrees    T Axis -27 degrees    Diagnosis Line *** Suspect arm lead reversal, interpretation assumes no reversal  Atrial fibrillation with a competing junctional pacemaker  Right axis deviation  Possible Right ventricular hypertrophy  Nonspecific T wave abnormality  Abnormal ECG    Confirmed by JOYCE HERMAN MD ([id number]) on 7/4/2023 11:44:51 AM (07-04-23 @ 09:57)  12 Lead ECG:   Ventricular Rate 67 BPM    Atrial Rate 68 BPM    QRS Duration 76 ms    Q-T Interval 454 ms    QTC Calculation(Bazett) 479 ms    R Axis 85 degrees    T Axis 27 degrees    Diagnosis Line Atrial fibrillation  Low voltage QRS  Abnormal ECG    Confirmedby JOYCE HERMAN MD ([id number]) on 7/3/2023 10:06:51 AM (07-03-23 @ 08:35)      MEDICATIONS  acetaZOLAMIDE  IVPB 500 IV Intermittent daily  albuterol    90 MICROgram(s) HFA Inhaler 2 Inhalation every 4 hours  aztreonam  IVPB 2000 IV Intermittent every 8 hours  budesonide 160 MICROgram(s)/formoterol 4.5 MICROgram(s) Inhaler 2 Inhalation two times a day  chlorhexidine 0.12% Liquid 15 Oral Mucosa every 12 hours  chlorhexidine 2% Cloths 1 Topical <User Schedule>  dextrose 5%. 1000 IV Continuous <Continuous>  dextrose 50% Injectable 25 IV Push once  doxycycline IVPB 100 IV Intermittent every 12 hours  enoxaparin Injectable 90 SubCutaneous every 12 hours  furosemide   Injectable 40 IV Push every 12 hours  glucagon  Injectable 1 IntraMuscular once  insulin lispro (ADMELOG) corrective regimen sliding scale  SubCutaneous three times a day before meals  nystatin Powder 1 Topical every 12 hours  pantoprazole  Injectable 40 IV Push daily  polyethylene glycol 3350 17 Oral every 12 hours  propofol Infusion 10 IV Continuous <Continuous>  senna 2 Oral at bedtime      WEIGHT  Weight (kg): 91.7 (07-03-23 @ 00:02)  Creatinine: 1.2 mg/dL (07-05-23 @ 06:04)      ANTIBIOTICS:  aztreonam  IVPB 2000 milliGRAM(s) IV Intermittent every 8 hours  doxycycline IVPB 100 milliGRAM(s) IV Intermittent every 12 hours      All available historical records have been reviewed

## 2023-07-05 NOTE — CONSULT NOTE ADULT - SUBJECTIVE AND OBJECTIVE BOX
CC: respiratory distress     HPI:  87 yo W female w/ PMH as noted below.  Pt. comes to hospital w/ 3 days history of progressive respiratory distress.  pt has a left sided indwelling chest tube that was place approx 1 month ago when pt was @ [facility].  This tube was to be removed this  week as per  son.  Pt was placed on AVAPS  NIV  in ER .  Pt has bilat pleural effusion on CXR . L > R .  While waiting adm to ICU pt's respiratory status worsen & was orally intubated w/ size 7 ETT . 22cm @ lip line.  (02 Jul 2023 23:50)    PERTINENT PM/SXH:   HTN (hypertension)    Afib    Goiter    Cellulitis    OA (osteoarthritis)    PVD (peripheral vascular disease)    COPD (chronic obstructive pulmonary disease)    Anxiety    Obesity    Acute on chronic systolic congestive heart failure    Lymphatic edema    Edema of both lower legs      H/O abdominal hysterectomy    H/O discectomy    H/O total knee replacement, bilateral      FAMILY HISTORY:  Unable to determine from patient as intubated/sedated    ITEMS NOT CHECKED ARE NOT PRESENT    SOCIAL HISTORY:   Significant other/partner[ ]  Children[ ]  Latter day/Spirituality:  Substance hx:  [ ]   Tobacco hx:  [ ]   Alcohol hx: [ ]   Living Situation: [x]Home  [ ]Long term care  [ ]Rehab [ ]Other  Home Services: [ ] HHA [ ] Visting RN [ ] Hospice  Occupation:  Home Opioid hx:  [ ] Y [ ] N [x ] I-Stop Reference No:     Reference #: [id number]    You have not added a GIOVANY number. Keeping your GIOAVNY number(s) up to date on the My GIOVANY # tab will enable the separation of your prescriptions from others in the search results.    Practitioner Count: 1  Pharmacy Count: 1  Current Opioid Prescriptions: 0  Current Benzodiazepine Prescriptions: 1  Current Stimulant Prescriptions: 0      Patient Demographic Information (PDI)       PDI	First Name	Last Name	Birth Date	Gender	Street Address	[facility]	Zip Code  A	Constanza Mcconnell	1935	Female	[address] [facility]	[address]    Prescription Information      PDI Filter:    PDI	Current Rx	Drug Type	Rx Written	Rx Dispensed	Drug	Quantity	Days Supply  A	Y	B	06/26/2023	06/26/2023	alprazolam 0.5 mg tablet	10	10  Prescriber Name Etta Ly C  Prescriber GIOVANY # [id number]  Payment Method Medicare  Dispenser [facility] #[id number]    ADVANCE DIRECTIVES:    [x ] Full Code [ ] DNR  MOLST  [ ]  Living Will  [ ]   DECISION MAKER(s):  [ ] Health Care Proxy(s)  [x] Surrogate(s)  [ ] Guardian           Name(s): Phone Number(s):     BASELINE (I)ADL(s) (prior to admission):  [place]: [ ]Total  [ ] Moderate [ ]Dependent  Palliative Performance Status Version 2:         %    http://[facility].org/files/news/palliative_performance_scale_ppsv2.pdf    Allergies    penicillin (Other)  codeine (Other)  aspirin (Other)  sulfa drugs (Hives; Other)  contrast media (iodine-based) (Other)    Intolerances    MEDICATIONS  (STANDING):  acetaZOLAMIDE  IVPB 500 milliGRAM(s) IV Intermittent daily  albuterol    90 MICROgram(s) HFA Inhaler 2 Puff(s) Inhalation every 4 hours  azithromycin  IVPB 500 milliGRAM(s) IV Intermittent every 24 hours  aztreonam  IVPB 2000 milliGRAM(s) IV Intermittent every 8 hours  budesonide 160 MICROgram(s)/formoterol 4.5 MICROgram(s) Inhaler 2 Puff(s) Inhalation two times a day  chlorhexidine 0.12% Liquid 15 milliLiter(s) Oral Mucosa every 12 hours  chlorhexidine 2% Cloths 1 Application(s) Topical <User Schedule>  dextrose 5%. 1000 milliLiter(s) (50 mL/Hr) IV Continuous <Continuous>  dextrose 50% Injectable 25 Gram(s) IV Push once  enoxaparin Injectable 90 milliGRAM(s) SubCutaneous every 12 hours  furosemide   Injectable 40 milliGRAM(s) IV Push every 12 hours  glucagon  Injectable 1 milliGRAM(s) IntraMuscular once  insulin lispro (ADMELOG) corrective regimen sliding scale   SubCutaneous three times a day before meals  nystatin Powder 1 Application(s) Topical every 12 hours  pantoprazole  Injectable 40 milliGRAM(s) IV Push daily  propofol Infusion 10 MICROgram(s)/kG/Min (4.62 mL/Hr) IV Continuous <Continuous>    MEDICATIONS  (PRN):  dextrose Oral Gel 15 Gram(s) Oral once PRN Blood Glucose LESS THAN 70 milliGRAM(s)/deciliter  midazolam Injectable 2 milliGRAM(s) IV Push every 2 hours PRN severe agitation    PRESENT SYMPTOMS: [ ]Unable to obtain due to poor mentation   Source if other than patient:  [ ]Family   [ ]Team     Pain: [ ]yes [ ]no  QOL impact -   Location -                    Aggravating factors -  Quality -  Radiation -  Timing-  Severity (0-10 scale):  Minimal acceptable level (0-10 scale):     CPOT:    https://www.[facility].org/getattachment/[email or web address]-6a2t-[email or web address]-8542o5605n9o/Critical-Care-Pain-Observation-Tool-(CPOT)    PAIN AD Score:   http://geriatrictoolkit.[facility]/cog/painad.pdf (press ctrl +  left click to view)    Dyspnea:                           [ ]None[ ]Mild [ ]Moderate [ ]Severe     Respiratory Distress Observation Scale (RDOS):   A score of 0 to 2 signifies little or no respiratory distress, 3 signifies mild distress, scores 4 to 6 indicate moderate distress, and scores greater than 7 signify severe distress  https://www.[facility].ca/sites/default/files/PDFS/437058-jlobtmsbqvs-kirtucrp-fwudprpflqk-gvtwb.pdf    Anxiety:                             [ ]None[ ]Mild [ ]Moderate [ ]Severe   Fatigue:                             [ ]None[ ]Mild [ ]Moderate [ ]Severe   Nausea:                             [ ]None[ ]Mild [ ]Moderate [ ]Severe   Loss of appetite:              [ ]None[ ]Mild [ ]Moderate [ ]Severe   Constipation:                    [ ]None[ ]Mild [ ]Moderate [ ]Severe    Other Symptoms:  [ ]All other review of systems negative     Palliative Performance Status Version 2:         %    http://[facility].org/files/news/palliative_performance_scale_ppsv2.pdf  PHYSICAL EXAM:  Vital Signs Last 24 Hrs  T(C): 36.1 (05 Jul 2023 09:11), Max: 36.2 (05 Jul 2023 02:38)  T(F): 97 (05 Jul 2023 09:11), Max: 97.2 (05 Jul 2023 02:38)  HR: 90 (05 Jul 2023 09:11) (74 - 162)  BP: 113/56 (05 Jul 2023 09:11) (86/51 - 128/66)  BP(mean): 80 (05 Jul 2023 09:11) (64 - 85)  RR: 14 (05 Jul 2023 09:11) (14 - 28)  SpO2: 100% (05 Jul 2023 09:11) (98% - 100%)    Parameters below as of 05 Jul 2023 08:37  Patient On (Oxygen Delivery Method): ventilator    O2 Concentration (%): 40 I&O's Summary    04 Jul 2023 07:01  -  05 Jul 2023 07:00  --------------------------------------------------------  IN: 2019 mL / OUT: 2165 mL / NET: -146 mL    05 Jul 2023 07:01  -  05 Jul 2023 10:56  --------------------------------------------------------  IN: 264 mL / OUT: 850 mL / NET: -586 mL        GENERAL:  [ ] No acute distress [ ]Lethargic  [ ]Unarousable  [ ]Verbal  [ ]Non-Verbal [ ]Cachexia    BEHAVIORAL/PSYCH:  [ ]Alert and Oriented x  [ ] Anxiety [ ] Delirium [ ] Agitation [ ] Calm   EYES: [ ] No scleral icterus [ ] Scleral icterus [ ] Closed  ENMT:  [ ]Dry mouth  [ ]No external oral lesions [ ] No external ear or nose lesions  CARDIOVASCULAR:  [ ]Regular [ ]Irregular [ ]Tachy [ ]Not Tachy  [ ]Sam [ ] Edema [ ] No edema  PULMONARY:  [ ]Tachypnea  [ ]Audible excessive secretions [ ] No labored breathing [ ] labored breathing  GASTROINTESTINAL: [ ]Soft  [ ]Distended  [ ]Not distended [ ]Non tender [ ]Tender  MUSCULOSKELETAL: [ ]No clubbing [ ] clubbing  [ ] No cyanosis [ ] cyanosis  NEUROLOGIC: [ ]No focal deficits  [ ]Follows commands  [ ]Does not follow commands  [ ]Cognitive impairment  [ ]Dysphagia  [ ]Dysarthria  [ ]Paresis   SKIN: [ ] Jaundiced [ ] Non-jaundiced [ ]Rash [ ]No Rash [ ] Warm [ ] Dry  MISC/LINES: [ ] ET tube [ ] Trach [ ]NGT/OGT [ ]PEG [ ]Ba    CRITICAL CARE:  [ ] Shock Present  [ ]Septic [ ]Cardiogenic [ ]Neurologic [ ]Hypovolemic  [ ]  Vasopressors [ ]  Inotropes   [ ]Respiratory failure present [ ]Mechanical ventilation [ ]Non-invasive ventilatory support [ ]High flow  [ ]Acute  [ ]Chronic [ ]Hypoxic  [ ]Hypercarbic [ ]Other  [ ]Other organ failure     LABS: reviewed by me                        8.7    11.44 )-----------( 216      ( 05 Jul 2023 06:04 )             31.1   07-05    142  |  96<L>  |  46<H>  ----------------------------<  105<H>  3.5   |  38<H>  |  1.2    Ca    8.4      05 Jul 2023 06:04  Mg     2.1     07-05    TPro  6.2  /  Alb  3.0<L>  /  TBili  0.3  /  DBili  x   /  AST  17  /  ALT  7   /  AlkPhos  125<H>  07-05      Urinalysis Basic - ( 05 Jul 2023 06:04 )    Color: x / Appearance: x / SG: x / pH: x  Gluc: 105 mg/dL / Ketone: x  / Bili: x / Urobili: x   Blood: x / Protein: x / Nitrite: x   Leuk Esterase: x / RBC: x / WBC x   Sq Epi: x / Non Sq Epi: x / Bacteria: x      RADIOLOGY & ADDITIONAL STUDIES: reviewed by me    EKG: reviewed by me      PROTEIN CALORIE MALNUTRITION PRESENT: [ ]mild [ ]moderate [ ]severe [ ]underweight [ ]morbid obesity  https://www.andeal.org/vault/5015/web/files/ONC/Table_Clinical%20Characteristics%20to%20Document%20Malnutrition-White%20JV%20et%20al%202012.pdf    Height (cm): 167.6 (07-03-23 @ 00:02), 154.9 (05-02-23 @ 12:13), 157.5 (03-07-23 @ 15:06)  Weight (kg): 91.7 (07-03-23 @ 00:02), 90.3 (05-02-23 @ 12:13), 96.8 (03-04-23 @ 12:04)  BMI (kg/m2): 32.6 (07-03-23 @ 00:02), 37.6 (05-02-23 @ 12:13), 39 (03-07-23 @ 15:06)    [ ]PPSV2 < or = to 30% [ ]significant weight loss  [ ]poor nutritional intake  [ ]anasarca      [ ]Artificial Nutrition      REFERRALS:   [ ]Chaplaincy  [ ]Hospice  [ ]Child Life  [ ]Social Work  [ ]Case management [ ]Holistic Therapy     Patient discussed with primary medical team MD  Palliative care education provided to patient and/or family  Patient and/or family assessed for spiritual and social needs    Goals of Care Document:    CC: respiratory distress     HPI:  87 yo W female w/ PMH as noted below.  Pt. comes to hospital w/ 3 days history of progressive respiratory distress.  pt has a left sided indwelling chest tube that was place approx 1 month ago when pt was @ [facility].  This tube was to be removed this  week as per  son.  Pt was placed on AVAPS  NIV  in ER .  Pt has bilat pleural effusion on CXR . L > R .  While waiting adm to ICU pt's respiratory status worsen & was orally intubated w/ size 7 ETT . 22cm @ lip line.  (02 Jul 2023 23:50)    PERTINENT PM/SXH:   HTN (hypertension)    Afib    Goiter    Cellulitis    OA (osteoarthritis)    PVD (peripheral vascular disease)    COPD (chronic obstructive pulmonary disease)    Anxiety    Obesity    Acute on chronic systolic congestive heart failure    Lymphatic edema    Edema of both lower legs      H/O abdominal hysterectomy    H/O discectomy    H/O total knee replacement, bilateral      FAMILY HISTORY:  Unable to determine from patient as intubated/sedated    ITEMS NOT CHECKED ARE NOT PRESENT    SOCIAL HISTORY:   Significant other/partner[ ]  Children[ ]  Shinto/Spirituality:  Substance hx:  [ ]   Tobacco hx:  [ ]   Alcohol hx: [ ]   Living Situation: [x]Home  [ ]Long term care  [ ]Rehab [ ]Other  Home Services: [ ] HHA [ ] Visting RN [ ] Hospice  Occupation:  Home Opioid hx:  [ ] Y [ ] N [x ] I-Stop Reference No:     Reference #: [id number]    You have not added a GIOVANY number. Keeping your GIOVANY number(s) up to date on the My GIOVANY # tab will enable the separation of your prescriptions from others in the search results.    Practitioner Count: 1  Pharmacy Count: 1  Current Opioid Prescriptions: 0  Current Benzodiazepine Prescriptions: 1  Current Stimulant Prescriptions: 0      Patient Demographic Information (PDI)       PDI	First Name	Last Name	Birth Date	Gender	Street Address	[facility]	Zip Code  [place]	Constanza Mcconnell	1935	Female	[address]	[address]    Prescription Information      PDI Filter:    PDI	Current Rx	Drug Type	Rx Written	Rx Dispensed	Drug	Quantity	Days Supply  A	Y	B	06/26/2023	06/26/2023	alprazolam 0.5 mg tablet	10	10  Prescriber Name Etta Ly C  Prescriber GIOVANY # [id number]  Payment Method Medicare  Dispenser [facility] #[id number]    ADVANCE DIRECTIVES:    [x ] Full Code [ ] DNR  MOLST  [ ]  Living Will  [ ]   DECISION MAKER(s):  [x] Health Care Proxy(s)  [ ] Surrogate(s)  [ ] Guardian           Name(s): Phone Number(s): HCP from 2007 names Karishma, maria ines Colin notes there is a new HCP naming him. All children make decisions together.    BASELINE (I)ADL(s) (prior to admission):  [place]: [ ]Total  [ ] Moderate [ ]Dependent  Palliative Performance Status Version 2:         %    http://npcrc.org/files/news/palliative_performance_scale_ppsv2.pdf    Allergies    penicillin (Other)  codeine (Other)  aspirin (Other)  sulfa drugs (Hives; Other)  contrast media (iodine-based) (Other)    Intolerances    MEDICATIONS  (STANDING):  acetaZOLAMIDE  IVPB 500 milliGRAM(s) IV Intermittent daily  albuterol    90 MICROgram(s) HFA Inhaler 2 Puff(s) Inhalation every 4 hours  azithromycin  IVPB 500 milliGRAM(s) IV Intermittent every 24 hours  aztreonam  IVPB 2000 milliGRAM(s) IV Intermittent every 8 hours  budesonide 160 MICROgram(s)/formoterol 4.5 MICROgram(s) Inhaler 2 Puff(s) Inhalation two times a day  chlorhexidine 0.12% Liquid 15 milliLiter(s) Oral Mucosa every 12 hours  chlorhexidine 2% Cloths 1 Application(s) Topical <User Schedule>  dextrose 5%. 1000 milliLiter(s) (50 mL/Hr) IV Continuous <Continuous>  dextrose 50% Injectable 25 Gram(s) IV Push once  enoxaparin Injectable 90 milliGRAM(s) SubCutaneous every 12 hours  furosemide   Injectable 40 milliGRAM(s) IV Push every 12 hours  glucagon  Injectable 1 milliGRAM(s) IntraMuscular once  insulin lispro (ADMELOG) corrective regimen sliding scale   SubCutaneous three times a day before meals  nystatin Powder 1 Application(s) Topical every 12 hours  pantoprazole  Injectable 40 milliGRAM(s) IV Push daily  propofol Infusion 10 MICROgram(s)/kG/Min (4.62 mL/Hr) IV Continuous <Continuous>    MEDICATIONS  (PRN):  dextrose Oral Gel 15 Gram(s) Oral once PRN Blood Glucose LESS THAN 70 milliGRAM(s)/deciliter  midazolam Injectable 2 milliGRAM(s) IV Push every 2 hours PRN severe agitation    PRESENT SYMPTOMS: [x ]Unable to obtain due to poor mentation   Source if other than patient:  [ ]Family   [ ]Team     Pain: [ ]yes [ ]no  QOL impact -   Location -                    Aggravating factors -  Quality -  Radiation -  Timing-  Severity (0-10 scale):  Minimal acceptable level (0-10 scale):     CPOT:  1  https://www.[facility].org/getattachment/[email or web address]/Critical-Care-Pain-Observation-Tool-(CPOT)    PAIN AD Score:   http://geriatrictoolkit.[facility]/cog/painad.pdf (press ctrl +  left click to view)    Dyspnea:                           [ ]None[ ]Mild [ ]Moderate [ ]Severe     Respiratory Distress Observation Scale (RDOS): 1  A score of 0 to 2 signifies little or no respiratory distress, 3 signifies mild distress, scores 4 to 6 indicate moderate distress, and scores greater than 7 signify severe distress  https://www.[facility].ca/sites/default/files/PDFS/857848-vebhxqhkqug-xpknpzkv-phihekqowae-ohqlq.pdf    Anxiety:                             [ ]None[ ]Mild [ ]Moderate [ ]Severe   Fatigue:                             [ ]None[ ]Mild [ ]Moderate [ ]Severe   Nausea:                             [ ]None[ ]Mild [ ]Moderate [ ]Severe   Loss of appetite:              [ ]None[ ]Mild [ ]Moderate [ ]Severe   Constipation:                    [ ]None[ ]Mild [ ]Moderate [ ]Severe    Other Symptoms:  [ ]All other review of systems negative     Palliative Performance Status Version 2:         10%    http://npcrc.org/files/news/palliative_performance_scale_ppsv2.pdf  PHYSICAL EXAM:  Vital Signs Last 24 Hrs  T(C): 36.1 (05 Jul 2023 09:11), Max: 36.2 (05 Jul 2023 02:38)  T(F): 97 (05 Jul 2023 09:11), Max: 97.2 (05 Jul 2023 02:38)  HR: 90 (05 Jul 2023 09:11) (74 - 162)  BP: 113/56 (05 Jul 2023 09:11) (86/51 - 128/66)  BP(mean): 80 (05 Jul 2023 09:11) (64 - 85)  RR: 14 (05 Jul 2023 09:11) (14 - 28)  SpO2: 100% (05 Jul 2023 09:11) (98% - 100%)    Parameters below as of 05 Jul 2023 08:37  Patient On (Oxygen Delivery Method): ventilator    O2 Concentration (%): 40 I&O's Summary    04 Jul 2023 07:01  -  05 Jul 2023 07:00  --------------------------------------------------------  IN: 2019 mL / OUT: 2165 mL / NET: -146 mL    05 Jul 2023 07:01  -  05 Jul 2023 10:56  --------------------------------------------------------  IN: 264 mL / OUT: 850 mL / NET: -586 mL        GENERAL:  [x ] No acute distress [ ]Lethargic  [x ]Unarousable  [ ]Verbal  [ ]Non-Verbal [ ]Cachexia    BEHAVIORAL/PSYCH:  [ ]Alert and Oriented x  [ ] Anxiety [ ] Delirium [ ] Agitation [ x] Calm   EYES: [ ] No scleral icterus [ ] Scleral icterus [x ] Closed  ENMT:  [ ]Dry mouth  [ x]No external oral lesions [ ] No external ear or nose lesions  CARDIOVASCULAR:  [ ]Regular [ ]Irregular [ ]Tachy [ ]Not Tachy  [ ]Sam [ ] Edema [ ] No edema  PULMONARY:  [ ]Tachypnea  [ ]Audible excessive secretions [ x] No labored breathing [ ] labored breathing  GASTROINTESTINAL: [ ]Soft  [ ]Distended  [x ]Not distended [ ]Non tender [ ]Tender  MUSCULOSKELETAL: [ ]No clubbing [ ] clubbing  [ x] No cyanosis [ ] cyanosis  NEUROLOGIC: [ ]No focal deficits  [ ]Follows commands  [x ]Does not follow commands  [ ]Cognitive impairment  [ ]Dysphagia  [ ]Dysarthria  [ ]Paresis   SKIN: [ ] Jaundiced [x ] Non-jaundiced [ ]Rash [ ]No Rash [ ] Warm [ ] Dry  MISC/LINES: [ ] ET tube [ ] Trach [ ]NGT/OGT [ ]PEG [ ]Ba    CRITICAL CARE:  [ ] Shock Present  [ ]Septic [ ]Cardiogenic [ ]Neurologic [ ]Hypovolemic  [ ]  Vasopressors [ ]  Inotropes   [x ]Respiratory failure present [ x]Mechanical ventilation [ ]Non-invasive ventilatory support [ ]High flow  [ ]Acute  [ ]Chronic [ ]Hypoxic  [ ]Hypercarbic [ ]Other  [ ]Other organ failure     LABS: reviewed by me                        8.7    11.44 )-----------( 216      ( 05 Jul 2023 06:04 )             31.1   07-05    142  |  96<L>  |  46<H>  ----------------------------<  105<H>  3.5   |  38<H>  |  1.2    Ca    8.4      05 Jul 2023 06:04  Mg     2.1     07-05    TPro  6.2  /  Alb  3.0<L>  /  TBili  0.3  /  DBili  x   /  AST  17  /  ALT  7   /  AlkPhos  125<H>  07-05      Urinalysis Basic - ( 05 Jul 2023 06:04 )    Color: x / Appearance: x / SG: x / pH: x  Gluc: 105 mg/dL / Ketone: x  / Bili: x / Urobili: x   Blood: x / Protein: x / Nitrite: x   Leuk Esterase: x / RBC: x / WBC x   Sq Epi: x / Non Sq Epi: x / Bacteria: x      RADIOLOGY & ADDITIONAL STUDIES: reviewed by me  < from: Xray Chest 1 View- PORTABLE-Routine (Xray Chest 1 View- PORTABLE-Routine in AM.) (07.05.23 @ 06:44) >  Impression:    Endotracheal tube tip is just above the shelly and clinical correlation   is recommended    Stable bilateral lung opacities    < end of copied text >        EKG: reviewed by me  < from: 12 Lead ECG (07.04.23 @ 09:57) >  Ventricular Rate 77 BPM    Atrial Rate 75 BPM    QRS Duration 60 ms    Q-T Interval 336 ms    QTC Calculation(Bazett) 380 ms    R Axis 145 degrees    T Axis -27 degrees    Diagnosis Line *** Suspect arm lead reversal, interpretation assumes no reversal  Atrial fibrillation with a competing junctional pacemaker  Right axis deviation  Possible Right ventricular hypertrophy  Nonspecific T wave abnormality  Abnormal ECG    < end of copied text >      PROTEIN CALORIE MALNUTRITION PRESENT: [ ]mild [ ]moderate [ ]severe [ ]underweight [ ]morbid obesity  https://www.andeal.org/vault/2440/web/files/ONC/Table_Clinical%20Characteristics%20to%20Document%20Malnutrition-White%20JV%20et%20al%202012.pdf    Height (cm): 167.6 (07-03-23 @ 00:02), 154.9 (05-02-23 @ 12:13), 157.5 (03-07-23 @ 15:06)  Weight (kg): 91.7 (07-03-23 @ 00:02), 90.3 (05-02-23 @ 12:13), 96.8 (03-04-23 @ 12:04)  BMI (kg/m2): 32.6 (07-03-23 @ 00:02), 37.6 (05-02-23 @ 12:13), 39 (03-07-23 @ 15:06)    [ ]PPSV2 < or = to 30% [ ]significant weight loss  [ ]poor nutritional intake  [ ]anasarca      [ ]Artificial Nutrition      REFERRALS:   [ ]Chaplaincy  [ ]Hospice  [ ]Child Life  [x ]Social Work  [ ]Case management [ ]Holistic Therapy     Patient discussed with primary medical team MD  Palliative care education provided to patient and/or family  Patient and/or family assessed for spiritual and social needs    Goals of Care Document:

## 2023-07-05 NOTE — PROGRESS NOTE ADULT - ASSESSMENT
87 YO F with a medical history  of HTN, malignant pleural effusion of breast origin, chronic A.fib, PAD, Obesity, Chronic lymphedema of  BLE and COPD on home oxygen 2 - 1/2 L nc, ESBL UTI presented to ED with worsening shortness of breath and weakness for 4 days with increasing cough productive of yellow-green sputum. In ED pt was in respiratory distress and intubated    acute respiratory failure / b/l pleural effusions      - continue IV Lasix and negative fluid balance   - no  fluid came out from R sided chest pleurex catheter when drainage attempted yesterday   - may need thora?   - supplement hypokalemia and re check lytes   - continue antibiotics    - steroids DC'd   - continue anticoagulation for now   - SAT   - palliative    - GI prophylaxis

## 2023-07-05 NOTE — PROGRESS NOTE ADULT - SUBJECTIVE AND OBJECTIVE BOX
GIOVANNY BUCK 88y Female  MRN#: [id number]     Hospital Day: 3d    Pt is currently admitted with the primary diagnosis of acute hypoxemic and hypercapnic respiratory failure    SUBJECTIVE  Hospital Course    No Overnight events     Unable to assess Subjective complaints since patient is intubated    Present Today:   - Mejia:   Yes [ x ] : Indication: critically ill                                            ----------------------------------------------------------  OBJECTIVE  PAST MEDICAL & SURGICAL HISTORY  HTN (hypertension)    Afib  s/p ablation 2001    Goiter  no meds    Cellulitis  chronic    OA (osteoarthritis)    PVD (peripheral vascular disease)    COPD (chronic obstructive pulmonary disease)    Anxiety    Obesity    Acute on chronic systolic congestive heart failure    Edema of both lower legs    H/O abdominal hysterectomy    H/O discectomy    H/O total knee replacement, bilateral                                              -----------------------------------------------------------  ALLERGIES:  penicillin (Other)  codeine (Other)  aspirin (Other)  sulfa drugs (Hives; Other)  contrast media (iodine-based) (Other)                                            ------------------------------------------------------------    HOME MEDICATIONS  Home Medications:  albuterol 90 mcg/inh inhalation aerosol: 2 puff(s) inhaled every 6 hours as needed for  shortness of breath and/or wheezing (02 Jul 2023 23:05)  budesonide-formoterol 80 mcg-4.5 mcg/inh inhalation aerosol: 2 puff(s) inhaled 2 times a day (02 Jul 2023 23:05)  dilTIAZem 180 mg/24 hours oral capsule, extended release: 1 cap(s) orally once a day (02 Jul 2023 23:05)  Eliquis 2.5 mg oral tablet: 1 tab(s) orally 2 times a day (02 Jul 2023 23:05)  furosemide 20 mg oral tablet: 1 tab(s) orally once a day (02 Jul 2023 23:05)  Senna 8.6 mg oral tablet: 2 tab(s) orally once a day (at bedtime) (02 Jul 2023 23:05)                           MEDICATIONS:  STANDING MEDICATIONS  acetaZOLAMIDE  IVPB 500 milliGRAM(s) IV Intermittent daily  albuterol    90 MICROgram(s) HFA Inhaler 2 Puff(s) Inhalation every 4 hours  aztreonam  IVPB 2000 milliGRAM(s) IV Intermittent every 8 hours  budesonide 160 MICROgram(s)/formoterol 4.5 MICROgram(s) Inhaler 2 Puff(s) Inhalation two times a day  chlorhexidine 0.12% Liquid 15 milliLiter(s) Oral Mucosa every 12 hours  chlorhexidine 2% Cloths 1 Application(s) Topical <User Schedule>  dextrose 5%. 1000 milliLiter(s) IV Continuous <Continuous>  dextrose 50% Injectable 25 Gram(s) IV Push once  doxycycline IVPB 100 milliGRAM(s) IV Intermittent every 12 hours  enoxaparin Injectable 90 milliGRAM(s) SubCutaneous every 12 hours  furosemide   Injectable 40 milliGRAM(s) IV Push every 12 hours  glucagon  Injectable 1 milliGRAM(s) IntraMuscular once  insulin lispro (ADMELOG) corrective regimen sliding scale   SubCutaneous three times a day before meals  nystatin Powder 1 Application(s) Topical every 12 hours  pantoprazole  Injectable 40 milliGRAM(s) IV Push daily  polyethylene glycol 3350 17 Gram(s) Oral every 12 hours  propofol Infusion 10 MICROgram(s)/kG/Min IV Continuous <Continuous>  senna 2 Tablet(s) Oral at bedtime    PRN MEDICATIONS  dextrose Oral Gel 15 Gram(s) Oral once PRN  midazolam Injectable 2 milliGRAM(s) IV Push every 2 hours PRN                                            ------------------------------------------------------------  VITAL SIGNS: Last 24 Hours  T(C): 35.9 (05 Jul 2023 11:00), Max: 36.2 (05 Jul 2023 02:38)  T(F): 96.6 (05 Jul 2023 11:00), Max: 97.2 (05 Jul 2023 02:38)  HR: 90 (05 Jul 2023 09:11) (85 - 162)  BP: 113/56 (05 Jul 2023 09:11) (86/51 - 128/66)  BP(mean): 80 (05 Jul 2023 09:11) (64 - 85)  RR: 14 (05 Jul 2023 11:00) (14 - 28)  SpO2: 100% (05 Jul 2023 09:11) (98% - 100%)      07-04-23 @ 07:01  -  07-05-23 @ 07:00  --------------------------------------------------------  IN: 2019 mL / OUT: 2165 mL / NET: -146 mL    07-05-23 @ 07:01  -  07-05-23 @ 14:22  --------------------------------------------------------  IN: 264 mL / OUT: 1200 mL / NET: -936 mL                                             --------------------------------------------------------------  LABS:                        8.7    11.44 )-----------( 216      ( 05 Jul 2023 06:04 )             31.1     07-05    142  |  96<L>  |  46<H>  ----------------------------<  105<H>  3.5   |  38<H>  |  1.2    Ca    8.4      05 Jul 2023 06:04  Mg     2.1     07-05    TPro  6.2  /  Alb  3.0<L>  /  TBili  0.3  /  DBili  x   /  AST  17  /  ALT  7   /  AlkPhos  125<H>  07-05      Urinalysis Basic - ( 05 Jul 2023 06:04 )    Color: x / Appearance: x / SG: x / pH: x  Gluc: 105 mg/dL / Ketone: x  / Bili: x / Urobili: x   Blood: x / Protein: x / Nitrite: x   Leuk Esterase: x / RBC: x / WBC x   Sq Epi: x / Non Sq Epi: x / Bacteria: x      ABG - ( 05 Jul 2023 03:34 )  pH, Arterial: 7.40  pH, Blood: x     /  pCO2: 69    /  pO2: 115   / HCO3: 43    / Base Excess: 15.6  /  SaO2: 99.5                    Culture - Body Fluid with Gram Stain (collected 04 Jul 2023 19:56)  Source: .Body Fluid Thoracentesis Fluid  Gram Stain (05 Jul 2023 05:13):    No polymorphonuclear leukocytes seen    Gram Positive Cocci in Clusters seen    by cytocentrifuge    Culture - Sputum (collected 04 Jul 2023 08:00)  Source: Trach Asp Tracheal Aspirate  Gram Stain (04 Jul 2023 18:15):    Few Squamous epithelial cells per low power field    Few polymorphonuclear leukocytes per low power field    Few Yeast per oil power field    Culture - Blood (collected 02 Jul 2023 19:55)  Source: .Blood Blood-Peripheral  Preliminary Report (05 Jul 2023 04:01):    No growth at 48 Hours    Culture - Blood (collected 02 Jul 2023 19:55)  Source: .Blood Blood-Peripheral  Preliminary Report (05 Jul 2023 04:01):    No growth at 48 Hours        PHYSICAL EXAM:    GENERAL: intubated sedated  EYES: equal and reactive to light  ENT: tube present  NECK: Supple, No JVD  CHEST/LUNG: Clear to auscultation bilaterally; No rales, rhonchi, wheezing, or rubs. Unlabored respirations  HEART: Regular rate and rhythm; No murmurs, rubs, or gallops  ABDOMEN: BSx4; Soft, nontender, nondistended  EXTREMITIES: severe bilateral lower extremity up to hips  NERVOUS SYSTEM:  unable to assess due to intuabtion

## 2023-07-05 NOTE — PROCEDURAL SAFETY CHECKLIST WITH OR WITHOUT SEDATION - NSINSTRUMENTCOUNTSD_GEN_ALL_CORE
Called and spoke to the patient he is concerned about the symptoms he is having and is on the schedule for Friday.  Patient made aware that if his symptoms change that he is to call the office or ER or UC  
From: Buddy Rush  To: Jimmy Olivier MD  Sent: 4/26/2017 9:31 AM CDT  Subject: Heart Palpitations    I believe I am experiencing heart palpitations. My heart feels like it is beating too fast and fluttering. In reading about it on Web MD it recommended contacting my doctor.     From what I read it may be caused by stress and anxiety and I certainly am experiencing that right now. I am adopted and through [email or web address] I recently was put in touch with my birth father and his family. This was very unexpected and it has been a real shock to me even though it is a happy thing. I have found my mind racing and I have had trouble sleeping. If Dr. Olivier could recommend someone I could discuss this matter with I would appreciate that too. Thank you.  
done

## 2023-07-05 NOTE — CONSULT NOTE ADULT - PROBLEM SELECTOR RECOMMENDATION 2
-Full code  -ongoing medical management  -Karishma has HCP in system, but Cristi notes he has a form naming him HCP  -will need to clarify HCP  -children make decisions together  -GOC as appropriate

## 2023-07-05 NOTE — CONSULT NOTE ADULT - PROBLEM SELECTOR RECOMMENDATION 9
In the setting bilateral pleural effusion: HF vs malignant. Some concern for PNA.  Now on mechanical ventilation.  -continue IV azithromycin and aztreonam  -continue IV diuresis  -vent management per ICU team  -family wishes for full code  -GOC  -high risk

## 2023-07-05 NOTE — PROGRESS NOTE ADULT - SUBJECTIVE AND OBJECTIVE BOX
Patient seen and evaluated this am, sedated on ventilator with propofol       T(F): 96.8 (07-05-23 @ 07:01), Max: 97.2 (07-05-23 @ 02:38)  HR: 89 (07-05-23 @ 09:04)  BP: 115/60 (07-05-23 @ 05:00)  RR: 14 (07-05-23 @ 07:01)  SpO2: 100% (07-05-23 @ 09:04) (98% - 100%)    PHYSICAL EXAM:  GENERAL: NAD  HEAD:  Atraumatic, Normocephalic  EYES: EOMI, PERRLA, conjunctiva and sclera clear  NERVOUS SYSTEM:  no focal deficits   CHEST/LUNG:  bilateral rales  HEART: Regular rate and rhythm; No murmurs, rubs, or gallops  ABDOMEN: Soft, Nontender, Nondistended; Bowel sounds present  EXTREMITIES:  2+ Peripheral Pulses, No clubbing, cyanosis, or edema    LABS  07-05    142  |  96<L>  |  46<H>  ----------------------------<  105<H>  3.5   |  38<H>  |  1.2    Ca    8.4      05 Jul 2023 06:04  Mg     2.1     07-05    TPro  6.2  /  Alb  3.0<L>  /  TBili  0.3  /  DBili  x   /  AST  17  /  ALT  7   /  AlkPhos  125<H>  07-05                          8.7    11.44 )-----------( 216      ( 05 Jul 2023 06:04 )             31.1       Mode: AC/ CMV (Assist Control/ Continuous Mandatory Ventilation)  RR (machine): 14  TV (machine): 360  FiO2: 40  PEEP: 8      CARDIAC ENZYMES    Troponin T, Serum: <0.01 ng/mL (07-02-23 @ 19:55)      Culture Results:   No growth at 48 Hours (07-02-23)  Culture Results:   No growth at 48 Hours (07-02-23)    RADIOLOGY  < from: Xray Chest 1 View- PORTABLE-Routine (Xray Chest 1 View- PORTABLE-Routine in AM.) (07.05.23 @ 06:44) >  Impression:    Endotracheal tube tip is just above the shelly and clinical correlation   is recommended    Stable bilateral lung opacities      < end of copied text >    MEDICATIONS  (STANDING):  acetaZOLAMIDE  IVPB 500 milliGRAM(s) IV Intermittent daily  albuterol    90 MICROgram(s) HFA Inhaler 2 Puff(s) Inhalation every 4 hours  azithromycin  IVPB 500 milliGRAM(s) IV Intermittent every 24 hours  aztreonam  IVPB 2000 milliGRAM(s) IV Intermittent every 8 hours  budesonide 160 MICROgram(s)/formoterol 4.5 MICROgram(s) Inhaler 2 Puff(s) Inhalation two times a day  chlorhexidine 0.12% Liquid 15 milliLiter(s) Oral Mucosa every 12 hours  chlorhexidine 2% Cloths 1 Application(s) Topical <User Schedule>  enoxaparin Injectable 90 milliGRAM(s) SubCutaneous every 12 hours  furosemide   Injectable 40 milliGRAM(s) IV Push every 12 hours  insulin lispro (ADMELOG) corrective regimen sliding scale   SubCutaneous three times a day before meals  nystatin Powder 1 Application(s) Topical every 12 hours  pantoprazole  Injectable 40 milliGRAM(s) IV Push daily  propofol Infusion 10 MICROgram(s)/kG/Min (4.62 mL/Hr) IV Continuous <Continuous>    MEDICATIONS  (PRN):  dextrose Oral Gel 15 Gram(s) Oral once PRN Blood Glucose LESS THAN 70 milliGRAM(s)/deciliter  midazolam Injectable 2 milliGRAM(s) IV Push every 2 hours PRN severe agitation

## 2023-07-05 NOTE — PROGRESS NOTE ADULT - SUBJECTIVE AND OBJECTIVE BOX
Patient is a 88y old  Female who presents with a chief complaint of respiratory distress (05 Jul 2023 09:30)        Over Night Events:  critically ill on MV      ROS:     All ROS are negative except HPI         PHYSICAL EXAM    ICU Vital Signs Last 24 Hrs  T(C): 36.1 (05 Jul 2023 09:11), Max: 36.2 (05 Jul 2023 02:38)  T(F): 97 (05 Jul 2023 09:11), Max: 97.2 (05 Jul 2023 02:38)  HR: 90 (05 Jul 2023 09:11) (74 - 162)  BP: 113/56 (05 Jul 2023 09:11) (86/51 - 128/66)  BP(mean): 80 (05 Jul 2023 09:11) (64 - 85)  ABP: --  ABP(mean): --  RR: 14 (05 Jul 2023 09:11) (14 - 28)  SpO2: 100% (05 Jul 2023 09:11) (98% - 100%)    O2 Parameters below as of 05 Jul 2023 08:37  Patient On (Oxygen Delivery Method): ventilator    O2 Concentration (%): 40      ENT: Airway patent, ET tubed  EYES: Pupils equal, Round and reactive to light.  CARDIAC: Normal rate, Regular rhythm.    RESPIRATORY: No wheezing, Bilateral BS, Not tachypneic, No use of accessory muscles  GASTROINTESTINAL: Abdomen soft, Non-tender, No guarding,   NEUROLOGICAL: Sedated  SKIN: Skin normal color for race, Warm and dry and intact.         07-04-23 @ 07:01  -  07-05-23 @ 07:00  --------------------------------------------------------  IN:    IV PiggyBack: 600 mL    Propofol: 369 mL    Vital High Protein: 1050 mL  Total IN: 2019 mL    OUT:    Indwelling Catheter - Urethral (mL): 2165 mL  Total OUT: 2165 mL    Total NET: -146 mL      07-05-23 @ 07:01  -  07-05-23 @ 10:41  --------------------------------------------------------  IN:    Propofol: 64 mL    Vital High Protein: 200 mL  Total IN: 264 mL    OUT:    Indwelling Catheter - Urethral (mL): 850 mL  Total OUT: 850 mL    Total NET: -586 mL    LABS:                          8.7    11.44 )-----------( 216      ( 05 Jul 2023 06:04 )             31.1                                               07-05    142  |  96<L>  |  46<H>  ----------------------------<  105<H>  3.5   |  38<H>  |  1.2    Ca    8.4      05 Jul 2023 06:04  Mg     2.1     07-05    TPro  6.2  /  Alb  3.0<L>  /  TBili  0.3  /  DBili  x   /  AST  17  /  ALT  7   /  AlkPhos  125<H>  07-05                                             Urinalysis Basic - ( 05 Jul 2023 06:04 )    Color: x / Appearance: x / SG: x / pH: x  Gluc: 105 mg/dL / Ketone: x  / Bili: x / Urobili: x   Blood: x / Protein: x / Nitrite: x   Leuk Esterase: x / RBC: x / WBC x   Sq Epi: x / Non Sq Epi: x / Bacteria: x                                              LIVER FUNCTIONS - ( 05 Jul 2023 06:04 )  Alb: 3.0 g/dL / Pro: 6.2 g/dL / ALK PHOS: 125 U/L / ALT: 7 U/L / AST: 17 U/L / GGT: x                                                  Culture - Body Fluid with Gram Stain (collected 04 Jul 2023 19:56)  Source: .Body Fluid Thoracentesis Fluid  Gram Stain (05 Jul 2023 05:13):    No polymorphonuclear leukocytes seen    Gram Positive Cocci in Clusters seen    by cytocentrifuge    Culture - Sputum (collected 04 Jul 2023 08:00)  Source: Trach Asp Tracheal Aspirate  Gram Stain (04 Jul 2023 18:15):    Few Squamous epithelial cells per low power field    Few polymorphonuclear leukocytes per low power field    Few Yeast per oil power field    Culture - Blood (collected 02 Jul 2023 19:55)  Source: .Blood Blood-Peripheral  Preliminary Report (05 Jul 2023 04:01):    No growth at 48 Hours    Culture - Blood (collected 02 Jul 2023 19:55)  Source: .Blood Blood-Peripheral  Preliminary Report (05 Jul 2023 04:01):    No growth at 48 Hours                                                   Mode: AC/ CMV (Assist Control/ Continuous Mandatory Ventilation)  RR (machine): 14  TV (machine): 360  FiO2: 40  PEEP: 8  ITime: 0.94  MAP: 14  PIP: 38                                      ABG - ( 05 Jul 2023 03:34 )  pH, Arterial: 7.40  pH, Blood: x     /  pCO2: 69    /  pO2: 115   / HCO3: 43    / Base Excess: 15.6  /  SaO2: 99.5        MEDICATIONS  (STANDING):  acetaZOLAMIDE  IVPB 500 milliGRAM(s) IV Intermittent daily  albuterol    90 MICROgram(s) HFA Inhaler 2 Puff(s) Inhalation every 4 hours  azithromycin  IVPB 500 milliGRAM(s) IV Intermittent every 24 hours  aztreonam  IVPB 2000 milliGRAM(s) IV Intermittent every 8 hours  budesonide 160 MICROgram(s)/formoterol 4.5 MICROgram(s) Inhaler 2 Puff(s) Inhalation two times a day  chlorhexidine 0.12% Liquid 15 milliLiter(s) Oral Mucosa every 12 hours  chlorhexidine 2% Cloths 1 Application(s) Topical <User Schedule>  dextrose 5%. 1000 milliLiter(s) (50 mL/Hr) IV Continuous <Continuous>  dextrose 50% Injectable 25 Gram(s) IV Push once  enoxaparin Injectable 90 milliGRAM(s) SubCutaneous every 12 hours  furosemide   Injectable 40 milliGRAM(s) IV Push every 12 hours  glucagon  Injectable 1 milliGRAM(s) IntraMuscular once  insulin lispro (ADMELOG) corrective regimen sliding scale   SubCutaneous three times a day before meals  nystatin Powder 1 Application(s) Topical every 12 hours  pantoprazole  Injectable 40 milliGRAM(s) IV Push daily  propofol Infusion 10 MICROgram(s)/kG/Min (4.62 mL/Hr) IV Continuous <Continuous>    MEDICATIONS  (PRN):  dextrose Oral Gel 15 Gram(s) Oral once PRN Blood Glucose LESS THAN 70 milliGRAM(s)/deciliter  midazolam Injectable 2 milliGRAM(s) IV Push every 2 hours PRN severe agitation

## 2023-07-05 NOTE — PROGRESS NOTE ADULT - ASSESSMENT
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 Light sedation RASS -2 - 0    HEENT: Oral care. ET care Day 03    PULMONARY:  HOB @ 45 degrees. CXR noted with bilateral opacities slightly improved. Albuterol Nebulizers.   Go up on RR 18, Repeat ABG  Do bedside US; if large effusion then may need thoracentesis.      CARDIOVASCULAR:  Avoid volume overload. Strict I's and O's. Target MAP>65. Continue with lasix 40mg IV BID. Goal is negative balance. Echo with normal eF.     GI: GI prophylaxis.  Feeding: OG feeds as tolerated.     RENAL:  Follow up lytes. Correct as needed. Repeat VBG this afternoon.    INFECTIOUS DISEASE: RVP negative;. cx negative. DTA negative. Follow pleural fluid cx, c/w Aztreonam and doxycycline for now. Nasal MRSA.    HEMATOLOGICAL:  Full AC LMWH. Moniot CBC and Coags. Hgb stable.     ENDOCRINE:  Follow up FS.  Insulin protocol if needed.    MUSCULOSKELETAL: Bedrest    Ba: 7/2  Lines: Periphers    Disposition: ICU  Palliative eval  Prognosis is poor overall

## 2023-07-05 NOTE — PROGRESS NOTE ADULT - ASSESSMENT
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  presents to ED with SOB, intubated after not tolerating NIV    #acute hypoxemic and hypercapnic respiratory failure - s/p MV 07/03/2023  #pulmonary edema  #H/O left malignant effusion s/p pleurx - likely of breast origin as per path 03/2023  #chronic lymphedema  #suspected CAP  - SOB for 3 days duration  - intubated after not tolerating NIV  - CXR -> bilateral pleural effusion and vascular congestion  - s/p Meropenem on admission -> switched to aztreonam and Azithromycin 07/03 -> switched azithromycin to doxycycline 07/05  - c/w lasix 40 mg IV BID  - procal 0.8  - DTA -> NGTD  - fu blood and urine cultures  - fu urine and strep legionella  - TTE -> EF 55-60%  - Pleurex drained 07/04 -> only 5 cc drained -> gram stain showing gram positive cocci in clusters -> fu culture  - s/p right thoracentesis 07/05 ->   - palliative eval ->  full code and aggressive measures for now awaiting further discussions    #COPD  - not on home O2  - solumedrol stopped on 07/03  - c/w albuterol neb  - restart solumedrol if respiratory status worsened    #AFIB  - heparin drip switched to lovenox 90 BID 07/03    #MISC  DVT PPX Lovenox therapeutic  GI PPX Protonix  DIET OG feeding  CODE FULL

## 2023-07-05 NOTE — CONSULT NOTE ADULT - ASSESSMENT
89 yo W female w/ PMH as noted below.  Pt. comes to hospital w/ 3 days history of progressive respiratory distress.  pt has a left sided indwelling chest tube that was place approx 1 month ago when pt was @ [facility].  This tube was to be removed this  week as per  son. Her EF in past was 66%. Element volume over load, This is improving Follow lytes weight. Rx copd. Follow cbc. H/H lower. Po lasix when stable
ASSESSMENT  89 yo W female who presents  w/ 3 days history of progressive respiratory distress    IMPRESSION  #Acute Hypoexmic respiratory failure  #Bilateral plerual effusions   - s/p left thoracentesis 3/2023 -- Cytology -PLEURAL FLUID, THORACENTESIS: - SUSPICIOUS FOR MALIGNANCY. - Atypical cells present in background of histiocytes and mesothelial  cells, suspicious for metastatic carcinoma.     #Acute decompensated HF    #Obesity BMI (kg/m2): 32.6  #DM   #Abx allergy: penicillin (Other)  codeine (Other)  aspirin (Other)    RECOMMENDATIONS  - bilateral pleural effusions -- likely malignant vs heart failure   - continue aztreonam 2g q 8 hours and azithromycin for now -- ideally need pleural fluid sample to analysis and cultures  - follow-up deep tracheal Cx   - poor prognosis  - palliative care    Please call or message on Microsoft Teams if with any questions.  Spectra [phone]  
IMPRESSION:    Acute hypoxemic resp failure on MV   Acute hypercapnic resp failure  Acute decompensated HF / HO HFpEF   Fluid overload   b/l pleural effusions - 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)  HO Probable LAWRENCE refused testing   Thyroid mass SP FNA   HO LE cellulitis with ulcer and abscess MRSA +  HO Afib on Eliquis  Chronic lymphedema  HO Peripheral vascular disease    PLAN:    CNS: C/w fentanyl, propofol.     HEENT: Oral care. ET care Day 01    PULMONARY:  HOB @ 45 degrees. CXR in am. Abuterol Nebulizers. Vent changes as follows: Decrease MV, RR 14, . Increase PEEP to 8. Repeat ABG in one hour    CARDIOVASCULAR:  Avoid volume overload. Strict I's and O's. Target MAP>65.      GI: GI prophylaxis.  Feeding: OG feeds    RENAL:  Follow up lytes. Correct as needed    INFECTIOUS DISEASE: Cover empirically for community acquired pneumonia and get procalcitonin, follow blood cultures, send sputum gram stain and culture, Urine streptococcal and Legionella Ag and send full set of rapid viral panel.    HEMATOLOGICAL:  Full AC LMWH. Moniot CBC and COags    ENDOCRINE:  Follow up FS.  Insulin protocol if needed.    MUSCULOSKELETAL: Bedrest    Ba: 7/2  Lines: Periphers    Disposition: ICU  Pallaitive eval  Very poor prognosis        
88 year old woman with a history of  pleural effusions, PAD, HTN, COPD on home o2 presents with SOB. Hospital course complicated by respiratory failure in the setting of BL pleural effusions and heart failur requiring intubation. Palliative care consulted for [facility].     Spoke with Cristi via telehealth at bedside. Palliative care discussed at length. He was able to provide a medial history and hospital course. Cristi noted he is aware of the patient's poor prognosis, that he's discussed it with his 3 siblings, and the plan is for full code and aggressive measures.    We discussed code status, specifically DNR, and comfort measures only should the patient not improve. All questions answered.    MEDD (morphine equivalent daily dose):    Education about palliative care provided to patient/family.  See Recs below.    Please call [phone] with questions or concerns 24/7.   We will continue to follow.

## 2023-07-06 NOTE — PROGRESS NOTE ADULT - ASSESSMENT
87 YO F with a medical history  of HTN, malignant pleural effusion of breast origin, chronic A.fib, PAD, Obesity, Chronic lymphedema of  BLE and COPD on home oxygen 2 - 1/2 L nc, ESBL UTI presented to ED with worsening shortness of breath and weakness for 4 days with increasing cough productive of yellow-green sputum. In ED pt was in respiratory distress and intubated    acute respiratory failure / b/l pleural effusions      - pt is in negative balance s/p  IV Lasix    - decrease frequency of Lasix to once daily as per pulmonary   - DC R pleurex catheter ?   - s/p thoracentesis on R    - supplement hypokalemia and re check lytes   - continue antibiotics    - continue anticoagulation for now   - SAT   - palliative    - GI prophylaxis

## 2023-07-06 NOTE — PROGRESS NOTE ADULT - SUBJECTIVE AND OBJECTIVE BOX
GIOVANNY BUCK  88y, Female  Allergy: penicillin (Other)  codeine (Other)  aspirin (Other)  sulfa drugs (Hives; Other)  contrast media (iodine-based) (Other)      LOS  4d    CHIEF COMPLAINT: respiratory diastress (06 Jul 2023 12:59)      INTERVAL EVENTS/HPI  - No acute events overnight  - T(F): , Max: 99.3 (07-06-23 @ 01:00)  - Denies any worsening symptoms  - Tolerating medication  - WBC Count: 10.09 (07-06-23 @ 05:51)  WBC Count: 11.44 (07-05-23 @ 06:04)     - Creatinine: 1.3 (07-06-23 @ 05:51)  Creatinine: 1.2 (07-05-23 @ 06:04)       ROS  General: Denies rigors, nightsweats  HEENT: Denies headache, rhinorrhea, sore throat, eye pain  CV: Denies CP, palpitations  PULM: Denies wheezing, hemoptysis  GI: Denies hematemesis, hematochezia, melena  : Denies discharge, hematuria  MSK: Denies arthralgias, myalgias  SKIN: Denies rash, lesions  NEURO: Denies paresthesias, weakness  PSYCH: Denies depression, anxiety    VITALS:  T(F): 98.4, Max: 99.3 (07-06-23 @ 01:00)  HR: 105  BP: 113/58  RR: 17Vital Signs Last 24 Hrs  T(C): 36.9 (06 Jul 2023 11:00), Max: 37.4 (06 Jul 2023 01:00)  T(F): 98.4 (06 Jul 2023 11:00), Max: 99.3 (06 Jul 2023 01:00)  HR: 105 (06 Jul 2023 13:00) (93 - 105)  BP: 113/58 (06 Jul 2023 13:00) (80/48 - 114/57)  BP(mean): 83 (06 Jul 2023 13:00) (58 - 83)  RR: 17 (06 Jul 2023 13:00) (16 - 18)  SpO2: 100% (06 Jul 2023 13:00) (98% - 100%)    Parameters below as of 06 Jul 2023 13:00  Patient On (Oxygen Delivery Method): ventilator    O2 Concentration (%): 40    PHYSICAL EXAM:  Gen: NAD, resting in bed  HEENT: Normocephalic, atraumatic  Neck: supple, no lymphadenopathy  CV: Regular rate & regular rhythm  Lungs: decreased BS at bases, no fremitus  Abdomen: Soft, BS present  Ext: Warm, well perfused  Neuro: non focal, awake  Skin: no rash, no erythema  Lines: no phlebitis    FH: Non-contributory  Social Hx: Non-contributory    TESTS & MEASUREMENTS:                        8.5    10.09 )-----------( 191      ( 06 Jul 2023 05:51 )             29.0     07-06    145  |  98  |  50<H>  ----------------------------<  105<H>  3.3<L>   |  39<H>  |  1.3    Ca    8.3<L>      06 Jul 2023 05:51  Mg     2.0     07-06    TPro  5.7<L>  /  Alb  2.7<L>  /  TBili  0.3  /  DBili  x   /  AST  25  /  ALT  8   /  AlkPhos  138<H>  07-06      LIVER FUNCTIONS - ( 06 Jul 2023 05:51 )  Alb: 2.7 g/dL / Pro: 5.7 g/dL / ALK PHOS: 138 U/L / ALT: 8 U/L / AST: 25 U/L / GGT: x           Urinalysis Basic - ( 06 Jul 2023 05:51 )    Color: x / Appearance: x / SG: x / pH: x  Gluc: 105 mg/dL / Ketone: x  / Bili: x / Urobili: x   Blood: x / Protein: x / Nitrite: x   Leuk Esterase: x / RBC: x / WBC x   Sq Epi: x / Non Sq Epi: x / Bacteria: x        Culture - Fungal, Body Fluid (collected 07-05-23 @ 15:45)  Source: Pleural Fl Pleural Fluid  Preliminary Report (07-06-23 @ 13:44):    Testing in progress    Culture - Body Fluid with Gram Stain (collected 07-05-23 @ 15:45)  Source: Pleural Fl Pleural Fluid  Gram Stain (07-06-23 @ 04:21):    polymorphonuclear leukocytes seen    No organisms seen    by cytocentrifuge    Culture - Body Fluid with Gram Stain (collected 07-04-23 @ 19:56)  Source: .Body Fluid Thoracentesis Fluid  Gram Stain (07-05-23 @ 05:13):    No polymorphonuclear leukocytes seen    Gram Positive Cocci in Clusters seen    by cytocentrifuge  Preliminary Report (07-05-23 @ 21:30):    Numerous Staphylococcus epidermidis    Culture - Sputum (collected 07-04-23 @ 08:00)  Source: Trach Asp Tracheal Aspirate  Gram Stain (07-04-23 @ 18:15):    Few Squamous epithelial cells per low power field    Few polymorphonuclear leukocytes per low power field    Few Yeast per oil power field  Preliminary Report (07-05-23 @ 17:22):    Normal Respiratory Joaquina present    Culture - Blood (collected 07-02-23 @ 19:55)  Source: .Blood Blood-Peripheral  Preliminary Report (07-06-23 @ 04:01):    No growth at 72 Hours    Culture - Blood (collected 07-02-23 @ 19:55)  Source: .Blood Blood-Peripheral  Preliminary Report (07-06-23 @ 04:01):    No growth at 72 Hours        INFECTIOUS DISEASES TESTING    RADIOLOGY & ADDITIONAL TESTS:  I have personally reviewed the last available Chest xray  CXR  Xray Chest 1 View- PORTABLE-Urgent:   ACC: [id number] EXAM:  XR CHEST PORTABLE URGENT 1V   ORDERED BY: TERRENCE FOOTE     PROCEDURE DATE:  07/05/2023          INTERPRETATION:  Clinical History / Reason for exam: Endotracheal tube   position    Comparison : Chest radiograph earlier in the day.    Technique/Positioning: Frontal.    Findings:    Support devices: Tip of endotracheal tube is 1.8 cm above the shelly.   Nasogastric tube seen coursing below diaphragm, tip not seen. There is a   left chest tube. Telemetry leads and tubing overlie patient.    Cardiac/mediastinum/hilum: Stable..    Lung parenchyma/Pleura: Stable bilateral lung opacities, left greater   than right..    Skeleton/soft tissues: Stable..    Impression:    Stable bilateral lung opacities, left greater than right    Tip of endotracheal tube is 1.8 cm above the shelly    --- End of Report ---            WILLIAM DIAL MD; Attending Radiologist  This document has been electronically signed. Jul 5 2023  5:22PM (07-05-23 @ 14:11)      CT        MEDICATIONS  acetaZOLAMIDE  IVPB 500 IV Intermittent daily  albuterol    90 MICROgram(s) HFA Inhaler 2 Inhalation every 4 hours  aztreonam  IVPB 2000 IV Intermittent every 8 hours  budesonide 160 MICROgram(s)/formoterol 4.5 MICROgram(s) Inhaler 2 Inhalation two times a day  chlorhexidine 0.12% Liquid 15 Oral Mucosa every 12 hours  chlorhexidine 2% Cloths 1 Topical <User Schedule>  dextrose 5%. 1000 IV Continuous <Continuous>  dextrose 50% Injectable 25 IV Push once  doxycycline IVPB 100 IV Intermittent every 12 hours  enoxaparin Injectable 90 SubCutaneous every 12 hours  glucagon  Injectable 1 IntraMuscular once  insulin lispro (ADMELOG) corrective regimen sliding scale  SubCutaneous three times a day before meals  nystatin Powder 1 Topical every 12 hours  pantoprazole  Injectable 40 IV Push daily  polyethylene glycol 3350 17 Oral every 12 hours  propofol Infusion 10 IV Continuous <Continuous>  senna 2 Oral at bedtime      WEIGHT  Weight (kg): 91.7 (07-03-23 @ 00:02)  Creatinine: 1.3 mg/dL (07-06-23 @ 05:51)      ANTIBIOTICS:  aztreonam  IVPB 2000 milliGRAM(s) IV Intermittent every 8 hours  doxycycline IVPB 100 milliGRAM(s) IV Intermittent every 12 hours      All available historical records have been reviewed       GIOVANNY BUCK  88y, Female  Allergy: penicillin (Other)  codeine (Other)  aspirin (Other)  sulfa drugs (Hives; Other)  contrast media (iodine-based) (Other)      LOS  4d    CHIEF COMPLAINT: respiratory distress (06 Jul 2023 12:59)      INTERVAL EVENTS/HPI  - No acute events overnight. Remains on mechanical ventilation.   - T(F): , Max: 99.3 (07-06-23 @ 01:00)  - Denies any worsening symptoms  - Tolerating medication  - WBC Count: 10.09 (07-06-23 @ 05:51)  WBC Count: 11.44 (07-05-23 @ 06:04)     - Creatinine: 1.3 (07-06-23 @ 05:51)  Creatinine: 1.2 (07-05-23 @ 06:04)       ROS- Limited due patient's mental status.     VITALS:  T(F): 98.4, Max: 99.3 (07-06-23 @ 01:00)  HR: 105  BP: 113/58  RR: 17Vital Signs Last 24 Hrs  T(C): 36.9 (06 Jul 2023 11:00), Max: 37.4 (06 Jul 2023 01:00)  T(F): 98.4 (06 Jul 2023 11:00), Max: 99.3 (06 Jul 2023 01:00)  HR: 105 (06 Jul 2023 13:00) (93 - 105)  BP: 113/58 (06 Jul 2023 13:00) (80/48 - 114/57)  BP(mean): 83 (06 Jul 2023 13:00) (58 - 83)  RR: 17 (06 Jul 2023 13:00) (16 - 18)  SpO2: 100% (06 Jul 2023 13:00) (98% - 100%)    Parameters below as of 06 Jul 2023 13:00  Patient On (Oxygen Delivery Method): ventilator    O2 Concentration (%): 40    PHYSICAL EXAM:  Gen: NAD, On MV  Neck: supple, no lymphadenopathy  CV: Regular rate & regular rhythm  Lungs: decreased BS at bases, no fremitus  Abdomen: Soft, BS present  Ext: Warm, well perfused  Neuro: non focal, awake  Skin: no rash, no erythema  Lines: no phlebitis    FH: Non-contributory  Social Hx: Non-contributory    TESTS & MEASUREMENTS:                        8.5    10.09 )-----------( 191      ( 06 Jul 2023 05:51 )             29.0     07-06    145  |  98  |  50<H>  ----------------------------<  105<H>  3.3<L>   |  39<H>  |  1.3    Ca    8.3<L>      06 Jul 2023 05:51  Mg     2.0     07-06    TPro  5.7<L>  /  Alb  2.7<L>  /  TBili  0.3  /  DBili  x   /  AST  25  /  ALT  8   /  AlkPhos  138<H>  07-06      LIVER FUNCTIONS - ( 06 Jul 2023 05:51 )  Alb: 2.7 g/dL / Pro: 5.7 g/dL / ALK PHOS: 138 U/L / ALT: 8 U/L / AST: 25 U/L / GGT: x           Urinalysis Basic - ( 06 Jul 2023 05:51 )    Color: x / Appearance: x / SG: x / pH: x  Gluc: 105 mg/dL / Ketone: x  / Bili: x / Urobili: x   Blood: x / Protein: x / Nitrite: x   Leuk Esterase: x / RBC: x / WBC x   Sq Epi: x / Non Sq Epi: x / Bacteria: x        Culture - Fungal, Body Fluid (collected 07-05-23 @ 15:45)  Source: Pleural Fl Pleural Fluid  Preliminary Report (07-06-23 @ 13:44):    Testing in progress    Culture - Body Fluid with Gram Stain (collected 07-05-23 @ 15:45)  Source: Pleural Fl Pleural Fluid  Gram Stain (07-06-23 @ 04:21):    polymorphonuclear leukocytes seen    No organisms seen    by cytocentrifuge    Culture - Body Fluid with Gram Stain (collected 07-04-23 @ 19:56)  Source: .Body Fluid Thoracentesis Fluid  Gram Stain (07-05-23 @ 05:13):    No polymorphonuclear leukocytes seen    Gram Positive Cocci in Clusters seen    by cytocentrifuge  Preliminary Report (07-05-23 @ 21:30):    Numerous Staphylococcus epidermidis    Culture - Sputum (collected 07-04-23 @ 08:00)  Source: Trach Asp Tracheal Aspirate  Gram Stain (07-04-23 @ 18:15):    Few Squamous epithelial cells per low power field    Few polymorphonuclear leukocytes per low power field    Few Yeast per oil power field  Preliminary Report (07-05-23 @ 17:22):    Normal Respiratory Joaquina present    Culture - Blood (collected 07-02-23 @ 19:55)  Source: .Blood Blood-Peripheral  Preliminary Report (07-06-23 @ 04:01):    No growth at 72 Hours    Culture - Blood (collected 07-02-23 @ 19:55)  Source: .Blood Blood-Peripheral  Preliminary Report (07-06-23 @ 04:01):    No growth at 72 Hours        INFECTIOUS DISEASES TESTING    RADIOLOGY & ADDITIONAL TESTS:  I have personally reviewed the last available Chest xray  CXR  Xray Chest 1 View- PORTABLE-Urgent:   ACC: [id number] EXAM:  XR CHEST PORTABLE URGENT 1V   ORDERED BY: TERRENCE FOOTE     PROCEDURE DATE:  07/05/2023          INTERPRETATION:  Clinical History / Reason for exam: Endotracheal tube   position    Comparison : Chest radiograph earlier in the day.    Technique/Positioning: Frontal.    Findings:    Support devices: Tip of endotracheal tube is 1.8 cm above the shelly.   Nasogastric tube seen coursing below diaphragm, tip not seen. There is a   left chest tube. Telemetry leads and tubing overlie patient.    Cardiac/mediastinum/hilum: Stable..    Lung parenchyma/Pleura: Stable bilateral lung opacities, left greater   than right..    Skeleton/soft tissues: Stable..    Impression:    Stable bilateral lung opacities, left greater than right    Tip of endotracheal tube is 1.8 cm above the shelly    --- End of Report ---    WILLIAM DIAL MD; Attending Radiologist  This document has been electronically signed. Jul 5 2023  5:22PM (07-05-23 @ 14:11)    MEDICATIONS  acetaZOLAMIDE  IVPB 500 IV Intermittent daily  albuterol    90 MICROgram(s) HFA Inhaler 2 Inhalation every 4 hours  aztreonam  IVPB 2000 IV Intermittent every 8 hours  budesonide 160 MICROgram(s)/formoterol 4.5 MICROgram(s) Inhaler 2 Inhalation two times a day  chlorhexidine 0.12% Liquid 15 Oral Mucosa every 12 hours  chlorhexidine 2% Cloths 1 Topical <User Schedule>  dextrose 5%. 1000 IV Continuous <Continuous>  dextrose 50% Injectable 25 IV Push once  doxycycline IVPB 100 IV Intermittent every 12 hours  enoxaparin Injectable 90 SubCutaneous every 12 hours  glucagon  Injectable 1 IntraMuscular once  insulin lispro (ADMELOG) corrective regimen sliding scale  SubCutaneous three times a day before meals  nystatin Powder 1 Topical every 12 hours  pantoprazole  Injectable 40 IV Push daily  polyethylene glycol 3350 17 Oral every 12 hours  propofol Infusion 10 IV Continuous <Continuous>  senna 2 Oral at bedtime  WEIGHT  Weight (kg): 91.7 (07-03-23 @ 00:02)  Creatinine: 1.3 mg/dL (07-06-23 @ 05:51)      ANTIBIOTICS:  aztreonam  IVPB 2000 milliGRAM(s) IV Intermittent every 8 hours  doxycycline IVPB 100 milliGRAM(s) IV Intermittent every 12 hours    All available historical records have been reviewed

## 2023-07-06 NOTE — PROGRESS NOTE ADULT - SUBJECTIVE AND OBJECTIVE BOX
Patient seen and evaluated this am, sedated with propofol on ventilator       T(F): 98.4 (07-06-23 @ 11:00), Max: 99.3 (07-06-23 @ 01:00)  HR: 96 (07-06-23 @ 09:00)  BP: 80/48 (07-06-23 @ 09:00)  RR: 18 (07-06-23 @ 11:00)  SpO2: 100% (07-06-23 @ 09:00) (98% - 100%)    PHYSICAL EXAM:  GENERAL: NAD  HEAD:  Atraumatic, Normocephalic  EYES: EOMI, PERRLA, conjunctiva and sclera clear  NERVOUS SYSTEM:  Alert & Oriented X3, no focal deficits   CHEST/LUNG:  bilateral rales  HEART: Regular rate and rhythm; No murmurs, rubs, or gallops  ABDOMEN: Soft, Nontender, Nondistended; Bowel sounds present  EXTREMITIES:  2+ Peripheral Pulses, No clubbing, cyanosis, or edema    LABS  07-06    145  |  98  |  50<H>  ----------------------------<  105<H>  3.3<L>   |  39<H>  |  1.3    Ca    8.3<L>      06 Jul 2023 05:51  Mg     2.0     07-06    TPro  5.7<L>  /  Alb  2.7<L>  /  TBili  0.3  /  DBili  x   /  AST  25  /  ALT  8   /  AlkPhos  138<H>  07-06                          8.5    10.09 )-----------( 191      ( 06 Jul 2023 05:51 )             29.0       Mode: AC/ CMV (Assist Control/ Continuous Mandatory Ventilation)  RR (machine): 18  TV (machine): 360  FiO2: 40  PEEP: 8    Culture Results:   Numerous Staphylococcus epidermidis (07-04-23)  Culture Results:   Normal Respiratory Joaquina present (07-04-23)  Culture Results:   No growth at 72 Hours (07-02-23)  Culture Results:   No growth at 72 Hours (07-02-23)    RADIOLOGY  < from: Xray Chest 1 View- PORTABLE-Routine (Xray Chest 1 View- PORTABLE-Routine in AM.) (07.06.23 @ 06:29) >  Impression:    Left pleural effusion/basilar opacity.    < end of copied text >    MEDICATIONS  (STANDING):  acetaZOLAMIDE  IVPB 500 milliGRAM(s) IV Intermittent daily  albuterol    90 MICROgram(s) HFA Inhaler 2 Puff(s) Inhalation every 4 hours  aztreonam  IVPB 2000 milliGRAM(s) IV Intermittent every 8 hours  budesonide 160 MICROgram(s)/formoterol 4.5 MICROgram(s) Inhaler 2 Puff(s) Inhalation two times a day  chlorhexidine 0.12% Liquid 15 milliLiter(s) Oral Mucosa every 12 hours  chlorhexidine 2% Cloths 1 Application(s) Topical <User Schedule>  doxycycline IVPB 100 milliGRAM(s) IV Intermittent every 12 hours  enoxaparin Injectable 90 milliGRAM(s) SubCutaneous every 12 hours  insulin lispro (ADMELOG) corrective regimen sliding scale   SubCutaneous three times a day before meals  nystatin Powder 1 Application(s) Topical every 12 hours  pantoprazole  Injectable 40 milliGRAM(s) IV Push daily  polyethylene glycol 3350 17 Gram(s) Oral every 12 hours  propofol Infusion 10 MICROgram(s)/kG/Min (4.62 mL/Hr) IV Continuous <Continuous>  senna 2 Tablet(s) Oral at bedtime    MEDICATIONS  (PRN):  dextrose Oral Gel 15 Gram(s) Oral once PRN Blood Glucose LESS THAN 70 milliGRAM(s)/deciliter  midazolam Injectable 2 milliGRAM(s) IV Push every 2 hours PRN severe agitation

## 2023-07-06 NOTE — CHART NOTE - NSCHARTNOTEFT_GEN_A_CORE
PALLIATIVE MEDICINE INTERDISCIPLINARY TEAM NOTE    Provider:                Family or contact name / phone #     Met with: [ x] Patient  [ x ] Family  [   ] Other:    Primary Language: [ x  ] English [   ] Other*:                      *Interpretation provided by:    SUPPORT DIAGNOSES            (Check all that apply)  [ x  ] Spiritual assessment  [   ] EOL issues  [   ] Cultural / spiritual concerns  [ x  ] Pain / suffering  [   ] Dementia / AMS  [   ] Other:  [   ] AD issues  [   ] Grief / loss / sadness  [   ] Discharge issues  [   ] Distress / coping    SPIRITUAL ASSESSMENT    [ x  ] Initial Assessment            [   ] Reassessment          [   ] Not Applicable this visit    Pain/suffering acuity:  [   ] None to mild (0-3)           [   ] Moderate (4-6)        [   ] High (7-10)    Coping:  [   ] Coping well                     [   ] Coping w/difficulty            [   ] Poor coping    Support system:  [ x ] Strong                              [   ] Adequate                        [   ] Inadequate    Advent/Spiritual practice: ____Catholic_______________________    Role of organized [employer]aritan:  [ x ] Important                     [   ] Some (fam tradition, cultural)               [   ] None    Effects on medical care:  [   ] Yes, _____________________________________                         [ x ] None    Cultural/Latter day need:  [   ] Yes, _____________________________________                         [ x  ] None    Refer to Pastoral Care:  [ x  ] Yes           [   ] No, not at this time    SERVICE PROVIDED  [   ]PSSA                                                                             [   ]Discharge support / facilitation  [   ]AD / goals of care counseling                                  [   ]EOL / death / bereavement counseling  [   ]Counseling / support                                                [   ] Family meeting  [ x  ]Prayer / sacrament / ritual                                      [   ] Referral   [   ]Other                                                                       NOTE and Plan of Care (PoC):  THIS WAS A TELEHEALTH visit with Dr Cruz and myself 7/5/23. Pt looked comfortable. Pt son who is also HCP were present at time of visit. son is realistic but do not want to get in a fight with sisters who are very unrealistic as to the undue suffering of Pt. Son is upset but does not want to be at odds with his sisters. He says it pains him to see what they are doing. I will follow up for support.

## 2023-07-06 NOTE — CHART NOTE - NSCHARTNOTEFT_GEN_A_CORE
Registered Dietitian Follow-Up     Patient Profile Reviewed                           Yes [X]   No []     Nutrition History Previously Obtained        Yes []  No []       Pertinent  Information:  pt is 88 year old female with hx of CHF, COPD, HTN, chronic cellulitis, afib s/p ablation, FLAVIO presents with 3 days of progressive respiratory distress. pt has L side indwelling chest tube placed one month ago  for malignant effusion. pt found to have B/L pleural effusion L>R, intubation in ED warranted.  s/p thoracentesis 7/5. propofol held since 1am       Diet, NPO with Tube Feed:   Tube Feeding Modality: Orogastric  Vital High Protein  Total Volume for 24 Hours (mL): 1200  Continuous  Starting Tube Feed Rate {mL per Hour}: 20  Until Goal Tube Feed Rate (mL per Hour): 50  Tube Feed Duration (in Hours): 24  Tube Feed Start Time: 17:00 (07-03-23 @ 17:06) [Active]       Anthropometrics:  - Ht. 167.6cm  - Wt. 86.8 kgs  - %wt change  - BMI   - IBW      Pertinent Lab Data:  07-06    145  |  98  |  50<H>  ----------------------------<  105<H>  3.3<L>   |  39<H>  |  1.3    Ca    8.3<L>      06 Jul 2023 05:51  Mg     2.0     07-06    TPro  5.7<L>  /  Alb  2.7<L>  /  TBili  0.3  /  DBili  x   /  AST  25  /  ALT  8   /  AlkPhos  138<H>  07-06                            8.5    10.09 )-----------( 191      ( 06 Jul 2023 05:51 )             29.0        Pertinent Meds:  MEDICATIONS  (STANDING):  acetaZOLAMIDE  IVPB 500 milliGRAM(s) IV Intermittent daily  albuterol    90 MICROgram(s) HFA Inhaler 2 Puff(s) Inhalation every 4 hours  aztreonam  IVPB 2000 milliGRAM(s) IV Intermittent every 8 hours  budesonide 160 MICROgram(s)/formoterol 4.5 MICROgram(s) Inhaler 2 Puff(s) Inhalation two times a day    doxycycline IVPB 100 milliGRAM(s) IV Intermittent every 12 hours  enoxaparin Injectable 90 milliGRAM(s) SubCutaneous every 12 hours  insulin lispro (ADMELOG) corrective regimen sliding scale   SubCutaneous three times a day before meals  nystatin Powder 1 Application(s) Topical every 12 hours  pantoprazole  Injectable 40 milliGRAM(s) IV Push daily  polyethylene glycol 3350 17 Gram(s) Oral every 12 hours  propofol Infusion 10 MICROgram(s)/kG/Min (4.62 mL/Hr) IV Continuous <Continuous>  senna 2 Tablet(s) Oral at bedtime    MEDICATIONS  (PRN):  dextrose Oral Gel 15 Gram(s) Oral once PRN Blood Glucose LESS THAN 70 milliGRAM(s)/deciliter  midazolam Injectable 2 milliGRAM(s) IV Push every 2 hours PRN severe agitation       Physical Findings:  - Appearance: intubated to vent  - GI function: +BS, no BM Noted since admission   - Tubes: OGT  - Oral/Mouth cavity:  - Skin: generalized 2+ edema noted, no pressure ulcers noted     Nutrition Requirements  Weight Used: 86.8 kgs     Estimated Energy Needs:  1474 kcals via BRIGID state   5574-7552 kcals (16-22 kcal/kg/BW)  89-118g protein (1.5-2.0g/kg/IBW)  1;1 kcal for estimated fluid needs            Nutrient Intake: present EN regimen of Vital HP at 50 ml/hr provides pt with 1200 kcals,  103.5g protein and 1200 ml total fluid meeting >80% of estimated nutrient needs        [] Previous Nutrition Diagnosis:            [] Ongoing          [X] Resolved    inadequate energy-protein intake resolved     Nutrition Intervention: maintain on present EN regime,  bowel regimen added 7/5, monitor bowel fxn     Goal/Expected Outcome: pt to tolerate EN and meet at least 80% of estimated nutrient needs within 3-5 days     Indicator/Monitoring: RD to monitor tolerance to EN, labs/meds, NFPF and f/u as needed within 3-5 days  high risk

## 2023-07-06 NOTE — PROGRESS NOTE ADULT - ASSESSMENT
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 Light sedation RASS -2 - 0.SBT     HEENT: Oral care. ET care Day 04    PULMONARY:  HOB @ 45 degrees. CXR noted with bilateral opacities slightly improved. Albuterol Nebulizers. SBT  s/p thoracentesis 7/5 -> exudative   Follow up cyto    CARDIOVASCULAR:  Avoid volume overload. Strict I's and O's. Target MAP>65. Hold lasix.  Echo with normal eF.     GI: GI prophylaxis.  Feeding: OG feeds as tolerated.     RENAL:  Follow up lytes. Correct as needed.    INFECTIOUS DISEASE: RVP negative;. cx negative. DTA negative. Follow pleural fluid cx, c/w Aztreonam and doxycycline for now. Nasal MRSA.    HEMATOLOGICAL:  Full AC LMWH. Moniot CBC and Coags. Hgb stable.     ENDOCRINE:  Follow up FS.  Insulin protocol if needed.    MUSCULOSKELETAL: Bedrest    Ba: 7/2  Lines: Periphers    Disposition: ICU  Palliative eval  Prognosis is poor overall

## 2023-07-06 NOTE — PROGRESS NOTE ADULT - SUBJECTIVE AND OBJECTIVE BOX
GIOVANNY BUCK 88y Female  MRN#: [id number]     Hospital Day: 4d    Pt is currently admitted with the primary diagnosis of  Acute respiratory failure with hypoxia        SUBJECTIVE     Overnight events  None                                              ----------------------------------------------------------  OBJECTIVE  PAST MEDICAL & SURGICAL HISTORY  HTN (hypertension)    Afib  s/p ablation 2001    Goiter  no meds    Cellulitis  chronic    OA (osteoarthritis)    PVD (peripheral vascular disease)    COPD (chronic obstructive pulmonary disease)    Anxiety    Obesity    Acute on chronic systolic congestive heart failure    Edema of both lower legs    H/O abdominal hysterectomy    H/O discectomy    H/O total knee replacement, bilateral                                              -----------------------------------------------------------  ALLERGIES:  penicillin (Other)  codeine (Other)  aspirin (Other)  sulfa drugs (Hives; Other)  contrast media (iodine-based) (Other)                                            ------------------------------------------------------------    HOME MEDICATIONS  Home Medications:  albuterol 90 mcg/inh inhalation aerosol: 2 puff(s) inhaled every 6 hours as needed for  shortness of breath and/or wheezing (02 Jul 2023 23:05)  budesonide-formoterol 80 mcg-4.5 mcg/inh inhalation aerosol: 2 puff(s) inhaled 2 times a day (02 Jul 2023 23:05)  dilTIAZem 180 mg/24 hours oral capsule, extended release: 1 cap(s) orally once a day (02 Jul 2023 23:05)  Eliquis 2.5 mg oral tablet: 1 tab(s) orally 2 times a day (02 Jul 2023 23:05)  furosemide 20 mg oral tablet: 1 tab(s) orally once a day (02 Jul 2023 23:05)  Senna 8.6 mg oral tablet: 2 tab(s) orally once a day (at bedtime) (02 Jul 2023 23:05)                           MEDICATIONS:  STANDING MEDICATIONS  acetaZOLAMIDE  IVPB 500 milliGRAM(s) IV Intermittent daily  albuterol    90 MICROgram(s) HFA Inhaler 2 Puff(s) Inhalation every 4 hours  aztreonam  IVPB 2000 milliGRAM(s) IV Intermittent every 8 hours  budesonide 160 MICROgram(s)/formoterol 4.5 MICROgram(s) Inhaler 2 Puff(s) Inhalation two times a day  chlorhexidine 0.12% Liquid 15 milliLiter(s) Oral Mucosa every 12 hours  chlorhexidine 2% Cloths 1 Application(s) Topical <User Schedule>  dextrose 5%. 1000 milliLiter(s) IV Continuous <Continuous>  dextrose 50% Injectable 25 Gram(s) IV Push once  doxycycline IVPB 100 milliGRAM(s) IV Intermittent every 12 hours  enoxaparin Injectable 90 milliGRAM(s) SubCutaneous every 12 hours  glucagon  Injectable 1 milliGRAM(s) IntraMuscular once  insulin lispro (ADMELOG) corrective regimen sliding scale   SubCutaneous three times a day before meals  nystatin Powder 1 Application(s) Topical every 12 hours  pantoprazole  Injectable 40 milliGRAM(s) IV Push daily  polyethylene glycol 3350 17 Gram(s) Oral every 12 hours  propofol Infusion 10 MICROgram(s)/kG/Min IV Continuous <Continuous>  senna 2 Tablet(s) Oral at bedtime    PRN MEDICATIONS  dextrose Oral Gel 15 Gram(s) Oral once PRN  midazolam Injectable 2 milliGRAM(s) IV Push every 2 hours PRN                                            ------------------------------------------------------------  VITAL SIGNS: Last 24 Hours  T(C): 36.9 (06 Jul 2023 11:00), Max: 37.4 (06 Jul 2023 01:00)  T(F): 98.4 (06 Jul 2023 11:00), Max: 99.3 (06 Jul 2023 01:00)  HR: 96 (06 Jul 2023 09:00) (88 - 103)  BP: 80/48 (06 Jul 2023 09:00) (80/48 - 114/57)  BP(mean): 59 (06 Jul 2023 09:00) (58 - 82)  RR: 18 (06 Jul 2023 11:00) (14 - 31)  SpO2: 100% (06 Jul 2023 09:00) (98% - 100%)      07-05-23 @ 07:01  -  07-06-23 @ 07:00  --------------------------------------------------------  IN: 1312 mL / OUT: 4325 mL / NET: -3013 mL    07-06-23 @ 07:01  -  07-06-23 @ 13:00  --------------------------------------------------------  IN: 0 mL / OUT: 1450 mL / NET: -1450 mL                                             --------------------------------------------------------------  LABS:                        8.5    10.09 )-----------( 191      ( 06 Jul 2023 05:51 )             29.0     07-06    145  |  98  |  50<H>  ----------------------------<  105<H>  3.3<L>   |  39<H>  |  1.3    Ca    8.3<L>      06 Jul 2023 05:51  Mg     2.0     07-06    TPro  5.7<L>  /  Alb  2.7<L>  /  TBili  0.3  /  DBili  x   /  AST  25  /  ALT  8   /  AlkPhos  138<H>  07-06      Urinalysis Basic - ( 06 Jul 2023 05:51 )    Color: x / Appearance: x / SG: x / pH: x  Gluc: 105 mg/dL / Ketone: x  / Bili: x / Urobili: x   Blood: x / Protein: x / Nitrite: x   Leuk Esterase: x / RBC: x / WBC x   Sq Epi: x / Non Sq Epi: x / Bacteria: x      ABG - ( 06 Jul 2023 04:36 )  pH, Arterial: 7.50  pH, Blood: x     /  pCO2: 55    /  pO2: 150   / HCO3: 43    / Base Excess: 16.9  /  SaO2: 100.0                   Culture - Body Fluid with Gram Stain (collected 05 Jul 2023 15:45)  Source: Pleural Fl Pleural Fluid  Gram Stain (06 Jul 2023 04:21):    polymorphonuclear leukocytes seen    No organisms seen    by cytocentrifuge    Culture - Body Fluid with Gram Stain (collected 04 Jul 2023 19:56)  Source: .Body Fluid Thoracentesis Fluid  Gram Stain (05 Jul 2023 05:13):    No polymorphonuclear leukocytes seen    Gram Positive Cocci in Clusters seen    by cytocentrifuge  Preliminary Report (05 Jul 2023 21:30):    Numerous Staphylococcus epidermidis    Culture - Sputum (collected 04 Jul 2023 08:00)  Source: Trach Asp Tracheal Aspirate  Gram Stain (04 Jul 2023 18:15):    Few Squamous epithelial cells per low power field    Few polymorphonuclear leukocytes per low power field    Few Yeast per oil power field  Preliminary Report (05 Jul 2023 17:22):    Normal Respiratory Joaquina present                                                    -------------------------------------------------------------  RADIOLOGY:  < from: Xray Chest 1 View- PORTABLE-Routine (Xray Chest 1 View- PORTABLE-Routine in AM.) (07.06.23 @ 06:29) >  Left pleural effusion/basilar opacity.  Support devices as above.    < end of copied text >                                              --------------------------------------------------------------    PHYSICAL EXAM:  ENT: Airway patent, tubed  EYES: Pupils equal, Round and reactive to light.  CARDIAC: Normal rate, Regular rhythm.    RESPIRATORY: No wheezing, Bilateral crackles, Not tachypneic, No use of accessory muscles  GASTROINTESTINAL: Abdomen soft, Non-tender, No guarding,   NEUROLOGICAL: Sedated  SKIN: Skin normal color for race, Warm and dry and intact.                                          --------------------------------------------------------------

## 2023-07-06 NOTE — PROGRESS NOTE ADULT - ASSESSMENT
ASSESSMENT  87 yo W female who presents  w/ 3 days history of progressive respiratory distress    IMPRESSION  #Acute Hypoexmic respiratory failure  #Bilateral plerual effusions   - s/p left thoracentesis 3/2023 -- Cytology -PLEURAL FLUID, THORACENTESIS: - SUSPICIOUS FOR MALIGNANCY. - Atypical cells present in background of histiocytes and mesothelial  cells, suspicious for metastatic carcinoma.   - Pleurx Cx 7/4 - GPC in clusters, No PMNs  - s/p thoracentesis right thoracentesis 7/5     #Acute decompensated HF    #Obesity BMI (kg/m2): 32.6  #DM   #Abx allergy: penicillin (Other)  codeine (Other)  aspirin (Other)    RECOMMENDATIONS  - continue aztreonam 2g q 8 hours and doxycyline for now   - follow-up Right thoracentesis cell counts, Cx from 7/5   - Noted GPC in pleurx catheter gram stain, No PMNs -- difficult to interpret and would wait for thoracentesis from today   - trend WBC     Please call or message on [employer] Teams if with any questions.  Spectra [phone]   ASSESSMENT  89 yo W female who presents  w/ 3 days history of progressive respiratory distress    IMPRESSION  #Acute Hypoexmic respiratory failure  #Bilateral pleural effusions   - s/p left thoracentesis 3/2023 -- Cytology -PLEURAL FLUID, THORACENTESIS: - SUSPICIOUS FOR MALIGNANCY. - Atypical cells present in background of histiocytes and mesothelial  cells, suspicious for metastatic carcinoma.   - Pleurx Cx 7/4 - GPC in clusters, No PMNs  - s/p thoracentesis right thoracentesis 7/5 Exudative in nature. Cultures pending.     #Acute decompensated HF  #Obesity BMI (kg/m2): 32.6  #DM   #Abx allergy: penicillin (Other)  codeine (Other)  aspirin (Other)    RECOMMENDATIONS  - continue aztreonam 2g q 8 hours and doxycyline for now   - follow-up Right thoracentesis Cx from 7/5   - Noted GPC in pleurx catheter gram stain, No PMNs -- difficult to interpret and would wait for thoracentesis from 7/5  - trend WBC- Leukocytosis improving.     Please call or message on [employer] Teams if with any questions.

## 2023-07-06 NOTE — PROGRESS NOTE ADULT - SUBJECTIVE AND OBJECTIVE BOX
Patient is a 88y old  Female who presents with a chief complaint of respiratory diastress (05 Jul 2023 14:21)        Over Night Events:  Still critically ill. On MV    ROS:     All ROS are negative except HPI       PHYSICAL EXAM    ICU Vital Signs Last 24 Hrs  T(C): 36.9 (06 Jul 2023 11:00), Max: 37.4 (06 Jul 2023 01:00)  T(F): 98.4 (06 Jul 2023 11:00), Max: 99.3 (06 Jul 2023 01:00)  HR: 96 (06 Jul 2023 09:00) (88 - 103)  BP: 80/48 (06 Jul 2023 09:00) (80/48 - 114/57)  BP(mean): 59 (06 Jul 2023 09:00) (58 - 82)  ABP: --  ABP(mean): --  RR: 18 (06 Jul 2023 11:00) (14 - 31)  SpO2: 100% (06 Jul 2023 09:00) (98% - 100%)    O2 Parameters below as of 06 Jul 2023 09:00  Patient On (Oxygen Delivery Method): ventilator    O2 Concentration (%): 40      ENT: Airway patent, tubed  EYES: Pupils equal, Round and reactive to light.  CARDIAC: Normal rate, Regular rhythm.    RESPIRATORY: No wheezing, Bilateral crackles, Not tachypneic, No use of accessory muscles  GASTROINTESTINAL: Abdomen soft, Non-tender, No guarding,   NEUROLOGICAL: Sedated  SKIN: Skin normal color for race, Warm and dry and intact.         07-05-23 @ 07:01  -  07-06-23 @ 07:00  --------------------------------------------------------  IN:    IV PiggyBack: 250 mL    Propofol: 312 mL    Vital High Protein: 750 mL  Total IN: 1312 mL    OUT:    Drain (mL): 1150 mL    Indwelling Catheter - Urethral (mL): 2875 mL    Voided (mL): 300 mL  Total OUT: 4325 mL    Total NET: -3013 mL      07-06-23 @ 07:01  -  07-06-23 @ 11:00  --------------------------------------------------------  IN:  Total IN: 0 mL    OUT:    Indwelling Catheter - Urethral (mL): 1100 mL  Total OUT: 1100 mL    Total NET: -1100 mL          LABS:                            8.5    10.09 )-----------( 191      ( 06 Jul 2023 05:51 )             29.0                                               07-06    145  |  98  |  50<H>  ----------------------------<  105<H>  3.3<L>   |  39<H>  |  1.3    Ca    8.3<L>      06 Jul 2023 05:51  Mg     2.0     07-06    TPro  5.7<L>  /  Alb  2.7<L>  /  TBili  0.3  /  DBili  x   /  AST  25  /  ALT  8   /  AlkPhos  138<H>  07-06                                             Urinalysis Basic - ( 06 Jul 2023 05:51 )    Color: x / Appearance: x / SG: x / pH: x  Gluc: 105 mg/dL / Ketone: x  / Bili: x / Urobili: x   Blood: x / Protein: x / Nitrite: x   Leuk Esterase: x / RBC: x / WBC x   Sq Epi: x / Non Sq Epi: x / Bacteria: x                                                  LIVER FUNCTIONS - ( 06 Jul 2023 05:51 )  Alb: 2.7 g/dL / Pro: 5.7 g/dL / ALK PHOS: 138 U/L / ALT: 8 U/L / AST: 25 U/L / GGT: x                                                  Culture - Body Fluid with Gram Stain (collected 05 Jul 2023 15:45)  Source: Pleural Fl Pleural Fluid  Gram Stain (06 Jul 2023 04:21):    polymorphonuclear leukocytes seen    No organisms seen    by cytocentrifuge    Culture - Body Fluid with Gram Stain (collected 04 Jul 2023 19:56)  Source: .Body Fluid Thoracentesis Fluid  Gram Stain (05 Jul 2023 05:13):    No polymorphonuclear leukocytes seen    Gram Positive Cocci in Clusters seen    by cytocentrifuge  Preliminary Report (05 Jul 2023 21:30):    Numerous Staphylococcus epidermidis    Culture - Sputum (collected 04 Jul 2023 08:00)  Source: Trach Asp Tracheal Aspirate  Gram Stain (04 Jul 2023 18:15):    Few Squamous epithelial cells per low power field    Few polymorphonuclear leukocytes per low power field    Few Yeast per oil power field  Preliminary Report (05 Jul 2023 17:22):    Normal Respiratory Joaquina present                                                   Mode: AC/ CMV (Assist Control/ Continuous Mandatory Ventilation)  RR (machine): 18  TV (machine): 360  FiO2: 40  PEEP: 8  ITime: 1.99  MAP: 16  PIP: 37                                      ABG - ( 06 Jul 2023 04:36 )  pH, Arterial: 7.50  pH, Blood: x     /  pCO2: 55    /  pO2: 150   / HCO3: 43    / Base Excess: 16.9  /  SaO2: 100.0         MEDICATIONS  (STANDING):  acetaZOLAMIDE  IVPB 500 milliGRAM(s) IV Intermittent daily  albuterol    90 MICROgram(s) HFA Inhaler 2 Puff(s) Inhalation every 4 hours  aztreonam  IVPB 2000 milliGRAM(s) IV Intermittent every 8 hours  budesonide 160 MICROgram(s)/formoterol 4.5 MICROgram(s) Inhaler 2 Puff(s) Inhalation two times a day  chlorhexidine 0.12% Liquid 15 milliLiter(s) Oral Mucosa every 12 hours  chlorhexidine 2% Cloths 1 Application(s) Topical <User Schedule>  dextrose 5%. 1000 milliLiter(s) (50 mL/Hr) IV Continuous <Continuous>  dextrose 50% Injectable 25 Gram(s) IV Push once  doxycycline IVPB 100 milliGRAM(s) IV Intermittent every 12 hours  enoxaparin Injectable 90 milliGRAM(s) SubCutaneous every 12 hours  furosemide   Injectable 40 milliGRAM(s) IV Push every 12 hours  glucagon  Injectable 1 milliGRAM(s) IntraMuscular once  insulin lispro (ADMELOG) corrective regimen sliding scale   SubCutaneous three times a day before meals  nystatin Powder 1 Application(s) Topical every 12 hours  pantoprazole  Injectable 40 milliGRAM(s) IV Push daily  polyethylene glycol 3350 17 Gram(s) Oral every 12 hours  propofol Infusion 10 MICROgram(s)/kG/Min (4.62 mL/Hr) IV Continuous <Continuous>  senna 2 Tablet(s) Oral at bedtime    MEDICATIONS  (PRN):  dextrose Oral Gel 15 Gram(s) Oral once PRN Blood Glucose LESS THAN 70 milliGRAM(s)/deciliter  midazolam Injectable 2 milliGRAM(s) IV Push every 2 hours PRN severe agitation

## 2023-07-06 NOTE — PROGRESS NOTE ADULT - ASSESSMENT
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  presents to ED with SOB, intubated after not tolerating NIV    #acute hypoxemic and hypercapnic respiratory failure - s/p MV 07/03/2023  #pulmonary edema  #H/O left malignant effusion s/p pleurx - likely of breast origin as per path 03/2023  #chronic lymphedema  #suspected CAP  - SOB for 3 days duration  - intubated after not tolerating NIV  - will give SBT today  - CXR -> bilateral pleural effusion and vascular congestion  - s/p Meropenem on admission -> switched to aztreonam and Azithromycin 07/03 -> switched azithromycin to doxycycline 07/05  - c/w lasix 40 mg IV BID  - procal 0.8  - DTA -> NGTD  - fu blood and urine cultures  - fu urine and strep legionella  - TTE -> EF 55-60%  - Pleurex drained 07/04 -> only 5 cc drained -> numerous staph epidermidis (possible contaminant)  - s/p right thoracentesis 07/05 ->  f/u cultures and cytopathology  - palliative eval ->  full code and aggressive measures for now awaiting further discussions    #COPD  - not on home O2  - solumedrol stopped on 07/03  - c/w albuterol neb  - restart solumedrol if respiratory status worsened    #AFIB  - heparin drip switched to lovenox 90 BID 07/03    #MISC  DVT PPX Lovenox therapeutic  GI PPX Protonix  DIET OG feeding  CODE FULL

## 2023-07-07 NOTE — CHART NOTE - NSCHARTNOTEFT_GEN_A_CORE
PALLIATIVE MEDICINE INTERDISCIPLINARY TEAM NOTE    Provider:                                     Met with: [ x  ] Patient  [ x  ] Family  [   ] Other:    Primary Language: [ x  ] English [   ] Other*:                      *Interpretation provided by:    SUPPORT DIAGNOSES            (Check all that apply)    [   ] EOL issues  [  x ] Advanced Illness  [   ] Cultural / spiritual concerns  [   ] Pain / suffering  [   ] Dementia / AMS  [   ] Other:  [  x ] AD issues  [   ] Grief / loss / sadness  [   ] Discharge issues  [  x ] Distress / coping    PSYCHOSOCIAL ASSESSMENT OF PATIENT         (Check all that apply)    [ x  ] Initial Assessment            [   ] Reassessment          [   ] Not Applicable this visit    Pain/suffering acuity:  [  x ] None to mild (0-3)           [   ] Moderate (4-6)        [   ] High (7-10)    Mental Status:  [   ] Alert/oriented (x3)          [   ] Confused/Altered(x2/x1)         [  x ] Non-resp: patient presently intubated     Functional status:  [   ] Independent w ADLs      [   ] Needs Assistance             [ x  ] Bedbound/Full Care    Coping:  [   ] Coping well                     [   ] Coping w/difficulty            [   ] Poor coping  [ x ] unable to assess     Support system:  [ x  ] Strong                              [   ] Adequate                        [   ] Inadequate      Past history and medications for:     [ ] Anxiety       [ ] Depression    [ ] Sleep disorders       SERVICE PROVIDED  [   ]Discharge support / facilitation  [   ]AD / goals of care counseling                                  [   ]EOL / death / bereavement counseling  [   ]Counseling / support                                               [   ]Family meeting  [   ]Prayer / sacrament / ritual                                        [   ]Referral   [  x ]Other : left VM for maria ines sanchez                                                                      NOTE and Plan of Care (PoC):    patient is a 89 y/o F w/PMHx of HTN, A.fib (on eliquis), PAD, Chronic lymphedema and COPD (not on home o2), 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 patient presently intubated. No family at bedside at time of visit. called and left VM for patient's son Cristi. will f/u [phone]

## 2023-07-07 NOTE — PROGRESS NOTE ADULT - SUBJECTIVE AND OBJECTIVE BOX
GIOVANNY BUCK 88y Female  MRN#: [id number]     Hospital Day: 5d    Pt is currently admitted with the primary diagnosis of  Acute respiratory failure with hypoxia        SUBJECTIVE     Overnight events  Pt was hypotensive at night s/p 1000L bolus, Pt also had an emesis episode                                              ----------------------------------------------------------  OBJECTIVE  PAST MEDICAL & SURGICAL HISTORY  HTN (hypertension)    Afib  s/p ablation 2001    Goiter  no meds    Cellulitis  chronic    OA (osteoarthritis)    PVD (peripheral vascular disease)    COPD (chronic obstructive pulmonary disease)    Anxiety    Obesity    Acute on chronic systolic congestive heart failure    Edema of both lower legs    H/O abdominal hysterectomy    H/O discectomy    H/O total knee replacement, bilateral                                              -----------------------------------------------------------  ALLERGIES:  penicillin (Other)  codeine (Other)  aspirin (Other)  sulfa drugs (Hives; Other)  contrast media (iodine-based) (Other)                                            ------------------------------------------------------------    HOME MEDICATIONS  Home Medications:  albuterol 90 mcg/inh inhalation aerosol: 2 puff(s) inhaled every 6 hours as needed for  shortness of breath and/or wheezing (02 Jul 2023 23:05)  budesonide-formoterol 80 mcg-4.5 mcg/inh inhalation aerosol: 2 puff(s) inhaled 2 times a day (02 Jul 2023 23:05)  dilTIAZem 180 mg/24 hours oral capsule, extended release: 1 cap(s) orally once a day (02 Jul 2023 23:05)  Eliquis 2.5 mg oral tablet: 1 tab(s) orally 2 times a day (02 Jul 2023 23:05)  furosemide 20 mg oral tablet: 1 tab(s) orally once a day (02 Jul 2023 23:05)  Senna 8.6 mg oral tablet: 2 tab(s) orally once a day (at bedtime) (02 Jul 2023 23:05)                           MEDICATIONS:  STANDING MEDICATIONS  acetaZOLAMIDE  IVPB 500 milliGRAM(s) IV Intermittent daily  albuterol    90 MICROgram(s) HFA Inhaler 2 Puff(s) Inhalation every 4 hours  aztreonam  IVPB 2000 milliGRAM(s) IV Intermittent every 8 hours  budesonide 160 MICROgram(s)/formoterol 4.5 MICROgram(s) Inhaler 2 Puff(s) Inhalation two times a day  chlorhexidine 0.12% Liquid 15 milliLiter(s) Oral Mucosa every 12 hours  chlorhexidine 2% Cloths 1 Application(s) Topical <User Schedule>  dextrose 5%. 1000 milliLiter(s) IV Continuous <Continuous>  dextrose 50% Injectable 25 Gram(s) IV Push once  doxycycline IVPB 100 milliGRAM(s) IV Intermittent every 12 hours  enoxaparin Injectable 90 milliGRAM(s) SubCutaneous every 12 hours  glucagon  Injectable 1 milliGRAM(s) IntraMuscular once  insulin lispro (ADMELOG) corrective regimen sliding scale   SubCutaneous three times a day before meals  lactulose Syrup 20 Gram(s) Oral three times a day  nystatin Powder 1 Application(s) Topical every 12 hours  pantoprazole  Injectable 40 milliGRAM(s) IV Push daily  polyethylene glycol 3350 17 Gram(s) Oral every 12 hours  propofol Infusion 10 MICROgram(s)/kG/Min IV Continuous <Continuous>  senna 2 Tablet(s) Oral at bedtime    PRN MEDICATIONS  dextrose Oral Gel 15 Gram(s) Oral once PRN  midazolam Injectable 2 milliGRAM(s) IV Push every 2 hours PRN                                            ------------------------------------------------------------  VITAL SIGNS: Last 24 Hours  T(C): 36.8 (07 Jul 2023 07:01), Max: 37.5 (06 Jul 2023 23:00)  T(F): 98.3 (07 Jul 2023 07:01), Max: 99.5 (06 Jul 2023 23:00)  HR: 112 (07 Jul 2023 12:15) (96 - 133)  BP: 125/57 (07 Jul 2023 12:00) (74/45 - 132/66)  BP(mean): 82 (07 Jul 2023 12:00) (55 - 91)  RR: 18 (07 Jul 2023 12:00) (16 - 28)  SpO2: 100% (07 Jul 2023 12:15) (93% - 100%)      07-06-23 @ 07:01  -  07-07-23 @ 07:00  --------------------------------------------------------  IN: 2670 mL / OUT: 3240 mL / NET: -570 mL    07-07-23 @ 07:01  -  07-07-23 @ 13:22  --------------------------------------------------------  IN: 116 mL / OUT: 575 mL / NET: -459 mL                                             --------------------------------------------------------------  LABS:                        8.2    10.92 )-----------( 170      ( 07 Jul 2023 06:53 )             28.6     07-07    144  |  99  |  47<H>  ----------------------------<  115<H>  3.4<L>   |  36<H>  |  1.0    Ca    8.3<L>      07 Jul 2023 06:53  Mg     2.0     07-07    TPro  5.7<L>  /  Alb  2.8<L>  /  TBili  0.5  /  DBili  x   /  AST  23  /  ALT  10  /  AlkPhos  156<H>  07-07      Urinalysis Basic - ( 07 Jul 2023 06:53 )    Color: x / Appearance: x / SG: x / pH: x  Gluc: 115 mg/dL / Ketone: x  / Bili: x / Urobili: x   Blood: x / Protein: x / Nitrite: x   Leuk Esterase: x / RBC: x / WBC x   Sq Epi: x / Non Sq Epi: x / Bacteria: x      ABG - ( 07 Jul 2023 12:02 )  pH, Arterial: 7.42  pH, Blood: x     /  pCO2: 64    /  pO2: 124   / HCO3: 42    / Base Excess: 14.3  /  SaO2: 100.0                   Culture - Body Fluid with Gram Stain (collected 05 Jul 2023 15:45)  Source: Pleural Fl Pleural Fluid  Gram Stain (06 Jul 2023 04:21):    polymorphonuclear leukocytes seen    No organisms seen    by cytocentrifuge  Preliminary Report (06 Jul 2023 20:41):    No growth to date.    Culture - Fungal, Body Fluid (collected 05 Jul 2023 15:45)  Source: Pleural Fl Pleural Fluid  Preliminary Report (06 Jul 2023 13:44):    Testing in progress    Culture - Body Fluid with Gram Stain (collected 04 Jul 2023 19:56)  Source: .Body Fluid Thoracentesis Fluid  Gram Stain (05 Jul 2023 05:13):    No polymorphonuclear leukocytes seen    Gram Positive Cocci in Clusters seen    by cytocentrifuge  Preliminary Report (05 Jul 2023 21:30):    Numerous Staphylococcus epidermidis                                                    -------------------------------------------------------------  RADIOLOGY:    < from: Xray Chest 1 View- PORTABLE-Routine (Xray Chest 1 View- PORTABLE-Routine in AM.) (07.07.23 @ 05:58) >    1. Unchanged left basilar opacity/pleural effusion.  2. Lines and tubes as above.    < end of copied text >                                              --------------------------------------------------------------    PHYSICAL EXAM:  ENT: Airway patent, intubated  EYES: Pupils equal, Round and reactive to light.  CARDIAC: Normal rate, Regular rhythm.    RESPIRATORY: No wheezing, Bilateral BS, Not tachypneic, No use of accessory muscles  GASTROINTESTINAL: Abdomen soft, Non-tender, No guarding,   NEUROLOGICAL:Sedated  SKIN: Skin normal color for race, Warm and dry and intact.                                              --------------------------------------------------------------

## 2023-07-07 NOTE — PROGRESS NOTE ADULT - ASSESSMENT
87 YO F with a medical history  of HTN, malignant pleural effusion of breast origin, chronic A.fib, PAD, Obesity, Chronic lymphedema of  BLE and COPD on home oxygen 2 - 1/2 L nc, ESBL UTI presented to ED with worsening shortness of breath and weakness for 4 days with increasing cough productive of yellow-green sputum. In ED pt was in respiratory distress and intubated    acute respiratory failure / b/l pleural effusions      - received fluid for low BP overnight   - given fluid overload state - going forward would DC propofol if BP drops and use Precedex for sedation   - try to maintain even to negative fluid balance   - SAT -> SBT   - s/p thoracentesis on R    - supplement hypokalemia and re check lytes   - continue antibiotics    - continue anticoagulation    - palliative    - GI prophylaxis

## 2023-07-07 NOTE — PROGRESS NOTE ADULT - SUBJECTIVE AND OBJECTIVE BOX
Patient seen and evaluated this am, sedated on ventilator with propofol      T(F): 98.3 (07-07-23 @ 07:01), Max: 99.5 (07-06-23 @ 23:00)  HR: 104 (07-07-23 @ 11:14)  BP: 106/56 (07-07-23 @ 10:00)  RR: 16 (07-07-23 @ 10:00)  SpO2: 100% (07-07-23 @ 11:14) (93% - 100%)    PHYSICAL EXAM:  GENERAL: NAD  HEAD:  Atraumatic, Normocephalic  EYES: EOMI, PERRLA, conjunctiva and sclera clear  NERVOUS SYSTEM: no focal deficits   CHEST/LUNG:  bilateral rales  HEART: Regular rate and rhythm; No murmurs, rubs, or gallops  ABDOMEN: Soft, Nontender, Nondistended; Bowel sounds present  EXTREMITIES:  2+ Peripheral Pulses, No clubbing, cyanosis, or edema    LABS  07-07    144  |  99  |  47<H>  ----------------------------<  115<H>  3.4<L>   |  36<H>  |  1.0    Ca    8.3<L>      07 Jul 2023 06:53  Mg     2.0     07-07    TPro  5.7<L>  /  Alb  2.8<L>  /  TBili  0.5  /  DBili  x   /  AST  23  /  ALT  10  /  AlkPhos  156<H>  07-07                          8.2    10.92 )-----------( 170      ( 07 Jul 2023 06:53 )             28.6       Mode: CPAP with PS  FiO2: 40  PEEP: 8      Culture Results:   No growth to date. (07-05-23)  Culture Results:   Testing in progress (07-05-23)  Culture Results:   Numerous Staphylococcus epidermidis (07-04-23)  Culture Results:   Normal Respiratory Joaquina present (07-04-23)  Culture Results:   No growth at 4 days (07-02-23)  Culture Results:   No growth at 4 days (07-02-23)    RADIOLOGY  < from: Xray Chest 1 View- PORTABLE-Routine (Xray Chest 1 View- PORTABLE-Routine in AM.) (07.07.23 @ 05:58) >  Impression:    1. Unchanged left basilar opacity/pleural effusion.    < end of copied text >    MEDICATIONS  (STANDING):  acetaZOLAMIDE  IVPB 500 milliGRAM(s) IV Intermittent daily  albuterol    90 MICROgram(s) HFA Inhaler 2 Puff(s) Inhalation every 4 hours  aztreonam  IVPB 2000 milliGRAM(s) IV Intermittent every 8 hours  budesonide 160 MICROgram(s)/formoterol 4.5 MICROgram(s) Inhaler 2 Puff(s) Inhalation two times a day  chlorhexidine 0.12% Liquid 15 milliLiter(s) Oral Mucosa every 12 hours  chlorhexidine 2% Cloths 1 Application(s) Topical <User Schedule>  doxycycline IVPB 100 milliGRAM(s) IV Intermittent every 12 hours  enoxaparin Injectable 90 milliGRAM(s) SubCutaneous every 12 hours  insulin lispro (ADMELOG) corrective regimen sliding scale   SubCutaneous three times a day before meals  lactulose Syrup 20 Gram(s) Oral three times a day  nystatin Powder 1 Application(s) Topical every 12 hours  pantoprazole  Injectable 40 milliGRAM(s) IV Push daily  polyethylene glycol 3350 17 Gram(s) Oral every 12 hours  potassium chloride  20 mEq/100 mL IVPB 20 milliEquivalent(s) IV Intermittent every 2 hours  propofol Infusion 10 MICROgram(s)/kG/Min (4.62 mL/Hr) IV Continuous <Continuous>  senna 2 Tablet(s) Oral at bedtime    MEDICATIONS  (PRN):  dextrose Oral Gel 15 Gram(s) Oral once PRN Blood Glucose LESS THAN 70 milliGRAM(s)/deciliter  midazolam Injectable 2 milliGRAM(s) IV Push every 2 hours PRN severe agitation

## 2023-07-07 NOTE — PROGRESS NOTE ADULT - ASSESSMENT
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  presents to ED with SOB, intubated after not tolerating NIV    #acute hypoxemic and hypercapnic respiratory failure - s/p MV 07/03/2023  #pulmonary edema  #H/O left malignant effusion s/p pleurx - likely of breast origin as per path 03/2023  #chronic lymphedema  #suspected CAP  - SOB for 3 days duration  - intubated after not tolerating NIV  - Failed SBT yesterday, will try again tomorrow  - CXR -> bilateral pleural effusion and vascular congestion  - s/p Meropenem on admission -> switched to aztreonam and Azithromycin 07/03 -> switched azithromycin to doxycycline 07/05  - procal 0.8  - DTA -> NGTD  - fu blood and urine cultures  - fu urine and strep legionella  - TTE -> EF 55-60%  - Pleurex drained 07/04 -> only 5 cc drained -> numerous staph epidermidis (possible contaminant)  - s/p right thoracentesis 07/05 ->  f/u cultures and cytopathology  - palliative eval ->  full code and aggressive measures for now awaiting further discussions    # Constipation  - Pt did not have a BM since admission  - f/u KUB  - add lactulose c/w miralax and senna      #COPD  - not on home O2  - solumedrol stopped on 07/03  - c/w albuterol neb  - restart solumedrol if respiratory status worsened    #AFIB  - heparin drip switched to lovenox 90 BID 07/03    #MISC  DVT PPX Lovenox therapeutic  GI PPX Protonix  DIET OG feeding  CODE FULL

## 2023-07-07 NOTE — PROGRESS NOTE ADULT - ASSESSMENT
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 Light sedation RASS -2 - 0.SBT     HEENT: Oral care. ET care Day 04    PULMONARY:  HOB @ 45 degrees. CXR noted with bilateral opacities slightly improved. Albuterol Nebulizers. SBT  s/p thoracentesis 7/5 -> exudative   Follow up cyto path    CARDIOVASCULAR:  Avoid volume overload. Strict I's and O's. Target MAP>65. Hold lasix.  Echo with normal EF.     GI: GI prophylaxis.  Feeding: OG feeds as tolerated.     RENAL:  Follow up lytes. Correct as needed.    INFECTIOUS DISEASE: RVP negative;. cx negative. DTA negative. Follow pleural fluid cx, c/w Aztreonam and doxycycline for now. Nasal MRSA.    HEMATOLOGICAL:  Full AC LMWH. Moniot CBC and Coags. Hgb stable.     ENDOCRINE:  Follow up FS.  Insulin protocol if needed.    MUSCULOSKELETAL: Bedrest    Ba: 7/2  Lines: Periphers    Disposition: ICU  Palliative eval  Prognosis is poor overall

## 2023-07-07 NOTE — PROVIDER CONTACT NOTE (OTHER) - ASSESSMENT
Patient vomited about 30cc of yellow gastric fluid. Feeds held immediately. Oral and ET tube suction performed immediately. Aspiration precautions in place. Abdomen soft and bowel sounds present in all four quadrants. Patient currently on Miralax and senna.

## 2023-07-07 NOTE — PROGRESS NOTE ADULT - SUBJECTIVE AND OBJECTIVE BOX
Patient is a 88y old  Female who presents with a chief complaint of respiratory diastress (06 Jul 2023 14:26)        Over Night Events:  Was hypotensive got 1 L Bolus.     ROS:     All ROS are negative except HPI       PHYSICAL EXAM    ICU Vital Signs Last 24 Hrs  T(C): 36.8 (07 Jul 2023 07:01), Max: 37.5 (06 Jul 2023 23:00)  T(F): 98.3 (07 Jul 2023 07:01), Max: 99.5 (06 Jul 2023 23:00)  HR: 104 (07 Jul 2023 10:00) (96 - 133)  BP: 106/56 (07 Jul 2023 10:00) (74/45 - 132/66)  BP(mean): 77 (07 Jul 2023 10:00) (55 - 91)  RR: 16 (07 Jul 2023 10:00) (16 - 28)  SpO2: 100% (07 Jul 2023 10:00) (93% - 100%)    O2 Parameters below as of 07 Jul 2023 10:00  Patient On (Oxygen Delivery Method): ventilator    O2 Concentration (%): 40      ENT: Airway patent, intubated  EYES: Pupils equal, Round and reactive to light.  CARDIAC: Normal rate, Regular rhythm.    RESPIRATORY: No wheezing, Bilateral BS, Not tachypneic, No use of accessory muscles  GASTROINTESTINAL: Abdomen soft, Non-tender, No guarding,   NEUROLOGICAL:Sedated  SKIN: Skin normal color for race, Warm and dry and intact.       07-06-23 @ 07:01  -  07-07-23 @ 07:00  --------------------------------------------------------  IN:    IV PiggyBack: 550 mL    Propofol: 70 mL    Sodium Chloride 0.9% Bolus: 1000 mL    Vital High Protein: 1050 mL  Total IN: 2670 mL    OUT:    Indwelling Catheter - Urethral (mL): 3160 mL    Nasogastric/Oral tube (mL): 80 mL  Total OUT: 3240 mL    Total NET: -570 mL      07-07-23 @ 07:01  -  07-07-23 @ 11:08  --------------------------------------------------------  IN:    IV PiggyBack: 100 mL    Propofol: 14 mL  Total IN: 114 mL    OUT:    Indwelling Catheter - Urethral (mL): 425 mL  Total OUT: 425 mL    Total NET: -311 mL      LABS:                          8.2    10.92 )-----------( 170      ( 07 Jul 2023 06:53 )             28.6                                               07-07    144  |  99  |  47<H>  ----------------------------<  115<H>  3.4<L>   |  36<H>  |  1.0    Ca    8.3<L>      07 Jul 2023 06:53  Mg     2.0     07-07    TPro  5.7<L>  /  Alb  2.8<L>  /  TBili  0.5  /  DBili  x   /  AST  23  /  ALT  10  /  AlkPhos  156<H>  07-07                                             Urinalysis Basic - ( 07 Jul 2023 06:53 )    Color: x / Appearance: x / SG: x / pH: x  Gluc: 115 mg/dL / Ketone: x  / Bili: x / Urobili: x   Blood: x / Protein: x / Nitrite: x   Leuk Esterase: x / RBC: x / WBC x   Sq Epi: x / Non Sq Epi: x / Bacteria: x                                                LIVER FUNCTIONS - ( 07 Jul 2023 06:53 )  Alb: 2.8 g/dL / Pro: 5.7 g/dL / ALK PHOS: 156 U/L / ALT: 10 U/L / AST: 23 U/L / GGT: x                                                Culture - Body Fluid with Gram Stain (collected 05 Jul 2023 15:45)  Source: Pleural Fl Pleural Fluid  Gram Stain (06 Jul 2023 04:21):    polymorphonuclear leukocytes seen    No organisms seen    by cytocentrifuge  Preliminary Report (06 Jul 2023 20:41):    No growth to date.    Culture - Fungal, Body Fluid (collected 05 Jul 2023 15:45)  Source: Pleural Fl Pleural Fluid  Preliminary Report (06 Jul 2023 13:44):    Testing in progress    Culture - Body Fluid with Gram Stain (collected 04 Jul 2023 19:56)  Source: .Body Fluid Thoracentesis Fluid  Gram Stain (05 Jul 2023 05:13):    No polymorphonuclear leukocytes seen    Gram Positive Cocci in Clusters seen    by cytocentrifuge  Preliminary Report (05 Jul 2023 21:30):    Numerous Staphylococcus epidermidis                                                  Mode: AC/ CMV (Assist Control/ Continuous Mandatory Ventilation)  RR (machine): 16  TV (machine): 0.36  FiO2: 40  PEEP: 8  ITime: 1.28  MAP: 13  PIP: 30                                      ABG - ( 07 Jul 2023 04:33 )  pH, Arterial: 7.49  pH, Blood: x     /  pCO2: 56    /  pO2: 154   / HCO3: 43    / Base Excess: 16.5  /  SaO2: 99.2          MEDICATIONS  STANDING):  acetaZOLAMIDE  IVPB 500 milliGRAM(s) IV Intermittent daily  albuterol    90 MICROgram(s) HFA Inhaler 2 Puff(s) Inhalation every 4 hours  aztreonam  IVPB 2000 milliGRAM(s) IV Intermittent every 8 hours  budesonide 160 MICROgram(s)/formoterol 4.5 MICROgram(s) Inhaler 2 Puff(s) Inhalation two times a day  chlorhexidine 0.12% Liquid 15 milliLiter(s) Oral Mucosa every 12 hours  chlorhexidine 2% Cloths 1 Application(s) Topical <User Schedule>  dextrose 5%. 1000 milliLiter(s) (50 mL/Hr) IV Continuous <Continuous>  dextrose 50% Injectable 25 Gram(s) IV Push once  doxycycline IVPB 100 milliGRAM(s) IV Intermittent every 12 hours  enoxaparin Injectable 90 milliGRAM(s) SubCutaneous every 12 hours  glucagon  Injectable 1 milliGRAM(s) IntraMuscular once  insulin lispro (ADMELOG) corrective regimen sliding scale   SubCutaneous three times a day before meals  nystatin Powder 1 Application(s) Topical every 12 hours  pantoprazole  Injectable 40 milliGRAM(s) IV Push daily  polyethylene glycol 3350 17 Gram(s) Oral every 12 hours  potassium chloride  20 mEq/100 mL IVPB 20 milliEquivalent(s) IV Intermittent every 2 hours  propofol Infusion 10 MICROgram(s)/kG/Min (4.62 mL/Hr) IV Continuous <Continuous>  senna 2 Tablet(s) Oral at bedtime    MEDICATIONS  (PRN):  dextrose Oral Gel 15 Gram(s) Oral once PRN Blood Glucose LESS THAN 70 milliGRAM(s)/deciliter  midazolam Injectable 2 milliGRAM(s) IV Push every 2 hours PRN severe agitation

## 2023-07-07 NOTE — PROGRESS NOTE ADULT - SUBJECTIVE AND OBJECTIVE BOX
GIOVANNY BUCK  88y, Female  Allergy: penicillin (Other)  codeine (Other)  aspirin (Other)  sulfa drugs (Hives; Other)  contrast media (iodine-based) (Other)      LOS  5d    CHIEF COMPLAINT: respiratory diastress (07 Jul 2023 11:08)      INTERVAL EVENTS/HPI  - No acute events overnight. Was hypotensive overnight.   - T(F): , Max: 99.5 (07-06-23 @ 23:00)  - Denies any worsening symptoms  - Tolerating medication  - WBC Count: 10.92 (07-07-23 @ 06:53)  WBC Count: 10.09 (07-06-23 @ 05:51)     - Creatinine: 1.0 (07-07-23 @ 06:53)  Creatinine: 1.3 (07-06-23 @ 05:51)       ROS- Limited due patient's mental status.     VITALS:  T(F): 98.3, Max: 99.5 (07-06-23 @ 23:00)  HR: 104  BP: 106/56  RR: 16Vital Signs Last 24 Hrs  T(C): 36.8 (07 Jul 2023 07:01), Max: 37.5 (06 Jul 2023 23:00)  T(F): 98.3 (07 Jul 2023 07:01), Max: 99.5 (06 Jul 2023 23:00)  HR: 104 (07 Jul 2023 11:14) (96 - 133)  BP: 106/56 (07 Jul 2023 10:00) (74/45 - 132/66)  BP(mean): 77 (07 Jul 2023 10:00) (55 - 91)  RR: 16 (07 Jul 2023 10:00) (16 - 28)  SpO2: 100% (07 Jul 2023 11:14) (93% - 100%)    Parameters below as of 07 Jul 2023 10:00  Patient On (Oxygen Delivery Method): ventilator    O2 Concentration (%): 40    PHYSICAL EXAM:  Gen: NAD, resting in bed  HEENT: Normocephalic, atraumatic  Neck: supple, no lymphadenopathy  CV: Regular rate & regular rhythm  Lungs: decreased BS at bases, no fremitus  Abdomen: Soft, BS present  Ext: Warm, well perfused  Neuro: non focal, awake  Skin: no rash, no erythema  Lines: no phlebitis    FH: Non-contributory  Social Hx: Non-contributory    TESTS & MEASUREMENTS:                        8.2    10.92 )-----------( 170      ( 07 Jul 2023 06:53 )             28.6     07-07    144  |  99  |  47<H>  ----------------------------<  115<H>  3.4<L>   |  36<H>  |  1.0    Ca    8.3<L>      07 Jul 2023 06:53  Mg     2.0     07-07    TPro  5.7<L>  /  Alb  2.8<L>  /  TBili  0.5  /  DBili  x   /  AST  23  /  ALT  10  /  AlkPhos  156<H>  07-07      LIVER FUNCTIONS - ( 07 Jul 2023 06:53 )  Alb: 2.8 g/dL / Pro: 5.7 g/dL / ALK PHOS: 156 U/L / ALT: 10 U/L / AST: 23 U/L / GGT: x           Urinalysis Basic - ( 07 Jul 2023 06:53 )    Color: x / Appearance: x / SG: x / pH: x  Gluc: 115 mg/dL / Ketone: x  / Bili: x / Urobili: x   Blood: x / Protein: x / Nitrite: x   Leuk Esterase: x / RBC: x / WBC x   Sq Epi: x / Non Sq Epi: x / Bacteria: x        Culture - Fungal, Body Fluid (collected 07-05-23 @ 15:45)  Source: Pleural Fl Pleural Fluid  Preliminary Report (07-06-23 @ 13:44):    Testing in progress    Culture - Body Fluid with Gram Stain (collected 07-05-23 @ 15:45)  Source: Pleural Fl Pleural Fluid  Gram Stain (07-06-23 @ 04:21):    polymorphonuclear leukocytes seen    No organisms seen    by cytocentrifuge  Preliminary Report (07-06-23 @ 20:41):    No growth to date.    Culture - Body Fluid with Gram Stain (collected 07-04-23 @ 19:56)  Source: .Body Fluid Thoracentesis Fluid  Gram Stain (07-05-23 @ 05:13):    No polymorphonuclear leukocytes seen    Gram Positive Cocci in Clusters seen    by cytocentrifuge  Preliminary Report (07-05-23 @ 21:30):    Numerous Staphylococcus epidermidis    Culture - Sputum (collected 07-04-23 @ 08:00)  Source: Trach Asp Tracheal Aspirate  Gram Stain (07-04-23 @ 18:15):    Few Squamous epithelial cells per low power field    Few polymorphonuclear leukocytes per low power field    Few Yeast per oil power field  Final Report (07-06-23 @ 17:33):    Normal Respiratory Joaquina present    Culture - Blood (collected 07-02-23 @ 19:55)  Source: .Blood Blood-Peripheral  Preliminary Report (07-07-23 @ 04:01):    No growth at 4 days    Culture - Blood (collected 07-02-23 @ 19:55)  Source: .Blood Blood-Peripheral  Preliminary Report (07-07-23 @ 04:01):    No growth at 4 days    RADIOLOGY & ADDITIONAL TESTS:  I have personally reviewed the last available Chest xray  CXR  Xray Chest 1 View- PORTABLE-Urgent:   ACC: [id number] EXAM:  XR CHEST PORTABLE URGENT 1V   ORDERED BY: TERRENCE FOOTE     PROCEDURE DATE:  07/05/2023          INTERPRETATION:  Clinical History / Reason for exam: Endotracheal tube   position    Comparison : Chest radiograph earlier in the day.    Technique/Positioning: Frontal.    Findings:    Support devices: Tip of endotracheal tube is 1.8 cm above the shelly.   Nasogastric tube seen coursing below diaphragm, tip not seen. There is a   left chest tube. Telemetry leads and tubing overlie patient.    Cardiac/mediastinum/hilum: Stable..    Lung parenchyma/Pleura: Stable bilateral lung opacities, left greater   than right..    Skeleton/soft tissues: Stable..    Impression:    Stable bilateral lung opacities, left greater than right    Tip of endotracheal tube is 1.8 cm above the shelly    --- End of Report ---            WILLIAM DIAL MD; Attending Radiologist  This document has been electronically signed. Jul 5 2023  5:22PM (07-05-23 @ 14:11)      CT        MEDICATIONS  acetaZOLAMIDE  IVPB 500 IV Intermittent daily  albuterol    90 MICROgram(s) HFA Inhaler 2 Inhalation every 4 hours  aztreonam  IVPB 2000 IV Intermittent every 8 hours  budesonide 160 MICROgram(s)/formoterol 4.5 MICROgram(s) Inhaler 2 Inhalation two times a day  chlorhexidine 0.12% Liquid 15 Oral Mucosa every 12 hours  chlorhexidine 2% Cloths 1 Topical <User Schedule>  dextrose 5%. 1000 IV Continuous <Continuous>  dextrose 50% Injectable 25 IV Push once  doxycycline IVPB 100 IV Intermittent every 12 hours  enoxaparin Injectable 90 SubCutaneous every 12 hours  glucagon  Injectable 1 IntraMuscular once  insulin lispro (ADMELOG) corrective regimen sliding scale  SubCutaneous three times a day before meals  lactulose Syrup 20 Oral three times a day  nystatin Powder 1 Topical every 12 hours  pantoprazole  Injectable 40 IV Push daily  polyethylene glycol 3350 17 Oral every 12 hours  potassium chloride  20 mEq/100 mL IVPB 20 IV Intermittent every 2 hours  propofol Infusion 10 IV Continuous <Continuous>  senna 2 Oral at bedtime      WEIGHT  Weight (kg): 91.7 (07-03-23 @ 00:02)  Creatinine: 1.0 mg/dL (07-07-23 @ 06:53)      ANTIBIOTICS:  aztreonam  IVPB 2000 milliGRAM(s) IV Intermittent every 8 hours  doxycycline IVPB 100 milliGRAM(s) IV Intermittent every 12 hours      All available historical records have been reviewed

## 2023-07-07 NOTE — PROGRESS NOTE ADULT - ASSESSMENT
ASSESSMENT  87 yo W female who presents  w/ 3 days history of progressive respiratory distress    IMPRESSION  #Acute Hypoexmic respiratory failure  #Bilateral pleural effusions   - s/p left thoracentesis 3/2023 -- Cytology -PLEURAL FLUID, THORACENTESIS: - SUSPICIOUS FOR MALIGNANCY. - Atypical cells present in background of histiocytes and mesothelial  cells, suspicious for metastatic carcinoma.   - Pleurx Cx 7/4 - GPC in clusters, No PMNs  - s/p thoracentesis right thoracentesis 7/5 Exudative in nature. Cultures pending.     #Acute decompensated HF  #Obesity BMI (kg/m2): 32.6  #DM   #Abx allergy: penicillin (Other)  codeine (Other)  aspirin (Other)    RECOMMENDATIONS  - continue aztreonam 2g q 8 hours and doxycyline for now   - follow-up Right thoracentesis Cx from 7/5   - Noted GPC in pleurx catheter gram stain, No PMNs -- difficult to interpret and would wait for thoracentesis from 7/5  - trend WBC    Please call or message on [employer] Teams if with any questions.

## 2023-07-08 NOTE — PROGRESS NOTE ADULT - SUBJECTIVE AND OBJECTIVE BOX
87 YO F with a medical history  of HTN, malignant pleural effusion of breast origin, chronic A.fib, PAD, Obesity, Chronic lymphedema of  BLE and COPD on home oxygen 2 - 1/2 L nc, ESBL UTI presented to ED with worsening shortness of breath and weakness for 4 days with increasing cough productive of yellow-green sputum. In ED pt was in respiratory distress and intubated    Today:  Seen at bedside, discussed case with daughters.        REVIEW OF SYSTEMS:  No new complaints      MEDICATIONS  (STANDING):  acetaZOLAMIDE  IVPB 500 milliGRAM(s) IV Intermittent daily  albuterol    90 MICROgram(s) HFA Inhaler 2 Puff(s) Inhalation every 4 hours  aztreonam  IVPB 2000 milliGRAM(s) IV Intermittent every 8 hours  budesonide 160 MICROgram(s)/formoterol 4.5 MICROgram(s) Inhaler 2 Puff(s) Inhalation two times a day  chlorhexidine 2% Cloths 1 Application(s) Topical <User Schedule>  dextrose 5%. 1000 milliLiter(s) (50 mL/Hr) IV Continuous <Continuous>  dextrose 50% Injectable 25 Gram(s) IV Push once  doxycycline IVPB 100 milliGRAM(s) IV Intermittent every 12 hours  enoxaparin Injectable 90 milliGRAM(s) SubCutaneous every 12 hours  glucagon  Injectable 1 milliGRAM(s) IntraMuscular once  insulin lispro (ADMELOG) corrective regimen sliding scale   SubCutaneous three times a day before meals  lactulose Syrup 20 Gram(s) Oral three times a day  nystatin Powder 1 Application(s) Topical every 12 hours  pantoprazole  Injectable 40 milliGRAM(s) IV Push daily  polyethylene glycol 3350 17 Gram(s) Oral every 12 hours  propofol Infusion 10 MICROgram(s)/kG/Min (4.62 mL/Hr) IV Continuous <Continuous>  senna 2 Tablet(s) Oral at bedtime    MEDICATIONS  (PRN):  dextrose Oral Gel 15 Gram(s) Oral once PRN Blood Glucose LESS THAN 70 milliGRAM(s)/deciliter  midazolam Injectable 2 milliGRAM(s) IV Push every 2 hours PRN severe agitation      Allergies  penicillin (Other)  codeine (Other)  aspirin (Other)  sulfa drugs (Hives; Other)  contrast media (iodine-based) (Other)        Vital Signs Last 24 Hrs  T(C): 37.4 (08 Jul 2023 11:00), Max: 37.8 (07 Jul 2023 15:30)  T(F): 99.3 (08 Jul 2023 11:00), Max: 100 (07 Jul 2023 15:30)  HR: 102 (08 Jul 2023 10:00) (97 - 114)  BP: 106/57 (08 Jul 2023 10:00) (84/50 - 133/62)  BP(mean): 79 (08 Jul 2023 10:00) (62 - 93)  RR: 25 (08 Jul 2023 11:00) (16 - 32)  SpO2: 98% (08 Jul 2023 10:00) (98% - 100%)    Parameters below as of 08 Jul 2023 10:00  Patient On (Oxygen Delivery Method): ventilator, CPAP on ventilator        PHYSICAL EXAM:  GENERAL: NAD  HEAD:  Atraumatic, Normocephalic  EYES: EOMI, PERRLA, conjunctiva and sclera clear  NERVOUS SYSTEM: no focal deficits   CHEST/LUNG:  bilateral rales  HEART: Regular rate and rhythm; No murmurs, rubs, or gallops  ABDOMEN: Soft, Nontender, Nondistended; Bowel sounds present  EXTREMITIES:  2+ Peripheral Pulses, No clubbing, cyanosis, or edema      LABS:                        8.1    10.05 )-----------( 163      ( 08 Jul 2023 06:01 )             28.5     07-08    144  |  99  |  43<H>  ----------------------------<  112<H>  3.6   |  34<H>  |  0.9    Ca    8.6      08 Jul 2023 06:01  Mg     2.1     07-08    TPro  6.1  /  Alb  2.9<L>  /  TBili  0.4  /  DBili  x   /  AST  20  /  ALT  10  /  AlkPhos  175<H>  07-08      Urinalysis Basic - ( 08 Jul 2023 06:01 )    Color: x / Appearance: x / SG: x / pH: x  Gluc: 112 mg/dL / Ketone: x  / Bili: x / Urobili: x   Blood: x / Protein: x / Nitrite: x   Leuk Esterase: x / RBC: x / WBC x   Sq Epi: x / Non Sq Epi: x / Bacteria: x

## 2023-07-08 NOTE — PROGRESS NOTE ADULT - SUBJECTIVE AND OBJECTIVE BOX
Patient is a 88y old  Female who presents with a chief complaint of respiratory diastress (07 Jul 2023 13:21)      Over Night Events:  Patient seen and examined.   awake on vent   no pressors     ROS:  See HPI    PHYSICAL EXAM    ICU Vital Signs Last 24 Hrs  T(C): 37.1 (08 Jul 2023 07:08), Max: 37.8 (07 Jul 2023 15:30)  T(F): 98.8 (08 Jul 2023 07:08), Max: 100 (07 Jul 2023 15:30)  HR: 104 (08 Jul 2023 09:06) (97 - 114)  BP: 100/50 (08 Jul 2023 08:00) (84/50 - 133/62)  BP(mean): 71 (08 Jul 2023 08:00) (62 - 93)  ABP: --  ABP(mean): --  RR: 18 (08 Jul 2023 08:00) (16 - 32)  SpO2: 100% (08 Jul 2023 09:06) (98% - 100%)    O2 Parameters below as of 08 Jul 2023 07:00  Patient On (Oxygen Delivery Method): ventilator    O2 Concentration (%): 40        General:awake   HEENT:     et tube            Lymph Nodes: NO cervical LN   Lungs: Bilateral BS  Cardiovascular: Regular   Abdomen: Soft, Positive BS  Extremities: No clubbing   Skin: warm   Neurological: no focal   Musculoskeletal: move all ext     I&O's Detail    07 Jul 2023 07:01  -  08 Jul 2023 07:00  --------------------------------------------------------  IN:    IV PiggyBack: 650 mL    Propofol: 115 mL    Vital High Protein: 650 mL  Total IN: 1415 mL    OUT:    Indwelling Catheter - Urethral (mL): 1905 mL  Total OUT: 1905 mL    Total NET: -490 mL      08 Jul 2023 07:01  -  08 Jul 2023 10:14  --------------------------------------------------------  IN:    Propofol: 6 mL    Vital High Protein: 100 mL  Total IN: 106 mL    OUT:    Indwelling Catheter - Urethral (mL): 85 mL  Total OUT: 85 mL    Total NET: 21 mL          LABS:                          8.1    10.05 )-----------( 163      ( 08 Jul 2023 06:01 )             28.5         08 Jul 2023 06:01    144    |  99     |  43     ----------------------------<  112    3.6     |  34     |  0.9      Ca    8.6        08 Jul 2023 06:01  Mg     2.1       08 Jul 2023 06:01    TPro  6.1    /  Alb  2.9    /  TBili  0.4    /  DBili  x      /  AST  20     /  ALT  10     /  AlkPhos  175    08 Jul 2023 06:01  Amylase x     lipase x                                                                                        Urinalysis Basic - ( 08 Jul 2023 06:01 )    Color: x / Appearance: x / SG: x / pH: x  Gluc: 112 mg/dL / Ketone: x  / Bili: x / Urobili: x   Blood: x / Protein: x / Nitrite: x   Leuk Esterase: x / RBC: x / WBC x   Sq Epi: x / Non Sq Epi: x / Bacteria: x                                                                Culture - Fungal, Body Fluid (collected 05 Jul 2023 15:45)  Source: Pleural Fl Pleural Fluid  Preliminary Report (06 Jul 2023 13:44):    Testing in progress    Culture - Body Fluid with Gram Stain (collected 05 Jul 2023 15:45)  Source: Pleural Fl Pleural Fluid  Gram Stain (06 Jul 2023 04:21):    polymorphonuclear leukocytes seen    No organisms seen    by cytocentrifuge  Preliminary Report (06 Jul 2023 20:41):    No growth to date.                                                   Mode: CPAP with PS  FiO2: 30  PEEP: 5  PS: 5  MAP: 11  PIP: 13                                      ABG - ( 07 Jul 2023 12:02 )  pH, Arterial: 7.42  pH, Blood: x     /  pCO2: 64    /  pO2: 124   / HCO3: 42    / Base Excess: 14.3  /  SaO2: 100.0               MEDICATIONS  (STANDING):  acetaZOLAMIDE  IVPB 500 milliGRAM(s) IV Intermittent daily  albuterol    90 MICROgram(s) HFA Inhaler 2 Puff(s) Inhalation every 4 hours  aztreonam  IVPB 2000 milliGRAM(s) IV Intermittent every 8 hours  budesonide 160 MICROgram(s)/formoterol 4.5 MICROgram(s) Inhaler 2 Puff(s) Inhalation two times a day  chlorhexidine 0.12% Liquid 15 milliLiter(s) Oral Mucosa every 12 hours  chlorhexidine 2% Cloths 1 Application(s) Topical <User Schedule>  dextrose 5%. 1000 milliLiter(s) (50 mL/Hr) IV Continuous <Continuous>  dextrose 50% Injectable 25 Gram(s) IV Push once  doxycycline IVPB 100 milliGRAM(s) IV Intermittent every 12 hours  enoxaparin Injectable 90 milliGRAM(s) SubCutaneous every 12 hours  glucagon  Injectable 1 milliGRAM(s) IntraMuscular once  insulin lispro (ADMELOG) corrective regimen sliding scale   SubCutaneous three times a day before meals  lactulose Syrup 20 Gram(s) Oral three times a day  nystatin Powder 1 Application(s) Topical every 12 hours  pantoprazole  Injectable 40 milliGRAM(s) IV Push daily  polyethylene glycol 3350 17 Gram(s) Oral every 12 hours  propofol Infusion 10 MICROgram(s)/kG/Min (4.62 mL/Hr) IV Continuous <Continuous>  senna 2 Tablet(s) Oral at bedtime    MEDICATIONS  (PRN):  dextrose Oral Gel 15 Gram(s) Oral once PRN Blood Glucose LESS THAN 70 milliGRAM(s)/deciliter  midazolam Injectable 2 milliGRAM(s) IV Push every 2 hours PRN severe agitation          Xrays:  TLC:  OG:  ET tube:                                                                                    right lower mild opacity    ECHO:  [facility] ICU:

## 2023-07-08 NOTE — PROGRESS NOTE ADULT - ASSESSMENT
89 YO F with a medical history  of HTN, malignant pleural effusion of breast origin, chronic A.fib, PAD, Obesity, Chronic lymphedema of  BLE and COPD on home oxygen 2 - 1/2 L nc, ESBL UTI presented to ED with worsening shortness of breath and weakness for 4 days with increasing cough productive of yellow-green sputum. In ED pt was in respiratory distress and intubated    acute respiratory failure / b/l pleural effusions        - s/p thoracentesis on R    - supplement hypokalemia and re check lytes   - continue antibiotics    - continue anticoagulation    - palliative    - GI prophylaxis

## 2023-07-08 NOTE — CHART NOTE - NSCHARTNOTEFT_GEN_A_CORE
I was notified this afternoon that patient has significant bleeding in oral airway. Hb stable on repeat CBC. Still seeing fresh blood

## 2023-07-08 NOTE — PROGRESS NOTE ADULT - CRITICAL CARE ATTENDING COMMENT
This patient is critically ill due to the following:  * Respiratory instability requiring management of invasive ventilation  * Multiple organ failure requiring complex decision-making, and there is a high probability of imminent or life-threatening deterioration in the patient’s condition  * The patient required frequent reassessments and monitoring to ensure response to interventions and therapies.    Critical care time includes time spent evaluating and treating the patient's acute illness as well as time spent reviewing labs, radiology,  and discussing the case with a multidisciplinary team in an effort to prevent further life threatening deterioration or end organ damage. This time is independent of any procedures performed.
This patient is critically ill due to the following:  * Multiple organ failure requiring complex decision-making, and there is a high probability of imminent or life-threatening deterioration in the patient’s condition  * The patient required frequent reassessments and monitoring to ensure response to interventions and therapies.    Critical care time includes time spent evaluating and treating the patient's acute illness as well as time spent reviewing labs, radiology,  and discussing the case with a multidisciplinary team in an effort to prevent further life threatening deterioration or end organ damage. This time is independent of any procedures performed.

## 2023-07-08 NOTE — PROGRESS NOTE ADULT - ASSESSMENT
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 Light sedation RASS -2 - 0.SBT     HEENT: Oral care. ET care Day 04    PULMONARY:  HOB @ 45 degrees. CXR noted with bilateral opacities slightly improved. Albuterol Nebulizers. SBT  s/p thoracentesis 7/5 -> exudative   Follow up cyto path  do weaning trial SBT  CARDIOVASCULAR:  Avoid volume overload. Strict I's and O's. Target MAP>65. Hold lasix.  Echo with normal EF.     GI: GI prophylaxis.  Feeding: OG feeds as tolerated.     RENAL:  Follow up lytes. Correct as needed.    INFECTIOUS DISEASE: RVP negative;. cx negative. DTA negative. Follow pleural fluid cx, c/w Aztreonam and doxycycline for now. Nasal MRSA.    HEMATOLOGICAL:  Full AC LMWH. Moniot CBC and Coags. Hgb stable.     ENDOCRINE:  Follow up FS.  Insulin protocol if needed.    MUSCULOSKELETAL: Bedrest    Ba: 7/2  Lines: Periphers    Disposition: ICU  Palliative eval  Prognosis is poor overall

## 2023-07-09 NOTE — CHART NOTE - NSCHARTNOTEFT_GEN_A_CORE
Pt had bleeding post extubation, ~ 200 cc of bloody material suctioned orally. No obvious oral trauma. Pt is comfortable, saturating 99% on NC and responsive. No further bleeding. CXR performed awaiting report. Will continue to monitor, hold AC.

## 2023-07-09 NOTE — PROGRESS NOTE ADULT - SUBJECTIVE AND OBJECTIVE BOX
Patient seen and evaluated this am, comfortable off ventilator, no respiratory distress, dried blood visible in oral cavity      T(F): 99 (07-09-23 @ 07:05), Max: 100.4 (07-08-23 @ 19:00)  HR: 105 (07-09-23 @ 07:00)  BP: 103/55 (07-09-23 @ 07:00)  RR: 19 (07-09-23 @ 07:05)  SpO2: 97% (07-09-23 @ 07:00) (89% - 100%)    PHYSICAL EXAM:  GENERAL: NAD  HEAD:  Atraumatic, Normocephalic  EYES: EOMI, PERRLA, conjunctiva and sclera clear  NERVOUS SYSTEM:   no focal deficits   CHEST/LUNG:  bilateral rhonchi  HEART: Regular rate and rhythm; No murmurs, rubs, or gallops  ABDOMEN: Soft, Nontender, Nondistended; Bowel sounds present  EXTREMITIES:  2+ Peripheral Pulses, No clubbing, cyanosis, or edema    LABS  07-09    143  |  102  |  44<H>  ----------------------------<  97  3.5   |  34<H>  |  0.9    Ca    8.5      09 Jul 2023 05:55  Phos  3.0     07-09  Mg     2.1     07-09    TPro  5.6<L>  /  Alb  2.7<L>  /  TBili  0.4  /  DBili  x   /  AST  15  /  ALT  8   /  AlkPhos  143<H>  07-09                          7.1    8.93  )-----------( 185      ( 09 Jul 2023 05:55 )             25.1       Culture Results:   No growth to date. (07-05-23)  Culture Results:   Culture is being performed. Fungal cultures are held for 4 weeks. (07-05-23)  Culture Results:   Numerous Staphylococcus epidermidis (07-04-23)  Culture Results:   Normal Respiratory Joaquina present (07-04-23)  Culture Results:   No growth at 5 days (07-02-23)  Culture Results:   No growth at 5 days (07-02-23)    RADIOLOGY  < from: Xray Chest 1 View-PORTABLE IMMEDIATE (Xray Chest 1 View-PORTABLE IMMEDIATE .) (07.08.23 @ 23:56) >    IMPRESSION:    Unchanged bibasilar opacities and effusions.      < end of copied text >    MEDICATIONS  (STANDING):  acetaZOLAMIDE  IVPB 500 milliGRAM(s) IV Intermittent daily  aztreonam  IVPB 2000 milliGRAM(s) IV Intermittent every 8 hours  budesonide 160 MICROgram(s)/formoterol 4.5 MICROgram(s) Inhaler 2 Puff(s) Inhalation two times a day  chlorhexidine 2% Cloths 1 Application(s) Topical <User Schedule>  dextrose 5%. 1000 milliLiter(s) (50 mL/Hr) IV Continuous <Continuous>  dextrose 50% Injectable 25 Gram(s) IV Push once  doxycycline IVPB 100 milliGRAM(s) IV Intermittent every 12 hours  insulin lispro (ADMELOG) corrective regimen sliding scale   SubCutaneous three times a day before meals  lactulose Syrup 20 Gram(s) Oral three times a day  nystatin Powder 1 Application(s) Topical every 12 hours  pantoprazole  Injectable 40 milliGRAM(s) IV Push daily  polyethylene glycol 3350 17 Gram(s) Oral every 12 hours  senna 2 Tablet(s) Oral at bedtime      MEDICATIONS  (PRN):  dextrose Oral Gel 15 Gram(s) Oral once PRN Blood Glucose LESS THAN 70 milliGRAM(s)/deciliter

## 2023-07-09 NOTE — SWALLOW BEDSIDE ASSESSMENT ADULT - COMMENTS
Provided oral care + toleration observed without overt symptoms of penetration/aspiration for pureed solids/ mildly thick liquids via small/ controlled sips and bites,  Pt appeared more coordinated during straw sip compared to cup sip

## 2023-07-09 NOTE — PROGRESS NOTE ADULT - ASSESSMENT
IMPRESSION:    Acute hypoxemic resp failure on MV   Acute hypercapnic resp failure  Acute decompensated HF / HO HFpEF   Fluid overload   b/l pleural effusions - 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)  HO Probable LAWRENCE refused testing   Thyroid mass SP FNA   HO LE cellulitis with ulcer and abscess MRSA +  HO Afib on Eliquis  Chronic lymphedema  HO Peripheral vascular disease    PLAN:    CNS: no sedation     HEENT: Oral care. ET care Day 04    PULMONARY:  HOB @ 45 degrees. target pox > 92 %   NIV as needed   do cxr today   s/p thoracentesis 7/5 -> exudative   Follow up cyto path    CARDIOVASCULAR:  Avoid volume overload. Strict I's and O's. Target MAP>65. Hold lasix.  Echo with normal EF.     GI: GI prophylaxis.  Feeding: OG feeds as tolerated.     RENAL:  Follow up lytes. Correct as needed.    INFECTIOUS DISEASE: RVP negative;. cx negative. DTA negative. Follow pleural fluid cx, c/w Aztreonam and doxycycline for now. Nasal MRSA.    HEMATOLOGICAL:  Full AC LMWH. Moniot CBC and Coags. Hgb stable.     ENDOCRINE:  Follow up FS.  Insulin protocol if needed.    MUSCULOSKELETAL: Bedrest    Ba: 7/2  Lines: Periphers    Disposition:sdu  Palliative eval  Prognosis is poor overall

## 2023-07-09 NOTE — PROGRESS NOTE ADULT - ASSESSMENT
89 YO F with a medical history  of HTN, malignant pleural effusion of breast origin, chronic A.fib, PAD, Obesity, Chronic lymphedema of  BLE and COPD on home oxygen 2 - 1/2 L nc, ESBL UTI presented to ED with worsening shortness of breath and weakness for 4 days with increasing cough productive of yellow-green sputum. In ED pt was in respiratory distress and intubated    acute respiratory failure - resolved / b/l pleural effusions / bleeding from oral cavity     - no visible active bleeding now   - hold anticoagulation   - transfuse 1 unit PRBC   - follow H/H q12h   - try to maintain even to negative fluid balance   - SAT -> SBT -> extubated    - s/p thoracentesis on R    - supplement hypokalemia and re check lytes   - continue antibiotics    - S&S if fails ->NGT   - GI prophylaxis

## 2023-07-09 NOTE — PROGRESS NOTE ADULT - SUBJECTIVE AND OBJECTIVE BOX
Patient is a 88y old  Female who presents with a chief complaint of respiratory distress (09 Jul 2023 09:19)      Over Night Events:  Patient seen and examined.   s/p extubation tolerate   not on distress     ROS:  See HPI    PHYSICAL EXAM    ICU Vital Signs Last 24 Hrs  T(C): 37.2 (09 Jul 2023 07:05), Max: 38 (08 Jul 2023 19:00)  T(F): 99 (09 Jul 2023 07:05), Max: 100.4 (08 Jul 2023 19:00)  HR: 109 (09 Jul 2023 09:00) (105 - 117)  BP: 113/56 (09 Jul 2023 09:00) (88/49 - 113/56)  BP(mean): 80 (09 Jul 2023 09:00) (65 - 81)  ABP: --  ABP(mean): --  RR: 20 (09 Jul 2023 09:00) (19 - 32)  SpO2: 98% (09 Jul 2023 09:00) (89% - 100%)    O2 Parameters below as of 09 Jul 2023 09:00  Patient On (Oxygen Delivery Method): nasal cannula  O2 Flow (L/min): 4          General:awake   HEENT:       nasal cannula          Lymph Nodes: NO cervical LN   Lungs: Bilateral BS  Cardiovascular: Regular   Abdomen: Soft, Positive BS  Extremities: No clubbing   Skin: warm   Neurological: no focal   Musculoskeletal: move all ext     I&O's Detail    08 Jul 2023 07:01  -  09 Jul 2023 07:00  --------------------------------------------------------  IN:    IV PiggyBack: 350 mL    Propofol: 6 mL    sodium chloride 0.9%: 420 mL    Vital High Protein: 100 mL  Total IN: 876 mL    OUT:    Indwelling Catheter - Urethral (mL): 1455 mL  Total OUT: 1455 mL    Total NET: -579 mL      09 Jul 2023 07:01  -  09 Jul 2023 10:01  --------------------------------------------------------  IN:    sodium chloride 0.9%: 180 mL  Total IN: 180 mL    OUT:    Indwelling Catheter - Urethral (mL): 90 mL  Total OUT: 90 mL    Total NET: 90 mL          LABS:                          7.1    8.93  )-----------( 185      ( 09 Jul 2023 05:55 )             25.1         09 Jul 2023 05:55    143    |  102    |  44     ----------------------------<  97     3.5     |  34     |  0.9      Ca    8.5        09 Jul 2023 05:55  Phos  3.0       09 Jul 2023 05:55  Mg     2.1       09 Jul 2023 05:55    TPro  5.6    /  Alb  2.7    /  TBili  0.4    /  DBili  x      /  AST  15     /  ALT  8      /  AlkPhos  143    09 Jul 2023 05:55  Amylase x     lipase x                                                                                        Urinalysis Basic - ( 09 Jul 2023 05:55 )    Color: x / Appearance: x / SG: x / pH: x  Gluc: 97 mg/dL / Ketone: x  / Bili: x / Urobili: x   Blood: x / Protein: x / Nitrite: x   Leuk Esterase: x / RBC: x / WBC x   Sq Epi: x / Non Sq Epi: x / Bacteria: x                                                                                                             Mode: standby                                      ABG - ( 08 Jul 2023 10:44 )  pH, Arterial: 7.47  pH, Blood: x     /  pCO2: 56    /  pO2: 79    / HCO3: 41    / Base Excess: 15.3  /  SaO2: 98.5                MEDICATIONS  (STANDING):  acetaZOLAMIDE  IVPB 500 milliGRAM(s) IV Intermittent daily  aztreonam  IVPB 2000 milliGRAM(s) IV Intermittent every 8 hours  budesonide 160 MICROgram(s)/formoterol 4.5 MICROgram(s) Inhaler 2 Puff(s) Inhalation two times a day  chlorhexidine 2% Cloths 1 Application(s) Topical <User Schedule>  dextrose 5%. 1000 milliLiter(s) (50 mL/Hr) IV Continuous <Continuous>  dextrose 50% Injectable 25 Gram(s) IV Push once  doxycycline IVPB 100 milliGRAM(s) IV Intermittent every 12 hours  glucagon  Injectable 1 milliGRAM(s) IntraMuscular once  insulin lispro (ADMELOG) corrective regimen sliding scale   SubCutaneous three times a day before meals  lactulose Syrup 20 Gram(s) Oral three times a day  nystatin Powder 1 Application(s) Topical every 12 hours  pantoprazole  Injectable 40 milliGRAM(s) IV Push daily  polyethylene glycol 3350 17 Gram(s) Oral every 12 hours  senna 2 Tablet(s) Oral at bedtime  sodium chloride 0.9%. 1000 milliLiter(s) (60 mL/Hr) IV Continuous <Continuous>    MEDICATIONS  (PRN):  dextrose Oral Gel 15 Gram(s) Oral once PRN Blood Glucose LESS THAN 70 milliGRAM(s)/deciliter          Xrays:  TLC:  OG:  ET tube:                                                                                       ECHO:  [facility] ICU:

## 2023-07-10 NOTE — PROGRESS NOTE ADULT - SUBJECTIVE AND OBJECTIVE BOX
GIOVANNY BUCK 88y Female  MRN#: [id number]   CODE STATUS:________    Hospital Day: 8d    Pt is currently admitted with the primary diagnosis of PNA     SUBJECTIVE  Hospital Course    88F w/PMHx of HTN, malignant pleural effusion of breast origin, chronic A.fib, PAD, Obesity, Chronic lymphedema of  BLE and COPD on home oxygen 2 - 1/2 L nc, ESBL UTI presented to ED with worsening shortness of breath and weakness for 4 days with increasing cough productive of yellow-green sputum. In ED pt was in respiratory distress and intubated    07/04 - Pleurex cath drainage with pleural Cx snet - showed GPC in clusters, No PMNs  07/05 - thoracocentesis   07/09 - receieved PRBC x1 - Extubated  07/10 - TOV - CXR improving     Overnight events   No overnight events     Subjective complaints   Patient is feeling fine, no complaints     Present Today:   - Ba:  No [  ], Yes [ X  ] : Indication: do trial of void                                            OBJECTIVE  PAST MEDICAL & SURGICAL HISTORY  HTN (hypertension)    Afib  s/p ablation 2001    Goiter  no meds    Cellulitis  chronic    OA (osteoarthritis)    PVD (peripheral vascular disease)    COPD (chronic obstructive pulmonary disease)    Anxiety    Obesity    Acute on chronic systolic congestive heart failure    Edema of both lower legs    H/O abdominal hysterectomy    H/O discectomy    H/O total knee replacement, bilateral                                                ALLERGIES:  penicillin (Other)  codeine (Other)  aspirin (Other)  sulfa drugs (Hives; Other)  contrast media (iodine-based) (Other)                           HOME MEDICATIONS  Home Medications:  albuterol 90 mcg/inh inhalation aerosol: 2 puff(s) inhaled every 6 hours as needed for  shortness of breath and/or wheezing (02 Jul 2023 23:05)  budesonide-formoterol 80 mcg-4.5 mcg/inh inhalation aerosol: 2 puff(s) inhaled 2 times a day (02 Jul 2023 23:05)  dilTIAZem 180 mg/24 hours oral capsule, extended release: 1 cap(s) orally once a day (02 Jul 2023 23:05)  Eliquis 2.5 mg oral tablet: 1 tab(s) orally 2 times a day (02 Jul 2023 23:05)  furosemide 20 mg oral tablet: 1 tab(s) orally once a day (02 Jul 2023 23:05)  Senna 8.6 mg oral tablet: 2 tab(s) orally once a day (at bedtime) (02 Jul 2023 23:05)                           MEDICATIONS:  STANDING MEDICATIONS  acetaZOLAMIDE  IVPB 500 milliGRAM(s) IV Intermittent daily  aztreonam  IVPB 2000 milliGRAM(s) IV Intermittent every 8 hours  budesonide 160 MICROgram(s)/formoterol 4.5 MICROgram(s) Inhaler 2 Puff(s) Inhalation two times a day  chlorhexidine 2% Cloths 1 Application(s) Topical <User Schedule>  dextrose 5%. 1000 milliLiter(s) IV Continuous <Continuous>  dextrose 50% Injectable 25 Gram(s) IV Push once  doxycycline IVPB 100 milliGRAM(s) IV Intermittent every 12 hours  glucagon  Injectable 1 milliGRAM(s) IntraMuscular once  insulin lispro (ADMELOG) corrective regimen sliding scale   SubCutaneous three times a day before meals  lactulose Syrup 20 Gram(s) Oral three times a day  nystatin Powder 1 Application(s) Topical every 12 hours  pantoprazole  Injectable 40 milliGRAM(s) IV Push daily  polyethylene glycol 3350 17 Gram(s) Oral every 12 hours  senna 2 Tablet(s) Oral at bedtime    PRN MEDICATIONS  dextrose Oral Gel 15 Gram(s) Oral once PRN                                            ------------------------------------------------------------  VITAL SIGNS: Last 24 Hours  T(C): 36.2 (10 Jul 2023 07:10), Max: 37.5 (09 Jul 2023 22:00)  T(F): 97.1 (10 Jul 2023 07:10), Max: 99.5 (09 Jul 2023 22:00)  HR: 104 (10 Jul 2023 14:36) (103 - 111)  BP: 111/64 (10 Jul 2023 14:36) (95/54 - 118/63)  BP(mean): 82 (10 Jul 2023 14:36) (72 - 84)  RR: 28 (10 Jul 2023 14:36) (24 - 31)  SpO2: 97% (10 Jul 2023 14:36) (94% - 97%)      07-09-23 @ 07:01  -  07-10-23 @ 07:00  --------------------------------------------------------  IN: 770 mL / OUT: 870 mL / NET: -100 mL    07-10-23 @ 07:01  -  07-10-23 @ 14:59  --------------------------------------------------------  IN: 0 mL / OUT: 450 mL / NET: -450 mL                                               LABS:                        7.4    8.25  )-----------( 203      ( 10 Jul 2023 05:57 )             26.5     07-10    145  |  102  |  43<H>  ----------------------------<  81  3.6   |  33<H>  |  0.9    Ca    9.0      10 Jul 2023 05:57  Phos  3.0     07-09  Mg     2.2     07-10    TPro  6.0  /  Alb  2.8<L>  /  TBili  0.4  /  DBili  x   /  AST  16  /  ALT  9   /  AlkPhos  154<H>  07-10      Urinalysis Basic - ( 10 Jul 2023 05:57 )    Color: x / Appearance: x / SG: x / pH: x  Gluc: 81 mg/dL / Ketone: x  / Bili: x / Urobili: x   Blood: x / Protein: x / Nitrite: x   Leuk Esterase: x / RBC: x / WBC x   Sq Epi: x / Non Sq Epi: x / Bacteria: x                                                            RADIOLOGY:        PHYSICAL EXAM:  GENERAL: NAD, lying in bed comfortably  NECK: Supple, No JVD  CHEST/LUNG: Clear to auscultation bilaterally; No rales, rhonchi, wheezing, or rubs. Unlabored respirations  HEART: Regular rate and rhythm; No murmurs, rubs, or gallops  ABDOMEN: BSx4; Soft, nontender, nondistended  EXTREMITIES:  2+ Peripheral Pulses, brisk capillary refill. No clubbing, cyanosis, or edema  NERVOUS SYSTEM:  A&Ox3, no focal deficits   SKIN: No rashes or lesions                                         ASSESSMENT & PLAN    HTN, malignant pleural effusion of breast origin, chronic A.fib, PAD, Obesity, Chronic lymphedema of  BLE and COPD on home oxygen 2 - 1/2 L nc, ESBL UTI presented to ED with worsening shortness of breath and weakness for 4 days with increasing cough productive of yellow-green sputum. In ED pt was in respiratory distress and intubated    #acute hypoxemic and hypercapnic respiratory failure - s/p MV 07/03/2023  #pulmonary edema  #H/O left malignant effusion s/p pleurx - likely of breast origin as per path 03/2023  #chronic lymphedema  #suspected CAP  - Monitor O2 - Daily CXR/ABG - s/p intubation on admission/extubation 07/09 - on NC 3L now   - s/p Meropenem on admission -> switched to aztreonam and Azithromycin 07/03 till end -date 07/12 as per ID   - palliative eval noted   - TOV - D/C servando     #COPD  - not on home O2  - solumedrol stopped on 07/03  - c/w albuterol neb    #AFIB  - heparin drip switched to lovenox 90 BID 07/03    #MISC  DVT PPX Lovenox therapeutic  GI PPX Protonix  DIET OG feeding  CODE FULL

## 2023-07-10 NOTE — PROGRESS NOTE ADULT - SUBJECTIVE AND OBJECTIVE BOX
Patient is a 88y old  Female who presents with a chief complaint of respiratory distress (09 Jul 2023 10:01)      Over Night Events:  Patient seen and examined.   stable on NC   extubated for 48 hrs     ROS:  See HPI    PHYSICAL EXAM    ICU Vital Signs Last 24 Hrs  T(C): 36.2 (10 Jul 2023 07:10), Max: 37.5 (09 Jul 2023 22:00)  T(F): 97.1 (10 Jul 2023 07:10), Max: 99.5 (09 Jul 2023 22:00)  HR: 103 (10 Jul 2023 07:29) (103 - 111)  BP: 118/63 (10 Jul 2023 07:29) (91/50 - 118/63)  BP(mean): 84 (10 Jul 2023 07:29) (67 - 84)  ABP: --  ABP(mean): --  RR: 31 (10 Jul 2023 07:29) (23 - 38)  SpO2: 96% (10 Jul 2023 07:29) (94% - 98%)    O2 Parameters below as of 10 Jul 2023 07:29  Patient On (Oxygen Delivery Method): nasal cannula  O2 Flow (L/min): 3          General:awake   HEENT: steph               Lymph Nodes: NO cervical LN   Lungs: Bilateral BS  Cardiovascular: Regular   Abdomen: Soft, Positive BS  Extremities: No clubbing   Skin: warm   Neurological: no focal   Musculoskeletal: move all ext     I&O's Detail    09 Jul 2023 07:01  -  10 Jul 2023 07:00  --------------------------------------------------------  IN:    IV PiggyBack: 450 mL    Oral Fluid: 80 mL    sodium chloride 0.9%: 240 mL  Total IN: 770 mL    OUT:    Indwelling Catheter - Urethral (mL): 870 mL  Total OUT: 870 mL    Total NET: -100 mL          LABS:                          7.4    8.25  )-----------( 203      ( 10 Jul 2023 05:57 )             26.5         10 Jul 2023 05:57    145    |  102    |  43     ----------------------------<  81     3.6     |  33     |  0.9      Ca    9.0        10 Jul 2023 05:57  Phos  3.0       09 Jul 2023 05:55  Mg     2.2       10 Jul 2023 05:57    TPro  6.0    /  Alb  2.8    /  TBili  0.4    /  DBili  x      /  AST  16     /  ALT  9      /  AlkPhos  154    10 Jul 2023 05:57  Amylase x     lipase x                                                                                        Urinalysis Basic - ( 10 Jul 2023 05:57 )    Color: x / Appearance: x / SG: x / pH: x  Gluc: 81 mg/dL / Ketone: x  / Bili: x / Urobili: x   Blood: x / Protein: x / Nitrite: x   Leuk Esterase: x / RBC: x / WBC x   Sq Epi: x / Non Sq Epi: x / Bacteria: x                                                                                                                                                 ABG - ( 08 Jul 2023 10:44 )  pH, Arterial: 7.47  pH, Blood: x     /  pCO2: 56    /  pO2: 79    / HCO3: 41    / Base Excess: 15.3  /  SaO2: 98.5                MEDICATIONS  (STANDING):  acetaZOLAMIDE  IVPB 500 milliGRAM(s) IV Intermittent daily  aztreonam  IVPB 2000 milliGRAM(s) IV Intermittent every 8 hours  budesonide 160 MICROgram(s)/formoterol 4.5 MICROgram(s) Inhaler 2 Puff(s) Inhalation two times a day  chlorhexidine 2% Cloths 1 Application(s) Topical <User Schedule>  dextrose 5%. 1000 milliLiter(s) (50 mL/Hr) IV Continuous <Continuous>  dextrose 50% Injectable 25 Gram(s) IV Push once  doxycycline IVPB 100 milliGRAM(s) IV Intermittent every 12 hours  glucagon  Injectable 1 milliGRAM(s) IntraMuscular once  insulin lispro (ADMELOG) corrective regimen sliding scale   SubCutaneous three times a day before meals  lactulose Syrup 20 Gram(s) Oral three times a day  nystatin Powder 1 Application(s) Topical every 12 hours  pantoprazole  Injectable 40 milliGRAM(s) IV Push daily  polyethylene glycol 3350 17 Gram(s) Oral every 12 hours  senna 2 Tablet(s) Oral at bedtime  sodium chloride 0.9%. 1000 milliLiter(s) (60 mL/Hr) IV Continuous <Continuous>    MEDICATIONS  (PRN):  dextrose Oral Gel 15 Gram(s) Oral once PRN Blood Glucose LESS THAN 70 milliGRAM(s)/deciliter          Xrays:  TLC:  OG:  ET tube:                                                                                       ECHO:  [facility] ICU:

## 2023-07-10 NOTE — PROGRESS NOTE ADULT - SUBJECTIVE AND OBJECTIVE BOX
GIOVANNY BUCK  88y, Female  Allergy: penicillin (Other)  codeine (Other)  aspirin (Other)  sulfa drugs (Hives; Other)  contrast media (iodine-based) (Other)      LOS  8d    CHIEF COMPLAINT: respiratory diastress (10 Jul 2023 09:54)      INTERVAL EVENTS/HPI  - No acute events overnight  - T(F): , Max: 99.5 (07-09-23 @ 22:00)  - Denies any worsening symptoms  - Tolerating medication, extubated.   - WBC Count: 8.25 (07-10-23 @ 05:57)  WBC Count: 9.56 (07-09-23 @ 15:18)     - Creatinine: 0.9 (07-10-23 @ 05:57)  Creatinine: 0.9 (07-09-23 @ 05:55)       ROS  General: Denies rigors, nightsweats  HEENT: Denies headache, rhinorrhea, sore throat, eye pain  CV: Denies CP, palpitations  PULM: Denies wheezing, hemoptysis  GI: Denies hematemesis, hematochezia, melena  : Denies discharge, hematuria  MSK: Denies arthralgias, myalgias  SKIN: Denies rash, lesions  NEURO: Denies paresthesias, weakness  PSYCH: Denies depression, anxiety    VITALS:  T(F): 97.1, Max: 99.5 (07-09-23 @ 22:00)  HR: 103  BP: 118/63  RR: 31Vital Signs Last 24 Hrs  T(C): 36.2 (10 Jul 2023 07:10), Max: 37.5 (09 Jul 2023 22:00)  T(F): 97.1 (10 Jul 2023 07:10), Max: 99.5 (09 Jul 2023 22:00)  HR: 103 (10 Jul 2023 07:29) (103 - 111)  BP: 118/63 (10 Jul 2023 07:29) (91/50 - 118/63)  BP(mean): 84 (10 Jul 2023 07:29) (67 - 84)  RR: 31 (10 Jul 2023 07:29) (23 - 38)  SpO2: 96% (10 Jul 2023 07:29) (94% - 98%)    Parameters below as of 10 Jul 2023 07:29  Patient On (Oxygen Delivery Method): nasal cannula  O2 Flow (L/min): 3      PHYSICAL EXAM:  Gen: NAD, resting in bed  HEENT: Normocephalic, atraumatic, mild drainage noted on the left eye.   Neck: supple, no lymphadenopathy  CV: Regular rate & regular rhythm  Lungs: decreased BS at bases, no fremitus  Abdomen: Soft, BS present  Ext: Warm, well perfused  Neuro: non focal, awake  Skin: no rash, no erythema  Lines: no phlebitis    FH: Non-contributory  Social Hx: Non-contributory    TESTS & MEASUREMENTS:                        7.4    8.25  )-----------( 203      ( 10 Jul 2023 05:57 )             26.5     07-10    145  |  102  |  43<H>  ----------------------------<  81  3.6   |  33<H>  |  0.9    Ca    9.0      10 Jul 2023 05:57  Phos  3.0     07-09  Mg     2.2     07-10    TPro  6.0  /  Alb  2.8<L>  /  TBili  0.4  /  DBili  x   /  AST  16  /  ALT  9   /  AlkPhos  154<H>  07-10      LIVER FUNCTIONS - ( 10 Jul 2023 05:57 )  Alb: 2.8 g/dL / Pro: 6.0 g/dL / ALK PHOS: 154 U/L / ALT: 9 U/L / AST: 16 U/L / GGT: x           Urinalysis Basic - ( 10 Jul 2023 05:57 )    Color: x / Appearance: x / SG: x / pH: x  Gluc: 81 mg/dL / Ketone: x  / Bili: x / Urobili: x   Blood: x / Protein: x / Nitrite: x   Leuk Esterase: x / RBC: x / WBC x   Sq Epi: x / Non Sq Epi: x / Bacteria: x        Culture - Fungal, Body Fluid (collected 07-05-23 @ 15:45)  Source: Pleural Fl Pleural Fluid  Preliminary Report (07-08-23 @ 15:03):    Culture is being performed. Fungal cultures are held for 4 weeks.    Culture - Body Fluid with Gram Stain (collected 07-05-23 @ 15:45)  Source: Pleural Fl Pleural Fluid  Gram Stain (07-06-23 @ 04:21):    polymorphonuclear leukocytes seen    No organisms seen    by cytocentrifuge  Preliminary Report (07-06-23 @ 20:41):    No growth to date.    Culture - Body Fluid with Gram Stain (collected 07-04-23 @ 19:56)  Source: .Body Fluid Thoracentesis Fluid  Gram Stain (07-05-23 @ 05:13):    No polymorphonuclear leukocytes seen    Gram Positive Cocci in Clusters seen    by cytocentrifuge  Final Report (07-09-23 @ 14:58):    Numerous Staphylococcus epidermidis  Organism: Staphylococcus epidermidis (07-09-23 @ 14:58)  Organism: Staphylococcus epidermidis (07-09-23 @ 14:58)      Method Type: DARIO      -  Ampicillin/Sulbactam: R <=8/4      -  Cefazolin: R <=4      -  Clindamycin: R >4      -  Erythromycin: R >4      -  Gentamicin: R >8 Should not be used as monotherapy      -  Oxacillin: R >2      -  Penicillin: R 4      -  Rifampin: S <=1 Should not be used as monotherapy      -  Tetracycline: S <=1      -  Trimethoprim/Sulfamethoxazole: R >2/38      -  Vancomycin: S 1    Culture - Sputum (collected 07-04-23 @ 08:00)  Source: Trach Asp Tracheal Aspirate  Gram Stain (07-04-23 @ 18:15):    Few Squamous epithelial cells per low power field    Few polymorphonuclear leukocytes per low power field    Few Yeast per oil power field  Final Report (07-06-23 @ 17:33):    Normal Respiratory Joaquina present    Culture - Blood (collected 07-02-23 @ 19:55)  Source: .Blood Blood-Peripheral  Final Report (07-08-23 @ 04:00):    No growth at 5 days    Culture - Blood (collected 07-02-23 @ 19:55)  Source: .Blood Blood-Peripheral  Final Report (07-08-23 @ 04:00):    No growth at 5 days        INFECTIOUS DISEASES TESTING    RADIOLOGY & ADDITIONAL TESTS:  I have personally reviewed the last available Chest xray  CXR  Xray Chest 1 View- PORTABLE-Urgent:   ACC: [id number] EXAM:  XR CHEST PORTABLE URGENT 1V   ORDERED BY: TERRENCE FOOTE     PROCEDURE DATE:  07/10/2023          INTERPRETATION:  STUDY INDICATION: post extubation    TECHNIQUE:  Portable frontal view of the chest obtained.    COMPARISON: 7/8/2023    FINDINGS/  IMPRESSION:    Bibasal effusions/opacities similar to prior exam. No pneumothorax.    Stable cardiomediastinal silhouette.    Unchanged osseous structures.    --- End of Report ---            PEE MORELAND MD; Attending Radiologist  This document has been electronically signed. Jul 10 2023 11:40AM (07-10-23 @ 10:51)      CT        MEDICATIONS  acetaZOLAMIDE  IVPB 500 IV Intermittent daily  aztreonam  IVPB 2000 IV Intermittent every 8 hours  budesonide 160 MICROgram(s)/formoterol 4.5 MICROgram(s) Inhaler 2 Inhalation two times a day  chlorhexidine 2% Cloths 1 Topical <User Schedule>  dextrose 5%. 1000 IV Continuous <Continuous>  dextrose 50% Injectable 25 IV Push once  doxycycline IVPB 100 IV Intermittent every 12 hours  glucagon  Injectable 1 IntraMuscular once  insulin lispro (ADMELOG) corrective regimen sliding scale  SubCutaneous three times a day before meals  lactulose Syrup 20 Oral three times a day  nystatin Powder 1 Topical every 12 hours  pantoprazole  Injectable 40 IV Push daily  polyethylene glycol 3350 17 Oral every 12 hours  senna 2 Oral at bedtime  sodium chloride 0.9%. 1000 IV Continuous <Continuous>      WEIGHT  Weight (kg): 91.7 (07-03-23 @ 00:02)  Creatinine: 0.9 mg/dL (07-10-23 @ 05:57)      ANTIBIOTICS:  aztreonam  IVPB 2000 milliGRAM(s) IV Intermittent every 8 hours  doxycycline IVPB 100 milliGRAM(s) IV Intermittent every 12 hours      All available historical records have been reviewed

## 2023-07-10 NOTE — SWALLOW BEDSIDE ASSESSMENT ADULT - NS SPL SWALLOW CLINIC TRIAL FT
+ Weak/ discoordinated swallow during PO trials of thin liquids.  Pt exhibiting anterior loss of bolus, delayed oral transit time, wet vocal quality post oral intake, and multiple swallows.  Via palpation suspect delayed pharyngeal swallow.
+ Weak/ discoordinated swallow during PO trials of thin liquids.  Pt exhibiting anterior loss of bolus, delayed oral transit time, wet vocal quality post oral intake, and multiple swallows.  Via palpation suspect delayed pharyngeal swallow.

## 2023-07-10 NOTE — PROGRESS NOTE ADULT - ASSESSMENT
ASSESSMENT  87 yo W female who presents  w/ 3 days history of progressive respiratory distress    IMPRESSION  #Acute Hypoexmic respiratory failure s/p extubation since 7/9  #Bilateral pleural effusions   - s/p left thoracentesis 3/2023 -- Cytology -PLEURAL FLUID, THORACENTESIS: - SUSPICIOUS FOR MALIGNANCY. - Atypical cells present in background of histiocytes and mesothelial  cells, suspicious for metastatic carcinoma.   - Pleurx Cx 7/4 - GPC in clusters, No PMNs  - s/p thoracentesis right thoracentesis 7/5 Exudative in nature. Cultures so far negative    #Acute decompensated HF  #Obesity BMI (kg/m2): 32.6  #DM   #Abx allergy: penicillin (Other)  codeine (Other)  aspirin (Other)    RECOMMENDATIONS  - follow-up Right thoracentesis Cx from 7/5   - Noted GPC in pleurx catheter gram stain, No PMNs -- difficult to interpret and would wait for thoracentesis from 7/5  - Leukocytosis improved.   - continue aztreonam 2g q 8 hours and doxycyline for now. Plan for antibiotics for 10 days. Tentatively End date 7/12.     Please call or message on [employer] Teams if with any questions.

## 2023-07-10 NOTE — CHART NOTE - NSCHARTNOTEFT_GEN_A_CORE
Anesthetic History No history of anesthetic complications Review of Systems / Medical History Patient summary reviewed and pertinent labs reviewed Pulmonary Within defined limits Neuro/Psych Cardiovascular GI/Hepatic/Renal 
  
GERD Endo/Other Cancer Other Findings Comments: Documentation of current medication Current medications obtained, documented and obtained? YES Risk Factors for Postoperative nausea/vomiting: 
     History of postoperative nausea/vomiting? NO Female? YES Motion sickness? NO Intended opioid administration for postoperative analgesia? NO Smoking Abstinence: 
Current Smoker? NO Elective Surgery? YES Seen preoperatively by anesthesiologist or proxy prior to day of surgery? YES Pt abstained from smoking 24 hours prior to anesthesia? N/A Preventive care/screening for High Blood Pressure: 
Aged 18 years and older: YES Screened for high blood pressure: YES Patients with high blood pressure referred to primary care provider 
 for BP management: YES Physical Exam 
 
Airway Mallampati: II 
TM Distance: 4 - 6 cm Neck ROM: normal range of motion Mouth opening: Normal 
 
 Cardiovascular Regular rate and rhythm,  S1 and S2 normal,  no murmur, click, rub, or gallop Dental 
No notable dental hx Pulmonary Breath sounds clear to auscultation Abdominal 
GI exam deferred Other Findings Anesthetic Plan ASA: 3 Anesthesia type: MAC Induction: Intravenous Anesthetic plan and risks discussed with: Patient Alerted by RN patient with potential eye infection.     Evaluated at bedside.   Patient denies pain/pruritis in eye.   Denies vision changes.     Exam   R>L purulent drainage around eye.   Sclera of both eyes erythematous + inflamed    Will start Antibiotic ointment B/l     Pawel ELLISON

## 2023-07-10 NOTE — SWALLOW BEDSIDE ASSESSMENT ADULT - SWALLOW EVAL: STRUCTURAL ABNORMALITIES
[FreeTextEntry1] : cc- f/u seizures\par \par Since last visit c/o a brief myoclonic jerks.  Affect upper body.\par No other autonomic symptoms.\par \par no episodes heart racing .\par No marti vu. No tongue bites.\par \par Still gets periods of anxiety - like panic.\par \par Original episodes- severe anxiety, heart racing and tremulousness for minutes at a time. Would start with a gasp out of sleep.\par \par meds- \par vimpat 100 bid (4.9)\par Kln 0.5 mg bid\par MM 1:1 cbd/THC (once per day at night)\par Goes weeks without it
large mass on right side of neck.  Pt reports no pain in the area.  Son reports the mass is chronic and has no affect on the pt's speech and swallow function
large mass on right side of neck.  Pt reports no pain in the area.  Son reports the mass is chronic and has no affect on the pt's speech and swallow function

## 2023-07-10 NOTE — PROGRESS NOTE ADULT - ASSESSMENT
87 YO F with a medical history  of HTN, malignant pleural effusion of breast origin, chronic A.fib, PAD, Obesity, Chronic lymphedema of  BLE and COPD on home oxygen 2 - 1/2 L nc, ESBL UTI presented to ED with worsening shortness of breath and weakness for 4 days with increasing cough productive of yellow-green sputum. In ED pt was in respiratory distress and intubated    #acute respiratory failure   -Seems to be secondary to pna VS possible malignant pleural effusion s/p thoracentesis on the right   -S/P extubation  -continue with current antibiotic and follow up with pleural fluid culture  - no visible active bleeding now   - hold anticoagulation for oral bleeding    - transfused 1 unit PRBC   - follow H/H q12h.  GI consult   -wean off oxygen as tolerated     # Malignant  -Cytology done on 3/7 shows SUSPICIOUS FOR MALIGNANCY.  - Atypical cells present in background of histiocytes and mesothelial  cells, suspicious for metastatic carcinoma.   -patient has not seen oncology yet  -oncology consult      further care as per critical care team

## 2023-07-10 NOTE — PHYSICAL THERAPY INITIAL EVALUATION ADULT - PERTINENT HX OF CURRENT PROBLEM, REHAB EVAL
89 yo W female w/ PMH as noted below.  Pt. comes to hospital w/ 3 days history of progressive respiratory distress.  pt has a left sided indwelling chest tube that was place approx 1 month ago when pt was @ [facility].  This tube was to be removed this  week as per  son.  Pt was placed on AVAPS  NIV  in ER .  Pt has bilat pleural effusion on CXR . L > R .  While waiting adm to ICU pt's respiratory status worsen & was orally intubated

## 2023-07-10 NOTE — PHYSICAL THERAPY INITIAL EVALUATION ADULT - PERSONAL SAFETY AND JUDGMENT, REHAB EVAL
Name: [address] [place] [address] Dr: Sharron Cates  
: 1967 Admit Date: 2019 Phone:   Room: 535/01 PCP: Odessa Blackwood MD  MRN: [id number] Date: 2019  Code: Full Code Chart and notes reviewed. Data reviewed. I review the patient's current medications in the medical record at each encounter. I have evaluated and examined the patient. History of Present Illness: 
Mr. 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 Found to have a large right sided pleural effusion. Underwent colonoscopy and EGD today. Had his PEG replaced and found to have significant anal/rectal stenosis, suspicious for malignancy. He had a chest x-ray for shortness of breath and found to have a right sided effusion. He tells me has been short of breath for quite some time and attributed it to his COPD. No cough, chest pain, f/c. 
 
Labs reviewed: no new labs today other than a glucose of 130 I have personally reviewed chest CT from [facility] in 2019. Pt with mod R effusion at that time and mass like lesion in RLL. PET scan in Feb with increased uptake in RLL w mod effusion. New R paracolic soft tissue mass w increased uptake Overnight Events Afebrile Mildly tachycardic; HR 100s BP soft O2 sats 97% on RA Feels well this morning. Denies SOB or cough. 550 cc removed yesterday. Getting oob to chair. Past Medical History:  
Diagnosis Date  Cancer [facility])   
 esophagus cancer, right lung cancer  Chronic obstructive pulmonary disease ([facility] [address]  Diabetes ([facility] [address]  Hypertension  Sleep apnea   
 no cpap Past Surgical History:  
Procedure Laterality Date  COLONOSCOPY N/A 2019 COLONOSCOPY performed by Rosibel Solorio MD at [address] HX ENDOSCOPY    
 w/ PEG tube  HX HEENT    
 17years old had eye surgery  IR INSERT CATH PLEURAL INDWELL  2019 Family History Problem Relation Age of Onset  Heart Disease Mother  Hypertension Mother  Heart Disease Brother  Heart Disease Maternal Grandmother  Heart Disease Paternal Grandmother Social History Tobacco Use  Smoking status: Former Smoker Last attempt to quit: 2018 Years since quittin.2  Smokeless tobacco: Never Used Substance Use Topics  Alcohol use: No  
 
 
Allergies Allergen Reactions  Codeine Other (comments)  
  hallucinations  Pcn [Penicillins] Hives States \"it's a childhood allergy\" Current Facility-Administered Medications Medication Dose Route Frequency  levalbuterol (XOPENEX) nebulizer soln 1.25 mg/3 mL  1.25 mg Nebulization Q6HWA RT  
 dextrose 5 % - 0.45% NaCl infusion  100 mL/hr IntraVENous CONTINUOUS  
 aluminum-magnesium hydroxide (MAALOX) oral suspension 30 mL  30 mL Oral Q6H PRN  
 morphine injection 4 mg  4 mg IntraVENous Q3H PRN  
 lidocaine (PF) (XYLOCAINE) 10 mg/mL (1 %) injection 0.1 mL  0.1 mL SubCUTAneous PRN  
 sodium chloride (NS) flush 5-40 mL  5-40 mL IntraVENous Q8H  
 sodium chloride (NS) flush 5-40 mL  5-40 mL IntraVENous PRN  
 acetaminophen (TYLENOL) tablet 650 mg  650 mg Oral Q4H PRN  
 ondansetron (ZOFRAN) injection 4 mg  4 mg IntraVENous Q4H PRN  
 oxyCODONE (ROXICODONE INTENSOL) 20 mg/mL concentrated solution 5 mg  5 mg Oral Q4H PRN  
 sertraline (ZOLOFT) tablet 50 mg  50 mg Oral DAILY REVIEW OF SYSTEMS  
12 point ROS negative except as stated in the HPI. Physical Exam:  
Visit Vitals BP 99/63 (BP 1 Location: Left arm, BP Patient Position: At rest) Pulse 99 Temp 97.8 °F (36.6 °C) Resp 18 Ht 5' 9\" (1.753 m) Wt 65.3 kg (144 lb) SpO2 98% BMI 21.27 kg/m² General:  Alert, cooperative, ill appearing, no acute distress, appears stated age. Head:  Normocephalic, without obvious abnormality, atraumatic. Eyes:  Conjunctivae/corneas clear. Nose: Nares normal. Septum midline. Mucosa normal.   
Throat: Lips, mucosa, and tongue normal.   
Neck: Supple, symmetrical, trachea midline, no adenopathy. Lungs:   R ant chest with air entry but diminished in lower right 1/3 field. R pleurx dressing: clean, dry and intact. Left sounds clear. Chest wall:  No tenderness or deformity. Heart:  Regular rate and rhythm, S1, S2 normal, no murmur, click, rub or gallop. Abdomen:   Soft, mildly tender. PEG in place. Ostomy in place with brown stool. Extremities: Extremities normal, atraumatic, no cyanosis or edema. Pulses: 2+ and symmetric all extremities. Skin: Skin color, texture, turgor normal. No rashes or lesions Lymph nodes: Cervical, supraclavicular nodes normal.  
Neurologic: Grossly nonfocal  
 
 
Lab Results Component Value Date/Time Sodium 142 04/26/2019 06:46 AM  
 Potassium 4.0 04/26/2019 06:46 AM  
 Chloride 110 (H) 04/26/2019 06:46 AM  
 CO2 25 04/26/2019 06:46 AM  
 BUN 49 (H) 04/26/2019 06:46 AM  
 Creatinine 2.46 (H) 04/26/2019 06:46 AM  
 Glucose 175 (H) 04/26/2019 06:46 AM  
 Calcium 8.3 (L) 04/26/2019 06:46 AM  
 Magnesium 1.7 04/25/2019 04:33 AM  
 Phosphorus 3.0 04/23/2019 05:40 AM  
 
 
Lab Results Component Value Date/Time WBC 12.8 (H) 04/25/2019 04:33 AM  
 HGB 10.3 (L) 04/25/2019 04:33 AM  
 PLATELET 473 06/90/3580 04:33 AM  
 MCV 92.4 04/25/2019 04:33 AM  
 
 
Lab Results Component Value Date/Time AST (SGOT) 12 (L) 04/23/2019 05:40 AM  
 Alk. phosphatase 56 04/23/2019 05:40 AM  
 Protein, total 4.8 (L) 04/23/2019 05:40 AM  
 Albumin 1.9 (L) 04/23/2019 05:40 AM  
 Globulin 2.9 04/23/2019 05:40 AM  
 
 
No results found for: IRON, FE, TIBC, IBCT, PSAT, FERR No results found for: SR, CRP, TALA, ANAIGG, RA, RPR, RPRT, VDRLT, VDRLS, TSH, TSHEXT, TSHEXT No results found for: PH, PHI, PCO2, PCO2I, PO2, PO2I, HCO3, HCO3I, FIO2, FIO2I 
 
 No results found for: CPK, RCK1, RCK2, RCK3, RCK4, CKNDX, CKND1, TROPT, TROIQ, BNPP, BNP Lab Results Component Value Date/Time Culture result: NO GROWTH 4 DAYS 04/12/2019 12:27 PM  
 
 
No results found for: TOXA1, RPR, HBCM, HBSAG, HAAB, HCAB1, HAAT, G6PD, CRYAC, HIVGT, HIVR, HIV1, HIV12, HIVPC, HIVRPI No results found for: VANCT, CPK Lab Results Component Value Date/Time Color RED 04/19/2019 09:34 AM  
 Appearance BLOODY (A) 04/19/2019 09:34 AM  
 pH (UA) 5.5 04/19/2019 09:34 AM  
 Protein 300 (A) 04/19/2019 09:34 AM  
 Glucose NEGATIVE  04/19/2019 09:34 AM  
 Ketone 15 (A) 04/19/2019 09:34 AM  
 Blood LARGE (A) 04/19/2019 09:34 AM  
 Urobilinogen 1.0 04/19/2019 09:34 AM  
 Nitrites POSITIVE (A) 04/19/2019 09:34 AM  
 Leukocyte Esterase MODERATE (A) 04/19/2019 09:34 AM  
 WBC 10-20 04/19/2019 09:34 AM  
 RBC >100 (H) 04/19/2019 09:34 AM  
 Bacteria 3+ (A) 04/19/2019 09:34 AM  
 
 
IMPRESSION 
· Malignant pleural effusion: adenocarcinoma. S/p Pleurx drain 4/16.  400-500 cc being drained. · Esophageal cancer · Lung cancer · Suspected anal/rectal cancer · Partial SBO · COPD · Sleep apnea (does not wear CPAP) PLAN 
· Goal sats >90%; maintaining well on RA 
· Cont draining PleurX to T/Th/Sat · Cont with xopenex nebs · Pain control per Palliative Care · Clear liquid diet · OOB as able: will have PT see · Patient and family considering hospice.   
 
 
 
 
 
Jannet Caballero MD  
 
 
 
 intact

## 2023-07-10 NOTE — SWALLOW BEDSIDE ASSESSMENT ADULT - COMMENTS
+ toleration observed without overt symptoms of penetration/aspiration for pureed solids/ mildly thick liquids via small/ controlled sips and bites,  Pt appeared more coordinated during straw sip compared to cup sip

## 2023-07-10 NOTE — PHYSICAL THERAPY INITIAL EVALUATION ADULT - PATIENT/FAMILY/SIGNIFICANT OTHER GOALS STATEMENT, PT EVAL
pt is unable to work toward her goals, goals were discussed with her son Cristi over the phone, he wants his mother to be able to amb at home with RW around the house

## 2023-07-10 NOTE — PHYSICAL THERAPY INITIAL EVALUATION ADULT - ADDITIONAL COMMENTS
not performed, pt refused lives alone in a private house with 5 steps to enter with rails and one level inside. Pt amb with RW prior to admission.  As per son he and his sister are trying to help with ADL/IADL but unable to be home with her all the time

## 2023-07-10 NOTE — SWALLOW BEDSIDE ASSESSMENT ADULT - ASR SWALLOW LABIAL MOBILITY
overall weakness, anterior loss of thin liquids/impaired seal
overall weakness, anterior loss of thin liquids/impaired seal

## 2023-07-10 NOTE — SWALLOW BEDSIDE ASSESSMENT ADULT - SWALLOW EVAL: RECOMMENDED FEEDING/EATING TECHNIQUES
alternate food with liquid/check mouth frequently for oral residue/pocketing/oral hygiene/position upright (90 degrees)/small sips/bites
alternate food with liquid/check mouth frequently for oral residue/pocketing/oral hygiene/position upright (90 degrees)/small sips/bites

## 2023-07-10 NOTE — PROGRESS NOTE ADULT - ASSESSMENT
IMPRESSION:    Acute hypoxemic resp failure on MV   Acute hypercapnic resp failure  Acute decompensated HF / HO HFpEF   Fluid overload   b/l pleural effusions - 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)  HO Probable LAWRENCE refused testing   Thyroid mass SP FNA   HO LE cellulitis with ulcer and abscess MRSA +  HO Afib on Eliquis  Chronic lymphedema  HO Peripheral vascular disease    PLAN:    CNS: no sedation     HEENT: Oral care. ET care Day 04    PULMONARY:  HOB @ 45 degrees. target pox > 92 %   NIV as needed   do cxr today   s/p thoracentesis 7/5 -> exudative   Follow up cyto path    CARDIOVASCULAR:  Avoid volume overload. Strict I's and O's. Target MAP>65. Hold lasix.  Echo with normal EF.     GI: GI prophylaxis.  Feeding: OG feeds as tolerated.     RENAL:  Follow up lytes. Correct as needed.    INFECTIOUS DISEASE: RVP negative;. cx negative. DTA negative. Follow pleural fluid cx, c/w Aztreonam and doxycycline for now. Nasal MRSA.    HEMATOLOGICAL:  Full AC LMWH. Moniot CBC and Coags. Hgb stable.     ENDOCRINE:  Follow up FS.  Insulin protocol if needed.    MUSCULOSKELETAL: Bedrest    Ba: 7/2  Lines: Periphers    Disposition:sdu for today   Palliative eval  Prognosis is poor overall    IMPRESSION:    Acute hypoxemic resp failure on MV   Acute hypercapnic resp failure  Acute decompensated HF / HO HFpEF   Fluid overload   b/l pleural effusions - 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)  HO Probable LAWRENCE refused testing   Thyroid mass SP FNA   HO LE cellulitis with ulcer and abscess MRSA +  HO Afib on Eliquis  Chronic lymphedema  HO Peripheral vascular disease    PLAN:    CNS: no sedation     HEENT: Oral care. ET care Day 04    PULMONARY:  HOB @ 45 degrees. target pox > 92 %   NIV as needed   do cxr today   s/p thoracentesis 7/5 -> exudative   Follow up cyto path    CARDIOVASCULAR:  Avoid volume overload. Strict I's and O's. Target MAP>65. Hold lasix.  Echo with normal EF.     GI: GI prophylaxis.  Feeding: OG feeds as tolerated.     RENAL:  Follow up lytes. Correct as needed.    INFECTIOUS DISEASE: RVP negative;. cx negative. DTA negative. Follow pleural fluid cx, c/w Aztreonam and doxycycline for now. Nasal MRSA.    HEMATOLOGICAL:  Full AC LMWH. Moniot CBC and Coags. Hgb stable.     ENDOCRINE:  Follow up FS.  Insulin protocol if needed.    MUSCULOSKELETAL: Bedrest    Ba: 7/2  Lines: Periphers    Disposition:sdu for today   Palliative eval  Prognosis is poor overall   voiding trial

## 2023-07-10 NOTE — CHART NOTE - NSCHARTNOTEFT_GEN_A_CORE
visited patient earlier today. patient was extubated. Awake and alert. her sister and niece at bedside. patient denied any pain or discomfort. per family, patient's dtr will be passing by in the afternoon. left contact info with family. will follow. [phone]

## 2023-07-11 NOTE — PROGRESS NOTE ADULT - ASSESSMENT
88 year old woman with a history of  pleural effusions, PAD, HTN, COPD on home o2 presents with SOB. Hospital course complicated by respiratory failure in the setting of BL pleural effusions and heart failur requiring intubation. Palliative care consulted for GOC.     Spoke with Karishma via telehealth.  Discussed palliative care. She was able to provide a medical history and hospital course. She had been speaking with her siblings.  Discussed GOC and code status. She noted that she and her family want DNR/DNI.  They are also interested in hospice. Plan for hospice consult.    MEDD (morphine equivalent daily dose):    Education about palliative care provided to patient/family.  See Recs below.    Please call [phone] with questions or concerns 24/7.   We will continue to follow.

## 2023-07-11 NOTE — PROGRESS NOTE ADULT - SUBJECTIVE AND OBJECTIVE BOX
HPI:  89 yo W female w/ PMH as noted below.  Pt. comes to hospital w/ 3 days history of progressive respiratory distress.  pt has a left sided indwelling chest tube that was place approx 1 month ago when pt was @ [facility].  This tube was to be removed this  week as per  son.  Pt was placed on AVAPS  NIV  in ER .  Pt has bilat pleural effusion on CXR . L > R .  While waiting adm to ICU pt's respiratory status worsen & was orally intubated w/ size 7 ETT . 22cm @ lip line.  (02 Jul 2023 23:50)    Interval history  -Patient seen at bedside  -Endorsed some SOB    ADVANCE DIRECTIVES:    [x ] Full Code [ ] DNR  MOLST  [ ]  Living Will  [ ]   DECISION MAKER(s):  [x] Health Care Proxy(s)  [ ] Surrogate(s)  [ ] Guardian           Name(s): Phone Number(s): Karishma listed as HCP in chart - children make decisions together    BASELINE (I)ADL(s) (prior to admission):  Marathon: [ ]Total  [ ] Moderate [ ]Dependent  Palliative Performance Status Version 2:         %    http://npcrc.org/files/news/palliative_performance_scale_ppsv2.pdf    Allergies    penicillin (Other)  codeine (Other)  aspirin (Other)  sulfa drugs (Hives; Other)  contrast media (iodine-based) (Other)    Intolerances    MEDICATIONS  (STANDING):  acetaZOLAMIDE  IVPB 500 milliGRAM(s) IV Intermittent daily  aztreonam  IVPB 2000 milliGRAM(s) IV Intermittent every 8 hours  budesonide 160 MICROgram(s)/formoterol 4.5 MICROgram(s) Inhaler 2 Puff(s) Inhalation two times a day  chlorhexidine 2% Cloths 1 Application(s) Topical <User Schedule>  dextrose 5%. 1000 milliLiter(s) (50 mL/Hr) IV Continuous <Continuous>  dextrose 50% Injectable 25 Gram(s) IV Push once  doxycycline IVPB 100 milliGRAM(s) IV Intermittent every 12 hours  erythromycin   Ointment 1 Application(s) Both EYES two times a day  furosemide   Injectable 40 milliGRAM(s) IV Push daily  glucagon  Injectable 1 milliGRAM(s) IntraMuscular once  insulin lispro (ADMELOG) corrective regimen sliding scale   SubCutaneous three times a day before meals  nystatin Powder 1 Application(s) Topical every 12 hours  polyethylene glycol 3350 17 Gram(s) Oral every 12 hours  senna 2 Tablet(s) Oral at bedtime    MEDICATIONS  (PRN):  dextrose Oral Gel 15 Gram(s) Oral once PRN Blood Glucose LESS THAN 70 milliGRAM(s)/deciliter      PRESENT SYMPTOMS: [ ]Unable to obtain due to poor mentation   Source if other than patient:  [ ]Family   [ ]Team     Pain: [ ]yes [ x]no  QOL impact -   Location -                    Aggravating factors -  Quality -  Radiation -  Timing-  Severity (0-10 scale):  Minimal acceptable level (0-10 scale):     CPOT:    https://www.[facility].org/getattachment/oes32s99-1b7p-6e1f-[email or web address]-9451k9415v2a/Critical-Care-Pain-Observation-Tool-(CPOT)    PAIN AD Score:   http://geriatrictoolkit.[facility]/cog/painad.pdf (press ctrl +  left click to view)    Dyspnea:                           [ ]None[ x]Mild [ ]Moderate [ ]Severe     Respiratory Distress Observation Scale (RDOS):   A score of 0 to 2 signifies little or no respiratory distress, 3 signifies mild distress, scores 4 to 6 indicate moderate distress, and scores greater than 7 signify severe distress  https://[email or web address].ca/sites/default/files/PDFS/289657-czoabivzpbi-auknqcwf-iytxctiqshf-mjxtd.pdf    Anxiety:                             [x ]None[ ]Mild [ ]Moderate [ ]Severe   Fatigue:                             [ x]None[ ]Mild [ ]Moderate [ ]Severe   Nausea:                             [x ]None[ ]Mild [ ]Moderate [ ]Severe   Loss of appetite:              [x ]None[ ]Mild [ ]Moderate [ ]Severe   Constipation:                    [ x]None[ ]Mild [ ]Moderate [ ]Severe    Other Symptoms:  [x ]All other review of systems negative     Palliative Performance Status Version 2:         30%    http://npcrc.org/files/news/palliative_performance_scale_ppsv2.pdf  PHYSICAL EXAM:  Vital Signs Last 24 Hrs  T(C): 35.7 (11 Jul 2023 15:30), Max: 36.7 (10 Jul 2023 23:08)  T(F): 96.2 (11 Jul 2023 15:30), Max: 98 (10 Jul 2023 23:08)  HR: 103 (11 Jul 2023 18:00) (103 - 112)  BP: 120/67 (11 Jul 2023 18:00) (94/50 - 162/91)  BP(mean): 87 (11 Jul 2023 18:00) (68 - 120)  RR: 31 (11 Jul 2023 18:00) (20 - 53)  SpO2: 97% (11 Jul 2023 18:00) (91% - 100%)    Parameters below as of 11 Jul 2023 18:00  Patient On (Oxygen Delivery Method): nasal cannula  O2 Flow (L/min): 3      GENERAL:  [x ] No acute distress [ ]Lethargic  [ ]Unarousable  [ x]Verbal  [ ]Non-Verbal [ ]Cachexia    BEHAVIORAL/PSYCH:  [ ]Alert and Oriented x  [ ] Anxiety [ ] Delirium [ ] Agitation [ x] Calm   EYES: [x ] No scleral icterus [ ] Scleral icterus [ ] Closed  ENMT:  [ ]Dry mouth  [ x]No external oral lesions [ ] No external ear or nose lesions  CARDIOVASCULAR:  [ ]Regular [ ]Irregular [ ]Tachy [ ]Not Tachy  [ ]Sam [ ] Edema [ ] No edema  PULMONARY:  [ ]Tachypnea  [ ]Audible excessive secretions [ x] No labored breathing [ ] labored breathing  GASTROINTESTINAL: [ ]Soft  [ ]Distended  [x ]Not distended [ ]Non tender [ ]Tender  MUSCULOSKELETAL: [ ]No clubbing [ ] clubbing  [ x] No cyanosis [ ] cyanosis  NEUROLOGIC: [ ]No focal deficits  [x ]Follows commands  [ ]Does not follow commands  [x ]Cognitive impairment  [ ]Dysphagia  [ ]Dysarthria  [ ]Paresis   SKIN: [ ] Jaundiced [x ] Non-jaundiced [ ]Rash [ ]No Rash [ ] Warm [ ] Dry  MISC/LINES: [ ] ET tube [ ] Trach [ ]NGT/OGT [ ]PEG [ ]Ba      LABS: reviewed by me                          8.3    9.36  )-----------( 258      ( 11 Jul 2023 06:23 )             29.2       07-11    146  |  104  |  34<H>  ----------------------------<  105<H>  3.6   |  32  |  0.9    Ca    9.0      11 Jul 2023 06:23  Mg     2.1     07-11    TPro  6.3  /  Alb  2.9<L>  /  TBili  0.4  /  DBili  x   /  AST  19  /  ALT  10  /  AlkPhos  174<H>  07-11              Urinalysis Basic - ( 11 Jul 2023 06:23 )    Color: x / Appearance: x / SG: x / pH: x  Gluc: 105 mg/dL / Ketone: x  / Bili: x / Urobili: x   Blood: x / Protein: x / Nitrite: x   Leuk Esterase: x / RBC: x / WBC x   Sq Epi: x / Non Sq Epi: x / Bacteria: x                  CAPILLARY BLOOD GLUCOSE      POCT Blood Glucose.: 106 mg/dL (11 Jul 2023 16:15)              RADIOLOGY & ADDITIONAL STUDIES: reviewed by me  < from: Xray Chest 1 View- PORTABLE-Routine (Xray Chest 1 View- PORTABLE-Routine in AM.) (07.11.23 @ 06:04) >  Findings/  impression:    Cardiac/mediastinum/hilum: Unchanged    Lung parenchyma/Pleura: Bilateral opacities and effusions redemonstrated,   appear increased on the right compared to prior. No pneumothorax.    Skeleton/soft tissues: Unchanged      < end of copied text >    EKG: reviewed by me  < from: 12 Lead ECG (07.05.23 @ 07:43) >  Ventricular Rate 88 BPM    Atrial Rate 88 BPM    QRS Duration 78 ms    Q-T Interval 356 ms    QTC Calculation(Bazett) 430 ms    R Axis 104 degrees    T Axis 57 degrees    Diagnosis Line Atrial fibrillation  Rightward axis  Low voltage QRS  Nonspecific ST and T wave abnormality  Abnormal ECG    < end of copied text >      PROTEIN CALORIE MALNUTRITION PRESENT: [ ]mild [ ]moderate [ ]severe [ ]underweight [ ]morbid obesity  https://www.andeal.org/vault/2440/web/files/ONC/Table_Clinical%20Characteristics%20to%20Document%20Malnutrition-White%20JV%20et%20al%202012.pdf    Height (cm): 167.6 (07-03-23 @ 00:02), 154.9 (05-02-23 @ 12:13), 157.5 (03-07-23 @ 15:06)  Weight (kg): 91.7 (07-03-23 @ 00:02), 90.3 (05-02-23 @ 12:13), 96.8 (03-04-23 @ 12:04)  BMI (kg/m2): 32.6 (07-03-23 @ 00:02), 37.6 (05-02-23 @ 12:13), 39 (03-07-23 @ 15:06)    [ ]PPSV2 < or = to 30% [ ]significant weight loss  [ ]poor nutritional intake  [ ]anasarca      [ ]Artificial Nutrition      REFERRALS:   [ ]Chaplaincy  [ ]Hospice  [ ]Child Life  [x ]Social Work  [ ]Case management [ ]Holistic Therapy     Patient discussed with primary medical team MD  Palliative care education provided to patient and/or family  Patient and/or family assessed for spiritual and social needs    Goals of Care Document:

## 2023-07-11 NOTE — PROGRESS NOTE ADULT - SUBJECTIVE AND OBJECTIVE BOX
Patient is a 88y old  Female who presents with a chief complaint of respiratory diastress (10 Jul 2023 14:59)        Over Night Events:  Off pressors. On 3 L NC      ROS:     All ROS are negative except HPI         PHYSICAL EXAM    ICU Vital Signs Last 24 Hrs  T(C): 36.4 (11 Jul 2023 07:11), Max: 36.8 (10 Jul 2023 15:30)  T(F): 97.6 (11 Jul 2023 07:11), Max: 98.2 (10 Jul 2023 15:30)  HR: 108 (11 Jul 2023 07:00) (103 - 108)  BP: 129/60 (11 Jul 2023 07:00) (111/64 - 135/68)  BP(mean): 87 (11 Jul 2023 07:00) (82 - 92)  RR: 24 (11 Jul 2023 07:11) (22 - 28)  SpO2: 96% (11 Jul 2023 07:00) (96% - 97%)    O2 Parameters below as of 11 Jul 2023 07:00  Patient On (Oxygen Delivery Method): nasal cannula  O2 Flow (L/min): 3      Ill apearing  ENT: Airway patent,  EYES: Pupils equal, Round and reactive to light.  CARDIAC: Normal rate, Regular rhythm.    RESPIRATORY: No wheezing, Bilateral crackles, Not tachypneic, No use of accessory muscles  GASTROINTESTINAL: Abdomen soft, Non-tender, No guarding,   NEUROLOGICAL: arousable, awake.  SKIN: Skin normal color for race, Warm and dry and intact.         07-10-23 @ 07:01  -  07-11-23 @ 07:00  --------------------------------------------------------  IN:    IV PiggyBack: 400 mL    Oral Fluid: 170 mL  Total IN: 570 mL    OUT:    Indwelling Catheter - Urethral (mL): 450 mL    Voided (mL): 550 mL  Total OUT: 1000 mL    Total NET: -430 mL      LABS:                            8.3    9.36  )-----------( 258      ( 11 Jul 2023 06:23 )             29.2                                               07-11    146  |  104  |  34<H>  ----------------------------<  105<H>  3.6   |  32  |  0.9    Ca    9.0      11 Jul 2023 06:23  Mg     2.1     07-11    TPro  6.3  /  Alb  2.9<L>  /  TBili  0.4  /  DBili  x   /  AST  19  /  ALT  10  /  AlkPhos  174<H>  07-11                                             Urinalysis Basic - ( 11 Jul 2023 06:23 )    Color: x / Appearance: x / SG: x / pH: x  Gluc: 105 mg/dL / Ketone: x  / Bili: x / Urobili: x   Blood: x / Protein: x / Nitrite: x   Leuk Esterase: x / RBC: x / WBC x   Sq Epi: x / Non Sq Epi: x / Bacteria: x                                                  LIVER FUNCTIONS - ( 11 Jul 2023 06:23 )  Alb: 2.9 g/dL / Pro: 6.3 g/dL / ALK PHOS: 174 U/L / ALT: 10 U/L / AST: 19 U/L / GGT: x                                                  Culture - Blood (collected 08 Jul 2023 21:23)  Source: .Blood Blood-Peripheral  Preliminary Report (10 Jul 2023 17:02):    No growth at 24 hours                                                                                           MEDICATIONS  (STANDING):  acetaZOLAMIDE  IVPB 500 milliGRAM(s) IV Intermittent daily  aztreonam  IVPB 2000 milliGRAM(s) IV Intermittent every 8 hours  budesonide 160 MICROgram(s)/formoterol 4.5 MICROgram(s) Inhaler 2 Puff(s) Inhalation two times a day  chlorhexidine 2% Cloths 1 Application(s) Topical <User Schedule>  dextrose 5%. 1000 milliLiter(s) (50 mL/Hr) IV Continuous <Continuous>  dextrose 50% Injectable 25 Gram(s) IV Push once  doxycycline IVPB 100 milliGRAM(s) IV Intermittent every 12 hours  erythromycin   Ointment 1 Application(s) Both EYES two times a day  glucagon  Injectable 1 milliGRAM(s) IntraMuscular once  insulin lispro (ADMELOG) corrective regimen sliding scale   SubCutaneous three times a day before meals  lactulose Syrup 20 Gram(s) Oral three times a day  nystatin Powder 1 Application(s) Topical every 12 hours  pantoprazole  Injectable 40 milliGRAM(s) IV Push daily  polyethylene glycol 3350 17 Gram(s) Oral every 12 hours  senna 2 Tablet(s) Oral at bedtime    MEDICATIONS  (PRN):  dextrose Oral Gel 15 Gram(s) Oral once PRN Blood Glucose LESS THAN 70 milliGRAM(s)/deciliter

## 2023-07-11 NOTE — PROGRESS NOTE ADULT - ASSESSMENT
89 YO F with a medical history  of HTN, malignant pleural effusion of breast origin, chronic A.fib, PAD, Obesity, Chronic lymphedema of  BLE and COPD on home oxygen 2 - 1/2 L nc, ESBL UTI presented to ED with worsening shortness of breath and weakness for 4 days with increasing cough productive of yellow-green sputum. In ED pt was in respiratory distress and intubated    #acute respiratory failure   -Seems to be secondary to pna VS possible malignant pleural effusion s/p thoracentesis on the right .  follow up cystology  -S/P extubation  -continue with current antibiotic and follow up with pleural fluid culture  - no visible active bleeding now   - hold anticoagulation for oral bleeding    - transfused 1 unit PRBC   - follow H/H q12h.  GI consult   -wean off oxygen as tolerated     # Malignant  -Cytology done on 3/7 shows SUSPICIOUS FOR MALIGNANCY.  - Atypical cells present in background of histiocytes and mesothelial  cells, suspicious for metastatic carcinoma.   -patient has not seen oncology yet  -oncology consult      further care as per critical care team

## 2023-07-11 NOTE — PROGRESS NOTE ADULT - PROBLEM SELECTOR PLAN 1
In the setting bilateral pleural effusion: HF vs malignant. Some concern for PNA.  Now extubated.  -continue IV doxycycline and aztreonam  -continue IV diuresis  -now DNR/DNI  -hospice consult

## 2023-07-11 NOTE — PROGRESS NOTE ADULT - SUBJECTIVE AND OBJECTIVE BOX
GIOVANNY BUCK 88y Female  MRN#: [id number]   CODE STATUS:________    Hospital Day: 9d    Pt is currently admitted with the primary diagnosis of PNA     SUBJECTIVE  Hospital Course    88F w/PMHx of HTN, malignant pleural effusion of breast origin, chronic A.fib, PAD, Obesity, Chronic lymphedema of  BLE and COPD on home oxygen 2 - 1/2 L nc, ESBL UTI presented to ED with worsening shortness of breath and weakness for 4 days with increasing cough productive of yellow-green sputum. In ED pt was in respiratory distress and intubated    07/04 - Pleurex cath drainage with pleural Cx snet - showed GPC in clusters, No PMNs  07/05 - thoracocentesis   07/09 - receieved PRBC x1 - Extubated  07/10 - TOV - CXR improving   07/11 - CXR worsening R pleural effusion - DNR/DNI code status     Overnight events   No overnight events     Subjective complaints   Patient is feeling fine, no complaints     Present Today:   - Ba:  No [ X ], Yes [  ] : Indication:                                               OBJECTIVE  PAST MEDICAL & SURGICAL HISTORY  HTN (hypertension)    Afib  s/p ablation 2001    Goiter  no meds    Cellulitis  chronic    OA (osteoarthritis)    PVD (peripheral vascular disease)    COPD (chronic obstructive pulmonary disease)    Anxiety    Obesity    Acute on chronic systolic congestive heart failure    Edema of both lower legs    H/O abdominal hysterectomy    H/O discectomy    H/O total knee replacement, bilateral                                                ALLERGIES:  penicillin (Other)  codeine (Other)  aspirin (Other)  sulfa drugs (Hives; Other)  contrast media (iodine-based) (Other)                           HOME MEDICATIONS  Home Medications:  albuterol 90 mcg/inh inhalation aerosol: 2 puff(s) inhaled every 6 hours as needed for  shortness of breath and/or wheezing (02 Jul 2023 23:05)  budesonide-formoterol 80 mcg-4.5 mcg/inh inhalation aerosol: 2 puff(s) inhaled 2 times a day (02 Jul 2023 23:05)  dilTIAZem 180 mg/24 hours oral capsule, extended release: 1 cap(s) orally once a day (02 Jul 2023 23:05)  Eliquis 2.5 mg oral tablet: 1 tab(s) orally 2 times a day (02 Jul 2023 23:05)  furosemide 20 mg oral tablet: 1 tab(s) orally once a day (02 Jul 2023 23:05)  Senna 8.6 mg oral tablet: 2 tab(s) orally once a day (at bedtime) (02 Jul 2023 23:05)                           MEDICATIONS:  STANDING MEDICATIONS  acetaZOLAMIDE  IVPB 500 milliGRAM(s) IV Intermittent daily  aztreonam  IVPB 2000 milliGRAM(s) IV Intermittent every 8 hours  budesonide 160 MICROgram(s)/formoterol 4.5 MICROgram(s) Inhaler 2 Puff(s) Inhalation two times a day  chlorhexidine 2% Cloths 1 Application(s) Topical <User Schedule>  dextrose 5%. 1000 milliLiter(s) IV Continuous <Continuous>  dextrose 50% Injectable 25 Gram(s) IV Push once  doxycycline IVPB 100 milliGRAM(s) IV Intermittent every 12 hours  erythromycin   Ointment 1 Application(s) Both EYES two times a day  furosemide   Injectable 40 milliGRAM(s) IV Push daily  glucagon  Injectable 1 milliGRAM(s) IntraMuscular once  insulin lispro (ADMELOG) corrective regimen sliding scale   SubCutaneous three times a day before meals  nystatin Powder 1 Application(s) Topical every 12 hours  polyethylene glycol 3350 17 Gram(s) Oral every 12 hours  senna 2 Tablet(s) Oral at bedtime    PRN MEDICATIONS  dextrose Oral Gel 15 Gram(s) Oral once PRN                                            ------------------------------------------------------------  VITAL SIGNS: Last 24 Hours  T(C): 36.4 (11 Jul 2023 07:11), Max: 36.8 (10 Jul 2023 15:30)  T(F): 97.6 (11 Jul 2023 07:11), Max: 98.2 (10 Jul 2023 15:30)  HR: 108 (11 Jul 2023 08:06) (103 - 109)  BP: 137/66 (11 Jul 2023 08:06) (118/58 - 137/66)  BP(mean): 95 (11 Jul 2023 08:06) (82 - 95)  RR: 43 (11 Jul 2023 08:06) (22 - 53)  SpO2: 91% (11 Jul 2023 08:06) (91% - 97%)      07-10-23 @ 07:01  -  07-11-23 @ 07:00  --------------------------------------------------------  IN: 570 mL / OUT: 1000 mL / NET: -430 mL    07-11-23 @ 07:01  -  07-11-23 @ 15:12  --------------------------------------------------------  IN: 0 mL / OUT: 201 mL / NET: -201 mL                                               LABS:                        8.3    9.36  )-----------( 258      ( 11 Jul 2023 06:23 )             29.2     07-11    146  |  104  |  34<H>  ----------------------------<  105<H>  3.6   |  32  |  0.9    Ca    9.0      11 Jul 2023 06:23  Mg     2.1     07-11    TPro  6.3  /  Alb  2.9<L>  /  TBili  0.4  /  DBili  x   /  AST  19  /  ALT  10  /  AlkPhos  174<H>  07-11      Urinalysis Basic - ( 11 Jul 2023 06:23 )    Color: x / Appearance: x / SG: x / pH: x  Gluc: 105 mg/dL / Ketone: x  / Bili: x / Urobili: x   Blood: x / Protein: x / Nitrite: x   Leuk Esterase: x / RBC: x / WBC x   Sq Epi: x / Non Sq Epi: x / Bacteria: x              Culture - Blood (collected 08 Jul 2023 21:23)  Source: .Blood Blood-Peripheral  Preliminary Report (10 Jul 2023 17:02):    No growth at 24 hours                                                    RADIOLOGY:        PHYSICAL EXAM:  GENERAL: NAD, lying in bed comfortably  NECK: Supple, No JVD  CHEST/LUNG: Clear to auscultation bilaterally; No rales, rhonchi, wheezing, or rubs. Unlabored respirations  HEART: Regular rate and rhythm; No murmurs, rubs, or gallops  ABDOMEN: BSx4; Soft, nontender, nondistended  EXTREMITIES:  2+ Peripheral Pulses, brisk capillary refill. No clubbing, cyanosis, or edema  NERVOUS SYSTEM:  A&Ox3, no focal deficits   SKIN: No rashes or lesions                                         ASSESSMENT & PLAN    87 yo F PMHx HTN, malignant pleural effusion of breast origin, chronic A.fib, PAD, Obesity, Chronic lymphedema of  BLE and COPD on home oxygen 2 - 1/2 L nc, ESBL UTI presented to ED with worsening shortness of breath and weakness for 4 days with increasing cough productive of yellow-green sputum. In ED pt was in respiratory distress and intubated    #acute hypoxemic and hypercapnic respiratory failure - s/p MV 07/03/2023  #pulmonary edema  #H/O left malignant effusion s/p pleurx - likely of breast origin as per path 03/2023  #chronic lymphedema  #suspected CAP  - Monitor O2 - Daily CXR/ABG - s/p intubation on admission/extubation 07/09 - on NC 3L now   - s/p Meropenem on admission -> switched to aztreonam and Azithromycin 07/03 till end -date 07/12 as per ID   - palliative eval noted   - worsening CXR with stable oxygen requirements   - TOV - D/C servando     #COPD  - not on home O2  - solumedrol stopped on 07/03  - c/w albuterol neb    #AFIB  - heparin drip switched to lovenox 90 BID 07/03    #MISC  DVT PPX Lovenox therapeutic  GI PPX Protonix  DIET OG feeding  CODE FULL     GIOVANNY BUCK 88y Female  MRN#: [id number]   CODE STATUS:________    Hospital Day: 9d    Pt is currently admitted with the primary diagnosis of PNA     SUBJECTIVE  Hospital Course    88F w/PMHx of HTN, malignant pleural effusion of breast origin, chronic A.fib, PAD, Obesity, Chronic lymphedema of  BLE and COPD on home oxygen 2 - 1/2 L nc, ESBL UTI presented to ED with worsening shortness of breath and weakness for 4 days with increasing cough productive of yellow-green sputum. In ED pt was in respiratory distress and intubated    07/04 - Pleurex cath drainage with pleural Cx snet - showed GPC in clusters, No PMNs  07/05 - thoracocentesis   07/09 - receieved PRBC x1 - Extubated  07/10 - TOV - CXR improving   07/11 - CXR worsening R pleural effusion - DNR/DNI code status     Overnight events   No overnight events     Subjective complaints   Patient is feeling fine, no complaints     Present Today:   - Ba:  No [ X ], Yes [  ] : Indication:                                               OBJECTIVE  PAST MEDICAL & SURGICAL HISTORY  HTN (hypertension)    Afib  s/p ablation 2001    Goiter  no meds    Cellulitis  chronic    OA (osteoarthritis)    PVD (peripheral vascular disease)    COPD (chronic obstructive pulmonary disease)    Anxiety    Obesity    Acute on chronic systolic congestive heart failure    Edema of both lower legs    H/O abdominal hysterectomy    H/O discectomy    H/O total knee replacement, bilateral                                                ALLERGIES:  penicillin (Other)  codeine (Other)  aspirin (Other)  sulfa drugs (Hives; Other)  contrast media (iodine-based) (Other)                           HOME MEDICATIONS  Home Medications:  albuterol 90 mcg/inh inhalation aerosol: 2 puff(s) inhaled every 6 hours as needed for  shortness of breath and/or wheezing (02 Jul 2023 23:05)  budesonide-formoterol 80 mcg-4.5 mcg/inh inhalation aerosol: 2 puff(s) inhaled 2 times a day (02 Jul 2023 23:05)  dilTIAZem 180 mg/24 hours oral capsule, extended release: 1 cap(s) orally once a day (02 Jul 2023 23:05)  Eliquis 2.5 mg oral tablet: 1 tab(s) orally 2 times a day (02 Jul 2023 23:05)  furosemide 20 mg oral tablet: 1 tab(s) orally once a day (02 Jul 2023 23:05)  Senna 8.6 mg oral tablet: 2 tab(s) orally once a day (at bedtime) (02 Jul 2023 23:05)                           MEDICATIONS:  STANDING MEDICATIONS  acetaZOLAMIDE  IVPB 500 milliGRAM(s) IV Intermittent daily  aztreonam  IVPB 2000 milliGRAM(s) IV Intermittent every 8 hours  budesonide 160 MICROgram(s)/formoterol 4.5 MICROgram(s) Inhaler 2 Puff(s) Inhalation two times a day  chlorhexidine 2% Cloths 1 Application(s) Topical <User Schedule>  dextrose 5%. 1000 milliLiter(s) IV Continuous <Continuous>  dextrose 50% Injectable 25 Gram(s) IV Push once  doxycycline IVPB 100 milliGRAM(s) IV Intermittent every 12 hours  erythromycin   Ointment 1 Application(s) Both EYES two times a day  furosemide   Injectable 40 milliGRAM(s) IV Push daily  glucagon  Injectable 1 milliGRAM(s) IntraMuscular once  insulin lispro (ADMELOG) corrective regimen sliding scale   SubCutaneous three times a day before meals  nystatin Powder 1 Application(s) Topical every 12 hours  polyethylene glycol 3350 17 Gram(s) Oral every 12 hours  senna 2 Tablet(s) Oral at bedtime    PRN MEDICATIONS  dextrose Oral Gel 15 Gram(s) Oral once PRN                                            ------------------------------------------------------------  VITAL SIGNS: Last 24 Hours  T(C): 36.4 (11 Jul 2023 07:11), Max: 36.8 (10 Jul 2023 15:30)  T(F): 97.6 (11 Jul 2023 07:11), Max: 98.2 (10 Jul 2023 15:30)  HR: 108 (11 Jul 2023 08:06) (103 - 109)  BP: 137/66 (11 Jul 2023 08:06) (118/58 - 137/66)  BP(mean): 95 (11 Jul 2023 08:06) (82 - 95)  RR: 43 (11 Jul 2023 08:06) (22 - 53)  SpO2: 91% (11 Jul 2023 08:06) (91% - 97%)      07-10-23 @ 07:01  -  07-11-23 @ 07:00  --------------------------------------------------------  IN: 570 mL / OUT: 1000 mL / NET: -430 mL    07-11-23 @ 07:01  -  07-11-23 @ 15:12  --------------------------------------------------------  IN: 0 mL / OUT: 201 mL / NET: -201 mL                                               LABS:                        8.3    9.36  )-----------( 258      ( 11 Jul 2023 06:23 )             29.2     07-11    146  |  104  |  34<H>  ----------------------------<  105<H>  3.6   |  32  |  0.9    Ca    9.0      11 Jul 2023 06:23  Mg     2.1     07-11    TPro  6.3  /  Alb  2.9<L>  /  TBili  0.4  /  DBili  x   /  AST  19  /  ALT  10  /  AlkPhos  174<H>  07-11      Urinalysis Basic - ( 11 Jul 2023 06:23 )    Color: x / Appearance: x / SG: x / pH: x  Gluc: 105 mg/dL / Ketone: x  / Bili: x / Urobili: x   Blood: x / Protein: x / Nitrite: x   Leuk Esterase: x / RBC: x / WBC x   Sq Epi: x / Non Sq Epi: x / Bacteria: x              Culture - Blood (collected 08 Jul 2023 21:23)  Source: .Blood Blood-Peripheral  Preliminary Report (10 Jul 2023 17:02):    No growth at 24 hours                                                    RADIOLOGY:        PHYSICAL EXAM:  GENERAL: NAD, lying in bed comfortably  NECK: Supple, No JVD  CHEST/LUNG: Clear to auscultation bilaterally; No rales, rhonchi, wheezing, or rubs. Unlabored respirations  HEART: Regular rate and rhythm; No murmurs, rubs, or gallops  ABDOMEN: BSx4; Soft, nontender, nondistended  EXTREMITIES:  2+ Peripheral Pulses, brisk capillary refill. No clubbing, cyanosis, or edema  NERVOUS SYSTEM:  A&Ox3, no focal deficits   SKIN: No rashes or lesions                                         ASSESSMENT & PLAN    89 yo F PMHx HTN, malignant pleural effusion of breast origin, chronic A.fib, PAD, Obesity, Chronic lymphedema of  BLE and COPD on home oxygen 2 - 1/2 L nc, ESBL UTI presented to ED with worsening shortness of breath and weakness for 4 days with increasing cough productive of yellow-green sputum. In ED pt was in respiratory distress and intubated    #acute hypoxemic and hypercapnic respiratory failure - s/p MV 07/03/2023  #pulmonary edema  #H/O left malignant effusion s/p pleurx - likely of breast origin as per path 03/2023  #chronic lymphedema  #suspected CAP  - Monitor O2 - Daily CXR/ABG - s/p intubation on admission/extubation 07/09 - on NC 3L now   - s/p Meropenem on admission -> switched to aztreonam and Azithromycin 07/03 till end -date 07/12 as per ID   - palliative eval noted   - worsening CXR with stable oxygen requirements   - TOV - D/C servando   - DNR/DNI today as per patient's wishes    #COPD  - not on home O2  - solumedrol stopped on 07/03  - c/w albuterol neb    #AFIB  - heparin drip switched to lovenox 90 BID 07/03    #MISC  DVT PPX Lovenox therapeutic  GI PPX Protonix  DIET OG feeding  CODE FULL     GIOVANNY BUCK 88y Female  MRN#: [id number]   CODE STATUS:________    Hospital Day: 9d    Pt is currently admitted with the primary diagnosis of PNA     SUBJECTIVE  Hospital Course    88F w/PMHx of HTN, malignant pleural effusion of breast origin, chronic A.fib, PAD, Obesity, Chronic lymphedema of  BLE and COPD on home oxygen 2 - 1/2 L nc, ESBL UTI presented to ED with worsening shortness of breath and weakness for 4 days with increasing cough productive of yellow-green sputum. In ED pt was in respiratory distress and intubated    07/04 - Pleurex cath drainage with pleural Cx snet - showed GPC in clusters, No PMNs  07/05 - thoracocentesis   07/09 - receieved PRBC x1 - Extubated  07/10 - TOV - CXR improving   07/11 - CXR worsening R pleural effusion - DNR/DNI code status     Overnight events   No overnight events     Subjective complaints   Patient is feeling fine, no complaints     Present Today:   - Ba:  No [ X ], Yes [  ] : Indication:                                               OBJECTIVE  PAST MEDICAL & SURGICAL HISTORY  HTN (hypertension)    Afib  s/p ablation 2001    Goiter  no meds    Cellulitis  chronic    OA (osteoarthritis)    PVD (peripheral vascular disease)    COPD (chronic obstructive pulmonary disease)    Anxiety    Obesity    Acute on chronic systolic congestive heart failure    Edema of both lower legs    H/O abdominal hysterectomy    H/O discectomy    H/O total knee replacement, bilateral                                                ALLERGIES:  penicillin (Other)  codeine (Other)  aspirin (Other)  sulfa drugs (Hives; Other)  contrast media (iodine-based) (Other)                           HOME MEDICATIONS  Home Medications:  albuterol 90 mcg/inh inhalation aerosol: 2 puff(s) inhaled every 6 hours as needed for  shortness of breath and/or wheezing (02 Jul 2023 23:05)  budesonide-formoterol 80 mcg-4.5 mcg/inh inhalation aerosol: 2 puff(s) inhaled 2 times a day (02 Jul 2023 23:05)  dilTIAZem 180 mg/24 hours oral capsule, extended release: 1 cap(s) orally once a day (02 Jul 2023 23:05)  Eliquis 2.5 mg oral tablet: 1 tab(s) orally 2 times a day (02 Jul 2023 23:05)  furosemide 20 mg oral tablet: 1 tab(s) orally once a day (02 Jul 2023 23:05)  Senna 8.6 mg oral tablet: 2 tab(s) orally once a day (at bedtime) (02 Jul 2023 23:05)                           MEDICATIONS:  STANDING MEDICATIONS  acetaZOLAMIDE  IVPB 500 milliGRAM(s) IV Intermittent daily  aztreonam  IVPB 2000 milliGRAM(s) IV Intermittent every 8 hours  budesonide 160 MICROgram(s)/formoterol 4.5 MICROgram(s) Inhaler 2 Puff(s) Inhalation two times a day  chlorhexidine 2% Cloths 1 Application(s) Topical <User Schedule>  dextrose 5%. 1000 milliLiter(s) IV Continuous <Continuous>  dextrose 50% Injectable 25 Gram(s) IV Push once  doxycycline IVPB 100 milliGRAM(s) IV Intermittent every 12 hours  erythromycin   Ointment 1 Application(s) Both EYES two times a day  furosemide   Injectable 40 milliGRAM(s) IV Push daily  glucagon  Injectable 1 milliGRAM(s) IntraMuscular once  insulin lispro (ADMELOG) corrective regimen sliding scale   SubCutaneous three times a day before meals  nystatin Powder 1 Application(s) Topical every 12 hours  polyethylene glycol 3350 17 Gram(s) Oral every 12 hours  senna 2 Tablet(s) Oral at bedtime    PRN MEDICATIONS  dextrose Oral Gel 15 Gram(s) Oral once PRN                                            ------------------------------------------------------------  VITAL SIGNS: Last 24 Hours  T(C): 36.4 (11 Jul 2023 07:11), Max: 36.8 (10 Jul 2023 15:30)  T(F): 97.6 (11 Jul 2023 07:11), Max: 98.2 (10 Jul 2023 15:30)  HR: 108 (11 Jul 2023 08:06) (103 - 109)  BP: 137/66 (11 Jul 2023 08:06) (118/58 - 137/66)  BP(mean): 95 (11 Jul 2023 08:06) (82 - 95)  RR: 43 (11 Jul 2023 08:06) (22 - 53)  SpO2: 91% (11 Jul 2023 08:06) (91% - 97%)      07-10-23 @ 07:01  -  07-11-23 @ 07:00  --------------------------------------------------------  IN: 570 mL / OUT: 1000 mL / NET: -430 mL    07-11-23 @ 07:01  -  07-11-23 @ 15:12  --------------------------------------------------------  IN: 0 mL / OUT: 201 mL / NET: -201 mL                                               LABS:                        8.3    9.36  )-----------( 258      ( 11 Jul 2023 06:23 )             29.2     07-11    146  |  104  |  34<H>  ----------------------------<  105<H>  3.6   |  32  |  0.9    Ca    9.0      11 Jul 2023 06:23  Mg     2.1     07-11    TPro  6.3  /  Alb  2.9<L>  /  TBili  0.4  /  DBili  x   /  AST  19  /  ALT  10  /  AlkPhos  174<H>  07-11      Urinalysis Basic - ( 11 Jul 2023 06:23 )    Color: x / Appearance: x / SG: x / pH: x  Gluc: 105 mg/dL / Ketone: x  / Bili: x / Urobili: x   Blood: x / Protein: x / Nitrite: x   Leuk Esterase: x / RBC: x / WBC x   Sq Epi: x / Non Sq Epi: x / Bacteria: x              Culture - Blood (collected 08 Jul 2023 21:23)  Source: .Blood Blood-Peripheral  Preliminary Report (10 Jul 2023 17:02):    No growth at 24 hours                                                    RADIOLOGY:        PHYSICAL EXAM:  GENERAL: NAD, lying in bed comfortably  NECK: Supple, No JVD  CHEST/LUNG: Clear to auscultation bilaterally; No rales, rhonchi, wheezing, or rubs. Unlabored respirations  HEART: Regular rate and rhythm; No murmurs, rubs, or gallops  ABDOMEN: BSx4; Soft, nontender, nondistended  EXTREMITIES:  2+ Peripheral Pulses, brisk capillary refill. No clubbing, cyanosis, or edema  NERVOUS SYSTEM:  A&Ox3, no focal deficits   SKIN: No rashes or lesions                                         ASSESSMENT & PLAN    89 yo F PMHx HTN, malignant pleural effusion of breast origin, chronic A.fib, PAD, Obesity, Chronic lymphedema of  BLE and COPD on home oxygen 2 - 1/2 L nc, ESBL UTI presented to ED with worsening shortness of breath and weakness for 4 days with increasing cough productive of yellow-green sputum. In ED pt was in respiratory distress and intubated    #acute hypoxemic and hypercapnic respiratory failure - s/p MV 07/03/2023  #pulmonary edema  #H/O left malignant effusion s/p pleurx - likely of breast origin as per path 03/2023  #chronic lymphedema  #suspected CAP  - Monitor O2 - Daily CXR/ABG - s/p intubation on admission/extubation 07/09 - on NC 3L now   - s/p Meropenem on admission -> switched to aztreonam and Azithromycin 07/03 till end -date 07/12 as per ID   - palliative eval noted   - worsening CXR with stable oxygen requirements - IV lasix x1 40   - TOV - D/C servando   - DNR/DNI today as per patient's wishes    #COPD  - not on home O2  - solumedrol stopped on 07/03  - c/w albuterol neb    #AFIB  - heparin drip switched to lovenox 90 BID 07/03    #MISC  DVT PPX Lovenox therapeutic  GI PPX Protonix  DIET OG feeding  CODE FULL

## 2023-07-11 NOTE — PROGRESS NOTE ADULT - ASSESSMENT
IMPRESSION:    Acute hypoxemic resp failure - extubtaed  Acute hypercapnic resp failure - improving   Acute decompensated HF / HO HFpEF   Fluid overload / R thora -> exudative  b/l pleural effusions - 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)  HO Probable [facility] refused testing   Thyroid mass SP FNA   HO LE cellulitis with ulcer and abscess MRSA +  HO Afib on Eliquis  Chronic lymphedema  HO Peripheral vascular disease    PLAN:    CNS: no sedation     HEENT: Oral care.    PULMONARY:  HOB @ 45 degrees. target pox > 92 %   NIV as needed   s/p thoracentesis 7/5 -> exudative   Follow up cyto path    CARDIOVASCULAR:  Avoid volume overload. Strict I's and O's. Target MAP>65. Restart lasix.  Echo with normal EF.     GI: GI prophylaxis.  feeding per speech    RENAL:  Follow up lytes. Correct as needed.    INFECTIOUS DISEASE: RVP negative;. cx negative. DTA negative. Follow pleural fluid cx, c/w Aztreonam and doxycycline for now. Nasal MRSA negative    HEMATOLOGICAL:  Full AC LMWH. Moniot CBC and Coags. Hgb stable.     ENDOCRINE:  Follow up FS.  Insulin protocol if needed.    MUSCULOSKELETAL: IAT. PT/OT    Ba: 7/2  Lines: Periphers    Disposition: sdu for today   Palliative eval  Prognosis is poor overall   voiding trial

## 2023-07-11 NOTE — CHART NOTE - NSCHARTNOTEFT_GEN_A_CORE
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)  HO Probable LAWRENCE refused testing   Thyroid mass SP FNA   HO LE cellulitis with ulcer and abscess MRSA +  HO Afib on Eliquis  Chronic lymphedema  HO Peripheral vascular disease  s/p thoracentesis 7/5 -> exudative     Nutrition Interval History:   s/p extubation  -7/9 enteral nutrition discontinued, diet advanced to puree/ moderate thick liquids  -7/10 SLP recommendations; pureed solids/ mildly thick liquids via small/ controlled sips and bites  alternate food with liquid; check mouth frequently for oral residue/pocketing; oral hygiene; position upright (90 degrees); small sips/bites  dependent  1:1    patient with poor to fair PO intake, some meals eats better than others having ~50% of trays at least  at breakfast didn't feel well this morning had minimal amount of meal tray  Nutrient Intake: Patient meeting ~50% of estimated energy needs in-house     Diet order:   Diet, Pureed:   DASH/TLC {Sodium & Cholesterol Restricted}  Moderately Thick Liquids (MODTHICKLIQS) (07-09-23 @ 11:33) [Active]    Anthropometrics:  167.6cm  83kg lowest weight   BMI 29.55kg/m2  IBW: 59.06kg    current admission weights (kg): likely fluid shifts  83.6 (07-11)  83 (07-10)  83 (07-09)  83.4 (07-08)  84.8 (07-07)  86.8 (07-06)  89.6 (07-05)   91.7 7/3    MEDICATIONS  (STANDING):  acetaZOLAMIDE  IVPB 500 milliGRAM(s) IV Intermittent daily  aztreonam  IVPB 2000 milliGRAM(s) IV Intermittent every 8 hours  budesonide 160 MICROgram(s)/formoterol 4.5 MICROgram(s) Inhaler 2 Puff(s) Inhalation two times a day  chlorhexidine 2% Cloths 1 Application(s) Topical <User Schedule>  dextrose 5%. 1000 milliLiter(s) (50 mL/Hr) IV Continuous <Continuous>  dextrose 50% Injectable 25 Gram(s) IV Push once  doxycycline IVPB 100 milliGRAM(s) IV Intermittent every 12 hours  erythromycin   Ointment 1 Application(s) Both EYES two times a day  furosemide   Injectable 40 milliGRAM(s) IV Push daily  glucagon  Injectable 1 milliGRAM(s) IntraMuscular once  insulin lispro (ADMELOG) corrective regimen sliding scale   SubCutaneous three times a day before meals  nystatin Powder 1 Application(s) Topical every 12 hours  polyethylene glycol 3350 17 Gram(s) Oral every 12 hours  senna 2 Tablet(s) Oral at bedtime    MEDICATIONS  (PRN):  dextrose Oral Gel 15 Gram(s) Oral once PRN Blood Glucose LESS THAN 70 milliGRAM(s)/deciliter    Pertinent Labs: 07-11 @ 06:23: Na 146, BUN 34<H>, Cr 0.9, <H>, K+ 3.6, Phos --, Mg 2.1, Alk Phos 174<H>, ALT/SGPT 10, AST/SGOT 19, HbA1c --    Finger Sticks:  POCT Blood Glucose.: 102 mg/dL (07-11 @ 11:23)  POCT Blood Glucose.: 113 mg/dL (07-11 @ 07:19)  POCT Blood Glucose.: 103 mg/dL (07-10 @ 23:51)  POCT Blood Glucose.: 122 mg/dL (07-10 @ 16:22)    Physical Findings:  - Cognition: confused/ lethargic. speech garbled  - GI function: last bowel movement 7/11 x2  - Tubes:  none  - Oral/Mouth cavity: SLP 7/10 see recs above  - Skin: fissure to sacrum  - Edema: dependent, R/L leg nonpitting      Nutrition Requirements: with consideration for age, weight, BMI. needs adjusted with lowest weight & extubation  Weight Used: ABW 83kg     Estimated Energy Needs    Continue [x]  Adjust [] 7557-3659 kcal/day (MSJ x 1-1.2)  Estimated Protein Needs    Continue [x]  Adjust [] 83-100g/day (1-1.2g/kg)  Estimated Fluid Needs        Continue [x]  Adjust [] 1mL/kcal    [x] Previous Nutrition Diagnosis: none  + inadequate energy intake 7/11 related to decrease PO as evidence by patient meeting <75% estimated energy needs   Goal/Expected Outcome: meet >/=75% est energy needs within 3-5 days  Nutrition Intervention: Meals and Snacks, Medical Food Supplement, Vitamin Supplement, Nutrition Related Medication, Coordination of Care  Indicator/Monitoring:  Monitor diet order, energy intake, food and nutrient intake, body composition, weight    Recommendations:  pending       Konrad Chaney, #[id number] or via TEAMS  Patient is at High Risk, follow up x 3-5days Registered Dietitian Follow-Up  Patient Profile Reviewed                           Yes [x]   No []  Nutrition History Previously Obtained        Yes []  No [x]      Pertinent Medical Interventions:  Acute hypoxemic resp failure - extubtaed  Acute decompensated HF / HO HFpEF   b/l pleural effusions - 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)  HO Probable LAWRENCE refused testing   Thyroid mass SP FNA   HO LE cellulitis with ulcer and abscess MRSA +  HO Afib on Eliquis  Chronic lymphedema  HO Peripheral vascular disease  s/p thoracentesis 7/5 -> exudative     Nutrition Interval History:   s/p extubation  -7/9 enteral nutrition discontinued, diet advanced to puree/ moderate thick liquids  -7/10 SLP recommendations; pureed solids/ mildly thick liquids via small/ controlled sips and bites  alternate food with liquid; check mouth frequently for oral residue/pocketing; oral hygiene; position upright (90 degrees); small sips/bites  dependent  1:1    patient with poor to fair PO intake, some meals eats better than others having ~50% of trays at least  at breakfast didn't feel well this morning had minimal amount of meal tray  Nutrient Intake: Patient meeting ~50% of estimated energy needs in-house     Diet order:   Diet, Pureed:   DASH/TLC {Sodium & Cholesterol Restricted}  Moderately Thick Liquids (MODTHICKLIQS) (07-09-23 @ 11:33) [Active]    Anthropometrics:  167.6cm  83kg lowest weight   BMI 29.55kg/m2  IBW: 59.06kg    current admission weights (kg): likely fluid shifts  83.6 (07-11)  83 (07-10)  83 (07-09)  83.4 (07-08)  84.8 (07-07)  86.8 (07-06)  89.6 (07-05)   91.7 7/3    MEDICATIONS  (STANDING):  acetaZOLAMIDE  IVPB 500 milliGRAM(s) IV Intermittent daily  aztreonam  IVPB 2000 milliGRAM(s) IV Intermittent every 8 hours  budesonide 160 MICROgram(s)/formoterol 4.5 MICROgram(s) Inhaler 2 Puff(s) Inhalation two times a day  chlorhexidine 2% Cloths 1 Application(s) Topical <User Schedule>  dextrose 5%. 1000 milliLiter(s) (50 mL/Hr) IV Continuous <Continuous>  dextrose 50% Injectable 25 Gram(s) IV Push once  doxycycline IVPB 100 milliGRAM(s) IV Intermittent every 12 hours  erythromycin   Ointment 1 Application(s) Both EYES two times a day  furosemide   Injectable 40 milliGRAM(s) IV Push daily  glucagon  Injectable 1 milliGRAM(s) IntraMuscular once  insulin lispro (ADMELOG) corrective regimen sliding scale   SubCutaneous three times a day before meals  nystatin Powder 1 Application(s) Topical every 12 hours  polyethylene glycol 3350 17 Gram(s) Oral every 12 hours  senna 2 Tablet(s) Oral at bedtime    MEDICATIONS  (PRN):  dextrose Oral Gel 15 Gram(s) Oral once PRN Blood Glucose LESS THAN 70 milliGRAM(s)/deciliter    Pertinent Labs: 07-11 @ 06:23: Na 146, BUN 34<H>, Cr 0.9, <H>, K+ 3.6, Phos --, Mg 2.1, Alk Phos 174<H>, ALT/SGPT 10, AST/SGOT 19, HbA1c --    Finger Sticks:  POCT Blood Glucose.: 102 mg/dL (07-11 @ 11:23)  POCT Blood Glucose.: 113 mg/dL (07-11 @ 07:19)  POCT Blood Glucose.: 103 mg/dL (07-10 @ 23:51)  POCT Blood Glucose.: 122 mg/dL (07-10 @ 16:22)    Physical Findings:  - Cognition: confused/ lethargic. speech garbled  - GI function: last bowel movement 7/11 x2  - Tubes:  none  - Oral/Mouth cavity: SLP 7/10 see recs above  - Skin: fissure to sacrum  - Edema: dependent, R/L leg nonpitting      Nutrition Requirements: with consideration for age, weight, BMI. needs adjusted with lowest weight & extubation  Weight Used: ABW 83kg     Estimated Energy Needs    Continue [x]  Adjust [] 4559-0402 kcal/day (MSJ x 1-1.2)  Estimated Protein Needs    Continue [x]  Adjust [] 83-100g/day (1-1.2g/kg)  Estimated Fluid Needs        Continue [x]  Adjust [] 1mL/kcal    [x] Previous Nutrition Diagnosis: none  + inadequate energy intake 7/11 related to decrease PO as evidence by patient meeting <75% estimated energy needs   Goal/Expected Outcome: meet >/=75% est energy needs within 3-5 days  Nutrition Intervention: Meals and Snacks, Medical Food Supplement, Vitamin Supplement, Nutrition Related Medication, Coordination of Care  Indicator/Monitoring:  Monitor diet order, energy intake, food and nutrient intake, body composition, weight    Recommendations:  - adjust texture to reflect SLP evaluation: puree/ mild thick liquids   - magic cup with meals 3x/daily; 290kcal, 9g protein each  - insulin regimen to promote euglycemia  - bowel regimen to promote regular bowel movements   - MV daily    Konrad Chaney, #[id number] or via TEAMS  Patient is at High Risk, follow up x 3-5days

## 2023-07-11 NOTE — PROGRESS NOTE ADULT - CONVERSATION DETAILS
poor prognosis with respiratory failure and malignant pleural effusion  family is considering DNR and comfort measures  however wants to remain full code for now
Spoke with Karishma via telehealth.  Discussed palliative care. She was able to provide a medical history and hospital course. She had been speaking with her siblings.  Discussed GOC and code status. She noted that she and her family want DNR/DNI.  They are also interested in hospice. Plan for hospice consult.

## 2023-07-11 NOTE — PROGRESS NOTE ADULT - ASSESSMENT
ASSESSMENT  87 yo W female who presents  w/ 3 days history of progressive respiratory distress    IMPRESSION  #Acute Hypoexmic respiratory failure s/p extubation since 7/9  #Bilateral pleural effusions   - s/p left thoracentesis 3/2023 -- Cytology -PLEURAL FLUID, THORACENTESIS: - SUSPICIOUS FOR MALIGNANCY. - Atypical cells present in background of histiocytes and mesothelial  cells, suspicious for metastatic carcinoma.   - Pleurx Cx 7/4 - GPC in clusters, No PMNs  - s/p thoracentesis right thoracentesis 7/5 Exudative in nature. Cultures so far negative    #Acute decompensated HF  #Obesity BMI (kg/m2): 32.6  #DM   #Abx allergy: penicillin (Other)  codeine (Other)  aspirin (Other)    RECOMMENDATIONS  - follow-up Right thoracentesis Cx from 7/5   - Noted GPC in pleurx catheter gram stain, No PMNs on 7/4 -- difficult to interpret if this is true infection vs colonizer.   -S/p thoracentesis on 7/5- exudative in nature. Cultures remain negative to date.   - Leukocytosis improved.   - continue aztreonam 2g q 8 hours and doxycyline for now. Plan for antibiotics for 10 days. Tentatively End date 7/12.  -Poor prognosis.      Please call or message on [employer] Teams if with any questions.

## 2023-07-11 NOTE — CHART NOTE - NSCHARTNOTEFT_GEN_A_CORE
PALLIATIVE MEDICINE INTERDISCIPLINARY TEAM NOTE    Provider:                Family or contact name / phone #     Met with: [ x  ] Patient  [   ] Family  [   ] Other:    Primary Language: [ x  ] English [   ] Other*:                      *Interpretation provided by:    SUPPORT DIAGNOSES            (Check all that apply)  [ x  ] Spiritual assessment  [   ] EOL issues  [   ] Cultural / spiritual concerns  [   ] Pain / suffering  [   ] Dementia / AMS  [   ] Other:  [   ] AD issues  [   ] Grief / loss / sadness  [   ] Discharge issues  [   ] Distress / coping    SPIRITUAL ASSESSMENT    [   ] Initial Assessment            [ x  ] Reassessment          [   ] Not Applicable this visit    Pain/suffering acuity:  [ x  ] None to mild (0-3)           [   ] Moderate (4-6)        [   ] High (7-10)    Coping:  [   ] Coping well                     [ x  ] Coping w/difficulty            [   ] Poor coping    Support system:  [ x  ] Strong                              [   ] Adequate                        [   ] Inadequate    Mandaeism/Spiritual practice: ___Catholic________________________    Role of organized [employer]:  [ x  ] Important                     [   ] Some (fam tradition, cultural)               [   ] None    Effects on medical care:  [   ] Yes, _____________________________________                         [  x ] None    Cultural/Congregational need:  [   ] Yes, _____________________________________                         [ x  ] None    Refer to Pastoral Care:  [   ] Yes           [ x  ] No, not at this time    SERVICE PROVIDED  [   ]PSSA                                                                             [   ]Discharge support / facilitation  [   ]AD / goals of care counseling                                  [   ]EOL / death / bereavement counseling  [ x  ]Counseling / support                                                [   ] Family meeting  [ x  ]Prayer / sacrament / ritual                                      [   ] Referral   [   ]Other                                                                       NOTE and Plan of Care (PoC): This was a follow up visit with Dr. Cruz. Family has agreed to make Pt DNI/DNR and is considering hospice care. Pt states she's comfortable but sleeps all the time. I was a supportive pastoral presence. I will follow up.

## 2023-07-11 NOTE — PROGRESS NOTE ADULT - PROBLEM SELECTOR PLAN 3
-now DNR/DNI  -primary ICU team to fill out MOLST with family  -ongoing medical management  -hospice consult  -children make decisions together - HCP in system names Karishma PROCTOR as appropriate

## 2023-07-11 NOTE — PROGRESS NOTE ADULT - SUBJECTIVE AND OBJECTIVE BOX
GIOVANNY BUCK  88y, Female  Allergy: penicillin (Other)  codeine (Other)  aspirin (Other)  sulfa drugs (Hives; Other)  contrast media (iodine-based) (Other)      LOS  9d    CHIEF COMPLAINT: respiratory diastress (11 Jul 2023 09:56)      INTERVAL EVENTS/HPI  - No acute events overnight  - T(F): , Max: 98.2 (07-10-23 @ 15:30)  - Denies any worsening symptoms  - Tolerating medication  - WBC Count: 9.36 (07-11-23 @ 06:23)  WBC Count: 8.25 (07-10-23 @ 05:57)     - Creatinine: 0.9 (07-11-23 @ 06:23)  Creatinine: 0.9 (07-10-23 @ 05:57)       ROS  General: Denies rigors, nightsweats  HEENT: Denies headache, rhinorrhea, sore throat, eye pain  CV: Denies CP, palpitations  PULM: Denies wheezing, hemoptysis  GI: Denies hematemesis, hematochezia, melena  : Denies discharge, hematuria  MSK: Denies arthralgias, myalgias  SKIN: Denies rash, lesions  NEURO: Denies paresthesias, weakness  PSYCH: Denies depression, anxiety    VITALS:  T(F): 97.6, Max: 98.2 (07-10-23 @ 15:30)  HR: 108  BP: 137/66  RR: 43Vital Signs Last 24 Hrs  T(C): 36.4 (11 Jul 2023 07:11), Max: 36.8 (10 Jul 2023 15:30)  T(F): 97.6 (11 Jul 2023 07:11), Max: 98.2 (10 Jul 2023 15:30)  HR: 108 (11 Jul 2023 08:06) (103 - 109)  BP: 137/66 (11 Jul 2023 08:06) (111/64 - 137/66)  BP(mean): 95 (11 Jul 2023 08:06) (82 - 95)  RR: 43 (11 Jul 2023 08:06) (22 - 53)  SpO2: 91% (11 Jul 2023 08:06) (91% - 97%)    Parameters below as of 11 Jul 2023 08:06  Patient On (Oxygen Delivery Method): nasal cannula  O2 Flow (L/min): 4.5      PHYSICAL EXAM:  Gen: NAD, resting in bed  HEENT: Normocephalic, atraumatic  Neck: supple, no lymphadenopathy  CV: Regular rate & regular rhythm  Lungs: decreased BS at bases, no fremitus  Abdomen: Soft, BS present  Ext: Warm, well perfused  Neuro: non focal, awake  Skin: no rash, no erythema  Lines: no phlebitis  FH: Non-contributory  Social Hx: Non-contributory    TESTS & MEASUREMENTS:                        8.3    9.36  )-----------( 258      ( 11 Jul 2023 06:23 )             29.2     07-11    146  |  104  |  34<H>  ----------------------------<  105<H>  3.6   |  32  |  0.9    Ca    9.0      11 Jul 2023 06:23  Mg     2.1     07-11    TPro  6.3  /  Alb  2.9<L>  /  TBili  0.4  /  DBili  x   /  AST  19  /  ALT  10  /  AlkPhos  174<H>  07-11      LIVER FUNCTIONS - ( 11 Jul 2023 06:23 )  Alb: 2.9 g/dL / Pro: 6.3 g/dL / ALK PHOS: 174 U/L / ALT: 10 U/L / AST: 19 U/L / GGT: x           Urinalysis Basic - ( 11 Jul 2023 06:23 )    Color: x / Appearance: x / SG: x / pH: x  Gluc: 105 mg/dL / Ketone: x  / Bili: x / Urobili: x   Blood: x / Protein: x / Nitrite: x   Leuk Esterase: x / RBC: x / WBC x   Sq Epi: x / Non Sq Epi: x / Bacteria: x        Culture - Blood (collected 07-08-23 @ 21:23)  Source: .Blood Blood-Peripheral  Preliminary Report (07-10-23 @ 17:02):    No growth at 24 hours    Culture - Fungal, Body Fluid (collected 07-05-23 @ 15:45)  Source: Pleural Fl Pleural Fluid  Preliminary Report (07-08-23 @ 15:03):    Culture is being performed. Fungal cultures are held for 4 weeks.    Culture - Body Fluid with Gram Stain (collected 07-05-23 @ 15:45)  Source: Pleural Fl Pleural Fluid  Gram Stain (07-06-23 @ 04:21):    polymorphonuclear leukocytes seen    No organisms seen    by cytocentrifuge  Final Report (07-10-23 @ 17:41):    No growth    Culture - Body Fluid with Gram Stain (collected 07-04-23 @ 19:56)  Source: .Body Fluid Thoracentesis Fluid  Gram Stain (07-05-23 @ 05:13):    No polymorphonuclear leukocytes seen    Gram Positive Cocci in Clusters seen    by cytocentrifuge  Final Report (07-09-23 @ 14:58):    Numerous Staphylococcus epidermidis  Organism: Staphylococcus epidermidis (07-09-23 @ 14:58)  Organism: Staphylococcus epidermidis (07-09-23 @ 14:58)      Method Type: DARIO      -  Ampicillin/Sulbactam: R <=8/4      -  Cefazolin: R <=4      -  Clindamycin: R >4      -  Erythromycin: R >4      -  Gentamicin: R >8 Should not be used as monotherapy      -  Oxacillin: R >2      -  Penicillin: R 4      -  Rifampin: S <=1 Should not be used as monotherapy      -  Tetracycline: S <=1      -  Trimethoprim/Sulfamethoxazole: R >2/38      -  Vancomycin: S 1    Culture - Sputum (collected 07-04-23 @ 08:00)  Source: Trach Asp Tracheal Aspirate  Gram Stain (07-04-23 @ 18:15):    Few Squamous epithelial cells per low power field    Few polymorphonuclear leukocytes per low power field    Few Yeast per oil power field  Final Report (07-06-23 @ 17:33):    Normal Respiratory Joaquina present    Culture - Blood (collected 07-02-23 @ 19:55)  Source: .Blood Blood-Peripheral  Final Report (07-08-23 @ 04:00):    No growth at 5 days    Culture - Blood (collected 07-02-23 @ 19:55)  Source: .Blood Blood-Peripheral  Final Report (07-08-23 @ 04:00):    No growth at 5 days        INFECTIOUS DISEASES TESTING    RADIOLOGY & ADDITIONAL TESTS:  I have personally reviewed the last available Chest xray  CXR  Xray Chest 1 View- PORTABLE-Urgent:   ACC: [id number] EXAM:  XR CHEST PORTABLE URGENT 1V   ORDERED BY: TERRENCE FOOTE     PROCEDURE DATE:  07/10/2023          INTERPRETATION:  STUDY INDICATION: post extubation    TECHNIQUE:  Portable frontal view of the chest obtained.    COMPARISON: 7/8/2023    FINDINGS/  IMPRESSION:    Bibasal effusions/opacities similar to prior exam. No pneumothorax.    Stable cardiomediastinal silhouette.    Unchanged osseous structures.    --- End of Report ---            PEE MORELAND MD; Attending Radiologist  This document has been electronically signed. Jul 10 2023 11:40AM (07-10-23 @ 10:51)      CT        MEDICATIONS  acetaZOLAMIDE  IVPB 500 IV Intermittent daily  aztreonam  IVPB 2000 IV Intermittent every 8 hours  budesonide 160 MICROgram(s)/formoterol 4.5 MICROgram(s) Inhaler 2 Inhalation two times a day  chlorhexidine 2% Cloths 1 Topical <User Schedule>  dextrose 5%. 1000 IV Continuous <Continuous>  dextrose 50% Injectable 25 IV Push once  doxycycline IVPB 100 IV Intermittent every 12 hours  erythromycin   Ointment 1 Both EYES two times a day  furosemide   Injectable 40 IV Push daily  glucagon  Injectable 1 IntraMuscular once  insulin lispro (ADMELOG) corrective regimen sliding scale  SubCutaneous three times a day before meals  lactulose Syrup 20 Oral three times a day  nystatin Powder 1 Topical every 12 hours  polyethylene glycol 3350 17 Oral every 12 hours  senna 2 Oral at bedtime      WEIGHT  Weight (kg): 91.7 (07-03-23 @ 00:02)  Creatinine: 0.9 mg/dL (07-11-23 @ 06:23)      ANTIBIOTICS:  aztreonam  IVPB 2000 milliGRAM(s) IV Intermittent every 8 hours  doxycycline IVPB 100 milliGRAM(s) IV Intermittent every 12 hours      All available historical records have been reviewed

## 2023-07-11 NOTE — PROGRESS NOTE ADULT - SUBJECTIVE AND OBJECTIVE BOX
CC.  respiratory distress  HPI.  Patient reports that she feels better.  SOB, and cough are improving  afebrile               Constitutional: No fever, fatigue or weight loss.  Skin: No rash.  Eyes: No recent vision problems or eye pain.  ENT: No congestion, ear pain, or sore throat.  Endocrine: No thyroid problems.  Cardiovascular: No chest pain or palpation.  Respiratory: No  congestion, or wheezing.  Gastrointestinal: No abdominal pain, nausea, vomiting, or diarrhea.  Genitourinary: No dysuria.  Musculoskeletal: No joint swelling.  Neurologic: No headache.      Vital Signs Last 24 Hrs  T(C): 36.4 (11 Jul 2023 07:11), Max: 36.8 (10 Jul 2023 15:30)  T(F): 97.6 (11 Jul 2023 07:11), Max: 98.2 (10 Jul 2023 15:30)  HR: 108 (11 Jul 2023 08:06) (103 - 109)  BP: 137/66 (11 Jul 2023 08:06) (118/58 - 137/66)  BP(mean): 95 (11 Jul 2023 08:06) (82 - 95)  RR: 43 (11 Jul 2023 08:06) (22 - 53)  SpO2: 91% (11 Jul 2023 08:06) (91% - 97%)    Parameters below as of 11 Jul 2023 08:06  Patient On (Oxygen Delivery Method): nasal cannula  O2 Flow (L/min): 4.5          PHYSICAL EXAM-  GENERAL: NAD,   HEAD:  Atraumatic, Normocephalic  EYES: EOMI, PERRLA, conjunctiva and sclera clear  NECK: Supple, No JVD, Normal thyroid  NERVOUS SYSTEM:  Alert & Oriented moving all extremities  CHEST/LUNG:  + rhonchi with good air entry  HEART: Regular rate and rhythm; No murmurs, rubs, or gallops  ABDOMEN: Soft, Nontender, Nondistended; Bowel sounds present  EXTREMITIES:   No clubbing, cyanosis, or edema  SKIN: No rashes or lesions                                8.3    9.36  )-----------( 258      ( 11 Jul 2023 06:23 )             29.2     07-11    146  |  104  |  34<H>  ----------------------------<  105<H>  3.6   |  32  |  0.9    Ca    9.0      11 Jul 2023 06:23  Mg     2.1     07-11    TPro  6.3  /  Alb  2.9<L>  /  TBili  0.4  /  DBili  x   /  AST  19  /  ALT  10  /  AlkPhos  174<H>  07-11          Urinalysis Basic - ( 11 Jul 2023 06:23 )    Color: x / Appearance: x / SG: x / pH: x  Gluc: 105 mg/dL / Ketone: x  / Bili: x / Urobili: x   Blood: x / Protein: x / Nitrite: x   Leuk Esterase: x / RBC: x / WBC x   Sq Epi: x / Non Sq Epi: x / Bacteria: x                              MEDICATIONS  (STANDING):  acetaZOLAMIDE  IVPB 500 milliGRAM(s) IV Intermittent daily  aztreonam  IVPB 2000 milliGRAM(s) IV Intermittent every 8 hours  budesonide 160 MICROgram(s)/formoterol 4.5 MICROgram(s) Inhaler 2 Puff(s) Inhalation two times a day  chlorhexidine 2% Cloths 1 Application(s) Topical <User Schedule>  dextrose 5%. 1000 milliLiter(s) (50 mL/Hr) IV Continuous <Continuous>  dextrose 50% Injectable 25 Gram(s) IV Push once  doxycycline IVPB 100 milliGRAM(s) IV Intermittent every 12 hours  glucagon  Injectable 1 milliGRAM(s) IntraMuscular once  insulin lispro (ADMELOG) corrective regimen sliding scale   SubCutaneous three times a day before meals  lactulose Syrup 20 Gram(s) Oral three times a day  nystatin Powder 1 Application(s) Topical every 12 hours   pantoprazole  Injectable 40 milliGRAM(s) IV Push daily  polyethylene glycol 3350 17 Gram(s) Oral every 12 hours  senna 2 Tablet(s) Oral at bedtime  sodium chloride 0.9%. 1000 milliLiter(s) (60 mL/Hr) IV Continuous <Continuous>    MEDICATIONS  (PRN):  dextrose Oral Gel 15 Gram(s) Oral once PRN Blood Glucose LESS THAN 70 milliGRAM(s)/deciliter          Imaging Personally Reviewed:     [x ] YES  [ ] NO    Consultant(s) Notes Reviewed:  [x ] YES  [ ] NO    Care Discussed with Consultants/Other Providers [x ] YES  [ ] NO

## 2023-07-12 NOTE — HOSPICE CARE NOTE - CONVESATION DETAILS
Reviewed home hospice services with private hire [facility] versus SNF placement with hospice services.  Patient’s daughter Karishma requesting placement at [facility]. No Beds available at this time. Karishma  not open to a D/C plan at this time.

## 2023-07-12 NOTE — OCCUPATIONAL THERAPY INITIAL EVALUATION ADULT - PERTINENT HX OF CURRENT PROBLEM, REHAB EVAL
89 yo W female w/ PMH as noted below.  Pt. comes to hospital 7/02/23 w/ 3 days history of progressive respiratory distress.  pt has a left sided indwelling chest tube that was place approx 1 month ago when pt was @ [facility].  This tube was to be removed this  week as per  son.  Pt was placed on AVAPS  NIV  in ER .  Pt has bilat pleural effusion on CXR . L > R .  While waiting adm to ICU pt's respiratory status worsen & was orally intubated w/ size 7 ETT . 22cm @ lip line. Patient was not available for the therapy session at this time.   .      .Pt. underwent General Anesthesia, will need new orders to resume PT.

## 2023-07-12 NOTE — OCCUPATIONAL THERAPY INITIAL EVALUATION ADULT - LIVES WITH, PROFILE
in a private home with 7 steps to enter right side handrail + chair lift to main floor + walk in shower with shower chair + standard toilet/alone

## 2023-07-12 NOTE — PROGRESS NOTE ADULT - ASSESSMENT
ASSESSMENT  87 yo W female who presents  w/ 3 days history of progressive respiratory distress    IMPRESSION  #Acute Hypoexmic respiratory failure s/p extubation since 7/9  #Bilateral pleural effusions   - s/p left thoracentesis 3/2023 -- Cytology -PLEURAL FLUID, THORACENTESIS: - SUSPICIOUS FOR MALIGNANCY. - Atypical cells present in background of histiocytes and mesothelial  cells, suspicious for metastatic carcinoma.   - Pleurx Cx 7/4 - GPC in clusters, No PMNs  - s/p thoracentesis right thoracentesis 7/5 Exudative in nature. Cultures so far negative    #Acute decompensated HF  #Obesity BMI (kg/m2): 32.6  #DM   #Abx allergy: penicillin (Other)  codeine (Other)  aspirin (Other)    RECOMMENDATIONS  - noted GOC discussions -- planned for hospice   - can stop antibiotics after today's dose   - recall as needed    Please call or message on Microsoft Teams if with any questions.  Spectra [phone]

## 2023-07-12 NOTE — OCCUPATIONAL THERAPY INITIAL EVALUATION ADULT - LIGHT TOUCH SENSATION, RUE, REHAB EVAL
not performed 2/2 change in vitals upon bed mobility; to f/u when appropriate/not tested grossly intact

## 2023-07-12 NOTE — HOSPICE CARE NOTE - CONVESATION DETAILS
Second attempt  to reach patient's daughter Karishma unsuccessful. Land line number provided with hospice referral was [phone]. Pending return call.

## 2023-07-12 NOTE — PROGRESS NOTE ADULT - SUBJECTIVE AND OBJECTIVE BOX
GIOVANNY BUCK  88y, Female  Allergy: penicillin (Other)  codeine (Other)  aspirin (Other)  sulfa drugs (Hives; Other)  contrast media (iodine-based) (Other)      LOS  10d    CHIEF COMPLAINT: respiratory diastress (12 Jul 2023 10:29)      INTERVAL EVENTS/HPI  - No acute events overnight  - T(F): , Max: 97.5 (07-12-23 @ 06:00)  - Denies any worsening symptoms  - Tolerating medication  - WBC Count: 11.17 (07-12-23 @ 06:10)  WBC Count: 9.36 (07-11-23 @ 06:23)     - Creatinine: 0.8 (07-12-23 @ 06:10)  Creatinine: 0.9 (07-11-23 @ 06:23)       ROS  General: Denies rigors, nightsweats  HEENT: Denies headache, rhinorrhea, sore throat, eye pain  CV: Denies CP, palpitations  PULM: Denies wheezing, hemoptysis  GI: Denies hematemesis, hematochezia, melena  : Denies discharge, hematuria  MSK: Denies arthralgias, myalgias  SKIN: Denies rash, lesions  NEURO: Denies paresthesias, weakness  PSYCH: Denies depression, anxiety    VITALS:  T(F): 97, Max: 97.5 (07-12-23 @ 06:00)  HR: 111  BP: 160/72  RR: 22Vital Signs Last 24 Hrs  T(C): 36.1 (12 Jul 2023 07:05), Max: 36.4 (12 Jul 2023 06:00)  T(F): 97 (12 Jul 2023 07:05), Max: 97.5 (12 Jul 2023 06:00)  HR: 111 (12 Jul 2023 08:01) (103 - 118)  BP: 160/72 (12 Jul 2023 08:01) (94/50 - 162/91)  BP(mean): 103 (12 Jul 2023 08:01) (68 - 120)  RR: 22 (12 Jul 2023 08:01) (20 - 54)  SpO2: 83% (12 Jul 2023 08:01) (83% - 100%)    Parameters below as of 12 Jul 2023 07:25  Patient On (Oxygen Delivery Method): nasal cannula  O2 Flow (L/min): 3      PHYSICAL EXAM:  Gen: NAD, resting in bed  HEENT: Normocephalic, atraumatic  Neck: supple, no lymphadenopathy  CV: Regular rate & regular rhythm  Lungs: decreased BS at bases, no fremitus  Abdomen: Soft, BS present  Ext: Warm, well perfused  Neuro: non focal, awake  Skin: no rash, no erythema  Lines: no phlebitis    FH: Non-contributory  Social Hx: Non-contributory    TESTS & MEASUREMENTS:                        8.6    11.17 )-----------( 307      ( 12 Jul 2023 06:10 )             29.8     07-12    144  |  102  |  32<H>  ----------------------------<  107<H>  3.3<L>   |  30  |  0.8    Ca    9.2      12 Jul 2023 06:10  Mg     1.9     07-12    TPro  6.5  /  Alb  3.1<L>  /  TBili  0.4  /  DBili  x   /  AST  18  /  ALT  11  /  AlkPhos  182<H>  07-12      LIVER FUNCTIONS - ( 12 Jul 2023 06:10 )  Alb: 3.1 g/dL / Pro: 6.5 g/dL / ALK PHOS: 182 U/L / ALT: 11 U/L / AST: 18 U/L / GGT: x           Urinalysis Basic - ( 12 Jul 2023 06:10 )    Color: x / Appearance: x / SG: x / pH: x  Gluc: 107 mg/dL / Ketone: x  / Bili: x / Urobili: x   Blood: x / Protein: x / Nitrite: x   Leuk Esterase: x / RBC: x / WBC x   Sq Epi: x / Non Sq Epi: x / Bacteria: x        Culture - Blood (collected 07-08-23 @ 21:23)  Source: .Blood Blood-Peripheral  Preliminary Report (07-11-23 @ 17:01):    No growth at 48 Hours    Culture - Fungal, Body Fluid (collected 07-05-23 @ 15:45)  Source: Pleural Fl Pleural Fluid  Preliminary Report (07-08-23 @ 15:03):    Culture is being performed. Fungal cultures are held for 4 weeks.    Culture - Body Fluid with Gram Stain (collected 07-05-23 @ 15:45)  Source: Pleural Fl Pleural Fluid  Gram Stain (07-06-23 @ 04:21):    polymorphonuclear leukocytes seen    No organisms seen    by cytocentrifuge  Final Report (07-10-23 @ 17:41):    No growth    Culture - Body Fluid with Gram Stain (collected 07-04-23 @ 19:56)  Source: .Body Fluid Thoracentesis Fluid  Gram Stain (07-05-23 @ 05:13):    No polymorphonuclear leukocytes seen    Gram Positive Cocci in Clusters seen    by cytocentrifuge  Final Report (07-09-23 @ 14:58):    Numerous Staphylococcus epidermidis  Organism: Staphylococcus epidermidis (07-09-23 @ 14:58)  Organism: Staphylococcus epidermidis (07-09-23 @ 14:58)      Method Type: DARIO      -  Ampicillin/Sulbactam: R <=8/4      -  Cefazolin: R <=4      -  Clindamycin: R >4      -  Erythromycin: R >4      -  Gentamicin: R >8 Should not be used as monotherapy      -  Oxacillin: R >2      -  Penicillin: R 4      -  Rifampin: S <=1 Should not be used as monotherapy      -  Tetracycline: S <=1      -  Trimethoprim/Sulfamethoxazole: R >2/38      -  Vancomycin: S 1    Culture - Sputum (collected 07-04-23 @ 08:00)  Source: Trach Asp Tracheal Aspirate  Gram Stain (07-04-23 @ 18:15):    Few Squamous epithelial cells per low power field    Few polymorphonuclear leukocytes per low power field    Few Yeast per oil power field  Final Report (07-06-23 @ 17:33):    Normal Respiratory Joaquina present    Culture - Blood (collected 07-02-23 @ 19:55)  Source: .Blood Blood-Peripheral  Final Report (07-08-23 @ 04:00):    No growth at 5 days    Culture - Blood (collected 07-02-23 @ 19:55)  Source: .Blood Blood-Peripheral  Final Report (07-08-23 @ 04:00):    No growth at 5 days            INFECTIOUS DISEASES TESTING  MRSA PCR Result.: Negative (07-05-23 @ 15:45)  Procalcitonin, Serum: 0.90 (07-03-23 @ 15:48)  Procalcitonin, Serum: 0.68 (07-03-23 @ 05:51)  COVID-19 PCR: NotDetec (05-03-23 @ 14:15)  Procalcitonin, Serum: 8.81 (04-23-23 @ 14:27)  COVID-19 PCR: NotDetec (03-13-23 @ 14:15)  Procalcitonin, Serum: 0.34 (03-07-23 @ 17:16)  Procalcitonin, Serum: 0.34 (03-07-23 @ 17:16)  MRSA PCR Result.: Negative (03-02-23 @ 15:27)  MRSA PCR Result.: Negative (03-02-23 @ 07:58)  Procalcitonin, Serum: 0.19 (02-28-23 @ 06:53)  COVID-19 PCR: NotDetec (02-27-23 @ 20:59)      INFLAMMATORY MARKERS  C-Reactive Protein, Serum: 65.9 mg/L (02-28-23 @ 06:53)      RADIOLOGY & ADDITIONAL TESTS:  I have personally reviewed the last available Chest xray  CXR  Xray Chest 1 View- PORTABLE-Urgent:   ACC: [id number] EXAM:  XR CHEST PORTABLE URGENT 1V   ORDERED BY: TERRENCE FOOTE     PROCEDURE DATE:  07/10/2023          INTERPRETATION:  STUDY INDICATION: post extubation    TECHNIQUE:  Portable frontal view of the chest obtained.    COMPARISON: 7/8/2023    FINDINGS/  IMPRESSION:    Bibasal effusions/opacities similar to prior exam. No pneumothorax.    Stable cardiomediastinal silhouette.    Unchanged osseous structures.    --- End of Report ---            PEE MORELAND MD; Attending Radiologist  This document has been electronically signed. Jul 10 2023 11:40AM (07-10-23 @ 10:51)      CT      CARDIOLOGY TESTING  12 Lead ECG:   Ventricular Rate 88 BPM    Atrial Rate 88 BPM    QRS Duration 78 ms    Q-T Interval 356 ms    QTC Calculation(Bazett) 430 ms    R Axis 104 degrees    T Axis 57 degrees    Diagnosis Line Atrial fibrillation  Rightward axis  Low voltage QRS  Nonspecific ST and T wave abnormality  Abnormal ECG    Confirmed by JOYCE HERMAN MD ([id number]) on 7/6/2023 11:16:10 AM (07-05-23 @ 07:43)  12 Lead ECG:   Ventricular Rate 77 BPM    Atrial Rate 75 BPM    QRS Duration 60 ms    Q-T Interval 336 ms    QTC Calculation(Bazett) 380 ms    R Axis 145 degrees    T Axis -27 degrees    Diagnosis Line *** Suspect arm lead reversal, interpretation assumes no reversal  Atrial fibrillation with a competing junctional pacemaker  Right axis deviation  Possible Right ventricular hypertrophy  Nonspecific T wave abnormality  Abnormal ECG    Confirmed by JOYCE HERMAN MD ([id number]) on 7/4/2023 11:44:51 AM (07-04-23 @ 09:57)      MEDICATIONS  acetaZOLAMIDE  IVPB 500 IV Intermittent daily  aztreonam  IVPB 2000 IV Intermittent every 8 hours  budesonide 160 MICROgram(s)/formoterol 4.5 MICROgram(s) Inhaler 2 Inhalation two times a day  chlorhexidine 2% Cloths 1 Topical <User Schedule>  dextrose 5%. 1000 IV Continuous <Continuous>  dextrose 50% Injectable 25 IV Push once  doxycycline IVPB 100 IV Intermittent every 12 hours  erythromycin   Ointment 1 Both EYES two times a day  furosemide   Injectable 40 IV Push two times a day  glucagon  Injectable 1 IntraMuscular once  insulin lispro (ADMELOG) corrective regimen sliding scale  SubCutaneous three times a day before meals  nystatin Powder 1 Topical every 12 hours  polyethylene glycol 3350 17 Oral every 12 hours  potassium chloride   Powder 40 Oral every 4 hours  senna 2 Oral at bedtime      WEIGHT  Weight (kg): 91.7 (07-03-23 @ 00:02)  Creatinine: 0.8 mg/dL (07-12-23 @ 06:10)      ANTIBIOTICS:  aztreonam  IVPB 2000 milliGRAM(s) IV Intermittent every 8 hours  doxycycline IVPB 100 milliGRAM(s) IV Intermittent every 12 hours      All available historical records have been reviewed

## 2023-07-12 NOTE — PROGRESS NOTE ADULT - SUBJECTIVE AND OBJECTIVE BOX
Patient is a 88y old  Female who presents with a chief complaint of respiratory diastress (11 Jul 2023 15:12)        Over Night Events:  Off pressors. on 3 L NC      ROS:     All ROS are negative except HPI         PHYSICAL EXAM    ICU Vital Signs Last 24 Hrs  T(C): 36.1 (12 Jul 2023 07:05), Max: 36.4 (12 Jul 2023 06:00)  T(F): 97 (12 Jul 2023 07:05), Max: 97.5 (12 Jul 2023 06:00)  HR: 118 (12 Jul 2023 06:00) (103 - 118)  BP: 151/78 (12 Jul 2023 06:00) (94/50 - 162/91)  BP(mean): 107 (12 Jul 2023 06:00) (68 - 120)  RR: 26 (12 Jul 2023 07:05) (20 - 40)  SpO2: 90% (12 Jul 2023 06:00) (90% - 100%)    O2 Parameters below as of 12 Jul 2023 06:00  Patient On (Oxygen Delivery Method): nasal cannula  O2 Flow (L/min): 3      Ill apearing. Malnourished  ENT: Airway patent,  EYES: Pupils equal, Round and reactive to light.  CARDIAC: Tachy, Regular rhythm.    RESPIRATORY: No wheezing, Bilateral crackles, Not tachypneic, No use of accessory muscles  GASTROINTESTINAL: Abdomen soft, Non-tender, No guarding,   NEUROLOGICAL: Alert and oriented   SKIN: Skin normal color for race, Warm and dry and intact.       07-11-23 @ 07:01  -  07-12-23 @ 07:00  --------------------------------------------------------  IN:  Total IN: 0 mL    OUT:    Indwelling Catheter - Urethral (mL): 2 mL    Voided (mL): 1050 mL  Total OUT: 1052 mL    Total NET: -1052 mL      07-12-23 @ 07:01  -  07-12-23 @ 10:29  --------------------------------------------------------  IN:    Oral Fluid: 100 mL  Total IN: 100 mL    OUT:  Total OUT: 0 mL    Total NET: 100 mL        LABS:                            8.6    11.17 )-----------( 307      ( 12 Jul 2023 06:10 )             29.8                                               07-12    144  |  102  |  32<H>  ----------------------------<  107<H>  3.3<L>   |  30  |  0.8    Ca    9.2      12 Jul 2023 06:10  Mg     1.9     07-12    TPro  6.5  /  Alb  3.1<L>  /  TBili  0.4  /  DBili  x   /  AST  18  /  ALT  11  /  AlkPhos  182<H>  07-12                                             Urinalysis Basic - ( 12 Jul 2023 06:10 )    Color: x / Appearance: x / SG: x / pH: x  Gluc: 107 mg/dL / Ketone: x  / Bili: x / Urobili: x   Blood: x / Protein: x / Nitrite: x   Leuk Esterase: x / RBC: x / WBC x   Sq Epi: x / Non Sq Epi: x / Bacteria: x                                                  LIVER FUNCTIONS - ( 12 Jul 2023 06:10 )  Alb: 3.1 g/dL / Pro: 6.5 g/dL / ALK PHOS: 182 U/L / ALT: 11 U/L / AST: 18 U/L / GGT: x                                                            MEDICATIONS  (STANDING):  acetaZOLAMIDE  IVPB 500 milliGRAM(s) IV Intermittent daily  aztreonam  IVPB 2000 milliGRAM(s) IV Intermittent every 8 hours  budesonide 160 MICROgram(s)/formoterol 4.5 MICROgram(s) Inhaler 2 Puff(s) Inhalation two times a day  chlorhexidine 2% Cloths 1 Application(s) Topical <User Schedule>  dextrose 5%. 1000 milliLiter(s) (50 mL/Hr) IV Continuous <Continuous>  dextrose 50% Injectable 25 Gram(s) IV Push once  doxycycline IVPB 100 milliGRAM(s) IV Intermittent every 12 hours  erythromycin   Ointment 1 Application(s) Both EYES two times a day  furosemide   Injectable 40 milliGRAM(s) IV Push two times a day  glucagon  Injectable 1 milliGRAM(s) IntraMuscular once  insulin lispro (ADMELOG) corrective regimen sliding scale   SubCutaneous three times a day before meals  nystatin Powder 1 Application(s) Topical every 12 hours  polyethylene glycol 3350 17 Gram(s) Oral every 12 hours  senna 2 Tablet(s) Oral at bedtime    MEDICATIONS  (PRN):  dextrose Oral Gel 15 Gram(s) Oral once PRN Blood Glucose LESS THAN 70 milliGRAM(s)/deciliter         Patient is a 88y old  Female who presents with a chief complaint of respiratory diastress (11 Jul 2023 15:12)        Over Night Events:  Off pressors. on 3 L NC    no on distress   s/p hospice eval   ROS:     All ROS are negative except HPI         PHYSICAL EXAM    ICU Vital Signs Last 24 Hrs  T(C): 36.1 (12 Jul 2023 07:05), Max: 36.4 (12 Jul 2023 06:00)  T(F): 97 (12 Jul 2023 07:05), Max: 97.5 (12 Jul 2023 06:00)  HR: 118 (12 Jul 2023 06:00) (103 - 118)  BP: 151/78 (12 Jul 2023 06:00) (94/50 - 162/91)  BP(mean): 107 (12 Jul 2023 06:00) (68 - 120)  RR: 26 (12 Jul 2023 07:05) (20 - 40)  SpO2: 90% (12 Jul 2023 06:00) (90% - 100%)    O2 Parameters below as of 12 Jul 2023 06:00  Patient On (Oxygen Delivery Method): nasal cannula  O2 Flow (L/min): 3      Ill apearing. Malnourished  ENT: Airway patent,  EYES: Pupils equal, Round and reactive to light.  CARDIAC: Tachy, Regular rhythm.    RESPIRATORY: No wheezing, Bilateral crackles, Not tachypneic, No use of accessory muscles  GASTROINTESTINAL: Abdomen soft, Non-tender, No guarding,   NEUROLOGICAL: Alert and oriented   SKIN: Skin normal color for race, Warm and dry and intact.       07-11-23 @ 07:01  -  07-12-23 @ 07:00  --------------------------------------------------------  IN:  Total IN: 0 mL    OUT:    Indwelling Catheter - Urethral (mL): 2 mL    Voided (mL): 1050 mL  Total OUT: 1052 mL    Total NET: -1052 mL      07-12-23 @ 07:01  -  07-12-23 @ 10:29  --------------------------------------------------------  IN:    Oral Fluid: 100 mL  Total IN: 100 mL    OUT:  Total OUT: 0 mL    Total NET: 100 mL        LABS:                            8.6    11.17 )-----------( 307      ( 12 Jul 2023 06:10 )             29.8                                               07-12    144  |  102  |  32<H>  ----------------------------<  107<H>  3.3<L>   |  30  |  0.8    Ca    9.2      12 Jul 2023 06:10  Mg     1.9     07-12    TPro  6.5  /  Alb  3.1<L>  /  TBili  0.4  /  DBili  x   /  AST  18  /  ALT  11  /  AlkPhos  182<H>  07-12                                             Urinalysis Basic - ( 12 Jul 2023 06:10 )    Color: x / Appearance: x / SG: x / pH: x  Gluc: 107 mg/dL / Ketone: x  / Bili: x / Urobili: x   Blood: x / Protein: x / Nitrite: x   Leuk Esterase: x / RBC: x / WBC x   Sq Epi: x / Non Sq Epi: x / Bacteria: x                                                  LIVER FUNCTIONS - ( 12 Jul 2023 06:10 )  Alb: 3.1 g/dL / Pro: 6.5 g/dL / ALK PHOS: 182 U/L / ALT: 11 U/L / AST: 18 U/L / GGT: x                                                            MEDICATIONS  (STANDING):  acetaZOLAMIDE  IVPB 500 milliGRAM(s) IV Intermittent daily  aztreonam  IVPB 2000 milliGRAM(s) IV Intermittent every 8 hours  budesonide 160 MICROgram(s)/formoterol 4.5 MICROgram(s) Inhaler 2 Puff(s) Inhalation two times a day  chlorhexidine 2% Cloths 1 Application(s) Topical <User Schedule>  dextrose 5%. 1000 milliLiter(s) (50 mL/Hr) IV Continuous <Continuous>  dextrose 50% Injectable 25 Gram(s) IV Push once  doxycycline IVPB 100 milliGRAM(s) IV Intermittent every 12 hours  erythromycin   Ointment 1 Application(s) Both EYES two times a day  furosemide   Injectable 40 milliGRAM(s) IV Push two times a day  glucagon  Injectable 1 milliGRAM(s) IntraMuscular once  insulin lispro (ADMELOG) corrective regimen sliding scale   SubCutaneous three times a day before meals  nystatin Powder 1 Application(s) Topical every 12 hours  polyethylene glycol 3350 17 Gram(s) Oral every 12 hours  senna 2 Tablet(s) Oral at bedtime    MEDICATIONS  (PRN):  dextrose Oral Gel 15 Gram(s) Oral once PRN Blood Glucose LESS THAN 70 milliGRAM(s)/deciliter

## 2023-07-12 NOTE — PROGRESS NOTE ADULT - SUBJECTIVE AND OBJECTIVE BOX
GIOVANNY BUCK 88y Female  MRN#: [id number]   CODE STATUS:________    Hospital Day: 10d    Pt is currently admitted with the primary diagnosis of     SUBJECTIVE  Hospital Course  88F w/PMHx of HTN, malignant pleural effusion of breast origin, chronic A.fib, PAD, Obesity, Chronic lymphedema of  BLE and COPD on home oxygen 2 - 1/2 L nc, ESBL UTI presented to ED with worsening shortness of breath and weakness for 4 days with increasing cough productive of yellow-green sputum. In ED pt was in respiratory distress and intubated    07/04 - Pleurex cath drainage with pleural Cx snet - showed GPC in clusters, No PMNs  07/05 - thoracocentesis   07/09 - receieved PRBC x1 - Extubated  07/10 - TOV - CXR improving   07/11 - CXR worsening R pleural effusion - DNR/DNI code status     Overnight events   No overnight events     Subjective complaints   Patient is feeling fine, no complaints     Present Today:   - Ba:  No [ X ], Yes [  ] : Indication:                                                 OBJECTIVE  PAST MEDICAL & SURGICAL HISTORY  HTN (hypertension)    Afib  s/p ablation 2001    Goiter  no meds    Cellulitis  chronic    OA (osteoarthritis)    PVD (peripheral vascular disease)    COPD (chronic obstructive pulmonary disease)    Anxiety    Obesity    Acute on chronic systolic congestive heart failure    Edema of both lower legs    H/O abdominal hysterectomy    H/O discectomy    H/O total knee replacement, bilateral                                                ALLERGIES:  penicillin (Other)  codeine (Other)  aspirin (Other)  sulfa drugs (Hives; Other)  contrast media (iodine-based) (Other)                           HOME MEDICATIONS  Home Medications:  albuterol 90 mcg/inh inhalation aerosol: 2 puff(s) inhaled every 6 hours as needed for  shortness of breath and/or wheezing (02 Jul 2023 23:05)  budesonide-formoterol 80 mcg-4.5 mcg/inh inhalation aerosol: 2 puff(s) inhaled 2 times a day (02 Jul 2023 23:05)  dilTIAZem 180 mg/24 hours oral capsule, extended release: 1 cap(s) orally once a day (02 Jul 2023 23:05)  Eliquis 2.5 mg oral tablet: 1 tab(s) orally 2 times a day (02 Jul 2023 23:05)  furosemide 20 mg oral tablet: 1 tab(s) orally once a day (02 Jul 2023 23:05)  Senna 8.6 mg oral tablet: 2 tab(s) orally once a day (at bedtime) (02 Jul 2023 23:05)                           MEDICATIONS:  STANDING MEDICATIONS  acetaZOLAMIDE  IVPB 500 milliGRAM(s) IV Intermittent daily  aztreonam  IVPB 2000 milliGRAM(s) IV Intermittent every 8 hours  budesonide 160 MICROgram(s)/formoterol 4.5 MICROgram(s) Inhaler 2 Puff(s) Inhalation two times a day  chlorhexidine 2% Cloths 1 Application(s) Topical <User Schedule>  dextrose 5%. 1000 milliLiter(s) IV Continuous <Continuous>  dextrose 50% Injectable 25 Gram(s) IV Push once  doxycycline IVPB 100 milliGRAM(s) IV Intermittent every 12 hours  erythromycin   Ointment 1 Application(s) Both EYES two times a day  furosemide   Injectable 40 milliGRAM(s) IV Push two times a day  glucagon  Injectable 1 milliGRAM(s) IntraMuscular once  insulin lispro (ADMELOG) corrective regimen sliding scale   SubCutaneous three times a day before meals  nystatin Powder 1 Application(s) Topical every 12 hours  polyethylene glycol 3350 17 Gram(s) Oral every 12 hours  potassium chloride   Powder 40 milliEquivalent(s) Oral every 4 hours  senna 2 Tablet(s) Oral at bedtime    PRN MEDICATIONS  dextrose Oral Gel 15 Gram(s) Oral once PRN                                            ------------------------------------------------------------  VITAL SIGNS: Last 24 Hours  T(C): 36.1 (12 Jul 2023 07:05), Max: 36.4 (12 Jul 2023 06:00)  T(F): 97 (12 Jul 2023 07:05), Max: 97.5 (12 Jul 2023 06:00)  HR: 111 (12 Jul 2023 08:01) (103 - 118)  BP: 160/72 (12 Jul 2023 08:01) (94/50 - 162/91)  BP(mean): 103 (12 Jul 2023 08:01) (68 - 120)  RR: 22 (12 Jul 2023 08:01) (20 - 54)  SpO2: 83% (12 Jul 2023 08:01) (83% - 100%)      07-11-23 @ 07:01  -  07-12-23 @ 07:00  --------------------------------------------------------  IN: 0 mL / OUT: 1052 mL / NET: -1052 mL    07-12-23 @ 07:01  -  07-12-23 @ 12:28  --------------------------------------------------------  IN: 150 mL / OUT: 1000 mL / NET: -850 mL                                               LABS:                        8.6    11.17 )-----------( 307      ( 12 Jul 2023 06:10 )             29.8     07-12    144  |  102  |  32<H>  ----------------------------<  107<H>  3.3<L>   |  30  |  0.8    Ca    9.2      12 Jul 2023 06:10  Mg     1.9     07-12    TPro  6.5  /  Alb  3.1<L>  /  TBili  0.4  /  DBili  x   /  AST  18  /  ALT  11  /  AlkPhos  182<H>  07-12      Urinalysis Basic - ( 12 Jul 2023 06:10 )    Color: x / Appearance: x / SG: x / pH: x  Gluc: 107 mg/dL / Ketone: x  / Bili: x / Urobili: x   Blood: x / Protein: x / Nitrite: x   Leuk Esterase: x / RBC: x / WBC x   Sq Epi: x / Non Sq Epi: x / Bacteria: x                                                            RADIOLOGY:        PHYSICAL EXAM:  GENERAL: NAD, lying in bed comfortably  NECK: Supple, No JVD  CHEST/LUNG: Clear to auscultation bilaterally; No rales, rhonchi, wheezing, or rubs. Unlabored respirations  HEART: Regular rate and rhythm; No murmurs, rubs, or gallops  ABDOMEN: BSx4; Soft, nontender, nondistended  EXTREMITIES:  2+ Peripheral Pulses, brisk capillary refill. No clubbing, cyanosis, or edema  NERVOUS SYSTEM:  A&Ox3, no focal deficits   SKIN: No rashes or lesions                                         ASSESSMENT & PLAN    87 yo F PMHx HTN, malignant pleural effusion of breast origin, chronic A.fib, PAD, Obesity, Chronic lymphedema of  BLE and COPD on home oxygen 2 - 1/2 L nc, ESBL UTI presented to ED with worsening shortness of breath and weakness for 4 days with increasing cough productive of yellow-green sputum. In ED pt was in respiratory distress and intubated    #acute hypoxemic and hypercapnic respiratory failure - s/p MV 07/03/2023  #pulmonary edema  #H/O left malignant effusion s/p pleurx - likely of breast origin as per path 03/2023  #chronic lymphedema  #suspected CAP  - Monitor O2 - Daily CXR/ABG - s/p intubation on admission/extubation 07/09 - on NC 3L now   - s/p Meropenem on admission -> switched to aztreonam and Azithromycin 07/03 till end -date 07/12 as per ID   - palliative eval noted - hospice care evaluation   - worsening CXR with stable oxygen requirements - IV lasix switched to 40 BID 07/12  - DNR/DNI today as per patient's wishes    #COPD  - not on home O2  - solumedrol stopped on 07/03  - c/w albuterol neb    #AFIB  - heparin drip switched to lovenox 90 BID 07/03    #MISC  DVT PPX Lovenox therapeutic  GI PPX Protonix  DIET OG feeding  CODE FULL

## 2023-07-12 NOTE — OCCUPATIONAL THERAPY INITIAL EVALUATION ADULT - ADDITIONAL COMMENTS
PLOF obtained from pt and pt's daughter via telephone call.  Pt's daughter states pt needed assistance with all ADL's; pt owns chair lift, shower chair, RW  (-) driving

## 2023-07-12 NOTE — PROGRESS NOTE ADULT - ASSESSMENT
89 YO F with a medical history  of HTN, malignant pleural effusion of breast origin, chronic A.fib, PAD, Obesity, Chronic lymphedema of  BLE and COPD on home oxygen 2 - 1/2 L nc, ESBL UTI presented to ED with worsening shortness of breath and weakness for 4 days with increasing cough productive of yellow-green sputum. In ED pt was in respiratory distress and intubated    acute respiratory failure - resolved / b/l pleural effusions         - s/p  extubation    - s/p thoracentesis on R    - supplement hypokalemia and re check lytes   - now DNR/DNI - hospice consult   - complete  antibiotic course today   - maintain negative fluid balance    - downgrade to floor

## 2023-07-12 NOTE — OCCUPATIONAL THERAPY INITIAL EVALUATION ADULT - RANGE OF MOTION EXAMINATION
PROM RUE shoulder 0-80 degrees; PROM LUE shoulder 0-80 degrees; BUE elbow WFL/deficits as listed below

## 2023-07-12 NOTE — OCCUPATIONAL THERAPY INITIAL EVALUATION ADULT - LEVEL OF INDEPENDENCE: DRESS LOWER BODY, OT EVAL
semi reese in bed/dependent (less than 25% patients effort) 2/2 to significant change in O2/unable to perform

## 2023-07-12 NOTE — PROGRESS NOTE ADULT - TIME BILLING
I have personally seen and examined this patient.    I have reviewed all pertinent clinical information and reviewed all relevant imaging and diagnostic studies personally.   I counseled the patient about diagnostic testing and treatment plan. All questions were answered.   I discussed recommendations with the primary team.
as above
as above
I have personally seen and examined this patient.    I have reviewed all pertinent clinical information and reviewed all relevant imaging and diagnostic studies personally.   I counseled the patient about diagnostic testing and treatment plan. All questions were answered.   I discussed recommendations with the primary team.

## 2023-07-12 NOTE — OCCUPATIONAL THERAPY INITIAL EVALUATION ADULT - GENERAL OBSERVATIONS, REHAB EVAL
11:45-12:05; Chart reviewed, ok to treat by Occupational Therapist as confirmed by PARUL Muñiz, Pt received semi reese + LUE heplock + tele + pulse ox + 3L OX via nc with PARUL Muñiz present in NAD. Pt reported no pain at rest, unquantified bilateral leg pain upon activity, PARUL Muñiz aware. 11:45-12:05; Chart reviewed, ok to treat by Occupational Therapist as confirmed by PARUL Muñiz, Pt received semi reese + primafit + LUE heplock + tele + pulse ox + 3L OX via nc with PARUL Muñiz present in NAD. Pt reported no pain at rest, unquantified bilateral leg pain upon activity, PARUL Muñiz aware.

## 2023-07-12 NOTE — OCCUPATIONAL THERAPY INITIAL EVALUATION ADULT - VISUAL ASSESSMENT: TRACKING
not performed 2/2 change in vitals upon bed mobility; to f/u when appropriate not tested 2/2 fatigue to be assessed when appropriate

## 2023-07-12 NOTE — OCCUPATIONAL THERAPY INITIAL EVALUATION ADULT - FINE MOTOR COORDINATION, LEFT HAND, FINGER TO NOSE, OT EVAL
not performed 2/2 change in vitals upon bed mobility; to f/u when appropriate/not tested 2/2 fatigue to be assessed when appropriate/not tested

## 2023-07-12 NOTE — OCCUPATIONAL THERAPY INITIAL EVALUATION ADULT - PLANNED THERAPY INTERVENTIONS, OT EVAL
[FreeTextEntry1] : 74 yo female referred for evaluation of DVT/ PE diagnosed April 2022.  LLE DVt - proximal gastrocnemius/ peroneal. Patient had bilateral PE, multiple scattered/ subsegmental. \par \par No FH or personal h/o of DVT/ PE.  She never conceived, has adopted children. \par Denies weight loss. \par \par March 2022- drove to [place] x 2-3 hours and was sitting there for few days while visiting family, which doesn’t seem to be strong provoking circumstances. During todays - 2nd visit she recalls being SOB before the trip to [place]. \par Hypercoagulable work up positive for diluted Brad viper venom time - patient on Xarelto, will need to repeat results when off Xarelto.\par DAVID IgG positive - 18.1 ( 2nd test).\par \par Cardiac catheterization done. \par \par Patient 7 months on AC - LAC x 2 positive and positive DAVID- stop Xarelto x 5 days - repeat blood work, resume after.  Set up telehealth after to review results.\par \par Reviewed with patient - if positive for LAC will need to stay on AC, she is hesitant to change to warfarin, but since responded well to Xarelto might stay on it. \par will require window off AC to repeat LAC \par \par \par \par \par FH - mother and maternal aunt - pancreatic ca, mother- AA\par Genetic testing negative - reviewed report and implication with patient
ADL retraining/transfer training

## 2023-07-12 NOTE — PROGRESS NOTE ADULT - SUBJECTIVE AND OBJECTIVE BOX
Patient seen and evaluated this am, she is awake and alert, no complaints, confirms she wants to be DNR/DNI      T(F): 97 (07-12-23 @ 07:05), Max: 97.5 (07-12-23 @ 06:00)  HR: 111 (07-12-23 @ 08:01)  BP: 160/72 (07-12-23 @ 08:01)  RR: 20  SpO2: 83% (07-12-23 @ 08:01) (83% - 100%)    PHYSICAL EXAM:  GENERAL: NAD  HEAD:  Atraumatic, Normocephalic  EYES: EOMI, PERRLA, conjunctiva and sclera clear  NERVOUS SYSTEM:  Alert & Oriented X3, no focal deficits   CHEST/LUNG:  bilateral rhonchi  HEART: Regular rate and rhythm; No murmurs, rubs, or gallops  ABDOMEN: Soft, Nontender, Nondistended; Bowel sounds present  EXTREMITIES:  2+ Peripheral Pulses, No clubbing, cyanosis, or edema    LABS  07-12    144  |  102  |  32<H>  ----------------------------<  107<H>  3.3<L>   |  30  |  0.8    Ca    9.2      12 Jul 2023 06:10  Mg     1.9     07-12    TPro  6.5  /  Alb  3.1<L>  /  TBili  0.4  /  DBili  x   /  AST  18  /  ALT  11  /  AlkPhos  182<H>  07-12                          8.6    11.17 )-----------( 307      ( 12 Jul 2023 06:10 )             29.8     Culture Results:   No growth at 48 Hours (07-08-23)  Culture Results:   No growth (07-05-23)  Culture Results:   Culture is being performed. Fungal cultures are held for 4 weeks. (07-05-23)  Culture Results:   Numerous Staphylococcus epidermidis (07-04-23)  Culture Results:   Normal Respiratory Joaquina present (07-04-23)  Culture Results:   No growth at 5 days (07-02-23)  Culture Results:   No growth at 5 days (07-02-23)    RADIOLOGY  < from: Xray Chest 1 View- PORTABLE-Routine (Xray Chest 1 View- PORTABLE-Routine in AM.) (07.12.23 @ 06:27) >  Impression:    Unchanged bilateral opacities/effusions.      < end of copied text >    MEDICATIONS  (STANDING):  acetaZOLAMIDE  IVPB 500 milliGRAM(s) IV Intermittent daily  aztreonam  IVPB 2000 milliGRAM(s) IV Intermittent every 8 hours  budesonide 160 MICROgram(s)/formoterol 4.5 MICROgram(s) Inhaler 2 Puff(s) Inhalation two times a day  chlorhexidine 2% Cloths 1 Application(s) Topical <User Schedule>  doxycycline IVPB 100 milliGRAM(s) IV Intermittent every 12 hours  erythromycin   Ointment 1 Application(s) Both EYES two times a day  furosemide   Injectable 40 milliGRAM(s) IV Push two times a day  insulin lispro (ADMELOG) corrective regimen sliding scale   SubCutaneous three times a day before meals  nystatin Powder 1 Application(s) Topical every 12 hours  polyethylene glycol 3350 17 Gram(s) Oral every 12 hours  potassium chloride   Powder 40 milliEquivalent(s) Oral every 4 hours  senna 2 Tablet(s) Oral at bedtime    MEDICATIONS  (PRN):  dextrose Oral Gel 15 Gram(s) Oral once PRN Blood Glucose LESS THAN 70 milliGRAM(s)/deciliter

## 2023-07-13 NOTE — PROGRESS NOTE ADULT - SUBJECTIVE AND OBJECTIVE BOX
Patient is a 88y old  Female who presents with a chief complaint of respiratory diastress (12 Jul 2023 12:27)        Over Night Events:        ROS:     All ROS are negative except HPI         PHYSICAL EXAM    ICU Vital Signs Last 24 Hrs  T(C): 36.6 (13 Jul 2023 07:05), Max: 36.8 (12 Jul 2023 15:00)  T(F): 97.9 (13 Jul 2023 07:05), Max: 98.3 (12 Jul 2023 15:00)  HR: 106 (13 Jul 2023 05:49) (94 - 117)  BP: 138/67 (13 Jul 2023 05:49) (120/66 - 163/89)  BP(mean): 93 (13 Jul 2023 05:49) (87 - 120)  ABP: --  ABP(mean): --  RR: 71 (13 Jul 2023 07:05) (23 - 71)  SpO2: 96% (13 Jul 2023 05:49) (89% - 98%)          ENT: Airway patent,  EYES: Pupils equal, Round and reactive to light.  CARDIAC: Normal rate, Regular rhythm.    RESPIRATORY: No wheezing, Bilateral BS, Not tachypneic, No use of accessory muscles  GASTROINTESTINAL: Abdomen soft, Non-tender, No guarding,   NEUROLOGICAL: Alert and oriented   SKIN: Skin normal color for race, Warm and dry and intact.         07-12-23 @ 07:01  -  07-13-23 @ 07:00  --------------------------------------------------------  IN:    IV PiggyBack: 250 mL    Oral Fluid: 280 mL  Total IN: 530 mL    OUT:    Voided (mL): 2425 mL  Total OUT: 2425 mL    Total NET: -1895 mL          LABS:                            8.2    10.11 )-----------( 288      ( 13 Jul 2023 05:53 )             28.3                                               07-13    144  |  103  |  30<H>  ----------------------------<  89  3.3<L>   |  32  |  0.7    Ca    9.2      13 Jul 2023 05:53  Mg     1.6     07-13    TPro  6.1  /  Alb  2.9<L>  /  TBili  0.4  /  DBili  x   /  AST  14  /  ALT  10  /  AlkPhos  153<H>  07-13                                             Urinalysis Basic - ( 13 Jul 2023 05:53 )    Color: x / Appearance: x / SG: x / pH: x  Gluc: 89 mg/dL / Ketone: x  / Bili: x / Urobili: x   Blood: x / Protein: x / Nitrite: x   Leuk Esterase: x / RBC: x / WBC x   Sq Epi: x / Non Sq Epi: x / Bacteria: x                                                  LIVER FUNCTIONS - ( 13 Jul 2023 05:53 )  Alb: 2.9 g/dL / Pro: 6.1 g/dL / ALK PHOS: 153 U/L / ALT: 10 U/L / AST: 14 U/L / GGT: x                                                                                                                                       MEDICATIONS  (STANDING):  acetaZOLAMIDE  IVPB 500 milliGRAM(s) IV Intermittent daily  budesonide 160 MICROgram(s)/formoterol 4.5 MICROgram(s) Inhaler 2 Puff(s) Inhalation two times a day  chlorhexidine 2% Cloths 1 Application(s) Topical <User Schedule>  dextrose 5%. 1000 milliLiter(s) (50 mL/Hr) IV Continuous <Continuous>  dextrose 50% Injectable 25 Gram(s) IV Push once  erythromycin   Ointment 1 Application(s) Both EYES two times a day  furosemide   Injectable 40 milliGRAM(s) IV Push two times a day  glucagon  Injectable 1 milliGRAM(s) IntraMuscular once  insulin lispro (ADMELOG) corrective regimen sliding scale   SubCutaneous three times a day before meals  nystatin Powder 1 Application(s) Topical every 12 hours  polyethylene glycol 3350 17 Gram(s) Oral every 12 hours  senna 2 Tablet(s) Oral at bedtime    MEDICATIONS  (PRN):  dextrose Oral Gel 15 Gram(s) Oral once PRN Blood Glucose LESS THAN 70 milliGRAM(s)/deciliter         Patient is a 88y old  Female who presents with a chief complaint of respiratory diastress (12 Jul 2023 12:27)        Over Night Events:  on 6 L NC      ROS:     All ROS are negative except HPI         PHYSICAL EXAM    ICU Vital Signs Last 24 Hrs  T(C): 36.6 (13 Jul 2023 07:05), Max: 36.8 (12 Jul 2023 15:00)  T(F): 97.9 (13 Jul 2023 07:05), Max: 98.3 (12 Jul 2023 15:00)  HR: 106 (13 Jul 2023 05:49) (94 - 117)  BP: 138/67 (13 Jul 2023 05:49) (120/66 - 163/89)  BP(mean): 93 (13 Jul 2023 05:49) (87 - 120)  RR: 71 (13 Jul 2023 07:05) (23 - 71)  SpO2: 96% (13 Jul 2023 05:49) (89% - 98%)        ill apearing  ENT: Airway patent,  EYES: Pupils equal, Round and reactive to light.  CARDIAC: Normal rate, Regular rhythm.    RESPIRATORY: No wheezing, Bilateral crackles, Not tachypneic, No use of accessory muscles  GASTROINTESTINAL: Abdomen soft, Non-tender, No guarding,   NEUROLOGICAL: Alert and oriented x 1  SKIN: Skin normal color for race, Warm and dry and intact.         07-12-23 @ 07:01  -  07-13-23 @ 07:00  --------------------------------------------------------  IN:    IV PiggyBack: 250 mL    Oral Fluid: 280 mL  Total IN: 530 mL    OUT:    Voided (mL): 2425 mL  Total OUT: 2425 mL    Total NET: -1895 mL          LABS:                            8.2    10.11 )-----------( 288      ( 13 Jul 2023 05:53 )             28.3                                               07-13    144  |  103  |  30<H>  ----------------------------<  89  3.3<L>   |  32  |  0.7    Ca    9.2      13 Jul 2023 05:53  Mg     1.6     07-13    TPro  6.1  /  Alb  2.9<L>  /  TBili  0.4  /  DBili  x   /  AST  14  /  ALT  10  /  AlkPhos  153<H>  07-13                                             Urinalysis Basic - ( 13 Jul 2023 05:53 )    Color: x / Appearance: x / SG: x / pH: x  Gluc: 89 mg/dL / Ketone: x  / Bili: x / Urobili: x   Blood: x / Protein: x / Nitrite: x   Leuk Esterase: x / RBC: x / WBC x   Sq Epi: x / Non Sq Epi: x / Bacteria: x                                                  LIVER FUNCTIONS - ( 13 Jul 2023 05:53 )  Alb: 2.9 g/dL / Pro: 6.1 g/dL / ALK PHOS: 153 U/L / ALT: 10 U/L / AST: 14 U/L / GGT: x                                                                                        MEDICATIONS  (STANDING):  acetaZOLAMIDE  IVPB 500 milliGRAM(s) IV Intermittent daily  budesonide 160 MICROgram(s)/formoterol 4.5 MICROgram(s) Inhaler 2 Puff(s) Inhalation two times a day  chlorhexidine 2% Cloths 1 Application(s) Topical <User Schedule>  dextrose 5%. 1000 milliLiter(s) (50 mL/Hr) IV Continuous <Continuous>  dextrose 50% Injectable 25 Gram(s) IV Push once  erythromycin   Ointment 1 Application(s) Both EYES two times a day  furosemide   Injectable 40 milliGRAM(s) IV Push two times a day  glucagon  Injectable 1 milliGRAM(s) IntraMuscular once  insulin lispro (ADMELOG) corrective regimen sliding scale   SubCutaneous three times a day before meals  nystatin Powder 1 Application(s) Topical every 12 hours  polyethylene glycol 3350 17 Gram(s) Oral every 12 hours  senna 2 Tablet(s) Oral at bedtime    MEDICATIONS  (PRN):  dextrose Oral Gel 15 Gram(s) Oral once PRN Blood Glucose LESS THAN 70 milliGRAM(s)/deciliter

## 2023-07-13 NOTE — PROGRESS NOTE ADULT - ASSESSMENT
89 YO F with a medical history  of HTN, malignant pleural effusion of breast origin, chronic A.fib, PAD, Obesity, Chronic lymphedema of  BLE and COPD on home oxygen 2 - 1/2 L nc, ESBL UTI presented to ED with worsening shortness of breath and weakness for 4 days with increasing cough productive of yellow-green sputum. In ED pt was in respiratory distress and intubated    acute respiratory failure - resolved / b/l pleural effusions         - s/p  extubation    - s/p thoracentesis on R    - supplement hypokalemia and hypomagnesemia and re check lytes   - now DNR/DNI - hospice consult   - completed   antibiotic course    - maintain negative fluid balance    - downgrade to floor and DC planning

## 2023-07-13 NOTE — PROGRESS NOTE ADULT - SUBJECTIVE AND OBJECTIVE BOX
Patient seen and evaluated this am, awake and alert, denies any pain or discomfort       T(F): 97.9 (07-13-23 @ 07:05), Max: 98.3 (07-12-23 @ 15:00)  HR: 113 (07-13-23 @ 07:22)  BP: 142/72 (07-13-23 @ 07:22)  RR: 68 (07-13-23 @ 07:22)  SpO2: 95% (07-13-23 @ 07:22) (95% - 98%)    PHYSICAL EXAM:  GENERAL: NAD  HEAD:  Atraumatic, Normocephalic  EYES: EOMI, PERRLA, conjunctiva and sclera clear  NERVOUS SYSTEM:  no focal deficits   CHEST/LUNG:  bilateral rales  HEART: Regular rate and rhythm; No murmurs, rubs, or gallops  ABDOMEN: Soft, Nontender, Nondistended; Bowel sounds present  EXTREMITIES:  2+ Peripheral Pulses, No clubbing, cyanosis, or edema    LABS  07-13    144  |  103  |  30<H>  ----------------------------<  89  3.3<L>   |  32  |  0.7    Ca    9.2      13 Jul 2023 05:53  Mg     1.6     07-13    TPro  6.1  /  Alb  2.9<L>  /  TBili  0.4  /  DBili  x   /  AST  14  /  ALT  10  /  AlkPhos  153<H>  07-13                          8.2    10.11 )-----------( 288      ( 13 Jul 2023 05:53 )             28.3     Culture Results:   No growth at 72 Hours (07-08-23)  Culture Results:   No growth (07-05-23)  Culture Results:   Culture is being performed. Fungal cultures are held for 4 weeks. (07-05-23)  Culture Results:   Numerous Staphylococcus epidermidis (07-04-23)  Culture Results:   Normal Respiratory Joaquina present (07-04-23)  Culture Results:   No growth at 5 days (07-02-23)  Culture Results:   No growth at 5 days (07-02-23)    RADIOLOGY  < from: Xray Chest 1 View-PORTABLE IMMEDIATE (Xray Chest 1 View-PORTABLE IMMEDIATE .) (07.13.23 @ 05:56) >  Impression:    Stable bilateral diffuse lung opacities        < end of copied text >    MEDICATIONS  (STANDING):  acetaZOLAMIDE  IVPB 500 milliGRAM(s) IV Intermittent daily  budesonide 160 MICROgram(s)/formoterol 4.5 MICROgram(s) Inhaler 2 Puff(s) Inhalation two times a day  chlorhexidine 2% Cloths 1 Application(s) Topical <User Schedule>  erythromycin   Ointment 1 Application(s) Both EYES two times a day  furosemide   Injectable 40 milliGRAM(s) IV Push two times a day  insulin lispro (ADMELOG) corrective regimen sliding scale   SubCutaneous three times a day before meals  nystatin Powder 1 Application(s) Topical every 12 hours  polyethylene glycol 3350 17 Gram(s) Oral every 12 hours  senna 2 Tablet(s) Oral at bedtime    MEDICATIONS  (PRN):  dextrose Oral Gel 15 Gram(s) Oral once PRN Blood Glucose LESS THAN 70 milliGRAM(s)/deciliter

## 2023-07-13 NOTE — PROGRESS NOTE ADULT - ATTENDING COMMENTS
Ms Mcconnell was seen & examined at bedside today, patient was unable to undergo SBT yesterday, will plan for one today paired with SAT.  Thoracentesis yesterday demonstrated exudative effusion, consistent with suspected malignancy, will follow-up cytology.  Patient's fluid status still likely overloaded, ventilator driving pressure 17.  Continue Lasix, decrease to daily as patient was extensively net negative 3L yesterday.  Continue diamox daily for metabolic alkolosis.  Continue empiric antibiotics, no culture positive yet, continue to monitor.  Ongoing GOC discussions.  I agree with the fellow note, with the exceptions listed in my attestation above.  The remainder of impression and plan per fellow note.
patient seen and examined agree above note   palliative care eval   start lasix as above   cxr fabien   keep pox >92 %
patient seen and examined agree above note   palliative care eval   start lasix as above   cxr fabien   keep pox >92 %
Ms Mcconnell was seen & examined at bedside today, patient remains critically ill with elevated peak pressures on ventilator (30, driving pressure 22).  Patient is continuing on diuresis, net negative 500cc in the past 24hours without significant improvement in driving pressure.  SAT this AM was complicated by AF-RVR, but was following commands briefly.  She remains on broad spectrum antibiotics.  CXR by my read demonstrates bilateral pulmonary opacities with ETT tip too deep.  Will withdraw 3cm.  Increase TV to 18, continue TV at current level, increasing pCO2 on daily ABGs.  Repeat ABG after change, increase RR PRN.  Small to moderate pleural effusion noted on POCUS on the right hemithorax today.  Plan for diagnstic/therapeutic thoracentesis today, hopefully patient will be ready for extubation trials tomorrow.  Complete empiric antibiotics for borderline procalcitonin.  Increase bowel regimen.  I agree with the fellow note, with the exceptions listed in my attestation above.  The remainder of impression and plan per fellow note.
Ms Mcconnell was seen & examined at bedside today during her SBT.  Patient was having small tidal volumes, but remains fairly somnolent off sedation.  Pleural fluid located, will follow-up cytopathology.  Patient received IV boluses overnight for hypotension, but still examines modestly fluid overloaded on exam with metabolic alkalemia.  Will continue acetazolemide but hold further lasix.  Patient has not had BM in several days, will increase regimen significantly to target BM today.  Daily SAT/SBT.  Hopefully will be able to extubate in the next 1-2 days.  I agree with the fellow note, with the exceptions listed in my attestation above.  The remainder of impression and plan per fellow note.
patient  seen and examined   drain the left pleurx   replace K  monitor is and os   follow bun, cr   on diuretics

## 2023-07-13 NOTE — PROGRESS NOTE ADULT - SUBJECTIVE AND OBJECTIVE BOX
GIOVANNY BUCK 88y Female  MRN#: [id number]   CODE STATUS:________    Hospital Day: 11d    Pt is currently admitted with the primary diagnosis of malignant pleural effusion     SUBJECTIVE  Hospital Course  88F w/PMHx of HTN, malignant pleural effusion of breast origin, chronic A.fib, PAD, Obesity, Chronic lymphedema of  BLE and COPD on home oxygen 2 - 1/2 L nc, ESBL UTI presented to ED with worsening shortness of breath and weakness for 4 days with increasing cough productive of yellow-green sputum. In ED pt was in respiratory distress and intubated    07/04 - Pleurex cath drainage with pleural Cx snet - showed GPC in clusters, No PMNs  07/05 - thoracocentesis   07/09 - receieved PRBC x1 - Extubated  07/10 - TOV - CXR improving   07/11 - CXR worsening R pleural effusion - DNR/DNI code status   07/12 - Hospice consult -IV lasix 40 BID     Overnight events   No overnight events     Subjective complaints   Patient is feeling fine, no complaints     Present Today:   - Ba:  No [ X ], Yes [  ] : Indication:                                               OBJECTIVE  PAST MEDICAL & SURGICAL HISTORY  HTN (hypertension)    Afib  s/p ablation 2001    Goiter  no meds    Cellulitis  chronic    OA (osteoarthritis)    PVD (peripheral vascular disease)    COPD (chronic obstructive pulmonary disease)    Anxiety    Obesity    Acute on chronic systolic congestive heart failure    Edema of both lower legs    H/O abdominal hysterectomy    H/O discectomy    H/O total knee replacement, bilateral                                                ALLERGIES:  penicillin (Other)  codeine (Other)  aspirin (Other)  sulfa drugs (Hives; Other)  contrast media (iodine-based) (Other)                           HOME MEDICATIONS  Home Medications:  albuterol 90 mcg/inh inhalation aerosol: 2 puff(s) inhaled every 6 hours as needed for  shortness of breath and/or wheezing (02 Jul 2023 23:05)  budesonide-formoterol 80 mcg-4.5 mcg/inh inhalation aerosol: 2 puff(s) inhaled 2 times a day (02 Jul 2023 23:05)  dilTIAZem 180 mg/24 hours oral capsule, extended release: 1 cap(s) orally once a day (02 Jul 2023 23:05)  Eliquis 2.5 mg oral tablet: 1 tab(s) orally 2 times a day (02 Jul 2023 23:05)  furosemide 20 mg oral tablet: 1 tab(s) orally once a day (02 Jul 2023 23:05)  Senna 8.6 mg oral tablet: 2 tab(s) orally once a day (at bedtime) (02 Jul 2023 23:05)                           MEDICATIONS:  STANDING MEDICATIONS  acetaZOLAMIDE  IVPB 500 milliGRAM(s) IV Intermittent daily  budesonide 160 MICROgram(s)/formoterol 4.5 MICROgram(s) Inhaler 2 Puff(s) Inhalation two times a day  chlorhexidine 2% Cloths 1 Application(s) Topical <User Schedule>  dextrose 5%. 1000 milliLiter(s) IV Continuous <Continuous>  dextrose 50% Injectable 25 Gram(s) IV Push once  erythromycin   Ointment 1 Application(s) Both EYES two times a day  furosemide   Injectable 40 milliGRAM(s) IV Push two times a day  glucagon  Injectable 1 milliGRAM(s) IntraMuscular once  insulin lispro (ADMELOG) corrective regimen sliding scale   SubCutaneous three times a day before meals  nystatin Powder 1 Application(s) Topical every 12 hours  polyethylene glycol 3350 17 Gram(s) Oral every 12 hours  senna 2 Tablet(s) Oral at bedtime    PRN MEDICATIONS  dextrose Oral Gel 15 Gram(s) Oral once PRN                                            ------------------------------------------------------------  VITAL SIGNS: Last 24 Hours  T(C): 36.6 (13 Jul 2023 07:05), Max: 36.8 (12 Jul 2023 15:00)  T(F): 97.9 (13 Jul 2023 07:05), Max: 98.3 (12 Jul 2023 15:00)  HR: 113 (13 Jul 2023 07:22) (94 - 113)  BP: 142/72 (13 Jul 2023 07:22) (120/66 - 142/72)  BP(mean): 101 (13 Jul 2023 07:22) (87 - 101)  RR: 68 (13 Jul 2023 07:22) (23 - 71)  SpO2: 95% (13 Jul 2023 07:22) (95% - 98%)      07-12-23 @ 07:01  -  07-13-23 @ 07:00  --------------------------------------------------------  IN: 530 mL / OUT: 2425 mL / NET: -1895 mL    07-13-23 @ 07:01  -  07-13-23 @ 13:31  --------------------------------------------------------  IN: 0 mL / OUT: 850 mL / NET: -850 mL                                               LABS:                        8.2    10.11 )-----------( 288      ( 13 Jul 2023 05:53 )             28.3     07-13    144  |  103  |  30<H>  ----------------------------<  89  3.3<L>   |  32  |  0.7    Ca    9.2      13 Jul 2023 05:53  Mg     1.6     07-13    TPro  6.1  /  Alb  2.9<L>  /  TBili  0.4  /  DBili  x   /  AST  14  /  ALT  10  /  AlkPhos  153<H>  07-13      Urinalysis Basic - ( 13 Jul 2023 05:53 )    Color: x / Appearance: x / SG: x / pH: x  Gluc: 89 mg/dL / Ketone: x  / Bili: x / Urobili: x   Blood: x / Protein: x / Nitrite: x   Leuk Esterase: x / RBC: x / WBC x   Sq Epi: x / Non Sq Epi: x / Bacteria: x                                                            RADIOLOGY:        PHYSICAL EXAM:  GENERAL: NAD, lying in bed comfortably  NECK: Supple, No JVD  CHEST/LUNG: Clear to auscultation bilaterally; No rales, rhonchi, wheezing, or rubs. Unlabored respirations  HEART: Regular rate and rhythm; No murmurs, rubs, or gallops  ABDOMEN: BSx4; Soft, nontender, nondistended  EXTREMITIES:  2+ Peripheral Pulses, brisk capillary refill. No clubbing, cyanosis, or edema  NERVOUS SYSTEM:  A&Ox3, no focal deficits   SKIN: No rashes or lesions                                         ASSESSMENT & PLAN    87 yo F PMHx HTN, malignant pleural effusion of breast origin, chronic A.fib, PAD, Obesity, Chronic lymphedema of  BLE and COPD on home oxygen 2 - 1/2 L nc, ESBL UTI presented to ED with worsening shortness of breath and weakness for 4 days with increasing cough productive of yellow-green sputum. In ED pt was in respiratory distress and intubated    #acute hypoxemic and hypercapnic respiratory failure - s/p MV 07/03/2023  #pulmonary edema  #H/O left malignant effusion s/p pleurx - likely of breast origin as per path 03/2023  #chronic lymphedema  #suspected CAP  - Monitor O2 - Daily CXR/ABG - s/p intubation on admission/extubation 07/09 - on NC 3L now   - s/p Meropenem on admission -> switched to aztreonam and Azithromycin 07/03 till end -date 07/12 as per ID   - palliative eval noted - hospice care evaluation   - worsening CXR with stable oxygen requirements - IV lasix switched to 40 BID 07/12  - DNR/DNI today as per patient's wishes    #COPD  - not on home O2  - solumedrol stopped on 07/03 - given one dose 07/13   - c/w albuterol neb    #AFIB  - heparin drip switched to lovenox 90 BID 07/03    #MISC  DVT PPX Lovenox therapeutic  GI PPX Protonix  DIET OG feeding  CODE FULL

## 2023-07-13 NOTE — PROGRESS NOTE ADULT - ASSESSMENT
IMPRESSION:    Acute hypoxemic resp failure - extubtaed  Acute hypercapnic resp failure - improving   Acute decompensated HF / HO HFpEF   Fluid overload / R thora -> exudative  b/l pleural effusions and L pleurx - HO 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)  HO Probable [facility]ENCE refused testing   Thyroid mass SP FNA   HO LE cellulitis with ulcer and abscess MRSA +  HO Afib on Eliquis  Chronic lymphedema  HO Peripheral vascular disease    PLAN:    CNS: no sedation     HEENT: Oral care.    PULMONARY:  HOB @ 45 degrees. target pox > 92 %   NIV as needed  s/p thoracentesis 7/5 -> exudative   Cyto path is negative for malignant cells on right thora    CARDIOVASCULAR:  Avoid volume overload. Strict I's and O's. Target MAP>65. C/w lasix BID.  Echo with normal EF.     GI: GI prophylaxis.  feeding per speech    RENAL:  Follow up lytes. Correct as needed.    INFECTIOUS DISEASE: RVP negative;. cx negative. DTA negative. Follow pleural fluid cx,  Finish Abx course Nasal MRSA negative    HEMATOLOGICAL:  Full AC LMWH. Moniot CBC and Coags. Hgb stable.     ENDOCRINE:  Follow up FS.  Insulin protocol if needed.    MUSCULOSKELETAL: IAT. PT/OT    Ba: 7/2  Lines: Periphers    Disposition: DGTF  Palliative eval appreciated  Prognosis is poor overall   DNR/DNI IMPRESSION:    Acute hypoxemic resp failure - extubated  Acute hypercapnic resp failure - improving   Acute decompensated HF / HO HFpEF   Fluid overload / R thora -> exudative  b/l pleural effusions and L pleurx - HO 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)  HO Probable [facility] refused testing   Thyroid mass SP FNA   HO LE cellulitis with ulcer and abscess MRSA +  HO Afib on Eliquis  Chronic lymphedema  HO Peripheral vascular disease    PLAN:    CNS: no sedation     HEENT: Oral care.    PULMONARY:  HOB @ 45 degrees. target pox > 92 %   NIV as needed  s/p thoracentesis 7/5 -> exudative   Cyto path is negative for malignant cells on right thora    CARDIOVASCULAR:  Avoid volume overload. Strict I's and O's. Target MAP>65. C/w lasix BID.  Echo with normal EF.     GI: GI prophylaxis.  feeding per speech    RENAL:  Follow up lytes. Correct as needed.    INFECTIOUS DISEASE: RVP negative;. cx negative. DTA negative. Follow pleural fluid cx,  Finish Abx course Nasal MRSA negative    HEMATOLOGICAL:  Full AC LMWH. Moniot CBC and Coags. Hgb stable.     ENDOCRINE:  Follow up FS.  Insulin protocol if needed.    MUSCULOSKELETAL: IAT. PT/OT    Ba: 7/2  Lines: Periphers    Disposition: DGTF  Palliative eval appreciated  Prognosis is poor overall   DNR/DNI

## 2023-07-14 NOTE — SWALLOW BEDSIDE ASSESSMENT ADULT - SLP PERTINENT HISTORY OF CURRENT PROBLEM
89 yo W female w/ PMH as noted below.  Pt. comes to hospital w/ 3 days history of progressive respiratory distress.  pt has a left sided indwelling chest tube that was place approx 1 month ago when pt was @ [facility].  This tube was to be removed this  week as per  son.  Pt was placed on AVAPS  NIV  in ER .  Pt has bilat pleural effusion on CXR . L > R .  While waiting adm to ICU pt's respiratory status worsen & was orally intubated w/ size 7 ETT . 22cm @ lip line.  (02 Jul 2023 23:50).  Pt extubated 7/8/23.
89 yo W female w/ PMH as noted below.  Pt. comes to hospital w/ 3 days history of progressive respiratory distress.  pt has a left sided indwelling chest tube that was place approx 1 month ago when pt was @ [facility].  This tube was to be removed this  week as per  son.  Pt was placed on AVAPS  NIV  in ER .  Pt has bilat pleural effusion on CXR . L > R .  While waiting adm to ICU pt's respiratory status worsen & was orally intubated w/ size 7 ETT . 22cm @ lip line.  (02 Jul 2023 23:50).  Pt extubated 7/8/23.
89 yo W female w/ PMH as noted below.  Pt. comes to hospital w/ 3 days history of progressive respiratory distress.  pt has a left sided indwelling chest tube that was place approx 1 month ago when pt was @ [facility].  This tube was to be removed this  week as per  son.  Pt was placed on AVAPS  NIV  in ER .  Pt has bilat pleural effusion on CXR . L > R .  While waiting adm to ICU pt's respiratory status worsen & was orally intubated w/ size 7 ETT . 22cm @ lip line.  (02 Jul 2023 23:50).  Pt extubated 7/8/23.

## 2023-07-14 NOTE — SWALLOW BEDSIDE ASSESSMENT ADULT - SWALLOW EVAL: RECOMMENDED DIET
Minced and moist solids with thin liquids
pureed solids/ mildly thick liquids via small/ controlled sips and bites
pureed solids/ mildly thick liquids via small/ controlled sips and bites

## 2023-07-14 NOTE — SWALLOW BEDSIDE ASSESSMENT ADULT - SWALLOW EVAL: DIAGNOSIS
+toleration of minced and moist solids, thin liquids, mildly thick liquids w/o overt s/s of penetration/aspiration; ineffective mastication of soft and bite-sized
+ toleration observed without overt symptoms of penetration/aspiration for mildly thick liquids. Immediate cough/wet vocal quality post PO intake of thin liquids.
+ toleration observed without overt symptoms of penetration/aspiration for pureed solids/ mildly thick liquids via small/ controlled sips/ bites.  + Weak/ discoordinated swallow during PO trials of thin liquids.  Pt exhibiting anterior loss of bolus, delayed oral transit time, wet vocal quality post oral intake, and multiple swallows.

## 2023-07-14 NOTE — PROGRESS NOTE ADULT - ASSESSMENT
89 yo F PMHx HTN, malignant pleural effusion of breast origin, chronic A.fib, PAD, Obesity, Chronic lymphedema of  BLE and COPD on home oxygen 2 - 1/2 L nc, ESBL UTI presented to ED with worsening shortness of breath and weakness for 4 days with increasing cough productive of yellow-green sputum. In ED pt was in respiratory distress and intubated    #acute hypoxemic and hypercapnic respiratory failure - s/p MV 07/03/2023  #pulmonary edema  #H/O left malignant effusion s/p pleurx - likely of breast origin as per path 03/2023  #chronic lymphedema  #suspected CAP  - s/p intubation on admission/extubation 07/09   - on NC 4L now; aim to mean off of supplemental O2  - ID eval appreciated  - s/p abx  - will switch IV lasix to PO lasix  - palliative eval noted - hospice care evaluation   - drain the left pleurx   - f/u AM CXR  - DNR/DNI today as per patient's wishes    #COPD- not on home O2  - solumedrol stopped on 07/03 - given one dose 07/13   - c/w albuterol neb    #Chronic AFIB  - heparin drip switched to lovenox 90 BID 07/03  - if Hgb stable in AM, plan to restart home AC    #MISC  DVT PPX Lovenox therapeutic  GI PPX Protonix  DNR/DNI    Family: spoke to sister at bedside; unable to get a hold of daughter  Dispo: dc planning to hospice

## 2023-07-14 NOTE — CHART NOTE - NSCHARTNOTEFT_GEN_A_CORE
visited patient earlier today. patient encountered resting in bed. awake and alert. She denied any pain or discomfort. She wished to go home soon and asked that I call her dtr.     called and left  for patient's dtr Karishma.     will follow. [id number]

## 2023-07-14 NOTE — PROGRESS NOTE ADULT - SUBJECTIVE AND OBJECTIVE BOX
Patient seen and evaluated this am, awake and alert, denies any pain or discomfort; patient reports her breathing has improved. Patient reports she wants to go home.     ROS: All systems reviewed and are otherwise negative    Vital Signs Last 24 Hrs  T(C): 35.9 (14 Jul 2023 13:40), Max: 36 (14 Jul 2023 04:30)  T(F): 96.7 (14 Jul 2023 13:40), Max: 96.8 (14 Jul 2023 04:30)  HR: 95 (14 Jul 2023 13:40) (95 - 102)  BP: 129/78 (14 Jul 2023 13:40) (129/78 - 137/72)  BP(mean): --  RR: 18 (14 Jul 2023 13:40) (18 - 22)  SpO2: 97% (14 Jul 2023 13:40) (95% - 98%)    Parameters below as of 14 Jul 2023 09:02  Patient On (Oxygen Delivery Method): nasal cannula  O2 Flow (L/min): 4    PHYSICAL EXAM:  GENERAL: NAD, elderly  HEAD:  Atraumatic, Normocephalic  EYES: EOMI, PERRLA, conjunctiva and sclera clear  NERVOUS SYSTEM:  no focal deficits   CHEST/LUNG:  bilateral rales, +pleuex cath on left side  HEART: Regular rate and rhythm; No murmurs, rubs, or gallops  ABDOMEN: Soft, Nontender, Nondistended; Bowel sounds present  EXTREMITIES:  2+ Peripheral Pulses, No clubbing, cyanosis, or edema    LABS                        8.2    11.32 )-----------( 366      ( 14 Jul 2023 05:54 )             28.1   07-14    148<H>  |  103  |  35<H>  ----------------------------<  73  3.3<L>   |  33<H>  |  0.8    Ca    9.3      14 Jul 2023 05:54  Mg     1.9     07-14    TPro  6.4  /  Alb  3.0<L>  /  TBili  0.3  /  DBili  x   /  AST  15  /  ALT  9   /  AlkPhos  157<H>  07-14      Culture Results:   No growth at 72 Hours (07-08-23)  Culture Results:   No growth (07-05-23)  Culture Results:   Culture is being performed. Fungal cultures are held for 4 weeks. (07-05-23)  Culture Results:   Numerous Staphylococcus epidermidis (07-04-23)  Culture Results:   Normal Respiratory Joaquina present (07-04-23)  Culture Results:   No growth at 5 days (07-02-23)  Culture Results:   No growth at 5 days (07-02-23)    RADIOLOGY  < from: Xray Chest 1 View-PORTABLE IMMEDIATE (Xray Chest 1 View-PORTABLE IMMEDIATE .) (07.13.23 @ 05:56) >  Impression:    Stable bilateral diffuse lung opacities        < end of copied text >    MEDICATIONS  (STANDING):  budesonide 160 MICROgram(s)/formoterol 4.5 MICROgram(s) Inhaler 2 Puff(s) Inhalation two times a day  chlorhexidine 2% Cloths 1 Application(s) Topical <User Schedule>  dextrose 5%. 1000 milliLiter(s) (50 mL/Hr) IV Continuous <Continuous>  dextrose 50% Injectable 25 Gram(s) IV Push once  erythromycin   Ointment 1 Application(s) Both EYES two times a day  glucagon  Injectable 1 milliGRAM(s) IntraMuscular once  insulin lispro (ADMELOG) corrective regimen sliding scale   SubCutaneous three times a day before meals  nystatin Powder 1 Application(s) Topical every 12 hours  polyethylene glycol 3350 17 Gram(s) Oral every 12 hours  senna 2 Tablet(s) Oral at bedtime    MEDICATIONS  (PRN):  dextrose Oral Gel 15 Gram(s) Oral once PRN Blood Glucose LESS THAN 70 milliGRAM(s)/deciliter

## 2023-07-14 NOTE — SWALLOW BEDSIDE ASSESSMENT ADULT - NS ASR SWALLOW FINDINGS DISCUS
RN Jennifer/Nursing/Patient/Family
PA at 3630, RN Christopher/Physician/Nursing
PARUL Almanza/Nursing/Patient/Family

## 2023-07-14 NOTE — CHART NOTE - NSCHARTNOTEFT_GEN_A_CORE
Case d/e speech and swallow who recommended change diet to minced and moist with thin liquids.  Order placed.

## 2023-07-14 NOTE — SWALLOW BEDSIDE ASSESSMENT ADULT - SLP GENERAL OBSERVATIONS
Pt received sleeping at bedside, became alert with verbal cueing. +02 via NC, amenable to assessment
Pt received asleep in bed on 3L O2 NC.
Pt received asleep in bed on 3L O2 NC.  Pt oriented to person and place.

## 2023-07-15 NOTE — PROGRESS NOTE ADULT - SUBJECTIVE AND OBJECTIVE BOX
Patient seen and evaluated this am, awake and alert, denies any pain or discomfort; patient reports her breathing has improved. Patient reports she wants to go home. SPoke to patient's daughter regarding discharge planning.    ROS: All systems reviewed and are otherwise negative    Vital Signs Last 24 Hrs  T(C): 36.8 (15 Jul 2023 13:20), Max: 36.8 (15 Jul 2023 13:20)  T(F): 98.3 (15 Jul 2023 13:20), Max: 98.3 (15 Jul 2023 13:20)  HR: 111 (15 Jul 2023 13:20) (106 - 112)  BP: 115/60 (15 Jul 2023 13:20) (115/60 - 150/89)  BP(mean): --  RR: 18 (15 Jul 2023 13:20) (18 - 18)  SpO2: 94% (15 Jul 2023 05:26) (94% - 96%)    Parameters below as of 14 Jul 2023 17:29  Patient On (Oxygen Delivery Method): nasal cannula  O2 Flow (L/min): 3  PHYSICAL EXAM:  GENERAL: NAD, elderly  HEAD:  Atraumatic, Normocephalic  EYES: EOMI, PERRLA, conjunctiva and sclera clear  NERVOUS SYSTEM:  no focal deficits   CHEST/LUNG:  bilateral rales, +pleuex cath on left side  HEART: Regular rate and rhythm; No murmurs, rubs, or gallops  ABDOMEN: Soft, Nontender, Nondistended; Bowel sounds present  EXTREMITIES:  2+ Peripheral Pulses, No clubbing, cyanosis, or edema    LABS                        9.3    12.40 )-----------( 386      ( 15 Jul 2023 07:30 )             31.7   07-15    147<H>  |  103  |  30<H>  ----------------------------<  85  3.7   |  33<H>  |  0.8    Ca    9.7      15 Jul 2023 07:30  Mg     1.7     07-15    TPro  6.6  /  Alb  3.1<L>  /  TBili  0.4  /  DBili  x   /  AST  19  /  ALT  11  /  AlkPhos  162<H>  07-15        Culture Results:   No growth at 72 Hours (07-08-23)  Culture Results:   No growth (07-05-23)  Culture Results:   Culture is being performed. Fungal cultures are held for 4 weeks. (07-05-23)  Culture Results:   Numerous Staphylococcus epidermidis (07-04-23)  Culture Results:   Normal Respiratory Joaquina present (07-04-23)  Culture Results:   No growth at 5 days (07-02-23)  Culture Results:   No growth at 5 days (07-02-23)    RADIOLOGY  < from: Xray Chest 1 View-PORTABLE IMMEDIATE (Xray Chest 1 View-PORTABLE IMMEDIATE .) (07.13.23 @ 05:56) >  Impression:    Stable bilateral diffuse lung opacities        < end of copied text >    MEDICATIONS  (STANDING):  budesonide 160 MICROgram(s)/formoterol 4.5 MICROgram(s) Inhaler 2 Puff(s) Inhalation two times a day  chlorhexidine 2% Cloths 1 Application(s) Topical <User Schedule>  dextrose 5%. 1000 milliLiter(s) (50 mL/Hr) IV Continuous <Continuous>  dextrose 50% Injectable 25 Gram(s) IV Push once  erythromycin   Ointment 1 Application(s) Both EYES two times a day  furosemide    Tablet 40 milliGRAM(s) Oral daily  glucagon  Injectable 1 milliGRAM(s) IntraMuscular once  insulin lispro (ADMELOG) corrective regimen sliding scale   SubCutaneous three times a day before meals  magnesium sulfate  IVPB 1 Gram(s) IV Intermittent once  nystatin Powder 1 Application(s) Topical every 12 hours  polyethylene glycol 3350 17 Gram(s) Oral every 12 hours  senna 2 Tablet(s) Oral at bedtime    MEDICATIONS  (PRN):  dextrose Oral Gel 15 Gram(s) Oral once PRN Blood Glucose LESS THAN 70 milliGRAM(s)/deciliter

## 2023-07-15 NOTE — PROGRESS NOTE ADULT - ASSESSMENT
89 yo F PMHx HTN, malignant pleural effusion of breast origin, chronic A.fib, PAD, Obesity, Chronic lymphedema of  BLE and COPD on home oxygen 2 - 1/2 L nc, ESBL UTI presented to ED with worsening shortness of breath and weakness for 4 days with increasing cough productive of yellow-green sputum. In ED pt was in respiratory distress and intubated    #acute hypoxemic and hypercapnic respiratory failure - s/p MV 07/03/2023  #pulmonary edema  #H/O left malignant effusion s/p pleurx - likely of breast origin as per path 03/2023  #chronic lymphedema  #suspected CAP  - s/p intubation on admission/extubation 07/09   - on NC 4L now; aim to mean off of supplemental O2  - ID eval appreciated  - s/p abx  - will switch IV lasix to PO lasix  - palliative eval noted - hospice care evaluation   - drain the left pleurx   - DNR/DNI today as per patient's wishes    #COPD- not on home O2  - solumedrol stopped on 07/03 - given one dose 07/13   - c/w albuterol neb    #Chronic AFIB  - restarted home dose of eliquis    #MISC  DVT PPX Eliquis  GI PPX Protonix  DNR/DNI    Family: karri  Dispo: dc planning to hospice

## 2023-07-15 NOTE — CHART NOTE - NSCHARTNOTEFT_GEN_A_CORE
Was informed by RN that patient has an abscess in right groin which is draining green milky drainage.  Patient was seen and examined in the presence of chaperone.  Patient has right groin approximately 2cm X 4cm abscess with opening draining pus.  -asked RN to send the pus for culture  -

## 2023-07-16 NOTE — PROGRESS NOTE ADULT - SUBJECTIVE AND OBJECTIVE BOX
Patient seen and evaluated this am, awake and alert, denies any pain or discomfort; patient reports her breathing has improved. Patient reports she wants to go home. Spoke to patient regarding discharge planning.    ROS: All systems reviewed and are otherwise negative    Vital Signs Last 24 Hrs  T(C): 36 (16 Jul 2023 04:30), Max: 36.9 (15 Jul 2023 21:03)  T(F): 96.8 (16 Jul 2023 04:30), Max: 98.5 (15 Jul 2023 21:03)  HR: 105 (16 Jul 2023 04:30) (104 - 111)  BP: 146/76 (16 Jul 2023 04:30) (115/60 - 146/76)  BP(mean): --  RR: 17 (16 Jul 2023 04:30) (16 - 18)  SpO2: 97% (16 Jul 2023 04:30) (95% - 97%)    Parameters below as of 15 Jul 2023 14:45  Patient On (Oxygen Delivery Method): nasal cannula  O2 Flow (L/min): 3  PHYSICAL EXAM:  GENERAL: NAD, elderly  HEAD:  Atraumatic, Normocephalic  EYES: EOMI, PERRLA, conjunctiva and sclera clear  NERVOUS SYSTEM:  no focal deficits   CHEST/LUNG:  bilateral rales, +pleuex cath on left side  HEART: Regular rate and rhythm; No murmurs, rubs, or gallops  ABDOMEN: Soft, Nontender, Nondistended; Bowel sounds present  EXTREMITIES:  2+ Peripheral Pulses, No clubbing, cyanosis, or edema    LABS                        9.2    13.64 )-----------( 384      ( 16 Jul 2023 09:05 )             31.2   07-16    147<H>  |  103  |  24<H>  ----------------------------<  137<H>  3.3<L>   |  33<H>  |  0.8    Ca    9.5      16 Jul 2023 09:05  Mg     1.7     07-16    TPro  6.3  /  Alb  2.9<L>  /  TBili  0.4  /  DBili  x   /  AST  18  /  ALT  10  /  AlkPhos  157<H>  07-16          Culture Results:   No growth at 72 Hours (07-08-23)  Culture Results:   No growth (07-05-23)  Culture Results:   Culture is being performed. Fungal cultures are held for 4 weeks. (07-05-23)  Culture Results:   Numerous Staphylococcus epidermidis (07-04-23)  Culture Results:   Normal Respiratory Joaquina present (07-04-23)  Culture Results:   No growth at 5 days (07-02-23)  Culture Results:   No growth at 5 days (07-02-23)    RADIOLOGY  < from: Xray Chest 1 View-PORTABLE IMMEDIATE (Xray Chest 1 View-PORTABLE IMMEDIATE .) (07.13.23 @ 05:56) >  Impression:    Stable bilateral diffuse lung opacities        < end of copied text >    MEDICATIONS  (STANDING):  apixaban 2.5 milliGRAM(s) Oral two times a day  budesonide 160 MICROgram(s)/formoterol 4.5 MICROgram(s) Inhaler 2 Puff(s) Inhalation two times a day  chlorhexidine 2% Cloths 1 Application(s) Topical <User Schedule>  dextrose 5%. 1000 milliLiter(s) (50 mL/Hr) IV Continuous <Continuous>  dextrose 50% Injectable 25 Gram(s) IV Push once  doxycycline monohydrate Capsule 100 milliGRAM(s) Oral every 12 hours  erythromycin   Ointment 1 Application(s) Both EYES two times a day  furosemide    Tablet 40 milliGRAM(s) Oral daily  glucagon  Injectable 1 milliGRAM(s) IntraMuscular once  insulin lispro (ADMELOG) corrective regimen sliding scale   SubCutaneous three times a day before meals  magnesium sulfate  IVPB 1 Gram(s) IV Intermittent once  nystatin Powder 1 Application(s) Topical every 12 hours  polyethylene glycol 3350 17 Gram(s) Oral every 12 hours  potassium chloride    Tablet ER 40 milliEquivalent(s) Oral once  senna 2 Tablet(s) Oral at bedtime    MEDICATIONS  (PRN):  dextrose Oral Gel 15 Gram(s) Oral once PRN Blood Glucose LESS THAN 70 milliGRAM(s)/deciliter

## 2023-07-16 NOTE — PROGRESS NOTE ADULT - ASSESSMENT
87 yo F PMHx HTN, malignant pleural effusion of breast origin, chronic A.fib, PAD, Obesity, Chronic lymphedema of  BLE and COPD on home oxygen 2 - 1/2 L nc, ESBL UTI presented to ED with worsening shortness of breath and weakness for 4 days with increasing cough productive of yellow-green sputum. In ED pt was in respiratory distress and intubated    #acute hypoxemic and hypercapnic respiratory failure - s/p MV 07/03/2023  #pulmonary edema  #H/O left malignant effusion s/p pleurx - likely of breast origin as per path 03/2023  #chronic lymphedema  #suspected CAP  - s/p intubation on admission/extubation 07/09   - on NC 3L now; aim to mean off of supplemental O2  - ID eval appreciated  - s/p abx  - PO lasix  - palliative eval noted - hospice care evaluation   - drain the left pleurx   - DNR/DNI today as per patient's wishes    #Right groin inguinal region abscess  - CT abd/pelvis: Right inguinal stranding/inflammation without drainable fluid collection. Findings suggestive of cellulitis in appropriate clinical setting  - 7/16: abscess appears smaller today  - f/u culture from Abscess  - c/w doxycycline   - c/w nystatin powder  - if no improvement in abscess size, will get surgical eval for I&D    #COPD- not on home O2  - solumedrol stopped on 07/03 - given one dose 07/13   - c/w albuterol neb    #Chronic AFIB  - restarted home dose of eliquis    #MISC  DVT PPX Eliquis  GI PPX Protonix  DNR/DNI    Family: karri  Dispo: dc planning to hospice

## 2023-07-17 NOTE — CHART NOTE - NSCHARTNOTEFT_GEN_A_CORE
visited patient earlier today. patient encountered resting comfortably in bed. Awake and alert, eating her breakfast. She denied any pain or discomfort - wants to go home soon.     patient is DNR/DNI. daughter and son in communication with Hospice team re: hospice services at SNF.     as goals are clear, palliative will sign off. please re-consult PRN. [id number]

## 2023-07-17 NOTE — PROGRESS NOTE ADULT - SUBJECTIVE AND OBJECTIVE BOX
Patient seen and evaluated this am, awake and alert, denies any pain or discomfort; patient reports her breathing has improved. Patient reports she wants to go home. Spoke to patient regarding discharge planning.     ROS: All systems reviewed and are otherwise negative    Vital Signs Last 24 Hrs  T(C): 36.4 (17 Jul 2023 05:46), Max: 36.4 (17 Jul 2023 05:46)  T(F): 97.6 (17 Jul 2023 05:46), Max: 97.6 (17 Jul 2023 05:46)  HR: 99 (17 Jul 2023 05:46) (71 - 103)  BP: 155/72 (17 Jul 2023 05:46) (123/67 - 155/72)  BP(mean): --  RR: 18 (17 Jul 2023 08:04) (18 - 20)  SpO2: 98% (17 Jul 2023 08:04) (97% - 98%)    Parameters below as of 17 Jul 2023 08:04  Patient On (Oxygen Delivery Method): nasal cannula  O2 Flow (L/min): 2  PHYSICAL EXAM:  GENERAL: NAD, elderly  HEAD:  Atraumatic, Normocephalic  EYES: EOMI, PERRLA, conjunctiva and sclera clear  NERVOUS SYSTEM:  no focal deficits   CHEST/LUNG:  bilateral rales, +pleuex cath on left side  HEART: Regular rate and rhythm; No murmurs, rubs, or gallops  ABDOMEN: Soft, Nontender, Nondistended; Bowel sounds present  EXTREMITIES:  2+ Peripheral Pulses, No clubbing, cyanosis, or edema    LABS                        8.8    11.57 )-----------( 380      ( 17 Jul 2023 05:52 )             29.9   07-17    148<H>  |  105  |  23<H>  ----------------------------<  88  4.0   |  32  |  0.8    Ca    9.4      17 Jul 2023 05:52  Mg     2.0     07-17    TPro  6.1  /  Alb  2.9<L>  /  TBili  0.4  /  DBili  x   /  AST  17  /  ALT  9   /  AlkPhos  137<H>  07-17          Culture Results:   No growth at 72 Hours (07-08-23)  Culture Results:   No growth (07-05-23)  Culture Results:   Culture is being performed. Fungal cultures are held for 4 weeks. (07-05-23)  Culture Results:   Numerous Staphylococcus epidermidis (07-04-23)  Culture Results:   Normal Respiratory Joaquina present (07-04-23)  Culture Results:   No growth at 5 days (07-02-23)  Culture Results:   No growth at 5 days (07-02-23)    RADIOLOGY  < from: Xray Chest 1 View-PORTABLE IMMEDIATE (Xray Chest 1 View-PORTABLE IMMEDIATE .) (07.13.23 @ 05:56) >  Impression:    Stable bilateral diffuse lung opacities        < end of copied text >    MEDICATIONS  (STANDING):  apixaban 2.5 milliGRAM(s) Oral two times a day  budesonide 160 MICROgram(s)/formoterol 4.5 MICROgram(s) Inhaler 2 Puff(s) Inhalation two times a day  chlorhexidine 2% Cloths 1 Application(s) Topical <User Schedule>  dextrose 5%. 1000 milliLiter(s) (50 mL/Hr) IV Continuous <Continuous>  dextrose 50% Injectable 25 Gram(s) IV Push once  doxycycline monohydrate Capsule 100 milliGRAM(s) Oral every 12 hours  erythromycin   Ointment 1 Application(s) Both EYES two times a day  furosemide    Tablet 40 milliGRAM(s) Oral daily  glucagon  Injectable 1 milliGRAM(s) IntraMuscular once  insulin lispro (ADMELOG) corrective regimen sliding scale   SubCutaneous three times a day before meals  nystatin Powder 1 Application(s) Topical every 12 hours  polyethylene glycol 3350 17 Gram(s) Oral every 12 hours  senna 2 Tablet(s) Oral at bedtime    MEDICATIONS  (PRN):  dextrose Oral Gel 15 Gram(s) Oral once PRN Blood Glucose LESS THAN 70 milliGRAM(s)/deciliter  melatonin 3 milliGRAM(s) Oral at bedtime PRN Insomnia

## 2023-07-17 NOTE — PROGRESS NOTE ADULT - ASSESSMENT
89 yo F PMHx HTN, malignant pleural effusion of breast origin, chronic A.fib, PAD, Obesity, Chronic lymphedema of  BLE and COPD on home oxygen 2 - 1/2 L nc, ESBL UTI presented to ED with worsening shortness of breath and weakness for 4 days with increasing cough productive of yellow-green sputum. In ED pt was in respiratory distress and intubated    #acute hypoxemic and hypercapnic respiratory failure - s/p MV 07/03/2023  #pulmonary edema  #H/O left malignant effusion s/p pleurx - likely of breast origin as per path 03/2023  #chronic lymphedema  #suspected CAP  - s/p intubation on admission/extubation 07/09   - on NC 3L now; aim to mean off of supplemental O2  - ID eval appreciated  - s/p abx  - PO lasix  - palliative eval noted - hospice care evaluation   - drain the left pleurx   - DNR/DNI today as per patient's wishes    #Right groin inguinal region abscess  - CT abd/pelvis: Right inguinal stranding/inflammation without drainable fluid collection. Findings suggestive of cellulitis in appropriate clinical setting  - 7/16: abscess appears smaller today  - f/u culture from Abscess  - c/w doxycycline   - c/w nystatin powder  - if no improvement in abscess size, will get surgical eval for I&D    #COPD- not on home O2  - solumedrol stopped on 07/03 - given one dose 07/13   - c/w albuterol neb    #Chronic AFIB  - restarted home dose of eliquis    #MISC  DVT PPX Eliquis  GI PPX Protonix  DNR/DNI    Family: daughter  Dispo: dc planning to hospice vs STR

## 2023-07-18 NOTE — PROGRESS NOTE ADULT - SUBJECTIVE AND OBJECTIVE BOX
Patient seen and evaluated this am, awake and alert, denies any pain or discomfort; patient reports her breathing has improved. Patient reports she wants to go home. Spoke to patient regarding discharge planning.     ROS: All systems reviewed and are otherwise negative    Vital Signs Last 24 Hrs  T(C): 36.1 (18 Jul 2023 05:26), Max: 36.1 (18 Jul 2023 05:26)  T(F): 96.9 (18 Jul 2023 05:26), Max: 96.9 (18 Jul 2023 05:26)  HR: 97 (18 Jul 2023 05:26) (97 - 110)  BP: 138/63 (18 Jul 2023 05:26) (113/65 - 138/63)  BP(mean): --  RR: 16 (18 Jul 2023 05:26) (16 - 18)  SpO2: 96% (18 Jul 2023 05:26) (96% - 97%)    Parameters below as of 17 Jul 2023 21:14  Patient On (Oxygen Delivery Method): nasal cannula  O2 Flow (L/min): 2  PHYSICAL EXAM:  GENERAL: NAD, elderly  HEAD:  Atraumatic, Normocephalic  EYES: EOMI, PERRLA, conjunctiva and sclera clear  NERVOUS SYSTEM:  no focal deficits   CHEST/LUNG:  bilateral rales, +pleuex cath on left side  HEART: Regular rate and rhythm; No murmurs, rubs, or gallops  ABDOMEN: Soft, Nontender, Nondistended; Bowel sounds present  EXTREMITIES:  2+ Peripheral Pulses, No clubbing, cyanosis, or edema    LABS                        8.8    11.57 )-----------( 380      ( 17 Jul 2023 05:52 )             29.9   07-17    148<H>  |  105  |  23<H>  ----------------------------<  88  4.0   |  32  |  0.8    Ca    9.4      17 Jul 2023 05:52  Mg     2.0     07-17    TPro  6.1  /  Alb  2.9<L>  /  TBili  0.4  /  DBili  x   /  AST  17  /  ALT  9   /  AlkPhos  137<H>  07-17          Culture Results:   No growth at 72 Hours (07-08-23)  Culture Results:   No growth (07-05-23)  Culture Results:   Culture is being performed. Fungal cultures are held for 4 weeks. (07-05-23)  Culture Results:   Numerous Staphylococcus epidermidis (07-04-23)  Culture Results:   Normal Respiratory Joaquina present (07-04-23)  Culture Results:   No growth at 5 days (07-02-23)  Culture Results:   No growth at 5 days (07-02-23)    RADIOLOGY  < from: Xray Chest 1 View-PORTABLE IMMEDIATE (Xray Chest 1 View-PORTABLE IMMEDIATE .) (07.13.23 @ 05:56) >  Impression:    Stable bilateral diffuse lung opacities        < end of copied text >    MEDICATIONS  (STANDING):  apixaban 2.5 milliGRAM(s) Oral two times a day  budesonide 160 MICROgram(s)/formoterol 4.5 MICROgram(s) Inhaler 2 Puff(s) Inhalation two times a day  chlorhexidine 2% Cloths 1 Application(s) Topical <User Schedule>  dextrose 5%. 1000 milliLiter(s) (50 mL/Hr) IV Continuous <Continuous>  dextrose 50% Injectable 25 Gram(s) IV Push once  doxycycline monohydrate Capsule 100 milliGRAM(s) Oral every 12 hours  erythromycin   Ointment 1 Application(s) Both EYES two times a day  furosemide    Tablet 40 milliGRAM(s) Oral daily  glucagon  Injectable 1 milliGRAM(s) IntraMuscular once  insulin lispro (ADMELOG) corrective regimen sliding scale   SubCutaneous three times a day before meals  nystatin Powder 1 Application(s) Topical every 12 hours  polyethylene glycol 3350 17 Gram(s) Oral every 12 hours  senna 2 Tablet(s) Oral at bedtime    MEDICATIONS  (PRN):  dextrose Oral Gel 15 Gram(s) Oral once PRN Blood Glucose LESS THAN 70 milliGRAM(s)/deciliter  melatonin 3 milliGRAM(s) Oral at bedtime PRN Insomnia

## 2023-07-18 NOTE — CHART NOTE - NSCHARTNOTESELECT_GEN_ALL_CORE
Event Note
PA Note/Event Note
PallCare/Event Note
Palliative Care - Sign Off/Event Note
Palliative Care - Social Work/Event Note
Palliative Care - Social Work/Event Note
Event Note
Palliative Care - Social Work/Event Note

## 2023-07-18 NOTE — CHART NOTE - NSCHARTNOTEFT_GEN_A_CORE
Registered Dietitian Follow-Up     Patient Profile Reviewed                           Yes [X]   No []     Nutrition History Previously Obtained        Yes [X]  No []       Pertinent  Information:  pt is 88 year old female with hx of CHF, COPD, HTN, chronic cellulitis, afib s/p ablation, FLAVIO presents with 3 days of progressive respiratory distress. pt has L side indwelling chest tube placed one month ago  for malignant effusion. pt found to have B/L pleural effusion L>R, intubation in ED warranted.  s/p thoracentesis 7/5. pt extubated on 7/8.  7/11 CXR with worsening R pleural effusion. pt made DNR/DNI to be discharged under hospice services      Diet, Minced and Moist:   DASH/TLC {Sodium & Cholesterol Restricted} (07-14-23 @ 13:59) [Active]    *as per SLP eval on 7/14     Anthropometrics:  - Ht. 167.6cm  - Wt. 91.7 kgs vs 86.8 kg   - %wt change  - BMI        Pertinent Lab Data:  07-17    148<H>  |  105  |  23<H>  ----------------------------<  88  4.0   |  32  |  0.8    Ca    9.4      17 Jul 2023 05:52  Mg     2.0     07-17    TPro  6.1  /  Alb  2.9<L>  /  TBili  0.4  /  DBili  x   /  AST  17  /  ALT  9   /  AlkPhos  137<H>  07-17                            8.8    11.57 )-----------( 380      ( 17 Jul 2023 05:52 )             29.9        Pertinent Meds:  MEDICATIONS  (STANDING):  apixaban 2.5 milliGRAM(s) Oral two times a day  budesonide 160 MICROgram(s)/formoterol 4.5 MICROgram(s) Inhaler 2 Puff(s) Inhalation two times a day  doxycycline monohydrate Capsule 100 milliGRAM(s) Oral every 12 hours  erythromycin   Ointment 1 Application(s) Both EYES two times a day  furosemide    Tablet 40 milliGRAM(s) Oral daily  insulin lispro (ADMELOG) corrective regimen sliding scale   SubCutaneous three times a day before meals  nystatin Powder 1 Application(s) Topical every 12 hours  polyethylene glycol 3350 17 Gram(s) Oral every 12 hours  senna 2 Tablet(s) Oral at bedtime    MEDICATIONS  (PRN):  dextrose Oral Gel 15 Gram(s) Oral once PRN Blood Glucose LESS THAN 70 milliGRAM(s)/deciliter  melatonin 3 milliGRAM(s) Oral at bedtime PRN Insomnia       Physical Findings:  - Appearance: awake sitting up in recliner  - GI function: + BS, BM x2 noted 7/13  - Tubes: n/a   - Oral/Mouth cavity:  - Skin: sacrum fissure noted   B/L LE trace edema noted      Nutrition Requirements  Weight Used: 86.8 kgs     Estimated Energy Needs  7805-3348 kcals (16-22 kcal/kg/BW)  89-118g protein (1.5-2.0g/kg/IBW)  1;1 kcal for estimated fluid needs     Nutrient Intake: remains poor at <50% of meals consumed today as per family at bedside pt dislikes texture however understand importance of adhering to SLP recommendations.         [] Previous Nutrition Diagnosis:            [X] Ongoing          [] Resolved    inadequate intake remains      Nutrition Intervention: Family requesting to liberalize diet and remove DASH/TLC restriction to aid with po intake. will also add magic cup with meals (290 kcals, 9g protein)     Goal/Expected Outcome: pt to tolerate po diet and consume at least 50-75% of meals within 3-5 days     Indicator/Monitoring: RD to monitor tolerance to po diet, labs/meds, NFPF and f/u as needed within 3-5 days  high risk

## 2023-07-18 NOTE — PROGRESS NOTE ADULT - ASSESSMENT
87 yo F PMHx HTN, malignant pleural effusion of breast origin, chronic A.fib, PAD, Obesity, Chronic lymphedema of  BLE and COPD on home oxygen 2 - 1/2 L nc, ESBL UTI presented to ED with worsening shortness of breath and weakness for 4 days with increasing cough productive of yellow-green sputum. In ED pt was in respiratory distress and intubated    #acute hypoxemic and hypercapnic respiratory failure - s/p MV 07/03/2023  #pulmonary edema  #H/O left malignant effusion s/p pleurx - likely of breast origin as per path 03/2023  #chronic lymphedema  #suspected CAP  - s/p intubation on admission/extubation 07/09   - on NC 3L now; aim to mean off of supplemental O2  - ID eval appreciated  - s/p abx  - PO lasix  - palliative eval noted - hospice care evaluation   - drain the left pleurx   - DNR/DNI today as per patient's wishes    #Right groin inguinal region abscess- improved  - CT abd/pelvis: Right inguinal stranding/inflammation without drainable fluid collection. Findings suggestive of cellulitis in appropriate clinical setting  - 7/16: abscess appears smaller today  - f/u culture from Abscess  - c/w doxycycline   - c/w nystatin powder  - if no improvement in abscess size, will get surgical eval for I&D    #COPD- not on home O2  - solumedrol stopped on 07/03 - given one dose 07/13   - c/w albuterol neb    #Chronic AFIB  - restarted home dose of eliquis    #MISC  DVT PPX Eliquis  GI PPX Protonix  DNR/DNI    Family: daughter  Dispo: dc planning to hospice vs STR

## 2023-07-18 NOTE — CHART NOTE - NSCHARTNOTEFT_GEN_A_CORE
PALLIATIVE MEDICINE INTERDISCIPLINARY TEAM NOTE    Provider:                Family or contact name / phone #     Met with: [ x  ] Patient  [   ] Family  [   ] Other:    Primary Language: [ x ] English [   ] Other*:                      *Interpretation provided by:    SUPPORT DIAGNOSES            (Check all that apply)  [ x ] Spiritual assessment  [   ] EOL issues  [   ] Cultural / spiritual concerns  [   ] Pain / suffering  [   ] Dementia / AMS  [   ] Other:  [   ] AD issues  [   ] Grief / loss / sadness  [   ] Discharge issues  [   ] Distress / coping    SPIRITUAL ASSESSMENT    [   ] Initial Assessment            [ x  ] Reassessment          [   ] Not Applicable this visit    Pain/suffering acuity:  [   ] None to mild (0-3)           [ x  ] Moderate (4-6)        [   ] High (7-10)    Coping:  [   ] Coping well                     [  x ] Coping w/difficulty            [   ] Poor coping    Support system:  [ x  ] Strong                              [   ] Adequate                        [   ] Inadequate    Buddhist/Spiritual practice: _____Catholic______________________    Role of organized Druze:  [ x  ] Important                     [   ] Some (fam tradition, cultural)               [   ] None    Effects on medical care:  [   ] Yes, _____________________________________                         [ x  ] None    Cultural/Methodist need:  [   ] Yes, _____________________________________                         [  x ] None    Refer to Pastoral Care:  [   ] Yes           [  x ] No, not at this time    SERVICE PROVIDED  [   ]PSSA                                                                             [   ]Discharge support / facilitation  [   ]AD / goals of care counseling                                  [   ]EOL / death / bereavement counseling  [   ]Counseling / support                                                [   ] Family meeting  [ x ]Prayer / sacrament / ritual                                      [   ] Referral   [   ]Other                                                                       NOTE and Plan of Care (PoC): Pt states she's comfortable, she was sitting is chair no family were present. I was a supportive Pastoral presence. I will follow up

## 2023-07-19 NOTE — DISCHARGE NOTE NURSING/CASE MANAGEMENT/SOCIAL WORK - PATIENT PORTAL LINK FT
You can access the FollowMyHealth Patient Portal offered by [facility] by registering at the following website: [email or web address]. By joining [facility]’s FollowMyHealth portal, you will also be able to view your health information using other applications (apps) compatible with our system.

## 2023-07-19 NOTE — PROGRESS NOTE ADULT - SUBJECTIVE AND OBJECTIVE BOX
Patient seen and evaluated this am, awake and alert, denies any pain or discomfort; patient reports her breathing has improved. Patient reports she wants to go home. Spoke to patient regarding discharge planning.     ROS: All systems reviewed and are otherwise negative    Vital Signs Last 24 Hrs  T(C): 36.1 (19 Jul 2023 05:43), Max: 36.1 (18 Jul 2023 14:05)  T(F): 96.9 (19 Jul 2023 05:43), Max: 97 (18 Jul 2023 14:05)  HR: 103 (19 Jul 2023 09:56) (100 - 106)  BP: 137/71 (19 Jul 2023 05:43) (115/78 - 137/71)  BP(mean): --  RR: 16 (19 Jul 2023 09:56) (16 - 18)  SpO2: 98% (19 Jul 2023 09:56) (96% - 100%)    Parameters below as of 19 Jul 2023 09:56  Patient On (Oxygen Delivery Method): nasal cannula  O2 Flow (L/min): 2  PHYSICAL EXAM:  GENERAL: NAD, elderly  HEAD:  Atraumatic, Normocephalic  EYES: EOMI, PERRLA, conjunctiva and sclera clear  NERVOUS SYSTEM:  no focal deficits   CHEST/LUNG:  bilateral rales, +pleuex cath on left side  HEART: Regular rate and rhythm; No murmurs, rubs, or gallops  ABDOMEN: Soft, Nontender, Nondistended; Bowel sounds present  EXTREMITIES:  2+ Peripheral Pulses, No clubbing, cyanosis, or edema    LABS        Culture Results:   No growth at 72 Hours (07-08-23)  Culture Results:   No growth (07-05-23)  Culture Results:   Culture is being performed. Fungal cultures are held for 4 weeks. (07-05-23)  Culture Results:   Numerous Staphylococcus epidermidis (07-04-23)  Culture Results:   Normal Respiratory Joaquina present (07-04-23)  Culture Results:   No growth at 5 days (07-02-23)  Culture Results:   No growth at 5 days (07-02-23)    RADIOLOGY  < from: Xray Chest 1 View-PORTABLE IMMEDIATE (Xray Chest 1 View-PORTABLE IMMEDIATE .) (07.13.23 @ 05:56) >  Impression:    Stable bilateral diffuse lung opacities        < end of copied text >    MEDICATIONS  (STANDING):  apixaban 2.5 milliGRAM(s) Oral two times a day  budesonide 160 MICROgram(s)/formoterol 4.5 MICROgram(s) Inhaler 2 Puff(s) Inhalation two times a day  chlorhexidine 2% Cloths 1 Application(s) Topical <User Schedule>  dextrose 5%. 1000 milliLiter(s) (50 mL/Hr) IV Continuous <Continuous>  dextrose 50% Injectable 25 Gram(s) IV Push once  doxycycline monohydrate Capsule 100 milliGRAM(s) Oral every 12 hours  erythromycin   Ointment 1 Application(s) Both EYES two times a day  furosemide    Tablet 40 milliGRAM(s) Oral daily  glucagon  Injectable 1 milliGRAM(s) IntraMuscular once  insulin lispro (ADMELOG) corrective regimen sliding scale   SubCutaneous three times a day before meals  nystatin Powder 1 Application(s) Topical every 12 hours  polyethylene glycol 3350 17 Gram(s) Oral every 12 hours  senna 2 Tablet(s) Oral at bedtime    MEDICATIONS  (PRN):  dextrose Oral Gel 15 Gram(s) Oral once PRN Blood Glucose LESS THAN 70 milliGRAM(s)/deciliter  melatonin 3 milliGRAM(s) Oral at bedtime PRN Insomnia

## 2023-07-19 NOTE — DISCHARGE NOTE PROVIDER - HOSPITAL COURSE
87 yo F PMHx HTN, malignant pleural effusion of breast origin, chronic A.fib, PAD, Obesity, Chronic lymphedema of  BLE and COPD on home oxygen 2 - 1/2 L nc, ESBL UTI presented to ED with worsening shortness of breath and weakness for 4 days with increasing cough productive of yellow-green sputum. In ED pt was in respiratory distress and intubated. Patient was admitted for acute hypoxemic and hypercapnic respiratory failure - s/p MV 07/03/2023; she was eventually extubated. She was treated for pulmonary edema. Patient with history of history of left malignant effusion s/p Pleurx- likely of breast origin as per path 03/2023. Patient was also treated with antibiotics.   #chronic lymphedema  #suspected CAP  - s/p intubation on admission/extubation 07/09   - on NC 3L now; aim to mean off of supplemental O2  - ID eval appreciated  - s/p abx  - PO lasix  - palliative eval noted - hospice care evaluation   - drain the left pleurx   - DNR/DNI today as per patient's wishes    #Right groin inguinal region abscess- improved  - CT abd/pelvis: Right inguinal stranding/inflammation without drainable fluid collection. Findings suggestive of cellulitis in appropriate clinical setting  - 7/16: abscess appears smaller today  - f/u culture from Abscess  - c/w doxycycline   - c/w nystatin powder  - if no improvement in abscess size, will get surgical eval for I&D    #COPD- not on home O2  - solumedrol stopped on 07/03 - given one dose 07/13   - c/w albuterol neb    #Chronic AFIB  - restarted home dose of eliquis 89 yo F PMHx HTN, malignant pleural effusion of breast origin, chronic A.fib, PAD, Obesity, Chronic lymphedema of  BLE and COPD on home oxygen 2 - 1/2 L nc, ESBL UTI presented to ED with worsening shortness of breath and weakness for 4 days with increasing cough productive of yellow-green sputum. In ED pt was in respiratory distress and intubated. Patient was admitted for acute hypoxemic and hypercapnic respiratory failure - s/p MV 07/03/2023; she was eventually extubated. She was treated for pulmonary edema. Patient with history of history of left malignant effusion s/p Pleurx- likely of breast origin as per path 03/2023. Patient was also treated with antibiotics. Patient was monitored in ICU and then eventually downgraded. Patient was evaluated by hospice. After further discussion with family, decision was made to discharge patient home with the goals of setting up hospice care at home.    Things to follow up:  -PCP follow up in 1-2 weeks  -May drain left sided pleuex cath once a week  -Continue medications as instructed

## 2023-07-19 NOTE — DISCHARGE NOTE PROVIDER - CARE PROVIDER_API CALL
Faviola Petty NP in Family Health  [address] [place] [id number]  Phone: [phone]  Fax: [phone]  Follow Up Time: 1 week

## 2023-07-19 NOTE — PROGRESS NOTE ADULT - PROVIDER SPECIALTY LIST ADULT
CCU
CCU
Hospitalist
Hospitalist
Critical Care
Hospitalist
Internal Medicine
Internal Medicine
CCU
CCU
Critical Care
Critical Care
Hospitalist
Hospitalist
Infectious Disease
Pulmonology
Pulmonology
Critical Care
Hospitalist
Infectious Disease
Infectious Disease
Internal Medicine
Internal Medicine
Pulmonology
Hospitalist
Hospitalist
Infectious Disease
Palliative Care

## 2023-07-19 NOTE — DISCHARGE NOTE PROVIDER - NSDCMRMEDTOKEN_GEN_ALL_CORE_FT
albuterol 90 mcg/inh inhalation aerosol: 2 puff(s) inhaled every 6 hours as needed for  shortness of breath and/or wheezing  budesonide-formoterol 80 mcg-4.5 mcg/inh inhalation aerosol: 2 puff(s) inhaled 2 times a day  dilTIAZem 180 mg/24 hours oral capsule, extended release: 1 cap(s) orally once a day  Eliquis 2.5 mg oral tablet: 1 tab(s) orally 2 times a day  furosemide 20 mg oral tablet: 1 tab(s) orally once a day  lidocaine 4% topical film: Apply topically to affected area once a day  saccharomyces boulardii lyo 250 mg oral capsule: 1 cap(s) orally 2 times a day  Senna 8.6 mg oral tablet: 2 tab(s) orally once a day (at bedtime)   albuterol 90 mcg/inh inhalation aerosol: 2 puff(s) inhaled every 6 hours as needed for  shortness of breath and/or wheezing  budesonide-formoterol 80 mcg-4.5 mcg/inh inhalation aerosol: 2 puff(s) inhaled 2 times a day  dilTIAZem 180 mg/24 hours oral capsule, extended release: 1 cap(s) orally once a day  Eliquis 2.5 mg oral tablet: 1 tab(s) orally 2 times a day  Lasix 40 mg oral tablet: 1 tab(s) orally once a day  lidocaine 4% topical film: Apply topically to affected area once a day  Monodox 50 mg oral capsule: 2 cap(s) orally every 12 hours  nystatin 100,000 units/g topical powder: Apply topically to affected area 2 times a day apply to groin and to affected area  saccharomyces boulardii lyo 250 mg oral capsule: 1 cap(s) orally 2 times a day  Senna 8.6 mg oral tablet: 2 tab(s) orally once a day (at bedtime)

## 2023-07-19 NOTE — DISCHARGE NOTE PROVIDER - NSDCCPCAREPLAN_GEN_ALL_CORE_FT
PRINCIPAL DISCHARGE DIAGNOSIS  Diagnosis: Acute respiratory failure with hypoxia  Assessment and Plan of Treatment: presented to ED with worsening shortness of breath and weakness for 4 days with increasing cough productive of yellow-green sputum. In ED pt was in respiratory distress and intubated. Patient was admitted for acute hypoxemic and hypercapnic respiratory failure - s/p MV 07/03/2023; she was eventually extubated. She was treated for pulmonary edema. Patient with history of history of left malignant effusion s/p Pleurx- likely of breast origin as per path 03/2023. Patient was also treated with antibiotics. Patient was monitored in ICU and then eventually downgraded. Patient was evaluated by hospice. After further discussion with family, decision was made to discharge patient home with the goals of setting up hospice care at home.  Things to follow up:  -PCP follow up in 1-2 weeks  -May drain left sided pleuex cath once a week  -Continue medications as instructed

## 2023-07-19 NOTE — DISCHARGE NOTE PROVIDER - NSDCFUADDINST_GEN_ALL_CORE_FT
Things to follow up:  -PCP follow up in 1-2 weeks  -May drain left sided pleuex cath once a week  -Continue medications as instructed

## 2023-07-19 NOTE — DISCHARGE NOTE PROVIDER - NPI NUMBER (FOR SYSADMIN USE ONLY) :
Routine  care  Anticipatory guidance  Encourage BF  Tc bili at 36 hrs  [facility], [facility], [place] PTD  UTOX pending Routine  care  Anticipatory guidance  Encourage BF  Tc bili at 36 hrs  [facility], [facility], [place] screen PTD [[id number]]

## 2023-07-19 NOTE — PROGRESS NOTE ADULT - REASON FOR ADMISSION
respiratory diastress
respiratory distress
respiratory diastress
respiratory distress
respiratory diastress
respiratory diastress
respiratory distress
respiratory diastress
respiratory distress
respiratory diastress

## 2023-07-20 PROBLEM — R60.0 LOCALIZED EDEMA: Chronic | Status: ACTIVE | Noted: 2023-01-01

## 2023-07-20 PROBLEM — I50.23 ACUTE ON CHRONIC SYSTOLIC (CONGESTIVE) HEART FAILURE: Chronic | Status: ACTIVE | Noted: 2023-01-01

## 2023-07-26 PROBLEM — I48.91 ATRIAL FIBRILLATION: Status: ACTIVE | Noted: 2020-10-12

## 2023-07-26 PROBLEM — L03.314 CELLULITIS OF GROIN, RIGHT: Status: ACTIVE | Noted: 2023-01-01

## 2023-07-26 PROBLEM — L89.321 PRESSURE INJURY OF LEFT BUTTOCK, STAGE 1: Status: ACTIVE | Noted: 2023-01-01

## 2023-07-26 PROBLEM — F41.9 ANXIETY AND DEPRESSION: Status: ACTIVE | Noted: 2020-10-12

## 2023-07-26 PROBLEM — I73.9 PVD (PERIPHERAL VASCULAR DISEASE): Status: ACTIVE | Noted: 2023-01-01

## 2023-07-26 PROBLEM — I10 HTN (HYPERTENSION): Status: ACTIVE | Noted: 2020-10-12

## 2023-07-26 PROBLEM — N18.31 STAGE 3A CHRONIC KIDNEY DISEASE: Status: ACTIVE | Noted: 2023-01-01

## 2023-07-26 PROBLEM — J90 PLEURAL EFFUSION: Status: ACTIVE | Noted: 2021-12-14

## 2023-07-26 PROBLEM — J44.9 CHRONIC OBSTRUCTIVE PULMONARY DISEASE, UNSPECIFIED COPD TYPE: Status: ACTIVE | Noted: 2021-12-14

## 2023-07-26 PROBLEM — I89.0 LYMPHEDEMA OF BOTH LOWER EXTREMITIES: Status: ACTIVE | Noted: 2020-10-12

## 2023-07-26 NOTE — COUNSELING
[Obese -  ( BMI  >29.9 )] : obese - ( BMI  >29.9 ) [DASH diet recommended] : DASH diet recommended [Continue diet as tolerated] : continue diet as tolerated based on goals of care [Non - Smoker] : non-smoker [Use assistive device to avoid falls] : use assistive device to avoid falls [Remove clutter and unsafe carpeting to avoid falls] : remove clutter and unsafe carpeting to avoid falls [] : foot exam [Improve exercise tolerance] : improve exercise tolerance [Discussed disease trajectory with patient/caregiver] : discussed disease trajectory with patient/caregiver [Patient/Caregiver is unclear of wishes] : patient/caregiver is unclear of wishes [_____] : HCP: [unfilled] [Completed Healthcare Proxy] : completed healthcare proxy

## 2023-07-26 NOTE — LETTER HEADER
[Care Plan reviewed every ___ weeks] : Care plan reviewed every [unfilled] weeks [Care Plan reviewed and provided to patient/caregiver] : Care plan reviewed and provided to patient/caregiver

## 2023-07-28 PROBLEM — E87.6 HYPOKALEMIA: Status: ACTIVE | Noted: 2023-01-01

## 2023-07-28 NOTE — REASON FOR VISIT
[Family Member] : family member [Pre-Visit Preparation] : pre-visit preparation was done [Follow-Up] : a follow-up visit [FreeTextEntry1] : PMH HTN, Lymphedema, atrial fibrillation, asthma and COPD [FreeTextEntry2] : chart review

## 2023-07-28 NOTE — HEALTH RISK ASSESSMENT
[HRA Reviewed] : Health risk assessment reviewed [Independent] : using telephone [Some assistance needed] : managing medications [Full assistance needed] : managing finances [No falls in past year] : Patient reported no falls in the past year [Yes] : The patient does have visual impairment [de-identified] : patient reports pain to left foot with ambulation, refusing at this time

## 2023-07-28 NOTE — REVIEW OF SYSTEMS
[Lower Ext Edema] : lower extremity edema [Dyspnea on Exertion] : dyspnea on exertion [Negative] : Gastrointestinal [Muscle Weakness] : muscle weakness [Nausea] : no nausea [Constipation] : no constipation [Diarrhea] : diarrhea [Vomiting] : no vomiting [Melena] : no melena

## 2023-07-28 NOTE — CHRONIC CARE ASSESSMENT
[PPS Score: ____] : Palliative Performance Scale (PPS) Score: [unfilled] [de-identified] : as tolerated [de-identified] : low sodium

## 2023-07-28 NOTE — HISTORY OF PRESENT ILLNESS
[Patient is stable] : patient is stable [Patient] : patient [Family Member] : family member [LastPVisitDate] : 04/2023 [FreeTextEntry4] : Sr. Christian [LastSpecialistVisitDate] : 06/2023 [FreeTextEntry1] : lymphedema and COPD  [FreeTextEntry2] : 07/26/2023 COVID SCREEN:\par Patient or caregiver denies fever, cough, trouble breathing, rash or contact with someone who tested positive for COVID-19. \par \par N95 mask, gloves, eye wear and gown (if indicated) used during visit: YES\par \par Total face to face time with patient is 45 min. \par \par Jayesh Apodaca is 88 year old female with COPD, oxygen dependent 2 L NC, pleural effusion, atrial fibrillation, PVD, lymphedema, anxiety and depression presents today for follow up visit post hospital discharge. \par \par \par Patient hospitalized at [facility] on 7/3/23 for difficulty breathing. Patient was found to be hypoxic and intubated placed on mechanical ventilation.\par \par Hospital course as follows: \par 7/3/23 Intubated and placed on mechanical ventilation\par 7/9/23 Extubated\par 7/15/23: CT abdomen and pelvis + right inguinal inflammation without drainable fluid collection. Suggestive of cellulitis. \par 7/17/12:Chest X-ray -unchanged bilateral pleural effusion\par \par \par 6/14/23 CT Chest impression: multiple pulmonary nodules\par Left pleural effusion likely malignant breast origin. \par \par \par Labs: \par WBC- 11.57\par HGB- 8.8\par HCT- 29.9\par NA+- 148\par BUN 23\par \par \par Patient discharged to home on 7/19/23 with hospice referral, as per son Cristi (HCP) discussed via telephone, patient cancelled hospice in home visit as she would like to be full code and discuss treatment options. Son offered hematology/oncology referral reports would like to discuss with pulmonology before referral. \par \par \par \par Today patient accompanied by daughter Jazmyn and MAIDA Yadav. Since hospitalization patient reports no difficulty breathing, SOB or fever/chills. Left pleurex drain in place.  Lung sounds diminished on ANGELITO. No crackles noted. Patient sating 99% on 2 L O2 NC Daughter reports VNS service stopped for drain care after hospitalization. VNS referral placed. [phone].. Chest x-ray ordered.\par \par Right groin abscess present, no redness, swelling, warmth or drainage noted to site. Patient denies pain or discomfort. Completed Doxycycline 100 mg Q 12 hours x 10 days. Caregiver educated on perineal care and continue nystatin powder BID. Will repeat inflammatory markers. \par \par \par Patient's caregiver reports no weight loss >10 lbs in the past 6 months. No changes in dentition or swallowing reported, No changes in hearing or vision reported. No changes in Cognition reported. Patient denies any symptoms of depression or anxiety. Patient is ADL dependent and IADL dependent. No changes falls reported. \par \par Patient's home environment is safe.\par \par  \par

## 2023-07-28 NOTE — PHYSICAL EXAM
[No Accessory Muscle Use] : no accessory muscle use [Pedal Pulses Present] : the pedal pulses are present [Patient Refused] : rectal exam was refused by the patient [No Clubbing, Cyanosis] : no clubbing  or cyanosis of the fingernails [Normal Strength/Tone] : muscle strength and tone were normal [No Acute Distress] : no acute distress [Normal Voice/Communication] : normal voice communication [Normal Sclera/Conjunctiva] : normal sclera/conjunctiva [Normal Outer Ear/Nose] : the ears and nose were normal in appearance [No JVD] : no jugular venous distention [No Respiratory Distress] : no respiratory distress [Normal Rate] : heart rate was normal  [Normal Bowel Sounds] : normal bowel sounds [Non Tender] : non-tender [Soft] : abdomen soft [Not Distended] : not distended [Normal Anterior Cervical Nodes] : no anterior cervical lymphadenopathy [No CVA Tenderness] : no ~M costovertebral angle tenderness [No Joint Swelling] : no joint swelling seen [No Rash] : no rash [No Motor Deficits] : the motor exam was normal [Oriented x3] : oriented to person, place, and time [Normal Affect] : the affect was normal [Normal Mood] : the mood was normal [de-identified] : ANGELITO diminished, Left Pleurex drain in place. No crackles noted [de-identified] : +3 bilateral lower extremity swelling noted [de-identified] :  thyroid nodule noted [de-identified] : tenderness to unbilical area

## 2023-08-02 PROBLEM — D50.8 OTHER IRON DEFICIENCY ANEMIA: Status: ACTIVE | Noted: 2023-01-01

## 2023-08-10 PROBLEM — D64.9 ANEMIA, UNSPECIFIED TYPE: Status: ACTIVE | Noted: 2023-01-01

## 2023-08-10 PROBLEM — R19.5 FECAL OCCULT BLOOD TEST POSITIVE: Status: ACTIVE | Noted: 2023-01-01

## 2023-08-12 PROBLEM — L02.214 ABSCESS OF GROIN, RIGHT: Status: ACTIVE | Noted: 2023-01-01

## 2023-08-17 NOTE — DISCHARGE NOTE PROVIDER - NSFTFHOMEHTHYNRD_GEN_ALL_CORE
Chief Complaint  Foot Swelling and Foot Pain    Subjective          Review of Systems   Constitutional:  Negative for activity change, diaphoresis and unexpected weight change.   HENT:  Positive for dental problem. Negative for drooling, ear discharge, facial swelling, hearing loss, mouth sores, nosebleeds, sinus pressure, sinus pain, tinnitus and voice change.         TMJ bilateral   Eyes:  Negative for photophobia, discharge, redness, itching and visual disturbance.        Wear glasses , UTD eye exam   Respiratory:  Negative for apnea, choking, chest tightness and stridor.         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       Cardiovascular:  Positive for palpitations. Negative for leg swelling.        Heart murmur   Gastrointestinal:  Positive for abdominal pain and constipation. Negative for abdominal distention, anal bleeding, blood in stool, nausea and rectal pain.        IBS-C   H/O Chronic burping improved S/P Tx H-pylori   Endocrine: Negative for cold intolerance, heat intolerance, polydipsia, polyphagia and polyuria.   Genitourinary:  Positive for enuresis, genital sores and vaginal pain. Negative for decreased urine volume, difficulty urinating, dyspareunia, dysuria, flank pain, frequency, hematuria, menstrual problem, pelvic pain, urgency, vaginal bleeding and vaginal discharge.        Had positive swab for HSV 2   Musculoskeletal:  Positive for arthralgias, back pain and neck pain. Negative for gait problem, joint swelling and neck stiffness.   Skin:  Positive for rash. Negative for color change, pallor and wound.        Toenail fungus  Rash L elbow   Allergic/Immunologic: Positive for food allergies. Negative for immunocompromised state.   Neurological:  Positive for dizziness, light-headedness and numbness. Negative for tremors, seizures, syncope, facial asymmetry, speech difficulty and weakness.        TMJ   Had CT of head in past when having facial and arm numbness,  "was negative.   Used Depakote for Headaches in past   Hematological:  Negative for adenopathy. Bruises/bleeds easily.   Psychiatric/Behavioral:  Positive for agitation, dysphoric mood and sleep disturbance. Negative for behavioral problems, confusion, decreased concentration, hallucinations, self-injury and suicidal ideas. The patient is nervous/anxious. The patient is not hyperactive.      Oumou Santizo presents to [facility] [place]  Foot Pain  This is a chronic problem. The current episode started more than 1 month ago. The problem occurs intermittently. The problem has been waxing and waning. Associated symptoms include abdominal pain, arthralgias, neck pain, numbness and a rash. Pertinent negatives include no diaphoresis, joint swelling, nausea or weakness. The symptoms are aggravated by standing (Stopped Spirolactone, feet swell.). She has tried acetaminophen for the symptoms. The treatment provided no relief.     Objective   Vital Signs:   /80 (BP Location: Left arm, Patient Position: Sitting, Cuff Size: Adult)   Pulse (!) 121   Temp 97.5 øF (36.4 øC) (Temporal)   Ht 162.6 cm (64\")   Wt 81.4 kg (179 lb 6.4 oz)   SpO2 96%   BMI 30.79 kg/mý     Physical Exam  Vitals and nursing note reviewed.   Constitutional:       Appearance: Normal appearance. She is well-developed and normal weight.   HENT:      Head: Normocephalic and atraumatic.      Right Ear: Tympanic membrane, ear canal and external ear normal. There is no impacted cerumen.      Left Ear: Tympanic membrane, ear canal and external ear normal. There is no impacted cerumen.      Nose: Nose normal.      Mouth/Throat:      Mouth: Mucous membranes are moist.      Pharynx: Oropharynx is clear. No oropharyngeal exudate.   Eyes:      General: No scleral icterus.        Right eye: No discharge.         Left eye: No discharge.      Extraocular Movements: Extraocular movements intact.      " Conjunctiva/sclera: Conjunctivae normal.      Pupils: Pupils are equal, round, and reactive to light.   Cardiovascular:      Rate and Rhythm: Normal rate and regular rhythm.      Heart sounds: Normal heart sounds. No murmur heard.    No friction rub. No gallop.   Pulmonary:      Effort: Pulmonary effort is normal. No respiratory distress.      Breath sounds: Normal breath sounds. No stridor. No wheezing, rhonchi or rales.   Chest:      Chest wall: No tenderness.   Abdominal:      General: Bowel sounds are normal. There is no distension.      Palpations: Abdomen is soft. There is no mass.      Tenderness: There is no abdominal tenderness. There is no right CVA tenderness, left CVA tenderness, guarding or rebound.      Hernia: No hernia is present.   Musculoskeletal:         General: No swelling, tenderness, deformity or signs of injury. Normal range of motion.      Cervical back: Normal range of motion and neck supple.      Right lower leg: Edema present.      Left lower leg: Edema present.      Comments: 1+ edema in feet.   Mild scoliosis   Skin:     General: Skin is warm and dry.      Capillary Refill: Capillary refill takes 2 to 3 seconds.      Coloration: Skin is not jaundiced or pale.      Findings: Erythema and rash present. No bruising or lesion.      Comments:  Psoriasis L elbow   Neurological:      Mental Status: She is alert and oriented to person, place, and time.      Cranial Nerves: No cranial nerve deficit.      Sensory: No sensory deficit.      Motor: No weakness.      Coordination: Coordination normal.      Gait: Gait normal.      Deep Tendon Reflexes: Reflexes normal.   Psychiatric:         Mood and Affect: Mood normal.         Speech: Speech normal.         Behavior: Behavior normal.         Thought Content: Thought content normal.         Judgment: Judgment normal.      Result Review :                 Assessment and Plan    Diagnoses and all orders for this visit:    1. Foot pain, bilateral  -      XR Foot 3+ View Bilateral; Future    2. Localized edema  -     hydroCHLOROthiazide (HYDRODIURIL) 12.5 MG tablet; Take 1 tablet by mouth Daily.  Dispense: 30 tablet; Refill: 2        Follow Up   Return in about 3 months (around 11/17/2023).  Patient was given instructions and counseling regarding her condition or for health maintenance advice. Please see specific information pulled into the AVS if appropriate.         This document has been electronically signed by Kashif Pérez MD on August 17, 2023 17:53 CDT         Yes

## 2023-08-21 NOTE — ED PROVIDER NOTE - PHYSICAL EXAMINATION
Leandro Hanson(Resident)
CONSTITUTIONAL:  in no acute distress.   SKIN: warm, dry  HEAD: Normocephalic; atraumatic.  EYES: PERRL, EOMI, normal sclera and conjunctiva   ENT: No nasal discharge; airway clear.  NECK: Supple; non tender.  CARD:  Regular rate and rhythm.   RESP: NO inc WOB , no wheezing  ABD: soft ntnd  EXT: Normal ROM.    LYMPH: No acute cervical adenopathy.  NEURO: Alert, oriented, grossly unremarkable  PSYCH: Cooperative, appropriate.

## 2023-08-21 NOTE — ED PROVIDER NOTE - CLINICAL SUMMARY MEDICAL DECISION MAKING FREE TEXT BOX
Patient tolerated transfusion well.  No shortness of breath.  In my opinion, outpatient treatment and follow up are appropriate.  See attending note for documentation of medical decision making.

## 2023-08-21 NOTE — ED ADULT NURSE NOTE - NSFALLHARMRISKINTERV_ED_ALL_ED
Assistance OOB with selected safe patient handling equipment if applicable/Assistance with ambulation/Communicate risk of Fall with Harm to all staff, patient, and family/Monitor gait and stability/Provide visual cue: red socks, yellow wristband, yellow gown, etc/Reinforce activity limits and safety measures with patient and family/Bed in lowest position, wheels locked, appropriate side rails in place/Call bell, personal items and telephone in reach/Instruct patient to call for assistance before getting out of bed/chair/stretcher/Non-slip footwear applied when patient is off stretcher/[place] to call system/Physically safe environment - no spills, clutter or unnecessary equipment/Purposeful Proactive Rounding/Room/bathroom lighting operational, light cord in reach

## 2023-08-21 NOTE — ED PROVIDER NOTE - PROGRESS NOTE DETAILS
Offerred admission for further eval for shortness of breath and anemia had shared decision making with family numbers and patient.  Patient will follow-up outpatient with PMD-Authored by Brandyn Bowden

## 2023-08-21 NOTE — ED PROVIDER NOTE - PATIENT PORTAL LINK FT
You can access the FollowMyHealth Patient Portal offered by [facility] by registering at the following website: [email or web address]. By joining [facility]’s FollowMyHealth portal, you will also be able to view your health information using other applications (apps) compatible with our system.

## 2023-08-21 NOTE — ED PROVIDER NOTE - DIFFERENTIAL DIAGNOSIS
Differential Diagnosis Anemia, dyspnea r/o acute bleeding vs ACS vs pneumonia vs fluid overload vs pneumothorax

## 2023-08-21 NOTE — ED PROVIDER NOTE - ATTENDING CONTRIBUTION TO CARE
Patient referred to the ED for symptomatic anemia.  She denies melena, bright red blood per rectum, vomiting blood.  Family states she has long history of iron deficiency anemia.  Etiology, despite work-up, has remained obscure.  Patient denies chest pain.  However, she is somewhat more short of breath than she is at baseline.  She is short of breath on exertion.  Patient uses supplemental oxygen at home.  She has a chronic indwelling intrathoracic drain on the left side.  Vital signs noted.  No apparent distress.  Chest clear.  Heart regular rate.  Chest x-ray reviewed small bilateral pleural effusions right greater than left.  No significant change from previous.  EKG shows no active ischemia.  CBC confirms profound anemia.  Transfusion ordered. Patient referred to the ED for symptomatic anemia.  She denies melena, bright red blood per rectum, vomiting blood.  Family states she has long history of iron deficiency anemia.  Etiology, despite work-up, has remained obscure.  Patient denies chest pain.  However, she is somewhat more short of breath than she is at baseline.  She is short of breath on exertion.  Patient uses supplemental oxygen at home.  She has a chronic indwelling intrathoracic drain on the left side.  Vital signs noted.  No apparent distress.  Chest clear.  Heart regular rate.  Chest x-ray reviewed small bilateral pleural effusions right greater than left.  No significant change from previous.  EKG shows no active ischemia.  CBC confirms profound anemia.  Transfusion ordered.    I personally reviewed the radiographs performed on this patient and independently interpreted them. I used this review in my medical decision making.

## 2023-08-21 NOTE — ED PROVIDER NOTE - NSFOLLOWUPINSTRUCTIONS_ED_ALL_ED_FT
Please follow-up with your primary care doctor for further eval/work-up.    Anemia    Anemia is a condition in which the concentration of red blood cells or hemoglobin in the blood is below normal. Hemoglobin is a substance in red blood cells that carries oxygen to the tissues of the body. Anemia results in not enough oxygen reaching these tissues which can cause symptoms such as weakness, dizziness/lightheadedness, shortness of breath, chest pain, paleness, or nausea.    SEEK IMMEDIATE MEDICAL CARE IF YOU HAVE THE FOLLOWING SYMPTOMS: extreme weakness/chest pain/shortness of breath, black or bloody stools, vomiting blood, fainting, fever, or any signs of dehydration.    Shortness of breath    Shortness of breath means you have trouble breathing and could indicate a medical problem. Causes include lung diseases, heart disease, low amount of red blood cells (anemia), poor physical fitness, being overweight, smoking, etc. Your health care provider may not be able to find a cause for your shortness of breath after your exam. In this case, it is important to have a follow-up exam with your primary care physician as instructed. If medicines were prescribed, take them as directed for the full length of time directed. Refrain from tobacco products.    SEEK IMMEDIATE MEDICAL CARE IF YOU HAVE THE FOLLOWING SYMPTOMS: worsening shortness of breath, chest pain, back pain, abdominal pain, fever, coughing up blood, lightheadedness/dizziness.

## 2023-08-21 NOTE — ED ADULT NURSE NOTE - BEFAST EYES
Attempted twice to get a hold of Dr. Lugo's nursing staff.  Left phone number to have someone call me back at the , pertaining this patient's xray today   No

## 2023-08-21 NOTE — ED PROVIDER NOTE - CONSIDERATION OF ADMISSION OBSERVATION
Consideration of Admission/Observation Offered admission - patient with chronic anemia but now with shortness of breath which may benefit from inpatient monitoring. However patient and family declined and wish to follow up outpatient

## 2023-08-21 NOTE — ED PROVIDER NOTE - OBJECTIVE STATEMENT
88-year-old female past medical history of bilateral pleural effusions A-fib PAD chronic lymphedema COPD on home oxygen 2 L coming in here for complaints.  Patient has routine blood work done and was told to have a low hemoglobin.  Patient was told by "nurse "to come in for possible transfusion.  Patient also complaining of symptomatic shortness of breath with exertion.  Patient is not short of breath at rest and is comfortable.  Oxygen saturation 94 on 2 L which is her baseline given her COPD.

## 2023-08-28 NOTE — H&P ADULT - NSHPLABSRESULTS_GEN_ALL_CORE
8.6    18.48 )-----------( 334      ( 28 Aug 2023 11:00 )             29.4       08-28    140  |  97<L>  |  23<H>  ----------------------------<  119<H>  3.5   |  31  |  1.0    Ca    9.1      28 Aug 2023 11:00    TPro  7.2  /  Alb  3.8  /  TBili  0.5  /  DBili  x   /  AST  14  /  ALT  6   /  AlkPhos  188<H>  08-28          ABG - ( 28 Aug 2023 19:01 )  pH, Arterial: 7.38  pH, Blood: x     /  pCO2: 56    /  pO2: 122   / HCO3: 33    / Base Excess: 6.9   /  SaO2: 99.6      Urinalysis Basic - ( 28 Aug 2023 11:00 )    Color: x / Appearance: x / SG: x / pH: x  Gluc: 119 mg/dL / Ketone: x  / Bili: x / Urobili: x   Blood: x / Protein: x / Nitrite: x   Leuk Esterase: x / RBC: x / WBC x   Sq Epi: x / Non Sq Epi: x / Bacteria: x        PT/INR - ( 28 Aug 2023 11:00 )   PT: 16.80 sec;   INR: 1.46 ratio         PTT - ( 28 Aug 2023 11:00 )  PTT:33.1 sec    Lactate Trend  08-28 @ 11:00 Lactate:1.3       CARDIAC MARKERS ( 28 Aug 2023 11:00 )  x     / <0.01 ng/mL / x     / x     / x            < from: CT Angio Chest PE Protocol w/ IV Cont (08.28.23 @ 13:18) >      IMPRESSION:    No CT evidence of acute pulmonary embolus.    Stable bilateral moderate pleural effusions, greater on the right. Stable   position of the left pleural space drainage catheter.    Increase left lung and new right middle lobe consolidations as described.   Findings may represent infectious/inflammatory process.    Stable enlarged heterogeneous right thyroid gland measuring up to 7.7 cm.      < end of copied text >

## 2023-08-28 NOTE — H&P ADULT - NSICDXPASTMEDICALHX_GEN_ALL_CORE_FT
PAST MEDICAL HISTORY:  Acute on chronic systolic congestive heart failure     Afib s/p ablation 2001    Anxiety     Cellulitis chronic    COPD (chronic obstructive pulmonary disease)     Edema of both lower legs     Goiter no meds    HTN (hypertension)     OA (osteoarthritis)     Obesity     PVD (peripheral vascular disease)     Required emergent intubation

## 2023-08-28 NOTE — PATIENT PROFILE ADULT - FALL HARM RISK - HARM RISK INTERVENTIONS

## 2023-08-28 NOTE — H&P ADULT - ASSESSMENT
1. hypercapnic respiratory failure  requiring intubation  2. Hx- advanced COPD(on home O2) , chronic left pleural effusion w/ chest tube placed > 1 month ago. , thyroid goiter, afib, CHF, bilateral lymphedema lower extremities, HTN    Plan  adm to ICU  f/u post intubation ABG  adjust vent to results of ABG  IV antibx started for presumed pneumonia, to be continued per ICU MD   bronchodilators, IV steroids

## 2023-08-28 NOTE — H&P ADULT - BIRTH SEX
Airway patent, Nasal mucosa clear. Mouth with normal mucosa. Throat has no vesicles, no oropharyngeal exudates and uvula is midline.
Female

## 2023-08-28 NOTE — ED PROVIDER NOTE - PHYSICAL EXAMINATION
CONST: unwell appearing  EYES: PERRL, EOMI, Sclera and conjunctiva clear.   ENT: No nasal discharge. Oropharynx normal appearing  NECK: Non-tender, no meningeal signs. normal ROM. supple   CARD: Normal S1 S2; Normal rate and rhythm  RESP: Equal BS B/L, mile tahcypnea, decreased BS bilaterally  GI: Soft, non-tender, non-distended. no cva tenderness. normal BS  MS: Normal ROM in all extremities. No midline spinal tenderness. pulses 2 +. leg edema  SKIN: Warm, dry, no acute rashes. Good turgor  NEURO: Alert, No focal deficits.

## 2023-08-28 NOTE — ED PROVIDER NOTE - CLINICAL SUMMARY MEDICAL DECISION MAKING FREE TEXT BOX
Patient past medical history as above presents with shortness of breath brought in desatted 62 patient progressed with BiPAP with increased work of breathing repeat VBG 90 from 70 patient rescinded her DNR DNI request we will intubate.  I agree with exam as above unwell appearing increased work of breathing bilateral leg edema decreased breath sounds lower lobes.  Will admit to ICU

## 2023-08-28 NOTE — H&P ADULT - CRITICAL CARE ATTENDING COMMENT
Pt seen w PA and agree w above.  Pt w worsened sob after initiation of Bipap.  Hypercapnic resp failure. required intubation.  CT angio negative for PE. B/l pleural effusions, L pleural drain in place.  ? pneumonia. Continue abx. IV steroids, bronchodilators. Sedation for vent synchrony.  Check abg. Daily SAT/sbt. Monitor in ICU

## 2023-08-28 NOTE — ED PROCEDURE NOTE - NS ED PROCEUDURE1 POST INTUBATION REVIEW
Appropriate capnography/Positive end tidal Co2 noted/Chest X-Ray no discharge, no irritation, no pain, no redness, and no visual changes.

## 2023-08-28 NOTE — ED PROCEDURE NOTE - NS ED ATTENDING STATEMENT MOD
This was a shared visit with the KEVIN. I reviewed and verified the documentation and independently performed the documented:
This was a shared visit with the KEVIN. I reviewed and verified the documentation and independently performed the documented:

## 2023-08-28 NOTE — H&P ADULT - HISTORY OF PRESENT ILLNESS
This is an 89yo W female w/ PMH as noted below. Pt comes to ER w/ worsening dyspnea. Pt has advanced COPD on home O2.  . Pt was @ [facility] 7/3/23   in respiratory failure due to COPD exacerbation. pt was intubated and went to ICU,  In ICU pt was extubated , left sided chest tube was placed for possible malignant effusion. During that hospitalization  pt signed DNI/DNR.  & was discharged to SNF.    Now pt again in respiratory distress from COPD.  Pt's PCO2  was 97  after being placed on 100% NRB.  Pt's MS worsened & required intubation as  family rescinded  DNR/DNI.

## 2023-08-28 NOTE — ED PROVIDER NOTE - CRTICAL CARE TIME SPENT (MIN)
INR is 3.1 today.    Hold today then continue Mondays and Fridays 6 mg then on other days 4 mg   Recheck INR in 1 month 60

## 2023-08-28 NOTE — H&P ADULT - NSHPPHYSICALEXAM_GEN_ALL_CORE
GENERAL:  89y/o W Female . pt orallintubated sedated  HEAD:  Atraumatic, Normocephalic  EYES: EOMI, PER. non recative to light conjunctiva and sclera clear  NECK: Supple, No JVD, no cervical lymphadenopathy, non-tender  CHEST/LUNG: Clear to auscultation bilaterally; No wheeze, rhonchi, or rales  HEART: Regular rate and rhythm; S1&S2  ABDOMEN: Soft, Nontender, Nondistended x 4 quadrants; Bowel sounds present  EXTREMITIES:   Peripheral Pulses Present, b/l lower extremity edema   PSYCH: sedated  NEUROLOGY: sedated unable to perform  SKIN: WNL

## 2023-08-28 NOTE — ED PROVIDER NOTE - OBJECTIVE STATEMENT
88 year old female with pmhx of copd on home o2, afib, ,chf, htn, bilateral malignant pleural effusions with pleurx catheter to L lung, brought in by ems for sob and hypoxia. Pt has been short of breath x 1 day, oxygen saturation at 65% when ems arrived, which improved on nonrebreather. + dry cough. Daughter denies fever, vomiting, or diarrhea.

## 2023-08-29 NOTE — DIETITIAN INITIAL EVALUATION ADULT - ORAL INTAKE PTA/DIET HISTORY
unable to assess pt intubated no family at bedside. RD to obtain upon f/u    presently NPO s/p recent intubation

## 2023-08-29 NOTE — DIETITIAN INITIAL EVALUATION ADULT - ENTERAL
once medically feasible to initiate feeds recommend  once medically feasible to initiate feeds recommend peptamen AF at 20 ml/hr increase as tolerated to goal rate of 50 ml/hr will provide pt 1500 kcals, 95g protein, 1200 ml total volume meeting >80% of estimated nutrient needs

## 2023-08-29 NOTE — PROCEDURE
Pt is requesting appt for numbness and tingling in wrists.    [FreeTextEntry1] : CXR PA and Lateral \par The costophrenic and cardiophrenic angles are sharp\par The fanny parenchyma shows no infiltrates, consolidations, or nodules \par The Mediastinum is within normal limits\par Small left pleural effusions\par

## 2023-08-29 NOTE — DIETITIAN INITIAL EVALUATION ADULT - PERTINENT MEDS FT
MEDICATIONS  (STANDING):  albuterol    90 MICROgram(s) HFA Inhaler 2 Puff(s) Inhalation every 4 hours  azithromycin  IVPB      cefTRIAXone   IVPB      dextrose 5%. 1000 milliLiter(s) (50 mL/Hr) IV Continuous <Continuous>  dextrose 50% Injectable 25 Gram(s) IV Push once  enoxaparin Injectable 90 milliGRAM(s) SubCutaneous every 12 hours  fentaNYL   Infusion. 0.5 MICROgram(s)/kG/Hr (4.1 mL/Hr) IV Continuous <Continuous>  insulin lispro (ADMELOG) corrective regimen sliding scale   SubCutaneous three times a day before meals  methylPREDNISolone sodium succinate Injectable 40 milliGRAM(s) IV Push every 8 hours  pantoprazole  Injectable 40 milliGRAM(s) IV Push daily    MEDICATIONS  (PRN):  dextrose Oral Gel 15 Gram(s) Oral once PRN Blood Glucose LESS THAN 70 milliGRAM(s)/deciliter

## 2023-08-29 NOTE — DIETITIAN INITIAL EVALUATION ADULT - NS FNS DIET ORDER
Diet, NPO:   Except Medications     Special Instructions for Nursing:  Except Medications (08-28-23 @ 21:44)

## 2023-08-29 NOTE — PROGRESS NOTE ADULT - ASSESSMENT
89 YO F with a medical history  of HTN, malignant pleural effusion of breast origin, chronic A.fib, PAD, Obesity, Chronic lymphedema of  BLE and COPD on home oxygen 2 - 1/2 L nc, ESBL UTI presented to ED with worsening  89 YO F with a medical history  of HTN, malignant pleural effusion of breast origin, chronic A.fib, PAD, Obesity, Chronic lymphedema of  BLE and COPD on home oxygen 2 - 1/2 L nc, ESBL UTI presented to ED with severe SOB, DNR / DNI rescinded and pt was intubated and admitted to ICU    acute respiratory failure / B/L effusions / possible pneumonia / COPD     -  DNR and DNI rescinded   - consult palliative   - continue antibiotics and steroids   - Lovenox   - pulmonary following

## 2023-08-29 NOTE — CONSULT NOTE ADULT - ASSESSMENT
IMPRESSION:  acute hypercapnic res failure s/p intuabtion   COPD exacerbation   Sepsis present on admission   afib on eliquis  H/O breast ca complicated by recurrent pleural effusions s/p pleurex  H/O HFpEF    PLAN:    CNS: d/c versed, precedx + fentenyl for sedation, SAT     HEENT: oral care     PULMONARY: aspiration precaution, HOB@ 45 degrees, daily abg/xray, keep pco2 around 50, solumedrol 40 BID, albuterl PRN    CARDIOVASCULAR: echo, trops noted. BNP elevated, lasix 40mg x1 now, keep I<os, baseline ekg    GI: GI prophylaxis.  Feeding,     RENAL: f/u lytes correct as needed,     INFECTIOUS DISEASE: start on ctx and azithro, ua, ucx, bcx, RVP, procal, urine legion/step     HEMATOLOGICAL: L[facility], f/u with oncologist    ENDOCRINE:  Follow up FS.  Insulin protocol if needed.    MUSCULOSKELETAL: bedbound    full code for now (DNR/DNI was rescinded by family)  ICU  servando

## 2023-08-29 NOTE — DIETITIAN INITIAL EVALUATION ADULT - PERTINENT LABORATORY DATA
08-29    141  |  101  |  23<H>  ----------------------------<  123<H>  3.7   |  30  |  0.9    Ca    8.8      29 Aug 2023 05:44  Phos  2.9     08-29  Mg     1.8     08-29    TPro  7.2  /  Alb  3.8  /  TBili  0.5  /  DBili  x   /  AST  14  /  ALT  6   /  AlkPhos  188<H>  08-28  POCT Blood Glucose.: 152 mg/dL (08-29-23 @ 16:56)  A1C with Estimated Average Glucose Result: 6.1 % (07-03-23 @ 05:51)  A1C with Estimated Average Glucose Result: 6.0 % (02-28-23 @ 06:53)                          9.2    9.89  )-----------( 258      ( 29 Aug 2023 05:44 )             30.9

## 2023-08-29 NOTE — CONSULT NOTE ADULT - SUBJECTIVE AND OBJECTIVE BOX
Patient is a 88y old  Female who presents with a chief complaint of hypercapnic respiratory failure (28 Aug 2023 21:01)      HPI:  This is an 87yo W female w/ PMH as noted below. Pt comes to ER w/ worsening dyspnea. Pt has advanced COPD on home O2.  . Pt was @ [facility] 7/3/23   in respiratory failure due to COPD exacerbation. pt was intubated and went to ICU,  In ICU pt was extubated , left sided chest tube was placed for possible malignant effusion. During that hospitalization  pt signed DNI/DNR.  & was discharged to SNF.    Now pt again in respiratory distress from COPD.  Pt's PCO2  was 97  after being placed on 100% NRB.  Pt's MS worsened & required intubation as  family rescinded  DNR/DNI.   (28 Aug 2023 21:01)      PAST MEDICAL & SURGICAL HISTORY:  HTN (hypertension)      Afib  s/p ablation 2001      Goiter  no meds      Cellulitis  chronic      OA (osteoarthritis)      PVD (peripheral vascular disease)      COPD (chronic obstructive pulmonary disease)      Anxiety      Obesity      Acute on chronic systolic congestive heart failure      Edema of both lower legs      Required emergent intubation      H/O abdominal hysterectomy      H/O discectomy      H/O total knee replacement, bilateral          SOCIAL HX:   Smoking                         ETOH                            Other    FAMILY HISTORY:  :  No known cardiovacular family hisotry     Review Of Systems:     All ROS are negative except per HPI       Allergies    aspirin (Other)  codeine (Other)  sulfa drugs (Hives; Other)  penicillin (Other)  contrast media (iodine-based) (Other)    Intolerances          PHYSICAL EXAM    ICU Vital Signs Last 24 Hrs  T(C): 36.3 (29 Aug 2023 07:10), Max: 38.8 (28 Aug 2023 10:53)  T(F): 97.3 (29 Aug 2023 07:10), Max: 101.9 (28 Aug 2023 10:53)  HR: 79 (29 Aug 2023 08:03) (79 - 130)  BP: 157/74 (29 Aug 2023 06:00) (98/65 - 165/74)  BP(mean): 106 (29 Aug 2023 06:00) (93 - 106)  ABP: --  ABP(mean): --  RR: 20 (29 Aug 2023 09:01) (20 - 31)  SpO2: 99% (29 Aug 2023 08:03) (94% - 110%)    O2 Parameters below as of 29 Aug 2023 05:05  Patient On (Oxygen Delivery Method): ventilator            CONSTITUTIONAL:  intubated     CARDIAC:   tachycardic     RESPIRATORY:   mechanical BS     GASTROINTESTINAL:  Abdomen soft,   Non-tender,   No guarding,     NEUROLOGICAL:   sedated     08-28-23 @ 07:01  -  08-29-23 @ 07:00  --------------------------------------------------------  IN:    FentaNYL: 235 mL    Midazolam: 12 mL  Total IN: 247 mL    OUT:    Indwelling Catheter - Urethral (mL): 585 mL  Total OUT: 585 mL    Total NET: -338 mL      08-29-23 @ 07:01  -  08-29-23 @ 10:38  --------------------------------------------------------  IN:  Total IN: 0 mL    OUT:    Indwelling Catheter - Urethral (mL): 75 mL  Total OUT: 75 mL    Total NET: -75 mL          LABS:                          9.2    9.89  )-----------( 258      ( 29 Aug 2023 05:44 )             30.9                                               08-29    141  |  101  |  23<H>  ----------------------------<  123<H>  3.7   |  30  |  0.9    Ca    8.8      29 Aug 2023 05:44  Phos  2.9     08-29  Mg     1.8     08-29    TPro  7.2  /  Alb  3.8  /  TBili  0.5  /  DBili  x   /  AST  14  /  ALT  6   /  AlkPhos  188<H>  08-28      PT/INR - ( 28 Aug 2023 11:00 )   PT: 16.80 sec;   INR: 1.46 ratio         PTT - ( 28 Aug 2023 11:00 )  PTT:33.1 sec                                       Urinalysis Basic - ( 29 Aug 2023 05:44 )    Color: x / Appearance: x / SG: x / pH: x  Gluc: 123 mg/dL / Ketone: x  / Bili: x / Urobili: x   Blood: x / Protein: x / Nitrite: x   Leuk Esterase: x / RBC: x / WBC x   Sq Epi: x / Non Sq Epi: x / Bacteria: x        CARDIAC MARKERS ( 28 Aug 2023 11:00 )  x     / <0.01 ng/mL / x     / x     / x                                                LIVER FUNCTIONS - ( 28 Aug 2023 11:00 )  Alb: 3.8 g/dL / Pro: 7.2 g/dL / ALK PHOS: 188 U/L / ALT: 6 U/L / AST: 14 U/L / GGT: x                                                                                               Mode: AC/ CMV (Assist Control/ Continuous Mandatory Ventilation)  RR (machine): 20  TV (machine): 400  FiO2: 50  PEEP: 5  ITime: 0.9  MAP: 11  PIP: 31                                      ABG - ( 29 Aug 2023 03:59 )  pH, Arterial: 7.48  pH, Blood: x     /  pCO2: 43    /  pO2: 99    / HCO3: 32    / Base Excess: 7.6   /  SaO2: 99.2                X-Rays reviewed                                                                                     ECHO    CXR interpreted by me     MEDICATIONS  (STANDING):  albuterol    90 MICROgram(s) HFA Inhaler 2 Puff(s) Inhalation every 4 hours  chlorhexidine 2% Cloths 1 Application(s) Topical <User Schedule>  dextrose 5%. 1000 milliLiter(s) (50 mL/Hr) IV Continuous <Continuous>  dextrose 50% Injectable 25 Gram(s) IV Push once  enoxaparin Injectable 90 milliGRAM(s) SubCutaneous every 12 hours  fentaNYL   Infusion. 0.5 MICROgram(s)/kG/Hr (4.1 mL/Hr) IV Continuous <Continuous>  glucagon  Injectable 1 milliGRAM(s) IntraMuscular once  insulin lispro (ADMELOG) corrective regimen sliding scale   SubCutaneous three times a day before meals  methylPREDNISolone sodium succinate Injectable 40 milliGRAM(s) IV Push every 8 hours  midazolam Infusion 0.02 mG/kG/Hr (1.64 mL/Hr) IV Continuous <Continuous>  pantoprazole  Injectable 40 milliGRAM(s) IV Push daily    MEDICATIONS  (PRN):  dextrose Oral Gel 15 Gram(s) Oral once PRN Blood Glucose LESS THAN 70 milliGRAM(s)/deciliter

## 2023-08-29 NOTE — DIETITIAN INITIAL EVALUATION ADULT - OTHER INFO
pt is 88 year old female with hx of CHF, afib s/p ablation, anxiety, cellulitis, COPD, B/L LE edema, HTN, OA, obesity, PVD, FLAVIO, B/L TKR recently admitted 7/23/23 with respiratory failure 2/2 COPD exacerbation with intubation and L chest tube warranted, pt was discharged to SNF. pt now p/w respiratory distress s/p intubation. admitted with hypercapnic respiratory failure, B/L pleural effusions.

## 2023-08-29 NOTE — PROGRESS NOTE ADULT - SUBJECTIVE AND OBJECTIVE BOX
Patient seen and evaluated this am, sedated on ventilator with fentanyl and versed      T(F): 95.7 (08-29-23 @ 15:30), Max: 98.8 (08-28-23 @ 21:03)  HR: 66 (08-29-23 @ 17:00)  BP: 140/64 (08-29-23 @ 17:00)  RR: 20 (08-29-23 @ 17:00)  SpO2: 100% (08-29-23 @ 17:00) (98% - 110%)    PHYSICAL EXAM:  GENERAL: NAD  HEAD:  Atraumatic, Normocephalic  EYES: EOMI, PERRLA, conjunctiva and sclera clear  NERVOUS SYSTEM: no focal deficits   CHEST/LUNG: bilateral rhonchi  HEART: Regular rate and rhythm; No murmurs, rubs, or gallops  ABDOMEN: Soft, Nontender, Nondistended; Bowel sounds present  EXTREMITIES:  2+ Peripheral Pulses, No clubbing, cyanosis, or edema    LABS  08-29    141  |  101  |  23<H>  ----------------------------<  123<H>  3.7   |  30  |  0.9    Ca    8.8      29 Aug 2023 05:44  Phos  2.9     08-29  Mg     1.8     08-29    TPro  7.2  /  Alb  3.8  /  TBili  0.5  /  DBili  x   /  AST  14  /  ALT  6   /  AlkPhos  188<H>  08-28                          9.2    9.89  )-----------( 258      ( 29 Aug 2023 05:44 )             30.9     PT/INR - ( 28 Aug 2023 11:00 )   PT: 16.80 sec;   INR: 1.46 ratio         PTT - ( 28 Aug 2023 11:00 )  PTT:33.1 sec    Mode: AC/ CMV (Assist Control/ Continuous Mandatory Ventilation)  RR (machine): 20  TV (machine): 400  FiO2: 50  PEEP: 5      CARDIAC ENZYMES    Troponin T, Serum: <0.01 ng/mL (08-28-23 @ 11:00)    < from: 12 Lead ECG (08.28.23 @ 10:51) >    Atrial fibrillation with rapid ventricular response with premature    < end of copied text >  < from: TTE Echo Complete w/o Contrast w/ Doppler (07.03.23 @ 13:25) >  Summary:   1. Left ventricular ejection fraction, by visual estimation, is 55 to   60%.   2. Mildly enlarged left atrium.   3. Mildly enlarged right atrium.   4. Mild mitral annular calcification.   5. Mild mitral valve regurgitation.   6. Moderate tricuspid regurgitation.   7. Sclerotic aortic valve with normal opening.   8. Estimated pulmonary artery systolic pressure is 36.8 mmHg assuming a   right atrial pressure of 3 mmHg, which is consistent with borderline   pulmonary hypertension.   9. There is mild aortic root calcification.      < end of copied text >      RADIOLOGY  < from: CT Angio Chest PE Protocol w/ IV Cont (08.28.23 @ 13:18) >  IMPRESSION:    No CT evidence of acute pulmonary embolus.    Stable bilateral moderate pleural effusions, greater on the right. Stable   position of the left pleural space drainage catheter.    Increase left lung and new right middle lobe consolidations as described.   Findings may represent infectious/inflammatory process.    < end of copied text >    MEDICATIONS  (STANDING):  albuterol    90 MICROgram(s) HFA Inhaler 2 Puff(s) Inhalation every 4 hours  azithromycin  IVPB      cefTRIAXone   IVPB      chlorhexidine 0.12% Liquid 15 milliLiter(s) Oral Mucosa every 12 hours  chlorhexidine 2% Cloths 1 Application(s) Topical <User Schedule>  dextrose 5%. 1000 milliLiter(s) (50 mL/Hr) IV Continuous <Continuous>  dextrose 50% Injectable 25 Gram(s) IV Push once  enoxaparin Injectable 90 milliGRAM(s) SubCutaneous every 12 hours  fentaNYL   Infusion. 0.5 MICROgram(s)/kG/Hr (4.1 mL/Hr) IV Continuous <Continuous>  insulin lispro (ADMELOG) corrective regimen sliding scale   SubCutaneous three times a day before meals  methylPREDNISolone sodium succinate Injectable 40 milliGRAM(s) IV Push every 8 hours  pantoprazole  Injectable 40 milliGRAM(s) IV Push daily    MEDICATIONS  (PRN):  dextrose Oral Gel 15 Gram(s) Oral once PRN Blood Glucose LESS THAN 70 milliGRAM(s)/deciliter

## 2023-08-30 NOTE — PROGRESS NOTE ADULT - ASSESSMENT
IMPRESSION:  acute hypercapnic res failure s/p intubation   COPD exacerbation   Sepsis present on admission   CAP  afib on eliquis  H/O recurrent pleural effusions s/p pleurex   H/O malignant pleural effusion   H/O HFpEF    PLAN:    CNS: d/c versed, precedx + fentenyl for sedation, SAT     HEENT: oral care     PULMONARY: aspiration precaution, HOB@ 45 degrees, daily abg/xray, keep pco2 around 50, solumedrol 40 BID, albuterl PRN, vent changes:  RR 16 fio2 50 PEEP 5    CARDIOVASCULAR: echo, trops noted. BNP elevated, lasix 40mg x1 now, keep I<os, baseline ekg    GI: GI prophylaxis.  Feeding,     RENAL: f/u lytes correct as needed,     INFECTIOUS DISEASE: start on ctx and azithro, ua, ucx, bcx, RVP, procal, urine legion/step, DTA     HEMATOLOGICAL: [facility], f/u with oncologist    ENDOCRINE:  Follow up FS.  Insulin protocol if needed.    MUSCULOSKELETAL: bedbound    full code for now (DNR/DNI was rescinded by family)  ICU  al IMPRESSION:  acute hypercapnic res failure s/p intubation   COPD exacerbation   Sepsis present on admission   CAP  afib on eliquis  recurrent pleural effusions s/p pleurex   malignant pleural effusion   H/O HFpEF    PLAN:    CNS: d/c versed, precedx + fentenyl for sedation, SAT     HEENT: oral care     PULMONARY: aspiration precaution, HOB@ 45 degrees, daily abg/xray, keep pco2 around 50, solumedrol 40 BID, albuterl PRN, vent changes:  RR 16 fio2 50 PEEP 5  POCUR pleural spaces    CARDIOVASCULAR: echo, trops noted. BNP elevated, lasix 40mg x1 now, keep I<os, baseline ekg    GI: GI prophylaxis.  Feeding,     RENAL: f/u lytes correct as needed,     INFECTIOUS DISEASE: start on ctx and azithro, ua, ucx, bcx, RVP, procal, urine legion/step, DTA     HEMATOLOGICAL: [facility], f/u with oncologist    ENDOCRINE:  Follow up FS.  Insulin protocol if needed.    MUSCULOSKELETAL: bedbound    full code for now (DNR/DNI was rescinded by family)  ICU  servando

## 2023-08-30 NOTE — PROGRESS NOTE ADULT - SUBJECTIVE AND OBJECTIVE BOX
Patient is a 88y old  Female who presents with a chief complaint of hypercapnic respiratory failure (30 Aug 2023 09:49)        Over Night Events: no overnight events        ROS:     All ROS are negative except HPI         PHYSICAL EXAM    ICU Vital Signs Last 24 Hrs  T(C): 37.3 (30 Aug 2023 07:03), Max: 37.9 (30 Aug 2023 01:00)  T(F): 99.1 (30 Aug 2023 07:03), Max: 100.2 (30 Aug 2023 01:00)  HR: 101 (30 Aug 2023 07:54) (66 - 129)  BP: 129/91 (30 Aug 2023 06:00) (93/54 - 156/79)  BP(mean): 104 (30 Aug 2023 06:00) (69 - 110)  ABP: --  ABP(mean): --  RR: 20 (30 Aug 2023 07:03) (20 - 25)  SpO2: 100% (30 Aug 2023 07:54) (98% - 100%)    O2 Parameters below as of 30 Aug 2023 05:07  Patient On (Oxygen Delivery Method): ventilator    O2 Concentration (%): 50        CONSTITUTIONAL:  Well nourished.  NAD    ENT:   Airway patent,   Mouth with normal mucosa.   No thrush    EYES:   Pupils equal,   Round and reactive to light.    CARDIAC:   Normal rate,   Regular rhythm.    No edema      Vascular:  Normal systolic impulse  No Carotid bruits    RESPIRATORY:   No wheezing  Bilateral BS  Normal chest expansion  Not tachypneic,  No use of accessory muscles    GASTROINTESTINAL:  Abdomen soft,   Non-tender,   No guarding,   + BS    MUSCULOSKELETAL:   Range of motion is not limited,  No clubbing, cyanosis    NEUROLOGICAL:   Alert and oriented   No motor  deficits.    SKIN:   Skin normal color for race,   Warm and dry and intact.   No evidence of rash.    PSYCHIATRIC:   Normal mood and affect.   No apparent risk to self or others.    HEMATOLOGICAL:  No cervical  lymphadenopathy.  no inguinal lymphadenopathy      08-29-23 @ 07:01  -  08-30-23 @ 07:00  --------------------------------------------------------  IN:    FentaNYL: 645 mL    IV PiggyBack: 150 mL    Midazolam: 11 mL    Peptamen A.F.: 240 mL  Total IN: 1046 mL    OUT:    Indwelling Catheter - Urethral (mL): 555 mL  Total OUT: 555 mL    Total NET: 491 mL      08-30-23 @ 07:01  -  08-30-23 @ 10:38  --------------------------------------------------------  IN:  Total IN: 0 mL    OUT:    Voided (mL): 45 mL  Total OUT: 45 mL    Total NET: -45 mL          LABS:                            8.9    13.42 )-----------( 336      ( 30 Aug 2023 06:55 )             29.4                                               08-30    137  |  98  |  32<H>  ----------------------------<  132<H>  3.7   |  27  |  1.0    Ca    8.8      30 Aug 2023 06:55  Phos  3.3     08-30  Mg     2.4     08-30    TPro  6.6  /  Alb  3.3<L>  /  TBili  0.3  /  DBili  x   /  AST  12  /  ALT  8   /  AlkPhos  148<H>  08-30      PT/INR - ( 28 Aug 2023 11:00 )   PT: 16.80 sec;   INR: 1.46 ratio         PTT - ( 28 Aug 2023 11:00 )  PTT:33.1 sec                                       Urinalysis Basic - ( 30 Aug 2023 06:55 )    Color: x / Appearance: x / SG: x / pH: x  Gluc: 132 mg/dL / Ketone: x  / Bili: x / Urobili: x   Blood: x / Protein: x / Nitrite: x   Leuk Esterase: x / RBC: x / WBC x   Sq Epi: x / Non Sq Epi: x / Bacteria: x        CARDIAC MARKERS ( 28 Aug 2023 11:00 )  x     / <0.01 ng/mL / x     / x     / x                                                LIVER FUNCTIONS - ( 30 Aug 2023 06:55 )  Alb: 3.3 g/dL / Pro: 6.6 g/dL / ALK PHOS: 148 U/L / ALT: 8 U/L / AST: 12 U/L / GGT: x                                                  Culture - Blood (collected 28 Aug 2023 11:00)  Source: .Blood Blood-Peripheral  Preliminary Report (29 Aug 2023 23:01):    No growth at 24 hours    Culture - Blood (collected 28 Aug 2023 11:00)  Source: .Blood Blood-Peripheral  Preliminary Report (29 Aug 2023 23:01):    No growth at 24 hours                                                   Mode: AC/ CMV (Assist Control/ Continuous Mandatory Ventilation)  RR (machine): 20  TV (machine): 400  FiO2: 50  PEEP: 5  ITime: 0.9  MAP: 10  PIP: 25                                      ABG - ( 30 Aug 2023 04:07 )  pH, Arterial: 7.53  pH, Blood: x     /  pCO2: 41    /  pO2: 135   / HCO3: 34    / Base Excess: 10.5  /  SaO2: 99.9                MEDICATIONS  (STANDING):  albuterol    90 MICROgram(s) HFA Inhaler 2 Puff(s) Inhalation every 4 hours  azithromycin  IVPB      azithromycin  IVPB 500 milliGRAM(s) IV Intermittent every 24 hours  cefTRIAXone   IVPB      cefTRIAXone   IVPB 1000 milliGRAM(s) IV Intermittent every 24 hours  chlorhexidine 0.12% Liquid 15 milliLiter(s) Oral Mucosa every 12 hours  chlorhexidine 2% Cloths 1 Application(s) Topical <User Schedule>  dextrose 5%. 1000 milliLiter(s) (50 mL/Hr) IV Continuous <Continuous>  dextrose 50% Injectable 25 Gram(s) IV Push once  enoxaparin Injectable 90 milliGRAM(s) SubCutaneous every 12 hours  fentaNYL   Infusion. 0.5 MICROgram(s)/kG/Hr (4.1 mL/Hr) IV Continuous <Continuous>  glucagon  Injectable 1 milliGRAM(s) IntraMuscular once  insulin lispro (ADMELOG) corrective regimen sliding scale   SubCutaneous three times a day before meals  methylPREDNISolone sodium succinate Injectable 40 milliGRAM(s) IV Push every 12 hours  pantoprazole  Injectable 40 milliGRAM(s) IV Push daily    MEDICATIONS  (PRN):  dextrose Oral Gel 15 Gram(s) Oral once PRN Blood Glucose LESS THAN 70 milliGRAM(s)/deciliter      New X-rays reviewed:                                                                                  ECHO    CXR interpreted by me:

## 2023-08-30 NOTE — PROGRESS NOTE ADULT - SUBJECTIVE AND OBJECTIVE BOX
Patient seen and evaluated this am, sedated on ventilator with fentanyl      T(F): 99.1 (08-30-23 @ 07:03), Max: 100.2 (08-30-23 @ 01:00)  HR: 101 (08-30-23 @ 07:54)  BP: 129/91 (08-30-23 @ 06:00)  RR: 20 (08-30-23 @ 07:03)  SpO2: 100% (08-30-23 @ 07:54) (98% - 100%)    PHYSICAL EXAM:  GENERAL: NAD  HEAD:  Atraumatic, Normocephalic  EYES: EOMI, PERRLA, conjunctiva and sclera clear  NERVOUS SYSTEM:  no focal deficits   CHEST/LUNG:  bilateral rales  HEART: Regular rate and rhythm; No murmurs, rubs, or gallops  ABDOMEN: Soft, Nontender, Nondistended; Bowel sounds present  EXTREMITIES:  2+ Peripheral Pulses, No clubbing, cyanosis, or edema    LABS  08-30    137  |  98  |  32<H>  ----------------------------<  132<H>  3.7   |  27  |  1.0    Ca    8.8      30 Aug 2023 06:55  Phos  3.3     08-30  Mg     2.4     08-30    TPro  6.6  /  Alb  3.3<L>  /  TBili  0.3  /  DBili  x   /  AST  12  /  ALT  8   /  AlkPhos  148<H>  08-30                          8.9    13.42 )-----------( 336      ( 30 Aug 2023 06:55 )             29.4     PT/INR - ( 28 Aug 2023 11:00 )   PT: 16.80 sec;   INR: 1.46 ratio         PTT - ( 28 Aug 2023 11:00 )  PTT:33.1 sec    Mode: AC/ CMV (Assist Control/ Continuous Mandatory Ventilation)  RR (machine): 20  TV (machine): 400  FiO2: 50  PEEP: 5    CARDIAC ENZYMES    Troponin T, Serum: <0.01 ng/mL (08-28-23 @ 11:00)      Culture Results:   No growth at 24 hours (08-28-23)  Culture Results:   No growth at 24 hours (08-28-23)    RADIOLOGY  < from: Xray Chest 1 View- PORTABLE-Routine (Xray Chest 1 View- PORTABLE-Routine in AM.) (08.30.23 @ 06:22) >  Impression:    Unchanged bilateral pleural effusions and bibasilar opacities.    < end of copied text >    MEDICATIONS  (STANDING):  albuterol    90 MICROgram(s) HFA Inhaler 2 Puff(s) Inhalation every 4 hours    azithromycin  IVPB 500 milliGRAM(s) IV Intermittent every 24 hours      cefTRIAXone   IVPB 1000 milliGRAM(s) IV Intermittent every 24 hours  chlorhexidine 0.12% Liquid 15 milliLiter(s) Oral Mucosa every 12 hours  chlorhexidine 2% Cloths 1 Application(s) Topical <User Schedule>  enoxaparin Injectable 90 milliGRAM(s) SubCutaneous every 12 hours  fentaNYL   Infusion. 0.5 MICROgram(s)/kG/Hr (4.1 mL/Hr) IV Continuous <Continuous>  insulin lispro (ADMELOG) corrective regimen sliding scale   SubCutaneous three times a day before meals  methylPREDNISolone sodium succinate Injectable 40 milliGRAM(s) IV Push every 12 hours  pantoprazole  Injectable 40 milliGRAM(s) IV Push daily    MEDICATIONS  (PRN):  dextrose Oral Gel 15 Gram(s) Oral once PRN Blood Glucose LESS THAN 70 milliGRAM(s)/deciliter

## 2023-08-30 NOTE — PROGRESS NOTE ADULT - ASSESSMENT
89 YO F with a medical history  of HTN, malignant pleural effusion of breast origin, chronic A.fib, PAD, Obesity, Chronic lymphedema of  BLE and COPD on home oxygen 2 - 1/2 L nc, ESBL UTI presented to ED with severe SOB, DNR / DNI rescinded and pt was intubated and admitted to ICU    acute respiratory failure / B/L effusions / possible pneumonia / COPD     -  DNR and DNI rescinded   - consult palliative   - continue antibiotics and steroids   - Lovenox   - pulmonary following

## 2023-08-31 NOTE — PROGRESS NOTE ADULT - SUBJECTIVE AND OBJECTIVE BOX
Patient is a 88y old  Female who presents with a chief complaint of hypercapnic respiratory failure (30 Aug 2023 10:37)        Over Night Events:        ROS:     All ROS are negative except HPI         PHYSICAL EXAM    ICU Vital Signs Last 24 Hrs  T(C): 36.3 (31 Aug 2023 07:10), Max: 37.2 (30 Aug 2023 15:16)  T(F): 97.3 (31 Aug 2023 07:10), Max: 99 (30 Aug 2023 15:16)  HR: 96 (31 Aug 2023 07:42) (65 - 142)  BP: 161/73 (31 Aug 2023 07:42) (105/62 - 170/100)  BP(mean): 105 (31 Aug 2023 07:42) (77 - 128)  ABP: --  ABP(mean): --  RR: 17 (31 Aug 2023 09:00) (16 - 31)  SpO2: 100% (31 Aug 2023 07:42) (96% - 100%)    O2 Parameters below as of 31 Aug 2023 06:00  Patient On (Oxygen Delivery Method): ventilator    O2 Concentration (%): 40    CONSTITUTIONAL:  Well nourished.  NAD    CARDIAC:   Normal rate,   Regular rhythm.    No edema    Vascular:  Normal systolic impulse  No Carotid bruits    RESPIRATORY:   bl diminished    GASTROINTESTINAL:  Abdomen soft,   Non-tender,   No guarding,     MUSCULOSKELETAL:   Range of motion is not limited,  No clubbing, cyanosis    NEUROLOGICAL:   following commands     SKIN:   Skin normal color for race,   Warm and dry and intact.   No evidence of rash.    no inguinal lymphadenopathy      08-30-23 @ 07:01  -  08-31-23 @ 07:00  --------------------------------------------------------  IN:    FentaNYL: 582 mL    IV PiggyBack: 150 mL    Peptamen A.F.: 900 mL  Total IN: 1632 mL    OUT:    Intermittent Catheterization - Urethral (mL): 400 mL    Voided (mL): 246 mL  Total OUT: 646 mL    Total NET: 986 mL      LABS:                            8.9    13.42 )-----------( 336      ( 30 Aug 2023 06:55 )             29.4                                               08-30    137  |  98  |  32<H>  ----------------------------<  132<H>  3.7   |  27  |  1.0    Ca    8.8      30 Aug 2023 06:55  Phos  3.3     08-30  Mg     2.4     08-30    TPro  6.6  /  Alb  3.3<L>  /  TBili  0.3  /  DBili  x   /  AST  12  /  ALT  8   /  AlkPhos  148<H>  08-30                                             Urinalysis Basic - ( 30 Aug 2023 06:55 )    Color: x / Appearance: x / SG: x / pH: x  Gluc: 132 mg/dL / Ketone: x  / Bili: x / Urobili: x   Blood: x / Protein: x / Nitrite: x   Leuk Esterase: x / RBC: x / WBC x   Sq Epi: x / Non Sq Epi: x / Bacteria: x                                                  LIVER FUNCTIONS - ( 30 Aug 2023 06:55 )  Alb: 3.3 g/dL / Pro: 6.6 g/dL / ALK PHOS: 148 U/L / ALT: 8 U/L / AST: 12 U/L / GGT: x                                                  Culture - Blood (collected 28 Aug 2023 11:00)  Source: .Blood Blood-Peripheral  Preliminary Report (30 Aug 2023 23:00):    No growth at 48 Hours    Culture - Blood (collected 28 Aug 2023 11:00)  Source: .Blood Blood-Peripheral  Preliminary Report (30 Aug 2023 23:00):    No growth at 48 Hours                                                   Mode: AC/ CMV (Assist Control/ Continuous Mandatory Ventilation)  RR (machine): 16  TV (machine): 400  FiO2: 40  PEEP: 5  MAP: 14  PIP: 34                                      ABG - ( 31 Aug 2023 03:39 )  pH, Arterial: 7.40  pH, Blood: x     /  pCO2: 51    /  pO2: 114   / HCO3: 32    / Base Excess: 5.3   /  SaO2: 98.7          MEDICATIONS  (STANDING):  albuterol    90 MICROgram(s) HFA Inhaler 2 Puff(s) Inhalation every 4 hours  azithromycin  IVPB      azithromycin  IVPB 500 milliGRAM(s) IV Intermittent every 24 hours  cefTRIAXone   IVPB 1000 milliGRAM(s) IV Intermittent every 24 hours  cefTRIAXone   IVPB      chlorhexidine 0.12% Liquid 15 milliLiter(s) Oral Mucosa every 12 hours  chlorhexidine 2% Cloths 1 Application(s) Topical <User Schedule>  dexMEDEtomidine Infusion 0.05 MICROgram(s)/kG/Hr (1.1 mL/Hr) IV Continuous <Continuous>  dextrose 5%. 1000 milliLiter(s) (50 mL/Hr) IV Continuous <Continuous>  dextrose 50% Injectable 25 Gram(s) IV Push once  fentaNYL   Infusion. 0.5 MICROgram(s)/kG/Hr (4.1 mL/Hr) IV Continuous <Continuous>  glucagon  Injectable 1 milliGRAM(s) IntraMuscular once  insulin lispro (ADMELOG) corrective regimen sliding scale   SubCutaneous three times a day before meals  methylPREDNISolone sodium succinate Injectable 40 milliGRAM(s) IV Push every 12 hours  pantoprazole  Injectable 40 milliGRAM(s) IV Push daily  polyethylene glycol 3350 17 Gram(s) Oral two times a day  senna 2 Tablet(s) Oral at bedtime    MEDICATIONS  (PRN):  dextrose Oral Gel 15 Gram(s) Oral once PRN Blood Glucose LESS THAN 70 milliGRAM(s)/deciliter      New X-rays reviewed:                                                                                  ECHO    CXR interpreted by me:

## 2023-08-31 NOTE — CHART NOTE - NSCHARTNOTEFT_GEN_A_CORE
Registered Dietitian Follow-Up     Patient Profile Reviewed                           Yes [x]   No []     Nutrition History Previously Obtained        Yes []  No [x]       Pertinent Subjective Information:  Spoke with RN this morning concerning EN. Patient tolerating feeds well. No BM - yet, but recently started on bowel regimen; Planned for extubation today      Pertinent Medical Interventions:  Acute hypercapnic res failure s/p intubation - on precedex + fentanyl for sedation; SBT today.      Diet order:   Diet, NPO:   Tube Feeding Modality: Nasogastric  Peptamen A.F. Formula  Continuous  Starting Tube Feed Rate {mL per Hour}: 20  Increase Tube Feed Rate by (mL): 10     Every 2 hours  Until Goal Tube Feed Rate (mL per Hour): 50  Tube Feed Duration (in Hours): 24  Tube Feed Start Time: 01:00 (23 @ 00:49) [Active]    Anthropometrics:  Height (cm): 170.2 (23 @ 21:03)  Weight (kg): 87.8 (23 @ 21:03)  BMI (kg/m2): 30.3 (23 @ 21:03)  IBW: 61.4 kg     Daily Weight in k.5 (-31), Weight in k.2 (-30), Weight in k.4 (-29)    MEDICATIONS  (STANDING):  albuterol    90 MICROgram(s) HFA Inhaler 2 Puff(s) Inhalation every 4 hours  azithromycin  IVPB 500 milliGRAM(s) IV Intermittent every 24 hours  azithromycin  IVPB      cefTRIAXone   IVPB      cefTRIAXone   IVPB 1000 milliGRAM(s) IV Intermittent every 24 hours  chlorhexidine 0.12% Liquid 15 milliLiter(s) Oral Mucosa every 12 hours  chlorhexidine 2% Cloths 1 Application(s) Topical <User Schedule>  dexMEDEtomidine Infusion 0.05 MICROgram(s)/kG/Hr (1.1 mL/Hr) IV Continuous <Continuous>  dextrose 5%. 1000 milliLiter(s) (50 mL/Hr) IV Continuous <Continuous>  dextrose 50% Injectable 25 Gram(s) IV Push once  fentaNYL   Infusion. 0.5 MICROgram(s)/kG/Hr (4.1 mL/Hr) IV Continuous <Continuous>  glucagon  Injectable 1 milliGRAM(s) IntraMuscular once  insulin lispro (ADMELOG) corrective regimen sliding scale   SubCutaneous three times a day before meals  pantoprazole  Injectable 40 milliGRAM(s) IV Push daily  polyethylene glycol 3350 17 Gram(s) Oral two times a day  senna 2 Tablet(s) Oral at bedtime    MEDICATIONS  (PRN):  dextrose Oral Gel 15 Gram(s) Oral once PRN Blood Glucose LESS THAN 70 milliGRAM(s)/deciliter    Pertinent Labs:   : H/H 9.2 / 31.2 (L); : Na 137, K+ 3.7, Chloride 98, BUN 32 (H), Cr 1.0, Glucose 132 (H), Calcium 8.8, Alk Phos 148 (H), eGFR 55 (L)    Finger Sticks:  POCT Blood Glucose.: 151 mg/dL ( @ 11:31)  POCT Blood Glucose.: 138 mg/dL ( @ 06:04)  POCT Blood Glucose.: 158 mg/dL ( @ 01:28)  POCT Blood Glucose.: 160 mg/dL ( @ 17:39)    Physical Findings:  - Appearance: intubated; sedated   - GI function: no BM yet; but on bowel regimen  - Tubes: +OGT  - Oral/Mouth cavity: NPO w/ TF via OGT  - Skin: no documented pressure injuries      Nutrition Requirements:  Weight Used: 87.8 kg for energy needs; IBW 61.4 for protein estimations per initial RD assessment      Estimated Energy Needs    Continue [x]  Adjust []  1317 - 1756 kcal/day (15 - 20 kcal/kg)      Estimated Protein Needs    Continue [x]  Adjust []  91 - 122 gm/day (1.5 - 2.0 gm/kg IBW)      Estimated Fluid Needs        Continue [x]  Adjust []  1317 - 1756 mL/day (15-20 mL/kg)      Nutrient Intake: Current Regimen provides: 1200 mL total volume, 1440 kcal, 90 gm Protein, 972 mL free water     [x] Previous Nutrition Diagnosis:  Inadequate Protein Energy Intake             [x] Ongoing          [] Resolved     Nutrition Intervention:  EN, coordination of care     Goal/Expected Outcome:   Patient to meet >80% of estimated needs within 3-5 days      Indicator/Monitoring:   energy intake, weight, labs, skin status, NFPE     Recommendation:  1) When extubated would recommend SLP evaluation as feasible to determine if PO diet is feasible  2) If patient remains on EN would recommend to maintain on Peptamen AF via NGT as it is well tolerated by patient  3) Continue to monitor BM, GI s/s     Patient continues at high nutrition risk, RD to f/u in 2-4 days or PRN    RD to remain available: Cheryl Gutierrez, RD [phone] or via Teams

## 2023-08-31 NOTE — PROGRESS NOTE ADULT - ASSESSMENT
87 YO F with a medical history  of HTN, malignant pleural effusion of breast origin, chronic A.fib, PAD, Obesity, Chronic lymphedema of  BLE and COPD on home oxygen 2 - 1/2 L nc, ESBL UTI presented to ED with severe SOB, DNR / DNI rescinded and pt was intubated and admitted to ICU    acute respiratory failure / B/L effusions / possible pneumonia / COPD     -  DNR and DNI rescinded   - consult palliative   - continue antibiotics and steroids   - Lovenox   - pulmonary following

## 2023-08-31 NOTE — PROGRESS NOTE ADULT - ASSESSMENT
IMPRESSION:  acute hypercapnic res failure s/p intubation   COPD exacerbation   Sepsis present on admission   CAP  afib on eliquis  recurrent pleural effusions s/p pleurex   malignant pleural effusion   H/O HFpEF    PLAN:    CNS: d/c versed, precedx + fentenyl for sedation, SAT     HEENT: oral care    PULMONARY: aspiration precaution, HOB@ 45 degrees, daily abg/xray, keep pco2 around 50, solumedrol 40 qd, albuterl PRN, vent changes:  RR 16 fio2 50 PEEP 5  POCUS, large right sided pleural effusion  SBT today   talk to family regarding possibility of thora     CARDIOVASCULAR: echo, trops noted. BNP elevated, lasix 40mg x1 now, keep I<os, baseline ekg    GI: GI prophylaxis.  Feeding,     RENAL: f/u lytes correct as needed,     INFECTIOUS DISEASE: start on ctx and azithro, ua, ucx, bcx, RVP, procal, urine legion/step, DTA     HEMATOLOGICAL: LW hold for thora, f/u with oncologist    ENDOCRINE:  Follow up FS.  Insulin protocol if needed.    MUSCULOSKELETAL: bedbound    full code for now (DNR/DNI was rescinded by family)  ICU  al d/c, straight cath and bladder scan q6hrs

## 2023-08-31 NOTE — PROGRESS NOTE ADULT - SUBJECTIVE AND OBJECTIVE BOX
Patient seen and evaluated this am, sedated on ventilator with fentanyl      T(F): 97.9 (08-31-23 @ 11:00), Max: 99 (08-30-23 @ 15:16)  HR: 96 (08-31-23 @ 07:42)  BP: 161/73 (08-31-23 @ 07:42)  RR: 16 (08-31-23 @ 11:00)  SpO2: 100% (08-31-23 @ 07:42) (96% - 100%)    PHYSICAL EXAM:  GENERAL: NAD  HEAD:  Atraumatic, Normocephalic  EYES: EOMI, PERRLA, conjunctiva and sclera clear  NERVOUS SYSTEM:   no focal deficits   CHEST/LUNG:  bilateral rales  HEART: Regular rate and rhythm; No murmurs, rubs, or gallops  ABDOMEN: Soft, Nontender, Nondistended; Bowel sounds present  EXTREMITIES:  2+ Peripheral Pulses, No clubbing, cyanosis, or edema    LABS  08-30    137  |  98  |  32<H>  ----------------------------<  132<H>  3.7   |  27  |  1.0    Ca    8.8      30 Aug 2023 06:55  Phos  3.3     08-30  Mg     2.4     08-30    TPro  6.6  /  Alb  3.3<L>  /  TBili  0.3  /  DBili  x   /  AST  12  /  ALT  8   /  AlkPhos  148<H>  08-30                          8.9    13.42 )-----------( 336      ( 30 Aug 2023 06:55 )             29.4       Mode: AC/ CMV (Assist Control/ Continuous Mandatory Ventilation)  RR (machine): 16  TV (machine): 400  FiO2: 40  PEEP: 5      Culture Results:   No growth at 48 Hours (08-28-23)  Culture Results:   No growth at 48 Hours (08-28-23)    RADIOLOGY  < from: Xray Chest 1 View- PORTABLE-Routine (Xray Chest 1 View- PORTABLE-Routine .) (08.31.23 @ 06:33) >  Impression:    Stable bilateral lung opacities        < end of copied text >    MEDICATIONS  (STANDING):  albuterol    90 MICROgram(s) HFA Inhaler 2 Puff(s) Inhalation every 4 hours    azithromycin  IVPB 500 milliGRAM(s) IV Intermittent every 24 hours     cefTRIAXone   IVPB 1000 milliGRAM(s) IV Intermittent every 24 hours  chlorhexidine 0.12% Liquid 15 milliLiter(s) Oral Mucosa every 12 hours  chlorhexidine 2% Cloths 1 Application(s) Topical <User Schedule>  dexMEDEtomidine Infusion 0.05 MICROgram(s)/kG/Hr (1.1 mL/Hr) IV Continuous <Continuous>  dextrose 5%. 1000 milliLiter(s) (50 mL/Hr) IV Continuous <Continuous>  dextrose 50% Injectable 25 Gram(s) IV Push once  fentaNYL   Infusion. 0.5 MICROgram(s)/kG/Hr (4.1 mL/Hr) IV Continuous <Continuous>  furosemide   Injectable 40 milliGRAM(s) IV Push once  insulin lispro (ADMELOG) corrective regimen sliding scale   SubCutaneous three times a day before meals  pantoprazole  Injectable 40 milliGRAM(s) IV Push daily  polyethylene glycol 3350 17 Gram(s) Oral two times a day  senna 2 Tablet(s) Oral at bedtime    MEDICATIONS  (PRN):  dextrose Oral Gel 15 Gram(s) Oral once PRN Blood Glucose LESS THAN 70 milliGRAM(s)/deciliter

## 2023-08-31 NOTE — CHART NOTE - NSCHARTNOTEFT_GEN_A_CORE
Palliative care chart note    Palliative care consult received. Called and spoke with primary team. Patient is intubated and may be appropriate for medical extubation in coming days.  It was noted the patient had been DNR/DNI previously and it was rescinded on arrival, and the team is unclear if the family will wish for the patient to be intubated again.     Called and spoke with the patient's daughter and HCP, Karishma. I had spoken with Karishma back in July, and at that time, she made the patient DNR/DNI.  She noted that the patient always wanted to be Full code, and she and her family want the patient reintubated if needed following medical extubation. We discussed trach/PEG and she noted she's unclear if the patient would want trach/PEG. She would want the patient to have CPR/be intubated and discussion about trach/PEG could happen further with her family following that.  All questions answered    Full consult will follow tomorrow  [phone]

## 2023-08-31 NOTE — PROGRESS NOTE ADULT - SUBJECTIVE AND OBJECTIVE BOX
SUBJECTIVE / OVERNIGHT EVENTS  Unable to interview due to patient's current mental status. No acute overnight events reported by clinical staff this AM. No reported fevers, chills, respiratory distress, vomiting, or diarrhea.     MEDICATIONS  albuterol    90 MICROgram(s) HFA Inhaler 2 Puff(s) Inhalation every 4 hours  azithromycin  IVPB      azithromycin  IVPB 500 milliGRAM(s) IV Intermittent every 24 hours  cefTRIAXone   IVPB      cefTRIAXone   IVPB 1000 milliGRAM(s) IV Intermittent every 24 hours  chlorhexidine 0.12% Liquid 15 milliLiter(s) Oral Mucosa every 12 hours  chlorhexidine 2% Cloths 1 Application(s) Topical <User Schedule>  dexMEDEtomidine Infusion 0.05 MICROgram(s)/kG/Hr IV Continuous <Continuous>  dextrose 5%. 1000 milliLiter(s) IV Continuous <Continuous>  dextrose 50% Injectable 25 Gram(s) IV Push once  fentaNYL   Infusion. 0.5 MICROgram(s)/kG/Hr IV Continuous <Continuous>  glucagon  Injectable 1 milliGRAM(s) IntraMuscular once  insulin lispro (ADMELOG) corrective regimen sliding scale   SubCutaneous three times a day before meals  pantoprazole  Injectable 40 milliGRAM(s) IV Push daily  polyethylene glycol 3350 17 Gram(s) Oral two times a day  senna 2 Tablet(s) Oral at bedtime    dextrose Oral Gel 15 Gram(s) Oral once PRN Blood Glucose LESS THAN 70 milliGRAM(s)/deciliter    VITALS /  EXAM    T(C): 36 (08-31-23 @ 19:00), Max: 36.8 (08-30-23 @ 23:00)  HR: 69 (08-31-23 @ 21:00) (65 - 113)  BP: 176/83 (08-31-23 @ 21:00) (105/62 - 185/90)  RR: 16 (08-31-23 @ 21:00) (16 - 30)  SpO2: 98% (08-31-23 @ 21:00) (98% - 100%)  POCT Blood Glucose.: 151 mg/dL (08-31-23 @ 18:26)  POCT Blood Glucose.: 151 mg/dL (08-31-23 @ 11:31)  POCT Blood Glucose.: 138 mg/dL (08-31-23 @ 06:04)  POCT Blood Glucose.: 158 mg/dL (08-31-23 @ 01:28)    GENERAL: intubated; sedated; agitated during SAT  CHEST/LUNG: decreased BS b/l lung bases  HEART: Regular rate and rhythm; No murmurs, rubs, or gallops  ABDOMEN: Soft, Nontender, Nondistended; Bowel sounds present, no masses.  EXTREMITIES:  2+ Peripheral Pulses, No clubbing, cyanosis, or edema    I's & O's     08-30-23 @ 07:01  -  08-31-23 @ 07:00  --------------------------------------------------------  IN:    FentaNYL: 617 mL    IV PiggyBack: 150 mL    Peptamen A.F.: 950 mL  Total IN: 1717 mL    OUT:    Intermittent Catheterization - Urethral (mL): 400 mL    Voided (mL): 246 mL  Total OUT: 646 mL    Total NET: 1071 mL    LABS             9.2    8.09  )-----------( 245      ( 08-31-23 @ 11:00 )             31.2     138  |  99  |  34  -------------------------<  233   08-31-23 @ 11:00  4.0  |  27  |  0.8    Ca      8.7     08-31-23 @ 11:00  Phos   3.6     08-31-23 @ 11:00  Mg     2.4     08-31-23 @ 11:00    TPro  7.1  /  Alb  3.4  /  TBili  0.3  /  DBili  x   /  AST  13  /  ALT  9   /  AlkPhos  145  /  GGT  x     08-31-23 @ 11:00    PT/INR - ( 08-31-23 @ 11:00 )   PT: 12.70 sec;   INR: 1.11 ratio  PTT - ( 08-31-23 @ 11:00 )  PTT:31.1 sec    Blood Gas Arterial, Lactate: 1.00 mmol/L (08-31-23 @ 03:39)    Urinalysis Basic - ( 31 Aug 2023 11:00 )  Color: x / Appearance: x / SG: x / pH: x  Gluc: 233 mg/dL / Ketone: x  / Bili: x / Urobili: x   Blood: x / Protein: x / Nitrite: x   Leuk Esterase: x / RBC: x / WBC x   Sq Epi: x / Non Sq Epi: x / Bacteria: x    MICRO / IMAGING / CARDIOLOGY  Telemetry: Reviewed   EKG: Reviewed    Culture - Blood (collected 08-28-23 @ 11:00)  Source: .Blood Blood-Peripheral  Preliminary Report:    No growth at 48 Hours    Culture - Blood (collected 08-28-23 @ 11:00)  Source: .Blood Blood-Peripheral  Preliminary Report:    No growth at 48 Hours    Xray Chest 1 View- PORTABLE-Routine (08.31.23 @ 06:33)  Stable bilateral lung opacities

## 2023-08-31 NOTE — PROGRESS NOTE ADULT - ASSESSMENT
IMPRESSION    #Acute Hypercapnic Respiratory s/p intubation   #COPD exacerbation   #Sepsis present on admission   #CAP  #afib on eliquis  #recurrent pleural effusions s/p pleurex   #malignant pleural effusion   #Chronic HFpEF    PLAN    CNS  - Sedation: Precedex gtt  - Pain: fentanyl gtt    HEENT  - Oral care    PULMONARY  - HOB at 45  - Aspiration precautions    CARDIOVASCULAR  - avoid volume overload    GI  - Diet: NPO w/ NGT  - GI ppx: pantoprazole 40 iv qd    RENAL  - strict I&Os, daily weight, and monitor volume status  - Follow up renal function and lytes, correct as needed (K>4; Mg>2)    INFECTIOUS DISEASE  - c/w ceftriaxone 1g IV Q24H  - c/w azithromycin 500mg IV Q12H  - procalcitonin 0.57  - monitor VS    HEMATOLOGICAL  - DVT ppx: SCD's; hold for right-sided thoracentesis    ENDOCRINE  - Monitor FS  - insulin protocol if needed    MUSCULOSKELETAL  - Activity: Increase as Tolerated    DISPOSITION: MICU

## 2023-09-01 NOTE — PROGRESS NOTE ADULT - ASSESSMENT
89 YO F with a medical history  of HTN, malignant pleural effusion of breast origin, chronic A.fib, PAD, Obesity, Chronic lymphedema of  BLE and COPD on home oxygen 2 - 1/2 L nc, ESBL UTI presented to ED with severe SOB, DNR / DNI rescinded and pt was intubated and admitted to ICU    acute respiratory failure / B/L effusions / possible pneumonia / COPD     -  DNR and DNI rescinded   - consult palliative   - Lasix   - continue antibiotics and steroids   - Lovenox   - pulmonary following

## 2023-09-01 NOTE — CONSULT NOTE ADULT - NSGCTIMESPENTATTENDAPP_GEN_A_CORE
Post-Anesthesia Evaluation and Assessment    Patient: Lamin Alexander MRN: [id number]  SSN: [id number]    YOB: 1998  Age: 26 y.o. Sex: male       Cardiovascular Function/Vital Signs  Visit Vitals    /67    Pulse 105    Temp 36.9 °C (98.5 °F)    Resp 10    Ht 5' 6\" (1.676 m)    Wt 97.1 kg (214 lb 1.1 oz)    SpO2 100%    BMI 34.55 kg/m2       Patient is status post general anesthesia for Procedure(s):  RIGHT L5-S1 DISCECTOMY. Nausea/Vomiting: None    Postoperative hydration reviewed and adequate. Pain:  Pain Scale 1: Numeric (0 - 10) (12/05/17 1300)  Pain Intensity 1: 0 (12/05/17 1300)   Managed    Neurological Status:   Neuro (WDL): Exceptions to WDL (12/05/17 1232)  Neuro  Neurologic State: Drowsy (12/05/17 1232)  Orientation Level: Oriented to person (12/05/17 1232)  Cognition: Decreased attention/concentration;Decreased command following (12/05/17 1232)  Speech: Clear (12/05/17 1033)  LUE Motor Response: Purposeful (12/05/17 1033)  LLE Motor Response: Purposeful (12/05/17 1033)  RUE Motor Response: Purposeful (12/05/17 1033)  RLE Motor Response: Purposeful;Numbness (12/05/17 1033)   At baseline    Mental Status and Level of Consciousness: Arousable    Pulmonary Status:   O2 Device: Nasal cannula (12/05/17 1300)   Adequate oxygenation and airway patent    Complications related to anesthesia: None    Post-anesthesia assessment completed.  No concerns    Signed By: Bertram Cast MD     December 5, 2017 30

## 2023-09-01 NOTE — CONSULT NOTE ADULT - SUBJECTIVE AND OBJECTIVE BOX
CC:  dyspnea    HPI:  This is an 87yo W female w/ PMH as noted below. Pt comes to ER w/ worsening dyspnea. Pt has advanced COPD on home O2.  . Pt was @ [facility] 7/3/23   in respiratory failure due to COPD exacerbation. pt was intubated and went to ICU,  In ICU pt was extubated , left sided chest tube was placed for possible malignant effusion. During that hospitalization  pt signed DNI/DNR.  & was discharged to SNF.    Now pt again in respiratory distress from COPD.  Pt's PCO2  was 97  after being placed on 100% NRB.  Pt's MS worsened & required intubation as  family rescinded  DNR/DNI.   (28 Aug 2023 21:01)    PERTINENT PM/SXH:   HTN (hypertension)    Afib    Goiter    Cellulitis    OA (osteoarthritis)    PVD (peripheral vascular disease)    COPD (chronic obstructive pulmonary disease)    Anxiety    Obesity    Acute on chronic systolic congestive heart failure    Lymphatic edema    Edema of both lower legs    Required emergent intubation      H/O abdominal hysterectomy    H/O discectomy    H/O total knee replacement, bilateral      FAMILY HISTORY:  Unable to get from patient as intubated    ITEMS NOT CHECKED ARE NOT PRESENT    SOCIAL HISTORY:   Significant other/partner[ ]  Children[ ]  Spiritism/Spirituality:  Substance hx:  [ ]   Tobacco hx:  [ ]   Alcohol hx: [ ]   Living Situation: [x ]Home  [ ]Long term care  [ ]Rehab [ ]Other  Home Services: [ ] HHA [ ] Visting RN [ ] Hospice  Occupation:  Home Opioid hx:  [ ] Y [ ] N [x ] I-Stop Reference No:    Reference #: [id number]    Practitioner Count: 2  Pharmacy Count: 1  Current Opioid Prescriptions: 0  Current Benzodiazepine Prescriptions: 1  Current Stimulant Prescriptions: 0      Patient Demographic Information (PDI)       PDI	First Name	Last Name	Birth Date	Gender	Street Address	[facility]	Zip Code  JOSELIN Mcconnell	1935	Female	[address] [facility] [address] [facility]	[address]    Prescription Information      PDI Filter:    PDI	Current Rx	Drug Type	Rx Written	Rx Dispensed	Drug	Quantity	Days Supply  A	Y	B	08/11/2023	08/13/2023	alprazolam 0.5 mg tablet	30	30  Prescriber Name Poppy Worthington  Prescriber GIOVANY # [id number]  Payment Method Medicare  Dispenser [facility] #[id number]  A	N	B	06/26/2023	06/26/2023	alprazolam 0.5 mg tablet	10	10  Prescriber Name Etta Ly C  Prescriber GIOVANY # [id number]  Payment Method Medicare  Dispenser [facility] #[id number]       ADVANCE DIRECTIVES:     [x ] Full Code [ ] DNR  MOLST  [ ]  Living Will  [ ]   DECISION MAKER(s):  [ x] Health Care Proxy(s)  [ ] Surrogate(s)  [ ] Guardian           Name(s): Phone Number(s):  Karishma       BASELINE (I)ADL(s) (prior to admission):    [place]: [ ]Total  [ ] Moderate [ ]Dependent  Palliative Performance Status Version 2:         %    http://[facility].org/files/news/palliative_performance_scale_ppsv2.pdf    Allergies    aspirin (Other)  codeine (Other)  sulfa drugs (Hives; Other)  penicillin (Other)  contrast media (iodine-based) (Other)    Intolerances    MEDICATIONS  (STANDING):  albuterol    90 MICROgram(s) HFA Inhaler 2 Puff(s) Inhalation every 4 hours  azithromycin  IVPB      azithromycin  IVPB 500 milliGRAM(s) IV Intermittent every 24 hours  cefTRIAXone   IVPB 1000 milliGRAM(s) IV Intermittent every 24 hours  cefTRIAXone   IVPB      chlorhexidine 0.12% Liquid 15 milliLiter(s) Oral Mucosa every 12 hours  chlorhexidine 2% Cloths 1 Application(s) Topical <User Schedule>  dexMEDEtomidine Infusion 0.05 MICROgram(s)/kG/Hr (1.1 mL/Hr) IV Continuous <Continuous>  dextrose 5%. 1000 milliLiter(s) (50 mL/Hr) IV Continuous <Continuous>  dextrose 50% Injectable 25 Gram(s) IV Push once  fentaNYL   Infusion. 0.5 MICROgram(s)/kG/Hr (4.1 mL/Hr) IV Continuous <Continuous>  glucagon  Injectable 1 milliGRAM(s) IntraMuscular once  insulin lispro (ADMELOG) corrective regimen sliding scale   SubCutaneous three times a day before meals  methylPREDNISolone sodium succinate Injectable 40 milliGRAM(s) IV Push daily  pantoprazole  Injectable 40 milliGRAM(s) IV Push daily  polyethylene glycol 3350 17 Gram(s) Oral two times a day  senna 2 Tablet(s) Oral at bedtime    MEDICATIONS  (PRN):  dextrose Oral Gel 15 Gram(s) Oral once PRN Blood Glucose LESS THAN 70 milliGRAM(s)/deciliter  midazolam Injectable 2 milliGRAM(s) IV Push every 4 hours PRN agitation/vent synchrony    PRESENT SYMPTOMS: [ x]Unable to obtain due to poor mentation   Source if other than patient:  [ ]Family   [ ]Team     Pain: [ ]yes [ ]no  QOL impact -   Location -                    Aggravating factors -  Quality -  Radiation -  Timing-  Severity (0-10 scale):  Minimal acceptable level (0-10 scale):     CPOT:  1  https://www.[facility].org/getattachment/[email or web address]/Critical-Care-Pain-Observation-Tool-(CPOT)    PAIN AD Score:   http://geriatrictoolkit.[facility]/cog/painad.pdf (press ctrl +  left click to view)    Dyspnea:                           [ ]None[ ]Mild [ ]Moderate [ ]Severe     Respiratory Distress Observation Scale (RDOS):   A score of 0 to 2 signifies little or no respiratory distress, 3 signifies mild distress, scores 4 to 6 indicate moderate distress, and scores greater than 7 signify severe distress  https://www.[facility].ca/sites/default/files/PDFS/345382-bugcfgvjtzw-xqcdprix-jwjqpdagvfi-dalmu.pdf    Anxiety:                             [ ]None[ ]Mild [ ]Moderate [ ]Severe   Fatigue:                             [ ]None[ ]Mild [ ]Moderate [ ]Severe   Nausea:                             [ ]None[ ]Mild [ ]Moderate [ ]Severe   Loss of appetite:              [ ]None[ ]Mild [ ]Moderate [ ]Severe   Constipation:                    [ ]None[ ]Mild [ ]Moderate [ ]Severe    Other Symptoms:  [ ]All other review of systems negative     Palliative Performance Status Version 2:         %    http://npcrc.org/files/news/palliative_performance_scale_ppsv2.pdf    PHYSICAL EXAM:  Vital Signs Last 24 Hrs  T(C): 37.3 (01 Sep 2023 09:00), Max: 37.3 (01 Sep 2023 09:00)  T(F): 99.1 (01 Sep 2023 09:00), Max: 99.1 (01 Sep 2023 09:00)  HR: 85 (01 Sep 2023 09:00) (62 - 113)  BP: 135/73 (01 Sep 2023 09:00) (135/73 - 185/90)  BP(mean): 99 (01 Sep 2023 09:00) (97 - 133)  RR: 16 (01 Sep 2023 09:00) (16 - 19)  SpO2: 98% (01 Sep 2023 09:00) (97% - 100%)    Parameters below as of 01 Sep 2023 09:00  Patient On (Oxygen Delivery Method): ventilator    O2 Concentration (%): 40 I&O's Summary    31 Aug 2023 07:01  -  01 Sep 2023 07:00  --------------------------------------------------------  IN: 1972.3 mL / OUT: 2250 mL / NET: -277.7 mL    01 Sep 2023 07:01  -  01 Sep 2023 09:51  --------------------------------------------------------  IN: 314.4 mL / OUT: 0 mL / NET: 314.4 mL        GENERAL:  [ ] No acute distress [ ]Lethargic  [ ]Unarousable  [ ]Verbal  [ ]Non-Verbal [ ]Cachexia    BEHAVIORAL/PSYCH:  [ ]Alert and Oriented x  [ ] Anxiety [ ] Delirium [ ] Agitation [ ] Calm   EYES: [ ] No scleral icterus [ ] Scleral icterus [ ] Closed  ENMT:  [ ]Dry mouth  [ ]No external oral lesions [ ] No external ear or nose lesions  CARDIOVASCULAR:  [ ]Regular [ ]Irregular [ ]Tachy [ ]Not Tachy  [ ]Sam [ ] Edema [ ] No edema  PULMONARY:  [ ]Tachypnea  [ ]Audible excessive secretions [ ] No labored breathing [ ] labored breathing  GASTROINTESTINAL: [ ]Soft  [ ]Distended  [ ]Not distended [ ]Non tender [ ]Tender  MUSCULOSKELETAL: [ ]No clubbing [ ] clubbing  [ ] No cyanosis [ ] cyanosis  NEUROLOGIC: [ ]No focal deficits  [ ]Follows commands  [ ]Does not follow commands  [ ]Cognitive impairment  [ ]Dysphagia  [ ]Dysarthria  [ ]Paresis   SKIN: [ ] Jaundiced [ ] Non-jaundiced [ ]Rash [ ]No Rash [ ] Warm [ ] Dry  MISC/LINES: [ ] ET tube [ ] Trach [ ]NGT/OGT [ ]PEG [ ]Ba    LABS: reviewed by me                        10.0   12.30 )-----------( 252      ( 01 Sep 2023 05:39 )             34.0   09-01    141  |  100  |  37<H>  ----------------------------<  151<H>  3.6   |  31  |  0.7    Ca    8.8      01 Sep 2023 05:39  Phos  3.4     09-01  Mg     2.2     09-01    TPro  7.1  /  Alb  3.4<L>  /  TBili  0.4  /  DBili  x   /  AST  45<H>  /  ALT  31  /  AlkPhos  166<H>  09-01  PT/INR - ( 31 Aug 2023 11:00 )   PT: 12.70 sec;   INR: 1.11 ratio         PTT - ( 31 Aug 2023 11:00 )  PTT:31.1 sec    Urinalysis Basic - ( 01 Sep 2023 05:39 )    Color: x / Appearance: x / SG: x / pH: x  Gluc: 151 mg/dL / Ketone: x  / Bili: x / Urobili: x   Blood: x / Protein: x / Nitrite: x   Leuk Esterase: x / RBC: x / WBC x   Sq Epi: x / Non Sq Epi: x / Bacteria: x      RADIOLOGY & ADDITIONAL STUDIES: reviewed by me    EKG: reviewed by me      PROTEIN CALORIE MALNUTRITION PRESENT: [ ]mild [ ]moderate [ ]severe [ ]underweight [ ]morbid obesity  https://www.andeal.org/vault/5619/web/files/ONC/Table_Clinical%20Characteristics%20to%20Document%20Malnutrition-White%20JV%20et%20al%202012.pdf    Height (cm): 170.2 (08-28-23 @ 21:03), 157.5 (08-21-23 @ 12:16), 157.5 (07-13-23 @ 18:15)  Weight (kg): 87.8 (08-28-23 @ 21:03), 86.2 (08-21-23 @ 12:16), 91.7 (07-13-23 @ 18:15)  BMI (kg/m2): 30.3 (08-28-23 @ 21:03), 34.7 (08-21-23 @ 12:16), 37 (07-13-23 @ 18:15)  [ ]PPSV2 < or = to 30% [ ]significant weight loss  [ ]poor nutritional intake  [ ]anasarca      [ ]Artificial Nutrition      Palliative Care Spiritual/Emotional Screening Tool Question  Severity (0-4):                    OR                    [ ] Unable to determine/NA  Score of 2 or greater indicates recommendation of Chaplaincy referral  Chaplaincy Referral: [ ] Yes [ ] Refused [ ] Following     Caregiver [place]:  [ ] Yes [ ] No [ ] Deferred  Social Work Referral [ ]  Patient and Family Centered Care Referral [ ]    Anticipatory Grief Present: [ ] Yes [ ] No [ ] Deferred  Social Work Referral [ ]  Patient and Family Centered Care Referral [ ]    Patient discussed with primary medical team MD  Palliative care education provided to patient and/or family   CC:  dyspnea    HPI:  This is an 87yo W female w/ PMH as noted below. Pt comes to ER w/ worsening dyspnea. Pt has advanced COPD on home O2.  . Pt was @ [facility] 7/3/23   in respiratory failure due to COPD exacerbation. pt was intubated and went to ICU,  In ICU pt was extubated , left sided chest tube was placed for possible malignant effusion. During that hospitalization  pt signed DNI/DNR.  & was discharged to SNF.    Now pt again in respiratory distress from COPD.  Pt's PCO2  was 97  after being placed on 100% NRB.  Pt's MS worsened & required intubation as  family rescinded  DNR/DNI.   (28 Aug 2023 21:01)    PERTINENT PM/SXH:   HTN (hypertension)    Afib    Goiter    Cellulitis    OA (osteoarthritis)    PVD (peripheral vascular disease)    COPD (chronic obstructive pulmonary disease)    Anxiety    Obesity    Acute on chronic systolic congestive heart failure    Lymphatic edema    Edema of both lower legs    Required emergent intubation      H/O abdominal hysterectomy    H/O discectomy    H/O total knee replacement, bilateral      FAMILY HISTORY:  Unable to get from patient as intubated    ITEMS NOT CHECKED ARE NOT PRESENT    SOCIAL HISTORY:   Significant other/partner[ ]  Children[ ]  Adventism/Spirituality:  Substance hx:  [ ]   Tobacco hx:  [ ]   Alcohol hx: [ ]   Living Situation: [x ]Home  [ ]Long term care  [ ]Rehab [ ]Other  Home Services: [ ] HHA [ ] Visting RN [ ] Hospice  Occupation:  Home Opioid hx:  [ ] Y [ ] N [x ] I-Stop Reference No:    Reference #: [id number]    Practitioner Count: 2  Pharmacy Count: 1  Current Opioid Prescriptions: 0  Current Benzodiazepine Prescriptions: 1  Current Stimulant Prescriptions: 0      Patient Demographic Information (PDI)       PDI	First Name	Last Name	Birth Date	Gender	Street Address	[facility]	Zip Code  JOSELIN Mcconnell	1935	Female	[address] [facility] [address] [facility]	[address]    Prescription Information      PDI Filter:    PDI	Current Rx	Drug Type	Rx Written	Rx Dispensed	Drug	Quantity	Days Supply  A	Y	B	08/11/2023	08/13/2023	alprazolam 0.5 mg tablet	30	30  Prescriber Name Poppy Worthington  Prescriber GIOVANY # [id number]  Payment Method Medicare  Dispenser [facility] #[id number]  A	N	B	06/26/2023	06/26/2023	alprazolam 0.5 mg tablet	10	10  Prescriber Name Etta Ly C  Prescriber GIOVANY # [id number]  Payment Method Medicare  Dispenser [facility] #[id number]       ADVANCE DIRECTIVES:     [x ] Full Code [ ] DNR  MOLST  [ ]  Living Will  [ ]   DECISION MAKER(s):  [ x] Health Care Proxy(s)  [ ] Surrogate(s)  [ ] Guardian           Name(s): Phone Number(s):  Karishma       BASELINE (I)ADL(s) (prior to admission):    [place]: [ ]Total  [ ] Moderate [ ]Dependent  Palliative Performance Status Version 2:         %    http://[facility].org/files/news/palliative_performance_scale_ppsv2.pdf    Allergies    aspirin (Other)  codeine (Other)  sulfa drugs (Hives; Other)  penicillin (Other)  contrast media (iodine-based) (Other)    Intolerances    MEDICATIONS  (STANDING):  albuterol    90 MICROgram(s) HFA Inhaler 2 Puff(s) Inhalation every 4 hours  azithromycin  IVPB      azithromycin  IVPB 500 milliGRAM(s) IV Intermittent every 24 hours  cefTRIAXone   IVPB 1000 milliGRAM(s) IV Intermittent every 24 hours  cefTRIAXone   IVPB      chlorhexidine 0.12% Liquid 15 milliLiter(s) Oral Mucosa every 12 hours  chlorhexidine 2% Cloths 1 Application(s) Topical <User Schedule>  dexMEDEtomidine Infusion 0.05 MICROgram(s)/kG/Hr (1.1 mL/Hr) IV Continuous <Continuous>  dextrose 5%. 1000 milliLiter(s) (50 mL/Hr) IV Continuous <Continuous>  dextrose 50% Injectable 25 Gram(s) IV Push once  fentaNYL   Infusion. 0.5 MICROgram(s)/kG/Hr (4.1 mL/Hr) IV Continuous <Continuous>  glucagon  Injectable 1 milliGRAM(s) IntraMuscular once  insulin lispro (ADMELOG) corrective regimen sliding scale   SubCutaneous three times a day before meals  methylPREDNISolone sodium succinate Injectable 40 milliGRAM(s) IV Push daily  pantoprazole  Injectable 40 milliGRAM(s) IV Push daily  polyethylene glycol 3350 17 Gram(s) Oral two times a day  senna 2 Tablet(s) Oral at bedtime    MEDICATIONS  (PRN):  dextrose Oral Gel 15 Gram(s) Oral once PRN Blood Glucose LESS THAN 70 milliGRAM(s)/deciliter  midazolam Injectable 2 milliGRAM(s) IV Push every 4 hours PRN agitation/vent synchrony    PRESENT SYMPTOMS: [ x]Unable to obtain due to poor mentation   Source if other than patient:  [ ]Family   [ ]Team     Pain: [ ]yes [ ]no  QOL impact -   Location -                    Aggravating factors -  Quality -  Radiation -  Timing-  Severity (0-10 scale):  Minimal acceptable level (0-10 scale):     CPOT:  1  https://www.[facility].org/getattachment/[email or web address]/Critical-Care-Pain-Observation-Tool-(CPOT)    PAIN AD Score:   http://geriatrictoolkit.[facility]/cog/painad.pdf (press ctrl +  left click to view)    Dyspnea:                           [ ]None[ ]Mild [ ]Moderate [ ]Severe     Respiratory Distress Observation Scale (RDOS): 1  A score of 0 to 2 signifies little or no respiratory distress, 3 signifies mild distress, scores 4 to 6 indicate moderate distress, and scores greater than 7 signify severe distress  https://www.[facility].ca/sites/default/files/PDFS/223474-nflpmyhsbou-uoikmgws-ruhhpgaqiyv-egfzo.pdf    Anxiety:                             [ ]None[ ]Mild [ ]Moderate [ ]Severe   Fatigue:                             [ ]None[ ]Mild [ ]Moderate [ ]Severe   Nausea:                             [ ]None[ ]Mild [ ]Moderate [ ]Severe   Loss of appetite:              [ ]None[ ]Mild [ ]Moderate [ ]Severe   Constipation:                    [ ]None[ ]Mild [ ]Moderate [ ]Severe    Other Symptoms:  [ ]All other review of systems negative     Palliative Performance Status Version 2:         10%    http://[facility].org/files/news/palliative_performance_scale_ppsv2.pdf    PHYSICAL EXAM:  Vital Signs Last 24 Hrs  T(C): 37.3 (01 Sep 2023 09:00), Max: 37.3 (01 Sep 2023 09:00)  T(F): 99.1 (01 Sep 2023 09:00), Max: 99.1 (01 Sep 2023 09:00)  HR: 85 (01 Sep 2023 09:00) (62 - 113)  BP: 135/73 (01 Sep 2023 09:00) (135/73 - 185/90)  BP(mean): 99 (01 Sep 2023 09:00) (97 - 133)  RR: 16 (01 Sep 2023 09:00) (16 - 19)  SpO2: 98% (01 Sep 2023 09:00) (97% - 100%)    Parameters below as of 01 Sep 2023 09:00  Patient On (Oxygen Delivery Method): ventilator    O2 Concentration (%): 40 I&O's Summary    31 Aug 2023 07:01  -  01 Sep 2023 07:00  --------------------------------------------------------  IN: 1972.3 mL / OUT: 2250 mL / NET: -277.7 mL    01 Sep 2023 07:01  -  01 Sep 2023 09:51  --------------------------------------------------------  IN: 314.4 mL / OUT: 0 mL / NET: 314.4 mL        GENERAL:  [ ] No acute distress [ ]Lethargic  [ x]Unarousable  [ ]Verbal  [ ]Non-Verbal [ ]Cachexia    BEHAVIORAL/PSYCH:  [ ]Alert and Oriented x  [ ] Anxiety [ ] Delirium [ ] Agitation [x ] Calm   EYES: [ ] No scleral icterus [ ] Scleral icterus [x ] Closed  ENMT:  [ ]Dry mouth  [x ]No external oral lesions [ ] No external ear or nose lesions  CARDIOVASCULAR:  [ ]Regular [ ]Irregular [ ]Tachy [ ]Not Tachy  [ ]Sam [ ] Edema [ ] No edema  PULMONARY:  [ ]Tachypnea  [ ]Audible excessive secretions [x ] No labored breathing [ ] labored breathing  GASTROINTESTINAL: [ ]Soft  [ ]Distended  [x ]Not distended [ ]Non tender [ ]Tender  MUSCULOSKELETAL: [ ]No clubbing [ ] clubbing  [ x] No cyanosis [ ] cyanosis  NEUROLOGIC: [ ]No focal deficits  [ ]Follows commands  [x ]Does not follow commands  [ ]Cognitive impairment  [ ]Dysphagia  [ ]Dysarthria  [ ]Paresis   SKIN: [ ] Jaundiced [ x] Non-jaundiced [ ]Rash [ ]No Rash [ ] Warm [ ] Dry  MISC/LINES: [x ] ET tube [ ] Trach [ ]NGT/OGT [ ]PEG [ ]Ba    LABS: reviewed by me                        10.0   12.30 )-----------( 252      ( 01 Sep 2023 05:39 )             34.0   09-01    141  |  100  |  37<H>  ----------------------------<  151<H>  3.6   |  31  |  0.7    Ca    8.8      01 Sep 2023 05:39  Phos  3.4     09-01  Mg     2.2     09-01    TPro  7.1  /  Alb  3.4<L>  /  TBili  0.4  /  DBili  x   /  AST  45<H>  /  ALT  31  /  AlkPhos  166<H>  09-01  PT/INR - ( 31 Aug 2023 11:00 )   PT: 12.70 sec;   INR: 1.11 ratio         PTT - ( 31 Aug 2023 11:00 )  PTT:31.1 sec    Urinalysis Basic - ( 01 Sep 2023 05:39 )    Color: x / Appearance: x / SG: x / pH: x  Gluc: 151 mg/dL / Ketone: x  / Bili: x / Urobili: x   Blood: x / Protein: x / Nitrite: x   Leuk Esterase: x / RBC: x / WBC x   Sq Epi: x / Non Sq Epi: x / Bacteria: x      RADIOLOGY & ADDITIONAL STUDIES: reviewed by me    < from: Xray Chest 1 View- PORTABLE-Routine (Xray Chest 1 View- PORTABLE-Routine .) (08.31.23 @ 06:33) >  Impression:    Stable bilateral lung opacities    < end of copied text >    EKG: reviewed by me  < from: 12 Lead ECG (08.28.23 @ 10:51) >    Ventricular Rate 122 BPM    Atrial Rate 144 BPM    QRS Duration 82 ms    Q-T Interval 288 ms    QTC Calculation(Bazett) 410 ms    R Axis 141 degrees    T Axis -48 degrees    Diagnosis Line *** Suspect arm lead reversal, interpretation assumes no reversal    Atrial fibrillation with rapid ventricular response with premature  ventricular or aberrantly conducted complexes  Possible Right ventricular hypertrophy  Lateral infarct , age undetermined  Abnormal ECG    < end of copied text >      PROTEIN CALORIE MALNUTRITION PRESENT: [ ]mild [ ]moderate [ ]severe [ ]underweight [ ]morbid obesity  https://www.andeal.org/vault/2440/web/files/ONC/Table_Clinical%20Characteristics%20to%20Document%20Malnutrition-White%20JV%20et%20al%202012.pdf    Height (cm): 170.2 (08-28-23 @ 21:03), 157.5 (08-21-23 @ 12:16), 157.5 (07-13-23 @ 18:15)  Weight (kg): 87.8 (08-28-23 @ 21:03), 86.2 (08-21-23 @ 12:16), 91.7 (07-13-23 @ 18:15)  BMI (kg/m2): 30.3 (08-28-23 @ 21:03), 34.7 (08-21-23 @ 12:16), 37 (07-13-23 @ 18:15)  [ ]PPSV2 < or = to 30% [ ]significant weight loss  [ ]poor nutritional intake  [ ]anasarca      [ ]Artificial Nutrition      Palliative Care Spiritual/Emotional Screening Tool Question  Severity (0-4):                    OR                    [ x] Unable to determine/NA  Score of 2 or greater indicates recommendation of Chaplaincy referral  Chaplaincy Referral: [ ] Yes [ ] Refused [ ] Following     Caregiver [place]:  [ ] Yes [ ] No [x ] Deferred  Social Work Referral [ ]  Patient and Family Centered Care Referral [ ]    Anticipatory Grief Present: [ ] Yes [ ] No [x ] Deferred  Social Work Referral [ ]  Patient and Family Centered Care Referral [ ]    Patient discussed with primary medical team MD  Palliative care education provided to patient and/or family

## 2023-09-01 NOTE — PROGRESS NOTE ADULT - SUBJECTIVE AND OBJECTIVE BOX
SUBJECTIVE / OVERNIGHT EVENTS  Prior to SAT today, pt became acutely agitated and tachycardic to . Appeared afib on telemetry monitor. After Versed 2mg IV push, pt significantly less agitated and HR back to baseline. Later, SAT attempted while on Precedex alone, but pt became agitated again to 's.    MEDICATIONS  albuterol    90 MICROgram(s) HFA Inhaler 2 Puff(s) Inhalation every 4 hours  azithromycin  IVPB 500 milliGRAM(s) IV Intermittent every 24 hours  azithromycin  IVPB      cefTRIAXone   IVPB 1000 milliGRAM(s) IV Intermittent every 24 hours  cefTRIAXone   IVPB      chlorhexidine 0.12% Liquid 15 milliLiter(s) Oral Mucosa every 12 hours  chlorhexidine 2% Cloths 1 Application(s) Topical <User Schedule>  dexMEDEtomidine Infusion 0.05 MICROgram(s)/kG/Hr IV Continuous <Continuous>  dextrose 5%. 1000 milliLiter(s) IV Continuous <Continuous>  dextrose 50% Injectable 25 Gram(s) IV Push once  enoxaparin Injectable 90 milliGRAM(s) SubCutaneous every 12 hours  fentaNYL   Infusion. 0.5 MICROgram(s)/kG/Hr IV Continuous <Continuous>  glucagon  Injectable 1 milliGRAM(s) IntraMuscular once  insulin lispro (ADMELOG) corrective regimen sliding scale   SubCutaneous three times a day before meals  methylPREDNISolone sodium succinate Injectable 40 milliGRAM(s) IV Push daily  pantoprazole  Injectable 40 milliGRAM(s) IV Push daily  polyethylene glycol 3350 17 Gram(s) Oral two times a day  senna 2 Tablet(s) Oral at bedtime    dextrose Oral Gel 15 Gram(s) Oral once PRN Blood Glucose LESS THAN 70 milliGRAM(s)/deciliter  midazolam Injectable 2 milliGRAM(s) IV Push every 4 hours PRN agitation/vent synchrony    VITALS /  EXAM    T(C): 38.1 (09-01-23 @ 11:00), Max: 38.1 (09-01-23 @ 11:00)  HR: 85 (09-01-23 @ 09:00) (62 - 85)  BP: 135/73 (09-01-23 @ 09:00) (135/73 - 185/90)  RR: 16 (09-01-23 @ 13:00) (16 - 19)  SpO2: 98% (09-01-23 @ 09:00) (97% - 100%)  POCT Blood Glucose.: 164 mg/dL (09-01-23 @ 11:23)  POCT Blood Glucose.: 161 mg/dL (09-01-23 @ 05:32)  POCT Blood Glucose.: 247 mg/dL (09-01-23 @ 00:47)  POCT Blood Glucose.: 151 mg/dL (08-31-23 @ 18:26)    GENERAL: intubated; sedated; agitated during SAT  CHEST/LUNG: decreased BS b/l lung bases  HEART: Regular rate and rhythm; No murmurs, rubs, or gallops  ABDOMEN: Soft, Nontender, Nondistended; Bowel sounds present, no masses.  EXTREMITIES:  b/l LE pitting edema; 2+ Peripheral Pulses    I's & O's     08-31-23 @ 07:01  -  09-01-23 @ 07:00  --------------------------------------------------------  IN:    Dexmedetomidine: 545.3 mL    FentaNYL: 377 mL    IV PiggyBack: 150 mL    Peptamen A.F.: 900 mL  Total IN: 1972.3 mL    OUT:    Voided (mL): 2250 mL  Total OUT: 2250 mL    Total NET: -277.7 mL    LABS             10.0   12.30 )-----------( 252      ( 09-01-23 @ 05:39 )             34.0     141  |  100  |  37  -------------------------<  151   09-01-23 @ 05:39  3.6  |  31  |  0.7    Ca      8.8     09-01-23 @ 05:39  Phos   3.4     09-01-23 @ 05:39  Mg     2.2     09-01-23 @ 05:39    TPro  7.1  /  Alb  3.4  /  TBili  0.4  /  DBili  x   /  AST  45  /  ALT  31  /  AlkPhos  166  /  GGT  x     09-01-23 @ 05:39    PT/INR - ( 08-31-23 @ 11:00 )   PT: 12.70 sec;   INR: 1.11 ratio  PTT - ( 08-31-23 @ 11:00 )  PTT:31.1 sec    Blood Gas Arterial, Lactate: 0.90 mmol/L (09-01-23 @ 05:51)    Urinalysis Basic - ( 01 Sep 2023 05:39 )  Color: x / Appearance: x / SG: x / pH: x  Gluc: 151 mg/dL / Ketone: x  / Bili: x / Urobili: x   Blood: x / Protein: x / Nitrite: x   Leuk Esterase: x / RBC: x / WBC x   Sq Epi: x / Non Sq Epi: x / Bacteria: x    MICRO / IMAGING / CARDIOLOGY  Telemetry: Reviewed   EKG: Reviewed    Culture - Blood (collected 08-28-23 @ 11:00)  Source: .Blood Blood-Peripheral  Preliminary Report:    No growth at 72 Hours    Culture - Blood (collected 08-28-23 @ 11:00)  Source: .Blood Blood-Peripheral  Preliminary Report:    No growth at 72 Hours    Xray Chest 1 View- PORTABLE-Urgent (09.01.23 @ 12:20)  Stable bilateral lung opacities and effusions

## 2023-09-01 NOTE — CHART NOTE - NSCHARTNOTEFT_GEN_A_CORE
PALLIATIVE MEDICINE INTERDISCIPLINARY TEAM NOTE    Provider:                                         Met with: [  x ] Patient  [   ] Family  [   ] Other:    Primary Language: [ x  ] English [   ] Other*:                      *Interpretation provided by:    SUPPORT DIAGNOSES            (Check all that apply)    [   ] EOL issues  [  x ] Advanced Illness  [   ] Cultural / spiritual concerns  [  x ] Pain / suffering  [   ] Dementia / AMS  [   ] Other:  [   ] AD issues  [   ] Grief / loss / sadness  [   ] Discharge issues  [ x  ] Distress / coping    PSYCHOSOCIAL ASSESSMENT OF PATIENT         (Check all that apply)    [ x  ] Initial Assessment            [   ] Reassessment          [   ] Not Applicable this visit    Pain/suffering acuity:  [x   ] None to mild (0-3)           [   ] Moderate (4-6)        [   ] High (7-10)    Mental Status:  [   ] Alert/oriented (x3)          [   ] Confused/Altered(x2/x1)         [  x ] Non-resp: presently intubated     Functional status:  [   ] Independent w ADLs      [   ] Needs Assistance             [ x  ] Bedbound/Full Care    Coping:  [   ] Coping well                     [   ] Coping w/difficulty            [   ] Poor coping  [ x ] unable to assess     Support system:  [   ] Strong                              [ x  ] Adequate                        [   ] Inadequate      Past history and medications for:     [ ] Anxiety       [ ] Depression    [ ] Sleep disorders       SERVICE PROVIDED  [   ]Discharge support / facilitation  [   ]AD / goals of care counseling                                  [   ]EOL / death / bereavement counseling  [   ]Counseling / support                                                [   ] Family meeting  [   ]Prayer / sacrament / ritual                                      [   ] Referral   [ x  ]Other: Teledoc visit w/Palliative attending                                                                       NOTE and Plan of Care (PoC):    patient is a 87 y/o F with pmhx HTN, hx of malignant pleural effusion, chronic lymphedema, COPD, ESBL UTI presenting with SOB. patient presently intubated at time of visit. no non verbal signs of pain or discomfort. no family at bedside at time of visit. [phone]

## 2023-09-01 NOTE — CONSULT NOTE ADULT - SUBJECTIVE AND OBJECTIVE BOX
GIOVANNY BUCK  88y, Female  Allergies    aspirin (Other)  codeine (Other)  sulfa drugs (Hives; Other)  penicillin (Other)  contrast media (iodine-based) (Other)    Intolerances      LOS  4d    HPI  HPI:  This is an 87yo W female w/ PMH as noted below. Pt comes to ER w/ worsening dyspnea. Pt has advanced COPD on home O2.  . Pt was @ [facility] 7/3/23   in respiratory failure due to COPD exacerbation. pt was intubated and went to ICU,  In ICU pt was extubated , left sided chest tube was placed for possible malignant effusion. During that hospitalization  pt signed DNI/DNR.  & was discharged to SNF.    Now pt again in respiratory distress from COPD.  Pt's PCO2  was 97  after being placed on 100% NRB.  Pt's MS worsened & required intubation as  family rescinded  DNR/DNI.   (28 Aug 2023 21:01)      INFECTIOUS DISEASE HISTORY:  This is a 88 year old female with PMH of HTN, Afib, OA, PVD, COPD, Anxiety, Obesity, Acute on chronic systolic congestive heart failure, H/O abdominal hysterectomy, H/O discectomy, H/O total knee replacement, bilaterally and bilateral pleural effusions (s/p left thoracentesis 3/2023 -- Cytology -PLEURAL FLUID, THORACENTESIS: - SUSPICIOUS FOR MALIGNANCY. - Atypical cells present in background of histiocytes and mesothelial  cells, suspicious for metastatic carcinoma) Suspected to be breast cancer. She has a pleurex catheter on the left. She had multiple admissions noted this year, most recent for admission in July 2023, when she was admitted for acute hypoxic respiratory failure, (Pleurx Cx 7/4 - GPC in clusters-staph Epidermidis, No PMNs) thought to be a colonizer and not true infection. Underwent right thoracentesis 7/5 Exudative in nature. Cultures were negative. She did finish antibiotic course at that time for presumed superimposed parapneumonic effusion. She is currently admitted due to worsening respiratory distress and hypercapnic respiratory failure and COPD exacerbation, currently on mechanical ventilation. She was started on treatment for CAP and ID is being consulted for antimicrobial recommendations given she is spiking intermittent fevers.     Off note she has a history of ESBL E.coli bacteremia in May 2023      Prior hospital charts reviewed [Yes]  Primary team notes reviewed [Yes]  Other consultant notes reviewed [Yes]    REVIEW OF SYSTEMS:  CONSTITUTIONAL: No fever or chills  HEENT: No sore throat  RESPIRATORY: No cough, no shortness of breath  CARDIOVASCULAR: No chest pain or palpitations  GASTROINTESTINAL: No abdominal or epigastric pain  GENITOURINARY: No dysuria  NEUROLOGICAL: No headache/dizziness  MSK: No joint pain, erythema, or swelling; no back pain  SKIN: No itching, rashes  All other ROS negative except noted above    PAST MEDICAL & SURGICAL HISTORY:  HTN (hypertension)      Afib  s/p ablation 2001      Goiter  no meds      Cellulitis  chronic      OA (osteoarthritis)      PVD (peripheral vascular disease)      COPD (chronic obstructive pulmonary disease)      Anxiety      Obesity      Acute on chronic systolic congestive heart failure      Edema of both lower legs      Required emergent intubation      H/O abdominal hysterectomy      H/O discectomy      H/O total knee replacement, bilateral          SOCIAL HISTORY:  - No recent travel  - Lives with family    FAMILY HISTORY: No history of breast cancer or ovarian cancer in family.       ANTIMICROBIALS:  azithromycin  IVPB 500 every 24 hours  azithromycin  IVPB    cefTRIAXone   IVPB 1000 every 24 hours  cefTRIAXone   IVPB        ANTIMICROBIALS (past 90 days):  MEDICATIONS  (STANDING):    azithromycin  IVPB   255 mL/Hr IV Intermittent (08-29-23 @ 14:27)    azithromycin  IVPB   255 mL/Hr IV Intermittent (09-01-23 @ 12:05)   255 mL/Hr IV Intermittent (08-31-23 @ 11:42)   255 mL/Hr IV Intermittent (08-30-23 @ 11:50)    aztreonam  IVPB   50 mL/Hr IV Intermittent (08-28-23 @ 13:13)    cefTRIAXone   IVPB   100 mL/Hr IV Intermittent (09-01-23 @ 12:04)   100 mL/Hr IV Intermittent (08-31-23 @ 11:43)   100 mL/Hr IV Intermittent (08-30-23 @ 16:41)    cefTRIAXone   IVPB   100 mL/Hr IV Intermittent (08-29-23 @ 14:26)    vancomycin  IVPB   250 mL/Hr IV Intermittent (08-28-23 @ 15:19)      OTHER MEDS:   MEDICATIONS  (STANDING):  acetaminophen     Tablet .. 650 every 6 hours PRN  albuterol    90 MICROgram(s) HFA Inhaler 2 every 4 hours  dexMEDEtomidine Infusion 0.05 <Continuous>  dextrose 50% Injectable 25 once  dextrose Oral Gel 15 once PRN  enoxaparin Injectable 90 every 12 hours  fentaNYL   Infusion. 0.5 <Continuous>  glucagon  Injectable 1 once  insulin lispro (ADMELOG) corrective regimen sliding scale  three times a day before meals  methylPREDNISolone sodium succinate Injectable 40 daily  midazolam Injectable 2 every 4 hours PRN  pantoprazole  Injectable 40 daily  polyethylene glycol 3350 17 two times a day  senna 2 at bedtime      VITALS:  Vital Signs Last 24 Hrs  T(F): 99.2 (09-01-23 @ 15:10), Max: 101.9 (08-28-23 @ 10:53)    Vital Signs Last 24 Hrs  HR: 85 (09-01-23 @ 15:10) (62 - 98)  BP: 125/64 (09-01-23 @ 15:10) (125/64 - 182/82)  RR: 16 (09-01-23 @ 15:10)  SpO2: 99% (09-01-23 @ 15:10) (97% - 100%)  Wt(kg): --    EXAM:  GENERAL: NAD, lying in bed, On Mechanical Vent, Frail looking   HEAD: No head lesions, very dry mucosa.   NECK: Supple, nontender to palpation; no JVD  CHEST/LUNG: Bronchial breath sounds bilaterally  HEART: S1 S2  ABDOMEN: Soft, nontender, nondistended;  EXTREMITIES: Chronic lymphedema and stasis dermatitis bilaterally.    NERVOUS SYSTEM: On MV. Sedated.   MSK: No joint erythema, swelling or pain  SKIN: No rashes or lesions, no superficial thrombophlebitis    Labs:                        10.0   12.30 )-----------( 252      ( 01 Sep 2023 05:39 )             34.0     09-01    141  |  100  |  37<H>  ----------------------------<  151<H>  3.6   |  31  |  0.7    Ca    8.8      01 Sep 2023 05:39  Phos  3.4     09-01  Mg     2.2     09-01    TPro  7.1  /  Alb  3.4<L>  /  TBili  0.4  /  DBili  x   /  AST  45<H>  /  ALT  31  /  AlkPhos  166<H>  09-01      WBC Trend:  WBC Count: 12.30 (09-01-23 @ 05:39)  WBC Count: 8.09 (08-31-23 @ 11:00)  WBC Count: 13.42 (08-30-23 @ 06:55)  WBC Count: 9.89 (08-29-23 @ 05:44)      Auto Neutrophil #: 10.15 K/uL (09-01-23 @ 05:39)  Auto Neutrophil #: 7.49 K/uL (08-31-23 @ 11:00)  Auto Neutrophil #: 11.86 K/uL (08-30-23 @ 06:55)  Auto Neutrophil #: 16.21 K/uL (08-28-23 @ 11:00)  Auto Neutrophil #: 7.36 K/uL (08-21-23 @ 12:30)      Creatine Trend:  Creatinine: 0.7 (09-01)  Creatinine: 0.8 (08-31)  Creatinine: 1.0 (08-30)  Creatinine: 0.9 (08-29)      Liver Biochemical Testing Trend:  Alanine Aminotransferase (ALT/SGPT): 31 (09-01)  Alanine Aminotransferase (ALT/SGPT): 9 (08-31)  Alanine Aminotransferase (ALT/SGPT): 8 (08-30)  Alanine Aminotransferase (ALT/SGPT): 6 (08-28)  Alanine Aminotransferase (ALT/SGPT): 5 (08-21)  Aspartate Aminotransferase (AST/SGOT): 45 (09-01-23 @ 05:39)  Aspartate Aminotransferase (AST/SGOT): 13 (08-31-23 @ 11:00)  Aspartate Aminotransferase (AST/SGOT): 12 (08-30-23 @ 06:55)  Aspartate Aminotransferase (AST/SGOT): 14 (08-28-23 @ 11:00)  Aspartate Aminotransferase (AST/SGOT): 12 (08-21-23 @ 12:30)  Bilirubin Total: 0.4 (09-01)  Bilirubin Total: 0.3 (08-31)  Bilirubin Total: 0.3 (08-30)  Bilirubin Total: 0.5 (08-28)  Bilirubin Total: 0.4 (08-21)      Trend LDH  03-07-23 @ 21:06  211      Auto Eosinophil %: 0.1 % (09-01-23 @ 05:39)  Auto Eosinophil %: 0.0 % (08-31-23 @ 11:00)  Auto Eosinophil %: 0.0 % (08-30-23 @ 06:55)      Urinalysis Basic - ( 01 Sep 2023 05:39 )    Color: x / Appearance: x / SG: x / pH: x  Gluc: 151 mg/dL / Ketone: x  / Bili: x / Urobili: x   Blood: x / Protein: x / Nitrite: x   Leuk Esterase: x / RBC: x / WBC x   Sq Epi: x / Non Sq Epi: x / Bacteria: x        MICROBIOLOGY:    Female    Culture - Blood (collected 28 Aug 2023 11:00)  Source: .Blood Blood-Peripheral  Preliminary Report (31 Aug 2023 23:01):    No growth at 72 Hours    Culture - Blood (collected 28 Aug 2023 11:00)  Source: .Blood Blood-Peripheral  Preliminary Report (31 Aug 2023 23:01):    No growth at 72 Hours        Legionella Antigen, Urine: Negative (08-29 @ 10:53)  Rapid RVP Result: NotDetec (08-29 @ 10:44)      Procalcitonin, Serum: 0.57 (08-30)    Blood Gas Arterial, Lactate: 0.90 (09-01 @ 05:51)  Blood Gas Arterial, Lactate: 1.00 (08-31 @ 03:39)  Blood Gas Arterial, Lactate: 1.70 (08-30 @ 04:07)    A1C with Estimated Average Glucose Result: 6.1 % (07-03-23 @ 05:51)      INFLAMMATORY MARKERS      RADIOLOGY & ADDITIONAL TESTS:  I have personally reviewed the imagings.  CXR  Xray Chest 1 View- PORTABLE-Urgent:   ACC: [id number] EXAM:  XR CHEST PORTABLE URGENT 1V   ORDERED BY: MAG LANTIGUA     PROCEDURE DATE:  09/01/2023          INTERPRETATION:  Clinical History / Reason for exam: Respiratory failure    Comparison : Chest radiograph earlier the same day.    Technique/Positioning: AP chest.    Findings:    Support devices: Endotracheal tube in satisfactory position. Sidehole of   nasogastric tube within the stomach. Left-sided chest tube again   demonstrated.    Cardiac/mediastinum/hilum: No change    Lung parenchyma/Pleura: Stable bilateral lung opacities and effusions.    Skeleton/soft tissues: No change    Impression:    Stable bilateral lung opacities and effusions.        --- End of Report ---            LUIZA VASQUEZ MD; Attending Radiologist  This document has been electronically signed. Sep  1 2023 12:53PM (09-01-23 @ 12:20)      CT      CARDIOLOGY TESTING  12 Lead ECG:   Ventricular Rate 122 BPM    Atrial Rate 144 BPM    QRS Duration 82 ms    Q-T Interval 288 ms    QTC Calculation(Bazett) 410 ms    R Axis 141 degrees    T Axis -48 degrees    Diagnosis Line *** Suspect arm lead reversal, interpretation assumes no reversal    Atrial fibrillation with rapid ventricular response with premature  ventricular or aberrantly conducted complexes  Possible Right ventricular hypertrophy  Lateral infarct , age undetermined  Abnormal ECG    Confirmed by JOYCE HERMAN MD ([id number]) on 8/28/2023 11:22:07 AM (08-28-23 @ 10:51)  12 Lead ECG:   Ventricular Rate 88 BPM    Atrial Rate 93 BPM    QRS Duration 88 ms    Q-T Interval 396 ms    QTC Calculation(Bazett) 479 ms    R Axis 123 degrees    T Axis 109 degrees    Diagnosis Line Atrial fibrillation  Right axis deviation  Abnormal ECG    Confirmed by Jagjit Narvaez ([id number]) on 8/21/2023 2:39:58 PM (08-21-23 @ 13:18)

## 2023-09-01 NOTE — CONSULT NOTE ADULT - CONVERSATION DETAILS
Called and spoke with the patient's daughter and HCP, Karishma on 8/31. I had spoken with Karishma back in July 2023, and at that time, she made the patient DNR/DNI.  She noted that the patient always wanted to be Full code, and she and her family want the patient reintubated if needed following medical extubation. We discussed trach/PEG and she noted she's unclear if the patient would want trach/PEG. She would want the patient to have CPR/be intubated and discussion about trach/PEG could happen further with her family following that.  All questions answered

## 2023-09-01 NOTE — PROGRESS NOTE ADULT - ASSESSMENT
IMPRESSION:  acute hypercapnic/ hypoxic res failure s/p intubation   COPD exacerbation?  Sepsis present on admission   CAP  afib on eliquis  recurrent pleural effusions s/p pleurex   malignant pleural effusion   H/O HFpEF    PLAN:    CNS: precedx + fentenyl for sedation, SAT    HEENT: oral care    PULMONARY: aspiration precaution, HOB@ 45 degrees, daily abg/xray, keep pco2 around 50, solumedrol 40 qd, albuterl PRN, vent changes:  RR 16 fio2 40 PEEP 5  POCUS, large right sided pleural effusion  SBT today  talk to family regarding possibility of thora    CARDIOVASCULAR: echo, trops noted. BNP elevated, lasix 40mg x1 now, keep I<os, baseline ekg    GI: GI prophylaxis.  Feeding,     RENAL: f/u lytes correct as needed,     INFECTIOUS DISEASE: c/w ctx and azithro, ua, ucx, bcx, RVP, procal, urine legion/step, DTA     HEMATOLOGICAL: LW hold for thora, f/u with oncologist    ENDOCRINE:  Follow up FS.  Insulin protocol if needed.    MUSCULOSKELETAL: bedbound    full code for now (DNR/DNI was rescinded by family)  ICU  al d/c, straight cath and bladder scan q6hrs IMPRESSION:  acute hypercapnic/ hypoxic res failure s/p intubation   COPD exacerbation?  Sepsis present on admission   CAP  afib on eliquis  recurrent pleural effusions s/p pleurex   malignant pleural effusion   H/O HFpEF    PLAN:    CNS: precedx + fentenyl for sedation, SAT    HEENT: oral care    PULMONARY: aspiration precaution, HOB@ 45 degrees, daily abg/xray, keep pco2 around 50, solumedrol 40 qd, albuterl PRN, vent changes:  RR 16 fio2 40 PEEP 5  POCUS right sided pleural effusion  SBT today  talk to family regarding possibility of thora though less likely to make benefit in weaning pt from vent      CARDIOVASCULAR: echo, trops noted. BNP elevated, lasix 40mg x1 now, keep I<os, baseline ekg    GI: GI prophylaxis.  Feeding,     RENAL: f/u lytes correct as needed,     INFECTIOUS DISEASE: c/w ctx and azithro, ua, ucx, bcx, RVP, procal, urine legion/step, DTA     HEMATOLOGICAL: [facility] hold for thora, f/u with oncologist    ENDOCRINE:  Follow up FS.  Insulin protocol if needed.    MUSCULOSKELETAL: bedbound    full code for now (DNR/DNI was rescinded by family)  ICU  al d/c, straight cath and bladder scan q6hrs

## 2023-09-01 NOTE — CONSULT NOTE ADULT - PROBLEM SELECTOR RECOMMENDATION 9
In the setting of COPD exacerbation, sepsis, recurrent malignant pleural effusions. Patient intubated.  -vent management per ICU team  -continue ceftriaxone  -continue azithromycin  -full code with reintubation per family

## 2023-09-01 NOTE — CONSULT NOTE ADULT - ASSESSMENT
88 year old woman with history of HTN, history of malignant pleural effusion, chronic lymphedema, COPD on home O2, ESBL UTI presents with SOB. Hospital course complicated by respiratory failure requiring intubation in the setting of BL effusions and possible pneumonia.  Palliative care consulted for GOC.      MEDD (morphine equivalent daily dose):    Education about palliative care provided to patient/family.  See Recs below.    Please call [phone] with questions or concerns 24/7.   We will continue to follow.

## 2023-09-01 NOTE — PROGRESS NOTE ADULT - ASSESSMENT
IMPRESSION    #Acute Hypercapnic Respiratory Failure s/p intubation   #COPD exacerbation   #Sepsis, present on admission  #Community Acquired Pneumonia  #Chronic Atrial Fibrillation (on Eliquis)  #Recurrent Bilateral Malignant Pleural Effusions s/p   #Chronic HFpEF    PLAN    CNS  - Sedation: Precedex gtt  - Pain: fentanyl gtt  - c/w Versed 2mg IV Q4h PRN agitation/vent synchrony  - daily SAT (preferably on Precedex to limit agitation)    HEENT  - Oral care    PULMONARY  - HOB at 45  - Aspiration precautions  - POCUS shows large right pleural effusion  - will consider right-sided thora vs PleurX after extubation (no need for now as pt on minimal vent settings)  - daily SBT (if passing SAT)    CARDIOVASCULAR  - avoid volume overload  - give Lasix 40mg IV once wow    GI  - Diet: NPO w/ NGT  - GI ppx: pantoprazole 40 iv qd  - c/w senna 2 tabs PO QHS (constipation prophylaxis while on fentanyl)  - c/w Miralax 17g PO QD (constipation prophylaxis while on fentanyl)    RENAL  - strict I&Os, daily weight, and monitor volume status  - Follow up renal function and lytes, correct as needed (K>4; Mg>2)    INFECTIOUS DISEASE  - c/w ceftriaxone 1g IV Q24H x5 days (8/30-9/3)  - c/w azithromycin 500mg IV Q24H x5 days (8/30-9/3)  - procalcitonin 0.57  - monitor VS    HEMATOLOGICAL  - DVT ppx: Lovenox 90mg SQ BID    ENDOCRINE  - Monitor FS  - insulin protocol if needed    MUSCULOSKELETAL  - Activity: Increase as Tolerated    DISPOSITION: MICU IMPRESSION    #Acute Hypercapnic Respiratory Failure s/p intubation   #COPD exacerbation   #Sepsis, present on admission  #Community Acquired Pneumonia  #Chronic Atrial Fibrillation (on Eliquis)  #Recurrent Bilateral Malignant Pleural Effusions s/p   #Chronic HFpEF    PLAN    CNS  - Sedation: Precedex gtt  - Pain: fentanyl gtt  - c/w Versed 2mg IV Q4h PRN agitation/vent synchrony  - daily SAT (preferably on Precedex to limit agitation)    HEENT  - Oral care    PULMONARY  - HOB at 45  - Aspiration precautions  - POCUS shows large right pleural effusion  - will consider right-sided thora vs PleurX after extubation (no need for now as pt on minimal vent settings)  - daily SBT (if passing SAT)    CARDIOVASCULAR  - avoid volume overload  - give Lasix 40mg IV once wow    GI  - Diet: NPO w/ NGT  - GI ppx: pantoprazole 40 iv qd  - c/w senna 2 tabs PO QHS (constipation prophylaxis while on fentanyl)  - c/w Miralax 17g PO QD (constipation prophylaxis while on fentanyl)    RENAL  - strict I&Os, daily weight, and monitor volume status  - Follow up renal function and lytes, correct as needed (K>4; Mg>2)    INFECTIOUS DISEASE  - c/w ceftriaxone 1g IV Q24H x5 days (8/30-9/3)  - c/w azithromycin 500mg IV Q24H x5 days (8/30-9/3)  - procalcitonin 0.57  - monitor VS    HEMATOLOGICAL  - DVT ppx: Lovenox 90mg SQ BID  - patient and family know effusions are likely secondary to undiagnosed breast malignancy (see thora cytology from Mar 2023)  - patient and family currently NOT interested in pursuing further evaluation/workup for malignancy    ENDOCRINE  - Monitor FS  - insulin protocol if needed    MUSCULOSKELETAL  - Activity: Increase as Tolerated    DISPOSITION: MICU

## 2023-09-01 NOTE — PROGRESS NOTE ADULT - SUBJECTIVE AND OBJECTIVE BOX
Patient is a 88y old  Female who presents with a chief complaint of hypercapnic respiratory failure (01 Sep 2023 09:49)        Over Night Events: no overnight     ROS:     All ROS are negative except HPI         PHYSICAL EXAM    ICU Vital Signs Last 24 Hrs  T(C): 37.3 (01 Sep 2023 09:00), Max: 37.3 (01 Sep 2023 09:00)  T(F): 99.1 (01 Sep 2023 09:00), Max: 99.1 (01 Sep 2023 09:00)  HR: 85 (01 Sep 2023 09:00) (62 - 113)  BP: 135/73 (01 Sep 2023 09:00) (135/73 - 185/90)  BP(mean): 99 (01 Sep 2023 09:00) (97 - 133)  ABP: --  ABP(mean): --  RR: 16 (01 Sep 2023 09:00) (16 - 19)  SpO2: 98% (01 Sep 2023 09:00) (97% - 100%)    O2 Parameters below as of 01 Sep 2023 09:00  Patient On (Oxygen Delivery Method): ventilator    O2 Concentration (%): 40        CONSTITUTIONAL:  Well nourished.  NAD    CARDIAC:   Normal rate,   Regular rhythm.    BL LE edema    RESPIRATORY:   bl diminished BS worse, worse on the right  mechanical BS     GASTROINTESTINAL:  Abdomen soft,   Non-tender,   No guarding,     MUSCULOSKELETAL:   Range of motion is not limited,  No clubbing, cyanosis    NEUROLOGICAL:   sedated     08-31-23 @ 07:01  -  09-01-23 @ 07:00  --------------------------------------------------------  IN:    Dexmedetomidine: 545.3 mL    FentaNYL: 377 mL    IV PiggyBack: 150 mL    Peptamen A.F.: 900 mL  Total IN: 1972.3 mL    OUT:    Voided (mL): 2250 mL  Total OUT: 2250 mL    Total NET: -277.7 mL      09-01-23 @ 07:01  -  09-01-23 @ 10:37  --------------------------------------------------------  IN:    Dexmedetomidine: 71.4 mL    FentaNYL: 93 mL    Peptamen A.F.: 150 mL  Total IN: 314.4 mL    OUT:  Total OUT: 0 mL    Total NET: 314.4 mL          LABS:                            10.0   12.30 )-----------( 252      ( 01 Sep 2023 05:39 )             34.0                                               09-01    141  |  100  |  37<H>  ----------------------------<  151<H>  3.6   |  31  |  0.7    Ca    8.8      01 Sep 2023 05:39  Phos  3.4     09-01  Mg     2.2     09-01    TPro  7.1  /  Alb  3.4<L>  /  TBili  0.4  /  DBili  x   /  AST  45<H>  /  ALT  31  /  AlkPhos  166<H>  09-01      PT/INR - ( 31 Aug 2023 11:00 )   PT: 12.70 sec;   INR: 1.11 ratio         PTT - ( 31 Aug 2023 11:00 )  PTT:31.1 sec                                       Urinalysis Basic - ( 01 Sep 2023 05:39 )    Color: x / Appearance: x / SG: x / pH: x  Gluc: 151 mg/dL / Ketone: x  / Bili: x / Urobili: x   Blood: x / Protein: x / Nitrite: x   Leuk Esterase: x / RBC: x / WBC x   Sq Epi: x / Non Sq Epi: x / Bacteria: x                                                  LIVER FUNCTIONS - ( 01 Sep 2023 05:39 )  Alb: 3.4 g/dL / Pro: 7.1 g/dL / ALK PHOS: 166 U/L / ALT: 31 U/L / AST: 45 U/L / GGT: x                                                                                               Mode: AC/ CMV (Assist Control/ Continuous Mandatory Ventilation)  RR (machine): 16  TV (machine): 400  FiO2: 40  PEEP: 5  MAP: 11  PIP: 34                                      ABG - ( 01 Sep 2023 05:51 )  pH, Arterial: 7.55  pH, Blood: x     /  pCO2: 41    /  pO2: 108   / HCO3: 36    / Base Excess: 12.3  /  SaO2: 98.9                MEDICATIONS  (STANDING):  albuterol    90 MICROgram(s) HFA Inhaler 2 Puff(s) Inhalation every 4 hours  azithromycin  IVPB      azithromycin  IVPB 500 milliGRAM(s) IV Intermittent every 24 hours  cefTRIAXone   IVPB      cefTRIAXone   IVPB 1000 milliGRAM(s) IV Intermittent every 24 hours  chlorhexidine 0.12% Liquid 15 milliLiter(s) Oral Mucosa every 12 hours  chlorhexidine 2% Cloths 1 Application(s) Topical <User Schedule>  dexMEDEtomidine Infusion 0.05 MICROgram(s)/kG/Hr (1.1 mL/Hr) IV Continuous <Continuous>  dextrose 5%. 1000 milliLiter(s) (50 mL/Hr) IV Continuous <Continuous>  dextrose 50% Injectable 25 Gram(s) IV Push once  fentaNYL   Infusion. 0.5 MICROgram(s)/kG/Hr (4.1 mL/Hr) IV Continuous <Continuous>  glucagon  Injectable 1 milliGRAM(s) IntraMuscular once  insulin lispro (ADMELOG) corrective regimen sliding scale   SubCutaneous three times a day before meals  methylPREDNISolone sodium succinate Injectable 40 milliGRAM(s) IV Push daily  pantoprazole  Injectable 40 milliGRAM(s) IV Push daily  polyethylene glycol 3350 17 Gram(s) Oral two times a day  senna 2 Tablet(s) Oral at bedtime    MEDICATIONS  (PRN):  dextrose Oral Gel 15 Gram(s) Oral once PRN Blood Glucose LESS THAN 70 milliGRAM(s)/deciliter  midazolam Injectable 2 milliGRAM(s) IV Push every 4 hours PRN agitation/vent synchrony      New X-rays reviewed:                                                                                  ECHO    CXR interpreted by me:

## 2023-09-01 NOTE — PROGRESS NOTE ADULT - SUBJECTIVE AND OBJECTIVE BOX
Patient seen and evaluated this am, sedated on ventilator with fentanyl and Precedex      T(F): 100.6 (09-01-23 @ 11:00), Max: 100.6 (09-01-23 @ 11:00)  HR: 85 (09-01-23 @ 09:00)  BP: 135/73 (09-01-23 @ 09:00)  RR: 16 (09-01-23 @ 11:00)  SpO2: 98% (09-01-23 @ 09:00) (97% - 100%)    PHYSICAL EXAM:  GENERAL: NAD  HEAD:  Atraumatic, Normocephalic  EYES: EOMI, PERRLA, conjunctiva and sclera clear  NERVOUS SYSTEM: no focal deficits   CHEST/LUNG:  bilateral rales  HEART: Regular rate and rhythm; No murmurs, rubs, or gallops  ABDOMEN: Soft, Nontender, Nondistended; Bowel sounds present  EXTREMITIES:  2+ Peripheral Pulses, No clubbing, cyanosis, or edema    LABS  09-01    141  |  100  |  37<H>  ----------------------------<  151<H>  3.6   |  31  |  0.7    Ca    8.8      01 Sep 2023 05:39  Phos  3.4     09-01  Mg     2.2     09-01    TPro  7.1  /  Alb  3.4<L>  /  TBili  0.4  /  DBili  x   /  AST  45<H>  /  ALT  31  /  AlkPhos  166<H>  09-01                          10.0   12.30 )-----------( 252      ( 01 Sep 2023 05:39 )             34.0     PT/INR - ( 31 Aug 2023 11:00 )   PT: 12.70 sec;   INR: 1.11 ratio         PTT - ( 31 Aug 2023 11:00 )  PTT:31.1 sec    Mode: AC/ CMV (Assist Control/ Continuous Mandatory Ventilation)  RR (machine): 16  TV (machine): 400  FiO2: 40  PEEP: 5    Culture Results:   No growth at 72 Hours (08-28-23)  Culture Results:   No growth at 72 Hours (08-28-23)    RADIOLOGY  < from: Xray Chest 1 View- PORTABLE-Routine (Xray Chest 1 View- PORTABLE-Routine .) (08.31.23 @ 06:33) >  Impression:    Stable bilateral lung opacities    < end of copied text >    MEDICATIONS  (STANDING):  albuterol    90 MICROgram(s) HFA Inhaler 2 Puff(s) Inhalation every 4 hours  azithromycin  IVPB 500 milliGRAM(s) IV Intermittent every 24 hours  cefTRIAXone   IVPB 1000 milliGRAM(s) IV Intermittent every 24 hours  chlorhexidine 0.12% Liquid 15 milliLiter(s) Oral Mucosa every 12 hours  chlorhexidine 2% Cloths 1 Application(s) Topical <User Schedule>  dexMEDEtomidine Infusion 0.05 MICROgram(s)/kG/Hr (1.1 mL/Hr) IV Continuous <Continuous>  fentaNYL   Infusion. 0.5 MICROgram(s)/kG/Hr (4.1 mL/Hr) IV Continuous <Continuous>  furosemide   Injectable 40 milliGRAM(s) IV Push once  glucagon  Injectable 1 milliGRAM(s) IntraMuscular once  insulin lispro (ADMELOG) corrective regimen sliding scale   SubCutaneous three times a day before meals  methylPREDNISolone sodium succinate Injectable 40 milliGRAM(s) IV Push daily  pantoprazole  Injectable 40 milliGRAM(s) IV Push daily  polyethylene glycol 3350 17 Gram(s) Oral two times a day  senna 2 Tablet(s) Oral at bedtime    MEDICATIONS  (PRN):  dextrose Oral Gel 15 Gram(s) Oral once PRN Blood Glucose LESS THAN 70 milliGRAM(s)/deciliter  midazolam Injectable 2 milliGRAM(s) IV Push every 4 hours PRN agitation/vent synchrony

## 2023-09-01 NOTE — CONSULT NOTE ADULT - ASSESSMENT
ASSESSMENT  88y F admitted with Acute respiratory failure with hypoxia      HTN (hypertension)    Afib    Goiter    Cellulitis    OA (osteoarthritis)    PVD (peripheral vascular disease)    COPD (chronic obstructive pulmonary disease)    Anxiety    Obesity    Acute on chronic systolic congestive heart failure    Edema of both lower legs    Required emergent intubation        IMPRESSION  #  #Severe Sepsis on admission  #Lactic acidosis  #Hyponatremia   #Obesity BMI (kg/m2): 30.3, 34.7  #DM     RECOMMENDATIONS  This is an incomplete consult note. All final recommendations to follow after interview and examination of the patient. Please follow recommendations noted below.    If any questions, please send a message or call on [email or web address] Teams  Please continue to update ID with any pertinent new laboratory or radiographic findings.    Clifford Cordero M.D  Infectious Diseases Attending  [facility]    ASSESSMENT  88 year old female with PMH of HTN, Afib, OA, PVD, COPD, Anxiety, Obesity, Acute on chronic systolic congestive heart failure, H/O abdominal hysterectomy, H/O discectomy, H/O total knee replacement, bilaterally and bilateral pleural effusions (s/p left thoracentesis 3/2023 -- Cytology -PLEURAL FLUID, THORACENTESIS: - SUSPICIOUS FOR MALIGNANCY) with a left sided pleurex catheter is currently admitted due to worsening respiratory distress and hypercapnic respiratory failure and COPD exacerbation, currently on mechanical ventilation.ID is being consulted for antimicrobial recommendations given she is spiking intermittent fevers.     IMPRESSION  #Acute hypercapnic respiratory failure- Volume overload vs COPD exacerbation   #Sepsis present on admission  #Fevers- Concerns of superimposed VAP?   #Bilateral pleural effusions   - s/p left thoracentesis 3/2023 -- Cytology -PLEURAL FLUID, THORACENTESIS: - SUSPICIOUS FOR MALIGNANCY. - Atypical cells present in background of histiocytes and mesothelial  cells, suspicious for metastatic carcinoma.   - Pleurx Cx 7/4 - Staph epidermidis, No PMNs  - s/p thoracentesis right thoracentesis 7/5 Exudative in nature. Cultures negative  #History of ESBL E.coli bacteremia (5/2023)   #Hx 2/2023 foot wound MRSA  #Right inguinal Abscess history (7/2023)- Cx (Polymicrobial- Proteus, E.feacalis and VRE)   #HF with preserved EF  #COPD   #Chronic Lymphedema  #Obesity BMI (kg/m2): 30.3, 34.7  #RVP and legionella urine antigen negative    RECOMMENDATIONS  -Recommend following up with blood cultures. Check UA and urine cultures, and deep tracheal aspirates.    -Follow up with urine Strep antigen.   -Check MRSA nares.   -Stop Ctx/azithromycin.  -Recommend initiating on IV vancomycin 1250mg BID. Check vanc trough before 3rd dose.   -Recommend starting on IV cefepime 2 gram q 8 hours.   -Already on full anticoagulation (possibility of acute thrombosis is less likely)  -Monitor for diarrhea, if noted, send for C.diff.   -No reported skin break down or failure. Continue with precautions for off loading.   -Drug related fevers is in differential (such as with precedex) but too early to develop.   -Recommend aspiration precautions.   -Trend fever curve. Trend WBC   -Trend LFTs. If rising, obtain RUQ US or CT abdomen to check for acalculous cholecystitis.   -Patient is at high risk of ICU related deconditioning.      If any questions, please send a message or call on [employer] Teams  Please continue to update ID with any pertinent new laboratory or radiographic findings.    Clifford Cordero M.D  Infectious Diseases Attending  [facility]    ASSESSMENT  88 year old female with PMH of HTN, Afib, OA, PVD, COPD, Anxiety, Obesity, Acute on chronic systolic congestive heart failure, H/O abdominal hysterectomy, H/O discectomy, H/O total knee replacement, bilaterally and bilateral pleural effusions (s/p left thoracentesis 3/2023 -- Cytology -PLEURAL FLUID, THORACENTESIS: - SUSPICIOUS FOR MALIGNANCY) with a left sided pleurex catheter is currently admitted due to worsening respiratory distress and hypercapnic respiratory failure and COPD exacerbation, currently on mechanical ventilation.ID is being consulted for antimicrobial recommendations given she is spiking intermittent fevers.     IMPRESSION  #Acute hypercapnic respiratory failure- Volume overload vs COPD exacerbation   #Sepsis present on admission  #Fevers- Concerns of superimposed VAP?   #Bilateral pleural effusions   - s/p left thoracentesis 3/2023 -- Cytology -PLEURAL FLUID, THORACENTESIS: - SUSPICIOUS FOR MALIGNANCY. - Atypical cells present in background of histiocytes and mesothelial  cells, suspicious for metastatic carcinoma.   - Pleurx Cx 7/4 - Staph epidermidis, No PMNs  - s/p thoracentesis right thoracentesis 7/5 Exudative in nature. Cultures negative  #History of ESBL E.coli bacteremia (5/2023)   #Hx 2/2023 foot wound MRSA  #Right inguinal Abscess history (7/2023)- Cx (Polymicrobial- Proteus, E.feacalis and VRE)   #HF with preserved EF  #COPD   #Chronic Lymphedema  #Obesity BMI (kg/m2): 30.3, 34.7  #RVP and legionella urine antigen negative    RECOMMENDATIONS  -Recommend following up with blood cultures. Check UA and urine cultures, and deep tracheal aspirates.    -Follow up with urine Strep antigen.   -Check MRSA nares.   -Stop Ctx/azithromycin.  -Recommend initiating on IV vancomycin 1250mg BID. Check vanc trough before 3rd dose.   -Recommend starting on IV cefepime 2 gram q 8 hours.   -Already on full anticoagulation (possibility of acute thrombosis is less likely)  -Monitor for diarrhea, if noted, send for C.diff.   -No reported skin break down or failure. Continue with precautions for off loading.   -Drug related fevers is in differential (such as with precedex) but too early to develop.   -Recommend aspiration precautions.   -Trend fever curve. Trend WBC   -Trend LFTs. If rising, obtain RUQ US or CT abdomen to check for acalculous cholecystitis.   -Patient is at high risk of ICU related deconditioning.    -Guarded prognosis.     If any questions, please send a message or call on [employer] Teams  Please continue to update ID with any pertinent new laboratory or radiographic findings.    Clifford Cordero M.D  Infectious Diseases Attending  [facility]

## 2023-09-02 NOTE — PROGRESS NOTE ADULT - SUBJECTIVE AND OBJECTIVE BOX
T(F): , Max: 100 (09-02-23 @ 02:00)  HR: 68 (09-02-23 @ 22:00) (66 - 180)  BP: 121/58 (09-02-23 @ 22:00)  RR: 16 (09-02-23 @ 22:00)  SpO2: 96% (09-02-23 @ 22:00)  IN: 3114 mL / OUT: 2400 mL / NET: 714 mL    IN: 1587.1 mL / OUT: 820 mL / NET: 767.1 mL      General: No apparent distress  Cardiovascular: S1, S2  Gastrointestinal: Soft, Non-tender, Non-distended  Respiratory: vented  Lymphatic: No edema  Neurologic: sedated  Dermatologic: Skin dry                          10.4   15.85 )-----------( 255      ( 02 Sep 2023 06:01 )             35.6     09-02    140  |  97<L>  |  40<H>  ----------------------------<  143<H>  3.6   |  31  |  0.9    Ca    8.4      02 Sep 2023 06:01  Phos  2.7     09-02  Mg     2.0     09-02    TPro  6.9  /  Alb  3.3<L>  /  TBili  0.5  /  DBili  x   /  AST  37  /  ALT  38  /  AlkPhos  174<H>  09-02      Culture - Sputum (collected 02 Sep 2023 03:00)  Source: Trach Asp Tracheal Aspirate  Gram Stain (02 Sep 2023 21:45):    Numerous polymorphonuclear leukocytes per low power field    No Squamous epithelial cells per low power field    No organisms seen per oil power field

## 2023-09-02 NOTE — PROGRESS NOTE ADULT - ASSESSMENT
IMPRESSION:  acute hypercapnic/ hypoxic res failure s/p intubation   COPD exacerbation?  Sepsis present on admission   CAP  afib on eliquis  recurrent pleural effusions s/p pleurex   malignant pleural effusion   H/O HFpEF    PLAN:    CNS: precedx + fentenyl for sedation, SAT daily    HEENT: oral care, ETT care    PULMONARY: aspiration precaution, HOB@ 45 degrees, daily abg/xray, keep pco2 around 50, solumedrol 40 qd, albuterl PRN, vent changes:  RR 16 fio2 40, increase PEEP to 8 for increased driving pressure  POCUS right sided pleural effusion  SBT daily    CARDIOVASCULAR: echo, trops noted. BNP elevated, lasix 40mg x1 now again today, inadequate output yesterday, keep I<os, baseline ekg    GI: GI prophylaxis.  Feeding,     RENAL: f/u lytes correct as needed,     INFECTIOUS DISEASE: c/w vancomycin/cefepime, ua, ucx, bcx, RVP, procal, urine legion/step, DTA     HEMATOLOGICAL: [facility] hold for thora Tuesday, f/u with oncologist    ENDOCRINE:  Follow up FS.  Insulin protocol if needed.    MUSCULOSKELETAL: bedbound    full code for now (DNR/DNI was rescinded by family)  ICU  al d/c, straight cath and bladder scan q6hrs

## 2023-09-02 NOTE — PROGRESS NOTE ADULT - ASSESSMENT
acute respiratory failure 2/2  possible pneumonia  -  DNR and DNI rescinded  - intubated sedated  - palliative care    COPD  - poor prognosis    pleural effusions, possibly from breast CA with mets  - poor prognosis

## 2023-09-02 NOTE — GOALS OF CARE CONVERSATION - ADVANCED CARE PLANNING - CONVERSATION DETAILS
Discussed advanced care planning with patient's family. Discussed full vs limited medical management. Extensively explained that full medical management would involve intubating the patient if medically indicated and performing chest compressions in an attempt to resuscitate if the patient were to arrest. Further explained the concept of limited medical management, such as DNR/DNI. Further explained that DNR/DNI would not preclude medical management up until the point of arrest/intubation (such as antibiotics, IV fluids, blood draws, etc.). Patient's family expressed understanding of above. Patient's code status is FULL CODE.
d/w family at length pt's current medical assessment and plan  family has been in talks with palliative in depth since pt's last visit in hospital in July  there were plans for hospice at one point and pt was also DNR/DNI but all have since been declined/ changed    family (all siblings minus one in [place] who does not take part in [facility] discussions) seem to be in agreement that pt appears to have low quality of life at this time. Difference between allowing a natural death and comfort measures was discussed in detail mainly with son rCisti. They understand that pt is not imminently at end of life yet but seems to be progressing in that direction if current medical management does not improve patients mental status.     They would like to enact a DNR and allow a natural death if patient's heart stops for any reason.  They would hold of on CMO at this time and continue with medical management including IV fluids, Abx, and if need arises HD.  They want to get their mother off the ventilator.     MAEVE signed, needs attending signature

## 2023-09-02 NOTE — PROGRESS NOTE ADULT - SUBJECTIVE AND OBJECTIVE BOX
Patient is a 88y old  Female who presents with a chief complaint of hypercapnic respiratory failure (01 Sep 2023 16:05)        Over Night Events:    driving pressure 20  net positive yesterday  febrile yesterday AM  agitated overnight, kicks frequently    ROS:     Unable to assess ROS: patient intubated.        PHYSICAL EXAM    ICU Vital Signs Last 24 Hrs  T(C): 37.3 (02 Sep 2023 04:00), Max: 38.1 (01 Sep 2023 11:00)  T(F): 99.1 (02 Sep 2023 04:00), Max: 100.6 (01 Sep 2023 11:00)  HR: 95 (02 Sep 2023 04:00) (65 - 120)  BP: 167/80 (02 Sep 2023 04:00) (125/64 - 186/91)  BP(mean): 115 (02 Sep 2023 04:00) (92 - 131)  ABP: --  ABP(mean): --  RR: 25 (02 Sep 2023 04:) (16 - 30)  SpO2: 100% (02 Sep 2023 04:) (97% - 100%)    O2 Parameters below as of 02 Sep 2023 04:00  Patient On (Oxygen Delivery Method): ventilator    O2 Concentration (%): 40        Constitutional: no acute distress, well nourished well developed  Neuro: moving all 4 limbs spontaneously, no facial droop or dysarthria  HEENT: NCAT, anicteric  Neck: no visible lymphadenopathy or goiter  Pulm: no respiratory distress. clear to auscultation bilaterally  Cardiac: extremities appear pink and well-perfused.  regular rhythm and rate, no murmur detected  Abdomen: non-distended  Extremities: no peripheral edema      23 @ 07:01  -  23 @ 07:00  --------------------------------------------------------  IN:    Dexmedetomidine: 545.3 mL    FentaNYL: 377 mL    IV PiggyBack: 150 mL    Peptamen A.F.: 900 mL  Total IN: 1972.3 mL    OUT:    Voided (mL): 2250 mL  Total OUT: 2250 mL    Total NET: -277.7 mL      23 @ 07:01  -  23 @ 05:54  --------------------------------------------------------  IN:    Dexmedetomidine: 588 mL    FentaNYL: 820 mL    IV PiggyBack: 300 mL    Peptamen A.F.: 1100 mL  Total IN: 2808 mL    OUT:    Voided (mL): 1400 mL  Total OUT: 1400 mL    Total NET: 1408 mL          LABS:                            10.0   12.30 )-----------( 252      ( 01 Sep 2023 05:39 )             34.0                                                   141  |  100  |  37<H>  ----------------------------<  151<H>  3.6   |  31  |  0.7    Ca    8.8      01 Sep 2023 05:39  Phos  3.4       Mg     2.2         TPro  7.1  /  Alb  3.4<L>  /  TBili  0.4  /  DBili  x   /  AST  45<H>  /  ALT  31  /  AlkPhos  166<H>        PT/INR - ( 31 Aug 2023 11:00 )   PT: 12.70 sec;   INR: 1.11 ratio         PTT - ( 31 Aug 2023 11:00 )  PTT:31.1 sec                                       Urinalysis Basic - ( 01 Sep 2023 19:50 )    Color: Yellow / Appearance: Clear / S.010 / pH: x  Gluc: x / Ketone: Negative mg/dL  / Bili: Negative / Urobili: 0.2 mg/dL   Blood: x / Protein: Negative mg/dL / Nitrite: Negative   Leuk Esterase: Negative / RBC: x / WBC x   Sq Epi: x / Non Sq Epi: x / Bacteria: x                                                  LIVER FUNCTIONS - ( 01 Sep 2023 05:39 )  Alb: 3.4 g/dL / Pro: 7.1 g/dL / ALK PHOS: 166 U/L / ALT: 31 U/L / AST: 45 U/L / GGT: x                                                                                               Mode: AC/ CMV (Assist Control/ Continuous Mandatory Ventilation)  RR (machine): 16  TV (machine): 400  FiO2: 40  PEEP: 5  ITime: 1  MAP: 11  PIP: 34                                      ABG - ( 02 Sep 2023 03:45 )  pH, Arterial: 7.51  pH, Blood: x     /  pCO2: 45    /  pO2: 106   / HCO3: 36    / Base Excess: 11.6  /  SaO2: 99.7                MEDICATIONS  (STANDING):  albuterol    90 MICROgram(s) HFA Inhaler 2 Puff(s) Inhalation every 4 hours  cefepime   IVPB      cefepime   IVPB 2000 milliGRAM(s) IV Intermittent every 8 hours  chlorhexidine 0.12% Liquid 15 milliLiter(s) Oral Mucosa every 12 hours  chlorhexidine 2% Cloths 1 Application(s) Topical <User Schedule>  dexMEDEtomidine Infusion 0.05 MICROgram(s)/kG/Hr (1.1 mL/Hr) IV Continuous <Continuous>  dextrose 5%. 1000 milliLiter(s) (50 mL/Hr) IV Continuous <Continuous>  dextrose 50% Injectable 25 Gram(s) IV Push once  enoxaparin Injectable 90 milliGRAM(s) SubCutaneous every 12 hours  fentaNYL   Infusion. 0.5 MICROgram(s)/kG/Hr (4.1 mL/Hr) IV Continuous <Continuous>  glucagon  Injectable 1 milliGRAM(s) IntraMuscular once  insulin lispro (ADMELOG) corrective regimen sliding scale   SubCutaneous three times a day before meals  methylPREDNISolone sodium succinate Injectable 40 milliGRAM(s) IV Push daily  pantoprazole  Injectable 40 milliGRAM(s) IV Push daily  polyethylene glycol 3350 17 Gram(s) Oral two times a day  senna 2 Tablet(s) Oral at bedtime  vancomycin  IVPB      vancomycin  IVPB 1250 milliGRAM(s) IV Intermittent every 12 hours    MEDICATIONS  (PRN):  acetaminophen     Tablet .. 650 milliGRAM(s) Oral every 6 hours PRN Temp greater or equal to 38C (100.4F), Mild Pain (1 - 3)  dextrose Oral Gel 15 Gram(s) Oral once PRN Blood Glucose LESS THAN 70 milliGRAM(s)/deciliter  midazolam Injectable 2 milliGRAM(s) IV Push every 4 hours PRN agitation/vent synchrony      New X-rays reviewed:       ECHO reviewed    CXR interpreted by me:      images and radiologist read reviewed, by my read demonstrating bibasilar infiltrates, pulmonary edema vs less likely pneumonia

## 2023-09-03 NOTE — PROGRESS NOTE ADULT - SUBJECTIVE AND OBJECTIVE BOX
Patient is a 88y old  Female who presents with a chief complaint of hypercapnic respiratory failure (02 Sep 2023 22:38)        Over Night Events:    intermittently agitated requiring propofol intermittently  desat episodes overnight 2/2 agitation    ROS:     Unable to assess ROS: patient intubated.        PHYSICAL EXAM    ICU Vital Signs Last 24 Hrs  T(C): 36.7 (03 Sep 2023 03:03), Max: 37.8 (02 Sep 2023 11:00)  T(F): 98 (03 Sep 2023 03:03), Max: 100 (02 Sep 2023 11:00)  HR: 64 (03 Sep 2023 03:00) (64 - 180)  BP: 132/59 (03 Sep 2023 03:00) (92/51 - 155/65)  BP(mean): 85 (03 Sep 2023 03:00) (68 - 107)  ABP: --  ABP(mean): --  RR: 24 (03 Sep 2023 03:03) (16 - 32)  SpO2: 100% (03 Sep 2023 03:00) (90% - 100%)    O2 Parameters below as of 02 Sep 2023 07:00  Patient On (Oxygen Delivery Method): ventilator    O2 Concentration (%): 40      Constitutional: no acute distress, well nourished well developed  Neuro: moving all 4 limbs spontaneously.  Sedated.  HEENT: NCAT, anicteric, ETT in place  Neck: no visible lymphadenopathy or goiter  Pulm: synchronous with ventilator, coarse bilateral breath sounds anteriorly  Cardiac: extremities appear pink and well-perfused.  regular rhythm and rate, no murmur detected  Abdomen: non-distended  Extremities: trace dependent edema  Skin: no visible rashes or lesions      09-01-23 @ 07:01  -  09-02-23 @ 07:00  --------------------------------------------------------  IN:    Dexmedetomidine: 658 mL    FentaNYL: 906 mL    IV PiggyBack: 350 mL    Peptamen A.F.: 1200 mL  Total IN: 3114 mL    OUT:    Voided (mL): 2400 mL  Total OUT: 2400 mL    Total NET: 714 mL      09-02-23 @ 07:01 - 09-03-23 @ 06:33  --------------------------------------------------------  IN:    Dexmedetomidine: 473 mL    FentaNYL: 474.8 mL    IV PiggyBack: 300 mL    Peptamen A.F.: 950 mL    Propofol: 15.3 mL    Propofol (Status Epilepticus): 50 mL    Propofol (Status Epilepticus): 30 mL  Total IN: 2293.1 mL    OUT:    Voided (mL): 1800 mL  Total OUT: 1800 mL    Total NET: 493.1 mL          LABS:                            10.4   15.85 )-----------( 255      ( 02 Sep 2023 06:01 )             35.6                                               09-02    140  |  97<L>  |  40<H>  ----------------------------<  143<H>  3.6   |  31  |  0.9    Ca    8.4      02 Sep 2023 06:01  Phos  2.7     09-02  Mg     2.0     09-02    TPro  6.9  /  Alb  3.3<L>  /  TBili  0.5  /  DBili  x   /  AST  37  /  ALT  38  /  AlkPhos  174<H>  09-02                                             Urinalysis Basic - ( 02 Sep 2023 06:01 )    Color: x / Appearance: x / SG: x / pH: x  Gluc: 143 mg/dL / Ketone: x  / Bili: x / Urobili: x   Blood: x / Protein: x / Nitrite: x   Leuk Esterase: x / RBC: x / WBC x   Sq Epi: x / Non Sq Epi: x / Bacteria: x                                                  LIVER FUNCTIONS - ( 02 Sep 2023 06:01 )  Alb: 3.3 g/dL / Pro: 6.9 g/dL / ALK PHOS: 174 U/L / ALT: 38 U/L / AST: 37 U/L / GGT: x                                                  Culture - Sputum (collected 02 Sep 2023 03:00)  Source: Trach Asp Tracheal Aspirate  Gram Stain (02 Sep 2023 21:45):    Numerous polymorphonuclear leukocytes per low power field    No Squamous epithelial cells per low power field    No organisms seen per oil power field    Culture - Urine (collected 01 Sep 2023 19:50)  Source: Catheterized Catheterized  Final Report (02 Sep 2023 22:46):    <10,000 CFU/mL Normal Urogenital Joaquina    Culture - Blood (collected 01 Sep 2023 19:32)  Source: .Blood None  Preliminary Report (03 Sep 2023 02:02):    No growth at 24 hours                                                   Mode: AC/ CMV (Assist Control/ Continuous Mandatory Ventilation)  RR (machine): 16  TV (machine): 400  FiO2: 90  PEEP: 5  MAP: 11  PIP: 34                                      ABG - ( 03 Sep 2023 03:55 )  pH, Arterial: 7.49  pH, Blood: x     /  pCO2: 44    /  pO2: 135   / HCO3: 34    / Base Excess: 9.2   /  SaO2: 99.5                MEDICATIONS  (STANDING):  albuterol    90 MICROgram(s) HFA Inhaler 2 Puff(s) Inhalation every 4 hours  cefepime   IVPB      cefepime   IVPB 2000 milliGRAM(s) IV Intermittent every 8 hours  chlorhexidine 0.12% Liquid 15 milliLiter(s) Oral Mucosa every 12 hours  chlorhexidine 2% Cloths 1 Application(s) Topical <User Schedule>  dexMEDEtomidine Infusion 0.05 MICROgram(s)/kG/Hr (1.1 mL/Hr) IV Continuous <Continuous>  dextrose 5%. 1000 milliLiter(s) (50 mL/Hr) IV Continuous <Continuous>  dextrose 50% Injectable 25 Gram(s) IV Push once  enoxaparin Injectable 90 milliGRAM(s) SubCutaneous every 12 hours  fentaNYL   Infusion. 0.5 MICROgram(s)/kG/Hr (4.1 mL/Hr) IV Continuous <Continuous>  glucagon  Injectable 1 milliGRAM(s) IntraMuscular once  insulin lispro (ADMELOG) corrective regimen sliding scale   SubCutaneous three times a day before meals  methylPREDNISolone sodium succinate Injectable 40 milliGRAM(s) IV Push daily  pantoprazole  Injectable 40 milliGRAM(s) IV Push daily  polyethylene glycol 3350 17 Gram(s) Oral two times a day  propofol Infusion. 29.992 MICROgram(s)/kG/Min (15.8 mL/Hr) IV Continuous <Continuous>  senna 2 Tablet(s) Oral at bedtime  vancomycin  IVPB      vancomycin  IVPB 1250 milliGRAM(s) IV Intermittent every 12 hours    MEDICATIONS  (PRN):  acetaminophen     Tablet .. 650 milliGRAM(s) Oral every 6 hours PRN Temp greater or equal to 38C (100.4F), Mild Pain (1 - 3)  dextrose Oral Gel 15 Gram(s) Oral once PRN Blood Glucose LESS THAN 70 milliGRAM(s)/deciliter  midazolam Injectable 2 milliGRAM(s) IV Push every 4 hours PRN agitation/vent synchrony      New X-rays reviewed:       ECHO reviewed    CXR interpreted by me:    9/3  images and radiologist read reviewed, by my read demonstrating hazy bibasilar opacities, exaggerated by filmrotation

## 2023-09-03 NOTE — PROGRESS NOTE ADULT - SUBJECTIVE AND OBJECTIVE BOX
Pt calm, sedated and vented    T(F): , Max: 98.8 (09-02-23 @ 19:00)  HR: 74 (09-03-23 @ 15:00) (59 - 101)  BP: 111/55 (09-03-23 @ 15:00)  RR: 20 (09-03-23 @ 15:03)  SpO2: 100% (09-03-23 @ 15:00)  IN: 2397.1 mL / OUT: 1800 mL / NET: 597.1 mL    IN: 1028 mL / OUT: 1210 mL / NET: -182 mL      General: No apparent distress  Cardiovascular: S1, S2  Gastrointestinal: Soft, Non-tender, Non-distended  Respiratory: vented  Lymphatic: No edema  Neurologic: sedated  Dermatologic: Skin dry                          9.7    15.74 )-----------( 204      ( 03 Sep 2023 06:01 )             32.6     09-03    140  |  101  |  39<H>  ----------------------------<  155<H>  4.0   |  29  |  0.8    Ca    8.8      03 Sep 2023 06:01  Phos  2.4     09-03  Mg     2.1     09-03    TPro  5.8<L>  /  Alb  2.7<L>  /  TBili  0.2  /  DBili  x   /  AST  21  /  ALT  27  /  AlkPhos  142<H>  09-03      Culture - Sputum (collected 02 Sep 2023 03:00)  Source: Trach Asp Tracheal Aspirate  Gram Stain (02 Sep 2023 21:45):    Numerous polymorphonuclear leukocytes per low power field    No Squamous epithelial cells per low power field    No organisms seen per oil power field  Preliminary Report (03 Sep 2023 17:01):    Normal Respiratory Joaquina present    Culture - Urine (collected 01 Sep 2023 19:50)  Source: Catheterized Catheterized  Final Report (02 Sep 2023 22:46):    <10,000 CFU/mL Normal Urogenital Joaquina    Culture - Blood (collected 01 Sep 2023 19:32)  Source: .Blood None  Preliminary Report (03 Sep 2023 02:02):    No growth at 24 hours

## 2023-09-03 NOTE — PROGRESS NOTE ADULT - ASSESSMENT
acute respiratory failure 2/2  possible pneumonia  -  DNR and DNI rescinded  - intubated sedated  - palliative care  - cefepime/vanco  - propfol/precedex    COPD  - poor prognosis    pleural effusions, possibly from breast CA with mets  - poor prognosis

## 2023-09-03 NOTE — PROGRESS NOTE ADULT - ASSESSMENT
IMPRESSION:  acute hypercapnic/ hypoxic res failure s/p intubation   COPD exacerbation?  Sepsis present on admission   CAP  afib on eliquis  recurrent pleural effusions s/p pleurex   malignant pleural effusion   H/O HFpEF    PLAN:    CNS: precedx + fentenyl for sedation, SAT daily    HEENT: oral care, ETT care    PULMONARY: aspiration precaution, HOB@ 45 degrees, daily abg/xray, keep pco2 around 50, solumedrol 40 qd, albuterl PRN, vent changes:  RR 16 fio2 wean as tolerated to maintain spO2 > 92%, increase PEEP to 8  SBT daily    CARDIOVASCULAR: echo, trops noted. BNP elevated, lasix 40mg x1 now again today, only slightly net positive yesterday, keep I<os, baseline ekg    GI: GI prophylaxis.  Feeding,     RENAL: f/u lytes correct as needed,     INFECTIOUS DISEASE: c/w vancomycin/cefepime, ua, ucx, bcx, RVP, procal, urine legion/step, DTA     HEMATOLOGICAL: [facility] hold for thora Tuesday, f/u with oncologist    ENDOCRINE:  Follow up FS.  Insulin protocol if needed.    MUSCULOSKELETAL: bedbound    full code for now (DNR/DNI was rescinded by family)  ICU  al d/c, straight cath and bladder scan q6hrs

## 2023-09-04 NOTE — PROGRESS NOTE ADULT - ASSESSMENT
IMPRESSION:  acute hypercapnic/ hypoxic res failure s/p intubation   COPD exacerbation?  Sepsis present on admission   CAP  afib on eliquis  recurrent pleural effusions s/p pleurex   malignant pleural effusion   H/O HFpEF    PLAN:    CNS: precedx + fentenyl for sedation, SAT daily    HEENT: oral care, ETT care    PULMONARY: aspiration precaution, HOB@ 45 degrees, daily abg/xray, keep pco2 around 50, solumedrol 40 qd, albuterl PRN, vent changes: Wean FiO2 as tolerated to maintain spO2 > 92%  Repeat CT chest NC today  SBT daily    CARDIOVASCULAR: echo, trops noted. BNP elevated, lasix 40mg x1 now again today with 500mg Acetazolemide IV, only slightly net positive yesterday, keep I<os, baseline ekg    GI: GI prophylaxis.  Feeding,     RENAL: f/u lytes correct as needed,     INFECTIOUS DISEASE: c/w vancomycin/cefepime, ua, ucx, bcx, RVP, procal, urine legion/step, DTA     HEMATOLOGICAL: [facility] hold for thora Tuesday, f/u with oncologist    ENDOCRINE:  Follow up FS.  Insulin protocol if needed.    MUSCULOSKELETAL: bedbound    full code for now (DNR/DNI was rescinded by family)  ICU  al d/c, straight cath and bladder scan q6hrs

## 2023-09-04 NOTE — PROGRESS NOTE ADULT - ASSESSMENT
acute respiratory failure 2/2  possible pneumonia  -  DNR and DNI rescinded  - intubated sedated  - family amenable to hospice  - cefepime/vanco  - propfol/precedex    COPD  - poor prognosis    pleural effusions, possibly from breast CA with mets  - poor prognosis    pending hospice eval

## 2023-09-04 NOTE — PROGRESS NOTE ADULT - SUBJECTIVE AND OBJECTIVE BOX
Patient is a 88y old  Female who presents with a chief complaint of hypercapnic respiratory failure (04 Sep 2023 07:08)        Over Night Events:    very heavily sedated overnight  Aa gradient remains elevated from previously    ROS:     Unable to assess ROS: patient intubated.        PHYSICAL EXAM    ICU Vital Signs Last 24 Hrs  T(C): 36.3 (04 Sep 2023 07:10), Max: 37 (03 Sep 2023 15:03)  T(F): 97.3 (04 Sep 2023 07:10), Max: 98.6 (03 Sep 2023 15:03)  HR: 65 (04 Sep 2023 05:00) (59 - 80)  BP: 114/56 (04 Sep 2023 05:00) (101/57 - 137/64)  BP(mean): 81 (04 Sep 2023 05:00) (74 - 93)  ABP: --  ABP(mean): --  RR: 16 (04 Sep 2023 07:10) (16 - 22)  SpO2: 96% (04 Sep 2023 05:00) (96% - 100%)    O2 Parameters below as of 04 Sep 2023 05:00  Patient On (Oxygen Delivery Method): ventilator    O2 Concentration (%): 60      Constitutional: no acute distress, well nourished well developed  Neuro: moving all 4 limbs spontaneously.  Sedated.  HEENT: NCAT, anicteric, ETT in place  Neck: no visible lymphadenopathy or goiter  Pulm: synchronous with ventilator, coarse bilateral breath sounds anteriorly  Cardiac: extremities appear pink and well-perfused.  regular rhythm and rate, no murmur detected  Abdomen: non-distended  Extremities: trace dependent edema  Skin: no visible rashes or lesions      09-03-23 @ 07:01  -  09-04-23 @ 07:00  --------------------------------------------------------  IN:    Dexmedetomidine: 528 mL    FentaNYL: 528 mL    IV PiggyBack: 650 mL    Peptamen A.F.: 1200 mL    Propofol (Status Epilepticus): 281 mL  Total IN: 3187 mL    OUT:    Voided (mL): 3060 mL  Total OUT: 3060 mL    Total NET: 127 mL          LABS:                            9.4    14.76 )-----------( 216      ( 04 Sep 2023 05:48 )             32.6                                               09-03    140  |  101  |  39<H>  ----------------------------<  155<H>  4.0   |  29  |  0.8    Ca    8.8      03 Sep 2023 06:01  Phos  2.4     09-03  Mg     2.1     09-03    TPro  5.8<L>  /  Alb  2.7<L>  /  TBili  0.2  /  DBili  x   /  AST  21  /  ALT  27  /  AlkPhos  142<H>  09-03                                             Urinalysis Basic - ( 03 Sep 2023 06:01 )    Color: x / Appearance: x / SG: x / pH: x  Gluc: 155 mg/dL / Ketone: x  / Bili: x / Urobili: x   Blood: x / Protein: x / Nitrite: x   Leuk Esterase: x / RBC: x / WBC x   Sq Epi: x / Non Sq Epi: x / Bacteria: x                                                  LIVER FUNCTIONS - ( 03 Sep 2023 06:01 )  Alb: 2.7 g/dL / Pro: 5.8 g/dL / ALK PHOS: 142 U/L / ALT: 27 U/L / AST: 21 U/L / GGT: x                                                  Culture - Blood (collected 02 Sep 2023 06:01)  Source: .Blood None  Preliminary Report (03 Sep 2023 20:02):    No growth at 24 hours    Culture - Sputum (collected 02 Sep 2023 03:00)  Source: Trach Asp Tracheal Aspirate  Gram Stain (02 Sep 2023 21:45):    Numerous polymorphonuclear leukocytes per low power field    No Squamous epithelial cells per low power field    No organisms seen per oil power field  Preliminary Report (03 Sep 2023 17:01):    Normal Respiratory Joaquina present    Culture - Urine (collected 01 Sep 2023 19:50)  Source: Catheterized Catheterized  Final Report (02 Sep 2023 22:46):    <10,000 CFU/mL Normal Urogenital Joaquina    Culture - Blood (collected 01 Sep 2023 19:32)  Source: .Blood None  Preliminary Report (04 Sep 2023 02:01):    No growth at 48 Hours                                                   Mode: AC/ CMV (Assist Control/ Continuous Mandatory Ventilation)  RR (machine): 16  TV (machine): 400  FiO2: 60  PEEP: 8  ITime: 0.7  MAP: 13  PIP: 33                                      ABG - ( 04 Sep 2023 03:48 )  pH, Arterial: 7.43  pH, Blood: x     /  pCO2: 49    /  pO2: 68    / HCO3: 32    / Base Excess: 7.2   /  SaO2: 95.3                MEDICATIONS  (STANDING):  albuterol    90 MICROgram(s) HFA Inhaler 2 Puff(s) Inhalation every 4 hours  cefepime   IVPB      cefepime   IVPB 2000 milliGRAM(s) IV Intermittent every 8 hours  chlorhexidine 0.12% Liquid 15 milliLiter(s) Oral Mucosa every 12 hours  chlorhexidine 2% Cloths 1 Application(s) Topical <User Schedule>  dexMEDEtomidine Infusion 0.05 MICROgram(s)/kG/Hr (1.1 mL/Hr) IV Continuous <Continuous>  dextrose 5%. 1000 milliLiter(s) (50 mL/Hr) IV Continuous <Continuous>  dextrose 50% Injectable 25 Gram(s) IV Push once  enoxaparin Injectable 90 milliGRAM(s) SubCutaneous every 12 hours  fentaNYL   Infusion. 0.5 MICROgram(s)/kG/Hr (4.1 mL/Hr) IV Continuous <Continuous>  glucagon  Injectable 1 milliGRAM(s) IntraMuscular once  insulin lispro (ADMELOG) corrective regimen sliding scale   SubCutaneous three times a day before meals  methylPREDNISolone sodium succinate Injectable 40 milliGRAM(s) IV Push daily  pantoprazole  Injectable 40 milliGRAM(s) IV Push daily  polyethylene glycol 3350 17 Gram(s) Oral two times a day  propofol Infusion. 29.992 MICROgram(s)/kG/Min (15.8 mL/Hr) IV Continuous <Continuous>  senna 2 Tablet(s) Oral at bedtime  vancomycin  IVPB 1250 milliGRAM(s) IV Intermittent every 12 hours  vancomycin  IVPB        MEDICATIONS  (PRN):  acetaminophen     Tablet .. 650 milliGRAM(s) Oral every 6 hours PRN Temp greater or equal to 38C (100.4F), Mild Pain (1 - 3)  dextrose Oral Gel 15 Gram(s) Oral once PRN Blood Glucose LESS THAN 70 milliGRAM(s)/deciliter  midazolam Injectable 2 milliGRAM(s) IV Push every 4 hours PRN agitation/vent synchrony      New X-rays reviewed:       ECHO reviewed    CXR interpreted by me:    9/4  images and radiologist read reviewed, by my read demonstrating bilateral pulmonary edema, stable bibasilar opacities.  Likely trace bilateral effusions

## 2023-09-04 NOTE — PROGRESS NOTE ADULT - ASSESSMENT
88 year old female with PMH of HTN, Afib, OA, PVD, COPD, Anxiety, Obesity, Acute on chronic systolic congestive heart failure, H/O abdominal hysterectomy, H/O discectomy, H/O total knee replacement, bilaterally and bilateral pleural effusions (s/p left thoracentesis 3/2023 -- Cytology -PLEURAL FLUID, THORACENTESIS: - SUSPICIOUS FOR MALIGNANCY) with a left sided pleurex catheter is currently admitted due to worsening respiratory distress and hypercapnic respiratory failure and COPD exacerbation, currently on mechanical ventilation.ID is being consulted for antimicrobial recommendations given she is spiking intermittent fevers.     IMPRESSION  #Acute hypercapnic respiratory failure- Volume overload vs COPD exacerbation   #Sepsis present on admission  #Fevers- Concerns of superimposed VAP?   #Bilateral pleural effusions   - s/p left thoracentesis 3/2023 -- Cytology -PLEURAL FLUID, THORACENTESIS: - SUSPICIOUS FOR MALIGNANCY. - Atypical cells present in background of histiocytes and mesothelial  cells, suspicious for metastatic carcinoma.   - Pleurx Cx 7/4 - Staph epidermidis, No PMNs  - s/p thoracentesis right thoracentesis 7/5 Exudative in nature. Cultures negative  #History of ESBL E.coli bacteremia (5/2023)   #Hx 2/2023 foot wound MRSA  #Right inguinal Abscess history (7/2023)- Cx (Polymicrobial- Proteus, E.feacalis and VRE)   #HF with preserved EF  #COPD   #Chronic Lymphedema  #Obesity BMI (kg/m2): 30.3, 34.7  #RVP and legionella urine antigen negative    RECOMMENDATIONS  -Recommend following up with blood cultures. Check UA and urine cultures, and deep tracheal aspirates.    -Follow up with urine Strep antigen.   -Check MRSA nares.   -Stop Ctx/azithromycin.  -Recommend initiating on IV vancomycin 1250mg BID. Check vanc trough before 3rd dose.   -Recommend starting on IV cefepime 2 gram q 8 hours.   -Already on full anticoagulation (possibility of acute thrombosis is less likely)  -Monitor for diarrhea, if noted, send for C.diff.   -No reported skin break down or failure. Continue with precautions for off loading.   -Drug related fevers is in differential (such as with precedex) but too early to develop.   -Recommend aspiration precautions.   -Trend fever curve. Trend WBC   -Trend LFTs. If rising, obtain RUQ US or CT abdomen to check for acalculous cholecystitis.   -Patient is at high risk of ICU related deconditioning.    -Guarded prognosis.     If any questions, please send a message or call on [employer] Teams  Please continue to update ID with any pertinent new laboratory or radiographic findings.    Clifford Cordero M.D  Infectious Diseases Attending  [facility]        88 year old female with PMH of HTN, Afib, OA, PVD, COPD, Anxiety, Obesity, Acute on chronic systolic congestive heart failure, H/O abdominal hysterectomy, H/O discectomy, H/O total knee replacement, bilaterally and bilateral pleural effusions (s/p left thoracentesis 3/2023 -- Cytology -PLEURAL FLUID, THORACENTESIS: - SUSPICIOUS FOR MALIGNANCY) with a left sided pleurex catheter is currently admitted due to worsening respiratory distress and hypercapnic respiratory failure and COPD exacerbation, currently on mechanical ventilation.ID is being consulted for antimicrobial recommendations given she is spiking intermittent fevers.     IMPRESSION  #Acute hypercapnic respiratory failure- Volume overload vs COPD exacerbation   #Sepsis present on admission  #Fevers- Concerns of superimposed VAP?   #Bilateral pleural effusions   - s/p left thoracentesis 3/2023 -- Cytology -PLEURAL FLUID, THORACENTESIS: - SUSPICIOUS FOR MALIGNANCY. - Atypical cells present in background of histiocytes and mesothelial  cells, suspicious for metastatic carcinoma.   - Pleurx Cx 7/4 - Staph epidermidis, No PMNs  - s/p thoracentesis right thoracentesis 7/5 Exudative in nature. Cultures negative  #History of ESBL E.coli bacteremia (5/2023)   #Hx 2/2023 foot wound MRSA  #Right inguinal Abscess history (7/2023)- Cx (Polymicrobial- Proteus, E.feacalis and VRE)   #HF with preserved EF  #COPD   #Chronic Lymphedema  #Obesity BMI (kg/m2): 30.3, 34.7  #RVP and legionella urine antigen negative    RECOMMENDATIONS  -Recommend following up with blood cultures. Check UA and urine cultures, and deep tracheal aspirates.    -Follow up with urine Strep antigen.   -Check MRSA nares.   -on IV vancomycin 1250mg BID. Check vanc trough before 3rd dose.   -on IV cefepime 2 gram q 8 hours.   -Already on full anticoagulation (possibility of acute thrombosis is less likely)  -Monitor for diarrhea, if noted, send for C.diff.   -No reported skin break down or failure. Continue with precautions for off loading.   -Drug related fevers is in differential (such as with precedex) but too early to develop.   -Recommend aspiration precautions.   -Trend fever curve. Trend WBC   -Trend LFTs. If rising, obtain RUQ US or CT abdomen to check for acalculous cholecystitis.   -Patient is at high risk of ICU related deconditioning.    -Guarded prognosis.     If any questions, please send a message or call on [employer] Teams  Please continue to update ID with any pertinent new laboratory or radiographic findings.    Clifford Cordero M.D  Infectious Diseases Attending  [facility]

## 2023-09-04 NOTE — PROGRESS NOTE ADULT - SUBJECTIVE AND OBJECTIVE BOX
GIOVANNY BUCK  88y, Female    LOS  7d    INTERVAL EVENTS/HPI  - No acute events overnight  - T(F): , Max: 98.6 (09-03-23 @ 15:03)  - Tolerating medication  - WBC Count: 15.74 (09-03-23 @ 06:01)  WBC Count: 15.85 (09-02-23 @ 06:01)     - Creatinine: 0.8 (09-03-23 @ 06:01)         REVIEW OF SYSTEMS: cannot obtain further history from the patient secondary to altered mental status or sedation      Prior hospital charts reviewed [Yes]  Primary team notes reviewed [Yes]  Other consultant notes reviewed [Yes]    Allergy: 7d    ANTIMICROBIALS:   cefepime   IVPB    cefepime   IVPB 2000 every 8 hours  vancomycin  IVPB    vancomycin  IVPB 1250 every 12 hours      OTHER MEDS: MEDICATIONS  (STANDING):  acetaminophen     Tablet .. 650 every 6 hours PRN  albuterol    90 MICROgram(s) HFA Inhaler 2 every 4 hours  dexMEDEtomidine Infusion 0.05 <Continuous>  dextrose 50% Injectable 25 once  dextrose Oral Gel 15 once PRN  enoxaparin Injectable 90 every 12 hours  fentaNYL   Infusion. 0.5 <Continuous>  glucagon  Injectable 1 once  insulin lispro (ADMELOG) corrective regimen sliding scale  three times a day before meals  methylPREDNISolone sodium succinate Injectable 40 daily  midazolam Injectable 2 every 4 hours PRN  pantoprazole  Injectable 40 daily  polyethylene glycol 3350 17 two times a day  propofol Infusion. 29.992 <Continuous>  senna 2 at bedtime      Vital Signs Last 24 Hrs  T(F): 97.3 (09-04-23 @ 03:02), Max: 101.9 (08-28-23 @ 10:53)    Vital Signs Last 24 Hrs  HR: 65 (09-04-23 @ 05:00) (59 - 80)  BP: 114/56 (09-04-23 @ 05:00) (101/57 - 137/64)  RR: 16 (09-04-23 @ 05:00)  SpO2: 96% (09-04-23 @ 05:00) (96% - 100%)  Wt(kg): --    EXAM:  GENERAL: NAD, lying in bed comfortably  HEAD: No head lesions  EYES: Conjunctiva pink and cornea white  ENT: Normal external ears and nose, no discharges; moist mucous membranes  NECK: Supple, nontender to palpation  CHEST/LUNG: Clear to auscultation bilaterally  HEART: S1 S2  ABDOMEN: Soft, nontender, nondistended; normoactive bowel sounds  EXTREMITIES: No clubbing, cyanosis, or petal edema  NERVOUS SYSTEM: Alert and oriented to person, time, place and situation, speech clear. No focal deficits   MSK: No joint erythema, swelling or pain  SKIN: No rashes or lesions, no superficial thrombophlebitis    Labs:                        9.7    15.74 )-----------( 204      ( 03 Sep 2023 06:01 )             32.6     09-03    140  |  101  |  39<H>  ----------------------------<  155<H>  4.0   |  29  |  0.8    Ca    8.8      03 Sep 2023 06:01  Phos  2.4     09-03  Mg     2.1     09-03    TPro  5.8<L>  /  Alb  2.7<L>  /  TBili  0.2  /  DBili  x   /  AST  21  /  ALT  27  /  AlkPhos  142<H>  09-03      WBC Trend:  WBC Count: 15.74 (09-03-23 @ 06:01)  WBC Count: 15.85 (09-02-23 @ 06:01)  WBC Count: 12.30 (09-01-23 @ 05:39)  WBC Count: 8.09 (08-31-23 @ 11:00)      Creatine Trend:  Creatinine: 0.8 (09-03)  Creatinine: 0.9 (09-02)  Creatinine: 0.7 (09-01)  Creatinine: 0.8 (08-31)      Liver Biochemical Testing Trend:  Alanine Aminotransferase (ALT/SGPT): 27 (09-03)  Alanine Aminotransferase (ALT/SGPT): 38 (09-02)  Alanine Aminotransferase (ALT/SGPT): 31 (09-01)  Alanine Aminotransferase (ALT/SGPT): 9 (08-31)  Alanine Aminotransferase (ALT/SGPT): 8 (08-30)  Aspartate Aminotransferase (AST/SGOT): 21 (09-03-23 @ 06:01)  Aspartate Aminotransferase (AST/SGOT): 37 (09-02-23 @ 06:01)  Aspartate Aminotransferase (AST/SGOT): 45 (09-01-23 @ 05:39)  Aspartate Aminotransferase (AST/SGOT): 13 (08-31-23 @ 11:00)  Aspartate Aminotransferase (AST/SGOT): 12 (08-30-23 @ 06:55)  Bilirubin Total: 0.2 (09-03)  Bilirubin Total: 0.5 (09-02)  Bilirubin Total: 0.4 (09-01)  Bilirubin Total: 0.3 (08-31)  Bilirubin Total: 0.3 (08-30)      Trend LDH  03-07-23 @ 21:06  211      Urinalysis Basic - ( 03 Sep 2023 06:01 )    Color: x / Appearance: x / SG: x / pH: x  Gluc: 155 mg/dL / Ketone: x  / Bili: x / Urobili: x   Blood: x / Protein: x / Nitrite: x   Leuk Esterase: x / RBC: x / WBC x   Sq Epi: x / Non Sq Epi: x / Bacteria: x        MICROBIOLOGY:  Vancomycin Level, Trough: 14.0 (09-02 @ 19:02)    Female    Culture - Blood (collected 02 Sep 2023 06:01)  Source: .Blood None  Preliminary Report:    No growth at 24 hours    Culture - Sputum (collected 02 Sep 2023 03:00)  Source: Trach Asp Tracheal Aspirate  Preliminary Report:    Normal Respiratory Joaquina present    Culture - Urine (collected 01 Sep 2023 19:50)  Source: Catheterized Catheterized  Final Report:    <10,000 CFU/mL Normal Urogenital Joaquina    Culture - Blood (collected 01 Sep 2023 19:32)  Source: .Blood None  Preliminary Report:    No growth at 48 Hours    Culture - Blood (collected 28 Aug 2023 11:00)  Source: .Blood Blood-Peripheral  Final Report:    No growth at 5 days    Culture - Blood (collected 28 Aug 2023 11:00)  Source: .Blood Blood-Peripheral  Final Report:    No growth at 5 days    Culture - Abscess with Gram Stain (collected 16 Jul 2023 07:00)  Source: .Abscess Right groin abscess  Final Report:    Numerous Proteus mirabilis    Numerous Enterococcus faecalis    Few Coag Negative Staphylococcus "Susceptibilities not performed"    Few Enterococcus faecium (vancomycin resistant)    Moderate Prevotella timonensis "Susceptibilities not performed"  Organism: Proteus mirabilis  Enterococcus faecalis  Enterococcus faecium (vancomycin resistant)  Organism: Enterococcus faecium (vancomycin resistant)    Sensitivities:      Method Type: DARIO      -  Ampicillin: R >8 Predicts results to ampicillin/sulbactam, amoxacillin-clavulanate and  piperacillin-tazobactam.      -  Daptomycin: SDD 4 The breakpoint for SDD (susceptible dose dependent)is based on a dosage regimen of 8-12 mg/kg administered every 24 h in adults and is intended for serious infections due to E. faecium. Consultation with an infectious diseases specialist is recommended.      -  Levofloxacin: R >4      -  Linezolid: S 2      -  Tetracycline: R >8      -  Vancomycin: R >16  Organism: Enterococcus faecalis    Sensitivities:      Method Type: DARIO      -  Ampicillin: S <=2 Predicts results to ampicillin/sulbactam, amoxacillin-clavulanate and  piperacillin-tazobactam.      -  Tetracycline: R >8      -  Vancomycin: S 2  Organism: Proteus mirabilis    Sensitivities:      Method Type: DARIO      -  Amikacin: S <=16      -  Amoxicillin/Clavulanic Acid: S <=8/4      -  Ampicillin: S <=8 These ampicillin results predict results for amoxicillin      -  Ampicillin/Sulbactam: S <=4/2 Enterobacter, Klebsiella aerogenes, Citrobacter, and Serratia may develop resistance during prolonged therapy (3-4 days)      -  Aztreonam: S <=4      -  Cefazolin: S <=2 Enterobacter, Klebsiella aerogenes, Citrobacter, and Serratia may develop resistance during prolonged therapy (3-4 days)      -  Cefepime: S <=2      -  Cefoxitin: S <=8      -  Ceftriaxone: S <=1 Enterobacter, Klebsiella aerogenes, Citrobacter, and Serratia may develop resistance during prolonged therapy      -  Ciprofloxacin: S <=0.25      -  Ertapenem: S <=0.5      -  Gentamicin: S <=2      -  Levofloxacin: S <=0.5      -  Meropenem: S <=1      -  Piperacillin/Tazobactam: S <=8      -  Tobramycin: S <=2      -  Trimethoprim/Sulfamethoxazole: S 1/19    Culture - Blood (collected 08 Jul 2023 21:23)  Source: .Blood Blood-Peripheral  Final Report:    No growth at 5 days    Culture - Fungal, Body Fluid (collected 05 Jul 2023 15:45)  Source: Pleural Fl Pleural Fluid  Final Report:    No fungus isolated at 4 weeks.    Culture - Body Fluid with Gram Stain (collected 05 Jul 2023 15:45)  Source: Pleural Fl Pleural Fluid  Final Report:    No growth                        Legionella Antigen, Urine: Negative (08-29 @ 10:53)  Rapid RVP Result: NotDetec (08-29 @ 10:44)        Procalcitonin, Serum: 0.17 (09-02)  Procalcitonin, Serum: 0.57 (08-30)    Blood Gas Arterial, Lactate: 0.70 (09-04 @ 03:48)  Blood Gas Arterial, Lactate: 1.20 (09-03 @ 09:40)  Blood Gas Arterial, Lactate: 1.00 (09-03 @ 03:55)  Blood Gas Arterial, Lactate: 1.20 (09-03 @ 01:03)        RADIOLOGY & ADDITIONAL TESTS:  I have personally reviewed the relevant images.   CXR  Xray Chest 1 View- PORTABLE-Urgent:   ACC: [id number] EXAM:  XR CHEST PORTABLE URGENT 1V   ORDERED BY: DELORIS MUSTAFA     PROCEDURE DATE:  09/03/2023          INTERPRETATION:  Clinical History / Reason for exam: 88-year-old female   with respiratory failure and hypoxia.    Comparison : Chestradiograph performed 9/2/2023 at 5:58 AM.    Technique/Positioning: AP portable.    Findings:    Support devices: Precordial leads are present.  Endotracheal tube tip is   4 cm above the shelly.  Enteric feeding tube courses over the midline   below the left guicho-diaphragm; the tip is excluded from the image.  A   large bore left chest tube is coiled over the left lung base, stable.    Cardiac/mediastinum/hilum: The cardiac silhouette is magnified.    Lung parenchyma/Pleura: There are grossly stable bilateral pulmonary   opacities and left basilar pulmonary opacity/pleural effusion.  There has   been interval clearing of previously reported right pleural effusion.  No   pneumothorax is seen.    Skeleton/soft tissues: Stable degenerative changes.    Impression:  1.  Support lines/tubes, as described.  2.  Grossly stable bilateral pulmonary opacities and left basilar   pulmonary opacity/pleural effusion.  3.  Interval clearing of previously reported right pleural effusion.  4.  No pneumothorax is seen.        --- End of Report ---            WILLIAM GILLESPIE MD; Attending Radiologist  This document has been electronically signed. Sep  3 2023  7:23AM (09-03-23 @ 01:42)      CT        WEIGHT  Weight (kg): 87.8 (08-28-23 @ 21:03)      All available historical records have been reviewed       GIOVANNY BUCK  88y, Female    LOS  7d    INTERVAL EVENTS/HPI  - No acute events overnight  - T(F): , Max: 98.6 (09-03-23 @ 15:03)  - Tolerating medication  - WBC Count: 15.74 (09-03-23 @ 06:01)  WBC Count: 15.85 (09-02-23 @ 06:01)  - Creatinine: 0.8 (09-03-23 @ 06:01)         REVIEW OF SYSTEMS: cannot obtain further history from the patient secondary to altered mental status or sedation      Prior hospital charts reviewed [Yes]  Primary team notes reviewed [Yes]  Other consultant notes reviewed [Yes]    Allergy: 7d    ANTIMICROBIALS:   cefepime   IVPB    cefepime   IVPB 2000 every 8 hours  vancomycin  IVPB    vancomycin  IVPB 1250 every 12 hours      OTHER MEDS: MEDICATIONS  (STANDING):  acetaminophen     Tablet .. 650 every 6 hours PRN  albuterol    90 MICROgram(s) HFA Inhaler 2 every 4 hours  dexMEDEtomidine Infusion 0.05 <Continuous>  dextrose 50% Injectable 25 once  dextrose Oral Gel 15 once PRN  enoxaparin Injectable 90 every 12 hours  fentaNYL   Infusion. 0.5 <Continuous>  glucagon  Injectable 1 once  insulin lispro (ADMELOG) corrective regimen sliding scale  three times a day before meals  methylPREDNISolone sodium succinate Injectable 40 daily  midazolam Injectable 2 every 4 hours PRN  pantoprazole  Injectable 40 daily  polyethylene glycol 3350 17 two times a day  propofol Infusion. 29.992 <Continuous>  senna 2 at bedtime      Vital Signs Last 24 Hrs  T(F): 97.3 (09-04-23 @ 03:02), Max: 101.9 (08-28-23 @ 10:53)    Vital Signs Last 24 Hrs  HR: 65 (09-04-23 @ 05:00) (59 - 80)  BP: 114/56 (09-04-23 @ 05:00) (101/57 - 137/64)  RR: 16 (09-04-23 @ 05:00)  SpO2: 96% (09-04-23 @ 05:00) (96% - 100%)  Wt(kg): --    EXAM:  GENERAL: NAD on MV.  HEAD: No head lesions  EYES: Conjunctiva pink and cornea white  NECK: Supple, nontender to palpation  CHEST/LUNG: Mild bilateral crackles.   HEART: S1 S2  ABDOMEN: Soft, nontender, nondistended  EXTREMITIES: No clubbing, cyanosis, or petal edema  NERVOUS SYSTEM: On Sedation.  MSK: No joint erythema, swelling or pain  SKIN: No rashes or lesions, no superficial thrombophlebitis    Labs:                        9.7    15.74 )-----------( 204      ( 03 Sep 2023 06:01 )             32.6     09-03    140  |  101  |  39<H>  ----------------------------<  155<H>  4.0   |  29  |  0.8    Ca    8.8      03 Sep 2023 06:01  Phos  2.4     09-03  Mg     2.1     09-03    TPro  5.8<L>  /  Alb  2.7<L>  /  TBili  0.2  /  DBili  x   /  AST  21  /  ALT  27  /  AlkPhos  142<H>  09-03      WBC Trend:  WBC Count: 15.74 (09-03-23 @ 06:01)  WBC Count: 15.85 (09-02-23 @ 06:01)  WBC Count: 12.30 (09-01-23 @ 05:39)  WBC Count: 8.09 (08-31-23 @ 11:00)      Creatine Trend:  Creatinine: 0.8 (09-03)  Creatinine: 0.9 (09-02)  Creatinine: 0.7 (09-01)  Creatinine: 0.8 (08-31)      Liver Biochemical Testing Trend:  Alanine Aminotransferase (ALT/SGPT): 27 (09-03)  Alanine Aminotransferase (ALT/SGPT): 38 (09-02)  Alanine Aminotransferase (ALT/SGPT): 31 (09-01)  Alanine Aminotransferase (ALT/SGPT): 9 (08-31)  Alanine Aminotransferase (ALT/SGPT): 8 (08-30)  Aspartate Aminotransferase (AST/SGOT): 21 (09-03-23 @ 06:01)  Aspartate Aminotransferase (AST/SGOT): 37 (09-02-23 @ 06:01)  Aspartate Aminotransferase (AST/SGOT): 45 (09-01-23 @ 05:39)  Aspartate Aminotransferase (AST/SGOT): 13 (08-31-23 @ 11:00)  Aspartate Aminotransferase (AST/SGOT): 12 (08-30-23 @ 06:55)  Bilirubin Total: 0.2 (09-03)  Bilirubin Total: 0.5 (09-02)  Bilirubin Total: 0.4 (09-01)  Bilirubin Total: 0.3 (08-31)  Bilirubin Total: 0.3 (08-30)      Trend LDH  03-07-23 @ 21:06  211      Urinalysis Basic - ( 03 Sep 2023 06:01 )    Color: x / Appearance: x / SG: x / pH: x  Gluc: 155 mg/dL / Ketone: x  / Bili: x / Urobili: x   Blood: x / Protein: x / Nitrite: x   Leuk Esterase: x / RBC: x / WBC x   Sq Epi: x / Non Sq Epi: x / Bacteria: x        MICROBIOLOGY:  Vancomycin Level, Trough: 14.0 (09-02 @ 19:02)    Female    Culture - Blood (collected 02 Sep 2023 06:01)  Source: .Blood None  Preliminary Report:    No growth at 24 hours    Culture - Sputum (collected 02 Sep 2023 03:00)  Source: Trach Asp Tracheal Aspirate  Preliminary Report:    Normal Respiratory Joaquina present    Culture - Urine (collected 01 Sep 2023 19:50)  Source: Catheterized Catheterized  Final Report:    <10,000 CFU/mL Normal Urogenital Joaquina    Culture - Blood (collected 01 Sep 2023 19:32)  Source: .Blood None  Preliminary Report:    No growth at 48 Hours    Culture - Blood (collected 28 Aug 2023 11:00)  Source: .Blood Blood-Peripheral  Final Report:    No growth at 5 days    Culture - Blood (collected 28 Aug 2023 11:00)  Source: .Blood Blood-Peripheral  Final Report:    No growth at 5 days    Culture - Abscess with Gram Stain (collected 16 Jul 2023 07:00)  Source: .Abscess Right groin abscess  Final Report:    Numerous Proteus mirabilis    Numerous Enterococcus faecalis    Few Coag Negative Staphylococcus "Susceptibilities not performed"    Few Enterococcus faecium (vancomycin resistant)    Moderate Prevotella timonensis "Susceptibilities not performed"  Organism: Proteus mirabilis  Enterococcus faecalis  Enterococcus faecium (vancomycin resistant)  Organism: Enterococcus faecium (vancomycin resistant)    Sensitivities:      Method Type: DARIO      -  Ampicillin: R >8 Predicts results to ampicillin/sulbactam, amoxacillin-clavulanate and  piperacillin-tazobactam.      -  Daptomycin: SDD 4 The breakpoint for SDD (susceptible dose dependent)is based on a dosage regimen of 8-12 mg/kg administered every 24 h in adults and is intended for serious infections due to E. faecium. Consultation with an infectious diseases specialist is recommended.      -  Levofloxacin: R >4      -  Linezolid: S 2      -  Tetracycline: R >8      -  Vancomycin: R >16  Organism: Enterococcus faecalis    Sensitivities:      Method Type: DARIO      -  Ampicillin: S <=2 Predicts results to ampicillin/sulbactam, amoxacillin-clavulanate and  piperacillin-tazobactam.      -  Tetracycline: R >8      -  Vancomycin: S 2  Organism: Proteus mirabilis    Sensitivities:      Method Type: DARIO      -  Amikacin: S <=16      -  Amoxicillin/Clavulanic Acid: S <=8/4      -  Ampicillin: S <=8 These ampicillin results predict results for amoxicillin      -  Ampicillin/Sulbactam: S <=4/2 Enterobacter, Klebsiella aerogenes, Citrobacter, and Serratia may develop resistance during prolonged therapy (3-4 days)      -  Aztreonam: S <=4      -  Cefazolin: S <=2 Enterobacter, Klebsiella aerogenes, Citrobacter, and Serratia may develop resistance during prolonged therapy (3-4 days)      -  Cefepime: S <=2      -  Cefoxitin: S <=8      -  Ceftriaxone: S <=1 Enterobacter, Klebsiella aerogenes, Citrobacter, and Serratia may develop resistance during prolonged therapy      -  Ciprofloxacin: S <=0.25      -  Ertapenem: S <=0.5      -  Gentamicin: S <=2      -  Levofloxacin: S <=0.5      -  Meropenem: S <=1      -  Piperacillin/Tazobactam: S <=8      -  Tobramycin: S <=2      -  Trimethoprim/Sulfamethoxazole: S 1/19    Culture - Blood (collected 08 Jul 2023 21:23)  Source: .Blood Blood-Peripheral  Final Report:    No growth at 5 days    Culture - Fungal, Body Fluid (collected 05 Jul 2023 15:45)  Source: Pleural Fl Pleural Fluid  Final Report:    No fungus isolated at 4 weeks.    Culture - Body Fluid with Gram Stain (collected 05 Jul 2023 15:45)  Source: Pleural Fl Pleural Fluid  Final Report:    No growth                        Legionella Antigen, Urine: Negative (08-29 @ 10:53)  Rapid RVP Result: NotDetec (08-29 @ 10:44)        Procalcitonin, Serum: 0.17 (09-02)  Procalcitonin, Serum: 0.57 (08-30)    Blood Gas Arterial, Lactate: 0.70 (09-04 @ 03:48)  Blood Gas Arterial, Lactate: 1.20 (09-03 @ 09:40)  Blood Gas Arterial, Lactate: 1.00 (09-03 @ 03:55)  Blood Gas Arterial, Lactate: 1.20 (09-03 @ 01:03)        RADIOLOGY & ADDITIONAL TESTS:  I have personally reviewed the relevant images.   CXR  Xray Chest 1 View- PORTABLE-Urgent:   ACC: [id number] EXAM:  XR CHEST PORTABLE URGENT 1V   ORDERED BY: DELORIS MUSTAFA     PROCEDURE DATE:  09/03/2023          INTERPRETATION:  Clinical History / Reason for exam: 88-year-old female   with respiratory failure and hypoxia.    Comparison : Chestradiograph performed 9/2/2023 at 5:58 AM.    Technique/Positioning: AP portable.    Findings:    Support devices: Precordial leads are present.  Endotracheal tube tip is   4 cm above the shelly.  Enteric feeding tube courses over the midline   below the left guicho-diaphragm; the tip is excluded from the image.  A   large bore left chest tube is coiled over the left lung base, stable.    Cardiac/mediastinum/hilum: The cardiac silhouette is magnified.    Lung parenchyma/Pleura: There are grossly stable bilateral pulmonary   opacities and left basilar pulmonary opacity/pleural effusion.  There has   been interval clearing of previously reported right pleural effusion.  No   pneumothorax is seen.    Skeleton/soft tissues: Stable degenerative changes.    Impression:  1.  Support lines/tubes, as described.  2.  Grossly stable bilateral pulmonary opacities and left basilar   pulmonary opacity/pleural effusion.  3.  Interval clearing of previously reported right pleural effusion.  4.  No pneumothorax is seen.        --- End of Report ---            WILLIAM GILLESPIE MD; Attending Radiologist  This document has been electronically signed. Sep  3 2023  7:23AM (09-03-23 @ 01:42)      CT        WEIGHT  Weight (kg): 87.8 (08-28-23 @ 21:03)      All available historical records have been reviewed

## 2023-09-04 NOTE — CHART NOTE - NSCHARTNOTEFT_GEN_A_CORE
Registered Dietitian Follow-Up     Patient Profile Reviewed                           Yes [X]   No []     Nutrition History Previously Obtained        Yes []  No []       Pertinent Information:    pt is 88 year old female with hx of CHF, afib s/p ablation, anxiety, cellulitis, COPD, B/L LE edema, HTN, OA, obesity, PVD, FLAVIO, B/L TKR recently admitted 7/23/23 with respiratory failure 2/2 COPD exacerbation with intubation and L chest tube warranted, pt was discharged to SNF. pt now p/w respiratory distress s/p intubation. admitted with hypercapnic respiratory failure, B/L pleural effusions. possible PNA. pt remains sedated with propofol at 12 ml/hr (317 kcals).      Diet, NPO:   Tube Feeding Modality: Nasogastric  Peptamen A.F. Formula  Continuous  Starting Tube Feed Rate {mL per Hour}: 20  Increase Tube Feed Rate by (mL): 10     Every 2 hours  Until Goal Tube Feed Rate (mL per Hour): 50  Tube Feed Duration (in Hours): 24  Tube Feed Start Time: 01:00 (08-30-23 @ 00:49) [Active]         Anthropometrics:  - Ht.  170.2 cm  - Wt. 87.8 kgs vs 84.5 kg 9/2  - %wt change  - BMI   - IBW      Pertinent Lab Data:  09-04    142  |  103  |  42<H>  ----------------------------<  144<H>  4.5   |  28  |  0.7    Ca    8.4      04 Sep 2023 05:48  Phos  2.7     09-04  Mg     2.2     09-04    TPro  5.2<L>  /  Alb  2.8<L>  /  TBili  <0.2  /  DBili  x   /  AST  18  /  ALT  24  /  AlkPhos  144<H>  09-04                            9.4    14.76 )-----------( 216      ( 04 Sep 2023 05:48 )             32.6          Pertinent Meds:    MEDICATIONS  (STANDING):  albuterol    90 MICROgram(s) HFA Inhaler 2 Puff(s) Inhalation every 4 hours  cefepime   IVPB 2000 milliGRAM(s) IV Intermittent every 8 hours  dexMEDEtomidine Infusion 0.05 MICROgram(s)/kG/Hr (1.1 mL/Hr) IV Continuous <Continuous>  fentaNYL   Infusion. 0.5 MICROgram(s)/kG/Hr (4.1 mL/Hr) IV Continuous <Continuous>  insulin lispro (ADMELOG) corrective regimen sliding scale   SubCutaneous three times a day before meals  methylPREDNISolone sodium succinate Injectable 40 milliGRAM(s) IV Push daily  pantoprazole  Injectable 40 milliGRAM(s) IV Push daily  polyethylene glycol 3350 17 Gram(s) Oral two times a day  propofol Infusion. 29.992 MICROgram(s)/kG/Min (15.8 mL/Hr) IV Continuous <Continuous>  senna 2 Tablet(s) Oral at bedtime  vancomycin  IVPB 1250 milliGRAM(s) IV Intermittent every 12 hours       MEDICATIONS  (PRN):  acetaminophen     Tablet .. 650 milliGRAM(s) Oral every 6 hours PRN Temp greater or equal to 38C (100.4F), Mild Pain (1 - 3)  dextrose Oral Gel 15 Gram(s) Oral once PRN Blood Glucose LESS THAN 70 milliGRAM(s)/deciliter  midazolam Injectable 2 milliGRAM(s) IV Push every 4 hours PRN agitation/vent synchrony       Physical Findings:  - Appearance: sedated, intubated to vent  - GI function: + BS, large BM noted 8/29-pt is on miralax  - Tubes: OGT  - Oral/Mouth cavity:  - Skin: no pressure ulcers noted      Nutrition Requirements  Weight Used: 84.5 kgs        Estimated Energy Needs:  5100-7371 kcals (20-25 kcal/kg/bw)  91.5-122g protein (1.5-2.0g/kg/IBW)  1;1 kcal for estimated fluid needs          Nutrient Intake: present EN regimen provides  pt with 1500 kcals +317 kcals (propofol)  95g protein, 1200 ml total volume meeting >80% of estimated nutrient needs.         [] Previous Nutrition Diagnosis:            [] Ongoing          [X] Resolved       Nutrition Intervention: 2/2 to propofol recommend to change feeds to lower fat formula: Vital HP at 30 ml/hr increase as tolerated to goal rate of 50 ml/hr will provide 1185+317 kcals, 103.5g protein and 1200 ml meeting >80% of estimated nutrient needs. Consider increasing bowel regimen last BM was 6 days ago? abd is soft as per RN     Goal/Expected Outcome: pt to tolerate EN and meet >80% of estimated nutrient needs within 3-5 days     Indicator/Monitoring: RD to monitor tolerance to EN, labs/meds, NFPF and f/u as needed within 3-5 days  high risk

## 2023-09-04 NOTE — PROGRESS NOTE ADULT - SUBJECTIVE AND OBJECTIVE BOX
Pt seen, sisters at bedside, asking for overall prognosis    T(F): , Max: 97.8 (09-03-23 @ 23:00)  HR: 104 (09-04-23 @ 18:14) (58 - 128)  BP: 120/55 (09-04-23 @ 19:00)  RR: 21 (09-04-23 @ 19:00)  SpO2: 95% (09-04-23 @ 20:02)  IN: 3187 mL / OUT: 3060 mL / NET: 127 mL    IN: 1312.5 mL / OUT: 200 mL / NET: 1112.5 mL      General: No apparent distress  Cardiovascular: S1, S2  Gastrointestinal: Soft, Non-tender, Non-distended  Respiratory: vented  Lymphatic: diffuse edema  Neurologic: sedated  Dermatologic: Skin dry                          9.4    14.76 )-----------( 216      ( 04 Sep 2023 05:48 )             32.6     09-04    142  |  103  |  42<H>  ----------------------------<  144<H>  4.5   |  28  |  0.7    Ca    8.4      04 Sep 2023 05:48  Phos  2.7     09-04  Mg     2.2     09-04    TPro  5.2<L>  /  Alb  2.8<L>  /  TBili  <0.2  /  DBili  x   /  AST  18  /  ALT  24  /  AlkPhos  144<H>  09-04      Culture - Blood (collected 02 Sep 2023 06:01)  Source: .Blood None  Preliminary Report (04 Sep 2023 20:01):    No growth at 48 Hours    Culture - Sputum (collected 02 Sep 2023 03:00)  Source: Trach Asp Tracheal Aspirate  Gram Stain (02 Sep 2023 21:45):    Numerous polymorphonuclear leukocytes per low power field    No Squamous epithelial cells per low power field    No organisms seen per oil power field  Final Report (04 Sep 2023 17:09):    Normal Respiratory Joaquina present

## 2023-09-05 NOTE — PROGRESS NOTE ADULT - SUBJECTIVE AND OBJECTIVE BOX
Patient is a 88y old  Female who presents with a chief complaint of hypercapnic respiratory failure (05 Sep 2023 13:11)      T(F): 99 (09-05-23 @ 11:00), Max: 99 (09-05-23 @ 11:00)  HR: 70 (09-05-23 @ 14:14)  BP: 96/54 (09-05-23 @ 13:01)  RR: 16 (09-05-23 @ 13:01)  SpO2: 100% (09-05-23 @ 14:14) (92% - 100%)    PHYSICAL EXAM:  GENERAL: NAD  HEAD:  Atraumatic, Normocephalic  EYES: EOMI, PERRLA, conjunctiva and sclera clear  NERVOUS SYSTEM:  Alert & Oriented X3, no focal deficits   CHEST/LUNG:  bilateral rales  HEART: Regular rate and rhythm; No murmurs, rubs, or gallops  ABDOMEN: Soft, Nontender, Nondistended; Bowel sounds present  EXTREMITIES:  2+ Peripheral Pulses, No clubbing, cyanosis, or edema    LABS  09-04    142  |  103  |  42<H>  ----------------------------<  144<H>  4.5   |  28  |  0.7    Ca    8.4      04 Sep 2023 05:48  Phos  2.7     09-04  Mg     2.2     09-04    TPro  5.2<L>  /  Alb  2.8<L>  /  TBili  <0.2  /  DBili  x   /  AST  18  /  ALT  24  /  AlkPhos  144<H>  09-04                          9.4    14.76 )-----------( 216      ( 04 Sep 2023 05:48 )             32.6       Mode: AC/ CMV (Assist Control/ Continuous Mandatory Ventilation)  RR (machine): 16  TV (machine): 400  FiO2: 40  PEEP: 8    Culture Results:   No growth at 48 Hours (09-02-23)  Culture Results:   Normal Respiratory Joaquina present (09-02-23)  Culture Results:   <10,000 CFU/mL Normal Urogenital Joaquina (09-01-23)  Culture Results:   No growth at 72 Hours (09-01-23)  Culture Results:   No growth at 5 days (08-28-23)  Culture Results:   No growth at 5 days (08-28-23)    RADIOLOGY  < from: Xray Chest 1 View- PORTABLE-Routine (Xray Chest 1 View- PORTABLE-Routine in AM.) (09.05.23 @ 06:35) >  Impression:    Stable opacities/left effusion.    < end of copied text >    MEDICATIONS  (STANDING):  albuterol    90 MICROgram(s) HFA Inhaler 2 Puff(s) Inhalation every 4 hours  cefepime   IVPB 2000 milliGRAM(s) IV Intermittent every 12 hours  chlorhexidine 0.12% Liquid 15 milliLiter(s) Oral Mucosa every 12 hours  chlorhexidine 2% Cloths 1 Application(s) Topical <User Schedule>  dexMEDEtomidine Infusion 0.05 MICROgram(s)/kG/Hr (1.1 mL/Hr) IV Continuous <Continuous>  dextrose 5%. 1000 milliLiter(s) (50 mL/Hr) IV Continuous <Continuous>  dextrose 50% Injectable 25 Gram(s) IV Push once  enoxaparin Injectable 90 milliGRAM(s) SubCutaneous every 12 hours  fentaNYL   Infusion. 0.467 MICROgram(s)/kG/Hr (4.1 mL/Hr) IV Continuous <Continuous>  glucagon  Injectable 1 milliGRAM(s) IntraMuscular once  insulin lispro (ADMELOG) corrective regimen sliding scale   SubCutaneous three times a day before meals  methylPREDNISolone sodium succinate Injectable 40 milliGRAM(s) IV Push daily  metoprolol tartrate 25 milliGRAM(s) Oral two times a day  mupirocin 2% Ointment 1 Application(s) Both Nostrils two times a day  pantoprazole  Injectable 40 milliGRAM(s) IV Push daily  polyethylene glycol 3350 17 Gram(s) Oral two times a day  propofol Infusion. 29.992 MICROgram(s)/kG/Min (15.8 mL/Hr) IV Continuous <Continuous>  senna 2 Tablet(s) Oral at bedtime  vancomycin  IVPB 1250 milliGRAM(s) IV Intermittent every 12 hours  vancomycin  IVPB        MEDICATIONS  (PRN):  acetaminophen     Tablet .. 650 milliGRAM(s) Oral every 6 hours PRN Temp greater or equal to 38C (100.4F), Mild Pain (1 - 3)  dextrose Oral Gel 15 Gram(s) Oral once PRN Blood Glucose LESS THAN 70 milliGRAM(s)/deciliter  midazolam Injectable 2 milliGRAM(s) IV Push every 4 hours PRN agitation/vent synchrony      Patient is a 88y old  Female who presents with a chief complaint of hypercapnic respiratory failure (05 Sep 2023 13:11)      T(F): 99 (09-05-23 @ 11:00), Max: 99 (09-05-23 @ 11:00)  HR: 70 (09-05-23 @ 14:14)  BP: 96/54 (09-05-23 @ 13:01)  RR: 16 (09-05-23 @ 13:01)  SpO2: 100% (09-05-23 @ 14:14) (92% - 100%)    PHYSICAL EXAM:  GENERAL: NAD  HEAD:  Atraumatic, Normocephalic  EYES: EOMI, PERRLA, conjunctiva and sclera clear  NERVOUS SYSTEM:  Alert & Oriented X3, no focal deficits   CHEST/LUNG:  bilateral rales  HEART: Regular rate and rhythm; No murmurs, rubs, or gallops  ABDOMEN: Soft, Nontender, Nondistended; Bowel sounds present  EXTREMITIES:  2+ Peripheral Pulses, No clubbing, cyanosis, or edema  Sacrum - stage2    LABS  09-04    142  |  103  |  42<H>  ----------------------------<  144<H>  4.5   |  28  |  0.7    Ca    8.4      04 Sep 2023 05:48  Phos  2.7     09-04  Mg     2.2     09-04    TPro  5.2<L>  /  Alb  2.8<L>  /  TBili  <0.2  /  DBili  x   /  AST  18  /  ALT  24  /  AlkPhos  144<H>  09-04                          9.4    14.76 )-----------( 216      ( 04 Sep 2023 05:48 )             32.6       Mode: AC/ CMV (Assist Control/ Continuous Mandatory Ventilation)  RR (machine): 16  TV (machine): 400  FiO2: 40  PEEP: 8    Culture Results:   No growth at 48 Hours (09-02-23)  Culture Results:   Normal Respiratory Joaquina present (09-02-23)  Culture Results:   <10,000 CFU/mL Normal Urogenital Joaquina (09-01-23)  Culture Results:   No growth at 72 Hours (09-01-23)  Culture Results:   No growth at 5 days (08-28-23)  Culture Results:   No growth at 5 days (08-28-23)    RADIOLOGY  < from: Xray Chest 1 View- PORTABLE-Routine (Xray Chest 1 View- PORTABLE-Routine in AM.) (09.05.23 @ 06:35) >  Impression:    Stable opacities/left effusion.    < end of copied text >    MEDICATIONS  (STANDING):  albuterol    90 MICROgram(s) HFA Inhaler 2 Puff(s) Inhalation every 4 hours  cefepime   IVPB 2000 milliGRAM(s) IV Intermittent every 12 hours  chlorhexidine 0.12% Liquid 15 milliLiter(s) Oral Mucosa every 12 hours  chlorhexidine 2% Cloths 1 Application(s) Topical <User Schedule>  dexMEDEtomidine Infusion 0.05 MICROgram(s)/kG/Hr (1.1 mL/Hr) IV Continuous <Continuous>  dextrose 5%. 1000 milliLiter(s) (50 mL/Hr) IV Continuous <Continuous>  dextrose 50% Injectable 25 Gram(s) IV Push once  enoxaparin Injectable 90 milliGRAM(s) SubCutaneous every 12 hours  fentaNYL   Infusion. 0.467 MICROgram(s)/kG/Hr (4.1 mL/Hr) IV Continuous <Continuous>  glucagon  Injectable 1 milliGRAM(s) IntraMuscular once  insulin lispro (ADMELOG) corrective regimen sliding scale   SubCutaneous three times a day before meals  methylPREDNISolone sodium succinate Injectable 40 milliGRAM(s) IV Push daily  metoprolol tartrate 25 milliGRAM(s) Oral two times a day  mupirocin 2% Ointment 1 Application(s) Both Nostrils two times a day  pantoprazole  Injectable 40 milliGRAM(s) IV Push daily  polyethylene glycol 3350 17 Gram(s) Oral two times a day  propofol Infusion. 29.992 MICROgram(s)/kG/Min (15.8 mL/Hr) IV Continuous <Continuous>  senna 2 Tablet(s) Oral at bedtime  vancomycin  IVPB 1250 milliGRAM(s) IV Intermittent every 12 hours  vancomycin  IVPB        MEDICATIONS  (PRN):  acetaminophen     Tablet .. 650 milliGRAM(s) Oral every 6 hours PRN Temp greater or equal to 38C (100.4F), Mild Pain (1 - 3)  dextrose Oral Gel 15 Gram(s) Oral once PRN Blood Glucose LESS THAN 70 milliGRAM(s)/deciliter  midazolam Injectable 2 milliGRAM(s) IV Push every 4 hours PRN agitation/vent synchrony

## 2023-09-05 NOTE — PROGRESS NOTE ADULT - ASSESSMENT
88 year old female with PMH of HTN, Afib, OA, PVD, COPD, Anxiety, Obesity, Acute on chronic systolic congestive heart failure, H/O abdominal hysterectomy, H/O discectomy, H/O total knee replacement, bilaterally and bilateral pleural effusions (s/p left thoracentesis 3/2023 -- Cytology -PLEURAL FLUID, THORACENTESIS: - SUSPICIOUS FOR MALIGNANCY) with a left sided pleurex catheter is currently admitted due to worsening respiratory distress and hypercapnic respiratory failure and COPD exacerbation, currently on mechanical ventilation.ID is being consulted for antimicrobial recommendations given she is spiking intermittent fevers.     IMPRESSION  #Acute hypercapnic respiratory failure- Volume overload vs COPD exacerbation   #Sepsis present on admission  #Fevers- Concerns of superimposed VAP?   #Bilateral pleural effusions   - s/p left thoracentesis 3/2023 -- Cytology -PLEURAL FLUID, THORACENTESIS: - SUSPICIOUS FOR MALIGNANCY. - Atypical cells present in background of histiocytes and mesothelial  cells, suspicious for metastatic carcinoma.   - Pleurx Cx 7/4 - Staph epidermidis, No PMNs  - s/p thoracentesis right thoracentesis 7/5 Exudative in nature. Cultures negative  #History of ESBL E.coli bacteremia (5/2023)   #Hx 2/2023 foot wound MRSA  #Right inguinal Abscess history (7/2023)- Cx (Polymicrobial- Proteus, E.feacalis and VRE)   #HF with preserved EF  #COPD   #Chronic Lymphedema  #Obesity BMI (kg/m2): 30.3, 34.7  #RVP and legionella urine antigen negative  #MRSA nares positive.     RECOMMENDATIONS  -Follow up with urine Strep antigen.   -on IV vancomycin 1250mg BID. Check vanc trough before 3rd dose.   -on IV cefepime 2 gram q 8 hours.   -On steroids as per the ICU team.   -Already on full anticoagulation (possibility of acute thrombosis is less likely)  -Monitor for diarrhea, if noted, send for C.diff.   -No reported skin break down or failure. Continue with precautions for off loading.   -Recommend aspiration precautions.   -Trend fever curve. Trend WBC   -Trend LFTs. If rising, obtain RUQ US or CT abdomen to check for acalculous cholecystitis.   -Patient is at high risk of ICU related deconditioning.    -Guarded prognosis.     If any questions, please send a message or call on [facility] Teams  Please continue to update ID with any pertinent new laboratory or radiographic findings.    Clifford Cordero M.D  Infectious Diseases Attending  [facility]      88 year old female with PMH of HTN, Afib, OA, PVD, COPD, Anxiety, Obesity, Acute on chronic systolic congestive heart failure, H/O abdominal hysterectomy, H/O discectomy, H/O total knee replacement, bilaterally and bilateral pleural effusions (s/p left thoracentesis 3/2023 -- Cytology -PLEURAL FLUID, THORACENTESIS: - SUSPICIOUS FOR MALIGNANCY) with a left sided pleurex catheter is currently admitted due to worsening respiratory distress and hypercapnic respiratory failure and COPD exacerbation, currently on mechanical ventilation.ID is being consulted for antimicrobial recommendations given she is spiking intermittent fevers.     IMPRESSION  #Acute hypercapnic respiratory failure- Volume overload vs COPD exacerbation   #Sepsis present on admission  #Fevers- Concerns of superimposed VAP?   #Bilateral pleural effusions   - s/p left thoracentesis 3/2023 -- Cytology -PLEURAL FLUID, THORACENTESIS: - SUSPICIOUS FOR MALIGNANCY. - Atypical cells present in background of histiocytes and mesothelial  cells, suspicious for metastatic carcinoma.   - Pleurx Cx 7/4 - Staph epidermidis, No PMNs  - s/p thoracentesis right thoracentesis 7/5 Exudative in nature. Cultures negative  #History of ESBL E.coli bacteremia (5/2023)   #Hx 2/2023 foot wound MRSA  #Right inguinal Abscess history (7/2023)- Cx (Polymicrobial- Proteus, E.feacalis and VRE)   #HF with preserved EF  #COPD   #Chronic Lymphedema  #Obesity BMI (kg/m2): 30.3, 34.7  #RVP and legionella urine antigen negative  #MRSA nares positive.     RECOMMENDATIONS  -Follow up with urine Strep antigen.   -on IV vancomycin 1250mg BID. Check vanc trough before 3rd dose.   -on IV cefepime 2 gram q 8 hours.   -On steroids as per the ICU team.   -Already on full anticoagulation (possibility of acute thrombosis is less likely)  -Monitor for diarrhea, if noted, send for C.diff.   -No reported skin break down or failure. Continue with precautions for off loading.   -Recommend aspiration precautions.   -Trend fever curve. Trend WBC   -Trend LFTs. If rising, obtain RUQ US or CT abdomen to check for acalculous cholecystitis.   -Patient is at high risk of ICU related deconditioning.    -Guarded prognosis.     If any questions, please send a message or call on [facility] Teams  Please continue to update ID with any pertinent new laboratory or radiographic findings.    Clifford Cordero M.D  Infectious Diseases Attending  [facility]

## 2023-09-05 NOTE — PROGRESS NOTE ADULT - ASSESSMENT
IMPRESSION:  acute hypercapnic/ hypoxic res failure s/p intubation   COPD exacerbation?  Sepsis present on admission   CAP  afib on eliquis  recurrent pleural effusions s/p pleurex   malignant pleural effusion   H/O HFpEF    PLAN:    CNS: precedx + fentanyl for sedation, SAT daily, failed SAT today   taper propofol off   HEENT: oral care, ETT care    PULMONARY: aspiration precaution, HOB@ 45 degrees, daily abg/xray, keep pco2 around 50, solumedrol 40 qd, albuterl PRN, vent changes: Wean FiO2 as tolerated to maintain spO2 > 92%  PleurX not draining   Fio2 lowered to 40%    CARDIOVASCULAR: echo, trops noted. BNP elevated, lasix 40mg x1 now , only slightly net positive yesterday, keep I<os, baseline ekg  start 24 Q12 hrs   GI: GI prophylaxis.  Feeding,     RENAL: f/u lytes correct as needed,     INFECTIOUS DISEASE: c/w vancomycin/cefepime, ua, ucx, bcx, RVP, procal, urine legion/step, DTA   check vanco trouph   follow Id recommendation   send DTA   HEMATOLOGICAL: [facility]     ENDOCRINE:  Follow up FS.  Insulin protocol if needed.    MUSCULOSKELETAL: bedbound

## 2023-09-05 NOTE — PROGRESS NOTE ADULT - ASSESSMENT
87 YO F with a medical history  of HTN, malignant pleural effusion of breast origin, chronic A.fib, PAD, Obesity, Chronic lymphedema of  BLE and COPD on home oxygen 2 - 1/2 L nc, ESBL UTI presented to ED with severe SOB, DNR / DNI rescinded and pt was intubated and admitted to ICU    acute respiratory failure / B/L effusions / possible pneumonia / COPD / stage 2 sacral decubiti was POA     -  DNR and DNI rescinded   - consult palliative   - Lasix   - complete  antibiotic course   - taper steroids   - Lovenox   - pulmonary following   - poor prognosis

## 2023-09-05 NOTE — PROGRESS NOTE ADULT - SUBJECTIVE AND OBJECTIVE BOX
Patient is a 88y old  Female who presents with a chief complaint of hypercapnic respiratory failure (05 Sep 2023 07:37)      Over Night Events:  Patient seen and examined.   on sedation   during SAt this morning increase HR rapid Afib   on vent   ROS:  See HPI    PHYSICAL EXAM    ICU Vital Signs Last 24 Hrs  T(C): 36.7 (05 Sep 2023 07:10), Max: 36.7 (05 Sep 2023 07:10)  T(F): 98.1 (05 Sep 2023 07:10), Max: 98.1 (05 Sep 2023 07:10)  HR: 98 (05 Sep 2023 06:37) (58 - 104)  BP: 94/54 (05 Sep 2023 06:37) (92/51 - 124/64)  BP(mean): 67 (05 Sep 2023 06:37) (65 - 88)  ABP: --  ABP(mean): --  RR: 16 (05 Sep 2023 07:10) (16 - 26)  SpO2: 100% (05 Sep 2023 06:37) (92% - 100%)    O2 Parameters below as of 05 Sep 2023 06:37  Patient On (Oxygen Delivery Method): ventilator    O2 Concentration (%): 60        General: on sedation   HEENT:         et tube        Lymph Nodes: NO cervical LN   Lungs: Bilateral BS  Cardiovascular: Regular   Abdomen: Soft, Positive BS  Extremities: No clubbing   Skin: warm   Neurological: positive gag   Musculoskeletal: move all ext     I&O's Detail    04 Sep 2023 07:01  -  05 Sep 2023 07:00  --------------------------------------------------------  IN:    Dexmedetomidine: 338.7 mL    FentaNYL: 318.6 mL    FentaNYL: 30 mL    IV PiggyBack: 550 mL    Peptamen A.F.: 300 mL    Propofol (Status Epilepticus): 172.7 mL    Vital High Protein: 650 mL  Total IN: 2360 mL    OUT:    Voided (mL): 800 mL  Total OUT: 800 mL    Total NET: 1560 mL          LABS:                          9.4    14.76 )-----------( 216      ( 04 Sep 2023 05:48 )             32.6         04 Sep 2023 05:48    142    |  103    |  42     ----------------------------<  144    4.5     |  28     |  0.7      Ca    8.4        04 Sep 2023 05:48  Phos  2.7       04 Sep 2023 05:48  Mg     2.2       04 Sep 2023 05:48                                                                                      Urinalysis Basic - ( 04 Sep 2023 05:48 )    Color: x / Appearance: x / SG: x / pH: x  Gluc: 144 mg/dL / Ketone: x  / Bili: x / Urobili: x   Blood: x / Protein: x / Nitrite: x   Leuk Esterase: x / RBC: x / WBC x   Sq Epi: x / Non Sq Epi: x / Bacteria: x                                                                                                             Mode: AC/ CMV (Assist Control/ Continuous Mandatory Ventilation)  RR (machine): 16  TV (machine): 400  FiO2: 60  PEEP: 8  MAP: 12  PIP: 34                                      ABG - ( 05 Sep 2023 04:16 )  pH, Arterial: 7.45  pH, Blood: x     /  pCO2: 43    /  pO2: 86    / HCO3: 30    / Base Excess: 5.3   /  SaO2: 98.4                MEDICATIONS  (STANDING):  albuterol    90 MICROgram(s) HFA Inhaler 2 Puff(s) Inhalation every 4 hours  cefepime   IVPB      cefepime   IVPB 2000 milliGRAM(s) IV Intermittent every 8 hours  chlorhexidine 0.12% Liquid 15 milliLiter(s) Oral Mucosa every 12 hours  chlorhexidine 2% Cloths 1 Application(s) Topical <User Schedule>  dexMEDEtomidine Infusion 0.05 MICROgram(s)/kG/Hr (1.1 mL/Hr) IV Continuous <Continuous>  dextrose 5%. 1000 milliLiter(s) (50 mL/Hr) IV Continuous <Continuous>  dextrose 50% Injectable 25 Gram(s) IV Push once  enoxaparin Injectable 90 milliGRAM(s) SubCutaneous every 12 hours  fentaNYL   Infusion. 0.467 MICROgram(s)/kG/Hr (4.1 mL/Hr) IV Continuous <Continuous>  glucagon  Injectable 1 milliGRAM(s) IntraMuscular once  insulin lispro (ADMELOG) corrective regimen sliding scale   SubCutaneous three times a day before meals  methylPREDNISolone sodium succinate Injectable 40 milliGRAM(s) IV Push daily  mupirocin 2% Ointment 1 Application(s) Both Nostrils two times a day  pantoprazole  Injectable 40 milliGRAM(s) IV Push daily  polyethylene glycol 3350 17 Gram(s) Oral two times a day  propofol Infusion. 29.992 MICROgram(s)/kG/Min (15.8 mL/Hr) IV Continuous <Continuous>  senna 2 Tablet(s) Oral at bedtime  vancomycin  IVPB      vancomycin  IVPB 1250 milliGRAM(s) IV Intermittent every 12 hours    MEDICATIONS  (PRN):  acetaminophen     Tablet .. 650 milliGRAM(s) Oral every 6 hours PRN Temp greater or equal to 38C (100.4F), Mild Pain (1 - 3)  dextrose Oral Gel 15 Gram(s) Oral once PRN Blood Glucose LESS THAN 70 milliGRAM(s)/deciliter  midazolam Injectable 2 milliGRAM(s) IV Push every 4 hours PRN agitation/vent synchrony          Xrays:  TLC:  OG:  ET tube:                                                                                    left lower opacity    ECHO:  [facility] ICU:

## 2023-09-05 NOTE — PROGRESS NOTE ADULT - SUBJECTIVE AND OBJECTIVE BOX
GIOVANNY BUCK  88y, Female    LOS  8d    INTERVAL EVENTS/HPI  - No acute events overnight  - T(F): , Max: 98.1 (09-05-23 @ 07:10)  - Tolerating medication  - WBC Count: 14.76 (09-04-23 @ 05:48)  WBC Count: 15.74 (09-03-23 @ 06:01)  - Creatinine: 0.7 (09-04-23 @ 05:48)     REVIEW OF SYSTEMS: cannot obtain further history from the patient secondary to altered mental status or sedation    Prior hospital charts reviewed [Yes]  Primary team notes reviewed [Yes]  Other consultant notes reviewed [Yes]      ANTIMICROBIALS:   cefepime   IVPB    cefepime   IVPB 2000 every 8 hours  vancomycin  IVPB    vancomycin  IVPB 1250 every 12 hours      OTHER MEDS: MEDICATIONS  (STANDING):  acetaminophen     Tablet .. 650 every 6 hours PRN  albuterol    90 MICROgram(s) HFA Inhaler 2 every 4 hours  dexMEDEtomidine Infusion 0.05 <Continuous>  dextrose 50% Injectable 25 once  dextrose Oral Gel 15 once PRN  enoxaparin Injectable 90 every 12 hours  fentaNYL   Infusion. 0.467 <Continuous>  glucagon  Injectable 1 once  insulin lispro (ADMELOG) corrective regimen sliding scale  three times a day before meals  methylPREDNISolone sodium succinate Injectable 40 daily  midazolam Injectable 2 every 4 hours PRN  pantoprazole  Injectable 40 daily  polyethylene glycol 3350 17 two times a day  propofol Infusion. 29.992 <Continuous>  senna 2 at bedtime      Vital Signs Last 24 Hrs  T(F): 98.1 (09-05-23 @ 07:10), Max: 100.6 (09-01-23 @ 11:00)    Vital Signs Last 24 Hrs  HR: 98 (09-05-23 @ 06:37) (58 - 128)  BP: 94/54 (09-05-23 @ 06:37) (92/51 - 129/86)  RR: 16 (09-05-23 @ 07:10)  SpO2: 100% (09-05-23 @ 06:37) (92% - 100%)  Wt(kg): --  Mode: AC/ CMV (Assist Control/ Continuous Mandatory Ventilation)  RR (machine): 16  TV (machine): 400  FiO2: 60  PEEP: 8  MAP: 12  PIP: 34    EXAM:  GENERAL: NAD on MV.  HEAD: No head lesions  EYES: Conjunctiva pink and cornea white  NECK: Supple, nontender to palpation  CHEST/LUNG: Mild bilateral crackles.   HEART: S1 S2  ABDOMEN: Soft, nontender, nondistended  EXTREMITIES: No clubbing, cyanosis, or petal edema  NERVOUS SYSTEM: On Sedation.  MSK: No joint erythema, swelling or pain  SKIN: No rashes or lesions, no superficial thrombophlebitis  Labs:                        9.4    14.76 )-----------( 216      ( 04 Sep 2023 05:48 )             32.6     09-04    142  |  103  |  42<H>  ----------------------------<  144<H>  4.5   |  28  |  0.7    Ca    8.4      04 Sep 2023 05:48  Phos  2.7     09-04  Mg     2.2     09-04    TPro  5.2<L>  /  Alb  2.8<L>  /  TBili  <0.2  /  DBili  x   /  AST  18  /  ALT  24  /  AlkPhos  144<H>  09-04      WBC Trend:  WBC Count: 14.76 (09-04-23 @ 05:48)  WBC Count: 15.74 (09-03-23 @ 06:01)  WBC Count: 15.85 (09-02-23 @ 06:01)  WBC Count: 12.30 (09-01-23 @ 05:39)      Creatine Trend:  Creatinine: 0.7 (09-04)  Creatinine: 0.8 (09-03)  Creatinine: 0.9 (09-02)  Creatinine: 0.7 (09-01)      Liver Biochemical Testing Trend:  Alanine Aminotransferase (ALT/SGPT): 24 (09-04)  Alanine Aminotransferase (ALT/SGPT): 27 (09-03)  Alanine Aminotransferase (ALT/SGPT): 38 (09-02)  Alanine Aminotransferase (ALT/SGPT): 31 (09-01)  Alanine Aminotransferase (ALT/SGPT): 9 (08-31)  Aspartate Aminotransferase (AST/SGOT): 18 (09-04-23 @ 05:48)  Aspartate Aminotransferase (AST/SGOT): 21 (09-03-23 @ 06:01)  Aspartate Aminotransferase (AST/SGOT): 37 (09-02-23 @ 06:01)  Aspartate Aminotransferase (AST/SGOT): 45 (09-01-23 @ 05:39)  Aspartate Aminotransferase (AST/SGOT): 13 (08-31-23 @ 11:00)  Bilirubin Total: <0.2 (09-04)  Bilirubin Total: 0.2 (09-03)  Bilirubin Total: 0.5 (09-02)  Bilirubin Total: 0.4 (09-01)  Bilirubin Total: 0.3 (08-31)      Trend LDH  03-07-23 @ 21:06  211      Urinalysis Basic - ( 04 Sep 2023 05:48 )    Color: x / Appearance: x / SG: x / pH: x  Gluc: 144 mg/dL / Ketone: x  / Bili: x / Urobili: x   Blood: x / Protein: x / Nitrite: x   Leuk Esterase: x / RBC: x / WBC x   Sq Epi: x / Non Sq Epi: x / Bacteria: x        MICROBIOLOGY:  Vancomycin Level, Trough: 14.0 (09-02 @ 19:02)    Female    Culture - Blood (collected 02 Sep 2023 06:01)  Source: .Blood None  Preliminary Report:    No growth at 48 Hours    Culture - Sputum (collected 02 Sep 2023 03:00)  Source: Trach Asp Tracheal Aspirate  Final Report:    Normal Respiratory Joaquina present    Culture - Urine (collected 01 Sep 2023 19:50)  Source: Catheterized Catheterized  Final Report:    <10,000 CFU/mL Normal Urogenital Joaquina    Culture - Blood (collected 01 Sep 2023 19:32)  Source: .Blood None  Preliminary Report:    No growth at 72 Hours    Culture - Blood (collected 28 Aug 2023 11:00)  Source: .Blood Blood-Peripheral  Final Report:    No growth at 5 days    Culture - Blood (collected 28 Aug 2023 11:00)  Source: .Blood Blood-Peripheral  Final Report:    No growth at 5 days    Culture - Abscess with Gram Stain (collected 16 Jul 2023 07:00)  Source: .Abscess Right groin abscess  Final Report:    Numerous Proteus mirabilis    Numerous Enterococcus faecalis    Few Coag Negative Staphylococcus "Susceptibilities not performed"    Few Enterococcus faecium (vancomycin resistant)    Moderate Prevotella timonensis "Susceptibilities not performed"  Organism: Proteus mirabilis  Enterococcus faecalis  Enterococcus faecium (vancomycin resistant)  Organism: Enterococcus faecium (vancomycin resistant)    Sensitivities:      Method Type: DARIO      -  Ampicillin: R >8 Predicts results to ampicillin/sulbactam, amoxacillin-clavulanate and  piperacillin-tazobactam.      -  Daptomycin: SDD 4 The breakpoint for SDD (susceptible dose dependent)is based on a dosage regimen of 8-12 mg/kg administered every 24 h in adults and is intended for serious infections due to E. faecium. Consultation with an infectious diseases specialist is recommended.      -  Levofloxacin: R >4      -  Linezolid: S 2      -  Tetracycline: R >8      -  Vancomycin: R >16  Organism: Enterococcus faecalis    Sensitivities:      Method Type: DARIO      -  Ampicillin: S <=2 Predicts results to ampicillin/sulbactam, amoxacillin-clavulanate and  piperacillin-tazobactam.      -  Tetracycline: R >8      -  Vancomycin: S 2  Organism: Proteus mirabilis    Sensitivities:      Method Type: DARIO      -  Amikacin: S <=16      -  Amoxicillin/Clavulanic Acid: S <=8/4      -  Ampicillin: S <=8 These ampicillin results predict results for amoxicillin      -  Ampicillin/Sulbactam: S <=4/2 Enterobacter, Klebsiella aerogenes, Citrobacter, and Serratia may develop resistance during prolonged therapy (3-4 days)      -  Aztreonam: S <=4      -  Cefazolin: S <=2 Enterobacter, Klebsiella aerogenes, Citrobacter, and Serratia may develop resistance during prolonged therapy (3-4 days)      -  Cefepime: S <=2      -  Cefoxitin: S <=8      -  Ceftriaxone: S <=1 Enterobacter, Klebsiella aerogenes, Citrobacter, and Serratia may develop resistance during prolonged therapy      -  Ciprofloxacin: S <=0.25      -  Ertapenem: S <=0.5      -  Gentamicin: S <=2      -  Levofloxacin: S <=0.5      -  Meropenem: S <=1      -  Piperacillin/Tazobactam: S <=8      -  Tobramycin: S <=2      -  Trimethoprim/Sulfamethoxazole: S 1/19    Culture - Blood (collected 08 Jul 2023 21:23)  Source: .Blood Blood-Peripheral  Final Report:    No growth at 5 days    Culture - Fungal, Body Fluid (collected 05 Jul 2023 15:45)  Source: Pleural Fl Pleural Fluid  Final Report:    No fungus isolated at 4 weeks.    Culture - Body Fluid with Gram Stain (collected 05 Jul 2023 15:45)  Source: Pleural Fl Pleural Fluid  Final Report:    No growth          Legionella Antigen, Urine: Negative (08-29 @ 10:53)  Rapid RVP Result: NotDetec (08-29 @ 10:44)        Procalcitonin, Serum: 0.17 (09-02)  Procalcitonin, Serum: 0.57 (08-30)                Blood Gas Arterial, Lactate: 0.90 (09-05 @ 04:16)  Blood Gas Arterial, Lactate: 0.70 (09-04 @ 03:48)  Blood Gas Arterial, Lactate: 1.20 (09-03 @ 09:40)  Blood Gas Arterial, Lactate: 1.00 (09-03 @ 03:55)        RADIOLOGY & ADDITIONAL TESTS:  I have personally reviewed the relevant images.   CXR  Xray Chest 1 View- PORTABLE-Urgent:   ACC: [id number] EXAM:  XR CHEST PORTABLE URGENT 1V   ORDERED BY: DELORIS MUSTAFA     PROCEDURE DATE:  09/03/2023          INTERPRETATION:  Clinical History / Reason for exam: 88-year-old female   with respiratory failure and hypoxia.    Comparison : Chestradiograph performed 9/2/2023 at 5:58 AM.    Technique/Positioning: AP portable.    Findings:    Support devices: Precordial leads are present.  Endotracheal tube tip is   4 cm above the shelly.  Enteric feeding tube courses over the midline   below the left guicho-diaphragm; the tip is excluded from the image.  A   large bore left chest tube is coiled over the left lung base, stable.    Cardiac/mediastinum/hilum: The cardiac silhouette is magnified.    Lung parenchyma/Pleura: There are grossly stable bilateral pulmonary   opacities and left basilar pulmonary opacity/pleural effusion.  There has   been interval clearing of previously reported right pleural effusion.  No   pneumothorax is seen.    Skeleton/soft tissues: Stable degenerative changes.    Impression:  1.  Support lines/tubes, as described.  2.  Grossly stable bilateral pulmonary opacities and left basilar   pulmonary opacity/pleural effusion.  3.  Interval clearing of previously reported right pleural effusion.  4.  No pneumothorax is seen.        --- End of Report ---            WILLIAM GILLESPIE MD; Attending Radiologist  This document has been electronically signed. Sep  3 2023  7:23AM (09-03-23 @ 01:42)      CT        WEIGHT  Weight (kg): 87.8 (08-28-23 @ 21:03)      All available historical records have been reviewed       GIOVANNY BUCK  88y, Female    LOS  8d    INTERVAL EVENTS/HPI  - No acute events overnight  - T(F): , Max: 98.1 (09-05-23 @ 07:10)  - Tolerating medication  - WBC Count: 14.76 (09-04-23 @ 05:48)  WBC Count: 15.74 (09-03-23 @ 06:01)  - Creatinine: 0.7 (09-04-23 @ 05:48)     REVIEW OF SYSTEMS: cannot obtain further history from the patient secondary to altered mental status or sedation    Prior hospital charts reviewed [Yes]  Primary team notes reviewed [Yes]  Other consultant notes reviewed [Yes]      ANTIMICROBIALS:   cefepime   IVPB    cefepime   IVPB 2000 every 8 hours  vancomycin  IVPB    vancomycin  IVPB 1250 every 12 hours      OTHER MEDS: MEDICATIONS  (STANDING):  acetaminophen     Tablet .. 650 every 6 hours PRN  albuterol    90 MICROgram(s) HFA Inhaler 2 every 4 hours  dexMEDEtomidine Infusion 0.05 <Continuous>  dextrose 50% Injectable 25 once  dextrose Oral Gel 15 once PRN  enoxaparin Injectable 90 every 12 hours  fentaNYL   Infusion. 0.467 <Continuous>  glucagon  Injectable 1 once  insulin lispro (ADMELOG) corrective regimen sliding scale  three times a day before meals  methylPREDNISolone sodium succinate Injectable 40 daily  midazolam Injectable 2 every 4 hours PRN  pantoprazole  Injectable 40 daily  polyethylene glycol 3350 17 two times a day  propofol Infusion. 29.992 <Continuous>  senna 2 at bedtime      Vital Signs Last 24 Hrs  T(F): 98.1 (09-05-23 @ 07:10), Max: 100.6 (09-01-23 @ 11:00)    Vital Signs Last 24 Hrs  HR: 98 (09-05-23 @ 06:37) (58 - 128)  BP: 94/54 (09-05-23 @ 06:37) (92/51 - 129/86)  RR: 16 (09-05-23 @ 07:10)  SpO2: 100% (09-05-23 @ 06:37) (92% - 100%)  Wt(kg): --  Mode: AC/ CMV (Assist Control/ Continuous Mandatory Ventilation)  RR (machine): 16  TV (machine): 400  FiO2: 60  PEEP: 8  MAP: 12  PIP: 34    EXAM:  GENERAL: NAD on MV.  HEAD: No head lesions  EYES: Conjunctiva pink and cornea white  NECK: Supple, nontender to palpation  CHEST/LUNG: Mild bilateral crackles.   HEART: S1 S2  ABDOMEN: Soft, nontender, nondistended  EXTREMITIES: No clubbing, cyanosis, or petal edema  NERVOUS SYSTEM: On Sedation.  MSK: No joint erythema, swelling or pain  SKIN: No rashes or lesions, no superficial thrombophlebitis  Labs:                        9.4    14.76 )-----------( 216      ( 04 Sep 2023 05:48 )             32.6     09-04    142  |  103  |  42<H>  ----------------------------<  144<H>  4.5   |  28  |  0.7    Ca    8.4      04 Sep 2023 05:48  Phos  2.7     09-04  Mg     2.2     09-04    TPro  5.2<L>  /  Alb  2.8<L>  /  TBili  <0.2  /  DBili  x   /  AST  18  /  ALT  24  /  AlkPhos  144<H>  09-04      WBC Trend:  WBC Count: 14.76 (09-04-23 @ 05:48)  WBC Count: 15.74 (09-03-23 @ 06:01)  WBC Count: 15.85 (09-02-23 @ 06:01)  WBC Count: 12.30 (09-01-23 @ 05:39)      Creatine Trend:  Creatinine: 0.7 (09-04)  Creatinine: 0.8 (09-03)  Creatinine: 0.9 (09-02)  Creatinine: 0.7 (09-01)      Liver Biochemical Testing Trend:  Alanine Aminotransferase (ALT/SGPT): 24 (09-04)  Alanine Aminotransferase (ALT/SGPT): 27 (09-03)  Alanine Aminotransferase (ALT/SGPT): 38 (09-02)  Alanine Aminotransferase (ALT/SGPT): 31 (09-01)  Alanine Aminotransferase (ALT/SGPT): 9 (08-31)  Aspartate Aminotransferase (AST/SGOT): 18 (09-04-23 @ 05:48)  Aspartate Aminotransferase (AST/SGOT): 21 (09-03-23 @ 06:01)  Aspartate Aminotransferase (AST/SGOT): 37 (09-02-23 @ 06:01)  Aspartate Aminotransferase (AST/SGOT): 45 (09-01-23 @ 05:39)  Aspartate Aminotransferase (AST/SGOT): 13 (08-31-23 @ 11:00)  Bilirubin Total: <0.2 (09-04)  Bilirubin Total: 0.2 (09-03)  Bilirubin Total: 0.5 (09-02)  Bilirubin Total: 0.4 (09-01)  Bilirubin Total: 0.3 (08-31)      Trend LDH  03-07-23 @ 21:06  211      Urinalysis Basic - ( 04 Sep 2023 05:48 )    Color: x / Appearance: x / SG: x / pH: x  Gluc: 144 mg/dL / Ketone: x  / Bili: x / Urobili: x   Blood: x / Protein: x / Nitrite: x   Leuk Esterase: x / RBC: x / WBC x   Sq Epi: x / Non Sq Epi: x / Bacteria: x        MICROBIOLOGY:  Vancomycin Level, Trough: 14.0 (09-02 @ 19:02)    Female    Culture - Blood (collected 02 Sep 2023 06:01)  Source: .Blood None  Preliminary Report:    No growth at 48 Hours    Culture - Sputum (collected 02 Sep 2023 03:00)  Source: Trach Asp Tracheal Aspirate  Final Report:    Normal Respiratory Joaquina present    Culture - Urine (collected 01 Sep 2023 19:50)  Source: Catheterized Catheterized  Final Report:    <10,000 CFU/mL Normal Urogenital Joaquina    Culture - Blood (collected 01 Sep 2023 19:32)  Source: .Blood None  Preliminary Report:    No growth at 72 Hours    Culture - Blood (collected 28 Aug 2023 11:00)  Source: .Blood Blood-Peripheral  Final Report:    No growth at 5 days    Culture - Blood (collected 28 Aug 2023 11:00)  Source: .Blood Blood-Peripheral  Final Report:    No growth at 5 days    Culture - Abscess with Gram Stain (collected 16 Jul 2023 07:00)  Source: .Abscess Right groin abscess  Final Report:    Numerous Proteus mirabilis    Numerous Enterococcus faecalis    Few Coag Negative Staphylococcus "Susceptibilities not performed"    Few Enterococcus faecium (vancomycin resistant)    Moderate Prevotella timonensis "Susceptibilities not performed"  Organism: Proteus mirabilis  Enterococcus faecalis  Enterococcus faecium (vancomycin resistant)  Organism: Enterococcus faecium (vancomycin resistant)    Sensitivities:      Method Type: DARIO      -  Ampicillin: R >8 Predicts results to ampicillin/sulbactam, amoxacillin-clavulanate and  piperacillin-tazobactam.      -  Daptomycin: SDD 4 The breakpoint for SDD (susceptible dose dependent)is based on a dosage regimen of 8-12 mg/kg administered every 24 h in adults and is intended for serious infections due to E. faecium. Consultation with an infectious diseases specialist is recommended.      -  Levofloxacin: R >4      -  Linezolid: S 2      -  Tetracycline: R >8      -  Vancomycin: R >16  Organism: Enterococcus faecalis    Sensitivities:      Method Type: DARIO      -  Ampicillin: S <=2 Predicts results to ampicillin/sulbactam, amoxacillin-clavulanate and  piperacillin-tazobactam.      -  Tetracycline: R >8      -  Vancomycin: S 2  Organism: Proteus mirabilis    Sensitivities:      Method Type: DARIO      -  Amikacin: S <=16      -  Amoxicillin/Clavulanic Acid: S <=8/4      -  Ampicillin: S <=8 These ampicillin results predict results for amoxicillin      -  Ampicillin/Sulbactam: S <=4/2 Enterobacter, Klebsiella aerogenes, Citrobacter, and Serratia may develop resistance during prolonged therapy (3-4 days)      -  Aztreonam: S <=4      -  Cefazolin: S <=2 Enterobacter, Klebsiella aerogenes, Citrobacter, and Serratia may develop resistance during prolonged therapy (3-4 days)      -  Cefepime: S <=2      -  Cefoxitin: S <=8      -  Ceftriaxone: S <=1 Enterobacter, Klebsiella aerogenes, Citrobacter, and Serratia may develop resistance during prolonged therapy      -  Ciprofloxacin: S <=0.25      -  Ertapenem: S <=0.5      -  Gentamicin: S <=2      -  Levofloxacin: S <=0.5      -  Meropenem: S <=1      -  Piperacillin/Tazobactam: S <=8      -  Tobramycin: S <=2      -  Trimethoprim/Sulfamethoxazole: S 1/19    Culture - Blood (collected 08 Jul 2023 21:23)  Source: .Blood Blood-Peripheral  Final Report:    No growth at 5 days    Culture - Fungal, Body Fluid (collected 05 Jul 2023 15:45)  Source: Pleural Fl Pleural Fluid  Final Report:    No fungus isolated at 4 weeks.    Culture - Body Fluid with Gram Stain (collected 05 Jul 2023 15:45)  Source: Pleural Fl Pleural Fluid  Final Report:    No growth          Legionella Antigen, Urine: Negative (08-29 @ 10:53)  Rapid RVP Result: NotDetec (08-29 @ 10:44)    Procalcitonin, Serum: 0.17 (09-02)  Procalcitonin, Serum: 0.57 (08-30)        Blood Gas Arterial, Lactate: 0.90 (09-05 @ 04:16)  Blood Gas Arterial, Lactate: 0.70 (09-04 @ 03:48)  Blood Gas Arterial, Lactate: 1.20 (09-03 @ 09:40)  Blood Gas Arterial, Lactate: 1.00 (09-03 @ 03:55)        RADIOLOGY & ADDITIONAL TESTS:  I have personally reviewed the relevant images.   CXR  Xray Chest 1 View- PORTABLE-Urgent:   ACC: [id number] EXAM:  XR CHEST PORTABLE URGENT 1V   ORDERED BY: DELORIS MUSTAFA     PROCEDURE DATE:  09/03/2023          INTERPRETATION:  Clinical History / Reason for exam: 88-year-old female   with respiratory failure and hypoxia.    Comparison : Chestradiograph performed 9/2/2023 at 5:58 AM.    Technique/Positioning: AP portable.    Findings:    Support devices: Precordial leads are present.  Endotracheal tube tip is   4 cm above the shelly.  Enteric feeding tube courses over the midline   below the left guicho-diaphragm; the tip is excluded from the image.  A   large bore left chest tube is coiled over the left lung base, stable.    Cardiac/mediastinum/hilum: The cardiac silhouette is magnified.    Lung parenchyma/Pleura: There are grossly stable bilateral pulmonary   opacities and left basilar pulmonary opacity/pleural effusion.  There has   been interval clearing of previously reported right pleural effusion.  No   pneumothorax is seen.    Skeleton/soft tissues: Stable degenerative changes.    Impression:  1.  Support lines/tubes, as described.  2.  Grossly stable bilateral pulmonary opacities and left basilar   pulmonary opacity/pleural effusion.  3.  Interval clearing of previously reported right pleural effusion.  4.  No pneumothorax is seen.        --- End of Report ---            WILLIAM GILLESPIE MD; Attending Radiologist  This document has been electronically signed. Sep  3 2023  7:23AM (09-03-23 @ 01:42)      CT        WEIGHT  Weight (kg): 87.8 (08-28-23 @ 21:03)      All available historical records have been reviewed

## 2023-09-05 NOTE — PROGRESS NOTE ADULT - SUBJECTIVE AND OBJECTIVE BOX
GIOVANNY BUCK 88y Female  MRN#: [id number]     Hospital Day: 8d      SUBJECTIVE  Failed SAT today, currently on precedex,fentanyl,propofol.                                           ----------------------------------------------------------  OBJECTIVE  PAST MEDICAL & SURGICAL HISTORY  HTN (hypertension)    Afib  s/p ablation 2001    Goiter  no meds    Cellulitis  chronic    OA (osteoarthritis)    PVD (peripheral vascular disease)    COPD (chronic obstructive pulmonary disease)    Anxiety    Obesity    Acute on chronic systolic congestive heart failure    Edema of both lower legs    Required emergent intubation    H/O abdominal hysterectomy    H/O discectomy    H/O total knee replacement, bilateral                                              -----------------------------------------------------------  ALLERGIES:  aspirin (Other)  codeine (Other)  sulfa drugs (Hives; Other)  penicillin (Other)  contrast media (iodine-based) (Other)                                            ------------------------------------------------------------    HOME MEDICATIONS  Home Medications:  albuterol 90 mcg/inh inhalation aerosol: 2 puff(s) inhaled every 6 hours as needed for  shortness of breath and/or wheezing (02 Jul 2023 23:05)  budesonide-formoterol 80 mcg-4.5 mcg/inh inhalation aerosol: 2 puff(s) inhaled 2 times a day (02 Jul 2023 23:05)  dilTIAZem 180 mg/24 hours oral capsule, extended release: 1 cap(s) orally once a day (02 Jul 2023 23:05)  Eliquis 2.5 mg oral tablet: 1 tab(s) orally 2 times a day (02 Jul 2023 23:05)  Senna 8.6 mg oral tablet: 2 tab(s) orally once a day (at bedtime) (02 Jul 2023 23:05)                           MEDICATIONS:  STANDING MEDICATIONS  albuterol    90 MICROgram(s) HFA Inhaler 2 Puff(s) Inhalation every 4 hours  cefepime   IVPB      cefepime   IVPB 2000 milliGRAM(s) IV Intermittent every 8 hours  chlorhexidine 0.12% Liquid 15 milliLiter(s) Oral Mucosa every 12 hours  chlorhexidine 2% Cloths 1 Application(s) Topical <User Schedule>  dexMEDEtomidine Infusion 0.05 MICROgram(s)/kG/Hr IV Continuous <Continuous>  dextrose 5%. 1000 milliLiter(s) IV Continuous <Continuous>  dextrose 50% Injectable 25 Gram(s) IV Push once  enoxaparin Injectable 90 milliGRAM(s) SubCutaneous every 12 hours  fentaNYL   Infusion. 0.467 MICROgram(s)/kG/Hr IV Continuous <Continuous>  glucagon  Injectable 1 milliGRAM(s) IntraMuscular once  insulin lispro (ADMELOG) corrective regimen sliding scale   SubCutaneous three times a day before meals  methylPREDNISolone sodium succinate Injectable 40 milliGRAM(s) IV Push daily  metoprolol tartrate 25 milliGRAM(s) Oral two times a day  mupirocin 2% Ointment 1 Application(s) Both Nostrils two times a day  pantoprazole  Injectable 40 milliGRAM(s) IV Push daily  polyethylene glycol 3350 17 Gram(s) Oral two times a day  propofol Infusion. 29.992 MICROgram(s)/kG/Min IV Continuous <Continuous>  senna 2 Tablet(s) Oral at bedtime  vancomycin  IVPB 1250 milliGRAM(s) IV Intermittent every 12 hours  vancomycin  IVPB        PRN MEDICATIONS  acetaminophen     Tablet .. 650 milliGRAM(s) Oral every 6 hours PRN  dextrose Oral Gel 15 Gram(s) Oral once PRN  midazolam Injectable 2 milliGRAM(s) IV Push every 4 hours PRN                                            ------------------------------------------------------------  VITAL SIGNS: Last 24 Hours  T(C): 37.2 (05 Sep 2023 11:00), Max: 37.2 (05 Sep 2023 11:00)  T(F): 99 (05 Sep 2023 11:00), Max: 99 (05 Sep 2023 11:00)  HR: 125 (05 Sep 2023 10:00) (60 - 128)  BP: 110/71 (05 Sep 2023 10:00) (92/51 - 124/64)  BP(mean): 86 (05 Sep 2023 10:00) (65 - 88)  RR: 20 (05 Sep 2023 11:00) (16 - 26)  SpO2: 100% (05 Sep 2023 10:00) (92% - 100%)      09-04-23 @ 07:01  -  09-05-23 @ 07:00  --------------------------------------------------------  IN: 2393 mL / OUT: 800 mL / NET: 1593 mL    09-05-23 @ 07:01  -  09-05-23 @ 13:11  --------------------------------------------------------  IN: 48.9 mL / OUT: 0 mL / NET: 48.9 mL                                             --------------------------------------------------------------  LABS:                        9.4    14.76 )-----------( 216      ( 04 Sep 2023 05:48 )             32.6     09-04    142  |  103  |  42<H>  ----------------------------<  144<H>  4.5   |  28  |  0.7    Ca    8.4      04 Sep 2023 05:48  Phos  2.7     09-04  Mg     2.2     09-04    TPro  5.2<L>  /  Alb  2.8<L>  /  TBili  <0.2  /  DBili  x   /  AST  18  /  ALT  24  /  AlkPhos  144<H>  09-04      Urinalysis Basic - ( 04 Sep 2023 05:48 )    Color: x / Appearance: x / SG: x / pH: x  Gluc: 144 mg/dL / Ketone: x  / Bili: x / Urobili: x   Blood: x / Protein: x / Nitrite: x   Leuk Esterase: x / RBC: x / WBC x   Sq Epi: x / Non Sq Epi: x / Bacteria: x      ABG - ( 05 Sep 2023 04:16 )  pH, Arterial: 7.45  pH, Blood: x     /  pCO2: 43    /  pO2: 86    / HCO3: 30    / Base Excess: 5.3   /  SaO2: 98.4                                                                  -------------------------------------------------------------  RADIOLOGY:  reviewed                                          --------------------------------------------------------------    PHYSICAL EXAM:  GENERAL: intubated/sedated  CHEST/LUNG: decreasedbreaath sounds  ABDOMEN: Soft, nontender  EXTREMITIES:  edema LE  NERVOUS SYSTEM:  intubated/sedated                                             --------------------------------------------------------------

## 2023-09-05 NOTE — PROGRESS NOTE ADULT - ASSESSMENT
IMPRESSION:  acute hypercapnic/ hypoxic res failure s/p intubation   COPD exacerbation?  Sepsis present on admission   CAP  afib on eliquis  recurrent pleural effusions s/p pleurex   malignant pleural effusion   H/O HFpEF    PLAN:    CNS: precedx + fentenyl for sedation, SAT daily    HEENT: oral care, ETT care    PULMONARY: aspiration precaution, HOB@ 45 degrees, daily abg/xray, keep pco2 around 50, solumedrol 40 qd, albuterl PRN, vent changes: Wean FiO2 as tolerated to maintain spO2 > 92%   pleurx drainage   SBT daily  lower fio2 to 50%  CARDIOVASCULAR: echo, trops noted. BNP elevated, lasix 40mg x1 now again today with 500mg Acetazolemide IV, only slightly net positive yesterday, keep I<os, baseline ekg    GI: GI prophylaxis.  Feeding,     RENAL: f/u lytes correct as needed,     INFECTIOUS DISEASE: c/w vancomycin/cefepime, ua, ucx, bcx, RVP, procal, urine legion/step, DTA   check vanco trouph   follow Id recommendation   HEMATOLOGICAL: [facility]     ENDOCRINE:  Follow up FS.  Insulin protocol if needed.    MUSCULOSKELETAL: bedbound    full code for now (DNR/DNI was rescinded by family)  ICU   IMPRESSION:  acute hypercapnic/ hypoxic res failure s/p intubation   COPD exacerbation?  Sepsis present on admission   CAP  afib on eliquis  recurrent pleural effusions s/p pleurex   malignant pleural effusion   H/O HFpEF    PLAN:    CNS: precedx + fentanyl for sedation, SAT daily  taper propofol off   HEENT: oral care, ETT care    PULMONARY: aspiration precaution, HOB@ 45 degrees, daily abg/xray, keep pco2 around 50, solumedrol 40 qd, albuterl PRN, vent changes: Wean FiO2 as tolerated to maintain spO2 > 92%   pleurx drainage   SBT daily  lower fio2 to 50%  CARDIOVASCULAR: echo, trops noted. BNP elevated, lasix 40mg x1 now , only slightly net positive yesterday, keep I<os, baseline ekg  start 24 Q12 hrs   GI: GI prophylaxis.  Feeding,     RENAL: f/u lytes correct as needed,     INFECTIOUS DISEASE: c/w vancomycin/cefepime, ua, ucx, bcx, RVP, procal, urine legion/step, DTA   check vanco trouph   follow Id recommendation   send DTA   HEMATOLOGICAL: [facility]     ENDOCRINE:  Follow up FS.  Insulin protocol if needed.    MUSCULOSKELETAL: bedbound    full code for now (DNR/DNI was rescinded by family)  ICU  discussed with family at bed side grand daughter tried to call son not answering

## 2023-09-05 NOTE — CHART NOTE - NSCHARTNOTEFT_GEN_A_CORE
visited patient earlier today. patient remains intubated.     called and spoke with patient's daughter/HCP Karishma. Reviewed role of palliative Sw. She noted she and her family spoke with Dr Blankenship over weekend. She noted she and her family have further questions. She requested to have a family meeting. Provided Karishma with our contact information. Asked her to call us once she and her family have day/time where they can all be together to meet.     all questions answered. support rendered.     will follow. [phone]

## 2023-09-06 NOTE — PROGRESS NOTE ADULT - ASSESSMENT
89 YO F with a medical history  of HTN, malignant pleural effusion of breast origin, chronic A.fib, PAD, Obesity, Chronic lymphedema of  BLE and COPD on home oxygen 2 - 1/2 L nc, ESBL UTI presented to ED with severe SOB, DNR / DNI rescinded and pt was intubated and admitted to ICU    acute respiratory failure / B/L effusions / possible pneumonia / COPD / stage 2 sacral decubiti was POA     - SAT -> SBT   - now DNR    - Lasix   - complete  antibiotic course   - taper steroids   - Lovenox   - pulmonary following

## 2023-09-06 NOTE — PROGRESS NOTE ADULT - SUBJECTIVE AND OBJECTIVE BOX
GIOVANNY BUCK  88y, Female    LOS  9d    INTERVAL EVENTS/HPI  - No acute events overnight  - T(F): , Max: 98.4 (09-05-23 @ 23:00)  - Tolerating medication  - WBC Count: 16.81 (09-06-23 @ 05:50)  WBC Count: 17.69 (09-05-23 @ 20:09)  - Creatinine: 0.9 (09-06-23 @ 05:50)  Creatinine: 0.8 (09-05-23 @ 20:09)       REVIEW OF SYSTEMS: cannot obtain further history from the patient secondary to altered mental status or sedation    Prior hospital charts reviewed [Yes]  Primary team notes reviewed [Yes]  Other consultant notes reviewed [Yes]      ANTIMICROBIALS:   cefepime   IVPB 2000 every 12 hours    MEDICATIONS  (STANDING):  azithromycin  IVPB   255 mL/Hr IV Intermittent (08-29-23 @ 14:27)    azithromycin  IVPB   255 mL/Hr IV Intermittent (09-01-23 @ 12:05)   255 mL/Hr IV Intermittent (08-31-23 @ 11:42)   255 mL/Hr IV Intermittent (08-30-23 @ 11:50)    aztreonam  IVPB   50 mL/Hr IV Intermittent (08-28-23 @ 13:13)    cefepime   IVPB   100 mL/Hr IV Intermittent (09-01-23 @ 21:20)    cefepime   IVPB   100 mL/Hr IV Intermittent (09-05-23 @ 05:00)   100 mL/Hr IV Intermittent (09-04-23 @ 21:39)   100 mL/Hr IV Intermittent (09-04-23 @ 14:28)   100 mL/Hr IV Intermittent (09-04-23 @ 05:14)   100 mL/Hr IV Intermittent (09-03-23 @ 21:58)   100 mL/Hr IV Intermittent (09-03-23 @ 14:04)   100 mL/Hr IV Intermittent (09-03-23 @ 05:05)   100 mL/Hr IV Intermittent (09-02-23 @ 22:16)   100 mL/Hr IV Intermittent (09-02-23 @ 13:33)   100 mL/Hr IV Intermittent (09-02-23 @ 05:12)    cefepime   IVPB   100 mL/Hr IV Intermittent (09-06-23 @ 17:02)   100 mL/Hr IV Intermittent (09-06-23 @ 06:25)   100 mL/Hr IV Intermittent (09-05-23 @ 18:07)    cefTRIAXone   IVPB   100 mL/Hr IV Intermittent (08-29-23 @ 14:26)    cefTRIAXone   IVPB   100 mL/Hr IV Intermittent (09-01-23 @ 12:04)   100 mL/Hr IV Intermittent (08-31-23 @ 11:43)   100 mL/Hr IV Intermittent (08-30-23 @ 16:41)    vancomycin  IVPB   250 mL/Hr IV Intermittent (08-28-23 @ 15:19)    vancomycin  IVPB   166.67 mL/Hr IV Intermittent (09-05-23 @ 21:32)   166.67 mL/Hr IV Intermittent (09-05-23 @ 10:21)   166.67 mL/Hr IV Intermittent (09-04-23 @ 21:39)   166.67 mL/Hr IV Intermittent (09-04-23 @ 09:47)   166.67 mL/Hr IV Intermittent (09-03-23 @ 21:58)   166.67 mL/Hr IV Intermittent (09-03-23 @ 12:15)   166.67 mL/Hr IV Intermittent (09-02-23 @ 23:04)   166.67 mL/Hr IV Intermittent (09-02-23 @ 10:59)    vancomycin  IVPB   166.67 mL/Hr IV Intermittent (09-01-23 @ 21:41)      OTHER MEDS: MEDICATIONS  (STANDING):  acetaminophen     Tablet .. 650 every 6 hours PRN  albuterol    90 MICROgram(s) HFA Inhaler 2 every 4 hours  dexMEDEtomidine Infusion 0.05 <Continuous>  dextrose 50% Injectable 25 once  dextrose Oral Gel 15 once PRN  enoxaparin Injectable 90 every 12 hours  fentaNYL   Infusion. 0.467 <Continuous>  glucagon  Injectable 1 once  insulin lispro (ADMELOG) corrective regimen sliding scale  three times a day before meals  methylPREDNISolone sodium succinate Injectable 40 daily  metoprolol tartrate 25 two times a day  midazolam Injectable 2 every 4 hours PRN  pantoprazole  Injectable 40 daily  polyethylene glycol 3350 17 two times a day  propofol Infusion 10 <Continuous>  senna 2 at bedtime      Vital Signs Last 24 Hrs  T(F): 97.5 (09-06-23 @ 15:28), Max: 100.6 (09-01-23 @ 11:00)    Vital Signs Last 24 Hrs  HR: 62 (09-06-23 @ 16:00) (55 - 114)  BP: 89/54 (09-06-23 @ 16:00) (89/54 - 132/61)  RR: 16 (09-06-23 @ 16:00)  SpO2: 100% (09-06-23 @ 16:00) (95% - 100%)  Wt(kg): --  Mode: AC/ CMV (Assist Control/ Continuous Mandatory Ventilation)  RR (machine): 16  TV (machine): 400  FiO2: 40  PEEP: 8  MAP: 13  PIP: 30    EXAM:  GENERAL: NAD on MV.  HEAD: No head lesions  EYES: Conjunctiva pink and cornea white  NECK: Supple, nontender to palpation  CHEST/LUNG: Mild bilateral crackles.   HEART: S1 S2  ABDOMEN: Soft, nontender, nondistended  EXTREMITIES: No clubbing, cyanosis, or petal edema  NERVOUS SYSTEM: On Sedation.  MSK: No joint erythema, swelling or pain  SKIN: No rashes or lesions, no superficial thrombophlebitis  Labs:                        9.4    16.81 )-----------( 222      ( 06 Sep 2023 05:50 )             32.5     09-06    140  |  102  |  59<H>  ----------------------------<  138<H>  4.4   |  26  |  0.9    Ca    8.5      06 Sep 2023 05:50  Mg     2.3     09-06    TPro  5.1<L>  /  Alb  2.7<L>  /  TBili  <0.2  /  DBili  x   /  AST  17  /  ALT  22  /  AlkPhos  131<H>  09-06      WBC Trend:  WBC Count: 16.81 (09-06-23 @ 05:50)  WBC Count: 17.69 (09-05-23 @ 20:09)  WBC Count: 14.76 (09-04-23 @ 05:48)  WBC Count: 15.74 (09-03-23 @ 06:01)      Creatine Trend:  Creatinine: 0.9 (09-06)  Creatinine: 0.8 (09-05)  Creatinine: 0.7 (09-04)  Creatinine: 0.8 (09-03)      Liver Biochemical Testing Trend:  Alanine Aminotransferase (ALT/SGPT): 22 (09-06)  Alanine Aminotransferase (ALT/SGPT): 25 (09-05)  Alanine Aminotransferase (ALT/SGPT): 24 (09-04)  Alanine Aminotransferase (ALT/SGPT): 27 (09-03)  Alanine Aminotransferase (ALT/SGPT): 38 (09-02)  Aspartate Aminotransferase (AST/SGOT): 17 (09-06-23 @ 05:50)  Aspartate Aminotransferase (AST/SGOT): 18 (09-05-23 @ 20:09)  Aspartate Aminotransferase (AST/SGOT): 18 (09-04-23 @ 05:48)  Aspartate Aminotransferase (AST/SGOT): 21 (09-03-23 @ 06:01)  Aspartate Aminotransferase (AST/SGOT): 37 (09-02-23 @ 06:01)  Bilirubin Total: <0.2 (09-06)  Bilirubin Total: 0.2 (09-05)  Bilirubin Total: <0.2 (09-04)  Bilirubin Total: 0.2 (09-03)  Bilirubin Total: 0.5 (09-02)      Trend LDH  03-07-23 @ 21:06  211      Urinalysis Basic - ( 06 Sep 2023 05:50 )    Color: x / Appearance: x / SG: x / pH: x  Gluc: 138 mg/dL / Ketone: x  / Bili: x / Urobili: x   Blood: x / Protein: x / Nitrite: x   Leuk Esterase: x / RBC: x / WBC x   Sq Epi: x / Non Sq Epi: x / Bacteria: x        MICROBIOLOGY:  Vancomycin Level, Trough: 34.5 (09-06 @ 06:44)  Vancomycin Level, Trough: 35.1 (09-05 @ 20:09)  Vancomycin Level, Trough: 14.0 (09-02 @ 19:02)    Female    Culture - Sputum (collected 05 Sep 2023 19:15)  Source: Trach Asp Tracheal Aspirate    Culture - Blood (collected 02 Sep 2023 06:01)  Source: .Blood None  Preliminary Report:    No growth at 72 Hours    Culture - Sputum (collected 02 Sep 2023 03:00)  Source: Trach Asp Tracheal Aspirate  Final Report:    Normal Respiratory Joaquina present    Culture - Urine (collected 01 Sep 2023 19:50)  Source: Catheterized Catheterized  Final Report:    <10,000 CFU/mL Normal Urogenital Joaquina    Culture - Blood (collected 01 Sep 2023 19:32)  Source: .Blood None  Preliminary Report:    No growth at 4 days    Culture - Blood (collected 28 Aug 2023 11:00)  Source: .Blood Blood-Peripheral  Final Report:    No growth at 5 days    Culture - Blood (collected 28 Aug 2023 11:00)  Source: .Blood Blood-Peripheral  Final Report:    No growth at 5 days    Culture - Abscess with Gram Stain (collected 16 Jul 2023 07:00)  Source: .Abscess Right groin abscess  Final Report:    Numerous Proteus mirabilis    Numerous Enterococcus faecalis    Few Coag Negative Staphylococcus "Susceptibilities not performed"    Few Enterococcus faecium (vancomycin resistant)    Moderate Prevotella timonensis "Susceptibilities not performed"  Organism: Proteus mirabilis  Enterococcus faecalis  Enterococcus faecium (vancomycin resistant)  Organism: Enterococcus faecium (vancomycin resistant)    Sensitivities:      -  Levofloxacin: R >4      -  Daptomycin: SDD 4 The breakpoint for SDD (susceptible dose dependent)is based on a dosage regimen of 8-12 mg/kg administered every 24 h in adults and is intended for serious infections due to E. faecium. Consultation with an infectious diseases specialist is recommended.      -  Linezolid: S 2      -  Vancomycin: R >16      -  Ampicillin: R >8 Predicts results to ampicillin/sulbactam, amoxacillin-clavulanate and  piperacillin-tazobactam.      -  Tetracycline: R >8      Method Type: DARIO  Organism: Enterococcus faecalis    Sensitivities:      -  Vancomycin: S 2      -  Ampicillin: S <=2 Predicts results to ampicillin/sulbactam, amoxacillin-clavulanate and  piperacillin-tazobactam.      -  Tetracycline: R >8      Method Type: DARIO  Organism: Proteus mirabilis    Sensitivities:      -  Levofloxacin: S <=0.5      -  Tobramycin: S <=2      -  Aztreonam: S <=4      -  Gentamicin: S <=2      -  Cefazolin: S <=2 Enterobacter, Klebsiella aerogenes, Citrobacter, and Serratia may develop resistance during prolonged therapy (3-4 days)      -  Cefepime: S <=2      -  Piperacillin/Tazobactam: S <=8      -  Ciprofloxacin: S <=0.25      -  Ceftriaxone: S <=1 Enterobacter, Klebsiella aerogenes, Citrobacter, and Serratia may develop resistance during prolonged therapy      -  Ampicillin: S <=8 These ampicillin results predict results for amoxicillin      Method Type: DARIO      -  Meropenem: S <=1      -  Ampicillin/Sulbactam: S <=4/2 Enterobacter, Klebsiella aerogenes, Citrobacter, and Serratia may develop resistance during prolonged therapy (3-4 days)      -  Cefoxitin: S <=8      -  Amikacin: S <=16      -  Amoxicillin/Clavulanic Acid: S <=8/4      -  Trimethoprim/Sulfamethoxazole: S 1/19      -  Ertapenem: S <=0.5    Culture - Blood (collected 08 Jul 2023 21:23)  Source: .Blood Blood-Peripheral  Final Report:    No growth at 5 days    Culture - Fungal, Body Fluid (collected 05 Jul 2023 15:45)  Source: Pleural Fl Pleural Fluid  Final Report:    No fungus isolated at 4 weeks.        Procalcitonin, Serum: 0.17 (09-02)      Blood Gas Arterial, Lactate: 0.70 (09-06 @ 09:10)  Blood Gas Arterial, Lactate: 0.60 (09-06 @ 03:45)  Blood Gas Arterial, Lactate: 0.90 (09-05 @ 04:16)  Blood Gas Arterial, Lactate: 0.70 (09-04 @ 03:48)        RADIOLOGY & ADDITIONAL TESTS:  I have personally reviewed the relevant images.   CXR      CT  CT Chest No Cont:   ACC: [id number] EXAM:  CT CHEST   ORDERED BY: MERNA GABRIEL     PROCEDURE DATE:  09/05/2023          INTERPRETATION:  Reason for Exam:  Shortness of breath.  CT of the chest was performed from the thoracic inlet to the level of the   adrenal glands without contrast injection. Coronal and sagittal images   have been submitted.    Comparison: August 28, 2023.    Findings:    Tubes/Lines: The patient is intubated. Enteric catheter extends below the   hemidiaphragm. Left pleural catheter. Lungs, Pleura, and Airways:Large   right neck mass measuring 6.6 cm.    Bilateral pleural effusions, right greater than left. Underlying   opacifications with atelectasis, right greater than left.    6 mm nodule within the right upper lung field, image #[id number], likely related   to improving opacities.    Mediastinum/Lymph Nodes:Reactive axillary lymph nodes. No mediastinal   lymph nodes.    Heart/Great Vessels: Cardiomegaly. Atherosclerosis.    Abdomen: Within normal limits.    Bones and soft tissues: Osteopenia. Kyphosis.    IMPRESSION:    Bilateral pleural effusions with underlying atelectasis and opacities,   slight improvement from last week.    Support devices as described.    Right neck mass.    --- End of Report ---      ANURAG YA MD;Attending Interventional Radiologist  This document has been electronically signed. Sep  6 2023  9:24AM (09-05-23 @ 15:17)        WEIGHT  Weight (kg): 87.8 (08-28-23 @ 21:03)  Creatinine: 0.9 mg/dL (09-06-23 @ 05:50)  Creatinine: 0.8 mg/dL (09-05-23 @ 20:09)      All available historical records have been reviewed

## 2023-09-06 NOTE — PROGRESS NOTE ADULT - SUBJECTIVE AND OBJECTIVE BOX
Patient seen and evaluated this am, awake off sedation      T(F): 97.9 (09-06-23 @ 07:05), Max: 99 (09-05-23 @ 11:00)  HR: 113 (09-06-23 @ 09:00)  BP: 127/80 (09-06-23 @ 09:00)  RR: 20  SpO2: 100% (09-06-23 @ 09:00) (95% - 100%)    PHYSICAL EXAM:  GENERAL: NAD  HEAD:  Atraumatic, Normocephalic  EYES: EOMI, PERRLA, conjunctiva and sclera clear  NERVOUS SYSTEM:   no focal deficits   CHEST/LUNG:  bilateral rales  HEART: Regular rate and rhythm; No murmurs, rubs, or gallops  ABDOMEN: Soft, Nontender, Nondistended; Bowel sounds present  EXTREMITIES:  2+ Peripheral Pulses, No clubbing, cyanosis, or edema  Sacrum: stage 2 decubiti  LABS  09-06    140  |  102  |  59<H>  ----------------------------<  138<H>  4.4   |  26  |  0.9    Ca    8.5      06 Sep 2023 05:50  Mg     2.3     09-06    TPro  5.1<L>  /  Alb  2.7<L>  /  TBili  <0.2  /  DBili  x   /  AST  17  /  ALT  22  /  AlkPhos  131<H>  09-06                          9.4    16.81 )-----------( 222      ( 06 Sep 2023 05:50 )             32.5       Mode: CPAP with PS  FiO2: 40  PEEP: 5    Culture Results:   No growth at 72 Hours (09-02-23)  Culture Results:   Normal Respiratory Joaquina present (09-02-23)  Culture Results:   <10,000 CFU/mL Normal Urogenital Joaquina (09-01-23)  Culture Results:   No growth at 4 days (09-01-23)  Culture Results:   No growth at 5 days (08-28-23)  Culture Results:   No growth at 5 days (08-28-23)    RADIOLOGY  < from: Xray Chest 1 View- PORTABLE-Routine (Xray Chest 1 View- PORTABLE-Routine in AM.) (09.06.23 @ 06:45) >  Lung parenchyma/Pleura: Stable bilateral opacities and left pleural   effusion. No pneumothorax.    < end of copied text >    MEDICATIONS  (STANDING):  albuterol    90 MICROgram(s) HFA Inhaler 2 Puff(s) Inhalation every 4 hours  cefepime   IVPB 2000 milliGRAM(s) IV Intermittent every 12 hours  chlorhexidine 0.12% Liquid 15 milliLiter(s) Oral Mucosa every 12 hours  chlorhexidine 2% Cloths 1 Application(s) Topical <User Schedule>  dexMEDEtomidine Infusion 0.05 MICROgram(s)/kG/Hr (1.1 mL/Hr) IV Continuous <Continuous>  enoxaparin Injectable 90 milliGRAM(s) SubCutaneous every 12 hours  fentaNYL   Infusion. 0.467 MICROgram(s)/kG/Hr (4.1 mL/Hr) IV Continuous <Continuous>  insulin lispro (ADMELOG) corrective regimen sliding scale   SubCutaneous three times a day before meals  methylPREDNISolone sodium succinate Injectable 40 milliGRAM(s) IV Push daily  metoprolol tartrate 25 milliGRAM(s) Oral two times a day  mupirocin 2% Ointment 1 Application(s) Both Nostrils two times a day  pantoprazole  Injectable 40 milliGRAM(s) IV Push daily  polyethylene glycol 3350 17 Gram(s) Oral two times a day  propofol Infusion. 29.992 MICROgram(s)/kG/Min (15.8 mL/Hr) IV Continuous <Continuous>  senna 2 Tablet(s) Oral at bedtime    MEDICATIONS  (PRN):  acetaminophen     Tablet .. 650 milliGRAM(s) Oral every 6 hours PRN Temp greater or equal to 38C (100.4F), Mild Pain (1 - 3)  dextrose Oral Gel 15 Gram(s) Oral once PRN Blood Glucose LESS THAN 70 milliGRAM(s)/deciliter  midazolam Injectable 2 milliGRAM(s) IV Push every 4 hours PRN agitation/vent synchrony

## 2023-09-06 NOTE — PROGRESS NOTE ADULT - ASSESSMENT
88 year old female with PMH of HTN, Afib, OA, PVD, COPD, Anxiety, Obesity, Acute on chronic systolic congestive heart failure, H/O abdominal hysterectomy, H/O discectomy, H/O total knee replacement, bilaterally and bilateral pleural effusions (s/p left thoracentesis 3/2023 -- Cytology -PLEURAL FLUID, THORACENTESIS: - SUSPICIOUS FOR MALIGNANCY) with a left sided pleurex catheter is currently admitted due to worsening respiratory distress and hypercapnic respiratory failure and COPD exacerbation, currently on mechanical ventilation.ID is being consulted for antimicrobial recommendations given she is spiking intermittent fevers.     IMPRESSION  #Acute hypercapnic respiratory failure- Volume overload vs COPD exacerbation   #Sepsis present on admission  #Fevers- Concerns of superimposed VAP?   #Bilateral pleural effusions   - s/p left thoracentesis 3/2023 -- Cytology -PLEURAL FLUID, THORACENTESIS: - SUSPICIOUS FOR MALIGNANCY. - Atypical cells present in background of histiocytes and mesothelial  cells, suspicious for metastatic carcinoma.   - Pleurx Cx 7/4 - Staph epidermidis, No PMNs  - s/p thoracentesis right thoracentesis 7/5 Exudative in nature. Cultures negative  #History of ESBL E.coli bacteremia (5/2023)   #Hx 2/2023 foot wound MRSA  #Right inguinal Abscess history (7/2023)- Cx (Polymicrobial- Proteus, E.feacalis and VRE)   #HF with preserved EF  #COPD   #Chronic Lymphedema  #Obesity BMI (kg/m2): 30.3, 34.7  #RVP and legionella urine antigen negative  #MRSA nares positive.     RECOMMENDATIONS  -Follow up with urine Strep antigen.   -on IV vancomycin. Dosing as per pharmacy protocol.   -on IV cefepime 2 gram q 8 hours.   -Will plan for 7 days of antibiotics and then stop.   -On steroids as per the ICU team.   -Already on full anticoagulation (possibility of acute thrombosis is less likely)  -Monitor for diarrhea, if noted, send for C.diff.   -No reported skin break down or failure. Continue with precautions for off loading.   -Recommend aspiration precautions.   -Trend fever curve. Trend WBC   -Trend LFTs. If rising, obtain RUQ US or CT abdomen to check for acalculous cholecystitis.   -Patient is at high risk of ICU related deconditioning.    -Guarded prognosis.     If any questions, please send a message or call on [employer]  Please continue to update ID with any pertinent new laboratory or radiographic findings.    Clifford Cordero M.D  Infectious Diseases Attending  [facility]

## 2023-09-06 NOTE — PROGRESS NOTE ADULT - ASSESSMENT
IMPRESSION:  acute hypercapnic/ hypoxic res failure s/p intubation   COPD exacerbation?  Sepsis present on admission   CAP  afib on eliquis  recurrent pleural effusions s/p pleurex   malignant pleural effusion   H/O HFpEF    PLAN:    CNS: precedx + fentanyl for sedation, SAT daily  taper propofol off   HEENT: oral care, ETT care    PULMONARY: aspiration precaution, HOB@ 45 degrees, daily abg/xray, keep pco2 around 50, solumedrol 40 qd, albuterl PRN, vent changes: Wean FiO2 as tolerated to maintain spO2 > 92%  SBT iff passed SAT     CARDIOVASCULAR: echo, trops noted. BNP elevated, lasix 40mg x1 today   keep I<os, baseline ekg    GI: GI prophylaxis.  Feeding,     RENAL: f/u lytes correct as needed,     INFECTIOUS DISEASE: c/w vancomycin/cefepime, ua, ucx, bcx, RVP, procal, urine legion/step, DTA   check vanco trouph   follow Id recommendation     HEMATOLOGICAL: [facility]     ENDOCRINE:  Follow up FS.  Insulin protocol if needed.    MUSCULOSKELETAL: bedbound    ICU   IMPRESSION:  acute hypercapnic/ hypoxic res failure s/p intubation   COPD exacerbation?  Sepsis present on admission   CAP  afib on eliquis  recurrent pleural effusions s/p pleurex   malignant pleural effusion   H/O HFpEF    PLAN:    CNS: precedx + fentanyl for sedation, SAT daily  taper propofol off   HEENT: oral care, ETT care    PULMONARY: aspiration precaution, HOB@ 45 degrees, daily abg/xray, keep pco2 around 50, solumedrol 40 qd, albuterl PRN, vent changes: Wean FiO2 as tolerated to maintain spO2 > 92%  SBT iff passed SAT     CARDIOVASCULAR: echo, trops noted. BNP elevated, lasix 40mg x1 today   keep I<os, baseline ekg    GI: GI prophylaxis.  Feeding,     RENAL: f/u lytes correct as needed,     INFECTIOUS DISEASE hold vanco  level was elevated   cefepime, ua, ucx, bcx, RVP, procal, urine legion/step, DTA   check vanco trouph today      follow Id recommendation     HEMATOLOGICAL: [facility]     ENDOCRINE:  Follow up FS.  Insulin protocol if needed.    MUSCULOSKELETAL: bedbound    ICU

## 2023-09-06 NOTE — PROGRESS NOTE ADULT - SUBJECTIVE AND OBJECTIVE BOX
Patient is a 88y old  Female who presents with a chief complaint of hypercapnic respiratory failure (05 Sep 2023 14:52)      Over Night Events:  Patient seen and examined.   on vent   no pressors   s/p ct scan   ?? big goiter   b/l effusion with atelectasis opacity     ROS:  See HPI    PHYSICAL EXAM    ICU Vital Signs Last 24 Hrs  T(C): 36.6 (06 Sep 2023 07:05), Max: 37.2 (05 Sep 2023 11:00)  T(F): 97.9 (06 Sep 2023 07:05), Max: 99 (05 Sep 2023 11:00)  HR: 65 (06 Sep 2023 07:00) (55 - 133)  BP: 94/53 (06 Sep 2023 07:00) (92/51 - 174/117)  BP(mean): 66 (06 Sep 2023 07:00) (66 - 132)  ABP: --  ABP(mean): --  RR: 16 (06 Sep 2023 07:05) (16 - 33)  SpO2: 100% (06 Sep 2023 07:00) (95% - 100%)    O2 Parameters below as of 06 Sep 2023 07:00  Patient On (Oxygen Delivery Method): ventilator    O2 Concentration (%): 40        General: on sedation   HEENT:     et tube            Lymph Nodes: NO cervical LN   Lungs:decrease bibasilar   Cardiovascular: Regular   Abdomen: Soft, Positive BS  Extremities: No clubbing   Skin: warm   Neurological:   Musculoskeletal: move all ext     I&O's Detail    05 Sep 2023 07:01  -  06 Sep 2023 07:00  --------------------------------------------------------  IN:    Dexmedetomidine: 682.7 mL    Enteral Tube Flush: 140 mL    FentaNYL: 412.3 mL    IV PiggyBack: 50 mL    IV PiggyBack: 250 mL    Propofol (Status Epilepticus): 182 mL    Vital High Protein: 1150 mL  Total IN: 2867 mL    OUT:    Voided (mL): 2900 mL  Total OUT: 2900 mL    Total NET: -33 mL          LABS:                          9.4    16.81 )-----------( 222      ( 06 Sep 2023 05:50 )             32.5         06 Sep 2023 05:50    140    |  102    |  59     ----------------------------<  138    4.4     |  26     |  0.9      Ca    8.5        06 Sep 2023 05:50  Mg     2.3       06 Sep 2023 05:50    TPro  5.1    /  Alb  2.7    /  TBili  <0.2   /  DBili  x      /  AST  17     /  ALT  22     /  AlkPhos  131    06 Sep 2023 05:50  Amylase x     lipase x                                                                                        Urinalysis Basic - ( 06 Sep 2023 05:50 )    Color: x / Appearance: x / SG: x / pH: x  Gluc: 138 mg/dL / Ketone: x  / Bili: x / Urobili: x   Blood: x / Protein: x / Nitrite: x   Leuk Esterase: x / RBC: x / WBC x   Sq Epi: x / Non Sq Epi: x / Bacteria: x                                                                Culture - Sputum (collected 05 Sep 2023 19:15)  Source: Trach Asp Tracheal Aspirate  Gram Stain (06 Sep 2023 05:59):    Numerous polymorphonuclear leukocytes per low power field    No Squamous epithelial cells per low power field    Few Yeast like cells seen per oil power field    Rare Gram Negative Rods seen per oil power field                                                   Mode: AC/ CMV (Assist Control/ Continuous Mandatory Ventilation)  RR (machine): 16  TV (machine): 400  FiO2: 40  PEEP: 8  MAP: 12  PIP: 30                                      ABG - ( 06 Sep 2023 03:45 )  pH, Arterial: 7.40  pH, Blood: x     /  pCO2: 48    /  pO2: 102   / HCO3: 30    / Base Excess: 4.0   /  SaO2: 99.5                MEDICATIONS  (STANDING):  albuterol    90 MICROgram(s) HFA Inhaler 2 Puff(s) Inhalation every 4 hours  cefepime   IVPB 2000 milliGRAM(s) IV Intermittent every 12 hours  chlorhexidine 0.12% Liquid 15 milliLiter(s) Oral Mucosa every 12 hours  chlorhexidine 2% Cloths 1 Application(s) Topical <User Schedule>  dexMEDEtomidine Infusion 0.05 MICROgram(s)/kG/Hr (1.1 mL/Hr) IV Continuous <Continuous>  dextrose 5%. 1000 milliLiter(s) (50 mL/Hr) IV Continuous <Continuous>  dextrose 50% Injectable 25 Gram(s) IV Push once  enoxaparin Injectable 90 milliGRAM(s) SubCutaneous every 12 hours  fentaNYL   Infusion. 0.467 MICROgram(s)/kG/Hr (4.1 mL/Hr) IV Continuous <Continuous>  glucagon  Injectable 1 milliGRAM(s) IntraMuscular once  insulin lispro (ADMELOG) corrective regimen sliding scale   SubCutaneous three times a day before meals  methylPREDNISolone sodium succinate Injectable 40 milliGRAM(s) IV Push daily  metoprolol tartrate 25 milliGRAM(s) Oral two times a day  mupirocin 2% Ointment 1 Application(s) Both Nostrils two times a day  pantoprazole  Injectable 40 milliGRAM(s) IV Push daily  polyethylene glycol 3350 17 Gram(s) Oral two times a day  propofol Infusion. 29.992 MICROgram(s)/kG/Min (15.8 mL/Hr) IV Continuous <Continuous>  senna 2 Tablet(s) Oral at bedtime  vancomycin  IVPB      vancomycin  IVPB 1250 milliGRAM(s) IV Intermittent every 12 hours    MEDICATIONS  (PRN):  acetaminophen     Tablet .. 650 milliGRAM(s) Oral every 6 hours PRN Temp greater or equal to 38C (100.4F), Mild Pain (1 - 3)  dextrose Oral Gel 15 Gram(s) Oral once PRN Blood Glucose LESS THAN 70 milliGRAM(s)/deciliter  midazolam Injectable 2 milliGRAM(s) IV Push every 4 hours PRN agitation/vent synchrony          Xrays:  TLC:  OG:  ET tube:                                                                                    bibasilar effusion opacity    ECHO:  CAM ICU:         Patient is a 88y old  Female who presents with a chief complaint of hypercapnic respiratory failure (05 Sep 2023 14:52)      Over Night Events:  Patient seen and examined.   on vent   no pressors   s/p ct scan   ?? big goiter   b/l effusion with atelectasis opacity     ROS:  See HPI    PHYSICAL EXAM    ICU Vital Signs Last 24 Hrs  T(C): 36.6 (06 Sep 2023 07:05), Max: 37.2 (05 Sep 2023 11:00)  T(F): 97.9 (06 Sep 2023 07:05), Max: 99 (05 Sep 2023 11:00)  HR: 65 (06 Sep 2023 07:00) (55 - 133)  BP: 94/53 (06 Sep 2023 07:00) (92/51 - 174/117)  BP(mean): 66 (06 Sep 2023 07:00) (66 - 132)  ABP: --  ABP(mean): --  RR: 16 (06 Sep 2023 07:05) (16 - 33)  SpO2: 100% (06 Sep 2023 07:00) (95% - 100%)    O2 Parameters below as of 06 Sep 2023 07:00  Patient On (Oxygen Delivery Method): ventilator    O2 Concentration (%): 40        General: on sedation   HEENT:     et tube            Lymph Nodes: NO cervical LN   Lungs:decrease bibasilar   Cardiovascular: Regular   Abdomen: Soft, Positive BS  Extremities: No clubbing   Skin: warm   Neurological: no focal deficit   Musculoskeletal: move all ext     I&O's Detail    05 Sep 2023 07:01  -  06 Sep 2023 07:00  --------------------------------------------------------  IN:    Dexmedetomidine: 682.7 mL    Enteral Tube Flush: 140 mL    FentaNYL: 412.3 mL    IV PiggyBack: 50 mL    IV PiggyBack: 250 mL    Propofol (Status Epilepticus): 182 mL    Vital High Protein: 1150 mL  Total IN: 2867 mL    OUT:    Voided (mL): 2900 mL  Total OUT: 2900 mL    Total NET: -33 mL          LABS:                          9.4    16.81 )-----------( 222      ( 06 Sep 2023 05:50 )             32.5         06 Sep 2023 05:50    140    |  102    |  59     ----------------------------<  138    4.4     |  26     |  0.9      Ca    8.5        06 Sep 2023 05:50  Mg     2.3       06 Sep 2023 05:50    TPro  5.1    /  Alb  2.7    /  TBili  <0.2   /  DBili  x      /  AST  17     /  ALT  22     /  AlkPhos  131    06 Sep 2023 05:50  Amylase x     lipase x                                                                                        Urinalysis Basic - ( 06 Sep 2023 05:50 )    Color: x / Appearance: x / SG: x / pH: x  Gluc: 138 mg/dL / Ketone: x  / Bili: x / Urobili: x   Blood: x / Protein: x / Nitrite: x   Leuk Esterase: x / RBC: x / WBC x   Sq Epi: x / Non Sq Epi: x / Bacteria: x                                                                Culture - Sputum (collected 05 Sep 2023 19:15)  Source: Trach Asp Tracheal Aspirate  Gram Stain (06 Sep 2023 05:59):    Numerous polymorphonuclear leukocytes per low power field    No Squamous epithelial cells per low power field    Few Yeast like cells seen per oil power field    Rare Gram Negative Rods seen per oil power field                                                   Mode: AC/ CMV (Assist Control/ Continuous Mandatory Ventilation)  RR (machine): 16  TV (machine): 400  FiO2: 40  PEEP: 8  MAP: 12  PIP: 30                                      ABG - ( 06 Sep 2023 03:45 )  pH, Arterial: 7.40  pH, Blood: x     /  pCO2: 48    /  pO2: 102   / HCO3: 30    / Base Excess: 4.0   /  SaO2: 99.5                MEDICATIONS  (STANDING):  albuterol    90 MICROgram(s) HFA Inhaler 2 Puff(s) Inhalation every 4 hours  cefepime   IVPB 2000 milliGRAM(s) IV Intermittent every 12 hours  chlorhexidine 0.12% Liquid 15 milliLiter(s) Oral Mucosa every 12 hours  chlorhexidine 2% Cloths 1 Application(s) Topical <User Schedule>  dexMEDEtomidine Infusion 0.05 MICROgram(s)/kG/Hr (1.1 mL/Hr) IV Continuous <Continuous>  dextrose 5%. 1000 milliLiter(s) (50 mL/Hr) IV Continuous <Continuous>  dextrose 50% Injectable 25 Gram(s) IV Push once  enoxaparin Injectable 90 milliGRAM(s) SubCutaneous every 12 hours  fentaNYL   Infusion. 0.467 MICROgram(s)/kG/Hr (4.1 mL/Hr) IV Continuous <Continuous>  glucagon  Injectable 1 milliGRAM(s) IntraMuscular once  insulin lispro (ADMELOG) corrective regimen sliding scale   SubCutaneous three times a day before meals  methylPREDNISolone sodium succinate Injectable 40 milliGRAM(s) IV Push daily  metoprolol tartrate 25 milliGRAM(s) Oral two times a day  mupirocin 2% Ointment 1 Application(s) Both Nostrils two times a day  pantoprazole  Injectable 40 milliGRAM(s) IV Push daily  polyethylene glycol 3350 17 Gram(s) Oral two times a day  propofol Infusion. 29.992 MICROgram(s)/kG/Min (15.8 mL/Hr) IV Continuous <Continuous>  senna 2 Tablet(s) Oral at bedtime  vancomycin  IVPB      vancomycin  IVPB 1250 milliGRAM(s) IV Intermittent every 12 hours    MEDICATIONS  (PRN):  acetaminophen     Tablet .. 650 milliGRAM(s) Oral every 6 hours PRN Temp greater or equal to 38C (100.4F), Mild Pain (1 - 3)  dextrose Oral Gel 15 Gram(s) Oral once PRN Blood Glucose LESS THAN 70 milliGRAM(s)/deciliter  midazolam Injectable 2 milliGRAM(s) IV Push every 4 hours PRN agitation/vent synchrony          Xrays:  TLC:  OG:  ET tube:                                                                                    bibasilar effusion opacity    ECHO:  CAM ICU:

## 2023-09-07 NOTE — PROGRESS NOTE ADULT - SUBJECTIVE AND OBJECTIVE BOX
GIOVANNY BUCK  88y, Female    LOS  10d    INTERVAL EVENTS/HPI  - No acute events overnight  - T(F): , Max: 97.7 (09-07-23 @ 07:05)  - Tolerating medication  - WBC Count: 18.65 (09-07-23 @ 06:02)  WBC Count: 16.81 (09-06-23 @ 05:50)  - Creatinine: 0.8 (09-07-23 @ 06:02)  Creatinine: 0.9 (09-06-23 @ 05:50)     REVIEW OF SYSTEMS: cannot obtain further history from the patient secondary to altered mental status or sedation    Prior hospital charts reviewed [Yes]  Primary team notes reviewed [Yes]  Other consultant notes reviewed [Yes]    ANTIMICROBIALS:   cefepime   IVPB 2000 every 12 hours    MEDICATIONS  (STANDING):  azithromycin  IVPB   255 mL/Hr IV Intermittent (08-29-23 @ 14:27)    azithromycin  IVPB   255 mL/Hr IV Intermittent (09-01-23 @ 12:05)   255 mL/Hr IV Intermittent (08-31-23 @ 11:42)   255 mL/Hr IV Intermittent (08-30-23 @ 11:50)    aztreonam  IVPB   50 mL/Hr IV Intermittent (08-28-23 @ 13:13)    cefepime   IVPB   100 mL/Hr IV Intermittent (09-01-23 @ 21:20)    cefepime   IVPB   100 mL/Hr IV Intermittent (09-05-23 @ 05:00)   100 mL/Hr IV Intermittent (09-04-23 @ 21:39)   100 mL/Hr IV Intermittent (09-04-23 @ 14:28)   100 mL/Hr IV Intermittent (09-04-23 @ 05:14)   100 mL/Hr IV Intermittent (09-03-23 @ 21:58)   100 mL/Hr IV Intermittent (09-03-23 @ 14:04)   100 mL/Hr IV Intermittent (09-03-23 @ 05:05)   100 mL/Hr IV Intermittent (09-02-23 @ 22:16)   100 mL/Hr IV Intermittent (09-02-23 @ 13:33)   100 mL/Hr IV Intermittent (09-02-23 @ 05:12)    cefepime   IVPB   100 mL/Hr IV Intermittent (09-07-23 @ 05:09)   100 mL/Hr IV Intermittent (09-06-23 @ 17:02)   100 mL/Hr IV Intermittent (09-06-23 @ 06:25)   100 mL/Hr IV Intermittent (09-05-23 @ 18:07)    cefTRIAXone   IVPB   100 mL/Hr IV Intermittent (08-29-23 @ 14:26)    cefTRIAXone   IVPB   100 mL/Hr IV Intermittent (09-01-23 @ 12:04)   100 mL/Hr IV Intermittent (08-31-23 @ 11:43)   100 mL/Hr IV Intermittent (08-30-23 @ 16:41)    vancomycin  IVPB   166.67 mL/Hr IV Intermittent (09-05-23 @ 21:32)   166.67 mL/Hr IV Intermittent (09-05-23 @ 10:21)   166.67 mL/Hr IV Intermittent (09-04-23 @ 21:39)   166.67 mL/Hr IV Intermittent (09-04-23 @ 09:47)   166.67 mL/Hr IV Intermittent (09-03-23 @ 21:58)   166.67 mL/Hr IV Intermittent (09-03-23 @ 12:15)   166.67 mL/Hr IV Intermittent (09-02-23 @ 23:04)   166.67 mL/Hr IV Intermittent (09-02-23 @ 10:59)    vancomycin  IVPB   250 mL/Hr IV Intermittent (08-28-23 @ 15:19)    vancomycin  IVPB   166.67 mL/Hr IV Intermittent (09-01-23 @ 21:41)      OTHER MEDS: MEDICATIONS  (STANDING):  acetaminophen     Tablet .. 650 every 6 hours PRN  albuterol    90 MICROgram(s) HFA Inhaler 2 every 4 hours  dexMEDEtomidine Infusion 0.05 <Continuous>  dextrose 50% Injectable 25 once  dextrose Oral Gel 15 once PRN  enoxaparin Injectable 90 every 12 hours  fentaNYL   Infusion. 0.467 <Continuous>  furosemide   Injectable 40 once  glucagon  Injectable 1 once  insulin lispro (ADMELOG) corrective regimen sliding scale  three times a day before meals  methylPREDNISolone sodium succinate Injectable 40 daily  metoprolol tartrate 25 two times a day  midazolam Injectable 2 every 4 hours PRN  pantoprazole  Injectable 40 daily  polyethylene glycol 3350 17 two times a day  propofol Infusion 10 <Continuous>  senna 2 at bedtime      Vital Signs Last 24 Hrs  T(F): 97.7 (09-07-23 @ 07:05), Max: 100.6 (09-01-23 @ 11:00)    Vital Signs Last 24 Hrs  HR: 90 (09-07-23 @ 07:43) (50 - 114)  BP: 115/66 (09-07-23 @ 05:00) (74/40 - 136/60)  RR: 26 (09-07-23 @ 07:05)  SpO2: 99% (09-07-23 @ 07:43) (97% - 100%)  Wt(kg): --  Mode: AC/ CMV (Assist Control/ Continuous Mandatory Ventilation)  RR (machine): 16  TV (machine): 400  FiO2: 35  PEEP: 5  MAP: 12  PIP: 23    EXAM:  GENERAL: NAD on MV.  HEAD: No head lesions  EYES: Conjunctiva pink and cornea white  NECK: Supple, nontender to palpation  CHEST/LUNG: Mild bilateral crackles.   HEART: S1 S2  ABDOMEN: Soft, nontender, nondistended  EXTREMITIES: No clubbing, cyanosis, or petal edema  NERVOUS SYSTEM: On Sedation.  MSK: No joint erythema, swelling or pain  SKIN: No rashes or lesions, no superficial thrombophlebitis  Labs:                        9.0    18.65 )-----------( 299      ( 07 Sep 2023 06:02 )             30.0     09-07    139  |  100  |  65<HH>  ----------------------------<  131<H>  3.7   |  29  |  0.8    Ca    8.7      07 Sep 2023 06:02  Mg     2.3     09-07    TPro  5.3<L>  /  Alb  3.0<L>  /  TBili  0.2  /  DBili  x   /  AST  13  /  ALT  20  /  AlkPhos  120<H>  09-07      WBC Trend:  WBC Count: 18.65 (09-07-23 @ 06:02)  WBC Count: 16.81 (09-06-23 @ 05:50)  WBC Count: 17.69 (09-05-23 @ 20:09)  WBC Count: 14.76 (09-04-23 @ 05:48)      Creatine Trend:  Creatinine: 0.8 (09-07)  Creatinine: 0.9 (09-06)  Creatinine: 0.8 (09-05)  Creatinine: 0.7 (09-04)      Liver Biochemical Testing Trend:  Alanine Aminotransferase (ALT/SGPT): 20 (09-07)  Alanine Aminotransferase (ALT/SGPT): 22 (09-06)  Alanine Aminotransferase (ALT/SGPT): 25 (09-05)  Alanine Aminotransferase (ALT/SGPT): 24 (09-04)  Alanine Aminotransferase (ALT/SGPT): 27 (09-03)  Aspartate Aminotransferase (AST/SGOT): 13 (09-07-23 @ 06:02)  Aspartate Aminotransferase (AST/SGOT): 17 (09-06-23 @ 05:50)  Aspartate Aminotransferase (AST/SGOT): 18 (09-05-23 @ 20:09)  Aspartate Aminotransferase (AST/SGOT): 18 (09-04-23 @ 05:48)  Aspartate Aminotransferase (AST/SGOT): 21 (09-03-23 @ 06:01)  Bilirubin Total: 0.2 (09-07)  Bilirubin Total: <0.2 (09-06)  Bilirubin Total: 0.2 (09-05)  Bilirubin Total: <0.2 (09-04)  Bilirubin Total: 0.2 (09-03)      Trend LDH  03-07-23 @ 21:06  211      Urinalysis Basic - ( 07 Sep 2023 06:02 )    Color: x / Appearance: x / SG: x / pH: x  Gluc: 131 mg/dL / Ketone: x  / Bili: x / Urobili: x   Blood: x / Protein: x / Nitrite: x   Leuk Esterase: x / RBC: x / WBC x   Sq Epi: x / Non Sq Epi: x / Bacteria: x        MICROBIOLOGY:  Vancomycin Level, Trough: 34.5 (09-06 @ 06:44)  Vancomycin Level, Trough: 35.1 (09-05 @ 20:09)  Vancomycin Level, Trough: 14.0 (09-02 @ 19:02)    Female    Culture - Sputum (collected 05 Sep 2023 19:15)  Source: Trach Asp Tracheal Aspirate  Preliminary Report:    Normal Respiratory Joaquina present    Culture - Blood (collected 02 Sep 2023 06:01)  Source: .Blood None  Preliminary Report:    No growth at 4 days    Culture - Sputum (collected 02 Sep 2023 03:00)  Source: Trach Asp Tracheal Aspirate  Final Report:    Normal Respiratory Joaquina present    Culture - Urine (collected 01 Sep 2023 19:50)  Source: Catheterized Catheterized  Final Report:    <10,000 CFU/mL Normal Urogenital Joaquina    Culture - Blood (collected 01 Sep 2023 19:32)  Source: .Blood None  Final Report:    No growth at 5 days    Culture - Blood (collected 28 Aug 2023 11:00)  Source: .Blood Blood-Peripheral  Final Report:    No growth at 5 days    Culture - Blood (collected 28 Aug 2023 11:00)  Source: .Blood Blood-Peripheral  Final Report:    No growth at 5 days    Culture - Abscess with Gram Stain (collected 16 Jul 2023 07:00)  Source: .Abscess Right groin abscess  Final Report:    Numerous Proteus mirabilis    Numerous Enterococcus faecalis    Few Coag Negative Staphylococcus "Susceptibilities not performed"    Few Enterococcus faecium (vancomycin resistant)    Moderate Prevotella timonensis "Susceptibilities not performed"  Organism: Proteus mirabilis  Enterococcus faecalis  Enterococcus faecium (vancomycin resistant)  Organism: Enterococcus faecium (vancomycin resistant)    Sensitivities:      Method Type: DARIO      -  Ampicillin: R >8 Predicts results to ampicillin/sulbactam, amoxacillin-clavulanate and  piperacillin-tazobactam.      -  Daptomycin: SDD 4 The breakpoint for SDD (susceptible dose dependent)is based on a dosage regimen of 8-12 mg/kg administered every 24 h in adults and is intended for serious infections due to E. faecium. Consultation with an infectious diseases specialist is recommended.      -  Levofloxacin: R >4      -  Linezolid: S 2      -  Tetracycline: R >8      -  Vancomycin: R >16  Organism: Enterococcus faecalis    Sensitivities:      Method Type: DARIO      -  Ampicillin: S <=2 Predicts results to ampicillin/sulbactam, amoxacillin-clavulanate and  piperacillin-tazobactam.      -  Tetracycline: R >8      -  Vancomycin: S 2  Organism: Proteus mirabilis    Sensitivities:      Method Type: DARIO      -  Amikacin: S <=16      -  Amoxicillin/Clavulanic Acid: S <=8/4      -  Ampicillin: S <=8 These ampicillin results predict results for amoxicillin      -  Ampicillin/Sulbactam: S <=4/2 Enterobacter, Klebsiella aerogenes, Citrobacter, and Serratia may develop resistance during prolonged therapy (3-4 days)      -  Aztreonam: S <=4      -  Cefazolin: S <=2 Enterobacter, Klebsiella aerogenes, Citrobacter, and Serratia may develop resistance during prolonged therapy (3-4 days)      -  Cefepime: S <=2      -  Cefoxitin: S <=8      -  Ceftriaxone: S <=1 Enterobacter, Klebsiella aerogenes, Citrobacter, and Serratia may develop resistance during prolonged therapy      -  Ciprofloxacin: S <=0.25      -  Ertapenem: S <=0.5      -  Gentamicin: S <=2      -  Levofloxacin: S <=0.5      -  Meropenem: S <=1      -  Piperacillin/Tazobactam: S <=8      -  Tobramycin: S <=2      -  Trimethoprim/Sulfamethoxazole: S 1/19    Culture - Blood (collected 08 Jul 2023 21:23)  Source: .Blood Blood-Peripheral  Final Report:    No growth at 5 days    Culture - Fungal, Body Fluid (collected 05 Jul 2023 15:45)  Source: Pleural Fl Pleural Fluid  Final Report:    No fungus isolated at 4 weeks.    Procalcitonin, Serum: 0.17 (09-02)    Blood Gas Arterial, Lactate: 1.00 (09-07 @ 03:52)  Blood Gas Arterial, Lactate: 0.60 (09-06 @ 17:19)  Blood Gas Arterial, Lactate: 0.70 (09-06 @ 09:10)  Blood Gas Arterial, Lactate: 0.60 (09-06 @ 03:45)        RADIOLOGY & ADDITIONAL TESTS:  I have personally reviewed the relevant images.   CXR      CT  CT Chest No Cont:   ACC: [id number] EXAM:  CT CHEST   ORDERED BY: MERNA GABRIEL     PROCEDURE DATE:  09/05/2023          INTERPRETATION:  Reason for Exam:  Shortness of breath.  CT of the chest was performed from the thoracic inlet to the level of the   adrenal glands without contrast injection. Coronal and sagittal images   have been submitted.    Comparison: August 28, 2023.    Findings:    Tubes/Lines: The patient is intubated. Enteric catheter extends below the   hemidiaphragm. Left pleural catheter. Lungs, Pleura, and Airways:Large   right neck mass measuring 6.6 cm.    Bilateral pleural effusions, right greater than left. Underlying   opacifications with atelectasis, right greater than left.    6 mm nodule within the right upper lung field, image #[id number], likely related   to improving opacities.    Mediastinum/Lymph Nodes:Reactive axillary lymph nodes. No mediastinal   lymph nodes.    Heart/Great Vessels: Cardiomegaly. Atherosclerosis.    Abdomen: Within normal limits.    Bones and soft tissues: Osteopenia. Kyphosis.    IMPRESSION:    Bilateral pleural effusions with underlying atelectasis and opacities,   slight improvement from last week.    Support devices as described.    Right neck mass.    --- End of Report ---    ANURAG YA MD;Attending Interventional Radiologist  This document has been electronically signed. Sep  6 2023  9:24AM (09-05-23 @ 15:17)        WEIGHT  Weight (kg): 87.8 (08-28-23 @ 21:03)  Creatinine: 0.8 mg/dL (09-07-23 @ 06:02)      All available historical records have been reviewed

## 2023-09-07 NOTE — PROGRESS NOTE ADULT - SUBJECTIVE AND OBJECTIVE BOX
Patient seen and evaluated this am, awake on ventilator, off sedation       T(F): 98.6 (09-07-23 @ 11:04), Max: 98.6 (09-07-23 @ 11:04)  HR: 90 (09-07-23 @ 07:43)  BP: 115/66 (09-07-23 @ 05:00)  RR: 22 (09-07-23 @ 11:04)  SpO2: 99% (09-07-23 @ 07:43) (97% - 100%)    PHYSICAL EXAM:  GENERAL: NAD  HEAD:  Atraumatic, Normocephalic  EYES: EOMI, PERRLA, conjunctiva and sclera clear  NERVOUS SYSTEM:  no focal deficits   CHEST/LUNG:  bilateral rhonchi  HEART: Regular rate and rhythm; No murmurs, rubs, or gallops  ABDOMEN: Soft, Nontender, Nondistended; Bowel sounds present  EXTREMITIES:  2+ Peripheral Pulses, No clubbing, cyanosis, or edema    LABS  09-07    139  |  100  |  65<HH>  ----------------------------<  131<H>  3.7   |  29  |  0.8    Ca    8.7      07 Sep 2023 06:02  Mg     2.3     09-07    TPro  5.3<L>  /  Alb  3.0<L>  /  TBili  0.2  /  DBili  x   /  AST  13  /  ALT  20  /  AlkPhos  120<H>  09-07                          9.0    18.65 )-----------( 299      ( 07 Sep 2023 06:02 )             30.0       Mode: AC/ CMV (Assist Control/ Continuous Mandatory Ventilation)  RR (machine): 16  TV (machine): 400  FiO2: 35  PEEP: 5        Culture Results:   Normal Respiratory Joaquina present (09-05-23)  Culture Results:   No growth at 4 days (09-02-23)  Culture Results:   Normal Respiratory Joaquina present (09-02-23)  Culture Results:   <10,000 CFU/mL Normal Urogenital Joaquina (09-01-23)  Culture Results:   No growth at 5 days (09-01-23)  Culture Results:   No growth at 5 days (08-28-23)  Culture Results:   No growth at 5 days (08-28-23)    RADIOLOGY  < from: Xray Chest 1 View- PORTABLE-Routine (Xray Chest 1 View- PORTABLE-Routine in AM.) (09.07.23 @ 06:03) >  Impression:    Stable left greater than right basilar opacity/effusion. No pneumothorax      < end of copied text >    MEDICATIONS  (STANDING):  albuterol    90 MICROgram(s) HFA Inhaler 2 Puff(s) Inhalation every 4 hours  cefepime   IVPB 2000 milliGRAM(s) IV Intermittent every 12 hours  chlorhexidine 0.12% Liquid 15 milliLiter(s) Oral Mucosa every 12 hours  chlorhexidine 2% Cloths 1 Application(s) Topical <User Schedule>  dexMEDEtomidine Infusion 0.05 MICROgram(s)/kG/Hr (1.1 mL/Hr) IV Continuous <Continuous>  enoxaparin Injectable 90 milliGRAM(s) SubCutaneous every 12 hours  fentaNYL   Infusion. 0.467 MICROgram(s)/kG/Hr (4.1 mL/Hr) IV Continuous <Continuous>  insulin lispro (ADMELOG) corrective regimen sliding scale   SubCutaneous three times a day before meals  methylPREDNISolone sodium succinate Injectable 40 milliGRAM(s) IV Push daily  metoprolol tartrate 25 milliGRAM(s) Oral two times a day  mupirocin 2% Ointment 1 Application(s) Both Nostrils two times a day  pantoprazole  Injectable 40 milliGRAM(s) IV Push daily  polyethylene glycol 3350 17 Gram(s) Oral two times a day  propofol Infusion 10 MICROgram(s)/kG/Min (5.27 mL/Hr) IV Continuous <Continuous>  senna 2 Tablet(s) Oral at bedtime    MEDICATIONS  (PRN):  acetaminophen     Tablet .. 650 milliGRAM(s) Oral every 6 hours PRN Temp greater or equal to 38C (100.4F), Mild Pain (1 - 3)  dextrose Oral Gel 15 Gram(s) Oral once PRN Blood Glucose LESS THAN 70 milliGRAM(s)/deciliter  midazolam Injectable 2 milliGRAM(s) IV Push every 4 hours PRN agitation/vent synchrony

## 2023-09-07 NOTE — PROGRESS NOTE ADULT - ASSESSMENT
IMPRESSION:  acute hypercapnic/ hypoxic res failure s/p intubation   COPD exacerbation?  Sepsis present on admission   CAP  afib on eliquis  recurrent pleural effusions s/p pleurex   malignant pleural effusion   H/O HFpEF    PLAN:    CNS: precedx + fentanyl for sedation, SAT daily  taper propofol off   HEENT: oral care, ETT care    PULMONARY: aspiration precaution, HOB@ 45 degrees, daily abg/xray, keep pco2 around 50,  lower solumedrol 20 qd, albuterl PRN, vent changes: Wean FiO2 as tolerated to maintain spO2 > 92%  SBT iff passed SAT extubate to bipap     CARDIOVASCULAR: echo, trops noted. BNP elevated, lasix 40mg x1 today also    keep I<os, baseline ekg    GI: GI prophylaxis. hold feed for SBT   stool softener   RENAL: f/u lytes correct as needed,     INFECTIOUS DISEASE hold vanco  level was elevated   cefepime, ua, ucx, bcx, RVP, procal, urine legion/step, DTA   check vancolevel today      follow Id recommendation     HEMATOLOGICAL: [facility]     ENDOCRINE:  Follow up FS.  Insulin protocol if needed.    MUSCULOSKELETAL: bedbound    ICU

## 2023-09-07 NOTE — PROGRESS NOTE ADULT - SUBJECTIVE AND OBJECTIVE BOX
Patient is a 88y old  Female who presents with a chief complaint of hypercapnic respiratory failure (06 Sep 2023 17:22)      Over Night Events:  Patient seen and examined.   she passed SBT yesterday but patient vomited during SBT   so remained intubated     ROS:  See HPI    PHYSICAL EXAM    ICU Vital Signs Last 24 Hrs  T(C): 36.5 (07 Sep 2023 07:05), Max: 36.5 (07 Sep 2023 07:05)  T(F): 97.7 (07 Sep 2023 07:05), Max: 97.7 (07 Sep 2023 07:05)  HR: 90 (07 Sep 2023 07:43) (50 - 114)  BP: 115/66 (07 Sep 2023 05:00) (74/40 - 136/60)  BP(mean): 86 (07 Sep 2023 05:00) (52 - 97)  ABP: --  ABP(mean): --  RR: 26 (07 Sep 2023 07:05) (16 - 31)  SpO2: 99% (07 Sep 2023 07:43) (97% - 100%)    O2 Parameters below as of 07 Sep 2023 05:00  Patient On (Oxygen Delivery Method): ventilator    O2 Concentration (%): 35        General: awake   HEENT:      et tube           Lymph Nodes: NO cervical LN   Lungs: Bilateral BS  Cardiovascular: Regular   Abdomen: Soft, Positive BS  Extremities: No clubbing   Skin: warm   Neurological: no focal   Musculoskeletal: move all ext     I&O's Detail    06 Sep 2023 07:01  -  07 Sep 2023 07:00  --------------------------------------------------------  IN:    Dexmedetomidine: 303 mL    Enteral Tube Flush: 150 mL    IV PiggyBack: 100 mL    Propofol: 90 mL    Propofol (Status Epilepticus): 50 mL    Vital High Protein: 900 mL  Total IN: 1593 mL    OUT:    Voided (mL): 2000 mL  Total OUT: 2000 mL    Total NET: -407 mL          LABS:                          9.0    18.65 )-----------( 299      ( 07 Sep 2023 06:02 )             30.0         07 Sep 2023 06:02    139    |  100    |  65     ----------------------------<  131    3.7     |  29     |  0.8      Ca    8.7        07 Sep 2023 06:02  Mg     2.3       07 Sep 2023 06:02    TPro  5.3    /  Alb  3.0    /  TBili  0.2    /  DBili  x      /  AST  13     /  ALT  20     /  AlkPhos  120    07 Sep 2023 06:02  Amylase x     lipase x                                                                                        Urinalysis Basic - ( 07 Sep 2023 06:02 )    Color: x / Appearance: x / SG: x / pH: x  Gluc: 131 mg/dL / Ketone: x  / Bili: x / Urobili: x   Blood: x / Protein: x / Nitrite: x   Leuk Esterase: x / RBC: x / WBC x   Sq Epi: x / Non Sq Epi: x / Bacteria: x                                                                Culture - Sputum (collected 05 Sep 2023 19:15)  Source: Trach Asp Tracheal Aspirate  Gram Stain (06 Sep 2023 05:59):    Numerous polymorphonuclear leukocytes per low power field    No Squamous epithelial cells per low power field    Few Yeast like cells seen per oil power field    Rare Gram Negative Rods seen per oil power field  Preliminary Report (06 Sep 2023 17:56):    Normal Respiratory Joaquina present                                                   Mode: AC/ CMV (Assist Control/ Continuous Mandatory Ventilation)  RR (machine): 16  TV (machine): 400  FiO2: 35  PEEP: 5  MAP: 12  PIP: 23                                      ABG - ( 07 Sep 2023 03:52 )  pH, Arterial: 7.42  pH, Blood: x     /  pCO2: 47    /  pO2: 99    / HCO3: 30    / Base Excess: 5.3   /  SaO2: 99.0                MEDICATIONS  (STANDING):  albuterol    90 MICROgram(s) HFA Inhaler 2 Puff(s) Inhalation every 4 hours  cefepime   IVPB 2000 milliGRAM(s) IV Intermittent every 12 hours  chlorhexidine 0.12% Liquid 15 milliLiter(s) Oral Mucosa every 12 hours  chlorhexidine 2% Cloths 1 Application(s) Topical <User Schedule>  dexMEDEtomidine Infusion 0.05 MICROgram(s)/kG/Hr (1.1 mL/Hr) IV Continuous <Continuous>  dextrose 5%. 1000 milliLiter(s) (50 mL/Hr) IV Continuous <Continuous>  dextrose 50% Injectable 25 Gram(s) IV Push once  enoxaparin Injectable 90 milliGRAM(s) SubCutaneous every 12 hours  fentaNYL   Infusion. 0.467 MICROgram(s)/kG/Hr (4.1 mL/Hr) IV Continuous <Continuous>  glucagon  Injectable 1 milliGRAM(s) IntraMuscular once  insulin lispro (ADMELOG) corrective regimen sliding scale   SubCutaneous three times a day before meals  methylPREDNISolone sodium succinate Injectable 40 milliGRAM(s) IV Push daily  metoprolol tartrate 25 milliGRAM(s) Oral two times a day  mupirocin 2% Ointment 1 Application(s) Both Nostrils two times a day  pantoprazole  Injectable 40 milliGRAM(s) IV Push daily  polyethylene glycol 3350 17 Gram(s) Oral two times a day  propofol Infusion 10 MICROgram(s)/kG/Min (5.27 mL/Hr) IV Continuous <Continuous>  senna 2 Tablet(s) Oral at bedtime    MEDICATIONS  (PRN):  acetaminophen     Tablet .. 650 milliGRAM(s) Oral every 6 hours PRN Temp greater or equal to 38C (100.4F), Mild Pain (1 - 3)  dextrose Oral Gel 15 Gram(s) Oral once PRN Blood Glucose LESS THAN 70 milliGRAM(s)/deciliter  midazolam Injectable 2 milliGRAM(s) IV Push every 4 hours PRN agitation/vent synchrony          Xrays:  TLC:  OG:  ET tube:                                                                                    b/l effusion and lower opacity    ECHO:  [facility] ICU:

## 2023-09-07 NOTE — PHARMACOTHERAPY INTERVENTION NOTE - COMMENTS
Patient on vancomycin dosed by level in the setting of elevated levels for the treatment of suspected superimposed VAP per Dr. Cordero. Vancomycin level on 9/6 at 0644, about 9 hours after last dose of vancomycin 1250mg IV, was 34.5 mg/L. Recommended continuing to hold vancomycin, and obtaining a repeat vancomycin level on 9/7 with AM labs to reassess. 
Patient on vancomycin dosed by level in the setting of elevated levels for the treatment of suspected superimposed VAP per Dr. Cordero. Vancomycin level on 9/7 at 0602, about 23 hours after level of 34.5 mg/L with no vancomycin doses in between, was 36.7 mg/L. Given that patient is not clearing vancomycin, and last day of therapy is today per Dr. Cordero, recommended to not give any additional vancomycin doses or to obtain any additional vancomycin levels. 
Recommended decreasing cefepime dose to 2000mg IV q12h since CrCl is ~56mL/min when rounding SCr to 0.8 mg/dL given age.

## 2023-09-07 NOTE — PROGRESS NOTE ADULT - ASSESSMENT
89 YO F with a medical history  of HTN, malignant pleural effusion of breast origin, chronic A.fib, PAD, Obesity, Chronic lymphedema of  BLE and COPD on home oxygen 2 - 1/2 L nc, ESBL UTI presented to ED with severe SOB, DNR / DNI rescinded and pt was intubated and admitted to ICU    acute respiratory failure / B/L effusions / possible pneumonia / COPD / stage 2 sacral decubiti was POA     - SAT -> SBT   -  DNR    - Lasix   - complete  antibiotic course   - DC steroids   - start methadone and taper IV fentanyl   - Lovenox -> Eliquis   - pulmonary following

## 2023-09-07 NOTE — PROGRESS NOTE ADULT - ASSESSMENT
88 year old female with PMH of HTN, Afib, OA, PVD, COPD, Anxiety, Obesity, Acute on chronic systolic congestive heart failure, H/O abdominal hysterectomy, H/O discectomy, H/O total knee replacement, bilaterally and bilateral pleural effusions (s/p left thoracentesis 3/2023 -- Cytology -PLEURAL FLUID, THORACENTESIS: - SUSPICIOUS FOR MALIGNANCY) with a left sided pleurex catheter is currently admitted due to worsening respiratory distress and hypercapnic respiratory failure and COPD exacerbation, currently on mechanical ventilation.ID is being consulted for antimicrobial recommendations given she is spiking intermittent fevers.     IMPRESSION  #Acute hypercapnic respiratory failure- Volume overload vs COPD exacerbation   #Sepsis present on admission  #Fevers- Concerns of superimposed VAP?   #Bilateral pleural effusions   - s/p left thoracentesis 3/2023 -- Cytology -PLEURAL FLUID, THORACENTESIS: - SUSPICIOUS FOR MALIGNANCY. - Atypical cells present in background of histiocytes and mesothelial  cells, suspicious for metastatic carcinoma.   - Pleurx Cx 7/4 - Staph epidermidis, No PMNs  - s/p thoracentesis right thoracentesis 7/5 Exudative in nature. Cultures negative  #History of ESBL E.coli bacteremia (5/2023)   #Hx 2/2023 foot wound MRSA  #Right inguinal Abscess history (7/2023)- Cx (Polymicrobial- Proteus, E.feacalis and VRE)   #HF with preserved EF  #COPD   #Chronic Lymphedema  #Obesity BMI (kg/m2): 30.3, 34.7  #RVP and legionella urine antigen negative  #MRSA nares positive.     RECOMMENDATIONS  -So far cultures remain negative.   -on IV vancomycin. Dosing as per pharmacy protocol.   -on IV cefepime 2 gram q 8 hours.   -Will plan for 7 days of antibiotics and then stop. ED 9/7  -On steroids as per the ICU team. Leukocytosis may be related to steroids. Procal trending down.    -Already on full anticoagulation (possibility of acute thrombosis is less likely)  -Monitor for diarrhea, if noted, send for C.diff.   -No reported skin break down or failure. Continue with precautions for off loading.   -Recommend aspiration precautions.   -Trend fever curve. Trend WBC   -Trend LFTs. If rising, obtain RUQ US or CT abdomen to check for acalculous cholecystitis.   -Patient is at high risk of ICU related deconditioning.    -Guarded prognosis.     If any questions, please send a message or call on [employer] Teams  Please continue to update ID with any pertinent new laboratory or radiographic findings.    Clifford Cordero M.D  Infectious Diseases Attending  [facility]    88 year old female with PMH of HTN, Afib, OA, PVD, COPD, Anxiety, Obesity, Acute on chronic systolic congestive heart failure, H/O abdominal hysterectomy, H/O discectomy, H/O total knee replacement, bilaterally and bilateral pleural effusions (s/p left thoracentesis 3/2023 -- Cytology -PLEURAL FLUID, THORACENTESIS: - SUSPICIOUS FOR MALIGNANCY) with a left sided pleurex catheter is currently admitted due to worsening respiratory distress and hypercapnic respiratory failure and COPD exacerbation, currently on mechanical ventilation.ID is being consulted for antimicrobial recommendations given she is spiking intermittent fevers.     IMPRESSION  #Acute hypercapnic respiratory failure- Volume overload vs COPD exacerbation   #Sepsis present on admission  #Fevers- Concerns of superimposed VAP?   #Bilateral pleural effusions   - s/p left thoracentesis 3/2023 -- Cytology -PLEURAL FLUID, THORACENTESIS: - SUSPICIOUS FOR MALIGNANCY. - Atypical cells present in background of histiocytes and mesothelial  cells, suspicious for metastatic carcinoma.   - Pleurx Cx 7/4 - Staph epidermidis, No PMNs  - s/p thoracentesis right thoracentesis 7/5 Exudative in nature. Cultures negative  #History of ESBL E.coli bacteremia (5/2023)   #Hx 2/2023 foot wound MRSA  #Right inguinal Abscess history (7/2023)- Cx (Polymicrobial- Proteus, E.feacalis and VRE)   #HF with preserved EF  #COPD   #Chronic Lymphedema  #Obesity BMI (kg/m2): 30.3, 34.7  #RVP and legionella urine antigen negative  #MRSA nares positive.     RECOMMENDATIONS  -So far cultures remain negative.   -Vancomycin elevated levels noted. No further dosing of vancomycin needed.   -on IV cefepime 2 gram q 8 hours.   -Will plan for 7 days of antibiotics and then stop. ED 9/7  -On steroids as per the ICU team. Leukocytosis may be related to steroids. Procal trending down.    -Already on full anticoagulation (possibility of acute thrombosis is less likely)  -Monitor for diarrhea, if noted, send for C.diff.   -No reported skin break down or failure. Continue with precautions for off loading.   -Recommend aspiration precautions.   -Trend fever curve. Trend WBC   -Trend LFTs. If rising, obtain RUQ US or CT abdomen to check for acalculous cholecystitis.   -Patient is at high risk of ICU related deconditioning.    -Guarded prognosis.     If any questions, please send a message or call on [employer] Teams  Please continue to update ID with any pertinent new laboratory or radiographic findings.    Clifford Cordero M.D  Infectious Diseases Attending  [facility]

## 2023-09-08 NOTE — PROGRESS NOTE ADULT - SUBJECTIVE AND OBJECTIVE BOX
Patient seen and evaluated this am, lightly sedated on ventilator with Precedex      T(F): 96.4 (09-08-23 @ 07:05), Max: 98.6 (09-07-23 @ 11:04)  HR: 68 (09-08-23 @ 06:13)  BP: 91/45 (09-08-23 @ 06:13)  RR: 20  SpO2: 100% (09-08-23 @ 06:13) (96% - 100%)    PHYSICAL EXAM:  GENERAL: NAD  HEAD:  Atraumatic, Normocephalic  EYES: EOMI, PERRLA, conjunctiva and sclera clear  NERVOUS SYSTEM:  no focal deficits   CHEST/LUNG:  bilateral rales  HEART: Regular rate and rhythm; No murmurs, rubs, or gallops  ABDOMEN: Soft, Nontender, Nondistended; Bowel sounds present  EXTREMITIES:  2+ Peripheral Pulses, No clubbing, cyanosis, or edema    LABS  09-08    143  |  104  |  60<H>  ----------------------------<  114<H>  3.5   |  28  |  0.8    Ca    8.5      08 Sep 2023 05:44  Mg     2.3     09-08    TPro  4.9<L>  /  Alb  2.7<L>  /  TBili  0.4  /  DBili  x   /  AST  11  /  ALT  15  /  AlkPhos  94  09-08                          7.8    13.28 )-----------( 249      ( 08 Sep 2023 05:44 )             26.4       Mode: AC/ CMV (Assist Control/ Continuous Mandatory Ventilation)  RR (machine): 16  TV (machine): 400  FiO2: 35  PEEP: 5      Culture Results:   Normal Respiratory Joaquina present (09-05-23)  Culture Results:   No growth at 5 days (09-02-23)  Culture Results:   Normal Respiratory Joaquina present (09-02-23)  Culture Results:   <10,000 CFU/mL Normal Urogenital Joaquina (09-01-23)  Culture Results:   No growth at 5 days (09-01-23)  Culture Results:   No growth at 5 days (08-28-23)  Culture Results:   No growth at 5 days (08-28-23)    RADIOLOGY  < from: Xray Chest 1 View- PORTABLE-Routine (Xray Chest 1 View- PORTABLE-Routine in AM.) (09.08.23 @ 07:02) >  IMPRESSION:    ET tube is 2 to 3 cm above the shelly. NG tube coursing below left   hemidiaphragm out of the field-of-view. Unchanged left chest tube.    Decreased left basal and slightly increased right basal   pleural-parenchymalopacities. No pneumothorax.    < end of copied text >    MEDICATIONS  (STANDING):  albuterol    90 MICROgram(s) HFA Inhaler 2 Puff(s) Inhalation every 4 hours  cefepime   IVPB 2000 milliGRAM(s) IV Intermittent every 12 hours  chlorhexidine 0.12% Liquid 15 milliLiter(s) Oral Mucosa every 12 hours  chlorhexidine 2% Cloths 1 Application(s) Topical <User Schedule>  dexMEDEtomidine Infusion 0.05 MICROgram(s)/kG/Hr (1.1 mL/Hr) IV Continuous <Continuous>  enoxaparin Injectable 90 milliGRAM(s) SubCutaneous every 12 hours  fentaNYL   Infusion. 0.467 MICROgram(s)/kG/Hr (4.1 mL/Hr) IV Continuous <Continuous>  gabapentin 100 milliGRAM(s) Oral three times a day  insulin lispro (ADMELOG) corrective regimen sliding scale   SubCutaneous three times a day before meals  methylPREDNISolone sodium succinate Injectable 40 milliGRAM(s) IV Push daily  metoprolol tartrate 25 milliGRAM(s) Oral two times a day  mupirocin 2% Ointment 1 Application(s) Both Nostrils two times a day  pantoprazole  Injectable 40 milliGRAM(s) IV Push daily  polyethylene glycol 3350 17 Gram(s) Oral two times a day  senna 2 Tablet(s) Oral at bedtime    MEDICATIONS  (PRN):  acetaminophen     Tablet .. 650 milliGRAM(s) Oral every 6 hours PRN Temp greater or equal to 38C (100.4F), Mild Pain (1 - 3)  atropine Injectable 0.5 milliGRAM(s) IV Push once PRN bradycardia  dextrose Oral Gel 15 Gram(s) Oral once PRN Blood Glucose LESS THAN 70 milliGRAM(s)/deciliter  midazolam Injectable 2 milliGRAM(s) IV Push every 4 hours PRN agitation/vent synchrony

## 2023-09-08 NOTE — CHART NOTE - NSCHARTNOTEFT_GEN_A_CORE
Registered Dietitian Follow-Up     Patient Profile Reviewed                           Yes [X]   No []     Nutrition History Previously Obtained        Yes []  No []       Pertinent  Information:     pt is 88 year old female with hx of CHF, afib s/p ablation, anxiety, cellulitis, COPD, B/L LE edema, HTN, OA, obesity, PVD, FLAVIO, B/L TKR recently admitted 7/23/23 with respiratory failure 2/2 COPD exacerbation with intubation and L chest tube warranted, pt was discharged to SNF. pt now p/w respiratory distress s/p intubation. admitted with hypercapnic respiratory failure, B/L pleural effusions. possible PNA.  EN presently held for SBT trial, unsuccessful pt to remain intubated, EN to be resumed. pt remains off propofol to be given precedex.       diet order discontuned at this time due to SBT trial  previous order was for Vital HP at 50 ml/hr via OGT        Anthropometrics:  - Ht. 170.2cm  - Wt. 84.1 kgs today vs 86.4 kg upon admission   - %wt change  - BMI   - IBW      Pertinent Lab Data:  09-08    143  |  104  |  60<H>  ----------------------------<  114<H>  3.5   |  28  |  0.8    Ca    8.5      08 Sep 2023 05:44  Mg     2.3     09-08    TPro  4.9<L>  /  Alb  2.7<L>  /  TBili  0.4  /  DBili  x   /  AST  11  /  ALT  15  /  AlkPhos  94  09-08                            7.8    13.28 )-----------( 249      ( 08 Sep 2023 05:44 )             26.4        Pertinent Meds:  MEDICATIONS  (STANDING):  albuterol    90 MICROgram(s) HFA Inhaler 2 Puff(s) Inhalation every 4 hours  dexMEDEtomidine Infusion 0.05 MICROgram(s)/kG/Hr (1.1 mL/Hr) IV Continuous <Continuous>  fentaNYL   Infusion. 0.467 MICROgram(s)/kG/Hr (4.1 mL/Hr) IV Continuous <Continuous>  gabapentin 100 milliGRAM(s) Oral three times a day  insulin lispro (ADMELOG) corrective regimen sliding scale   SubCutaneous three times a day before meals  metoprolol tartrate 25 milliGRAM(s) Oral two times a day  mupirocin 2% Ointment 1 Application(s) Both Nostrils two times a day  pantoprazole  Injectable 40 milliGRAM(s) IV Push daily  polyethylene glycol 3350 17 Gram(s) Oral two times a day  propofol Infusion 10 MICROgram(s)/kG/Min (5.27 mL/Hr) IV Continuous <Continuous>  senna 2 Tablet(s) Oral at bedtime    MEDICATIONS  (PRN):  acetaminophen     Tablet .. 650 milliGRAM(s) Oral every 6 hours PRN Temp greater or equal to 38C (100.4F), Mild Pain (1 - 3)  atropine Injectable 0.5 milliGRAM(s) IV Push once PRN bradycardia  dextrose Oral Gel 15 Gram(s) Oral once PRN Blood Glucose LESS THAN 70 milliGRAM(s)/deciliter  midazolam Injectable 2 milliGRAM(s) IV Push every 4 hours PRN agitation/vent synchrony       Physical Findings:  - Appearance: awake, intubated   - GI function: +BS, + BM noted today   - Tubes: OGT   - Oral/Mouth cavity:  - Skin: sacrum stage II Noted      Nutrition Requirements  Weight Used: 84.1 kgs     Estimated Energy Needs:  8720-7527 kcals (20-25 kcal/kg/BW)  92-122g protein (1.5-2.0g/kg/IBW)  1;1 kcal for estimated fluid needs          Nutrient Intake:   EN presently held       Nutrition Intervention: recommend to resume previous feeds which will provide pt  with 1500 kcals, 95g protein, 1200 ml total volume meeting >80% of estimated nutrient needs.        Goal/Expected Outcome: pt to continue to tolerate EN and meet >80% of estimated nutrient needs within 3-5 days     Indicator/Monitoring: RD to monitor tolerance to EN, labs/meds, NFPF and f/u as needed within 3-5 days  high risk

## 2023-09-08 NOTE — CHART NOTE - NSCHARTNOTEFT_GEN_A_CORE
discussed with primary team.     goals are clear. patient is DNR    palliative team will sign off. please re-consult PRN     [phone]

## 2023-09-08 NOTE — PROVIDER CONTACT NOTE (OTHER) - RECOMMENDATIONS
MD Blankenship made aware- PRN versed IVP given. Awaiting further interventions
Pt sleeping. On precedex 8cc. BP 89/42 and MAP 61

## 2023-09-08 NOTE — PROGRESS NOTE ADULT - SUBJECTIVE AND OBJECTIVE BOX
Patient is a 88y old  Female who presents with a chief complaint of hypercapnic respiratory failure (08 Sep 2023 07:38)      Over Night Events:  Patient seen and examined.   still on vent   failed weaning trial yesterday high RSB     ROS:  See HPI    PHYSICAL EXAM    ICU Vital Signs Last 24 Hrs  T(C): 35.8 (08 Sep 2023 07:05), Max: 37 (07 Sep 2023 11:04)  T(F): 96.4 (08 Sep 2023 07:05), Max: 98.6 (07 Sep 2023 11:04)  HR: 68 (08 Sep 2023 06:13) (63 - 101)  BP: 91/45 (08 Sep 2023 06:13) (76/38 - 117/55)  BP(mean): 65 (08 Sep 2023 06:13) (52 - 91)  ABP: --  ABP(mean): --  RR: 29 (08 Sep 2023 07:05) (16 - 29)  SpO2: 100% (08 Sep 2023 06:13) (96% - 100%)    O2 Parameters below as of 08 Sep 2023 01:10  Patient On (Oxygen Delivery Method): ventilator  O2 Flow (L/min): 35          General: awake   HEENT:         steph       Lymph Nodes: NO cervical LN   Lungs: Bilateral BS  Cardiovascular: Regular   Abdomen: Soft, Positive BS  Extremities: No clubbing   Skin: warm   Neurological: no focal   Musculoskeletal: move all ext     I&O's Detail    07 Sep 2023 07:01  -  08 Sep 2023 07:00  --------------------------------------------------------  IN:    Dexmedetomidine: 120 mL    Enteral Tube Flush: 50 mL    FentaNYL: 21 mL    Sodium Chloride 0.9% Bolus: 1000 mL  Total IN: 1191 mL    OUT:    Propofol: 0 mL    Voided (mL): 750 mL  Total OUT: 750 mL    Total NET: 441 mL          LABS:                          7.8    13.28 )-----------( 249      ( 08 Sep 2023 05:44 )             26.4         08 Sep 2023 05:44    143    |  104    |  60     ----------------------------<  114    3.5     |  28     |  0.8      Ca    8.5        08 Sep 2023 05:44  Mg     2.3       08 Sep 2023 05:44    TPro  4.9    /  Alb  2.7    /  TBili  0.4    /  DBili  x      /  AST  11     /  ALT  15     /  AlkPhos  94     08 Sep 2023 05:44  Amylase x     lipase x                                                                                        Urinalysis Basic - ( 08 Sep 2023 05:44 )    Color: x / Appearance: x / SG: x / pH: x  Gluc: 114 mg/dL / Ketone: x  / Bili: x / Urobili: x   Blood: x / Protein: x / Nitrite: x   Leuk Esterase: x / RBC: x / WBC x   Sq Epi: x / Non Sq Epi: x / Bacteria: x                                                                Culture - Sputum (collected 05 Sep 2023 19:15)  Source: Trach Asp Tracheal Aspirate  Gram Stain (06 Sep 2023 05:59):    Numerous polymorphonuclear leukocytes per low power field    No Squamous epithelial cells per low power field    Few Yeast like cells seen per oil power field    Rare Gram Negative Rods seen per oil power field  Final Report (07 Sep 2023 20:02):    Normal Respiratory Joaquina present                                                   Mode: AC/ CMV (Assist Control/ Continuous Mandatory Ventilation)  RR (machine): 16  TV (machine): 400  FiO2: 35  PEEP: 5  MAP: 13  PIP: 30                                      ABG - ( 08 Sep 2023 03:56 )  pH, Arterial: 7.46  pH, Blood: x     /  pCO2: 43    /  pO2: 126   / HCO3: 31    / Base Excess: 6.0   /  SaO2: 99.5                MEDICATIONS  (STANDING):  albuterol    90 MICROgram(s) HFA Inhaler 2 Puff(s) Inhalation every 4 hours  cefepime   IVPB 2000 milliGRAM(s) IV Intermittent every 12 hours  chlorhexidine 0.12% Liquid 15 milliLiter(s) Oral Mucosa every 12 hours  chlorhexidine 2% Cloths 1 Application(s) Topical <User Schedule>  dexMEDEtomidine Infusion 0.05 MICROgram(s)/kG/Hr (1.1 mL/Hr) IV Continuous <Continuous>  dextrose 5%. 1000 milliLiter(s) (50 mL/Hr) IV Continuous <Continuous>  dextrose 50% Injectable 25 Gram(s) IV Push once  enoxaparin Injectable 90 milliGRAM(s) SubCutaneous every 12 hours  fentaNYL   Infusion. 0.467 MICROgram(s)/kG/Hr (4.1 mL/Hr) IV Continuous <Continuous>  gabapentin 100 milliGRAM(s) Oral three times a day  glucagon  Injectable 1 milliGRAM(s) IntraMuscular once  insulin lispro (ADMELOG) corrective regimen sliding scale   SubCutaneous three times a day before meals  methylPREDNISolone sodium succinate Injectable 40 milliGRAM(s) IV Push daily  metoprolol tartrate 25 milliGRAM(s) Oral two times a day  mupirocin 2% Ointment 1 Application(s) Both Nostrils two times a day  pantoprazole  Injectable 40 milliGRAM(s) IV Push daily  polyethylene glycol 3350 17 Gram(s) Oral two times a day  propofol Infusion 10 MICROgram(s)/kG/Min (5.27 mL/Hr) IV Continuous <Continuous>  senna 2 Tablet(s) Oral at bedtime    MEDICATIONS  (PRN):  acetaminophen     Tablet .. 650 milliGRAM(s) Oral every 6 hours PRN Temp greater or equal to 38C (100.4F), Mild Pain (1 - 3)  atropine Injectable 0.5 milliGRAM(s) IV Push once PRN bradycardia  dextrose Oral Gel 15 Gram(s) Oral once PRN Blood Glucose LESS THAN 70 milliGRAM(s)/deciliter  midazolam Injectable 2 milliGRAM(s) IV Push every 4 hours PRN agitation/vent synchrony          Xrays:  TLC:  OG:  ET tube:                                                                                    stable bibasilar opacity / effusion    ECHO:  [facility] ICU:

## 2023-09-08 NOTE — PROGRESS NOTE ADULT - ASSESSMENT
88 year old female with PMH of HTN, Afib, OA, PVD, COPD, Anxiety, Obesity, Acute on chronic systolic congestive heart failure, H/O abdominal hysterectomy, H/O discectomy, H/O total knee replacement, bilaterally and bilateral pleural effusions (s/p left thoracentesis 3/2023 -- Cytology -PLEURAL FLUID, THORACENTESIS: - SUSPICIOUS FOR MALIGNANCY) with a left sided pleurex catheter is currently admitted due to worsening respiratory distress and hypercapnic respiratory failure and COPD exacerbation, currently on mechanical ventilation.ID is being consulted for antimicrobial recommendations given she is spiking intermittent fevers.     IMPRESSION  #Acute hypercapnic respiratory failure- Volume overload vs COPD exacerbation   #Sepsis present on admission  #Fevers- Concerns of superimposed VAP?   #Bilateral pleural effusions   - s/p left thoracentesis 3/2023 -- Cytology -PLEURAL FLUID, THORACENTESIS: - SUSPICIOUS FOR MALIGNANCY. - Atypical cells present in background of histiocytes and mesothelial  cells, suspicious for metastatic carcinoma.   - Pleurx Cx 7/4 - Staph epidermidis, No PMNs  - s/p thoracentesis right thoracentesis 7/5 Exudative in nature. Cultures negative  #History of ESBL E.coli bacteremia (5/2023)   #Hx 2/2023 foot wound MRSA  #Right inguinal Abscess history (7/2023)- Cx (Polymicrobial- Proteus, E.feacalis and VRE)   #HF with preserved EF  #COPD   #Chronic Lymphedema  #Obesity BMI (kg/m2): 30.3, 34.7  #RVP and legionella urine antigen negative  #MRSA nares positive.     RECOMMENDATIONS  -Can stop all the antibiotics. Got 7 days of Van/cefepime for VAP.   -On steroids as per the ICU team. Leukocytosis may be related to steroids. Procal trending down.    -No reported skin break down or failure. Continue with precautions for off loading.   -Recommend aspiration precautions.   -Trend fever curve. Trend WBC   -Patient is at high risk of ICU related deconditioning.    -Guarded prognosis.     If any questions, please send a message or call on [employer] Teams  Please continue to update ID with any pertinent new laboratory or radiographic findings.    Clifford Cordero M.D  Infectious Diseases Attending  [facility]    88 year old female with PMH of HTN, Afib, OA, PVD, COPD, Anxiety, Obesity, Acute on chronic systolic congestive heart failure, H/O abdominal hysterectomy, H/O discectomy, H/O total knee replacement, bilaterally and bilateral pleural effusions (s/p left thoracentesis 3/2023 -- Cytology -PLEURAL FLUID, THORACENTESIS: - SUSPICIOUS FOR MALIGNANCY) with a left sided pleurex catheter is currently admitted due to worsening respiratory distress and hypercapnic respiratory failure and COPD exacerbation, currently on mechanical ventilation.ID is being consulted for antimicrobial recommendations given she is spiking intermittent fevers.     IMPRESSION  #Acute hypercapnic respiratory failure- Volume overload vs COPD exacerbation   #Sepsis present on admission  #Fevers- Concerns of superimposed VAP?   #Bilateral pleural effusions   - s/p left thoracentesis 3/2023 -- Cytology -PLEURAL FLUID, THORACENTESIS: - SUSPICIOUS FOR MALIGNANCY. - Atypical cells present in background of histiocytes and mesothelial  cells, suspicious for metastatic carcinoma.   - Pleurx Cx 7/4 - Staph epidermidis, No PMNs  - s/p thoracentesis right thoracentesis 7/5 Exudative in nature. Cultures negative  #History of ESBL E.coli bacteremia (5/2023)   #Hx 2/2023 foot wound MRSA  #Right inguinal Abscess history (7/2023)- Cx (Polymicrobial- Proteus, E.feacalis and VRE)   #HF with preserved EF  #COPD   #Chronic Lymphedema  #Obesity BMI (kg/m2): 30.3, 34.7  #RVP and legionella urine antigen negative  #MRSA nares positive.     RECOMMENDATIONS  -Can stop all the antibiotics. Got 7 days of Van/cefepime for VAP.   -On steroids as per the ICU team. Leukocytosis may be related to steroids. Procal trending down.    -No reported skin break down or failure. Continue with precautions for off loading. Local wound care for bilateral lower extremities.  -Recommend aspiration precautions.   -Trend fever curve. Trend WBC   -Patient is at high risk of ICU related deconditioning.    -Guarded prognosis.     If any questions, please send a message or call on [employer] Teams  Please continue to update ID with any pertinent new laboratory or radiographic findings.    Clifford Cordero M.D  Infectious Diseases Attending  [facility]

## 2023-09-08 NOTE — PROGRESS NOTE ADULT - ASSESSMENT
IMPRESSION:  acute hypercapnic/ hypoxic res failure s/p intubation   COPD exacerbation?  Sepsis present on admission   CAP  afib on eliquis  recurrent pleural effusions s/p pleurex   malignant pleural effusion   H/O HFpEF    PLAN:    CNS: precedx + fentanyl for sedation, SAT daily  taper propofol off   HEENT: oral care, ETT care    PULMONARY: aspiration precaution, HOB@ 45 degrees, daily abg/xray, keep pco2 around 50,  lower solumedrol 20 qd for 3 days and then d/c , albuterl PRN, vent changes: Wean FiO2 as tolerated to maintain spO2 > 92%  SBT iff passed SAT extubate to bipap     CARDIOVASCULAR: echo, trops noted. BNP elevated, lasix 40mg  daily    also    keep I<os, baseline ekg    GI: GI prophylaxis. hold feed for SBT   stool softener   RENAL: f/u lytes correct as needed,     INFECTIOUS DISEASE off vanco  level was elevated   cefepime, total 7 days   follow Id recommendation     HEMATOLOGICAL: [facility]     ENDOCRINE:  Follow up FS.  Insulin protocol if needed.    MUSCULOSKELETAL: bedbound    ICU

## 2023-09-08 NOTE — PROGRESS NOTE ADULT - ASSESSMENT
89 YO F with a medical history  of HTN, malignant pleural effusion of breast origin, chronic A.fib, PAD, Obesity, Chronic lymphedema of  BLE and COPD on home oxygen 2 - 1/2 L nc, ESBL UTI presented to ED with severe SOB, DNR / DNI rescinded and pt was intubated and admitted to ICU    acute respiratory failure / B/L effusions / possible pneumonia / COPD / stage 2 sacral decubiti was POA     - SAT -> SBT   -  DNR    - completed  antibiotic course   - taper/DC steroids   - Lovenox -> Eliquis   - pulmonary following

## 2023-09-08 NOTE — PROVIDER CONTACT NOTE (OTHER) - BACKGROUND
TF are on hold due to vomiting x 2 during day shift, pt is not on any IVF maintenance.
Pt intubated and on precedex. Comfortable and O2 100%

## 2023-09-08 NOTE — PROGRESS NOTE ADULT - SUBJECTIVE AND OBJECTIVE BOX
GIOVANNY BUCK  88y, Female    LOS  11d    INTERVAL EVENTS/HPI  - No acute events overnight  - T(F): , Max: 98.6 (09-07-23 @ 11:04)  - Tolerating medication  - WBC Count: 13.28 (09-08-23 @ 05:44)  WBC Count: 18.65 (09-07-23 @ 06:02)  - Creatinine: 0.8 (09-08-23 @ 05:44)  Creatinine: 0.8 (09-07-23 @ 06:02)         REVIEW OF SYSTEMS: cannot obtain further history from the patient secondary to altered mental status or sedation      Prior hospital charts reviewed [Yes]  Primary team notes reviewed [Yes]  Other consultant notes reviewed [Yes]    Allergy: 11d    ANTIMICROBIALS:   cefepime   IVPB 2000 every 12 hours      OTHER MEDS: MEDICATIONS  (STANDING):  acetaminophen     Tablet .. 650 every 6 hours PRN  albuterol    90 MICROgram(s) HFA Inhaler 2 every 4 hours  atropine Injectable 0.5 once PRN  dexMEDEtomidine Infusion 0.05 <Continuous>  dextrose 50% Injectable 25 once  dextrose Oral Gel 15 once PRN  enoxaparin Injectable 90 every 12 hours  fentaNYL   Infusion. 0.467 <Continuous>  gabapentin 100 three times a day  glucagon  Injectable 1 once  insulin lispro (ADMELOG) corrective regimen sliding scale  three times a day before meals  methylPREDNISolone sodium succinate Injectable 40 daily  metoprolol tartrate 25 two times a day  midazolam Injectable 2 every 4 hours PRN  pantoprazole  Injectable 40 daily  polyethylene glycol 3350 17 two times a day  propofol Infusion 10 <Continuous>  senna 2 at bedtime      Vital Signs Last 24 Hrs  T(F): 96.4 (09-08-23 @ 07:05), Max: 100.6 (09-01-23 @ 11:00)    Vital Signs Last 24 Hrs  HR: 68 (09-08-23 @ 06:13) (63 - 101)  BP: 91/45 (09-08-23 @ 06:13) (76/38 - 117/55)  RR: 29 (09-08-23 @ 07:05)  SpO2: 100% (09-08-23 @ 06:13) (96% - 100%)  Wt(kg): --  Mode: AC/ CMV (Assist Control/ Continuous Mandatory Ventilation)  RR (machine): 16  TV (machine): 400  FiO2: 35  PEEP: 5  MAP: 13  PIP: 30    EXAM:  GENERAL: NAD on MV.  HEAD: No head lesions  EYES: Conjunctiva pink and cornea white  NECK: Supple, nontender to palpation  CHEST/LUNG: Mild bilateral crackles.   HEART: S1 S2  ABDOMEN: Soft, nontender, nondistended  EXTREMITIES: No clubbing, cyanosis, or petal edema  NERVOUS SYSTEM: On Sedation.  MSK: No joint erythema, swelling or pain  SKIN: No rashes or lesions, no superficial thrombophlebitis    Labs:                        7.8    13.28 )-----------( 249      ( 08 Sep 2023 05:44 )             26.4     09-08    143  |  104  |  60<H>  ----------------------------<  114<H>  3.5   |  28  |  0.8    Ca    8.5      08 Sep 2023 05:44  Mg     2.3     09-08    TPro  4.9<L>  /  Alb  2.7<L>  /  TBili  0.4  /  DBili  x   /  AST  11  /  ALT  15  /  AlkPhos  94  09-08      WBC Trend:  WBC Count: 13.28 (09-08-23 @ 05:44)  WBC Count: 18.65 (09-07-23 @ 06:02)  WBC Count: 16.81 (09-06-23 @ 05:50)  WBC Count: 17.69 (09-05-23 @ 20:09)      Creatine Trend:  Creatinine: 0.8 (09-08)  Creatinine: 0.8 (09-07)  Creatinine: 0.9 (09-06)  Creatinine: 0.8 (09-05)      Liver Biochemical Testing Trend:  Alanine Aminotransferase (ALT/SGPT): 15 (09-08)  Alanine Aminotransferase (ALT/SGPT): 20 (09-07)  Alanine Aminotransferase (ALT/SGPT): 22 (09-06)  Alanine Aminotransferase (ALT/SGPT): 25 (09-05)  Alanine Aminotransferase (ALT/SGPT): 24 (09-04)  Aspartate Aminotransferase (AST/SGOT): 11 (09-08-23 @ 05:44)  Aspartate Aminotransferase (AST/SGOT): 13 (09-07-23 @ 06:02)  Aspartate Aminotransferase (AST/SGOT): 17 (09-06-23 @ 05:50)  Aspartate Aminotransferase (AST/SGOT): 18 (09-05-23 @ 20:09)  Aspartate Aminotransferase (AST/SGOT): 18 (09-04-23 @ 05:48)  Bilirubin Total: 0.4 (09-08)  Bilirubin Total: 0.2 (09-07)  Bilirubin Total: <0.2 (09-06)  Bilirubin Total: 0.2 (09-05)  Bilirubin Total: <0.2 (09-04)      Trend LDH  03-07-23 @ 21:06  211      Urinalysis Basic - ( 08 Sep 2023 05:44 )    Color: x / Appearance: x / SG: x / pH: x  Gluc: 114 mg/dL / Ketone: x  / Bili: x / Urobili: x   Blood: x / Protein: x / Nitrite: x   Leuk Esterase: x / RBC: x / WBC x   Sq Epi: x / Non Sq Epi: x / Bacteria: x        MICROBIOLOGY:  Vancomycin Level, Random: 36.7 (09-07 @ 06:02)  Vancomycin Level, Trough: 34.5 (09-06 @ 06:44)  Vancomycin Level, Trough: 35.1 (09-05 @ 20:09)  Vancomycin Level, Trough: 14.0 (09-02 @ 19:02)    Female    Culture - Sputum (collected 05 Sep 2023 19:15)  Source: Trach Asp Tracheal Aspirate  Final Report:    Normal Respiratory Joaquina present    Culture - Blood (collected 02 Sep 2023 06:01)  Source: .Blood None  Final Report:    No growth at 5 days    Culture - Sputum (collected 02 Sep 2023 03:00)  Source: Trach Asp Tracheal Aspirate  Final Report:    Normal Respiratory Joaquina present    Culture - Urine (collected 01 Sep 2023 19:50)  Source: Catheterized Catheterized  Final Report:    <10,000 CFU/mL Normal Urogenital Joaquina    Culture - Blood (collected 01 Sep 2023 19:32)  Source: .Blood None  Final Report:    No growth at 5 days    Culture - Blood (collected 28 Aug 2023 11:00)  Source: .Blood Blood-Peripheral  Final Report:    No growth at 5 days    Culture - Blood (collected 28 Aug 2023 11:00)  Source: .Blood Blood-Peripheral  Final Report:    No growth at 5 days    Culture - Abscess with Gram Stain (collected 16 Jul 2023 07:00)  Source: .Abscess Right groin abscess  Final Report:    Numerous Proteus mirabilis    Numerous Enterococcus faecalis    Few Coag Negative Staphylococcus "Susceptibilities not performed"    Few Enterococcus faecium (vancomycin resistant)    Moderate Prevotella timonensis "Susceptibilities not performed"  Organism: Proteus mirabilis  Enterococcus faecalis  Enterococcus faecium (vancomycin resistant)  Organism: Enterococcus faecium (vancomycin resistant)    Sensitivities:      Method Type: DARIO      -  Ampicillin: R >8 Predicts results to ampicillin/sulbactam, amoxacillin-clavulanate and  piperacillin-tazobactam.      -  Daptomycin: SDD 4 The breakpoint for SDD (susceptible dose dependent)is based on a dosage regimen of 8-12 mg/kg administered every 24 h in adults and is intended for serious infections due to E. faecium. Consultation with an infectious diseases specialist is recommended.      -  Levofloxacin: R >4      -  Linezolid: S 2      -  Tetracycline: R >8      -  Vancomycin: R >16  Organism: Enterococcus faecalis    Sensitivities:      Method Type: DARIO      -  Ampicillin: S <=2 Predicts results to ampicillin/sulbactam, amoxacillin-clavulanate and  piperacillin-tazobactam.      -  Tetracycline: R >8      -  Vancomycin: S 2  Organism: Proteus mirabilis    Sensitivities:      Method Type: DARIO      -  Amikacin: S <=16      -  Amoxicillin/Clavulanic Acid: S <=8/4      -  Ampicillin: S <=8 These ampicillin results predict results for amoxicillin      -  Ampicillin/Sulbactam: S <=4/2 Enterobacter, Klebsiella aerogenes, Citrobacter, and Serratia may develop resistance during prolonged therapy (3-4 days)      -  Aztreonam: S <=4      -  Cefazolin: S <=2 Enterobacter, Klebsiella aerogenes, Citrobacter, and Serratia may develop resistance during prolonged therapy (3-4 days)      -  Cefepime: S <=2      -  Cefoxitin: S <=8      -  Ceftriaxone: S <=1 Enterobacter, Klebsiella aerogenes, Citrobacter, and Serratia may develop resistance during prolonged therapy      -  Ciprofloxacin: S <=0.25      -  Ertapenem: S <=0.5      -  Gentamicin: S <=2      -  Levofloxacin: S <=0.5      -  Meropenem: S <=1      -  Piperacillin/Tazobactam: S <=8      -  Tobramycin: S <=2      -  Trimethoprim/Sulfamethoxazole: S 1/19    Culture - Blood (collected 08 Jul 2023 21:23)  Source: .Blood Blood-Peripheral  Final Report:    No growth at 5 days    Culture - Fungal, Body Fluid (collected 05 Jul 2023 15:45)  Source: Pleural Fl Pleural Fluid  Final Report:    No fungus isolated at 4 weeks.    Procalcitonin, Serum: 0.17 (09-02)                Blood Gas Arterial, Lactate: 0.60 (09-08 @ 03:56)  Blood Gas Arterial, Lactate: 1.00 (09-07 @ 03:52)  Blood Gas Arterial, Lactate: 0.60 (09-06 @ 17:19)  Blood Gas Arterial, Lactate: 0.70 (09-06 @ 09:10)        RADIOLOGY & ADDITIONAL TESTS:  I have personally reviewed the relevant images.   CXR      CT  CT Chest No Cont:   ACC: [id number] EXAM:  CT CHEST   ORDERED BY: MERNA GABRIEL     PROCEDURE DATE:  09/05/2023          INTERPRETATION:  Reason for Exam:  Shortness of breath.  CT of the chest was performed from the thoracic inlet to the level of the   adrenal glands without contrast injection. Coronal and sagittal images   have been submitted.    Comparison: August 28, 2023.    Findings:    Tubes/Lines: The patient is intubated. Enteric catheter extends below the   hemidiaphragm. Left pleural catheter. Lungs, Pleura, and Airways:Large   right neck mass measuring 6.6 cm.    Bilateral pleural effusions, right greater than left. Underlying   opacifications with atelectasis, right greater than left.    6 mm nodule within the right upper lung field, image #79, likely related   to improving opacities.    Mediastinum/Lymph Nodes:Reactive axillary lymph nodes. No mediastinal   lymph nodes.    Heart/Great Vessels: Cardiomegaly. Atherosclerosis.    Abdomen: Within normal limits.    Bones and soft tissues: Osteopenia. Kyphosis.    IMPRESSION:    Bilateral pleural effusions with underlying atelectasis and opacities,   slight improvement from last week.    Support devices as described.    Right neck mass.    --- End of Report ---            ANURAG YA MD;Attending Interventional Radiologist  This document has been electronically signed. Sep  6 2023  9:24AM (09-05-23 @ 15:17)        WEIGHT  Weight (kg): 87.8 (08-28-23 @ 21:03)  Creatinine: 0.8 mg/dL (09-08-23 @ 05:44)      All available historical records have been reviewed       GIOVANNY BUCK  88y, Female    LOS  11d    INTERVAL EVENTS/HPI  - No acute events overnight  - T(F): , Max: 98.6 (09-07-23 @ 11:04)  - Tolerating medication  - WBC Count: 13.28 (09-08-23 @ 05:44)  WBC Count: 18.65 (09-07-23 @ 06:02)  - Creatinine: 0.8 (09-08-23 @ 05:44)  Creatinine: 0.8 (09-07-23 @ 06:02)  -Aware today. Able to follow some commands.     REVIEW OF SYSTEMS: cannot obtain further history from the patient secondary to altered mental status or sedation      Prior hospital charts reviewed [Yes]  Primary team notes reviewed [Yes]  Other consultant notes reviewed [Yes]    ANTIMICROBIALS:   cefepime   IVPB 2000 every 12 hours      OTHER MEDS: MEDICATIONS  (STANDING):  acetaminophen     Tablet .. 650 every 6 hours PRN  albuterol    90 MICROgram(s) HFA Inhaler 2 every 4 hours  atropine Injectable 0.5 once PRN  dexMEDEtomidine Infusion 0.05 <Continuous>  dextrose 50% Injectable 25 once  dextrose Oral Gel 15 once PRN  enoxaparin Injectable 90 every 12 hours  fentaNYL   Infusion. 0.467 <Continuous>  gabapentin 100 three times a day  glucagon  Injectable 1 once  insulin lispro (ADMELOG) corrective regimen sliding scale  three times a day before meals  methylPREDNISolone sodium succinate Injectable 40 daily  metoprolol tartrate 25 two times a day  midazolam Injectable 2 every 4 hours PRN  pantoprazole  Injectable 40 daily  polyethylene glycol 3350 17 two times a day  propofol Infusion 10 <Continuous>  senna 2 at bedtime      Vital Signs Last 24 Hrs  T(F): 96.4 (09-08-23 @ 07:05), Max: 100.6 (09-01-23 @ 11:00)    Vital Signs Last 24 Hrs  HR: 68 (09-08-23 @ 06:13) (63 - 101)  BP: 91/45 (09-08-23 @ 06:13) (76/38 - 117/55)  RR: 29 (09-08-23 @ 07:05)  SpO2: 100% (09-08-23 @ 06:13) (96% - 100%)  Wt(kg): --  Mode: AC/ CMV (Assist Control/ Continuous Mandatory Ventilation)  RR (machine): 16  TV (machine): 400  FiO2: 35  PEEP: 5  MAP: 13  PIP: 30    EXAM:  GENERAL: NAD on MV.  HEAD: No head lesions  EYES: Conjunctiva pink and cornea white  NECK: Supple, nontender to palpation  CHEST/LUNG: Mild bilateral crackles.   HEART: S1 S2  ABDOMEN: Soft, nontender, nondistended  EXTREMITIES: No clubbing, cyanosis, or petal edema  NERVOUS SYSTEM: Awake.   MSK: No joint erythema, swelling or pain  SKIN: No rashes or lesions, no superficial thrombophlebitis    Labs:                        7.8    13.28 )-----------( 249      ( 08 Sep 2023 05:44 )             26.4     09-08    143  |  104  |  60<H>  ----------------------------<  114<H>  3.5   |  28  |  0.8    Ca    8.5      08 Sep 2023 05:44  Mg     2.3     09-08    TPro  4.9<L>  /  Alb  2.7<L>  /  TBili  0.4  /  DBili  x   /  AST  11  /  ALT  15  /  AlkPhos  94  09-08      WBC Trend:  WBC Count: 13.28 (09-08-23 @ 05:44)  WBC Count: 18.65 (09-07-23 @ 06:02)  WBC Count: 16.81 (09-06-23 @ 05:50)  WBC Count: 17.69 (09-05-23 @ 20:09)      Creatine Trend:  Creatinine: 0.8 (09-08)  Creatinine: 0.8 (09-07)  Creatinine: 0.9 (09-06)  Creatinine: 0.8 (09-05)      Liver Biochemical Testing Trend:  Alanine Aminotransferase (ALT/SGPT): 15 (09-08)  Alanine Aminotransferase (ALT/SGPT): 20 (09-07)  Alanine Aminotransferase (ALT/SGPT): 22 (09-06)  Alanine Aminotransferase (ALT/SGPT): 25 (09-05)  Alanine Aminotransferase (ALT/SGPT): 24 (09-04)  Aspartate Aminotransferase (AST/SGOT): 11 (09-08-23 @ 05:44)  Aspartate Aminotransferase (AST/SGOT): 13 (09-07-23 @ 06:02)  Aspartate Aminotransferase (AST/SGOT): 17 (09-06-23 @ 05:50)  Aspartate Aminotransferase (AST/SGOT): 18 (09-05-23 @ 20:09)  Aspartate Aminotransferase (AST/SGOT): 18 (09-04-23 @ 05:48)  Bilirubin Total: 0.4 (09-08)  Bilirubin Total: 0.2 (09-07)  Bilirubin Total: <0.2 (09-06)  Bilirubin Total: 0.2 (09-05)  Bilirubin Total: <0.2 (09-04)      Trend LDH  03-07-23 @ 21:06  211      Urinalysis Basic - ( 08 Sep 2023 05:44 )    Color: x / Appearance: x / SG: x / pH: x  Gluc: 114 mg/dL / Ketone: x  / Bili: x / Urobili: x   Blood: x / Protein: x / Nitrite: x   Leuk Esterase: x / RBC: x / WBC x   Sq Epi: x / Non Sq Epi: x / Bacteria: x        MICROBIOLOGY:  Vancomycin Level, Random: 36.7 (09-07 @ 06:02)  Vancomycin Level, Trough: 34.5 (09-06 @ 06:44)  Vancomycin Level, Trough: 35.1 (09-05 @ 20:09)  Vancomycin Level, Trough: 14.0 (09-02 @ 19:02)    Female    Culture - Sputum (collected 05 Sep 2023 19:15)  Source: Trach Asp Tracheal Aspirate  Final Report:    Normal Respiratory Joaquina present    Culture - Blood (collected 02 Sep 2023 06:01)  Source: .Blood None  Final Report:    No growth at 5 days    Culture - Sputum (collected 02 Sep 2023 03:00)  Source: Trach Asp Tracheal Aspirate  Final Report:    Normal Respiratory Joaquina present    Culture - Urine (collected 01 Sep 2023 19:50)  Source: Catheterized Catheterized  Final Report:    <10,000 CFU/mL Normal Urogenital Joaquina    Culture - Blood (collected 01 Sep 2023 19:32)  Source: .Blood None  Final Report:    No growth at 5 days    Culture - Blood (collected 28 Aug 2023 11:00)  Source: .Blood Blood-Peripheral  Final Report:    No growth at 5 days    Culture - Blood (collected 28 Aug 2023 11:00)  Source: .Blood Blood-Peripheral  Final Report:    No growth at 5 days    Culture - Abscess with Gram Stain (collected 16 Jul 2023 07:00)  Source: .Abscess Right groin abscess  Final Report:    Numerous Proteus mirabilis    Numerous Enterococcus faecalis    Few Coag Negative Staphylococcus "Susceptibilities not performed"    Few Enterococcus faecium (vancomycin resistant)    Moderate Prevotella timonensis "Susceptibilities not performed"  Organism: Proteus mirabilis  Enterococcus faecalis  Enterococcus faecium (vancomycin resistant)  Organism: Enterococcus faecium (vancomycin resistant)    Sensitivities:      Method Type: DARIO      -  Ampicillin: R >8 Predicts results to ampicillin/sulbactam, amoxacillin-clavulanate and  piperacillin-tazobactam.      -  Daptomycin: SDD 4 The breakpoint for SDD (susceptible dose dependent)is based on a dosage regimen of 8-12 mg/kg administered every 24 h in adults and is intended for serious infections due to E. faecium. Consultation with an infectious diseases specialist is recommended.      -  Levofloxacin: R >4      -  Linezolid: S 2      -  Tetracycline: R >8      -  Vancomycin: R >16  Organism: Enterococcus faecalis    Sensitivities:      Method Type: DARIO      -  Ampicillin: S <=2 Predicts results to ampicillin/sulbactam, amoxacillin-clavulanate and  piperacillin-tazobactam.      -  Tetracycline: R >8      -  Vancomycin: S 2  Organism: Proteus mirabilis    Sensitivities:      Method Type: DARIO      -  Amikacin: S <=16      -  Amoxicillin/Clavulanic Acid: S <=8/4      -  Ampicillin: S <=8 These ampicillin results predict results for amoxicillin      -  Ampicillin/Sulbactam: S <=4/2 Enterobacter, Klebsiella aerogenes, Citrobacter, and Serratia may develop resistance during prolonged therapy (3-4 days)      -  Aztreonam: S <=4      -  Cefazolin: S <=2 Enterobacter, Klebsiella aerogenes, Citrobacter, and Serratia may develop resistance during prolonged therapy (3-4 days)      -  Cefepime: S <=2      -  Cefoxitin: S <=8      -  Ceftriaxone: S <=1 Enterobacter, Klebsiella aerogenes, Citrobacter, and Serratia may develop resistance during prolonged therapy      -  Ciprofloxacin: S <=0.25      -  Ertapenem: S <=0.5      -  Gentamicin: S <=2      -  Levofloxacin: S <=0.5      -  Meropenem: S <=1      -  Piperacillin/Tazobactam: S <=8      -  Tobramycin: S <=2      -  Trimethoprim/Sulfamethoxazole: S 1/19    Culture - Blood (collected 08 Jul 2023 21:23)  Source: .Blood Blood-Peripheral  Final Report:    No growth at 5 days    Culture - Fungal, Body Fluid (collected 05 Jul 2023 15:45)  Source: Pleural Fl Pleural Fluid  Final Report:    No fungus isolated at 4 weeks.    Procalcitonin, Serum: 0.17 (09-02)                Blood Gas Arterial, Lactate: 0.60 (09-08 @ 03:56)  Blood Gas Arterial, Lactate: 1.00 (09-07 @ 03:52)  Blood Gas Arterial, Lactate: 0.60 (09-06 @ 17:19)  Blood Gas Arterial, Lactate: 0.70 (09-06 @ 09:10)        RADIOLOGY & ADDITIONAL TESTS:  I have personally reviewed the relevant images.   CXR      CT  CT Chest No Cont:   ACC: [id number] EXAM:  CT CHEST   ORDERED BY: MERNA GABRIEL     PROCEDURE DATE:  09/05/2023          INTERPRETATION:  Reason for Exam:  Shortness of breath.  CT of the chest was performed from the thoracic inlet to the level of the   adrenal glands without contrast injection. Coronal and sagittal images   have been submitted.    Comparison: August 28, 2023.    Findings:    Tubes/Lines: The patient is intubated. Enteric catheter extends below the   hemidiaphragm. Left pleural catheter. Lungs, Pleura, and Airways:Large   right neck mass measuring 6.6 cm.    Bilateral pleural effusions, right greater than left. Underlying   opacifications with atelectasis, right greater than left.    6 mm nodule within the right upper lung field, image #79, likely related   to improving opacities.    Mediastinum/Lymph Nodes:Reactive axillary lymph nodes. No mediastinal   lymph nodes.    Heart/Great Vessels: Cardiomegaly. Atherosclerosis.    Abdomen: Within normal limits.    Bones and soft tissues: Osteopenia. Kyphosis.    IMPRESSION:    Bilateral pleural effusions with underlying atelectasis and opacities,   slight improvement from last week.    Support devices as described.    Right neck mass.    --- End of Report ---            ANURAG YA MD;Attending Interventional Radiologist  This document has been electronically signed. Sep  6 2023  9:24AM (09-05-23 @ 15:17)        WEIGHT  Weight (kg): 87.8 (08-28-23 @ 21:03)  Creatinine: 0.8 mg/dL (09-08-23 @ 05:44)      All available historical records have been reviewed

## 2023-09-09 NOTE — PROGRESS NOTE ADULT - SUBJECTIVE AND OBJECTIVE BOX
Patient is a 88y old  Female who presents with a chief complaint of hypercapnic respiratory failure (08 Sep 2023 08:59)      Over Night Events:  Patient seen and examined.   failed weaning trial yesterday not pulling TV   today more awake     ROS:  See HPI    PHYSICAL EXAM    ICU Vital Signs Last 24 Hrs  T(C): 36.9 (09 Sep 2023 03:00), Max: 36.9 (09 Sep 2023 03:00)  T(F): 98.5 (09 Sep 2023 03:00), Max: 98.5 (09 Sep 2023 03:00)  HR: 90 (09 Sep 2023 06:00) (65 - 100)  BP: 83/47 (09 Sep 2023 06:00) (82/44 - 133/54)  BP(mean): 58 (09 Sep 2023 06:00) (57 - 80)  ABP: --  ABP(mean): --  RR: 20 (09 Sep 2023 06:00) (16 - 29)  SpO2: 100% (09 Sep 2023 06:00) (79% - 100%)    O2 Parameters below as of 09 Sep 2023 06:00  Patient On (Oxygen Delivery Method): ventilator    O2 Concentration (%): 35        General:awake   HEENT:      steph          Lymph Nodes: NO cervical LN   Lungs: Bilateral BS  Cardiovascular: Regular   Abdomen: Soft, Positive BS  Extremities: No clubbing   Skin: warm   Neurological: no focal   Musculoskeletal: move all ext     I&O's Detail    07 Sep 2023 07:01  -  08 Sep 2023 07:00  --------------------------------------------------------  IN:    Dexmedetomidine: 130 mL    Enteral Tube Flush: 50 mL    FentaNYL: 21 mL    Sodium Chloride 0.9% Bolus: 1000 mL  Total IN: 1201 mL    OUT:    Propofol: 0 mL    Voided (mL): 750 mL  Total OUT: 750 mL    Total NET: 451 mL      08 Sep 2023 07:01  -  09 Sep 2023 06:40  --------------------------------------------------------  IN:    Dexmedetomidine: 194 mL    Enteral Tube Flush: 60 mL    Vital High Protein: 340 mL  Total IN: 594 mL    OUT:    Voided (mL): 100 mL  Total OUT: 100 mL    Total NET: 494 mL          LABS:                          7.8    13.28 )-----------( 249      ( 08 Sep 2023 05:44 )             26.4         08 Sep 2023 05:44    143    |  104    |  60     ----------------------------<  114    3.5     |  28     |  0.8      Ca    8.5        08 Sep 2023 05:44  Mg     2.3       08 Sep 2023 05:44                                                                                      Urinalysis Basic - ( 08 Sep 2023 05:44 )    Color: x / Appearance: x / SG: x / pH: x  Gluc: 114 mg/dL / Ketone: x  / Bili: x / Urobili: x   Blood: x / Protein: x / Nitrite: x   Leuk Esterase: x / RBC: x / WBC x   Sq Epi: x / Non Sq Epi: x / Bacteria: x                                                                                                             Mode: AC/ CMV (Assist Control/ Continuous Mandatory Ventilation)  RR (machine): 16  TV (machine): 400  FiO2: 35  PEEP: 5  ITime: 1  MAP: 13  PIP: 22                                      ABG - ( 09 Sep 2023 03:30 )  pH, Arterial: 7.45  pH, Blood: x     /  pCO2: 43    /  pO2: 113   / HCO3: 30    / Base Excess: 5.4   /  SaO2: 98.7                MEDICATIONS  (STANDING):  albuterol    90 MICROgram(s) HFA Inhaler 2 Puff(s) Inhalation every 4 hours  chlorhexidine 0.12% Liquid 15 milliLiter(s) Oral Mucosa every 12 hours  chlorhexidine 2% Cloths 1 Application(s) Topical <User Schedule>  dexMEDEtomidine Infusion 0.05 MICROgram(s)/kG/Hr (1.1 mL/Hr) IV Continuous <Continuous>  dextrose 5%. 1000 milliLiter(s) (50 mL/Hr) IV Continuous <Continuous>  dextrose 50% Injectable 25 Gram(s) IV Push once  enoxaparin Injectable 90 milliGRAM(s) SubCutaneous every 12 hours  fentaNYL   Infusion. 0.467 MICROgram(s)/kG/Hr (4.1 mL/Hr) IV Continuous <Continuous>  gabapentin 100 milliGRAM(s) Oral three times a day  glucagon  Injectable 1 milliGRAM(s) IntraMuscular once  insulin lispro (ADMELOG) corrective regimen sliding scale   SubCutaneous three times a day before meals  methylPREDNISolone sodium succinate Injectable 20 milliGRAM(s) IV Push daily  metoprolol tartrate 25 milliGRAM(s) Oral two times a day  mupirocin 2% Ointment 1 Application(s) Both Nostrils two times a day  pantoprazole  Injectable 40 milliGRAM(s) IV Push daily  polyethylene glycol 3350 17 Gram(s) Oral two times a day  propofol Infusion 10 MICROgram(s)/kG/Min (5.27 mL/Hr) IV Continuous <Continuous>  senna 2 Tablet(s) Oral at bedtime    MEDICATIONS  (PRN):  acetaminophen     Tablet .. 650 milliGRAM(s) Oral every 6 hours PRN Temp greater or equal to 38C (100.4F), Mild Pain (1 - 3)  atropine Injectable 0.5 milliGRAM(s) IV Push once PRN bradycardia  dextrose Oral Gel 15 Gram(s) Oral once PRN Blood Glucose LESS THAN 70 milliGRAM(s)/deciliter          Xrays:  TLC:  OG:  ET tube:                                                                                    stable left effusion /opacity    ECHO:  [facility] ICU:

## 2023-09-09 NOTE — PROGRESS NOTE ADULT - SUBJECTIVE AND OBJECTIVE BOX
SUBJECTIVE:    Patient is a 88y old Female who presents with a chief complaint of hypercapnic respiratory failure (09 Sep 2023 06:40)    Currently admitted to medicine with the primary diagnosis of Acute respiratory failure with hypoxia       Today is hospital day 12d. This morning she is resting comfortably in bed     ROS:     unable to obtain given patient vented  appears comfortable with no non verbal signs of distress         PAST MEDICAL & SURGICAL HISTORY  HTN (hypertension)    Afib  s/p ablation 2001    Goiter  no meds    Cellulitis  chronic    OA (osteoarthritis)    PVD (peripheral vascular disease)    COPD (chronic obstructive pulmonary disease)    Anxiety    Obesity    Acute on chronic systolic congestive heart failure    Edema of both lower legs    Required emergent intubation    H/O abdominal hysterectomy    H/O discectomy    H/O total knee replacement, bilateral      SOCIAL HISTORY:    ALLERGIES:  aspirin (Other)  codeine (Other)  sulfa drugs (Hives; Other)  penicillin (Other)  contrast media (iodine-based) (Other)    MEDICATIONS:  STANDING MEDICATIONS  albuterol    90 MICROgram(s) HFA Inhaler 2 Puff(s) Inhalation every 4 hours  chlorhexidine 0.12% Liquid 15 milliLiter(s) Oral Mucosa every 12 hours  chlorhexidine 2% Cloths 1 Application(s) Topical <User Schedule>  dexMEDEtomidine Infusion 0.05 MICROgram(s)/kG/Hr IV Continuous <Continuous>  dextrose 5%. 1000 milliLiter(s) IV Continuous <Continuous>  dextrose 50% Injectable 25 Gram(s) IV Push once  fentaNYL   Infusion. 0.467 MICROgram(s)/kG/Hr IV Continuous <Continuous>  gabapentin 100 milliGRAM(s) Oral three times a day  glucagon  Injectable 1 milliGRAM(s) IntraMuscular once  insulin lispro (ADMELOG) corrective regimen sliding scale   SubCutaneous three times a day before meals  methylPREDNISolone sodium succinate Injectable 20 milliGRAM(s) IV Push daily  metoprolol tartrate 25 milliGRAM(s) Oral two times a day  mupirocin 2% Ointment 1 Application(s) Both Nostrils two times a day  pantoprazole  Injectable 40 milliGRAM(s) IV Push two times a day  polyethylene glycol 3350 17 Gram(s) Oral two times a day  propofol Infusion 10 MICROgram(s)/kG/Min IV Continuous <Continuous>  senna 2 Tablet(s) Oral at bedtime    PRN MEDICATIONS  acetaminophen     Tablet .. 650 milliGRAM(s) Oral every 6 hours PRN  atropine Injectable 0.5 milliGRAM(s) IV Push once PRN  dextrose Oral Gel 15 Gram(s) Oral once PRN    VITALS:   T(F): 98  HR: 86  BP: 118/54  RR: 16  SpO2: 100%    LABS:  Negative for smoking/alcohol/drug use.                         5.5    15.78 )-----------( 255      ( 09 Sep 2023 05:59 )             19.4     09-09    144  |  106  |  55<H>  ----------------------------<  108<H>  3.9   |  26  |  0.8    Ca    8.6      09 Sep 2023 05:59  Mg     2.2     09-09    TPro  4.4<L>  /  Alb  2.6<L>  /  TBili  0.4  /  DBili  x   /  AST  14  /  ALT  12  /  AlkPhos  78  09-09      Urinalysis Basic - ( 09 Sep 2023 05:59 )    Color: x / Appearance: x / SG: x / pH: x  Gluc: 108 mg/dL / Ketone: x  / Bili: x / Urobili: x   Blood: x / Protein: x / Nitrite: x   Leuk Esterase: x / RBC: x / WBC x   Sq Epi: x / Non Sq Epi: x / Bacteria: x      ABG - ( 09 Sep 2023 03:30 )  pH, Arterial: 7.45  pH, Blood: x     /  pCO2: 43    /  pO2: 113   / HCO3: 30    / Base Excess: 5.4   /  SaO2: 98.7        RADIOLOGY:    PHYSICAL EXAM:  GEN: No acute distress  HEENT: normocephalic, atraumatic, aniceteric  LUNGS: decreased breath sounds bl   HEART: S1/S2 present. RRR, no murmurs  ABD: Soft, non-tender, non-distended. Bowel sounds present  EXT: warm   NEURO: awake, follows commands, responds to pain       ASSESSMENT AND PLAN:    89yo W female w/ PMH as noted below. Pt comes to ER w/ worsening dyspnea. Pt has advanced COPD on home O2.  . Pt was @ [facility] 7/3/23   in respiratory failure due to COPD exacerbation. pt was intubated and went to ICU,  In ICU pt was extubated , left sided chest tube was placed for possible malignant effusion. During that hospitalization  pt signed DNI/DNR.  & was discharged to SNF.    Now pt again in respiratory distress from COPD.  Pt's PCO2  was 97  after being placed on 100% NRB.  Pt's MS worsened & required intubation as  family rescinded  DNR/DNI.      # Acute on chronic anemia   # Acute hypercapnic/ hypoxic res failure s/p intubation   # COPD exacerbation   # Sepsis POA possibly 2/2 Pneumonia   # (+) MRSA nares PCR   # H/O Chronic Afib (on eliquis at home)  # H/O  Recurrent ? malignant pleural effusions s/p pleurex catheter  # H/O HFpEF  # H/O Chronic Lymphedema     PLAN:  - CT AP STAT to ro retroperitoneal bleed ; OFF AC now , eliquis stopped ; transfuse 2 units prbc, repeat cbc ; active type and screen , 2 large bore iv's  - fu NGT lavage, if positive- GI EVAL   - vented ; CXR - Decreased left basal and slightly increased right basal pleural-parenchymalopacities  - PPI IV BID   - on IV Solumedrol , wean per pulm   - completed 7 days Cefepime and Vancomycin , ID Following  ; procalcitonin trended down   - monitor fever curve , leukocytosis     # dvt/gi ppx/diet  # dispo: acute  # handoff : CT AP to r/o retroperitoneal bleed ; transfuse prbc ; repeat cbc    case discussed with cc team

## 2023-09-09 NOTE — PROGRESS NOTE ADULT - ASSESSMENT
IMPRESSION:  acute hypercapnic/ hypoxic res failure s/p intubation   COPD exacerbation?  Sepsis present on admission   CAP  afib on eliquis  recurrent pleural effusions s/p pleurex   malignant pleural effusion   H/O HFpEF  anemia ?? r/o any retro bleed   PLAN:    CNS: precedx  , SAT daily    HEENT: oral care, ETT care    PULMONARY: aspiration precaution, HOB@ 45 degrees, daily abg/xray, keep pco2 around 50,  lower solumedrol 20 qd for 3 days and then d/c , albuterl PRN, vent changes: Wean FiO2 as tolerated to maintain spO2 > 92%  SBT iff passed SAT and ct abd no bleed extubate to bipap       CARDIOVASCULAR: echo, trops noted. BNP elevated, lasix 40mg  daily    also    keep I<os, baseline ekg    GI: GI prophylaxis. hold feed for SBT   stool softener   do ct abd r/o retro bleed   ng lavage     RENAL: f/u lytes correct as needed,     INFECTIOUS DISEASE s/p ABX for 7 days   follow Id recommendation     HEMATOLOGICAL: stop  [facility] if ct any bleed retroperitoneal consider protamine   stat cbc , type and screen   transfuse prbc if HB <7.5 on ABG hb 6.7     ENDOCRINE:  Follow up FS.  Insulin protocol if needed.    MUSCULOSKELETAL: bedbound    ICU  case discussed with intensivist on call

## 2023-09-10 NOTE — PROGRESS NOTE ADULT - SUBJECTIVE AND OBJECTIVE BOX
Patient seen and evaluated this am, awake on ventilator, following commands      T(F): 98.6 (09-10-23 @ 07:01), Max: 98.7 (09-09-23 @ 23:01)  HR: 111 (09-10-23 @ 08:39)  BP: 153/65 (09-10-23 @ 08:39)  RR: 22 (09-10-23 @ 08:39)  SpO2: 100% (09-10-23 @ 08:44) (98% - 100%)    PHYSICAL EXAM:  GENERAL: NAD  HEAD:  Atraumatic, Normocephalic  EYES: EOMI, PERRLA, conjunctiva and sclera clear  NERVOUS SYSTEM: no focal deficits   CHEST/LUNG:  bilateral rales  HEART: Regular rate and rhythm; No murmurs, rubs, or gallops  ABDOMEN: Soft, Nontender, Nondistended; Bowel sounds present  EXTREMITIES: ecchymosis on RUE    LABS  09-10    143  |  105  |  56<H>  ----------------------------<  106<H>  3.6   |  26  |  0.9    Ca    8.5      10 Sep 2023 05:45  Mg     2.3     09-10    TPro  5.4<L>  /  Alb  3.1<L>  /  TBili  0.6  /  DBili  x   /  AST  18  /  ALT  11  /  AlkPhos  78  09-10                          6.1    19.09 )-----------( 316      ( 10 Sep 2023 05:45 )             19.7       Culture Results:   Normal Respiratory Joaquina present (09-05-23)  Culture Results:   No growth at 5 days (09-02-23)  Culture Results:   Normal Respiratory Joaquina present (09-02-23)  Culture Results:   <10,000 CFU/mL Normal Urogenital Joaquina (09-01-23)  Culture Results:   No growth at 5 days (09-01-23)  Culture Results:   No growth at 5 days (08-28-23)  Culture Results:   No growth at 5 days (08-28-23)    RADIOLOGY  < from: CT Abdomen and Pelvis No Cont (09.09.23 @ 11:49) >    IMPRESSION:    No retroperitoneal collection.      < end of copied text >    MEDICATIONS  (STANDING):  albuterol    90 MICROgram(s) HFA Inhaler 2 Puff(s) Inhalation every 4 hours  chlorhexidine 2% Cloths 1 Application(s) Topical <User Schedule>  dexMEDEtomidine Infusion 0.05 MICROgram(s)/kG/Hr (1.1 mL/Hr) IV Continuous <Continuous>  dextrose 5%. 1000 milliLiter(s) (50 mL/Hr) IV Continuous <Continuous>  dextrose 50% Injectable 25 Gram(s) IV Push once  fentaNYL   Infusion. 0.467 MICROgram(s)/kG/Hr (4.1 mL/Hr) IV Continuous <Continuous>  gabapentin 100 milliGRAM(s) Oral three times a day  insulin lispro (ADMELOG) corrective regimen sliding scale   SubCutaneous three times a day before meals  methylPREDNISolone sodium succinate Injectable 20 milliGRAM(s) IV Push daily  metoprolol tartrate 25 milliGRAM(s) Oral two times a day  pantoprazole  Injectable 40 milliGRAM(s) IV Push two times a day  polyethylene glycol 3350 17 Gram(s) Oral two times a day  senna 2 Tablet(s) Oral at bedtime    MEDICATIONS  (PRN):  acetaminophen     Tablet .. 650 milliGRAM(s) Oral every 6 hours PRN Temp greater or equal to 38C (100.4F), Mild Pain (1 - 3)  atropine Injectable 0.5 milliGRAM(s) IV Push once PRN bradycardia  dextrose Oral Gel 15 Gram(s) Oral once PRN Blood Glucose LESS THAN 70 milliGRAM(s)/deciliter

## 2023-09-10 NOTE — PROGRESS NOTE ADULT - CRITICAL CARE ATTENDING COMMENT
This patient is critically ill due to the following:  * Multiple organ failure requiring complex decision-making, and there is a high probability of imminent or life-threatening deterioration in the patient’s condition  * The patient required frequent reassessments and monitoring to ensure response to interventions and therapies.    Critical care time includes time spent evaluating and treating the patient's acute illness as well as time spent reviewing labs, radiology,  and discussing the case with a multidisciplinary team in an effort to prevent further life threatening deterioration or end organ damage. This time is independent of any procedures performed.
This patient is critically ill due to the following:  * Multiple organ failure requiring complex decision-making, and there is a high probability of imminent or life-threatening deterioration in the patient’s condition  * The patient required frequent reassessments and monitoring to ensure response to interventions and therapies.    Critical care time includes time spent evaluating and treating the patient's acute illness as well as time spent reviewing labs, radiology,  and discussing the case with a multidisciplinary team in an effort to prevent further life threatening deterioration or end organ damage. This time is independent of any procedures performed.
This patient is critically ill due to the following:  * Respiratory instability requiring management of invasive ventilation  * Multiple organ failure requiring complex decision-making, and there is a high probability of imminent or life-threatening deterioration in the patient’s condition  * The patient required frequent reassessments and monitoring to ensure response to interventions and therapies.    Critical care time includes time spent evaluating and treating the patient's acute illness as well as time spent reviewing labs, radiology,  and discussing the case with a multidisciplinary team in an effort to prevent further life threatening deterioration or end organ damage. This time is independent of any procedures performed.
This patient is critically ill due to the following:  * Respiratory instability requiring management of invasive ventilation  * Multiple organ failure requiring complex decision-making, and there is a high probability of imminent or life-threatening deterioration in the patient’s condition  * The patient required frequent reassessments and monitoring to ensure response to interventions and therapies.    Critical care time includes time spent evaluating and treating the patient's acute illness as well as time spent reviewing labs, radiology,  and discussing the case with a multidisciplinary team in an effort to prevent further life threatening deterioration or end organ damage. This time is independent of any procedures performed.
This patient is critically ill due to the following:  * Multiple organ failure requiring complex decision-making, and there is a high probability of imminent or life-threatening deterioration in the patient’s condition  * The patient required frequent reassessments and monitoring to ensure response to interventions and therapies.    Critical care time includes time spent evaluating and treating the patient's acute illness as well as time spent reviewing labs, radiology,  and discussing the case with a multidisciplinary team in an effort to prevent further life threatening deterioration or end organ damage. This time is independent of any procedures performed.
This patient is critically ill due to the following:  * Respiratory instability requiring management of invasive ventilation  * Multiple organ failure requiring complex decision-making, and there is a high probability of imminent or life-threatening deterioration in the patient’s condition  * The patient required frequent reassessments and monitoring to ensure response to interventions and therapies.    Critical care time includes time spent evaluating and treating the patient's acute illness as well as time spent reviewing labs, radiology,  and discussing the case with a multidisciplinary team in an effort to prevent further life threatening deterioration or end organ damage. This time is independent of any procedures performed.
This patient is critically ill due to the following:  * Multiple organ failure requiring complex decision-making, and there is a high probability of imminent or life-threatening deterioration in the patient’s condition  * The patient required frequent reassessments and monitoring to ensure response to interventions and therapies.    Critical care time includes time spent evaluating and treating the patient's acute illness as well as time spent reviewing labs, radiology,  and discussing the case with a multidisciplinary team in an effort to prevent further life threatening deterioration or end organ damage. This time is independent of any procedures performed.

## 2023-09-10 NOTE — PROGRESS NOTE ADULT - SUBJECTIVE AND OBJECTIVE BOX
Patient is a 88y old  Female who presents with a chief complaint of hypercapnic respiratory failure (09 Sep 2023 12:36)      Over Night Events:  Patient seen and examined.   s/p transfusion   no pressors   still on vent   ct scan no retro bleed     ROS:  See HPI    PHYSICAL EXAM    ICU Vital Signs Last 24 Hrs  T(C): 36.7 (10 Sep 2023 03:03), Max: 37.1 (09 Sep 2023 23:01)  T(F): 98.1 (10 Sep 2023 03:03), Max: 98.7 (09 Sep 2023 23:01)  HR: 88 (10 Sep 2023 05:00) (72 - 106)  BP: 97/52 (10 Sep 2023 05:00) (52/29 - 155/67)  BP(mean): 73 (10 Sep 2023 05:00) (35 - 96)  ABP: --  ABP(mean): --  RR: 17 (10 Sep 2023 05:00) (16 - 23)  SpO2: 100% (10 Sep 2023 05:00) (98% - 100%)    O2 Parameters below as of 10 Sep 2023 05:00  Patient On (Oxygen Delivery Method): ventilator    O2 Concentration (%): 35        General: awake   HEENT:    et tube             Lymph Nodes: NO cervical LN   Lungs: decrease bibasialr   Cardiovascular: Regular   Abdomen: Soft, Positive BS  Extremities: No clubbing   Skin: warm   Neurological: no focal   Musculoskeletal: move all ext     I&O's Detail    08 Sep 2023 07:01  -  09 Sep 2023 07:00  --------------------------------------------------------  IN:    Dexmedetomidine: 194 mL    Enteral Tube Flush: 60 mL    Lactated Ringers Bolus: 500 mL    Vital High Protein: 370 mL  Total IN: 1124 mL    OUT:    Voided (mL): 100 mL  Total OUT: 100 mL    Total NET: 1024 mL      09 Sep 2023 07:01  -  10 Sep 2023 06:24  --------------------------------------------------------  IN:    Dexmedetomidine: 84 mL    FentaNYL: 193.5 mL    PRBCs (Packed Red Blood Cells): 337 mL    Vital High Protein: 360 mL  Total IN: 974.5 mL    OUT:    Voided (mL): 600 mL  Total OUT: 600 mL    Total NET: 374.5 mL          LABS:                          9.3    29.00 )-----------( 261      ( 09 Sep 2023 21:43 )             31.1         09 Sep 2023 05:59    144    |  106    |  55     ----------------------------<  108    3.9     |  26     |  0.8      Ca    8.6        09 Sep 2023 05:59  Mg     2.2       09 Sep 2023 05:59                                                                                      Urinalysis Basic - ( 09 Sep 2023 05:59 )    Color: x / Appearance: x / SG: x / pH: x  Gluc: 108 mg/dL / Ketone: x  / Bili: x / Urobili: x   Blood: x / Protein: x / Nitrite: x   Leuk Esterase: x / RBC: x / WBC x   Sq Epi: x / Non Sq Epi: x / Bacteria: x                                                                                                             Mode: AC/ CMV (Assist Control/ Continuous Mandatory Ventilation)  RR (machine): 16  TV (machine): 400  FiO2: 35  PEEP: 5  MAP: 12  PIP: 29                                      ABG - ( 10 Sep 2023 04:16 )  pH, Arterial: 7.50  pH, Blood: x     /  pCO2: 37    /  pO2: 144   / HCO3: 29    / Base Excess: 5.3   /  SaO2: 98.7                MEDICATIONS  (STANDING):  albuterol    90 MICROgram(s) HFA Inhaler 2 Puff(s) Inhalation every 4 hours  chlorhexidine 0.12% Liquid 15 milliLiter(s) Oral Mucosa every 12 hours  chlorhexidine 2% Cloths 1 Application(s) Topical <User Schedule>  dexMEDEtomidine Infusion 0.05 MICROgram(s)/kG/Hr (1.1 mL/Hr) IV Continuous <Continuous>  dextrose 5%. 1000 milliLiter(s) (50 mL/Hr) IV Continuous <Continuous>  dextrose 50% Injectable 25 Gram(s) IV Push once  fentaNYL   Infusion. 0.467 MICROgram(s)/kG/Hr (4.1 mL/Hr) IV Continuous <Continuous>  gabapentin 100 milliGRAM(s) Oral three times a day  glucagon  Injectable 1 milliGRAM(s) IntraMuscular once  insulin lispro (ADMELOG) corrective regimen sliding scale   SubCutaneous three times a day before meals  methylPREDNISolone sodium succinate Injectable 20 milliGRAM(s) IV Push daily  metoprolol tartrate 25 milliGRAM(s) Oral two times a day  ondansetron Injectable 4 milliGRAM(s) IV Push once  pantoprazole  Injectable 40 milliGRAM(s) IV Push two times a day  polyethylene glycol 3350 17 Gram(s) Oral two times a day  propofol Infusion 10 MICROgram(s)/kG/Min (5.27 mL/Hr) IV Continuous <Continuous>  senna 2 Tablet(s) Oral at bedtime    MEDICATIONS  (PRN):  acetaminophen     Tablet .. 650 milliGRAM(s) Oral every 6 hours PRN Temp greater or equal to 38C (100.4F), Mild Pain (1 - 3)  atropine Injectable 0.5 milliGRAM(s) IV Push once PRN bradycardia  dextrose Oral Gel 15 Gram(s) Oral once PRN Blood Glucose LESS THAN 70 milliGRAM(s)/deciliter          Xrays:  TLC:  OG:  ET tube:                                                                                    small b/l effusion    ECHO:  [facility] ICU:

## 2023-09-10 NOTE — PROGRESS NOTE ADULT - ASSESSMENT
87 YO F with a medical history  of HTN, malignant pleural effusion of breast origin, chronic A.fib, PAD, Obesity, Chronic lymphedema of  BLE and COPD on home oxygen 2 - 1/2 L nc, ESBL UTI presented to ED with severe SOB, DNR / DNI rescinded and pt was intubated and admitted to ICU    acute respiratory failure / B/L effusions / possible pneumonia / COPD / stage 2 sacral decubiti was POA / anemia     - s/p transfusion of one unit PRBC   - CT abdomen negative for bleed   - HGB again low today   - check repeat and if real - transfuse PRBC   - SAT -> SBT -> extubate   -  DNR    - completed  antibiotic course   - DC steroids   - anticoagulation on hold secondary to anemia   - pulmonary following

## 2023-09-10 NOTE — PROGRESS NOTE ADULT - ASSESSMENT
IMPRESSION:  acute hypercapnic/ hypoxic res failure s/p intubation   COPD exacerbation?  Sepsis present on admission   CAP  afib on eliquis  recurrent pleural effusions s/p pleurex   malignant pleural effusion   H/O HFpEF  anemia    PLAN:    CNS: precedx  , SAT daily    HEENT: oral care, ETT care    PULMONARY: aspiration precaution, HOB@ 45 degrees, daily abg/xray, keep pco2 around 50, d/c solumedrol  , albuterl PRN, vent changes: Wean FiO2 as tolerated to maintain spO2 > 92%  SBT today for extubation   pleurx to be drain fabien     CARDIOVASCULAR: echo, trops noted. BNP elevated, lasix 40mg  daily    also    keep I<os, baseline ekg    GI: GI prophylaxis. hold feed for SBT  resume if failed   stool softener   ng lavage     RENAL: f/u lytes correct as needed,     INFECTIOUS DISEASE s/p ABX for 7 days   follow Id recommendation     HEMATOLOGICAL if h/h remain stable today resume lovenox fabien Q12 hrs   stat cbc , type and screen   transfuse prbc if HB <7.5      ENDOCRINE:  Follow up FS.  Insulin protocol if needed.    MUSCULOSKELETAL: bedbound    ICU

## 2023-09-11 NOTE — PHYSICAL THERAPY INITIAL EVALUATION ADULT - GENERAL OBSERVATIONS, REHAB EVAL
9;00-9;20 pt was seen for PT IE at bed side,. pt is agreeable, chart thoroughly reviewed, RN Sharda is aware.  Pt received and left semi reese in bed, in no apparent distress, +hep lock, +telemonitoring, +BP cuff and +pulse ox, +primafit, +O2 via NC @ 3L, +call bell within reach, bed side table at reach

## 2023-09-11 NOTE — PROGRESS NOTE ADULT - SUBJECTIVE AND OBJECTIVE BOX
Patient is a 88y old  Female who presents with a chief complaint of hypercapnic respiratory failure (11 Sep 2023 07:50)        Over Night Events: no acute events overnight, SP extubation 9/10 on 3L NC sating 96%, SP 1 unit of PRBC yesterday       Vital Signs Last 24 Hrs  T(C): 37.3 (11 Sep 2023 07:01), Max: 37.8 (11 Sep 2023 01:00)  T(F): 99.1 (11 Sep 2023 07:01), Max: 100 (11 Sep 2023 01:00)  HR: 102 (11 Sep 2023 07:00) (98 - 113)  BP: 108/53 (11 Sep 2023 07:00) (101/57 - 170/61)  BP(mean): 75 (11 Sep 2023 07:00) (72 - 113)  RR: 20 (11 Sep 2023 07:01) (15 - 25)  SpO2: 98% (11 Sep 2023 07:00) (94% - 100%)    O2 Parameters below as of 11 Sep 2023 07:00  Patient On (Oxygen Delivery Method): nasal cannula  O2 Flow (L/min): 3          CONSTITUTIONAL:  Chronically Ill appearing. NAD    ENT:   Airway patent,   Mouth with normal mucosa.   No thrush    EYES:   Pupils equal,   Round and reactive to light.    CARDIAC:   Normal rate,   Regular rhythm.    anasarca     RESPIRATORY:   No wheezing  Bilateral BS  Normal chest expansion  Not tachypneic,  No use of accessory muscles    GASTROINTESTINAL:  Abdomen soft,   Non-tender,   No guarding,   + BS    MUSCULOSKELETAL:   Range of motion is not limited      NEUROLOGICAL:   Alert and oriented x4    SKIN:   Skin normal color for race,   Warm and dry  stage II sacral ulcer     PSYCHIATRIC:   No apparent risk to self or others.        09-10-23 @ 07:01  -  09-11-23 @ 07:00  --------------------------------------------------------  IN:    Dexmedetomidine: 4 mL    IV PiggyBack: 100 mL    PRBCs (Packed Red Blood Cells): 325 mL  Total IN: 429 mL    OUT:    Voided (mL): 350 mL  Total OUT: 350 mL    Total NET: 79 mL          LABS:                            6.5    23.79 )-----------( 161      ( 11 Sep 2023 06:08 )             21.3                                               09-11    146  |  107  |  55<H>  ----------------------------<  131<H>  4.8   |  24  |  1.2    Ca    8.9      11 Sep 2023 06:08  Mg     2.5     09-11    TPro  5.1<L>  /  Alb  3.1<L>  /  TBili  0.7  /  DBili  x   /  AST  51<H>  /  ALT  18  /  AlkPhos  89  09-11                                             Urinalysis Basic - ( 11 Sep 2023 06:08 )    Color: x / Appearance: x / SG: x / pH: x  Gluc: 131 mg/dL / Ketone: x  / Bili: x / Urobili: x   Blood: x / Protein: x / Nitrite: x   Leuk Esterase: x / RBC: x / WBC x   Sq Epi: x / Non Sq Epi: x / Bacteria: x                                                  LIVER FUNCTIONS - ( 11 Sep 2023 06:08 )  Alb: 3.1 g/dL / Pro: 5.1 g/dL / ALK PHOS: 89 U/L / ALT: 18 U/L / AST: 51 U/L / GGT: x                                                                                                                                   ABG - ( 10 Sep 2023 08:20 )  pH, Arterial: 7.41  pH, Blood: x     /  pCO2: 47    /  pO2: 124   / HCO3: 30    / Base Excess: 4.7   /  SaO2: 99.8                MEDICATIONS  (STANDING):  albuterol    90 MICROgram(s) HFA Inhaler 2 Puff(s) Inhalation every 4 hours  chlorhexidine 2% Cloths 1 Application(s) Topical <User Schedule>  dextrose 5%. 1000 milliLiter(s) (50 mL/Hr) IV Continuous <Continuous>  dextrose 50% Injectable 25 Gram(s) IV Push once  gabapentin 100 milliGRAM(s) Oral three times a day  glucagon  Injectable 1 milliGRAM(s) IntraMuscular once  insulin lispro (ADMELOG) corrective regimen sliding scale   SubCutaneous three times a day before meals  metoprolol tartrate Injectable 2.5 milliGRAM(s) IV Push every 6 hours  pantoprazole  Injectable 40 milliGRAM(s) IV Push two times a day  polyethylene glycol 3350 17 Gram(s) Oral two times a day  senna 2 Tablet(s) Oral at bedtime    MEDICATIONS  (PRN):  acetaminophen     Tablet .. 650 milliGRAM(s) Oral every 6 hours PRN Temp greater or equal to 38C (100.4F), Mild Pain (1 - 3)  atropine Injectable 0.5 milliGRAM(s) IV Push once PRN bradycardia  dextrose Oral Gel 15 Gram(s) Oral once PRN Blood Glucose LESS THAN 70 milliGRAM(s)/deciliter      CXR reviewed

## 2023-09-11 NOTE — PHYSICAL THERAPY INITIAL EVALUATION ADULT - PERTINENT HX OF CURRENT PROBLEM, REHAB EVAL
This is an 87yo W female w/ PMH as noted below. Pt comes to ER w/ worsening dyspnea. Pt has advanced COPD on home O2.  . Pt was @ [facility] 7/3/23   in respiratory failure due to COPD exacerbation. pt was intubated and went to ICU,  In ICU pt was extubated , left sided chest tube was placed for possible malignant effusion. During that hospitalization  pt signed DNI/DNR.  & was discharged to SNF.    Now pt again in respiratory distress from COPD.  Pt's PCO2  was 97  after being placed on 100% NRB.  Pt's MS worsened & required intubation as  family rescinded  DNR/DNI.

## 2023-09-11 NOTE — PROGRESS NOTE ADULT - SUBJECTIVE AND OBJECTIVE BOX
DANIELEGIOVANNY THAKUR  88y, Female    LOS  14d    INTERVAL EVENTS/HPI  - No acute events overnight  - T(F): , Max: 100 (09-11-23 @ 01:00)  - Tolerating medication  - WBC Count: 25.67 (09-10-23 @ 19:21)  WBC Count: 23.28 (09-10-23 @ 10:40)  - Creatinine: 0.9 (09-10-23 @ 05:45)    REVIEW OF SYSTEMS: cannot obtain further history from the patient secondary to altered mental status or sedation    Prior hospital charts reviewed [Yes]  Primary team notes reviewed [Yes]  Other consultant notes reviewed [Yes]    ANTIMICROBIALS:   MEDICATIONS  (STANDING):  azithromycin  IVPB   255 mL/Hr IV Intermittent (08-29-23 @ 14:27)    azithromycin  IVPB   255 mL/Hr IV Intermittent (09-01-23 @ 12:05)   255 mL/Hr IV Intermittent (08-31-23 @ 11:42)   255 mL/Hr IV Intermittent (08-30-23 @ 11:50)    aztreonam  IVPB   50 mL/Hr IV Intermittent (08-28-23 @ 13:13)    cefepime   IVPB   100 mL/Hr IV Intermittent (09-01-23 @ 21:20)    cefepime   IVPB   100 mL/Hr IV Intermittent (09-05-23 @ 05:00)   100 mL/Hr IV Intermittent (09-04-23 @ 21:39)   100 mL/Hr IV Intermittent (09-04-23 @ 14:28)   100 mL/Hr IV Intermittent (09-04-23 @ 05:14)   100 mL/Hr IV Intermittent (09-03-23 @ 21:58)   100 mL/Hr IV Intermittent (09-03-23 @ 14:04)   100 mL/Hr IV Intermittent (09-03-23 @ 05:05)   100 mL/Hr IV Intermittent (09-02-23 @ 22:16)   100 mL/Hr IV Intermittent (09-02-23 @ 13:33)   100 mL/Hr IV Intermittent (09-02-23 @ 05:12)    cefepime   IVPB   100 mL/Hr IV Intermittent (09-08-23 @ 05:04)   100 mL/Hr IV Intermittent (09-07-23 @ 18:34)   100 mL/Hr IV Intermittent (09-07-23 @ 05:09)   100 mL/Hr IV Intermittent (09-06-23 @ 17:02)   100 mL/Hr IV Intermittent (09-06-23 @ 06:25)   100 mL/Hr IV Intermittent (09-05-23 @ 18:07)    cefTRIAXone   IVPB   100 mL/Hr IV Intermittent (09-01-23 @ 12:04)   100 mL/Hr IV Intermittent (08-31-23 @ 11:43)   100 mL/Hr IV Intermittent (08-30-23 @ 16:41)    cefTRIAXone   IVPB   100 mL/Hr IV Intermittent (08-29-23 @ 14:26)    vancomycin  IVPB   166.67 mL/Hr IV Intermittent (09-05-23 @ 21:32)   166.67 mL/Hr IV Intermittent (09-05-23 @ 10:21)   166.67 mL/Hr IV Intermittent (09-04-23 @ 21:39)   166.67 mL/Hr IV Intermittent (09-04-23 @ 09:47)   166.67 mL/Hr IV Intermittent (09-03-23 @ 21:58)   166.67 mL/Hr IV Intermittent (09-03-23 @ 12:15)   166.67 mL/Hr IV Intermittent (09-02-23 @ 23:04)   166.67 mL/Hr IV Intermittent (09-02-23 @ 10:59)    vancomycin  IVPB   250 mL/Hr IV Intermittent (08-28-23 @ 15:19)    vancomycin  IVPB   166.67 mL/Hr IV Intermittent (09-01-23 @ 21:41)        OTHER MEDS: MEDICATIONS  (STANDING):  acetaminophen     Tablet .. 650 every 6 hours PRN  albuterol    90 MICROgram(s) HFA Inhaler 2 every 4 hours  atropine Injectable 0.5 once PRN  dextrose 50% Injectable 25 once  dextrose Oral Gel 15 once PRN  gabapentin 100 three times a day  glucagon  Injectable 1 once  insulin lispro (ADMELOG) corrective regimen sliding scale  three times a day before meals  metoprolol tartrate Injectable 2.5 every 6 hours  pantoprazole  Injectable 40 two times a day  polyethylene glycol 3350 17 two times a day  senna 2 at bedtime      Vital Signs Last 24 Hrs  T(F): 99.1 (09-11-23 @ 07:01), Max: 100 (09-11-23 @ 01:00)    Vital Signs Last 24 Hrs  HR: 102 (09-11-23 @ 07:00) (97 - 113)  BP: 108/53 (09-11-23 @ 07:00) (96/45 - 170/61)  RR: 20 (09-11-23 @ 07:01)  SpO2: 98% (09-11-23 @ 07:00) (94% - 100%)  Wt(kg): --    EXAM:  GENERAL: NAD on MV.  HEAD: No head lesions  EYES: Conjunctiva pink and cornea white  NECK: Supple, nontender to palpation  CHEST/LUNG: Mild bilateral crackles.   HEART: S1 S2  ABDOMEN: Soft, nontender, nondistended  EXTREMITIES: No clubbing, cyanosis, or petal edema  NERVOUS SYSTEM: Awake.   MSK: No joint erythema, swelling or pain  SKIN: No rashes or lesions, no superficial thrombophlebitis  Labs:                        7.5    25.67 )-----------( 372      ( 10 Sep 2023 19:21 )             24.1     09-10    143  |  105  |  56<H>  ----------------------------<  106<H>  3.6   |  26  |  0.9    Ca    8.5      10 Sep 2023 05:45  Mg     2.3     09-10    TPro  5.4<L>  /  Alb  3.1<L>  /  TBili  0.6  /  DBili  x   /  AST  18  /  ALT  11  /  AlkPhos  78  09-10      WBC Trend:  WBC Count: 25.67 (09-10-23 @ 19:21)  WBC Count: 23.28 (09-10-23 @ 10:40)  WBC Count: 19.09 (09-10-23 @ 05:45)  WBC Count: 29.00 (09-09-23 @ 21:43)      Creatine Trend:  Creatinine: 0.9 (09-10)  Creatinine: 0.8 (09-09)  Creatinine: 0.8 (09-08)  Creatinine: 0.8 (09-07)      Liver Biochemical Testing Trend:  Alanine Aminotransferase (ALT/SGPT): 11 (09-10)  Alanine Aminotransferase (ALT/SGPT): 12 (09-09)  Alanine Aminotransferase (ALT/SGPT): 15 (09-08)  Alanine Aminotransferase (ALT/SGPT): 20 (09-07)  Alanine Aminotransferase (ALT/SGPT): 22 (09-06)  Aspartate Aminotransferase (AST/SGOT): 18 (09-10-23 @ 05:45)  Aspartate Aminotransferase (AST/SGOT): 14 (09-09-23 @ 05:59)  Aspartate Aminotransferase (AST/SGOT): 11 (09-08-23 @ 05:44)  Aspartate Aminotransferase (AST/SGOT): 13 (09-07-23 @ 06:02)  Aspartate Aminotransferase (AST/SGOT): 17 (09-06-23 @ 05:50)  Bilirubin Total: 0.6 (09-10)  Bilirubin Total: 0.4 (09-09)  Bilirubin Total: 0.4 (09-08)  Bilirubin Total: 0.2 (09-07)  Bilirubin Total: <0.2 (09-06)      Trend LDH  09-10-23 @ 19:21  398<H>  03-07-23 @ 21:06  211      Urinalysis Basic - ( 10 Sep 2023 05:45 )    Color: x / Appearance: x / SG: x / pH: x  Gluc: 106 mg/dL / Ketone: x  / Bili: x / Urobili: x   Blood: x / Protein: x / Nitrite: x   Leuk Esterase: x / RBC: x / WBC x   Sq Epi: x / Non Sq Epi: x / Bacteria: x        MICROBIOLOGY:  Vancomycin Level, Random: 36.7 (09-07 @ 06:02)  Vancomycin Level, Trough: 34.5 (09-06 @ 06:44)  Vancomycin Level, Trough: 35.1 (09-05 @ 20:09)  Vancomycin Level, Trough: 14.0 (09-02 @ 19:02)    Female    Culture - Sputum (collected 05 Sep 2023 19:15)  Source: Trach Asp Tracheal Aspirate  Final Report:    Normal Respiratory Joaquina present    Culture - Blood (collected 02 Sep 2023 06:01)  Source: .Blood None  Final Report:    No growth at 5 days    Culture - Sputum (collected 02 Sep 2023 03:00)  Source: Trach Asp Tracheal Aspirate  Final Report:    Normal Respiratory Joaquina present    Culture - Urine (collected 01 Sep 2023 19:50)  Source: Catheterized Catheterized  Final Report:    <10,000 CFU/mL Normal Urogenital Joaquina    Culture - Blood (collected 01 Sep 2023 19:32)  Source: .Blood None  Final Report:    No growth at 5 days    Culture - Blood (collected 28 Aug 2023 11:00)  Source: .Blood Blood-Peripheral  Final Report:    No growth at 5 days    Culture - Blood (collected 28 Aug 2023 11:00)  Source: .Blood Blood-Peripheral  Final Report:    No growth at 5 days    Culture - Abscess with Gram Stain (collected 16 Jul 2023 07:00)  Source: .Abscess Right groin abscess  Final Report:    Numerous Proteus mirabilis    Numerous Enterococcus faecalis    Few Coag Negative Staphylococcus "Susceptibilities not performed"    Few Enterococcus faecium (vancomycin resistant)    Moderate Prevotella timonensis "Susceptibilities not performed"  Organism: Proteus mirabilis  Enterococcus faecalis  Enterococcus faecium (vancomycin resistant)  Organism: Enterococcus faecium (vancomycin resistant)    Sensitivities:      Method Type: DARIO      -  Ampicillin: R >8 Predicts results to ampicillin/sulbactam, amoxacillin-clavulanate and  piperacillin-tazobactam.      -  Daptomycin: SDD 4 The breakpoint for SDD (susceptible dose dependent)is based on a dosage regimen of 8-12 mg/kg administered every 24 h in adults and is intended for serious infections due to E. faecium. Consultation with an infectious diseases specialist is recommended.      -  Levofloxacin: R >4      -  Linezolid: S 2      -  Tetracycline: R >8      -  Vancomycin: R >16  Organism: Enterococcus faecalis    Sensitivities:      Method Type: DARIO      -  Ampicillin: S <=2 Predicts results to ampicillin/sulbactam, amoxacillin-clavulanate and  piperacillin-tazobactam.      -  Tetracycline: R >8      -  Vancomycin: S 2  Organism: Proteus mirabilis    Sensitivities:      Method Type: DARIO      -  Amikacin: S <=16      -  Amoxicillin/Clavulanic Acid: S <=8/4      -  Ampicillin: S <=8 These ampicillin results predict results for amoxicillin      -  Ampicillin/Sulbactam: S <=4/2 Enterobacter, Klebsiella aerogenes, Citrobacter, and Serratia may develop resistance during prolonged therapy (3-4 days)      -  Aztreonam: S <=4      -  Cefazolin: S <=2 Enterobacter, Klebsiella aerogenes, Citrobacter, and Serratia may develop resistance during prolonged therapy (3-4 days)      -  Cefepime: S <=2      -  Cefoxitin: S <=8      -  Ceftriaxone: S <=1 Enterobacter, Klebsiella aerogenes, Citrobacter, and Serratia may develop resistance during prolonged therapy      -  Ciprofloxacin: S <=0.25      -  Ertapenem: S <=0.5      -  Gentamicin: S <=2      -  Levofloxacin: S <=0.5      -  Meropenem: S <=1      -  Piperacillin/Tazobactam: S <=8      -  Tobramycin: S <=2      -  Trimethoprim/Sulfamethoxazole: S 1/19    Culture - Blood (collected 08 Jul 2023 21:23)  Source: .Blood Blood-Peripheral  Final Report:    No growth at 5 days    Culture - Fungal, Body Fluid (collected 05 Jul 2023 15:45)  Source: Pleural Fl Pleural Fluid  Final Report:    No fungus isolated at 4 weeks.      Lactate Dehydrogenase, Serum: 398 (09-10)        Blood Gas Arterial, Lactate: 0.80 (09-10 @ 08:20)  Blood Gas Arterial, Lactate: 0.80 (09-10 @ 04:16)  Blood Gas Arterial, Lactate: 0.90 (09-09 @ 03:30)        RADIOLOGY & ADDITIONAL TESTS:  I have personally reviewed the relevant images.   CXR  Xray Chest 1 View- PORTABLE-Urgent:   ACC: [id number] EXAM:  XR CHEST PORTABLE URGENT 1V   ORDERED BY: DELORIS MUSTAFA     PROCEDURE DATE:  09/09/2023          INTERPRETATION:  Clinical History / Reason for exam: Intubated,   88-year-old female with acute respiratory failure with hypoxia.    Comparison : Chest radiograph performed 9/9/2023 at 6:18 AM.    Technique/Positioning: Portable, AP semi-erect.    Findings:    Support devices: Endotracheal tube is present, with tip 2.4 cm above the   shelly.  Enteric feeding tube courses over the midline below the left   guicho-diaphragm; the tip is excluded from the image.  Chest tube is stable   at the left lung base.    Cardiac/mediastinum/hilum: The cardiac silhouette is magnified.  There   are thoracic aortic calcifications.  On    Lung parenchyma/Pleura: There is a stable left basilar pulmonary   opacity/pleural effusion.  Stable right basilar pleural-parenchymal   pulmonary opacity.  No pneumothorax is seen.    Skeleton/soft tissues: Stable.    Impression:  1.  Support lines/tubes, as described.  2.  Stable left basilar pulmonary opacity/pleural effusion.  3.  Stable right basilar pleural-parenchymal pulmonary opacity.  4.  No pneumothorax.        --- End of Report ---            WILLIAM GILLESPIE MD; Attending Radiologist  This document has been electronically signed. Sep  9 2023  4:05PM (09-09-23 @ 15:28)      CT  CT Abdomen and Pelvis No Cont:   ACC: [id number] EXAM:  CT ABDOMEN AND PELVIS   ORDERED BY: CLAIRE POLANCO     PROCEDURE DATE:  09/09/2023          INTERPRETATION:  CLINICAL STATEMENT: Rule out retroperitoneal bleed    TECHNIQUE: Contiguous axial CT images were obtained from the lower chest   to the pubic symphysis without intravenous contrast.  Oral contrast was   not administered.  Reformatted images in the coronal and sagittal planes   were acquired.    COMPARISON CT: 7/15/2023    QUALITY STATEMENT: Patient upper extremities causing streak artifact   inherently degrades image quality and limits this exam. Note that the   evaluation of solid organs and bowel loops is limited secondary to lack   of oral and IV contrast.    FINDINGS:    LOWER CHEST: Partially imaged bilateral pleural effusions with adjacent   consolidative opacities. Partially imaged left chest tube and mediastinal   drain.    HEPATOBILIARY: Unremarkable.    SPLEEN: Unremarkable.    PANCREAS: Unremarkable.    ADRENAL GLANDS: Unremarkable.    KIDNEYS: No hydronephrosis. Bilateral cysts. Exophytic nodular density   along the left kidney on series 302 image 130 measuring greater than   simple fluid density confounded by artifact.    ABDOMINOPELVIC NODES: Unremarkable.    PELVIC ORGANS: Distended gallbladder.    PERITONEUM/MESENTERY/BOWEL: No bowel obstruction, pneumatosis or   pneumoperitoneum. Trace ascites and mesenteric edema. No retroperitoneal   collection.    BONES/SOFT TISSUES: No acute osseous abnormality. Anasarca. Question of a   sacral decubitus ulcer versus gluteal fold. right lower quadrant anterior   ventral wall cutaneous defect.    OTHER: Normal caliber aorta.      IMPRESSION:    No retroperitoneal collection.    Question of a sacral decubitus ulcer. Correlate with physical exam. Right   lower quadrant anterior ventral wall cutaneous defect. Correlate with   physical exam.    Partially imaged bilateral pleural effusions with adjacent consolidative   opacities. There is pleural thickening along the collection on the left   which could reflect an empyema. Correlate with chest tube output.    Exophytic nodular density along the left kidney on series 302 image 130   measuring greater than simple fluid density confounded by artifact.   Nonemergent ultrasound is recommended to confirm cysts.    --- End of Report ---        PEE MORELAND MD; Attending Radiologist  This document has been electronically signed. Sep  9 2023  2:21PM (09-09-23 @ 11:49)        WEIGHT  Weight (kg): 87.8 (08-28-23 @ 21:03)      All available historical records have been reviewed       MAREKLORETTAGIOVANNY THAKUR  88y, Female    LOS  14d    INTERVAL EVENTS/HPI  - No acute events overnight  - T(F): , Max: 100 (09-11-23 @ 01:00)  - Tolerating medication  - WBC Count: 25.67 (09-10-23 @ 19:21)  WBC Count: 23.28 (09-10-23 @ 10:40)  - Creatinine: 0.9 (09-10-23 @ 05:45)    REVIEW OF SYSTEMS: cannot obtain further history from the patient secondary to altered mental status    Prior hospital charts reviewed [Yes]  Primary team notes reviewed [Yes]  Other consultant notes reviewed [Yes]    ANTIMICROBIALS:   MEDICATIONS  (STANDING):  azithromycin  IVPB   255 mL/Hr IV Intermittent (08-29-23 @ 14:27)    azithromycin  IVPB   255 mL/Hr IV Intermittent (09-01-23 @ 12:05)   255 mL/Hr IV Intermittent (08-31-23 @ 11:42)   255 mL/Hr IV Intermittent (08-30-23 @ 11:50)    aztreonam  IVPB   50 mL/Hr IV Intermittent (08-28-23 @ 13:13)    cefepime   IVPB   100 mL/Hr IV Intermittent (09-01-23 @ 21:20)    cefepime   IVPB   100 mL/Hr IV Intermittent (09-05-23 @ 05:00)   100 mL/Hr IV Intermittent (09-04-23 @ 21:39)   100 mL/Hr IV Intermittent (09-04-23 @ 14:28)   100 mL/Hr IV Intermittent (09-04-23 @ 05:14)   100 mL/Hr IV Intermittent (09-03-23 @ 21:58)   100 mL/Hr IV Intermittent (09-03-23 @ 14:04)   100 mL/Hr IV Intermittent (09-03-23 @ 05:05)   100 mL/Hr IV Intermittent (09-02-23 @ 22:16)   100 mL/Hr IV Intermittent (09-02-23 @ 13:33)   100 mL/Hr IV Intermittent (09-02-23 @ 05:12)    cefepime   IVPB   100 mL/Hr IV Intermittent (09-08-23 @ 05:04)   100 mL/Hr IV Intermittent (09-07-23 @ 18:34)   100 mL/Hr IV Intermittent (09-07-23 @ 05:09)   100 mL/Hr IV Intermittent (09-06-23 @ 17:02)   100 mL/Hr IV Intermittent (09-06-23 @ 06:25)   100 mL/Hr IV Intermittent (09-05-23 @ 18:07)    cefTRIAXone   IVPB   100 mL/Hr IV Intermittent (09-01-23 @ 12:04)   100 mL/Hr IV Intermittent (08-31-23 @ 11:43)   100 mL/Hr IV Intermittent (08-30-23 @ 16:41)    cefTRIAXone   IVPB   100 mL/Hr IV Intermittent (08-29-23 @ 14:26)    vancomycin  IVPB   166.67 mL/Hr IV Intermittent (09-05-23 @ 21:32)   166.67 mL/Hr IV Intermittent (09-05-23 @ 10:21)   166.67 mL/Hr IV Intermittent (09-04-23 @ 21:39)   166.67 mL/Hr IV Intermittent (09-04-23 @ 09:47)   166.67 mL/Hr IV Intermittent (09-03-23 @ 21:58)   166.67 mL/Hr IV Intermittent (09-03-23 @ 12:15)   166.67 mL/Hr IV Intermittent (09-02-23 @ 23:04)   166.67 mL/Hr IV Intermittent (09-02-23 @ 10:59)    vancomycin  IVPB   250 mL/Hr IV Intermittent (08-28-23 @ 15:19)    vancomycin  IVPB   166.67 mL/Hr IV Intermittent (09-01-23 @ 21:41)        OTHER MEDS: MEDICATIONS  (STANDING):  acetaminophen     Tablet .. 650 every 6 hours PRN  albuterol    90 MICROgram(s) HFA Inhaler 2 every 4 hours  atropine Injectable 0.5 once PRN  dextrose 50% Injectable 25 once  dextrose Oral Gel 15 once PRN  gabapentin 100 three times a day  glucagon  Injectable 1 once  insulin lispro (ADMELOG) corrective regimen sliding scale  three times a day before meals  metoprolol tartrate Injectable 2.5 every 6 hours  pantoprazole  Injectable 40 two times a day  polyethylene glycol 3350 17 two times a day  senna 2 at bedtime      Vital Signs Last 24 Hrs  T(F): 99.1 (09-11-23 @ 07:01), Max: 100 (09-11-23 @ 01:00)    Vital Signs Last 24 Hrs  HR: 102 (09-11-23 @ 07:00) (97 - 113)  BP: 108/53 (09-11-23 @ 07:00) (96/45 - 170/61)  RR: 20 (09-11-23 @ 07:01)  SpO2: 98% (09-11-23 @ 07:00) (94% - 100%)  Wt(kg): --    EXAM:  GENERAL: NAD. Interactive. On Nasal canula   HEAD: No head lesions  EYES: Conjunctiva pink and cornea white  NECK: Supple, nontender to palpation  CHEST/LUNG: Mild bilateral crackles.   HEART: S1 S2  ABDOMEN: Soft, nontender, nondistended  EXTREMITIES: No clubbing, cyanosis. +1 edema bilaterally. Chronic venous stasis dermatitis. No open wounds.   NERVOUS SYSTEM: Awake.   MSK: No joint erythema, swelling or pain  SKIN: No rashes or lesions, no superficial thrombophlebitis  Labs:                        7.5    25.67 )-----------( 372      ( 10 Sep 2023 19:21 )             24.1     09-10    143  |  105  |  56<H>  ----------------------------<  106<H>  3.6   |  26  |  0.9    Ca    8.5      10 Sep 2023 05:45  Mg     2.3     09-10    TPro  5.4<L>  /  Alb  3.1<L>  /  TBili  0.6  /  DBili  x   /  AST  18  /  ALT  11  /  AlkPhos  78  09-10      WBC Trend:  WBC Count: 25.67 (09-10-23 @ 19:21)  WBC Count: 23.28 (09-10-23 @ 10:40)  WBC Count: 19.09 (09-10-23 @ 05:45)  WBC Count: 29.00 (09-09-23 @ 21:43)      Creatine Trend:  Creatinine: 0.9 (09-10)  Creatinine: 0.8 (09-09)  Creatinine: 0.8 (09-08)  Creatinine: 0.8 (09-07)      Liver Biochemical Testing Trend:  Alanine Aminotransferase (ALT/SGPT): 11 (09-10)  Alanine Aminotransferase (ALT/SGPT): 12 (09-09)  Alanine Aminotransferase (ALT/SGPT): 15 (09-08)  Alanine Aminotransferase (ALT/SGPT): 20 (09-07)  Alanine Aminotransferase (ALT/SGPT): 22 (09-06)  Aspartate Aminotransferase (AST/SGOT): 18 (09-10-23 @ 05:45)  Aspartate Aminotransferase (AST/SGOT): 14 (09-09-23 @ 05:59)  Aspartate Aminotransferase (AST/SGOT): 11 (09-08-23 @ 05:44)  Aspartate Aminotransferase (AST/SGOT): 13 (09-07-23 @ 06:02)  Aspartate Aminotransferase (AST/SGOT): 17 (09-06-23 @ 05:50)  Bilirubin Total: 0.6 (09-10)  Bilirubin Total: 0.4 (09-09)  Bilirubin Total: 0.4 (09-08)  Bilirubin Total: 0.2 (09-07)  Bilirubin Total: <0.2 (09-06)      Trend LDH  09-10-23 @ 19:21  398<H>  03-07-23 @ 21:06  211      Urinalysis Basic - ( 10 Sep 2023 05:45 )    Color: x / Appearance: x / SG: x / pH: x  Gluc: 106 mg/dL / Ketone: x  / Bili: x / Urobili: x   Blood: x / Protein: x / Nitrite: x   Leuk Esterase: x / RBC: x / WBC x   Sq Epi: x / Non Sq Epi: x / Bacteria: x        MICROBIOLOGY:  Vancomycin Level, Random: 36.7 (09-07 @ 06:02)  Vancomycin Level, Trough: 34.5 (09-06 @ 06:44)  Vancomycin Level, Trough: 35.1 (09-05 @ 20:09)  Vancomycin Level, Trough: 14.0 (09-02 @ 19:02)    Female    Culture - Sputum (collected 05 Sep 2023 19:15)  Source: Trach Asp Tracheal Aspirate  Final Report:    Normal Respiratory Joaquina present    Culture - Blood (collected 02 Sep 2023 06:01)  Source: .Blood None  Final Report:    No growth at 5 days    Culture - Sputum (collected 02 Sep 2023 03:00)  Source: Trach Asp Tracheal Aspirate  Final Report:    Normal Respiratory Joaquina present    Culture - Urine (collected 01 Sep 2023 19:50)  Source: Catheterized Catheterized  Final Report:    <10,000 CFU/mL Normal Urogenital Joaquina    Culture - Blood (collected 01 Sep 2023 19:32)  Source: .Blood None  Final Report:    No growth at 5 days    Culture - Blood (collected 28 Aug 2023 11:00)  Source: .Blood Blood-Peripheral  Final Report:    No growth at 5 days    Culture - Blood (collected 28 Aug 2023 11:00)  Source: .Blood Blood-Peripheral  Final Report:    No growth at 5 days    Culture - Abscess with Gram Stain (collected 16 Jul 2023 07:00)  Source: .Abscess Right groin abscess  Final Report:    Numerous Proteus mirabilis    Numerous Enterococcus faecalis    Few Coag Negative Staphylococcus "Susceptibilities not performed"    Few Enterococcus faecium (vancomycin resistant)    Moderate Prevotella timonensis "Susceptibilities not performed"  Organism: Proteus mirabilis  Enterococcus faecalis  Enterococcus faecium (vancomycin resistant)  Organism: Enterococcus faecium (vancomycin resistant)    Sensitivities:      Method Type: DARIO      -  Ampicillin: R >8 Predicts results to ampicillin/sulbactam, amoxacillin-clavulanate and  piperacillin-tazobactam.      -  Daptomycin: SDD 4 The breakpoint for SDD (susceptible dose dependent)is based on a dosage regimen of 8-12 mg/kg administered every 24 h in adults and is intended for serious infections due to E. faecium. Consultation with an infectious diseases specialist is recommended.      -  Levofloxacin: R >4      -  Linezolid: S 2      -  Tetracycline: R >8      -  Vancomycin: R >16  Organism: Enterococcus faecalis    Sensitivities:      Method Type: DARIO      -  Ampicillin: S <=2 Predicts results to ampicillin/sulbactam, amoxacillin-clavulanate and  piperacillin-tazobactam.      -  Tetracycline: R >8      -  Vancomycin: S 2  Organism: Proteus mirabilis    Sensitivities:      Method Type: DARIO      -  Amikacin: S <=16      -  Amoxicillin/Clavulanic Acid: S <=8/4      -  Ampicillin: S <=8 These ampicillin results predict results for amoxicillin      -  Ampicillin/Sulbactam: S <=4/2 Enterobacter, Klebsiella aerogenes, Citrobacter, and Serratia may develop resistance during prolonged therapy (3-4 days)      -  Aztreonam: S <=4      -  Cefazolin: S <=2 Enterobacter, Klebsiella aerogenes, Citrobacter, and Serratia may develop resistance during prolonged therapy (3-4 days)      -  Cefepime: S <=2      -  Cefoxitin: S <=8      -  Ceftriaxone: S <=1 Enterobacter, Klebsiella aerogenes, Citrobacter, and Serratia may develop resistance during prolonged therapy      -  Ciprofloxacin: S <=0.25      -  Ertapenem: S <=0.5      -  Gentamicin: S <=2      -  Levofloxacin: S <=0.5      -  Meropenem: S <=1      -  Piperacillin/Tazobactam: S <=8      -  Tobramycin: S <=2      -  Trimethoprim/Sulfamethoxazole: S 1/19    Culture - Blood (collected 08 Jul 2023 21:23)  Source: .Blood Blood-Peripheral  Final Report:    No growth at 5 days    Culture - Fungal, Body Fluid (collected 05 Jul 2023 15:45)  Source: Pleural Fl Pleural Fluid  Final Report:    No fungus isolated at 4 weeks.      Lactate Dehydrogenase, Serum: 398 (09-10)        Blood Gas Arterial, Lactate: 0.80 (09-10 @ 08:20)  Blood Gas Arterial, Lactate: 0.80 (09-10 @ 04:16)  Blood Gas Arterial, Lactate: 0.90 (09-09 @ 03:30)        RADIOLOGY & ADDITIONAL TESTS:  I have personally reviewed the relevant images.   CXR  Xray Chest 1 View- PORTABLE-Urgent:   ACC: [id number] EXAM:  XR CHEST PORTABLE URGENT 1V   ORDERED BY: DELORIS MUSTAFA     PROCEDURE DATE:  09/09/2023          INTERPRETATION:  Clinical History / Reason for exam: Intubated,   88-year-old female with acute respiratory failure with hypoxia.    Comparison : Chest radiograph performed 9/9/2023 at 6:18 AM.    Technique/Positioning: Portable, AP semi-erect.    Findings:    Support devices: Endotracheal tube is present, with tip 2.4 cm above the   shelly.  Enteric feeding tube courses over the midline below the left   guicho-diaphragm; the tip is excluded from the image.  Chest tube is stable   at the left lung base.    Cardiac/mediastinum/hilum: The cardiac silhouette is magnified.  There   are thoracic aortic calcifications.  On    Lung parenchyma/Pleura: There is a stable left basilar pulmonary   opacity/pleural effusion.  Stable right basilar pleural-parenchymal   pulmonary opacity.  No pneumothorax is seen.    Skeleton/soft tissues: Stable.    Impression:  1.  Support lines/tubes, as described.  2.  Stable left basilar pulmonary opacity/pleural effusion.  3.  Stable right basilar pleural-parenchymal pulmonary opacity.  4.  No pneumothorax.        --- End of Report ---            WILLIAM GILLESPIE MD; Attending Radiologist  This document has been electronically signed. Sep  9 2023  4:05PM (09-09-23 @ 15:28)      CT  CT Abdomen and Pelvis No Cont:   ACC: [id number] EXAM:  CT ABDOMEN AND PELVIS   ORDERED BY: CLAIRE COXOMKARBRANDON     PROCEDURE DATE:  09/09/2023          INTERPRETATION:  CLINICAL STATEMENT: Rule out retroperitoneal bleed    TECHNIQUE: Contiguous axial CT images were obtained from the lower chest   to the pubic symphysis without intravenous contrast.  Oral contrast was   not administered.  Reformatted images in the coronal and sagittal planes   were acquired.    COMPARISON CT: 7/15/2023    QUALITY STATEMENT: Patient upper extremities causing streak artifact   inherently degrades image quality and limits this exam. Note that the   evaluation of solid organs and bowel loops is limited secondary to lack   of oral and IV contrast.    FINDINGS:    LOWER CHEST: Partially imaged bilateral pleural effusions with adjacent   consolidative opacities. Partially imaged left chest tube and mediastinal   drain.    HEPATOBILIARY: Unremarkable.    SPLEEN: Unremarkable.    PANCREAS: Unremarkable.    ADRENAL GLANDS: Unremarkable.    KIDNEYS: No hydronephrosis. Bilateral cysts. Exophytic nodular density   along the left kidney on series 302 image 130 measuring greater than   simple fluid density confounded by artifact.    ABDOMINOPELVIC NODES: Unremarkable.    PELVIC ORGANS: Distended gallbladder.    PERITONEUM/MESENTERY/BOWEL: No bowel obstruction, pneumatosis or   pneumoperitoneum. Trace ascites and mesenteric edema. No retroperitoneal   collection.    BONES/SOFT TISSUES: No acute osseous abnormality. Anasarca. Question of a   sacral decubitus ulcer versus gluteal fold. right lower quadrant anterior   ventral wall cutaneous defect.    OTHER: Normal caliber aorta.      IMPRESSION:    No retroperitoneal collection.    Question of a sacral decubitus ulcer. Correlate with physical exam. Right   lower quadrant anterior ventral wall cutaneous defect. Correlate with   physical exam.    Partially imaged bilateral pleural effusions with adjacent consolidative   opacities. There is pleural thickening along the collection on the left   which could reflect an empyema. Correlate with chest tube output.    Exophytic nodular density along the left kidney on series 302 image 130   measuring greater than simple fluid density confounded by artifact.   Nonemergent ultrasound is recommended to confirm cysts.    --- End of Report ---        PEE MORELAND MD; Attending Radiologist  This document has been electronically signed. Sep  9 2023  2:21PM (09-09-23 @ 11:49)        WEIGHT  Weight (kg): 87.8 (08-28-23 @ 21:03)      All available historical records have been reviewed

## 2023-09-11 NOTE — PROGRESS NOTE ADULT - SUBJECTIVE AND OBJECTIVE BOX
Patient seen and evaluated this am, comfortable in bed, no respiratory distress       T(F): 98.2 (09-11-23 @ 15:30), Max: 100 (09-11-23 @ 01:00)  HR: 102 (09-11-23 @ 19:36)  BP: 102/56 (09-11-23 @ 19:36)  RR: 20  SpO2: 90% (09-11-23 @ 19:36) (90% - 100%)    PHYSICAL EXAM:  GENERAL: NAD  HEAD:  Atraumatic, Normocephalic  EYES: EOMI, PERRLA, conjunctiva and sclera clear  NERVOUS SYSTEM:  no focal deficits   CHEST/LUNG:  bilateral rhonchi  HEART: Regular rate and rhythm; No murmurs, rubs, or gallops  ABDOMEN: Soft, Nontender, Nondistended; Bowel sounds present  EXTREMITIES:  2+ Peripheral Pulses, No clubbing, cyanosis, or edema    LABS  09-11    146  |  107  |  55<H>  ----------------------------<  131<H>  4.8   |  24  |  1.2    Ca    8.9      11 Sep 2023 06:08  Mg     2.5     09-11    TPro  5.1<L>  /  Alb  3.1<L>  /  TBili  0.7  /  DBili  x   /  AST  51<H>  /  ALT  18  /  AlkPhos  89  09-11                          7.0    28.26 )-----------( 299      ( 11 Sep 2023 19:49 )             22.2       Culture Results:   No growth at 24 hours (09-10-23)  Culture Results:   No growth at 24 hours (09-10-23)  Culture Results:   Normal Respiratory Joaquina present (09-05-23)  Culture Results:   No growth at 5 days (09-02-23)  Culture Results:   Normal Respiratory Joaquina present (09-02-23)  Culture Results:   <10,000 CFU/mL Normal Urogenital Joaquina (09-01-23)  Culture Results:   No growth at 5 days (09-01-23)  Culture Results:   No growth at 5 days (08-28-23)  Culture Results:   No growth at 5 days (08-28-23)    RADIOLOGY  < from: Xray Chest 1 View- PORTABLE-Routine (Xray Chest 1 View- PORTABLE-Routine in AM.) (09.11.23 @ 06:13) >    Impression:    Stable left basilar opacity and effusions.    Less extensive right lung opacities. No pneumothorax    < end of copied text >    MEDICATIONS  (STANDING):  albuterol    90 MICROgram(s) HFA Inhaler 2 Puff(s) Inhalation every 4 hours  chlorhexidine 2% Cloths 1 Application(s) Topical <User Schedule>  gabapentin 100 milliGRAM(s) Oral three times a day  insulin lispro (ADMELOG) corrective regimen sliding scale   SubCutaneous three times a day before meals  metoprolol tartrate 25 milliGRAM(s) Oral two times a day  pantoprazole  Injectable 40 milliGRAM(s) IV Push two times a day  polyethylene glycol 3350 17 Gram(s) Oral two times a day  senna 2 Tablet(s) Oral at bedtime    MEDICATIONS  (PRN):  acetaminophen     Tablet .. 650 milliGRAM(s) Oral every 6 hours PRN Temp greater or equal to 38C (100.4F), Mild Pain (1 - 3)  atropine Injectable 0.5 milliGRAM(s) IV Push once PRN bradycardia  dextrose Oral Gel 15 Gram(s) Oral once PRN Blood Glucose LESS THAN 70 milliGRAM(s)/deciliter

## 2023-09-11 NOTE — PHYSICAL THERAPY INITIAL EVALUATION ADULT - ADDITIONAL COMMENTS
pt lives alone in a private house with 5 steps to enter with rails and one level inside.. Pt amb with RW prior to admission.  Son and daughter come to help with IADLs

## 2023-09-11 NOTE — PROGRESS NOTE ADULT - ASSESSMENT
88 year old female with PMH of HTN, Afib, OA, PVD, COPD, Anxiety, Obesity, Acute on chronic systolic congestive heart failure, H/O abdominal hysterectomy, H/O discectomy, H/O total knee replacement, bilaterally and bilateral pleural effusions (s/p left thoracentesis 3/2023 -- Cytology -PLEURAL FLUID, THORACENTESIS: - SUSPICIOUS FOR MALIGNANCY) with a left sided pleurex catheter is currently admitted due to worsening respiratory distress and hypercapnic respiratory failure and COPD exacerbation, currently on mechanical ventilation.ID is being consulted for antimicrobial recommendations given she is spiking intermittent fevers.     IMPRESSION  #Acute hypercapnic respiratory failure- Volume overload vs COPD exacerbation- extubated since 9/10  #Possible GI bleed, acute anemia with reflex leukocytosis.   #Sepsis present on admission  #Fevers- Concerns of superimposed VAP?   #Bilateral pleural effusions   - s/p left thoracentesis 3/2023 -- Cytology -PLEURAL FLUID, THORACENTESIS: - SUSPICIOUS FOR MALIGNANCY. - Atypical cells present in background of histiocytes and mesothelial  cells, suspicious for metastatic carcinoma.   - Pleurx Cx 7/4 - Staph epidermidis, No PMNs  - s/p thoracentesis right thoracentesis 7/5 Exudative in nature. Cultures negative  #History of ESBL E.coli bacteremia (5/2023)   #Hx 2/2023 foot wound MRSA  #Right inguinal Abscess history (7/2023)- Cx (Polymicrobial- Proteus, E.feacalis and VRE)   #HF with preserved EF  #COPD   #Chronic Lymphedema  #Obesity BMI (kg/m2): 30.3, 34.7  #RVP and legionella urine antigen negative  #MRSA nares positive.     RECOMMENDATIONS  -Got 7 days of Van/cefepime for VAP (9/8)  -Recommend aspiration precautions.   -Trend fever curve. Trend WBC   -Patient is at high risk of ICU related deconditioning.    -Guarded prognosis.     If any questions, please send a message or call on [employer] Teams  Please continue to update ID with any pertinent new laboratory or radiographic findings.    Clifford oCrdero M.D  Infectious Diseases Attending  [facility]  88 year old female with PMH of HTN, Afib, OA, PVD, COPD, Anxiety, Obesity, Acute on chronic systolic congestive heart failure, H/O abdominal hysterectomy, H/O discectomy, H/O total knee replacement, bilaterally and bilateral pleural effusions (s/p left thoracentesis 3/2023 -- Cytology -PLEURAL FLUID, THORACENTESIS: - SUSPICIOUS FOR MALIGNANCY) with a left sided pleurex catheter is currently admitted due to worsening respiratory distress and hypercapnic respiratory failure and COPD exacerbation, currently on mechanical ventilation.ID is being consulted for antimicrobial recommendations given she is spiking intermittent fevers.     IMPRESSION  #Acute hypercapnic respiratory failure- Volume overload vs COPD exacerbation- extubated since 9/10  #Possible GI bleed, acute anemia with reflex leukocytosis.   #Sepsis present on admission  #Fevers- Concerns of superimposed VAP?   #Bilateral pleural effusions   - s/p left thoracentesis 3/2023 -- Cytology -PLEURAL FLUID, THORACENTESIS: - SUSPICIOUS FOR MALIGNANCY. - Atypical cells present in background of histiocytes and mesothelial  cells, suspicious for metastatic carcinoma.   - Pleurx Cx 7/4 - Staph epidermidis, No PMNs  - s/p thoracentesis right thoracentesis 7/5 Exudative in nature. Cultures negative  #History of ESBL E.coli bacteremia (5/2023)   #Hx 2/2023 foot wound MRSA  #Right inguinal Abscess history (7/2023)- Cx (Polymicrobial- Proteus, E.feacalis and VRE)   #HF with preserved EF  #COPD   #Chronic Lymphedema  #Obesity BMI (kg/m2): 30.3, 34.7  #RVP and legionella urine antigen negative  #MRSA nares positive.     RECOMMENDATIONS  -Got 7 days of Van/cefepime for VAP (9/8)  -Leukocytosis likely reactive from anemia. Workup as per the primary team.   -Recommend aspiration precautions.   -Trend fever curve. Trend WBC   -Patient is at high risk of ICU related deconditioning.    -Frequent offloading. High risk for skin failure and decubitus ulcer.   -Guarded prognosis.     If any questions, please send a message or call on [employer] Teams  Please continue to update ID with any pertinent new laboratory or radiographic findings.    Clifford Cordero M.D  Infectious Diseases Attending  [facility]

## 2023-09-11 NOTE — PROGRESS NOTE ADULT - ASSESSMENT
IMPRESSION:    acute hypercapnic/ hypoxic respiratory failure s/p intubation/extubation  Acute on Chronic Normocytic Anemia SP 2 units PRBC  COPD exacerbation  Sepsis present on admission   CAP  AFIB on Eliquis  recurrent pleural effusions s/p pleurex   malignant pleural effusion   H/O HFpEF    PLAN:    CNS: mental status at baseline    HEENT: oral care    PULMONARY: aspiration precaution, HOB@ 45 degrees, albuterol PRN, encourage incentive spirometry     CARDIOVASCULAR: 2D-Echo EF 64%, not on pressors     GI: GI prophylaxis. feeding, check FOBT    RENAL: f/u lytes correct as needed    INFECTIOUS DISEASE: SP ABX    HEMATOLOGICAL: check DIC/HIT panel, transfuse 1 unit PRBC today, give 20mg lasix IVP     ENDOCRINE:  Follow up FS.  Insulin protocol if needed    MUSCULOSKELETAL: bedbound, venous/arterial duplex of RUE for significant ecchymosis r/o hematoma     Code Status: DNR only    No lines    Downgrade to SDU monitoring

## 2023-09-11 NOTE — PROGRESS NOTE ADULT - ASSESSMENT
87 YO F with a medical history  of HTN, malignant pleural effusion of breast origin, chronic A.fib, PAD, Obesity, Chronic lymphedema of  BLE and COPD on home oxygen 2 - 1/2 L nc, ESBL UTI presented to ED with severe SOB, DNR / DNI rescinded and pt was intubated and admitted to ICU    acute respiratory failure / B/L effusions / possible pneumonia / COPD / stage 2 sacral decubiti was POA / anemia     - s/p transfusion of 2 units PRBC   - CT abdomen negative for bleed   - HGB again low today monitor H/H q12h   - maintain HGB >7   -  DNR    - completed  antibiotic course   - anticoagulation on hold secondary to anemia   - pulmonary following

## 2023-09-11 NOTE — CHART NOTE - NSCHARTNOTEFT_GEN_A_CORE
Registered Dietitian Follow-Up     Patient Profile Reviewed                           Yes [X]   No []     Nutrition History Previously Obtained        Yes []  No []       Pertinent  Information:  pt is 88 year old female with hx of CHF, afib s/p ablation, anxiety, cellulitis, COPD, B/L LE edema, HTN, OA, obesity, PVD, FLAVIO, B/L TKR recently admitted 7/23/23 with respiratory failure 2/2 COPD exacerbation with intubation and L chest tube warranted, pt was discharged to SNF. pt now p/w respiratory distress s/p intubation. admitted with hypercapnic respiratory failure, B/L pleural effusions. possible PNA.  9/10 s/p extubation. SLP eval placed on puree with thins   9/10 1U PRBC       Diet, Pureed (09-11-23 @ 09:03) [Active]    Anthropometrics:  - Ht. 170.2cm  - Wt. 86.4 kgs today vs 86.4 kg upon admission   - %wt change      09-11    146  |  107  |  55<H>  ----------------------------<  131<H>  4.8   |  24  |  1.2    Ca    8.9      11 Sep 2023 06:08  Mg     2.5     09-11    TPro  5.1<L>  /  Alb  3.1<L>  /  TBili  0.7  /  DBili  x   /  AST  51<H>  /  ALT  18  /  AlkPhos  89  09-11                        6.5    23.79 )-----------( 161      ( 11 Sep 2023 06:08 )             21.3        Pertinent Meds:  MEDICATIONS  (STANDING):  albuterol    90 MICROgram(s) HFA Inhaler 2 Puff(s) Inhalation every 4 hours  gabapentin 100 milliGRAM(s) Oral three times a day  glucagon  Injectable 1 milliGRAM(s) IntraMuscular once  insulin lispro (ADMELOG) corrective regimen sliding scale   SubCutaneous three times a day before meals  metoprolol tartrate 25 milliGRAM(s) Oral two times a day  pantoprazole  Injectable 40 milliGRAM(s) IV Push two times a day  polyethylene glycol 3350 17 Gram(s) Oral two times a day  senna 2 Tablet(s) Oral at bedtime    MEDICATIONS  (PRN):  acetaminophen     Tablet .. 650 milliGRAM(s) Oral every 6 hours PRN Temp greater or equal to 38C (100.4F), Mild Pain (1 - 3)  atropine Injectable 0.5 milliGRAM(s) IV Push once PRN bradycardia  dextrose Oral Gel 15 Gram(s) Oral once PRN Blood Glucose LESS THAN 70 milliGRAM(s)/deciliter       Physical Findings:  - Appearance: awake   - GI function: +BS   - Tubes: n/a   - Oral/Mouth  cavity:  - Skin: sacrum stage II  noted        Nutrition Requirements  Weight Used: 84.1 kgs     Estimated Energy Needs:  0436-3446 kcals (20-25 kcal/kg/BW)  92-122g protein (1.5-2.0g/kg/IBW)  1;1 kcal for estimated fluid needs     Nutrient Intake: tolerated po diet well consumed 50-75% of meal        [] Previous Nutrition Diagnosis:            [] Ongoing          [X] Resolved    [] No active nutrition diagnosis identified at this time       Nutrition Intervention: maintain on present diet order     Goal/Expected Outcome: pt to continue to tolerate po diet and meet at least 75% of estimated nutrient needs within 3-5 days     Indicator/Monitoring: RD to monitor tolerance to po diet, labs/meds, NFPF and f/u as needed within 3-5 days  high risk

## 2023-09-12 NOTE — CONSULT NOTE ADULT - ASSESSMENT
89yo W female w/ PMH as noted below. Pt comes to ER w/ worsening dyspnea. Pt has advanced COPD on home O2.  Patient admitted with CAP. GI consulted for anemia no gross GI bleeding.      Problem 1-anemia no gross GI bleeding  brown stool on rectal exam  patient with severe RUE ecchymosis  Rec  -r/o hematoma of RUE  -no acute GI intervention  -Maintain Hemodynamic Stability   -Monitor CBC  -CMP,Optimize Electrolytes  -PT,PTT,INR  -EKG, Chest-Xray   -Transfuse prn to hgb >8  -Two large bore IV lines  - PPI BID  -Monitor Vital Signs  -Monitor Stool For blood, frequency, consistency, melena  -Active Type and Screen    Problem 2-Question of a sacral decubitus ulcer. Correlate with physical exam. Right   lower quadrant anterior ventral wall cutaneous defect. Correlate with   physical exam.  Rec  - Care as per primary team     Problem 3-Partially imaged bilateral pleural effusions with adjacent consolidative   opacities. There is pleural thickening along the collection on the left   which could reflect an empyema. Correlate with chest tube output.  Rec  - Care as per primary team     Problem 4-Exophytic nodular density along the left kidney on series 302 image 130   measuring greater than simple fluid density confounded by artifact.   Nonemergent ultrasound is recommended to confirm cysts.  Rec  - Care as per primary team  87yo W female w/ PMH as noted below. Pt comes to ER w/ worsening dyspnea. Pt has advanced COPD on home O2.  Patient admitted with CAP. GI consulted for anemia no gross GI bleeding.      Problem 1-anemia no gross GI bleeding  brown stool on rectal exam  patient with severe RUE ecchymosis  Rec  -r/o hematoma of b/l Upper extremities   -no acute GI intervention  -Maintain Hemodynamic Stability   -Monitor CBC  -CMP,Optimize Electrolytes  -PT,PTT,INR  -EKG, Chest-Xray   -Transfuse prn to hgb >8  -Two large bore IV lines  - PPI BID  -Monitor Vital Signs  -Monitor Stool For blood, frequency, consistency, melena  -Active Type and Screen    Problem 2-Question of a sacral decubitus ulcer. Correlate with physical exam. Right   lower quadrant anterior ventral wall cutaneous defect. Correlate with   physical exam.  Rec  - Care as per primary team     Problem 3-Partially imaged bilateral pleural effusions with adjacent consolidative   opacities. There is pleural thickening along the collection on the left   which could reflect an empyema. Correlate with chest tube output.  Rec  - Care as per primary team     Problem 4-Exophytic nodular density along the left kidney on series 302 image 130   measuring greater than simple fluid density confounded by artifact.   Nonemergent ultrasound is recommended to confirm cysts.  Rec  - Care as per primary team  89yo W female w/ HTN, malignant pleural effusion of breast origin, chronic A.fib, PAD, Obesity, Chronic lymphedema of  BLE and COPD on home oxygen 2 - 1/2 L nc, ESBL UTIm admitted w/ resp failure 2/2 CAP s/p ICU admission, intubation and extubation, being evaluated for anemia w/ no gross GI bleeding.      Problem 1-anemia no gross GI bleeding  brown stool on rectal exam  FOBT -ve  patient with severe R and L UE ecchymosis    Rec  -r/o hematoma of b/l Upper extremities   may consider ultrasound vs CT w/o contrast  -no acute GI intervention  -Maintain Hemodynamic Stability   -Monitor CBC  -CMP,Optimize Electrolytes  -PT,PTT,INR  -EKG, Chest-Xray   -Transfuse prn to hgb >8  -Two large bore IV lines  -PO PPI BID for now  -Monitor Vital Signs  -Monitor Stool For blood, frequency, consistency, melena  -Active Type and Screen  -hematology consult  -please call back PRN    Problem 2-Question of a sacral decubitus ulcer. Correlate with physical exam. Right   lower quadrant anterior ventral wall cutaneous defect. Correlate with   physical exam.  Rec  - Care as per primary team     Problem 3-Partially imaged bilateral pleural effusions with adjacent consolidative   opacities. There is pleural thickening along the collection on the left   which could reflect an empyema. Correlate with chest tube output.  Rec  - Care as per primary team     Problem 4-Exophytic nodular density along the left kidney on series 302 image 130   measuring greater than simple fluid density confounded by artifact.   Nonemergent ultrasound is recommended to confirm cysts.  Rec  - Care as per primary team

## 2023-09-12 NOTE — PROGRESS NOTE ADULT - ASSESSMENT
88 year old female with PMH of HTN, Afib, OA, PVD, COPD, Anxiety, Obesity, Acute on chronic systolic congestive heart failure, H/O abdominal hysterectomy, H/O discectomy, H/O total knee replacement, bilaterally and bilateral pleural effusions (s/p left thoracentesis 3/2023 -- Cytology -PLEURAL FLUID, THORACENTESIS: - SUSPICIOUS FOR MALIGNANCY) with a left sided pleurex catheter is currently admitted due to worsening respiratory distress and hypercapnic respiratory failure and COPD exacerbation, currently on mechanical ventilation.ID is being consulted for antimicrobial recommendations given she is spiking intermittent fevers.     IMPRESSION  #Acute hypercapnic respiratory failure- Volume overload vs COPD exacerbation- extubated since 9/10  #Possible GI bleed, acute anemia with reflex leukocytosis.   #Sepsis present on admission  #Fevers- Concerns of superimposed VAP?   #Bilateral pleural effusions   - s/p left thoracentesis 3/2023 -- Cytology -PLEURAL FLUID, THORACENTESIS: - SUSPICIOUS FOR MALIGNANCY. - Atypical cells present in background of histiocytes and mesothelial  cells, suspicious for metastatic carcinoma.   - Pleurx Cx 7/4 - Staph epidermidis, No PMNs  - s/p thoracentesis right thoracentesis 7/5 Exudative in nature. Cultures negative  #History of ESBL E.coli bacteremia (5/2023)   #Hx 2/2023 foot wound MRSA  #Right inguinal Abscess history (7/2023)- Cx (Polymicrobial- Proteus, E.feacalis and VRE)   #HF with preserved EF  #COPD   #Chronic Lymphedema  #Obesity BMI (kg/m2): 30.3, 34.7  #RVP and legionella urine antigen negative  #MRSA nares positive.     RECOMMENDATIONS  -Got 7 days of Van/cefepime for VAP (9/8)  -Leukocytosis likely reactive from anemia. Workup as per the primary team.   -Recommend aspiration precautions.   -Trend fever curve. Trend WBC   -Patient is at high risk of ICU related deconditioning.    -Frequent offloading. High risk for skin failure and decubitus ulcer.   -Guarded prognosis.     If any questions, please send a message or call on [employer] Teams  Please continue to update ID with any pertinent new laboratory or radiographic findings.    Clifford Cordero M.D  Infectious Diseases Attending  [facility]

## 2023-09-12 NOTE — PROGRESS NOTE ADULT - SUBJECTIVE AND OBJECTIVE BOX
Patient is a 88y old  Female who presents with a chief complaint of hypercapnic respiratory failure (11 Sep 2023 20:16)        Over Night Events: no acute events overnight, SP 2 units PRBC yesterday        Vital Signs Last 24 Hrs  T(C): 36.4 (12 Sep 2023 07:10), Max: 37.3 (11 Sep 2023 11:00)  T(F): 97.5 (12 Sep 2023 07:10), Max: 99.2 (11 Sep 2023 23:48)  HR: 106 (12 Sep 2023 07:17) (102 - 121)  BP: 104/53 (12 Sep 2023 07:17) (95/64 - 104/65)  BP(mean): 76 (12 Sep 2023 07:17) (72 - 79)  RR: 20 (12 Sep 2023 07:17) (18 - 27)  SpO2: 100% (12 Sep 2023 07:17) (90% - 100%)    O2 Parameters below as of 12 Sep 2023 07:17  Patient On (Oxygen Delivery Method): nasal cannula  O2 Flow (L/min): 3        CONSTITUTIONAL:  Chronically Ill appearing. NAD    ENT:   Airway patent,   Mouth with normal mucosa.   No thrush    EYES:   Pupils equal,   Round and reactive to light.    CARDIAC:   Normal rate,   Regular rhythm.    anasarca     RESPIRATORY:   No wheezing  Bilateral BS  Normal chest expansion  Not tachypneic,  No use of accessory muscles    GASTROINTESTINAL:  Abdomen soft,   Non-tender,   No guarding,   + BS    MUSCULOSKELETAL:   Range of motion is not limited      NEUROLOGICAL:   Alert and oriented x4    SKIN:   Skin normal color for race,   Warm and dry  stage II sacral ulcer     PSYCHIATRIC:   No apparent risk to self or others.      09-11-23 @ 07:01  -  09-12-23 @ 07:00  --------------------------------------------------------  IN:    Oral Fluid: 360 mL    PRBCs (Packed Red Blood Cells): 315 mL  Total IN: 675 mL    OUT:    Voided (mL): 700 mL  Total OUT: 700 mL    Total NET: -25 mL          LABS:                            7.0    28.26 )-----------( 299      ( 11 Sep 2023 19:49 )             22.2                                               09-12    147<H>  |  109  |  54<H>  ----------------------------<  118<H>  4.7   |  27  |  1.2    Ca    8.4      12 Sep 2023 07:48  Mg     2.3     09-12    TPro  4.8<L>  /  Alb  2.9<L>  /  TBili  1.0  /  DBili  x   /  AST  54<H>  /  ALT  19  /  AlkPhos  110  09-12      PT/INR - ( 11 Sep 2023 19:49 )   PT: 11.60 sec;   INR: 1.02 ratio         PTT - ( 11 Sep 2023 19:49 )  PTT:27.9 sec                                       Urinalysis Basic - ( 12 Sep 2023 07:48 )    Color: x / Appearance: x / SG: x / pH: x  Gluc: 118 mg/dL / Ketone: x  / Bili: x / Urobili: x   Blood: x / Protein: x / Nitrite: x   Leuk Esterase: x / RBC: x / WBC x   Sq Epi: x / Non Sq Epi: x / Bacteria: x                                                  LIVER FUNCTIONS - ( 12 Sep 2023 07:48 )  Alb: 2.9 g/dL / Pro: 4.8 g/dL / ALK PHOS: 110 U/L / ALT: 19 U/L / AST: 54 U/L / GGT: x                                                  Culture - Blood (collected 10 Sep 2023 05:45)  Source: .Blood None  Preliminary Report (11 Sep 2023 17:02):    No growth at 24 hours    Culture - Blood (collected 10 Sep 2023 05:45)  Source: .Blood None  Preliminary Report (11 Sep 2023 17:02):    No growth at 24 hours                                                                                           MEDICATIONS  (STANDING):  albuterol    90 MICROgram(s) HFA Inhaler 2 Puff(s) Inhalation every 4 hours  chlorhexidine 2% Cloths 1 Application(s) Topical <User Schedule>  dextrose 5%. 1000 milliLiter(s) (50 mL/Hr) IV Continuous <Continuous>  dextrose 50% Injectable 25 Gram(s) IV Push once  gabapentin 100 milliGRAM(s) Oral three times a day  glucagon  Injectable 1 milliGRAM(s) IntraMuscular once  insulin lispro (ADMELOG) corrective regimen sliding scale   SubCutaneous three times a day before meals  metoprolol tartrate 25 milliGRAM(s) Oral two times a day  pantoprazole  Injectable 40 milliGRAM(s) IV Push two times a day  polyethylene glycol 3350 17 Gram(s) Oral two times a day  senna 2 Tablet(s) Oral at bedtime    MEDICATIONS  (PRN):  acetaminophen     Tablet .. 650 milliGRAM(s) Oral every 6 hours PRN Temp greater or equal to 38C (100.4F), Mild Pain (1 - 3)  atropine Injectable 0.5 milliGRAM(s) IV Push once PRN bradycardia  dextrose Oral Gel 15 Gram(s) Oral once PRN Blood Glucose LESS THAN 70 milliGRAM(s)/deciliter      CXR reviewed

## 2023-09-12 NOTE — CONSULT NOTE ADULT - SUBJECTIVE AND OBJECTIVE BOX
Chief complaint/Reason for consult: anemia no gross GI bleeding    HPI:  This is an 89yo W female w/ PMH as noted below. Pt comes to ER w/ worsening dyspnea. Pt has advanced COPD on home O2.  . Pt was @ [facility] 7[place]3/23   in respiratory failure due to COPD exacerbation. pt was intubated and went to ICU,  In ICU pt was extubated , left sided chest tube was placed for possible malignant effusion. During that hospitalization  pt signed DNI/DNR.  & was discharged to SNF.    Now pt again in respiratory distress from COPD.  Pt's PCO2  was 97  after being placed on 100% NRB.  Pt's MS worsened & required intubation as  family rescinded  DNR/DNI.   (28 Aug 2023 21:01)    GI Updates: 89yo W female w/ PMH as noted below. Pt comes to ER w/ worsening dyspnea. Pt has advanced COPD on home O2.  Patient admitted with CAP. GI consulted for anemia no gross GI bleeding.      PAST MEDICAL & SURGICAL HISTORY:   HTN (hypertension)      Afib  s/p ablation 2001      Goiter  no meds      Cellulitis  chronic      OA (osteoarthritis)      PVD (peripheral vascular disease)      COPD (chronic obstructive pulmonary disease)      Anxiety      Obesity      Acute on chronic systolic congestive heart failure      Edema of both lower legs      Required emergent intubation      H/O abdominal hysterectomy      H/O discectomy      H/O total knee replacement, bilateral             Family history:  FAMILY HISTORY:    No GI cancers in first or second degree relatives    Social History: No smoking. No alcohol. No illegal drug use.    Allergies:   aspirin (Other)  codeine (Other)  sulfa drugs (Hives; Other)  penicillin (Other)  contrast media (iodine-based) (Other)  Intolerances            MEDICATIONS: Home Medications:  albuterol 90 mcg/inh inhalation aerosol: 2 puff(s) inhaled every 6 hours as needed for  shortness of breath and/or wheezing (02 Jul 2023 23:05)  budesonide-formoterol 80 mcg-4.5 mcg/inh inhalation aerosol: 2 puff(s) inhaled 2 times a day (02 Jul 2023 23:05)  dilTIAZem 180 mg/24 hours oral capsule, extended release: 1 cap(s) orally once a day (02 Jul 2023 23:05)  Eliquis 2.5 mg oral tablet: 1 tab(s) orally 2 times a day (02 Jul 2023 23:05)  Senna 8.6 mg oral tablet: 2 tab(s) orally once a day (at bedtime) (02 Jul 2023 23:05)    MEDICATIONS  (STANDING):  albuterol    90 MICROgram(s) HFA Inhaler 2 Puff(s) Inhalation every 4 hours  chlorhexidine 2% Cloths 1 Application(s) Topical <User Schedule>  dextrose 5%. 1000 milliLiter(s) (50 mL/Hr) IV Continuous <Continuous>  dextrose 50% Injectable 25 Gram(s) IV Push once  gabapentin 100 milliGRAM(s) Oral three times a day  glucagon  Injectable 1 milliGRAM(s) IntraMuscular once  insulin lispro (ADMELOG) corrective regimen sliding scale   SubCutaneous three times a day before meals  metoprolol tartrate 25 milliGRAM(s) Oral two times a day  pantoprazole  Injectable 40 milliGRAM(s) IV Push two times a day  polyethylene glycol 3350 17 Gram(s) Oral two times a day  senna 2 Tablet(s) Oral at bedtime    MEDICATIONS  (PRN):  acetaminophen     Tablet .. 650 milliGRAM(s) Oral every 6 hours PRN Temp greater or equal to 38C (100.4F), Mild Pain (1 - 3)  atropine Injectable 0.5 milliGRAM(s) IV Push once PRN bradycardia  dextrose Oral Gel 15 Gram(s) Oral once PRN Blood Glucose LESS THAN 70 milliGRAM(s)/deciliter        REVIEW OF SYSTEMS  General:  No weight loss, fevers, or chills.  Eyes:  No reported pain or visual changes  ENT:  No sore throat or runny nose.  NECK: No stiffness or lymphadenopathy  CV:  No chest pain or palpitations.  Resp:  No shortness of breath, cough, wheezing or hemoptysis  GI:  No abdominal pain, nausea, vomiting, dysphagia, diarrhea or constipation. No rectal bleeding, melena, or hematemesis.  Muscle:  No aches or weakness  Neuro:  No tingling, numbness       VITALS:   T(F): 97.5 (09-12-23 @ 07:10), Max: 99.2 (09-11-23 @ 23:48)  HR: 106 (09-12-23 @ 07:17) (102 - 121)  BP: 104/53 (09-12-23 @ 07:17) (95/64 - 104/65)  RR: 20 (09-12-23 @ 07:17) (19 - 27)  SpO2: 100% (09-12-23 @ 07:17) (90% - 100%)    PHYSICAL EXAM:  GENERAL: AAOx3, no acute distress.  HEAD:  Atraumatic, Normocephalic  EYES: conjunctiva and sclera clear  NECK: Supple, No thyromegaly   CHEST/LUNG: Clear to auscultation bilaterally; No wheeze, rhonchi, or rales  HEART: Regular rate and rhythm; normal S1, S2, No murmurs.  ABDOMEN: Soft, nontender, nondistended; Bowel sounds present  NEUROLOGY: No asterixis or tremor  SKIN: Intact, no jaundice          LABS:  09-12    147<H>  |  109  |  54<H>  ----------------------------<  118<H>  4.7   |  27  |  1.2    Ca    8.4      12 Sep 2023 07:48  Mg     2.3     09-12    TPro  4.8<L>  /  Alb  2.9<L>  /  TBili  1.0  /  DBili  x   /  AST  54<H>  /  ALT  19  /  AlkPhos  110  09-12                          7.0    28.26 )-----------( 299      ( 11 Sep 2023 19:49 )             22.2     LIVER FUNCTIONS - ( 12 Sep 2023 07:48 )  Alb: 2.9 g/dL / Pro: 4.8 g/dL / ALK PHOS: 110 U/L / ALT: 19 U/L / AST: 54 U/L / GGT: x           PT/INR - ( 11 Sep 2023 19:49 )   PT: 11.60 sec;   INR: 1.02 ratio         PTT - ( 11 Sep 2023 19:49 )  PTT:27.9 sec    IMAGING:    < from: CT Abdomen and Pelvis No Cont (09.09.23 @ 11:49) >    ACC: [id number] EXAM:  CT ABDOMEN AND PELVIS   ORDERED BY: CLAIRE POLANCO     PROCEDURE DATE:  09/09/2023          INTERPRETATION:  CLINICAL STATEMENT: Rule out retroperitoneal bleed    TECHNIQUE: Contiguous axial CT images were obtained from the lower chest   to the pubic symphysis without intravenous contrast.  Oral contrast was   not administered.  Reformatted images in the coronal and sagittal planes   were acquired.    COMPARISON CT: 7/15/2023    QUALITY STATEMENT: Patient upper extremities causing streak artifact   inherently degrades image quality and limits this exam. Note that the   evaluation of solid organs and bowel loops is limited secondary to lack   of oral and IV contrast.    FINDINGS:    LOWER CHEST: Partially imaged bilateral pleural effusions with adjacent   consolidative opacities. Partially imaged left chest tube and mediastinal   drain.    HEPATOBILIARY: Unremarkable.    SPLEEN: Unremarkable.    PANCREAS: Unremarkable.    ADRENAL GLANDS: Unremarkable.    KIDNEYS: No hydronephrosis. Bilateral cysts. Exophytic nodular density   along the left kidney on series 302 image 130 measuring greater than   simple fluid density confounded by artifact.    ABDOMINOPELVIC NODES: Unremarkable.    PELVIC ORGANS: Distended gallbladder.    PERITONEUM/MESENTERY/BOWEL: No bowel obstruction, pneumatosis or   pneumoperitoneum. Trace ascites and mesenteric edema. No retroperitoneal   collection.    BONES/SOFT TISSUES: No acute osseous abnormality. Anasarca. Question of a   sacral decubitus ulcer versus gluteal fold. right lower quadrant anterior   ventral wall cutaneous defect.    OTHER: Normal caliber aorta.      IMPRESSION:    No retroperitoneal collection.    Question of a sacral decubitus ulcer. Correlate with physical exam. Right   lower quadrant anterior ventral wall cutaneous defect. Correlate with   physical exam.    Partially imaged bilateral pleural effusions with adjacent consolidative   opacities. There is pleural thickening along the collection on the left   which could reflect an empyema. Correlate with chest tube output.    Exophytic nodular density along the left kidney on series 302 image 130   measuring greater than simple fluid density confounded by artifact.   Nonemergent ultrasound is recommended to confirm cysts.    --- End of Report ---            PEE MORELAND MD; Attending Radiologist  This document has been electronically signed. Sep  9 2023  2:21PM    < end of copied text >       Chief complaint/Reason for consult: anemia no gross GI bleeding    HPI:  This is an 89yo W female w/ PMH as noted below. Pt comes to ER w/ worsening dyspnea. Pt has advanced COPD on home O2.  . Pt was @ [facility] 7/3/23   in respiratory failure due to COPD exacerbation. pt was intubated and went to ICU,  In ICU pt was extubated , left sided chest tube was placed for possible malignant effusion. During that hospitalization  pt signed DNI/DNR.  & was discharged to SNF.    Now pt again in respiratory distress from COPD.  Pt's PCO2  was 97  after being placed on 100% NRB.  Pt's MS worsened & required intubation as  family rescinded  DNR/DNI.   (28 Aug 2023 21:01)    GI Updates: 89yo W female w/ PMH as noted below. Pt comes to ER w/ worsening dyspnea. Pt has advanced COPD on home O2.  Patient admitted with CAP. GI consulted for anemia no gross GI bleeding. Patient denies nausea, vomiting, hematemesis, melena, blood in stool, diarrhea, constipation, abdominal pain. Patient has never had GI workup before.      PAST MEDICAL & SURGICAL HISTORY:   HTN (hypertension)      Afib  s/p ablation 2001      Goiter  no meds      Cellulitis  chronic      OA (osteoarthritis)      PVD (peripheral vascular disease)      COPD (chronic obstructive pulmonary disease)      Anxiety      Obesity      Acute on chronic systolic congestive heart failure      Edema of both lower legs      Required emergent intubation      H/O abdominal hysterectomy      H/O discectomy      H/O total knee replacement, bilateral             Family history:  FAMILY HISTORY:    No GI cancers in first or second degree relatives    Social History: No smoking. No alcohol. No illegal drug use.    Allergies:   aspirin (Other)  codeine (Other)  sulfa drugs (Hives; Other)  penicillin (Other)  contrast media (iodine-based) (Other)  Intolerances            MEDICATIONS: Home Medications:  albuterol 90 mcg/inh inhalation aerosol: 2 puff(s) inhaled every 6 hours as needed for  shortness of breath and/or wheezing (02 Jul 2023 23:05)  budesonide-formoterol 80 mcg-4.5 mcg/inh inhalation aerosol: 2 puff(s) inhaled 2 times a day (02 Jul 2023 23:05)  dilTIAZem 180 mg/24 hours oral capsule, extended release: 1 cap(s) orally once a day (02 Jul 2023 23:05)  Eliquis 2.5 mg oral tablet: 1 tab(s) orally 2 times a day (02 Jul 2023 23:05)  Senna 8.6 mg oral tablet: 2 tab(s) orally once a day (at bedtime) (02 Jul 2023 23:05)    MEDICATIONS  (STANDING):  albuterol    90 MICROgram(s) HFA Inhaler 2 Puff(s) Inhalation every 4 hours  chlorhexidine 2% Cloths 1 Application(s) Topical <User Schedule>  dextrose 5%. 1000 milliLiter(s) (50 mL/Hr) IV Continuous <Continuous>  dextrose 50% Injectable 25 Gram(s) IV Push once  gabapentin 100 milliGRAM(s) Oral three times a day  glucagon  Injectable 1 milliGRAM(s) IntraMuscular once  insulin lispro (ADMELOG) corrective regimen sliding scale   SubCutaneous three times a day before meals  metoprolol tartrate 25 milliGRAM(s) Oral two times a day  pantoprazole  Injectable 40 milliGRAM(s) IV Push two times a day  polyethylene glycol 3350 17 Gram(s) Oral two times a day  senna 2 Tablet(s) Oral at bedtime    MEDICATIONS  (PRN):  acetaminophen     Tablet .. 650 milliGRAM(s) Oral every 6 hours PRN Temp greater or equal to 38C (100.4F), Mild Pain (1 - 3)  atropine Injectable 0.5 milliGRAM(s) IV Push once PRN bradycardia  dextrose Oral Gel 15 Gram(s) Oral once PRN Blood Glucose LESS THAN 70 milliGRAM(s)/deciliter        REVIEW OF SYSTEMS  General:  No weight loss, fevers, or chills.  Eyes:  No reported pain or visual changes  ENT:  No sore throat or runny nose.  NECK: No stiffness or lymphadenopathy  CV:  No chest pain or palpitations.  Resp:  + shortness of breath, +cough, no wheezing or hemoptysis  GI:  No abdominal pain, nausea, vomiting, dysphagia, diarrhea or constipation. No rectal bleeding, melena, or hematemesis.  Neuro:  No tingling, numbness       VITALS:   T(F): 97.5 (09-12-23 @ 07:10), Max: 99.2 (09-11-23 @ 23:48)  HR: 106 (09-12-23 @ 07:17) (102 - 121)  BP: 104/53 (09-12-23 @ 07:17) (95/64 - 104/65)  RR: 20 (09-12-23 @ 07:17) (19 - 27)  SpO2: 100% (09-12-23 @ 07:17) (90% - 100%)    PHYSICAL EXAM:  GENERAL: AAOx3, no acute distress. +severe ecchymosis of RUE   HEAD:  Atraumatic, Normocephalic  EYES: conjunctiva and sclera clear  NECK: Supple, No thyromegaly   CHEST/LUNG: b/l rhonchi  HEART: Regular rate and rhythm; normal S1, S2, No murmurs.  ABDOMEN: Soft, nontender, nondistended; Bowel sounds present  NEUROLOGY: No asterixis or tremor  SKIN: Intact, no jaundice  Rectal exam-brown stool in rectal vault and on finger        LABS:  09-12    147<H>  |  109  |  54<H>  ----------------------------<  118<H>  4.7   |  27  |  1.2    Ca    8.4      12 Sep 2023 07:48  Mg     2.3     09-12    TPro  4.8<L>  /  Alb  2.9<L>  /  TBili  1.0  /  DBili  x   /  AST  54<H>  /  ALT  19  /  AlkPhos  110  09-12                          7.0    28.26 )-----------( 299      ( 11 Sep 2023 19:49 )             22.2     LIVER FUNCTIONS - ( 12 Sep 2023 07:48 )  Alb: 2.9 g/dL / Pro: 4.8 g/dL / ALK PHOS: 110 U/L / ALT: 19 U/L / AST: 54 U/L / GGT: x           PT/INR - ( 11 Sep 2023 19:49 )   PT: 11.60 sec;   INR: 1.02 ratio         PTT - ( 11 Sep 2023 19:49 )  PTT:27.9 sec    IMAGING:    < from: CT Abdomen and Pelvis No Cont (09.09.23 @ 11:49) >    ACC: [id number] EXAM:  CT ABDOMEN AND PELVIS   ORDERED BY: CLAIRE POLANCO     PROCEDURE DATE:  09/09/2023          INTERPRETATION:  CLINICAL STATEMENT: Rule out retroperitoneal bleed    TECHNIQUE: Contiguous axial CT images were obtained from the lower chest   to the pubic symphysis without intravenous contrast.  Oral contrast was   not administered.  Reformatted images in the coronal and sagittal planes   were acquired.    COMPARISON CT: 7/15/2023    QUALITY STATEMENT: Patient upper extremities causing streak artifact   inherently degrades image quality and limits this exam. Note that the   evaluation of solid organs and bowel loops is limited secondary to lack   of oral and IV contrast.    FINDINGS:    LOWER CHEST: Partially imaged bilateral pleural effusions with adjacent   consolidative opacities. Partially imaged left chest tube and mediastinal   drain.    HEPATOBILIARY: Unremarkable.    SPLEEN: Unremarkable.    PANCREAS: Unremarkable.    ADRENAL GLANDS: Unremarkable.    KIDNEYS: No hydronephrosis. Bilateral cysts. Exophytic nodular density   along the left kidney on series 302 image 130 measuring greater than   simple fluid density confounded by artifact.    ABDOMINOPELVIC NODES: Unremarkable.    PELVIC ORGANS: Distended gallbladder.    PERITONEUM/MESENTERY/BOWEL: No bowel obstruction, pneumatosis or   pneumoperitoneum. Trace ascites and mesenteric edema. No retroperitoneal   collection.    BONES/SOFT TISSUES: No acute osseous abnormality. Anasarca. Question of a   sacral decubitus ulcer versus gluteal fold. right lower quadrant anterior   ventral wall cutaneous defect.    OTHER: Normal caliber aorta.      IMPRESSION:    No retroperitoneal collection.    Question of a sacral decubitus ulcer. Correlate with physical exam. Right   lower quadrant anterior ventral wall cutaneous defect. Correlate with   physical exam.    Partially imaged bilateral pleural effusions with adjacent consolidative   opacities. There is pleural thickening along the collection on the left   which could reflect an empyema. Correlate with chest tube output.    Exophytic nodular density along the left kidney on series 302 image 130   measuring greater than simple fluid density confounded by artifact.   Nonemergent ultrasound is recommended to confirm cysts.    --- End of Report ---            PEE MORELAND MD; Attending Radiologist  This document has been electronically signed. Sep  9 2023  2:21PM    < end of copied text >       Chief complaint/Reason for consult: anemia no gross GI bleeding    HPI:  This is an 89yo W female w/ PMH as noted below. Pt comes to ER w/ worsening dyspnea. Pt has advanced COPD on home O2.  . Pt was @ [facility] 7/3/23   in respiratory failure due to COPD exacerbation. pt was intubated and went to ICU,  In ICU pt was extubated , left sided chest tube was placed for possible malignant effusion. During that hospitalization  pt signed DNI/DNR.  & was discharged to SNF.    Now pt again in respiratory distress from COPD.  Pt's PCO2  was 97  after being placed on 100% NRB.  Pt's MS worsened & required intubation as  family rescinded  DNR/DNI.   (28 Aug 2023 21:01)    GI Updates: 89yo W female w/ PMH as noted below. Pt comes to ER w/ worsening dyspnea. Pt has advanced COPD on home O2.  Patient admitted with CAP. GI consulted for anemia no gross GI bleeding. Patient denies nausea, vomiting, hematemesis, melena, blood in stool, diarrhea, constipation, abdominal pain. Patient has never had GI workup before.      PAST MEDICAL & SURGICAL HISTORY:   HTN (hypertension)      Afib  s/p ablation 2001      Goiter  no meds      Cellulitis  chronic      OA (osteoarthritis)      PVD (peripheral vascular disease)      COPD (chronic obstructive pulmonary disease)      Anxiety      Obesity      Acute on chronic systolic congestive heart failure      Edema of both lower legs      Required emergent intubation      H/O abdominal hysterectomy      H/O discectomy      H/O total knee replacement, bilateral             Family history:  FAMILY HISTORY:    No GI cancers in first or second degree relatives    Social History: No smoking. No alcohol. No illegal drug use.    Allergies:   aspirin (Other)  codeine (Other)  sulfa drugs (Hives; Other)  penicillin (Other)  contrast media (iodine-based) (Other)  Intolerances            MEDICATIONS: Home Medications:  albuterol 90 mcg/inh inhalation aerosol: 2 puff(s) inhaled every 6 hours as needed for  shortness of breath and/or wheezing (02 Jul 2023 23:05)  budesonide-formoterol 80 mcg-4.5 mcg/inh inhalation aerosol: 2 puff(s) inhaled 2 times a day (02 Jul 2023 23:05)  dilTIAZem 180 mg/24 hours oral capsule, extended release: 1 cap(s) orally once a day (02 Jul 2023 23:05)  Eliquis 2.5 mg oral tablet: 1 tab(s) orally 2 times a day (02 Jul 2023 23:05)  Senna 8.6 mg oral tablet: 2 tab(s) orally once a day (at bedtime) (02 Jul 2023 23:05)    MEDICATIONS  (STANDING):  albuterol    90 MICROgram(s) HFA Inhaler 2 Puff(s) Inhalation every 4 hours  chlorhexidine 2% Cloths 1 Application(s) Topical <User Schedule>  dextrose 5%. 1000 milliLiter(s) (50 mL/Hr) IV Continuous <Continuous>  dextrose 50% Injectable 25 Gram(s) IV Push once  gabapentin 100 milliGRAM(s) Oral three times a day  glucagon  Injectable 1 milliGRAM(s) IntraMuscular once  insulin lispro (ADMELOG) corrective regimen sliding scale   SubCutaneous three times a day before meals  metoprolol tartrate 25 milliGRAM(s) Oral two times a day  pantoprazole  Injectable 40 milliGRAM(s) IV Push two times a day  polyethylene glycol 3350 17 Gram(s) Oral two times a day  senna 2 Tablet(s) Oral at bedtime    MEDICATIONS  (PRN):  acetaminophen     Tablet .. 650 milliGRAM(s) Oral every 6 hours PRN Temp greater or equal to 38C (100.4F), Mild Pain (1 - 3)  atropine Injectable 0.5 milliGRAM(s) IV Push once PRN bradycardia  dextrose Oral Gel 15 Gram(s) Oral once PRN Blood Glucose LESS THAN 70 milliGRAM(s)/deciliter        REVIEW OF SYSTEMS  General:  No weight loss, fevers, or chills.  Eyes:  No reported pain or visual changes  ENT:  No sore throat or runny nose.  NECK: No stiffness or lymphadenopathy  CV:  No chest pain or palpitations.  Resp:  + shortness of breath, +cough, no wheezing or hemoptysis  GI:  No abdominal pain, nausea, vomiting, dysphagia, diarrhea or constipation. No rectal bleeding, melena, or hematemesis.  Neuro:  No tingling, numbness       VITALS:   T(F): 97.5 (09-12-23 @ 07:10), Max: 99.2 (09-11-23 @ 23:48)  HR: 106 (09-12-23 @ 07:17) (102 - 121)  BP: 104/53 (09-12-23 @ 07:17) (95/64 - 104/65)  RR: 20 (09-12-23 @ 07:17) (19 - 27)  SpO2: 100% (09-12-23 @ 07:17) (90% - 100%)    PHYSICAL EXAM:  GENERAL: AAOx3, no acute distress. +severe ecchymosis of b/l upper extremities   HEAD:  Atraumatic, Normocephalic  EYES: conjunctiva and sclera clear  NECK: Supple, No thyromegaly   CHEST/LUNG: b/l rhonchi  HEART: Regular rate and rhythm; normal S1, S2, No murmurs.  ABDOMEN: Soft, nontender, nondistended; Bowel sounds present  NEUROLOGY: No asterixis or tremor  SKIN: Intact, no jaundice  Rectal exam-brown stool in rectal vault and on finger        LABS:  09-12    147<H>  |  109  |  54<H>  ----------------------------<  118<H>  4.7   |  27  |  1.2    Ca    8.4      12 Sep 2023 07:48  Mg     2.3     09-12    TPro  4.8<L>  /  Alb  2.9<L>  /  TBili  1.0  /  DBili  x   /  AST  54<H>  /  ALT  19  /  AlkPhos  110  09-12                          7.0    28.26 )-----------( 299      ( 11 Sep 2023 19:49 )             22.2     LIVER FUNCTIONS - ( 12 Sep 2023 07:48 )  Alb: 2.9 g/dL / Pro: 4.8 g/dL / ALK PHOS: 110 U/L / ALT: 19 U/L / AST: 54 U/L / GGT: x           PT/INR - ( 11 Sep 2023 19:49 )   PT: 11.60 sec;   INR: 1.02 ratio         PTT - ( 11 Sep 2023 19:49 )  PTT:27.9 sec    IMAGING:    < from: CT Abdomen and Pelvis No Cont (09.09.23 @ 11:49) >    ACC: [id number] EXAM:  CT ABDOMEN AND PELVIS   ORDERED BY: CLAIRE POLANCO     PROCEDURE DATE:  09/09/2023          INTERPRETATION:  CLINICAL STATEMENT: Rule out retroperitoneal bleed    TECHNIQUE: Contiguous axial CT images were obtained from the lower chest   to the pubic symphysis without intravenous contrast.  Oral contrast was   not administered.  Reformatted images in the coronal and sagittal planes   were acquired.    COMPARISON CT: 7/15/2023    QUALITY STATEMENT: Patient upper extremities causing streak artifact   inherently degrades image quality and limits this exam. Note that the   evaluation of solid organs and bowel loops is limited secondary to lack   of oral and IV contrast.    FINDINGS:    LOWER CHEST: Partially imaged bilateral pleural effusions with adjacent   consolidative opacities. Partially imaged left chest tube and mediastinal   drain.    HEPATOBILIARY: Unremarkable.    SPLEEN: Unremarkable.    PANCREAS: Unremarkable.    ADRENAL GLANDS: Unremarkable.    KIDNEYS: No hydronephrosis. Bilateral cysts. Exophytic nodular density   along the left kidney on series 302 image 130 measuring greater than   simple fluid density confounded by artifact.    ABDOMINOPELVIC NODES: Unremarkable.    PELVIC ORGANS: Distended gallbladder.    PERITONEUM/MESENTERY/BOWEL: No bowel obstruction, pneumatosis or   pneumoperitoneum. Trace ascites and mesenteric edema. No retroperitoneal   collection.    BONES/SOFT TISSUES: No acute osseous abnormality. Anasarca. Question of a   sacral decubitus ulcer versus gluteal fold. right lower quadrant anterior   ventral wall cutaneous defect.    OTHER: Normal caliber aorta.      IMPRESSION:    No retroperitoneal collection.    Question of a sacral decubitus ulcer. Correlate with physical exam. Right   lower quadrant anterior ventral wall cutaneous defect. Correlate with   physical exam.    Partially imaged bilateral pleural effusions with adjacent consolidative   opacities. There is pleural thickening along the collection on the left   which could reflect an empyema. Correlate with chest tube output.    Exophytic nodular density along the left kidney on series 302 image 130   measuring greater than simple fluid density confounded by artifact.   Nonemergent ultrasound is recommended to confirm cysts.    --- End of Report ---            PEE MORELAND MD; Attending Radiologist  This document has been electronically signed. Sep  9 2023  2:21PM    < end of copied text >       Chief complaint/Reason for consult: anemia no gross GI bleeding    HPI:  This is an 87yo W female w/ PMH as noted below. Pt comes to ER w/ worsening dyspnea. Pt has advanced COPD on home O2.  . Pt was @ [facility] 7/3/23   in respiratory failure due to COPD exacerbation. pt was intubated and went to ICU,  In ICU pt was extubated , left sided chest tube was placed for possible malignant effusion. During that hospitalization  pt signed DNI/DNR.  & was discharged to SNF.    Now pt again in respiratory distress from COPD.  Pt's PCO2  was 97  after being placed on 100% NRB.  Pt's MS worsened & required intubation as  family rescinded  DNR/DNI.   (28 Aug 2023 21:01)    GI Updates: 87yo W female w/ PMH as noted below. Pt comes to ER w/ worsening dyspnea. Pt has advanced COPD on home O2.    Patient admitted with CAP. GI consulted for anemia no gross GI bleeding. Patient denies nausea, vomiting, hematemesis, melena, blood in stool, diarrhea, constipation, abdominal pain.   Patient has never had GI workup before.  she has significantly large bruises on both upper extremities.   she denies any signs of GI-related blood loss and has no hematuria or vaginal bleeding.   no recent falls.    PAST MEDICAL & SURGICAL HISTORY:   HTN (hypertension)  Afib  s/p ablation 2001  Goiter  no meds  Cellulitis  chronic  OA (osteoarthritis)  PVD (peripheral vascular disease)  COPD (chronic obstructive pulmonary disease)  Anxiety  Obesity  Acute on chronic systolic congestive heart failure  Edema of both lower legs  Required emergent intubation  H/O abdominal hysterectomy  H/O discectomy  H/O total knee replacement, bilateral         Family history:  FAMILY HISTORY:  No GI cancers in first or second degree relatives    Social History: No smoking. No alcohol. No illegal drug use.    Allergies:   aspirin (Other)  codeine (Other)  sulfa drugs (Hives; Other)  penicillin (Other)  contrast media (iodine-based) (Other)  Intolerances            MEDICATIONS: Home Medications:  albuterol 90 mcg/inh inhalation aerosol: 2 puff(s) inhaled every 6 hours as needed for  shortness of breath and/or wheezing (02 Jul 2023 23:05)  budesonide-formoterol 80 mcg-4.5 mcg/inh inhalation aerosol: 2 puff(s) inhaled 2 times a day (02 Jul 2023 23:05)  dilTIAZem 180 mg/24 hours oral capsule, extended release: 1 cap(s) orally once a day (02 Jul 2023 23:05)  Eliquis 2.5 mg oral tablet: 1 tab(s) orally 2 times a day (02 Jul 2023 23:05)  Senna 8.6 mg oral tablet: 2 tab(s) orally once a day (at bedtime) (02 Jul 2023 23:05)    MEDICATIONS  (STANDING):  albuterol    90 MICROgram(s) HFA Inhaler 2 Puff(s) Inhalation every 4 hours  chlorhexidine 2% Cloths 1 Application(s) Topical <User Schedule>  dextrose 5%. 1000 milliLiter(s) (50 mL/Hr) IV Continuous <Continuous>  dextrose 50% Injectable 25 Gram(s) IV Push once  gabapentin 100 milliGRAM(s) Oral three times a day  glucagon  Injectable 1 milliGRAM(s) IntraMuscular once  insulin lispro (ADMELOG) corrective regimen sliding scale   SubCutaneous three times a day before meals  metoprolol tartrate 25 milliGRAM(s) Oral two times a day  pantoprazole  Injectable 40 milliGRAM(s) IV Push two times a day  polyethylene glycol 3350 17 Gram(s) Oral two times a day  senna 2 Tablet(s) Oral at bedtime    MEDICATIONS  (PRN):  acetaminophen     Tablet .. 650 milliGRAM(s) Oral every 6 hours PRN Temp greater or equal to 38C (100.4F), Mild Pain (1 - 3)  atropine Injectable 0.5 milliGRAM(s) IV Push once PRN bradycardia  dextrose Oral Gel 15 Gram(s) Oral once PRN Blood Glucose LESS THAN 70 milliGRAM(s)/deciliter        REVIEW OF SYSTEMS  General:  No weight loss, fevers, or chills.  Eyes:  No reported pain or visual changes  ENT:  No sore throat or runny nose.  NECK: No stiffness or lymphadenopathy  CV:  No chest pain or palpitations.  Resp:  + shortness of breath, +cough, no wheezing or hemoptysis  GI:  No abdominal pain, nausea, vomiting, dysphagia, diarrhea or constipation. No rectal bleeding, melena, or hematemesis.  Neuro:  No tingling, numbness       VITALS:   T(F): 97.5 (09-12-23 @ 07:10), Max: 99.2 (09-11-23 @ 23:48)  HR: 106 (09-12-23 @ 07:17) (102 - 121)  BP: 104/53 (09-12-23 @ 07:17) (95/64 - 104/65)  RR: 20 (09-12-23 @ 07:17) (19 - 27)  SpO2: 100% (09-12-23 @ 07:17) (90% - 100%)    PHYSICAL EXAM:  GENERAL: AAOx3, no acute distress. +severe ecchymosis of b/l upper extremities   HEAD:  Atraumatic, Normocephalic  EYES: conjunctiva and sclera clear  NECK: Supple, No thyromegaly   CHEST/LUNG: b/l rhonchi  HEART: Regular rate and rhythm; normal S1, S2, No murmurs.  ABDOMEN: Soft, nontender, nondistended; Bowel sounds present  NEUROLOGY: No asterixis or tremor  SKIN: Intact, no jaundice  Rectal exam-brown stool in rectal vault and on finger        LABS:  09-12    147<H>  |  109  |  54<H>  ----------------------------<  118<H>  4.7   |  27  |  1.2    Ca    8.4      12 Sep 2023 07:48  Mg     2.3     09-12    TPro  4.8<L>  /  Alb  2.9<L>  /  TBili  1.0  /  DBili  x   /  AST  54<H>  /  ALT  19  /  AlkPhos  110  09-12                          7.0    28.26 )-----------( 299      ( 11 Sep 2023 19:49 )             22.2     LIVER FUNCTIONS - ( 12 Sep 2023 07:48 )  Alb: 2.9 g/dL / Pro: 4.8 g/dL / ALK PHOS: 110 U/L / ALT: 19 U/L / AST: 54 U/L / GGT: x           PT/INR - ( 11 Sep 2023 19:49 )   PT: 11.60 sec;   INR: 1.02 ratio         PTT - ( 11 Sep 2023 19:49 )  PTT:27.9 sec    IMAGING:    < from: CT Abdomen and Pelvis No Cont (09.09.23 @ 11:49) >    ACC: [id number] EXAM:  CT ABDOMEN AND PELVIS   ORDERED BY: CLAIRE POLANCO     PROCEDURE DATE:  09/09/2023          INTERPRETATION:  CLINICAL STATEMENT: Rule out retroperitoneal bleed    TECHNIQUE: Contiguous axial CT images were obtained from the lower chest   to the pubic symphysis without intravenous contrast.  Oral contrast was   not administered.  Reformatted images in the coronal and sagittal planes   were acquired.    COMPARISON CT: 7/15/2023    QUALITY STATEMENT: Patient upper extremities causing streak artifact   inherently degrades image quality and limits this exam. Note that the   evaluation of solid organs and bowel loops is limited secondary to lack   of oral and IV contrast.    FINDINGS:    LOWER CHEST: Partially imaged bilateral pleural effusions with adjacent   consolidative opacities. Partially imaged left chest tube and mediastinal   drain.    HEPATOBILIARY: Unremarkable.    SPLEEN: Unremarkable.    PANCREAS: Unremarkable.    ADRENAL GLANDS: Unremarkable.    KIDNEYS: No hydronephrosis. Bilateral cysts. Exophytic nodular density   along the left kidney on series 302 image 130 measuring greater than   simple fluid density confounded by artifact.    ABDOMINOPELVIC NODES: Unremarkable.    PELVIC ORGANS: Distended gallbladder.    PERITONEUM/MESENTERY/BOWEL: No bowel obstruction, pneumatosis or   pneumoperitoneum. Trace ascites and mesenteric edema. No retroperitoneal   collection.    BONES/SOFT TISSUES: No acute osseous abnormality. Anasarca. Question of a   sacral decubitus ulcer versus gluteal fold. right lower quadrant anterior   ventral wall cutaneous defect.    OTHER: Normal caliber aorta.      IMPRESSION:    No retroperitoneal collection.    Question of a sacral decubitus ulcer. Correlate with physical exam. Right   lower quadrant anterior ventral wall cutaneous defect. Correlate with   physical exam.    Partially imaged bilateral pleural effusions with adjacent consolidative   opacities. There is pleural thickening along the collection on the left   which could reflect an empyema. Correlate with chest tube output.    Exophytic nodular density along the left kidney on series 302 image 130   measuring greater than simple fluid density confounded by artifact.   Nonemergent ultrasound is recommended to confirm cysts.    --- End of Report ---            PEE MORELAND MD; Attending Radiologist  This document has been electronically signed. Sep  9 2023  2:21PM    < end of copied text >

## 2023-09-12 NOTE — PROGRESS NOTE ADULT - ASSESSMENT
87 YO F with a medical history  of HTN, malignant pleural effusion of breast origin, chronic A.fib, PAD, Obesity, Chronic lymphedema of  BLE and COPD on home oxygen 2 - 1/2 L nc, ESBL UTI presented to ED with severe SOB, DNR / DNI rescinded and pt was intubated and admitted to ICU    acute respiratory failure / B/L effusions / possible pneumonia / COPD / stage 2 sacral decubiti was POA / anemia     - s/p transfusion of 4 units PRBC   - maintain HGB>7   - monitor H/H q12h   -  DNR    - completed  antibiotic course   - anticoagulation on hold secondary to anemia   - pulmonary following

## 2023-09-12 NOTE — PROGRESS NOTE ADULT - SUBJECTIVE AND OBJECTIVE BOX
Patient       T(F): 97.5 (09-12-23 @ 07:10), Max: 99.2 (09-11-23 @ 23:48)  HR: 106 (09-12-23 @ 07:17)  BP: 104/53 (09-12-23 @ 07:17)  RR: 20 (09-12-23 @ 07:17)  SpO2: 100% (09-12-23 @ 07:17) (90% - 100%)    PHYSICAL EXAM:  GENERAL: NAD  HEAD:  Atraumatic, Normocephalic  EYES: EOMI, PERRLA, conjunctiva and sclera clear  NERVOUS SYSTEM:  Alert & Oriented X3, no focal deficits   CHEST/LUNG: Clear to percussion bilaterally; No rales, rhonchi, wheezing, or rubs  HEART: Regular rate and rhythm; No murmurs, rubs, or gallops  ABDOMEN: Soft, Nontender, Nondistended; Bowel sounds present  EXTREMITIES:  2+ Peripheral Pulses, No clubbing, cyanosis, or edema    LABS  09-12    147<H>  |  109  |  54<H>  ----------------------------<  118<H>  4.7   |  27  |  1.2    Ca    8.4      12 Sep 2023 07:48  Mg     2.3     09-12    TPro  4.8<L>  /  Alb  2.9<L>  /  TBili  1.0  /  DBili  x   /  AST  54<H>  /  ALT  19  /  AlkPhos  110  09-12                          6.5    21.34 )-----------( 224      ( 12 Sep 2023 11:32 )             20.3     PT/INR - ( 11 Sep 2023 19:49 )   PT: 11.60 sec;   INR: 1.02 ratio         PTT - ( 11 Sep 2023 19:49 )  PTT:27.9 sec      Culture Results:   No growth at 24 hours (09-10-23)  Culture Results:   No growth at 24 hours (09-10-23)  Culture Results:   Normal Respiratory Joaquina present (09-05-23)  Culture Results:   No growth at 5 days (09-02-23)  Culture Results:   Normal Respiratory Joaquina present (09-02-23)  Culture Results:   <10,000 CFU/mL Normal Urogenital Joaquina (09-01-23)  Culture Results:   No growth at 5 days (09-01-23)  Culture Results:   No growth at 5 days (08-28-23)  Culture Results:   No growth at 5 days (08-28-23)    RADIOLOGY  < from: Xray Chest 1 View- PORTABLE-Routine (09.12.23 @ 07:36) >  IMPRESSION:    Unchanged left chest tube.    Unchanged effusion/opacities. No pneumothorax.      < end of copied text >    MEDICATIONS  (STANDING):  albuterol    90 MICROgram(s) HFA Inhaler 2 Puff(s) Inhalation every 4 hours  chlorhexidine 2% Cloths 1 Application(s) Topical <User Schedule>  dextrose 5%. 1000 milliLiter(s) (50 mL/Hr) IV Continuous <Continuous>  dextrose 50% Injectable 25 Gram(s) IV Push once  furosemide   Injectable 20 milliGRAM(s) IV Push once  gabapentin 100 milliGRAM(s) Oral three times a day  glucagon  Injectable 1 milliGRAM(s) IntraMuscular once  insulin lispro (ADMELOG) corrective regimen sliding scale   SubCutaneous three times a day before meals  metoprolol tartrate 25 milliGRAM(s) Oral two times a day  pantoprazole  Injectable 40 milliGRAM(s) IV Push two times a day  polyethylene glycol 3350 17 Gram(s) Oral two times a day  senna 2 Tablet(s) Oral at bedtime    MEDICATIONS  (PRN):  acetaminophen     Tablet .. 650 milliGRAM(s) Oral every 6 hours PRN Temp greater or equal to 38C (100.4F), Mild Pain (1 - 3)  atropine Injectable 0.5 milliGRAM(s) IV Push once PRN bradycardia  dextrose Oral Gel 15 Gram(s) Oral once PRN Blood Glucose LESS THAN 70 milliGRAM(s)/deciliter      Patient seen and evaluated this am, comfortable in bed      T(F): 97.5 (09-12-23 @ 07:10), Max: 99.2 (09-11-23 @ 23:48)  HR: 106 (09-12-23 @ 07:17)  BP: 104/53 (09-12-23 @ 07:17)  RR: 20 (09-12-23 @ 07:17)  SpO2: 100% (09-12-23 @ 07:17) (90% - 100%)    PHYSICAL EXAM:  GENERAL: NAD  HEAD:  Atraumatic, Normocephalic  EYES: EOMI, PERRLA, conjunctiva and sclera clear  NERVOUS SYSTEM:  Alert & Oriented X3, no focal deficits   CHEST/LUNG: Clear to percussion bilaterally; No rales, rhonchi, wheezing, or rubs  HEART: Regular rate and rhythm; No murmurs, rubs, or gallops  ABDOMEN: Soft, Nontender, Nondistended; Bowel sounds present  EXTREMITIES:  2+ Peripheral Pulses, No clubbing, cyanosis, or edema    LABS  09-12    147<H>  |  109  |  54<H>  ----------------------------<  118<H>  4.7   |  27  |  1.2    Ca    8.4      12 Sep 2023 07:48  Mg     2.3     09-12    TPro  4.8<L>  /  Alb  2.9<L>  /  TBili  1.0  /  DBili  x   /  AST  54<H>  /  ALT  19  /  AlkPhos  110  09-12                          6.5    21.34 )-----------( 224      ( 12 Sep 2023 11:32 )             20.3     PT/INR - ( 11 Sep 2023 19:49 )   PT: 11.60 sec;   INR: 1.02 ratio         PTT - ( 11 Sep 2023 19:49 )  PTT:27.9 sec      Culture Results:   No growth at 24 hours (09-10-23)  Culture Results:   No growth at 24 hours (09-10-23)  Culture Results:   Normal Respiratory Joaquina present (09-05-23)  Culture Results:   No growth at 5 days (09-02-23)  Culture Results:   Normal Respiratory Joaquina present (09-02-23)  Culture Results:   <10,000 CFU/mL Normal Urogenital Joaquina (09-01-23)  Culture Results:   No growth at 5 days (09-01-23)  Culture Results:   No growth at 5 days (08-28-23)  Culture Results:   No growth at 5 days (08-28-23)    RADIOLOGY  < from: Xray Chest 1 View- PORTABLE-Routine (09.12.23 @ 07:36) >  IMPRESSION:    Unchanged left chest tube.    Unchanged effusion/opacities. No pneumothorax.      < end of copied text >    MEDICATIONS  (STANDING):  albuterol    90 MICROgram(s) HFA Inhaler 2 Puff(s) Inhalation every 4 hours  chlorhexidine 2% Cloths 1 Application(s) Topical <User Schedule>  furosemide   Injectable 20 milliGRAM(s) IV Push once  gabapentin 100 milliGRAM(s) Oral three times a day  insulin lispro (ADMELOG) corrective regimen sliding scale   SubCutaneous three times a day before meals  metoprolol tartrate 25 milliGRAM(s) Oral two times a day  pantoprazole  Injectable 40 milliGRAM(s) IV Push two times a day  polyethylene glycol 3350 17 Gram(s) Oral two times a day  senna 2 Tablet(s) Oral at bedtime    MEDICATIONS  (PRN):  acetaminophen     Tablet .. 650 milliGRAM(s) Oral every 6 hours PRN Temp greater or equal to 38C (100.4F), Mild Pain (1 - 3)  atropine Injectable 0.5 milliGRAM(s) IV Push once PRN bradycardia  dextrose Oral Gel 15 Gram(s) Oral once PRN Blood Glucose LESS THAN 70 milliGRAM(s)/deciliter

## 2023-09-12 NOTE — PROGRESS NOTE ADULT - SUBJECTIVE AND OBJECTIVE BOX
GIOVANNY BUCK  88y, Female    LOS  15d    INTERVAL EVENTS/HPI  - No acute events overnight  - T(F): , Max: 99.2 (09-11-23 @ 23:48)  - Denies any worsening symptoms  - Tolerating medication  - WBC Count: 21.34 (09-12-23 @ 11:32)  WBC Count: 28.26 (09-11-23 @ 19:49)  - Creatinine: 1.2 (09-12-23 @ 07:48)  Creatinine: 1.2 (09-11-23 @ 06:08)     REVIEW OF SYSTEMS:  CONSTITUTIONAL: No fever or chills  HEENT: No sore throat  RESPIRATORY: No cough, no shortness of breath  CARDIOVASCULAR: No chest pain or palpitations  GASTROINTESTINAL: No abdominal or epigastric pain  GENITOURINARY: No dysuria  NEUROLOGICAL: No headache/dizziness  MSK: No joint pain, erythema, or swelling; no back pain  SKIN: No itching, rashes  All other ROS negative except noted above    Prior hospital charts reviewed [Yes]  Primary team notes reviewed [Yes]  Other consultant notes reviewed [Yes]    ANTIMICROBIALS:       OTHER MEDS: MEDICATIONS  (STANDING):  acetaminophen     Tablet .. 650 every 6 hours PRN  albuterol    90 MICROgram(s) HFA Inhaler 2 every 4 hours  atropine Injectable 0.5 once PRN  dextrose 50% Injectable 25 once  dextrose Oral Gel 15 once PRN  furosemide   Injectable 20 once  gabapentin 100 three times a day  glucagon  Injectable 1 once  insulin lispro (ADMELOG) corrective regimen sliding scale  three times a day before meals  metoprolol tartrate 25 two times a day  pantoprazole  Injectable 40 two times a day  polyethylene glycol 3350 17 two times a day  senna 2 at bedtime      Vital Signs Last 24 Hrs  T(F): 97.5 (09-12-23 @ 07:10), Max: 100 (09-11-23 @ 01:00)    Vital Signs Last 24 Hrs  HR: 106 (09-12-23 @ 07:17) (102 - 121)  BP: 104/53 (09-12-23 @ 07:17) (99/61 - 104/65)  RR: 20 (09-12-23 @ 07:17)  SpO2: 100% (09-12-23 @ 07:17) (90% - 100%)  Wt(kg): --    EXAM:  GENERAL: NAD. Interactive. On Nasal canula   HEAD: No head lesions  EYES: Conjunctiva pink and cornea white  NECK: Supple, nontender to palpation  CHEST/LUNG: Mild bilateral crackles.   HEART: S1 S2  ABDOMEN: Soft, nontender, nondistended  EXTREMITIES: No clubbing, cyanosis. +1 edema bilaterally. Chronic venous stasis dermatitis. No open wounds.   NERVOUS SYSTEM: Awake.   MSK: No joint erythema, swelling or pain  SKIN: No rashes or lesions, no superficial thrombophlebitis    Labs:                        6.5    21.34 )-----------( 224      ( 12 Sep 2023 11:32 )             20.3     09-12    147<H>  |  109  |  54<H>  ----------------------------<  118<H>  4.7   |  27  |  1.2    Ca    8.4      12 Sep 2023 07:48  Mg     2.3     09-12    TPro  4.8<L>  /  Alb  2.9<L>  /  TBili  1.0  /  DBili  x   /  AST  54<H>  /  ALT  19  /  AlkPhos  110  09-12      WBC Trend:  WBC Count: 21.34 (09-12-23 @ 11:32)  WBC Count: 28.26 (09-11-23 @ 19:49)  WBC Count: 23.79 (09-11-23 @ 06:08)  WBC Count: 25.67 (09-10-23 @ 19:21)      Creatine Trend:  Creatinine: 1.2 (09-12)  Creatinine: 1.2 (09-11)  Creatinine: 0.9 (09-10)  Creatinine: 0.8 (09-09)      Liver Biochemical Testing Trend:  Alanine Aminotransferase (ALT/SGPT): 19 (09-12)  Alanine Aminotransferase (ALT/SGPT): 18 (09-11)  Alanine Aminotransferase (ALT/SGPT): 11 (09-10)  Alanine Aminotransferase (ALT/SGPT): 12 (09-09)  Alanine Aminotransferase (ALT/SGPT): 15 (09-08)  Aspartate Aminotransferase (AST/SGOT): 54 (09-12-23 @ 07:48)  Aspartate Aminotransferase (AST/SGOT): 51 (09-11-23 @ 06:08)  Aspartate Aminotransferase (AST/SGOT): 18 (09-10-23 @ 05:45)  Aspartate Aminotransferase (AST/SGOT): 14 (09-09-23 @ 05:59)  Aspartate Aminotransferase (AST/SGOT): 11 (09-08-23 @ 05:44)  Bilirubin Total: 1.0 (09-12)  Bilirubin Total: 0.7 (09-11)  Bilirubin Total: 0.6 (09-10)  Bilirubin Total: 0.4 (09-09)  Bilirubin Total: 0.4 (09-08)      Trend LDH  09-10-23 @ 19:21  398<H>  03-07-23 @ 21:06  211      Urinalysis Basic - ( 12 Sep 2023 07:48 )    Color: x / Appearance: x / SG: x / pH: x  Gluc: 118 mg/dL / Ketone: x  / Bili: x / Urobili: x   Blood: x / Protein: x / Nitrite: x   Leuk Esterase: x / RBC: x / WBC x   Sq Epi: x / Non Sq Epi: x / Bacteria: x        MICROBIOLOGY:  Vancomycin Level, Random: 36.7 (09-07 @ 06:02)  Vancomycin Level, Trough: 34.5 (09-06 @ 06:44)  Vancomycin Level, Trough: 35.1 (09-05 @ 20:09)  Vancomycin Level, Trough: 14.0 (09-02 @ 19:02)    Female    Culture - Blood (collected 10 Sep 2023 05:45)  Source: .Blood None  Preliminary Report:    No growth at 24 hours    Culture - Blood (collected 10 Sep 2023 05:45)  Source: .Blood None  Preliminary Report:    No growth at 24 hours    Culture - Sputum (collected 05 Sep 2023 19:15)  Source: Trach Asp Tracheal Aspirate  Final Report:    Normal Respiratory Joaquina present    Culture - Blood (collected 02 Sep 2023 06:01)  Source: .Blood None  Final Report:    No growth at 5 days    Culture - Sputum (collected 02 Sep 2023 03:00)  Source: Trach Asp Tracheal Aspirate  Final Report:    Normal Respiratory Joaquina present    Culture - Urine (collected 01 Sep 2023 19:50)  Source: Catheterized Catheterized  Final Report:    <10,000 CFU/mL Normal Urogenital Joaquina    Culture - Blood (collected 01 Sep 2023 19:32)  Source: .Blood None  Final Report:    No growth at 5 days    Culture - Blood (collected 28 Aug 2023 11:00)  Source: .Blood Blood-Peripheral  Final Report:    No growth at 5 days    Culture - Blood (collected 28 Aug 2023 11:00)  Source: .Blood Blood-Peripheral  Final Report:    No growth at 5 days    Culture - Abscess with Gram Stain (collected 16 Jul 2023 07:00)  Source: .Abscess Right groin abscess  Final Report:    Numerous Proteus mirabilis    Numerous Enterococcus faecalis    Few Coag Negative Staphylococcus "Susceptibilities not performed"    Few Enterococcus faecium (vancomycin resistant)    Moderate Prevotella timonensis "Susceptibilities not performed"  Organism: Proteus mirabilis  Enterococcus faecalis  Enterococcus faecium (vancomycin resistant)  Organism: Enterococcus faecium (vancomycin resistant)    Sensitivities:      Method Type: DARIO      -  Ampicillin: R >8 Predicts results to ampicillin/sulbactam, amoxacillin-clavulanate and  piperacillin-tazobactam.      -  Daptomycin: SDD 4 The breakpoint for SDD (susceptible dose dependent)is based on a dosage regimen of 8-12 mg/kg administered every 24 h in adults and is intended for serious infections due to E. faecium. Consultation with an infectious diseases specialist is recommended.      -  Levofloxacin: R >4      -  Linezolid: S 2      -  Tetracycline: R >8      -  Vancomycin: R >16  Organism: Enterococcus faecalis    Sensitivities:      Method Type: DARIO      -  Ampicillin: S <=2 Predicts results to ampicillin/sulbactam, amoxacillin-clavulanate and  piperacillin-tazobactam.      -  Tetracycline: R >8      -  Vancomycin: S 2  Organism: Proteus mirabilis    Sensitivities:      Method Type: DARIO      -  Amikacin: S <=16      -  Amoxicillin/Clavulanic Acid: S <=8/4      -  Ampicillin: S <=8 These ampicillin results predict results for amoxicillin      -  Ampicillin/Sulbactam: S <=4/2 Enterobacter, Klebsiella aerogenes, Citrobacter, and Serratia may develop resistance during prolonged therapy (3-4 days)      -  Aztreonam: S <=4      -  Cefazolin: S <=2 Enterobacter, Klebsiella aerogenes, Citrobacter, and Serratia may develop resistance during prolonged therapy (3-4 days)      -  Cefepime: S <=2      -  Cefoxitin: S <=8      -  Ceftriaxone: S <=1 Enterobacter, Klebsiella aerogenes, Citrobacter, and Serratia may develop resistance during prolonged therapy      -  Ciprofloxacin: S <=0.25      -  Ertapenem: S <=0.5      -  Gentamicin: S <=2      -  Levofloxacin: S <=0.5      -  Meropenem: S <=1      -  Piperacillin/Tazobactam: S <=8      -  Tobramycin: S <=2      -  Trimethoprim/Sulfamethoxazole: S 1/19              D-Dimer Assay, Quantitative: 531 (09-11)    Lactate Dehydrogenase, Serum: 398 (09-10)        Blood Gas Arterial, Lactate: 0.80 (09-10 @ 08:20)  Blood Gas Arterial, Lactate: 0.80 (09-10 @ 04:16)        RADIOLOGY & ADDITIONAL TESTS:  I have personally reviewed the relevant images.   CXR  Xray Chest 1 View- PORTABLE-Urgent:   ACC: [id number] EXAM:  XR CHEST PORTABLE URGENT 1V   ORDERED BY: DELORIS MUSTAFA     PROCEDURE DATE:  09/09/2023          INTERPRETATION:  Clinical History / Reason for exam: Intubated,   88-year-old female with acute respiratory failure with hypoxia.    Comparison : Chest radiograph performed 9/9/2023 at 6:18 AM.    Technique/Positioning: Portable, AP semi-erect.    Findings:    Support devices: Endotracheal tube is present, with tip 2.4 cm above the   shelly.  Enteric feeding tube courses over the midline below the left   guicho-diaphragm; the tip is excluded from the image.  Chest tube is stable   at the left lung base.    Cardiac/mediastinum/hilum: The cardiac silhouette is magnified.  There   are thoracic aortic calcifications.  On    Lung parenchyma/Pleura: There is a stable left basilar pulmonary   opacity/pleural effusion.  Stable right basilar pleural-parenchymal   pulmonary opacity.  No pneumothorax is seen.    Skeleton/soft tissues: Stable.    Impression:  1.  Support lines/tubes, as described.  2.  Stable left basilar pulmonary opacity/pleural effusion.  3.  Stable right basilar pleural-parenchymal pulmonary opacity.  4.  No pneumothorax.        --- End of Report ---            WILLIAM GILLESPIE MD; Attending Radiologist  This document has been electronically signed. Sep  9 2023  4:05PM (09-09-23 @ 15:28)      CT        WEIGHT  Weight (kg): 87.8 (08-28-23 @ 21:03)  Creatinine: 1.2 mg/dL (09-12-23 @ 07:48)      All available historical records have been reviewed

## 2023-09-12 NOTE — PROGRESS NOTE ADULT - ASSESSMENT
IMPRESSION:    acute hypercapnic/ hypoxic respiratory failure s/p intubation/extubation  Acute on Chronic Normocytic Anemia SP 4 units PRBC  COPD exacerbation  Sepsis present on admission   CAP  AFIB on Eliquis  recurrent pleural effusions s/p pleurex   malignant pleural effusion   H/O HFpEF    PLAN:    CNS: mental status at baseline    HEENT: oral care    PULMONARY: aspiration precaution, HOB@ 45 degrees, albuterol PRN, encourage incentive spirometry, on 3L NC sating 100%     CARDIOVASCULAR: 2D-Echo EF 64%, not on pressors     GI: GI prophylaxis. feeding, FOBT negative, GI consult     RENAL: f/u lytes correct as needed    INFECTIOUS DISEASE: SP ABX    HEMATOLOGICAL: check DIC/HIT panel, hematology consult, pending AM labs     ENDOCRINE:  Follow up FS.  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 and persistent anemia) for significant ecchymosis r/o hematoma     Code Status: DNR only    No lines    SDU monitoring  IMPRESSION:    acute hypercapnic/ hypoxic respiratory failure s/p intubation/extubation  Acute on Chronic Normocytic Anemia SP 4 units PRBC  COPD exacerbation  Sepsis present on admission   CAP  AFIB on Eliquis  recurrent pleural effusions s/p pleurex   malignant pleural effusion   H/O HFpEF    PLAN:    CNS: mental status at baseline    HEENT: oral care    PULMONARY: aspiration precaution, HOB@ 45 degrees, albuterol PRN, encourage incentive spirometry, on 3L NC sating 100%     CARDIOVASCULAR: 2D-Echo EF 64%, not on pressors     GI: GI prophylaxis. feeding, FOBT negative, GI consult     RENAL: f/u lytes correct as needed    INFECTIOUS DISEASE: SP ABX    HEMATOLOGICAL: check DIC/HIT panel, hematology consult, pending AM labs     ENDOCRINE:  Follow up FS.  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

## 2023-09-13 NOTE — PROGRESS NOTE ADULT - SUBJECTIVE AND OBJECTIVE BOX
GIOVANNY BUCK  88y, Female    LOS  16d    INTERVAL EVENTS/HPI  - No acute events overnight  - T(F): , Max: 98.8 (09-12-23 @ 22:42)  - Denies any worsening symptoms  - Tolerating medication  - WBC Count: 18.19 (09-13-23 @ 06:05)  WBC Count: 21.34 (09-12-23 @ 11:32)  - Creatinine: 1.2 (09-12-23 @ 07:48)     REVIEW OF SYSTEMS:  CONSTITUTIONAL: No fever or chills  HEENT: No sore throat  RESPIRATORY: No cough, no shortness of breath  CARDIOVASCULAR: No chest pain or palpitations  GASTROINTESTINAL: No abdominal or epigastric pain  GENITOURINARY: No dysuria  NEUROLOGICAL: No headache/dizziness  MSK: No joint pain, erythema, or swelling; no back pain  SKIN: No itching, rashes  All other ROS negative except noted above    Prior hospital charts reviewed [Yes]  Primary team notes reviewed [Yes]  Other consultant notes reviewed [Yes]    ANTIMICROBIALS:       OTHER MEDS: MEDICATIONS  (STANDING):  acetaminophen     Tablet .. 650 every 6 hours PRN  albuterol    90 MICROgram(s) HFA Inhaler 2 every 4 hours  atropine Injectable 0.5 once PRN  dextrose 50% Injectable 25 once  dextrose Oral Gel 15 once PRN  gabapentin 100 three times a day  glucagon  Injectable 1 once  insulin lispro (ADMELOG) corrective regimen sliding scale  three times a day before meals  metoprolol tartrate 25 two times a day  pantoprazole  Injectable 40 two times a day  polyethylene glycol 3350 17 two times a day  senna 2 at bedtime      Vital Signs Last 24 Hrs  T(F): 97.5 (09-13-23 @ 07:01), Max: 100 (09-11-23 @ 01:00)    Vital Signs Last 24 Hrs  HR: 123 (09-13-23 @ 07:10) (112 - 123)  BP: 116/68 (09-13-23 @ 07:10) (94/52 - 166/72)  RR: 28 (09-13-23 @ 07:10)  SpO2: 98% (09-13-23 @ 07:10) (98% - 100%)  Wt(kg): --    EXAM:  GENERAL: NAD. Interactive. On Nasal canula   HEAD: No head lesions  EYES: Conjunctiva pink and cornea white  NECK: Supple, nontender to palpation  CHEST/LUNG: Mild bilateral crackles.   HEART: S1 S2  ABDOMEN: Soft, nontender, nondistended  EXTREMITIES: No clubbing, cyanosis. +1 edema bilaterally. Chronic venous stasis dermatitis. No open wounds.   NERVOUS SYSTEM: Awake.   MSK: No joint erythema, swelling or pain  SKIN: No rashes or lesions, no superficial thrombophlebitis    Labs:                        7.7    18.19 )-----------( 225      ( 13 Sep 2023 06:05 )             24.1     09-12    147<H>  |  109  |  54<H>  ----------------------------<  118<H>  4.7   |  27  |  1.2    Ca    8.4      12 Sep 2023 07:48  Mg     2.3     09-12    TPro  4.8<L>  /  Alb  2.9<L>  /  TBili  1.0  /  DBili  x   /  AST  54<H>  /  ALT  19  /  AlkPhos  110  09-12      WBC Trend:  WBC Count: 18.19 (09-13-23 @ 06:05)  WBC Count: 21.34 (09-12-23 @ 11:32)  WBC Count: 28.26 (09-11-23 @ 19:49)  WBC Count: 23.79 (09-11-23 @ 06:08)      Creatine Trend:  Creatinine: 1.2 (09-12)  Creatinine: 1.2 (09-11)  Creatinine: 0.9 (09-10)  Creatinine: 0.8 (09-09)      Liver Biochemical Testing Trend:  Alanine Aminotransferase (ALT/SGPT): 19 (09-12)  Alanine Aminotransferase (ALT/SGPT): 18 (09-11)  Alanine Aminotransferase (ALT/SGPT): 11 (09-10)  Alanine Aminotransferase (ALT/SGPT): 12 (09-09)  Alanine Aminotransferase (ALT/SGPT): 15 (09-08)  Aspartate Aminotransferase (AST/SGOT): 54 (09-12-23 @ 07:48)  Aspartate Aminotransferase (AST/SGOT): 51 (09-11-23 @ 06:08)  Aspartate Aminotransferase (AST/SGOT): 18 (09-10-23 @ 05:45)  Aspartate Aminotransferase (AST/SGOT): 14 (09-09-23 @ 05:59)  Aspartate Aminotransferase (AST/SGOT): 11 (09-08-23 @ 05:44)  Bilirubin Total: 1.0 (09-12)  Bilirubin Total: 0.7 (09-11)  Bilirubin Total: 0.6 (09-10)  Bilirubin Total: 0.4 (09-09)  Bilirubin Total: 0.4 (09-08)      Trend LDH  09-10-23 @ 19:21  398<H>  03-07-23 @ 21:06  211      Urinalysis Basic - ( 12 Sep 2023 07:48 )    Color: x / Appearance: x / SG: x / pH: x  Gluc: 118 mg/dL / Ketone: x  / Bili: x / Urobili: x   Blood: x / Protein: x / Nitrite: x   Leuk Esterase: x / RBC: x / WBC x   Sq Epi: x / Non Sq Epi: x / Bacteria: x        MICROBIOLOGY:  Vancomycin Level, Random: 36.7 (09-07 @ 06:02)  Vancomycin Level, Trough: 34.5 (09-06 @ 06:44)  Vancomycin Level, Trough: 35.1 (09-05 @ 20:09)  Vancomycin Level, Trough: 14.0 (09-02 @ 19:02)    Female    Culture - Blood (collected 10 Sep 2023 05:45)  Source: .Blood None  Preliminary Report:    No growth at 48 Hours    Culture - Blood (collected 10 Sep 2023 05:45)  Source: .Blood None  Preliminary Report:    No growth at 48 Hours    Culture - Sputum (collected 05 Sep 2023 19:15)  Source: Trach Asp Tracheal Aspirate  Final Report:    Normal Respiratory Joaquina present    Culture - Blood (collected 02 Sep 2023 06:01)  Source: .Blood None  Final Report:    No growth at 5 days    Culture - Sputum (collected 02 Sep 2023 03:00)  Source: Trach Asp Tracheal Aspirate  Final Report:    Normal Respiratory Joaquina present    Culture - Urine (collected 01 Sep 2023 19:50)  Source: Catheterized Catheterized  Final Report:    <10,000 CFU/mL Normal Urogenital Joaquina    Culture - Blood (collected 01 Sep 2023 19:32)  Source: .Blood None  Final Report:    No growth at 5 days    Culture - Blood (collected 28 Aug 2023 11:00)  Source: .Blood Blood-Peripheral  Final Report:    No growth at 5 days    Culture - Blood (collected 28 Aug 2023 11:00)  Source: .Blood Blood-Peripheral  Final Report:    No growth at 5 days    Culture - Abscess with Gram Stain (collected 16 Jul 2023 07:00)  Source: .Abscess Right groin abscess  Final Report:    Numerous Proteus mirabilis    Numerous Enterococcus faecalis    Few Coag Negative Staphylococcus "Susceptibilities not performed"    Few Enterococcus faecium (vancomycin resistant)    Moderate Prevotella timonensis "Susceptibilities not performed"  Organism: Proteus mirabilis  Enterococcus faecalis  Enterococcus faecium (vancomycin resistant)  Organism: Enterococcus faecium (vancomycin resistant)    Sensitivities:      Method Type: DARIO      -  Ampicillin: R >8 Predicts results to ampicillin/sulbactam, amoxacillin-clavulanate and  piperacillin-tazobactam.      -  Daptomycin: SDD 4 The breakpoint for SDD (susceptible dose dependent)is based on a dosage regimen of 8-12 mg/kg administered every 24 h in adults and is intended for serious infections due to E. faecium. Consultation with an infectious diseases specialist is recommended.      -  Levofloxacin: R >4      -  Linezolid: S 2      -  Tetracycline: R >8      -  Vancomycin: R >16  Organism: Enterococcus faecalis    Sensitivities:      Method Type: DARIO      -  Ampicillin: S <=2 Predicts results to ampicillin/sulbactam, amoxacillin-clavulanate and  piperacillin-tazobactam.      -  Tetracycline: R >8      -  Vancomycin: S 2  Organism: Proteus mirabilis    Sensitivities:      Method Type: DARIO      -  Amikacin: S <=16      -  Amoxicillin/Clavulanic Acid: S <=8/4      -  Ampicillin: S <=8 These ampicillin results predict results for amoxicillin      -  Ampicillin/Sulbactam: S <=4/2 Enterobacter, Klebsiella aerogenes, Citrobacter, and Serratia may develop resistance during prolonged therapy (3-4 days)      -  Aztreonam: S <=4      -  Cefazolin: S <=2 Enterobacter, Klebsiella aerogenes, Citrobacter, and Serratia may develop resistance during prolonged therapy (3-4 days)      -  Cefepime: S <=2      -  Cefoxitin: S <=8      -  Ceftriaxone: S <=1 Enterobacter, Klebsiella aerogenes, Citrobacter, and Serratia may develop resistance during prolonged therapy      -  Ciprofloxacin: S <=0.25      -  Ertapenem: S <=0.5      -  Gentamicin: S <=2      -  Levofloxacin: S <=0.5      -  Meropenem: S <=1      -  Piperacillin/Tazobactam: S <=8      -  Tobramycin: S <=2      -  Trimethoprim/Sulfamethoxazole: S 1/19      Ferritin: 130 (09-12)    D-Dimer Assay, Quantitative: 531 (09-11)    Lactate Dehydrogenase, Serum: 398 (09-10)        Blood Gas Arterial, Lactate: 0.80 (09-10 @ 08:20)        RADIOLOGY & ADDITIONAL TESTS:  I have personally reviewed the relevant images.   CXR      CT    < from: VA Duplex Upper Extrem Arterial Limited, Left (09.12.23 @ 17:01) >  IMPRESSION:  Normal blood flow in visualized arteries of left upper extremity.  Radial and ulnar arteries are not visualized.    < end of copied text >  < from: Xray Chest 1 View- PORTABLE-Routine (09.12.23 @ 07:36) >    Unchanged left chest tube.    Unchanged effusion/opacities. No pneumothorax.    Stable cardiomediastinal silhouette.    Unchanged osseous structures.    < end of copied text >      WEIGHT  Weight (kg): 87.8 (08-28-23 @ 21:03)  Creatinine: 1.2 mg/dL (09-12-23 @ 07:48)      All available historical records have been reviewed

## 2023-09-13 NOTE — PROVIDER CONTACT NOTE (OTHER) - ASSESSMENT
Prior to administration- PIV was assessed to have blood return and flush easily on L arm. IV was placed by MD with US 9/12 just prior to IV administration. PRBCs hung per protocol with VS WNL. While receiving blood- pt PIV infiltrated- MD was made aware, blood was immediately stopped and sent back to blood bank, PIV was removed
pt wakes up agitated, trying to get out of bed. HR jumped to 180-190, tachypneic, desat to 88-90%- fighting vent, peak pressuring

## 2023-09-13 NOTE — PROGRESS NOTE ADULT - ASSESSMENT
89 YO F with a medical history  of HTN, malignant pleural effusion of breast origin, chronic A.fib, PAD, Obesity, Chronic lymphedema of  BLE and COPD on home oxygen 2 - 1/2 L nc, ESBL UTI presented to ED with severe SOB, DNR / DNI rescinded and pt was intubated and admitted to ICU    acute respiratory failure - resolved / B/L effusions / possible pneumonia / COPD / stage 2 sacral decubiti was POA / anemia     - s/p transfusion of 4 units PRBC   - maintain HGB>7   - monitor H/H q12h   -  DNR    - completed  antibiotic course   - anticoagulation on hold secondary to anemia   - Lasix

## 2023-09-13 NOTE — CONSULT NOTE ADULT - ASSESSMENT
-  Ms. Mcconnell is an 87yo W female w/ PMH of HTN, malignant pleural effusion possible breast origin, chronic A.fib,  Rt DVT of brachial veins x 2, PAD, Obesity, Chronic lymphedema of BLE, UTI, presented to ED 08/28/2023 with severe SOB, DNR / DNI rescinded, patient admitted for management of hypercapnic respiratory failure.    Hematology/Oncology consulted for evaluation of anemia.     #Anemia : HANS , chronic disease   H/H today : Transfuse PRN   Iron level :  Recommend Venofer 200mg IV x 3 doses   # GI Bleed   - Posiitve Occult blood 08/07/2023 recommend GI follow up with colonoscopy ,last  colonoscopy was done  over 10 yrs ago as per daughter  #3 Possible Breast CA    - Add Estrogen testing to    -  Ms. Mcconnell is an 87yo W female w/ PMH of HTN, malignant pleural effusion possible breast origin, chronic A.fib,  Rt DVT of brachial veins x 2, PAD, Obesity, Chronic lymphedema of BLE, UTI, presented to ED 08/28/2023 with severe SOB, DNR / DNI rescinded, patient admitted for management of hypercapnic respiratory failure.    Hematology/Oncology consulted for evaluation of anemia.     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   ER/DC testing requested; will follow.   -- Recommend PET Scan as outpatient when clinically stable      Tele visit performed Dr Hyatt at bedside      -  Ms. Mcconnell is an 87yo W female w/ PMH of HTN, malignant pleural effusion possible breast origin, chronic A.fib,  Rt DVT of brachial veins x 2, PAD, Obesity, Chronic lymphedema of BLE, UTI, presented to ED 08/28/2023 with severe SOB, DNR / DNI rescinded, patient admitted for management of hypercapnic respiratory failure.    Hematology/Oncology consulted for evaluation of anemia.     #Anemia : HANS , chronic disease   -- H/H today :7.7/24.Transfuse PRN  maintain HGB>7   Iron level today 32 Sat 12% Ferritin 130  :  Recommend Venofer 200mg IV x 3 doses   -- multimorbidity : as per medicine recommendations   # GI Bleed   -- Positive Occult blood 08/07/2023 recommend GI follow up with colonoscopy ,last  colonoscopy was done over 10 yrs ago as per daughter  #3 Possible Breast CA    -- Patient has not had screening in over 10 years as per daughter Emma which endorses patient has  history of cystic breast b/l   -- Pleural fluid, Thoracentesis performed 03/07/2023 Accession: [id number] 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   ER/CO testing requested; will follow.   -- Recommend PET Scan as outpatient when clinically stable      Tele visit performed Dr Hyatt at bedside      -  Ms. Mcconnell is an 87yo W female w/ PMH of HTN, malignant pleural effusion possible breast origin, chronic A.fib,  Rt DVT of brachial veins x 2, PAD, Obesity, Chronic lymphedema of BLE, UTI, presented to ED 08/28/2023 with severe SOB, DNR / DNI rescinded, patient admitted for management of hypercapnic respiratory failure.    Hematology/Oncology consulted for evaluation of anemia.     #Anemia : HANS , chronic disease   -- H/H today :7.7/24.Transfuse PRN  maintain HGB>7   Iron level today 32 Sat 12% Ferritin 130  :  Recommend Venofer 200mg IV x 3 doses   -- multimorbidity : as per medicine recommendations   # GI Bleed   -- Positive Occult blood 08/07/2023 recommend GI follow up with colonoscopy ,last  colonoscopy was done over 10 yrs ago as per daughter  #3 Possible Breast CA    -- Patient has not had screening in over 10 years as per daughter Emma which endorses patient has  history of cystic breast b/l   -- Pleural fluid, Thoracentesis performed 03/07/2023 Accession: [id number] 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  -- Recommend tumor markers CEA and CA15.3   ER/CT testing requested; will follow.   -- Recommend PET Scan as outpatient when clinically stable      Tele visit performed Dr Hyatt at bedside

## 2023-09-13 NOTE — PROVIDER CONTACT NOTE (OTHER) - ACTION/TREATMENT ORDERED:
Dr. King aware. No actions at this time. Will continue to monitor.
MD aware- other PIV access was attempted, but unable. MD placed TLC overnight. Pt arm was elevated with hot packs placed. Incident report done
MD Blankenship at bedside attempted to get PIV access multiple times with no success. Per MD- pt may need central line, awaiting further interventions, pending TLC insertion
IVF bolus of NS 0.9%, 500 ml stat.

## 2023-09-13 NOTE — PROGRESS NOTE ADULT - ASSESSMENT
IMPRESSION:    acute hypercapnic/ hypoxic respiratory failure s/p intubation/extubation  Acute on Chronic Normocytic Anemia SP 6 units PRBC  COPD exacerbation  Sepsis present on admission   CAP  AFIB on Eliquis  recurrent pleural effusions s/p pleurex   malignant pleural effusion   H/O HFpEF    PLAN:    CNS: mental status at baseline    HEENT: oral care    PULMONARY: aspiration precaution, HOB@ 45 degrees, albuterol PRN, encourage incentive spirometry, on 4L NC sating 100%     CARDIOVASCULAR: 2D-Echo EF 64%, not on pressors, start lasix 40mg IVP daily     GI: GI prophylaxis. feeding, FOBT negative, GI evaluated, CT abdomen/pelvis 9/9 negative for retroperitoneal hematoma     RENAL: f/u lytes correct as needed    INFECTIOUS DISEASE: SP ABX    HEMATOLOGICAL: DIC and HIT negative, hematology consult pending, monitor daily CBC and coags     ENDOCRINE:  Follow up FS.  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

## 2023-09-13 NOTE — PROGRESS NOTE ADULT - SUBJECTIVE AND OBJECTIVE BOX
Patient seen and evaluated this am, feeling improved      T(F): 97.5 (09-13-23 @ 07:01), Max: 98.8 (09-12-23 @ 22:42)  HR: 123 (09-13-23 @ 07:10)  BP: 116/68 (09-13-23 @ 07:10)  RR: 22  SpO2: 98% (09-13-23 @ 07:10) (98% - 100%)    PHYSICAL EXAM:  GENERAL: NAD  HEAD:  Atraumatic, Normocephalic  EYES: EOMI, PERRLA, conjunctiva and sclera clear  NERVOUS SYSTEM:   no focal deficits   CHEST/LUNG: bilateral rhonchi  HEART: Regular rate and rhythm; No murmurs, rubs, or gallops  ABDOMEN: Soft, Nontender, Nondistended; Bowel sounds present  EXTREMITIES:  2+ Peripheral Pulses, No clubbing, cyanosis, or edema    LABS  09-13    146  |  108  |  50<H>  ----------------------------<  96  4.3   |  30  |  1.2    Ca    8.5      13 Sep 2023 06:05  Mg     2.2     09-13    TPro  5.0<L>  /  Alb  2.9<L>  /  TBili  1.2  /  DBili  x   /  AST  53<H>  /  ALT  27  /  AlkPhos  129<H>  09-13                          7.7    18.19 )-----------( 225      ( 13 Sep 2023 06:05 )             24.1     PT/INR - ( 11 Sep 2023 19:49 )   PT: 11.60 sec;   INR: 1.02 ratio         PTT - ( 11 Sep 2023 19:49 )  PTT:27.9 sec      Culture Results:   No growth at 48 Hours (09-10-23)  Culture Results:   No growth at 48 Hours (09-10-23)  Culture Results:   Normal Respiratory Joaquina present (09-05-23)  Culture Results:   No growth at 5 days (09-02-23)  Culture Results:   Normal Respiratory Joaquina present (09-02-23)  Culture Results:   <10,000 CFU/mL Normal Urogenital Joaquina (09-01-23)  Culture Results:   No growth at 5 days (09-01-23)  Culture Results:   No growth at 5 days (08-28-23)  Culture Results:   No growth at 5 days (08-28-23)    RADIOLOGY  < from: Xray Chest 1 View- PORTABLE-Routine (09.13.23 @ 05:49) >  Impression:    1. Unchanged patchy bilateral opacities.        < end of copied text >    MEDICATIONS  (STANDING):  albuterol    90 MICROgram(s) HFA Inhaler 2 Puff(s) Inhalation every 4 hours  chlorhexidine 2% Cloths 1 Application(s) Topical <User Schedule>  chlorhexidine 4% Liquid 1 Application(s) Topical <User Schedule>  furosemide   Injectable 40 milliGRAM(s) IV Push daily  gabapentin 100 milliGRAM(s) Oral three times a day  insulin lispro (ADMELOG) corrective regimen sliding scale   SubCutaneous three times a day before meals  metoprolol tartrate 25 milliGRAM(s) Oral two times a day  pantoprazole  Injectable 40 milliGRAM(s) IV Push two times a day  polyethylene glycol 3350 17 Gram(s) Oral two times a day  senna 2 Tablet(s) Oral at bedtime    MEDICATIONS  (PRN):  acetaminophen     Tablet .. 650 milliGRAM(s) Oral every 6 hours PRN Temp greater or equal to 38C (100.4F), Mild Pain (1 - 3)  atropine Injectable 0.5 milliGRAM(s) IV Push once PRN bradycardia  dextrose Oral Gel 15 Gram(s) Oral once PRN Blood Glucose LESS THAN 70 milliGRAM(s)/deciliter  sodium chloride 0.9% lock flush 10 milliLiter(s) IV Push every 1 hour PRN Pre/post blood products, medications, blood draw, and to maintain line patency

## 2023-09-13 NOTE — CONSULT NOTE ADULT - NSCONSULTADDITIONALINFOA_GEN_ALL_CORE
High complexity case with respiratory distress/failure requiring mechanical ventilation and ICU admission
Recommend tumor markers CEA and CA15.3 with next blood draw

## 2023-09-13 NOTE — PROVIDER CONTACT NOTE (OTHER) - SITUATION
BP 76/38
pt only has 1 PIV access - the other IV extravasated and was removed per protocol- MD Blankenship made aware
Pt  receiving 1 u PRBC via PIV
Pt sleeping. On precedex 8cc. BP 89/42 and MAP 61
pt intubated/sedated on precedex and fentanyl - both at max dosage.

## 2023-09-13 NOTE — CONSULT NOTE ADULT - SUBJECTIVE AND OBJECTIVE BOX
Ms. Mcconnell is an 87yo W female admitted to ccu for management of hypercapnic respiratory failure.      Interval HPI :   Patient was seen and examined at bedside with daughter present., appeared lethargic but was A/O x3.  Ms. Mcconnell is an 89yo W female admitted to ccu for management of hypercapnic respiratory failure.      Interval HPI :   Patient was seen and examined at bedside with daughter Emma Pearson present. Patient appeared lethargic but was A/O x3 and cooperative.

## 2023-09-13 NOTE — PROGRESS NOTE ADULT - SUBJECTIVE AND OBJECTIVE BOX
Patient is a 88y old  Female who presents with a chief complaint of hypercapnic respiratory failure (13 Sep 2023 07:38)        Over Night Events: SP 2 units PRBCs yesterday, no evidence of bleeding       Vital Signs Last 24 Hrs  T(C): 36.4 (13 Sep 2023 07:01), Max: 37.1 (12 Sep 2023 22:42)  T(F): 97.5 (13 Sep 2023 07:01), Max: 98.8 (12 Sep 2023 22:42)  HR: 123 (13 Sep 2023 07:10) (112 - 125)  BP: 116/68 (13 Sep 2023 07:10) (94/52 - 166/72)  BP(mean): 85 (13 Sep 2023 07:10) (70 - 103)  RR: 28 (13 Sep 2023 07:10) (17 - 34)  SpO2: 98% (13 Sep 2023 07:10) (98% - 100%)    O2 Parameters below as of 13 Sep 2023 07:08  Patient On (Oxygen Delivery Method): nasal cannula  O2 Flow (L/min): 4        CONSTITUTIONAL:  Chronically Ill appearing. NAD    ENT:   Airway patent,   Mouth with normal mucosa.   No thrush    EYES:   Pupils equal,   Round and reactive to light.    CARDIAC:   Normal rate,   Regular rhythm.    anasarca     RESPIRATORY:   No wheezing  Bilateral BS  Normal chest expansion  Not tachypneic,  No use of accessory muscles    GASTROINTESTINAL:  Abdomen soft,   Non-tender,   No guarding,   + BS    MUSCULOSKELETAL:   Range of motion is not limited      NEUROLOGICAL:   Alert and oriented x4    SKIN:   Skin normal color for race,   Warm and dry  stage II sacral ulcer     PSYCHIATRIC:   No apparent risk to self or others.    09-12-23 @ 07:01  -  09-13-23 @ 07:00  --------------------------------------------------------  IN:    Oral Fluid: 260 mL    PRBCs (Packed Red Blood Cells): 343 mL  Total IN: 603 mL    OUT:    Voided (mL): 1000 mL  Total OUT: 1000 mL    Total NET: -397 mL          LABS:                            7.7    18.19 )-----------( 225      ( 13 Sep 2023 06:05 )             24.1                                               09-13    146  |  108  |  50<H>  ----------------------------<  96  4.3   |  30  |  1.2    Ca    8.5      13 Sep 2023 06:05  Mg     2.2     09-13    TPro  5.0<L>  /  Alb  2.9<L>  /  TBili  1.2  /  DBili  x   /  AST  53<H>  /  ALT  27  /  AlkPhos  129<H>  09-13      PT/INR - ( 11 Sep 2023 19:49 )   PT: 11.60 sec;   INR: 1.02 ratio         PTT - ( 11 Sep 2023 19:49 )  PTT:27.9 sec                                       Urinalysis Basic - ( 13 Sep 2023 06:05 )    Color: x / Appearance: x / SG: x / pH: x  Gluc: 96 mg/dL / Ketone: x  / Bili: x / Urobili: x   Blood: x / Protein: x / Nitrite: x   Leuk Esterase: x / RBC: x / WBC x   Sq Epi: x / Non Sq Epi: x / Bacteria: x                                                  LIVER FUNCTIONS - ( 13 Sep 2023 06:05 )  Alb: 2.9 g/dL / Pro: 5.0 g/dL / ALK PHOS: 129 U/L / ALT: 27 U/L / AST: 53 U/L / GGT: x                                                                                                                                       MEDICATIONS  (STANDING):  albuterol    90 MICROgram(s) HFA Inhaler 2 Puff(s) Inhalation every 4 hours  chlorhexidine 2% Cloths 1 Application(s) Topical <User Schedule>  chlorhexidine 4% Liquid 1 Application(s) Topical <User Schedule>  dextrose 5%. 1000 milliLiter(s) (50 mL/Hr) IV Continuous <Continuous>  dextrose 50% Injectable 25 Gram(s) IV Push once  gabapentin 100 milliGRAM(s) Oral three times a day  glucagon  Injectable 1 milliGRAM(s) IntraMuscular once  insulin lispro (ADMELOG) corrective regimen sliding scale   SubCutaneous three times a day before meals  metoprolol tartrate 25 milliGRAM(s) Oral two times a day  pantoprazole  Injectable 40 milliGRAM(s) IV Push two times a day  polyethylene glycol 3350 17 Gram(s) Oral two times a day  senna 2 Tablet(s) Oral at bedtime    MEDICATIONS  (PRN):  acetaminophen     Tablet .. 650 milliGRAM(s) Oral every 6 hours PRN Temp greater or equal to 38C (100.4F), Mild Pain (1 - 3)  atropine Injectable 0.5 milliGRAM(s) IV Push once PRN bradycardia  dextrose Oral Gel 15 Gram(s) Oral once PRN Blood Glucose LESS THAN 70 milliGRAM(s)/deciliter  sodium chloride 0.9% lock flush 10 milliLiter(s) IV Push every 1 hour PRN Pre/post blood products, medications, blood draw, and to maintain line patency      CXR reviewed

## 2023-09-13 NOTE — PROGRESS NOTE ADULT - ASSESSMENT
88 year old female with PMH of HTN, Afib, OA, PVD, COPD, Anxiety, Obesity, Acute on chronic systolic congestive heart failure, H/O abdominal hysterectomy, H/O discectomy, H/O total knee replacement, bilaterally and bilateral pleural effusions (s/p left thoracentesis 3/2023 -- Cytology -PLEURAL FLUID, THORACENTESIS: - SUSPICIOUS FOR MALIGNANCY) with a left sided pleurex catheter is currently admitted due to worsening respiratory distress and hypercapnic respiratory failure and COPD exacerbation, currently on mechanical ventilation.ID is being consulted for antimicrobial recommendations given she is spiking intermittent fevers.     IMPRESSION  #Acute hypercapnic respiratory failure- Volume overload vs COPD exacerbation- extubated since 9/10  #Possible GI bleed, acute anemia with reflex leukocytosis.   #Sepsis present on admission  #Fevers- Concerns of superimposed VAP?   #Bilateral pleural effusions   - s/p left thoracentesis 3/2023 -- Cytology -PLEURAL FLUID, THORACENTESIS: - SUSPICIOUS FOR MALIGNANCY. - Atypical cells present in background of histiocytes and mesothelial  cells, suspicious for metastatic carcinoma.   - Pleurx Cx 7/4 - Staph epidermidis, No PMNs  - s/p thoracentesis right thoracentesis 7/5 Exudative in nature. Cultures negative  #History of ESBL E.coli bacteremia (5/2023)   #Hx 2/2023 foot wound MRSA  #Right inguinal Abscess history (7/2023)- Cx (Polymicrobial- Proteus, E.feacalis and VRE)   #HF with preserved EF  #COPD   #Chronic Lymphedema  #Obesity BMI (kg/m2): 30.3, 34.7  #RVP and legionella urine antigen negative  #MRSA nares positive.     RECOMMENDATIONS  -Got 7 days of Van/cefepime for VAP (9/8)  -Leukocytosis likely reactive from anemia. Workup as per the primary team.   -Recommend aspiration precautions.   -Trend fever curve. Trend WBC   -Patient is at high risk of ICU related deconditioning.    -Frequent offloading. High risk for skin failure and decubitus ulcer.   -Guarded prognosis.     If any questions, please send a message or call on [employer] Teams  Please continue to update ID with any pertinent new laboratory or radiographic findings.    Clifford Cordero M.D  Infectious Diseases Attending  [facility]- [facility]

## 2023-09-14 NOTE — CONSULT NOTE ADULT - NS ATTEND AMEND GEN_ALL_CORE FT
I edited the note.   Time-based billing (NON-critical care).   75 minutes spent on total encounter; more than 50% of the visit was spent counseling and / or coordinating care by the attending physician.  The necessity of the time spent during the encounter on this date of service was due to: Coordination of care.
I personally reviewed the venous duplex- there is DVT in RUE.  There is no indication of IVC filter in upper extremity DVT.  Please treat the DVT per CHEST guidelines if not contraindicated.
I have reviewed pt's in- patient chart, reviewed labs and previous pathology reports in [facility]. Conducted video visit while NP was present in the pt room. Spoke at length with pt's daughter who was present in the exam room with pt.  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    Hem/Onc was consulted for anemia which is multifactorial causes include GI bleeding ( has positive stool occult in 8/23), iron deficiency, chronic disease and malignancy.    Recommend 1-2 doses of Venofer. Understand that pt is not an appropriate candidate for GI w/u. Family is aware.    Pleural effusion with one specimen showing malignant cells of breast origin. Ideally a PET scan should be done as outpt to determine extent of disease. We have requested Pathology to add ER testing on the specimen. If positive may consider hormonal therapy.  Discussed with daughter that response may not be brisk and therefore supportive care may be a good choice too, She will d/w the rest of the family. Pt is not a candidate for chemotherapy.

## 2023-09-14 NOTE — PROGRESS NOTE ADULT - ASSESSMENT
89 YO F with a medical history  of HTN, malignant pleural effusion of breast origin, chronic A.fib, PAD, Obesity, Chronic lymphedema of  BLE and COPD on home oxygen 2 - 1/2 L nc, ESBL UTI presented to ED with severe SOB, DNR / DNI rescinded and pt was intubated and admitted to ICU    acute respiratory failure - resolved / B/L effusions / possible pneumonia / COPD / stage 2 sacral decubiti was POA / anemia / R brachial vein DVT     - s/p transfusion of 4 units PRBC   - maintain HGB>7   - monitor H/H q12h   -  DNR    - no anticoagulation secondary to anemia   - check repeat RUE US to r/o progression of DVT as per vascular   - completed  antibiotic course   - Lasix

## 2023-09-14 NOTE — BRIEF OPERATIVE NOTE - OPERATION/FINDINGS
tazodone sen tto [facility]   Debridement of left buttock, partial primary closure, with negative wound vac in place grossly clean extensive wound with healthy adipose and small,  scattered areas of devitalized skin and   Debridement of left buttock, partial primary closure, with negative pressure  wound therapy application

## 2023-09-14 NOTE — PROGRESS NOTE ADULT - SUBJECTIVE AND OBJECTIVE BOX
Patient seen and evaluated this am, comfortable in bed, no complaints      T(F): 99 (09-14-23 @ 15:00), Max: 99 (09-14-23 @ 15:00)  HR: 109 (09-14-23 @ 19:00)  BP: 129/72 (09-14-23 @ 12:00)  RR: 20  SpO2: 100% (09-14-23 @ 19:00) (93% - 100%)    PHYSICAL EXAM:  GENERAL: NAD  HEAD:  Atraumatic, Normocephalic  EYES: EOMI, PERRLA, conjunctiva and sclera clear  NERVOUS SYSTEM:  no focal deficits   CHEST/LUNG: Clear to percussion bilaterally; No rales, rhonchi, wheezing, or rubs  HEART: Regular rate and rhythm; No murmurs, rubs, or gallops  ABDOMEN: Soft, Nontender, Nondistended; Bowel sounds present  EXTREMITIES:  R UE ecchymosis     LABS  09-14    146  |  107  |  45<H>  ----------------------------<  125<H>  3.8   |  30  |  1.0    Ca    8.5      14 Sep 2023 05:50  Mg     1.8     09-14    TPro  4.9<L>  /  Alb  2.8<L>  /  TBili  0.9  /  DBili  x   /  AST  40  /  ALT  24  /  AlkPhos  181<H>  09-14                          7.3    15.60 )-----------( 193      ( 14 Sep 2023 05:50 )             23.3     PT/INR - ( 14 Sep 2023 05:50 )   PT: 12.90 sec;   INR: 1.13 ratio         PTT - ( 14 Sep 2023 05:50 )  PTT:25.4 sec      Culture Results:   No growth at 4 days (09-10-23)  Culture Results:   No growth at 4 days (09-10-23)  Culture Results:   Normal Respiratory Joaquina present (09-05-23)  Culture Results:   No growth at 5 days (09-02-23)  Culture Results:   Normal Respiratory Joaquina present (09-02-23)  Culture Results:   <10,000 CFU/mL Normal Urogenital Joaquina (09-01-23)  Culture Results:   No growth at 5 days (09-01-23)  Culture Results:   No growth at 5 days (08-28-23)  Culture Results:   No growth at 5 days (08-28-23)    RADIOLOGY  < from: Xray Chest 1 View- PORTABLE-Routine (09.14.23 @ 06:21) >    Impression:    Unchanged patchy bilateral opacities.    < end of copied text >    < from: VA Duplex Upper Ext Vein Scan, Right (09.11.23 @ 11:13) >  IMPRESSION:  DVT is visualized in one of the 2 brachial veins.    < end of copied text >    MEDICATIONS  (STANDING):  albuterol    90 MICROgram(s) HFA Inhaler 2 Puff(s) Inhalation every 4 hours  chlorhexidine 2% Cloths 1 Application(s) Topical <User Schedule>  chlorhexidine 4% Liquid 1 Application(s) Topical <User Schedule>  furosemide   Injectable 40 milliGRAM(s) IV Push daily  gabapentin 100 milliGRAM(s) Oral three times a day  insulin lispro (ADMELOG) corrective regimen sliding scale   SubCutaneous three times a day before meals  iron sucrose IVPB 200 milliGRAM(s) IV Intermittent every 24 hours  metoprolol tartrate 25 milliGRAM(s) Oral two times a day  pantoprazole    Tablet 40 milliGRAM(s) Oral two times a day  polyethylene glycol 3350 17 Gram(s) Oral two times a day  senna 2 Tablet(s) Oral at bedtime    MEDICATIONS  (PRN):  acetaminophen     Tablet .. 650 milliGRAM(s) Oral every 6 hours PRN Temp greater or equal to 38C (100.4F), Mild Pain (1 - 3)  atropine Injectable 0.5 milliGRAM(s) IV Push once PRN bradycardia  dextrose Oral Gel 15 Gram(s) Oral once PRN Blood Glucose LESS THAN 70 milliGRAM(s)/deciliter  sodium chloride 0.9% lock flush 10 milliLiter(s) IV Push every 1 hour PRN Pre/post blood products, medications, blood draw, and to maintain line patency

## 2023-09-14 NOTE — PROGRESS NOTE ADULT - ASSESSMENT
88 year old female with PMH of HTN, Afib, OA, PVD, COPD, Anxiety, Obesity, Acute on chronic systolic congestive heart failure, H/O abdominal hysterectomy, H/O discectomy, H/O total knee replacement, bilaterally and bilateral pleural effusions (s/p left thoracentesis 3/2023 -- Cytology -PLEURAL FLUID, THORACENTESIS: - SUSPICIOUS FOR MALIGNANCY) with a left sided pleurex catheter is currently admitted due to worsening respiratory distress and hypercapnic respiratory failure and COPD exacerbation, currently on mechanical ventilation.ID is being consulted for antimicrobial recommendations given she is spiking intermittent fevers.     IMPRESSION  #Acute hypercapnic respiratory failure- Volume overload vs COPD exacerbation- extubated since 9/10  #Sepsis present on admission  #Fevers- Concerns of superimposed VAP?   #Bilateral pleural effusions   - s/p left thoracentesis 3/2023 -- Cytology -PLEURAL FLUID, THORACENTESIS: - SUSPICIOUS FOR MALIGNANCY. - Atypical cells present in background of histiocytes and mesothelial  cells, suspicious for metastatic carcinoma.   - Pleurx Cx 7/4 - Staph epidermidis, No PMNs  - s/p thoracentesis right thoracentesis 7/5 Exudative in nature. Cultures negative  #History of ESBL E.coli bacteremia (5/2023)   #Hx 2/2023 foot wound MRSA  #Right inguinal Abscess history (7/2023)- Cx (Polymicrobial- Proteus, E.feacalis and VRE)   #HF with preserved EF  #COPD   #Chronic Lymphedema  #Obesity BMI (kg/m2): 30.3, 34.7  #RVP and legionella urine antigen negative  #MRSA nares positive.     RECOMMENDATIONS  -Got 7 days of Van/cefepime for VAP (9/8)  -Leukocytosis likely reactive from anemia. (improving) Workup as per the primary team.   -Recommend aspiration precautions.   -Trend fever curve. Trend WBC   -Patient is at high risk of ICU related deconditioning.    -Frequent offloading. High risk for skin failure and decubitus ulcer.   -Guarded prognosis.     If any questions, please send a message or call on [employer] Teams  Please continue to update ID with any pertinent new laboratory or radiographic findings.    Clifford Cordero M.D  Infectious Diseases Attending  [facility]- [facility]

## 2023-09-14 NOTE — PROGRESS NOTE ADULT - ASSESSMENT
IMPRESSION:    Acute Hypercapnic/ hypoxic respiratory failure s/p intubation/extubation  Acute on Chronic Normocytic Anemia SP 6 units PRBC  COPD exacerbation  Sepsis present on admission   CAP  AFIB on Eliquis  recurrent pleural effusions s/p pleurex   malignant pleural effusion   H/O HFpEF    PLAN:    CNS: mental status at baseline    HEENT: oral care    PULMONARY: aspiration precaution, HOB@ 45 degrees, albuterol PRN, encourage incentive spirometry, on 3L NC sating 100%     CARDIOVASCULAR: 2D-Echo EF 64%, not on pressors, continue lasix 40mg IVP daily     GI: GI prophylaxis. feeding, FOBT negative, GI evaluated, CT abdomen/pelvis 9/9 negative for retroperitoneal hematoma     RENAL: f/u lytes correct as needed    INFECTIOUS DISEASE: SP ABX    HEMATOLOGICAL: DIC and HIT negative, hematology evaluated, monitor daily CBC and coags, proceed with Venofer     ENDOCRINE:  Follow up FS.  Insulin protocol if needed    MUSCULOSKELETAL: bedbound, RUE duplex positive for brachial dvt, holding off AC in light of persistent anemia, Vascular Surgery consult    Code Status: DNR only    Left IJ TLC in light of poor access     SDU monitoring

## 2023-09-14 NOTE — CONSULT NOTE ADULT - SUBJECTIVE AND OBJECTIVE BOX
Subjective:   Pt presents with swelling and deep red discoloration of upper right arm. Pt has equal and normal Ulnar and Radial pulses. Pt family member states she has had an IV in that arm in the past. Pt has delayed capillary refill of fingertips. Pt noted some discomfort upon palpation of affected area.     Objective:  Physical Exam:  General: NAD, resting comfortably  HEENT: NC/AT, EOMI, normal hearing, no oral lesions, no LAD, neck supple  Pulmonary: CTA B/L  Cardiovascular: S1, S2 w/RRR  Abdominal: soft, ND/NT  Extremities: right upper arm swelling and discoloration   Neuro: A/O x 3    Vital Signs Last 24 Hrs  T(C): 36 (14 Sep 2023 07:01), Max: 37.3 (13 Sep 2023 15:30)  T(F): 96.8 (14 Sep 2023 07:01), Max: 99.2 (13 Sep 2023 15:30)  HR: 104 (14 Sep 2023 13:55) (95 - 119)  BP: 129/72 (14 Sep 2023 12:00) (99/55 - 152/64)  BP(mean): 94 (14 Sep 2023 12:00) (72 - 94)  RR: 20 (14 Sep 2023 13:55) (18 - 25)  SpO2: 100% (14 Sep 2023 13:55) (93% - 100%)  Parameters below as of 14 Sep 2023 13:55  Patient On (Oxygen Delivery Method): nasal cannula  O2 Flow (L/min): 2    PAST MEDICAL & SURGICAL HISTORY:  HTN (hypertension)  Afib  s/p ablation 2001  Goiter  no meds  Cellulitis  chronic  OA (osteoarthritis)  PVD (peripheral vascular disease)  COPD (chronic obstructive pulmonary disease)  Anxiety  Obesity  Acute on chronic systolic congestive heart failure  Edema of both lower legs  Required emergent intubation  H/O abdominal hysterectomy  H/O discectomy  H/O total knee replacement, bilateral    MEDICATIONS  (STANDING):  albuterol    90 MICROgram(s) HFA Inhaler 2 Puff(s) Inhalation every 4 hours  chlorhexidine 2% Cloths 1 Application(s) Topical <User Schedule>  chlorhexidine 4% Liquid 1 Application(s) Topical <User Schedule>  dextrose 5%. 1000 milliLiter(s) (50 mL/Hr) IV Continuous <Continuous>  dextrose 50% Injectable 25 Gram(s) IV Push once  furosemide   Injectable 40 milliGRAM(s) IV Push daily  gabapentin 100 milliGRAM(s) Oral three times a day  glucagon  Injectable 1 milliGRAM(s) IntraMuscular once  insulin lispro (ADMELOG) corrective regimen sliding scale   SubCutaneous three times a day before meals  iron sucrose IVPB 200 milliGRAM(s) IV Intermittent every 24 hours  magnesium sulfate  IVPB 2 Gram(s) IV Intermittent once  metoprolol tartrate 25 milliGRAM(s) Oral two times a day  pantoprazole    Tablet 40 milliGRAM(s) Oral two times a day  polyethylene glycol 3350 17 Gram(s) Oral two times a day  potassium chloride  20 mEq/100 mL IVPB 20 milliEquivalent(s) IV Intermittent once  senna 2 Tablet(s) Oral at bedtime    MEDICATIONS  (PRN):  acetaminophen     Tablet .. 650 milliGRAM(s) Oral every 6 hours PRN Temp greater or equal to 38C (100.4F), Mild Pain (1 - 3)  atropine Injectable 0.5 milliGRAM(s) IV Push once PRN bradycardia  dextrose Oral Gel 15 Gram(s) Oral once PRN Blood Glucose LESS THAN 70 milliGRAM(s)/deciliter  sodium chloride 0.9% lock flush 10 milliLiter(s) IV Push every 1 hour PRN Pre/post blood products, medications, blood draw, and to maintain line patency    Allergies  aspirin (Other)  codeine (Other)  sulfa drugs (Hives; Other)  penicillin (Other)  contrast media (iodine-based) (Other)    Intolerances  13 Sep 2023 07:01  -  14 Sep 2023 07:00  --------------------------------------------------------  IN: 460 mL / OUT: 1825 mL / NET: -1365 mL    14 Sep 2023 07:01  -  14 Sep 2023 15:11  --------------------------------------------------------  IN: 220 mL / OUT: 0 mL / NET: 220 mL      LABS:                        7.3    15.60 )-----------( 193      ( 14 Sep 2023 05:50 )             23.3     09-14    146  |  107  |  45<H>  ----------------------------<  125<H>  3.8   |  30  |  1.0    Ca    8.5      14 Sep 2023 05:50  Mg     1.8     09-14    TPro  4.9<L>  /  Alb  2.8<L>  /  TBili  0.9  /  DBili  x   /  AST  40  /  ALT  24  /  AlkPhos  181<H>  09-14    PT/INR - ( 14 Sep 2023 05:50 )   PT: 12.90 sec;   INR: 1.13 ratio         PTT - ( 14 Sep 2023 05:50 )  PTT:25.4 sec  Urinalysis Basic - ( 14 Sep 2023 05:50 )    Color: x / Appearance: x / SG: x / pH: x  Gluc: 125 mg/dL / Ketone: x  / Bili: x / Urobili: x   Blood: x / Protein: x / Nitrite: x   Leuk Esterase: x / RBC: x / WBC x   Sq Epi: x / Non Sq Epi: x / Bacteria: x    CAPILLARY BLOOD GLUCOSE  POCT Blood Glucose.: 104 mg/dL (14 Sep 2023 11:22)  POCT Blood Glucose.: 138 mg/dL (14 Sep 2023 07:16)  POCT Blood Glucose.: 127 mg/dL (13 Sep 2023 21:11)  POCT Blood Glucose.: 114 mg/dL (13 Sep 2023 17:04)    LIVER FUNCTIONS - ( 14 Sep 2023 05:50 )  Alb: 2.8 g/dL / Pro: 4.9 g/dL / ALK PHOS: 181 U/L / ALT: 24 U/L / AST: 40 U/L / GGT: x           Cultures:      RADIOLOGY & ADDITIONAL STUDIES:  < from: Xray Chest 1 View- PORTABLE-Routine (09.14.23 @ 06:21) >  INTERPRETATION:  Clinical History / Reason for exam: Follow-up    Comparison : Chest radiograph yesterday.    Technique/Positioning:  AP chest.    Findings:    Support devices: Left IJ catheter, unchanged in position. Left-sided   chest tube, unchanged in position. Overlying telemetry leads.    Cardiac/mediastinum/hilum: No change    Lung parenchyma/Pleura: Bilateral lung opacities, overall unchanged   compared to the prior exam    Skeleton/soft tissues: Degenerative changes    Impression:    Unchanged patchy bilateral opacities.      < from: VA Duplex Upper Extrem Arterial Limited, Left (09.12.23 @ 17:01) >  PROCEDURE DATE:  09/12/2023      INTERPRETATION:  Clinical History / Reason for exam: 88 years old female   for evaluation of left upper extremity arterial system.    Left:  Patent subclavian, axillary, brachial arteries.  Radial and ulnar arteries are not visualized.    IMPRESSION:  Normal blood flow in visualized arteries of left upper extremity.  Radial and ulnar arteries are not visualized.    < from: VA Duplex Upper Extrem Arterial Limited, Right (09.12.23 @ 16:59) >  PROCEDURE DATE:  09/12/2023      INTERPRETATION:  Clinical History / Reason for exam: 88 years old female   with arm swelling for evaluation of arterial circulation in right upper   extremity.    Right:  Patent subclavian, axillary, brachial, radial, ulnar arteries.    IMPRESSION:  Normal blood flow is visualized in right upper extremity arterial system.         GIOVANNY BUCK [id number]  88y Female  17d    HPI:  This is an 87yo W female w/ PMH as noted below. Pt comes to ER w/ worsening dyspnea. Pt has advanced COPD on home O2.  . Pt was @ [facility] 7/3/23   in respiratory failure due to COPD exacerbation. pt was intubated and went to ICU,  In ICU pt was extubated , left sided chest tube was placed for possible malignant effusion. During that hospitalization  pt signed DNI/DNR.  & was discharged to SNF.    Now pt again in respiratory distress from COPD.  Pt's PCO2  was 97  after being placed on 100% NRB.  Pt's MS worsened & required intubation as  family rescinded  DNR/DNI.   (28 Aug 2023 21:01)    VASCULAR: Consult requested due to RUE swelling and ecchymosis, first noted on 9/11 when she had venous doppler showing Right brachial DVT. She has hx of Afib and was on  therapeutic lovenox since admission but it was held on 9/9 due to drop in Hgb.  Patient does not remember  having IV in that arm. Pt still not on AC due to persistent anemia and need for transfusions. Currently, pt c/o pain upon movement of RUE      PAST MEDICAL & SURGICAL HISTORY:  HTN (hypertension)  Afib  s/p ablation 2001  Goiter  no meds  Cellulitis  chronic  OA (osteoarthritis)  PVD (peripheral vascular disease)    Vital Signs Last 24 Hrs  T(C): 36 (14 Sep 2023 07:01), Max: 37.3 (13 Sep 2023 15:30)  T(F): 96.8 (14 Sep 2023 07:01), Max: 99.2 (13 Sep 2023 15:30)  HR: 104 (14 Sep 2023 13:55) (95 - 119)  BP: 129/72 (14 Sep 2023 12:00) (99/55 - 152/64)  BP(mean): 94 (14 Sep 2023 12:00) (72 - 94)  RR: 20 (14 Sep 2023 13:55) (18 - 25)  SpO2: 100% (14 Sep 2023 13:55) (93% - 100%)  Parameters below as of 14 Sep 2023 13:55  Patient On (Oxygen Delivery Method): nasal cannula  O2 Flow (L/min): 2    MEDICATIONS  (STANDING):  albuterol    90 MICROgram(s) HFA Inhaler 2 Puff(s) Inhalation every 4 hours  chlorhexidine 2% Cloths 1 Application(s) Topical <User Schedule>  chlorhexidine 4% Liquid 1 Application(s) Topical <User Schedule>  dextrose 5%. 1000 milliLiter(s) (50 mL/Hr) IV Continuous <Continuous>  dextrose 50% Injectable 25 Gram(s) IV Push once  furosemide   Injectable 40 milliGRAM(s) IV Push daily  gabapentin 100 milliGRAM(s) Oral three times a day  glucagon  Injectable 1 milliGRAM(s) IntraMuscular once  insulin lispro (ADMELOG) corrective regimen sliding scale   SubCutaneous three times a day before meals  iron sucrose IVPB 200 milliGRAM(s) IV Intermittent every 24 hours  magnesium sulfate  IVPB 2 Gram(s) IV Intermittent once  metoprolol tartrate 25 milliGRAM(s) Oral two times a day  pantoprazole    Tablet 40 milliGRAM(s) Oral two times a day  polyethylene glycol 3350 17 Gram(s) Oral two times a day  potassium chloride  20 mEq/100 mL IVPB 20 milliEquivalent(s) IV Intermittent once  senna 2 Tablet(s) Oral at bedtime    MEDICATIONS  (PRN):  acetaminophen     Tablet .. 650 milliGRAM(s) Oral every 6 hours PRN Temp greater or equal to 38C (100.4F), Mild Pain (1 - 3)  atropine Injectable 0.5 milliGRAM(s) IV Push once PRN bradycardia  dextrose Oral Gel 15 Gram(s) Oral once PRN Blood Glucose LESS THAN 70 milliGRAM(s)/deciliter  sodium chloride 0.9% lock flush 10 milliLiter(s) IV Push every 1 hour PRN Pre/post blood products, medications, blood draw, and to maintain line patency    Allergies  aspirin (Other)  codeine (Other)  sulfa drugs (Hives; Other)  penicillin (Other)  contrast media (iodine-based) (Other)    Physical Exam:   EXT: RUE w/mild swelling/pain/warmth; +ecchymosis noted above and below elbow; + equal and normal Ulnar and Radial pulses.       LABS:                        7.3    15.60 )-----------( 193      ( 14 Sep 2023 05:50 )             23.3     09-14    146  |  107  |  45<H>  ----------------------------<  125<H>  3.8   |  30  |  1.0    Ca    8.5      14 Sep 2023 05:50  Mg     1.8     09-14    TPro  4.9<L>  /  Alb  2.8<L>  /  TBili  0.9  /  DBili  x   /  AST  40  /  ALT  24  /  AlkPhos  181<H>  09-14    PT/INR - ( 14 Sep 2023 05:50 )   PT: 12.90 sec;   INR: 1.13 ratio         PTT - ( 14 Sep 2023 05:50 )  PTT:25.4 sec  Urinalysis Basic - ( 14 Sep 2023 05:50 )    Color: x / Appearance: x / SG: x / pH: x  Gluc: 125 mg/dL / Ketone: x  / Bili: x / Urobili: x   Blood: x / Protein: x / Nitrite: x   Leuk Esterase: x / RBC: x / WBC x   Sq Epi: x / Non Sq Epi: x / Bacteria: x    CAPILLARY BLOOD GLUCOSE  POCT Blood Glucose.: 104 mg/dL (14 Sep 2023 11:22)  POCT Blood Glucose.: 138 mg/dL (14 Sep 2023 07:16)  POCT Blood Glucose.: 127 mg/dL (13 Sep 2023 21:11)  POCT Blood Glucose.: 114 mg/dL (13 Sep 2023 17:04)    LIVER FUNCTIONS - ( 14 Sep 2023 05:50 )  Alb: 2.8 g/dL / Pro: 4.9 g/dL / ALK PHOS: 181 U/L / ALT: 24 U/L / AST: 40 U/L / GGT: x           Cultures:      RADIOLOGY & ADDITIONAL STUDIES:   VA Duplex Upper Ext Vein Scan, Right (09.11.23 @ 11:13) >  FINDINGS:    The right internal jugular, subclavian, axillary and brachial veins were   visualized on of the brachial veins have DVT.   The basilic vein (superficial vein) is patent and without thrombus.  The   cephalic vein (superficial vein) is patent and without thrombus.    IMPRESSION:  DVT is visualized in one of the 2 brachial veins.

## 2023-09-14 NOTE — PROGRESS NOTE ADULT - SUBJECTIVE AND OBJECTIVE BOX
Patient is a 88y old  Female who presents with a chief complaint of hypercapnic respiratory failure (13 Sep 2023 13:03)        Over Night Events: no acute events overnight, Hgb stable past 24 hours no additional PRBC transfusions       Vital Signs Last 24 Hrs  T(C): 36 (14 Sep 2023 07:01), Max: 37.3 (13 Sep 2023 15:30)  T(F): 96.8 (14 Sep 2023 07:01), Max: 99.2 (13 Sep 2023 15:30)  HR: 119 (14 Sep 2023 05:15) (103 - 119)  BP: 99/55 (14 Sep 2023 05:15) (99/55 - 152/64)  RR: 20 (14 Sep 2023 07:01) (18 - 26)  SpO2: 95% (14 Sep 2023 05:15) (93% - 100%)    O2 Parameters below as of 14 Sep 2023 05:15  Patient On (Oxygen Delivery Method): nasal cannula  O2 Flow (L/min): 5              CONSTITUTIONAL:  Chronically Ill appearing. NAD    ENT:   Airway patent,   Mouth with normal mucosa.   No thrush    EYES:   Pupils equal,   Round and reactive to light.    CARDIAC:   Normal rate,   Regular rhythm.    anasarca     RESPIRATORY:   No wheezing  Bilateral BS  Normal chest expansion  Not tachypneic,  No use of accessory muscles    GASTROINTESTINAL:  Abdomen soft,   Non-tender,   No guarding,   + BS    MUSCULOSKELETAL:   Range of motion is not limited      NEUROLOGICAL:   Alert and oriented x4    SKIN:   Skin normal color for race,   Warm and dry  stage II sacral ulcer     PSYCHIATRIC:   No apparent risk to self or others.        09-13-23 @ 07:01  -  09-14-23 @ 07:00  --------------------------------------------------------  IN:    IV PiggyBack: 100 mL    Oral Fluid: 360 mL  Total IN: 460 mL    OUT:    Voided (mL): 1825 mL  Total OUT: 1825 mL    Total NET: -1365 mL          LABS:                            7.3    15.60 )-----------( 193      ( 14 Sep 2023 05:50 )             23.3                                               09-14    146  |  107  |  45<H>  ----------------------------<  125<H>  3.8   |  30  |  1.0    Ca    8.5      14 Sep 2023 05:50  Mg     1.8     09-14    TPro  4.9<L>  /  Alb  2.8<L>  /  TBili  0.9  /  DBili  x   /  AST  40  /  ALT  24  /  AlkPhos  181<H>  09-14      PT/INR - ( 14 Sep 2023 05:50 )   PT: 12.90 sec;   INR: 1.13 ratio         PTT - ( 14 Sep 2023 05:50 )  PTT:25.4 sec                                       Urinalysis Basic - ( 14 Sep 2023 05:50 )    Color: x / Appearance: x / SG: x / pH: x  Gluc: 125 mg/dL / Ketone: x  / Bili: x / Urobili: x   Blood: x / Protein: x / Nitrite: x   Leuk Esterase: x / RBC: x / WBC x   Sq Epi: x / Non Sq Epi: x / Bacteria: x                                                  LIVER FUNCTIONS - ( 14 Sep 2023 05:50 )  Alb: 2.8 g/dL / Pro: 4.9 g/dL / ALK PHOS: 181 U/L / ALT: 24 U/L / AST: 40 U/L / GGT: x                                                                                                                                       MEDICATIONS  (STANDING):  albuterol    90 MICROgram(s) HFA Inhaler 2 Puff(s) Inhalation every 4 hours  chlorhexidine 2% Cloths 1 Application(s) Topical <User Schedule>  chlorhexidine 4% Liquid 1 Application(s) Topical <User Schedule>  dextrose 5%. 1000 milliLiter(s) (50 mL/Hr) IV Continuous <Continuous>  dextrose 50% Injectable 25 Gram(s) IV Push once  furosemide   Injectable 40 milliGRAM(s) IV Push daily  gabapentin 100 milliGRAM(s) Oral three times a day  glucagon  Injectable 1 milliGRAM(s) IntraMuscular once  insulin lispro (ADMELOG) corrective regimen sliding scale   SubCutaneous three times a day before meals  metoprolol tartrate 25 milliGRAM(s) Oral two times a day  pantoprazole    Tablet 40 milliGRAM(s) Oral two times a day  polyethylene glycol 3350 17 Gram(s) Oral two times a day  senna 2 Tablet(s) Oral at bedtime    MEDICATIONS  (PRN):  acetaminophen     Tablet .. 650 milliGRAM(s) Oral every 6 hours PRN Temp greater or equal to 38C (100.4F), Mild Pain (1 - 3)  atropine Injectable 0.5 milliGRAM(s) IV Push once PRN bradycardia  dextrose Oral Gel 15 Gram(s) Oral once PRN Blood Glucose LESS THAN 70 milliGRAM(s)/deciliter  sodium chloride 0.9% lock flush 10 milliLiter(s) IV Push every 1 hour PRN Pre/post blood products, medications, blood draw, and to maintain line patency      CXR reviewed

## 2023-09-14 NOTE — CONSULT NOTE ADULT - ASSESSMENT
87yo W female w/ PMH of HTN, malignant pleural effusion possible breast origin, chronic A.fib,  Rt DVT of brachial veins x 2, PAD, Obesity, Chronic lymphedema of BLE, UTI, presented to ED 08/28/2023 with severe SOB, DNR / DNI rescinded, patient admitted for management of hypercapnic respiratory failure.    #Right upper arm brachial DVT   - follow up for a repeat US Doppler  - follow up with vascular for IVC filter  - chronic normocytic anemia pt discuss implementation of anti coag therapies and ulterior options  87yo W female w/ PMH of HTN, malignant pleural effusion possible breast origin, chronic A.fib,  Rt DVT of brachial veins x 2, PAD, Obesity, Chronic lymphedema of BLE, UTI, presented to ED 08/28/2023 with severe SOB, DNR / DNI rescinded, patient admitted for management of hypercapnic respiratory failure.    #Right upper arm brachial DVT   Case D/W vascular PA:   - recommend AC for DVT  -if unable to antocoagualt    - follow up for a repeat US Doppler  - follow up with vascular for IVC filter  - chronic normocytic anemia pt discuss implementation of anti coag therapies and ulterior options  89yo W female w/ PMH of HTN, malignant pleural effusion possible breast origin, chronic A.fib,  Rt DVT of brachial veins x 2, PAD, Obesity, Chronic lymphedema of BLE, UTI, presented to ED 08/28/2023 with severe SOB, DNR / DNI rescinded, patient admitted for management of hypercapnic respiratory failure.    #Right upper arm brachial DVT   Case D/W vascular PA:   - recommend AC for DVT  -if unable to anticoagulate, recommend to repeat venous doppler in 24 hrs to r/o propagation  - keep arm elevated  - will follow and continue to make recommendations 87yo W female w/ PMH of HTN, malignant pleural effusion possible breast origin, chronic A.fib,  Rt DVT of brachial veins x 2, PAD, Obesity, Chronic lymphedema of BLE, UTI, presented to ED 08/28/2023 with severe SOB, DNR / DNI rescinded, patient admitted for management of hypercapnic respiratory failure.    #Right upper arm brachial DVT     Case D/W vascular PA and vacular fellow, Dr. Sun:   - recommend AC for DVT  -if unable to anticoagulate, recommend to repeat venous doppler in 24 hrs to r/o propagation and initiate anticoagulation when able  -IVC filter not indicated  - keep arm elevated; cont to treat symptoms

## 2023-09-14 NOTE — PROGRESS NOTE ADULT - SUBJECTIVE AND OBJECTIVE BOX
GIOVANNY BUCK  88y, Female    LOS  17d    INTERVAL EVENTS/HPI  - No acute events overnight  - T(F): , Max: 99.2 (09-13-23 @ 15:30)  - Denies any worsening symptoms  - Tolerating medication  - WBC Count: 15.60 (09-14-23 @ 05:50)  WBC Count: 18.63 (09-13-23 @ 22:12)  - Creatinine: 1.0 (09-14-23 @ 05:50)  Creatinine: 1.2 (09-13-23 @ 06:05)       REVIEW OF SYSTEMS:  CONSTITUTIONAL: No fever or chills  HEENT: No sore throat  RESPIRATORY: No cough, no shortness of breath  CARDIOVASCULAR: No chest pain or palpitations  GASTROINTESTINAL: No abdominal or epigastric pain  GENITOURINARY: No dysuria  NEUROLOGICAL: No headache/dizziness  MSK: No joint pain, erythema, or swelling; no back pain  SKIN: No itching, rashes  All other ROS negative except noted above    Prior hospital charts reviewed [Yes]  Primary team notes reviewed [Yes]  Other consultant notes reviewed [Yes]    ANTIMICROBIALS:       OTHER MEDS: MEDICATIONS  (STANDING):  acetaminophen     Tablet .. 650 every 6 hours PRN  albuterol    90 MICROgram(s) HFA Inhaler 2 every 4 hours  atropine Injectable 0.5 once PRN  dextrose 50% Injectable 25 once  dextrose Oral Gel 15 once PRN  furosemide   Injectable 40 daily  gabapentin 100 three times a day  glucagon  Injectable 1 once  insulin lispro (ADMELOG) corrective regimen sliding scale  three times a day before meals  metoprolol tartrate 25 two times a day  pantoprazole    Tablet 40 two times a day  polyethylene glycol 3350 17 two times a day  senna 2 at bedtime      Vital Signs Last 24 Hrs  T(F): 96.8 (09-14-23 @ 07:01), Max: 100 (09-11-23 @ 01:00)    Vital Signs Last 24 Hrs  HR: 104 (09-14-23 @ 13:55) (95 - 119)  BP: 129/72 (09-14-23 @ 12:00) (99/55 - 152/64)  RR: 20 (09-14-23 @ 13:55)  SpO2: 100% (09-14-23 @ 13:55) (93% - 100%)  Wt(kg): --    EXAM:  GENERAL: NAD. Interactive. On Nasal canula   HEAD: No head lesions  EYES: Conjunctiva pink and cornea white  NECK: Supple, nontender to palpation  CHEST/LUNG: Mild bilateral crackles.   HEART: S1 S2  ABDOMEN: Soft, nontender, nondistended  EXTREMITIES: No clubbing, cyanosis. +1 edema bilaterally. Chronic venous stasis dermatitis. No open wounds.   NERVOUS SYSTEM: Awake.   MSK: No joint erythema, swelling or pain  SKIN: No rashes or lesions, no superficial thrombophlebitis      Labs:                        7.3    15.60 )-----------( 193      ( 14 Sep 2023 05:50 )             23.3     09-14    146  |  107  |  45<H>  ----------------------------<  125<H>  3.8   |  30  |  1.0    Ca    8.5      14 Sep 2023 05:50  Mg     1.8     09-14    TPro  4.9<L>  /  Alb  2.8<L>  /  TBili  0.9  /  DBili  x   /  AST  40  /  ALT  24  /  AlkPhos  181<H>  09-14      WBC Trend:  WBC Count: 15.60 (09-14-23 @ 05:50)  WBC Count: 18.63 (09-13-23 @ 22:12)  WBC Count: 18.19 (09-13-23 @ 06:05)  WBC Count: 21.34 (09-12-23 @ 11:32)      Creatine Trend:  Creatinine: 1.0 (09-14)  Creatinine: 1.2 (09-13)  Creatinine: 1.2 (09-12)  Creatinine: 1.2 (09-11)      Liver Biochemical Testing Trend:  Alanine Aminotransferase (ALT/SGPT): 24 (09-14)  Alanine Aminotransferase (ALT/SGPT): 27 (09-13)  Alanine Aminotransferase (ALT/SGPT): 19 (09-12)  Alanine Aminotransferase (ALT/SGPT): 18 (09-11)  Alanine Aminotransferase (ALT/SGPT): 11 (09-10)  Aspartate Aminotransferase (AST/SGOT): 40 (09-14-23 @ 05:50)  Aspartate Aminotransferase (AST/SGOT): 53 (09-13-23 @ 06:05)  Aspartate Aminotransferase (AST/SGOT): 54 (09-12-23 @ 07:48)  Aspartate Aminotransferase (AST/SGOT): 51 (09-11-23 @ 06:08)  Aspartate Aminotransferase (AST/SGOT): 18 (09-10-23 @ 05:45)  Bilirubin Total: 0.9 (09-14)  Bilirubin Total: 1.2 (09-13)  Bilirubin Total: 1.0 (09-12)  Bilirubin Total: 0.7 (09-11)  Bilirubin Total: 0.6 (09-10)      Trend LDH  09-10-23 @ 19:21  398<H>  03-07-23 @ 21:06  211      Urinalysis Basic - ( 14 Sep 2023 05:50 )    Color: x / Appearance: x / SG: x / pH: x  Gluc: 125 mg/dL / Ketone: x  / Bili: x / Urobili: x   Blood: x / Protein: x / Nitrite: x   Leuk Esterase: x / RBC: x / WBC x   Sq Epi: x / Non Sq Epi: x / Bacteria: x        MICROBIOLOGY:  Vancomycin Level, Random: 36.7 (09-07 @ 06:02)  Vancomycin Level, Trough: 34.5 (09-06 @ 06:44)  Vancomycin Level, Trough: 35.1 (09-05 @ 20:09)  Vancomycin Level, Trough: 14.0 (09-02 @ 19:02)    Female    Culture - Blood (collected 10 Sep 2023 05:45)  Source: .Blood None  Preliminary Report:    No growth at 72 Hours    Culture - Blood (collected 10 Sep 2023 05:45)  Source: .Blood None  Preliminary Report:    No growth at 72 Hours    Culture - Sputum (collected 05 Sep 2023 19:15)  Source: Trach Asp Tracheal Aspirate  Final Report:    Normal Respiratory Joaquina present    Culture - Blood (collected 02 Sep 2023 06:01)  Source: .Blood None  Final Report:    No growth at 5 days    Culture - Sputum (collected 02 Sep 2023 03:00)  Source: Trach Asp Tracheal Aspirate  Final Report:    Normal Respiratory Joaquina present    Culture - Urine (collected 01 Sep 2023 19:50)  Source: Catheterized Catheterized  Final Report:    <10,000 CFU/mL Normal Urogenital Joaquina    Culture - Blood (collected 01 Sep 2023 19:32)  Source: .Blood None  Final Report:    No growth at 5 days    Culture - Blood (collected 28 Aug 2023 11:00)  Source: .Blood Blood-Peripheral  Final Report:    No growth at 5 days    Culture - Blood (collected 28 Aug 2023 11:00)  Source: .Blood Blood-Peripheral  Final Report:    No growth at 5 days    Culture - Abscess with Gram Stain (collected 16 Jul 2023 07:00)  Source: .Abscess Right groin abscess  Final Report:    Numerous Proteus mirabilis    Numerous Enterococcus faecalis    Few Coag Negative Staphylococcus "Susceptibilities not performed"    Few Enterococcus faecium (vancomycin resistant)    Moderate Prevotella timonensis "Susceptibilities not performed"  Organism: Proteus mirabilis  Enterococcus faecalis  Enterococcus faecium (vancomycin resistant)  Organism: Enterococcus faecium (vancomycin resistant)    Sensitivities:      Method Type: DARIO      -  Ampicillin: R >8 Predicts results to ampicillin/sulbactam, amoxacillin-clavulanate and  piperacillin-tazobactam.      -  Daptomycin: SDD 4 The breakpoint for SDD (susceptible dose dependent)is based on a dosage regimen of 8-12 mg/kg administered every 24 h in adults and is intended for serious infections due to E. faecium. Consultation with an infectious diseases specialist is recommended.      -  Levofloxacin: R >4      -  Linezolid: S 2      -  Tetracycline: R >8      -  Vancomycin: R >16  Organism: Enterococcus faecalis    Sensitivities:      Method Type: DARIO      -  Ampicillin: S <=2 Predicts results to ampicillin/sulbactam, amoxacillin-clavulanate and  piperacillin-tazobactam.      -  Tetracycline: R >8      -  Vancomycin: S 2  Organism: Proteus mirabilis    Sensitivities:      Method Type: DARIO      -  Amikacin: S <=16      -  Amoxicillin/Clavulanic Acid: S <=8/4      -  Ampicillin: S <=8 These ampicillin results predict results for amoxicillin      -  Ampicillin/Sulbactam: S <=4/2 Enterobacter, Klebsiella aerogenes, Citrobacter, and Serratia may develop resistance during prolonged therapy (3-4 days)      -  Aztreonam: S <=4      -  Cefazolin: S <=2 Enterobacter, Klebsiella aerogenes, Citrobacter, and Serratia may develop resistance during prolonged therapy (3-4 days)      -  Cefepime: S <=2      -  Cefoxitin: S <=8      -  Ceftriaxone: S <=1 Enterobacter, Klebsiella aerogenes, Citrobacter, and Serratia may develop resistance during prolonged therapy      -  Ciprofloxacin: S <=0.25      -  Ertapenem: S <=0.5      -  Gentamicin: S <=2      -  Levofloxacin: S <=0.5      -  Meropenem: S <=1      -  Piperacillin/Tazobactam: S <=8      -  Tobramycin: S <=2      -  Trimethoprim/Sulfamethoxazole: S 1/19                                  Ferritin: 130 (09-12)    D-Dimer Assay, Quantitative: 531 (09-11)    Lactate Dehydrogenase, Serum: 398 (09-10)              RADIOLOGY & ADDITIONAL TESTS:  I have personally reviewed the relevant images.   CXR  Xray Chest 1 View- PORTABLE-Urgent:   ACC: [id number] EXAM:  XR CHEST PORTABLE URGENT 1V   ORDERED BY: LAURA DUMONT     PROCEDURE DATE:  09/12/2023          INTERPRETATION:  Clinical History / Reason for exam: Left IJ inserted.    Comparison : Chest radiograph 9/12/2023 at 6:39 AM.    Technique/Positioning: Frontal chest radiograph.    Findings:    Support devices: Left IJ catheter likely terminating within the proximal   SVC/left brachiocephalic vein. Left chest tube.    Cardiac/mediastinum/hilum: Unchanged    Lung parenchyma/Pleura: Stable bilateral opacities. No pneumothorax.    Skeleton/soft tissues: Unchanged.    Impression:    Support devices as described.    Stable bilateral opacities.        --- End of Report ---            SABINO SULLIVAN MD; Attending Radiologist  This document has been electronically signed. Sep 13 2023 10:38AM (09-12-23 @ 21:01)      CT        WEIGHT  Weight (kg): 87.8 (08-28-23 @ 21:03)  Creatinine: 1.0 mg/dL (09-14-23 @ 05:50)      All available historical records have been reviewed

## 2023-09-15 NOTE — CHART NOTE - NSCHARTNOTEFT_GEN_A_CORE
Transfer Note    Transfer from: Step Down    Transfer to: Floor      For Follow-Up:  - CBC q24hr    HPI: This is an 89yo W female w/ PMH as noted below. Pt comes to ER w/ worsening dyspnea. Pt has advanced COPD on home O2.  . Pt was @ [facility] 7/3/23   in respiratory failure due to COPD exacerbation. pt was intubated and went to ICU,  In ICU pt was extubated , left sided chest tube was placed for possible malignant effusion. During that hospitalization  pt signed DNI/DNR.  & was discharged to SNF.    Now pt again in respiratory distress from COPD.  Pt's PCO2  was 97  after being placed on 100% NRB.  Pt's MS worsened & required intubation as  family rescinded  DNR/DNI    Hospital course: Patient had a prolonged hospital course. Today is day #19. Patient was initially admitted for acute hypercapnic respiratory failure s/p intubation and extubation now. Sepsis was present on admission 2/2 pneumonia s/p abx per ID. Also had malignant pleural effusion (hx breast cancer) which was chronic s/p PleureX now (locked). From 09/09-09/12, pt had acute anemic episodes for which AC (eliquis for Afib) was stopped and CT abd showed no retroperitoneal bleed. Patient received a total of 6U pRBC and was in positive fluid balance, so she was started on Lasix IV 40mg daily. No evidence of GI bleed per GI. No evidence of hemolysis on workup. Iron studies showed iron deficiency anemia. Per hematology, patient was started on IV Venofer for 3 days. RUE showed brachial vein DVT. AC was not resumed in the light of persistent anemia. Patient has a very poor peripheral IV access. So, triple lumen catheter was placed in Left IJ.      Vital Signs Last 24 Hrs  T(C): 34.8 (15 Sep 2023 07:26), Max: 37.3 (14 Sep 2023 23:22)  T(F): 94.6 (15 Sep 2023 07:26), Max: 99.1 (14 Sep 2023 23:22)  HR: 95 (15 Sep 2023 07:15) (95 - 110)  BP: 102/69 (15 Sep 2023 07:15) (102/69 - 106/54)  BP(mean): 81 (15 Sep 2023 07:15) (77 - 81)  RR: 24 (15 Sep 2023 07:26) (18 - 29)  SpO2: 100% (15 Sep 2023 07:15) (99% - 100%)    Parameters below as of 15 Sep 2023 07:15  Patient On (Oxygen Delivery Method): nasal cannula  O2 Flow (L/min): 2    I&O's Summary    14 Sep 2023 07:01  -  15 Sep 2023 07:00  --------------------------------------------------------  IN: 370 mL / OUT: 500 mL / NET: -130 mL    15 Sep 2023 07:01  -  15 Sep 2023 14:43  --------------------------------------------------------  IN: 420 mL / OUT: 400 mL / NET: 20 mL          MEDICATIONS  (STANDING):  albuterol    90 MICROgram(s) HFA Inhaler 2 Puff(s) Inhalation every 4 hours  chlorhexidine 2% Cloths 1 Application(s) Topical <User Schedule>  chlorhexidine 4% Liquid 1 Application(s) Topical <User Schedule>  dextrose 5%. 1000 milliLiter(s) (50 mL/Hr) IV Continuous <Continuous>  dextrose 50% Injectable 25 Gram(s) IV Push once  furosemide   Injectable 40 milliGRAM(s) IV Push daily  gabapentin 100 milliGRAM(s) Oral three times a day  glucagon  Injectable 1 milliGRAM(s) IntraMuscular once  insulin lispro (ADMELOG) corrective regimen sliding scale   SubCutaneous three times a day before meals  iron sucrose IVPB 200 milliGRAM(s) IV Intermittent every 24 hours  metoprolol tartrate 25 milliGRAM(s) Oral two times a day  pantoprazole    Tablet 40 milliGRAM(s) Oral two times a day  polyethylene glycol 3350 17 Gram(s) Oral two times a day  senna 2 Tablet(s) Oral at bedtime    MEDICATIONS  (PRN):  acetaminophen     Tablet .. 650 milliGRAM(s) Oral every 6 hours PRN Temp greater or equal to 38C (100.4F), Mild Pain (1 - 3)  atropine Injectable 0.5 milliGRAM(s) IV Push once PRN bradycardia  dextrose Oral Gel 15 Gram(s) Oral once PRN Blood Glucose LESS THAN 70 milliGRAM(s)/deciliter  sodium chloride 0.9% lock flush 10 milliLiter(s) IV Push every 1 hour PRN Pre/post blood products, medications, blood draw, and to maintain line patency        LABS                                            7.2                   Neurophils% (auto):   80.7   (09-15 @ 06:06):    11.58)-----------(218          Lymphocytes% (auto):  9.0                                           23.1                   Eosinphils% (auto):   2.7      Manual%: Neutrophils x    ; Lymphocytes x    ; Eosinophils x    ; Bands%: x    ; Blasts x                                    143    |  105    |  39                  Calcium: 8.4   / iCa: x      (09-15 @ 06:06)    ----------------------------<  85        Magnesium: 2.0                              4.7     |  30     |  1.0              Phosphorous: x        TPro  5.5    /  Alb  2.8    /  TBili  0.8    /  DBili  x      /  AST  35     /  ALT  24     /  AlkPhos  215    15 Sep 2023 06:06      ASSESSMENT & PLAN:     #Acute hypercapnic respiratory failure  - s/p intubation and extubation    #COPD exacerbation  - s/p steroid course    #Sepsis 2/2 pneumonia  - s/p 7 days of abx per ID    #Malignant Pleural effusion (left sided)  - s/p PleureX (locked)    #Chronic Afib  - metoprolol tartrate 25mg BID  - currently off AC (was on ELIQUIS before)    #Acute iron deficiency anemia  #HFpEF  - s/p 6U pRBC  - c/w lasix 40mg iv daily for net positive fluid balance  - Venofer 200mg IV daily for 3 days per hematology  - CBC q24hr  - Transfuse if HgB < 7    #Right brachial vein DVT  - off AC  - repeat RUQ US showed no mention of DVT prorogation  - initiate AC when HgB is persistently stable    Left IJ TLC in light of poor access

## 2023-09-15 NOTE — PROGRESS NOTE ADULT - SUBJECTIVE AND OBJECTIVE BOX
Patient is comfortable in bed, no respiratory distress       T(F): 94.6 (09-15-23 @ 07:26), Max: 99.1 (09-14-23 @ 23:22)  HR: 95 (09-15-23 @ 07:15)  BP: 102/69 (09-15-23 @ 07:15)  RR: 20  SpO2: 100% (09-15-23 @ 07:15) (99% - 100%)    PHYSICAL EXAM:  GENERAL: NAD  HEAD:  Atraumatic, Normocephalic  EYES: EOMI, PERRLA, conjunctiva and sclera clear  NERVOUS SYSTEM:  Alert & Oriented X3, no focal deficits   CHEST/LUNG:  bilateral rhonchi  HEART: Regular rate and rhythm; No murmurs, rubs, or gallops  ABDOMEN: Soft, Nontender, Nondistended; Bowel sounds present  EXTREMITIES:  R arm ecchymosis     LABS  09-15    143  |  105  |  39<H>  ----------------------------<  85  4.7   |  30  |  1.0    Ca    8.4      15 Sep 2023 06:06  Mg     2.0     09-15    TPro  5.5<L>  /  Alb  2.8<L>  /  TBili  0.8  /  DBili  x   /  AST  35  /  ALT  24  /  AlkPhos  215<H>  09-15                          7.2    11.58 )-----------( 218      ( 15 Sep 2023 06:06 )             23.1     PT/INR - ( 14 Sep 2023 05:50 )   PT: 12.90 sec;   INR: 1.13 ratio         PTT - ( 14 Sep 2023 05:50 )  PTT:25.4 sec      Culture Results:   No growth at 4 days (09-10-23)  Culture Results:   No growth at 4 days (09-10-23)  Culture Results:   Normal Respiratory Joaquina present (09-05-23)  Culture Results:   No growth at 5 days (09-02-23)  Culture Results:   Normal Respiratory Joaquina present (09-02-23)  Culture Results:   <10,000 CFU/mL Normal Urogenital Joaquina (09-01-23)  Culture Results:   No growth at 5 days (09-01-23)  Culture Results:   No growth at 5 days (08-28-23)  Culture Results:   No growth at 5 days (08-28-23)    RADIOLOGY  < from: Xray Chest 1 View- PORTABLE-Routine (09.14.23 @ 06:21) >  Impression:    Unchanged patchy bilateral opacities.    < end of copied text >    MEDICATIONS  (STANDING):  albuterol    90 MICROgram(s) HFA Inhaler 2 Puff(s) Inhalation every 4 hours  chlorhexidine 2% Cloths 1 Application(s) Topical <User Schedule>  chlorhexidine 4% Liquid 1 Application(s) Topical <User Schedule>  furosemide   Injectable 40 milliGRAM(s) IV Push daily  gabapentin 100 milliGRAM(s) Oral three times a day  insulin lispro (ADMELOG) corrective regimen sliding scale   SubCutaneous three times a day before meals  iron sucrose IVPB 200 milliGRAM(s) IV Intermittent every 24 hours  metoprolol tartrate 25 milliGRAM(s) Oral two times a day  pantoprazole    Tablet 40 milliGRAM(s) Oral two times a day  polyethylene glycol 3350 17 Gram(s) Oral two times a day  senna 2 Tablet(s) Oral at bedtime    MEDICATIONS  (PRN):  acetaminophen     Tablet .. 650 milliGRAM(s) Oral every 6 hours PRN Temp greater or equal to 38C (100.4F), Mild Pain (1 - 3)  atropine Injectable 0.5 milliGRAM(s) IV Push once PRN bradycardia  dextrose Oral Gel 15 Gram(s) Oral once PRN Blood Glucose LESS THAN 70 milliGRAM(s)/deciliter  sodium chloride 0.9% lock flush 10 milliLiter(s) IV Push every 1 hour PRN Pre/post blood products, medications, blood draw, and to maintain line patency

## 2023-09-15 NOTE — CHART NOTE - NSCHARTNOTEFT_GEN_A_CORE
Registered Dietitian Follow-Up     Patient Profile Reviewed                           Yes [X]   No []     Nutrition History Previously Obtained        Yes [X]  No []       Pertinent Information:   pt is 88 year old female with hx of CHF, afib s/p ablation, anxiety, cellulitis, COPD, B/L LE edema, HTN, OA, obesity, PVD, FLAVIO, B/L TKR recently admitted 7/23/23 with respiratory failure 2/2 COPD exacerbation with intubation and L chest tube warranted, pt was discharged to SNF. pt now p/w respiratory distress s/p intubation. admitted with hypercapnic respiratory failure, B/L pleural effusions. possible PNA.  9/10 s/p extubation. SLP eval placed on puree with thins. s/p 4u PRBCs       Diet, Pureed:   Supplement Feeding Modality:  Oral  Ensure Enlive Cans or Servings Per Day:  3       Frequency:  Three Times a day (09-12-23 @ 12:41) [Active]    Anthropometrics:  - Ht. 170.2cm  - Wt. 91.2 kgs today vs 86.4 kg upon admission   - %wt change       09-15    143  |  105  |  39<H>  ----------------------------<  85  4.7   |  30  |  1.0    Ca    8.4      15 Sep 2023 06:06  Mg     2.0     09-15    TPro  5.5<L>  /  Alb  2.8<L>  /  TBili  0.8  /  DBili  x   /  AST  35  /  ALT  24  /  AlkPhos  215<H>  09-15                            7.2    11.58 )-----------( 218      ( 15 Sep 2023 06:06 )             23.1     MEDICATIONS  (STANDING):  albuterol    90 MICROgram(s) HFA Inhaler 2 Puff(s) Inhalation every 4 hours  furosemide   Injectable 40 milliGRAM(s) IV Push daily  gabapentin 100 milliGRAM(s) Oral three times a day  insulin lispro (ADMELOG) corrective regimen sliding scale   SubCutaneous three times a day before meals  iron sucrose IVPB 200 milliGRAM(s) IV Intermittent every 24 hours  metoprolol tartrate 25 milliGRAM(s) Oral two times a day  pantoprazole    Tablet 40 milliGRAM(s) Oral two times a day  polyethylene glycol 3350 17 Gram(s) Oral two times a day  senna 2 Tablet(s) Oral at bedtime    MEDICATIONS  (PRN):  acetaminophen     Tablet .. 650 milliGRAM(s) Oral every 6 hours PRN Temp greater or equal to 38C (100.4F), Mild Pain (1 - 3)  atropine Injectable 0.5 milliGRAM(s) IV Push once PRN bradycardia  dextrose Oral Gel 15 Gram(s) Oral once PRN Blood Glucose LESS THAN 70 milliGRAM(s)/deciliter  sodium chloride 0.9% lock flush 10 milliLiter(s) IV Push every 1 hour PRN Pre/post blood products, medications, blood draw, and to maintain line patency       Physical Findings:  - Appearance: pt resting  - GI function: +BS.  - Tubes:  - Oral/Mouth cavity:  - Skin:      Nutrition Requirements  Weight Used:      Estimated Energy Needs    Continue []  Adjust []  Adjusted Energy Recommendations:   kcal/day        Estimated Protein Needs    Continue []  Adjust []  Adjusted Protein Recommendations:   gm/day        Estimated Fluid Needs        Continue []  Adjust []  Adjusted Fluid Recommendations:   mL/day     Nutrient Intake:        [] Previous Nutrition Diagnosis:            [] Ongoing          [] Resolved    [] No active nutrition diagnosis identified at this time     Nutrition Diagnostic #1  Problem:   Etiology:   Statement:      Nutrition Diagnostic #2  Problem:  Etiology:  Statement:     Nutrition Intervention:     Goal/Expected Outcome:     Indicator/Monitoring: Registered Dietitian Follow-Up     Patient Profile Reviewed                           Yes [X]   No []     Nutrition History Previously Obtained        Yes [X]  No []       Pertinent Information:   pt is 88 year old female with hx of CHF, afib s/p ablation, anxiety, cellulitis, COPD, B/L LE edema, HTN, OA, obesity, PVD, FLAVIO, B/L TKR recently admitted 7/23/23 with respiratory failure 2/2 COPD exacerbation with intubation and L chest tube warranted, pt was discharged to SNF. pt now p/w respiratory distress s/p intubation. admitted with hypercapnic respiratory failure, B/L pleural effusions. possible PNA.  9/10 s/p extubation. SLP eval placed on puree with thins. s/p 4u PRBCs       Diet, Pureed:   Supplement Feeding Modality:  Oral  Ensure Enlive Cans or Servings Per Day:  3       Frequency:  Three Times a day (09-12-23 @ 12:41) [Active]    Anthropometrics:  - Ht. 170.2cm  - Wt. 91.2 kgs today vs 86.4 kg upon admission   - %wt change       09-15    143  |  105  |  39<H>  ----------------------------<  85  4.7   |  30  |  1.0    Ca    8.4      15 Sep 2023 06:06  Mg     2.0     09-15    TPro  5.5<L>  /  Alb  2.8<L>  /  TBili  0.8  /  DBili  x   /  AST  35  /  ALT  24  /  AlkPhos  215<H>  09-15                            7.2    11.58 )-----------( 218      ( 15 Sep 2023 06:06 )             23.1     MEDICATIONS  (STANDING):  albuterol    90 MICROgram(s) HFA Inhaler 2 Puff(s) Inhalation every 4 hours  furosemide   Injectable 40 milliGRAM(s) IV Push daily  gabapentin 100 milliGRAM(s) Oral three times a day  insulin lispro (ADMELOG) corrective regimen sliding scale   SubCutaneous three times a day before meals  iron sucrose IVPB 200 milliGRAM(s) IV Intermittent every 24 hours  metoprolol tartrate 25 milliGRAM(s) Oral two times a day  pantoprazole    Tablet 40 milliGRAM(s) Oral two times a day  polyethylene glycol 3350 17 Gram(s) Oral two times a day  senna 2 Tablet(s) Oral at bedtime    MEDICATIONS  (PRN):  acetaminophen     Tablet .. 650 milliGRAM(s) Oral every 6 hours PRN Temp greater or equal to 38C (100.4F), Mild Pain (1 - 3)  atropine Injectable 0.5 milliGRAM(s) IV Push once PRN bradycardia  dextrose Oral Gel 15 Gram(s) Oral once PRN Blood Glucose LESS THAN 70 milliGRAM(s)/deciliter  sodium chloride 0.9% lock flush 10 milliLiter(s) IV Push every 1 hour PRN Pre/post blood products, medications, blood draw, and to maintain line patency       Physical Findings:  - Appearance: pt resting  - GI function: +BS.  - Tubes: n/a  - Oral/Mouth cavity:  - Skin: sacrum stage II     Nutrition Requirements  Weight Used: 84.1 kgs     Estimated Energy Needs:  5121-6381 kcals (20-25 kcal/kg/BW)  92-122g protein (1.5-2.0g/kg/IBW)  1;1 kcal for estimated fluid needs       Nutrient Intake: 50-75% of meals and supplements consumed         [] Previous Nutrition Diagnosis:            [] Ongoing          [X] Resolved     Nutrition Intervention: maintain on present diet/supplements     Goal/Expected Outcome: pt to continue to tolerate po diet and meet >75% of estimated needs within 5-7 days      Indicator/Monitoring: RD to monitor tolerance to po diet, labs/meds, NFPF and f/u as needed within 5-7 days  moderate risk

## 2023-09-15 NOTE — PROGRESS NOTE ADULT - ASSESSMENT
88 year old female with PMH of HTN, Afib, OA, PVD, COPD, Anxiety, Obesity, Acute on chronic systolic congestive heart failure, H/O abdominal hysterectomy, H/O discectomy, H/O total knee replacement, bilaterally and bilateral pleural effusions (s/p left thoracentesis 3/2023 -- Cytology -PLEURAL FLUID, THORACENTESIS: - SUSPICIOUS FOR MALIGNANCY) with a left sided pleurex catheter is currently admitted due to worsening respiratory distress and hypercapnic respiratory failure and COPD exacerbation, currently on mechanical ventilation.ID is being consulted for antimicrobial recommendations given she is spiking intermittent fevers.     IMPRESSION  #Acute hypercapnic respiratory failure- Volume overload vs COPD exacerbation- extubated since 9/10  #Sepsis present on admission  #Fevers- Concerns of superimposed VAP?   #Bilateral pleural effusions   - s/p left thoracentesis 3/2023 -- Cytology -PLEURAL FLUID, THORACENTESIS: - SUSPICIOUS FOR MALIGNANCY. - Atypical cells present in background of histiocytes and mesothelial  cells, suspicious for metastatic carcinoma.   - Pleurx Cx 7/4 - Staph epidermidis, No PMNs  - s/p thoracentesis right thoracentesis 7/5 Exudative in nature. Cultures negative  #History of ESBL E.coli bacteremia (5/2023)   #Hx 2/2023 foot wound MRSA  #Right inguinal Abscess history (7/2023)- Cx (Polymicrobial- Proteus, E.feacalis and VRE)   #HF with preserved EF  #COPD   #Chronic Lymphedema  #Obesity BMI (kg/m2): 30.3, 34.7  #RVP and legionella urine antigen negative  #MRSA nares positive.     RECOMMENDATIONS  -Got 7 days of Van/cefepime for VAP (9/8)  -Leukocytosis likely reactive from anemia. (improving) Workup as per the primary team.   -Recommend aspiration precautions.   -Trend fever curve. Trend WBC   -Patient is at high risk of ICU related deconditioning.    -Frequent offloading. High risk for skin failure and decubitus ulcer.   -Guarded prognosis.     If any questions, please send a message or call on [email or web address] Teams  Please continue to update ID with any pertinent new laboratory or radiographic findings.    Clifford Cordero M.D  Infectious Diseases Attending  [facility]

## 2023-09-15 NOTE — PROGRESS NOTE ADULT - SUBJECTIVE AND OBJECTIVE BOX
Patient is a 88y old  Female who presents with a chief complaint of hypercapnic respiratory failure (15 Sep 2023 08:23)        Over Night Events: no acute events overnight     Vital Signs Last 24 Hrs  T(C): 34.8 (15 Sep 2023 07:26), Max: 37.3 (14 Sep 2023 23:22)  T(F): 94.6 (15 Sep 2023 07:26), Max: 99.1 (14 Sep 2023 23:22)  HR: 95 (15 Sep 2023 07:15) (95 - 110)  BP: 102/69 (15 Sep 2023 07:15) (102/69 - 129/72)  BP(mean): 81 (15 Sep 2023 07:15) (77 - 94)  RR: 24 (15 Sep 2023 07:26) (18 - 29)  SpO2: 100% (15 Sep 2023 07:15) (99% - 100%)    O2 Parameters below as of 15 Sep 2023 07:15  Patient On (Oxygen Delivery Method): nasal cannula  O2 Flow (L/min): 2    CONSTITUTIONAL:  Chronically Ill appearing. NAD    ENT:   Airway patent,   Mouth with normal mucosa.   No thrush    EYES:   Pupils equal,   Round and reactive to light.    CARDIAC:   Normal rate,   Regular rhythm.    anasarca     RESPIRATORY:   No wheezing  Bilateral BS  Normal chest expansion  Not tachypneic,  No use of accessory muscles    GASTROINTESTINAL:  Abdomen soft,   Non-tender,   No guarding,   + BS    MUSCULOSKELETAL:   Range of motion is not limited      NEUROLOGICAL:   Alert and oriented x4    SKIN:   Skin normal color for race,   Warm and dry  stage II sacral ulcer     PSYCHIATRIC:   No apparent risk to self or others.      09-14-23 @ 07:01  -  09-15-23 @ 07:00  --------------------------------------------------------  IN:    IV PiggyBack: 150 mL    IV PiggyBack: 100 mL    Oral Fluid: 120 mL  Total IN: 370 mL    OUT:    Voided (mL): 500 mL  Total OUT: 500 mL    Total NET: -130 mL      09-15-23 @ 07:01  -  09-15-23 @ 08:34  --------------------------------------------------------  IN:    Oral Fluid: 210 mL  Total IN: 210 mL    OUT:  Total OUT: 0 mL    Total NET: 210 mL          LABS:                            7.2    11.58 )-----------( 218      ( 15 Sep 2023 06:06 )             23.1                                               09-15    143  |  105  |  39<H>  ----------------------------<  85  4.7   |  30  |  1.0    Ca    8.4      15 Sep 2023 06:06  Mg     2.0     09-15    TPro  5.5<L>  /  Alb  2.8<L>  /  TBili  0.8  /  DBili  x   /  AST  35  /  ALT  24  /  AlkPhos  215<H>  09-15      PT/INR - ( 14 Sep 2023 05:50 )   PT: 12.90 sec;   INR: 1.13 ratio         PTT - ( 14 Sep 2023 05:50 )  PTT:25.4 sec                                       Urinalysis Basic - ( 15 Sep 2023 06:06 )    Color: x / Appearance: x / SG: x / pH: x  Gluc: 85 mg/dL / Ketone: x  / Bili: x / Urobili: x   Blood: x / Protein: x / Nitrite: x   Leuk Esterase: x / RBC: x / WBC x   Sq Epi: x / Non Sq Epi: x / Bacteria: x                                                  LIVER FUNCTIONS - ( 15 Sep 2023 06:06 )  Alb: 2.8 g/dL / Pro: 5.5 g/dL / ALK PHOS: 215 U/L / ALT: 24 U/L / AST: 35 U/L / GGT: x                                                                                                                                       MEDICATIONS  (STANDING):  albuterol    90 MICROgram(s) HFA Inhaler 2 Puff(s) Inhalation every 4 hours  chlorhexidine 2% Cloths 1 Application(s) Topical <User Schedule>  chlorhexidine 4% Liquid 1 Application(s) Topical <User Schedule>  dextrose 5%. 1000 milliLiter(s) (50 mL/Hr) IV Continuous <Continuous>  dextrose 50% Injectable 25 Gram(s) IV Push once  furosemide   Injectable 40 milliGRAM(s) IV Push daily  gabapentin 100 milliGRAM(s) Oral three times a day  glucagon  Injectable 1 milliGRAM(s) IntraMuscular once  insulin lispro (ADMELOG) corrective regimen sliding scale   SubCutaneous three times a day before meals  iron sucrose IVPB 200 milliGRAM(s) IV Intermittent every 24 hours  metoprolol tartrate 25 milliGRAM(s) Oral two times a day  pantoprazole    Tablet 40 milliGRAM(s) Oral two times a day  polyethylene glycol 3350 17 Gram(s) Oral two times a day  senna 2 Tablet(s) Oral at bedtime    MEDICATIONS  (PRN):  acetaminophen     Tablet .. 650 milliGRAM(s) Oral every 6 hours PRN Temp greater or equal to 38C (100.4F), Mild Pain (1 - 3)  atropine Injectable 0.5 milliGRAM(s) IV Push once PRN bradycardia  dextrose Oral Gel 15 Gram(s) Oral once PRN Blood Glucose LESS THAN 70 milliGRAM(s)/deciliter  sodium chloride 0.9% lock flush 10 milliLiter(s) IV Push every 1 hour PRN Pre/post blood products, medications, blood draw, and to maintain line patency      CXR reviewed

## 2023-09-15 NOTE — PROGRESS NOTE ADULT - ASSESSMENT
89 YO F with a medical history  of HTN, malignant pleural effusion of breast origin, chronic A.fib, PAD, Obesity, Chronic lymphedema of  BLE and COPD on home oxygen 2 - 1/2 L nc, ESBL UTI presented to ED with severe SOB, DNR / DNI rescinded and pt was intubated and admitted to ICU    acute respiratory failure - resolved / B/L effusions / possible pneumonia / COPD / stage 2 sacral decubiti was POA / anemia / R brachial vein DVT     - s/p transfusion of 4 units PRBC   - maintain HGB>7   -  DNR    - no anticoagulation secondary to anemia   - check repeat RUE US to r/o progression of DVT as per vascular   - completed  antibiotic course   - Lasix

## 2023-09-15 NOTE — PROGRESS NOTE ADULT - SUBJECTIVE AND OBJECTIVE BOX
GIOVANNY BUCK  88y, Female    LOS  18d    INTERVAL EVENTS/HPI  - No acute events overnight  - T(F): , Max: 99.1 (09-14-23 @ 23:22)  - Denies any worsening symptoms  - Tolerating medication  - WBC Count: 11.58 (09-15-23 @ 06:06)  WBC Count: 15.60 (09-14-23 @ 05:50)  - Creatinine: 1.0 (09-15-23 @ 06:06)  Creatinine: 1.0 (09-14-23 @ 05:50)         REVIEW OF SYSTEMS:  CONSTITUTIONAL: No fever or chills  HEENT: No sore throat  RESPIRATORY: No cough, no shortness of breath  CARDIOVASCULAR: No chest pain or palpitations  GASTROINTESTINAL: No abdominal or epigastric pain  GENITOURINARY: No dysuria  NEUROLOGICAL: No headache/dizziness  MSK: No joint pain, erythema, or swelling; no back pain  SKIN: No itching, rashes  All other ROS negative except noted above    Prior hospital charts reviewed [Yes]  Primary team notes reviewed [Yes]  Other consultant notes reviewed [Yes]    ANTIMICROBIALS:       OTHER MEDS: MEDICATIONS  (STANDING):  acetaminophen     Tablet .. 650 every 6 hours PRN  albuterol    90 MICROgram(s) HFA Inhaler 2 every 4 hours  atropine Injectable 0.5 once PRN  dextrose 50% Injectable 25 once  dextrose Oral Gel 15 once PRN  furosemide   Injectable 40 daily  gabapentin 100 three times a day  glucagon  Injectable 1 once  insulin lispro (ADMELOG) corrective regimen sliding scale  three times a day before meals  metoprolol tartrate 25 two times a day  pantoprazole    Tablet 40 two times a day  polyethylene glycol 3350 17 two times a day  senna 2 at bedtime      Vital Signs Last 24 Hrs  T(F): 94.6 (09-15-23 @ 07:26), Max: 100 (09-11-23 @ 01:00)    Vital Signs Last 24 Hrs  HR: 95 (09-15-23 @ 07:15) (95 - 110)  BP: 102/69 (09-15-23 @ 07:15) (102/69 - 129/72)  RR: 24 (09-15-23 @ 07:26)  SpO2: 100% (09-15-23 @ 07:15) (99% - 100%)  Wt(kg): --    EXAM:  GENERAL: NAD. Interactive. On Nasal canula   HEAD: No head lesions  EYES: Conjunctiva pink and cornea white  NECK: Supple, nontender to palpation  CHEST/LUNG: Mild bilateral crackles.   HEART: S1 S2  ABDOMEN: Soft, nontender, nondistended  EXTREMITIES: No clubbing, cyanosis. +1 edema bilaterally. Chronic venous stasis dermatitis. No open wounds.   NERVOUS SYSTEM: Awake.   MSK: No joint erythema, swelling or pain  SKIN: No rashes or lesions, no superficial thrombophlebitis    Labs:                        7.2    11.58 )-----------( 218      ( 15 Sep 2023 06:06 )             23.1     09-15    143  |  105  |  39<H>  ----------------------------<  85  4.7   |  30  |  1.0    Ca    8.4      15 Sep 2023 06:06  Mg     2.0     09-15    TPro  5.5<L>  /  Alb  2.8<L>  /  TBili  0.8  /  DBili  x   /  AST  35  /  ALT  24  /  AlkPhos  215<H>  09-15      WBC Trend:  WBC Count: 11.58 (09-15-23 @ 06:06)  WBC Count: 15.60 (09-14-23 @ 05:50)  WBC Count: 18.63 (09-13-23 @ 22:12)  WBC Count: 18.19 (09-13-23 @ 06:05)      Creatine Trend:  Creatinine: 1.0 (09-15)  Creatinine: 1.0 (09-14)  Creatinine: 1.2 (09-13)  Creatinine: 1.2 (09-12)      Liver Biochemical Testing Trend:  Alanine Aminotransferase (ALT/SGPT): 24 (09-15)  Alanine Aminotransferase (ALT/SGPT): 24 (09-14)  Alanine Aminotransferase (ALT/SGPT): 27 (09-13)  Alanine Aminotransferase (ALT/SGPT): 19 (09-12)  Alanine Aminotransferase (ALT/SGPT): 18 (09-11)  Aspartate Aminotransferase (AST/SGOT): 35 (09-15-23 @ 06:06)  Aspartate Aminotransferase (AST/SGOT): 40 (09-14-23 @ 05:50)  Aspartate Aminotransferase (AST/SGOT): 53 (09-13-23 @ 06:05)  Aspartate Aminotransferase (AST/SGOT): 54 (09-12-23 @ 07:48)  Aspartate Aminotransferase (AST/SGOT): 51 (09-11-23 @ 06:08)  Bilirubin Total: 0.8 (09-15)  Bilirubin Total: 0.9 (09-14)  Bilirubin Total: 1.2 (09-13)  Bilirubin Total: 1.0 (09-12)  Bilirubin Total: 0.7 (09-11)      Trend LDH  09-10-23 @ 19:21  398<H>  03-07-23 @ 21:06  211      Urinalysis Basic - ( 15 Sep 2023 06:06 )    Color: x / Appearance: x / SG: x / pH: x  Gluc: 85 mg/dL / Ketone: x  / Bili: x / Urobili: x   Blood: x / Protein: x / Nitrite: x   Leuk Esterase: x / RBC: x / WBC x   Sq Epi: x / Non Sq Epi: x / Bacteria: x        MICROBIOLOGY:  Vancomycin Level, Random: 36.7 (09-07 @ 06:02)  Vancomycin Level, Trough: 34.5 (09-06 @ 06:44)  Vancomycin Level, Trough: 35.1 (09-05 @ 20:09)  Vancomycin Level, Trough: 14.0 (09-02 @ 19:02)    Female    Culture - Blood (collected 10 Sep 2023 05:45)  Source: .Blood None  Preliminary Report:    No growth at 4 days    Culture - Blood (collected 10 Sep 2023 05:45)  Source: .Blood None  Preliminary Report:    No growth at 4 days    Culture - Sputum (collected 05 Sep 2023 19:15)  Source: Trach Asp Tracheal Aspirate  Final Report:    Normal Respiratory Joaquina present    Culture - Blood (collected 02 Sep 2023 06:01)  Source: .Blood None  Final Report:    No growth at 5 days    Culture - Sputum (collected 02 Sep 2023 03:00)  Source: Trach Asp Tracheal Aspirate  Final Report:    Normal Respiratory Joaquina present    Culture - Urine (collected 01 Sep 2023 19:50)  Source: Catheterized Catheterized  Final Report:    <10,000 CFU/mL Normal Urogenital Joaquina    Culture - Blood (collected 01 Sep 2023 19:32)  Source: .Blood None  Final Report:    No growth at 5 days    Culture - Blood (collected 28 Aug 2023 11:00)  Source: .Blood Blood-Peripheral  Final Report:    No growth at 5 days    Culture - Blood (collected 28 Aug 2023 11:00)  Source: .Blood Blood-Peripheral  Final Report:    No growth at 5 days    Culture - Abscess with Gram Stain (collected 16 Jul 2023 07:00)  Source: .Abscess Right groin abscess  Final Report:    Numerous Proteus mirabilis    Numerous Enterococcus faecalis    Few Coag Negative Staphylococcus "Susceptibilities not performed"    Few Enterococcus faecium (vancomycin resistant)    Moderate Prevotella timonensis "Susceptibilities not performed"  Organism: Proteus mirabilis  Enterococcus faecalis  Enterococcus faecium (vancomycin resistant)  Organism: Enterococcus faecium (vancomycin resistant)    Sensitivities:      Method Type: DARIO      -  Ampicillin: R >8 Predicts results to ampicillin/sulbactam, amoxacillin-clavulanate and  piperacillin-tazobactam.      -  Daptomycin: SDD 4 The breakpoint for SDD (susceptible dose dependent)is based on a dosage regimen of 8-12 mg/kg administered every 24 h in adults and is intended for serious infections due to E. faecium. Consultation with an infectious diseases specialist is recommended.      -  Levofloxacin: R >4      -  Linezolid: S 2      -  Tetracycline: R >8      -  Vancomycin: R >16  Organism: Enterococcus faecalis    Sensitivities:      Method Type: DARIO      -  Ampicillin: S <=2 Predicts results to ampicillin/sulbactam, amoxacillin-clavulanate and  piperacillin-tazobactam.      -  Tetracycline: R >8      -  Vancomycin: S 2  Organism: Proteus mirabilis    Sensitivities:      Method Type: DARIO      -  Amikacin: S <=16      -  Amoxicillin/Clavulanic Acid: S <=8/4      -  Ampicillin: S <=8 These ampicillin results predict results for amoxicillin      -  Ampicillin/Sulbactam: S <=4/2 Enterobacter, Klebsiella aerogenes, Citrobacter, and Serratia may develop resistance during prolonged therapy (3-4 days)      -  Aztreonam: S <=4      -  Cefazolin: S <=2 Enterobacter, Klebsiella aerogenes, Citrobacter, and Serratia may develop resistance during prolonged therapy (3-4 days)      -  Cefepime: S <=2      -  Cefoxitin: S <=8      -  Ceftriaxone: S <=1 Enterobacter, Klebsiella aerogenes, Citrobacter, and Serratia may develop resistance during prolonged therapy      -  Ciprofloxacin: S <=0.25      -  Ertapenem: S <=0.5      -  Gentamicin: S <=2      -  Levofloxacin: S <=0.5      -  Meropenem: S <=1      -  Piperacillin/Tazobactam: S <=8      -  Tobramycin: S <=2      -  Trimethoprim/Sulfamethoxazole: S 1/19                                  Ferritin: 130 (09-12)    D-Dimer Assay, Quantitative: 531 (09-11)    Lactate Dehydrogenase, Serum: 398 (09-10)              RADIOLOGY & ADDITIONAL TESTS:  I have personally reviewed the relevant images.   CXR  Xray Chest 1 View- PORTABLE-Urgent:   ACC: [id number] EXAM:  XR CHEST PORTABLE URGENT 1V   ORDERED BY: LAURA DUMONT     PROCEDURE DATE:  09/12/2023          INTERPRETATION:  Clinical History / Reason for exam: Left IJ inserted.    Comparison : Chest radiograph 9/12/2023 at 6:39 AM.    Technique/Positioning: Frontal chest radiograph.    Findings:    Support devices: Left IJ catheter likely terminating within the proximal   SVC/left brachiocephalic vein. Left chest tube.    Cardiac/mediastinum/hilum: Unchanged    Lung parenchyma/Pleura: Stable bilateral opacities. No pneumothorax.    Skeleton/soft tissues: Unchanged.    Impression:    Support devices as described.    Stable bilateral opacities.        --- End of Report ---            SABINO SULLIVAN MD; Attending Radiologist  This document has been electronically signed. Sep 13 2023 10:38AM (09-12-23 @ 21:01)      CT        WEIGHT  Weight (kg): 87.8 (08-28-23 @ 21:03)  Creatinine: 1.0 mg/dL (09-15-23 @ 06:06)      All available historical records have been reviewed

## 2023-09-15 NOTE — PROGRESS NOTE ADULT - ASSESSMENT
IMPRESSION:    Acute Hypercapnic/ hypoxic respiratory failure s/p intubation/extubation  Acute on Chronic Iron Deficiency Anemia SP 6 units PRBC  COPD exacerbation  Sepsis present on admission   CAP  AFIB on Eliquis  recurrent pleural effusions s/p pleurex   malignant pleural effusion   H/O HFpEF    PLAN:    CNS: mental status at baseline    HEENT: oral care    PULMONARY: aspiration precaution, HOB@ 45 degrees, albuterol PRN, encourage incentive spirometry, on room air sating 95%    CARDIOVASCULAR: 2D-Echo EF 64%, not on pressors, continue lasix 40mg IVP daily     GI: GI prophylaxis. feeding, FOBT negative, GI evaluated, CT abdomen/pelvis 9/9 negative for retroperitoneal hematoma     RENAL: f/u lytes correct as needed, strict inputs and outputs    INFECTIOUS DISEASE: SP ABX    HEMATOLOGICAL: DIC and HIT negative, hematology evaluated, monitor daily CBC and coags, day 2 of Venofer     ENDOCRINE:  Follow up FS.  Insulin protocol if needed    MUSCULOSKELETAL: bedbound, RUE duplex positive for brachial dvt, holding off AC in light of persistent anemia, Vascular Surgery requesting repeat doppler today     Code Status: DNR only    Left IJ TLC in light of poor access     Downgrade to Floor

## 2023-09-15 NOTE — ED ADULT NURSE NOTE - NS ED NURSE DISCH DISPOSITION
Is the patient currently in the state of [place]? YES    Visit mode:VIDEO    If the visit is dropped, the patient can be reconnected by: VIDEO VISIT: Text to cell phone:   Telephone Information:   Mobile [phone]       Will anyone else be joining the visit? NO  (If patient encounters technical issues they should call [phone]    How would you like to obtain your AVS? MyChart    Are changes needed to the allergy or medication list? Pt stated no changes to allergies and Pt stated no med changes    Reason for visit: Consult    Anny PERZE       Admitted

## 2023-09-16 NOTE — PROGRESS NOTE ADULT - ASSESSMENT
88 year old female with PMH of HTN, Afib, OA, PVD, COPD, Anxiety, Obesity, Acute on chronic systolic congestive heart failure, H/O abdominal hysterectomy, H/O discectomy, H/O total knee replacement, bilaterally and bilateral pleural effusions (s/p left thoracentesis 3/2023 -- Cytology -PLEURAL FLUID, THORACENTESIS: - SUSPICIOUS FOR MALIGNANCY) with a left sided pleurex catheter is currently admitted due to worsening respiratory distress and hypercapnic respiratory failure and COPD exacerbation, s/p mechanical ventilation/extubation, downgraded from ICU to medical floor.     #Acute hypercapnic respiratory failure  - s/p intubation and extubation    #COPD exacerbation  - s/p steroid course    #Sepsis 2/2 pneumonia  - s/p 7 days of abx per ID    #Malignant Pleural effusion (left sided)  - s/p PleureX (locked)    #Chronic Afib  - metoprolol tartrate 25mg BID  - currently off AC (was on ELIQUIS before)    #Acute iron deficiency anemia  #HFpEF  - s/p 6U pRBC  - c/w lasix 40mg iv daily for net positive fluid balance  - Venofer 200mg IV daily for 3 days per hematology  - CBC q24hr  - Transfuse if HgB < 7    #Right brachial vein DVT  - off AC  - repeat RUQ US showed no mention of DVT prorogation  - initiate AC when HgB is persistently stable    Left IJ TLC in light of poor access.   88 year old female with PMH of HTN, Afib, OA, PVD, COPD, Anxiety, Obesity, Acute on chronic systolic congestive heart failure, H/O abdominal hysterectomy, H/O discectomy, H/O total knee replacement, bilaterally and bilateral pleural effusions (s/p left thoracentesis 3/2023 -- Cytology -PLEURAL FLUID, THORACENTESIS: - SUSPICIOUS FOR MALIGNANCY) with a left sided pleurex catheter is currently admitted due to worsening respiratory distress and hypercapnic respiratory failure and COPD exacerbation, s/p mechanical ventilation/extubation, downgraded from ICU to medical floor.     #Acute hypercapnic respiratory failure- Volume overload vs COPD exacerbation- extubated since 9/10  #Sepsis present on admission  #Fevers- Concerns of superimposed VAP?   #Bilateral pleural effusions   #COPD exacerbation  - s/p left thoracentesis 3/2023 -- Cytology -PLEURAL FLUID, THORACENTESIS: - SUSPICIOUS FOR MALIGNANCY. - Atypical cells present in background of histiocytes and mesothelial  cells, suspicious for metastatic carcinoma.   - Pleurx Cx 7/4 - Staph epidermidis, No PMNs  - s/p thoracentesis right thoracentesis 7/5 Exudative in nature. Cultures negative  - s/p steroid course  - s/p 7 days of abx per ID    #Chronic Afib  - metoprolol tartrate 25mg BID  - currently off AC (was on ELIQUIS before)    #Acute iron deficiency anemia  #HFpEF  - s/p 6U pRBC  - c/w lasix 40mg iv daily for net positive fluid balance  - Venofer 200mg IV daily for 3 days per hematology  - CBC q24hr  - Transfuse if HgB < 7    #Right brachial vein DVT  - off AC  - repeat RUQ US showed no mention of DVT prorogation  - initiate AC when HgB is persistently stable    Left IJ TLC in light of poor access.   88 year old female with PMH of HTN, Afib, OA, PVD, COPD, Anxiety, Obesity, Acute on chronic systolic congestive heart failure, H/O abdominal hysterectomy, H/O discectomy, H/O total knee replacement, bilaterally and bilateral pleural effusions (s/p left thoracentesis 3/2023 -- Cytology -PLEURAL FLUID, THORACENTESIS: - SUSPICIOUS FOR MALIGNANCY) with a left sided pleurex catheter is currently admitted due to worsening respiratory distress and hypercapnic respiratory failure and COPD exacerbation, s/p mechanical ventilation/extubation, downgraded from ICU to medical floor.     #Acute hypercapnic respiratory failure- Volume overload vs COPD exacerbation- extubated since 9/10  #Sepsis present on admission  #Fevers- Concerns of superimposed VAP?   #Bilateral pleural effusions   #COPD exacerbation  - s/p left thoracentesis 3/2023 -- Cytology -PLEURAL FLUID, THORACENTESIS: - SUSPICIOUS FOR MALIGNANCY. - Atypical cells present in background of histiocytes and mesothelial  cells, suspicious for metastatic carcinoma.   - Pleurx Cx 7/4 - Staph epidermidis, No PMNs  - s/p thoracentesis right thoracentesis 7/5 Exudative in nature. Cultures negative  - s/p steroid course  - s/p 7 days of abx per ID    #Chronic Afib  - metoprolol tartrate 25mg BID  - currently off AC (was on ELIQUIS before) due to anemia     #Acute iron deficiency anemia  #HFpEF  - s/p 6U pRBC  - c/w lasix 40mg iv daily for net positive fluid balance  - Venofer 200mg IV daily for 3 days per hematology  - CBC q24hr  - Transfuse if HgB < 7    #Right brachial vein DVT  - off AC  - repeat RUQ US showed no mention of DVT prorogation  - initiate AC when HgB is persistently stable    Left IJ TLC in light of poor access.   88 year old female with PMH of HTN, Afib, OA, PVD, COPD, Anxiety, Obesity, Acute on chronic systolic congestive heart failure, H/O abdominal hysterectomy, H/O discectomy, H/O total knee replacement, bilaterally and bilateral pleural effusions (s/p left thoracentesis 3/2023 -- Cytology -PLEURAL FLUID, THORACENTESIS: - SUSPICIOUS FOR MALIGNANCY) with a left sided pleurex catheter is currently admitted due to worsening respiratory distress and hypercapnic respiratory failure and COPD exacerbation, s/p mechanical ventilation/extubation, downgraded from ICU to medical floor.     #Acute hypercapnic respiratory failure- Volume overload vs COPD exacerbation- extubated since 9/10  #Sepsis present on admission  #Fevers- Concerns of superimposed VAP?   #Bilateral pleural effusions   #Malignant pleural effusion   #COPD exacerbation  - s/p left thoracentesis 3/2023 -- Cytology -PLEURAL FLUID, THORACENTESIS: - SUSPICIOUS FOR MALIGNANCY. - Atypical cells present in background of histiocytes and mesothelial  cells, suspicious for metastatic carcinoma.   - Pleurx Cx 7/4 - Staph epidermidis, No PMNs  - s/p thoracentesis right thoracentesis 7/5 Exudative in nature. Cultures negative  - s/p steroid course  - s/p 7 days of abx per ID    #Chronic Afib  - metoprolol tartrate 25mg BID  - currently off AC (was on ELIQUIS before) due to anemia     #Acute on Chronic Iron Deficiency Anemia SP 6 units PRBC  - s/p 6U pRBC  - Venofer 200mg IV daily for 3 days per hematology  - CBC q24hr  - Transfuse if HgB < 7  -CT abdomen/pelvis 9/9 negative for retroperitoneal hematoma     #Chronic HFpEF   -c/w lasix 40mg iv daily for net positive fluid balance  -EF 64%  -strict i/o  -daily weight  -c/w metoprolol 25mg bid     #Right brachial vein DVT  - off AC  - repeat RUE duplex showed no mention of DVT prorogation  - initiate AC when HgB is persistently stable     #Left IJ TLC in light of poor access.    #Progress Note Handoff  Pending (specify): clinical improvement, monitor H/H, monitor for bleeding, monitor O2 requirements  Family discussion: Daughter Karishma at bedside. Answered all questions.   Disposition: Acute  High risk given above acuity and comorbidities.

## 2023-09-16 NOTE — PROGRESS NOTE ADULT - SUBJECTIVE AND OBJECTIVE BOX
DANIELEOFE GIOVANNY  [facility] 4I 401 1 ([facility] 4I)      Patient is a 88y old  Female who presents with a chief complaint of hypercapnic respiratory failure (15 Sep 2023 09:30)        Interval events:  Patient seen and examined at bedside. No acute events overnight. Says she feels ok. No bleeding. Had BM this morning (no blood). Breathing comfortably on home dose of 2L NC. No cough.     -PMHx: HTN (hypertension)    Afib    Goiter    Cellulitis    OA (osteoarthritis)    PVD (peripheral vascular disease)    COPD (chronic obstructive pulmonary disease)    Anxiety    Obesity    Acute on chronic systolic congestive heart failure    Lymphatic edema    Edema of both lower legs    Required emergent intubation      -PSHx:H/O abdominal hysterectomy    H/O discectomy    H/O total knee replacement, bilateral            REVIEW OF SYSTEMS:  CONSTITUTIONAL: No fever, weight loss, or fatigue  RESPIRATORY: as above   CARDIOVASCULAR: No chest pain, palpitations, dizziness, or leg swelling  GASTROINTESTINAL: No abdominal or epigastric pain. No nausea, vomiting, or hematemesis; No diarrhea or constipation. No melena or hematochezia.  NEUROLOGICAL: No headaches  LYMPH NODES: No enlarged glands  MUSCULOSKELETAL: No joint pain or swelling; No muscle, back, or extremity pain      T(C): , Max: 37.1 (09-15-23 @ 20:00)  HR: 102 (09-16-23 @ 13:15)  BP: 143/79 (09-16-23 @ 13:15)  RR: 20 (09-16-23 @ 13:15)  SpO2: 100% (09-16-23 @ 13:15)  CAPILLARY BLOOD GLUCOSE      POCT Blood Glucose.: 122 mg/dL (16 Sep 2023 16:29)  POCT Blood Glucose.: 127 mg/dL (16 Sep 2023 11:26)  POCT Blood Glucose.: 107 mg/dL (16 Sep 2023 07:56)      PHYSICAL EXAM:  GENERAL: NAD  HEENT:  Atraumatic, Normocephalic; left IJ in place   EYES: EOMI, PERRLA, conjunctiva and sclera clear  NERVOUS SYSTEM:  Alert & Oriented X3, no focal deficits   CHEST/LUNG:  bilateral rhonchi; left chest wall pleurx in place   HEART: Regular rate and rhythm; No murmurs, rubs, or gallops  ABDOMEN: Soft, Nontender, Nondistended; Bowel sounds present  EXTREMITIES:  R arm ecchymosis; b/l LE 3+ non-pitting edema      Consultant(s) Notes Reviewed:  [x ] YES  [ ] NO  Care Discussed with Consultants/Other Providers [ x] YES  [ ] NO          LABS:          7.8  10.95  )-------(249          25.4  N=80.7  L=7.8  MCV=89.8    141|102|33<H>  ------------------<105<H>  4.5|31|0.9  eGFR:--  Ca:8.4            Microbiology:    Culture - Blood (collected 09-10-23 @ 05:45)  Source: .Blood None  Final Report (09-15-23 @ 17:00):    No growth at 5 days    Culture - Blood (collected 09-10-23 @ 05:45)  Source: .Blood None  Final Report (09-15-23 @ 17:00):    No growth at 5 days        RADIOLOGY & ADDITIONAL TESTS:  < from: [facility] Duplex Upper Ext Vein Scan, Right (09.15.23 @ 12:32) >  IMPRESSION:  DVT noted in the right brachial vein    < end of copied text >    < from: Xray Chest 1 View- PORTABLE-Routine (09.15.23 @ 07:43) >  Impression:    Stable bilateral opacities.    < end of copied text >        Medications:  acetaminophen     Tablet .. 650 milliGRAM(s) Oral every 6 hours PRN  albuterol    90 MICROgram(s) HFA Inhaler 2 Puff(s) Inhalation every 4 hours  atropine Injectable 0.5 milliGRAM(s) IV Push once PRN  chlorhexidine 2% Cloths 1 Application(s) Topical <User Schedule>  chlorhexidine 4% Liquid 1 Application(s) Topical <User Schedule>  dextrose 5%. 1000 milliLiter(s) IV Continuous <Continuous>  dextrose 50% Injectable 25 Gram(s) IV Push once  dextrose Oral Gel 15 Gram(s) Oral once PRN  furosemide   Injectable 40 milliGRAM(s) IV Push daily  gabapentin 100 milliGRAM(s) Oral three times a day  glucagon  Injectable 1 milliGRAM(s) IntraMuscular once  insulin lispro (ADMELOG) corrective regimen sliding scale   SubCutaneous three times a day before meals  melatonin 5 milliGRAM(s) Oral at bedtime PRN  metoprolol tartrate 25 milliGRAM(s) Oral two times a day  pantoprazole    Tablet 40 milliGRAM(s) Oral two times a day  polyethylene glycol 3350 17 Gram(s) Oral two times a day  senna 2 Tablet(s) Oral at bedtime  sodium chloride 0.9% lock flush 10 milliLiter(s) IV Push every 1 hour PRN         DANIELEOFE GIOVANNY  [facility] 4I 401 1 ([facility]-S 4I)      Patient is a 88y old  Female who presents with a chief complaint of hypercapnic respiratory failure (15 Sep 2023 09:30)        Interval events:  Patient seen and examined at bedside. No acute events overnight. Says she feels ok. No bleeding. Had BM this morning (no blood). Breathing comfortably on home dose of 2L NC. No cough.     -PMHx: HTN (hypertension)            Afib         Goiter     Cellulitis    OA (osteoarthritis)    PVD (peripheral vascular disease)    COPD (chronic obstructive pulmonary disease)    Anxiety    Obesity    Acute on chronic systolic congestive heart failure    Lymphatic edema    Edema of both lower legs    Required emergent intubation      -PSHx:H/O abdominal hysterectomy    H/O discectomy    H/O total knee replacement, bilateral            REVIEW OF SYSTEMS:  CONSTITUTIONAL: No fever, weight loss, or fatigue  RESPIRATORY: as above   CARDIOVASCULAR: No chest pain, palpitations, dizziness, or leg swelling  GASTROINTESTINAL: No abdominal or epigastric pain. No nausea, vomiting, or hematemesis; No diarrhea or constipation. No melena or hematochezia.  NEUROLOGICAL: No headaches  LYMPH NODES: No enlarged glands  MUSCULOSKELETAL: No joint pain or swelling; No muscle, back, or extremity pain      T(C): , Max: 37.1 (09-15-23 @ 20:00)  HR: 102 (09-16-23 @ 13:15)  BP: 143/79 (09-16-23 @ 13:15)  RR: 20 (09-16-23 @ 13:15)  SpO2: 100% (09-16-23 @ 13:15)  CAPILLARY BLOOD GLUCOSE       POCT Blood Glucose.: 122 mg/dL (16 Sep 2023 16:29)  POCT Blood Glucose.: 127 mg/dL (16 Sep 2023 11:26)  POCT Blood Glucose.: 107 mg/dL (16 Sep 2023 07:56)      PHYSICAL EXAM:  GENERAL: NAD  HEENT:  Atraumatic, Normocephalic; left IJ in place   EYES: EOMI, PERRLA, conjunctiva and sclera clear  NERVOUS SYSTEM:  Alert & Oriented X3, no focal deficits   CHEST/LUNG:  bilateral rhonchi; left chest wall pleurx in place   HEART: Regular rate and rhythm; No murmurs, rubs, or gallops  ABDOMEN: Soft, Nontender, Nondistended; Bowel sounds present  EXTREMITIES:  R arm ecchymosis; b/l LE 3+ non-pitting edema      Consultant(s) Notes Reviewed:  [x ] YES  [ ] NO  Care Discussed with Consultants/Other Providers [ x] YES  [ ] NO          LABS:          7.8  10.95  )-------(249          25.4  N=80.7  L=7.8  MCV=89.8    141|102|33<H>  ------------------<105<H>  4.5|31|0.9  eGFR:--  Ca:8.4            Microbiology:    Culture - Blood (collected 09-10-23 @ 05:45)  Source: .Blood None  Final Report (09-15-23 @ 17:00):    No growth at 5 days    Culture - Blood (collected 09-10-23 @ 05:45)  Source: .Blood None  Final Report (09-15-23 @ 17:00):    No growth at 5 days        RADIOLOGY & ADDITIONAL TESTS:  < from: VA Duplex Upper Ext Vein Scan, Right (09.15.23 @ 12:32) >  IMPRESSION:  DVT noted in the right brachial vein    < end of copied text >    < from: Xray Chest 1 View- PORTABLE-Routine (09.15.23 @ 07:43) >  Impression:    Stable bilateral opacities.    < end of copied text >        Medications:  acetaminophen     Tablet .. 650 milliGRAM(s) Oral every 6 hours PRN  albuterol    90 MICROgram(s) HFA Inhaler 2 Puff(s) Inhalation every 4 hours  atropine Injectable 0.5 milliGRAM(s) IV Push once PRN  chlorhexidine 2% Cloths 1 Application(s) Topical <User Schedule>  chlorhexidine 4% Liquid 1 Application(s) Topical <User Schedule>  dextrose 5%. 1000 milliLiter(s) IV Continuous <Continuous>  dextrose 50% Injectable 25 Gram(s) IV Push once  dextrose Oral Gel 15 Gram(s) Oral once PRN  furosemide   Injectable 40 milliGRAM(s) IV Push daily  gabapentin 100 milliGRAM(s) Oral three times a day  glucagon  Injectable 1 milliGRAM(s) IntraMuscular once  insulin lispro (ADMELOG) corrective regimen sliding scale   SubCutaneous three times a day before meals  melatonin 5 milliGRAM(s) Oral at bedtime PRN  metoprolol tartrate 25 milliGRAM(s) Oral two times a day  pantoprazole    Tablet 40 milliGRAM(s) Oral two times a day  polyethylene glycol 3350 17 Gram(s) Oral two times a day  senna 2 Tablet(s) Oral at bedtime  sodium chloride 0.9% lock flush 10 milliLiter(s) IV Push every 1 hour PRN

## 2023-09-17 NOTE — PROGRESS NOTE ADULT - TIME BILLING
I have personally seen and examined this patient.    I have reviewed all pertinent clinical information and reviewed all relevant imaging and diagnostic studies personally.   I counseled the patient about diagnostic testing and treatment plan. All questions were answered.   I discussed recommendations with the primary team.
Total time spent to complete patient's bedside assessment, physical examination, review medical chart including labs & imaging, discuss medical plan of care with housestaff was more than 50 minutes.
I have personally seen and examined this patient.    I have reviewed all pertinent clinical information and reviewed all relevant imaging and diagnostic studies personally.   I counseled the patient about diagnostic testing and treatment plan. All questions were answered.   I discussed recommendations with the primary team.
Total time spent to complete patient's bedside assessment, physical examination, review medical chart including labs & imaging, discuss medical plan of care with housestaff was more than 50 minutes.

## 2023-09-17 NOTE — PROGRESS NOTE ADULT - SUBJECTIVE AND OBJECTIVE BOX
DANIELEOFE GIOVANNY  [facility] 4I 401 1 ([facility]-S 4I)      Patient is a 88y old  Female who presents with a chief complaint of hypercapnic respiratory failure (16 Sep 2023 16:54)        Interval events:  Patient seen and examined at bedside. No acute events overnight. Patient says she feels about the same. On 2L NC. Had BM this morning, no bleeding.     -PMHx: HTN (hypertension)    Afib    Goiter    Cellulitis    OA (osteoarthritis)    PVD (peripheral vascular disease)    COPD (chronic obstructive pulmonary disease)    Anxiety    Obesity    Acute on chronic systolic congestive heart failure    Lymphatic edema    Edema of both lower legs    Required emergent intubation      -PSHx:H/O abdominal hysterectomy    H/O discectomy    H/O total knee replacement, bilateral            REVIEW OF SYSTEMS:  CONSTITUTIONAL: No fever, weight loss, or fatigue  RESPIRATORY: as above   CARDIOVASCULAR: No chest pain, palpitations, dizziness, or leg swelling  GASTROINTESTINAL: No abdominal or epigastric pain. No nausea, vomiting, or hematemesis; No diarrhea or constipation. No melena or hematochezia.  NEUROLOGICAL: No headaches  LYMPH NODES: No enlarged glands  MUSCULOSKELETAL: No joint pain or swelling; No muscle, back, or extremity pain      T(C): , Max: 36.6 (09-16-23 @ 21:16)  HR: 92 (09-17-23 @ 13:42)  BP: 118/61 (09-17-23 @ 13:42)  RR: 18 (09-17-23 @ 13:42)  SpO2: --  CAPILLARY BLOOD GLUCOSE      POCT Blood Glucose.: 93 mg/dL (17 Sep 2023 11:28)  POCT Blood Glucose.: 101 mg/dL (17 Sep 2023 07:25)  POCT Blood Glucose.: 114 mg/dL (16 Sep 2023 21:13)  POCT Blood Glucose.: 122 mg/dL (16 Sep 2023 16:29)      PHYSICAL EXAM:    GENERAL: NAD     HEENT:  Atraumatic, Normocephalic; left IJ in place   EYES: EOMI, PERRLA, conjunctiva and sclera clear  NERVOUS SYSTEM:  Alert & Oriented X3, no focal deficits   CHEST/LUNG:  bilateral rhonchi improved; left chest wall pleurx in place   HEART: Regular rate and rhythm; No murmurs, rubs, or gallops  ABDOMEN: Soft, Nontender, Nondistended; Bowel sounds present  EXTREMITIES:  R arm ecchymosis; b/l LE 3+ non-pitting edema      Consultant(s) Notes Reviewed:  [x ] YES  [ ] NO     Care Discussed with Consultants/Other Providers [ x] YES  [ ] NO         LABS:          8.4  12.10  )-------(274          27.1  N=83.3  L=6.9  MCV=89.7    140|98|29<H>  ------------------<82  4.1|32|0.9  eGFR:--  Ca:8.6            Microbiology:    Culture - Blood (collected 09-10-23 @ 05:45)  Source: .Blood None  Final Report (09-15-23 @ 17:00):    No growth at 5 days    Culture - Blood (collected 09-10-23 @ 05:45)  Source: .Blood None  Final Report (09-15-23 @ 17:00):    No growth at 5 days        RADIOLOGY & ADDITIONAL TESTS:  < from: VA Duplex Upper Ext Vein Scan, Right (09.15.23 @ 12:32) >  IMPRESSION:  DVT noted in the right brachial vein    < end of copied text >        Medications:  acetaminophen     Tablet .. 650 milliGRAM(s) Oral every 6 hours PRN  albuterol    90 MICROgram(s) HFA Inhaler 2 Puff(s) Inhalation every 4 hours  atropine Injectable 0.5 milliGRAM(s) IV Push once PRN  chlorhexidine 2% Cloths 1 Application(s) Topical <User Schedule>  chlorhexidine 4% Liquid 1 Application(s) Topical <User Schedule>  dextrose 5%. 1000 milliLiter(s) IV Continuous <Continuous>  dextrose 50% Injectable 25 Gram(s) IV Push once  dextrose Oral Gel 15 Gram(s) Oral once PRN  gabapentin 100 milliGRAM(s) Oral three times a day  glucagon  Injectable 1 milliGRAM(s) IntraMuscular once  insulin lispro (ADMELOG) corrective regimen sliding scale   SubCutaneous three times a day before meals  magnesium sulfate  IVPB 2 Gram(s) IV Intermittent once  melatonin 5 milliGRAM(s) Oral at bedtime PRN  metoprolol tartrate 25 milliGRAM(s) Oral two times a day  pantoprazole    Tablet 40 milliGRAM(s) Oral two times a day  polyethylene glycol 3350 17 Gram(s) Oral two times a day  senna 2 Tablet(s) Oral at bedtime  sodium chloride 0.9% lock flush 10 milliLiter(s) IV Push every 1 hour PRN

## 2023-09-17 NOTE — PROGRESS NOTE ADULT - ASSESSMENT
88 year old female with PMH of HTN, Afib, OA, PVD, COPD, Anxiety, Obesity, Acute on chronic systolic congestive heart failure, H/O abdominal hysterectomy, H/O discectomy, H/O total knee replacement, bilaterally and bilateral pleural effusions (s/p left thoracentesis 3/2023 -- Cytology -PLEURAL FLUID, THORACENTESIS: - SUSPICIOUS FOR MALIGNANCY) with a left sided pleurex catheter is currently admitted due to worsening respiratory distress and hypercapnic respiratory failure and COPD exacerbation, s/p mechanical ventilation/extubation, downgraded from ICU to medical floor.     #Acute hypercapnic respiratory failure- Volume overload vs COPD exacerbation- extubated since 9/10  #Sepsis present on admission  #Fevers- Concerns of superimposed VAP?   #Bilateral pleural effusions   #Malignant pleural effusion   #COPD exacerbation  - s/p left thoracentesis 3/2023 -- Cytology -PLEURAL FLUID, THORACENTESIS: - SUSPICIOUS FOR MALIGNANCY. - Atypical cells present in background of histiocytes and mesothelial  cells, suspicious for metastatic carcinoma.   - Pleurx Cx 7/4 - Staph epidermidis, No PMNs  - s/p thoracentesis right thoracentesis 7/5 Exudative in nature. Cultures negative  - s/p steroid course  - s/p 7 days of abx per ID    #Chronic Afib  - metoprolol tartrate 25mg BID  - currently off AC (was on ELIQUIS before) due to anemia     #Acute on Chronic Iron Deficiency Anemia SP 6 units PRBC  - s/p 6U pRBC  -s/p Venofer 200mg IV daily for 3 days per hematology  - CBC q24hr     - Transfuse if HgB < 7  -CT abdomen/pelvis 9/9 negative for retroperitoneal hematoma    -will start on heparin drip today; monitor closely for bleeding     #Chronic HFpEF   -c/w lasix 40mg iv daily for net positive fluid balance  -EF 64%  -strict i/o  -daily weight  -c/w metoprolol 25mg bid     #Right brachial vein DVT  -Hb improving every day; now 8.4; will start on heparin drip with strict PTT monitoring 55-80; monitor for bleeding   - repeat RUE duplex showed no mention of DVT prorogation     #Left IJ TLC in light of poor access.    #Progress Note Handoff  Pending (specify): clinical improvement, monitor H/H, monitor for bleeding, start heparin drip, monitor O2 requirements  Family discussion: Updated daughter Karishma. Answered all questions.   Disposition: Acute  High risk given above acuity and comorbidities.

## 2023-09-18 NOTE — CHART NOTE - NSCHARTNOTEFT_GEN_A_CORE
PTT is 57.4, full anticoagulation nomogram actually doesn't give instructions for this level but PTT of 58 states "no change". Will renew at 1400 units per hour

## 2023-09-18 NOTE — PROGRESS NOTE ADULT - ASSESSMENT
A 88 year old female with PMH of HTN, Afib, OA, PVD, COPD, Anxiety, Obesity, Acute on chronic systolic congestive heart failure, H/O abdominal hysterectomy, H/O discectomy, H/O total knee replacement, bilaterally and bilateral pleural effusions (s/p left thoracentesis 3/2023 -- Cytology -PLEURAL FLUID, THORACENTESIS: - SUSPICIOUS FOR MALIGNANCY) with a left sided pleurex catheter is currently admitted due to worsening respiratory distress and hypercapnic respiratory failure and COPD exacerbation, s/p mechanical ventilation/extubation, downgraded from ICU to medical floor.     #Acute hypercapnic respiratory failure- Volume overload vs COPD exacerbation- extubated since 9/10  #Sepsis present on admission  #Fevers- Concerns of superimposed VAP?   #Bilateral pleural effusions   #Malignant pleural effusion   #COPD exacerbation  - s/p left thoracentesis 3/2023 - Cytology -PLEURAL FLUID, THORACENTESIS: - SUSPICIOUS FOR MALIGNANCY. - Atypical cells present in background of histiocytes and mesothelial  cells, suspicious for metastatic carcinoma.   - Pleurx Cx 7/4 - Staph epidermidis, No PMNs  - s/p thoracentesis right thoracentesis 7/5 Exudative in nature. Cultures negative  - s/p steroid course  - s/p 7 days of abx per ID    #Chronic Afib  - metoprolol tartrate 25mg BID  - currently off AC (was on ELIQUIS before) due to anemia     #Acute on Chronic Iron Deficiency Anemia SP 6 units PRBC  - s/p 6U pRBC  -s/p Venofer 200mg IV daily for 3 days per hematology  - CBC q24hr     - Transfuse if HgB < 7  -CT abdomen/pelvis 9/9 negative for retroperitoneal hematoma    -c/w heparin drip today; monitor closely for bleeding,   -consider switching to Lovenox or Eliquis if no further bleed, discuss with GI and hematology    #Right brachial vein DVT  -Hb improving every day; now 8.4; will start on heparin drip with strict PTT monitoring 55-80; monitor for bleeding   - repeat RUE duplex showed no mention of DVT prorogation    #Chronic HFpEF   -c/w lasix 40mg iv daily for net positive fluid balance  -EF 64%  -strict i/o  -daily weight  -c/w metoprolol 25mg bid      #Left IJ TLC in light of poor access.    #Progress Note Handoff  Pending (specify): clinical improvement, monitor H/H, monitor for bleeding, c/w heparin drip, monitor O2 requirements  Disposition: Acute  High risk given above acuity and comorbidities.   A 88 year old female with PMH of HTN, Afib, OA, PVD, COPD, Anxiety, Obesity, Acute on chronic systolic congestive heart failure, H/O abdominal hysterectomy, H/O discectomy, H/O total knee replacement, bilaterally and bilateral pleural effusions (s/p left thoracentesis 3/2023 -- Cytology -PLEURAL FLUID, THORACENTESIS: - SUSPICIOUS FOR MALIGNANCY) with a left sided pleurex catheter is currently admitted due to worsening respiratory distress and hypercapnic respiratory failure and COPD exacerbation, s/p mechanical ventilation/extubation, downgraded from ICU to medical floor.     #Acute hypercapnic respiratory failure- Volume overload vs COPD exacerbation- extubated since 9/10  #Sepsis present on admission  #Fevers- Concerns of superimposed VAP?   #Bilateral pleural effusions   #Malignant pleural effusion   #COPD exacerbation  - s/p left thoracentesis 3/2023 - Cytology -PLEURAL FLUID, THORACENTESIS: - SUSPICIOUS FOR MALIGNANCY. - Atypical cells present in background of histiocytes and mesothelial  cells, suspicious for metastatic carcinoma.   - Pleurx Cx 7/4 - Staph epidermidis, No PMNs  - s/p thoracentesis right thoracentesis 7/5 Exudative in nature. Cultures negative  - s/p steroid course  - s/p 7 days of abx per ID    #Chronic Afib  - metoprolol tartrate 25mg BID  - currently off AC (was on ELIQUIS before) due to anemia     #Acute on Chronic Iron Deficiency Anemia SP 6 units PRBC  - s/p 6U pRBC  -s/p Venofer 200mg IV daily for 3 days per hematology  - CBC q24hr     - Transfuse if HgB < 7  -CT abdomen/pelvis 9/9 negative for retroperitoneal hematoma    -c/w heparin drip today; monitor closely for bleeding,   -consider switching to Lovenox or Eliquis if no further bleed, discuss with GI  -as per hematology( discuss with family risk and benefits, GI and cards regarding restarting anticoagulation)  - for now will continue as per family wishes until they decide for or against     #Right brachial vein DVT  -Hb improving every day; now 8.4; will start on heparin drip with strict PTT monitoring 55-80; monitor for bleeding   - repeat RUE duplex showed no mention of DVT prorogation  - daughter will discuss with family if they want to c/w blood thinner or not.    #Chronic HFpEF   -c/w lasix 40mg iv daily for net positive fluid balance  -EF 64%  -strict i/o  -daily weight  -c/w metoprolol 25mg bid      #Left IJ TLC in light of poor access.    #Progress Note Handoff  Pending (specify): clinical improvement, monitor H/H, monitor for bleeding, c/w heparin drip, monitor O2 requirements  Disposition: Acute  High risk given above acuity and comorbidities.  updated daughter- follow up decision on anticoagulation

## 2023-09-18 NOTE — PROGRESS NOTE ADULT - SUBJECTIVE AND OBJECTIVE BOX
GIOVANNY BUCK 88y Female  MRN#: [id number]   Hospital Day: 21d    SUBJECTIVE  Patient is a 88y old Female who presents with a chief complaint of hypercapnic respiratory failure (17 Sep 2023 13:48)  Currently admitted to medicine with the primary diagnosis of Acute respiratory failure with hypoxia      INTERVAL HPI AND OVERNIGHT EVENTS:  Patient was examined and seen at bedside. This morning she is resting comfortably in bed and reports no issues or overnight events.  denies chest pain , sob, n/v/d/c, hematuria or bloody stool.     REVIEW OF SYMPTOMS:  10 point ROS negative except as above.    OBJECTIVE  PAST MEDICAL & SURGICAL HISTORY  HTN (hypertension)    Afib  s/p ablation 2001    Goiter  no meds    Cellulitis  chronic    OA (osteoarthritis)    PVD (peripheral vascular disease)    COPD (chronic obstructive pulmonary disease)    Anxiety    Obesity    Acute on chronic systolic congestive heart failure    Edema of both lower legs    Required emergent intubation    H/O abdominal hysterectomy    H/O discectomy    H/O total knee replacement, bilateral      ALLERGIES:  aspirin (Other)  codeine (Other)  sulfa drugs (Hives; Other)  penicillin (Other)  contrast media (iodine-based) (Other)    MEDICATIONS:  STANDING MEDICATIONS  albuterol    90 MICROgram(s) HFA Inhaler 2 Puff(s) Inhalation every 4 hours  chlorhexidine 2% Cloths 1 Application(s) Topical <User Schedule>  chlorhexidine 4% Liquid 1 Application(s) Topical <User Schedule>  dextrose 5%. 1000 milliLiter(s) IV Continuous <Continuous>  dextrose 50% Injectable 25 Gram(s) IV Push once  furosemide    Tablet 40 milliGRAM(s) Oral daily  gabapentin 100 milliGRAM(s) Oral three times a day  glucagon  Injectable 1 milliGRAM(s) IntraMuscular once  heparin  Infusion. 1400 Unit(s)/Hr IV Continuous <Continuous>  insulin lispro (ADMELOG) corrective regimen sliding scale   SubCutaneous three times a day before meals  metoprolol tartrate 25 milliGRAM(s) Oral two times a day  pantoprazole    Tablet 40 milliGRAM(s) Oral two times a day  polyethylene glycol 3350 17 Gram(s) Oral two times a day  senna 2 Tablet(s) Oral at bedtime    PRN MEDICATIONS  acetaminophen     Tablet .. 650 milliGRAM(s) Oral every 6 hours PRN  atropine Injectable 0.5 milliGRAM(s) IV Push once PRN  dextrose Oral Gel 15 Gram(s) Oral once PRN  melatonin 5 milliGRAM(s) Oral at bedtime PRN  sodium chloride 0.9% lock flush 10 milliLiter(s) IV Push every 1 hour PRN      VITAL SIGNS: Last 24 Hours  T(C): 36.3 (18 Sep 2023 13:53), Max: 36.3 (18 Sep 2023 13:53)  T(F): 97.4 (18 Sep 2023 13:53), Max: 97.4 (18 Sep 2023 13:53)  HR: 126 (18 Sep 2023 13:53) (94 - 126)  BP: 138/89 (18 Sep 2023 13:53) (136/65 - 149/64)  BP(mean): --  RR: 16 (18 Sep 2023 13:53) (16 - 18)  SpO2: 97% (18 Sep 2023 09:12) (97% - 97%)    PHYSICAL EXAM:  GENERAL: NAD,   HEENT - NC/AT,   NECK: Supple, No JVD  CHEST/LUNG: Clear to auscultation bilaterally;   HEART: Regular rate and rhythm; No murmurs,  ABDOMEN: Soft, Nontender, Nondistended; Bowel sounds present  EXTREMITIES:  no edema, echomyosis b/l UE and lE  NEURO:  aox3, generalized weakness   SKIN: No rashes or lesions    LABS:                        8.3    11.53 )-----------( 265      ( 18 Sep 2023 05:33 )             27.0     09-18    142  |  100  |  28<H>  ----------------------------<  82  4.3   |  32  |  0.9    Ca    8.5      18 Sep 2023 05:33  Mg     1.7     09-18    TPro  6.0  /  Alb  3.0<L>  /  TBili  0.9  /  DBili  x   /  AST  23  /  ALT  18  /  AlkPhos  243<H>  09-18    PT/INR - ( 17 Sep 2023 14:10 )   PT: 15.80 sec;   INR: 1.37 ratio         PTT - ( 18 Sep 2023 11:41 )  PTT:119.2 sec  Urinalysis Basic - ( 18 Sep 2023 05:33 )    Color: x / Appearance: x / SG: x / pH: x  Gluc: 82 mg/dL / Ketone: x  / Bili: x / Urobili: x   Blood: x / Protein: x / Nitrite: x   Leuk Esterase: x / RBC: x / WBC x   Sq Epi: x / Non Sq Epi: x / Bacteria: x    RADIOLOGY:  < from: [facility] Duplex Upper Ext Vein Scan, Right (09.18.23 @ 11:53) >  The cephalic vein is patent.  The basilic vein is patent.    Impression:    Deep venous thrombosis of the right brachial vein.    < end of copied text >

## 2023-09-19 NOTE — PROGRESS NOTE ADULT - SUBJECTIVE AND OBJECTIVE BOX
Incomplete-- full notation to follow    **** patient evaluated this morning at bedside. care d/w the patient and her daughter. Switch to eliquis from heparin gtt (no bleeding on gtt reported).        CC: Patient is a 88y old  Female who presents with a chief complaint of hypercapnic respiratory failure (19 Sep 2023 13:13)      SUBJECTIVE / OVERNIGHT EVENTS:  No acute events overnight. Patient seen and evaluated at bedside. No fever/chills.  Denies SOB at rest, chest pain, palpitations, abdominal pain, nausea/vomiting    ROS:    MEDICATIONS  (STANDING):  albuterol    90 MICROgram(s) HFA Inhaler 2 Puff(s) Inhalation every 4 hours  apixaban 10 milliGRAM(s) Oral every 12 hours  chlorhexidine 2% Cloths 1 Application(s) Topical <User Schedule>  chlorhexidine 4% Liquid 1 Application(s) Topical <User Schedule>  dextrose 5%. 1000 milliLiter(s) (50 mL/Hr) IV Continuous <Continuous>  dextrose 50% Injectable 25 Gram(s) IV Push once  furosemide    Tablet 40 milliGRAM(s) Oral daily  gabapentin 100 milliGRAM(s) Oral three times a day  glucagon  Injectable 1 milliGRAM(s) IntraMuscular once  insulin lispro (ADMELOG) corrective regimen sliding scale   SubCutaneous three times a day before meals  metoprolol tartrate 25 milliGRAM(s) Oral two times a day  pantoprazole    Tablet 40 milliGRAM(s) Oral two times a day  polyethylene glycol 3350 17 Gram(s) Oral two times a day  senna 2 Tablet(s) Oral at bedtime    MEDICATIONS  (PRN):  acetaminophen     Tablet .. 650 milliGRAM(s) Oral every 6 hours PRN Temp greater or equal to 38C (100.4F), Mild Pain (1 - 3)  atropine Injectable 0.5 milliGRAM(s) IV Push once PRN bradycardia  dextrose Oral Gel 15 Gram(s) Oral once PRN Blood Glucose LESS THAN 70 milliGRAM(s)/deciliter  melatonin 5 milliGRAM(s) Oral at bedtime PRN Insomnia  sodium chloride 0.9% lock flush 10 milliLiter(s) IV Push every 1 hour PRN Pre/post blood products, medications, blood draw, and to maintain line patency      CAPILLARY BLOOD GLUCOSE      POCT Blood Glucose.: 108 mg/dL (19 Sep 2023 16:18)  POCT Blood Glucose.: 105 mg/dL (19 Sep 2023 11:25)  POCT Blood Glucose.: 87 mg/dL (19 Sep 2023 07:33)  POCT Blood Glucose.: 99 mg/dL (18 Sep 2023 20:31)    I&O's Summary    18 Sep 2023 07:01  -  19 Sep 2023 07:00  --------------------------------------------------------  IN: 84 mL / OUT: 1450 mL / NET: -1366 mL    19 Sep 2023 07:01  -  19 Sep 2023 19:42  --------------------------------------------------------  IN: 0 mL / OUT: 1200 mL / NET: -1200 mL        Physical Exam  Vital Signs Last 24 Hrs  T(C): 37.1 (19 Sep 2023 14:14), Max: 37.4 (18 Sep 2023 21:00)  T(F): 98.8 (19 Sep 2023 14:14), Max: 99.4 (18 Sep 2023 21:00)  HR: 118 (19 Sep 2023 14:14) (98 - 118)  BP: 133/65 (19 Sep 2023 14:14) (129/79 - 133/65)  RR: 18 (19 Sep 2023 14:14) (18 - 18)  SpO2: 98% (19 Sep 2023 14:14) (98% - 100%)    Parameters below as of 19 Sep 2023 08:00  Patient On (Oxygen Delivery Method): nasal cannula  O2 Flow (L/min): 2      GENERAL: NAD  Lung: normal work of breathing, no anterior rhonchi  Cardiovascular: S1&S2+, rrr, no m/r/g appreciated  ABDOMEN: soft, nt  SKIN: warm and dry, no visible purulence in exposed areas  Line: Left IJ triple lumen in place    LABS:                        8.6    10.02 )-----------( 282      ( 19 Sep 2023 06:56 )             28.7     09-18    142  |  100  |  28<H>  ----------------------------<  82  4.3   |  32  |  0.9    Ca    8.5      18 Sep 2023 05:33  Mg     1.5     09-19    TPro  6.0  /  Alb  3.0<L>  /  TBili  0.9  /  DBili  x   /  AST  23  /  ALT  18  /  AlkPhos  243<H>  09-18    PTT - ( 19 Sep 2023 16:00 )  PTT:192.9 sec      Urinalysis Basic - ( 18 Sep 2023 05:33 )    Color: x / Appearance: x / SG: x / pH: x  Gluc: 82 mg/dL / Ketone: x  / Bili: x / Urobili: x   Blood: x / Protein: x / Nitrite: x   Leuk Esterase: x / RBC: x / WBC x   Sq Epi: x / Non Sq Epi: x / Bacteria: x          RADIOLOGY & ADDITIONAL TESTS:  Results Reviewed:   Imaging Personally Reviewed:  Electrocardiogram Personally Reviewed:    COORDINATION OF CARE:  Care Discussed with Consultants/Other Providers [Y/N]:  Prior or Outpatient Records Reviewed [Y/N]:   CC: Patient is a 88y old  Female who presents with a chief complaint of hypercapnic respiratory failure      SUBJECTIVE / OVERNIGHT EVENTS:  No acute events overnight. Patient seen and evaluated at bedside. No fever/chills.  no bleeding    ROS:  no cp    MEDICATIONS  (STANDING):  albuterol    90 MICROgram(s) HFA Inhaler 2 Puff(s) Inhalation every 4 hours  apixaban 10 milliGRAM(s) Oral every 12 hours  chlorhexidine 2% Cloths 1 Application(s) Topical <User Schedule>  chlorhexidine 4% Liquid 1 Application(s) Topical <User Schedule>  dextrose 5%. 1000 milliLiter(s) (50 mL/Hr) IV Continuous <Continuous>  dextrose 50% Injectable 25 Gram(s) IV Push once  furosemide    Tablet 40 milliGRAM(s) Oral daily  gabapentin 100 milliGRAM(s) Oral three times a day  glucagon  Injectable 1 milliGRAM(s) IntraMuscular once  insulin lispro (ADMELOG) corrective regimen sliding scale   SubCutaneous three times a day before meals  metoprolol tartrate 25 milliGRAM(s) Oral two times a day  pantoprazole    Tablet 40 milliGRAM(s) Oral two times a day  polyethylene glycol 3350 17 Gram(s) Oral two times a day  senna 2 Tablet(s) Oral at bedtime    MEDICATIONS  (PRN):  acetaminophen     Tablet .. 650 milliGRAM(s) Oral every 6 hours PRN Temp greater or equal to 38C (100.4F), Mild Pain (1 - 3)  atropine Injectable 0.5 milliGRAM(s) IV Push once PRN bradycardia  dextrose Oral Gel 15 Gram(s) Oral once PRN Blood Glucose LESS THAN 70 milliGRAM(s)/deciliter  melatonin 5 milliGRAM(s) Oral at bedtime PRN Insomnia  sodium chloride 0.9% lock flush 10 milliLiter(s) IV Push every 1 hour PRN Pre/post blood products, medications, blood draw, and to maintain line patency      CAPILLARY BLOOD GLUCOSE      POCT Blood Glucose.: 108 mg/dL (19 Sep 2023 16:18)  POCT Blood Glucose.: 105 mg/dL (19 Sep 2023 11:25)  POCT Blood Glucose.: 87 mg/dL (19 Sep 2023 07:33)  POCT Blood Glucose.: 99 mg/dL (18 Sep 2023 20:31)    I&O's Summary    18 Sep 2023 07:01  -  19 Sep 2023 07:00  --------------------------------------------------------  IN: 84 mL / OUT: 1450 mL / NET: -1366 mL    19 Sep 2023 07:01  -  19 Sep 2023 19:42  --------------------------------------------------------  IN: 0 mL / OUT: 1200 mL / NET: -1200 mL        Physical Exam  Vital Signs Last 24 Hrs  T(C): 37.1 (19 Sep 2023 14:14), Max: 37.4 (18 Sep 2023 21:00)  T(F): 98.8 (19 Sep 2023 14:14), Max: 99.4 (18 Sep 2023 21:00)  HR: 118 (19 Sep 2023 14:14) (98 - 118)  BP: 133/65 (19 Sep 2023 14:14) (129/79 - 133/65)  RR: 18 (19 Sep 2023 14:14) (18 - 18)  SpO2: 98% (19 Sep 2023 14:14) (98% - 100%)    Parameters below as of 19 Sep 2023 08:00  Patient On (Oxygen Delivery Method): nasal cannula  O2 Flow (L/min): 2      GENERAL: NAD  Lung: normal work of breathing, no anterior rhonchi  Cardiovascular: S1&S2+, rrr, no m/r/g appreciated  ABDOMEN: soft, nt  SKIN: warm and dry, no visible purulence in exposed areas  Line: Left IJ triple lumen in place    LABS:                        8.6    10.02 )-----------( 282      ( 19 Sep 2023 06:56 )             28.7     09-18    142  |  100  |  28<H>  ----------------------------<  82  4.3   |  32  |  0.9    Ca    8.5      18 Sep 2023 05:33  Mg     1.5     09-19    TPro  6.0  /  Alb  3.0<L>  /  TBili  0.9  /  DBili  x   /  AST  23  /  ALT  18  /  AlkPhos  243<H>  09-18    PTT - ( 19 Sep 2023 16:00 )  PTT:192.9 sec      Urinalysis Basic - ( 18 Sep 2023 05:33 )    Color: x / Appearance: x / SG: x / pH: x  Gluc: 82 mg/dL / Ketone: x  / Bili: x / Urobili: x   Blood: x / Protein: x / Nitrite: x   Leuk Esterase: x / RBC: x / WBC x   Sq Epi: x / Non Sq Epi: x / Bacteria: x          RADIOLOGY & ADDITIONAL TESTS:  Results Reviewed:   Imaging Personally Reviewed:  Electrocardiogram Personally Reviewed:    COORDINATION OF CARE:  Care Discussed with Consultants/Other Providers [Y/N]:  Prior or Outpatient Records Reviewed [Y/N]:

## 2023-09-19 NOTE — CHART NOTE - NSCHARTNOTEFT_GEN_A_CORE
Based on PTT of 55, heparin infusion will be increased based on normogram (I ordered recommended IVP dose as well)

## 2023-09-19 NOTE — CONSULT NOTE ADULT - SUBJECTIVE AND OBJECTIVE BOX
HPI:  This is an 89yo W female w/ PMH as noted below. Pt comes to ER w/ worsening dyspnea. Pt has advanced COPD on home O2.  . Pt was @ [facility] 7/3/23   in respiratory failure due to COPD exacerbation. pt was intubated and went to ICU,  In ICU pt was extubated , left sided chest tube was placed for possible malignant effusion. During that hospitalization  pt signed DNI/DNR.  & was discharged to SNF.    Now pt again in respiratory distress from COPD.  Pt's PCO2  was 97  after being placed on 100% NRB.  Pt's MS worsened & required intubation as  family rescinded  DNR/DNI.         PAST MEDICAL & SURGICAL HISTORY:  HTN (hypertension)  Afib  s/p ablation 2001  Goiter  no meds  Cellulitis  chronic  OA (osteoarthritis)  PVD (peripheral vascular disease)  COPD (chronic obstructive pulmonary disease)  Anxiety  Obesity  Acute on chronic systolic congestive heart failure  Edema of both lower legs  Required emergent intubation  H/O abdominal hysterectomy  H/O discectomy  H/O total knee replacement, bilateral    REVIEW OF SYSTEMS: Pt unable to offer  General/ Breast/ Skin/ Neuro/ MSK: see HPI  All other systems negative    MEDICATIONS  (STANDING):  albuterol    90 MICROgram(s) HFA Inhaler 2 Puff(s) Inhalation every 4 hours  chlorhexidine 2% Cloths 1 Application(s) Topical <User Schedule>  chlorhexidine 4% Liquid 1 Application(s) Topical <User Schedule>  dextrose 5%. 1000 milliLiter(s) (50 mL/Hr) IV Continuous <Continuous>  dextrose 50% Injectable 25 Gram(s) IV Push once  furosemide    Tablet 40 milliGRAM(s) Oral daily  gabapentin 100 milliGRAM(s) Oral three times a day  glucagon  Injectable 1 milliGRAM(s) IntraMuscular once  heparin  Infusion. 1400 Unit(s)/Hr (14 mL/Hr) IV Continuous <Continuous>  insulin lispro (ADMELOG) corrective regimen sliding scale   SubCutaneous three times a day before meals  metoprolol tartrate 25 milliGRAM(s) Oral two times a day  pantoprazole    Tablet 40 milliGRAM(s) Oral two times a day  polyethylene glycol 3350 17 Gram(s) Oral two times a day  senna 2 Tablet(s) Oral at bedtime    MEDICATIONS  (PRN):  acetaminophen     Tablet .. 650 milliGRAM(s) Oral every 6 hours PRN Temp greater or equal to 38C (100.4F), Mild Pain (1 - 3)  atropine Injectable 0.5 milliGRAM(s) IV Push once PRN bradycardia  dextrose Oral Gel 15 Gram(s) Oral once PRN Blood Glucose LESS THAN 70 milliGRAM(s)/deciliter  melatonin 5 milliGRAM(s) Oral at bedtime PRN Insomnia  sodium chloride 0.9% lock flush 10 milliLiter(s) IV Push every 1 hour PRN Pre/post blood products, medications, blood draw, and to maintain line patency      Allergies  aspirin (Other)  codeine (Other)  sulfa drugs (Hives; Other)  penicillin (Other)  contrast media (iodine-based) (Other)    Intolerances        SOCIAL HISTORY:  Lives at home with aides     FAMILY HISTORY:   No pertinent family hx noted     PHYSICAL EXAM:  Vital Signs Last 24 Hrs  T(C): 36.7 (19 Sep 2023 05:39), Max: 37.4 (18 Sep 2023 21:00)  T(F): 98 (19 Sep 2023 05:39), Max: 99.4 (18 Sep 2023 21:00)  HR: 101 (19 Sep 2023 05:39) (98 - 126)  BP: 132/68 (19 Sep 2023 05:39) (129/79 - 138/89)  BP(mean): --  RR: 18 (19 Sep 2023 05:39) (16 - 18)  SpO2: 98% (19 Sep 2023 08:00) (98% - 100%)    Parameters below as of 19 Sep 2023 08:00  Patient On (Oxygen Delivery Method): nasal cannula  O2 Flow (L/min): 2      GENERAL: NAD,   HEENT - NC/AT,   NECK: Supple, No JVD  CHEST/LUNG: Clear to auscultation bilaterally;   HEART: Regular rate and rhythm; No murmurs,  ABDOMEN: Soft, Nontender, Nondistended; Bowel sounds present  EXTREMITIES:  no edema, echomyosis b/l UE and lE  NEURO:  aox3, generalized weakness   SKIN:  Moisture associated dermatitis        LABS/ CULTURES/ RADIOLOGY:                        8.6    10.02 )-----------( 282      ( 19 Sep 2023 06:56 )             28.7       142  |  100  |  28  ----------------------------<  82      [09-18-23 @ 05:33]  4.3   |  32  |  0.9        Ca     8.5     [09-18-23 @ 05:33]      Mg     1.5     [09-19-23 @ 06:56]    TPro  6.0  /  Alb  3.0  /  TBili  0.9  /  DBili  x   /  AST  23  /  ALT  18  /  AlkPhos  243  [09-18-23 @ 05:33]    PT/INR: PT 15.80, INR 1.37       [09-17-23 @ 14:10]  PTT: 55.6       [09-19-23 @ 03:51]

## 2023-09-19 NOTE — PROGRESS NOTE ADULT - ASSESSMENT
A 88 year old female with PMH of HTN, Afib, OA, PVD, COPD, Anxiety, Obesity, Acute on chronic systolic congestive heart failure, H/O abdominal hysterectomy, H/O discectomy, H/O total knee replacement, bilaterally and bilateral pleural effusions (s/p left thoracentesis 3/2023 -- Cytology -PLEURAL FLUID, THORACENTESIS: - SUSPICIOUS FOR MALIGNANCY) with a left sided pleurex catheter is currently admitted due to worsening respiratory distress and hypercapnic respiratory failure and COPD exacerbation, s/p mechanical ventilation/extubation, downgraded from ICU to medical floor.     #Acute hypercapnic respiratory failure- Volume overload vs COPD exacerbation- extubated since 9/10  #Sepsis present on admission  #Fevers- Concerns of superimposed VAP?   #Bilateral pleural effusions   #Malignant pleural effusion   #COPD exacerbation  - s/p left thoracentesis 3/2023 - Cytology -PLEURAL FLUID, THORACENTESIS: - SUSPICIOUS FOR MALIGNANCY. - Atypical cells present in background of histiocytes and mesothelial  cells, suspicious for metastatic carcinoma.   - Pleurx Cx 7/4 - Staph epidermidis, No PMNs  - s/p thoracentesis right thoracentesis 7/5 Exudative in nature. Cultures negative  - s/p steroid course  - s/p 7 days of abx per ID    #Chronic Afib  - metoprolol tartrate 25mg BID  - currently off AC (was on ELIQUIS before) due to anemia     #Acute on Chronic Iron Deficiency Anemia SP 6 units PRBC  - s/p 6U pRBC  -s/p Venofer 200mg IV daily for 3 days per hematology  - CBC q24hr     - Transfuse if HgB < 7  -CT abdomen/pelvis 9/9 negative for retroperitoneal hematoma        #Right brachial vein DVT  - discussed with patient and daughter at bedside- switch from heparin to eliquis    #Chronic HFpEF   -c/w lasix 40mg iv daily for net positive fluid balance  -EF 64%  -strict i/o  -daily weight  -c/w metoprolol 25mg bid      #Left IJ TLC in light of poor access.    #Progress Note Handoff  Pending (specify): clinical improvement, monitor H/H, monitor for bleeding, c/w heparin drip, monitor O2 requirements  Disposition: Acute  High risk given above acuity and comorbidities.  updated daughter- follow up decision on anticoagulation

## 2023-09-19 NOTE — CONSULT NOTE ADULT - CONSULT REQUESTED DATE/TIME
12-Sep-2023 11:02
29-Aug-2023 10:37
19-Sep-2023 13:16
01-Sep-2023 16:05
14-Sep-2023
01-Sep-2023 09:50
13-Sep-2023 13:04

## 2023-09-19 NOTE — CONSULT NOTE ADULT - ASSESSMENT
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    Clean b/l buttock and upper thigh  with soap and  water, pat dry then apply triad hydrophilic dressing   Pressure injury  preventive  measures  skin  and incontinence care   Assess skin  and inform primary provider of any changes   Case discussed with primary Rn  Wound/ ostomy specialist  to f/u as needed     Offloading: [ x] Frequent position changes [ ] Devices/Equipment  Cleansing: [ ] Saline [x ] Soap/Water [ ] Other: ______  Topicals: [ x] Barrier Cream [ ] Antimicrobial [ ] Enzymatic Wound Debridement  Dressings: [ ] Dry, sterile [ ] Allevyn  Foam [ ] Absorbant Pads [ ] Collagenase    Other Recs.   Per primary team      Total time for bedside assessment , review of medical records  and  discussion of plan of care with primary team greater than 35 min

## 2023-09-19 NOTE — CONSULT NOTE ADULT - CONSULT REASON
Respiratory failure.
anemia no gross GI bleeding
admitted to ICU
DVT in right upper extremity
Skin assessment and treatment recommendation
Anemia
GOC

## 2023-09-20 NOTE — PROGRESS NOTE ADULT - ASSESSMENT
88F w/ COPD on Home O2 (recent admit requiring intubation), Hx of B/L Pleural Effusion ([id number], suspicious for Met Breast ca origin, LEFT PleurX July2023), HFrEF, Afib s/p Ablation on eliquis, Hx of Right Inguinal Abscess (July2023),Hx of EBSL E.coli Bacteremia(May2023), Hx of MRSA Foot Wound(Feb2023), Chronic Lymphedema/PAD, sacral decubitus ulcer (stage II, prior to admit) presented with Acute Respiratory Failure with Hypoxia/Hypercapnia 2/2 COPD requiring intubation in the ER(8/28) and admitted to the ICU for further management. ICU course complicated by Sepsis/VAP (s/p 7d course of Vanc/Cefepime, per ID), Severe HANS s/p 6U pRBC (no acute hemorrhage, GI & Heme consulted), Acute RUE Brachial DVT (Duplex on 9/11;intermittently off AC due to anemia when developed and ac held due to anemia, Vascular consulted). Patient was extubated (9/10), Left IJ central line placed (9/12) due to poor IV access, and transferred to medicine service on 9/14. Upon transfer to medicine, patient started on Heparin gtt (9/14) which was transitioned to Eliquis (9/19).  88F w/ COPD on Home O2 (recent admit requiring intubation), Hx of B/L Pleural Effusion ([id number], suspicious for Met Breast ca origin, LEFT PleurX July2023), HFrEF, Afib s/p Ablation on eliquis, Hx of Right Inguinal Abscess (July2023),Hx of EBSL E.coli Bacteremia(May2023), Hx of MRSA Foot Wound(Feb2023), Chronic Lymphedema/PAD, sacral decubitus ulcer (stage II, prior to admit) presented with Acute Respiratory Failure with Hypoxia/Hypercapnia 2/2 COPD requiring intubation in the ER(8/28) and admitted to the ICU for further management. ICU course complicated by Sepsis/VAP (s/p 7d course of Vanc/Cefepime, per ID), Severe Anemia s/p 6U pRBC (no acute hemorrhage identified, GI & Heme consulted), Acute RUE Brachial DVT (Duplex on 9/11; off AC when occurred, Vascular consulted, HIT-PF4 Neg). Patient was extubated (9/10), Left IJ central line placed (9/12) due to poor IV access, and transferred to medicine service on 9/14. Upon transfer to medicine, patient started on patient-specific Heparin gtt (9/14) which was transitioned to Eliquis (9/19). Patient improving however continues to require medical optimization.

## 2023-09-20 NOTE — PROGRESS NOTE ADULT - SUBJECTIVE AND OBJECTIVE BOX
INCOMPLETE, full notation to follow    *** Patient evaluated at bedside, she reports feeling improved from day prior. exam notable for b/l basilar crackle, patient on supplemental oxygen, Left IJ central line in place (no erythema/purulence), chronic left pleurex catheter in left chest wall  - patient has tolerated transition to eliquis from heparin, tentative plan to remove Left IJ central line today      CC: Patient is a 88y old  Female who presents with a chief complaint of hypercapnic respiratory failure (19 Sep 2023 19:41)      SUBJECTIVE / OVERNIGHT EVENTS:  No acute events overnight. Patient seen and evaluated at bedside. No fever/chills.  Denies SOB at rest, chest pain, palpitations, abdominal pain, nausea/vomiting    ROS:    MEDICATIONS  (STANDING):  albuterol    90 MICROgram(s) HFA Inhaler 2 Puff(s) Inhalation every 4 hours  apixaban 10 milliGRAM(s) Oral every 12 hours  chlorhexidine 2% Cloths 1 Application(s) Topical <User Schedule>  chlorhexidine 4% Liquid 1 Application(s) Topical <User Schedule>  dextrose 5%. 1000 milliLiter(s) (50 mL/Hr) IV Continuous <Continuous>  dextrose 50% Injectable 25 Gram(s) IV Push once  furosemide    Tablet 40 milliGRAM(s) Oral daily  gabapentin 100 milliGRAM(s) Oral three times a day  glucagon  Injectable 1 milliGRAM(s) IntraMuscular once  insulin lispro (ADMELOG) corrective regimen sliding scale   SubCutaneous three times a day before meals  metoprolol tartrate 25 milliGRAM(s) Oral two times a day  pantoprazole    Tablet 40 milliGRAM(s) Oral two times a day  polyethylene glycol 3350 17 Gram(s) Oral two times a day  senna 2 Tablet(s) Oral at bedtime    MEDICATIONS  (PRN):  acetaminophen     Tablet .. 650 milliGRAM(s) Oral every 6 hours PRN Temp greater or equal to 38C (100.4F), Mild Pain (1 - 3)  atropine Injectable 0.5 milliGRAM(s) IV Push once PRN bradycardia  dextrose Oral Gel 15 Gram(s) Oral once PRN Blood Glucose LESS THAN 70 milliGRAM(s)/deciliter  melatonin 5 milliGRAM(s) Oral at bedtime PRN Insomnia  sodium chloride 0.9% lock flush 10 milliLiter(s) IV Push every 1 hour PRN Pre/post blood products, medications, blood draw, and to maintain line patency      CAPILLARY BLOOD GLUCOSE      POCT Blood Glucose.: 84 mg/dL (20 Sep 2023 07:28)  POCT Blood Glucose.: 103 mg/dL (19 Sep 2023 21:17)  POCT Blood Glucose.: 108 mg/dL (19 Sep 2023 16:18)  POCT Blood Glucose.: 105 mg/dL (19 Sep 2023 11:25)    I&O's Summary    19 Sep 2023 07:01  -  20 Sep 2023 07:00  --------------------------------------------------------  IN: 0 mL / OUT: 1200 mL / NET: -1200 mL        Physical Exam  Vital Signs Last 24 Hrs  T(C): 35.7 (20 Sep 2023 05:36), Max: 37.1 (19 Sep 2023 14:14)  T(F): 96.2 (20 Sep 2023 05:36), Max: 98.8 (19 Sep 2023 14:14)  HR: 92 (20 Sep 2023 05:36) (92 - 118)  BP: 133/68 (20 Sep 2023 05:36) (128/61 - 133/68)  RR: 19 (20 Sep 2023 09:00) (18 - 19)  SpO2: 98% (20 Sep 2023 09:00) (98% - 98%)    Parameters below as of 20 Sep 2023 09:00  Patient On (Oxygen Delivery Method): nasal cannula  O2 Flow (L/min): 2      GENERAL: NAD  HEAD:  Atraumatic, Normocephalic  EYES: EOMI, PERRL, conjunctiva and sclera clear  ENMT: MMM, no angular cheilitis appreciated  NECK: Supple, trachea midline, Left IJ central line present  Lung: normal work of breathing, b/l basilar crakcles (mild), left pleurx cath present  Cardiovascular: S1&S2+, rrr, no m/r/g appreciated  ABDOMEN: soft, nt  Neuro: Alert & follows commands, no flaccid paralysis in extremities appreciated  SKIN: warm and dry, no visible purulence in exposed areas    LABS:                        8.2    9.29  )-----------( 272      ( 20 Sep 2023 10:32 )             27.4       Mg     1.5     09-19      PTT - ( 19 Sep 2023 16:00 )  PTT:192.9 sec            RADIOLOGY & ADDITIONAL TESTS:  Results Reviewed:   Imaging Personally Reviewed:  Electrocardiogram Personally Reviewed:    COORDINATION OF CARE:  Care Discussed with Consultants/Other Providers [Y/N]:  Prior or Outpatient Records Reviewed [Y/N]:   CC: 88F w/ Acute on Chronic Respiratory Failure with Hypoxia & Hypercapnia    SUBJECTIVE / OVERNIGHT EVENTS:  No acute events overnight. Patient seen and evaluated at bedside. No fever/chills.  patient reports she feels breathing has some improvement.    ROS:  no vomiting    MEDICATIONS  (STANDING):  albuterol    90 MICROgram(s) HFA Inhaler 2 Puff(s) Inhalation every 4 hours  apixaban 10 milliGRAM(s) Oral every 12 hours  chlorhexidine 2% Cloths 1 Application(s) Topical <User Schedule>  chlorhexidine 4% Liquid 1 Application(s) Topical <User Schedule>  dextrose 5%. 1000 milliLiter(s) (50 mL/Hr) IV Continuous <Continuous>  dextrose 50% Injectable 25 Gram(s) IV Push once  furosemide    Tablet 40 milliGRAM(s) Oral daily  gabapentin 100 milliGRAM(s) Oral three times a day  glucagon  Injectable 1 milliGRAM(s) IntraMuscular once  insulin lispro (ADMELOG) corrective regimen sliding scale   SubCutaneous three times a day before meals  metoprolol tartrate 25 milliGRAM(s) Oral two times a day  pantoprazole    Tablet 40 milliGRAM(s) Oral two times a day  polyethylene glycol 3350 17 Gram(s) Oral two times a day  senna 2 Tablet(s) Oral at bedtime    MEDICATIONS  (PRN):  acetaminophen     Tablet .. 650 milliGRAM(s) Oral every 6 hours PRN Temp greater or equal to 38C (100.4F), Mild Pain (1 - 3)  atropine Injectable 0.5 milliGRAM(s) IV Push once PRN bradycardia  dextrose Oral Gel 15 Gram(s) Oral once PRN Blood Glucose LESS THAN 70 milliGRAM(s)/deciliter  melatonin 5 milliGRAM(s) Oral at bedtime PRN Insomnia  sodium chloride 0.9% lock flush 10 milliLiter(s) IV Push every 1 hour PRN Pre/post blood products, medications, blood draw, and to maintain line patency      CAPILLARY BLOOD GLUCOSE      POCT Blood Glucose.: 84 mg/dL (20 Sep 2023 07:28)  POCT Blood Glucose.: 103 mg/dL (19 Sep 2023 21:17)  POCT Blood Glucose.: 108 mg/dL (19 Sep 2023 16:18)  POCT Blood Glucose.: 105 mg/dL (19 Sep 2023 11:25)    I&O's Summary    19 Sep 2023 07:01  -  20 Sep 2023 07:00  --------------------------------------------------------  IN: 0 mL / OUT: 1200 mL / NET: -1200 mL        Physical Exam  Vital Signs Last 24 Hrs  T(C): 35.7 (20 Sep 2023 05:36), Max: 37.1 (19 Sep 2023 14:14)  T(F): 96.2 (20 Sep 2023 05:36), Max: 98.8 (19 Sep 2023 14:14)  HR: 92 (20 Sep 2023 05:36) (92 - 118)  BP: 133/68 (20 Sep 2023 05:36) (128/61 - 133/68)  RR: 19 (20 Sep 2023 09:00) (18 - 19)  SpO2: 98% (20 Sep 2023 09:00) (98% - 98%)    Parameters below as of 20 Sep 2023 09:00  Patient On (Oxygen Delivery Method): nasal cannula  O2 Flow (L/min): 2      GENERAL: NAD  HEAD:  Atraumatic, Normocephalic  EYES: EOMI, PERRL, conjunctiva and sclera clear  ENMT: MMM, no angular cheilitis appreciated  NECK: Supple, trachea midline, Left IJ central line present  Lung: normal work of breathing, b/l basilar crakcles (mild), left pleurx cath present  Cardiovascular: S1&S2+, rrr, no m/r/g appreciated  ABDOMEN: soft, nt  Neuro: Alert & follows commands, no flaccid paralysis in extremities appreciated  SKIN: warm and dry, no visible purulence in exposed areas    LABS:                        8.2    9.29  )-----------( 272      ( 20 Sep 2023 10:32 )             27.4       Mg     1.5     09-19      PTT - ( 19 Sep 2023 16:00 )  PTT:192.9 sec            RADIOLOGY & ADDITIONAL TESTS:  Results Reviewed:   Imaging Personally Reviewed:  Electrocardiogram Personally Reviewed:    COORDINATION OF CARE:  Care Discussed with Consultants/Other Providers [Y/N]:  Prior or Outpatient Records Reviewed [Y/N]:

## 2023-09-20 NOTE — PROGRESS NOTE ADULT - PROBLEM SELECTOR PROBLEM 1
Acute respiratory failure with hypoxia Acute on chronic respiratory failure with hypoxia and hypercapnia

## 2023-09-20 NOTE — PROGRESS NOTE ADULT - PROBLEM SELECTOR PLAN 1
- c/w supplemental oxygen (at home charted 2L)  - may require intermittent BiPAP, f/u labs tomorrow- may require ABG, patient on exam alert and not displaying overt worsening of hypercapnia  - 2/2 COPD, required intubation in ICU  - treated for VAP in ICU (completed 7d course of Vanc/Cefepime)  - start symbicort, spiriva, albuterol prn

## 2023-09-21 NOTE — PROGRESS NOTE ADULT - SUBJECTIVE AND OBJECTIVE BOX
CC: 88F w/ Acute on Chronic Respiratory Failure with Hypoxia & Hypercapnia    SUBJECTIVE / OVERNIGHT EVENTS:  No acute events overnight. Patient seen and evaluated at bedside. Daughter at bedside reports increased cough (has had during hospitalization since extubation). No fever/chills, no cp    ROS:  no vomiting    MEDICATIONS  (STANDING):  albuterol    90 MICROgram(s) HFA Inhaler 2 Puff(s) Inhalation every 4 hours  apixaban 10 milliGRAM(s) Oral every 12 hours  budesonide 160 MICROgram(s)/formoterol 4.5 MICROgram(s) Inhaler 2 Puff(s) Inhalation two times a day  chlorhexidine 2% Cloths 1 Application(s) Topical <User Schedule>  chlorhexidine 4% Liquid 1 Application(s) Topical <User Schedule>  dextrose 5%. 1000 milliLiter(s) (50 mL/Hr) IV Continuous <Continuous>  dextrose 50% Injectable 25 Gram(s) IV Push once  furosemide    Tablet 40 milliGRAM(s) Oral daily  gabapentin 100 milliGRAM(s) Oral three times a day  glucagon  Injectable 1 milliGRAM(s) IntraMuscular once  insulin lispro (ADMELOG) corrective regimen sliding scale   SubCutaneous three times a day before meals  magnesium oxide 400 milliGRAM(s) Oral three times a day with meals  metoprolol tartrate 25 milliGRAM(s) Oral two times a day  pantoprazole    Tablet 40 milliGRAM(s) Oral two times a day  polyethylene glycol 3350 17 Gram(s) Oral two times a day  senna 2 Tablet(s) Oral at bedtime  tiotropium 2.5 MICROgram(s) Inhaler 2 Puff(s) Inhalation daily    MEDICATIONS  (PRN):  acetaminophen     Tablet .. 650 milliGRAM(s) Oral every 6 hours PRN Temp greater or equal to 38C (100.4F), Mild Pain (1 - 3)  albuterol    90 MICROgram(s) HFA Inhaler 2 Puff(s) Inhalation every 6 hours PRN Shortness of Breath and/or Wheezing  dextrose Oral Gel 15 Gram(s) Oral once PRN Blood Glucose LESS THAN 70 milliGRAM(s)/deciliter  melatonin 5 milliGRAM(s) Oral at bedtime PRN Insomnia  sodium chloride 0.9% lock flush 10 milliLiter(s) IV Push every 1 hour PRN Pre/post blood products, medications, blood draw, and to maintain line patency      CAPILLARY BLOOD GLUCOSE      POCT Blood Glucose.: 113 mg/dL (21 Sep 2023 16:14)  POCT Blood Glucose.: 92 mg/dL (21 Sep 2023 11:26)  POCT Blood Glucose.: 90 mg/dL (21 Sep 2023 07:41)  POCT Blood Glucose.: 112 mg/dL (20 Sep 2023 21:20)  POCT Blood Glucose.: 120 mg/dL (20 Sep 2023 17:04)    I&O's Summary    20 Sep 2023 07:01  -  21 Sep 2023 07:00  --------------------------------------------------------  IN: 0 mL / OUT: 1200 mL / NET: -1200 mL    21 Sep 2023 07:01  -  21 Sep 2023 17:01  --------------------------------------------------------  IN: 0 mL / OUT: 650 mL / NET: -650 mL        Physical Exam  Vital Signs Last 24 Hrs  T(C): 37.2 (21 Sep 2023 14:59), Max: 37.2 (21 Sep 2023 14:59)  T(F): 99 (21 Sep 2023 14:59), Max: 99 (21 Sep 2023 14:59)  HR: 107 (21 Sep 2023 14:59) (99 - 107)  BP: 150/74 (21 Sep 2023 14:59) (126/60 - 154/66)  RR: 18 (21 Sep 2023 14:59) (18 - 20)  SpO2: 98% (21 Sep 2023 14:59) (98% - 100%)    Parameters below as of 21 Sep 2023 08:00  Patient On (Oxygen Delivery Method): nasal cannula  O2 Flow (L/min): 2      GENERAL: NAD  HEAD:  Atraumatic, Normocephalic  EYES: EOMI, PERRL, conjunctiva and sclera clear  ENMT: MMM, no angular cheilitis appreciated  NECK: Supple, trachea midline, Left IJ central line present  Lung: normal work of breathing, b/l basilar crakcles (mild), left pleurx cath present  Cardiovascular: S1&S2+, rrr, no m/r/g appreciated  ABDOMEN: soft, nt  Neuro: Alert & follows commands, no flaccid paralysis in extremities appreciated  SKIN: warm and dry, no visible purulence in exposed areas      LABS:                        8.8    8.59  )-----------( 283      ( 21 Sep 2023 07:18 )             28.7     09-21    141  |  95<L>  |  19  ----------------------------<  108<H>  3.4<L>   |  37<H>  |  0.9    Ca    8.5      21 Sep 2023 07:18  Phos  3.2     09-21  Mg     1.7     09-21    TPro  6.3  /  Alb  2.9<L>  /  TBili  0.8  /  DBili  x   /  AST  17  /  ALT  12  /  AlkPhos  222<H>  09-21    PT/INR - ( 21 Sep 2023 07:18 )   PT: 22.30 sec;   INR: 1.92 ratio         PTT - ( 21 Sep 2023 07:18 )  PTT:36.7 sec      Urinalysis Basic - ( 21 Sep 2023 07:18 )    Color: x / Appearance: x / SG: x / pH: x  Gluc: 108 mg/dL / Ketone: x  / Bili: x / Urobili: x   Blood: x / Protein: x / Nitrite: x   Leuk Esterase: x / RBC: x / WBC x   Sq Epi: x / Non Sq Epi: x / Bacteria: x          RADIOLOGY & ADDITIONAL TESTS:  Results Reviewed:   Imaging Personally Reviewed:  Electrocardiogram Personally Reviewed:    COORDINATION OF CARE:  Care Discussed with Consultants/Other Providers [Y/N]:  Prior or Outpatient Records Reviewed [Y/N]:

## 2023-09-21 NOTE — PROGRESS NOTE ADULT - PROBLEM SELECTOR PLAN 1
- c/w supplemental oxygen (at home charted 2L), start nocturnal Bipap (appears to be retaining on blood gas, w/ increasing compensatory alkalosis)  - 2/2 COPD, required intubation in ICU  - treated for VAP in ICU (completed 7d course of Vanc/Cefepime)  - c/w symbicort, spiriva, albuterol prn  - repeat CXR today unchanged from previous

## 2023-09-21 NOTE — PROCEDURE NOTE - ADDITIONAL PROCEDURE DETAILS
LIJ central line removed, 5 minutes manual pressure applied, no bleeding, covered with a DSD. Pt tolerated procedure well

## 2023-09-21 NOTE — PROCEDURE NOTE - NSPROCDETAILS_GEN_ALL_CORE
location identified, draped/prepped, sterile technique used/sterile dressing applied/sterile technique, catheter placed/supine position/ultrasound guidance
guidewire recovered/lumen(s) aspirated and flushed/sterile dressing applied/ultrasound guidance with use of sterile gel and probe cove

## 2023-09-21 NOTE — PROGRESS NOTE ADULT - ASSESSMENT
88F w/ COPD on Home O2 (recent admit requiring intubation), Hx of B/L Pleural Effusion ([id number], suspicious for Met Breast ca origin, LEFT PleurX July2023), HFrEF, Afib s/p Ablation on eliquis, Hx of Right Inguinal Abscess (July2023),Hx of EBSL E.coli Bacteremia(May2023), Hx of MRSA Foot Wound(Feb2023), Chronic Lymphedema/PAD, sacral decubitus ulcer (stage II, prior to admit) presented with Acute Respiratory Failure with Hypoxia/Hypercapnia 2/2 COPD requiring intubation in the ER(8/28) and admitted to the ICU for further management. ICU course complicated by Sepsis/VAP (s/p 7d course of Vanc/Cefepime, per ID), Severe Anemia s/p 6U pRBC (no acute hemorrhage identified, GI & Heme consulted), Acute RUE Brachial DVT (Duplex on 9/11; off AC when occurred, Vascular consulted, HIT-PF4 Neg). Patient was extubated (9/10), Left IJ central line placed (9/12) due to poor IV access, and transferred to medicine service on 9/14. Upon transfer to medicine, patient started on patient-specific Heparin gtt (9/14) which was transitioned to Eliquis (9/19). Patient improving however continues to require medical optimization.

## 2023-09-22 NOTE — CHART NOTE - NSCHARTNOTEFT_GEN_A_CORE
Pt with episode of witnessed aspiration per Dr Pavon. Pt desaturated was suctioned aggressively and coughed up some soup as well. Pt on Venti sat well. Solumedrol, Zosyn given (Dr Pavon aware of PCN allergy - pt received cefepime without incident).    Pt went into Afib with RVR - was given Lopressor 5mg IVP x 1 and rate stabilized. Pt will be upgraded to telemetry for further monitoring     CXR done  - await read Pt with episode of witnessed aspiration per Dr Pavon. Pt desaturated was suctioned aggressively and coughed up some soup as well. Pt on Venti sat well. Solumedrol, Zosyn given (Dr Pavon aware of PCN allergy - pt received cefepime without incident).    Pt went into Afib with RVR - was given Lopressor 5mg IVP x 1 and rate stabilized. Pt will be upgraded to telemetry for further monitoring     CXR done  - await read    =============================  Attending Chart Update 06:15pm  - patient improved with lopressor 5mg IV x1- HR now 110 with Afib with RVR, [id number] after treatment. Patient coughed up more soup which was aspirated. CXR reviewed appears similar to previous. NPO except meds through tomorrow morning. Continue with Ventimask at 40% FiO2 as she is satting 92% (hx of COPD goal 88-92%). Methylprednisolone 125mg IV x1 given. Start zosyn. upgrade to tele. plan to give oral pm dosing later tonight. Plan to start duonebs when on tele floor given tachycardia, for now with steroid and O2 patient improving, will continue with Methylprednisolone 40mg q 8hr.    Family at bedside at time of event and during above management. Plan discussed and family agrees/appreciated management.    Hold off on BiPAP given aspiration, reevaluate tomorrow    Jose Carlos Pavon MD

## 2023-09-22 NOTE — PROGRESS NOTE ADULT - PROBLEM SELECTOR PLAN 1
- c/w supplemental oxygen (at home charted 2L), patient did not tolerate nocturnal Bipap (appears to be retaining on blood gas, w/ increasing compensatory alkalosis), patient declining continuation at this time but may consider later  - 2/2 COPD, required intubation in ICU  - treated for VAP in ICU (completed 7d course of Vanc/Cefepime)  - c/w symbicort, spiriva, albuterol prn  - repeat CXR today unchanged from previous - c/w supplemental oxygen (at home charted 2L), patient did not tolerate nocturnal Bipap (appears to be retaining on blood gas, w/ increasing compensatory alkalosis), patient declining continuation at this time but may consider later  - 2/2 COPD, required intubation in ICU  - treated for VAP in ICU (completed 7d course of Vanc/Cefepime)  - c/w symbicort, spiriva, albuterol prn

## 2023-09-22 NOTE — PROGRESS NOTE ADULT - ASSESSMENT
88F w/ COPD on Home O2 (recent admit requiring intubation), Hx of B/L Pleural Effusion ([id number], suspicious for Met Breast ca origin, LEFT PleurX July2023), HFrEF, Afib s/p Ablation on eliquis, Hx of Right Inguinal Abscess (July2023),Hx of EBSL E.coli Bacteremia(May2023), Hx of MRSA Foot Wound(Feb2023), Chronic Lymphedema/PAD, sacral decubitus ulcer (stage II, prior to admit) presented with Acute Respiratory Failure with Hypoxia/Hypercapnia 2/2 COPD requiring intubation in the ER(8/28) and admitted to the ICU for further management. ICU course complicated by Sepsis/VAP (s/p 7d course of Vanc/Cefepime, per ID), Severe Anemia s/p 6U pRBC (no acute hemorrhage identified, GI & Heme consulted), Acute RUE Brachial DVT (Duplex on 9/11; off AC when occurred, Vascular consulted, HIT-PF4 Neg). Patient was extubated (9/10), Left IJ central line placed (9/12) due to poor IV access, and transferred to medicine service on 9/14. Upon transfer to medicine, patient started on patient-specific Heparin gtt (9/14) which was transitioned to Eliquis (9/19). Patient improving however continues to require medical optimization- noted to not tolerate nocturnal BiPAP treatment for Hypercapnia

## 2023-09-22 NOTE — PROGRESS NOTE ADULT - SUBJECTIVE AND OBJECTIVE BOX
INCOMPLETE, full notation to follow  ***Patient evaluated at bedisde. overnight only tolerated 3hr of bipap. I reinforced to the patient of risk of worsening hypercapnia however she declines bipap today and at night. RN thereafter spoke with the patient, will try again tonight with xanax 0.25mg prior.    CC: Patient is a 88y old  Female who presents with a chief complaint of Acute on Chronic Respiratory Failure with Hypoxia & Hypercapnia (21 Sep 2023 17:00)      SUBJECTIVE / OVERNIGHT EVENTS:  No acute events overnight. Patient seen and evaluated at bedside. No fever/chills.  Denies SOB at rest, chest pain, palpitations, abdominal pain, nausea/vomiting    ROS:    MEDICATIONS  (STANDING):  albuterol    90 MICROgram(s) HFA Inhaler 2 Puff(s) Inhalation every 4 hours  apixaban 10 milliGRAM(s) Oral every 12 hours  budesonide 160 MICROgram(s)/formoterol 4.5 MICROgram(s) Inhaler 2 Puff(s) Inhalation two times a day  chlorhexidine 2% Cloths 1 Application(s) Topical <User Schedule>  chlorhexidine 4% Liquid 1 Application(s) Topical <User Schedule>  dextrose 5%. 1000 milliLiter(s) (50 mL/Hr) IV Continuous <Continuous>  dextrose 50% Injectable 25 Gram(s) IV Push once  furosemide    Tablet 40 milliGRAM(s) Oral daily  gabapentin 100 milliGRAM(s) Oral three times a day  glucagon  Injectable 1 milliGRAM(s) IntraMuscular once  insulin lispro (ADMELOG) corrective regimen sliding scale   SubCutaneous three times a day before meals  magnesium oxide 400 milliGRAM(s) Oral three times a day with meals  metoprolol tartrate 25 milliGRAM(s) Oral two times a day  pantoprazole    Tablet 40 milliGRAM(s) Oral two times a day  polyethylene glycol 3350 17 Gram(s) Oral two times a day  senna 2 Tablet(s) Oral at bedtime  tiotropium 2.5 MICROgram(s) Inhaler 2 Puff(s) Inhalation daily    MEDICATIONS  (PRN):  acetaminophen     Tablet .. 650 milliGRAM(s) Oral every 6 hours PRN Temp greater or equal to 38C (100.4F), Mild Pain (1 - 3)  albuterol    90 MICROgram(s) HFA Inhaler 2 Puff(s) Inhalation every 6 hours PRN Shortness of Breath and/or Wheezing  dextrose Oral Gel 15 Gram(s) Oral once PRN Blood Glucose LESS THAN 70 milliGRAM(s)/deciliter  melatonin 5 milliGRAM(s) Oral at bedtime PRN Insomnia  sodium chloride 0.9% lock flush 10 milliLiter(s) IV Push every 1 hour PRN Pre/post blood products, medications, blood draw, and to maintain line patency      CAPILLARY BLOOD GLUCOSE      POCT Blood Glucose.: 131 mg/dL (22 Sep 2023 11:21)  POCT Blood Glucose.: 102 mg/dL (22 Sep 2023 07:32)  POCT Blood Glucose.: 122 mg/dL (21 Sep 2023 21:18)  POCT Blood Glucose.: 113 mg/dL (21 Sep 2023 16:14)    I&O's Summary    21 Sep 2023 07:01  -  22 Sep 2023 07:00  --------------------------------------------------------  IN: 0 mL / OUT: 650 mL / NET: -650 mL    22 Sep 2023 07:01  -  22 Sep 2023 15:58  --------------------------------------------------------  IN: 480 mL / OUT: 900 mL / NET: -420 mL        Physical Exam  Vital Signs Last 24 Hrs  T(C): 37.4 (22 Sep 2023 13:32), Max: 37.4 (22 Sep 2023 13:32)  T(F): 99.4 (22 Sep 2023 13:32), Max: 99.4 (22 Sep 2023 13:32)  HR: 109 (22 Sep 2023 13:32) (100 - 121)  BP: 123/60 (22 Sep 2023 13:32) (123/60 - 128/59)  RR: 18 (21 Sep 2023 20:34) (18 - 18)  SpO2: 97% (22 Sep 2023 13:32) (97% - 98%)    Parameters below as of 22 Sep 2023 08:51  Patient On (Oxygen Delivery Method): nasal cannula  O2 Flow (L/min): 2      GENERAL: NAD  HEAD:  Atraumatic, Normocephalic  EYES: EOMI, PERRL, conjunctiva and sclera clear  ENMT: MMM, no angular cheilitis appreciated  NECK: Supple, trachea midline, Left IJ central line present  Lung: normal work of breathing, b/l basilar crakcles (mild), left pleurx cath present  Cardiovascular: S1&S2+, rrr, no m/r/g appreciated  ABDOMEN: soft, nt  Neuro: Alert & follows commands, no flaccid paralysis in extremities appreciated  SKIN: warm and dry, no visible purulence in exposed areas    LABS:                        8.8    8.59  )-----------( 283      ( 21 Sep 2023 07:18 )             28.7     09-21    141  |  95<L>  |  19  ----------------------------<  108<H>  3.4<L>   |  37<H>  |  0.9    Ca    8.5      21 Sep 2023 07:18  Phos  3.2     09-21  Mg     1.7     09-21    TPro  6.3  /  Alb  2.9<L>  /  TBili  0.8  /  DBili  x   /  AST  17  /  ALT  12  /  AlkPhos  222<H>  09-21    PT/INR - ( 21 Sep 2023 07:18 )   PT: 22.30 sec;   INR: 1.92 ratio         PTT - ( 21 Sep 2023 07:18 )  PTT:36.7 sec      Urinalysis Basic - ( 21 Sep 2023 07:18 )    Color: x / Appearance: x / SG: x / pH: x  Gluc: 108 mg/dL / Ketone: x  / Bili: x / Urobili: x   Blood: x / Protein: x / Nitrite: x   Leuk Esterase: x / RBC: x / WBC x   Sq Epi: x / Non Sq Epi: x / Bacteria: x          RADIOLOGY & ADDITIONAL TESTS:  Results Reviewed:   Imaging Personally Reviewed:  Electrocardiogram Personally Reviewed:    COORDINATION OF CARE:  Care Discussed with Consultants/Other Providers [Y/N]:  Prior or Outpatient Records Reviewed [Y/N]:   CC: 88F w/ Acute on Chronic Respiratory Failure with Hypoxia & Hypercapnia      SUBJECTIVE / OVERNIGHT EVENTS:  Overnight patient could only tolerate BiPAP for 3hr. Patient seen and evaluated at bedside. No fever/chills.     ROS:  no cp    MEDICATIONS  (STANDING):  albuterol    90 MICROgram(s) HFA Inhaler 2 Puff(s) Inhalation every 4 hours  apixaban 10 milliGRAM(s) Oral every 12 hours  budesonide 160 MICROgram(s)/formoterol 4.5 MICROgram(s) Inhaler 2 Puff(s) Inhalation two times a day  chlorhexidine 2% Cloths 1 Application(s) Topical <User Schedule>  chlorhexidine 4% Liquid 1 Application(s) Topical <User Schedule>  dextrose 5%. 1000 milliLiter(s) (50 mL/Hr) IV Continuous <Continuous>  dextrose 50% Injectable 25 Gram(s) IV Push once  furosemide    Tablet 40 milliGRAM(s) Oral daily  gabapentin 100 milliGRAM(s) Oral three times a day  glucagon  Injectable 1 milliGRAM(s) IntraMuscular once  insulin lispro (ADMELOG) corrective regimen sliding scale   SubCutaneous three times a day before meals  magnesium oxide 400 milliGRAM(s) Oral three times a day with meals  metoprolol tartrate 25 milliGRAM(s) Oral two times a day  pantoprazole    Tablet 40 milliGRAM(s) Oral two times a day  polyethylene glycol 3350 17 Gram(s) Oral two times a day  senna 2 Tablet(s) Oral at bedtime  tiotropium 2.5 MICROgram(s) Inhaler 2 Puff(s) Inhalation daily    MEDICATIONS  (PRN):  acetaminophen     Tablet .. 650 milliGRAM(s) Oral every 6 hours PRN Temp greater or equal to 38C (100.4F), Mild Pain (1 - 3)  albuterol    90 MICROgram(s) HFA Inhaler 2 Puff(s) Inhalation every 6 hours PRN Shortness of Breath and/or Wheezing  dextrose Oral Gel 15 Gram(s) Oral once PRN Blood Glucose LESS THAN 70 milliGRAM(s)/deciliter  melatonin 5 milliGRAM(s) Oral at bedtime PRN Insomnia  sodium chloride 0.9% lock flush 10 milliLiter(s) IV Push every 1 hour PRN Pre/post blood products, medications, blood draw, and to maintain line patency      CAPILLARY BLOOD GLUCOSE      POCT Blood Glucose.: 131 mg/dL (22 Sep 2023 11:21)  POCT Blood Glucose.: 102 mg/dL (22 Sep 2023 07:32)  POCT Blood Glucose.: 122 mg/dL (21 Sep 2023 21:18)  POCT Blood Glucose.: 113 mg/dL (21 Sep 2023 16:14)    I&O's Summary    21 Sep 2023 07:01  -  22 Sep 2023 07:00  --------------------------------------------------------  IN: 0 mL / OUT: 650 mL / NET: -650 mL    22 Sep 2023 07:01  -  22 Sep 2023 15:58  --------------------------------------------------------  IN: 480 mL / OUT: 900 mL / NET: -420 mL        Physical Exam  Vital Signs Last 24 Hrs  T(C): 37.4 (22 Sep 2023 13:32), Max: 37.4 (22 Sep 2023 13:32)  T(F): 99.4 (22 Sep 2023 13:32), Max: 99.4 (22 Sep 2023 13:32)  HR: 109 (22 Sep 2023 13:32) (100 - 121)  BP: 123/60 (22 Sep 2023 13:32) (123/60 - 128/59)  RR: 18 (21 Sep 2023 20:34) (18 - 18)  SpO2: 97% (22 Sep 2023 13:32) (97% - 98%)    Parameters below as of 22 Sep 2023 08:51  Patient On (Oxygen Delivery Method): nasal cannula  O2 Flow (L/min): 2      GENERAL: NAD  HEAD:  Atraumatic, Normocephalic  EYES: EOMI, PERRL, conjunctiva and sclera clear  ENMT: MMM, no angular cheilitis appreciated  NECK: Supple, trachea midline  Lung: normal work of breathing, b/l basilar crakcles (mild), left pleurx cath present  Cardiovascular: S1&S2+, rrr, no m/r/g appreciated  ABDOMEN: soft, nt  Neuro: Alert & follows commands, no flaccid paralysis in extremities appreciated  SKIN: warm and dry, no visible purulence in exposed areas    LABS:                        8.8    8.59  )-----------( 283      ( 21 Sep 2023 07:18 )             28.7     09-21    141  |  95<L>  |  19  ----------------------------<  108<H>  3.4<L>   |  37<H>  |  0.9    Ca    8.5      21 Sep 2023 07:18  Phos  3.2     09-21  Mg     1.7     09-21    TPro  6.3  /  Alb  2.9<L>  /  TBili  0.8  /  DBili  x   /  AST  17  /  ALT  12  /  AlkPhos  222<H>  09-21    PT/INR - ( 21 Sep 2023 07:18 )   PT: 22.30 sec;   INR: 1.92 ratio         PTT - ( 21 Sep 2023 07:18 )  PTT:36.7 sec      Urinalysis Basic - ( 21 Sep 2023 07:18 )    Color: x / Appearance: x / SG: x / pH: x  Gluc: 108 mg/dL / Ketone: x  / Bili: x / Urobili: x   Blood: x / Protein: x / Nitrite: x   Leuk Esterase: x / RBC: x / WBC x   Sq Epi: x / Non Sq Epi: x / Bacteria: x          RADIOLOGY & ADDITIONAL TESTS:  Results Reviewed:   Imaging Personally Reviewed:  Electrocardiogram Personally Reviewed:    COORDINATION OF CARE:  Care Discussed with Consultants/Other Providers [Y/N]:  Prior or Outpatient Records Reviewed [Y/N]:

## 2023-09-23 NOTE — PROGRESS NOTE ADULT - PROBLEM SELECTOR PLAN 9
DVT ppx: Eliquis  GOC: DNR (see goc note in ICU)  Line: Left IJ central Triple lumen for poor IV access--> replace with midline today
- no abdominal pain  - once stabilized may consider RUQ US  - appears developed during this hospitalization
DVT ppx: Eliquis  GOC: DNR (see goc note in ICU)  Line: Left IJ central Triple lumen for poor IV access, may require PICC placement (assess labs tomorrow)  PT consulted

## 2023-09-23 NOTE — PROGRESS NOTE ADULT - PROBLEM SELECTOR PLAN 2
see above
- aspiration event 9/22 when drinking soup (witnessed, accidental- no acute neuro deficits identified)  - c/w zosyn (start 9/22-->) plan to treat through 9/26 (may need to extend if and decline or not improving  - for now c/w methylprednisolone 40mg q8hr, daily evaluation to slowly titrate down given advanced COPD  - c/w albuterol prn, given tachycardia caution use- appears steroids have opened up lung sufficiently for now

## 2023-09-23 NOTE — PROGRESS NOTE ADULT - PROBLEM SELECTOR PLAN 3
c/w eliquis 10mg BID x7 (through 9/25) then transition to 5mg BID
rate controlled w/ metoprolol  eliquis dosing for dvt as noted above  - c/w tele monitoring today, if HR<110 after titration down to home O2 (3L) then can discontinue and transfer patient back to med/surg

## 2023-09-23 NOTE — PROGRESS NOTE ADULT - PROBLEM SELECTOR PROBLEM 6
Normocytic anemia
Chronic systolic heart failure

## 2023-09-23 NOTE — PROGRESS NOTE ADULT - PROBLEM SELECTOR PROBLEM 3
Deep vein thrombosis (DVT) of brachial vein of right upper extremity
Atrial fibrillation with RVR

## 2023-09-23 NOTE — PROGRESS NOTE ADULT - PROBLEM SELECTOR PLAN 1
- Initial admit 2/2 worsened COPD and required intubation, was treated in ICU for VAP, now with Aspiration event (9/22)  - supplemental oxygen titrated from Ventimask overnight to 3LNC(monitor through today and if remains on 3LNC can go to med/surg  - 2/2 COPD, required intubation in ICU  - c/w treatment for aspiration as outlined below, symbicort, spiriva, albuterol prn  - Patient was trialed on Nocturnal BiPAP 9/21 and only tolerated 3hr due to anxiety, will need to reevaluate if she will trial again (can offer xanax 0.25), family maybe able to further convince patient. (last ABG w/ Hypercarbia, noted compensatory alkalosis has been worsening)

## 2023-09-23 NOTE — PROGRESS NOTE ADULT - PROBLEM SELECTOR PLAN 8
suspect compensation for respiratory acidosis  - did not tolerate nocturnal bipap, patient did not want to trial during the day but may reconsider
DVT ppx: Eliquis  GOC: DNR (see goc note in ICU)  Line: Left IJ central Triple lumen for poor IV access--> replace with midline today
replete Mg to goal 2.0, continue to monitor daily
replete Mg to goal 2.0, continue to monitor daily

## 2023-09-23 NOTE — PROGRESS NOTE ADULT - PROBLEM SELECTOR PLAN 5
rate controlled w/ metoprolol  eliquis dosing for dvt as noted above
c/w eliquis 10mg BID x7 (starting dose, through 9/25) then transition to 5mg BID (if appropriate at that time)

## 2023-09-23 NOTE — PROGRESS NOTE ADULT - PROBLEM SELECTOR PLAN 10
DVT ppx: Eliquis  GOC: DNR (see goc note in ICU), has had DNR rescinded multiple times w/ intubationx2 recently  Dispo: acute, planning once further stabilized

## 2023-09-23 NOTE — PROGRESS NOTE ADULT - PROBLEM SELECTOR PLAN 6
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
in ICU required 6U pRBC  no hemorrhage identified in ICU, no signs of hemorrhage now  GI and Heme consulted in ICU  since transfer out of ICU, anemia has been stable while starting ac for DVT
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

## 2023-09-23 NOTE — PROGRESS NOTE ADULT - ASSESSMENT
88F w/ COPD on Home O2 (recent admit requiring intubation), Hx of B/L Pleural Effusion ([id number], suspicious for Met Breast ca origin, LEFT PleurX July2023; patient deferred to family and for now patient not aware of concern for ca), HFrEF, Afib s/p Ablation on eliquis, Hx of Right Inguinal Abscess (July2023),Hx of EBSL E.coli Bacteremia(May2023), Hx of MRSA Foot Wound(Feb2023), Chronic Lymphedema/PAD, Sacral Decubitus ulcer (stage II, prior to admit), Chronic Right Enlarged Heterogenous Thyroid Gland presented with Acute Respiratory Failure with Hypoxia/Hypercapnia 2/2 COPD requiring intubation in the ER(8/28) and admitted to the ICU for further management. ICU course complicated by Sepsis/VAP (s/p 7d course of Vanc/Cefepime, per ID), Severe Anemia s/p 6U pRBC (no acute hemorrhage identified, GI & Heme consulted), Acute RUE Brachial DVT (Duplex on 9/11; off AC when occurred, Vascular consulted, HIT-PF4 Neg). Patient was extubated (9/10), Left IJ central line placed (9/12) due to poor IV access (now removed on floor 9/21, replaced w/ midline), and transferred to medicine service on 9/14. Upon transfer to medicine, patient started on patient-specific Heparin gtt (9/14) which was transitioned to Eliquis (9/19). Since transfer, course has been complicated by Aspiration pneumonitis 9/22 which was complicated by worsened Hypoxia and Afib with RVR and therefore the patient was transferred to [facility]. This morning, the patient has had significant improvement and feels well- plan to treat aspiration pneumonitis with 5d course of Zosyn, slow taper of steroid, dysphagia diet for now (note patient was tolerating regular diet up until event).

## 2023-09-23 NOTE — PROGRESS NOTE ADULT - PROBLEM SELECTOR PLAN 7
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
suspect compensation for respiratory acidosis  - did not tolerate nocturnal bipap, patient did not want to trial during the day but may reconsider
suspect compensation for respiratory acidosis  - start nocturnal bipap
suspect compensation for respiratory acidosis, possible contraction as patient is on furosemide  monitor through tomorrow if not improving then plan for abg- if 2/2 hypercapnia then will need to start intermittent BiPAP

## 2023-09-23 NOTE — PROGRESS NOTE ADULT - SUBJECTIVE AND OBJECTIVE BOX
CC: 88F w/ Acute on Chronic Respiratory Failure with Hypoxia & Hypercapnia    SUBJECTIVE / OVERNIGHT EVENTS:  Overnight, the patient had an aspiration when eating soup which lead to increased hypoxia and Afib with RVR- she was treated w/ Methylprednisolone 125mg x1, started on zosyn, and responsed to pushes of metoprolol 5mg IVx2. Patient seen and evaluated at bedside. She reports respiration has improved since last night, coughing has resolved. RN staff has been able to titrate from Ventimask 40% fio2 to 3L NC. No fever or chills    ROS:  No CP    MEDICATIONS  (STANDING):  albuterol    90 MICROgram(s) HFA Inhaler 2 Puff(s) Inhalation every 4 hours  apixaban 10 milliGRAM(s) Oral every 12 hours  budesonide 160 MICROgram(s)/formoterol 4.5 MICROgram(s) Inhaler 2 Puff(s) Inhalation two times a day  chlorhexidine 2% Cloths 1 Application(s) Topical <User Schedule>  chlorhexidine 4% Liquid 1 Application(s) Topical <User Schedule>  dextrose 5%. 1000 milliLiter(s) (50 mL/Hr) IV Continuous <Continuous>  dextrose 5%. 1000 milliLiter(s) (100 mL/Hr) IV Continuous <Continuous>  dextrose 50% Injectable 25 Gram(s) IV Push once  dextrose 50% Injectable 25 Gram(s) IV Push once  dextrose 50% Injectable 12.5 Gram(s) IV Push once  gabapentin 100 milliGRAM(s) Oral three times a day  glucagon  Injectable 1 milliGRAM(s) IntraMuscular once  insulin lispro (ADMELOG) corrective regimen sliding scale   SubCutaneous three times a day before meals  insulin lispro (ADMELOG) corrective regimen sliding scale   SubCutaneous at bedtime  magnesium oxide 400 milliGRAM(s) Oral three times a day with meals  magnesium sulfate  IVPB 1 Gram(s) IV Intermittent once  methylPREDNISolone sodium succinate Injectable 40 milliGRAM(s) IV Push every 8 hours  metoprolol tartrate 25 milliGRAM(s) Oral two times a day  pantoprazole    Tablet 40 milliGRAM(s) Oral two times a day  piperacillin/tazobactam IVPB.. 3.375 Gram(s) IV Intermittent every 8 hours  polyethylene glycol 3350 17 Gram(s) Oral two times a day  senna 2 Tablet(s) Oral at bedtime  tiotropium 2.5 MICROgram(s) Inhaler 2 Puff(s) Inhalation daily    MEDICATIONS  (PRN):  acetaminophen     Tablet .. 650 milliGRAM(s) Oral every 6 hours PRN Temp greater or equal to 38C (100.4F), Mild Pain (1 - 3)  albuterol    90 MICROgram(s) HFA Inhaler 2 Puff(s) Inhalation every 6 hours PRN Shortness of Breath and/or Wheezing  dextrose Oral Gel 15 Gram(s) Oral once PRN Blood Glucose LESS THAN 70 milliGRAM(s)/deciliter  melatonin 5 milliGRAM(s) Oral at bedtime PRN Insomnia  sodium chloride 0.9% lock flush 10 milliLiter(s) IV Push every 1 hour PRN Pre/post blood products, medications, blood draw, and to maintain line patency      CAPILLARY BLOOD GLUCOSE      POCT Blood Glucose.: 169 mg/dL (23 Sep 2023 07:47)  POCT Blood Glucose.: 103 mg/dL (22 Sep 2023 16:21)  POCT Blood Glucose.: 131 mg/dL (22 Sep 2023 11:21)    I&O's Summary    22 Sep 2023 07:01  -  23 Sep 2023 07:00  --------------------------------------------------------  IN: 480 mL / OUT: 1400 mL / NET: -920 mL        Physical Exam  Vital Signs Last 24 Hrs  T(C): 36.1 (23 Sep 2023 05:29), Max: 37.4 (22 Sep 2023 13:32)  T(F): 96.9 (23 Sep 2023 05:29), Max: 99.4 (22 Sep 2023 13:32)  HR: 94 (23 Sep 2023 05:29) (94 - 125)  BP: 143/65 (23 Sep 2023 05:29) (123/60 - 150/90)  RR: 16 (23 Sep 2023 05:29) (16 - 18)  SpO2: 98% (23 Sep 2023 10:00) (94% - 99%)    Parameters below as of 23 Sep 2023 10:00  Patient On (Oxygen Delivery Method): 3L NC    GENERAL: NAD  HEAD:  Atraumatic, Normocephalic  EYES: EOMI, PERRL, conjunctiva and sclera clear  ENMT: MMM, no angular cheilitis appreciated  NECK: Supple, trachea midline, chronic right thyromegaly  Lung: normal work of breathing, no rhonchi, mild crackles b/l bases, left pleurx cath present  Cardiovascular: S1&S2+, rrr, no m/r/g appreciated  ABDOMEN: soft, nt  Neuro: Alert & follows commands, no flaccid paralysis in extremities appreciated  SKIN: warm and dry, no visible purulence in exposed areas    LABS:                        8.9    8.96  )-----------( 259      ( 23 Sep 2023 07:52 )             28.6     09-23    142  |  98  |  21<H>  ----------------------------<  182<H>  4.5   |  34<H>  |  0.8    Ca    8.5      23 Sep 2023 07:52  Phos  4.0     09-23  Mg     1.7     09-23    TPro  5.9<L>  /  Alb  2.8<L>  /  TBili  0.8  /  DBili  x   /  AST  22  /  ALT  12  /  AlkPhos  212<H>  09-23          Urinalysis Basic - ( 23 Sep 2023 07:52 )    Color: x / Appearance: x / SG: x / pH: x  Gluc: 182 mg/dL / Ketone: x  / Bili: x / Urobili: x   Blood: x / Protein: x / Nitrite: x   Leuk Esterase: x / RBC: x / WBC x   Sq Epi: x / Non Sq Epi: x / Bacteria: x          RADIOLOGY & ADDITIONAL TESTS:  Results Reviewed:   Imaging Personally Reviewed:  Electrocardiogram Personally Reviewed:    COORDINATION OF CARE:  Care Discussed with Consultants/Other Providers [Y/N]:  Prior or Outpatient Records Reviewed [Y/N]:

## 2023-09-23 NOTE — PROGRESS NOTE ADULT - PROBLEM SELECTOR PLAN 4
in ICU required 6U pRBC  no hemorrhage identified in ICU, no signs of hemorrhage now  GI and Heme consulted in ICU  since transfer out of ICU, anemia has been stable while starting ac for DVT
see above

## 2023-09-23 NOTE — PROGRESS NOTE ADULT - PROBLEM SELECTOR PROBLEM 2
COPD with exacerbation
Deep vein thrombosis (DVT) of brachial vein of right upper extremity
COPD with exacerbation
Aspiration pneumonitis
COPD with exacerbation

## 2023-09-24 NOTE — PROGRESS NOTE ADULT - SUBJECTIVE AND OBJECTIVE BOX
Progress note    INTERVAL HPI/OVERNIGHT EVENTS:    Patient seen and examined at bedside. She denies any acute issues.      REVIEW OF SYSTEMS:  All other 13 Review of systems were reviewed and are negative    FAMILY HISTORY:    T(C): 35.6 (09-24-23 @ 05:00), Max: 35.6 (09-23-23 @ 14:17)  HR: 81 (09-24-23 @ 05:00) (81 - 94)  BP: 141/66 (09-24-23 @ 05:00) (133/62 - 141/72)  RR: 18 (09-24-23 @ 05:00) (16 - 18)  SpO2: 97% (09-24-23 @ 05:00) (93% - 100%)  Wt(kg): --Vital Signs Last 24 Hrs  T(C): 35.6 (24 Sep 2023 05:00), Max: 35.6 (23 Sep 2023 14:17)  T(F): 96 (24 Sep 2023 05:00), Max: 96 (23 Sep 2023 14:17)  HR: 81 (24 Sep 2023 05:00) (81 - 94)  BP: 141/66 (24 Sep 2023 05:00) (133/62 - 141/72)  BP(mean): --  RR: 18 (24 Sep 2023 05:00) (16 - 18)  SpO2: 97% (24 Sep 2023 05:00) (93% - 100%)    Parameters below as of 23 Sep 2023 21:15  Patient On (Oxygen Delivery Method): nasal cannula  O2 Flow (L/min): 2.5    aspirin (Other)  codeine (Other)  sulfa drugs (Hives; Other)  penicillin (Other)  contrast media (iodine-based) (Other)      PHYSICAL EXAM:    GENERAL: NAD  HEAD:  Atraumatic, Normocephalic  EYES: EOMI, PERRL, conjunctiva and sclera clear  ENMT: MMM, no angular cheilitis appreciated  NECK: Supple, trachea midline, chronic right thyromegaly  Lung: normal work of breathing, no rhonchi, mild crackles b/l bases, left pleurx cath present  Cardiovascular: S1&S2+, rrr, no m/r/g appreciated  ABDOMEN: soft, nt  Neuro: Alert & follows commands, no flaccid paralysis in extremities appreciated  SKIN: warm and dry, no visible purulence in exposed areas    Consultant(s) Notes Reviewed:  [x ] YES  [ ] NO  Care Discussed with Consultants/Other Providers [ x] YES  [ ] NO    LABS:      RADIOLOGY & ADDITIONAL TESTS:    Imaging Personally Reviewed:  [ ] YES  [ ] NO  acetaminophen     Tablet .. 650 milliGRAM(s) Oral every 6 hours PRN  albuterol    90 MICROgram(s) HFA Inhaler 2 Puff(s) Inhalation every 4 hours  albuterol    90 MICROgram(s) HFA Inhaler 2 Puff(s) Inhalation every 6 hours PRN  apixaban 10 milliGRAM(s) Oral every 12 hours  budesonide 160 MICROgram(s)/formoterol 4.5 MICROgram(s) Inhaler 2 Puff(s) Inhalation two times a day  chlorhexidine 2% Cloths 1 Application(s) Topical <User Schedule>  chlorhexidine 4% Liquid 1 Application(s) Topical <User Schedule>  dextrose 5%. 1000 milliLiter(s) IV Continuous <Continuous>  dextrose 5%. 1000 milliLiter(s) IV Continuous <Continuous>  dextrose 50% Injectable 12.5 Gram(s) IV Push once  dextrose 50% Injectable 25 Gram(s) IV Push once  dextrose 50% Injectable 25 Gram(s) IV Push once  dextrose Oral Gel 15 Gram(s) Oral once PRN  gabapentin 100 milliGRAM(s) Oral three times a day  glucagon  Injectable 1 milliGRAM(s) IntraMuscular once  insulin lispro (ADMELOG) corrective regimen sliding scale   SubCutaneous three times a day before meals  insulin lispro (ADMELOG) corrective regimen sliding scale   SubCutaneous at bedtime  magnesium oxide 400 milliGRAM(s) Oral three times a day with meals  melatonin 5 milliGRAM(s) Oral at bedtime PRN  methylPREDNISolone sodium succinate Injectable 40 milliGRAM(s) IV Push every 8 hours  metoprolol tartrate 25 milliGRAM(s) Oral two times a day  pantoprazole    Tablet 40 milliGRAM(s) Oral two times a day  piperacillin/tazobactam IVPB.. 3.375 Gram(s) IV Intermittent every 8 hours  polyethylene glycol 3350 17 Gram(s) Oral two times a day  senna 2 Tablet(s) Oral at bedtime  sodium chloride 0.9% lock flush 10 milliLiter(s) IV Push every 1 hour PRN  tiotropium 2.5 MICROgram(s) Inhaler 2 Puff(s) Inhalation daily      HEALTH ISSUES - PROBLEM Dx:    88F w/ COPD on Home O2 (recent admit requiring intubation), Hx of B/L Pleural Effusion ([id number], suspicious for Met Breast ca origin, LEFT PleurX July2023; patient deferred to family and for now patient not aware of concern for ca), HFrEF, Afib s/p Ablation on eliquis, Hx of Right Inguinal Abscess (July2023),Hx of EBSL E.coli Bacteremia(May2023), Hx of MRSA Foot Wound(Feb2023), Chronic Lymphedema/PAD, Sacral Decubitus ulcer (stage II, prior to admit), Chronic Right Enlarged Heterogenous Thyroid Gland presented with Acute Respiratory Failure with Hypoxia/Hypercapnia 2/2 COPD requiring intubation in the ER(8/28) and admitted to the ICU for further management. ICU course complicated by Sepsis/VAP (s/p 7d course of Vanc/Cefepime, per ID), Severe Anemia s/p 6U pRBC (no acute hemorrhage identified, GI & Heme consulted), Acute RUE Brachial DVT (Duplex on 9/11; off AC when occurred, Vascular consulted, HIT-PF4 Neg). Patient was extubated (9/10), Left IJ central line placed (9/12) due to poor IV access (now removed on floor 9/21, replaced w/ midline), and transferred to medicine service on 9/14. Upon transfer to medicine, patient started on patient-specific Heparin gtt (9/14) which was transitioned to Eliquis (9/19). Since transfer, course has been complicated by Aspiration pneumonitis 9/22 which was complicated by worsened Hypoxia and Afib with RVR and therefore the patient was transferred to [facility]. This morning, the patient has had significant improvement and feels well- plan to treat aspiration pneumonitis with 5d course of Zosyn, slow taper of steroid, dysphagia diet for now (note patient was tolerating regular diet up until event).      Problem/Plan - 1:  ·  Problem: Acute on chronic respiratory failure with hypoxia and hypercapnia.   ·  Plan: - Initial admit 2/2 worsened COPD and required intubation, was treated in ICU for VAP, then with Aspiration event (9/22)  - 2/2 COPD, required intubation in ICU  - c/w treatment for aspiration as outlined below, symbicort, spiriva, albuterol prn  - Patient was trialed on Nocturnal BiPAP 9/21 and only tolerated 3hr due to anxiety, will need to reevaluate if she will trial again (can offer xanax 0.25), family maybe able to further convince patient. (last ABG w/ Hypercarbia, noted compensatory alkalosis has been worsening).  - Ventimask-->2.5L NC    Problem/Plan - 2:  ·  Problem: Aspiration pneumonitis.   ·  Plan: - aspiration event 9/22 when drinking soup (witnessed, accidental- no acute neuro deficits identified)  - c/w zosyn (start 9/22-->) plan to treat through 9/26 (may need to extend if and decline or not improving  - Decrease methylprednisolone 40mg q8-->q12  - c/w albuterol prn, given tachycardia caution use- appears steroids have opened up lung sufficiently for now.    Problem/Plan - 3:  ·  Problem: Atrial fibrillation with RVR, controlled  ·  Plan: rate controlled w/ metoprolol  eliquis dosing for dvt as noted above    Problem/Plan - 4:  ·  Problem: COPD with exacerbation.   ·  Plan: see above.    Problem/Plan - 5:  ·  Problem: Deep vein thrombosis (DVT) of brachial vein of right upper extremity.   ·  Plan: c/w eliquis 10mg BID x7 (starting dose, through 9/25) then transition to 5mg BID (if appropriate at that time).    Problem/Plan - 6:  ·  Problem: Normocytic anemia.   ·  Plan: in ICU required 6U pRBC  no hemorrhage identified in ICU, no signs of hemorrhage now  GI and Heme consulted in ICU  since transfer out of ICU, anemia has been stable while starting ac for DVT.    Problem/Plan - 7:  ·  Problem: Chronic systolic heart failure.   ·  Plan: charted to have previous HFrEF  TTE this admission w/ EF>40%; suspect patient has HFrecEF  c/w furosemide 40mg d, metoprolol.    Problem/Plan - 8:  ·  Problem: Alkalosis.   ·  Plan: suspect compensation for respiratory acidosis  - did not tolerate nocturnal bipap, patient did not want to trial during the day but may reconsider.    Problem/Plan - 9:  ·  Problem: Elevated alkaline phosphatase level.   ·  Plan: - no abdominal pain  - once stabilized may consider RUQ US  - appears developed during this hospitalization.    Problem/Plan - 10:  ·  Problem: Prophylactic measure.   ·  Plan; DVT ppx: Eliquis  GOC: DNR (see goc note in ICU), has had DNR rescinded multiple times w/ intubationx2 recently  Dispo: acute, planning once further stabilized.

## 2023-09-25 NOTE — SWALLOW BEDSIDE ASSESSMENT ADULT - SWALLOW EVAL: RECOMMENDED DIET
SBS/thin liquids
Regular/thin liquids
Puree/thins. Upgrade as tolerated w/ dentures in place. (currently at home)
Regular/thin liquids

## 2023-09-25 NOTE — SWALLOW BEDSIDE ASSESSMENT ADULT - SLP GENERAL OBSERVATIONS
Pt encountered awake, alert, daughter at bedside.
Pt encountered awake, alert. Requesting solid food. On NC
Pt encountered awake, alert. Requesting food. On NC
Pt encountered awake, alert.

## 2023-09-25 NOTE — SWALLOW BEDSIDE ASSESSMENT ADULT - ORAL PHASE
Within functional limits
Never smoker

## 2023-09-25 NOTE — SWALLOW BEDSIDE ASSESSMENT ADULT - CONSISTENCIES ADMINISTERED
thin liquid/pureed
thin liquid/regular solid
thin liquid/soft & bite-sized
thin liquid/pureed/regular solid

## 2023-09-25 NOTE — PROGRESS NOTE ADULT - SUBJECTIVE AND OBJECTIVE BOX
Progress note    INTERVAL HPI/OVERNIGHT EVENTS:    Patient seen and examined at bedside. No acute distress. She is on 2L oxygen.      REVIEW OF SYSTEMS:  All other 13 Review of systems were reviewed and are negative    FAMILY HISTORY:    T(C): 36.7 (09-25-23 @ 13:42), Max: 36.7 (09-25-23 @ 13:42)  HR: 112 (09-25-23 @ 13:42) (84 - 112)  BP: 153/71 (09-25-23 @ 13:42) (135/82 - 164/87)  RR: 18 (09-25-23 @ 07:37) (18 - 18)  SpO2: 94% (09-25-23 @ 14:17) (94% - 99%)  Wt(kg): --Vital Signs Last 24 Hrs  T(C): 36.7 (25 Sep 2023 13:42), Max: 36.7 (25 Sep 2023 13:42)  T(F): 98 (25 Sep 2023 13:42), Max: 98 (25 Sep 2023 13:42)  HR: 112 (25 Sep 2023 13:42) (84 - 112)  BP: 153/71 (25 Sep 2023 13:42) (135/82 - 164/87)  BP(mean): --  RR: 18 (25 Sep 2023 07:37) (18 - 18)  SpO2: 94% (25 Sep 2023 14:17) (94% - 99%)    Parameters below as of 25 Sep 2023 14:17  Patient On (Oxygen Delivery Method): room air      aspirin (Other)  codeine (Other)  sulfa drugs (Hives; Other)  penicillin (Other)  contrast media (iodine-based) (Other)      PHYSICAL EXAM:    GENERAL: NAD  HEAD:  Atraumatic, Normocephalic  EYES: EOMI, PERRL, conjunctiva and sclera clear  ENMT: MMM, no angular cheilitis appreciated  NECK: Supple, trachea midline, chronic right thyromegaly  Lung: normal work of breathing, no rhonchi, mild crackles b/l bases, left pleurx cath present  Cardiovascular: S1&S2+, rrr, no m/r/g appreciated  ABDOMEN: soft, nt  Neuro: Alert & follows commands, no flaccid paralysis in extremities appreciated  SKIN: warm and dry, no visible purulence in exposed areas    Consultant(s) Notes Reviewed:  [x ] YES  [ ] NO  Care Discussed with Consultants/Other Providers [ x] YES  [ ] NO    LABS:      RADIOLOGY & ADDITIONAL TESTS:    Imaging Personally Reviewed:  [ ] YES  [ ] NO  acetaminophen     Tablet .. 650 milliGRAM(s) Oral every 6 hours PRN  albuterol    90 MICROgram(s) HFA Inhaler 2 Puff(s) Inhalation every 4 hours  albuterol    90 MICROgram(s) HFA Inhaler 2 Puff(s) Inhalation every 6 hours PRN  apixaban 10 milliGRAM(s) Oral every 12 hours  budesonide 160 MICROgram(s)/formoterol 4.5 MICROgram(s) Inhaler 2 Puff(s) Inhalation two times a day  chlorhexidine 2% Cloths 1 Application(s) Topical <User Schedule>  chlorhexidine 4% Liquid 1 Application(s) Topical <User Schedule>  dextrose 5%. 1000 milliLiter(s) IV Continuous <Continuous>  dextrose 5%. 1000 milliLiter(s) IV Continuous <Continuous>  dextrose 50% Injectable 25 Gram(s) IV Push once  dextrose 50% Injectable 12.5 Gram(s) IV Push once  dextrose 50% Injectable 25 Gram(s) IV Push once  dextrose Oral Gel 15 Gram(s) Oral once PRN  gabapentin 100 milliGRAM(s) Oral three times a day  glucagon  Injectable 1 milliGRAM(s) IntraMuscular once  insulin lispro (ADMELOG) corrective regimen sliding scale   SubCutaneous three times a day before meals  insulin lispro (ADMELOG) corrective regimen sliding scale   SubCutaneous at bedtime  magnesium oxide 400 milliGRAM(s) Oral three times a day with meals  melatonin 5 milliGRAM(s) Oral at bedtime PRN  methylPREDNISolone sodium succinate Injectable 40 milliGRAM(s) IV Push every 12 hours  metoprolol tartrate 25 milliGRAM(s) Oral two times a day  pantoprazole    Tablet 40 milliGRAM(s) Oral two times a day  piperacillin/tazobactam IVPB.. 3.375 Gram(s) IV Intermittent every 8 hours  polyethylene glycol 3350 17 Gram(s) Oral two times a day  senna 2 Tablet(s) Oral at bedtime  sodium chloride 0.9% lock flush 10 milliLiter(s) IV Push every 1 hour PRN  tiotropium 2.5 MICROgram(s) Inhaler 2 Puff(s) Inhalation daily      HEALTH ISSUES - PROBLEM Dx:  Deep vein thrombosis (DVT) of brachial vein of right upper extremity    COPD with exacerbation    Prophylactic measure    Chronic systolic heart failure    Normocytic anemia    Alkalosis    Acute on chronic respiratory failure with hypoxia and hypercapnia    Aspiration pneumonitis    Atrial fibrillation with RVR    Elevated alkaline phosphatase level            Total time spent to complete patient's bedside assessment, review medical chart, discuss medical plan of care with covering medical team was ________ with > 50% of time spent face to face w/ patient, discussion with patient/family and/or coordination of care Progress note    INTERVAL HPI/OVERNIGHT EVENTS:    Patient seen and examined at bedside. No acute distress. She is on 2L oxygen.      REVIEW OF SYSTEMS:  All other 13 Review of systems were reviewed and are negative    FAMILY HISTORY:    T(C): 36.7 (09-25-23 @ 13:42), Max: 36.7 (09-25-23 @ 13:42)  HR: 112 (09-25-23 @ 13:42) (84 - 112)  BP: 153/71 (09-25-23 @ 13:42) (135/82 - 164/87)  RR: 18 (09-25-23 @ 07:37) (18 - 18)  SpO2: 94% (09-25-23 @ 14:17) (94% - 99%)  Wt(kg): --Vital Signs Last 24 Hrs  T(C): 36.7 (25 Sep 2023 13:42), Max: 36.7 (25 Sep 2023 13:42)  T(F): 98 (25 Sep 2023 13:42), Max: 98 (25 Sep 2023 13:42)  HR: 112 (25 Sep 2023 13:42) (84 - 112)  BP: 153/71 (25 Sep 2023 13:42) (135/82 - 164/87)  BP(mean): --  RR: 18 (25 Sep 2023 07:37) (18 - 18)  SpO2: 94% (25 Sep 2023 14:17) (94% - 99%)    Parameters below as of 25 Sep 2023 14:17  Patient On (Oxygen Delivery Method): room air      aspirin (Other)  codeine (Other)  sulfa drugs (Hives; Other)  penicillin (Other)  contrast media (iodine-based) (Other)      PHYSICAL EXAM:    GENERAL: NAD  HEAD:  Atraumatic, Normocephalic  EYES: EOMI, PERRL, conjunctiva and sclera clear  ENMT: MMM, no angular cheilitis appreciated  NECK: Supple, trachea midline, chronic right thyromegaly  Lung: normal work of breathing, no rhonchi, mild crackles b/l bases, left pleurx cath present  Cardiovascular: S1&S2+, rrr, no m/r/g appreciated  ABDOMEN: soft, nt  Neuro: Alert & follows commands, no flaccid paralysis in extremities appreciated  SKIN: warm and dry, no visible purulence in exposed areas    Consultant(s) Notes Reviewed:  [x ] YES  [ ] NO  Care Discussed with Consultants/Other Providers [ x] YES  [ ] NO    LABS:      RADIOLOGY & ADDITIONAL TESTS:    Imaging Personally Reviewed:  [ ] YES  [ ] NO  acetaminophen     Tablet .. 650 milliGRAM(s) Oral every 6 hours PRN  albuterol    90 MICROgram(s) HFA Inhaler 2 Puff(s) Inhalation every 4 hours  albuterol    90 MICROgram(s) HFA Inhaler 2 Puff(s) Inhalation every 6 hours PRN  apixaban 10 milliGRAM(s) Oral every 12 hours  budesonide 160 MICROgram(s)/formoterol 4.5 MICROgram(s) Inhaler 2 Puff(s) Inhalation two times a day  chlorhexidine 2% Cloths 1 Application(s) Topical <User Schedule>  chlorhexidine 4% Liquid 1 Application(s) Topical <User Schedule>  dextrose 5%. 1000 milliLiter(s) IV Continuous <Continuous>  dextrose 5%. 1000 milliLiter(s) IV Continuous <Continuous>  dextrose 50% Injectable 25 Gram(s) IV Push once  dextrose 50% Injectable 12.5 Gram(s) IV Push once  dextrose 50% Injectable 25 Gram(s) IV Push once  dextrose Oral Gel 15 Gram(s) Oral once PRN  gabapentin 100 milliGRAM(s) Oral three times a day  glucagon  Injectable 1 milliGRAM(s) IntraMuscular once  insulin lispro (ADMELOG) corrective regimen sliding scale   SubCutaneous three times a day before meals  insulin lispro (ADMELOG) corrective regimen sliding scale   SubCutaneous at bedtime  magnesium oxide 400 milliGRAM(s) Oral three times a day with meals  melatonin 5 milliGRAM(s) Oral at bedtime PRN  methylPREDNISolone sodium succinate Injectable 40 milliGRAM(s) IV Push every 12 hours  metoprolol tartrate 25 milliGRAM(s) Oral two times a day  pantoprazole    Tablet 40 milliGRAM(s) Oral two times a day  piperacillin/tazobactam IVPB.. 3.375 Gram(s) IV Intermittent every 8 hours  polyethylene glycol 3350 17 Gram(s) Oral two times a day  senna 2 Tablet(s) Oral at bedtime  sodium chloride 0.9% lock flush 10 milliLiter(s) IV Push every 1 hour PRN  tiotropium 2.5 MICROgram(s) Inhaler 2 Puff(s) Inhalation daily      HEALTH ISSUES - PROBLEM Dx:    88F w/ COPD on Home O2 (recent admit requiring intubation), Hx of B/L Pleural Effusion ([id number], suspicious for Met Breast ca origin, LEFT PleurX July2023; patient deferred to family and for now patient not aware of concern for ca), HFrEF, Afib s/p Ablation on eliquis, Hx of Right Inguinal Abscess (July2023),Hx of EBSL E.coli Bacteremia(May2023), Hx of MRSA Foot Wound(Feb2023), Chronic Lymphedema/PAD, Sacral Decubitus ulcer (stage II, prior to admit), Chronic Right Enlarged Heterogenous Thyroid Gland presented with Acute Respiratory Failure with Hypoxia/Hypercapnia 2/2 COPD requiring intubation in the ER(8/28) and admitted to the ICU for further management. ICU course complicated by Sepsis/VAP (s/p 7d course of Vanc/Cefepime, per ID), Severe Anemia s/p 6U pRBC (no acute hemorrhage identified, GI & Heme consulted), Acute RUE Brachial DVT (Duplex on 9/11; off AC when occurred, Vascular consulted, HIT-PF4 Neg). Patient was extubated (9/10), Left IJ central line placed (9/12) due to poor IV access (now removed on floor 9/21, replaced w/ midline), and transferred to medicine service on 9/14. Upon transfer to medicine, patient started on patient-specific Heparin gtt (9/14) which was transitioned to Eliquis (9/19). Since transfer, course has been complicated by Aspiration pneumonitis 9/22 which was complicated by worsened Hypoxia and Afib with RVR and therefore the patient was transferred to [facility]. This morning, the patient has had significant improvement and feels well- plan to treat aspiration pneumonitis with 5d course of Zosyn, slow taper of steroid, dysphagia diet for now (note patient was tolerating regular diet up until event).      Problem/Plan - 1:  ·  Problem: Acute on chronic respiratory failure with hypoxia and hypercapnia.   ·  Plan: - Initial admit 2/2 worsened COPD and required intubation, was treated in ICU for VAP, then with Aspiration event (9/22)  - c/w treatment for aspiration as outlined below, symbicort, spiriva, albuterol prn  - Bicarb noted, will check abg for need for Bipap. If unable to tolerate d/t anxiety, can try Xanax 0.25mg  - Ventimask-->2.5L NC    Problem/Plan - 2:  ·  Problem: Aspiration pneumonitis.   ·  Plan: - aspiration event 9/22 when drinking soup (witnessed, accidental- no acute neuro deficits identified)  - c/w zosyn (start 9/22-->) plan to treat through 9/26 (may need to extend if and decline or not improving  - Decrease methylprednisolone 40mg q8-->q12-->qd  - c/w albuterol prn, given tachycardia caution use- appears steroids have opened up lung sufficiently for now.    Problem/Plan - 3:  ·  Problem: Atrial fibrillation with RVR, controlled  ·  Plan: rate controlled w/ metoprolol  eliquis dosing for dvt as noted below    Problem/Plan - 4:  ·  Problem: COPD with exacerbation.   ·  Plan: see above.    Problem/Plan - 5:  ·  Problem: Deep vein thrombosis (DVT) of brachial vein of right upper extremity.   ·  Plan: c/w eliquis 10mg BID x7-->5mg BID    Problem/Plan - 6:  ·  Problem: Normocytic anemia.   ·  Plan: in ICU required 6U pRBC  no hemorrhage identified in ICU, no signs of hemorrhage now  GI and Heme consulted in ICU  since transfer out of ICU, anemia has been stable while starting ac for DVT.    Problem/Plan - 7:  ·  Problem: Chronic systolic heart failure.   ·  Plan: charted to have previous HFrEF  TTE this admission w/ EF>40%; suspect patient has HFrecEF  c/w furosemide 40mg d, metoprolol.    Problem/Plan - 8:  ·  Problem: Alkalosis.   ·  Plan: suspect compensation for respiratory acidosis  - did not tolerate nocturnal bipap, patient did not want to trial during the day but may reconsider.    Problem/Plan - 9:  ·  Problem: Elevated alkaline phosphatase level.   ·  Plan: - no abdominal pain  - once stabilized may consider RUQ US  - appears developed during this hospitalization.    Problem/Plan - 10:  ·  Problem: Prophylactic measure.   ·  Plan; DVT ppx: Eliquis  GOC: DNR (see goc note in ICU), has had DNR rescinded multiple times w/ intubationx2 recently  Dispo: acute, planning once further stabilized.

## 2023-09-25 NOTE — SWALLOW BEDSIDE ASSESSMENT ADULT - DIET PRIOR TO ADMI
pt reports Regular/thins at home w/ dentures in place

## 2023-09-25 NOTE — SWALLOW BEDSIDE ASSESSMENT ADULT - ADDITIONAL RECOMMENDATIONS
"Aspiration event" appears to have been an isolated one-time event. d/c from SLP Service, reconsult PRN.
d/c from SLP Service, reconsult PRN

## 2023-09-25 NOTE — SWALLOW BEDSIDE ASSESSMENT ADULT - NS SPL SWALLOW CLINIC TRIAL FT
+ toleration observed without overt symptoms of penetration/aspiration

## 2023-09-25 NOTE — SWALLOW BEDSIDE ASSESSMENT ADULT - SWALLOW EVAL: DIAGNOSIS
+ toleration observed without overt symptoms of penetration/aspiration for Regular/thins
Toleration of SBS/thin liquids w/o overt s/s of penetration/aspiration.
+ toleration observed without overt symptoms of penetration/aspiration for Regular/thins
+ toleration observed without overt symptoms of penetration/aspiration for PUree/thins. Pt declined solid food, reports never eating w/o dentures in place.

## 2023-09-25 NOTE — SWALLOW BEDSIDE ASSESSMENT ADULT - SWALLOW EVAL: RECOMMENDED FEEDING/EATING TECHNIQUES
maintain upright posture during/after eating for 30 mins/oral hygiene/position upright (90 degrees)
maintain upright posture during/after eating for 30 mins/oral hygiene/position upright (90 degrees)
maintain upright posture during/after eating for 30 mins/oral hygiene/position upright (90 degrees)/small sips/bites

## 2023-09-25 NOTE — SWALLOW BEDSIDE ASSESSMENT ADULT - COMMENTS
Pt was d/c from SLP services on 9/18 w/ recs for REgular/thins. On 9/22 staff reported "aspiration event" on soup, and pt was downgraded by team to puree/thins. Treated for aspiration PNA. At this time pt is requesting upgraded diet consistency. Pt and daughter at the bedside deny h/o dysphagia.

## 2023-09-25 NOTE — SWALLOW BEDSIDE ASSESSMENT ADULT - SLP PERTINENT HISTORY OF CURRENT PROBLEM
88 year old female with PMH of HTN, Afib, OA, PVD, COPD, Anxiety, Obesity, Acute on chronic systolic congestive heart failure, H/O abdominal hysterectomy, H/O discectomy, H/O total knee replacement, bilaterally and bilateral pleural effusions (s/p left thoracentesis 3/2023 -- Cytology -PLEURAL FLUID, THORACENTESIS: - SUSPICIOUS FOR MALIGNANCY) with a left sided pleurex catheter is currently admitted due to worsening respiratory distress and hypercapnic respiratory failure and COPD exacerbation, currently on mechanical ventilation.ID is being consulted for antimicrobial recommendations given she is spiking intermittent fevers. #Acute hypercapnic respiratory failure- Volume overload vs COPD exacerbation- extubated since 9/10 #Possible GI bleed, acute anemia with reflex leukocytosis.

## 2023-09-26 NOTE — PROGRESS NOTE ADULT - PROVIDER SPECIALTY LIST ADULT
Hospitalist bilateral upper extremity Active ROM was WFL (within functional limits)/bilateral  lower extremity Active ROM was WFL (within functional limits)

## 2023-09-26 NOTE — PROGRESS NOTE ADULT - SUBJECTIVE AND OBJECTIVE BOX
Progress note    INTERVAL HPI/OVERNIGHT EVENTS:    Patient seen and examined at bedside. She denies any acute complaints.      REVIEW OF SYSTEMS:  All other 13 Review of systems were reviewed and are negative    FAMILY HISTORY:    T(C): 36.5 (09-26-23 @ 04:40), Max: 36.7 (09-25-23 @ 13:42)  HR: 100 (09-26-23 @ 12:11) (96 - 112)  BP: 148/82 (09-26-23 @ 12:11) (133/95 - 195/96)  RR: 18 (09-26-23 @ 12:11) (16 - 18)  SpO2: 98% (09-26-23 @ 12:11) (94% - 100%)  Wt(kg): --Vital Signs Last 24 Hrs  T(C): 36.5 (26 Sep 2023 04:40), Max: 36.7 (25 Sep 2023 13:42)  T(F): 97.7 (26 Sep 2023 04:40), Max: 98 (25 Sep 2023 13:42)  HR: 100 (26 Sep 2023 12:11) (96 - 112)  BP: 148/82 (26 Sep 2023 12:11) (133/95 - 195/96)  BP(mean): --  RR: 18 (26 Sep 2023 12:11) (16 - 18)  SpO2: 98% (26 Sep 2023 12:11) (94% - 100%)    Parameters below as of 26 Sep 2023 12:11  Patient On (Oxygen Delivery Method): room air      aspirin (Other)  codeine (Other)  sulfa drugs (Hives; Other)  penicillin (Other)  contrast media (iodine-based) (Other)      PHYSICAL EXAM:    GENERAL: NAD  HEAD:  Atraumatic, Normocephalic  EYES: EOMI, PERRL, conjunctiva and sclera clear  ENMT: MMM, no angular cheilitis appreciated  NECK: Supple, trachea midline, chronic right thyromegaly  Lung: normal work of breathing, no rhonchi, mild crackles b/l bases, left pleurx cath present  Cardiovascular: S1&S2+, rrr, no m/r/g appreciated  ABDOMEN: soft, nt  Neuro: Alert & follows commands, no flaccid paralysis in extremities appreciated  SKIN: warm and dry, no visible purulence in exposed areas    Consultant(s) Notes Reviewed:  [x ] YES  [ ] NO  Care Discussed with Consultants/Other Providers [ x] YES  [ ] NO    LABS:      RADIOLOGY & ADDITIONAL TESTS:    Imaging Personally Reviewed:  [ ] YES  [ ] NO  acetaminophen     Tablet .. 650 milliGRAM(s) Oral every 6 hours PRN  albuterol    90 MICROgram(s) HFA Inhaler 2 Puff(s) Inhalation every 6 hours PRN  albuterol    90 MICROgram(s) HFA Inhaler 2 Puff(s) Inhalation every 4 hours  apixaban 5 milliGRAM(s) Oral every 12 hours  budesonide 160 MICROgram(s)/formoterol 4.5 MICROgram(s) Inhaler 2 Puff(s) Inhalation two times a day  chlorhexidine 2% Cloths 1 Application(s) Topical <User Schedule>  chlorhexidine 4% Liquid 1 Application(s) Topical <User Schedule>  dextrose 5%. 1000 milliLiter(s) IV Continuous <Continuous>  dextrose 5%. 1000 milliLiter(s) IV Continuous <Continuous>  dextrose 50% Injectable 12.5 Gram(s) IV Push once  dextrose 50% Injectable 25 Gram(s) IV Push once  dextrose 50% Injectable 25 Gram(s) IV Push once  dextrose Oral Gel 15 Gram(s) Oral once PRN  gabapentin 100 milliGRAM(s) Oral three times a day  glucagon  Injectable 1 milliGRAM(s) IntraMuscular once  insulin lispro (ADMELOG) corrective regimen sliding scale   SubCutaneous three times a day before meals  insulin lispro (ADMELOG) corrective regimen sliding scale   SubCutaneous at bedtime  magnesium oxide 400 milliGRAM(s) Oral three times a day with meals  melatonin 5 milliGRAM(s) Oral at bedtime PRN  methylPREDNISolone sodium succinate Injectable 40 milliGRAM(s) IV Push daily  metoprolol tartrate 25 milliGRAM(s) Oral two times a day  pantoprazole    Tablet 40 milliGRAM(s) Oral two times a day  piperacillin/tazobactam IVPB.. 3.375 Gram(s) IV Intermittent every 8 hours  polyethylene glycol 3350 17 Gram(s) Oral two times a day  senna 2 Tablet(s) Oral at bedtime  sodium chloride 0.9% lock flush 10 milliLiter(s) IV Push every 1 hour PRN  tiotropium 2.5 MICROgram(s) Inhaler 2 Puff(s) Inhalation daily      HEALTH ISSUES - PROBLEM Dx:    88F w/ COPD on Home O2 (recent admit requiring intubation), Hx of B/L Pleural Effusion ([id number], suspicious for Met Breast ca origin, LEFT PleurX July2023; patient deferred to family and for now patient not aware of concern for ca), HFrEF, Afib s/p Ablation on eliquis, Hx of Right Inguinal Abscess (July2023),Hx of EBSL E.coli Bacteremia(May2023), Hx of MRSA Foot Wound(Feb2023), Chronic Lymphedema/PAD, Sacral Decubitus ulcer (stage II, prior to admit), Chronic Right Enlarged Heterogenous Thyroid Gland presented with Acute Respiratory Failure with Hypoxia/Hypercapnia 2/2 COPD requiring intubation in the ER(8/28) and admitted to the ICU for further management. ICU course complicated by Sepsis/VAP (s/p 7d course of Vanc/Cefepime, per ID), Severe Anemia s/p 6U pRBC (no acute hemorrhage identified, GI & Heme consulted), Acute RUE Brachial DVT (Duplex on 9/11; off AC when occurred, Vascular consulted, HIT-PF4 Neg). Patient was extubated (9/10), Left IJ central line placed (9/12) due to poor IV access (now removed on floor 9/21, replaced w/ midline), and transferred to medicine service on 9/14. Upon transfer to medicine, patient started on patient-specific Heparin gtt (9/14) which was transitioned to Eliquis (9/19). Since transfer, course has been complicated by Aspiration pneumonitis 9/22 which was complicated by worsened Hypoxia and Afib with RVR and therefore the patient was transferred to [facility]. This morning, the patient has had significant improvement and feels well- plan to treat aspiration pneumonitis with 5d course of Zosyn, slow taper of steroid, dysphagia diet for now (note patient was tolerating regular diet up until event).      Problem/Plan - 1:  ·  Problem: Acute on chronic respiratory failure with hypoxia and hypercapnia.   ·  Plan: - Initial admit 2/2 worsened COPD and required intubation, was treated in ICU for VAP, then with Aspiration event (9/22)  - c/w treatment for aspiration as outlined below, symbicort, spiriva, albuterol prn  - Ventimask-->2.5L NC    Problem/Plan - 2:  ·  Problem: Aspiration pneumonitis.   ·  Plan: - aspiration event 9/22 when drinking soup (witnessed, accidental- no acute neuro deficits identified)  - s/p 5 days of abx  - Bicarb elevated, check ABG, No AG present, may need bipap  - Decrease methylprednisolone 40mg q8-->q12-->qd  - c/w albuterol prn, given tachycardia caution use- appears steroids have opened up lung sufficiently for now.    Problem/Plan - 3:  ·  Problem: Atrial fibrillation with RVR, controlled  ·  Plan: rate controlled w/ metoprolol  eliquis dosing for dvt as noted below    Problem/Plan - 4:  ·  Problem: COPD with exacerbation.   ·  Plan: see above.    Problem/Plan - 5:  ·  Problem: Deep vein thrombosis (DVT) of brachial vein of right upper extremity.   ·  Plan: c/w eliquis 10mg BID x7-->5mg BID    Problem/Plan - 6:  ·  Problem: Normocytic anemia.   ·  Plan: in ICU required 6U pRBC  no hemorrhage identified in ICU, no signs of hemorrhage now  GI and Heme consulted in ICU  since transfer out of ICU, anemia has been stable while starting ac for DVT.    Problem/Plan - 7:  ·  Problem: Chronic systolic heart failure.   ·  Plan: charted to have previous HFrEF  TTE this admission w/ EF>40%; suspect patient has HFrecEF  c/w furosemide 40mg d, metoprolol.    Problem/Plan - 8:  ·  Problem: Alkalosis.   ·  Plan: suspect compensation for respiratory acidosis  - did not tolerate nocturnal bipap, patient did not want to trial during the day but may reconsider.    Problem/Plan - 9:  ·  Problem: Elevated alkaline phosphatase level.   ·  Plan: - no abdominal pain  - once stabilized may consider RUQ US  - appears developed during this hospitalization.    Problem/Plan - 10:  ·  Problem: Prophylactic measure.   ·  Plan; DVT ppx: Eliquis  GOC: DNR (see goc note in ICU), has had DNR rescinded multiple times w/ intubationx2 recently  Dispo: dc planning for SNF, patient currently refusing. D/w daughter who does want rehab      Total time spent to complete patient's bedside assessment, review medical chart, discuss medical plan of care with covering medical team was ____35____ with > 50% of time spent face to face w/ patient, discussion with patient/family and/or coordination of care Progress note    INTERVAL HPI/OVERNIGHT EVENTS:    Patient seen and examined at bedside. She denies any acute complaints.      REVIEW OF SYSTEMS:  All other 13 Review of systems were reviewed and are negative    FAMILY HISTORY:    T(C): 36.5 (09-26-23 @ 04:40), Max: 36.7 (09-25-23 @ 13:42)  HR: 100 (09-26-23 @ 12:11) (96 - 112)  BP: 148/82 (09-26-23 @ 12:11) (133/95 - 195/96)  RR: 18 (09-26-23 @ 12:11) (16 - 18)  SpO2: 98% (09-26-23 @ 12:11) (94% - 100%)  Wt(kg): --Vital Signs Last 24 Hrs  T(C): 36.5 (26 Sep 2023 04:40), Max: 36.7 (25 Sep 2023 13:42)  T(F): 97.7 (26 Sep 2023 04:40), Max: 98 (25 Sep 2023 13:42)  HR: 100 (26 Sep 2023 12:11) (96 - 112)  BP: 148/82 (26 Sep 2023 12:11) (133/95 - 195/96)  BP(mean): --  RR: 18 (26 Sep 2023 12:11) (16 - 18)  SpO2: 98% (26 Sep 2023 12:11) (94% - 100%)    Parameters below as of 26 Sep 2023 12:11  Patient On (Oxygen Delivery Method): room air      aspirin (Other)  codeine (Other)  sulfa drugs (Hives; Other)  penicillin (Other)  contrast media (iodine-based) (Other)      PHYSICAL EXAM:    GENERAL: NAD  HEAD:  Atraumatic, Normocephalic  EYES: EOMI, PERRL, conjunctiva and sclera clear  ENMT: MMM, no angular cheilitis appreciated  NECK: Supple, trachea midline, chronic right thyromegaly  Lung: normal work of breathing, no rhonchi, mild crackles b/l bases, left pleurx cath present  Cardiovascular: S1&S2+, rrr, no m/r/g appreciated  ABDOMEN: soft, nt  Neuro: Alert & follows commands, no flaccid paralysis in extremities appreciated  SKIN: warm and dry, no visible purulence in exposed areas    Consultant(s) Notes Reviewed:  [x ] YES  [ ] NO  Care Discussed with Consultants/Other Providers [ x] YES  [ ] NO    LABS:      RADIOLOGY & ADDITIONAL TESTS:    Imaging Personally Reviewed:  [ ] YES  [ ] NO  acetaminophen     Tablet .. 650 milliGRAM(s) Oral every 6 hours PRN  albuterol    90 MICROgram(s) HFA Inhaler 2 Puff(s) Inhalation every 6 hours PRN  albuterol    90 MICROgram(s) HFA Inhaler 2 Puff(s) Inhalation every 4 hours  apixaban 5 milliGRAM(s) Oral every 12 hours  budesonide 160 MICROgram(s)/formoterol 4.5 MICROgram(s) Inhaler 2 Puff(s) Inhalation two times a day  chlorhexidine 2% Cloths 1 Application(s) Topical <User Schedule>  chlorhexidine 4% Liquid 1 Application(s) Topical <User Schedule>  dextrose 5%. 1000 milliLiter(s) IV Continuous <Continuous>  dextrose 5%. 1000 milliLiter(s) IV Continuous <Continuous>  dextrose 50% Injectable 12.5 Gram(s) IV Push once  dextrose 50% Injectable 25 Gram(s) IV Push once  dextrose 50% Injectable 25 Gram(s) IV Push once  dextrose Oral Gel 15 Gram(s) Oral once PRN  gabapentin 100 milliGRAM(s) Oral three times a day  glucagon  Injectable 1 milliGRAM(s) IntraMuscular once  insulin lispro (ADMELOG) corrective regimen sliding scale   SubCutaneous three times a day before meals  insulin lispro (ADMELOG) corrective regimen sliding scale   SubCutaneous at bedtime  magnesium oxide 400 milliGRAM(s) Oral three times a day with meals  melatonin 5 milliGRAM(s) Oral at bedtime PRN  methylPREDNISolone sodium succinate Injectable 40 milliGRAM(s) IV Push daily  metoprolol tartrate 25 milliGRAM(s) Oral two times a day  pantoprazole    Tablet 40 milliGRAM(s) Oral two times a day  piperacillin/tazobactam IVPB.. 3.375 Gram(s) IV Intermittent every 8 hours  polyethylene glycol 3350 17 Gram(s) Oral two times a day  senna 2 Tablet(s) Oral at bedtime  sodium chloride 0.9% lock flush 10 milliLiter(s) IV Push every 1 hour PRN  tiotropium 2.5 MICROgram(s) Inhaler 2 Puff(s) Inhalation daily      HEALTH ISSUES - PROBLEM Dx:    88F w/ COPD on Home O2 (recent admit requiring intubation), Hx of B/L Pleural Effusion ([id number], suspicious for Met Breast ca origin, LEFT PleurX July2023; patient deferred to family and for now patient not aware of concern for ca), HFrEF, Afib s/p Ablation on eliquis, Hx of Right Inguinal Abscess (July2023),Hx of EBSL E.coli Bacteremia(May2023), Hx of MRSA Foot Wound(Feb2023), Chronic Lymphedema/PAD, Sacral Decubitus ulcer (stage II, prior to admit), Chronic Right Enlarged Heterogenous Thyroid Gland presented with Acute Respiratory Failure with Hypoxia/Hypercapnia 2/2 COPD requiring intubation in the ER(8/28) and admitted to the ICU for further management. ICU course complicated by Sepsis/VAP (s/p 7d course of Vanc/Cefepime, per ID), Severe Anemia s/p 6U pRBC (no acute hemorrhage identified, GI & Heme consulted), Acute RUE Brachial DVT (Duplex on 9/11; off AC when occurred, Vascular consulted, HIT-PF4 Neg). Patient was extubated (9/10), Left IJ central line placed (9/12) due to poor IV access (now removed on floor 9/21, replaced w/ midline), and transferred to medicine service on 9/14. Upon transfer to medicine, patient started on patient-specific Heparin gtt (9/14) which was transitioned to Eliquis (9/19). Since transfer, course has been complicated by Aspiration pneumonitis 9/22 which was complicated by worsened Hypoxia and Afib with RVR and therefore the patient was transferred to [facility]. This morning, the patient has had significant improvement and feels well- plan to treat aspiration pneumonitis with 5d course of Zosyn, slow taper of steroid, dysphagia diet for now (note patient was tolerating regular diet up until event).      Problem/Plan - 1:  ·  Problem: Acute on chronic respiratory failure with hypoxia and hypercapnia.   ·  Plan: - Initial admit 2/2 worsened COPD and required intubation, was treated in ICU for VAP, then with Aspiration event (9/22)  - c/w treatment for aspiration as outlined below, symbicort, spiriva, albuterol prn  - Ventimask-->2.5L NC  - Chest X-Ray in am    Problem/Plan - 2:  ·  Problem: Aspiration pneumonitis.   ·  Plan: - aspiration event 9/22 when drinking soup (witnessed, accidental- no acute neuro deficits identified)  - s/p 5 days of abx  - Patient refusing ABG to further evaluate elevated Bicarb  - Decrease methylprednisolone 40mg q8-->q12-->qd  - c/w albuterol prn, given tachycardia caution use- appears steroids have opened up lung sufficiently for now.    Problem/Plan - 3:  ·  Problem: Atrial fibrillation with RVR, controlled  ·  Plan: rate controlled w/ metoprolol  eliquis dosing for dvt as noted below    Problem/Plan - 4:  ·  Problem: COPD with exacerbation.   ·  Plan: see above.    Problem/Plan - 5:  ·  Problem: Deep vein thrombosis (DVT) of brachial vein of right upper extremity.   ·  Plan: c/w eliquis 10mg BID x7-->5mg BID    Problem/Plan - 6:  ·  Problem: Normocytic anemia.   ·  Plan: in ICU required 6U pRBC  no hemorrhage identified in ICU, no signs of hemorrhage now  GI and Heme consulted in ICU  since transfer out of ICU, anemia has been stable while starting ac for DVT.    Problem/Plan - 7:  ·  Problem: Chronic systolic heart failure.   ·  Plan: charted to have previous HFrEF  TTE this admission w/ EF>40%; suspect patient has HFrecEF  c/w furosemide 40mg d, metoprolol.    Problem/Plan - 8:  ·  Problem: Alkalosis.   ·  Plan: suspect compensation for respiratory acidosis  - did not tolerate nocturnal bipap, patient did not want to trial during the day but may reconsider.    Problem/Plan - 9:  ·  Problem: Elevated alkaline phosphatase level.   ·  Plan: - no abdominal pain  - once stabilized may consider RUQ US  - appears developed during this hospitalization.    Problem/Plan - 10:  ·  Problem: Prophylactic measure.   ·  Plan; DVT ppx: Eliquis  GOC: DNR (see goc note in ICU), has had DNR rescinded multiple times w/ intubationx2 recently  Dispo: Dc planning for snf, f/u labs and am bicarb level on bmp, possible dc in 24-48 hours      Total time spent to complete patient's bedside assessment, review medical chart, discuss medical plan of care with covering medical team was ____35____ with > 50% of time spent face to face w/ patient, discussion with patient/family and/or coordination of care

## 2023-09-27 NOTE — PROGRESS NOTE ADULT - ASSESSMENT
MEDICATIONS  (STANDING):  albuterol    90 MICROgram(s) HFA Inhaler 2 Puff(s) Inhalation every 4 hours  apixaban 5 milliGRAM(s) Oral every 12 hours  budesonide 160 MICROgram(s)/formoterol 4.5 MICROgram(s) Inhaler 2 Puff(s) Inhalation two times a day  chlorhexidine 2% Cloths 1 Application(s) Topical <User Schedule>  chlorhexidine 4% Liquid 1 Application(s) Topical <User Schedule>  dextrose 5%. 1000 milliLiter(s) (100 mL/Hr) IV Continuous <Continuous>  dextrose 5%. 1000 milliLiter(s) (50 mL/Hr) IV Continuous <Continuous>  dextrose 50% Injectable 25 Gram(s) IV Push once  dextrose 50% Injectable 12.5 Gram(s) IV Push once  dextrose 50% Injectable 25 Gram(s) IV Push once  gabapentin 100 milliGRAM(s) Oral three times a day  glucagon  Injectable 1 milliGRAM(s) IntraMuscular once  insulin lispro (ADMELOG) corrective regimen sliding scale   SubCutaneous three times a day before meals  insulin lispro (ADMELOG) corrective regimen sliding scale   SubCutaneous at bedtime  magnesium oxide 400 milliGRAM(s) Oral three times a day with meals  methylPREDNISolone sodium succinate Injectable 40 milliGRAM(s) IV Push daily  metoprolol tartrate 25 milliGRAM(s) Oral two times a day  pantoprazole    Tablet 40 milliGRAM(s) Oral two times a day  polyethylene glycol 3350 17 Gram(s) Oral two times a day  senna 2 Tablet(s) Oral at bedtime  tiotropium 2.5 MICROgram(s) Inhaler 2 Puff(s) Inhalation daily    MEDICATIONS  (PRN):  acetaminophen     Tablet .. 650 milliGRAM(s) Oral every 6 hours PRN Temp greater or equal to 38C (100.4F), Mild Pain (1 - 3)  albuterol    90 MICROgram(s) HFA Inhaler 2 Puff(s) Inhalation every 6 hours PRN Shortness of Breath and/or Wheezing  dextrose Oral Gel 15 Gram(s) Oral once PRN Blood Glucose LESS THAN 70 milliGRAM(s)/deciliter  melatonin 5 milliGRAM(s) Oral at bedtime PRN Insomnia  sodium chloride 0.9% lock flush 10 milliLiter(s) IV Push every 1 hour PRN Pre/post blood products, medications, blood draw, and to maintain line patency      88F w/ COPD on Home O2 (recent admit requiring intubation), Hx of B/L Pleural Effusion ([id number], suspicious for Met Breast ca origin, LEFT PleurX July2023; patient deferred to family and for now patient not aware of concern for ca), HFrEF, Afib s/p Ablation on eliquis, Hx of Right Inguinal Abscess (July2023),Hx of EBSL E.coli Bacteremia(May2023), Hx of MRSA Foot Wound(Feb2023), Chronic Lymphedema/PAD, Sacral Decubitus ulcer (stage II, prior to admit), Chronic Right Enlarged Heterogenous Thyroid Gland presented with Acute Respiratory Failure with Hypoxia/Hypercapnia 2/2 COPD requiring intubation in the ER(8/28) and admitted to the ICU for further management. ICU course complicated by Sepsis/VAP (s/p 7d course of Vanc/Cefepime, per ID), Severe Anemia s/p 6U pRBC (no acute hemorrhage identified, GI & Heme consulted), Acute RUE Brachial DVT (Duplex on 9/11; off AC when occurred, Vascular consulted, HIT-PF4 Neg). Patient was extubated (9/10), Left IJ central line placed (9/12) due to poor IV access (now removed on floor 9/21, replaced w/ midline), and transferred to medicine service on 9/14. Upon transfer to medicine, patient started on patient-specific Heparin gtt (9/14) which was transitioned to Eliquis (9/19). Since transfer, course has been complicated by Aspiration pneumonitis 9/22 which was complicated by worsened Hypoxia and Afib with RVR and therefore the patient was transferred to [facility]. This morning, the patient has had significant improvement and feels well- plan to treat aspiration pneumonitis with 5d course of Zosyn, slow taper of steroid, dysphagia diet for now (note patient was tolerating regular diet up until event).      Acute on chronic respiratory failure with hypoxia and hypercapnia due to COPD exacerbation s/p intubation c/b Sepsis VAP subsequently c/b aspiration PNA completed abx course.   - completed abx for VAP  - extubated 9/10/2023   - c/w treatment for aspiration as outlined below, symbicort, spiriva, albuterol prn  - Ventimask-->2.5L NC  - Chest X-Ray in am  - taper steroids, - t methylprednisolone 40mg q8-->q12-->qd, change to prednisone taper 40 then 30 then 20 then 10 mg.     Aspiration PNA   - aspiration event 9/22 when drinking soup (witnessed, accidental- no acute neuro deficits identified)  - s/p 5 days of abx, cimpleted   - speech eval 9/25, Recommended regular/thin liquids.   - c/w albuterol prn, given tachycardia caution use- appears steroids have opened up lung sufficiently for now.    Atrial fibrillation with RVR, hx/o ablation.   - now rate controlled  - cont metoprolol 25 mg bid.   - eliquis dosing for dvt as noted below    COPD with exacerbation.   - on o2 prn.   - completed abx, tapering steroids, cont symbicort, and spiriva.     Deep vein thrombosis (DVT) of brachial vein of right upper extremity.   ·  Plan: s/p eliquis 10mg BID x7-->5mg BID    Normocytic anemia/iron def anemia  - iron studies reviewed. start ferrous sulfate one tablet daily.     · while in ICU received 6U pRBC  - no hemorrhage identified in ICU, no signs of hemorrhage now  - GI and Heme consulted in ICU  - hb stable.     Chronic Diastolic heart failure.   - charted to have previous HFrEF  - reviewed TTE this admission w/ EF 64%, severe pulmonary HTN.   - cont  furosemide 40mg , metoprolol.    Post-hypercarbia alkalosis.   - suspect compensation for respiratory acidosis  - did not tolerate nocturnal bipap, patient did not want to trial during the day but may reconsider.  - s/p treatment for PNA, and copd exacerbation.     R thyroid goiter   - stable, hx/o negative biopsies.      Recurrent malignant b/l pleural effusion, s/p left pleurX cathter   - previous cytology c/w malignancy of breast origin.   - pulmonary following.   - hematology OP Dr. Hyatt.     Elevated ALP ? metastasis   - check CA 27.29     DVT Px:   -  Eliquis    DNR    GOC (see goc note in ICU), has had DNR rescinded multiple times w/ intubationx2 recently  Dispo: Dc planning for STR.    MEDICATIONS  (STANDING):  albuterol    90 MICROgram(s) HFA Inhaler 2 Puff(s) Inhalation every 4 hours  apixaban 5 milliGRAM(s) Oral every 12 hours  budesonide 160 MICROgram(s)/formoterol 4.5 MICROgram(s) Inhaler 2 Puff(s) Inhalation two times a day  chlorhexidine 2% Cloths 1 Application(s) Topical <User Schedule>  chlorhexidine 4% Liquid 1 Application(s) Topical <User Schedule>  dextrose 5%. 1000 milliLiter(s) (100 mL/Hr) IV Continuous <Continuous>  dextrose 5%. 1000 milliLiter(s) (50 mL/Hr) IV Continuous <Continuous>  dextrose 50% Injectable 25 Gram(s) IV Push once  dextrose 50% Injectable 12.5 Gram(s) IV Push once  dextrose 50% Injectable 25 Gram(s) IV Push once  gabapentin 100 milliGRAM(s) Oral three times a day  glucagon  Injectable 1 milliGRAM(s) IntraMuscular once  insulin lispro (ADMELOG) corrective regimen sliding scale   SubCutaneous three times a day before meals  insulin lispro (ADMELOG) corrective regimen sliding scale   SubCutaneous at bedtime  magnesium oxide 400 milliGRAM(s) Oral three times a day with meals  methylPREDNISolone sodium succinate Injectable 40 milliGRAM(s) IV Push daily  metoprolol tartrate 25 milliGRAM(s) Oral two times a day  pantoprazole    Tablet 40 milliGRAM(s) Oral two times a day  polyethylene glycol 3350 17 Gram(s) Oral two times a day  senna 2 Tablet(s) Oral at bedtime  tiotropium 2.5 MICROgram(s) Inhaler 2 Puff(s) Inhalation daily    MEDICATIONS  (PRN):  acetaminophen     Tablet .. 650 milliGRAM(s) Oral every 6 hours PRN Temp greater or equal to 38C (100.4F), Mild Pain (1 - 3)  albuterol    90 MICROgram(s) HFA Inhaler 2 Puff(s) Inhalation every 6 hours PRN Shortness of Breath and/or Wheezing  dextrose Oral Gel 15 Gram(s) Oral once PRN Blood Glucose LESS THAN 70 milliGRAM(s)/deciliter  melatonin 5 milliGRAM(s) Oral at bedtime PRN Insomnia  sodium chloride 0.9% lock flush 10 milliLiter(s) IV Push every 1 hour PRN Pre/post blood products, medications, blood draw, and to maintain line patency      88F w/ COPD on Home O2 (recent admit requiring intubation), Hx of B/L Pleural Effusion ([id number], suspicious for Met Breast ca origin, LEFT PleurX July2023; patient deferred to family and for now patient not aware of concern for ca), HFrEF, Afib s/p Ablation on eliquis, Hx of Right Inguinal Abscess (July2023),Hx of EBSL E.coli Bacteremia(May2023), Hx of MRSA Foot Wound(Feb2023), Chronic Lymphedema/PAD, Sacral Decubitus ulcer (stage II, prior to admit), Chronic Right Enlarged Heterogenous Thyroid Gland presented with Acute Respiratory Failure with Hypoxia/Hypercapnia 2/2 COPD requiring intubation in the ER(8/28) and admitted to the ICU for further management. ICU course complicated by Sepsis/VAP (s/p 7d course of Vanc/Cefepime, per ID), Severe Anemia s/p 6U pRBC (no acute hemorrhage identified, GI & Heme consulted), Acute RUE Brachial DVT (Duplex on 9/11; off AC when occurred, Vascular consulted, HIT-PF4 Neg). Patient was extubated (9/10), Left IJ central line placed (9/12) due to poor IV access (now removed on floor 9/21, replaced w/ midline), and transferred to medicine service on 9/14. Upon transfer to medicine, patient started on patient-specific Heparin gtt (9/14) which was transitioned to Eliquis (9/19). Since transfer, course has been complicated by Aspiration pneumonitis 9/22 which was complicated by worsened Hypoxia and Afib with RVR and therefore the patient was transferred to [facility]. This morning, the patient has had significant improvement and feels well- plan to treat aspiration pneumonitis with 5d course of Zosyn, slow taper of steroid, dysphagia diet for now (note patient was tolerating regular diet up until event).      Acute on chronic respiratory failure with hypoxia and hypercapnia due to COPD exacerbation s/p intubation c/b Sepsis VAP subsequently c/b aspiration PNA completed abx course.   - completed abx for VAP  - extubated 9/10/2023   - c/w treatment for aspiration as outlined below, symbicort, spiriva, albuterol prn  - Ventimask-->2.5L NC  - Chest X-Ray in am  - taper steroids, - t methylprednisolone 40mg q8-->q12-->qd, change to prednisone taper 40 then 30 then 20 then 10 mg.     Aspiration PNA   - aspiration event 9/22 when drinking soup (witnessed, accidental- no acute neuro deficits identified)  - s/p 5 days of abx, cimpleted   - speech eval 9/25, Recommended regular/thin liquids.   - c/w albuterol prn, given tachycardia caution use- appears steroids have opened up lung sufficiently for now.    Atrial fibrillation with RVR, hx/o ablation.   - now rate controlled  - cont metoprolol 25 mg bid.   - eliquis dosing for dvt as noted below      COPD with exacerbation.   - on o2 prn.   - completed abx, tapering steroids, cont symbicort, and spiriva.     Deep vein thrombosis (DVT) of brachial vein of right upper extremity.   ·  Plan: s/p eliquis 10mg BID x7-->5mg BID    YAIMA   - resolved with IV fluids.    Normocytic anemia/iron def anemia  - iron studies reviewed. start ferrous sulfate one tablet daily.     · while in ICU received 6U pRBC  - no hemorrhage identified in ICU, no signs of hemorrhage now  - GI and Heme consulted in ICU  - hb stable.     Chronic Diastolic heart failure.   - charted to have previous HFrEF  - reviewed TTE this admission w/ EF 64%, severe pulmonary HTN.   - cont  furosemide 40mg , metoprolol.    Post-hypercarbia alkalosis.   - suspect compensation for respiratory acidosis  - did not tolerate nocturnal bipap, patient did not want to trial during the day but may reconsider.  - s/p treatment for PNA, and copd exacerbation.     R thyroid goiter   - stable, hx/o negative biopsies.      Recurrent malignant b/l pleural effusion, s/p left pleurX cathter   - previous cytology c/w malignancy of breast origin.   - pulmonary following.   - hematology OP Dr. Hyatt.     Elevated ALP ? metastasis   - check CA 27.29     DVT Px:   -  Eliquis    DNR    GOC (see goc note in ICU), has had DNR rescinded multiple times w/ intubationx2 recently  Dispo: Dc planning for STR.

## 2023-09-27 NOTE — PROGRESS NOTE ADULT - SUBJECTIVE AND OBJECTIVE BOX
GIOVANNY [id number]    Subjective/Interval History   - chronic cough.   - no SOB.   - generalized weakness.   - on o2     ROS  - no fever.     PHYSICAL EXAM  Vital Signs Last 24 Hrs  T(C): 36.1 (27 Sep 2023 14:00), Max: 36.1 (27 Sep 2023 14:00)  T(F): 97 (27 Sep 2023 14:00), Max: 97 (27 Sep 2023 14:00)  HR: 105 (27 Sep 2023 14:00) (98 - 105)  BP: 148/71 (27 Sep 2023 14:00) (102/65 - 148/71)  BP(mean): --  RR: 18 (27 Sep 2023 14:00) (16 - 18)  SpO2: 95% (27 Sep 2023 14:00) (95% - 100%)    Parameters below as of 27 Sep 2023 05:03  Patient On (Oxygen Delivery Method): room air    GA : AAOX3, NAD   HEENT: PERRLA, EOMI  NECK: no JVD, no thyromegaly   CVS: S1 S2 no murmur no rubs no gallop  RESP: CTAB no wheeze, no rhonchi no rales  ABD: Soft, NT, ND, tympanic, no rebound or guarding   EXT; trace pedal edema   NEURO: AAOX3, no new focal deficits     LABS/ IMAGING                        9.8    11.95 )-----------( 330      ( 27 Sep 2023 08:02 )             32.0         09-27    142  |  97<L>  |  36<H>  ----------------------------<  104<H>  5.1<H>   |  37<H>  |  0.9    Ca    8.7      27 Sep 2023 08:02      CAPILLARY BLOOD GLUCOSE      POCT Blood Glucose.: 206 mg/dL (27 Sep 2023 16:24)  POCT Blood Glucose.: 144 mg/dL (27 Sep 2023 11:53)  POCT Blood Glucose.: 105 mg/dL (27 Sep 2023 07:50)  POCT Blood Glucose.: 112 mg/dL (26 Sep 2023 20:49)    Urinalysis Basic - ( 27 Sep 2023 08:02 )    Color: x / Appearance: x / SG: x / pH: x  Gluc: 104 mg/dL / Ketone: x  / Bili: x / Urobili: x   Blood: x / Protein: x / Nitrite: x   Leuk Esterase: x / RBC: x / WBC x   Sq Epi: x / Non Sq Epi: x / Bacteria: x

## 2023-09-28 NOTE — CHART NOTE - NSCHARTNOTEFT_GEN_A_CORE
Registered Dietitian Follow-Up     Patient Profile Reviewed                           Yes [x]   No []     Nutrition History Previously Obtained        Yes [x]  No []       Pertinent Subjective Information:  Patient reports fair appetite and PO intake at this morning's breakfast meal. RD discussed oral nutrition supplements - Patient agreeable to receive.     Pertinent Medical Interventions:  Acute on chronic respiratory failure with hypoxia and hypercapnia due to COPD exacerbation s/p intubation c/b Sepsis VAP subsequently c/b aspiration PNA completed abx course; Aspiration PNA - aspiration event  when drinking soup (witnessed, accidental - no acute neuro deficits identified) - speech eval  - recommended regular / thin liquids; Afib; COPD with exacerbation; DVT; YAIMA - resolved with IV fluids; Normocytic anemia / iron def anemia; Chronic diastolic heart failure; Post-hypercarbia alkalosis; R thyroid goiter; Recurrent malignant b/l pleural effusion, s/p left pleurX catheter - previous cytology c/w malignancy of breast origin; Elevated ALP? metastasis; GOC (see goc note in ICU), has had DNR rescinded multiple times w/ intubationx2 recently  Dispo planning for STR.    Diet order:   Diet, Regular:   Consistent Carbohydrate {No Snacks}  DASH/TLC {Sodium & Cholesterol Restricted} (- @ 07:54) [Active]    Anthropometrics:  Height: 170.2 cm   Weight: 87.8 kg   BMI: 30.3   IBW: 61.4 kg     Daily Weight in k.2 (), Weight in k (), Weight in k.5 (), Weight in k.4 (), Weight in k.4 (), Weight in k.1 ()    MEDICATIONS  (STANDING):  albuterol    90 MICROgram(s) HFA Inhaler 2 Puff(s) Inhalation every 4 hours  apixaban 5 milliGRAM(s) Oral every 12 hours  budesonide 160 MICROgram(s)/formoterol 4.5 MICROgram(s) Inhaler 2 Puff(s) Inhalation two times a day  chlorhexidine 2% Cloths 1 Application(s) Topical <User Schedule>  chlorhexidine 4% Liquid 1 Application(s) Topical <User Schedule>  dextrose 5%. 1000 milliLiter(s) (100 mL/Hr) IV Continuous <Continuous>  dextrose 5%. 1000 milliLiter(s) (50 mL/Hr) IV Continuous <Continuous>  dextrose 50% Injectable 12.5 Gram(s) IV Push once  dextrose 50% Injectable 25 Gram(s) IV Push once  dextrose 50% Injectable 25 Gram(s) IV Push once  ferrous    sulfate 325 milliGRAM(s) Oral daily  gabapentin 100 milliGRAM(s) Oral three times a day  glucagon  Injectable 1 milliGRAM(s) IntraMuscular once  insulin lispro (ADMELOG) corrective regimen sliding scale   SubCutaneous three times a day before meals  insulin lispro (ADMELOG) corrective regimen sliding scale   SubCutaneous at bedtime  magnesium oxide 400 milliGRAM(s) Oral three times a day with meals  metoprolol tartrate 25 milliGRAM(s) Oral two times a day  pantoprazole    Tablet 40 milliGRAM(s) Oral two times a day  polyethylene glycol 3350 17 Gram(s) Oral two times a day  predniSONE   Tablet 40 milliGRAM(s) Oral daily  senna 2 Tablet(s) Oral at bedtime  tiotropium 2.5 MICROgram(s) Inhaler 2 Puff(s) Inhalation daily    MEDICATIONS  (PRN):  acetaminophen     Tablet .. 650 milliGRAM(s) Oral every 6 hours PRN Temp greater or equal to 38C (100.4F), Mild Pain (1 - 3)  albuterol    90 MICROgram(s) HFA Inhaler 2 Puff(s) Inhalation every 6 hours PRN Shortness of Breath and/or Wheezing  dextrose Oral Gel 15 Gram(s) Oral once PRN Blood Glucose LESS THAN 70 milliGRAM(s)/deciliter  melatonin 5 milliGRAM(s) Oral at bedtime PRN Insomnia  sodium chloride 0.9% lock flush 10 milliLiter(s) IV Push every 1 hour PRN Pre/post blood products, medications, blood draw, and to maintain line patency    Pertinent Labs:  @ 07:32: Na 140, BUN 36<H>, Cr 0.9, BG 87, K+ 5.1<H>, Phos --, Mg --, Alk Phos --, ALT/SGPT --, AST/SGOT --, HbA1c --    Finger Sticks:  POCT Blood Glucose.: 128 mg/dL ( @ 11:28)  POCT Blood Glucose.: 91 mg/dL ( @ 07:42)  POCT Blood Glucose.: 85 mg/dL ( @ 20:57)  POCT Blood Glucose.: 206 mg/dL ( @ 16:24)    Physical Findings:  - Appearance: AAOx4  - GI function: last BM documented   - Tubes: n/a - no feeding tubes   - Oral/Mouth cavity: regular texture/thin liquids   - Skin: R wrist skin tear   - Edema: 3+ edema - right foot, right ankle, left foot, left ankle     Nutrition Requirements:  Weight Used: 84.1 kg      Estimated Energy Needs    Continue [x]  Adjust []  [id number] kcals (20-25 kcal/kg/BW)    Estimated Protein Needs    Continue [x]  Adjust []  92-122g protein (1.5-2.0g/kg/IBW)    Estimated Fluid Needs        Continue [x]  Adjust []  1mL/kcal      Nutrient Intake: 50-75% PO intake of meals      [x] No active nutrition diagnosis identified at this time     Nutrition Intervention:  meals and snacks, medical food supplements, coordination of care     Goal/Expected Outcome:   PO intake >75% of meals and supplement within 5- 7 days      Indicator/Monitoring:   energy intake, weight, labs, skin status, NFPE      Recommendation:  1) Continue current diet order  2) Recommend to add Ensure Max 2x/day (150 kcal, 30 gm Protein each) and Prosource Gelatein 20 SF 2x/day (80-90 kcal, 20 gm Protein each) to optimize kcal and protein intake   3) Encouragement and assistance as needed with all meals, snacks, supplement    Patient is at high nutrition risk, RD to f/u in 5-7 days or PRN if not discharged    RD to remain available: Cheryl Gutierrez RD [phone] or via teams

## 2023-09-28 NOTE — CHART NOTE - NSCHARTNOTESELECT_GEN_ALL_CORE
Event Note
MED PA/Event Note
Nutrition - RD Follow Up/Event Note
Palliative Care - Social Work/Event Note
Event Note
Hypomagnesimia
MED PA/Event Note
Nutrition - RD Follow Up/Event Note
Pallcare/Event Note
Palliative Care - Sign Off/Event Note
Palliative Care - Social Work/Event Note
Transfer Note
elevated PTT

## 2023-09-28 NOTE — PROGRESS NOTE ADULT - ASSESSMENT
MEDICATIONS  (STANDING):  albuterol    90 MICROgram(s) HFA Inhaler 2 Puff(s) Inhalation every 4 hours  apixaban 5 milliGRAM(s) Oral every 12 hours  budesonide 160 MICROgram(s)/formoterol 4.5 MICROgram(s) Inhaler 2 Puff(s) Inhalation two times a day  chlorhexidine 2% Cloths 1 Application(s) Topical <User Schedule>  chlorhexidine 4% Liquid 1 Application(s) Topical <User Schedule>  dextrose 5%. 1000 milliLiter(s) (100 mL/Hr) IV Continuous <Continuous>  dextrose 5%. 1000 milliLiter(s) (50 mL/Hr) IV Continuous <Continuous>  dextrose 50% Injectable 25 Gram(s) IV Push once  dextrose 50% Injectable 12.5 Gram(s) IV Push once  dextrose 50% Injectable 25 Gram(s) IV Push once  ferrous    sulfate 325 milliGRAM(s) Oral daily  gabapentin 100 milliGRAM(s) Oral three times a day  glucagon  Injectable 1 milliGRAM(s) IntraMuscular once  insulin lispro (ADMELOG) corrective regimen sliding scale   SubCutaneous three times a day before meals  insulin lispro (ADMELOG) corrective regimen sliding scale   SubCutaneous at bedtime  magnesium oxide 400 milliGRAM(s) Oral three times a day with meals  metoprolol tartrate 25 milliGRAM(s) Oral two times a day  pantoprazole    Tablet 40 milliGRAM(s) Oral two times a day  polyethylene glycol 3350 17 Gram(s) Oral two times a day  predniSONE   Tablet 40 milliGRAM(s) Oral daily  senna 2 Tablet(s) Oral at bedtime  tiotropium 2.5 MICROgram(s) Inhaler 2 Puff(s) Inhalation daily    MEDICATIONS  (PRN):  acetaminophen     Tablet .. 650 milliGRAM(s) Oral every 6 hours PRN Temp greater or equal to 38C (100.4F), Mild Pain (1 - 3)  albuterol    90 MICROgram(s) HFA Inhaler 2 Puff(s) Inhalation every 6 hours PRN Shortness of Breath and/or Wheezing  dextrose Oral Gel 15 Gram(s) Oral once PRN Blood Glucose LESS THAN 70 milliGRAM(s)/deciliter  melatonin 5 milliGRAM(s) Oral at bedtime PRN Insomnia  sodium chloride 0.9% lock flush 10 milliLiter(s) IV Push every 1 hour PRN Pre/post blood products, medications, blood draw, and to maintain line patency        88F w/ COPD on Home O2 (recent admit requiring intubation), Hx of B/L Pleural Effusion ([id number], suspicious for Met Breast ca origin, LEFT PleurX July2023; patient deferred to family and for now patient not aware of concern for ca), HFrEF, Afib s/p Ablation on eliquis, Hx of Right Inguinal Abscess (July2023),Hx of EBSL E.coli Bacteremia(May2023), Hx of MRSA Foot Wound(Feb2023), Chronic Lymphedema/PAD, Sacral Decubitus ulcer (stage II, prior to admit), Chronic Right Enlarged Heterogenous Thyroid Gland presented with Acute Respiratory Failure with Hypoxia/Hypercapnia 2/2 COPD requiring intubation in the ER(8/28) and admitted to the ICU for further management. ICU course complicated by Sepsis/VAP (s/p 7d course of Vanc/Cefepime, per ID), Severe Anemia s/p 6U pRBC (no acute hemorrhage identified, GI & Heme consulted), Acute RUE Brachial DVT (Duplex on 9/11; off AC when occurred, Vascular consulted, HIT-PF4 Neg). Patient was extubated (9/10), Left IJ central line placed (9/12) due to poor IV access (now removed on floor 9/21, replaced w/ midline), and transferred to medicine service on 9/14. Upon transfer to medicine, patient started on patient-specific Heparin gtt (9/14) which was transitioned to Eliquis (9/19). Since transfer, course has been complicated by Aspiration pneumonitis 9/22 which was complicated by worsened Hypoxia and Afib with RVR and therefore the patient was transferred to [facility]. This morning, the patient has had significant improvement and feels well- plan to treat aspiration pneumonitis with 5d course of Zosyn, slow taper of steroid, dysphagia diet for now (note patient was tolerating regular diet up until event).    Assessment and Plan     Acute on chronic respiratory failure with hypoxia and hypercapnia due to COPD exacerbation s/p intubation c/b Sepsis VAP subsequently c/b aspiration PNA completed abx course.   - completed abx for VAP  - extubated 9/10/2023   - c/w treatment for aspiration as outlined below, symbicort, spiriva, albuterol prn  - Ventimask-->2.5L NC (cont home o2)   - taper steroids,  methylprednisolone 40mg q8-->q12-->qd, change to prednisone taper to 30 then 20 then 10 mg.     Aspiration PNA   - aspiration event 9/22 when drinking soup (witnessed, accidental- no acute neuro deficits identified)  - s/p 5 days of abx, cimpleted   - speech eval 9/25, Recommended regular/thin liquids.   - c/w albuterol prn, given tachycardia caution use- appears steroids have opened up lung sufficiently for now.    Atrial fibrillation with RVR, hx/o ablation.   - now rate controlled  - cont metoprolol 25 mg bid.   - eliquis dosing for dvt as noted below    COPD with exacerbation.   - on o2 prn.   - completed abx, tapering steroids, cont symbicort, and spiriva.     Deep vein thrombosis (DVT) of brachial vein of right upper extremity.   ·  Plan: s/p eliquis 10mg BID x7-->5mg BID    YAIMA   - resolved with IV fluids.    Normocytic anemia/iron def anemia  - iron studies reviewed. start ferrous sulfate one tablet daily.     · while in ICU received 6U pRBC  - no hemorrhage identified in ICU, no signs of hemorrhage now  - GI and Heme consulted in ICU  - hb stable.     Chronic Diastolic heart failure, hx/o  HFrEF  - reviewed TTE this admission w/ EF 64%, severe pulmonary HTN.   - cont  furosemide 40mg , metoprolol.    Post-hypercarbia alkalosis.   - suspect compensation for respiratory acidosis  - did not tolerate nocturnal bipap, patient did not want to trial during the day but may reconsider.  - s/p treatment for PNA, and copd exacerbation.     R thyroid goiter   - stable, hx/o negative biopsies.      Recurrent malignant b/l pleural effusion, s/p left pleurX cathter with chronic respiratory failure.   - previous cytology c/w malignancy of breast origin.   - pulmonary following.   - hematology OP Dr. Hyatt.   - CA 27.29 Pending     Elevated ALP ? metastasis   - check CA 27.29     DVT Px:   -  Eliquis    DNR    GOC (see goc note in ICU), has had DNR rescinded multiple times w/ intubationx2 recently  Dispo: Dc planning for STR in am.

## 2023-09-28 NOTE — PROGRESS NOTE ADULT - SUBJECTIVE AND OBJECTIVE BOX
GIOVANNY Mago    Subjective/Interval History   - SOB stable, dry cough.     ROS  - No fever.     PHYSICAL EXAM  Vital Signs Last 24 Hrs  T(C): 36.8 (28 Sep 2023 17:04), Max: 36.9 (28 Sep 2023 13:34)  T(F): 98.3 (28 Sep 2023 17:04), Max: 98.4 (28 Sep 2023 13:34)  HR: 104 (28 Sep 2023 17:04) (99 - 110)  BP: 139/65 (28 Sep 2023 17:04) (116/73 - 139/65)  BP(mean): --  RR: 17 (28 Sep 2023 17:04) (16 - 18)  SpO2: 98% (28 Sep 2023 17:04) (96% - 100%)    Parameters below as of 28 Sep 2023 17:04  Patient On (Oxygen Delivery Method): nasal cannula    GA : AAOX3, NAD   HEENT: PERRLA, EOMI  NECK: no JVD, no thyromegaly, R thyroid goiter.    CVS: S1 S2 no murmur no rubs no gallop  RESP: CTAB no wheeze, no rhonchi, mild bibasilar rales, left pleurX catheter.   ABD: Soft, NT, ND, tympanic, no rebound or guarding   EXT; trace b/l  pedal edema,    NEURO: AAOX3, no new focal deficits     LABS/ IMAGING                        9.7    12.17 )-----------( 338      ( 28 Sep 2023 07:32 )             31.2         09-28    140  |  98  |  36<H>  ----------------------------<  87  5.1<H>   |  40<H>  |  0.9    Ca    8.8      28 Sep 2023 07:32      CAPILLARY BLOOD GLUCOSE      POCT Blood Glucose.: 165 mg/dL (28 Sep 2023 16:40)  POCT Blood Glucose.: 128 mg/dL (28 Sep 2023 11:28)  POCT Blood Glucose.: 91 mg/dL (28 Sep 2023 07:42)  POCT Blood Glucose.: 85 mg/dL (27 Sep 2023 20:57)    Urinalysis Basic - ( 28 Sep 2023 07:32 )    Color: x / Appearance: x / SG: x / pH: x  Gluc: 87 mg/dL / Ketone: x  / Bili: x / Urobili: x   Blood: x / Protein: x / Nitrite: x   Leuk Esterase: x / RBC: x / WBC x   Sq Epi: x / Non Sq Epi: x / Bacteria: x

## 2023-09-29 NOTE — DISCHARGE NOTE PROVIDER - ATTENDING DISCHARGE PHYSICAL EXAMINATION:
VITALS:   T(C): 36.4 (09-29-23 @ 14:50), Max: 36.6 (09-29-23 @ 05:17)  HR: 107 (09-29-23 @ 14:50) (106 - 107)  BP: 144/77 (09-29-23 @ 14:50) (134/84 - 165/69)  RR: 18 (09-29-23 @ 14:50) (18 - 18)  SpO2: 100% (09-29-23 @ 05:17) (96% - 100%)    GENERAL: AAOx3   HEAD:  Atraumatic, Normocephalic  EYES: EOMI, PERRLA, no pallor   ENT:  normal oropharynx , no thyromegaly   NECK: Supple, No JVD, right thyroid goiter  CHEST/LUNG:  mild bibasilar crackles, no wheeze, left pleurX catheter    HEART:  S1 S2 RRR ; No murmurs or rubs   ABDOMEN: Soft, nontender, nondistended, BS wnl   EXTREMITIES:  trace pedal edema  NERVOUS SYSTEM:  A&Ox3, no focal deficits

## 2023-09-29 NOTE — PROGRESS NOTE ADULT - ASSESSMENT
MEDICATIONS  (STANDING):  albuterol    90 MICROgram(s) HFA Inhaler 2 Puff(s) Inhalation every 4 hours  apixaban 5 milliGRAM(s) Oral every 12 hours  budesonide 160 MICROgram(s)/formoterol 4.5 MICROgram(s) Inhaler 2 Puff(s) Inhalation two times a day  chlorhexidine 2% Cloths 1 Application(s) Topical <User Schedule>  chlorhexidine 4% Liquid 1 Application(s) Topical <User Schedule>  dextrose 5%. 1000 milliLiter(s) (100 mL/Hr) IV Continuous <Continuous>  dextrose 5%. 1000 milliLiter(s) (50 mL/Hr) IV Continuous <Continuous>  dextrose 50% Injectable 25 Gram(s) IV Push once  dextrose 50% Injectable 12.5 Gram(s) IV Push once  dextrose 50% Injectable 25 Gram(s) IV Push once  ferrous    sulfate 325 milliGRAM(s) Oral daily  gabapentin 100 milliGRAM(s) Oral three times a day  glucagon  Injectable 1 milliGRAM(s) IntraMuscular once  insulin lispro (ADMELOG) corrective regimen sliding scale   SubCutaneous three times a day before meals  insulin lispro (ADMELOG) corrective regimen sliding scale   SubCutaneous at bedtime  magnesium oxide 400 milliGRAM(s) Oral three times a day with meals  metoprolol tartrate 25 milliGRAM(s) Oral two times a day  pantoprazole    Tablet 40 milliGRAM(s) Oral two times a day  polyethylene glycol 3350 17 Gram(s) Oral two times a day  predniSONE   Tablet 40 milliGRAM(s) Oral daily  senna 2 Tablet(s) Oral at bedtime  tiotropium 2.5 MICROgram(s) Inhaler 2 Puff(s) Inhalation daily    MEDICATIONS  (PRN):  acetaminophen     Tablet .. 650 milliGRAM(s) Oral every 6 hours PRN Temp greater or equal to 38C (100.4F), Mild Pain (1 - 3)  albuterol    90 MICROgram(s) HFA Inhaler 2 Puff(s) Inhalation every 6 hours PRN Shortness of Breath and/or Wheezing  dextrose Oral Gel 15 Gram(s) Oral once PRN Blood Glucose LESS THAN 70 milliGRAM(s)/deciliter  melatonin 5 milliGRAM(s) Oral at bedtime PRN Insomnia  sodium chloride 0.9% lock flush 10 milliLiter(s) IV Push every 1 hour PRN Pre/post blood products, medications, blood draw, and to maintain line patency        88F w/ COPD on Home O2 (recent admit requiring intubation), Hx of B/L Pleural Effusion ([id number], suspicious for Met Breast ca origin, LEFT PleurX July2023; patient deferred to family and for now patient not aware of concern for ca), HFrEF, Afib s/p Ablation on eliquis, Hx of Right Inguinal Abscess (July2023),Hx of EBSL E.coli Bacteremia(May2023), Hx of MRSA Foot Wound(Feb2023), Chronic Lymphedema/PAD, Sacral Decubitus ulcer (stage II, prior to admit), Chronic Right Enlarged Heterogenous Thyroid Gland presented with Acute Respiratory Failure with Hypoxia/Hypercapnia 2/2 COPD requiring intubation in the ER(8/28) and admitted to the ICU for further management. ICU course complicated by Sepsis/VAP (s/p 7d course of Vanc/Cefepime, per ID), Severe Anemia s/p 6U pRBC (no acute hemorrhage identified, GI & Heme consulted), Acute RUE Brachial DVT (Duplex on 9/11; off AC when occurred, Vascular consulted, HIT-PF4 Neg). Patient was extubated (9/10), Left IJ central line placed (9/12) due to poor IV access (now removed on floor 9/21, replaced w/ midline), and transferred to medicine service on 9/14. Upon transfer to medicine, patient started on patient-specific Heparin gtt (9/14) which was transitioned to Eliquis (9/19). Since transfer, course has been complicated by Aspiration pneumonitis 9/22 which was complicated by worsened Hypoxia and Afib with RVR and therefore the patient was transferred to [facility]. This morning, the patient has had significant improvement and feels well- plan to treat aspiration pneumonitis with 5d course of Zosyn, slow taper of steroid, dysphagia diet for now (note patient was tolerating regular diet up until event).    Assessment and Plan     Acute on chronic respiratory failure with hypoxia and hypercapnia due to COPD exacerbation s/p intubation c/b Sepsis VAP subsequently c/b aspiration PNA completed abx course.   - completed abx for VAP  - extubated 9/10/2023   - c/w treatment for aspiration as outlined below, symbicort, spiriva, albuterol prn  - Ventimask-->2.5L NC (cont home o2)   - taper steroids,  methylprednisolone 40mg q8-->q12-->qd, change to prednisone taper to 30 then 20 then 10 mg.     Aspiration PNA   - aspiration event 9/22 when drinking soup (witnessed, accidental- no acute neuro deficits identified)  - s/p 5 days of abx, cimpleted   - speech eval 9/25, Recommended regular/thin liquids.   - c/w albuterol prn, given tachycardia caution use- appears steroids have opened up lung sufficiently for now.    Atrial fibrillation with RVR, hx/o ablation.   - now rate controlled  - cont metoprolol 25 mg bid.   - eliquis.     COPD with exacerbation.   - on o2 prn.   - completed abx, tapering steroids, cont symbicort, and spiriva.     Deep vein thrombosis (DVT) of brachial vein of right upper extremity.   ·  Plan: s/p eliquis 10mg BID x7-->5mg BID    YAIMA   - resolved with IV fluids.    Normocytic anemia/iron def anemia    - iron studies reviewed. start ferrous sulfate one tablet daily.     · while in ICU received 6U pRBC  - no hemorrhage identified in ICU, no signs of hemorrhage now  - GI and Heme consulted in ICU  - hb stable.     Recurrent malignant b/l pleural effusion, s/p left pleurX cathter with chronic respiratory failure exacerbated by IVF resuscitation.   - s/p IV diuresis tapered to oral lasix.   - previous cytology c/w malignancy of breast origin.   - pulmonary following.   - hematology OP Dr. Hyatt.   - CA 27.29 elevated  c/w breast malignancy. needs PET/CT scan.     Metabolic alkalosis due to Post-hypercarbia alkalosis and contraction metabolic alkalosis.    - did not tolerate nocturnal bipap, patient did not want to trial during the day but may reconsider.  - s/p treatment for PNA, and copd exacerbation.     Chronic Diastolic heart failure, hx/o  HFrEF  - reviewed TTE this admission w/ EF 64%, severe pulmonary HTN.   - cont  furosemide change to 20 mg po daily.  , metoprolol.    R thyroid goiter   - stable, hx/o negative biopsies.       Elevated ALP ? metastasis   - check CA 27.29     DVT Px:   -  Eliquis    DNR.     GOC (see goc note in ICU), has had DNR rescinded multiple times w/ intubationx2 recently     Dispo: DC planning for STR in am.

## 2023-09-29 NOTE — DISCHARGE NOTE PROVIDER - CARE PROVIDER_API CALL
Barron King  Internal Medicine  [address] [place] [address]  Phone: [phone]  Fax: [phone]  Follow Up Time: 2 weeks    Carlotta Hyatt  Hematology  [address] [place] [id number]  Phone: [phone]  Fax: [phone]  Follow Up Time: 1 month

## 2023-09-29 NOTE — PROGRESS NOTE ADULT - SUBJECTIVE AND OBJECTIVE BOX
GIOVANNY [id number]    Subjective/Interval History   -chronic mild cough   - not SOB.    ROS  - denies fever.     PHYSICAL EXAM  Vital Signs Last 24 Hrs  T(C): 37 (30 Sep 2023 04:35), Max: 37 (30 Sep 2023 04:35)  T(F): 98.6 (30 Sep 2023 04:35), Max: 98.6 (30 Sep 2023 04:35)  HR: 87 (30 Sep 2023 04:35) (87 - 107)  BP: 131/71 (30 Sep 2023 04:35) (130/60 - 144/77)  BP(mean): --  RR: 18 (30 Sep 2023 04:35) (18 - 18)  SpO2: 100% (30 Sep 2023 04:35) (100% - 100%)    Parameters below as of 30 Sep 2023 04:35  Patient On (Oxygen Delivery Method): nasal cannula  O2 Flow (L/min): 2    GA : AAOX3, NAD   HEENT: PERRLA, EOMI  NECK: no JVD, no thyromegaly   CVS: S1 S2 no murmur no rubs no gallop  RESP: CTAB no wheeze, no rhonchi, bibasilar rales, left pleurX catheter   ABD: Soft, NT, ND, tympanic, no rebound or guarding    EXT; +1 b/l  pedal edema, no cyanosis  NEURO: AAOX3, no new focal deficits     LABS/ IMAGING                        9.6    13.77 )-----------( 377      ( 29 Sep 2023 04:30 )             31.8         09-29    142  |  100  |  36<H>  ----------------------------<  91  5.5<H>   |  32  |  0.9    Ca    9.0      29 Sep 2023 04:30    TPro  5.8<L>  /  Alb  3.1<L>  /  TBili  0.6  /  DBili  x   /  AST  28  /  ALT  31  /  AlkPhos  207<H>  09-29    CAPILLARY BLOOD GLUCOSE      POCT Blood Glucose.: 101 mg/dL (30 Sep 2023 07:23)  POCT Blood Glucose.: 127 mg/dL (29 Sep 2023 20:27)  POCT Blood Glucose.: 184 mg/dL (29 Sep 2023 16:42)  POCT Blood Glucose.: 137 mg/dL (29 Sep 2023 11:44)    Urinalysis Basic - ( 29 Sep 2023 04:30 )    Color: x / Appearance: x / SG: x / pH: x  Gluc: 91 mg/dL / Ketone: x  / Bili: x / Urobili: x   Blood: x / Protein: x / Nitrite: x   Leuk Esterase: x / RBC: x / WBC x   Sq Epi: x / Non Sq Epi: x / Bacteria: x      LIVER FUNCTIONS - ( 29 Sep 2023 04:30 )  Alb: 3.1 g/dL / Pro: 5.8 g/dL / ALK PHOS: 207 U/L / ALT: 31 U/L / AST: 28 U/L / GGT: x

## 2023-09-29 NOTE — DISCHARGE NOTE PROVIDER - HOSPITAL COURSE
HPI:  This is an 87yo W female with a past medical history significant  Pt comes to ER w/ worsening dyspnea. Pt has advanced COPD on home O2.  . Pt was @ [facility] 7/3/23   in respiratory failure due to COPD exacerbation. pt was intubated and went to ICU,  In ICU pt was extubated , left sided chest tube was placed for possible malignant effusion. During that hospitalization  pt signed DNI/DNR.  & was discharged to SNF.    Now pt again in respiratory distress from COPD.  Pt's PCO2  was 97  after being placed on 100% NRB.  Pt's MS worsened & required intubation as  family rescinded  DNR/DNI.   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  Pt presented with worsening dyspnea in respiratory distress   from COPD.  Pt's PCO2  was 97  after being placed on 100% NRB.  Pt's MS worsened & required intubation.    Patient was admitted to the ICU, intubated, later developed Ventilatory associated pneumonia treated/completed antibiotics, and later   aspirated on 9/22 and subsequently developed aspiration pneumonia treated and completed antibiotics. Condition improved and patient   was downgraded to med/surg with the following hospital course:     Acute on chronic respiratory failure with hypoxia and hypercapnia due to COPD exacerbation s/p intubation c/b Sepsis VAP subsequently c/b aspiration PNA completed abx course.   - completed abx for VAP and aspiration pneumonia.   - extubated 9/10/2023   - c/w treatment for aspiration as outlined below, symbicort, spiriva, albuterol prn  - Ventimask-->2.5L NC (cont home o2)   - taper steroids, iv solumedrol to prednisone for 2 more days, 20 mg for 1 one day and then 10 mg prednisone.      Aspiration PNA   - aspiration event 9/22 when drinking soup (witnessed, accidental- no acute neuro deficits identified)  - s/p 5 days of abx, cimpleted   - speech eval 9/25, Recommended regular/thin liquids.   - c/w albuterol prn, given tachycardia caution use- appears steroids have opened up lung sufficiently for now.    Chronic Atrial fibrillation with RVR, hx/o ablation.   - now rate controlled  - increase to metoprolol 50  mg bid.   - eliquis dosing for dvt as noted below    COPD with exacerbation.   - on o2 prn.   - completed abx, tapering steroids continue prednisone taper at discharge for 2 more days.  cont symbicort, and spiriva.     Deep vein thrombosis (DVT) of brachial vein of right upper extremity.   - Plan: s/p eliquis 10mg BID x7-->5mg BID    YAIMA   - resolved with IV fluids.    Normocytic anemia/iron def anemia  - iron studies reviewed. start ferrous sulfate one tablet daily.     · while in ICU received 6U pRBC  - no hemorrhage identified in ICU, no signs of hemorrhage now  - GI and Heme consulted in ICU  - hb stable.     Chronic Diastolic heart failure, hx/o  HFrEF  - reviewed TTE this admission w/ EF 64%, severe pulmonary HTN.   - cont  furosemide 20 mg po daily, metoprolol 50 mg po bid.     Metabolic alkosis due to Post-hypercarbia alkalosis contraction alkalosis    - will give diamox.   - s/p treatment for PNA, and copd exacerbation. was give lasix titrated to po lasix.     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   -Pls drain weekly, if no significant drainage per pulmonology drain monthly.   - Chest X-Ray on 9/27/23 improving right pleural effusion.   - previous cytology c/w malignancy of breast origin.   - pulmonary following.   - hematology OP Dr. Hyatt.   - CA 27.29 markedly elevated.     R thyroid goiter   - stable, hx/o negative biopsies.       Elevated ALP likely related with metastasis   - elevated CA 27.29   - follow up with Dr. Hyatt for PET/CT scan if patient family wish to pursue.            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  Pt presented with worsening dyspnea in respiratory distress   from COPD.  Pt's PCO2  was 97  after being placed on 100% NRB.  Pt's MS worsened & required intubation.    Patient was admitted to the ICU, intubated, later developed Ventilatory associated pneumonia treated/completed antibiotics, and later   aspirated on 9/22 and subsequently developed aspiration pneumonia treated and completed antibiotics. Condition improved and patient   was downgraded to med/surg with the following hospital course:     Acute on chronic respiratory failure with hypoxia and hypercapnia due to COPD exacerbation s/p intubation c/b Sepsis VAP subsequently c/b aspiration PNA completed abx course.   - completed abx for VAP and aspiration pneumonia.   - extubated 9/10/2023   - c/w treatment for aspiration as outlined below, symbicort, spiriva, albuterol prn  - Ventimask-->2.5L NC (cont home o2)   - taper steroids, iv solumedrol to prednisone for 2 more days, 20 mg for 1 one day and then 10 mg prednisone.      Aspiration PNA   - aspiration event 9/22 when drinking soup (witnessed, accidental- no acute neuro deficits identified)  - s/p 5 days of abx, completed   - speech eval 9/25, Recommended regular/thin liquids.   - c/w albuterol prn, given tachycardia caution use- appears steroids have opened up lung sufficiently for now.    Chronic Atrial fibrillation with RVR, hx/o ablation.   - now rate controlled  - cont metoprolol 25  mg bid. decreased home cardizem  to 120 mg po daily.   - eliquis continued increased dose to 5 mg po bid.     COPD with exacerbation.   - on o2 prn.   - completed abx, tapering steroids continue prednisone taper at discharge for 2 more days.  cont symbicort, and spiriva.     Acute Deep vein thrombosis (DVT) of brachial vein of right upper extremity.   - Plan: s/p eliquis 10mg BID x7-->5mg BID    YAIMA   - resolved with IV fluids.    Normocytic anemia/iron def anemia  - iron studies reviewed. start ferrous sulfate one tablet daily.     · while in ICU received 6U pRBC  - no hemorrhage identified in ICU, no signs of hemorrhage now  - GI and Heme consulted in ICU  - hb stable.     Chronic Diastolic heart failure, hx/o  HFrEF  - reviewed TTE this admission w/ EF 64%, severe pulmonary HTN.   - cont  furosemide 20 mg po daily, metoprolol 25 mg po bid.     Metabolic alkalosis due to Post-hypercarbia alkalosis contraction alkalosis    - will give diamox.   - s/p treatment for PNA, and copd exacerbation. was give lasix titrated to po lasix.     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   -Pls drain weekly, if no significant drainage per pulmonology drain monthly.   - Chest X-Ray on 9/27/23 improving right pleural effusion.   - previous cytology c/w malignancy of breast origin.   - pulmonary following.   - hematology OP Dr. Hyatt.   - CA 27.29 markedly elevated.     R thyroid goiter   - stable, hx/o negative biopsies.       Elevated ALP likely related with metastasis   - elevated CA 27.29   - follow up with Dr. Hyatt for PET/CT scan if patient family wish to pursue.     Patient's overall condition improved.  Patient and family reinstated DNR/DNI.  She is beind discharged in stable condition   to [facility].          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  Pt presented with worsening dyspnea in respiratory distress   from COPD.  Pt's PCO2  was 97  after being placed on 100% NRB.  Pt's MS worsened & required intubation.    Patient was admitted to the ICU, intubated, later developed Ventilatory associated pneumonia treated/completed antibiotics, and later   aspirated on 9/22 and subsequently developed aspiration pneumonia treated and completed antibiotics. Condition improved and patient   was downgraded to med/surg with the following hospital course:     Acute on chronic respiratory failure with hypoxia and hypercapnia due to COPD exacerbation s/p intubation c/b Sepsis VAP subsequently c/b aspiration PNA completed abx course.   - completed abx for VAP and aspiration pneumonia.   - extubated 9/10/2023   - c/w treatment for aspiration as outlined below, symbicort, spiriva, albuterol prn  - Ventimask-->2.5L NC (cont home o2)   - taper steroids, iv solumedrol to prednisone for 2 more days, 20 mg for 1 one day and then 10 mg prednisone.      Aspiration PNA   - aspiration event 9/22 when drinking soup (witnessed, accidental- no acute neuro deficits identified)  - s/p 5 days of abx, completed   - speech eval 9/25, Recommended regular/thin liquids.   - c/w albuterol prn, given tachycardia caution use- appears steroids have opened up lung sufficiently for now.    Chronic Atrial fibrillation with RVR, hx/o ablation.   - now rate controlled  - cont metoprolol 25  mg bid. decreased home cardizem  to 120 mg po daily.   - eliquis continued increased dose to 5 mg po bid.     COPD with exacerbation.   - on o2 prn.   - completed abx, tapering steroids continue prednisone taper at discharge for 2 more days.  cont symbicort, and spiriva.     Acute Deep vein thrombosis (DVT) of brachial vein of right upper extremity.   - Plan: s/p eliquis 10mg BID x7-->5mg BID    YAIMA   - resolved with IV fluids.    Normocytic anemia/iron def anemia s/p blood transfusion.   - iron studies reviewed. start ferrous sulfate one tablet daily.     · while in ICU received 6U pRBC  - no hemorrhage identified in ICU, no signs of hemorrhage now  - GI and Heme consulted in ICU  - hb stable.     Chronic Diastolic heart failure, hx/o  HFrEF  - reviewed TTE this admission w/ EF 64%, severe pulmonary HTN.   - cont  furosemide 20 mg po daily, metoprolol 25 mg po bid.     Metabolic alkalosis due to Post-hypercarbia alkalosis and  contraction alkalosis from IV diureiss.   - given diamox x 1    - s/p treatment for PNA, and copd exacerbation, and exacerbation of pleural effusions from IVF resuscitatoins, s/p IV lasix titrated to po lasix 20 mg.      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   - Chest X-Ray on 9/27/23 improving right pleural effusion.   - previous cytology c/w malignancy of breast origin.   - pulmonary following.   - hematology OP Dr. Hyatt.   - CA 27.29 markedly elevated.     R thyroid goiter   - stable, hx/o negative biopsies.       Elevated ALP likely related with metastasis   - elevated CA 27.29   - follow up with Dr. Hyatt for PET/CT scan if patient family wish to pursue.     Patient's overall condition improved.  Patient and family reinstated DNR/DNI.  She is beind discharged in stable condition   to [facility].          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  Pt presented with worsening dyspnea in respiratory distress   from COPD Exacerbation.  Pt's PCO2  was 97  after being placed on 100% NRB.  Pt's MS worsened & required intubation.    Patient was admitted to the ICU, intubated, later developed Ventilatory associated pneumonia treated/completed antibiotics, and later   aspirated on 9/22 and subsequently developed aspiration pneumonia treated and completed antibiotics. Condition improved and patient   was downgraded to med/surg with the following hospital course:     Acute on chronic respiratory failure with hypoxia and hypercapnia due to COPD exacerbation s/p intubation c/b Sepsis VAP subsequently c/b aspiration PNA completed abx course.   - completed abx for VAP and aspiration pneumonia.   - extubated 9/10/2023   - c/w treatment for aspiration as outlined below, symbicort, spiriva, albuterol prn  - Ventimask-->2.5L NC (cont home o2)   - taper steroids, iv solumedrol to prednisone for 2 more days, 20 mg for 1 one day and then 10 mg prednisone.      Aspiration PNA   - aspiration event 9/22 when drinking soup (witnessed, accidental- no acute neuro deficits identified)  - s/p 5 days of abx, completed   - speech eval 9/25, Recommended regular/thin liquids.   - c/w albuterol prn, given tachycardia caution use- appears steroids have opened up lung sufficiently for now.    Chronic Atrial fibrillation with RVR, hx/o ablation.   - now rate controlled  - cont metoprolol 25  mg bid. decreased home cardizem  to 120 mg po daily.   - eliquis continued increased dose to 5 mg po bid.     COPD with exacerbation.   - on o2 prn.   - completed abx, tapering steroids continue prednisone taper at discharge for 2 more days.  cont symbicort, and spiriva.     Acute Deep vein thrombosis (DVT) of brachial vein of right upper extremity.   - Plan: s/p eliquis 10mg BID x7-->5mg BID    YAIMA   - resolved with IV fluids.    Normocytic anemia/iron def anemia s/p blood transfusion.   - iron studies reviewed. start ferrous sulfate one tablet daily.     · while in ICU received 6U pRBC  - no hemorrhage identified in ICU, no signs of hemorrhage now  - GI and Heme consulted in ICU  - hb stable.     Chronic Diastolic heart failure, hx/o  HFrEF  - reviewed TTE this admission w/ EF 64%, severe pulmonary HTN.   - cont  furosemide 20 mg po daily, metoprolol 25 mg po bid.     Metabolic alkalosis due to Post-hypercarbia alkalosis and  contraction alkalosis from IV diureiss.   - given diamox x 1    - s/p treatment for PNA, and copd exacerbation, and exacerbation of pleural effusions from IVF resuscitatoins, s/p IV lasix titrated to po lasix 20 mg.      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   - Chest X-Ray on 9/27/23 improving right pleural effusion.   - previous cytology c/w malignancy of breast origin.   - pulmonary following.   - hematology OP Dr. Hyatt.   - CA 27.29 markedly elevated.     R thyroid goiter   - stable, hx/o negative biopsies.       Elevated ALP likely related with metastasis   - elevated CA 27.29   - follow up with Dr. Hyatt for PET/CT scan if patient family wish to pursue.     Patient's overall condition improved.  Patient and family reinstated DNR/DNI.  She is beind discharged in stable condition   to [facility].

## 2023-09-29 NOTE — DISCHARGE NOTE PROVIDER - NSDCFUADDINST_GEN_ALL_CORE_FT
Per pulmonology drain left pleurX catheter weekly, if no significant drainage < 50 ml drain monthly.  Per pulmonology drain left pleurX catheter weekly, if no significant drainage < 50 ml drain monthly.     Oncology f/u office visit: please d/w Oncology regarding PET/CT scan given patient has malignant effusion very likely of breast origing, with positive breast tumor markers in serum.

## 2023-09-29 NOTE — DISCHARGE NOTE PROVIDER - NSDCCPCAREPLAN_GEN_ALL_CORE_FT
PRINCIPAL DISCHARGE DIAGNOSIS  Diagnosis: Acute on chronic respiratory failure with hypoxia and hypercapnia  Assessment and Plan of Treatment: -Acute on chronic respiratory failure with hypoxia and hypercapnia due to COPD exacerbation s/p intubation c/b Sepsis VAP subsequently c/b aspiration PNA completed abx course.   - completed abx for VAP and aspiration pneumonia.   - extubated 9/10/2023   - c/w treatment for aspiration as outlined below, symbicort, spiriva, albuterol prn  - Ventimask-->2.5L NC (cont home o2)   - taper steroids, iv solumedrol to prednisone for 2 more days, 20 mg for 1 one day and then 10 mg prednisone.      SECONDARY DISCHARGE DIAGNOSES  Diagnosis: Ventilator associated pneumonia  Assessment and Plan of Treatment: -treated and completed antibiotics.    Diagnosis: Pneumonia, aspiration  Assessment and Plan of Treatment: Aspiration PNA   - aspiration event 9/22 when drinking soup (witnessed, accidental- no acute neuro deficits identified)  - s/p 5 days of abx, completed   - speech eval 9/25, Recommended regular/thin liquids.   - c/w albuterol prn, given tachycardia caution use- appears steroids have opened up lung sufficiently for now.    Diagnosis: Chronic atrial fibrillation with RVR  Assessment and Plan of Treatment: Chronic Atrial fibrillation with RVR, hx/o ablation.  - improved with pneumonia treatment and IV fluids.    - now rate controlled  - cont metoprolol 25  mg bid. decreased home cardizem  to 120 mg po daily.   - eliquis continued increased dose to 5 mg po bid.       Diagnosis: COPD exacerbation  Assessment and Plan of Treatment: - treatment as above, continue oral steroids as directed.       Diagnosis: Acute deep vein thrombosis (DVT)  Assessment and Plan of Treatment: Acute Deep vein thrombosis (DVT) of brachial vein of right upper extremity.   -  s/p eliquis 10mg BID x7-->5mg BID    Diagnosis: Acute kidney failure  Assessment and Plan of Treatment: YAIMA   - resolved with IV fluids.      Diagnosis: Iron deficiency anemia  Assessment and Plan of Treatment: Normocytic anemia/iron def anemia  - iron studies reviewed. start ferrous sulfate one tablet daily.     · while in ICU received 6U pRBC  - no hemorrhage identified in ICU, no signs of hemorrhage now  - GI and Heme consulted in ICU  - hb stable.    Diagnosis: Chronic diastolic congestive heart failure  Assessment and Plan of Treatment: Chronic Diastolic heart failure, hx/o  HFrEF  - reviewed TTE this admission w/ EF 64%, severe pulmonary HTN.   - cont  furosemide 20 mg po daily, metoprolol 25 mg po bid.    Diagnosis: Metabolic alkalosis  Assessment and Plan of Treatment: Metabolic alkalosis due to Post-hypercarbia alkalosis contraction alkalosis    - will give diamox.   - s/p treatment for PNA, and copd exacerbation. was give lasix titrated to po lasix.       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   - exacerbated by IVF , s/p IV lasix, taper to po lasix 20 mg po daily.   -Pls drain weekly, if no significant drainage per pulmonology drain monthly.   - Chest X-Ray on 9/27/23 improving right pleural effusion.   - previous cytology c/w malignancy of breast origin.   - pulmonary following.   - hematology OP Dr. Hyatt.   - CA 27.29 markedly elevated.

## 2023-09-29 NOTE — DISCHARGE NOTE NURSING/CASE MANAGEMENT/SOCIAL WORK - PATIENT PORTAL LINK FT
You can access the FollowMyHealth Patient Portal offered by [facility] by registering at the following website: [email or web address]. By joining [email or web address]’s FollowMyHealth portal, you will also be able to view your health information using other applications (apps) compatible with our system.

## 2023-09-29 NOTE — DISCHARGE NOTE PROVIDER - NSDCMRMEDTOKEN_GEN_ALL_CORE_FT
albuterol 90 mcg/inh inhalation aerosol: 2 puff(s) inhaled every 6 hours as needed for  shortness of breath and/or wheezing  budesonide-formoterol 80 mcg-4.5 mcg/inh inhalation aerosol: 2 puff(s) inhaled 2 times a day  dilTIAZem 180 mg/24 hours oral capsule, extended release: 1 cap(s) orally once a day  Eliquis 2.5 mg oral tablet: 1 tab(s) orally 2 times a day  Lasix 40 mg oral tablet: 1 tab(s) orally once a day  lidocaine 4% topical film: Apply topically to affected area once a day  Monodox 50 mg oral capsule: 2 cap(s) orally every 12 hours  nystatin 100,000 units/g topical powder: Apply topically to affected area 2 times a day apply to groin and to affected area  saccharomyces boulardii lyo 250 mg oral capsule: 1 cap(s) orally 2 times a day  Senna 8.6 mg oral tablet: 2 tab(s) orally once a day (at bedtime)   albuterol 90 mcg/inh inhalation aerosol: 2 puff(s) inhaled every 6 hours as needed for  shortness of breath and/or wheezing  budesonide-formoterol 80 mcg-4.5 mcg/inh inhalation aerosol: 2 puff(s) inhaled 2 times a day  Cardizem  mg/24 hours oral capsule, extended release: 1 tab(s) orally once a day  Eliquis 2.5 mg oral tablet: 2 tablet with sensor orally 2 times a day  ferrous sulfate 325 mg (65 mg elemental iron) oral tablet: 1 tab(s) orally once a day  furosemide 20 mg oral tablet: 1 tab(s) orally once a day  gabapentin 100 mg oral capsule: 1 cap(s) orally 3 times a day  lidocaine 4% topical film: Apply topically to affected area once a day  Metoprolol Tartrate 25 mg oral tablet: 1 tab(s) orally 2 times a day  Monodox 50 mg oral capsule: 2 cap(s) orally every 12 hours  nystatin 100,000 units/g topical powder: Apply topically to affected area 2 times a day apply to groin and to affected area  predniSONE 20 mg oral tablet: 1 tab(s) orally once a day DAY 1 1 TABLETS   DAY 2 0.5  TABLET  saccharomyces boulardii lyo 250 mg oral capsule: 1 cap(s) orally 2 times a day  Senna 8.6 mg oral tablet: 2 tab(s) orally once a day (at bedtime)  Spiriva HandiHaler 18 mcg inhalation capsule: 1 cap(s) inhaled once a day

## 2023-10-05 PROBLEM — Z98.890 OTHER SPECIFIED POSTPROCEDURAL STATES: Chronic | Status: ACTIVE | Noted: 2023-01-01

## 2023-10-15 NOTE — ED PROVIDER NOTE - WR ORDER ID 1
Pt came in stating "I was admitted for a week for SOB and I was told I have fluid in my lungs". Pt complains of chest discomfort, denies N/V, Fever.
[id number]

## 2023-10-23 NOTE — ED ADULT NURSE NOTE - NSFALLHARMRISKINTERV_ED_ALL_ED
Assistance OOB with selected safe patient handling equipment if applicable/Communicate risk of Fall with Harm to all staff, patient, and family/Provide visual cue: red socks, yellow wristband, yellow gown, etc/Reinforce activity limits and safety measures with patient and family/Bed in lowest position, wheels locked, appropriate side rails in place/Call bell, personal items and telephone in reach/Instruct patient to call for assistance before getting out of bed/chair/stretcher/Non-slip footwear applied when patient is off stretcher/[place] to call system/Physically safe environment - no spills, clutter or unnecessary equipment/Purposeful Proactive Rounding/Room/bathroom lighting operational, light cord in reach

## 2023-10-23 NOTE — ED PROVIDER NOTE - CLINICAL SUMMARY MEDICAL DECISION MAKING FREE TEXT BOX
88-year-old female, history of COPD on home O2, pneumonia, atrial fibrillation, malignant pleural effusion, here in ED for fever and lethargy.  Placed on BiPAP in the ED.  Labs noted for WBC 7.6, bands 54%, BUN 40, creatinine 1.7, lactate 4.2, magnesium 1.6, troponin 0.17.  EKG rapid A-fib nonspecific changes.  Chest x-ray bilateral pneumonia and effusion.  Given IV fluids, cefepime, levofloxacin, vancomycin, Tylenol, diltiazem, hydrocortisone, mag, Levophed infusion.  Will admit to ICU.

## 2023-10-23 NOTE — ED ADULT NURSE NOTE - CHIEF COMPLAINT QUOTE
[facility] "resident appeared confused and restless - following minimal verbal direction  /105, HR 160s, RR 30+, T 101 Axillary, O2sat 86% on 3L O2 / nonrebreather"

## 2023-10-23 NOTE — ED PROVIDER NOTE - CARE PLAN
1 Principal Discharge DX:	Septic shock  Secondary Diagnosis:	Rapid atrial fibrillation  Secondary Diagnosis:	Pneumonia

## 2023-10-23 NOTE — ED ADULT TRIAGE NOTE - CHIEF COMPLAINT QUOTE
[facility] "resident appeared confused and restless - following minimal verbal direction  /105, HR 160s, RR 30+, T 101 Axillary, O2sat 86% on 3L O2 / nonrebreather"

## 2023-10-23 NOTE — ED PROVIDER NOTE - OBJECTIVE STATEMENT
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  According to the son at bedside patient likely aspirated 2 days ago while eating and had similar presentation on last admission after aspirating.  Patient unable to provide any history in ED.

## 2023-10-23 NOTE — ED PROVIDER NOTE - NS ED ATTENDING STATEMENT MOD
I have seen and examined this patient and fully participated in the care of this patient as the teaching attending.  The service was shared with the KEVIN.  I reviewed and verified the documentation and independently performed the documented: I have personally provided the amount of critical care time documented below excluding time spent on separate procedures.

## 2023-10-23 NOTE — PATIENT PROFILE ADULT - FALL HARM RISK - HARM RISK INTERVENTIONS

## 2023-10-23 NOTE — PROVIDER CONTACT NOTE (OTHER) - ASSESSMENT
pt hard to arouse/lethargic on BIPAP, requires stimulation to wake up. No s/s CP or RD. HOB elevated 35-45 degrees, pulse ox 100%

## 2023-10-23 NOTE — ED PROVIDER NOTE - ATTENDING CONTRIBUTION TO CARE
88-year-old female, history of COPD on home O2, pneumonia, atrial fibrillation, malignant pleural effusion, brought in by EMS for fever and lethargy.  Son states that she aspirated while eating 2 days ago.  Exam shows lethargic frail elderly patient in no distress, HEENT NCAT PERRL, neck supple, lungs bilateral rales, tachycardia S1S2, abdomen soft NT +BS, chronic venous stasis.

## 2023-10-23 NOTE — H&P ADULT - NSHPPHYSICALEXAM_GEN_ALL_CORE
GENERAL:  88y/oW Female Pt's on bipap  vent.  HEAD:  Atraumatic, Normocephalic  EYES: EOMI, PERRLA, conjunctiva and sclera clear  NECK: Supple, No JVD, no cervical lymphadenopathy, non-tender  CHEST/LUNG: diminished breath so  to auscultation bilaterally; No wheeze, rhonchi, or rales  HEART: Regular rate and rhythm; S1&S2  ABDOMEN: Soft, Nontender, Nondistended x 4 quadrants; Bowel sounds present  EXTREMITIES:   Peripheral Pulses Present, No clubbing, no cyanosis, bilat. lower ext edema, no calf tenderness  PSYCH: AAOx3,   NEUROLOGY: unable to perform  SKIN: WNL

## 2023-10-23 NOTE — ED ADULT NURSE REASSESSMENT NOTE - NS ED NURSE REASSESS COMMENT FT1
pt w HR in 140's, pt in A fib rectal temp 102. MD Beal called, Vaso ordered as well as cooling blankets.

## 2023-10-23 NOTE — H&P ADULT - ASSESSMENT
elderly W Female w/ respiratory distress on NIV (bipap) secondary to bilateral pneumonia  (possibly aspiration) complicated w/ COPD and  now developing sepsis  PMH-COPD, prior intubations, aib,cellulitis legs, PVD, OA, anxiety    Plan:  adm to ICU when bed is available  central line insertion  start IV levophed to maintain MAP 65  started maxipime vanco in ER cont same  IVF hydration ( @ reduced rate as pt has reported CHF)  f/u cultures, lactic acid levels  poor prognosis

## 2023-10-23 NOTE — H&P ADULT - NS ATTEND OPT1A GEN_ALL_CORE
Creatinine is slightly above baseline    Improved, now at baseline with IV fluids History/Exam/Medical decision making

## 2023-10-23 NOTE — ED PROVIDER NOTE - CRITICAL CARE ATTENDING CONTRIBUTION TO CARE
Attending Statement: I have personally provided the amount of critical care time documented below excluding time spent on separate procedures.     Critical Care Time Spent (min) Must be 30 or more minutes to qualify: 40.     88-year-old female, history of COPD on home O2, pneumonia, atrial fibrillation, malignant pleural effusion, brought in by EMS for fever and lethargy.  Son states that she aspirated while eating 2 days ago.  Exam shows lethargic frail elderly patient in no distress, HEENT NCAT PERRL, neck supple, lungs bilateral rales, tachycardia S1S2, abdomen soft NT +BS, chronic venous stasis.

## 2023-10-23 NOTE — ED PROVIDER NOTE - PHYSICAL EXAMINATION
VITAL SIGNS: I have reviewed nursing notes and confirm.  CONSTITUTIONAL: Elderly female laying on stretcher in mild distress.   SKIN: Skin exam is warm and dry, no acute rash.  HEAD: Normocephalic; atraumatic.  EYES: PERRL, EOM intact; conjunctiva and sclera clear.  ENT: No nasal discharge; airway clear.   NECK: Supple; non tender.  CARD: S1, S2 normal; no murmurs, gallops, or rubs. Tachycardic, irregular.   RESP: (+) bibasilar rales, tachypnea, accessory muscle use.   ABD: Normal bowel sounds; soft; non-distended; non-tender; No rebound or guarding.   EXT: No clubbing, cyanosis. (+) 2+ B/L LE edema.   NEURO: Alert, oriented x2--person/place. Grossly unremarkable. No focal deficits.

## 2023-10-23 NOTE — PATIENT PROFILE ADULT - FUNCTIONAL ASSESSMENT - BASIC MOBILITY 6.
1-calculated by average/Not able to assess (calculate score using [facility] averaging method)

## 2023-10-23 NOTE — ED PROCEDURE NOTE - NS ED ATTENDING STATEMENT MOD
I have seen and examined this patient and fully participated in the care of this patient as the teaching attending.  The service was shared with the KEVIN.  I reviewed and verified the documentation and independently performed the documented:

## 2023-10-23 NOTE — H&P ADULT - NS ATTEND AMEND GEN_ALL_CORE FT
elderly W Female w/ respiratory distress on NIV (bipap) secondary to bilateral pneumonia  (possibly aspiration) complicated w/ COPD and  now developing sepsis  PMH-COPD, prior intubations, aib,cellulitis legs, PVD, OA, anxiety    Plan:  adm to ICU when bed is available  central line insertion  start IV levophed to maintain MAP 65  started maxipime vanco   IVF hydration ( @ reduced rate as pt has reported CHF)  f/u cultures, lactic acid levels  poor prognosis

## 2023-10-23 NOTE — H&P ADULT - NSHPLABSRESULTS_GEN_ALL_CORE
10.1   7.63  )-----------( 298      ( 23 Oct 2023 04:06 )             33.1       10-23    139  |  97<L>  |  40<H>  ----------------------------<  66<L>  4.5   |  31  |  1.7<H>    Ca    9.0      23 Oct 2023 04:06  Mg     1.6     10-23    TPro  6.3  /  Alb  3.0<L>  /  TBili  1.0  /  DBili  x   /  AST  22  /  ALT  11  /  AlkPhos  397<H>  10-23              Urinalysis Basic - ( 23 Oct 2023 04:06 )    Color: x / Appearance: x / SG: x / pH: x  Gluc: 66 mg/dL / Ketone: x  / Bili: x / Urobili: x   Blood: x / Protein: x / Nitrite: x   Leuk Esterase: x / RBC: x / WBC x   Sq Epi: x / Non Sq Epi: x / Bacteria: x        PT/INR - ( 23 Oct 2023 04:06 )   PT: >40.00 sec;   INR: 3.80 ratio         PTT - ( 23 Oct 2023 04:06 )  PTT:38.2 sec    Lactate Trend  10-23 @ 04:06 Lactate:4.2       CARDIAC MARKERS ( 23 Oct 2023 04:06 )  x     / 0.17 ng/mL / x     / x     / x            CAPILLARY BLOOD GLUCOSE

## 2023-10-23 NOTE — H&P ADULT - HISTORY OF PRESENT ILLNESS
87yo W Female w/PMH as noted below.  PT's from NH who was noted to have progressive dyspnea, fevers.  Pt w/ hx prior intubations for COPD.  On CXR pt has bilateral lower lobe infiltrates R>L.  Pt son stated pt may has aspirated 2 days while eating & pt has history of aspirating on food as note on prior admissions.  Pt is on Bipap ventilation  saturating on low to mid 90s.  Additionally , pt's hypotensive w/ MAP <65.  ER to place central venous cath & start IV levophed.  Pt can go to ICU when bed is available

## 2023-10-24 NOTE — CONSULT NOTE ADULT - ASSESSMENT
IMPRESSION:  Septic shock with multi drug resistant K. pneumoniae  Acute hypoxemic and hypercapnic respiratory failure on Bipap  Acute decompensated HF / HO HFpEF   Fluid overload   b/l pleural effusions - 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). possible superimposed empyema  YAIMA  probable aspiration pneumonitis  HO Probable LAWRENCE refused testing   Thyroid mass SP FNA   HO foot wound MRSA, Right inguinal Abscess VRE +& ESBL E.coli bacteremia (5/2023)   HO Afib on Eliquis  HO Right upper arm brachial DVT 9/23      PLAN:    CNS:   avoid cns depressants  pain control with Tylenol    HEENT: Oral care    PULMONARY:    HOB @ 45 degrees.   hold Bipap therapy. trial of HFNC 50/50 today given aspiration history. clarify with family regarding DNI statues. Supplemental O2 therapy as needed. keep SpO2 88-92%  check d-dimer. LE duplex  DuoNebs q6hrs  stress dose Steroids with hydrocortisone 100mg q8hrs  aggressive pulmonary toilet, chest pt  will drain the pleurex today and send for cytology and culture    CARDIOVASCULAR:  check BNP  Repeat echocardiogram  Fluid restriction: 1L, keep negative fluid balance  start Furosemide 40mg IVP BID.   Wean off levophed as tolerated. start stress dose steroids. Keep MAP >65    GI: GI prophylaxis.  NPO for now. speech and swallow.  Bowel regimen     RENAL:    Follow up lytes.  Correct as needed  Send urine creatinine, urea and lytes  Check renal sonogram  renal consult if kidneys function does not improve.    INFECTIOUS DISEASE:   blood culture grew Carbapenem Resistant and CTX-M K. pneumoniae  urine culture negative  continue with AVYCAZ   ID eval    HEMATOLOGICAL:    DVT prophylaxis with SCDs. hold eliquis, daily coag panel.   leukomoid reaction on CBC today with WBC of 39. monitor  LE duplex    ENDOCRINE:  Follow up FS.  Insulin protocol if needed.    MUSCULOSKELETAL: ambulate as tolerated    DNR not DNI  Lines: d/c right Femoral CVC 10/23, al  Dispo: MICU         IMPRESSION:  Septic shock with multi drug resistant K. pneumoniae  Acute hypoxemic and hypercapnic respiratory failure on Bipap  Acute decompensated HF / HO HFpEF   Fluid overload   b/l pleural effusions - 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). possible superimposed empyema  YAIMA  probable aspiration pneumonitis  HO Probable LAWRENCE refused testing   Thyroid mass SP FNA   HO foot wound MRSA, Right inguinal Abscess VRE +& ESBL E.coli bacteremia (5/2023)   HO Afib on Eliquis  HO Right upper arm brachial DVT 9/23      PLAN:    CNS:   avoid cns depressants  pain control with Tylenol    HEENT: Oral care    PULMONARY:    HOB @ 45 degrees.   tiral of avaps today. clarify with family regarding DNI statues. Supplemental O2 therapy as needed. keep SpO2 88-92%  low threshold for intubation.  check d-dimer. LE duplex  DuoNebs q6hrs  stress dose Steroids with hydrocortisone 100mg q8hrs  aggressive pulmonary toilet, chest pt  will drain the pleurex today and send for cytology and culture    CARDIOVASCULAR:  check BNP  Repeat echocardiogram  Fluid restriction: 1L, keep negative fluid balance  start Furosemide 40mg IVP BID.   Wean off levophed as tolerated. start stress dose steroids. Keep MAP >65    GI: GI prophylaxis.  NPO for now. speech and swallow.  Bowel regimen     RENAL:    Follow up lytes.  Correct as needed  Send urine creatinine, urea and lytes  Check renal sonogram  renal consult if kidneys function does not improve.    INFECTIOUS DISEASE:   blood culture grew Carbapenem Resistant and CTX-M K. pneumoniae  urine culture negative  continue with AVYCAZ   ID eval    HEMATOLOGICAL:    DVT prophylaxis with SCDs. hold eliquis, daily coag panel.   leukomoid reaction on CBC today with WBC of 39. monitor  LE duplex    ENDOCRINE:  Follow up FS.  Insulin protocol if needed.    MUSCULOSKELETAL: ambulate as tolerated    DNR not DNI  Lines: d/c right Femoral CVC 10/23, PIV  Dispo: MICU         IMPRESSION:  Septic shock with multi drug resistant K. pneumoniae  Acute hypoxemic and hypercapnic respiratory failure on Bipap  Acute decompensated HF / HO HFpEF   Fluid overload   b/l pleural effusions - 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). possible superimposed empyema  YAIMA  probable aspiration pneumonitis  HO Probable LAWRENCE refused testing   Thyroid mass SP FNA   HO foot wound MRSA, Right inguinal Abscess VRE +& ESBL E.coli bacteremia (5/2023)   HO Afib on Eliquis  HO Right upper arm brachial DVT 9/23      PLAN:    CNS:   avoid cns depressants  pain control with Tylenol    HEENT: Oral care    PULMONARY:    HOB @ 45 degrees.   tiral of avaps today. Supplemental O2 therapy as needed. keep SpO2 88-92%  low threshold for intubation. d/w son Anderson 10/24. Son would like trial of intubation  check d-dimer. LE duplex  DuoNebs q6hrs  stress dose Steroids with hydrocortisone 100mg q8hrs  aggressive pulmonary toilet, chest pt  will drain the pleurex today and send for cytology and culture    CARDIOVASCULAR:  check BNP  Repeat echocardiogram  Fluid restriction: 1L, keep negative fluid balance  start bumex 2mg IVP qd  Wean off levophed as tolerated. start stress dose steroids. Keep MAP >65    GI: GI prophylaxis.  NPO for now. speech and swallow.  Bowel regimen     RENAL:    Follow up lytes.  Correct as needed  Send urine creatinine, urea and lytes  Check renal sonogram  renal consult    INFECTIOUS DISEASE:   blood culture grew Carbapenem Resistant and CTX-M K. pneumoniae  urine culture negative  continue with AVYCAZ   ID eval    HEMATOLOGICAL:    DVT prophylaxis with SCDs. hold eliquis, daily coag panel.   leukomoid reaction on CBC today with WBC of 39. monitor  LE duplex    ENDOCRINE:  Follow up FS.  Insulin protocol if needed.    MUSCULOSKELETAL: ambulate as tolerated    DNR not DNI  Lines: d/c right Femoral CVC 10/23, PIV  Dispo: MICU         IMPRESSION:  Septic shock with multi drug resistant K. pneumoniae  Acute hypoxemic and hypercapnic respiratory failure on Bipap  Acute decompensated HF / HO HFpEF   Fluid overload   b/l pleural effusions - 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). possible superimposed empyema  YAIMA  probable aspiration pneumonitis  HO Probable LAWRENCE refused testing   Thyroid mass SP FNA   HO foot wound MRSA, Right inguinal Abscess VRE +& ESBL E.coli bacteremia (5/2023)   HO Afib on Eliquis  HO Right upper arm brachial DVT 9/23      PLAN:    CNS:   avoid cns depressants  pain control with Tylenol    HEENT: Oral care    PULMONARY:    HOB @ 45 degrees.   tiral of avaps today. Supplemental O2 therapy as needed. keep SpO2 88-92%  low threshold for intubation. d/w son Anderson 10/24. Son would like trial of intubation  check d-dimer. LE duplex  DuoNebs q6hrs  stress dose Steroids with hydrocortisone 100mg q8hrs  aggressive pulmonary toilet, chest pt  will drain the pleurex today and send for cytology and culture    CARDIOVASCULAR:  check BNP  Repeat echocardiogram  Fluid restriction: 1L, keep negative fluid balance  start bumex 2mg IVP qd  Wean off levophed as tolerated. start stress dose steroids. Keep MAP >65    GI: GI prophylaxis.  NPO for now. speech and swallow.  Bowel regimen     RENAL:    Follow up lytes.  Correct as needed  Send urine creatinine, urea and lytes  Check renal sonogram  renal consult    INFECTIOUS DISEASE:   blood culture grew Carbapenem Resistant and CTX-M K. pneumoniae  urine culture negative  continue with AVYCAZ   ID eval    HEMATOLOGICAL:    DVT prophylaxis with SCDs. hold eliquis, daily coag panel.   leukomoid reaction on CBC today with WBC of 39. monitor  LE duplex    ENDOCRINE:  Follow up FS.  Insulin protocol if needed.    MUSCULOSKELETAL: ambulate as tolerated    DNR not DNI  Lines: d/c right Femoral CVC 10/23, PIV  Dispo: MICU      The patient is critically ill with a high probability of deterioration.

## 2023-10-24 NOTE — CONSULT NOTE ADULT - ASSESSMENT
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     Patient currently on bipap and having difficulty with communication; she agrees to her children speaking for her. Patient's children report that they have been having ongoing discussions with the patient. She has previously been intubated twice and understands the risk. As long as her mind is intact, she is agreeable to aggressive interventions to stay alive. If needed, she agrees to trial of intubation, but would not want trach/PEG. She would not want CPR should she develop cardiac arrest.     Plan:  - patient appears comfortable; tolerating bipap  - GOC: life-prolonging, but no trach/PEG  - DNR, Ok to intubate     Palliative care will continue to follow.   Please call [phone] with questions or concerns 24/7.

## 2023-10-24 NOTE — DIETITIAN INITIAL EVALUATION ADULT - ADD RECOMMEND
-once breathing status improves recommend SLP eval to determine proper diet texture as pt was previously on puree with thins during last admission  -RD to monitor ability to initiate po diet, labs/meds, NFPF and f/u as needed within 3-5 days  high risk

## 2023-10-24 NOTE — DIETITIAN INITIAL EVALUATION ADULT - ORAL INTAKE PTA/DIET HISTORY
as per family at bedside pt consumed a regular diet PTA with thin liquids with good po intake. NKFA or intolerances. no recent weight changes    presently NPO 2/2 continuous bipap warranted

## 2023-10-24 NOTE — PROGRESS NOTE ADULT - SUBJECTIVE AND OBJECTIVE BOX
SUBJECTIVE:    Patient is a 88y old Female who presents with a chief complaint of respiratory failure (24 Oct 2023 13:57)    Overnight Events: No overnight events. Patient was switched from BIPAP to AVAPS early in the morning today because of persistent hypercapnia.  AVAPS was better tolerated, with capnia that improved, patient was later put off NIV, passed S&S.    PAST MEDICAL & SURGICAL HISTORY  HTN (hypertension)    Afib  s/p ablation 2001    Goiter  no meds    Cellulitis  chronic    OA (osteoarthritis)    PVD (peripheral vascular disease)    COPD (chronic obstructive pulmonary disease)    Anxiety    Obesity    Acute on chronic systolic congestive heart failure    Edema of both lower legs    Required emergent intubation    H/O abdominal hysterectomy    H/O discectomy    H/O total knee replacement, bilateral      SOCIAL HISTORY:  Negative for smoking/alcohol/drug use.     ALLERGIES:  aspirin (Other)  sulfa drugs (Hives; Other)  codeine (Other)  penicillin (Other)  contrast media (iodine-based) (Other)    MEDICATIONS:  STANDING MEDICATIONS  albuterol/ipratropium for Nebulization 3 milliLiter(s) Nebulizer every 6 hours  budesonide  80 MICROgram(s)/formoterol 4.5 MICROgram(s) Inhaler 2 Puff(s) Inhalation two times a day  buMETAnide Injectable 2 milliGRAM(s) IV Push daily  ceftazidime/avibactam IVPB 0.94 Gram(s) IV Intermittent every 12 hours  chlorhexidine 2% Cloths 1 Application(s) Topical <User Schedule>  ferrous    sulfate 325 milliGRAM(s) Oral daily  gabapentin 100 milliGRAM(s) Oral three times a day  hydrocortisone sodium succinate Injectable 100 milliGRAM(s) IV Push every 8 hours  metoprolol tartrate 25 milliGRAM(s) Oral two times a day  metoprolol tartrate Injectable 5 milliGRAM(s) IV Push once  norepinephrine Infusion 0.05 MICROgram(s)/kG/Min IV Continuous <Continuous>  vasopressin Infusion 0.04 Unit(s)/Min IV Continuous <Continuous>    PRN MEDICATIONS    VITALS:   T(F): 97.9, Max: 97.9 (10-24-23 @ 11:00)  HR: 136 (96 - 136)  BP: 94/54 (84/47 - 166/72)  RR: 22 (20 - 50)  SpO2: 97% (94% - 100%)    LABS:                        9.3    39.51 )-----------( 359      ( 24 Oct 2023 06:10 )             30.6     10-24    142  |  99  |  46<H>  ----------------------------<  129<H>  4.3   |  32  |  1.6<H>    Ca    8.9      24 Oct 2023 06:10  Mg     2.2     10-24    TPro  6.1  /  Alb  2.9<L>  /  TBili  0.6  /  DBili  x   /  AST  17  /  ALT  10  /  AlkPhos  289<H>  10-24    PT/INR - ( 24 Oct 2023 06:10 )   PT: 37.60 sec;   INR: 3.18 ratio         PTT - ( 24 Oct 2023 06:10 )  PTT:38.1 sec  Urinalysis Basic - ( 24 Oct 2023 06:10 )    Color: x / Appearance: x / SG: x / pH: x  Gluc: 129 mg/dL / Ketone: x  / Bili: x / Urobili: x   Blood: x / Protein: x / Nitrite: x   Leuk Esterase: x / RBC: x / WBC x   Sq Epi: x / Non Sq Epi: x / Bacteria: x      ABG - ( 24 Oct 2023 11:58 )  pH, Arterial: 7.42  pH, Blood: x     /  pCO2: 51    /  pO2: 171   / HCO3: 33    / Base Excess: 7.2   /  SaO2: 99.5              Troponin T, Serum: 0.06 ng/mL *HH* (10-24-23 @ 06:10)  Lactate, Blood: 1.7 mmol/L (10-23-23 @ 19:14)  Troponin T, Serum: 0.08 ng/mL *HH* (10-23-23 @ 19:14)      Culture - Blood (collected 23 Oct 2023 04:06)  Source: .Blood Blood-Peripheral  Gram Stain (24 Oct 2023 04:41):    Growth in aerobic bottle: Gram Negative Rods    Growth in anaerobic bottle: Gram Negative Rods  Preliminary Report (24 Oct 2023 04:42):    Growth in aerobic bottle: Gram Negative Rods    Growth in anaerobic bottle: Gram Negative Rods    Direct identification is available within approximately 3-5    hours either by Blood Panel Multiplexed PCR or Direct    MALDI-TOF. Details: [email or web address]  Organism: Blood Culture PCR (24 Oct 2023 02:28)  Organism: Blood Culture PCR (24 Oct 2023 02:28)    Culture - Blood (collected 23 Oct 2023 04:06)  Source: .Blood Blood-Peripheral  Gram Stain (24 Oct 2023 05:40):    Growth in aerobic bottle: Gram Negative Rods    Growth in anaerobic bottle: Gram Negative Rods  Preliminary Report (24 Oct 2023 05:40):    Growth in aerobic bottle: Gram Negative Rods    Growth in anaerobic bottle: Gram Negative Rods      CARDIAC MARKERS ( 24 Oct 2023 06:10 )  x     / 0.06 ng/mL / x     / x     / x      CARDIAC MARKERS ( 23 Oct 2023 19:14 )  x     / 0.08 ng/mL / x     / x     / x      CARDIAC MARKERS ( 23 Oct 2023 04:06 )  x     / 0.17 ng/mL / x     / x     / x              10-23-23 @ 07:01  -  10-24-23 @ 07:00  --------------------------------------------------------  IN: 164 mL / OUT: 250 mL / NET: -86 mL    10-24-23 @ 07:01  -  10-24-23 @ 15:11  --------------------------------------------------------  IN: 9 mL / OUT: 1100 mL / NET: -1091 mL          IMAGING/EKG:    No recent imaging    PHYSICAL EXAM:  GEN: NAD, comfortable  LUNGS: Decreased breath sounds bibasilarly  HEART: Irregular  ABD: soft, NT/ND, +BS  EXT: no edema, PP b/l  NEURO: AAO x3

## 2023-10-24 NOTE — CONSULT NOTE ADULT - CONVERSATION DETAILS
Patient's children report that they have been having ongoing discussions with the patient. She has previously been intubated twice and understands the risk. As long as her mind is intact, she is agreeable to aggressive interventions to stay alive. If needed, she agrees to trial of intubation, but would not want trach/PEG. She would not want CPR should she develop cardiac arrest.

## 2023-10-24 NOTE — DIETITIAN INITIAL EVALUATION ADULT - OTHER INFO
pt is 88 year old female with hx of CHF, afib s/p ablation, chronic cellulitis, COPD, PUD, HTN, FLAVIO, B/L TKR presents from SNF with progressive dyspnea and fevers admitted with B/L PNA possible aspiration, sepsis, bipap warranted. pt was recently admitted 8/28-9/29 2/2 respiratory failure with possible PNA and intubation

## 2023-10-24 NOTE — CONSULT NOTE ADULT - SUBJECTIVE AND OBJECTIVE BOX
Patient is a 88y old  Female who presents with a chief complaint of respiratory failure (23 Oct 2023 05:45)      HPI:  89yo W Female w/PMH as noted below.  PT's from NH who was noted to have progressive dyspnea, fevers.  Pt w/ hx prior intubations for COPD.  On CXR pt has bilateral lower lobe infiltrates R>L.  Pt son stated pt may has aspirated 2 days while eating & pt has history of aspirating on food as note on prior admissions.  Pt is on Bipap ventilation  saturating on low to mid 90s.  Additionally , pt's hypotensive w/ MAP <65.  ER to place central venous cath & start IV levophed.  Pt can go to ICU when bed is available (23 Oct 2023 05:45)      PAST MEDICAL & SURGICAL HISTORY:  HTN (hypertension)      Afib  s/p ablation 2001      Goiter  no meds      Cellulitis  chronic      OA (osteoarthritis)      PVD (peripheral vascular disease)      COPD (chronic obstructive pulmonary disease)      Anxiety      Obesity      Acute on chronic systolic congestive heart failure      Edema of both lower legs      Required emergent intubation      H/O abdominal hysterectomy      H/O discectomy      H/O total knee replacement, bilateral          SOCIAL HX:   Smoking                         ETOH                            Other    FAMILY HISTORY:      Review of system:  See HPI    Allergies    aspirin (Other)  sulfa drugs (Hives; Other)  codeine (Other)  penicillin (Other)  contrast media (iodine-based) (Other)    Intolerances          PHYSICAL EXAM    ICU Vital Signs Last 24 Hrs  T(C): 36.2 (23 Oct 2023 23:30), Max: 38.4 (23 Oct 2023 12:51)  T(F): 97.2 (23 Oct 2023 23:30), Max: 101.1 (23 Oct 2023 12:51)  HR: 103 (24 Oct 2023 06:00) (96 - 134)  BP: 143/65 (24 Oct 2023 06:00) (82/50 - 166/72)  BP(mean): 94 (24 Oct 2023 06:00) (62 - 103)  ABP: --  ABP(mean): --  RR: 21 (24 Oct 2023 06:00) (18 - 50)  SpO2: 99% (24 Oct 2023 06:00) (94% - 100%)    O2 Parameters below as of 24 Oct 2023 03:00  Patient On (Oxygen Delivery Method): BiPAP/CPAP    O2 Concentration (%): 50      I&O's Detail    23 Oct 2023 07:01  -  24 Oct 2023 07:00  --------------------------------------------------------  IN:    Norepinephrine: 158 mL    Vasopressin: 6 mL  Total IN: 164 mL    OUT:    Voided (mL): 250 mL  Total OUT: 250 mL    Total NET: -86 mL          General: lethargic  HEENT:  On NC  Lymph node: No palpable LN             Lungs: bilateral crackles  Cardiovascular: RRR, S1S2  Abdomen: BS+ve; soft non tender  Extremities: No LE edema  Skin:  No evident Rash  Neurological:  AAOx3; No focal deficit      LABS:                          9.3    39.51 )-----------( 359      ( 24 Oct 2023 06:10 )             30.6                                               10-24    142  |  99  |  46<H>  ----------------------------<  129<H>  4.3   |  32  |  1.6<H>    Ca    8.9      24 Oct 2023 06:10  Mg     2.2     10-24    TPro  6.1  /  Alb  2.9<L>  /  TBili  0.6  /  DBili  x   /  AST  17  /  ALT  10  /  AlkPhos  289<H>  10-24      PT/INR - ( 24 Oct 2023 06:10 )   PT: 37.60 sec;   INR: 3.18 ratio         PTT - ( 24 Oct 2023 06:10 )  PTT:38.1 sec                                       Urinalysis Basic - ( 24 Oct 2023 06:10 )    Color: x / Appearance: x / SG: x / pH: x  Gluc: 129 mg/dL / Ketone: x  / Bili: x / Urobili: x   Blood: x / Protein: x / Nitrite: x   Leuk Esterase: x / RBC: x / WBC x   Sq Epi: x / Non Sq Epi: x / Bacteria: x        CARDIAC MARKERS ( 23 Oct 2023 19:14 )  x     / 0.08 ng/mL / x     / x     / x      CARDIAC MARKERS ( 23 Oct 2023 04:06 )  x     / 0.17 ng/mL / x     / x     / x                                                LIVER FUNCTIONS - ( 24 Oct 2023 06:10 )  Alb: 2.9 g/dL / Pro: 6.1 g/dL / ALK PHOS: 289 U/L / ALT: 10 U/L / AST: 17 U/L / GGT: x                                                    Lactate (10-23-23 @ 19:14): 1.7  Lactate (10-23-23 @ 06:06): 4.40<HH>  Lactate (10-23-23 @ 04:10): 4.00<HH>  Lactate (10-23-23 @ 04:06): 4.2<HH>        Culture - Blood (collected 23 Oct 2023 04:06)  Source: .Blood Blood-Peripheral  Gram Stain (24 Oct 2023 04:41):    Growth in aerobic bottle: Gram Negative Rods    Growth in anaerobic bottle: Gram Negative Rods  Preliminary Report (24 Oct 2023 04:42):    Growth in aerobic bottle: Gram Negative Rods    Growth in anaerobic bottle: Gram Negative Rods    Direct identification is available within approximately 3-5    hours either by Blood Panel Multiplexed PCR or Direct    MALDI-TOF. Details: [email or web address]  Organism: Blood Culture PCR (24 Oct 2023 02:28)  Organism: Blood Culture PCR (24 Oct 2023 02:28)    Culture - Blood (collected 23 Oct 2023 04:06)  Source: .Blood Blood-Peripheral  Gram Stain (24 Oct 2023 05:40):    Growth in aerobic bottle: Gram Negative Rods    Growth in anaerobic bottle: Gram Negative Rods  Preliminary Report (24 Oct 2023 05:40):    Growth in aerobic bottle: Gram Negative Rods    Growth in anaerobic bottle: Gram Negative Rods                                                                                       ABG - ( 23 Oct 2023 17:21 )  pH, Arterial: 7.33  pH, Blood: x     /  pCO2: 62    /  pO2: 110   / HCO3: 33    / Base Excess: 5.5   /  SaO2: 98.6                  MEDICATIONS  (STANDING):  albuterol/ipratropium for Nebulization 3 milliLiter(s) Nebulizer every 6 hours  budesonide  80 MICROgram(s)/formoterol 4.5 MICROgram(s) Inhaler 2 Puff(s) Inhalation two times a day  ceftazidime/avibactam IVPB 0.94 Gram(s) IV Intermittent every 12 hours  ferrous    sulfate 325 milliGRAM(s) Oral daily  gabapentin 100 milliGRAM(s) Oral three times a day  norepinephrine Infusion 0.05 MICROgram(s)/kG/Min (4.04 mL/Hr) IV Continuous <Continuous>  predniSONE   Tablet 20 milliGRAM(s) Oral daily  vasopressin Infusion 0.04 Unit(s)/Min (6 mL/Hr) IV Continuous <Continuous>    MEDICATIONS  (PRN):      Radiology:    < from: Xray Chest 1 View-PORTABLE IMMEDIATE (10.23.23 @ 04:14) >  Impression:    Increased bilateral opacities/effusions.    < end of copied text >   Patient is a 88y old  Female who presents with a chief complaint of respiratory failure (23 Oct 2023 05:45)      HPI:  89yo W Female w/PMH as noted below.  PT's from NH who was noted to have progressive dyspnea, fevers.  Pt w/ hx prior intubations for COPD.  On CXR pt has bilateral lower lobe infiltrates R>L.  Pt son stated pt may has aspirated 2 days while eating & pt has history of aspirating on food as note on prior admissions.  Pt is on Bipap ventilation  saturating on low to mid 90s.  Additionally , pt's hypotensive w/ MAP <65.  ER to place central venous cath & start IV levophed.  Pt can go to ICU when bed is available (23 Oct 2023 05:45)      PAST MEDICAL & SURGICAL HISTORY:  HTN (hypertension)      Afib  s/p ablation 2001      Goiter  no meds      Cellulitis  chronic      OA (osteoarthritis)      PVD (peripheral vascular disease)      COPD (chronic obstructive pulmonary disease)      Anxiety      Obesity      Acute on chronic systolic congestive heart failure      Edema of both lower legs      Required emergent intubation      H/O abdominal hysterectomy      H/O discectomy      H/O total knee replacement, bilateral          SOCIAL HX:   Smoking                         ETOH                            Other    FAMILY HISTORY:      Review of system:  See HPI    Allergies    aspirin (Other)  sulfa drugs (Hives; Other)  codeine (Other)  penicillin (Other)  contrast media (iodine-based) (Other)    Intolerances          PHYSICAL EXAM    ICU Vital Signs Last 24 Hrs  T(C): 36.2 (23 Oct 2023 23:30), Max: 38.4 (23 Oct 2023 12:51)  T(F): 97.2 (23 Oct 2023 23:30), Max: 101.1 (23 Oct 2023 12:51)  HR: 103 (24 Oct 2023 06:00) (96 - 134)  BP: 143/65 (24 Oct 2023 06:00) (82/50 - 166/72)  BP(mean): 94 (24 Oct 2023 06:00) (62 - 103)  ABP: --  ABP(mean): --  RR: 21 (24 Oct 2023 06:00) (18 - 50)  SpO2: 99% (24 Oct 2023 06:00) (94% - 100%)    O2 Parameters below as of 24 Oct 2023 03:00  Patient On (Oxygen Delivery Method): BiPAP/CPAP    O2 Concentration (%): 50      I&O's Detail    23 Oct 2023 07:01  -  24 Oct 2023 07:00  --------------------------------------------------------  IN:    Norepinephrine: 158 mL    Vasopressin: 6 mL  Total IN: 164 mL    OUT:    Voided (mL): 250 mL  Total OUT: 250 mL    Total NET: -86 mL          General: lethargic  HEENT:  On NC  Lymph node: No palpable LN             Lungs: bilateral crackles  Cardiovascular: RRR, S1S2  Abdomen: BS+ve; soft non tender  Extremities: No LE edema  Skin:  No evident Rash  Neurological:  AAOx3; No focal deficit      LABS:                          9.3    39.51 )-----------( 359      ( 24 Oct 2023 06:10 )             30.6                                               10-24    142  |  99  |  46<H>  ----------------------------<  129<H>  4.3   |  32  |  1.6<H>    Ca    8.9      24 Oct 2023 06:10  Mg     2.2     10-24    TPro  6.1  /  Alb  2.9<L>  /  TBili  0.6  /  DBili  x   /  AST  17  /  ALT  10  /  AlkPhos  289<H>  10-24      PT/INR - ( 24 Oct 2023 06:10 )   PT: 37.60 sec;   INR: 3.18 ratio         PTT - ( 24 Oct 2023 06:10 )  PTT:38.1 sec                                       Urinalysis Basic - ( 24 Oct 2023 06:10 )    Color: x / Appearance: x / SG: x / pH: x  Gluc: 129 mg/dL / Ketone: x  / Bili: x / Urobili: x   Blood: x / Protein: x / Nitrite: x   Leuk Esterase: x / RBC: x / WBC x   Sq Epi: x / Non Sq Epi: x / Bacteria: x        CARDIAC MARKERS ( 23 Oct 2023 19:14 )  x     / 0.08 ng/mL / x     / x     / x      CARDIAC MARKERS ( 23 Oct 2023 04:06 )  x     / 0.17 ng/mL / x     / x     / x                                                LIVER FUNCTIONS - ( 24 Oct 2023 06:10 )  Alb: 2.9 g/dL / Pro: 6.1 g/dL / ALK PHOS: 289 U/L / ALT: 10 U/L / AST: 17 U/L / GGT: x                                                    Lactate (10-23-23 @ 19:14): 1.7  Lactate (10-23-23 @ 06:06): 4.40<HH>  Lactate (10-23-23 @ 04:10): 4.00<HH>  Lactate (10-23-23 @ 04:06): 4.2<HH>        Culture - Blood (collected 23 Oct 2023 04:06)  Source: .Blood Blood-Peripheral  Gram Stain (24 Oct 2023 04:41):    Growth in aerobic bottle: Gram Negative Rods    Growth in anaerobic bottle: Gram Negative Rods  Preliminary Report (24 Oct 2023 04:42):    Growth in aerobic bottle: Gram Negative Rods    Growth in anaerobic bottle: Gram Negative Rods    Direct identification is available within approximately 3-5    hours either by Blood Panel Multiplexed PCR or Direct    MALDI-TOF. Details: [email or web address]  Organism: Blood Culture PCR (24 Oct 2023 02:28)  Organism: Blood Culture PCR (24 Oct 2023 02:28)    Culture - Blood (collected 23 Oct 2023 04:06)  Source: .Blood Blood-Peripheral  Gram Stain (24 Oct 2023 05:40):    Growth in aerobic bottle: Gram Negative Rods    Growth in anaerobic bottle: Gram Negative Rods  Preliminary Report (24 Oct 2023 05:40):    Growth in aerobic bottle: Gram Negative Rods    Growth in anaerobic bottle: Gram Negative Rods                                                                                       ABG - ( 23 Oct 2023 17:21 )  pH, Arterial: 7.33  pH, Blood: x     /  pCO2: 62    /  pO2: 110   / HCO3: 33    / Base Excess: 5.5   /  SaO2: 98.6                  MEDICATIONS  (STANDING):  albuterol/ipratropium for Nebulization 3 milliLiter(s) Nebulizer every 6 hours  budesonide  80 MICROgram(s)/formoterol 4.5 MICROgram(s) Inhaler 2 Puff(s) Inhalation two times a day  ceftazidime/avibactam IVPB 0.94 Gram(s) IV Intermittent every 12 hours  ferrous    sulfate 325 milliGRAM(s) Oral daily  gabapentin 100 milliGRAM(s) Oral three times a day  norepinephrine Infusion 0.05 MICROgram(s)/kG/Min (4.04 mL/Hr) IV Continuous <Continuous>  predniSONE   Tablet 20 milliGRAM(s) Oral daily  vasopressin Infusion 0.04 Unit(s)/Min (6 mL/Hr) IV Continuous <Continuous>    MEDICATIONS  (PRN):      Radiology:    < from: Xray Chest 1 View-PORTABLE IMMEDIATE (10.23.23 @ 04:14)trial of  >  Impression:    Increased bilateral opacities/effusions.    < end of copied text >

## 2023-10-24 NOTE — CONSULT NOTE ADULT - ASSESSMENT
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                            B/l buttock and upper thigh  Dark pigmented skin with denuded areas noted                         Pt High risk for pressure injury development or progression                         B/l Lower extremity chronic venous stasis       Plan:  Wound and skin  care recs   Continue current treatment  clean buttock with soap and water, pat dry then apply  triad hydrophilic dressing    Pressure  injury  preventive  measures  skin  and incontinence care   Assess skin  and inform primary provider of any changes   Case discussed with primary Rn  Wound/ ostomy specialist  to f/u as needed     Offloading: [ x] Frequent position changes [ ] Devices/Equipment  Cleansing: [ ] Saline [ x] Soap/Water [ ] Other: ______  Topicals: [ x] Barrier Cream [ ] Antimicrobial [ ] Enzymatic Wound Debridement  Dressings: [ ] Dry, sterile [ ] Allevyn  Foam [ ] Absorbant Pads [ ] Collagenase    Other Recs.   Per Primary team      Total time for bedside assessment , review of medical records  and  discussion of plan of care with primary team greater than 35 min

## 2023-10-24 NOTE — PROGRESS NOTE ADULT - SUBJECTIVE AND OBJECTIVE BOX
Patient is a 88y old  Female who presents with a chief complaint of respiratory failure (24 Oct 2023 16:15)      T(F): 97.9 (10-24-23 @ 11:00), Max: 97.9 (10-24-23 @ 11:00)  HR: 121 (10-24-23 @ 18:00)  BP: 96/55 (10-24-23 @ 18:00)  RR: 28 (10-24-23 @ 18:00)  SpO2: 100% (10-24-23 @ 18:00) (94% - 100%)    PHYSICAL EXAM:  GENERAL: NAD  HEAD:  Atraumatic, Normocephalic  EYES: EOMI, PERRLA, conjunctiva and sclera clear  NERVOUS SYSTEM:  Alert & Oriented X3, no focal deficits   CHEST/LUNG:  bilateral rales  HEART: Regular rate and rhythm; No murmurs, rubs, or gallops  ABDOMEN: Soft, Nontender, Nondistended; Bowel sounds present  EXTREMITIES:  2+ Peripheral Pulses, No clubbing, cyanosis, or edema    LABS  10-24    143  |  101  |  48<H>  ----------------------------<  100<H>  4.0   |  31  |  1.6<H>    Ca    8.7      24 Oct 2023 15:19  Mg     2.2     10-24    TPro  6.1  /  Alb  2.9<L>  /  TBili  0.6  /  DBili  x   /  AST  17  /  ALT  10  /  AlkPhos  289<H>  10-24                          9.3    39.51 )-----------( 359      ( 24 Oct 2023 06:10 )             30.6     PT/INR - ( 24 Oct 2023 06:10 )   PT: 37.60 sec;   INR: 3.18 ratio         PTT - ( 24 Oct 2023 06:10 )  PTT:38.1 sec    CARDIAC ENZYMES      Troponin T, Serum: 0.06 ng/mL (10-24-23 @ 06:10)  Troponin T, Serum: 0.08 ng/mL (10-23-23 @ 19:14)  Troponin T, Serum: 0.17 ng/mL (10-23-23 @ 04:06)      Culture Results:   Growth in aerobic and anaerobic bottles: Klebsiella pneumoniae  Direct identification is available within approximately 3-5  hours either by Blood Panel Multiplexed PCR or Direct  MALDI-TOF. Details: [email or web address] (10-23-23)  Culture Results:   Growth in aerobic and anaerobic bottles: Klebsiella pneumoniae  See previous culture [id number] (10-23-23)    RADIOLOGY  < from: US Kidney and Bladder (10.24.23 @ 13:11) >    IMPRESSION:    Moderate left and mild right hydronephrosis.    Distended urinary bladder measuring 1490 cc, despite external catheter.    Urinary bladder debris and wall thickening. Correlate with urinalysis.    < end of copied text >  < from: Xray Chest 1 View- PORTABLE-Routine (Xray Chest 1 View- PORTABLE-Routine in AM.) (10.24.23 @ 07:55) >  Impression:    Stable diffuse lung opacities      < end of copied text >    MEDICATIONS  (STANDING):  albuterol/ipratropium for Nebulization 3 milliLiter(s) Nebulizer every 6 hours  budesonide  80 MICROgram(s)/formoterol 4.5 MICROgram(s) Inhaler 2 Puff(s) Inhalation two times a day  buMETAnide Injectable 2 milliGRAM(s) IV Push daily  ceftazidime/avibactam IVPB 0.94 Gram(s) IV Intermittent every 12 hours  chlorhexidine 2% Cloths 1 Application(s) Topical <User Schedule>  ferrous    sulfate 325 milliGRAM(s) Oral daily  gabapentin 100 milliGRAM(s) Oral three times a day  hydrocortisone sodium succinate Injectable 100 milliGRAM(s) IV Push every 8 hours  metoprolol tartrate 25 milliGRAM(s) Oral two times a day  norepinephrine Infusion 0.05 MICROgram(s)/kG/Min (4.04 mL/Hr) IV Continuous <Continuous>  vasopressin Infusion 0.04 Unit(s)/Min (6 mL/Hr) IV Continuous <Continuous>    MEDICATIONS  (PRN):

## 2023-10-24 NOTE — DIETITIAN INITIAL EVALUATION ADULT - NUTRITIONGOAL OUTCOME1
pt to be able to tolerate po diet and meet at least 50-75% of estimated nutrient needs within 3-5 days

## 2023-10-24 NOTE — CONSULT NOTE ADULT - SUBJECTIVE AND OBJECTIVE BOX
HPI:   Patient is a 89yo W Female w/PMH as noted below.  PT's from NH who was noted to have progressive dyspnea, fevers.  Pt w/ hx prior intubations for COPD.  On CXR pt has bilateral lower lobe infiltrates R>L.  Pt son stated pt may has aspirated 2 days while eating & pt has history of aspirating on food as note on prior admissions.  Pt is on Bipap ventilation  saturating on low to mid 90s.  Additionally , pt's hypotensive w/ MAP <65.  ER to place central venous cath & start IV levophed.  Pt can go to ICU when bed is available         PAST MEDICAL & SURGICAL HISTORY:  HTN (hypertension)  Afib  s/p ablation 2001  Goiter  no meds  Cellulitis  chronic  OA (osteoarthritis)  PVD (peripheral vascular disease)  COPD (chronic obstructive pulmonary disease)  Anxiety  Obesity  Acute on chronic systolic congestive heart failure  Edema of both lower legs  Required emergent intubation  H/O abdominal hysterectomy  H/O discectomy  H/O total knee replacement, bilateral    REVIEW OF SYSTEMS:  All other systems negative, unless indicated in HPI    MEDICATIONS  (STANDING):  albuterol/ipratropium for Nebulization 3 milliLiter(s) Nebulizer every 6 hours  budesonide  80 MICROgram(s)/formoterol 4.5 MICROgram(s) Inhaler 2 Puff(s) Inhalation two times a day  buMETAnide Injectable 2 milliGRAM(s) IV Push daily  ceftazidime/avibactam IVPB 0.94 Gram(s) IV Intermittent every 12 hours  chlorhexidine 2% Cloths 1 Application(s) Topical <User Schedule>  ferrous    sulfate 325 milliGRAM(s) Oral daily  gabapentin 100 milliGRAM(s) Oral three times a day  hydrocortisone sodium succinate Injectable 100 milliGRAM(s) IV Push every 8 hours  metoprolol tartrate 25 milliGRAM(s) Oral two times a day  norepinephrine Infusion 0.05 MICROgram(s)/kG/Min (4.04 mL/Hr) IV Continuous <Continuous>  vasopressin Infusion 0.04 Unit(s)/Min (6 mL/Hr) IV Continuous <Continuous>    MEDICATIONS  (PRN):      Allergies  aspirin (Other)  sulfa drugs (Hives; Other)  codeine (Other)  penicillin (Other)  contrast media (iodine-based) (Other)    Intolerances      SOCIAL HISTORY:  From     FAMILY HISTORY:    No pertinent family history noted   PHYSICAL EXAM:  Vital Signs Last 24 Hrs  T(C): 36.6 (24 Oct 2023 11:00), Max: 37 (23 Oct 2023 15:00)  T(F): 97.9 (24 Oct 2023 11:00), Max: 98.6 (23 Oct 2023 15:00)  HR: 132 (24 Oct 2023 13:00) (96 - 132)  BP: 109/86 (24 Oct 2023 13:00) (82/50 - 166/72)  BP(mean): 94 (24 Oct 2023 13:00) (61 - 103)  RR: 26 (24 Oct 2023 13:00) (20 - 50)  SpO2: 100% (24 Oct 2023 13:00) (94% - 100%)    Parameters below as of 24 Oct 2023 10:01  Patient On (Oxygen Delivery Method): AVAPS    General: lethargic  HEENT:  On NC  Lymph node: No palpable LN             Lungs: bilateral crackles  Cardiovascular: RRR, S1S2  Abdomen: BS+ve; soft non tender  Extremities: No LE edema  Skin:   chronic moisture associated dermatitis   Neurological:  AAOx3; No focal deficit      LABS/ CULTURES/ RADIOLOGY:                        9.3    39.51 )-----------( 359      ( 24 Oct 2023 06:10 )             30.6       142  |  99  |  46  ----------------------------<  129      [10-24-23 @ 06:10]  4.3   |  32  |  1.6        Ca     8.9     [10-24-23 @ 06:10]      Mg     2.2     [10-24-23 @ 06:10]    TPro  6.1  /  Alb  2.9  /  TBili  0.6  /  DBili  x   /  AST  17  /  ALT  10  /  AlkPhos  289  [10-24-23 @ 06:10]    PT/INR: PT 37.60, INR 3.18       [10-24-23 @ 06:10]  PTT: 38.1       [10-24-23 @ 06:10]    Troponin 0.06      [10-24-23 @ 06:10]        Culture - Blood (collected 10-23-23 @ 04:06)  Source: .Blood Blood-Peripheral  Gram Stain (10-24-23 @ 04:41):    Growth in aerobic bottle: Gram Negative Rods    Growth in anaerobic bottle: Gram Negative Rods  Preliminary Report (10-24-23 @ 04:42):    Growth in aerobic bottle: Gram Negative Rods    Growth in anaerobic bottle: Gram Negative Rods    Direct identification is available within approximately 3-5    hours either by Blood Panel Multiplexed PCR or Direct    MALDI-TOF. Details: [email or web address]  Organism: Blood Culture PCR (10-24-23 @ 02:28)  Organism: Blood Culture PCR (10-24-23 @ 02:28)      -  CTX-M Resistance Marker: Detec      -  KPC resistance gene: Detec The KPC gene confers resistance to all penicillins, cephalosporins, carbapenems and aztreonam. Additional resistance to fluoroquinolones and aminoglycosides is likely.      Method Type: PCR      -  K. pneumoniae group: Detec (K. pneumoniae, K. quasipneumoniae, K. variicola)      -  Carbapenem Resistance: Detec      -  ESBL: Detec    Culture - Blood (collected 10-23-23 @ 04:06)  Source: .Blood Blood-Peripheral  Gram Stain (10-24-23 @ 05:40):    Growth in aerobic bottle: Gram Negative Rods    Growth in anaerobic bottle: Gram Negative Rods  Preliminary Report (10-24-23 @ 05:40):    Growth in aerobic bottle: Gram Negative Rods    Growth in anaerobic bottle: Gram Negative Rods

## 2023-10-24 NOTE — PROGRESS NOTE ADULT - ASSESSMENT
CNS:   avoid cns depressants    HEENT: Oral care    PULMONARY:    HOB @ 45 degrees.   AVAPS   low threshold for intubation. d/w son Anderson 10/24. Son would like trial of intubation  check d-dimer. LE duplex  DuoNebs q6hrs  stress dose Steroids with hydrocortisone 100mg q8hrs  aggressive pulmonary toilet, chest pt  will drain the pleurex today and send for cytology and culture    CARDIOVASCULAR:  check BNP  Repeat echocardiogram  Fluid restriction: 1L, keep negative fluid balance  start bumex 2mg IVP qd  Wean off levophed as tolerated. start stress dose steroids. Keep MAP >65    GI: GI prophylaxis.  NPO for now. speech and swallow.  Bowel regimen     RENAL:    Follow up lytes.  Correct as needed  Send urine creatinine, urea and lytes  Check renal sonogram  renal consult    INFECTIOUS DISEASE:   blood culture grew Carbapenem Resistant and CTX-M K. pneumoniae  urine culture negative  continue with AVYCAZ   ID eval    HEMATOLOGICAL:    DVT prophylaxis with SCDs. hold eliquis, daily coag panel.   leukomoid reaction on CBC today with WBC of 39. monitor  LE duplex    ENDOCRINE:  Follow up FS.  Insulin protocol if needed.    MUSCULOSKELETAL: ambulate as tolerated    DNR not DNI  Lines: d/c right Femoral CVC 10/23, PIV  Dispo: MICU

## 2023-10-24 NOTE — DIETITIAN INITIAL EVALUATION ADULT - PERTINENT LABORATORY DATA
10-24    142  |  99  |  46<H>  ----------------------------<  129<H>  4.3   |  32  |  1.6<H>    Ca    8.9      24 Oct 2023 06:10  Mg     2.2     10-24    TPro  6.1  /  Alb  2.9<L>  /  TBili  0.6  /  DBili  x   /  AST  17  /  ALT  10  /  AlkPhos  289<H>  10-24  A1C with Estimated Average Glucose Result: 5.2 % (09-24-23 @ 07:42)  A1C with Estimated Average Glucose Result: 6.1 % (07-03-23 @ 05:51)  A1C with Estimated Average Glucose Result: 6.0 % (02-28-23 @ 06:53)                          9.3    39.51 )-----------( 359      ( 24 Oct 2023 06:10 )             30.6

## 2023-10-24 NOTE — PHARMACOTHERAPY INTERVENTION NOTE - COMMENTS
Recommended escalating from cefepime to ceftazidime-avibactam 0.94 g IVPB q12hrs dosed based on an CrCL:29 mL/min, in the setting of KPC Klebsiella pneumoniae bacteremia.     Maia RollinsD   Clinical Pharmacy Specialist, Infectious Diseases  Tele-Antimicrobial Stewardship Program (Tele-ASP)  Tele-ASP Phone: [phone]   Recommended escalating from cefepime to ceftazidime-avibactam 0.94 g IVPB q12hrs dosed based on an CrCL:29 mL/min, in the setting of KPC Klebsiella pneumoniae bacteremia.     Maia RollinsD   Clinical Pharmacy Specialist, Infectious Diseases  Tele-Antimicrobial Stewardship Program (Tele-ASP)  Tele-ASP Phone: [phone]

## 2023-10-24 NOTE — PROGRESS NOTE ADULT - ASSESSMENT
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  Placed on BiPAP in the ED,  EKG with rapid A-fib and  Chest x-ray bilateral pneumonia and effusion.     acute respiratory failure secondary to sepsis(POA) and KPC klebsiella pneumonia and malignant pleural effusion     - continue Avycaz as per ID   - titrate pressors to maintain MAP>65   - stress steroids    - poor prognosis - DNR

## 2023-10-24 NOTE — CONSULT NOTE ADULT - SUBJECTIVE AND OBJECTIVE BOX
INFECTIOUS DISEASE CONSULT NOTE    Patient is a 88y old  Female who presents with a chief complaint of respiratory failure (24 Oct 2023 08:01)    HPI:  89yo W Female w/PMH as noted below.  PT's from NH who was noted to have progressive dyspnea, fevers.  Pt w/ hx prior intubations for COPD.  On CXR pt has bilateral lower lobe infiltrates R>L.  Pt son stated pt may has aspirated 2 days while eating & pt has history of aspirating on food as note on prior admissions.  Pt is on Bipap ventilation  saturating on low to mid 90s.  Additionally , pt's hypotensive w/ MAP <65.  ER to place central venous cath & start IV levophed.  Pt can go to ICU when bed is available (23 Oct 2023 05:45)         Prior hospital charts reviewed [Yes]  Primary team notes reviewed [Yes]  Other consultant notes reviewed [Yes]    REVIEW OF SYSTEMS:      PAST MEDICAL & SURGICAL HISTORY:  HTN (hypertension)      Afib  s/p ablation 2001      Goiter  no meds      Cellulitis  chronic      OA (osteoarthritis)      PVD (peripheral vascular disease)      COPD (chronic obstructive pulmonary disease)      Anxiety      Obesity      Acute on chronic systolic congestive heart failure      Edema of both lower legs      Required emergent intubation      H/O abdominal hysterectomy      H/O discectomy      H/O total knee replacement, bilateral          SOCIAL HISTORY:  - Born in [place], migrated [place] in 19XX  - Currently working as / Retired  - Lives with _____; no pets  - No recent travel  - Denies tobacco use  - Denies alcohol use  - Denies illicit drug use  - Currently sexually active, has one male/female sexual partner    FAMILY HISTORY:      Allergies:  aspirin (Other)  sulfa drugs (Hives; Other)  codeine (Other)  penicillin (Other)  contrast media (iodine-based) (Other)      ANTIMICROBIALS:  ceftazidime/avibactam IVPB 0.94 every 12 hours      ANTIMICROBIALS (past 90 days):  MEDICATIONS  (STANDING):  cefepime   IVPB   100 mL/Hr IV Intermittent (10-23-23 @ 17:52)    cefepime   IVPB   100 mL/Hr IV Intermittent (10-23-23 @ 05:24)    cefepime   IVPB   100 mL/Hr IV Intermittent (10-23-23 @ 14:12)    ceftazidime/avibactam IVPB   50 mL/Hr IV Intermittent (10-24-23 @ 06:00)    levoFLOXacin IVPB   100 mL/Hr IV Intermittent (10-23-23 @ 04:45)    vancomycin  IVPB.   250 mL/Hr IV Intermittent (10-23-23 @ 05:34)        OTHER MEDS:   MEDICATIONS  (STANDING):  albuterol/ipratropium for Nebulization 3 every 6 hours  budesonide  80 MICROgram(s)/formoterol 4.5 MICROgram(s) Inhaler 2 two times a day  gabapentin 100 three times a day  norepinephrine Infusion 0.05 <Continuous>  predniSONE   Tablet 20 daily  vasopressin Infusion 0.04 <Continuous>      VITALS:  Vital Signs Last 24 Hrs  T(F): 97.5 (10-24-23 @ 07:05), Max: 104.5 (10-23-23 @ 03:36)    Vital Signs Last 24 Hrs  HR: 116 (10-24-23 @ 08:00) (96 - 132)  BP: 135/63 (10-24-23 @ 08:00) (82/50 - 166/72)  RR: 28 (10-24-23 @ 08:00)  SpO2: 95% (10-24-23 @ 08:00) (94% - 100%)  Wt(kg): --    EXAM:      Labs:                        9.3    39.51 )-----------( 359      ( 24 Oct 2023 06:10 )             30.6     10-24    142  |  99  |  46<H>  ----------------------------<  129<H>  4.3   |  32  |  1.6<H>    Ca    8.9      24 Oct 2023 06:10  Mg     2.2     10-24    TPro  6.1  /  Alb  2.9<L>  /  TBili  0.6  /  DBili  x   /  AST  17  /  ALT  10  /  AlkPhos  289<H>  10-24      WBC Trend:  WBC Count: 39.51 (10-24-23 @ 06:10)  WBC Count: 7.63 (10-23-23 @ 04:06)      Auto Neutrophil #: 36.74 K/uL (10-24-23 @ 06:10)  Band Neutrophils %: 21.0 % (10-24-23 @ 06:10)  Auto Neutrophil #: 7.02 K/uL (10-23-23 @ 04:06)  Band Neutrophils %: 54.0 % (10-23-23 @ 04:06)  Auto Neutrophil #: 10.24 K/uL (09-29-23 @ 04:30)      Creatine Trend:  Creatinine: 1.6 (10-24)  Creatinine: 1.7 (10-23)      Liver Biochemical Testing Trend:  Alanine Aminotransferase (ALT/SGPT): 10 (10-24)  Alanine Aminotransferase (ALT/SGPT): 11 (10-23)  Alanine Aminotransferase (ALT/SGPT): 31 (09-29)  Alanine Aminotransferase (ALT/SGPT): 15 (09-24)  Alanine Aminotransferase (ALT/SGPT): 12 (09-23)  Aspartate Aminotransferase (AST/SGOT): 17 (10-24-23 @ 06:10)  Aspartate Aminotransferase (AST/SGOT): 22 (10-23-23 @ 04:06)  Aspartate Aminotransferase (AST/SGOT): 28 (09-29-23 @ 04:30)  Aspartate Aminotransferase (AST/SGOT): 23 (09-24-23 @ 07:42)  Aspartate Aminotransferase (AST/SGOT): 22 (09-23-23 @ 07:52)  Bilirubin Total: 0.6 (10-24)  Bilirubin Total: 1.0 (10-23)  Bilirubin Total: 0.6 (09-29)  Bilirubin Total: 0.7 (09-24)  Bilirubin Total: 0.8 (09-23)      Trend LDH  09-10-23 @ 19:21  398<H>  03-07-23 @ 21:06  211      Auto Eosinophil %: 0.0 % (10-24-23 @ 06:10)  Auto Eosinophil %: 0.0 % (10-23-23 @ 04:06)      Urinalysis Basic - ( 24 Oct 2023 06:10 )    Color: x / Appearance: x / SG: x / pH: x  Gluc: 129 mg/dL / Ketone: x  / Bili: x / Urobili: x   Blood: x / Protein: x / Nitrite: x   Leuk Esterase: x / RBC: x / WBC x   Sq Epi: x / Non Sq Epi: x / Bacteria: x        MICROBIOLOGY:    MRSA PCR Result.: Positive (09-05-23 @ 00:00)  MRSA PCR Result.: Negative (07-05-23 @ 15:45)  MRSA PCR Result.: Negative (03-02-23 @ 15:27)  MRSA PCR Result.: Negative (03-02-23 @ 07:58)      Culture - Blood (collected 23 Oct 2023 04:06)  Source: .Blood Blood-Peripheral  Gram Stain (24 Oct 2023 04:41):    Growth in aerobic bottle: Gram Negative Rods    Growth in anaerobic bottle: Gram Negative Rods  Preliminary Report (24 Oct 2023 04:42):    Growth in aerobic bottle: Gram Negative Rods    Growth in anaerobic bottle: Gram Negative Rods    Direct identification is available within approximately 3-5    hours either by Blood Panel Multiplexed PCR or Direct    MALDI-TOF. Details: [email or web address]  Organism: Blood Culture PCR (24 Oct 2023 02:28)  Organism: Blood Culture PCR (24 Oct 2023 02:28)      Method Type: PCR      -  K. pneumoniae group: Detec (K. pneumoniae, K. quasipneumoniae, K. variicola)      -  ESBL: Detec      -  Carbapenem Resistance: Detec      -  CTX-M Resistance Marker: Detec      -  KPC resistance gene: Detec The KPC gene confers resistance to all penicillins, cephalosporins, carbapenems and aztreonam. Additional resistance to fluoroquinolones and aminoglycosides is likely.    Culture - Blood (collected 23 Oct 2023 04:06)  Source: .Blood Blood-Peripheral  Gram Stain (24 Oct 2023 05:40):    Growth in aerobic bottle: Gram Negative Rods    Growth in anaerobic bottle: Gram Negative Rods  Preliminary Report (24 Oct 2023 05:40):    Growth in aerobic bottle: Gram Negative Rods    Growth in anaerobic bottle: Gram Negative Rods                                COVID-19 PCR: NotDetec (09-28-23 @ 19:00)  COVID-19 PCR: NotDetec (09-26-23 @ 15:30)                      Lactate, Blood: 1.7 (10-23 @ 19:14)  Blood Gas Arterial, Lactate: 1.50 (10-23 @ 17:21)  Blood Gas Venous - Lactate: 4.40 (10-23 @ 06:06)  Blood Gas Venous - Lactate: 4.00 (10-23 @ 04:10)    A1C with Estimated Average Glucose Result: 5.2 % (09-24-23 @ 07:42)  A1C with Estimated Average Glucose Result: 6.1 % (07-03-23 @ 05:51)      RADIOLOGY:  imaging below personally reviewed    < from: Xray Chest 1 View-PORTABLE IMMEDIATE (10.23.23 @ 04:14) >  Impression:    Increased bilateral opacities/effusions.    < end of copied text >    < from: Xray Chest 1 View- PORTABLE-Routine (Xray Chest 1 View- PORTABLE-Routine in AM.) (09.27.23 @ 02:21) >  Impression:    Stable bilateral lung opacities      < end of copied text >   INFECTIOUS DISEASE CONSULT NOTE    Patient is a 88y old  Female who presents with a chief complaint of respiratory failure (24 Oct 2023 08:01)    HPI:  87yo W Female w/PMH as noted below.  PT's from NH who was noted to have progressive dyspnea, fevers.  Pt w/ hx prior intubations for COPD.  On CXR pt has bilateral lower lobe infiltrates R>L.  Pt son stated pt may has aspirated 2 days while eating & pt has history of aspirating on food as note on prior admissions.  Pt is on Bipap ventilation  saturating on low to mid 90s.  Additionally , pt's hypotensive w/ MAP <65.  ER to place central venous cath & start IV levophed.  Pt can go to ICU when bed is available (23 Oct 2023 05:45)         Prior hospital charts reviewed [Yes]  Primary team notes reviewed [Yes]  Other consultant notes reviewed [Yes]    REVIEW OF SYSTEMS:  Unable to obtain due to severe respiratory distress    PAST MEDICAL & SURGICAL HISTORY:  HTN (hypertension)      Afib  s/p ablation 2001      Goiter  no meds      Cellulitis  chronic      OA (osteoarthritis)      PVD (peripheral vascular disease)      COPD (chronic obstructive pulmonary disease)      Anxiety      Obesity      Acute on chronic systolic congestive heart failure      Edema of both lower legs      Required emergent intubation      H/O abdominal hysterectomy      H/O discectomy      H/O total knee replacement, bilateral          SOCIAL HISTORY:  Unable to obtain due to severe respiratory distress    FAMILY HISTORY:  Unable to obtain due to severe respiratory distress    Allergies:  aspirin (Other)  sulfa drugs (Hives; Other)  codeine (Other)  penicillin (Other)  contrast media (iodine-based) (Other)      ANTIMICROBIALS:  ceftazidime/avibactam IVPB 0.94 every 12 hours      ANTIMICROBIALS (past 90 days):  MEDICATIONS  (STANDING):  cefepime   IVPB   100 mL/Hr IV Intermittent (10-23-23 @ 17:52)    cefepime   IVPB   100 mL/Hr IV Intermittent (10-23-23 @ 05:24)    cefepime   IVPB   100 mL/Hr IV Intermittent (10-23-23 @ 14:12)    ceftazidime/avibactam IVPB   50 mL/Hr IV Intermittent (10-24-23 @ 06:00)    levoFLOXacin IVPB   100 mL/Hr IV Intermittent (10-23-23 @ 04:45)    vancomycin  IVPB.   250 mL/Hr IV Intermittent (10-23-23 @ 05:34)        OTHER MEDS:   MEDICATIONS  (STANDING):  albuterol/ipratropium for Nebulization 3 every 6 hours  budesonide  80 MICROgram(s)/formoterol 4.5 MICROgram(s) Inhaler 2 two times a day  gabapentin 100 three times a day  norepinephrine Infusion 0.05 <Continuous>  predniSONE   Tablet 20 daily  vasopressin Infusion 0.04 <Continuous>      VITALS:  Vital Signs Last 24 Hrs  T(F): 97.5 (10-24-23 @ 07:05), Max: 104.5 (10-23-23 @ 03:36)    Vital Signs Last 24 Hrs  HR: 116 (10-24-23 @ 08:00) (96 - 132)  BP: 135/63 (10-24-23 @ 08:00) (82/50 - 166/72)  RR: 28 (10-24-23 @ 08:00)  SpO2: 95% (10-24-23 @ 08:00) (94% - 100%)  Wt(kg): --    EXAM:  GENERAL: In severe respiratory distress, lying in bed  HEAD: No head lesions  EYES: Conjunctiva pink and cornea white  EAR, NOSE, MOUTH, THROAT: Normal external ears and nose, no discharges; moist mucous membranes; Wearing BiPAP  NECK: Supple, nontender to palpation; no JVD  RESPIRATORY: Decreased bibasilar breath sounds  CARDIOVASCULAR: S1 S2, tachycardia  GASTROINTESTINAL: Soft, nontender, nondistended; normoactive bowel sounds  EXTREMITIES: No clubbing, cyanosis, or petal edema  NERVOUS SYSTEM: Alert and alert, able to moves all her extremities  MUSCULOSKELETAL: No joint erythema, swelling or pain  SKIN: No rashes or lesions, no superficial thrombophlebitis  PSYCH: Restless      Labs:                        9.3    39.51 )-----------( 359      ( 24 Oct 2023 06:10 )             30.6     10-24    142  |  99  |  46<H>  ----------------------------<  129<H>  4.3   |  32  |  1.6<H>    Ca    8.9      24 Oct 2023 06:10  Mg     2.2     10-24    TPro  6.1  /  Alb  2.9<L>  /  TBili  0.6  /  DBili  x   /  AST  17  /  ALT  10  /  AlkPhos  289<H>  10-24      WBC Trend:  WBC Count: 39.51 (10-24-23 @ 06:10)  WBC Count: 7.63 (10-23-23 @ 04:06)      Auto Neutrophil #: 36.74 K/uL (10-24-23 @ 06:10)  Band Neutrophils %: 21.0 % (10-24-23 @ 06:10)  Auto Neutrophil #: 7.02 K/uL (10-23-23 @ 04:06)  Band Neutrophils %: 54.0 % (10-23-23 @ 04:06)  Auto Neutrophil #: 10.24 K/uL (09-29-23 @ 04:30)      Creatine Trend:  Creatinine: 1.6 (10-24)  Creatinine: 1.7 (10-23)      Liver Biochemical Testing Trend:  Alanine Aminotransferase (ALT/SGPT): 10 (10-24)  Alanine Aminotransferase (ALT/SGPT): 11 (10-23)  Alanine Aminotransferase (ALT/SGPT): 31 (09-29)  Alanine Aminotransferase (ALT/SGPT): 15 (09-24)  Alanine Aminotransferase (ALT/SGPT): 12 (09-23)  Aspartate Aminotransferase (AST/SGOT): 17 (10-24-23 @ 06:10)  Aspartate Aminotransferase (AST/SGOT): 22 (10-23-23 @ 04:06)  Aspartate Aminotransferase (AST/SGOT): 28 (09-29-23 @ 04:30)  Aspartate Aminotransferase (AST/SGOT): 23 (09-24-23 @ 07:42)  Aspartate Aminotransferase (AST/SGOT): 22 (09-23-23 @ 07:52)  Bilirubin Total: 0.6 (10-24)  Bilirubin Total: 1.0 (10-23)  Bilirubin Total: 0.6 (09-29)  Bilirubin Total: 0.7 (09-24)  Bilirubin Total: 0.8 (09-23)      Trend LDH  09-10-23 @ 19:21  398<H>  03-07-23 @ 21:06  211      Auto Eosinophil %: 0.0 % (10-24-23 @ 06:10)  Auto Eosinophil %: 0.0 % (10-23-23 @ 04:06)      Urinalysis Basic - ( 24 Oct 2023 06:10 )    Color: x / Appearance: x / SG: x / pH: x  Gluc: 129 mg/dL / Ketone: x  / Bili: x / Urobili: x   Blood: x / Protein: x / Nitrite: x   Leuk Esterase: x / RBC: x / WBC x   Sq Epi: x / Non Sq Epi: x / Bacteria: x        MICROBIOLOGY:    MRSA PCR Result.: Positive (09-05-23 @ 00:00)  MRSA PCR Result.: Negative (07-05-23 @ 15:45)  MRSA PCR Result.: Negative (03-02-23 @ 15:27)  MRSA PCR Result.: Negative (03-02-23 @ 07:58)      Culture - Blood (collected 23 Oct 2023 04:06)  Source: .Blood Blood-Peripheral  Gram Stain (24 Oct 2023 04:41):    Growth in aerobic bottle: Gram Negative Rods    Growth in anaerobic bottle: Gram Negative Rods  Preliminary Report (24 Oct 2023 04:42):    Growth in aerobic bottle: Gram Negative Rods    Growth in anaerobic bottle: Gram Negative Rods    Direct identification is available within approximately 3-5    hours either by Blood Panel Multiplexed PCR or Direct    MALDI-TOF. Details: [email or web address]  Organism: Blood Culture PCR (24 Oct 2023 02:28)  Organism: Blood Culture PCR (24 Oct 2023 02:28)      Method Type: PCR      -  K. pneumoniae group: Detec (K. pneumoniae, K. quasipneumoniae, K. variicola)      -  ESBL: Detec      -  Carbapenem Resistance: Detec      -  CTX-M Resistance Marker: Detec      -  KPC resistance gene: Detec The KPC gene confers resistance to all penicillins, cephalosporins, carbapenems and aztreonam. Additional resistance to fluoroquinolones and aminoglycosides is likely.    Culture - Blood (collected 23 Oct 2023 04:06)  Source: .Blood Blood-Peripheral  Gram Stain (24 Oct 2023 05:40):    Growth in aerobic bottle: Gram Negative Rods    Growth in anaerobic bottle: Gram Negative Rods  Preliminary Report (24 Oct 2023 05:40):    Growth in aerobic bottle: Gram Negative Rods    Growth in anaerobic bottle: Gram Negative Rods                                COVID-19 PCR: NotDetec (09-28-23 @ 19:00)  COVID-19 PCR: NotDetec (09-26-23 @ 15:30)                      Lactate, Blood: 1.7 (10-23 @ 19:14)  Blood Gas Arterial, Lactate: 1.50 (10-23 @ 17:21)  Blood Gas Venous - Lactate: 4.40 (10-23 @ 06:06)  Blood Gas Venous - Lactate: 4.00 (10-23 @ 04:10)    A1C with Estimated Average Glucose Result: 5.2 % (09-24-23 @ 07:42)  A1C with Estimated Average Glucose Result: 6.1 % (07-03-23 @ 05:51)      RADIOLOGY:  imaging below personally reviewed    < from: Xray Chest 1 View-PORTABLE IMMEDIATE (10.23.23 @ 04:14) >  Impression:    Increased bilateral opacities/effusions.    < end of copied text >    < from: Xray Chest 1 View- PORTABLE-Routine (Xray Chest 1 View- PORTABLE-Routine in AM.) (09.27.23 @ 02:21) >  Impression:    Stable bilateral lung opacities      < end of copied text >

## 2023-10-24 NOTE — PHARMACOTHERAPY INTERVENTION NOTE - COMMENTS
Recommended to consult infectious diseases since blood culture grew KPC Klebsiella pneumoniae.    Varsha Malagon, PharmD   Clinical Pharmacy Specialist, Infectious Diseases  Tele-Antimicrobial Stewardship Program (Tele-ASP)  Tele-ASP Phone: [phone]

## 2023-10-24 NOTE — CONSULT NOTE ADULT - SUBJECTIVE AND OBJECTIVE BOX
HPI:  87yo W Female w/PMH as noted below.  PT's from NH who was noted to have progressive dyspnea, fevers.  Pt w/ hx prior intubations for COPD.  On CXR pt has bilateral lower lobe infiltrates R>L.  Pt son stated pt may has aspirated 2 days while eating & pt has history of aspirating on food as note on prior admissions.  Pt is on Bipap ventilation  saturating on low to mid 90s.  Additionally , pt's hypotensive w/ MAP <65.  ER to place central venous cath & start IV levophed.  Pt can go to ICU when bed is available (23 Oct 2023 05:45)    Patient currently on bipap. She indicates that she is comfortable and agreeable for me to discuss plan of care with her children.     PERTINENT PM/SXH:   HTN (hypertension)    Afib    Goiter    Cellulitis    OA (osteoarthritis)    PVD (peripheral vascular disease)    COPD (chronic obstructive pulmonary disease)    Anxiety    Obesity    Acute on chronic systolic congestive heart failure    Lymphatic edema    Edema of both lower legs    Required emergent intubation      H/O abdominal hysterectomy    H/O discectomy    H/O total knee replacement, bilateral      FAMILY HISTORY:    ITEMS NOT CHECKED ARE NOT PRESENT    SOCIAL HISTORY:   Significant other/partner[ ]  Children[x ]  Religious/Spirituality:  Substance hx:  [ ]   Tobacco hx:  [ ]   Alcohol hx: [ ]   Living Situation: [ ]Home  [ ]Long term care  [ ]Rehab [ ]Other  Home Services: [ ] HHA [ ] Visting RN [ ] Hospice  Occupation:  Home Opioid hx:  [ ] Y [ ] N [ ] I-Stop Reference No:     ADVANCE DIRECTIVES:    [ ] Full Code [x ] DNR  MOLST  [ x]  Living Will  [ ]   DECISION MAKER(s):  [ x] Health Care Proxy(s)  [ ] Surrogate(s)  [ ] Guardian           Name(s): Karishma    JEANETTE (I)ADL(s) (prior to admission):  [place]: [ ]Total  [ ] Moderate [ ]Dependent  Palliative Performance Status Version 2:         %    http://npcrc.org/files/news/palliative_performance_scale_ppsv2.pdf    Allergies    aspirin (Other)  sulfa drugs (Hives; Other)  codeine (Other)  penicillin (Other)  contrast media (iodine-based) (Other)    Intolerances    MEDICATIONS  (STANDING):  albuterol/ipratropium for Nebulization 3 milliLiter(s) Nebulizer every 6 hours  budesonide  80 MICROgram(s)/formoterol 4.5 MICROgram(s) Inhaler 2 Puff(s) Inhalation two times a day  buMETAnide Injectable 2 milliGRAM(s) IV Push daily  ceftazidime/avibactam IVPB 0.94 Gram(s) IV Intermittent every 12 hours  chlorhexidine 2% Cloths 1 Application(s) Topical <User Schedule>  ferrous    sulfate 325 milliGRAM(s) Oral daily  gabapentin 100 milliGRAM(s) Oral three times a day  hydrocortisone sodium succinate Injectable 100 milliGRAM(s) IV Push every 8 hours  metoprolol tartrate 25 milliGRAM(s) Oral two times a day  norepinephrine Infusion 0.05 MICROgram(s)/kG/Min (4.04 mL/Hr) IV Continuous <Continuous>  vasopressin Infusion 0.04 Unit(s)/Min (6 mL/Hr) IV Continuous <Continuous>    MEDICATIONS  (PRN):      PRESENT SYMPTOMS: [ ]Unable to obtain due to poor mentation   Source if other than patient:  [ ]Family   [ ]Team     Pain: [ ]yes [x ]no  QOL impact -   Location -                    Aggravating factors -  Alleviating factors -   Quality -  Radiation -  Timing-  Severity (0-10 scale):  Minimal acceptable level (0-10 scale):     CPOT:    [email or web address]://[email or web address]/Critical-Care-Pain-Observation-Tool-(CPOT)    PAIN AD Score:   http://geriatrictoolkit.[place].[facility]/cog/painad.pdf     Dyspnea:                           [x ]None[ ]Mild [ ]Moderate [ ]Severe     Respiratory Distress Observation Scale (RDOS):   A score of 0 to 2 signifies little or no respiratory distress, 3 signifies mild distress, scores 4 to 6 indicate moderate distress, and scores greater than 7 signify severe distress  [email or web address]    Anxiety:                             [ ]None[ ]Mild [ ]Moderate [ ]Severe   Fatigue:                             [ ]None[ ]Mild [ ]Moderate [ ]Severe   Nausea:                             [ ]None[ ]Mild [ ]Moderate [ ]Severe   Loss of appetite:              [ ]None[ ]Mild [ ]Moderate [ ]Severe   Constipation:                    [ ]None[ ]Mild [ ]Moderate [ ]Severe    Other Symptoms:  [ ]All other review of systems negative     Palliative Performance Status Version 2:         %    http://npcrc.org/files/news/palliative_performance_scale_ppsv2.pdf  PHYSICAL EXAM:  Vital Signs Last 24 Hrs  T(C): 36.6 (24 Oct 2023 11:00), Max: 36.6 (24 Oct 2023 11:00)  T(F): 97.9 (24 Oct 2023 11:00), Max: 97.9 (24 Oct 2023 11:00)  HR: 136 (24 Oct 2023 15:00) (96 - 136)  BP: 100/57 (24 Oct 2023 15:00) (84/47 - 166/72)  BP(mean): 76 (24 Oct 2023 15:00) (61 - 103)  RR: 26 (24 Oct 2023 15:00) (20 - 50)  SpO2: 99% (24 Oct 2023 15:00) (94% - 100%)    Parameters below as of 24 Oct 2023 14:00  Patient On (Oxygen Delivery Method): nasal cannula  O2 Flow (L/min): 3   I&O's Summary    23 Oct 2023 07:01  -  24 Oct 2023 07:00  --------------------------------------------------------  IN: 164 mL / OUT: 250 mL / NET: -86 mL    24 Oct 2023 07:01  -  24 Oct 2023 16:16  --------------------------------------------------------  IN: 9 mL / OUT: 1450 mL / NET: -1441 mL        GENERAL:  NAD   EYES: EOMI, no scleral icterus  ENMT:  No external oral lesions  CARDIOVASCULAR:  tachycardic  PULMONARY:  On bipap  GASTROINTESTINAL: Non distended  NEUROLOGIC: Grossly intact  BEHAVIORAL/PSYCH:  Calm.   SKIN: No rash on exposed skin    CRITICAL CARE:  [ ] Shock Present  [ ]Septic [ ]Cardiogenic [ ]Neurologic [ ]Hypovolemic  [ ]  Vasopressors [ ]  Inotropes   [x ]Respiratory failure present [ ]Mechanical ventilation [ ]Non-invasive ventilatory support [ ]High flow  [x ]Acute  [ ]Chronic [ x]Hypoxic  [ ]Hypercarbic [ ]Other  [ ]Other organ failure     LABS: reviewed by me                        9.3    39.51 )-----------( 359      ( 24 Oct 2023 06:10 )             30.6   10-24    142  |  99  |  46<H>  ----------------------------<  129<H>  4.3   |  32  |  1.6<H>    Ca    8.9      24 Oct 2023 06:10  Mg     2.2     10-24    TPro  6.1  /  Alb  2.9<L>  /  TBili  0.6  /  DBili  x   /  AST  17  /  ALT  10  /  AlkPhos  289<H>  10-24  PT/INR - ( 24 Oct 2023 06:10 )   PT: 37.60 sec;   INR: 3.18 ratio         PTT - ( 24 Oct 2023 06:10 )  PTT:38.1 sec    Urinalysis Basic - ( 24 Oct 2023 06:10 )    Color: x / Appearance: x / SG: x / pH: x  Gluc: 129 mg/dL / Ketone: x  / Bili: x / Urobili: x   Blood: x / Protein: x / Nitrite: x   Leuk Esterase: x / RBC: x / WBC x   Sq Epi: x / Non Sq Epi: x / Bacteria: x      RADIOLOGY & ADDITIONAL STUDIES: reviewed by me  CXR 10/24  Stable diffuse lung opacities    PROTEIN CALORIE MALNUTRITION PRESENT: [ ]mild [ ]moderate [ ]severe [ ]underweight [ ]morbid obesity  https://www.andeal.org/vault/2440/web/files/ONC/Table_Clinical%20Characteristics%20to%20Document%20Malnutrition-White%20JV%20et%20al%202012.pdf    Height (cm): 170.2 (10-23-23 @ 12:51), 170.2 (09-28-23 @ 17:04), 157.5 (08-21-23 @ 12:16)  Weight (kg): 80.2 (10-23-23 @ 12:51), 88.3 (09-28-23 @ 17:04), 86.2 (08-21-23 @ 12:16)  BMI (kg/m2): 27.7 (10-23-23 @ 12:51), 30.5 (09-28-23 @ 17:04), 34.7 (08-21-23 @ 12:16)    [ ]PPSV2 < or = to 30% [ ]significant weight loss  [ ]poor nutritional intake  [ ]anasarca      [ ]Artificial Nutrition      Palliative Care Spiritual/Emotional Screening Tool Question  Severity (0-4):                    OR                    [ x] Unable to determine. Will assess at later time if appropriate.  Score of 2 or greater indicates recommendation of Chaplaincy and/or SW referral  Chaplaincy Referral: [ ] Yes [ ] Refused [ ] Following     Caregiver [place]:  [ ] Yes [ ] No    OR    [x ] Unable to determine. Will assess at later time if appropriate.  Social Work Referral [ ]  Patient and Family Centered Care Referral [ ]    Anticipatory Grief Present: [ ] Yes [ ] No    OR     [ x] Unable to determine. Will assess at later time if appropriate.  Social Work Referral [ ]  Patient and Family Centered Care Referral [ ]    REFERRALS:   [ ]Chaplaincy  [ ]Hospice  [ ]Child Life  [ ]Social Work  [ ]Case management [ ]Holistic Therapy     Palliative care education provided to patient and/or family    _____________  Kenney Seals MD  Palliative Medicine  [facility]   of Geriatric and Palliative Medicine  [phone]

## 2023-10-24 NOTE — DIETITIAN INITIAL EVALUATION ADULT - PERTINENT MEDS FT
MEDICATIONS  (STANDING):  albuterol/ipratropium for Nebulization 3 milliLiter(s) Nebulizer every 6 hours  budesonide  80 MICROgram(s)/formoterol 4.5 MICROgram(s) Inhaler 2 Puff(s) Inhalation two times a day  buMETAnide Injectable 2 milliGRAM(s) IV Push daily  ceftazidime/avibactam IVPB 0.94 Gram(s) IV Intermittent every 12 hours  ferrous    sulfate 325 milliGRAM(s) Oral daily  gabapentin 100 milliGRAM(s) Oral three times a day  hydrocortisone sodium succinate Injectable 100 milliGRAM(s) IV Push every 8 hours  norepinephrine Infusion 0.05 MICROgram(s)/kG/Min (4.04 mL/Hr) IV Continuous <Continuous>  vasopressin Infusion 0.04 Unit(s)/Min (6 mL/Hr) IV Continuous <Continuous>    MEDICATIONS  (PRN):

## 2023-10-25 NOTE — PROGRESS NOTE ADULT - ASSESSMENT
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    # COPD exacerbation  # Aspiration pneumonia    BC grew Klebsiella CRE-- > switched from cefepime to avycaz  On stress doses of steroids + duonebs q6h  Weaned off levo and vasopressin, and removed the femoral TLC  Off IV fluids, on bumex  CT chest no contrast today    # Malignant b/l pleural effusions    Drained yesterday via the PleurX    # YAIMA    Possible pre-renal in the setting of sepsis  B/L hydronephrosis noted, now has a Ba in   Creatinine is improving, with improved hemodynamics    # Chronic afib    Put patient back on metoprolol, still off cardizem  With a HR ranging between 90 and 110  Off eliquis, with daily INR to see when we can switch to heparin drip instead    DVT ppx: none  GI ppx: none  Diet: Pureed   Activity: as tolerated  Code status: DNR

## 2023-10-25 NOTE — PROGRESS NOTE ADULT - ASSESSMENT
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  Placed on BiPAP in the ED,  EKG with rapid A-fib and  Chest x-ray bilateral pneumonia and effusion.     acute respiratory failure secondary to sepsis(POA) and KPC klebsiella pneumonia and malignant pleural effusion     - clinically improved today   - continue Avycaz as per ID   - titrate pressors to maintain MAP>65   - stress steroids    - poor prognosis - DNR

## 2023-10-25 NOTE — SWALLOW FEES ASSESSMENT ADULT - SLP PERTINENT HISTORY OF CURRENT PROBLEM
87yo W Female w/PMH as noted below.  PT's from NH who was noted to have progressive dyspnea, fevers.  Pt w/ hx prior intubations for COPD.  On CXR pt has bilateral lower lobe infiltrates R>L.  Pt son stated pt may has aspirated 2 days while eating & pt has history of aspirating on food as note on prior admissions.  Pt is on Bipap ventilation  saturating on low to mid 90s.  Additionally , pt's hypotensive w/ MAP <65.  ER to place central venous cath & start IV levophed.

## 2023-10-25 NOTE — PROGRESS NOTE ADULT - SUBJECTIVE AND OBJECTIVE BOX
INFECTIOUS DISEASE FOLLOW UP NOTE:    Interval History/ROS: Patient is a 88y old  Female who presents with a chief complaint of respiratory failure (25 Oct 2023 17:06)      Overnight events: No event overnight. Feels better today    REVIEW OF SYSTEMS:  Unable to obtain due to cognitive impairment      Prior hospital charts reviewed [Yes]  Primary team notes reviewed [Yes]  Other consultant notes reviewed [Yes]    Allergies:  aspirin (Other)  sulfa drugs (Hives; Other)  codeine (Other)  penicillin (Other)  contrast media (iodine-based) (Other)      ANTIMICROBIALS:   ceftazidime/avibactam IVPB 0.94 every 12 hours      OTHER MEDS: MEDICATIONS  (STANDING):  albuterol/ipratropium for Nebulization 3 every 6 hours  budesonide  80 MICROgram(s)/formoterol 4.5 MICROgram(s) Inhaler 2 two times a day  buMETAnide Injectable 2 daily  gabapentin 100 three times a day  heparin  Infusion.  <Continuous>  hydrocortisone sodium succinate Injectable 100 every 8 hours  metoprolol tartrate 25 two times a day      Vital Signs Last 24 Hrs  T(F): 97.6 (10-25-23 @ 19:00), Max: 104.5 (10-23-23 @ 03:36)    Vital Signs Last 24 Hrs  HR: 94 (10-25-23 @ 20:00) (94 - 118)  BP: 127/73 (10-25-23 @ 18:11) (113/63 - 140/88)  RR: 24 (10-25-23 @ 20:00)  SpO2: 99% (10-25-23 @ 20:00) (93% - 100%)  Wt(kg): --    EXAM:    GENERAL: In mild respiratory distress, lying in bed  HEAD: No head lesions  EYES: Conjunctiva pink and cornea white  EAR, NOSE, MOUTH, THROAT: Normal external ears and nose, no discharges; moist mucous membranes; on NC today  NECK: Supple, nontender to palpation; no JVD  RESPIRATORY: Decreased bibasilar breath sounds  CARDIOVASCULAR: S1 S2, tachycardia  GASTROINTESTINAL: Soft, nontender, nondistended; normoactive bowel sounds  EXTREMITIES: No clubbing, cyanosis, or petal edema  NERVOUS SYSTEM: Alert and alert, able to moves all her extremities  MUSCULOSKELETAL: No joint erythema, swelling or pain  SKIN: No rashes or lesions, no superficial thrombophlebitis  PSYCH: Restless    Labs:                        8.6    25.97 )-----------( 247      ( 25 Oct 2023 06:05 )             28.2     10-25    143  |  99  |  50<H>  ----------------------------<  119<H>  3.8   |  32  |  1.5    Ca    9.0      25 Oct 2023 06:05  Mg     2.1     10-25    TPro  5.9<L>  /  Alb  2.8<L>  /  TBili  0.5  /  DBili  x   /  AST  16  /  ALT  8   /  AlkPhos  363<H>  10-25      WBC Trend:  WBC Count: 25.97 (10-25-23 @ 06:05)  WBC Count: 39.51 (10-24-23 @ 06:10)  WBC Count: 7.63 (10-23-23 @ 04:06)      Creatine Trend:  Creatinine: 1.5 (10-25)  Creatinine: 1.6 (10-24)  Creatinine: 1.6 (10-24)  Creatinine: 1.7 (10-23)      Liver Biochemical Testing Trend:  Alanine Aminotransferase (ALT/SGPT): 8 (10-25)  Alanine Aminotransferase (ALT/SGPT): 10 (10-24)  Alanine Aminotransferase (ALT/SGPT): 11 (10-23)  Alanine Aminotransferase (ALT/SGPT): 31 (09-29)  Alanine Aminotransferase (ALT/SGPT): 15 (09-24)  Aspartate Aminotransferase (AST/SGOT): 16 (10-25-23 @ 06:05)  Aspartate Aminotransferase (AST/SGOT): 17 (10-24-23 @ 06:10)  Aspartate Aminotransferase (AST/SGOT): 22 (10-23-23 @ 04:06)  Aspartate Aminotransferase (AST/SGOT): 28 (09-29-23 @ 04:30)  Aspartate Aminotransferase (AST/SGOT): 23 (09-24-23 @ 07:42)  Bilirubin Total: 0.5 (10-25)  Bilirubin Total: 0.6 (10-24)  Bilirubin Total: 1.0 (10-23)  Bilirubin Total: 0.6 (09-29)  Bilirubin Total: 0.7 (09-24)      Trend LDH  09-10-23 @ 19:21  398<H>  03-07-23 @ 21:06  211      Urinalysis Basic - ( 25 Oct 2023 06:05 )    Color: x / Appearance: x / SG: x / pH: x  Gluc: 119 mg/dL / Ketone: x  / Bili: x / Urobili: x   Blood: x / Protein: x / Nitrite: x   Leuk Esterase: x / RBC: x / WBC x   Sq Epi: x / Non Sq Epi: x / Bacteria: x        MICROBIOLOGY:    MRSA PCR Result.: Negative (10-24-23 @ 16:15)  MRSA PCR Result.: Positive (09-05-23 @ 00:00)  MRSA PCR Result.: Negative (07-05-23 @ 15:45)  MRSA PCR Result.: Negative (03-02-23 @ 15:27)  MRSA PCR Result.: Negative (03-02-23 @ 07:58)      Culture - Blood (collected 23 Oct 2023 04:06)  Source: .Blood Blood-Peripheral  Final Report:    Growth in aerobic and anaerobic bottles: Klebsiella pneumoniae    (Carbapenem Resistant)    See previous culture [id number]    Culture - Blood (collected 23 Oct 2023 04:06)  Source: .Blood Blood-Peripheral  Final Report:    Growth in aerobic and anaerobic bottles: Klebsiella pneumoniae    (Carbapenem Resistant)    Direct identification is available within approximately 3-5    hours either by Blood Panel Multiplexed PCR or Direct    MALDI-TOF. Details: [email or web address]  Organism: Blood Culture PCR  Klepne MDRO  Organism: Klepne MDRO    Sensitivities:      -  Levofloxacin: R >4      -  Tobramycin: S <=2      -  Ceftazidime/Avibactam: S <=4      -  Aztreonam: R >16      -  Gentamicin: S <=2      -  Cefazolin: R >16      -  Cefepime: R >16      -  Piperacillin/Tazobactam: R >64      -  Ciprofloxacin: R >2      -  Imipenem: R >8      -  Ceftriaxone: R >32      -  Ampicillin: R >16 These ampicillin results predict results for amoxicillin      Method Type: DARIO      -  Meropenem: R >8      -  Ampicillin/Sulbactam: R >16/8      -  Cefoxitin: R >16      -  Amikacin: S <=16      -  Meropenem/Vaborbactam: S <=2      -  Trimethoprim/Sulfamethoxazole: R >2/38      -  Ceftolozane/tazobactam: R >8      -  Ertapenem: R >1  Organism: Blood Culture PCR    Sensitivities:      -  CTX-M Resistance Marker: Detec      -  KPC resistance gene: Detec The KPC gene confers resistance to all penicillins, cephalosporins, carbapenems and aztreonam. Additional resistance to fluoroquinolones and aminoglycosides is likely.      Method Type: PCR      -  K. pneumoniae group: Detec (K. pneumoniae, K. quasipneumoniae, K. variicola)      -  Carbapenem Resistance: Detec      -  ESBL: Detec    Culture - Blood (collected 10 Sep 2023 05:45)  Source: .Blood None  Final Report:    No growth at 5 days    Culture - Blood (collected 10 Sep 2023 05:45)  Source: .Blood None  Final Report:    No growth at 5 days    Culture - Sputum (collected 05 Sep 2023 19:15)  Source: Trach Asp Tracheal Aspirate  Final Report:    Normal Respiratory Joaquina present    Culture - Blood (collected 02 Sep 2023 06:01)  Source: .Blood None  Final Report:    No growth at 5 days    Culture - Sputum (collected 02 Sep 2023 03:00)  Source: Trach Asp Tracheal Aspirate  Final Report:    Normal Respiratory Joaquina present    Culture - Urine (collected 01 Sep 2023 19:50)  Source: Catheterized Catheterized  Final Report:    <10,000 CFU/mL Normal Urogenital Joaquina    Culture - Blood (collected 01 Sep 2023 19:32)  Source: .Blood None  Final Report:    No growth at 5 days    Culture - Blood (collected 28 Aug 2023 11:00)  Source: .Blood Blood-Peripheral  Final Report:    No growth at 5 days    COVID-19 PCR: NotDetec (09-28-23 @ 19:00)  COVID-19 PCR: NotDetec (09-26-23 @ 15:30)    Blood Gas Arterial, Lactate: 1.10 (10-24 @ 11:58)  Blood Gas Arterial, Lactate: 1.00 (10-24 @ 09:26)  Lactate, Blood: 1.7 (10-23 @ 19:14)  Blood Gas Arterial, Lactate: 1.50 (10-23 @ 17:21)      RADIOLOGY:  imaging below personally reviewed    < from: CT Abdomen and Pelvis No Cont (10.25.23 @ 15:05) >  IMPRESSION:    No evidence of abdominal or pelvic mass or inflammatory process.    Bilateral renal cysts are again noted. The previously noted exophytic   posterior lesion remains indeterminate, therefore follow-up MRI with   contrast is recommended.    No evidence of retroperitoneal, intraperitoneal, intramuscular, or   subcutaneous hematoma.    < end of copied text >    < from: CT Chest No Cont (10.25.23 @ 15:05) >    IMPRESSION:    Right neck mass.    Bilateral opacifications and effusions, right greater than left, stable.    < end of copied text >

## 2023-10-25 NOTE — CONSULT NOTE ADULT - SUBJECTIVE AND OBJECTIVE BOX
NEPHROLOGY CONSULTATION NOTE    GIOVANNY BUCK  88y  Female  MRN-[id number]    CC:   Patient is a 88y old  Female who presents with a chief complaint of respiratory failure (25 Oct 2023 09:50)      HPI:  89yo W Female w/PMH as noted below.  PT's from NH who was noted to have progressive dyspnea, fevers.  Pt w/ hx prior intubations for COPD.  On CXR pt has bilateral lower lobe infiltrates R>L.  Pt son stated pt may has aspirated 2 days while eating & pt has history of aspirating on food as note on prior admissions.  Pt is on Bipap ventilation  saturating on low to mid 90s.  Additionally , pt's hypotensive w/ MAP <65.  ER to place central venous cath & start IV levophed.  Pt can go to ICU when bed is available (23 Oct 2023 05:45)      PAST MEDICAL & SURGICAL HISTORY:  HTN (hypertension)      Afib  s/p ablation       Goiter  no meds      Cellulitis  chronic      OA (osteoarthritis)      PVD (peripheral vascular disease)      COPD (chronic obstructive pulmonary disease)      Anxiety      Obesity      Acute on chronic systolic congestive heart failure      Edema of both lower legs      Required emergent intubation      H/O abdominal hysterectomy      H/O discectomy      H/O total knee replacement, bilateral        Allergies:  aspirin (Other)  sulfa drugs (Hives; Other)  codeine (Other)  penicillin (Other)  contrast media (iodine-based) (Other)    Home Medications Reviewed  Hospital Medications:   MEDICATIONS  (STANDING):  albuterol/ipratropium for Nebulization 3 milliLiter(s) Nebulizer every 6 hours  budesonide  80 MICROgram(s)/formoterol 4.5 MICROgram(s) Inhaler 2 Puff(s) Inhalation two times a day  buMETAnide Injectable 2 milliGRAM(s) IV Push daily  ceftazidime/avibactam IVPB 0.94 Gram(s) IV Intermittent every 12 hours  chlorhexidine 2% Cloths 1 Application(s) Topical <User Schedule>  ferrous    sulfate 325 milliGRAM(s) Oral daily  gabapentin 100 milliGRAM(s) Oral three times a day  hydrocortisone sodium succinate Injectable 100 milliGRAM(s) IV Push every 8 hours  metoprolol tartrate 25 milliGRAM(s) Oral two times a day    MEDICATIONS  (PRN):    Home Medications:  budesonide-formoterol 80 mcg-4.5 mcg/inh inhalation aerosol: 2 puff(s) inhaled 2 times a day (23 Oct 2023 15:58)  Cardizem  mg/24 hours oral capsule, extended release: 1 tab(s) orally once a day (23 Oct 2023 15:58)  Eliquis 2.5 mg oral tablet: 2 tablet with sensor orally 2 times a day (23 Oct 2023 15:58)  furosemide 20 mg oral tablet: 1 tab(s) orally once a day (23 Oct 2023 15:58)  Senna 8.6 mg oral tablet: 2 tab(s) orally once a day (at bedtime) (23 Oct 2023 15:58)      SOCIAL HISTORY:  Social History:      FAMILY HISTORY:      REVIEW OF SYSTEMS:  All other review of systems is negative unless indicated above.    VITALS:  T(F): 97.7 (10-25-23 @ 07:05), Max: 99 (10-24-23 @ 19:00)  HR: 109 (10-25-23 @ 10:00)  BP: 134/71 (10-25-23 @ 10:00)  RR: 28 (10-25-23 @ 10:00)  SpO2: 100% (10-25-23 @ 10:00)      I&O's Detail    24 Oct 2023 07:  -  25 Oct 2023 07:00  --------------------------------------------------------  IN:    Norepinephrine: 9 mL  Total IN: 9 mL    OUT:    Indwelling Catheter - Urethral (mL): 885 mL    Vasopressin: 0 mL    Voided (mL): 1450 mL  Total OUT: 2335 mL    Total NET: -2326 mL      25 Oct 2023 07:01  -  25 Oct 2023 10:55  --------------------------------------------------------  IN:    Oral Fluid: 50 mL  Total IN: 50 mL    OUT:    Indwelling Catheter - Urethral (mL): 275 mL    Norepinephrine: 0 mL    Vasopressin: 0 mL  Total OUT: 275 mL    Total NET: -225 mL            I&O's Summary    24 Oct 2023 07:  -  25 Oct 2023 07:00  --------------------------------------------------------  IN: 9 mL / OUT: 2335 mL / NET: -2326 mL    25 Oct 2023 07:01  -  25 Oct 2023 10:55  --------------------------------------------------------  IN: 50 mL / OUT: 275 mL / NET: -225 mL        PHYSICAL EXAM:  Gen: NAD  resp: b/l breath sounds  card: S1/S2  abd: soft  ext: no edema  vascular access:     LABS:  Daily     Daily Weight in k (25 Oct 2023 07:05)  ABG - ( 24 Oct 2023 11:58 )  pH, Arterial: 7.42  pH, Blood: x     /  pCO2: 51    /  pO2: 171   / HCO3: 33    / Base Excess: 7.2   /  SaO2: 99.5              Blood Gas Arterial, Lactate: 1.10 mmol/L (10-24-23 @ 11:58)  Blood Gas Arterial, Lactate: 1.00 mmol/L (10-24-23 @ 09:26)  Lactate, Blood: 1.7 mmol/L (10-23-23 @ 19:14)    10-25    143  |  99  |  50<H>  ----------------------------<  119<H>  3.8   |  32  |  1.5    Ca    9.0      25 Oct 2023 06:05  Mg     2.1     10-25    TPro  5.9<L>  /  Alb  2.8<L>  /  TBili  0.5  /  DBili      /  AST  16  /  ALT  8   /  AlkPhos  363<H>  10-25    eGFR if Non African American: 46 mL/min/1.73M2 (10-05-21 @ 14:35)  eGFR if African American: 53 mL/min/1.73M2 (10-05-21 @ 14:35)  eGFR if Non African American: 46 mL/min/1.73M2 (21 @ 17:08)  eGFR if : 53 mL/min/1.73M2 (21 @ 17:08)    Creatinine Trend:   Creatinine: 1.5 mg/dL (10-25-23 @ 06:05)  Creatinine: 1.6 mg/dL (10-24-23 @ 15:19)  Creatinine: 1.6 mg/dL (10-24-23 @ 06:10)  Creatinine: 1.7 mg/dL (10-23-23 @ 04:06)  Creatinine: 0.9 mg/dL (23 @ 04:30)  Creatinine: 0.9 mg/dL (23 @ 07:32)  Creatinine: 0.9 mg/dL (23 @ 08:02)  Creatinine: 1.1 mg/dL (23 @ 07:14)  Creatinine: 0.9 mg/dL (23 @ 07:50)  Creatinine: 0.9 mg/dL (23 @ 07:42)  Creatinine: 0.8 mg/dL (23 @ 07:52)  Creatinine: 0.9 mg/dL (23 @ 07:18)  Creatinine: 0.8 mg/dL (23 @ 10:32)  Creatinine: 0.9 mg/dL (23 @ 05:33)  Creatinine: 0.9 mg/dL (23 @ 11:40)  Creatinine: 0.9 mg/dL (23 @ 11:20)  Creatinine: 1.0 mg/dL (09-15-23 @ 06:06)  Creatinine: 1.0 mg/dL (23 @ 05:50)  Creatinine: 1.2 mg/dL (23 @ 06:05)  Creatinine: 1.2 mg/dL (23 @ 07:48)                            8.6    25.97 )-----------( 247      ( 25 Oct 2023 06:05 )             28.2     Mean Cell Volume: 93.7 fL (10-25-23 @ 06:05)    Urine Studies:  Protein, Urine: 300 mg/dL (10-24-23 @ 16:15)  White Blood Cell - Urine: 30 /HPF (10-24-23 @ 16:15)  Red Blood Cell - Urine: 8 /HPF (10-24-23 @ 16:15)  Protein, Urine: Negative mg/dL (23 @ 19:50)  Protein, Urine: 100 mg/dL (23 @ 12:53)  White Blood Cell - Urine: >50 /HPF (23 @ 12:53)  Red Blood Cell - Urine: 11-25 /HPF (23 @ 12:53)  Protein, Urine: 30 mg/dL (02-10-21 @ 21:00)  Red Blood Cell - Urine: 4 /HPF (02-10-21 @ 21:00)  White Blood Cell - Urine: 4 /HPF (02-10-21 @ 21:00)    Osmolality, Random Urine: 326 mos/kg (10-24 @ 16:15)  Sodium, Random Urine: 100.0 mmoL/L (10-24 @ 16:15)            RADIOLOGY & ADDITIONAL STUDIES:    US Kidney and Bladder:   ACC: [id number] EXAM:  US KIDNEYS AND BLADDER   ORDERED BY: ARETHA GONG     PROCEDURE DATE:  10/24/2023          INTERPRETATION:  CLINICAL INFORMATION: Posterior renal acute kidney   injury.    COMPARISON: CT abdomen pelvis 2023    TECHNIQUE: Sonography of the kidneys and bladder.    FINDINGS:    Right kidney: 9.7 cm. Mild hydronephrosis. No renal stone. Renal cyst   measuring 1.4 cm.    Left kidney:  11.7 cm. Moderate hydronephrosis. No renal stone. Renal   cyst measuring 1.2 cm.    Urinary bladder: Debris is noted. Patient has external urinary catheter.   Volume at time of the exam is 1490 cc. Urinary bladder wall thickening   measuring 0.6 cm.      IMPRESSION:    Moderate left and mild right hydronephrosis.    Distended urinary bladder measuring 1490 cc, despite external catheter.    Urinary bladder debris and wall thickening. Correlate with urinalysis.        --- End of Report ---            SABINO SULLIVAN MD; Attending Radiologist  This document has been electronically signed. Oct 24 2023  2:01PM (10-24-23 @ 13:11)      Xray Chest 1 View- PORTABLE-Routine:   ACC: [id number] EXAM:  XR CHEST PORTABLE  ROUTINE 1V   ORDERED BY: ARETHA GONG     PROCEDURE DATE:  10/25/2023          INTERPRETATION:  Clinical History / Reason for exam: Pneumonia.    Comparison : Chest radiograph dated 2023.    Technique/Positioning: Portable frontal.    Findings:    Support devices: Left-sided chest tube in place. Overlying EKG leads.    Cardiac/mediastinum/hilum: Stable.    Lung parenchyma/Pleura: Unchanged bibasilar opacities/pleural effusions.   No pneumothorax.    Skeleton/soft tissues: Stable.    Impression:    1. Unchanged bibasilar opacities/pleural effusions.    --- End of Report ---            CALDERON HANSON MD; Attending Radiologist  This document has been electronically signed. Oct 25 2023  9:05AM (10-25-23 @ 06:24)                     NEPHROLOGY CONSULTATION NOTE    GIOVANNY BUCK  88y  Female  MRN-[id number]    CC:   Patient is a 88y old  Female who presents with a chief complaint of respiratory failure (25 Oct 2023 09:50)      HPI:  87yo W Female w/PMH as noted below.  PT's from NH who was noted to have progressive dyspnea, fevers.  Pt w/ hx prior intubations for COPD.  On CXR pt has bilateral lower lobe infiltrates R>L.  Pt son stated pt may has aspirated 2 days while eating & pt has history of aspirating on food as note on prior admissions.  Pt is on Bipap ventilation  saturating on low to mid 90s.  Additionally , pt's hypotensive w/ MAP <65.  ER to place central venous cath & start IV levophed.  Pt can go to ICU when bed is available (23 Oct 2023 05:45)  Nephrology consulted for [facility]A  10/24/23 renal ultrasound notable for bladder distention and b/l hydronephrosis.  Ba catheter has been placed      PAST MEDICAL & SURGICAL HISTORY:  HTN (hypertension)      Afib  s/p ablation       Goiter  no meds      Cellulitis  chronic      OA (osteoarthritis)      PVD (peripheral vascular disease)      COPD (chronic obstructive pulmonary disease)      Anxiety      Obesity      Acute on chronic systolic congestive heart failure      Edema of both lower legs      Required emergent intubation      H/O abdominal hysterectomy      H/O discectomy      H/O total knee replacement, bilateral        Allergies:  aspirin (Other)  sulfa drugs (Hives; Other)  codeine (Other)  penicillin (Other)  contrast media (iodine-based) (Other)    Home Medications Reviewed  Hospital Medications:   MEDICATIONS  (STANDING):  albuterol/ipratropium for Nebulization 3 milliLiter(s) Nebulizer every 6 hours  budesonide  80 MICROgram(s)/formoterol 4.5 MICROgram(s) Inhaler 2 Puff(s) Inhalation two times a day  buMETAnide Injectable 2 milliGRAM(s) IV Push daily  ceftazidime/avibactam IVPB 0.94 Gram(s) IV Intermittent every 12 hours  chlorhexidine 2% Cloths 1 Application(s) Topical <User Schedule>  ferrous    sulfate 325 milliGRAM(s) Oral daily  gabapentin 100 milliGRAM(s) Oral three times a day  hydrocortisone sodium succinate Injectable 100 milliGRAM(s) IV Push every 8 hours  metoprolol tartrate 25 milliGRAM(s) Oral two times a day    MEDICATIONS  (PRN):    Home Medications:  budesonide-formoterol 80 mcg-4.5 mcg/inh inhalation aerosol: 2 puff(s) inhaled 2 times a day (23 Oct 2023 15:58)  Cardizem  mg/24 hours oral capsule, extended release: 1 tab(s) orally once a day (23 Oct 2023 15:58)  Eliquis 2.5 mg oral tablet: 2 tablet with sensor orally 2 times a day (23 Oct 2023 15:58)  furosemide 20 mg oral tablet: 1 tab(s) orally once a day (23 Oct 2023 15:58)  Senna 8.6 mg oral tablet: 2 tab(s) orally once a day (at bedtime) (23 Oct 2023 15:58)      SOCIAL HISTORY:  Social History:      FAMILY HISTORY:      REVIEW OF SYSTEMS:  All other review of systems is negative unless indicated above.    VITALS:  T(F): 97.7 (10-25-23 @ 07:05), Max: 99 (10-24-23 @ 19:00)  HR: 109 (10-25-23 @ 10:00)  BP: 134/71 (10-25-23 @ 10:00)  RR: 28 (10-25-23 @ 10:00)  SpO2: 100% (10-25-23 @ 10:00)      I&O's Detail    24 Oct 2023 07:  -  25 Oct 2023 07:00  --------------------------------------------------------  IN:    Norepinephrine: 9 mL  Total IN: 9 mL    OUT:    Indwelling Catheter - Urethral (mL): 885 mL    Vasopressin: 0 mL    Voided (mL): 1450 mL  Total OUT: 2335 mL    Total NET: -2326 mL      25 Oct 2023 07:  -  25 Oct 2023 10:55  --------------------------------------------------------  IN:    Oral Fluid: 50 mL  Total IN: 50 mL    OUT:    Indwelling Catheter - Urethral (mL): 275 mL    Norepinephrine: 0 mL    Vasopressin: 0 mL  Total OUT: 275 mL    Total NET: -225 mL            I&O's Summary    24 Oct 2023 07:  -  25 Oct 2023 07:00  --------------------------------------------------------  IN: 9 mL / OUT: 2335 mL / NET: -2326 mL    25 Oct 2023 07:  -  25 Oct 2023 10:55  --------------------------------------------------------  IN: 50 mL / OUT: 275 mL / NET: -225 mL        PHYSICAL EXAM:  Gen: ill appearing, in NAD, R neck mass  resp: b/l breath sounds  card: S1/S2  abd: soft  ext: edema    LABS:  Daily     Daily Weight in k (25 Oct 2023 07:05)  ABG - ( 24 Oct 2023 11:58 )  pH, Arterial: 7.42  pH, Blood: x     /  pCO2: 51    /  pO2: 171   / HCO3: 33    / Base Excess: 7.2   /  SaO2: 99.5        10-25    143  |  99  |  50<H>  ----------------------------<  119<H>  3.8   |  32  |  1.5    Ca    9.0      25 Oct 2023 06:05  Mg     2.1     10-25    TPro  5.9<L>  /  Alb  2.8<L>  /  TBili  0.5  /  DBili      /  AST  16  /  ALT  8   /  AlkPhos  363<H>  10-25    Creatinine Trend:   Creatinine: 1.5 mg/dL (10-25-23 @ 06:05)  Creatinine: 1.6 mg/dL (10-24-23 @ 15:19)  Creatinine: 1.6 mg/dL (10-24-23 @ 06:10)  Creatinine: 1.7 mg/dL (10-23-23 @ 04:06)  Creatinine: 0.9 mg/dL (23 @ 04:30)                          8.6    25.97 )-----------( 247      ( 25 Oct 2023 06:05 )             28.2     Mean Cell Volume: 93.7 fL (10-25-23 @ 06:05)    Urine Studies:  Protein, Urine: 300 mg/dL (10-24-23 @ 16:15)  White Blood Cell - Urine: 30 /HPF (10-24-23 @ 16:15)  Red Blood Cell - Urine: 8 /HPF (10-24-23 @ 16:15)  Protein, Urine: Negative mg/dL (23 @ 19:50)  Osmolality, Random Urine: 326 mos/kg (10-24 @ 16:15)  Sodium, Random Urine: 100.0 mmoL/L (10-24 @ 16:15)            RADIOLOGY & ADDITIONAL STUDIES:    US Kidney and Bladder:   ACC: [id number] EXAM:  US KIDNEYS AND BLADDER   ORDERED BY: ARETHA GONG     PROCEDURE DATE:  10/24/2023          INTERPRETATION:  CLINICAL INFORMATION: Posterior renal acute kidney   injury.    COMPARISON: CT abdomen pelvis 2023    TECHNIQUE: Sonography of the kidneys and bladder.    FINDINGS:    Right kidney: 9.7 cm. Mild hydronephrosis. No renal stone. Renal cyst   measuring 1.4 cm.    Left kidney:  11.7 cm. Moderate hydronephrosis. No renal stone. Renal   cyst measuring 1.2 cm.    Urinary bladder: Debris is noted. Patient has external urinary catheter.   Volume at time of the exam is 1490 cc. Urinary bladder wall thickening   measuring 0.6 cm.      IMPRESSION:    Moderate left and mild right hydronephrosis.    Distended urinary bladder measuring 1490 cc, despite external catheter.    Urinary bladder debris and wall thickening. Correlate with urinalysis.        --- End of Report ---            SABINO SULLIVAN MD; Attending Radiologist  This document has been electronically signed. Oct 24 2023  2:01PM (10-24-23 @ 13:11)      Xray Chest 1 View- PORTABLE-Routine:   ACC: [id number] EXAM:  XR CHEST PORTABLE  ROUTINE 1V   ORDERED BY: ARETHA GONG     PROCEDURE DATE:  10/25/2023          INTERPRETATION:  Clinical History / Reason for exam: Pneumonia.    Comparison : Chest radiograph dated 2023.    Technique/Positioning: Portable frontal.    Findings:    Support devices: Left-sided chest tube in place. Overlying EKG leads.    Cardiac/mediastinum/hilum: Stable.    Lung parenchyma/Pleura: Unchanged bibasilar opacities/pleural effusions.   No pneumothorax.    Skeleton/soft tissues: Stable.    Impression:    1. Unchanged bibasilar opacities/pleural effusions.    --- End of Report ---            CALDERON HANSON MD; Attending Radiologist  This document has been electronically signed. Oct 25 2023  9:05AM (10-25-23 @ 06:24)

## 2023-10-25 NOTE — PROGRESS NOTE ADULT - SUBJECTIVE AND OBJECTIVE BOX
Patient is a 88y old  Female who presents with a chief complaint of respiratory failure (25 Oct 2023 08:51)      Over Night Events:  Patient seen and examined.       ROS:  See HPI    PHYSICAL EXAM    ICU Vital Signs Last 24 Hrs  T(C): 36.5 (25 Oct 2023 07:05), Max: 37.2 (24 Oct 2023 19:00)  T(F): 97.7 (25 Oct 2023 07:05), Max: 99 (24 Oct 2023 19:00)  HR: 106 (25 Oct 2023 08:36) (104 - 136)  BP: 127/80 (25 Oct 2023 08:00) (94/54 - 140/88)  BP(mean): 99 (25 Oct 2023 08:00) (68 - 110)  ABP: --  ABP(mean): --  RR: 30 (25 Oct 2023 08:00) (19 - 52)  SpO2: 100% (25 Oct 2023 08:36) (93% - 100%)    O2 Parameters below as of 25 Oct 2023 07:00  Patient On (Oxygen Delivery Method): nasal cannula  O2 Flow (L/min): 4          General: in no acute distress              Lymph Nodes: NO cervical LN   Lungs: Bilateral BS  Cardiovascular: Regular   Abdomen: Soft, Positive BS  Extremities: No clubbing   Skin: warm   Neurological: a&ox3  Musculoskeletal: move all ext     I&O's Detail    24 Oct 2023 07:01  -  25 Oct 2023 07:00  --------------------------------------------------------  IN:    Norepinephrine: 9 mL  Total IN: 9 mL    OUT:    Indwelling Catheter - Urethral (mL): 885 mL    Vasopressin: 0 mL    Voided (mL): 1450 mL  Total OUT: 2335 mL    Total NET: -2326 mL      25 Oct 2023 07:01  -  25 Oct 2023 09:50  --------------------------------------------------------  IN:  Total IN: 0 mL    OUT:    Indwelling Catheter - Urethral (mL): 100 mL    Norepinephrine: 0 mL    Vasopressin: 0 mL  Total OUT: 100 mL    Total NET: -100 mL          LABS:                          8.6    25.97 )-----------( 247      ( 25 Oct 2023 06:05 )             28.2         25 Oct 2023 06:05    143    |  99     |  50     ----------------------------<  119    3.8     |  32     |  1.5      Ca    9.0        25 Oct 2023 06:05  Mg     2.1       25 Oct 2023 06:05    TPro  5.9    /  Alb  2.8    /  TBili  0.5    /  DBili  x      /  AST  16     /  ALT  8      /  AlkPhos  363    25 Oct 2023 06:05  Amylase x     lipase x                                                 PT/INR - ( 24 Oct 2023 06:10 )   PT: 37.60 sec;   INR: 3.18 ratio         PTT - ( 24 Oct 2023 06:10 )  PTT:38.1 sec                                       Urinalysis Basic - ( 25 Oct 2023 06:05 )    Color: x / Appearance: x / SG: x / pH: x  Gluc: 119 mg/dL / Ketone: x  / Bili: x / Urobili: x   Blood: x / Protein: x / Nitrite: x   Leuk Esterase: x / RBC: x / WBC x   Sq Epi: x / Non Sq Epi: x / Bacteria: x        Lactate, Blood: 1.7 mmol/L (10-23-23 @ 19:14)  Lactate, Blood: 4.2 mmol/L (10-23-23 @ 04:06)    CARDIAC MARKERS ( 24 Oct 2023 06:10 )  x     / 0.06 ng/mL / x     / x     / x      CARDIAC MARKERS ( 23 Oct 2023 19:14 )  x     / 0.08 ng/mL / x     / x     / x                                                            Culture - Blood (collected 23 Oct 2023 04:06)  Source: .Blood Blood-Peripheral  Gram Stain (24 Oct 2023 04:41):    Growth in aerobic bottle: Gram Negative Rods    Growth in anaerobic bottle: Gram Negative Rods  Preliminary Report (24 Oct 2023 17:37):    Growth in aerobic and anaerobic bottles: Klebsiella pneumoniae    Direct identification is available within approximately 3-5    hours either by Blood Panel Multiplexed PCR or Direct    MALDI-TOF. Details: [email or web address]  Organism: Blood Culture PCR (24 Oct 2023 02:28)  Organism: Blood Culture PCR (24 Oct 2023 02:28)    Culture - Blood (collected 23 Oct 2023 04:06)  Source: .Blood Blood-Peripheral  Gram Stain (24 Oct 2023 05:40):    Growth in aerobic bottle: Gram Negative Rods    Growth in anaerobic bottle: Gram Negative Rods  Preliminary Report (24 Oct 2023 17:54):    Growth in aerobic and anaerobic bottles: Klebsiella pneumoniae    See previous culture [id number]                                                                                       ABG - ( 24 Oct 2023 11:58 )  pH, Arterial: 7.42  pH, Blood: x     /  pCO2: 51    /  pO2: 171   / HCO3: 33    / Base Excess: 7.2   /  SaO2: 99.5                MEDICATIONS  (STANDING):  albuterol/ipratropium for Nebulization 3 milliLiter(s) Nebulizer every 6 hours  budesonide  80 MICROgram(s)/formoterol 4.5 MICROgram(s) Inhaler 2 Puff(s) Inhalation two times a day  buMETAnide Injectable 2 milliGRAM(s) IV Push daily  ceftazidime/avibactam IVPB 0.94 Gram(s) IV Intermittent every 12 hours  chlorhexidine 2% Cloths 1 Application(s) Topical <User Schedule>  ferrous    sulfate 325 milliGRAM(s) Oral daily  gabapentin 100 milliGRAM(s) Oral three times a day  hydrocortisone sodium succinate Injectable 100 milliGRAM(s) IV Push every 8 hours  metoprolol tartrate 25 milliGRAM(s) Oral two times a day  norepinephrine Infusion 0.05 MICROgram(s)/kG/Min (4.04 mL/Hr) IV Continuous <Continuous>  vasopressin Infusion 0.04 Unit(s)/Min (6 mL/Hr) IV Continuous <Continuous>    MEDICATIONS  (PRN):

## 2023-10-25 NOTE — PROGRESS NOTE ADULT - SUBJECTIVE AND OBJECTIVE BOX
Patient seen and evaluated this am, comfortable in bed on nc oxygen      T(F): 97.7 (10-25-23 @ 07:05), Max: 99 (10-24-23 @ 19:00)  HR: 104 (10-25-23 @ 08:00)  BP: 127/80 (10-25-23 @ 08:00)  RR: 22  SpO2: 98% (10-25-23 @ 08:00) (93% - 100%)    PHYSICAL EXAM:  GENERAL: NAD  HEAD:  Atraumatic, Normocephalic  EYES: EOMI, PERRLA, conjunctiva and sclera clear  NERVOUS SYSTEM:  no focal deficits   CHEST/LUNG:  bilateral rhonchi  HEART: Regular rate and rhythm; No murmurs, rubs, or gallops  ABDOMEN: Soft, Nontender, Nondistended; Bowel sounds present  EXTREMITIES:  2+ Peripheral Pulses, No clubbing, cyanosis, or edema    LABS  10-25    143  |  99  |  50<H>  ----------------------------<  119<H>  3.8   |  32  |  1.5    Ca    9.0      25 Oct 2023 06:05  Mg     2.1     10-25    TPro  5.9<L>  /  Alb  2.8<L>  /  TBili  0.5  /  DBili  x   /  AST  16  /  ALT  8   /  AlkPhos  363<H>  10-25                          8.6    25.97 )-----------( 247      ( 25 Oct 2023 06:05 )             28.2     PT/INR - ( 24 Oct 2023 06:10 )   PT: 37.60 sec;   INR: 3.18 ratio         PTT - ( 24 Oct 2023 06:10 )  PTT:38.1 sec    CARDIAC ENZYMES      Troponin T, Serum: 0.06 ng/mL (10-24-23 @ 06:10)  Troponin T, Serum: 0.08 ng/mL (10-23-23 @ 19:14)  Troponin T, Serum: 0.17 ng/mL (10-23-23 @ 04:06)      Culture Results:   Growth in aerobic and anaerobic bottles: Klebsiella pneumoniae  Direct identification is available within approximately 3-5  hours either by Blood Panel Multiplexed PCR or Direct  MALDI-TOF. Details: [email or web address] (10-23-23)  Culture Results:   Growth in aerobic and anaerobic bottles: Klebsiella pneumoniae  See previous culture [id number] (10-23-23)    RADIOLOGY  < from: US Kidney and Bladder (10.24.23 @ 13:11) >  IMPRESSION:    Moderate left and mild right hydronephrosis.    Distended urinary bladder measuring 1490 cc, despite external catheter.    Urinary bladder debris and wall thickening. Correlate with urinalysis.    < end of copied text >  < from: Xray Chest 1 View- PORTABLE-Routine (Xray Chest 1 View- PORTABLE-Routine in AM.) (10.24.23 @ 07:55) >  Impression:    Stable diffuse lung opacities      < end of copied text >    MEDICATIONS  (STANDING):  albuterol/ipratropium for Nebulization 3 milliLiter(s) Nebulizer every 6 hours  budesonide  80 MICROgram(s)/formoterol 4.5 MICROgram(s) Inhaler 2 Puff(s) Inhalation two times a day  buMETAnide Injectable 2 milliGRAM(s) IV Push daily  ceftazidime/avibactam IVPB 0.94 Gram(s) IV Intermittent every 12 hours  chlorhexidine 2% Cloths 1 Application(s) Topical <User Schedule>  ferrous    sulfate 325 milliGRAM(s) Oral daily  gabapentin 100 milliGRAM(s) Oral three times a day  hydrocortisone sodium succinate Injectable 100 milliGRAM(s) IV Push every 8 hours  metoprolol tartrate 25 milliGRAM(s) Oral two times a day  norepinephrine Infusion 0.05 MICROgram(s)/kG/Min (4.04 mL/Hr) IV Continuous <Continuous>  vasopressin Infusion 0.04 Unit(s)/Min (6 mL/Hr) IV Continuous <Continuous>    MEDICATIONS  (PRN):

## 2023-10-25 NOTE — SWALLOW FEES ASSESSMENT ADULT - COMMENTS
Pt with h/o frequent admissions, usually with "suspected aspiration event". No prior instrumentals per chart review. Complex Repair And Graft Additional Text (Will Appearing After The Standard Complex Repair Text): The complex repair was not sufficient to completely close the primary defect. The remaining additional defect was repaired with the graft mentioned below.

## 2023-10-25 NOTE — PHARMACOTHERAPY INTERVENTION NOTE - COMMENTS
Modified penicillin allergy history to state that patient tolerated ceftazidime-avibactam during this admission.

## 2023-10-25 NOTE — SWALLOW FEES ASSESSMENT ADULT - LARYNGEAL PENETRATION DURING SWALLOW - SILENT
d/t poor timing- delayed trigger of pharyngeal swallow with penetration to the cords for large consecutive straw sips of thins. For more controlled cup sips there was only intermittent trace penetration, above the cords./Trace/Mild

## 2023-10-25 NOTE — SWALLOW FEES ASSESSMENT ADULT - PHARYNGEAL PHASE COMMENTS
MODERATE pharyngeal dysphagia for thins via straw sips, reduced to MILD for thins via controlled cup sips.

## 2023-10-25 NOTE — PROGRESS NOTE ADULT - SUBJECTIVE AND OBJECTIVE BOX
SUBJECTIVE:    Patient is a 88y old Female who presents with a chief complaint of respiratory failure (25 Oct 2023 10:54)    Overnight Events: No overnight events. Patient is tolerating being on AVAPS 4h ON 4h OFF.    PAST MEDICAL & SURGICAL HISTORY  HTN (hypertension)    Afib  s/p ablation 2001    Goiter  no meds    Cellulitis  chronic    OA (osteoarthritis)    PVD (peripheral vascular disease)    COPD (chronic obstructive pulmonary disease)    Anxiety    Obesity    Acute on chronic systolic congestive heart failure    Edema of both lower legs    Required emergent intubation    H/O abdominal hysterectomy    H/O discectomy    H/O total knee replacement, bilateral      SOCIAL HISTORY:  Negative for smoking/alcohol/drug use.     ALLERGIES:  aspirin (Other)  sulfa drugs (Hives; Other)  codeine (Other)  penicillin (Other)  contrast media (iodine-based) (Other)    MEDICATIONS:  STANDING MEDICATIONS  albuterol/ipratropium for Nebulization 3 milliLiter(s) Nebulizer every 6 hours  budesonide  80 MICROgram(s)/formoterol 4.5 MICROgram(s) Inhaler 2 Puff(s) Inhalation two times a day  buMETAnide Injectable 2 milliGRAM(s) IV Push daily  ceftazidime/avibactam IVPB 0.94 Gram(s) IV Intermittent every 12 hours  chlorhexidine 2% Cloths 1 Application(s) Topical <User Schedule>  ferrous    sulfate 325 milliGRAM(s) Oral daily  gabapentin 100 milliGRAM(s) Oral three times a day  hydrocortisone sodium succinate Injectable 100 milliGRAM(s) IV Push every 8 hours  metoprolol tartrate 25 milliGRAM(s) Oral two times a day    PRN MEDICATIONS    VITALS:   T(F): 97.7, Max: 99 (10-24-23 @ 19:00)  HR: 109 (104 - 136)  BP: 134/71 (94/54 - 140/88)  RR: 28 (19 - 52)  SpO2: 100% (93% - 100%)    LABS:                        8.6    25.97 )-----------( 247      ( 25 Oct 2023 06:05 )             28.2     10-25    143  |  99  |  50<H>  ----------------------------<  119<H>  3.8   |  32  |  1.5    Ca    9.0      25 Oct 2023 06:05  Mg     2.1     10-25    TPro  5.9<L>  /  Alb  2.8<L>  /  TBili  0.5  /  DBili  x   /  AST  16  /  ALT  8   /  AlkPhos  363<H>  10-25    PT/INR - ( 24 Oct 2023 06:10 )   PT: 37.60 sec;   INR: 3.18 ratio         PTT - ( 24 Oct 2023 06:10 )  PTT:38.1 sec  Urinalysis Basic - ( 25 Oct 2023 06:05 )    Color: x / Appearance: x / SG: x / pH: x  Gluc: 119 mg/dL / Ketone: x  / Bili: x / Urobili: x   Blood: x / Protein: x / Nitrite: x   Leuk Esterase: x / RBC: x / WBC x   Sq Epi: x / Non Sq Epi: x / Bacteria: x      ABG - ( 24 Oct 2023 11:58 )  pH, Arterial: 7.42  pH, Blood: x     /  pCO2: 51    /  pO2: 171   / HCO3: 33    / Base Excess: 7.2   /  SaO2: 99.5                  Culture - Blood (collected 23 Oct 2023 04:06)  Source: .Blood Blood-Peripheral  Gram Stain (24 Oct 2023 04:41):    Growth in aerobic bottle: Gram Negative Rods    Growth in anaerobic bottle: Gram Negative Rods  Preliminary Report (24 Oct 2023 17:37):    Growth in aerobic and anaerobic bottles: Klebsiella pneumoniae    Direct identification is available within approximately 3-5    hours either by Blood Panel Multiplexed PCR or Direct    MALDI-TOF. Details: [email or web address]  Organism: Blood Culture PCR (24 Oct 2023 02:28)  Organism: Blood Culture PCR (24 Oct 2023 02:28)    Culture - Blood (collected 23 Oct 2023 04:06)  Source: .Blood Blood-Peripheral  Gram Stain (24 Oct 2023 05:40):    Growth in aerobic bottle: Gram Negative Rods    Growth in anaerobic bottle: Gram Negative Rods  Preliminary Report (24 Oct 2023 17:54):    Growth in aerobic and anaerobic bottles: Klebsiella pneumoniae    See previous culture [id number]      CARDIAC MARKERS ( 24 Oct 2023 06:10 )  x     / 0.06 ng/mL / x     / x     / x      CARDIAC MARKERS ( 23 Oct 2023 19:14 )  x     / 0.08 ng/mL / x     / x     / x              10-24-23 @ 07:01  -  10-25-23 @ 07:00  --------------------------------------------------------  IN: 9 mL / OUT: 2335 mL / NET: -2326 mL    10-25-23 @ 07:01  -  10-25-23 @ 11:19  --------------------------------------------------------  IN: 50 mL / OUT: 275 mL / NET: -225 mL          IMAGING/EKG:    No imaging    PHYSICAL EXAM:  GEN: NAD, comfortable  LUNGS: Decreased breath sounds bibasilarly  HEART: Irregular  ABD: soft, NT/ND, +BS  EXT: no edema, PP b/l  NEURO: MANUELO x3

## 2023-10-25 NOTE — PROGRESS NOTE ADULT - ASSESSMENT
IMPRESSION:  Septic shock with multi drug resistant K. pneumoniae. 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 possible superimposed empyema  YAIMA  probable aspiration pneumonitis  HO Probable LAWRENCE refused testing   Thyroid mass SP FNA   HO foot wound MRSA, Right inguinal Abscess VRE +& ESBL E.coli bacteremia (5/2023)   HO Afib on Eliquis  HO Right upper arm brachial DVT 9/23      PLAN:    CNS:   avoid cns depressants  pain control with Tylenol    HEENT: Oral care    PULMONARY:    HOB @ 45 degrees.   avaps 4 hours 4 hours off, avaps at night. Supplemental O2 therapy as needed. keep SpO2 88-92%  d-dimer 531. LE duplex  DuoNebs q6hrs  stress dose Steroids with hydrocortisone 100mg q8hrs  aggressive pulmonary toilet, chest pt  ct chest non con    CARDIOVASCULAR:  check BNP  Fluid restriction: 1L, keep negative fluid balance  c/w bumex 2mg IVP qd  Wean off levophed as tolerated.  Keep MAP >65    GI: GI prophylaxis.  puree diet.  Bowel regimen. last bm 10/25    RENAL:    Follow up lytes.  Correct as needed  Send urine creatinine, urea and lytes  Check renal sonogram  renal consult    INFECTIOUS DISEASE:   blood culture grew Carbapenem Resistant and CTX-M K. pneumoniae  urine culture negative  continue with AVYCAZ   ID eval    HEMATOLOGICAL:    DVT prophylaxis with SCDs. hold eliquis, daily coag panel.   ct abdomen and pelvis for dropping hb  LE duplex    ENDOCRINE:  Follow up FS.  Insulin protocol if needed.    MUSCULOSKELETAL: ambulate as tolerated    DNR not DNI  Lines: PIV. Ba 10/25 for rentention/hydronephrosis  Dispo: SDU

## 2023-10-25 NOTE — CONSULT NOTE ADULT - ASSESSMENT
YAIMA / urinary retention YAIMA / urinary retention / may have ATN iso septic shock  - maintain al catheter, repeat renal ultrasound tomorrow to assess resolution of hydronephrosis  - dose meds per eGFR / abx per ID recs  - cont diuretics as needed to maintain negative fluid balance-- would dose bumex twice daily

## 2023-10-25 NOTE — PROGRESS NOTE ADULT - ASSESSMENT
88 year old female with PMH of HTN, Afib, OA, PVD, COPD, Anxiety, Obesity, Acute on chronic systolic congestive heart failure, H/O abdominal hysterectomy, H/O discectomy, H/O total knee replacement, bilaterally and bilateral pleural effusions (s/p left thoracentesis 3/2023 -- Cytology -PLEURAL FLUID, THORACENTESIS: - SUSPICIOUS FOR MALIGNANCY) with a left sided pleurex catheter, presented for fever and dyspnea.    ID is consulted for septic shock  Febrile to 104.5 on admission, significant leukocytosis to 39.5K  Hypotension requiring Levophed  BCx (10/22) 4/4 bottles with KPC Klebsiella spp.  COVID/flu negative  CXR showed increased bilateral opacities/effusions    Antibiotics:  Vancomycin 10/23  Levaquin 10/23  Cefepime 10/23  Avycaz 10/23 ->    Overall KPC Klebsiella bacteremia, possibly 2/2 pneumonia  Would also obtain UA, UCx, CT chest/A/P to rule out other source  Patient is amendable for intubation      IMPRESSION:  KPC Klebsiella bacteremia, potentially life-threatening  Pneumonia, possible parapneumonic effusion  Septic shock  Bandemia    RECOMMENDATIONS:  - Continue IV Avycaz 0.94gm q12hrs (CrCl ~27)  - Hold off on vancomycin for now, check MRSA nare PCR  - Obtain sputum Cx, urine Legionella Ag, UA and UCx  - Repeat 2 sets of BCx in 48hrs  - Trend WBC, fever curve, transaminases, creatinine daily  - Will continue to follow      Micaela Melo D.O.  Attending Physician  Division of Infectious Diseases  [facility]  Please contact me via [employer] Teams

## 2023-10-25 NOTE — SWALLOW FEES ASSESSMENT ADULT - ROSENBEK'S PENETRATION ASPIRATION SCALE
PAS 6 for thins via straw sip (possibly aspiration however unable to visualize). PAS 3 for small controlled cup sips. Requires cuing for use of small controlled cup sips./(5) material contacts vocal cords, visible residue remains (penetration)

## 2023-10-26 NOTE — PROGRESS NOTE ADULT - SUBJECTIVE AND OBJECTIVE BOX
SUBJECTIVE:    Patient is a 88y old Female who presents with a chief complaint of respiratory failure (26 Oct 2023 10:22)    Overnight Events: No overnight events. BC are always growing Klebsiella [facility], on avycaz.    PAST MEDICAL & SURGICAL HISTORY  HTN (hypertension)    Afib  s/p ablation 2001    Goiter  no meds    Cellulitis  chronic    OA (osteoarthritis)    PVD (peripheral vascular disease)    COPD (chronic obstructive pulmonary disease)    Anxiety    Obesity    Acute on chronic systolic congestive heart failure    Edema of both lower legs    Required emergent intubation    H/O abdominal hysterectomy    H/O discectomy    H/O total knee replacement, bilateral      SOCIAL HISTORY:  Negative for smoking/alcohol/drug use.     ALLERGIES:  aspirin (Other)  sulfa drugs (Hives; Other)  codeine (Other)  penicillin (Other)  contrast media (iodine-based) (Other)    MEDICATIONS:  STANDING MEDICATIONS  albuterol/ipratropium for Nebulization 3 milliLiter(s) Nebulizer every 6 hours  budesonide  80 MICROgram(s)/formoterol 4.5 MICROgram(s) Inhaler 2 Puff(s) Inhalation two times a day  ceftazidime/avibactam IVPB 1.25 Gram(s) IV Intermittent every 8 hours  chlorhexidine 2% Cloths 1 Application(s) Topical <User Schedule>  ferrous    sulfate 325 milliGRAM(s) Oral daily  gabapentin 100 milliGRAM(s) Oral three times a day  heparin  Infusion.  Unit(s)/Hr IV Continuous <Continuous>  metoprolol tartrate 25 milliGRAM(s) Oral two times a day    PRN MEDICATIONS    VITALS:   T(F): 97, Max: 98.7 (10-26-23 @ 05:01)  HR: 92 (85 - 113)  BP: 93/58 (93/58 - 123/80)  RR: 24 (14 - 30)  SpO2: 100% (100% - 100%)    LABS:                        8.8    17.59 )-----------( 221      ( 26 Oct 2023 05:57 )             28.4     10-26    145  |  101  |  46<H>  ----------------------------<  162<H>  2.7<LL>   |  32  |  1.2    Ca    8.6      26 Oct 2023 11:24  Mg     1.8     10-26    TPro  5.8<L>  /  Alb  2.9<L>  /  TBili  0.3  /  DBili  x   /  AST  13  /  ALT  8   /  AlkPhos  337<H>  10-26    PT/INR - ( 26 Oct 2023 05:57 )   PT: 21.00 sec;   INR: 1.81 ratio         PTT - ( 26 Oct 2023 13:37 )  PTT:53.8 sec  Urinalysis Basic - ( 26 Oct 2023 11:24 )    Color: x / Appearance: x / SG: x / pH: x  Gluc: 162 mg/dL / Ketone: x  / Bili: x / Urobili: x   Blood: x / Protein: x / Nitrite: x   Leuk Esterase: x / RBC: x / WBC x   Sq Epi: x / Non Sq Epi: x / Bacteria: x            Culture - Urine (collected 24 Oct 2023 16:15)  Source: Clean Catch Clean Catch (Midstream)  Preliminary Report (26 Oct 2023 17:09):    >100,000 CFU/ml Klebsiella pneumoniae              10-25-23 @ 07:01  -  10-26-23 @ 07:00  --------------------------------------------------------  IN: 390 mL / OUT: 2405 mL / NET: -2015 mL    10-26-23 @ 07:01  -  10-26-23 @ 21:00  --------------------------------------------------------  IN: 606 mL / OUT: 700 mL / NET: -94 mL          IMAGING/EKG:    No recent imaging    PHYSICAL EXAM:  GEN: NAD, comfortable  LUNGS: Decreased breath sounds bibasilarly  HEART: Irregular  ABD: soft  EXT: no edema, PP b/l  NEURO: AAOX3

## 2023-10-26 NOTE — PROGRESS NOTE ADULT - ASSESSMENT
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    # COPD exacerbation  # Aspiration pneumonia    BC grew Klebsiella CRE-- > switched from cefepime to avycaz  Weaned down steroids to prednisone qday + duonebs q6h  Weaned off levo and vasopressin, and removed the femoral TLC  Off IV fluids, on bumex  CT chest no contrast showed consolidation bibasilarly    # Malignant b/l pleural effusions    Drained on 10/24 via the PleurX    # YAIMA    Possible pre-renal in the setting of sepsis + post-renal with b/l hydronephrosis  Has a Ba in   Creatinine is improving, with improved hemodynamics    # Chronic afib    Put patient back on metoprolol, still off cardizem  With a HR ranging between 90 and 110  Off eliquis, and now switched to heparin drip    DVT ppx: heparin drip  GI ppx: none  Diet: Pureed   Activity: as tolerated  Code status: DNR

## 2023-10-26 NOTE — PHARMACOTHERAPY INTERVENTION NOTE - COMMENTS
Recommended increasing ceftazidime-avibactam dose to 1.25g IV q8h since renal function improved, and CrCl is now ~33 mL/min.

## 2023-10-26 NOTE — PROGRESS NOTE ADULT - SUBJECTIVE AND OBJECTIVE BOX
Patient is a 88y old  Female who presents with a chief complaint of respiratory failure (26 Oct 2023 08:11)      Over Night Events:  Patient seen and examined.   no acute events overnight    ROS:  See HPI    PHYSICAL EXAM    ICU Vital Signs Last 24 Hrs  T(C): 35.6 (26 Oct 2023 07:09), Max: 37.1 (26 Oct 2023 05:01)  T(F): 96.1 (26 Oct 2023 07:09), Max: 98.7 (26 Oct 2023 05:01)  HR: 100 (26 Oct 2023 08:24) (94 - 113)  BP: 123/80 (26 Oct 2023 05:00) (116/65 - 134/71)  BP(mean): 97 (26 Oct 2023 05:00) (85 - 97)  ABP: --  ABP(mean): --  RR: 14 (26 Oct 2023 07:09) (14 - 33)  SpO2: 100% (26 Oct 2023 08:24) (94% - 100%)    O2 Parameters below as of 26 Oct 2023 05:00  Patient On (Oxygen Delivery Method): BiPAP/CPAP, AVAP    O2 Concentration (%): 50        General:   in no acute distress           Lymph Nodes: NO cervical LN   Lungs: Bilateral BS  Cardiovascular: Regular   Abdomen: Soft, Positive BS  Extremities: No clubbing   Skin: warm   Neurological: a&o x3  Musculoskeletal: move all ext     I&O's Detail    25 Oct 2023 07:01  -  26 Oct 2023 07:00  --------------------------------------------------------  IN:    Heparin Infusion: 140 mL    IV PiggyBack: 100 mL    Oral Fluid: 150 mL  Total IN: 390 mL    OUT:    Indwelling Catheter - Urethral (mL): 2405 mL    Norepinephrine: 0 mL    Vasopressin: 0 mL  Total OUT: 2405 mL    Total NET: -2015 mL      26 Oct 2023 07:01  -  26 Oct 2023 09:56  --------------------------------------------------------  IN:    Heparin Infusion: 14 mL  Total IN: 14 mL    OUT:  Total OUT: 0 mL    Total NET: 14 mL          LABS:                          8.8    17.59 )-----------( 221      ( 26 Oct 2023 05:57 )             28.4         26 Oct 2023 05:57    148    |  99     |  47     ----------------------------<  TNP    3.2     |  TNP    |  1.3      Ca    8.8        26 Oct 2023 05:57  Mg     TNP       26 Oct 2023 05:57    TPro  TNP    /  Alb  2.9    /  TBili  TNP    /  DBili  x      /  AST  14     /  ALT  9      /  AlkPhos  364    26 Oct 2023 05:57  Amylase x     lipase x                                                 PT/INR - ( 26 Oct 2023 05:57 )   PT: 21.00 sec;   INR: 1.81 ratio         PTT - ( 26 Oct 2023 05:57 )  PTT:61.3 sec                                       Urinalysis Basic - ( 26 Oct 2023 05:57 )    Color: x / Appearance: x / SG: x / pH: x  Gluc: TNP mg/dL / Ketone: x  / Bili: x / Urobili: x   Blood: x / Protein: x / Nitrite: x   Leuk Esterase: x / RBC: x / WBC x   Sq Epi: x / Non Sq Epi: x / Bacteria: x        Lactate, Blood: 1.7 mmol/L (10-23-23 @ 19:14)                                                          Culture - Urine (collected 24 Oct 2023 16:15)  Source: Clean Catch Clean Catch (Midstream)  Preliminary Report (26 Oct 2023 09:35):    >100,000 CFU/ml Gram Negative Rods                                                                                       ABG - ( 24 Oct 2023 11:58 )  pH, Arterial: 7.42  pH, Blood: x     /  pCO2: 51    /  pO2: 171   / HCO3: 33    / Base Excess: 7.2   /  SaO2: 99.5                MEDICATIONS  (STANDING):  albuterol/ipratropium for Nebulization 3 milliLiter(s) Nebulizer every 6 hours  budesonide  80 MICROgram(s)/formoterol 4.5 MICROgram(s) Inhaler 2 Puff(s) Inhalation two times a day  buMETAnide Injectable 2 milliGRAM(s) IV Push daily  ceftazidime/avibactam IVPB 1.25 Gram(s) IV Intermittent every 8 hours  chlorhexidine 2% Cloths 1 Application(s) Topical <User Schedule>  ferrous    sulfate 325 milliGRAM(s) Oral daily  gabapentin 100 milliGRAM(s) Oral three times a day  heparin  Infusion.  Unit(s)/Hr (14 mL/Hr) IV Continuous <Continuous>  hydrocortisone sodium succinate Injectable 100 milliGRAM(s) IV Push every 8 hours  metoprolol tartrate 25 milliGRAM(s) Oral two times a day    MEDICATIONS  (PRN):

## 2023-10-26 NOTE — PROGRESS NOTE ADULT - ASSESSMENT
IMPRESSION:  Septic shock with multi drug resistant K. pneumoniae. 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 possible superimposed empyema  YAIMA  probable aspiration pneumonitis  HO Probable LAWRENCE refused testing   Thyroid mass SP FNA   HO foot wound MRSA, Right inguinal Abscess VRE +& ESBL E.coli bacteremia (5/2023)   HO Afib on Eliquis  HO Right upper arm brachial DVT 9/23      PLAN:    CNS:   avoid cns depressants  pain control with Tylenol    HEENT: Oral care    PULMONARY:    HOB @ 45 degrees.   avaps 4 hours 4 hours off, avaps at night. Supplemental O2 therapy as needed. keep SpO2 88-92%  d-dimer 531. LE duplex  DuoNebs q6hrs  d/c hydrocortisone 100mg q8hrs. start prednisone 40mg qd x 5 days  aggressive pulmonary toilet, chest pt    CARDIOVASCULAR:  Fluid restriction: 1L, keep negative fluid balance  d/c bumex 2mg IVP qd  Wean off levophed as tolerated.  Keep MAP >65    GI: GI prophylaxis.  puree diet.  Bowel regimen. last bm 10/25    RENAL:    Follow up lytes.  Correct as needed  renal follow up PRN    INFECTIOUS DISEASE:   blood culture grew Carbapenem Resistant and CTX-M K. pneumoniae  contact precautions  continue with AVYCAZ   ID follow up    HEMATOLOGICAL:    hold eliquis, heparin drip  LE duplex    ENDOCRINE:  Follow up FS.  Insulin protocol if needed.    MUSCULOSKELETAL: ambulate as tolerated    DNR not DNI  Lines: PIV. Ba 10/25 for rentention/hydronephrosis  Dispo: SDU       IMPRESSION:  Septic shock with multi drug resistant K. pneumoniae. 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 possible superimposed empyema  YAIMA  aspiration pneumonitis  HO Probable LAWRENCE refused testing   Thyroid mass SP FNA   HO foot wound MRSA, Right inguinal Abscess VRE +& ESBL E.coli bacteremia (5/2023)   HO Afib on Eliquis  HO Right upper arm brachial DVT 9/23      PLAN:    CNS:   avoid cns depressants  pain control with Tylenol    HEENT: Oral care    PULMONARY:    HOB @ 45 degrees.   avaps 4 hours 4 hours off, avaps at night. Supplemental O2 therapy as needed. keep SpO2 88-92%  d-dimer 531. LE duplex  DuoNebs q6hrs  d/c hydrocortisone 100mg q8hrs. start prednisone 40mg qd x 5 days  aggressive pulmonary toilet, chest pt    CARDIOVASCULAR:  Fluid restriction: 1L, keep negative fluid balance  d/c bumex 2mg IVP qd  Wean off levophed as tolerated.  Keep MAP >65    GI: GI prophylaxis.  puree diet.  Bowel regimen. last bm 10/25    RENAL:    Follow up lytes.  Correct as needed  renal follow up PRN    INFECTIOUS DISEASE:   blood culture grew Carbapenem Resistant and CTX-M K. pneumoniae  contact precautions  continue with AVYCAZ   ID follow up    HEMATOLOGICAL:    hold eliquis, heparin drip  LE duplex    ENDOCRINE:  Follow up FS.  Insulin protocol if needed.    MUSCULOSKELETAL: ambulate as tolerated    DNR not DNI  Lines: PIV. Ba 10/25 for rentention/hydronephrosis  Dispo: SDU

## 2023-10-26 NOTE — PROGRESS NOTE ADULT - SUBJECTIVE AND OBJECTIVE BOX
INFECTIOUS DISEASE FOLLOW UP NOTE:    Interval History/ROS: Patient is a 88y old  Female who presents with a chief complaint of respiratory failure (26 Oct 2023 09:55)      Overnight events:    REVIEW OF SYSTEMS:        Prior hospital charts reviewed [Yes]  Primary team notes reviewed [Yes]  Other consultant notes reviewed [Yes]    Allergies:  aspirin (Other)  sulfa drugs (Hives; Other)  codeine (Other)  penicillin (Other)  contrast media (iodine-based) (Other)      ANTIMICROBIALS:   ceftazidime/avibactam IVPB 1.25 every 8 hours      OTHER MEDS: MEDICATIONS  (STANDING):  albuterol/ipratropium for Nebulization 3 every 6 hours  budesonide  80 MICROgram(s)/formoterol 4.5 MICROgram(s) Inhaler 2 two times a day  gabapentin 100 three times a day  heparin  Infusion.  <Continuous>  metoprolol tartrate 25 two times a day      Vital Signs Last 24 Hrs  T(F): 96.1 (10-26-23 @ 07:09), Max: 104.5 (10-23-23 @ 03:36)    Vital Signs Last 24 Hrs  HR: 100 (10-26-23 @ 08:24) (94 - 113)  BP: 123/80 (10-26-23 @ 05:00) (116/65 - 127/73)  RR: 14 (10-26-23 @ 07:09)  SpO2: 100% (10-26-23 @ 08:24) (94% - 100%)  Wt(kg): --    EXAM:      Labs:                        8.8    17.59 )-----------( 221      ( 26 Oct 2023 05:57 )             28.4     10-26    148<H>  |  99  |  47<H>  ----------------------------<  TNP  3.2<L>   |  TNP  |  1.3    Ca    8.8      26 Oct 2023 05:57  Mg     TNP     10-26    TPro  TNP  /  Alb  2.9<L>  /  TBili  TNP  /  DBili  x   /  AST  14  /  ALT  9   /  AlkPhos  364<H>  10-26      WBC Trend:  WBC Count: 17.59 (10-26-23 @ 05:57)  WBC Count: 25.97 (10-25-23 @ 06:05)  WBC Count: 39.51 (10-24-23 @ 06:10)  WBC Count: 7.63 (10-23-23 @ 04:06)      Creatine Trend:  Creatinine: 1.3 (10-26)  Creatinine: 1.5 (10-25)  Creatinine: 1.6 (10-24)  Creatinine: 1.6 (10-24)      Liver Biochemical Testing Trend:  Alanine Aminotransferase (ALT/SGPT): 9 (10-26)  Alanine Aminotransferase (ALT/SGPT): 8 (10-25)  Alanine Aminotransferase (ALT/SGPT): 10 (10-24)  Alanine Aminotransferase (ALT/SGPT): 11 (10-23)  Alanine Aminotransferase (ALT/SGPT): 31 (09-29)  Aspartate Aminotransferase (AST/SGOT): 14 (10-26-23 @ 05:57)  Aspartate Aminotransferase (AST/SGOT): 16 (10-25-23 @ 06:05)  Aspartate Aminotransferase (AST/SGOT): 17 (10-24-23 @ 06:10)  Aspartate Aminotransferase (AST/SGOT): 22 (10-23-23 @ 04:06)  Aspartate Aminotransferase (AST/SGOT): 28 (09-29-23 @ 04:30)  Bilirubin Total: TNP (10-26)  Bilirubin Total: 0.5 (10-25)  Bilirubin Total: 0.6 (10-24)  Bilirubin Total: 1.0 (10-23)  Bilirubin Total: 0.6 (09-29)      Trend LDH  09-10-23 @ 19:21  398<H>  03-07-23 @ 21:06  211      Urinalysis Basic - ( 26 Oct 2023 05:57 )    Color: x / Appearance: x / SG: x / pH: x  Gluc: TNP mg/dL / Ketone: x  / Bili: x / Urobili: x   Blood: x / Protein: x / Nitrite: x   Leuk Esterase: x / RBC: x / WBC x   Sq Epi: x / Non Sq Epi: x / Bacteria: x        MICROBIOLOGY:    MRSA PCR Result.: Negative (10-24-23 @ 16:15)  MRSA PCR Result.: Positive (09-05-23 @ 00:00)  MRSA PCR Result.: Negative (07-05-23 @ 15:45)  MRSA PCR Result.: Negative (03-02-23 @ 15:27)  MRSA PCR Result.: Negative (03-02-23 @ 07:58)      Culture - Urine (collected 24 Oct 2023 16:15)  Source: Clean Catch Clean Catch (Midstream)  Preliminary Report:    >100,000 CFU/ml Gram Negative Rods    Culture - Blood (collected 23 Oct 2023 04:06)  Source: .Blood Blood-Peripheral  Final Report:    Growth in aerobic and anaerobic bottles: Klebsiella pneumoniae    (Carbapenem Resistant)    Direct identification is available within approximately 3-5    hours either by Blood Panel Multiplexed PCR or Direct    MALDI-TOF. Details: [email or web address]  Organism: Blood Culture PCR  Klepne MDRO  Organism: Klepne MDRO    Sensitivities:      Method Type: DARIO      -  Amikacin: S <=16      -  Ampicillin: R >16 These ampicillin results predict results for amoxicillin      -  Ampicillin/Sulbactam: R >16/8      -  Aztreonam: R >16      -  Cefazolin: R >16      -  Cefepime: R >16      -  Cefoxitin: R >16      -  Ceftazidime/Avibactam: S <=4      -  Ceftolozane/tazobactam: R >8      -  Ceftriaxone: R >32      -  Ciprofloxacin: R >2      -  Ertapenem: R >1      -  Gentamicin: S <=2      -  Imipenem: R >8      -  Levofloxacin: R >4      -  Meropenem: R >8      -  Meropenem/Vaborbactam: S <=2      -  Piperacillin/Tazobactam: R >64      -  Tobramycin: S <=2      -  Trimethoprim/Sulfamethoxazole: R >2/38  Organism: Blood Culture PCR    Sensitivities:      Method Type: PCR      -  K. pneumoniae group: Detec (K. pneumoniae, K. quasipneumoniae, K. variicola)      -  ESBL: Detec      -  Carbapenem Resistance: Detec      -  CTX-M Resistance Marker: Detec      -  KPC resistance gene: Detec The KPC gene confers resistance to all penicillins, cephalosporins, carbapenems and aztreonam. Additional resistance to fluoroquinolones and aminoglycosides is likely.    Culture - Blood (collected 23 Oct 2023 04:06)  Source: .Blood Blood-Peripheral  Final Report:    Growth in aerobic and anaerobic bottles: Klebsiella pneumoniae    (Carbapenem Resistant)    See previous culture [id number]    Culture - Blood (collected 10 Sep 2023 05:45)  Source: .Blood None  Final Report:    No growth at 5 days    Culture - Blood (collected 10 Sep 2023 05:45)  Source: .Blood None  Final Report:    No growth at 5 days    Culture - Sputum (collected 05 Sep 2023 19:15)  Source: Trach Asp Tracheal Aspirate  Final Report:    Normal Respiratory Joaquina present    Culture - Blood (collected 02 Sep 2023 06:01)  Source: .Blood None  Final Report:    No growth at 5 days    Culture - Sputum (collected 02 Sep 2023 03:00)  Source: Trach Asp Tracheal Aspirate  Final Report:    Normal Respiratory Joaquina present    Culture - Urine (collected 01 Sep 2023 19:50)  Source: Catheterized Catheterized  Final Report:    <10,000 CFU/mL Normal Urogenital Joaquina    Culture - Blood (collected 01 Sep 2023 19:32)  Source: .Blood None  Final Report:    No growth at 5 days      COVID-19 PCR: NotDetec (09-28-23 @ 19:00)  COVID-19 PCR: NotDetec (09-26-23 @ 15:30)      Blood Gas Arterial, Lactate: 1.10 (10-24 @ 11:58)  Blood Gas Arterial, Lactate: 1.00 (10-24 @ 09:26)  Lactate, Blood: 1.7 (10-23 @ 19:14)  Blood Gas Arterial, Lactate: 1.50 (10-23 @ 17:21)      RADIOLOGY:  imaging below personally reviewed    < from: CT Abdomen and Pelvis No Cont (10.25.23 @ 15:05) >  IMPRESSION:    No evidence of abdominal or pelvic mass or inflammatory process.    Bilateral renal cysts are again noted. The previously noted exophytic   posterior lesion remains indeterminate, therefore follow-up MRI with   contrast is recommended.    No evidence of retroperitoneal, intraperitoneal, intramuscular, or   subcutaneous hematoma.    < end of copied text >    < from: CT Chest No Cont (10.25.23 @ 15:05) >    IMPRESSION:    Right neck mass.    Bilateral opacifications and effusions, right greater than left, stable.    < end of copied text > INFECTIOUS DISEASE FOLLOW UP NOTE:    Interval History/ROS: Patient is a 88y old  Female who presents with a chief complaint of respiratory failure (26 Oct 2023 09:55)      Overnight events: No overnight event. Much improved today    REVIEW OF SYSTEMS:  CONSTITUTIONAL: No fever or chills  HEAD: No lesion on scalp  EYES: No visual disturbance  ENT: No sore throat  RESPIRATORY: No cough, + shortness of breath  CARDIOVASCULAR: No chest pain or palpitations  GASTROINTESTINAL: No abdominal or epigastric pain; pain at the anus  GENITOURINARY: No dysuria  NEUROLOGICAL: No headache/dizziness  MUSCULOSKELETAL: No joint pain, erythema, or swelling; no back pain  SKIN: No itching, rashes  All other ROS negative except noted above        Prior hospital charts reviewed [Yes]  Primary team notes reviewed [Yes]  Other consultant notes reviewed [Yes]    Allergies:  aspirin (Other)  sulfa drugs (Hives; Other)  codeine (Other)  penicillin (Other)  contrast media (iodine-based) (Other)      ANTIMICROBIALS:   ceftazidime/avibactam IVPB 1.25 every 8 hours      OTHER MEDS: MEDICATIONS  (STANDING):  albuterol/ipratropium for Nebulization 3 every 6 hours  budesonide  80 MICROgram(s)/formoterol 4.5 MICROgram(s) Inhaler 2 two times a day  gabapentin 100 three times a day  heparin  Infusion.  <Continuous>  metoprolol tartrate 25 two times a day      Vital Signs Last 24 Hrs  T(F): 96.1 (10-26-23 @ 07:09), Max: 104.5 (10-23-23 @ 03:36)    Vital Signs Last 24 Hrs  HR: 100 (10-26-23 @ 08:24) (94 - 113)  BP: 123/80 (10-26-23 @ 05:00) (116/65 - 127/73)  RR: 14 (10-26-23 @ 07:09)  SpO2: 100% (10-26-23 @ 08:24) (94% - 100%)  Wt(kg): --    EXAM:  GENERAL: In mild respiratory distress, lying in bed  HEAD: No head lesions  EYES: Conjunctiva pink and cornea white  EAR, NOSE, MOUTH, THROAT: Normal external ears and nose, no discharges; moist mucous membranes; on NC today  NECK: Supple, nontender to palpation; no JVD  RESPIRATORY: Decreased bibasilar breath sounds  CARDIOVASCULAR: S1 S2, tachycardia  GASTROINTESTINAL: Soft, nontender, nondistended; normoactive bowel sounds  EXTREMITIES: No clubbing, cyanosis, or petal edema  NERVOUS SYSTEM: Alert and alert, able to moves all her extremities  MUSCULOSKELETAL: No joint erythema, swelling or pain  SKIN: No rashes or lesions, no superficial thrombophlebitis  PSYCH: Restless    Labs:                        8.8    17.59 )-----------( 221      ( 26 Oct 2023 05:57 )             28.4     10-26    148<H>  |  99  |  47<H>  ----------------------------<  TNP  3.2<L>   |  TNP  |  1.3    Ca    8.8      26 Oct 2023 05:57  Mg     TNP     10-26    TPro  TNP  /  Alb  2.9<L>  /  TBili  TNP  /  DBili  x   /  AST  14  /  ALT  9   /  AlkPhos  364<H>  10-26      WBC Trend:  WBC Count: 17.59 (10-26-23 @ 05:57)  WBC Count: 25.97 (10-25-23 @ 06:05)  WBC Count: 39.51 (10-24-23 @ 06:10)  WBC Count: 7.63 (10-23-23 @ 04:06)      Creatine Trend:  Creatinine: 1.3 (10-26)  Creatinine: 1.5 (10-25)  Creatinine: 1.6 (10-24)  Creatinine: 1.6 (10-24)      Liver Biochemical Testing Trend:  Alanine Aminotransferase (ALT/SGPT): 9 (10-26)  Alanine Aminotransferase (ALT/SGPT): 8 (10-25)  Alanine Aminotransferase (ALT/SGPT): 10 (10-24)  Alanine Aminotransferase (ALT/SGPT): 11 (10-23)  Alanine Aminotransferase (ALT/SGPT): 31 (09-29)  Aspartate Aminotransferase (AST/SGOT): 14 (10-26-23 @ 05:57)  Aspartate Aminotransferase (AST/SGOT): 16 (10-25-23 @ 06:05)  Aspartate Aminotransferase (AST/SGOT): 17 (10-24-23 @ 06:10)  Aspartate Aminotransferase (AST/SGOT): 22 (10-23-23 @ 04:06)  Aspartate Aminotransferase (AST/SGOT): 28 (09-29-23 @ 04:30)  Bilirubin Total: TNP (10-26)  Bilirubin Total: 0.5 (10-25)  Bilirubin Total: 0.6 (10-24)  Bilirubin Total: 1.0 (10-23)  Bilirubin Total: 0.6 (09-29)      Trend LDH  09-10-23 @ 19:21  398<H>  03-07-23 @ 21:06  211      Urinalysis Basic - ( 26 Oct 2023 05:57 )    Color: x / Appearance: x / SG: x / pH: x  Gluc: TNP mg/dL / Ketone: x  / Bili: x / Urobili: x   Blood: x / Protein: x / Nitrite: x   Leuk Esterase: x / RBC: x / WBC x   Sq Epi: x / Non Sq Epi: x / Bacteria: x        MICROBIOLOGY:    MRSA PCR Result.: Negative (10-24-23 @ 16:15)  MRSA PCR Result.: Positive (09-05-23 @ 00:00)  MRSA PCR Result.: Negative (07-05-23 @ 15:45)  MRSA PCR Result.: Negative (03-02-23 @ 15:27)  MRSA PCR Result.: Negative (03-02-23 @ 07:58)      Culture - Urine (collected 24 Oct 2023 16:15)  Source: Clean Catch Clean Catch (Midstream)  Preliminary Report:    >100,000 CFU/ml Gram Negative Rods    Culture - Blood (collected 23 Oct 2023 04:06)  Source: .Blood Blood-Peripheral  Final Report:    Growth in aerobic and anaerobic bottles: Klebsiella pneumoniae    (Carbapenem Resistant)    Direct identification is available within approximately 3-5    hours either by Blood Panel Multiplexed PCR or Direct    MALDI-TOF. Details: [email or web address]  Organism: Blood Culture PCR  Klepne MDRO  Organism: Klepne MDRO    Sensitivities:      Method Type: DARIO      -  Amikacin: S <=16      -  Ampicillin: R >16 These ampicillin results predict results for amoxicillin      -  Ampicillin/Sulbactam: R >16/8      -  Aztreonam: R >16      -  Cefazolin: R >16      -  Cefepime: R >16      -  Cefoxitin: R >16      -  Ceftazidime/Avibactam: S <=4      -  Ceftolozane/tazobactam: R >8      -  Ceftriaxone: R >32      -  Ciprofloxacin: R >2      -  Ertapenem: R >1      -  Gentamicin: S <=2      -  Imipenem: R >8      -  Levofloxacin: R >4      -  Meropenem: R >8      -  Meropenem/Vaborbactam: S <=2      -  Piperacillin/Tazobactam: R >64      -  Tobramycin: S <=2      -  Trimethoprim/Sulfamethoxazole: R >2/38  Organism: Blood Culture PCR    Sensitivities:      Method Type: PCR      -  K. pneumoniae group: Detec (K. pneumoniae, K. quasipneumoniae, K. variicola)      -  ESBL: Detec      -  Carbapenem Resistance: Detec      -  CTX-M Resistance Marker: Detec      -  KPC resistance gene: Detec The KPC gene confers resistance to all penicillins, cephalosporins, carbapenems and aztreonam. Additional resistance to fluoroquinolones and aminoglycosides is likely.    Culture - Blood (collected 23 Oct 2023 04:06)  Source: .Blood Blood-Peripheral  Final Report:    Growth in aerobic and anaerobic bottles: Klebsiella pneumoniae    (Carbapenem Resistant)    See previous culture [id number]    Culture - Blood (collected 10 Sep 2023 05:45)  Source: .Blood None  Final Report:    No growth at 5 days    Culture - Blood (collected 10 Sep 2023 05:45)  Source: .Blood None  Final Report:    No growth at 5 days    Culture - Sputum (collected 05 Sep 2023 19:15)  Source: Trach Asp Tracheal Aspirate  Final Report:    Normal Respiratory Joaquina present    Culture - Blood (collected 02 Sep 2023 06:01)  Source: .Blood None  Final Report:    No growth at 5 days    Culture - Sputum (collected 02 Sep 2023 03:00)  Source: Trach Asp Tracheal Aspirate  Final Report:    Normal Respiratory Joaquina present    Culture - Urine (collected 01 Sep 2023 19:50)  Source: Catheterized Catheterized  Final Report:    <10,000 CFU/mL Normal Urogenital Joaquina    Culture - Blood (collected 01 Sep 2023 19:32)  Source: .Blood None  Final Report:    No growth at 5 days      COVID-19 PCR: NotDetec (09-28-23 @ 19:00)  COVID-19 PCR: NotDetec (09-26-23 @ 15:30)      Blood Gas Arterial, Lactate: 1.10 (10-24 @ 11:58)  Blood Gas Arterial, Lactate: 1.00 (10-24 @ 09:26)  Lactate, Blood: 1.7 (10-23 @ 19:14)  Blood Gas Arterial, Lactate: 1.50 (10-23 @ 17:21)      RADIOLOGY:  imaging below personally reviewed    < from: CT Abdomen and Pelvis No Cont (10.25.23 @ 15:05) >  IMPRESSION:    No evidence of abdominal or pelvic mass or inflammatory process.    Bilateral renal cysts are again noted. The previously noted exophytic   posterior lesion remains indeterminate, therefore follow-up MRI with   contrast is recommended.    No evidence of retroperitoneal, intraperitoneal, intramuscular, or   subcutaneous hematoma.    < end of copied text >    < from: CT Chest No Cont (10.25.23 @ 15:05) >    IMPRESSION:    Right neck mass.    Bilateral opacifications and effusions, right greater than left, stable.    < end of copied text >

## 2023-10-26 NOTE — PROGRESS NOTE ADULT - SUBJECTIVE AND OBJECTIVE BOX
Patient seen and evaluated this am, comfortable in bed on nc oxygen with 100% pulse ox      T(F): 96.1 (10-26-23 @ 07:09), Max: 98.7 (10-26-23 @ 05:01)  HR: 98 (10-26-23 @ 05:00)  BP: 123/80 (10-26-23 @ 05:00)  RR: 14 (10-26-23 @ 07:09)  SpO2: 100% (10-26-23 @ 05:00) (94% - 100%)    PHYSICAL EXAM:  GENERAL: NAD  HEAD:  Atraumatic, Normocephalic  EYES: EOMI, PERRLA, conjunctiva and sclera clear  NERVOUS SYSTEM:   no focal deficits   CHEST/LUNG:  bilateral rhonchi  HEART: Regular rate and rhythm; No murmurs, rubs, or gallops  ABDOMEN: Soft, Nontender, Nondistended; Bowel sounds present  EXTREMITIES:  2+ Peripheral Pulses, No clubbing, cyanosis, or edema    LABS  10-25    143  |  99  |  50<H>  ----------------------------<  119<H>  3.8   |  32  |  1.5    Ca    9.0      25 Oct 2023 06:05  Mg     2.1     10-25    TPro  5.9<L>  /  Alb  2.8<L>  /  TBili  0.5  /  DBili  x   /  AST  16  /  ALT  8   /  AlkPhos  363<H>  10-25                          8.8    17.59 )-----------( 221      ( 26 Oct 2023 05:57 )             28.4     PT/INR - ( 26 Oct 2023 05:57 )   PT: 21.00 sec;   INR: 1.81 ratio         PTT - ( 26 Oct 2023 05:57 )  PTT:61.3 sec    CARDIAC ENZYMES      Troponin T, Serum: 0.06 ng/mL (10-24-23 @ 06:10)  Troponin T, Serum: 0.08 ng/mL (10-23-23 @ 19:14)      Culture Results:   Growth in aerobic and anaerobic bottles: Klebsiella pneumoniae  (Carbapenem Resistant)  Direct identification is available within approximately 3-5  hours either by Blood Panel Multiplexed PCR or Direct  MALDI-TOF. Details: [email or web address] (10-23-23)  Culture Results:   Growth in aerobic and anaerobic bottles: Klebsiella pneumoniae  (Carbapenem Resistant)  See previous culture [id number] (10-23-23)    RADIOLOGY  < from: CT Chest No Cont (10.25.23 @ 15:05) >  IMPRESSION:    Right neck mass.    Bilateral opacifications and effusions, right greater than left, stable.    < end of copied text >    MEDICATIONS  (STANDING):  albuterol/ipratropium for Nebulization 3 milliLiter(s) Nebulizer every 6 hours  budesonide  80 MICROgram(s)/formoterol 4.5 MICROgram(s) Inhaler 2 Puff(s) Inhalation two times a day  buMETAnide Injectable 2 milliGRAM(s) IV Push daily  ceftazidime/avibactam IVPB 0.94 Gram(s) IV Intermittent every 12 hours  chlorhexidine 2% Cloths 1 Application(s) Topical <User Schedule>  ferrous    sulfate 325 milliGRAM(s) Oral daily  gabapentin 100 milliGRAM(s) Oral three times a day  heparin  Infusion.  Unit(s)/Hr (14 mL/Hr) IV Continuous <Continuous>  hydrocortisone sodium succinate Injectable 100 milliGRAM(s) IV Push every 8 hours  metoprolol tartrate 25 milliGRAM(s) Oral two times a day    MEDICATIONS  (PRN):

## 2023-10-26 NOTE — PROGRESS NOTE ADULT - ASSESSMENT
88 year old female with PMH of HTN, Afib, OA, PVD, COPD, Anxiety, Obesity, Acute on chronic systolic congestive heart failure, H/O abdominal hysterectomy, H/O discectomy, H/O total knee replacement, bilaterally and bilateral pleural effusions (s/p left thoracentesis 3/2023 -- Cytology -PLEURAL FLUID, THORACENTESIS: - SUSPICIOUS FOR MALIGNANCY) with a left sided pleurex catheter, presented for fever and dyspnea.    ID is consulted for septic shock  Febrile to 104.5 on admission, significant leukocytosis to 39.5K  Hypotension requiring Levophed  BCx (10/22) 4/4 bottles with KPC Klebsiella spp.  UA showed mild pyuria, UCx with 100K GNRs  COVID/flu negative  MRSA nare PCR negative  CXR showed increased bilateral opacities/effusions    Antibiotics:  Vancomycin 10/23  Levaquin 10/23  Cefepime 10/23  Avycaz 10/23 ->    Overall KPC Klebsiella bacteremia, possibly 2/2 pneumonia vs UTI  Clinically improving, weaning off O2      IMPRESSION:  KPC Klebsiella bacteremia, potentially life-threatening  Pneumonia, possible parapneumonic effusion  UTI  Septic shock  Bandemia    RECOMMENDATIONS:  - Agree with Clinical Pharmacist, increase IV Avycaz to 1.25gm q8hrs (CrCl ~33)  - Obtain sputum Cx, urine Legionella Ag  - Repeat 2 sets of BCx in 48hrs  - Trend WBC, fever curve, transaminases, creatinine daily  - Will continue to follow      * THIS IS AN INCOMPLETE NOTE. FINAL RECOMMENDATION IS PENDING *       88 year old female with PMH of HTN, Afib, OA, PVD, COPD, Anxiety, Obesity, Acute on chronic systolic congestive heart failure, H/O abdominal hysterectomy, H/O discectomy, H/O total knee replacement, bilaterally and bilateral pleural effusions (s/p left thoracentesis 3/2023 -- Cytology -PLEURAL FLUID, THORACENTESIS: - SUSPICIOUS FOR MALIGNANCY) with a left sided pleurex catheter, presented for fever and dyspnea.    ID is consulted for septic shock  Febrile to 104.5 on admission, significant leukocytosis to 39.5K  Hypotension requiring Levophed  BCx (10/22) 4/4 bottles with KPC Klebsiella spp.  UA showed mild pyuria, UCx with 100K GNRs  COVID/flu negative  MRSA nare PCR negative  CXR showed increased bilateral opacities/effusions    Antibiotics:  Vancomycin 10/23  Levaquin 10/23  Cefepime 10/23  Avycaz 10/23 ->    Overall KPC Klebsiella bacteremia, possibly 2/2 pneumonia vs UTI  Clinically improving, weaning off O2      IMPRESSION:  KPC Klebsiella bacteremia, potentially life-threatening  Pneumonia, possible parapneumonic effusion  UTI  Septic shock  Bandemia    RECOMMENDATIONS:  - Agree with Clinical Pharmacist, increase IV Avycaz to 1.25gm q8hrs (CrCl ~33)  - Obtain sputum Cx, urine Legionella Ag  - Repeat 2 sets of BCx in 48hrs  - Trend WBC, fever curve, transaminases, creatinine daily  - Will continue to follow      Micaela Melo D.O.  Attending Physician  Division of Infectious Diseases  [facility]  Please contact me via [employer]

## 2023-10-26 NOTE — PROGRESS NOTE ADULT - ASSESSMENT
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  Placed on BiPAP in the ED,  EKG with rapid A-fib and  Chest x-ray bilateral pneumonia and effusion.     acute respiratory failure secondary to sepsis(POA) and KPC klebsiella pneumonia and malignant pleural effusion     - clinically improving   - continue Avycaz as per ID   - off pressors    - titrate stress steroids    - poor prognosis - DNR

## 2023-10-27 NOTE — PROGRESS NOTE ADULT - ASSESSMENT
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  Placed on BiPAP in the ED,  EKG with rapid A-fib and  Chest x-ray bilateral pneumonia and effusion.     acute respiratory failure - resolved  secondary to sepsis(POA) - resolved  and KPC klebsiella pneumonia and malignant pleural effusion     - clinically improved    - continue Avycaz as per ID   - off pressors    - supplement hypomagnesemia   - titrate stress steroids    - poor prognosis - DNR

## 2023-10-27 NOTE — SWALLOW BEDSIDE ASSESSMENT ADULT - SWALLOW EVAL: RECOMMENDED FEEDING/EATING TECHNIQUES
allow for swallow between intakes/alternate food with liquid/crush medication (when feasible)/hard swallow w/ each bite or sip/maintain upright posture during/after eating for 30 mins/no straws/oral hygiene/position upright (90 degrees)/small sips/bites
allow for swallow between intakes/alternate food with liquid/crush medication (when feasible)/hard swallow w/ each bite or sip/maintain upright posture during/after eating for 30 mins/no straws/oral hygiene/position upright (90 degrees)/small sips/bites

## 2023-10-27 NOTE — SWALLOW BEDSIDE ASSESSMENT ADULT - SWALLOW EVAL: DIAGNOSIS
+ toleration observed without overt symptoms of penetration/aspiration for Puree/thins. Pt is insistent on using straw despite SLP recs for NO STRAWS.
Toleration of min trials of puree with thin liquids w/o immediate overt s/s of penetration/aspiration.

## 2023-10-27 NOTE — PROGRESS NOTE ADULT - ASSESSMENT
IMPRESSION:  Septic shock with multi drug resistant K. pneumoniae. 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 possible superimposed empyema  YAIMA improving. Moderate left and mild right hydronephrosis.  aspiration pneumonitis  HO Probable LAWRENCE refused testing   Thyroid mass SP FNA   HO foot wound MRSA, Right inguinal Abscess VRE +& ESBL E.coli bacteremia (5/2023)   HO Afib on Eliquis  HO Right upper arm brachial DVT 9/23      PLAN:    CNS:   avoid cns depressants  pain control with Tylenol    HEENT: Oral care    PULMONARY:    HOB @ 45 degrees.   avaps at night. Supplemental O2 therapy as needed. keep SpO2 88-92%  d-dimer 531. LE duplex negative  DuoNebs q6hrs  c/w prednisone 40mg qd x 5 days  aggressive pulmonary toilet, chest pt    CARDIOVASCULAR:  Fluid restriction: 1L, keep negative fluid balance. Keep MAP >65    GI: GI prophylaxis.  puree diet.  Bowel regimen. last bm 10/25    RENAL:    Follow up lytes.  Correct as needed  renal follow up PRN  urology eval for hydro    INFECTIOUS DISEASE:   blood culture grew Carbapenem Resistant and CTX-M K. pneumoniae  contact precautions  continue with AVYCAZ   ID follow up    HEMATOLOGICAL:    d/c heparin drip. restart eliquis    ENDOCRINE:  Follow up FS.  Insulin protocol if needed.    MUSCULOSKELETAL: ambulate as tolerated    DNR not DNI  Lines: PIV. Ba 10/25 for rentention/hydronephrosis  Dispo: downgrade to the medical floor

## 2023-10-27 NOTE — PROGRESS NOTE ADULT - ATTENDING COMMENTS
Attending Statement: I have personally performed a face to face diagnostic evaluation on this patient. The patient is suffering from:  Septic shock with multi drug resistant K. pneumoniae. 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 possible superimposed empyema  YAIMA improving. Moderate left and mild right hydronephrosis.  aspiration pneumonitis  HO Probable LAWRENCE refused testing   Thyroid mass   I have made amendments to the documentation where necessary. I have personally seen and examined this patient.  I have fully participated in the care of this patient.  I have reviewed all pertinent clinical information, including history, physical exam, plan and note.
Attending Statement: I have personally performed a face to face diagnostic evaluation on this patient. The patient is suffering from:  Septic shock with multi drug resistant K. pneumoniae. 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 possible superimposed empyema  YAIMA  aspiration pneumonitis  I have made amendments to the documentation where necessary. I have personally seen and examined this patient.  I have fully participated in the care of this patient.  I have reviewed all pertinent clinical information, including history, physical exam, plan and note.

## 2023-10-27 NOTE — SWALLOW BEDSIDE ASSESSMENT ADULT - SLP PERTINENT HISTORY OF CURRENT PROBLEM
89yo W Female w/PMH as noted below.  PT's from NH who was noted to have progressive dyspnea, fevers.  Pt w/ hx prior intubations for COPD.  On CXR pt has bilateral lower lobe infiltrates R>L.  Pt son stated pt may has aspirated 2 days while eating & pt has history of aspirating on food as note on prior admissions.  Pt is on Bipap ventilation  saturating on low to mid 90s.  Additionally , pt's hypotensive w/ MAP <65.  ER to place central venous cath & start IV levophed.  Pt can go to ICU when bed is available
89yo W Female w/PMH as noted below.  PT's from NH who was noted to have progressive dyspnea, fevers.  Pt w/ hx prior intubations for COPD.  On CXR pt has bilateral lower lobe infiltrates R>L.  Pt son stated pt may has aspirated 2 days while eating & pt has history of aspirating on food as note on prior admissions.  Pt is on Bipap ventilation  saturating on low to mid 90s.  Additionally , pt's hypotensive w/ MAP <65.  ER to place central venous cath & start IV levophed.  Pt can go to ICU when bed is available

## 2023-10-27 NOTE — PROGRESS NOTE ADULT - SUBJECTIVE AND OBJECTIVE BOX
SUBJECTIVE:    Patient is a 88y old Female who presents with a chief complaint of respiratory failure (27 Oct 2023 10:19)    Overnight Events: patient is still 4h ON 4h OFF AVAPS, tolerating it well, will be switched to AVAPS only at night.    PAST MEDICAL & SURGICAL HISTORY  HTN (hypertension)    Afib  s/p ablation 2001    Goiter  no meds    Cellulitis  chronic    OA (osteoarthritis)    PVD (peripheral vascular disease)    COPD (chronic obstructive pulmonary disease)    Anxiety    Obesity    Acute on chronic systolic congestive heart failure    Edema of both lower legs    Required emergent intubation    H/O abdominal hysterectomy    H/O discectomy    H/O total knee replacement, bilateral      SOCIAL HISTORY:  Negative for smoking/alcohol/drug use.     ALLERGIES:  aspirin (Other)  sulfa drugs (Hives; Other)  codeine (Other)  penicillin (Other)  contrast media (iodine-based) (Other)    MEDICATIONS:  STANDING MEDICATIONS  albuterol/ipratropium for Nebulization 3 milliLiter(s) Nebulizer every 6 hours  apixaban 2.5 milliGRAM(s) Oral every 12 hours  budesonide  80 MICROgram(s)/formoterol 4.5 MICROgram(s) Inhaler 2 Puff(s) Inhalation two times a day  ceftazidime/avibactam IVPB 1.25 Gram(s) IV Intermittent every 8 hours  chlorhexidine 2% Cloths 1 Application(s) Topical <User Schedule>  ferrous    sulfate 325 milliGRAM(s) Oral daily  gabapentin 100 milliGRAM(s) Oral three times a day  metoprolol tartrate 25 milliGRAM(s) Oral two times a day  predniSONE   Tablet 40 milliGRAM(s) Oral daily    PRN MEDICATIONS    VITALS:   T(F): 96.9, Max: 97 (10-26-23 @ 15:00)  HR: 96 (92 - 101)  BP: 130/71 (92/55 - 130/71)  RR: 24 (16 - 28)  SpO2: 100% (99% - 100%)    LABS:                        8.4    14.73 )-----------( 206      ( 27 Oct 2023 05:57 )             27.7     10-27    144  |  100  |  46<H>  ----------------------------<  134<H>  3.9   |  33<H>  |  1.0    Ca    8.7      27 Oct 2023 05:57  Mg     1.7     10-27    TPro  5.6<L>  /  Alb  2.9<L>  /  TBili  0.3  /  DBili  x   /  AST  13  /  ALT  9   /  AlkPhos  321<H>  10-27    PT/INR - ( 26 Oct 2023 05:57 )   PT: 21.00 sec;   INR: 1.81 ratio         PTT - ( 27 Oct 2023 07:00 )  PTT:124.7 sec  Urinalysis Basic - ( 27 Oct 2023 05:57 )    Color: x / Appearance: x / SG: x / pH: x  Gluc: 134 mg/dL / Ketone: x  / Bili: x / Urobili: x   Blood: x / Protein: x / Nitrite: x   Leuk Esterase: x / RBC: x / WBC x   Sq Epi: x / Non Sq Epi: x / Bacteria: x            Culture - Urine (collected 24 Oct 2023 16:15)  Source: Clean Catch Clean Catch (Midstream)  Final Report (27 Oct 2023 10:53):    >100,000 CFU/ml Klebsiella pneumoniae (Carbapenem Resistant)  Organism: Klepne MDRO  Klepne MDRO (27 Oct 2023 10:53)  Organism: Klepne MDRO (27 Oct 2023 10:53)  Organism: Klepne MDRO (27 Oct 2023 10:53)              10-26-23 @ 07:01  -  10-27-23 @ 07:00  --------------------------------------------------------  IN: 1198 mL / OUT: 1400 mL / NET: -202 mL          IMAGING/EKG:    No recent imaging    PHYSICAL EXAM:  GEN: NAD, comfortable  LUNGS: Decreased breath sounds bibasilarly  HEART: Irregular  ABD: soft, NT/ND, +BS  EXT: no edema, PP b/l  NEURO: AAOX3

## 2023-10-27 NOTE — SWALLOW BEDSIDE ASSESSMENT ADULT - SLP GENERAL OBSERVATIONS
Pt. received in bed awake and alert. Mild confusion. On 2L O2 nasal cannula.
Pt. received in bed awake and alert. Family at b/s. Mild confusion. On 2L O2 nasal cannula.

## 2023-10-27 NOTE — PROGRESS NOTE ADULT - SUBJECTIVE AND OBJECTIVE BOX
Patient is a 88y old  Female who presents with a chief complaint of respiratory failure (26 Oct 2023 20:59)      Over Night Events:  Patient seen and examined.       ROS:  See HPI    PHYSICAL EXAM    ICU Vital Signs Last 24 Hrs  T(C): 36.1 (27 Oct 2023 07:10), Max: 36.1 (26 Oct 2023 15:00)  T(F): 96.9 (27 Oct 2023 07:10), Max: 97 (26 Oct 2023 15:00)  HR: 96 (27 Oct 2023 06:35) (92 - 101)  BP: 130/71 (27 Oct 2023 06:35) (92/55 - 130/71)  BP(mean): 94 (27 Oct 2023 06:35) (70 - 94)  ABP: --  ABP(mean): --  RR: 24 (27 Oct 2023 07:10) (16 - 28)  SpO2: 100% (27 Oct 2023 06:35) (99% - 100%)    O2 Parameters below as of 27 Oct 2023 06:35  Patient On (Oxygen Delivery Method): nasal cannula  O2 Flow (L/min): 2          General: in no acute distress         Lymph Nodes: NO cervical LN   Lungs: Bilateral BS  Cardiovascular: Regular   Abdomen: Soft, Positive BS  Extremities: No clubbing   Skin: warm   Neurological: a&o x3  Musculoskeletal: move all ext     I&O's Detail    26 Oct 2023 07:01  -  27 Oct 2023 07:00  --------------------------------------------------------  IN:    Heparin Infusion: 358 mL    IV PiggyBack: 600 mL    Oral Fluid: 240 mL  Total IN: 1198 mL    OUT:    Indwelling Catheter - Urethral (mL): 1400 mL  Total OUT: 1400 mL    Total NET: -202 mL          LABS:                          8.4    14.73 )-----------( 206      ( 27 Oct 2023 05:57 )             27.7         27 Oct 2023 05:57    144    |  100    |  46     ----------------------------<  134    3.9     |  33     |  1.0      Ca    8.7        27 Oct 2023 05:57  Mg     1.7       27 Oct 2023 05:57    TPro  5.6    /  Alb  2.9    /  TBili  0.3    /  DBili  x      /  AST  13     /  ALT  9      /  AlkPhos  321    27 Oct 2023 05:57  Amylase x     lipase x                                                 PT/INR - ( 26 Oct 2023 05:57 )   PT: 21.00 sec;   INR: 1.81 ratio         PTT - ( 27 Oct 2023 07:00 )  PTT:124.7 sec                                       Urinalysis Basic - ( 27 Oct 2023 05:57 )    Color: x / Appearance: x / SG: x / pH: x  Gluc: 134 mg/dL / Ketone: x  / Bili: x / Urobili: x   Blood: x / Protein: x / Nitrite: x   Leuk Esterase: x / RBC: x / WBC x   Sq Epi: x / Non Sq Epi: x / Bacteria: x                                                                Culture - Urine (collected 24 Oct 2023 16:15)  Source: Clean Catch Clean Catch (Midstream)  Preliminary Report (26 Oct 2023 17:09):    >100,000 CFU/ml Klebsiella pneumoniae                                                                                           MEDICATIONS  (STANDING):  albuterol/ipratropium for Nebulization 3 milliLiter(s) Nebulizer every 6 hours  budesonide  80 MICROgram(s)/formoterol 4.5 MICROgram(s) Inhaler 2 Puff(s) Inhalation two times a day  ceftazidime/avibactam IVPB 1.25 Gram(s) IV Intermittent every 8 hours  chlorhexidine 2% Cloths 1 Application(s) Topical <User Schedule>  ferrous    sulfate 325 milliGRAM(s) Oral daily  gabapentin 100 milliGRAM(s) Oral three times a day  heparin  Infusion.  Unit(s)/Hr (14 mL/Hr) IV Continuous <Continuous>  metoprolol tartrate 25 milliGRAM(s) Oral two times a day  predniSONE   Tablet 40 milliGRAM(s) Oral daily    MEDICATIONS  (PRN):

## 2023-10-27 NOTE — PROGRESS NOTE ADULT - ASSESSMENT
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    # COPD exacerbation  # Aspiration pneumonia    BC grew Klebsiella CRE-- > switched from cefepime to avycaz  Weaned down steroids to prednisone qday + duonebs q6h  Weaned off levo and vasopressin, and removed the femoral TLC  Off IV fluids, off bumex  CT chest no contrast showed consolidation bibasilarly    # Malignant b/l pleural effusions    Drained on 10/24 via the PleurX    # YAIMA    Possible pre-renal in the setting of sepsis + post-renal with b/l hydronephrosis  Has a Ba in   Creatinine is improving, with improved hemodynamics  Urology consulted for the retention    # Chronic afib    Put patient back on metoprolol, still off cardizem  With a HR ranging between 90 and 110  Off heparin drip, switched back to eliquis    DVT ppx: eliquid  GI ppx: none  Diet: Pureed   Activity: as tolerated  Code status: DNR

## 2023-10-27 NOTE — PROGRESS NOTE ADULT - SUBJECTIVE AND OBJECTIVE BOX
Patient seen and evaluated this am, comfortable in bed, no respiratory distress      T(F): 96.9 (10-27-23 @ 07:10), Max: 97 (10-26-23 @ 15:00)  HR: 96 (10-27-23 @ 06:35)  BP: 130/71 (10-27-23 @ 06:35)  RR: 18  SpO2: 100% (10-27-23 @ 06:35) (99% - 100%)    PHYSICAL EXAM:  GENERAL: NAD  HEAD:  Atraumatic, Normocephalic  EYES: EOMI, PERRLA, conjunctiva and sclera clear  NERVOUS SYSTEM:  no focal deficits   CHEST/LUNG:  bilateral rhonchi  HEART: Regular rate and rhythm; No murmurs, rubs, or gallops  ABDOMEN: Soft, Nontender, Nondistended; Bowel sounds present  EXTREMITIES:  2+ Peripheral Pulses, No clubbing, cyanosis, or edema    LABS  10-27    144  |  100  |  46<H>  ----------------------------<  134<H>  3.9   |  33<H>  |  1.0    Ca    8.7      27 Oct 2023 05:57  Mg     1.7     10-27    TPro  5.6<L>  /  Alb  2.9<L>  /  TBili  0.3  /  DBili  x   /  AST  13  /  ALT  9   /  AlkPhos  321<H>  10-27                          8.4    14.73 )-----------( 206      ( 27 Oct 2023 05:57 )             27.7     PT/INR - ( 26 Oct 2023 05:57 )   PT: 21.00 sec;   INR: 1.81 ratio         PTT - ( 27 Oct 2023 07:00 )  PTT:124.7 sec    Culture Results:   >100,000 CFU/ml Klebsiella pneumoniae (Carbapenem Resistant) (10-24-23)  Culture Results:   Growth in aerobic and anaerobic bottles: Klebsiella pneumoniae  (Carbapenem Resistant)  Direct identification is available within approximately 3-5  hours either by Blood Panel Multiplexed PCR or Direct  MALDI-TOF. Details: [email or web address] (10-23-23)  Culture Results:   Growth in aerobic and anaerobic bottles: Klebsiella pneumoniae  (Carbapenem Resistant)  See previous culture [id number] (10-23-23)    RADIOLOGY  < from: Xray Chest 1 View- PORTABLE-Routine (Xray Chest 1 View- PORTABLE-Routine in AM.) (10.26.23 @ 05:51) >  Impression:    Unchanged bibasilar opacities and pleural effusion.      < end of copied text >    MEDICATIONS  (STANDING):  albuterol/ipratropium for Nebulization 3 milliLiter(s) Nebulizer every 6 hours  apixaban 2.5 milliGRAM(s) Oral every 12 hours  budesonide  80 MICROgram(s)/formoterol 4.5 MICROgram(s) Inhaler 2 Puff(s) Inhalation two times a day  ceftazidime/avibactam IVPB 1.25 Gram(s) IV Intermittent every 8 hours  chlorhexidine 2% Cloths 1 Application(s) Topical <User Schedule>  ferrous    sulfate 325 milliGRAM(s) Oral daily  gabapentin 100 milliGRAM(s) Oral three times a day  metoprolol tartrate 25 milliGRAM(s) Oral two times a day  predniSONE   Tablet 40 milliGRAM(s) Oral daily    MEDICATIONS  (PRN):

## 2023-10-28 NOTE — PROGRESS NOTE ADULT - ASSESSMENT
IMPRESSION:  Septic shock with multi drug resistant K. pneumoniae.   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   YAIMA improving. Moderate left and mild right hydronephrosis.  aspiration pneumonitis  HO Probable LAWRENCE refused testing   Thyroid mass SP FNA   HO foot wound MRSA, Right inguinal Abscess VRE +& ESBL E.coli bacteremia (5/2023)   HO Afib on Eliquis  HO Right upper arm brachial DVT 9/23      PLAN:    CNS:   avoid cns depressants  pain control with Tylenol    HEENT: Oral care    PULMONARY:    HOB @ 45 degrees.   avaps at night. Supplemental O2 therapy as needed. keep SpO2 88-92%   LE duplex negative  DuoNebs q6hrs  c/w prednisone 40mg qd x 5 days taper  aggressive pulmonary toilet, chest pt    CARDIOVASCULAR:  Fluid restriction: 1L, keep negative fluid balance. Keep MAP >65    GI: GI prophylaxis.  diet.  Bowel regimen.     RENAL:    Follow up lytes.  Correct as needed  renal follow up PRN  urology eval for hydro    INFECTIOUS DISEASE:   blood culture grew Carbapenem Resistant and CTX-M K. pneumoniae  contact precautions  continue with AVYCAZ   ID follow up    HEMATOLOGICAL:    d/c heparin drip. restart eliquis    ENDOCRINE:  Follow up FS.  Insulin protocol if needed.    MUSCULOSKELETAL: ambulate as tolerated    DNR not DNI  Lines: PIV. Ba 10/25 for rentention/hydronephrosis  Dispo: downgrade to the medical floor

## 2023-10-28 NOTE — PROGRESS NOTE ADULT - SUBJECTIVE AND OBJECTIVE BOX
Pt denies sob, NC laying on side of face    T(F): , Max: 97.3 (10-28-23 @ 21:34)  HR: 108 (10-28-23 @ 21:34) (93 - 116)  BP: 112/70 (10-28-23 @ 21:34)  RR: 18 (10-28-23 @ 21:34)  SpO2: 100% (10-28-23 @ 07:49)  IN: 900 mL / OUT: 675 mL / NET: 225 mL    IN: 120 mL / OUT: 180 mL / NET: -60 mL      General: No apparent distress  Cardiovascular: S1, S2  Gastrointestinal: Soft, Non-tender, Non-distended  Respiratory: Good air entry bilaterally  Musculoskeletal: Moves all extremities  Lymphatic: No edema  Neurologic: confused  Dermatologic: Skin dry  PTT - ( 27 Oct 2023 07:00 )  PTT:124.7 sec                        9.5    20.44 )-----------( 219      ( 28 Oct 2023 06:18 )             31.3     10-28    144  |  99  |  41<H>  ----------------------------<  97  4.5   |  35<H>  |  0.9    Ca    9.0      28 Oct 2023 06:18  Mg     2.1     10-28    TPro  5.9<L>  /  Alb  3.1<L>  /  TBili  0.3  /  DBili  x   /  AST  30  /  ALT  17  /  AlkPhos  373<H>  10-28      Culture - Blood (collected 26 Oct 2023 05:57)  Source: .Blood None  Preliminary Report (28 Oct 2023 20:00):    No growth at 48 Hours

## 2023-10-28 NOTE — PROGRESS NOTE ADULT - ASSESSMENT
acute respiratory failure 2/2 sepsis from klebsiella pneumonia    - continue Avycaz as per ID    hx of breast CA, hx of effusions in previous admission    afib  - on doac    COPD  - on home o2

## 2023-10-28 NOTE — PROGRESS NOTE ADULT - SUBJECTIVE AND OBJECTIVE BOX
Patient is a 88y old  Female who presents with a chief complaint of respiratory failure (27 Oct 2023 14:40)        HPI:  87yo W Female w/PMH as noted below.  PT's from NH who was noted to have progressive dyspnea, fevers.  Pt w/ hx prior intubations for COPD.  On CXR pt has bilateral lower lobe infiltrates R>L.  Pt son stated pt may has aspirated 2 days while eating & pt has history of aspirating on food as note on prior admissions.  Pt is on Bipap ventilation  saturating on low to mid 90s.  Additionally , pt's hypotensive w/ MAP <65.  ER to place central venous cath & start IV levophed.  Pt can go to ICU when bed is available (23 Oct 2023 05:45)      Pt evaluated on rounds.  I reviewed the radiology tests and hospital record.    I reviewed previous notes on this patient.    Interval Events: No overnight events.      REVIEW OF SYSTEMS:   see HPI      OBJECTIVE:  ICU Vital Signs Last 24 Hrs  T(C): 36 (27 Oct 2023 23:02), Max: 36.8 (27 Oct 2023 15:30)  T(F): 96.8 (27 Oct 2023 23:02), Max: 98.2 (27 Oct 2023 15:30)  HR: 97 (28 Oct 2023 05:10) (88 - 101)  BP: 146/79 (28 Oct 2023 05:10) (100/66 - 146/79)  BP(mean): 105 (28 Oct 2023 05:10) (78 - 105)  ABP: --  ABP(mean): --  RR: 22 (28 Oct 2023 05:10) (18 - 38)  SpO2: 100% (28 Oct 2023 05:10) (93% - 100%)    O2 Parameters below as of 27 Oct 2023 23:30  Patient On (Oxygen Delivery Method): nasal cannula  O2 Flow (L/min): 2            10-26 @ 07:01  -  10-27 @ 07:00  --------------------------------------------------------  IN: 1198 mL / OUT: 1400 mL / NET: -202 mL    10-27 @ 07:01  -  10-28 @ 06:37  --------------------------------------------------------  IN: 900 mL / OUT: 675 mL / NET: 225 mL      CAPILLARY BLOOD GLUCOSE        PHYSICAL EXAM:       · ENMT:   Airway patent,   Nasal mucosa clear.  Mouth with normal mucosa.   No thrush    · EYES:   Clear bilaterally,   pupils equal,   round and reactive to light.    · CARDIAC:   Normal rate,   regular rhythm.    Heart sounds S1, S2.   No murmurs, no rubs or gallops on auscultation  no edema        CAROTID:   normal systolic impulse  no bruits    · RESPIRATORY:   rales  normal chest expansion  no retractions or use of accessory muscles  percussion of chest demonstrates no hyperresonance or dullness    · GASTROINTESTINAL:  Abdomen soft,   non-tender,   + BS  liver/spleen not palpable    · MUSCULOSKELETAL:   no clubbing, cyanosis      · SKIN:   Skin normal color for race,   warm, dry   No evidence of rash.        · HEME LYMPH:   no splenomegaly.  No cervical  lymphadenopathy.  no inguinal lymphadenopathy    HOSPITAL MEDICATIONS:  MEDICATIONS  (STANDING):  albuterol/ipratropium for Nebulization 3 milliLiter(s) Nebulizer every 6 hours  apixaban 2.5 milliGRAM(s) Oral every 12 hours  budesonide  80 MICROgram(s)/formoterol 4.5 MICROgram(s) Inhaler 2 Puff(s) Inhalation two times a day  ceftazidime/avibactam IVPB 1.25 Gram(s) IV Intermittent every 8 hours  chlorhexidine 2% Cloths 1 Application(s) Topical <User Schedule>  ferrous    sulfate 325 milliGRAM(s) Oral daily  gabapentin 100 milliGRAM(s) Oral three times a day  metoprolol tartrate 25 milliGRAM(s) Oral two times a day  predniSONE   Tablet 40 milliGRAM(s) Oral daily    MEDICATIONS  (PRN):    lactated ringers Bolus:   500 milliLiter(s), IV Bolus, once, infuse over 60 Minute(s), Stop After 1 Doses  Provider's Contact #: [phone]  lactated ringers Bolus:   1500 milliLiter(s), IV Bolus, once, infuse over 60 Minute(s), Stop After 1 Doses      LABS:                        8.4    14.73 )-----------( 206      ( 27 Oct 2023 05:57 )             27.7     10-27    144  |  100  |  46<H>  ----------------------------<  134<H>  3.9   |  33<H>  |  1.0    Ca    8.7      27 Oct 2023 05:57  Mg     1.7     10-27    TPro  5.6<L>  /  Alb  2.9<L>  /  TBili  0.3  /  DBili  x   /  AST  13  /  ALT  9   /  AlkPhos  321<H>  10-27    PTT - ( 27 Oct 2023 07:00 )  PTT:124.7 sec  Urinalysis Basic - ( 27 Oct 2023 05:57 )    Color: x / Appearance: x / SG: x / pH: x  Gluc: 134 mg/dL / Ketone: x  / Bili: x / Urobili: x   Blood: x / Protein: x / Nitrite: x   Leuk Esterase: x / RBC: x / WBC x   Sq Epi: x / Non Sq Epi: x / Bacteria: x                COVID-19 PCR: NotDetec (28 Sep 2023 19:00)  COVID-19 PCR: NotDetec (26 Sep 2023 15:30)  SARS-CoV-2: NotDetec (29 Aug 2023 10:44)  COVID-19 PCR: NotDetec (03 May 2023 14:15)            RADIOLOGY: Today I personally interpreted the latest CXR and other pertinent films.

## 2023-10-29 NOTE — PROGRESS NOTE ADULT - ASSESSMENT
88 year old female with PMH of HTN, Afib, OA, PVD, COPD, Anxiety, Obesity, Acute on chronic systolic congestive heart failure, H/O abdominal hysterectomy, H/O discectomy, H/O total knee replacement, bilaterally and bilateral pleural effusions (s/p left thoracentesis 3/2023 -- Cytology -PLEURAL FLUID, THORACENTESIS: - SUSPICIOUS FOR MALIGNANCY) with a left sided pleurex catheter, presented for fever and dyspnea.    Septic Shock with Mult-drug Resistant Klebsiella Bacteriemia with Acute Cystitis  Acute hypoxemic and hypercapnic respiratory failure: improving, now on 2LNC  Acute decompensated diastolic HF with pleural effusions likely malignant effusion with H/O breast ca  -CXR: Unchanged bibasilar opacities and pleural effusion.  -ID eval appreciated, c/w Avycaz  -c/w inhalers, duonebs and steriods  -F/up Repeat blood cultures    Acute kidney Injury: Pre-renal and Post obstructive: now resolved  US kidney bladder: Moderate left and mild right hydronephrosis.    Chronic AFIB: c/w BB and Eliquis    DVT PPX: Eliquis  GI PPX: PPI  Code Status: DNR  Dispo: from NH  Pending clearance of repeat blood cx, IV abx regimen         88 year old female with PMH of HTN, Afib, OA, PVD, COPD, Anxiety, Obesity, Acute on chronic systolic congestive heart failure, H/O abdominal hysterectomy, H/O discectomy, H/O total knee replacement, bilaterally and bilateral pleural effusions (s/p left thoracentesis 3/2023 -- Cytology -PLEURAL FLUID, THORACENTESIS: - SUSPICIOUS FOR MALIGNANCY) with a left sided pleurex catheter, presented for fever and dyspnea.    Septic Shock with Mult-drug Resistant Klebsiella Bacteriemia with Acute Cystitis  Acute hypoxemic and hypercapnic respiratory failure: improving, now on 2LNC  Acute decompensated diastolic HF with pleural effusions likely malignant effusion with H/O breast ca  -CXR: Unchanged bibasilar opacities and pleural effusion.  -ID eval appreciated, c/w Avycaz  -c/w inhalers, duonebs and steroids  -F/up Repeat blood cultures    Elevated ALP: f/up GGT and RuQ Sono    Acute kidney Injury: Pre-renal and Post obstructive: now resolved  US kidney bladder: Moderate left and mild right hydronephrosis.    Chronic AFIB: c/w BB and Eliquis    DVT PPX: Eliquis  GI PPX: PPI  Code Status: DNR  Dispo: from NH  Pending clearance of repeat blood cx, IV abx regimen

## 2023-10-29 NOTE — PROGRESS NOTE ADULT - SUBJECTIVE AND OBJECTIVE BOX
Patient is a 88y old  Female who presents with a chief complaint of respiratory failure (28 Oct 2023 23:28)        MEDICATIONS  (STANDING):  albuterol/ipratropium for Nebulization 3 milliLiter(s) Nebulizer every 6 hours  apixaban 2.5 milliGRAM(s) Oral every 12 hours  budesonide  80 MICROgram(s)/formoterol 4.5 MICROgram(s) Inhaler 2 Puff(s) Inhalation two times a day  ceftazidime/avibactam IVPB 1.25 Gram(s) IV Intermittent every 8 hours  chlorhexidine 2% Cloths 1 Application(s) Topical <User Schedule>  ferrous    sulfate 325 milliGRAM(s) Oral daily  gabapentin 100 milliGRAM(s) Oral three times a day  metoprolol tartrate 25 milliGRAM(s) Oral two times a day  predniSONE   Tablet 40 milliGRAM(s) Oral daily  saccharomyces boulardii 250 milliGRAM(s) Oral two times a day    MEDICATIONS  (PRN):    CAPILLARY BLOOD GLUCOSE    I&O's Summary    28 Oct 2023 07:01  -  29 Oct 2023 07:00  --------------------------------------------------------  IN: 120 mL / OUT: 530 mL / NET: -410 mL        PHYSICAL EXAM:  Vital Signs Last 24 Hrs  T(C): 35.9 (29 Oct 2023 05:23), Max: 36.3 (28 Oct 2023 21:34)  T(F): 96.7 (29 Oct 2023 05:23), Max: 97.3 (28 Oct 2023 21:34)  HR: 102 (29 Oct 2023 05:23) (102 - 116)  BP: 126/84 (29 Oct 2023 05:23) (101/55 - 126/84)  BP(mean): --  RR: 18 (29 Oct 2023 09:40) (18 - 18)  SpO2: 96% (29 Oct 2023 09:40) (96% - 100%)    Parameters below as of 29 Oct 2023 09:40  Patient On (Oxygen Delivery Method): nasal cannula  O2 Flow (L/min): 2      GENERAL: No acute distress, well-developed  HEAD:  Atraumatic, Normocephalic  EYES: EOMI, PERRLA, conjunctiva and sclera clear  NECK: Supple, no lymphadenopathy, no JVD  CHEST/LUNG: CTAB; No wheezes, rales, or rhonchi  HEART: Regular rate and rhythm; No murmurs, rubs, or gallops  ABDOMEN: Soft, non-tender, non-distended; normal bowel sounds, no organomegaly  EXTREMITIES:  2+ peripheral pulses b/l, No clubbing, cyanosis, or edema  NEUROLOGY: A&O x 3, no focal deficits  SKIN: No rashes or lesions    LABS:                        9.8    24.49 )-----------( 238      ( 29 Oct 2023 06:19 )             33.2     10-29    141  |  96<L>  |  38<H>  ----------------------------<  65<L>  4.7   |  35<H>  |  0.8    Ca    9.1      29 Oct 2023 06:19  Mg     2.1     10-29    TPro  6.0  /  Alb  3.2<L>  /  TBili  0.4  /  DBili  x   /  AST  23  /  ALT  16  /  AlkPhos  359<H>  10-29        Urinalysis Basic - ( 29 Oct 2023 06:19 )    Color: x / Appearance: x / SG: x / pH: x  Gluc: 65 mg/dL / Ketone: x  / Bili: x / Urobili: x   Blood: x / Protein: x / Nitrite: x   Leuk Esterase: x / RBC: x / WBC x   Sq Epi: x / Non Sq Epi: x / Bacteria: x           Patient is a 88y old  Female who presents with a chief complaint of respiratory failure (28 Oct 2023 23:28)        MEDICATIONS  (STANDING):  albuterol/ipratropium for Nebulization 3 milliLiter(s) Nebulizer every 6 hours  apixaban 2.5 milliGRAM(s) Oral every 12 hours  budesonide  80 MICROgram(s)/formoterol 4.5 MICROgram(s) Inhaler 2 Puff(s) Inhalation two times a day  ceftazidime/avibactam IVPB 1.25 Gram(s) IV Intermittent every 8 hours  chlorhexidine 2% Cloths 1 Application(s) Topical <User Schedule>  ferrous    sulfate 325 milliGRAM(s) Oral daily  gabapentin 100 milliGRAM(s) Oral three times a day  metoprolol tartrate 25 milliGRAM(s) Oral two times a day  predniSONE   Tablet 40 milliGRAM(s) Oral daily  saccharomyces boulardii 250 milliGRAM(s) Oral two times a day    MEDICATIONS  (PRN):    CAPILLARY BLOOD GLUCOSE    I&O's Summary    28 Oct 2023 07:01  -  29 Oct 2023 07:00  --------------------------------------------------------  IN: 120 mL / OUT: 530 mL / NET: -410 mL        PHYSICAL EXAM:  Vital Signs Last 24 Hrs  T(C): 35.9 (29 Oct 2023 05:23), Max: 36.3 (28 Oct 2023 21:34)  T(F): 96.7 (29 Oct 2023 05:23), Max: 97.3 (28 Oct 2023 21:34)  HR: 102 (29 Oct 2023 05:23) (102 - 116)  BP: 126/84 (29 Oct 2023 05:23) (101/55 - 126/84)  BP(mean): --  RR: 18 (29 Oct 2023 09:40) (18 - 18)  SpO2: 96% (29 Oct 2023 09:40) (96% - 100%)    Parameters below as of 29 Oct 2023 09:40  Patient On (Oxygen Delivery Method): nasal cannula  O2 Flow (L/min): 2      GENERAL: No acute distress, well-developed  HEAD:  Atraumatic, Normocephalic  EYES: EOMI, PERRLA, conjunctiva and sclera clear  NECK: Supple, no lymphadenopathy, no JVD  CHEST/LUNG: CTAB; No wheezes, rales, or rhonchi  HEART: Regular rate and rhythm; No murmurs, rubs, or gallops  ABDOMEN: Soft, non-tender, non-distended; normal bowel sounds, no organomegaly  EXTREMITIES:  B/L Stasis dermatitis No clubbing, cyanosis, or edema  NEUROLOGY: A&O x 2-3, no focal deficits  SKIN: No rashes or lesions    LABS:                        9.8    24.49 )-----------( 238      ( 29 Oct 2023 06:19 )             33.2     10-29    141  |  96<L>  |  38<H>  ----------------------------<  65<L>  4.7   |  35<H>  |  0.8    Ca    9.1      29 Oct 2023 06:19  Mg     2.1     10-29    TPro  6.0  /  Alb  3.2<L>  /  TBili  0.4  /  DBili  x   /  AST  23  /  ALT  16  /  AlkPhos  359<H>  10-29        Urinalysis Basic - ( 29 Oct 2023 06:19 )    Color: x / Appearance: x / SG: x / pH: x  Gluc: 65 mg/dL / Ketone: x  / Bili: x / Urobili: x   Blood: x / Protein: x / Nitrite: x   Leuk Esterase: x / RBC: x / WBC x   Sq Epi: x / Non Sq Epi: x / Bacteria: x

## 2023-10-30 NOTE — PROGRESS NOTE ADULT - SUBJECTIVE AND OBJECTIVE BOX
Patient is a 88y old  Female who presents with a chief complaint of respiratory failure (29 Oct 2023 11:37)      MEDICATIONS  (STANDING):  albuterol/ipratropium for Nebulization 3 milliLiter(s) Nebulizer every 6 hours  apixaban 2.5 milliGRAM(s) Oral every 12 hours  budesonide  80 MICROgram(s)/formoterol 4.5 MICROgram(s) Inhaler 2 Puff(s) Inhalation two times a day  ceftazidime/avibactam IVPB 1.25 Gram(s) IV Intermittent every 8 hours  chlorhexidine 2% Cloths 1 Application(s) Topical <User Schedule>  ferrous    sulfate 325 milliGRAM(s) Oral daily  gabapentin 100 milliGRAM(s) Oral three times a day  metoprolol tartrate 25 milliGRAM(s) Oral two times a day  pantoprazole    Tablet 40 milliGRAM(s) Oral before breakfast  predniSONE   Tablet 40 milliGRAM(s) Oral daily  saccharomyces boulardii 250 milliGRAM(s) Oral two times a day  sodium zirconium cyclosilicate 10 Gram(s) Oral once    MEDICATIONS  (PRN):    CAPILLARY BLOOD GLUCOSE  I&O's Summary    29 Oct 2023 07:01  -  30 Oct 2023 07:00  --------------------------------------------------------  IN: 360 mL / OUT: 600 mL / NET: -240 mL        PHYSICAL EXAM:  Vital Signs Last 24 Hrs  T(C): 36.9 (30 Oct 2023 14:22), Max: 36.9 (30 Oct 2023 14:22)  T(F): 98.4 (30 Oct 2023 14:22), Max: 98.4 (30 Oct 2023 14:22)  HR: 103 (30 Oct 2023 14:22) (97 - 108)  BP: 107/66 (30 Oct 2023 14:22) (107/66 - 127/67)  BP(mean): --  RR: 18 (30 Oct 2023 14:22) (18 - 18)  SpO2: 95% (30 Oct 2023 14:22) (95% - 99%)      GENERAL: No acute distress, well-developed  HEAD:  Atraumatic, Normocephalic  EYES: EOMI, PERRLA, conjunctiva and sclera clear  NECK: Supple, no lymphadenopathy, no JVD  CHEST/LUNG: CTAB; No wheezes, rales, or rhonchi  HEART: Regular rate and rhythm; No murmurs, rubs, or gallops  ABDOMEN: Soft, non-tender, non-distended; normal bowel sounds, no organomegaly  EXTREMITIES:  B/L Stasis dermatitis No clubbing, cyanosis, or edema  NEUROLOGY: A&O x 2-3, no focal deficits  SKIN: No rashes or lesions        LABS:                        9.2    35.00 )-----------( 231      ( 30 Oct 2023 04:30 )             30.6     10-30    141  |  99  |  34<H>  ----------------------------<  41<LL>  5.9<H>   |  27  |  0.7    Ca    8.8      30 Oct 2023 04:30  Mg     2.0     10-30    TPro  5.8<L>  /  Alb  2.9<L>  /  TBili  0.3  /  DBili  <0.2  /  AST  33  /  ALT  19  /  AlkPhos  379<H>  10-30    Urinalysis Basic - ( 30 Oct 2023 04:30 )    Color: x / Appearance: x / SG: x / pH: x  Gluc: 41 mg/dL / Ketone: x  / Bili: x / Urobili: x   Blood: x / Protein: x / Nitrite: x   Leuk Esterase: x / RBC: x / WBC x   Sq Epi: x / Non Sq Epi: x / Bacteria: x      Culture - Blood (collected 28 Oct 2023 06:18)  Source: .Blood None  Preliminary Report (29 Oct 2023 17:02):    No growth at 24 hours    < from: US Abdomen Upper Quadrant Right (10.29.23 @ 14:18) >  No gallstones. Negative Patterson sign. Gallbladder wall 2.9 cm.    No focal hepatic lesions    Minimal ascites. Right pleural effusion.    < end of copied text >

## 2023-10-30 NOTE — PHARMACOTHERAPY INTERVENTION NOTE - COMMENTS
Recommended increasing ceftazidime-avibactam dose to 2.5g IV q8h since renal function improved, and CrCl is now ~54 mL/min.

## 2023-10-30 NOTE — PROGRESS NOTE ADULT - ASSESSMENT
88 year old female with PMH of HTN, Afib, OA, PVD, COPD, Anxiety, Obesity, Acute on chronic systolic congestive heart failure, H/O abdominal hysterectomy, H/O discectomy, H/O total knee replacement, bilaterally and bilateral pleural effusions (s/p left thoracentesis 3/2023 -- Cytology -PLEURAL FLUID, THORACENTESIS: - SUSPICIOUS FOR MALIGNANCY) with a left sided pleurex catheter, presented for fever and dyspnea.    Septic Shock with Mult-drug Resistant Klebsiella Bacteriemia with Acute Cystitis  Acute hypoxemic and hypercapnic respiratory failure: improving, now on 2LNC  Acute decompensated diastolic HF with pleural effusions likely malignant effusion with H/O breast ca  -CXR: Unchanged bibasilar opacities and pleural effusion.  -ID eval appreciated, c/w Avycaz  -c/w inhalers, duonebs and steroids  -F/up Repeat blood cultures    Elevated ALP and GGT    RuQ Sono: Grossly unremarkable with Gallbladder wall 2.9 cm.  -O/P f/up with PCP/GI     Acute kidney Injury: Pre-renal and Post obstructive: now resolved  US kidney bladder: Moderate left and mild right hydronephrosis.    Chronic AFIB: c/w BB and Eliquis    DVT PPX: Eliquis  GI PPX: PPI  Code Status: DNR  Dispo: from NH  Pending clearance of repeat blood cx, IV abx regimen

## 2023-10-31 NOTE — CHART NOTE - NSCHARTNOTEFT_GEN_A_CORE
Provider:                                              Met with: [x   ] Patient  [ x  ] Family  [   ] Other:         Primary Language: [   x] English [   ] Other*:                      *Interpretation provided by:         SUPPORT DIAGNOSES            (Check all that apply)         [   ] EOL issues    [ x  ] Advanced Illness    [   ] Cultural / spiritual concerns    [ x  ] Pain / suffering    [   ] Dementia / AMS    [   ] Other:    [x   ] AD issues    [   ] Grief / loss / sadness    [   ] Discharge issues    [   ] Distress / coping         PSYCHOSOCIAL ASSESSMENT OF PATIENT         (Check all that apply)         [ X  ] Initial Assessment            [   ] Reassessment          [   ] Not Applicable this visit         Pain/suffering acuity:    [ x  ] None to mild (0-3)           [   ] Moderate (4-6)        [   ] High (7-10)         Mental Status:    [ x  ] Alert/oriented (x3)          [   ] Confused/Altered(x2/x1)         [   ] Non-resp         Functional status:    [   ] Independent w ADLs      [  x ] Needs Assistance             [   ] Bedbound/Full Care         Coping:    [ x  ] Coping well                     [  ] Coping w/difficulty            [   ] Poor coping         Support system:    [ x  ] Strong                              [   ] Adequate                        [   ] Inadequate              Past history and medications for:         [ ] Anxiety       [ ] Depression    [ ] Sleep disorders              SERVICE PROVIDED    [   ]Discharge support / facilitation    [x   ]AD / goals of care counseling                                  [   ]EOL / death / bereavement counseling    [ X  ]Counseling / support                                                [   ] Family meeting    [   ]Prayer / sacrament / ritual                                      [   ] Referral    [   ]Other                                                                           NOTE and Plan of Care (PoC):  89yo W Female w/PMH as noted below.  PT's from NH who was noted to have progressive dyspnea, fevers.  Pt w/ hx prior intubations for COPD.  On CXR pt has bilateral lower lobe infiltrates R>L.  Pt son stated pt may has aspirated 2 days while eating & pt has history of aspirating on food as note on prior admissions.  Pt is on Bipap ventilation  saturating on low to mid 90s.  Additionally , pt's hypotensive w/ MAP <65.  ER to place central venous cath & start IV levophed.  Palliative Care consulted for GOC.  LMSW met with PT at bedside. PT was alert & able to communicate she was comfortable; no pain or SOB noted. Pt requested to contact dtr: Karishma. LMSW spoke with Karishma and introduced role of Palliative Care. PT's daughter confirmed code status DNR/DNI with on-going medical management. Pt's daughter reports wishing to complete IVABx course and plan for d/c to [facility].  Questions answered and support rendered. Goals are clear: DNR/DNI with on-going med mgmt plan for return to [facility]. Will signoff. Reconsult PRN. [phone] Provider:                                              Met with: [x   ] Patient  [ x  ] Family  [   ] Other:         Primary Language: [   x] English [   ] Other*:                      *Interpretation provided by:         SUPPORT DIAGNOSES            (Check all that apply)         [   ] EOL issues    [ x  ] Advanced Illness    [   ] Cultural / spiritual concerns    [ x  ] Pain / suffering    [   ] Dementia / AMS    [   ] Other:    [x   ] AD issues    [   ] Grief / loss / sadness    [   ] Discharge issues    [   ] Distress / coping         PSYCHOSOCIAL ASSESSMENT OF PATIENT         (Check all that apply)         [ X  ] Initial Assessment            [   ] Reassessment          [   ] Not Applicable this visit         Pain/suffering acuity:    [ x  ] None to mild (0-3)           [   ] Moderate (4-6)        [   ] High (7-10)         Mental Status:    [ x  ] Alert/oriented (x3)          [   ] Confused/Altered(x2/x1)         [   ] Non-resp         Functional status:    [   ] Independent w ADLs      [  x ] Needs Assistance             [   ] Bedbound/Full Care         Coping:    [ x  ] Coping well                     [  ] Coping w/difficulty            [   ] Poor coping         Support system:    [ x  ] Strong                              [   ] Adequate                        [   ] Inadequate              Past history and medications for:         [ ] Anxiety       [ ] Depression    [ ] Sleep disorders              SERVICE PROVIDED    [   ]Discharge support / facilitation    [x   ]AD / goals of care counseling                                  [   ]EOL / death / bereavement counseling    [ X  ]Counseling / support                                                [   ] Family meeting    [   ]Prayer / sacrament / ritual                                      [   ] Referral    [   ]Other                                                                           NOTE and Plan of Care (PoC):  87yo W Female w/PMH as noted below.  PT's from NH who was noted to have progressive dyspnea, fevers.  Pt w/ hx prior intubations for COPD.  On CXR pt has bilateral lower lobe infiltrates R>L.  Pt son stated pt may has aspirated 2 days while eating & pt has history of aspirating on food as note on prior admissions.  Pt is on Bipap ventilation  saturating on low to mid 90s.  Additionally , pt's hypotensive w/ MAP <65.  ER to place central venous cath & start IV levophed.  Palliative Care consulted for GOC.  LMSW met with PT at bedside. PT was alert & able to communicate she was comfortable; no pain or SOB noted. Pt requested to contact dtr: Karishma. LMSW spoke with Karishma and introduced role of Palliative Care. PT's daughter confirmed code status DNR only with on-going medical management. Pt's daughter reports wishing to complete IVABx course and plan for d/c to [facility].  Questions answered and support rendered. Goals are clear: DNR only with on-going med mgmt plan for return to [facility]. Will signoff. Reconsult PRN. [phone]

## 2023-10-31 NOTE — PROGRESS NOTE ADULT - SUBJECTIVE AND OBJECTIVE BOX
Patient is a 88y old  Female who presents with a chief complaint of respiratory failure (31 Oct 2023 10:16)      MEDICATIONS  (STANDING):  albuterol/ipratropium for Nebulization 3 milliLiter(s) Nebulizer every 6 hours  apixaban 2.5 milliGRAM(s) Oral every 12 hours  budesonide  80 MICROgram(s)/formoterol 4.5 MICROgram(s) Inhaler 2 Puff(s) Inhalation two times a day  ceftazidime/avibactam IVPB 2.5 Gram(s) IV Intermittent every 8 hours  chlorhexidine 2% Cloths 1 Application(s) Topical <User Schedule>  ferrous    sulfate 325 milliGRAM(s) Oral daily  gabapentin 100 milliGRAM(s) Oral three times a day  metoprolol tartrate 25 milliGRAM(s) Oral two times a day  pantoprazole    Tablet 40 milliGRAM(s) Oral before breakfast  saccharomyces boulardii 250 milliGRAM(s) Oral two times a day    MEDICATIONS  (PRN):      CAPILLARY BLOOD GLUCOSE    I&O's Radha    30 Oct 2023 07:01  -  31 Oct 2023 07:00  --------------------------------------------------------  IN: 0 mL / OUT: 1200 mL / NET: -1200 mL        PHYSICAL EXAM:  Vital Signs Last 24 Hrs  T(C): 36.4 (31 Oct 2023 14:44), Max: 37.1 (30 Oct 2023 20:09)  T(F): 97.5 (31 Oct 2023 14:44), Max: 98.8 (30 Oct 2023 20:09)  HR: 104 (31 Oct 2023 14:44) (95 - 104)  BP: 109/64 (31 Oct 2023 14:44) (109/64 - 134/83)  BP(mean): --  RR: 18 (31 Oct 2023 05:11) (18 - 18)  SpO2: 97% (31 Oct 2023 14:44) (95% - 97%)    Parameters below as of 31 Oct 2023 09:41  Patient On (Oxygen Delivery Method): nasal cannula  O2 Flow (L/min): 3      GENERAL: No acute distress, well-developed  HEAD:  Atraumatic, Normocephalic  EYES: EOMI, PERRLA, conjunctiva and sclera clear  NECK: Supple, no lymphadenopathy, no JVD  CHEST/LUNG: CTAB; No wheezes, rales, or rhonchi  HEART: Regular rate and rhythm; No murmurs, rubs, or gallops  ABDOMEN: Soft, non-tender, non-distended; normal bowel sounds, no organomegaly  EXTREMITIES:  B/L Stasis dermatitis No clubbing, cyanosis, or edema  NEUROLOGY: A&O x 2-3, no focal deficits  SKIN: No rashes or lesions    LABS:                        10.0   32.52 )-----------( 272      ( 31 Oct 2023 04:30 )             32.9     10-31    141  |  98  |  30<H>  ----------------------------<  55<L>  5.3<H>   |  26  |  0.7    Ca    8.6      31 Oct 2023 04:30  Mg     1.8     10-31    TPro  5.8<L>  /  Alb  2.9<L>  /  TBili  0.3  /  DBili  x   /  AST  37  /  ALT  20  /  AlkPhos  370<H>  10-31      Urinalysis Basic - ( 31 Oct 2023 04:30 )    Color: x / Appearance: x / SG: x / pH: x  Gluc: 55 mg/dL / Ketone: x  / Bili: x / Urobili: x   Blood: x / Protein: x / Nitrite: x   Leuk Esterase: x / RBC: x / WBC x   Sq Epi: x / Non Sq Epi: x / Bacteria: x

## 2023-10-31 NOTE — PHYSICAL THERAPY INITIAL EVALUATION ADULT - ADDITIONAL COMMENTS
pt lives in a private house with 5 steps to enter and one level inside, pt was amb with RW, pt was at NH for STR prior to admission.  Pt's children come to help but unable to stay with her all day

## 2023-10-31 NOTE — PHARMACOTHERAPY INTERVENTION NOTE - COMMENTS
Extended duration of ceftazidime-avibactam so that last day is 11/8 (14 days from first negative blood culture on 10/26).

## 2023-10-31 NOTE — PHYSICAL THERAPY INITIAL EVALUATION ADULT - PERTINENT HX OF CURRENT PROBLEM, REHAB EVAL
89yo W Female w/PMH as noted below.  PT's from NH who was noted to have progressive dyspnea, fevers.  Pt w/ hx prior intubations for COPD.  On CXR pt has bilateral lower lobe infiltrates R>L.  Pt son stated pt may has aspirated 2 days while eating & pt has history of aspirating on food as note on prior admissions.  Pt is on Bipap ventilation  saturating on low to mid 90s.  Additionally , pt's hypotensive w/ MAP <65.  ER to place central venous cath & start IV levophed.  Pt can go to ICU when bed is available

## 2023-10-31 NOTE — PROGRESS NOTE ADULT - SUBJECTIVE AND OBJECTIVE BOX
INFECTIOUS DISEASE FOLLOW UP NOTE:    Interval History/ROS: Patient is a 88y old  Female who presents with a chief complaint of respiratory failure (30 Oct 2023 14:26)      Overnight events:    REVIEW OF SYSTEMS:        Prior hospital charts reviewed [Yes]  Primary team notes reviewed [Yes]  Other consultant notes reviewed [Yes]    Allergies:  aspirin (Other)  sulfa drugs (Hives; Other)  codeine (Other)  penicillin (Other)  contrast media (iodine-based) (Other)      ANTIMICROBIALS:   ceftazidime/avibactam IVPB 2.5 every 8 hours      OTHER MEDS: MEDICATIONS  (STANDING):  albuterol/ipratropium for Nebulization 3 every 6 hours  apixaban 2.5 every 12 hours  budesonide  80 MICROgram(s)/formoterol 4.5 MICROgram(s) Inhaler 2 two times a day  gabapentin 100 three times a day  metoprolol tartrate 25 two times a day  pantoprazole    Tablet 40 before breakfast      Vital Signs Last 24 Hrs  T(F): 96.7 (10-31-23 @ 05:11), Max: 99 (10-24-23 @ 19:00)    Vital Signs Last 24 Hrs  HR: 95 (10-31-23 @ 05:11) (95 - 103)  BP: 134/83 (10-31-23 @ 05:11) (107/66 - 134/83)  RR: 18 (10-31-23 @ 05:11)  SpO2: 96% (10-31-23 @ 09:41) (95% - 96%)  Wt(kg): --    EXAM:      Labs:                        9.2    35.00 )-----------( 231      ( 30 Oct 2023 04:30 )             30.6     10-30    141  |  99  |  34<H>  ----------------------------<  41<LL>  5.9<H>   |  27  |  0.7    Ca    8.8      30 Oct 2023 04:30  Mg     2.0     10-30    TPro  5.8<L>  /  Alb  2.9<L>  /  TBili  0.3  /  DBili  <0.2  /  AST  33  /  ALT  19  /  AlkPhos  379<H>  10-30      WBC Trend:  WBC Count: 35.00 (10-30-23 @ 04:30)  WBC Count: 24.49 (10-29-23 @ 06:19)  WBC Count: 20.44 (10-28-23 @ 06:18)  WBC Count: 14.73 (10-27-23 @ 05:57)      Creatine Trend:  Creatinine: 0.7 (10-30)  Creatinine: 0.8 (10-29)  Creatinine: 0.9 (10-28)  Creatinine: 1.0 (10-27)      Liver Biochemical Testing Trend:  Alanine Aminotransferase (ALT/SGPT): 19 (10-30)  Alanine Aminotransferase (ALT/SGPT): 16 (10-29)  Alanine Aminotransferase (ALT/SGPT): 17 (10-28)  Alanine Aminotransferase (ALT/SGPT): 9 (10-27)  Alanine Aminotransferase (ALT/SGPT): 8 (10-26)  Aspartate Aminotransferase (AST/SGOT): 33 (10-30-23 @ 04:30)  Aspartate Aminotransferase (AST/SGOT): 23 (10-29-23 @ 06:19)  Aspartate Aminotransferase (AST/SGOT): 30 (10-28-23 @ 06:18)  Aspartate Aminotransferase (AST/SGOT): 13 (10-27-23 @ 05:57)  Aspartate Aminotransferase (AST/SGOT): 13 (10-26-23 @ 11:24)  Bilirubin Total: 0.3 (10-30)  Bilirubin Total: 0.3 (10-30)  Bilirubin Direct: <0.2 (10-30)  Bilirubin Total: 0.4 (10-29)  Bilirubin Total: 0.3 (10-28)      Trend LDH  09-10-23 @ 19:21  398<H>  03-07-23 @ 21:06  211      Urinalysis Basic - ( 30 Oct 2023 04:30 )    Color: x / Appearance: x / SG: x / pH: x  Gluc: 41 mg/dL / Ketone: x  / Bili: x / Urobili: x   Blood: x / Protein: x / Nitrite: x   Leuk Esterase: x / RBC: x / WBC x   Sq Epi: x / Non Sq Epi: x / Bacteria: x        MICROBIOLOGY:    MRSA PCR Result.: Negative (10-24-23 @ 16:15)  MRSA PCR Result.: Positive (09-05-23 @ 00:00)  MRSA PCR Result.: Negative (07-05-23 @ 15:45)  MRSA PCR Result.: Negative (03-02-23 @ 15:27)  MRSA PCR Result.: Negative (03-02-23 @ 07:58)      Culture - Blood (collected 28 Oct 2023 06:18)  Source: .Blood None  Preliminary Report:    No growth at 48 Hours    Culture - Blood (collected 26 Oct 2023 05:57)  Source: .Blood None  Preliminary Report:    No growth at 4 days    Culture - Urine (collected 24 Oct 2023 16:15)  Source: Clean Catch Clean Catch (Midstream)  Final Report:    >100,000 CFU/ml Klebsiella pneumoniae (Carbapenem Resistant)  Organism: Klepne MDRO  Klepne MDRO  Organism: Klepne MDRO    Sensitivities:      Method Type: CarbaR      -  Resistance Gene to Carbapenem: Detec      -  Resistance Gene KPC: Detec  Organism: Klepne MDRO    Sensitivities:      Method Type: DARIO      -  Amikacin: S <=16      -  Amoxicillin/Clavulanic Acid: R >16/8      -  Ampicillin: R >16 These ampicillin results predict results for amoxicillin      -  Ampicillin/Sulbactam: R >16/8      -  Aztreonam: R >16      -  Cefazolin: R >16 For uncomplicated UTI with K. pneumoniae, E. coli, or P. mirablis: DARIO <=16 is sensitive and DARIO >=32 is resistant. This also predicts results for oral agents cefaclor, cefdinir, cefpodoxime, cefprozil, cefuroxime axetil, cephalexin and locarbef for uncomplicated UTI. Note that some isolates may be susceptible to these agents while testing resistant to cefazolin.      -  Cefepime: R >16      -  Ceftriaxone: R >32      -  Cefuroxime: R >16      -  Ciprofloxacin: I 0.5      -  Ertapenem: R >1      -  Gentamicin: S <=2      -  Imipenem: R >8      -  Levofloxacin: R 2      -  Meropenem: R >8      -  Nitrofurantoin: I 64 Should not be used to treat pyelonephritis      -  Piperacillin/Tazobactam: R >64      -  Tobramycin: S <=2      -  Trimethoprim/Sulfamethoxazole: R >2/38    Culture - Blood (collected 23 Oct 2023 04:06)  Source: .Blood Blood-Peripheral  Final Report:    Growth in aerobic and anaerobic bottles: Klebsiella pneumoniae    (Carbapenem Resistant)    Direct identification is available within approximately 3-5    hours either by Blood Panel Multiplexed PCR or Direct    MALDI-TOF. Details: [email or web address]  Organism: Blood Culture PCR  Klepne MDRO  Organism: Klepne MDRO    Sensitivities:      Method Type: DARIO      -  Amikacin: S <=16      -  Ampicillin: R >16 These ampicillin results predict results for amoxicillin      -  Ampicillin/Sulbactam: R >16/8      -  Aztreonam: R >16      -  Cefazolin: R >16      -  Cefepime: R >16      -  Cefoxitin: R >16      -  Ceftazidime/Avibactam: S <=4      -  Ceftolozane/tazobactam: R >8      -  Ceftriaxone: R >32      -  Ciprofloxacin: R >2      -  Ertapenem: R >1      -  Gentamicin: S <=2      -  Imipenem: R >8      -  Levofloxacin: R >4      -  Meropenem: R >8      -  Meropenem/Vaborbactam: S <=2      -  Piperacillin/Tazobactam: R >64      -  Tobramycin: S <=2      -  Trimethoprim/Sulfamethoxazole: R >2/38  Organism: Blood Culture PCR    Sensitivities:      Method Type: PCR      -  K. pneumoniae group: Detec (K. pneumoniae, K. quasipneumoniae, K. variicola)      -  ESBL: Detec      -  Carbapenem Resistance: Detec      -  CTX-M Resistance Marker: Detec      -  KPC resistance gene: Detec The KPC gene confers resistance to all penicillins, cephalosporins, carbapenems and aztreonam. Additional resistance to fluoroquinolones and aminoglycosides is likely.    Culture - Blood (collected 23 Oct 2023 04:06)  Source: .Blood Blood-Peripheral  Final Report:    Growth in aerobic and anaerobic bottles: Klebsiella pneumoniae    (Carbapenem Resistant)    See previous culture [id number]    Culture - Blood (collected 10 Sep 2023 05:45)  Source: .Blood None  Final Report:    No growth at 5 days    Culture - Blood (collected 10 Sep 2023 05:45)  Source: .Blood None  Final Report:    No growth at 5 days    Culture - Sputum (collected 05 Sep 2023 19:15)  Source: Trach Asp Tracheal Aspirate  Final Report:    Normal Respiratory Joaquina present    Culture - Blood (collected 02 Sep 2023 06:01)  Source: .Blood None  Final Report:    No growth at 5 days    Culture - Sputum (collected 02 Sep 2023 03:00)  Source: Trach Asp Tracheal Aspirate  Final Report:    Normal Respiratory Joaquina present    COVID-19 PCR: NotDetec (09-28-23 @ 19:00)  COVID-19 PCR: NotDetec (09-26-23 @ 15:30)      RADIOLOGY:  imaging below personally reviewed    < from: Xray Chest 1 View- PORTABLE-Routine (Xray Chest 1 View- PORTABLE-Routine in AM.) (10.30.23 @ 07:49) >  Impression:    Unchanged bilateral pleural effusions, opacities.    < end of copied text >    < from: US Abdomen Upper Quadrant Right (10.29.23 @ 14:18) >  IMPRESSION:  No gallstones. Negative Patterson sign. Gallbladder wall 2.9 cm.    No focal hepatic lesions    Minimal ascites. Right pleural effusion.      < end of copied text >    < from: CT Abdomen and Pelvis No Cont (10.25.23 @ 15:05) >  IMPRESSION:    No evidence of abdominal or pelvic mass or inflammatory process.    Bilateral renal cysts are again noted. The previously noted exophytic   posterior lesion remains indeterminate, therefore follow-up MRI with   contrast is recommended.    No evidence of retroperitoneal, intraperitoneal, intramuscular, or   subcutaneous hematoma.    < end of copied text >    < from: CT Chest No Cont (10.25.23 @ 15:05) >  IMPRESSION:    Right neck mass.    Bilateral opacifications and effusions, right greater than left, stable.    < end of copied text >   INFECTIOUS DISEASE FOLLOW UP NOTE:    Interval History/ROS: Patient is a 88y old  Female who presents with a chief complaint of respiratory failure (30 Oct 2023 14:26)      Overnight events: No overnight event.    REVIEW OF SYSTEMS:  CONSTITUTIONAL: No fever or chills  HEAD: No lesion on scalp  EYES: No visual disturbance  ENT: No sore throat  RESPIRATORY: No cough, + shortness of breath  CARDIOVASCULAR: No chest pain or palpitations  GASTROINTESTINAL: No abdominal or epigastric pain; pain at the anus  GENITOURINARY: No dysuria  NEUROLOGICAL: No headache/dizziness  MUSCULOSKELETAL: No joint pain, erythema, or swelling; no back pain  SKIN: No itching, rashes  All other ROS negative except noted above    Prior hospital charts reviewed [Yes]  Primary team notes reviewed [Yes]  Other consultant notes reviewed [Yes]    Allergies:  aspirin (Other)  sulfa drugs (Hives; Other)  codeine (Other)  penicillin (Other)  contrast media (iodine-based) (Other)      ANTIMICROBIALS:   ceftazidime/avibactam IVPB 2.5 every 8 hours      OTHER MEDS: MEDICATIONS  (STANDING):  albuterol/ipratropium for Nebulization 3 every 6 hours  apixaban 2.5 every 12 hours  budesonide  80 MICROgram(s)/formoterol 4.5 MICROgram(s) Inhaler 2 two times a day  gabapentin 100 three times a day  metoprolol tartrate 25 two times a day  pantoprazole    Tablet 40 before breakfast      Vital Signs Last 24 Hrs  T(F): 96.7 (10-31-23 @ 05:11), Max: 99 (10-24-23 @ 19:00)    Vital Signs Last 24 Hrs  HR: 95 (10-31-23 @ 05:11) (95 - 103)  BP: 134/83 (10-31-23 @ 05:11) (107/66 - 134/83)  RR: 18 (10-31-23 @ 05:11)  SpO2: 96% (10-31-23 @ 09:41) (95% - 96%)  Wt(kg): --    EXAM:  GENERAL: looks comfortable, lying in bed  HEAD: No head lesions  EYES: Conjunctiva pink and cornea white  EAR, NOSE, MOUTH, THROAT: Normal external ears and nose, no discharges; moist mucous membranes; on NC today  NECK: Supple, nontender to palpation; no JVD  RESPIRATORY: Decreased bibasilar breath sounds  CARDIOVASCULAR: S1 S2, tachycardia  GASTROINTESTINAL: Soft, nontender, nondistended; normoactive bowel sounds  EXTREMITIES: No clubbing, cyanosis, or petal edema  NERVOUS SYSTEM: Alert and alert, able to moves all her extremities  MUSCULOSKELETAL: No joint erythema, swelling or pain  SKIN: No rashes or lesions, no superficial thrombophlebitis  PSYCH: Calm    Labs:                        9.2    35.00 )-----------( 231      ( 30 Oct 2023 04:30 )             30.6     10-30    141  |  99  |  34<H>  ----------------------------<  41<LL>  5.9<H>   |  27  |  0.7    Ca    8.8      30 Oct 2023 04:30  Mg     2.0     10-30    TPro  5.8<L>  /  Alb  2.9<L>  /  TBili  0.3  /  DBili  <0.2  /  AST  33  /  ALT  19  /  AlkPhos  379<H>  10-30      WBC Trend:  WBC Count: 35.00 (10-30-23 @ 04:30)  WBC Count: 24.49 (10-29-23 @ 06:19)  WBC Count: 20.44 (10-28-23 @ 06:18)  WBC Count: 14.73 (10-27-23 @ 05:57)      Creatine Trend:  Creatinine: 0.7 (10-30)  Creatinine: 0.8 (10-29)  Creatinine: 0.9 (10-28)  Creatinine: 1.0 (10-27)      Liver Biochemical Testing Trend:  Alanine Aminotransferase (ALT/SGPT): 19 (10-30)  Alanine Aminotransferase (ALT/SGPT): 16 (10-29)  Alanine Aminotransferase (ALT/SGPT): 17 (10-28)  Alanine Aminotransferase (ALT/SGPT): 9 (10-27)  Alanine Aminotransferase (ALT/SGPT): 8 (10-26)  Aspartate Aminotransferase (AST/SGOT): 33 (10-30-23 @ 04:30)  Aspartate Aminotransferase (AST/SGOT): 23 (10-29-23 @ 06:19)  Aspartate Aminotransferase (AST/SGOT): 30 (10-28-23 @ 06:18)  Aspartate Aminotransferase (AST/SGOT): 13 (10-27-23 @ 05:57)  Aspartate Aminotransferase (AST/SGOT): 13 (10-26-23 @ 11:24)  Bilirubin Total: 0.3 (10-30)  Bilirubin Total: 0.3 (10-30)  Bilirubin Direct: <0.2 (10-30)  Bilirubin Total: 0.4 (10-29)  Bilirubin Total: 0.3 (10-28)      Trend LDH  09-10-23 @ 19:21  398<H>  03-07-23 @ 21:06  211      Urinalysis Basic - ( 30 Oct 2023 04:30 )    Color: x / Appearance: x / SG: x / pH: x  Gluc: 41 mg/dL / Ketone: x  / Bili: x / Urobili: x   Blood: x / Protein: x / Nitrite: x   Leuk Esterase: x / RBC: x / WBC x   Sq Epi: x / Non Sq Epi: x / Bacteria: x        MICROBIOLOGY:    MRSA PCR Result.: Negative (10-24-23 @ 16:15)  MRSA PCR Result.: Positive (09-05-23 @ 00:00)  MRSA PCR Result.: Negative (07-05-23 @ 15:45)  MRSA PCR Result.: Negative (03-02-23 @ 15:27)  MRSA PCR Result.: Negative (03-02-23 @ 07:58)      Culture - Blood (collected 28 Oct 2023 06:18)  Source: .Blood None  Preliminary Report:    No growth at 48 Hours    Culture - Blood (collected 26 Oct 2023 05:57)  Source: .Blood None  Preliminary Report:    No growth at 4 days    Culture - Urine (collected 24 Oct 2023 16:15)  Source: Clean Catch Clean Catch (Midstream)  Final Report:    >100,000 CFU/ml Klebsiella pneumoniae (Carbapenem Resistant)  Organism: Klepne MDRO  Klepne MDRO  Organism: Klepne MDRO    Sensitivities:      Method Type: CarbaR      -  Resistance Gene to Carbapenem: Detec      -  Resistance Gene KPC: Detec  Organism: Klepne MDRO    Sensitivities:      Method Type: DARIO      -  Amikacin: S <=16      -  Amoxicillin/Clavulanic Acid: R >16/8      -  Ampicillin: R >16 These ampicillin results predict results for amoxicillin      -  Ampicillin/Sulbactam: R >16/8      -  Aztreonam: R >16      -  Cefazolin: R >16 For uncomplicated UTI with K. pneumoniae, E. coli, or P. mirablis: DARIO <=16 is sensitive and DARIO >=32 is resistant. This also predicts results for oral agents cefaclor, cefdinir, cefpodoxime, cefprozil, cefuroxime axetil, cephalexin and locarbef for uncomplicated UTI. Note that some isolates may be susceptible to these agents while testing resistant to cefazolin.      -  Cefepime: R >16      -  Ceftriaxone: R >32      -  Cefuroxime: R >16      -  Ciprofloxacin: I 0.5      -  Ertapenem: R >1      -  Gentamicin: S <=2      -  Imipenem: R >8      -  Levofloxacin: R 2      -  Meropenem: R >8      -  Nitrofurantoin: I 64 Should not be used to treat pyelonephritis      -  Piperacillin/Tazobactam: R >64      -  Tobramycin: S <=2      -  Trimethoprim/Sulfamethoxazole: R >2/38    Culture - Blood (collected 23 Oct 2023 04:06)  Source: .Blood Blood-Peripheral  Final Report:    Growth in aerobic and anaerobic bottles: Klebsiella pneumoniae    (Carbapenem Resistant)    Direct identification is available within approximately 3-5    hours either by Blood Panel Multiplexed PCR or Direct    MALDI-TOF. Details: [email or web address]  Organism: Blood Culture PCR  Klepne MDRO  Organism: Klepne MDRO    Sensitivities:      Method Type: DARIO      -  Amikacin: S <=16      -  Ampicillin: R >16 These ampicillin results predict results for amoxicillin      -  Ampicillin/Sulbactam: R >16/8      -  Aztreonam: R >16      -  Cefazolin: R >16      -  Cefepime: R >16      -  Cefoxitin: R >16      -  Ceftazidime/Avibactam: S <=4      -  Ceftolozane/tazobactam: R >8      -  Ceftriaxone: R >32      -  Ciprofloxacin: R >2      -  Ertapenem: R >1      -  Gentamicin: S <=2      -  Imipenem: R >8      -  Levofloxacin: R >4      -  Meropenem: R >8      -  Meropenem/Vaborbactam: S <=2      -  Piperacillin/Tazobactam: R >64      -  Tobramycin: S <=2      -  Trimethoprim/Sulfamethoxazole: R >2/38  Organism: Blood Culture PCR    Sensitivities:      Method Type: PCR      -  K. pneumoniae group: Detec (K. pneumoniae, K. quasipneumoniae, K. variicola)      -  ESBL: Detec      -  Carbapenem Resistance: Detec      -  CTX-M Resistance Marker: Detec      -  KPC resistance gene: Detec The KPC gene confers resistance to all penicillins, cephalosporins, carbapenems and aztreonam. Additional resistance to fluoroquinolones and aminoglycosides is likely.    Culture - Blood (collected 23 Oct 2023 04:06)  Source: .Blood Blood-Peripheral  Final Report:    Growth in aerobic and anaerobic bottles: Klebsiella pneumoniae    (Carbapenem Resistant)    See previous culture [id number]    Culture - Blood (collected 10 Sep 2023 05:45)  Source: .Blood None  Final Report:    No growth at 5 days    Culture - Blood (collected 10 Sep 2023 05:45)  Source: .Blood None  Final Report:    No growth at 5 days    Culture - Sputum (collected 05 Sep 2023 19:15)  Source: Trach Asp Tracheal Aspirate  Final Report:    Normal Respiratory Joaquina present    Culture - Blood (collected 02 Sep 2023 06:01)  Source: .Blood None  Final Report:    No growth at 5 days    Culture - Sputum (collected 02 Sep 2023 03:00)  Source: Trach Asp Tracheal Aspirate  Final Report:    Normal Respiratory Joaquina present    COVID-19 PCR: NotDetec (09-28-23 @ 19:00)  COVID-19 PCR: NotDetec (09-26-23 @ 15:30)      RADIOLOGY:  imaging below personally reviewed    < from: Xray Chest 1 View- PORTABLE-Routine (Xray Chest 1 View- PORTABLE-Routine in AM.) (10.30.23 @ 07:49) >  Impression:    Unchanged bilateral pleural effusions, opacities.    < end of copied text >    < from: US Abdomen Upper Quadrant Right (10.29.23 @ 14:18) >  IMPRESSION:  No gallstones. Negative Patterson sign. Gallbladder wall 2.9 cm.    No focal hepatic lesions    Minimal ascites. Right pleural effusion.      < end of copied text >    < from: CT Abdomen and Pelvis No Cont (10.25.23 @ 15:05) >  IMPRESSION:    No evidence of abdominal or pelvic mass or inflammatory process.    Bilateral renal cysts are again noted. The previously noted exophytic   posterior lesion remains indeterminate, therefore follow-up MRI with   contrast is recommended.    No evidence of retroperitoneal, intraperitoneal, intramuscular, or   subcutaneous hematoma.    < end of copied text >    < from: CT Chest No Cont (10.25.23 @ 15:05) >  IMPRESSION:    Right neck mass.    Bilateral opacifications and effusions, right greater than left, stable.    < end of copied text >

## 2023-10-31 NOTE — PHARMACOTHERAPY INTERVENTION NOTE - NSPHARMCOMMASP
ASP - Duration of therapy
ASP - Recommend ID Consult
ASP - Renal dose adjustment
ASP - Renal dose adjustment
ASP - Duration of therapy
ASP - Drug-Bug mismatch

## 2023-10-31 NOTE — PROGRESS NOTE ADULT - ASSESSMENT
88 year old female with PMH of HTN, Afib, OA, PVD, COPD, Anxiety, Obesity, Acute on chronic systolic congestive heart failure, H/O abdominal hysterectomy, H/O discectomy, H/O total knee replacement, bilaterally and bilateral pleural effusions (s/p left thoracentesis 3/2023 -- Cytology -PLEURAL FLUID, THORACENTESIS: - SUSPICIOUS FOR MALIGNANCY) with a left sided pleurex catheter, presented for fever and dyspnea.    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  -ID eval appreciated, c/w Avycaz, will need two weeks course from the date of 1st negative blood culture  -c/w inhalers, duonebs and steroids  -F/up Repeat blood cultures    Persistent Leukocytosis: suspected Malignant etiology   -Heme/onc Eval      Elevated ALP and GGT    RuQ Sono: Grossly unremarkable with Gallbladder wall 2.9 cm.  -O/P f/up with PCP/GI     Acute kidney Injury: Pre-renal and Post obstructive: now resolved  US kidney bladder: Moderate left and mild right hydronephrosis.  Failed TOV, Pt is allergic to sulfa drugs with anaphylaxis hence no Flomax ordered    Chronic AFIB: c/w BB and Eliquis    DVT PPX: Eliquis  GI PPX: PPI  Code Status: DNR  Dispo: from NH  Medically ready  - Pending Heme/onc consult  -Authorization for NH

## 2023-10-31 NOTE — PHYSICAL THERAPY INITIAL EVALUATION ADULT - GENERAL OBSERVATIONS, REHAB EVAL
15;05-15;30 pt was seen for PT IE at bed side, pt is agreeable, chart thoroughly reviewed, RN Jennifer is aware.  Pt received and left semi reese in bed, in no apparent distress, +IV, +al, +O2 via NC @ 4L, +call bell within reach, bed side table at reach

## 2023-11-01 NOTE — CHART NOTE - NSCHARTNOTEFT_GEN_A_CORE
pt s/p al removal, voided 75 cc, 250 mg retained in bladder   will straight cath and continue to monitor UOP

## 2023-11-01 NOTE — PROGRESS NOTE ADULT - ASSESSMENT
88 year old female with PMH of HTN, Afib, OA, PVD, COPD, Anxiety, Obesity, Acute on chronic systolic congestive heart failure, H/O abdominal hysterectomy, H/O discectomy, H/O total knee replacement, bilaterally and bilateral pleural effusions (s/p left thoracentesis 3/2023 -- Cytology -PLEURAL FLUID, THORACENTESIS: - SUSPICIOUS FOR MALIGNANCY) with a left sided pleurex catheter, presented for fever and dyspnea.    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  -ID eval appreciated, c/w Avycaz, will need two weeks course from the date of 1st negative blood culture  -c/w inhalers, duonebs and steroids    # Persistent Leukocytosis: suspected Malignant etiology   -Heme/onc Eval    # Elevated ALP and GGT    RuQ Sono: Grossly unremarkable with Gallbladder wall 2.9 cm.  -O/P f/up with PCP/GI     # Acute kidney Injury  - Pre-renal and Post obstructive: now resolved  - US kidney bladder: Moderate left and mild right hydronephrosis.  - TOV    # Chronic AFIB: c/w BB and Eliquis    DVT PPX: Eliquis  GI PPX: PPI  Diet: puree  Code Status: DNR  Dispo: from NH  Medically ready  - Pending Heme/onc consult  - Authorization for NH

## 2023-11-01 NOTE — CONSULT NOTE ADULT - SUBJECTIVE AND OBJECTIVE BOX
Patient is a 88y old  Female who presents with a chief complaint of respiratory failure (01 Nov 2023 16:36)      HPI:  87yo W Female w/PMH as noted below.  PT's from NH who was noted to have progressive dyspnea, fevers.  Pt w/ hx prior intubations for COPD.  On CXR pt has bilateral lower lobe infiltrates R>L.  Pt son stated pt may has aspirated 2 days while eating & pt has history of aspirating on food as note on prior admissions.  Pt is on Bipap ventilation  saturating on low to mid 90s.  Additionally , pt's hypotensive w/ MAP <65.  ER to place central venous cath & start IV levophed.  Pt can go to ICU when bed is available (23 Oct 2023 05:45)       Hem/Onc :history obtained from chart and daughter . COPD on O2 , admitted several times in 20223 for exacerbated COPD , E coli sepsis and recently for klebsiella pneumonia/bacteremia , intubated twice before , has large multi-nodular goiter ( negative biopsy ? ) , chronic bilateral pleural effusions s/p thoracentesis in March 2023 ( atypical cells ? , negative as per daughter )   she has chronic normocytic anemia ( no iron or B12 deficiency )  intermittent leukocytosis during prior admissions  , Hematology is consulted for persistent leukocytosis (neutrophilia) after treatment of klebsiella bacteremia and cessation of systemic steroids . NO known hematologic malignancy .  noted with elevated alk phos and GGT , negative RUQ sono.          PAST MEDICAL & SURGICAL HISTORY:  HTN (hypertension)      Afib  s/p ablation 2001      Goiter  no meds      Cellulitis  chronic      OA (osteoarthritis)      PVD (peripheral vascular disease)      COPD (chronic obstructive pulmonary disease)      Anxiety      Obesity      Acute on chronic systolic congestive heart failure      Edema of both lower legs      Required emergent intubation      H/O abdominal hysterectomy      H/O discectomy      H/O total knee replacement, bilateral              MEDICATIONS  (STANDING):  albuterol/ipratropium for Nebulization 3 milliLiter(s) Nebulizer every 6 hours  apixaban 2.5 milliGRAM(s) Oral every 12 hours  budesonide  80 MICROgram(s)/formoterol 4.5 MICROgram(s) Inhaler 2 Puff(s) Inhalation two times a day  ceftazidime/avibactam IVPB 2.5 Gram(s) IV Intermittent every 8 hours  chlorhexidine 2% Cloths 1 Application(s) Topical <User Schedule>  ferrous    sulfate 325 milliGRAM(s) Oral daily  gabapentin 100 milliGRAM(s) Oral three times a day  metoprolol tartrate 25 milliGRAM(s) Oral two times a day  pantoprazole    Tablet 40 milliGRAM(s) Oral before breakfast  saccharomyces boulardii 250 milliGRAM(s) Oral two times a day        Allergies    aspirin (Other)  sulfa drugs (Hives; Other)  codeine (Other)  penicillin (Other)  contrast media (iodine-based) (Other)    Intolerances        Vital Signs Last 24 Hrs  T(C): 36.7 (01 Nov 2023 14:40), Max: 36.7 (01 Nov 2023 14:40)  T(F): 98 (01 Nov 2023 14:40), Max: 98 (01 Nov 2023 14:40)  HR: 99 (01 Nov 2023 14:40) (99 - 101)  BP: 101/66 (01 Nov 2023 14:40) (101/59 - 128/68)  BP(mean): --  RR: 18 (01 Nov 2023 14:40) (18 - 18)  SpO2: 99% (01 Nov 2023 14:40) (97% - 99%)    Parameters below as of 01 Nov 2023 14:40    O2 Flow (L/min): 3      PHYSICAL EXAM  General: obese female, conversant , answers simple questions , oriented to place and persons. in no acute distress.   HEENT: clear oropharynx, anicteric sclera, pink conjunctiva  Neck: supple  CV: irregular   Lungs: positive air movement b/l ant lungs,clear to auscultation, no wheezes, no rales  Abdomen: soft non-tender non-distended, no hepatosplenomegaly  Ext: no clubbing cyanosis or edema  Skin: no rashes and no petechiae  Neuro: alert and oriented X 4, no focal deficits      LABS:                          9.6    24.20 )-----------( 378      ( 01 Nov 2023 06:54 )             32.1         Mean Cell Volume : 95.0 fL  Mean Cell Hemoglobin : 28.4 pg  Mean Cell Hemoglobin Concentration : 29.9 g/dL  Auto Neutrophil # : 19.17 K/uL  Auto Lymphocyte # : 1.65 K/uL  Auto Monocyte # : 1.52 K/uL  Auto Eosinophil # : 0.06 K/uL  Auto Basophil # : 0.12 K/uL  Auto Neutrophil % : 79.3 %  Auto Lymphocyte % : 6.8 %  Auto Monocyte % : 6.3 %  Auto Eosinophil % : 0.2 %  Auto Basophil % : 0.5 %      Serial CBC's  11-01 @ 06:54  Hct-32.1 / Hgb-9.6 / Plat-378 / RBC-3.38 / WBC-24.20  Serial CBC's  10-31 @ 04:30  Hct-32.9 / Hgb-10.0 / Plat-272 / RBC-3.59 / WBC-32.52  Serial CBC's  10-30 @ 04:30  Hct-30.6 / Hgb-9.2 / Plat-231 / RBC-3.30 / WBC-35.00  Serial CBC's  10-29 @ 06:19  Hct-33.2 / Hgb-9.8 / Plat-238 / RBC-3.45 / WBC-24.49      11-01    138  |  96<L>  |  29<H>  ----------------------------<  51<LL>  5.0   |  32  |  0.7    Ca    8.4      01 Nov 2023 06:54  Mg     1.8     10-31    TPro  5.8<L>  /  Alb  2.9<L>  /  TBili  0.3  /  DBili  x   /  AST  37  /  ALT  20  /  AlkPhos  370<H>  10-31                      BLOOD SMEAR INTERPRETATION:       RADIOLOGY & ADDITIONAL STUDIES:     Patient is a 88y old  Female who presents with a chief complaint of respiratory failure (01 Nov 2023 16:36)      HPI:  89yo W Female w/PMH as noted below.  PT's from NH who was noted to have progressive dyspnea, fevers.  Pt w/ hx prior intubations for COPD.  On CXR pt has bilateral lower lobe infiltrates R>L.  Pt son stated pt may has aspirated 2 days while eating & pt has history of aspirating on food as note on prior admissions.  Pt is on Bipap ventilation  saturating on low to mid 90s.  Additionally , pt's hypotensive w/ MAP <65.  ER to place central venous cath & start IV levophed.  Pt can go to ICU when bed is available (23 Oct 2023 05:45)       Hem/Onc :history obtained from chart and daughter . COPD on O2 , admitted several times in 20223 for exacerbated COPD , E coli sepsis and recently for klebsiella pneumonia/bacteremia , intubated twice before , has large multi-nodular goiter ( negative biopsy ? ) , chronic bilateral pleural effusions s/p thoracentesis in March 2023 ( atypical cells ? , negative as per daughter )   she has chronic normocytic anemia ( ferritin : 130 , B12 : 849  )  intermittent leukocytosis during prior admissions  , Hematology is consulted for persistent leukocytosis (neutrophilia) after treatment of klebsiella bacteremia and cessation of systemic steroids . NO known hematologic malignancy .  noted with elevated alk phos and GGT , negative RUQ sono.          PAST MEDICAL & SURGICAL HISTORY:  HTN (hypertension)      Afib  s/p ablation 2001      Goiter  no meds      Cellulitis  chronic      OA (osteoarthritis)      PVD (peripheral vascular disease)      COPD (chronic obstructive pulmonary disease)      Anxiety      Obesity      Acute on chronic systolic congestive heart failure      Edema of both lower legs      Required emergent intubation      H/O abdominal hysterectomy      H/O discectomy      H/O total knee replacement, bilateral              MEDICATIONS  (STANDING):  albuterol/ipratropium for Nebulization 3 milliLiter(s) Nebulizer every 6 hours  apixaban 2.5 milliGRAM(s) Oral every 12 hours  budesonide  80 MICROgram(s)/formoterol 4.5 MICROgram(s) Inhaler 2 Puff(s) Inhalation two times a day  ceftazidime/avibactam IVPB 2.5 Gram(s) IV Intermittent every 8 hours  chlorhexidine 2% Cloths 1 Application(s) Topical <User Schedule>  ferrous    sulfate 325 milliGRAM(s) Oral daily  gabapentin 100 milliGRAM(s) Oral three times a day  metoprolol tartrate 25 milliGRAM(s) Oral two times a day  pantoprazole    Tablet 40 milliGRAM(s) Oral before breakfast  saccharomyces boulardii 250 milliGRAM(s) Oral two times a day        Allergies    aspirin (Other)  sulfa drugs (Hives; Other)  codeine (Other)  penicillin (Other)  contrast media (iodine-based) (Other)    Intolerances        Vital Signs Last 24 Hrs  T(C): 36.7 (01 Nov 2023 14:40), Max: 36.7 (01 Nov 2023 14:40)  T(F): 98 (01 Nov 2023 14:40), Max: 98 (01 Nov 2023 14:40)  HR: 99 (01 Nov 2023 14:40) (99 - 101)  BP: 101/66 (01 Nov 2023 14:40) (101/59 - 128/68)  BP(mean): --  RR: 18 (01 Nov 2023 14:40) (18 - 18)  SpO2: 99% (01 Nov 2023 14:40) (97% - 99%)    Parameters below as of 01 Nov 2023 14:40    O2 Flow (L/min): 3      PHYSICAL EXAM  General: obese female, conversant , answers simple questions , oriented to place and persons. in no acute distress.   HEENT: clear oropharynx, anicteric sclera, pink conjunctiva  Neck: supple  CV: irregular   Lungs: positive air movement b/l ant lungs,clear to auscultation, no wheezes, no rales  Abdomen: soft non-tender non-distended, no hepatosplenomegaly  Ext: no clubbing cyanosis or edema  Skin: no rashes and no petechiae  Neuro: alert and oriented X 4, no focal deficits      LABS:                          9.6    24.20 )-----------( 378      ( 01 Nov 2023 06:54 )             32.1         Mean Cell Volume : 95.0 fL  Mean Cell Hemoglobin : 28.4 pg  Mean Cell Hemoglobin Concentration : 29.9 g/dL  Auto Neutrophil # : 19.17 K/uL  Auto Lymphocyte # : 1.65 K/uL  Auto Monocyte # : 1.52 K/uL  Auto Eosinophil # : 0.06 K/uL  Auto Basophil # : 0.12 K/uL  Auto Neutrophil % : 79.3 %  Auto Lymphocyte % : 6.8 %  Auto Monocyte % : 6.3 %  Auto Eosinophil % : 0.2 %  Auto Basophil % : 0.5 %      Serial CBC's  11-01 @ 06:54  Hct-32.1 / Hgb-9.6 / Plat-378 / RBC-3.38 / WBC-24.20  Serial CBC's  10-31 @ 04:30  Hct-32.9 / Hgb-10.0 / Plat-272 / RBC-3.59 / WBC-32.52  Serial CBC's  10-30 @ 04:30  Hct-30.6 / Hgb-9.2 / Plat-231 / RBC-3.30 / WBC-35.00  Serial CBC's  10-29 @ 06:19  Hct-33.2 / Hgb-9.8 / Plat-238 / RBC-3.45 / WBC-24.49      11-01    138  |  96<L>  |  29<H>  ----------------------------<  51<LL>  5.0   |  32  |  0.7    Ca    8.4      01 Nov 2023 06:54  Mg     1.8     10-31    TPro  5.8<L>  /  Alb  2.9<L>  /  TBili  0.3  /  DBili  x   /  AST  37  /  ALT  20  /  AlkPhos  370<H>  10-31                      BLOOD SMEAR INTERPRETATION:       RADIOLOGY & ADDITIONAL STUDIES:

## 2023-11-01 NOTE — PROGRESS NOTE ADULT - SUBJECTIVE AND OBJECTIVE BOX
Hospital Day:  9d    Subjective:    Patient is a 88y old  Female who presents with a chief complaint of respiratory failure (31 Oct 2023 18:52)      Admitted to medicine for a primary diagnosis of     Past Medical Hx:   HTN (hypertension)    Afib    Goiter    Cellulitis    OA (osteoarthritis)    PVD (peripheral vascular disease)    COPD (chronic obstructive pulmonary disease)    Anxiety    Obesity    Acute on chronic systolic congestive heart failure    Lymphatic edema    Edema of both lower legs    Required emergent intubation      Past Sx:  H/O abdominal hysterectomy    H/O discectomy    H/O total knee replacement, bilateral      Allergies:  aspirin (Other)  sulfa drugs (Hives; Other)  codeine (Other)  penicillin (Other)  contrast media (iodine-based) (Other)    Current Meds:   Stand Meds:  albuterol/ipratropium for Nebulization 3 milliLiter(s) Nebulizer every 6 hours  apixaban 2.5 milliGRAM(s) Oral every 12 hours  budesonide  80 MICROgram(s)/formoterol 4.5 MICROgram(s) Inhaler 2 Puff(s) Inhalation two times a day  ceftazidime/avibactam IVPB 2.5 Gram(s) IV Intermittent every 8 hours  chlorhexidine 2% Cloths 1 Application(s) Topical <User Schedule>  ferrous    sulfate 325 milliGRAM(s) Oral daily  gabapentin 100 milliGRAM(s) Oral three times a day  metoprolol tartrate 25 milliGRAM(s) Oral two times a day  pantoprazole    Tablet 40 milliGRAM(s) Oral before breakfast  saccharomyces boulardii 250 milliGRAM(s) Oral two times a day    PRN Meds:    HOME MEDICATIONS:  budesonide-formoterol 80 mcg-4.5 mcg/inh inhalation aerosol: 2 puff(s) inhaled 2 times a day  Cardizem  mg/24 hours oral capsule, extended release: 1 tab(s) orally once a day  Eliquis 2.5 mg oral tablet: 2 tablet with sensor orally 2 times a day  furosemide 20 mg oral tablet: 1 tab(s) orally once a day  Senna 8.6 mg oral tablet: 2 tab(s) orally once a day (at bedtime)      Vital Signs:   T(F): 98 (11-01-23 @ 14:40), Max: 98 (11-01-23 @ 14:40)  HR: 99 (11-01-23 @ 14:40) (99 - 101)  BP: 101/66 (11-01-23 @ 14:40) (101/59 - 128/68)  RR: 18 (11-01-23 @ 14:40) (18 - 18)  SpO2: 99% (11-01-23 @ 14:40) (97% - 99%)      10-31-23 @ 07:01  -  11-01-23 @ 07:00  --------------------------------------------------------  IN: 0 mL / OUT: 550 mL / NET: -550 mL        Physical Exam:   GENERAL: NAD  HEENT: NCAT  CHEST/LUNG: CTAB  HEART: Regular rate and rhythm; s1 s2 appreciated, No murmurs, rubs, or gallops  ABDOMEN: Soft, Nontender, Nondistended; Bowel sounds present  EXTREMITIES: No LE edema b/l  SKIN: no rashes, no new lesions  NERVOUS SYSTEM:  Alert & Oriented X3  LINES/CATHETERS:        Labs:                         9.6    24.20 )-----------( 378      ( 01 Nov 2023 06:54 )             32.1     Neutophil% 79.3, Lymphocyte% 6.8, Monocyte% 6.3, Bands% 6.9 11-01-23 @ 06:54    01 Nov 2023 06:54    138    |  96     |  29     ----------------------------<  51     5.0     |  32     |  0.7      Ca    8.4        01 Nov 2023 06:54  Mg     1.8       31 Oct 2023 04:30    TPro  5.8    /  Alb  2.9    /  TBili  0.3    /  DBili  x      /  AST  37     /  ALT  20     /  AlkPhos  370    31 Oct 2023 04:30                    Urinalysis Basic - ( 01 Nov 2023 06:54 )    Color: x / Appearance: x / SG: x / pH: x  Gluc: 51 mg/dL / Ketone: x  / Bili: x / Urobili: x   Blood: x / Protein: x / Nitrite: x   Leuk Esterase: x / RBC: x / WBC x   Sq Epi: x / Non Sq Epi: x / Bacteria: x          Culture - Blood (collected 10-28-23 @ 06:18)  Source: .Blood None  Preliminary Report (10-31-23 @ 17:01):    No growth at 72 Hours    Culture - Blood (collected 10-26-23 @ 05:57)  Source: .Blood None  Final Report (10-31-23 @ 20:01):    No growth at 5 days         Hospital Day:  9d    Subjective:    Patient is a 88y old  Female who presents with a chief complaint of respiratory failure. No overnight events. In no distress this am. Endorses fatigue       Admitted to medicine for a primary diagnosis of AHRF.     Past Medical Hx:   HTN (hypertension)    Afib    Goiter    Cellulitis    OA (osteoarthritis)    PVD (peripheral vascular disease)    COPD (chronic obstructive pulmonary disease)    Anxiety    Obesity    Acute on chronic systolic congestive heart failure    Lymphatic edema    Edema of both lower legs    Required emergent intubation      Past Sx:  H/O abdominal hysterectomy    H/O discectomy    H/O total knee replacement, bilateral      Allergies:  aspirin (Other)  sulfa drugs (Hives; Other)  codeine (Other)  penicillin (Other)  contrast media (iodine-based) (Other)    Current Meds:   Standng Meds:  albuterol/ipratropium for Nebulization 3 milliLiter(s) Nebulizer every 6 hours  apixaban 2.5 milliGRAM(s) Oral every 12 hours  budesonide  80 MICROgram(s)/formoterol 4.5 MICROgram(s) Inhaler 2 Puff(s) Inhalation two times a day  ceftazidime/avibactam IVPB 2.5 Gram(s) IV Intermittent every 8 hours  chlorhexidine 2% Cloths 1 Application(s) Topical <User Schedule>  ferrous    sulfate 325 milliGRAM(s) Oral daily  gabapentin 100 milliGRAM(s) Oral three times a day  metoprolol tartrate 25 milliGRAM(s) Oral two times a day  pantoprazole    Tablet 40 milliGRAM(s) Oral before breakfast  saccharomyces boulardii 250 milliGRAM(s) Oral two times a day    PRN Meds:    HOME MEDICATIONS:  budesonide-formoterol 80 mcg-4.5 mcg/inh inhalation aerosol: 2 puff(s) inhaled 2 times a day  Cardizem  mg/24 hours oral capsule, extended release: 1 tab(s) orally once a day  Eliquis 2.5 mg oral tablet: 2 tablet with sensor orally 2 times a day  furosemide 20 mg oral tablet: 1 tab(s) orally once a day  Senna 8.6 mg oral tablet: 2 tab(s) orally once a day (at bedtime)      Vital Signs:   T(F): 98 (11-01-23 @ 14:40), Max: 98 (11-01-23 @ 14:40)  HR: 99 (11-01-23 @ 14:40) (99 - 101)  BP: 101/66 (11-01-23 @ 14:40) (101/59 - 128/68)  RR: 18 (11-01-23 @ 14:40) (18 - 18)  SpO2: 99% (11-01-23 @ 14:40) (97% - 99%)      10-31-23 @ 07:01  -  11-01-23 @ 07:00  --------------------------------------------------------  IN: 0 mL / OUT: 550 mL / NET: -550 mL        Physical Exam:   GENERAL: appears weak, fatigued unkempt, cooperative, pleasant   HEENT: NCAT  CHEST/LUNG: limited due to pt's decreased mobility , cta b/l from the front   HEART: Regular rate and rhythm; s1 s2 appreciated, No murmurs, rubs, or gallops  ABDOMEN: Soft, Nontender, Nondistended; Bowel sounds present  EXTREMITIES: No LE edema b/l  SKIN: b/l le dystrophic skin with raised, dark and indurated skin lesions  NERVOUS SYSTEM:  Alert & Oriented X3  LINES/CATHETERS: NC off face         Labs:                         9.6    24.20 )-----------( 378      ( 01 Nov 2023 06:54 )             32.1     Neutophil% 79.3, Lymphocyte% 6.8, Monocyte% 6.3, Bands% 6.9 11-01-23 @ 06:54    01 Nov 2023 06:54    138    |  96     |  29     ----------------------------<  51     5.0     |  32     |  0.7      Ca    8.4        01 Nov 2023 06:54  Mg     1.8       31 Oct 2023 04:30    TPro  5.8    /  Alb  2.9    /  TBili  0.3    /  DBili  x      /  AST  37     /  ALT  20     /  AlkPhos  370    31 Oct 2023 04:30                    Urinalysis Basic - ( 01 Nov 2023 06:54 )    Color: x / Appearance: x / SG: x / pH: x  Gluc: 51 mg/dL / Ketone: x  / Bili: x / Urobili: x   Blood: x / Protein: x / Nitrite: x   Leuk Esterase: x / RBC: x / WBC x   Sq Epi: x / Non Sq Epi: x / Bacteria: x          Culture - Blood (collected 10-28-23 @ 06:18)  Source: .Blood None  Preliminary Report (10-31-23 @ 17:01):    No growth at 72 Hours    Culture - Blood (collected 10-26-23 @ 05:57)  Source: .Blood None  Final Report (10-31-23 @ 20:01):    No growth at 5 days

## 2023-11-01 NOTE — CONSULT NOTE ADULT - CONSULT REQUESTED DATE/TIME
24-Oct-2023 14:00
25-Oct-2023 10:55
24-Oct-2023 08:01
01-Nov-2023 18:40
24-Oct-2023 08:58
24-Oct-2023

## 2023-11-01 NOTE — CONSULT NOTE ADULT - ASSESSMENT
88 year old female with COPD on O2 , admitted with Klebsiella pneumonia and bacteremia resolved with antibiotics .   Chronic normocytic anemia , stable . Persistent leukocytosis with neutrophil predominance , mild monocytosis ? no longer on steroids.  Chronic bilateral pleural effusions ( empyema ? less likely )  No splenomegaly . 88 year old female with COPD on O2 , admitted with Klebsiella pneumonia and bacteremia resolved with antibiotics .   Chronic normocytic anemia , stable ( negative work up ) Persistent leukocytosis with neutrophil predominance , mild monocytosis ? no longer on steroids.  Chronic bilateral pleural effusions ( empyema ? less likely )  No splenomegaly on CT.    Suggest : check inflammatory markers ( ESR , CRP )                flow cytometry                BCR/ABL , AMANDA 2 .                 follow up cbc as outpatient .

## 2023-11-02 NOTE — CHART NOTE - NSCHARTNOTEFT_GEN_A_CORE
bladder scan 417, will straight cath again, suspect may need al replaced, continue to monitor UO  dx urine retention

## 2023-11-02 NOTE — PROGRESS NOTE ADULT - SUBJECTIVE AND OBJECTIVE BOX
--------------------------------------------------------------------------------    24 hour events/subjective:   Reconsult and f/u skin assessment       ROS:   All other systems negative      ALLERGIES & MEDICATIONS  --------------------------------------------------------------------------------  Allergies    aspirin (Other)  sulfa drugs (Hives; Other)  codeine (Other)  penicillin (Other)  contrast media (iodine-based) (Other)    Intolerances          STANDING INPATIENT MEDICATIONS    albuterol/ipratropium for Nebulization 3 milliLiter(s) Nebulizer every 6 hours  apixaban 2.5 milliGRAM(s) Oral every 12 hours  budesonide  80 MICROgram(s)/formoterol 4.5 MICROgram(s) Inhaler 2 Puff(s) Inhalation two times a day  ceftazidime/avibactam IVPB 2.5 Gram(s) IV Intermittent every 8 hours  chlorhexidine 2% Cloths 1 Application(s) Topical <User Schedule>  ferrous    sulfate 325 milliGRAM(s) Oral daily  gabapentin 100 milliGRAM(s) Oral three times a day  metoprolol tartrate 25 milliGRAM(s) Oral two times a day  pantoprazole    Tablet 40 milliGRAM(s) Oral before breakfast  saccharomyces boulardii 250 milliGRAM(s) Oral two times a day      PRN INPATIENT MEDICATION        VITALS/PHYSICAL EXAM  --------------------------------------------------------------------------------  T(C): 35.8 (11-02-23 @ 04:26), Max: 36.7 (11-01-23 @ 14:40)  HR: 100 (11-02-23 @ 04:26) (98 - 100)  BP: 120/85 (11-02-23 @ 04:26) (101/66 - 120/85)  RR: 18 (11-01-23 @ 21:03) (18 - 18)  SpO2: 95% (11-02-23 @ 08:45) (95% - 99%)  Wt(kg): --        11-01-23 @ 07:01  -  11-02-23 @ 07:00  --------------------------------------------------------  IN: 240 mL / OUT: 350 mL / NET: -110 mL            LABS/ CULTURES/ RADIOLOGY:              9.2    19.25 >-----------<  306      [11-02-23 @ 04:30]              30.7     138  |  96  |  29  ----------------------------<  51      [11-01-23 @ 06:54]  5.0   |  32  |  0.7        Ca     8.4     [11-01-23 @ 06:54]            CAPILLARY BLOOD GLUCOSE                      Culture - Blood (collected 10-28-23 @ 06:18)  Source: .Blood None  Preliminary Report (11-01-23 @ 17:01):    No growth at 4 days          A1C with Estimated Average Glucose Result: 5.2 % (09-24-23 @ 07:42)  A1C with Estimated Average Glucose Result: 6.1 % (07-03-23 @ 05:51)

## 2023-11-02 NOTE — PROGRESS NOTE ADULT - SUBJECTIVE AND OBJECTIVE BOX
Hospital Day:  9d    Subjective:    Patient is a 88y old  Female who presents with a chief complaint of respiratory failure. No overnight events. In no distress this am. Denies physical complaints.      Admitted to medicine for a primary diagnosis of AHRF.     Past Medical Hx:   HTN (hypertension)    Afib    Goiter    Cellulitis    OA (osteoarthritis)    PVD (peripheral vascular disease)    COPD (chronic obstructive pulmonary disease)    Anxiety    Obesity    Acute on chronic systolic congestive heart failure    Lymphatic edema    Edema of both lower legs    Required emergent intubation      Past Sx:  H/O abdominal hysterectomy    H/O discectomy    H/O total knee replacement, bilateral      Allergies:  aspirin (Other)  sulfa drugs (Hives; Other)  codeine (Other)  penicillin (Other)  contrast media (iodine-based) (Other)    Current Meds:   Standng Meds:  albuterol/ipratropium for Nebulization 3 milliLiter(s) Nebulizer every 6 hours  apixaban 2.5 milliGRAM(s) Oral every 12 hours  budesonide  80 MICROgram(s)/formoterol 4.5 MICROgram(s) Inhaler 2 Puff(s) Inhalation two times a day  ceftazidime/avibactam IVPB 2.5 Gram(s) IV Intermittent every 8 hours  chlorhexidine 2% Cloths 1 Application(s) Topical <User Schedule>  ferrous    sulfate 325 milliGRAM(s) Oral daily  gabapentin 100 milliGRAM(s) Oral three times a day  metoprolol tartrate 25 milliGRAM(s) Oral two times a day  pantoprazole    Tablet 40 milliGRAM(s) Oral before breakfast  saccharomyces boulardii 250 milliGRAM(s) Oral two times a day    PRN Meds:    HOME MEDICATIONS:  budesonide-formoterol 80 mcg-4.5 mcg/inh inhalation aerosol: 2 puff(s) inhaled 2 times a day  Cardizem  mg/24 hours oral capsule, extended release: 1 tab(s) orally once a day  Eliquis 2.5 mg oral tablet: 2 tablet with sensor orally 2 times a day  furosemide 20 mg oral tablet: 1 tab(s) orally once a day  Senna 8.6 mg oral tablet: 2 tab(s) orally once a day (at bedtime)      Vital Signs:   T(F): 98 (11-01-23 @ 14:40), Max: 98 (11-01-23 @ 14:40)  HR: 99 (11-01-23 @ 14:40) (99 - 101)  BP: 101/66 (11-01-23 @ 14:40) (101/59 - 128/68)  RR: 18 (11-01-23 @ 14:40) (18 - 18)  SpO2: 99% (11-01-23 @ 14:40) (97% - 99%)      10-31-23 @ 07:01  -  11-01-23 @ 07:00  --------------------------------------------------------  IN: 0 mL / OUT: 550 mL / NET: -550 mL        Physical Exam:   GENERAL: appears weak, fatigued unkempt, cooperative, pleasant   HEENT: NCAT  CHEST/LUNG: limited due to pt's decreased mobility , cta b/l from the front   HEART: Regular rate and rhythm; s1 s2 appreciated, No murmurs, rubs, or gallops  ABDOMEN: Soft, Nontender, Nondistended; Bowel sounds present  EXTREMITIES: No LE edema b/l  SKIN: b/l le dystrophic skin with raised, dark and indurated skin lesions  NERVOUS SYSTEM:  Alert & Oriented X3  LINES/CATHETERS: NC off face         Labs:                         9.6    24.20 )-----------( 378      ( 01 Nov 2023 06:54 )             32.1     Neutophil% 79.3, Lymphocyte% 6.8, Monocyte% 6.3, Bands% 6.9 11-01-23 @ 06:54    01 Nov 2023 06:54    138    |  96     |  29     ----------------------------<  51     5.0     |  32     |  0.7      Ca    8.4        01 Nov 2023 06:54  Mg     1.8       31 Oct 2023 04:30    TPro  5.8    /  Alb  2.9    /  TBili  0.3    /  DBili  x      /  AST  37     /  ALT  20     /  AlkPhos  370    31 Oct 2023 04:30                    Urinalysis Basic - ( 01 Nov 2023 06:54 )    Color: x / Appearance: x / SG: x / pH: x  Gluc: 51 mg/dL / Ketone: x  / Bili: x / Urobili: x   Blood: x / Protein: x / Nitrite: x   Leuk Esterase: x / RBC: x / WBC x   Sq Epi: x / Non Sq Epi: x / Bacteria: x          Culture - Blood (collected 10-28-23 @ 06:18)  Source: .Blood None  Preliminary Report (10-31-23 @ 17:01):    No growth at 72 Hours    Culture - Blood (collected 10-26-23 @ 05:57)  Source: .Blood None  Final Report (10-31-23 @ 20:01):    No growth at 5 days

## 2023-11-02 NOTE — PROGRESS NOTE ADULT - ASSESSMENT
87 yo F with PMHx of HTN, Afib, OA, PVD, COPD, Anxiety, Obesity, Acute on chronic systolic congestive heart failure, H/O abdominal hysterectomy, H/O discectomy, H/O total knee replacement, bilaterally and bilateral pleural effusions (s/p left thoracentesis 3/2023 -- Cytology -PLEURAL FLUID, THORACENTESIS: - SUSPICIOUS FOR MALIGNANCY) with a left sided pleurex catheter, presented for fever and dyspnea.    # Septic Shock with Mult-drug Resistant Klebsiella Bacteriemia with Acute Cystitis  - Acute hypoxemic and hypercapnic respiratory failure: stable 2-3L NC  - Acute decompensated diastolic HF with pleural effusions likely malignant effusion   - CXR: Unchanged bibasilar opacities and pleural effusion  - ID eval appreciated, c/w Avycaz to end 11/8  - c/w inhalers, duonebs and steroids    # Persistent Leukocytosis: suspected Malignant etiology   - Heme/onc Eval: O/P f/u     # Elevated ALP and GGT    RuQ Sono: Grossly unremarkable with Gallbladder wall 2.9 cm.  -O/P f/up with PCP/GI     # Acute kidney Injury  - Pre-renal and Post obstructive: now resolved  - US kidney bladder: Moderate left and mild right hydronephrosis.  - TOV    # Chronic AFIB c/w BB and Eliquis    DVT PPX: Eliquis  GI PPX: PPI  Diet: puree  Code Status: DNR  Dispo: from NH. Medically ready to d/c to [facility] 11/3. Daughter updated 11/2           87 yo F with PMHx of HTN, Afib, OA, PVD, COPD, Anxiety, Obesity, Acute on chronic systolic congestive heart failure, H/O abdominal hysterectomy, H/O discectomy, H/O total knee replacement, bilaterally and bilateral pleural effusions (s/p left thoracentesis 3/2023 -- Cytology -PLEURAL FLUID, THORACENTESIS: - SUSPICIOUS FOR MALIGNANCY) with a left sided pleurex catheter, presented for fever and dyspnea.    # Septic Shock with Mult-drug Resistant Klebsiella Bacteriemia with Acute Cystitis  - Acute hypoxemic and hypercapnic respiratory failure: stable 2-3L NC  - Acute decompensated diastolic HF with pleural effusions likely malignant effusion   - CXR: Unchanged bibasilar opacities and pleural effusion  - ID eval appreciated, c/w Avycaz to end 11/8  - c/w inhalers, duonebs and steroids    # Persistent Leukocytosis: suspected Malignant etiology   - Heme/onc Eval: ESR , CRP, flow cytometry, BCR/ABL , AMANDA 2 => pt refused blood work   - O/P f/u     # Elevated ALP and GGT    RuQ Sono: Grossly unremarkable with Gallbladder wall 2.9 cm.  -O/P f/up with PCP/GI     # Acute kidney Injury  - Pre-renal and Post obstructive: now resolved  - US kidney bladder: Moderate left and mild right hydronephrosis.  - TOV    # Chronic AFIB c/w BB and Eliquis    DVT PPX: Eliquis  GI PPX: PPI  Diet: puree  Code Status: DNR  Dispo: from NH. Medically ready to d/c to [facility] 11/3. Daughter updated 11/2

## 2023-11-02 NOTE — PROGRESS NOTE ADULT - ASSESSMENT
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                         Skin macerated with epidermal skin loss                            B/l buttock and upper thigh  Dark pigmented skin with denuded areas noted                         Pt High risk for pressure injury development or progression                         B/l Lower extremity chronic venous stasis                           No pressure injury noted with current treatment       Plan:  Wound and skin  care recs   Continue current treatment  clean buttock with soap and water, pat dry then apply  triad hydrophilic dressing    Pressure  injury  preventive  measures  skin  and incontinence care   Assess skin  and inform primary provider of any changes   Case discussed with primary Rn  Wound/ ostomy specialist  to f/u as needed     Offloading: [ x] Frequent position changes [ ] Devices/Equipment  Cleansing: [ ] Saline [ x] Soap/Water [ ] Other: ______  Topicals: [ x] Barrier Cream [ ] Antimicrobial [ ] Enzymatic Wound Debridement  Dressings: [ ] Dry, sterile [ ] Allevyn  Foam [ ] Absorbant Pads [ ] Collagenase    Other Recs.   Per Primary team      Total time for bedside assessment , review of medical records  and  discussion of plan of care with primary team greater than 35 min

## 2023-11-03 NOTE — DISCHARGE NOTE NURSING/CASE MANAGEMENT/SOCIAL WORK - NSDCPEFALRISK_GEN_ALL_CORE
For information on Fall & Injury Prevention, visit: [email or web address] OR  [email or web address] OR  https://www.cdc.gov/steadi/patient.html

## 2023-11-03 NOTE — PROGRESS NOTE ADULT - ASSESSMENT
87 yo F with PMHx of HTN, Afib, OA, PVD, COPD, Anxiety, Obesity, Acute on chronic systolic congestive heart failure, H/O abdominal hysterectomy, H/O discectomy, H/O total knee replacement, bilaterally and bilateral pleural effusions (s/p left thoracentesis 3/2023 -- Cytology -PLEURAL FLUID, THORACENTESIS: - SUSPICIOUS FOR MALIGNANCY) with a left sided pleurex catheter, presented for fever and dyspnea.    # Septic Shock with Mult-drug Resistant Klebsiella Bacteriemia with Acute Cystitis  - Acute hypoxemic and hypercapnic respiratory failure: stable 2-3L NC  - Acute decompensated diastolic HF with pleural effusions likely malignant effusion   - CXR: Unchanged bibasilar opacities and pleural effusion  - ID eval appreciated, c/w Avycaz to end 11/8  - c/w inhalers, duonebs and steroids    # Persistent Leukocytosis: suspected Malignant etiology   - Heme/onc Eval: ESR , CRP, flow cytometry, BCR/ABL , AMANDA 2 => pt refused blood work   - O/P f/u     # Elevated ALP and GGT    RuQ Sono: Grossly unremarkable with Gallbladder wall 2.9 cm.  -O/P f/up with PCP/GI     # Acute kidney Injury  - Pre-renal and Post obstructive: now resolved  - US kidney bladder: Moderate left and mild right hydronephrosis.  - Failed TOV, al back on     # Chronic AFIB c/w BB and Eliquis    DVT PPX: Eliquis  GI PPX: PPI  Diet: puree  Code Status: DNR  Dispo: from NH. Was medically ready to d/c to [facility] 11/3 but [facility] cancelled d/c due to price of Avycaz so pt will stay until 11/8 . Daughter updated 11/3

## 2023-11-03 NOTE — OCCUPATIONAL THERAPY INITIAL EVALUATION ADULT - RANGE OF MOTION EXAMINATION
nina Shoulder AROM 1/4 range; elbow wrist and hand WFLs. PROM nina shoulders 1/2 range/deficits as listed below

## 2023-11-03 NOTE — DISCHARGE NOTE PROVIDER - NSDCCPCAREPLAN_GEN_ALL_CORE_FT
PRINCIPAL DISCHARGE DIAGNOSIS  Diagnosis: Septic shock  Assessment and Plan of Treatment: You were found to have Klebsciella      SECONDARY DISCHARGE DIAGNOSES  Diagnosis: Rapid atrial fibrillation  Assessment and Plan of Treatment:     Diagnosis: Pneumonia  Assessment and Plan of Treatment:      PRINCIPAL DISCHARGE DIAGNOSIS  Diagnosis: Septic shock  Assessment and Plan of Treatment: You were found to have Mulit-drug Resistent Klebsciella growing in your blood and urine. Please continue taking Avycaz until 11/8/23 via IV.      SECONDARY DISCHARGE DIAGNOSES  Diagnosis: Rapid atrial fibrillation  Assessment and Plan of Treatment: Likely due to your infection. Your heart rate has been controlled. Pleae continue Eliquis.    Diagnosis: Leukocytosis, unspecified type  Assessment and Plan of Treatment: You chronically elevated white cells are concerning for a potential malignant etiology. You were seen by Hematology/Oncology, however, you refused the blood sample requested by them.  Please follow up with Heme/Onc as an outpatient (referral given).    Diagnosis: Elevated alkaline phosphatase level  Assessment and Plan of Treatment: You were found to have an elevated alkaline phosphatase level which should be investigated by Gastroenterology. Please follow up as an outpatient (referral given)     PRINCIPAL DISCHARGE DIAGNOSIS  Diagnosis: Septic shock  Assessment and Plan of Treatment: You were found to have Mulit-drug Resistent Klebsciella growing in your blood and urine. You were kept in the hospital to complete your antibiotics Avycaz via IV      SECONDARY DISCHARGE DIAGNOSES  Diagnosis: Rapid atrial fibrillation  Assessment and Plan of Treatment: Likely due to your infection. Your heart rate has been controlled. Please continue Eliquis, metoprolol and amiodarone    Diagnosis: Leukocytosis, unspecified type  Assessment and Plan of Treatment: You chronically elevated white cells are concerning for a potential malignant etiology. You were seen by Hematology/Oncology, however, you refused the blood sample requested by them.  Please follow up with Heme/Onc as an outpatient (referral given).    Diagnosis: Elevated alkaline phosphatase level  Assessment and Plan of Treatment: You were found to have an elevated alkaline phosphatase level which should be investigated by Gastroenterology. Please follow up as an outpatient (referral given)

## 2023-11-03 NOTE — OCCUPATIONAL THERAPY INITIAL EVALUATION ADULT - GENERAL OBSERVATIONS, REHAB EVAL
10:15-10:45 Chart reviewed, ok to treat by Occupational Therapist as confirmed by TERRA Winter, Pt received semi-Franco's in bed +Primafit + O2 3L via NC in NAD. Pt in agreement with OT IE.

## 2023-11-03 NOTE — DISCHARGE NOTE PROVIDER - NSDCFUSCHEDAPPT_GEN_ALL_CORE_FT
Carlotta Hyatt [facility] PreAdmits  Scheduled Appointment: 11/14/2023    [facility]  [facility]  Scheduled Appointment: 11/14/2023

## 2023-11-03 NOTE — DISCHARGE NOTE PROVIDER - NSDCMRMEDTOKEN_GEN_ALL_CORE_FT
budesonide-formoterol 80 mcg-4.5 mcg/inh inhalation aerosol: 2 puff(s) inhaled 2 times a day  Cardizem  mg/24 hours oral capsule, extended release: 1 tab(s) orally once a day  Eliquis 2.5 mg oral tablet: 2 tablet with sensor orally 2 times a day  ferrous sulfate 325 mg (65 mg elemental iron) oral tablet: 1 tab(s) orally once a day  furosemide 20 mg oral tablet: 1 tab(s) orally once a day  gabapentin 100 mg oral capsule: 1 cap(s) orally 3 times a day  lidocaine 4% topical film: Apply topically to affected area once a day  Metoprolol Tartrate 25 mg oral tablet: 1 tab(s) orally 2 times a day  nystatin 100,000 units/g topical powder: Apply topically to affected area 2 times a day apply to groin and to affected area  predniSONE 20 mg oral tablet: 1 tab(s) orally once a day DAY 1 1 TABLETS   DAY 2 0.5  TABLET  saccharomyces boulardii lyo 250 mg oral capsule: 1 cap(s) orally 2 times a day  Senna 8.6 mg oral tablet: 2 tab(s) orally once a day (at bedtime)  Spiriva HandiHaler 18 mcg inhalation capsule: 1 cap(s) inhaled once a day   budesonide-formoterol 80 mcg-4.5 mcg/inh inhalation aerosol: 2 puff(s) inhaled 2 times a day  Cardizem  mg/24 hours oral capsule, extended release: 1 tab(s) orally once a day  ceftazidime-avibactam 2 g-0.5 g intravenous injection: intravenous once a day  Eliquis 2.5 mg oral tablet: 2 tablet with sensor orally 2 times a day  ferrous sulfate 325 mg (65 mg elemental iron) oral tablet: 1 tab(s) orally once a day  furosemide 20 mg oral tablet: 1 tab(s) orally once a day  gabapentin 100 mg oral capsule: 1 cap(s) orally 3 times a day  lidocaine 4% topical film: Apply topically to affected area once a day  Metoprolol Tartrate 25 mg oral tablet: 1 tab(s) orally 2 times a day  nystatin 100,000 units/g topical powder: Apply topically to affected area 2 times a day apply to groin and to affected area  predniSONE 20 mg oral tablet: 1 tab(s) orally once a day DAY 1 1 TABLETS   DAY 2 0.5  TABLET  saccharomyces boulardii lyo 250 mg oral capsule: 1 cap(s) orally 2 times a day  Senna 8.6 mg oral tablet: 2 tab(s) orally once a day (at bedtime)  Spiriva HandiHaler 18 mcg inhalation capsule: 1 cap(s) inhaled once a day   budesonide-formoterol 80 mcg-4.5 mcg/inh inhalation aerosol: 2 puff(s) inhaled 2 times a day  Eliquis 2.5 mg oral tablet: 2 tablet with sensor orally 2 times a day  furosemide 20 mg oral tablet: 1 tab(s) orally once a day  lidocaine 4% topical film: Apply topically to affected area once a day  nystatin 100,000 units/g topical powder: Apply topically to affected area 2 times a day apply to groin and to affected area  polyethylene glycol 3350 oral powder for reconstitution: 17 gram(s) orally once a day as needed for  constipation  Senna 8.6 mg oral tablet: 2 tab(s) orally once a day (at bedtime)

## 2023-11-03 NOTE — DISCHARGE NOTE PROVIDER - HOSPITAL COURSE
87 yo F with PMHx of HTN, Afib, OA, PVD, COPD, Anxiety, Obesity, Acute on chronic systolic congestive heart failure, H/O abdominal hysterectomy, H/O discectomy, H/O total knee replacement, bilaterally and bilateral pleural effusions (s/p left thoracentesis 3/2023 -- Cytology -PLEURAL FLUID, THORACENTESIS: - SUSPICIOUS FOR MALIGNANCY) with a left sided pleurex catheter, presented for fever and dyspnea.    # Septic Shock with Mult-drug Resistant Klebsiella Bacteriemia with Acute Cystitis  - Acute hypoxemic and hypercapnic respiratory failure: stable 2-3L NC  - Acute decompensated diastolic HF with pleural effusions likely malignant effusion   - CXR: Unchanged bibasilar opacities and pleural effusion  - ID eval appreciated, c/w Avycaz to end 11/8  - c/w inhalers, duonebs and steroids    # Persistent Leukocytosis: suspected Malignant etiology   - Heme/onc Eval: ESR , CRP, flow cytometry, BCR/ABL , AMANDA 2 => pt refused blood work   - O/P f/u     # Elevated ALP and GGT    RuQ Sono: Grossly unremarkable with Gallbladder wall 2.9 cm.  -O/P f/up with PCP/GI     # Acute kidney Injury, resolved   - Pre-renal and Post obstructive: now resolved  - US kidney bladder: Moderate left and mild right hydronephrosis.  - Failed TOV. Ba back on     # Chronic AFIB c/w BB and Eliquis    Pt is stable for d/c to [facility] today 87 yo F with PMHx of HTN, Afib, OA, PVD, COPD, Anxiety, Obesity, Acute on chronic systolic congestive heart failure, H/O abdominal hysterectomy, H/O discectomy, H/O total knee replacement, bilaterally and bilateral pleural effusions (s/p left thoracentesis 3/2023 -- Cytology -PLEURAL FLUID, THORACENTESIS: - SUSPICIOUS FOR MALIGNANCY) with a left sided pleurex catheter, presented for fever and dyspnea.    # Septic Shock with Mult-drug Resistant Klebsiella Bacteriemia with Acute Cystitis  - Acute hypoxemic and hypercapnic respiratory failure: stable 2-3L NC  - Acute decompensated diastolic HF with pleural effusions likely malignant effusion   - CXR: Unchanged bibasilar opacities and pleural effusion  - Blcx and ucx with Klebsiella MRD  - ID eval appreciated, c/w Avycaz to end 11/8  - c/w inhalers, duonebs and steroids    # Persistent Leukocytosis: suspected Malignant etiology   - Heme/onc Eval: ESR , CRP, flow cytometry, BCR/ABL , AMANDA 2 => pt refused blood work   - O/P f/u     # Elevated ALP and GGT    - RuQ Sono: Grossly unremarkable with Gallbladder wall 2.9 cm.  - O/P f/up with PCP/GI     # Acute kidney Injury, resolved   - Pre-renal and Post obstructive: now resolved  - US kidney bladder: Moderate left and mild right hydronephrosis.  - Failed TOV. Ba back on     # Chronic AFIB c/w BB and Eliquis    [facility] 89 [facility] with PMHx of HTN, Afib, OA, PVD, COPD, Anxiety, Obesity, Acute on chronic systolic congestive heart failure, H/O abdominal hysterectomy, H/O discectomy, H/O total knee replacement, bilaterally and bilateral pleural effusions (s/p left thoracentesis 3/2023 -- Cytology -PLEURAL FLUID, THORACENTESIS: - SUSPICIOUS FOR MALIGNANCY) with a left sided pleurex catheter, presented for fever and dyspnea.    # Septic Shock with Mult-drug Resistant Klebsiella Bacteriemia with Acute Cystitis  - Acute hypoxemic and hypercapnic respiratory failure: stable 2-3L NC  - Acute decompensated diastolic HF with pleural effusions likely malignant effusion   - CXR: Unchanged bibasilar opacities and pleural effusion  - Blcx and ucx with Klebsiella MRD  - ID eval appreciated, c/w Avycaz to end 11/8  - c/w inhalers, duonebs and steroids    # Persistent Leukocytosis: suspected Malignant etiology   - Heme/onc Eval: ESR , CRP, flow cytometry, BCR/ABL , AMANDA 2 => pt refused blood work   - O/P f/u     # Elevated ALP and GGT    - RuQ Sono: Grossly unremarkable with Gallbladder wall 2.9 cm.  - O/P f/up with PCP/GI     # Acute kidney Injury, resolved   - Pre-renal and Post obstructive: now resolved  - US kidney bladder: Moderate left and mild right hydronephrosis.  - Failed TOV. Ba back on     Chronic AFIB c/w BB and Eliquis    [facility]

## 2023-11-03 NOTE — OCCUPATIONAL THERAPY INITIAL EVALUATION ADULT - ADDITIONAL COMMENTS
PLOF obtained from patient and chart. (-)  Pt states has RW, Commode, and shower chair at home     Pt was at NH for STR prior to admission.  Pt's children come to help but unable to stay with her all day

## 2023-11-03 NOTE — OCCUPATIONAL THERAPY INITIAL EVALUATION ADULT - PERTINENT HX OF CURRENT PROBLEM, REHAB EVAL
89yo W Female w/PMH as noted below.  PT's from NH who was noted to have progressive dyspnea, fevers.  Pt w/ hx prior intubations for COPD.  On CXR pt has bilateral lower lobe infiltrates R>L.  Pt son stated pt may has aspirated 2 days while eating & pt has history of aspirating on food as note on prior admissions.  Pt is on Bipap ventilation  saturating on low to mid 90s.  Additionally , pt's hypotensive w/ MAP <65.  ER to place central venous cath & start IV levophed.

## 2023-11-03 NOTE — DISCHARGE NOTE PROVIDER - NSDCFUADDINST_GEN_ALL_CORE_FT
We Initiated Trial of Void yesterday, 11/8  Patient was catheterized on the morning of  11/9 and 11/10  with finding of 500cc  Patient is able to pee with findings of wet chucks  She will benefit from a diaper and bladder scans every 8hrs   She is allergic to an ingredient in Tamsulosin, hence was not started  We Initiated Trial of Void on  11/8. Patient was catheterized on the morning of  11/9 and 11/10  with finding of 500cc  Patient is able to urinate with findings of wet chucks, Continue Trial Of Void and monitor for wetness, She will benefit from a diaper   I recommend doing a straight cath twice a day or once a day depending on her urinary output   She is allergic to an ingredient in Tamsulosin, hence was not started

## 2023-11-03 NOTE — DISCHARGE NOTE PROVIDER - PROVIDER TOKENS
PROVIDER:[TOKEN:[[id number]]],PROVIDER:[TOKEN:[[id number]]],PROVIDER:[TOKEN:[[id number]]] PROVIDER:[TOKEN:[[id number]]],PROVIDER:[TOKEN:[[id number]]],PROVIDER:[TOKEN:[[id number]]]

## 2023-11-03 NOTE — DISCHARGE NOTE NURSING/CASE MANAGEMENT/SOCIAL WORK - PATIENT PORTAL LINK FT
You can access the FollowMyHealth Patient Portal offered by [facility] by registering at the following website: [email or web address]. By joining [facility]’s FollowMyHealth portal, you will also be able to view your health information using other applications (apps) compatible with our system.

## 2023-11-03 NOTE — DISCHARGE NOTE PROVIDER - ATTENDING DISCHARGE PHYSICAL EXAMINATION:
[Soft] : abdomen soft [Non Tender] : non-tender [No Rash] : no rash [Moves all extremities] : moves all extremities [No Focal Deficits] : no focal deficits [Cognitive Impairment] : cognitive impairment [Normal S1, S2] : normal S1, S2 [No Rub] : no rub [Murmur] : murmur [Normal] : clear lung fields, good air entry, no respiratory distress [de-identified] : 2/6 ESM [de-identified] : non communicative ,  HEAD:  Atraumatic, Normocephalic  EYES: conjunctiva and sclera clear  NECK: Supple, no lymphadenopathy, no JVD  CHEST/LUNG: CTAB; No wheezes, rales, or rhonchi  HEART: Regular rate and rhythm; No murmurs, rubs, or gallops  ABDOMEN: Soft, non-tender, non-distended; normal bowel sounds, no organomegaly  EXTREMITIES:  B/L Stasis dermatitis No clubbing, cyanosis, or edema  NEUROLOGY: A&O x 2-3, no focal deficits  SKIN: No rashes or lesions HEAD:  Atraumatic, Normocephalic  EYES: conjunctiva and sclera clear  NECK: Right Thyromegaly   CHEST/LUNG: CTAB; No wheezes, rales, or rhonchi  HEART: Regular rate and rhythm; No murmurs, rubs, or gallops  ABDOMEN: Soft, non-tender, non-distended; normal bowel sounds, no organomegaly  EXTREMITIES:  B/L Stasis dermatitis No clubbing, cyanosis, or edema  NEUROLOGY: A&O x 2-3, no focal deficits  SKIN: No rashes or lesions

## 2023-11-03 NOTE — DISCHARGE NOTE PROVIDER - CARE PROVIDER_API CALL
Barron King  Internal Medicine  [address] [place] [address]  Phone: [phone]  Fax: [phone]  Follow Up Time:     Kevin Mcmanus  Medical Oncology  [address] [address]  Phone: [phone]  Fax: [phone]  Follow Up Time:     Jennifer Marrufo  Nephrology  [address] [place] [address]  Phone: [phone]  Fax: [phone]  Follow Up Time:    Barron King  Internal Medicine  [address] [place] [address]  Phone: [phone]  Fax: [phone]  Follow Up Time:     Kevin Mcmanus  Medical Oncology  [address] [place] [place] [address]  Phone: [phone]  Fax: [phone]  Follow Up Time:     Robert Wasserman  Gastroenterology  [address] [place] [address]  Phone: [phone]  Fax: [phone]  Follow Up Time:

## 2023-11-03 NOTE — DISCHARGE NOTE PROVIDER - CARE PROVIDERS DIRECT ADDRESSES
,DirectAddress_Unknown,[email or web address],DirectAddress_Unknown ,DirectAddress_Unknown,[email or web address]

## 2023-11-04 NOTE — PROGRESS NOTE ADULT - SUBJECTIVE AND OBJECTIVE BOX
Hospital Day:  9d    Subjective:  88y old  Female who presents with a chief complaint of respiratory failure. No overnight events. In no distress this am. C/o pain in L buttock area      Admitted to medicine for a primary diagnosis of AHRF.     Past Medical Hx:   HTN (hypertension)    Afib    Goiter    Cellulitis    OA (osteoarthritis)    PVD (peripheral vascular disease)    COPD (chronic obstructive pulmonary disease)    Anxiety    Obesity    Acute on chronic systolic congestive heart failure    Lymphatic edema    Edema of both lower legs    Required emergent intubation      Past Sx:  H/O abdominal hysterectomy    H/O discectomy    H/O total knee replacement, bilateral      Allergies:  aspirin (Other)  sulfa drugs (Hives; Other)  codeine (Other)  penicillin (Other)  contrast media (iodine-based) (Other)    Current Meds:   Standng Meds:  albuterol/ipratropium for Nebulization 3 milliLiter(s) Nebulizer every 6 hours  apixaban 2.5 milliGRAM(s) Oral every 12 hours  budesonide  80 MICROgram(s)/formoterol 4.5 MICROgram(s) Inhaler 2 Puff(s) Inhalation two times a day  ceftazidime/avibactam IVPB 2.5 Gram(s) IV Intermittent every 8 hours  chlorhexidine 2% Cloths 1 Application(s) Topical <User Schedule>  ferrous    sulfate 325 milliGRAM(s) Oral daily  gabapentin 100 milliGRAM(s) Oral three times a day  metoprolol tartrate 25 milliGRAM(s) Oral two times a day  pantoprazole    Tablet 40 milliGRAM(s) Oral before breakfast  saccharomyces boulardii 250 milliGRAM(s) Oral two times a day    PRN Meds:    HOME MEDICATIONS:  budesonide-formoterol 80 mcg-4.5 mcg/inh inhalation aerosol: 2 puff(s) inhaled 2 times a day  Cardizem  mg/24 hours oral capsule, extended release: 1 tab(s) orally once a day  Eliquis 2.5 mg oral tablet: 2 tablet with sensor orally 2 times a day  furosemide 20 mg oral tablet: 1 tab(s) orally once a day  Senna 8.6 mg oral tablet: 2 tab(s) orally once a day (at bedtime)      Vital Signs:   T(F): 98 (11-01-23 @ 14:40), Max: 98 (11-01-23 @ 14:40)  HR: 99 (11-01-23 @ 14:40) (99 - 101)  BP: 101/66 (11-01-23 @ 14:40) (101/59 - 128/68)  RR: 18 (11-01-23 @ 14:40) (18 - 18)  SpO2: 99% (11-01-23 @ 14:40) (97% - 99%)      10-31-23 @ 07:01  -  11-01-23 @ 07:00  --------------------------------------------------------  IN: 0 mL / OUT: 550 mL / NET: -550 mL        Physical Exam:   GENERAL: appears weak, fatigued unkempt, cooperative, pleasant   HEENT: NCAT  CHEST/LUNG: limited due to pt's decreased mobility, crackles auscultated from the front in ANGELITO  HEART: Regular rate and rhythm; s1 s2 appreciated, No murmurs, rubs, or gallops  ABDOMEN: Soft, Nontender, Nondistended; Bowel sounds present  EXTREMITIES: No LE edema b/l  SKIN: b/l le dystrophic skin with raised, dark and indurated skin lesions  NERVOUS SYSTEM:  Alert & Oriented X2-3  LINES/CATHETERS: NC off nose        Labs:                         9.6    24.20 )-----------( 378      ( 01 Nov 2023 06:54 )             32.1     Neutophil% 79.3, Lymphocyte% 6.8, Monocyte% 6.3, Bands% 6.9 11-01-23 @ 06:54    01 Nov 2023 06:54    138    |  96     |  29     ----------------------------<  51     5.0     |  32     |  0.7      Ca    8.4        01 Nov 2023 06:54  Mg     1.8       31 Oct 2023 04:30    TPro  5.8    /  Alb  2.9    /  TBili  0.3    /  DBili  x      /  AST  37     /  ALT  20     /  AlkPhos  370    31 Oct 2023 04:30                    Urinalysis Basic - ( 01 Nov 2023 06:54 )    Color: x / Appearance: x / SG: x / pH: x  Gluc: 51 mg/dL / Ketone: x  / Bili: x / Urobili: x   Blood: x / Protein: x / Nitrite: x   Leuk Esterase: x / RBC: x / WBC x   Sq Epi: x / Non Sq Epi: x / Bacteria: x          Culture - Blood (collected 10-28-23 @ 06:18)  Source: .Blood None  Preliminary Report (10-31-23 @ 17:01):    No growth at 72 Hours    Culture - Blood (collected 10-26-23 @ 05:57)  Source: .Blood None  Final Report (10-31-23 @ 20:01):    No growth at 5 days

## 2023-11-04 NOTE — PROGRESS NOTE ADULT - ASSESSMENT
87 yo F with PMHx of HTN, Afib, OA, PVD, COPD, Anxiety, Obesity, Acute on chronic systolic congestive heart failure, H/O abdominal hysterectomy, H/O discectomy, H/O total knee replacement, bilaterally and bilateral pleural effusions (s/p left thoracentesis 3/2023 -- Cytology -PLEURAL FLUID, THORACENTESIS: - SUSPICIOUS FOR MALIGNANCY) with a left sided pleurex catheter, presented for fever and dyspnea.    # Septic Shock with Mult-drug Resistant Klebsiella Bacteriemia with Acute Cystitis  - Acute hypoxemic and hypercapnic respiratory failure: stable 2-3L NC  - Acute decompensated diastolic HF with pleural effusions likely malignant effusion   - CXR: Unchanged bibasilar opacities and pleural effusion  - ID eval appreciated, c/w Avycaz to end 11/8 (pt will have to finish abx course inhouse)  - C/w inhalers, duonebs and steroids    # Persistent Leukocytosis  - suspected Malignant etiology   - Heme/onc Eval: ESR , CRP, flow cytometry, BCR/ABL , AMANDA 2 => pt refused blood work. Will reattempt   - O/P f/u     # Elevated ALP and GGT    RuQ Sono: Grossly unremarkable with Gallbladder wall 2.9 cm.  -O/P f/up with PCP/GI     # Acute kidney Injury  - Pre-renal and Post obstructive: now resolved  - US kidney bladder: Moderate left and mild right hydronephrosis.  - Failed TOV, al back on     # Chronic AFIB c/w BB and Eliquis    DVT PPX: Eliquis  GI PPX: PPI  Diet: puree  Code Status: DNR  Dispo: from NH. Was medically ready to d/c to [facility] 11/[facility] but [facility] cancelled d/c last minute due to price of Avycaz so pt will stay until 11/8 . Daughter updated 11/3

## 2023-11-05 NOTE — PROGRESS NOTE ADULT - SUBJECTIVE AND OBJECTIVE BOX
Hospital Day:  9d    Subjective:  88y old  Female who presents with a chief complaint of respiratory failure. No overnight events. In no distress this am. C/o pain in L buttock area      Admitted to medicine for a primary diagnosis of AHRF.     Past Medical Hx:   HTN (hypertension)    Afib    Goiter    Cellulitis    OA (osteoarthritis)    PVD (peripheral vascular disease)    COPD (chronic obstructive pulmonary disease)    Anxiety    Obesity    Acute on chronic systolic congestive heart failure    Lymphatic edema    Edema of both lower legs    Required emergent intubation      Past Sx:  H/O abdominal hysterectomy    H/O discectomy    H/O total knee replacement, bilateral      Allergies:  aspirin (Other)  sulfa drugs (Hives; Other)  codeine (Other)  penicillin (Other)  contrast media (iodine-based) (Other)    Current Meds:   Standng Meds:  albuterol/ipratropium for Nebulization 3 milliLiter(s) Nebulizer every 6 hours  apixaban 2.5 milliGRAM(s) Oral every 12 hours  budesonide  80 MICROgram(s)/formoterol 4.5 MICROgram(s) Inhaler 2 Puff(s) Inhalation two times a day  ceftazidime/avibactam IVPB 2.5 Gram(s) IV Intermittent every 8 hours  chlorhexidine 2% Cloths 1 Application(s) Topical <User Schedule>  ferrous    sulfate 325 milliGRAM(s) Oral daily  gabapentin 100 milliGRAM(s) Oral three times a day  metoprolol tartrate 25 milliGRAM(s) Oral two times a day  pantoprazole    Tablet 40 milliGRAM(s) Oral before breakfast  saccharomyces boulardii 250 milliGRAM(s) Oral two times a day    PRN Meds:    HOME MEDICATIONS:  budesonide-formoterol 80 mcg-4.5 mcg/inh inhalation aerosol: 2 puff(s) inhaled 2 times a day  Cardizem  mg/24 hours oral capsule, extended release: 1 tab(s) orally once a day  Eliquis 2.5 mg oral tablet: 2 tablet with sensor orally 2 times a day  furosemide 20 mg oral tablet: 1 tab(s) orally once a day  Senna 8.6 mg oral tablet: 2 tab(s) orally once a day (at bedtime)      Vital Signs:   T(F): 98 (11-01-23 @ 14:40), Max: 98 (11-01-23 @ 14:40)  HR: 99 (11-01-23 @ 14:40) (99 - 101)  BP: 101/66 (11-01-23 @ 14:40) (101/59 - 128/68)  RR: 18 (11-01-23 @ 14:40) (18 - 18)  SpO2: 99% (11-01-23 @ 14:40) (97% - 99%)      10-31-23 @ 07:01  -  11-01-23 @ 07:00  --------------------------------------------------------  IN: 0 mL / OUT: 550 mL / NET: -550 mL        Physical Exam:   GENERAL: appears weak, fatigued unkempt, cooperative, pleasant   HEENT: NCAT  CHEST/LUNG: limited due to pt's decreased mobility, crackles auscultated from the front in ANGELITO  HEART: Regular rate and rhythm; s1 s2 appreciated, No murmurs, rubs, or gallops  ABDOMEN: Soft, Nontender, Nondistended; Bowel sounds present  EXTREMITIES: No LE edema b/l  SKIN: b/l le dystrophic skin with raised, dark and indurated skin lesions  NERVOUS SYSTEM:  Alert & Oriented X2-3  LINES/CATHETERS: NC off nose        Labs:                         9.6    24.20 )-----------( 378      ( 01 Nov 2023 06:54 )             32.1     Neutophil% 79.3, Lymphocyte% 6.8, Monocyte% 6.3, Bands% 6.9 11-01-23 @ 06:54    01 Nov 2023 06:54    138    |  96     |  29     ----------------------------<  51     5.0     |  32     |  0.7      Ca    8.4        01 Nov 2023 06:54  Mg     1.8       31 Oct 2023 04:30    TPro  5.8    /  Alb  2.9    /  TBili  0.3    /  DBili  x      /  AST  37     /  ALT  20     /  AlkPhos  370    31 Oct 2023 04:30                    Urinalysis Basic - ( 01 Nov 2023 06:54 )    Color: x / Appearance: x / SG: x / pH: x  Gluc: 51 mg/dL / Ketone: x  / Bili: x / Urobili: x   Blood: x / Protein: x / Nitrite: x   Leuk Esterase: x / RBC: x / WBC x   Sq Epi: x / Non Sq Epi: x / Bacteria: x          Culture - Blood (collected 10-28-23 @ 06:18)  Source: .Blood None  Preliminary Report (10-31-23 @ 17:01):    No growth at 72 Hours    Culture - Blood (collected 10-26-23 @ 05:57)  Source: .Blood None  Final Report (10-31-23 @ 20:01):    No growth at 5 days         Hospital Day:  9d    Subjective:  88y old  Female who presents with a chief complaint of respiratory failure. No overnight events. In no distress this am. C/o same pain in L lower back/buttock area      Admitted to medicine for a primary diagnosis of AHRF.     Past Medical Hx:   HTN (hypertension)    Afib    Goiter    Cellulitis    OA (osteoarthritis)    PVD (peripheral vascular disease)    COPD (chronic obstructive pulmonary disease)    Anxiety    Obesity    Acute on chronic systolic congestive heart failure    Lymphatic edema    Edema of both lower legs    Required emergent intubation      Past Sx:  H/O abdominal hysterectomy    H/O discectomy    H/O total knee replacement, bilateral      Allergies:  aspirin (Other)  sulfa drugs (Hives; Other)  codeine (Other)  penicillin (Other)  contrast media (iodine-based) (Other)    Current Meds:   Standng Meds:  albuterol/ipratropium for Nebulization 3 milliLiter(s) Nebulizer every 6 hours  apixaban 2.5 milliGRAM(s) Oral every 12 hours  budesonide  80 MICROgram(s)/formoterol 4.5 MICROgram(s) Inhaler 2 Puff(s) Inhalation two times a day  ceftazidime/avibactam IVPB 2.5 Gram(s) IV Intermittent every 8 hours  chlorhexidine 2% Cloths 1 Application(s) Topical <User Schedule>  ferrous    sulfate 325 milliGRAM(s) Oral daily  gabapentin 100 milliGRAM(s) Oral three times a day  metoprolol tartrate 25 milliGRAM(s) Oral two times a day  pantoprazole    Tablet 40 milliGRAM(s) Oral before breakfast  saccharomyces boulardii 250 milliGRAM(s) Oral two times a day    PRN Meds:    HOME MEDICATIONS:  budesonide-formoterol 80 mcg-4.5 mcg/inh inhalation aerosol: 2 puff(s) inhaled 2 times a day  Cardizem  mg/24 hours oral capsule, extended release: 1 tab(s) orally once a day  Eliquis 2.5 mg oral tablet: 2 tablet with sensor orally 2 times a day  furosemide 20 mg oral tablet: 1 tab(s) orally once a day  Senna 8.6 mg oral tablet: 2 tab(s) orally once a day (at bedtime)      Vital Signs:   T(F): 98 (11-01-23 @ 14:40), Max: 98 (11-01-23 @ 14:40)  HR: 99 (11-01-23 @ 14:40) (99 - 101)  BP: 101/66 (11-01-23 @ 14:40) (101/59 - 128/68)  RR: 18 (11-01-23 @ 14:40) (18 - 18)  SpO2: 99% (11-01-23 @ 14:40) (97% - 99%)      10-31-23 @ 07:01  -  11-01-23 @ 07:00  --------------------------------------------------------  IN: 0 mL / OUT: 550 mL / NET: -550 mL        Physical Exam:   GENERAL: appears weak, fatigued unkempt, cooperative, pleasant   HEENT: NCAT  CHEST/LUNG: limited due to pt's decreased mobility, ctab  HEART: Regular rate and rhythm; s1 s2 appreciated, No murmurs, rubs, or gallops  ABDOMEN: Soft, Nontender, Nondistended; Bowel sounds present  EXTREMITIES: No LE edema b/l  SKIN: b/l le dystrophic skin with raised, dark and indurated skin lesions  NERVOUS SYSTEM:  Alert & Oriented X2-3  LINES/CATHETERS: NC off nose        Labs:                         9.6    24.20 )-----------( 378      ( 01 Nov 2023 06:54 )             32.1     Neutophil% 79.3, Lymphocyte% 6.8, Monocyte% 6.3, Bands% 6.9 11-01-23 @ 06:54    01 Nov 2023 06:54    138    |  96     |  29     ----------------------------<  51     5.0     |  32     |  0.7      Ca    8.4        01 Nov 2023 06:54  Mg     1.8       31 Oct 2023 04:30    TPro  5.8    /  Alb  2.9    /  TBili  0.3    /  DBili  x      /  AST  37     /  ALT  20     /  AlkPhos  370    31 Oct 2023 04:30                    Urinalysis Basic - ( 01 Nov 2023 06:54 )    Color: x / Appearance: x / SG: x / pH: x  Gluc: 51 mg/dL / Ketone: x  / Bili: x / Urobili: x   Blood: x / Protein: x / Nitrite: x   Leuk Esterase: x / RBC: x / WBC x   Sq Epi: x / Non Sq Epi: x / Bacteria: x          Culture - Blood (collected 10-28-23 @ 06:18)  Source: .Blood None  Preliminary Report (10-31-23 @ 17:01):    No growth at 72 Hours    Culture - Blood (collected 10-26-23 @ 05:57)  Source: .Blood None  Final Report (10-31-23 @ 20:01):    No growth at 5 days

## 2023-11-05 NOTE — PROGRESS NOTE ADULT - ASSESSMENT
87 yo F with PMHx of HTN, Afib, OA, PVD, COPD, Anxiety, Obesity, Acute on chronic systolic congestive heart failure, H/O abdominal hysterectomy, H/O discectomy, H/O total knee replacement, bilaterally and bilateral pleural effusions (s/p left thoracentesis 3/2023 -- Cytology -PLEURAL FLUID, THORACENTESIS: - SUSPICIOUS FOR MALIGNANCY) with a left sided pleurex catheter, presented for fever and dyspnea.    # Septic Shock with Mult-drug Resistant Klebsiella Bacteriemia with Acute Cystitis  - Acute hypoxemic and hypercapnic respiratory failure: stable 2-3L NC  - Acute decompensated diastolic HF with pleural effusions likely malignant effusion   - CXR: Unchanged bibasilar opacities and pleural effusion  - ID eval appreciated, c/w Avycaz to end 11/8 (pt will have to finish abx course inhouse)  - C/w inhalers, duonebs and steroids    # Persistent Leukocytosis  - suspected Malignant etiology   - Heme/onc Eval: ESR , CRP, flow cytometry, BCR/ABL , AMANDA 2 => pt refused blood work. Will reattempt   - O/P f/u     # Elevated ALP and GGT    RuQ Sono: Grossly unremarkable with Gallbladder wall 2.9 cm.  -O/P f/up with PCP/GI     # Acute kidney Injury  - Pre-renal and Post obstructive: now resolved  - US kidney bladder: Moderate left and mild right hydronephrosis.  - Failed TOV, al back on     # Chronic AFIB c/w BB and Eliquis    DVT PPX: Eliquis  GI PPX: PPI  Diet: puree  Code Status: DNR  Dispo: from NH. Was medically ready to d/c to [facility] 11/[facility] but [facility] cancelled d/c last minute due to price of Avycaz so pt will stay until 11/8 . Daughter updated 11/3           89 yo F with PMHx of HTN, Afib, OA, PVD, COPD, Anxiety, Obesity, Acute on chronic systolic congestive heart failure, H/O abdominal hysterectomy, H/O discectomy, H/O total knee replacement, bilaterally and bilateral pleural effusions (s/p left thoracentesis 3/2023 -- Cytology -PLEURAL FLUID, THORACENTESIS: - SUSPICIOUS FOR MALIGNANCY) with a left sided pleurex catheter, presented for fever and dyspnea.    # Septic Shock with Mult-drug Resistant Klebsiella Bacteriemia with Acute Cystitis  - Acute hypoxemic and hypercapnic respiratory failure: stable 2-3L NC  - Acute decompensated diastolic HF with pleural effusions likely malignant effusion   - CXR: Unchanged bibasilar opacities and pleural effusion  - ID eval appreciated, c/w Avycaz to end 11/8 (pt will have to finish abx course inhouse)  - C/w inhalers, duonebs and steroids  - Gave lasix 20mg iv push x1 for crackles     # Persistent Leukocytosis  - suspected Malignant etiology   - Heme/onc Eval: ESR , CRP, flow cytometry, BCR/ABL , AMANDA 2 => pt refused blood work. Will reattempt   - O/P f/u     # Elevated ALP and GGT   - RuQ Sono: Grossly unremarkable with Gallbladder wall 2.9 cm.  -O/P f/up with PCP/GI     # Acute kidney Injury  - Pre-renal and Post obstructive: now resolved  - US kidney bladder: Moderate left and mild right hydronephrosis.  - Failed TOV, al back on     # Chronic AFIB c/w BB and Eliquis    DVT PPX: Eliquis  GI PPX: PPI  Diet: puree  Code Status: DNR  Dispo: from NH. Was medically ready to d/c to [facility] 11/3 but [facility] cancelled d/c last minute due to price of Avycaz so pt will stay until 11/8 . Daughter updated 11/3           87 yo F with PMHx of HTN, Afib, OA, PVD, COPD, Anxiety, Obesity, Acute on chronic systolic congestive heart failure, H/O abdominal hysterectomy, H/O discectomy, H/O total knee replacement, bilaterally and bilateral pleural effusions (s/p left thoracentesis 3/2023 -- Cytology -PLEURAL FLUID, THORACENTESIS: - SUSPICIOUS FOR MALIGNANCY) with a left sided pleurex catheter, presented for fever and dyspnea.    # Septic Shock with Mult-drug Resistant Klebsiella Bacteriemia with Acute Cystitis  - Acute hypoxemic and hypercapnic respiratory failure: stable 2-3L NC  - Acute decompensated diastolic HF with pleural effusions likely malignant effusion   - CXR: Unchanged bibasilar opacities and pleural effusion  - ID eval appreciated, c/w Avycaz to end 11/8 (pt will have to finish abx course inhouse)  - C/w inhalers, duonebs and steroids  - Gave lasix 20mg iv push x1 for crackles     # Persistent Leukocytosis  - suspected Malignant etiology   - Heme/onc Eval: ESR , CRP, flow cytometry, BCR/ABL , AMANDA 2 => pt refused blood work. Will reattempt   - O/P f/u     # Elevated ALP and GGT   - RuQ Sono: Grossly unremarkable with Gallbladder wall 2.9 cm.  -O/P f/up with PCP/GI     # Acute kidney Injury  - Pre-renal and Post obstructive: now resolved  - US kidney bladder: Moderate left and mild right hydronephrosis.  - Failed TOV, al back on     # Chronic AFIB c/w BB and Eliquis    DVT PPX: Eliquis  GI PPX: PPI  Diet: puree  Code Status: DNR  Dispo: from NH. Was medically ready to d/c to [facility] 11/3 but [facility] cancelled d/c last minute due to price of Avycaz so pt will stay until 11/8 . Daughter updated 11/5

## 2023-11-06 NOTE — PROGRESS NOTE ADULT - SUBJECTIVE AND OBJECTIVE BOX
Hospital Day:  9d    Subjective:  88y old  Female who presents with a chief complaint of respiratory failure. No overnight events. In no distress this am. C/o same pain in L lower back/buttock area      Admitted to medicine for a primary diagnosis of AHRF.     Past Medical Hx:   HTN (hypertension)    Afib    Goiter    Cellulitis    OA (osteoarthritis)    PVD (peripheral vascular disease)    COPD (chronic obstructive pulmonary disease)    Anxiety    Obesity    Acute on chronic systolic congestive heart failure    Lymphatic edema    Edema of both lower legs    Required emergent intubation      Past Sx:  H/O abdominal hysterectomy    H/O discectomy    H/O total knee replacement, bilateral      Allergies:  aspirin (Other)  sulfa drugs (Hives; Other)  codeine (Other)  penicillin (Other)  contrast media (iodine-based) (Other)    Current Meds:   Standng Meds:  albuterol/ipratropium for Nebulization 3 milliLiter(s) Nebulizer every 6 hours  apixaban 2.5 milliGRAM(s) Oral every 12 hours  budesonide  80 MICROgram(s)/formoterol 4.5 MICROgram(s) Inhaler 2 Puff(s) Inhalation two times a day  ceftazidime/avibactam IVPB 2.5 Gram(s) IV Intermittent every 8 hours  chlorhexidine 2% Cloths 1 Application(s) Topical <User Schedule>  ferrous    sulfate 325 milliGRAM(s) Oral daily  gabapentin 100 milliGRAM(s) Oral three times a day  metoprolol tartrate 25 milliGRAM(s) Oral two times a day  pantoprazole    Tablet 40 milliGRAM(s) Oral before breakfast  saccharomyces boulardii 250 milliGRAM(s) Oral two times a day    PRN Meds:    HOME MEDICATIONS:  budesonide-formoterol 80 mcg-4.5 mcg/inh inhalation aerosol: 2 puff(s) inhaled 2 times a day  Cardizem  mg/24 hours oral capsule, extended release: 1 tab(s) orally once a day  Eliquis 2.5 mg oral tablet: 2 tablet with sensor orally 2 times a day  furosemide 20 mg oral tablet: 1 tab(s) orally once a day  Senna 8.6 mg oral tablet: 2 tab(s) orally once a day (at bedtime)      Vital Signs:   T(F): 98 (11-01-23 @ 14:40), Max: 98 (11-01-23 @ 14:40)  HR: 99 (11-01-23 @ 14:40) (99 - 101)  BP: 101/66 (11-01-23 @ 14:40) (101/59 - 128/68)  RR: 18 (11-01-23 @ 14:40) (18 - 18)  SpO2: 99% (11-01-23 @ 14:40) (97% - 99%)      10-31-23 @ 07:01  -  11-01-23 @ 07:00  --------------------------------------------------------  IN: 0 mL / OUT: 550 mL / NET: -550 mL        Physical Exam:   GENERAL: appears weak, fatigued unkempt, cooperative, pleasant   HEENT: NCAT  CHEST/LUNG: limited due to pt's decreased mobility, ctab  HEART: Regular rate and rhythm; s1 s2 appreciated, No murmurs, rubs, or gallops  ABDOMEN: Soft, Nontender, Nondistended; Bowel sounds present  EXTREMITIES: No LE edema b/l  SKIN: b/l le dystrophic skin with raised, dark and indurated skin lesions  NERVOUS SYSTEM:  Alert & Oriented X2-3  LINES/CATHETERS: NC off nose        Labs:                         9.6    24.20 )-----------( 378      ( 01 Nov 2023 06:54 )             32.1     Neutophil% 79.3, Lymphocyte% 6.8, Monocyte% 6.3, Bands% 6.9 11-01-23 @ 06:54    01 Nov 2023 06:54    138    |  96     |  29     ----------------------------<  51     5.0     |  32     |  0.7      Ca    8.4        01 Nov 2023 06:54  Mg     1.8       31 Oct 2023 04:30    TPro  5.8    /  Alb  2.9    /  TBili  0.3    /  DBili  x      /  AST  37     /  ALT  20     /  AlkPhos  370    31 Oct 2023 04:30                    Urinalysis Basic - ( 01 Nov 2023 06:54 )    Color: x / Appearance: x / SG: x / pH: x  Gluc: 51 mg/dL / Ketone: x  / Bili: x / Urobili: x   Blood: x / Protein: x / Nitrite: x   Leuk Esterase: x / RBC: x / WBC x   Sq Epi: x / Non Sq Epi: x / Bacteria: x          Culture - Blood (collected 10-28-23 @ 06:18)  Source: .Blood None  Preliminary Report (10-31-23 @ 17:01):    No growth at 72 Hours    Culture - Blood (collected 10-26-23 @ 05:57)  Source: .Blood None  Final Report (10-31-23 @ 20:01):    No growth at 5 days         Hospital Day:  9d    Subjective:  88y old  Female who presents with a chief complaint of respiratory failure. No overnight events. In no distress this am. C/o same pain in L lower back/buttock area      Admitted to medicine for a primary diagnosis of AHRF.     Past Medical Hx:   HTN (hypertension)    Afib    Goiter    Cellulitis    OA (osteoarthritis)    PVD (peripheral vascular disease)    COPD (chronic obstructive pulmonary disease)    Anxiety    Obesity    Acute on chronic systolic congestive heart failure    Lymphatic edema    Edema of both lower legs    Required emergent intubation      Past Sx:  H/O abdominal hysterectomy    H/O discectomy    H/O total knee replacement, bilateral      Allergies:  aspirin (Other)  sulfa drugs (Hives; Other)  codeine (Other)  penicillin (Other)  contrast media (iodine-based) (Other)    Current Meds:   Standng Meds:  albuterol/ipratropium for Nebulization 3 milliLiter(s) Nebulizer every 6 hours  apixaban 2.5 milliGRAM(s) Oral every 12 hours  budesonide  80 MICROgram(s)/formoterol 4.5 MICROgram(s) Inhaler 2 Puff(s) Inhalation two times a day  ceftazidime/avibactam IVPB 2.5 Gram(s) IV Intermittent every 8 hours  chlorhexidine 2% Cloths 1 Application(s) Topical <User Schedule>  ferrous    sulfate 325 milliGRAM(s) Oral daily  gabapentin 100 milliGRAM(s) Oral three times a day  metoprolol tartrate 25 milliGRAM(s) Oral two times a day  pantoprazole    Tablet 40 milliGRAM(s) Oral before breakfast  saccharomyces boulardii 250 milliGRAM(s) Oral two times a day    PRN Meds:    HOME MEDICATIONS:  budesonide-formoterol 80 mcg-4.5 mcg/inh inhalation aerosol: 2 puff(s) inhaled 2 times a day  Cardizem  mg/24 hours oral capsule, extended release: 1 tab(s) orally once a day  Eliquis 2.5 mg oral tablet: 2 tablet with sensor orally 2 times a day  furosemide 20 mg oral tablet: 1 tab(s) orally once a day  Senna 8.6 mg oral tablet: 2 tab(s) orally once a day (at bedtime)      Vital Signs:   T(F): 98 (11-01-23 @ 14:40), Max: 98 (11-01-23 @ 14:40)  HR: 99 (11-01-23 @ 14:40) (99 - 101)  BP: 101/66 (11-01-23 @ 14:40) (101/59 - 128/68)  RR: 18 (11-01-23 @ 14:40) (18 - 18)  SpO2: 99% (11-01-23 @ 14:40) (97% - 99%)      10-31-23 @ 07:01  -  11-01-23 @ 07:00  --------------------------------------------------------  IN: 0 mL / OUT: 550 mL / NET: -550 mL        Physical Exam:   GENERAL: appears weak, fatigued unkempt, cooperative, pleasant   HEENT: NCAT  CHEST/LUNG: limited due to pt's decreased mobility, ctab  HEART: Regular rate and rhythm; s1 s2 appreciated, No murmurs, rubs, or gallops  ABDOMEN: Soft, Nontender, Nondistended; Bowel sounds present  EXTREMITIES: No LE edema b/l  SKIN: b/l le dystrophic skin with raised, dark and indurated skin lesions  NERVOUS SYSTEM:  Alert & Oriented X2  LINES/CATHETERS: NC, servando         Labs:                         9.6    24.20 )-----------( 378      ( 01 Nov 2023 06:54 )             32.1     Neutophil% 79.3, Lymphocyte% 6.8, Monocyte% 6.3, Bands% 6.9 11-01-23 @ 06:54    01 Nov 2023 06:54    138    |  96     |  29     ----------------------------<  51     5.0     |  32     |  0.7      Ca    8.4        01 Nov 2023 06:54  Mg     1.8       31 Oct 2023 04:30    TPro  5.8    /  Alb  2.9    /  TBili  0.3    /  DBili  x      /  AST  37     /  ALT  20     /  AlkPhos  370    31 Oct 2023 04:30                    Urinalysis Basic - ( 01 Nov 2023 06:54 )    Color: x / Appearance: x / SG: x / pH: x  Gluc: 51 mg/dL / Ketone: x  / Bili: x / Urobili: x   Blood: x / Protein: x / Nitrite: x   Leuk Esterase: x / RBC: x / WBC x   Sq Epi: x / Non Sq Epi: x / Bacteria: x          Culture - Blood (collected 10-28-23 @ 06:18)  Source: .Blood None  Preliminary Report (10-31-23 @ 17:01):    No growth at 72 Hours    Culture - Blood (collected 10-26-23 @ 05:57)  Source: .Blood None  Final Report (10-31-23 @ 20:01):    No growth at 5 days

## 2023-11-06 NOTE — PROGRESS NOTE ADULT - ASSESSMENT
89 yo F with PMHx of HTN, Afib, OA, PVD, COPD, Anxiety, Obesity, Acute on chronic systolic congestive heart failure, H/O abdominal hysterectomy, H/O discectomy, H/O total knee replacement, bilaterally and bilateral pleural effusions (s/p left thoracentesis 3/2023 -- Cytology -PLEURAL FLUID, THORACENTESIS: - SUSPICIOUS FOR MALIGNANCY) with a left sided pleurex catheter, presented for fever and dyspnea.    # Septic Shock with Mult-drug Resistant Klebsiella Bacteriemia with Acute Cystitis, resolving  - Acute hypoxemic and hypercapnic respiratory failure: stable 2-3L NC  - Acute decompensated diastolic HF with pleural effusions likely malignant effusion   - CXR: Unchanged bibasilar opacities and pleural effusion  - ID eval appreciated, c/w Avycaz to end 11/8 (pt will have to finish abx course inhouse)  - C/w inhalers, duonebs and steroids    # Persistent Leukocytosis  - suspected Malignant etiology   - Heme/onc Eval: ESR , CRP, flow cytometry, BCR/ABL , AMANDA 2 => pt refused blood work. Will reattempt   - O/P f/u     # Elevated ALP and GGT   - RuQ Sono: Grossly unremarkable with Gallbladder wall 2.9 cm.  - O/P f/up with PCP/GI     # Acute kidney Injury  - Pre-renal and Post obstructive: now resolved  - US kidney bladder: Moderate left and mild right hydronephrosis.  - Failed TOV, al back on     # Chronic AFIB c/w BB and Eliquis    DVT PPX: Eliquis  GI PPX: PPI  Diet: puree  Code Status: DNR  Dispo: from NH. Was medically ready to d/c to [facility] 11/3 but [facility] cancelled d/c last minute due to price of Avycaz so pt will stay until 11/8 . Daughter updated 11/5           87 yo F with PMHx of HTN, Afib, OA, PVD, COPD, Anxiety, Obesity, Acute on chronic systolic congestive heart failure, H/O abdominal hysterectomy, H/O discectomy, H/O total knee replacement, bilaterally and bilateral pleural effusions (s/p left thoracentesis 3/2023 -- Cytology -PLEURAL FLUID, THORACENTESIS: - SUSPICIOUS FOR MALIGNANCY) with a left sided pleurex catheter, presented for fever and dyspnea.    # Septic Shock with Mult-drug Resistant Klebsiella Bacteriemia with Acute Cystitis, resolving  - Acute hypoxemic and hypercapnic respiratory failure: stable 2-3L NC  - Acute decompensated diastolic HF with pleural effusions likely malignant effusion   - CXR: Unchanged bibasilar opacities and pleural effusion  - ID eval appreciated, c/w Avycaz to end 11/8 (pt will have to finish abx course inhouse)  - C/w inhalers, duonebs and steroids    # Persistent Leukocytosis  - suspected Malignant etiology   - Heme/onc Eval: ESR , CRP, flow cytometry, BCR/ABL , AMANDA 2 => pt refused blood work. Will reattempt   - O/P f/u     # Elevated ALP and GGT   - RuQ Sono: Grossly unremarkable with Gallbladder wall 2.9 cm.  - O/P f/up with PCP/GI     # Acute kidney Injury, resolved   - Pre-renal and Post obstructive: now resolved  - US kidney bladder: Moderate left and mild right hydronephrosis.  - Failed TOV, al back on     # Chronic AFIB c/w BB and Eliquis    DVT PPX: Eliquis  GI PPX: PPI  Diet: puree  Code Status: DNR  Dispo: from NH. Was medically ready to d/c to [facility] 11/3 but they cancelled d/c last minute due to price of Avycaz so pt will stay until 11/9 . Daughter updated 11/5

## 2023-11-07 NOTE — PROGRESS NOTE ADULT - ASSESSMENT
87 yo F with PMHx of HTN, Afib, OA, PVD, COPD, Anxiety, Obesity, Acute on chronic systolic congestive heart failure, H/O abdominal hysterectomy, H/O discectomy, H/O total knee replacement, bilaterally and bilateral pleural effusions (s/p left thoracentesis 3/2023 -- Cytology -PLEURAL FLUID, THORACENTESIS: - SUSPICIOUS FOR MALIGNANCY) with a left sided pleurex catheter, presented for fever and dyspnea.    # Septic Shock with Mult-drug Resistant Klebsiella Bacteriemia with Acute Cystitis, resolving  - Acute hypoxemic and hypercapnic respiratory failure: stable 2-3L NC  - Acute decompensated diastolic HF with pleural effusions likely malignant effusion   - CXR: Unchanged bibasilar opacities and pleural effusion  - ID eval appreciated, c/w Avycaz q8h to end 11/8 (pt will have to finish abx course inhouse)  - C/w inhalers, duonebs and steroids    # Persistent Leukocytosis  - suspected Malignant etiology   - Heme/onc Eval: ESR , CRP, flow cytometry, BCR/ABL , AMANDA 2 => pt refused blood work. Will reattempt   - O/P f/u     # Elevated ALP and GGT   - RuQ Sono: Grossly unremarkable with Gallbladder wall 2.9 cm.  - O/P f/up with PCP/GI     # Acute kidney Injury, resolved   - Pre-renal and Post obstructive: now resolved  - US kidney bladder: Moderate left and mild right hydronephrosis.  - TOV     # Chronic AFIB c/w BB and Eliquis    DVT PPX: Eliquis  GI PPX: PPI  Diet: puree  Code Status: DNR/I  Dispo: from NH. Was medically ready to d/c to [facility] 11/3 but they cancelled d/c last minute due to price of Avycaz so pt will finish abx 11/8 and d/c 11/9. Daughter aware            89 yo F with PMHx of HTN, Afib, OA, PVD, COPD, Anxiety, Obesity, Acute on chronic systolic congestive heart failure, H/O abdominal hysterectomy, H/O discectomy, H/O total knee replacement, bilaterally and bilateral pleural effusions (s/p left thoracentesis 3/2023 -- Cytology -PLEURAL FLUID, THORACENTESIS: - SUSPICIOUS FOR MALIGNANCY) with a left sided pleurex catheter, presented for fever and dyspnea.    # Septic Shock with Mult-drug Resistant Klebsiella Bacteriemia with Acute Cystitis, resolving  - Acute hypoxemic and hypercapnic respiratory failure: stable 2-3L NC  - Acute decompensated diastolic HF with pleural effusions likely malignant effusion   - CXR: Unchanged bibasilar opacities and pleural effusion  - ID eval appreciated, c/w Avycaz q8h to end 11/8 (pt will have to finish abx course inhouse)  - C/w inhalers, duonebs and steroids    # Persistent Leukocytosis  - suspected Malignant etiology   - Heme/onc Eval: ESR , CRP, flow cytometry, BCR/ABL , AMANDA 2 => pt refused blood work. Will reattempt   - O/P f/u     # Elevated ALP and GGT   - RuQ Sono: Grossly unremarkable with Gallbladder wall 2.9 cm.  - O/P f/up with PCP/GI     # Acute kidney Injury, resolved   - Pre-renal and Post obstructive: now resolved  - US kidney bladder: Moderate left and mild right hydronephrosis.  - TOV     # Chronic AFIB c/w BB and Eliquis    DVT PPX: Eliquis  GI PPX: PPI  Diet: puree  Code Status: DNR/I  Dispo: from NH. Was medically ready to d/c to [facility] 11/3 but they cancelled d/c last minute due to price of Avycaz so pt will finish abx 11/8 and d/c 11/9. Daughter Karishma updated 11/7

## 2023-11-07 NOTE — PROGRESS NOTE ADULT - SUBJECTIVE AND OBJECTIVE BOX
Hospital Day:  9d    Subjective:  88y old  Female who presents with a chief complaint of respiratory failure. No overnight events. In no distress this am.      Admitted to medicine for a primary diagnosis of AHRF.     Past Medical Hx:   HTN (hypertension)    Afib    Goiter    Cellulitis    OA (osteoarthritis)    PVD (peripheral vascular disease)    COPD (chronic obstructive pulmonary disease)    Anxiety    Obesity    Acute on chronic systolic congestive heart failure    Lymphatic edema    Edema of both lower legs    Required emergent intubation      Past Sx:  H/O abdominal hysterectomy    H/O discectomy    H/O total knee replacement, bilateral      Allergies:  aspirin (Other)  sulfa drugs (Hives; Other)  codeine (Other)  penicillin (Other)  contrast media (iodine-based) (Other)    Current Meds:   Standng Meds:  albuterol/ipratropium for Nebulization 3 milliLiter(s) Nebulizer every 6 hours  apixaban 2.5 milliGRAM(s) Oral every 12 hours  budesonide  80 MICROgram(s)/formoterol 4.5 MICROgram(s) Inhaler 2 Puff(s) Inhalation two times a day  ceftazidime/avibactam IVPB 2.5 Gram(s) IV Intermittent every 8 hours  chlorhexidine 2% Cloths 1 Application(s) Topical <User Schedule>  ferrous    sulfate 325 milliGRAM(s) Oral daily  gabapentin 100 milliGRAM(s) Oral three times a day  metoprolol tartrate 25 milliGRAM(s) Oral two times a day  pantoprazole    Tablet 40 milliGRAM(s) Oral before breakfast  saccharomyces boulardii 250 milliGRAM(s) Oral two times a day    PRN Meds:    HOME MEDICATIONS:  budesonide-formoterol 80 mcg-4.5 mcg/inh inhalation aerosol: 2 puff(s) inhaled 2 times a day  Cardizem  mg/24 hours oral capsule, extended release: 1 tab(s) orally once a day  Eliquis 2.5 mg oral tablet: 2 tablet with sensor orally 2 times a day  furosemide 20 mg oral tablet: 1 tab(s) orally once a day  Senna 8.6 mg oral tablet: 2 tab(s) orally once a day (at bedtime)      Vital Signs:   T(F): 98 (11-01-23 @ 14:40), Max: 98 (11-01-23 @ 14:40)  HR: 99 (11-01-23 @ 14:40) (99 - 101)  BP: 101/66 (11-01-23 @ 14:40) (101/59 - 128/68)  RR: 18 (11-01-23 @ 14:40) (18 - 18)  SpO2: 99% (11-01-23 @ 14:40) (97% - 99%)      10-31-23 @ 07:01  -  11-01-23 @ 07:00  --------------------------------------------------------  IN: 0 mL / OUT: 550 mL / NET: -550 mL        Physical Exam:   GENERAL: appears weak, fatigued unkempt, cooperative, pleasant   HEENT: NCAT  CHEST/LUNG: limited due to pt's decreased mobility, ctab  HEART: Regular rate and rhythm; s1 s2 appreciated, No murmurs, rubs, or gallops  ABDOMEN: Soft, Nontender, Nondistended; Bowel sounds present  EXTREMITIES: No LE edema b/l  SKIN: b/l le dystrophic skin with raised, dark and indurated skin lesions  NERVOUS SYSTEM:  Alert & Oriented X2  LINES/CATHETERS: NC, al         Labs:                         9.6    24.20 )-----------( 378      ( 01 Nov 2023 06:54 )             32.1     Neutophil% 79.3, Lymphocyte% 6.8, Monocyte% 6.3, Bands% 6.9 11-01-23 @ 06:54    01 Nov 2023 06:54    138    |  96     |  29     ----------------------------<  51     5.0     |  32     |  0.7      Ca    8.4        01 Nov 2023 06:54  Mg     1.8       31 Oct 2023 04:30    TPro  5.8    /  Alb  2.9    /  TBili  0.3    /  DBili  x      /  AST  37     /  ALT  20     /  AlkPhos  370    31 Oct 2023 04:30                    Urinalysis Basic - ( 01 Nov 2023 06:54 )    Color: x / Appearance: x / SG: x / pH: x  Gluc: 51 mg/dL / Ketone: x  / Bili: x / Urobili: x   Blood: x / Protein: x / Nitrite: x   Leuk Esterase: x / RBC: x / WBC x   Sq Epi: x / Non Sq Epi: x / Bacteria: x          Culture - Blood (collected 10-28-23 @ 06:18)  Source: .Blood None  Preliminary Report (10-31-23 @ 17:01):    No growth at 72 Hours    Culture - Blood (collected 10-26-23 @ 05:57)  Source: .Blood None  Final Report (10-31-23 @ 20:01):    No growth at 5 days

## 2023-11-08 NOTE — PROGRESS NOTE ADULT - ASSESSMENT
89 yo F with PMHx of HTN, Afib, OA, PVD, COPD, Anxiety, Obesity, Acute on chronic systolic congestive heart failure, H/O abdominal hysterectomy, H/O discectomy, H/O total knee replacement, bilaterally and bilateral pleural effusions (s/p left thoracentesis 3/2023 -- Cytology -PLEURAL FLUID, THORACENTESIS: - SUSPICIOUS FOR MALIGNANCY) with a left sided pleurex catheter, presented for fever and dyspnea.    Septic Shock with Multi-drug Resistant Klebsiella Bacteriemia with Acute Cystitis, resolving  - Acute hypoxemic and hypercapnic respiratory failure: stable 2-3L NC  - Acute decompensated diastolic HF with pleural effusions likely malignant effusion   - CXR: Unchanged bibasilar opacities and pleural effusion  - ID eval appreciated, c/w Avycaz q8h to end 11/8 (pt will have to finish abx course inhouse)  - C/w inhalers, duonebs and steroids    Persistent Leukocytosis, suspected Malignant etiology   - Heme/onc Eval: ESR , CRP, flow cytometry, BCR/ABL , AMANDA 2 => pt refused blood work.   - O/P f/u     Elevated ALP and GGT   - RuQ Sono: Grossly unremarkable with Gallbladder wall 2.9 cm.  - O/P f/up with PCP/GI     Acute kidney Injury, resolved   - Pre-renal and Post obstructive: now resolved  - US kidney bladder: Moderate left and mild right hydronephrosis.  - Currently on TOV      Chronic AFIB c/w BB and Eliquis    DVT PPX: Eliquis  GI PPX: PPI  Diet: puree  Code Status: DNR/I  Dispo: from NH 11/9

## 2023-11-08 NOTE — PROGRESS NOTE ADULT - SUBJECTIVE AND OBJECTIVE BOX
Patient is a 88y old  Female who presents with a chief complaint of respiratory failure       MEDICATIONS  (STANDING):  albuterol/ipratropium for Nebulization 3 milliLiter(s) Nebulizer every 6 hours  apixaban 2.5 milliGRAM(s) Oral every 12 hours  bisacodyl 5 milliGRAM(s) Oral at bedtime  budesonide  80 MICROgram(s)/formoterol 4.5 MICROgram(s) Inhaler 2 Puff(s) Inhalation two times a day  ceftazidime/avibactam IVPB 2.5 Gram(s) IV Intermittent every 8 hours  chlorhexidine 2% Cloths 1 Application(s) Topical <User Schedule>  ferrous    sulfate 325 milliGRAM(s) Oral daily  gabapentin 100 milliGRAM(s) Oral three times a day  metoprolol tartrate 25 milliGRAM(s) Oral two times a day  pantoprazole    Tablet 40 milliGRAM(s) Oral before breakfast  polyethylene glycol 3350 17 Gram(s) Oral daily  saccharomyces boulardii 250 milliGRAM(s) Oral two times a day  senna 2 Tablet(s) Oral at bedtime    MEDICATIONS  (PRN):  acetaminophen     Tablet .. 650 milliGRAM(s) Oral every 6 hours PRN Temp greater or equal to 38C (100.4F), Mild Pain (1 - 3)  guaifenesin/dextromethorphan Oral Liquid 15 milliLiter(s) Oral every 8 hours PRN Cough      CAPILLARY BLOOD GLUCOSE    I&O's Summary    07 Nov 2023 07:01  -  08 Nov 2023 07:00  --------------------------------------------------------  IN: 50 mL / OUT: 300 mL / NET: -250 mL        PHYSICAL EXAM:  Vital Signs Last 24 Hrs  T(C): 36.3 (08 Nov 2023 14:43), Max: 36.3 (08 Nov 2023 14:43)  T(F): 97.4 (08 Nov 2023 14:43), Max: 97.4 (08 Nov 2023 14:43)  HR: 129 (08 Nov 2023 14:43) (103 - 129)  BP: 106/59 (08 Nov 2023 14:43) (105/69 - 109/73)  BP(mean): --  RR: 18 (08 Nov 2023 14:43) (18 - 18)  SpO2: 94% (08 Nov 2023 14:43) (92% - 100%)    Parameters below as of 08 Nov 2023 14:43  Patient On (Oxygen Delivery Method): nasal cannula  O2 Flow (L/min): 3    GENERAL: No acute distress, well-developed, Morbidly Obese   HEAD:  Atraumatic, Normocephalic  EYES: conjunctiva and sclera clear  NECK: Supple, no lymphadenopathy, no JVD  CHEST/LUNG: CTAB; No wheezes, rales, or rhonchi  HEART: Regular rate and rhythm; No murmurs, rubs, or gallops  ABDOMEN: Soft, non-tender, non-distended; normal bowel sounds, no organomegaly  EXTREMITIES:  B/L Stasis dermatitis No clubbing, cyanosis, or edema  NEUROLOGY: A&O x 2-3, no focal deficits  SKIN: No rashes or lesions      LABS:                        8.5    13.46 )-----------( 218      ( 07 Nov 2023 05:49 )             27.8     11-07    141  |  98  |  21<H>  ----------------------------<  69<L>  4.4   |  34<H>  |  0.8    Ca    8.5      07 Nov 2023 05:49  Mg     1.6     11-07    TPro  5.2<L>  /  Alb  2.6<L>  /  TBili  0.4  /  DBili  x   /  AST  16  /  ALT  12  /  AlkPhos  373<H>  11-07          Urinalysis Basic - ( 07 Nov 2023 05:49 )    Color: x / Appearance: x / SG: x / pH: x  Gluc: 69 mg/dL / Ketone: x  / Bili: x / Urobili: x   Blood: x / Protein: x / Nitrite: x   Leuk Esterase: x / RBC: x / WBC x   Sq Epi: x / Non Sq Epi: x / Bacteria: x

## 2023-11-09 NOTE — PROGRESS NOTE ADULT - PROVIDER SPECIALTY LIST ADULT
Hospitalist
Infectious Disease
Hospitalist
Infectious Disease
Internal Medicine
Critical Care
Critical Care
Hospitalist
Infectious Disease
Internal Medicine
Internal Medicine
Pulmonology
Wound Care
Critical Care
Hospitalist
Internal Medicine

## 2023-11-09 NOTE — PROGRESS NOTE ADULT - REASON FOR ADMISSION
respiratory failure

## 2023-11-09 NOTE — PROGRESS NOTE ADULT - SUBJECTIVE AND OBJECTIVE BOX
Patient is a 88y old  Female who presents with a chief complaint of respiratory failure         MEDICATIONS  (STANDING):  albuterol/ipratropium for Nebulization 3 milliLiter(s) Nebulizer every 6 hours  apixaban 2.5 milliGRAM(s) Oral every 12 hours  bisacodyl 5 milliGRAM(s) Oral at bedtime  budesonide  80 MICROgram(s)/formoterol 4.5 MICROgram(s) Inhaler 2 Puff(s) Inhalation two times a day  chlorhexidine 2% Cloths 1 Application(s) Topical <User Schedule>  ferrous    sulfate 325 milliGRAM(s) Oral daily  gabapentin 100 milliGRAM(s) Oral three times a day  metoprolol tartrate 25 milliGRAM(s) Oral two times a day  pantoprazole    Tablet 40 milliGRAM(s) Oral before breakfast  polyethylene glycol 3350 17 Gram(s) Oral daily  saccharomyces boulardii 250 milliGRAM(s) Oral two times a day  senna 2 Tablet(s) Oral at bedtime    MEDICATIONS  (PRN):  acetaminophen     Tablet .. 650 milliGRAM(s) Oral every 6 hours PRN Temp greater or equal to 38C (100.4F), Mild Pain (1 - 3)  guaifenesin/dextromethorphan Oral Liquid 15 milliLiter(s) Oral every 8 hours PRN Cough      CAPILLARY BLOOD GLUCOSE    I&O's Summary    09 Nov 2023 07:01  -  09 Nov 2023 14:56  --------------------------------------------------------  IN: 0 mL / OUT: 500 mL / NET: -500 mL        PHYSICAL EXAM:  Vital Signs Last 24 Hrs  T(C): 36 (09 Nov 2023 04:57), Max: 36 (09 Nov 2023 04:57)  T(F): 96.8 (09 Nov 2023 04:57), Max: 96.8 (09 Nov 2023 04:57)  HR: 106 (09 Nov 2023 04:57) (100 - 107)  BP: 107/58 (09 Nov 2023 04:57) (107/58 - 107/64)  BP(mean): --  RR: 18 (09 Nov 2023 04:57) (18 - 18)  SpO2: 98% (09 Nov 2023 04:57) (93% - 100%)    Parameters below as of 09 Nov 2023 04:57  Patient On (Oxygen Delivery Method): nasal cannula  O2 Flow (L/min): 3    GENERAL: No acute distress, well-developed, Morbidly Obese   HEAD:  Atraumatic, Normocephalic  EYES: conjunctiva and sclera clear  NECK: Supple, no lymphadenopathy, no JVD  CHEST/LUNG: CTAB; No wheezes, rales, or rhonchi  HEART: Regular rate and rhythm; No murmurs, rubs, or gallops  ABDOMEN: Soft, non-tender, non-distended; normal bowel sounds, no organomegaly  EXTREMITIES:  B/L Stasis dermatitis No clubbing, cyanosis, or edema  NEUROLOGY: A&O x 2-3, no focal deficits  SKIN: No rashes or lesions      LABS:                        8.3    10.39 )-----------( 166      ( 09 Nov 2023 04:30 )             27.2     11-09    141  |  99  |  28<H>  ----------------------------<  71  5.0   |  31  |  1.0    Ca    8.6      09 Nov 2023 04:30  Mg     2.0     11-09            Urinalysis Basic - ( 09 Nov 2023 04:30 )    Color: x / Appearance: x / SG: x / pH: x  Gluc: 71 mg/dL / Ketone: x  / Bili: x / Urobili: x   Blood: x / Protein: x / Nitrite: x   Leuk Esterase: x / RBC: x / WBC x   Sq Epi: x / Non Sq Epi: x / Bacteria: x             Patient is a 88y old  Female who presents with a chief complaint of respiratory failure         MEDICATIONS  (STANDING):  albuterol/ipratropium for Nebulization 3 milliLiter(s) Nebulizer every 6 hours  apixaban 2.5 milliGRAM(s) Oral every 12 hours  bisacodyl 5 milliGRAM(s) Oral at bedtime  budesonide  80 MICROgram(s)/formoterol 4.5 MICROgram(s) Inhaler 2 Puff(s) Inhalation two times a day  chlorhexidine 2% Cloths 1 Application(s) Topical <User Schedule>  ferrous    sulfate 325 milliGRAM(s) Oral daily  gabapentin 100 milliGRAM(s) Oral three times a day  metoprolol tartrate 25 milliGRAM(s) Oral two times a day  pantoprazole    Tablet 40 milliGRAM(s) Oral before breakfast  polyethylene glycol 3350 17 Gram(s) Oral daily  saccharomyces boulardii 250 milliGRAM(s) Oral two times a day  senna 2 Tablet(s) Oral at bedtime    MEDICATIONS  (PRN):  acetaminophen     Tablet .. 650 milliGRAM(s) Oral every 6 hours PRN Temp greater or equal to 38C (100.4F), Mild Pain (1 - 3)  guaifenesin/dextromethorphan Oral Liquid 15 milliLiter(s) Oral every 8 hours PRN Cough      CAPILLARY BLOOD GLUCOSE    I&O's Summary    09 Nov 2023 07:01  -  09 Nov 2023 14:56  --------------------------------------------------------  IN: 0 mL / OUT: 500 mL / NET: -500 mL        PHYSICAL EXAM:  Vital Signs Last 24 Hrs  T(C): 36 (09 Nov 2023 04:57), Max: 36 (09 Nov 2023 04:57)  T(F): 96.8 (09 Nov 2023 04:57), Max: 96.8 (09 Nov 2023 04:57)  HR: 106 (09 Nov 2023 04:57) (100 - 107)  BP: 107/58 (09 Nov 2023 04:57) (107/58 - 107/64)  BP(mean): --  RR: 18 (09 Nov 2023 04:57) (18 - 18)  SpO2: 98% (09 Nov 2023 04:57) (93% - 100%)    Parameters below as of 09 Nov 2023 04:57  Patient On (Oxygen Delivery Method): nasal cannula  O2 Flow (L/min): 3    GENERAL: No acute distress, well-developed, Morbidly Obese   HEAD:  Atraumatic, Normocephalic  EYES: conjunctiva and sclera clear  NECK: Right lobar Thyromegaly   CHEST/LUNG: CTAB; No wheezes, rales, or rhonchi  HEART: Regular rate and rhythm; No murmurs, rubs, or gallops  ABDOMEN: Soft, non-tender, non-distended; normal bowel sounds, no organomegaly  EXTREMITIES:  B/L Stasis dermatitis No clubbing, cyanosis, or edema  NEUROLOGY: A&O x 2-3, no focal deficits  SKIN: No rashes or lesions      LABS:                        8.3    10.39 )-----------( 166      ( 09 Nov 2023 04:30 )             27.2     11-09    141  |  99  |  28<H>  ----------------------------<  71  5.0   |  31  |  1.0    Ca    8.6      09 Nov 2023 04:30  Mg     2.0     11-09            Urinalysis Basic - ( 09 Nov 2023 04:30 )    Color: x / Appearance: x / SG: x / pH: x  Gluc: 71 mg/dL / Ketone: x  / Bili: x / Urobili: x   Blood: x / Protein: x / Nitrite: x   Leuk Esterase: x / RBC: x / WBC x   Sq Epi: x / Non Sq Epi: x / Bacteria: x

## 2023-11-09 NOTE — PROGRESS NOTE ADULT - ASSESSMENT
87 yo F with PMHx of HTN, Afib, OA, PVD, COPD, Anxiety, Obesity, Acute on chronic systolic congestive heart failure, H/O abdominal hysterectomy, H/O discectomy, H/O total knee replacement, bilaterally and bilateral pleural effusions (s/p left thoracentesis 3/2023 -- Cytology -PLEURAL FLUID, THORACENTESIS: - SUSPICIOUS FOR MALIGNANCY) with a left sided pleurex catheter, presented for fever and dyspnea.    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   - O/P f/u     Elevated ALP and GGT   - RuQ Sono: Grossly unremarkable with Gallbladder wall 2.9 cm.  - O/P f/up with PCP/GI     Acute kidney Injury, resolved   - Pre-renal and Post obstructive: now resolved  - US kidney bladder: Moderate left and mild right hydronephrosis.  - Currently on TOV      Chronic AFIB c/w BB and Eliquis    DVT PPX: Eliquis  GI PPX: PPI  Diet: puree  Code Status: DNR/I  Dispo: from NH   -Pending Bed Availability with daily anticipation

## 2023-11-24 NOTE — ED PROVIDER NOTE - CLINICAL SUMMARY MEDICAL DECISION MAKING FREE TEXT BOX
88-year-old female, past medical history of COPD, CHF, A-fib on Eliquis, PVD, OA, presenting from [facility] for lethargy and decreased responsiveness.  Patient unable to give history at this time. Eyes open spontaneously and attempts to respond to verbal stimulus but unable to speak clearly. Elderly and ill appearing. Large thyroid mass visible. Decreased breath sounds on left. 88-year-old female, past medical history of COPD, CHF, A-fib on Eliquis, PVD, OA, presenting from [facility] for lethargy and decreased responsiveness.  Patient unable to give history at this time. Eyes open spontaneously and attempts to respond to verbal stimulus but unable to speak clearly. Elderly and ill appearing. Large thyroid mass visible. Decreased breath sounds bilaterally. Labs and EKG were ordered and reviewed.  Imaging was ordered and reviewed by me.  Worsening b/l opacities and pleural effusions. Appropriate medications for patient's presenting complaints were ordered and effects were reassessed.  Patient's records (prior hospital) were reviewed.  Additional history was obtained from NH notes.  Escalation to admission/observation was considered.  Pulmonology consulted for thoracentesis. Patient requires inpatient hospitalization - monitored setting. 88-year-old female, past medical history of COPD, CHF, A-fib on Eliquis, PVD, OA, presenting from [facility] for lethargy and decreased responsiveness.  Patient unable to give history at this time. Eyes open spontaneously and attempts to respond to verbal stimulus but unable to speak clearly. Elderly and ill appearing. Large thyroid mass visible. Decreased breath sounds bilaterally. Patient hypercapnic and acidotic and placed on BiPAP. Labs and EKG were ordered and reviewed.  Imaging was ordered and reviewed by me.  Worsening b/l opacities and pleural effusions. Appropriate medications for patient's presenting complaints were ordered and effects were reassessed.  Patient's records (prior hospital) were reviewed.  Additional history was obtained from NH notes.  Escalation to admission/observation was considered.  ICU consulted. Patient requires inpatient hospitalization - monitored setting.

## 2023-11-24 NOTE — ED PROVIDER NOTE - OBJECTIVE STATEMENT
88 year old female with pmhx of HTN, Afib, OA, PVD, COPD, Anxiety, Obesity, Acute on chronic systolic congestive heart failure, H/O abdominal hysterectomy, H/O discectomy, H/O total knee replacement, bilateral pleural effusions s/p thoracentesis, presents sent in by NH for altered mental status.

## 2023-11-24 NOTE — ED ADULT NURSE NOTE - OBJECTIVE STATEMENT
GEE from [facility], was found unresponsive at 11:20 AM, returned to baseline around 20 minutes later. pt c/o generalized weakness, sent to ED by NH staff for increased lethargy. pt is A&Ox3 in triage, on home O2. FS = 97 in triage.

## 2023-11-24 NOTE — ED PROVIDER NOTE - CARE PLAN
1 Principal Discharge DX:	Altered mental status  Secondary Diagnosis:	Acute UTI  Secondary Diagnosis:	Pleural effusion  Secondary Diagnosis:	Hypercapnia  Secondary Diagnosis:	Hypomagnesemia

## 2023-11-24 NOTE — H&P ADULT - NSHPPHYSICALEXAM_GEN_ALL_CORE
GENERAL: NAD, lying in bed comfortably  HEAD:  Atraumatic, Normocephalic  EYES: conjunctiva and sclera clear  ENT: Moist mucous membranes  NECK: Supple, No JVD  CHEST/LUNG: Clear to auscultation bilaterally; No rales, rhonchi, wheezing, or rubs. Unlabored respirations  HEART: Regular rate and rhythm; No murmurs, rubs, or gallops  ABDOMEN: Soft, nontender, nondistended  EXTREMITIES:  No clubbing, cyanosis, or edema  NERVOUS SYSTEM:  A&Ox3 GENERAL: NAD, lying in bed comfortably  HEAD:  Atraumatic, Normocephalic  EYES: conjunctiva and sclera clear  ENT: Moist mucous membranes  NECK: Supple, No JVD  CHEST/LUNG: Clear to auscultation bilaterally; On bipap  HEART: Regular rate and rhythm; No murmurs, rubs, or gallops  ABDOMEN: Soft, nontender, nondistended  EXTREMITIES:  No clubbing, cyanosis, or edema

## 2023-11-24 NOTE — GOALS OF CARE CONVERSATION - ADVANCED CARE PLANNING - CONVERSATION DETAILS
Discussed GOC with patient's son, Anderson Mcconnell. He understands life sustaining measures including resuscitation and intubation and wishes at this time for his mother, Constanza Mcconnell to be DNR with intubation. He expresses that this is what the patient wanted for herself and he would like to uphold his mother's wishes.

## 2023-11-24 NOTE — H&P ADULT - ATTENDING COMMENTS
The patient is seen and examined the history labs and imaging are reviewed. I agree with the resident note unless otherwise noted.  88-year-old female was admitted for altered  mental status.  UA positive.  Rule out UTI.  Started on received Rocephin check urine culture and sensitivities.  ID evaluation.  History of resistant Klebsiella bacteremia.  Was on Avycaz.  Check bladder scan intermittent catheterization if needed.  check with ID if need to resume Avycaz   Hypercapnic respiratory failure on BiPAP.  COPD exacerbation secondary to pneumonia rule out pulmonary edema  Respiratory acidosis  Chest x-ray shows bilateral opacities and pleural effusions increased from prior.  Check procalcitonin start ttt HCAP adjust for kidney function DC antibiotics if procalcitonin is neg.  Last echo from September 2023 normal EF.  Severe pulmonary hypertension.  Heart failure team evaluation.  IV Lasix 40 mg today, reassess in a.m. repeat chest x-ray in AM. strict I's and O's and daily weight  Elevated alk phos and GGT from previous admission follow-up with hepatology as outpatient  History of A-fib on beta-blocker and Eliquis  CKD GFR 44  Chronic microcytic anemia stable check folate and vitamin B-12 level The patient is seen and examined the history labs and imaging are reviewed. I agree with the resident note unless otherwise noted.  88-year-old female was admitted for altered  mental status.  UA positive.  Rule out UTI.  Started on received Rocephin check urine culture and sensitivities.  ID evaluation.  History of resistant Klebsiella bacteremia.  Was on Avycaz.  Check bladder scan intermittent catheterization if needed.  check with ID if need to resume Avycaz   Hypercapnic respiratory failure on BiPAP.  COPD exacerbation secondary to pneumonia rule out pulmonary edema  Respiratory acidosis, nebs and IV steroids. Pulmonary evaluation.   Chest x-ray shows bilateral opacities and pleural effusions increased from prior.  Check procalcitonin start ttt HCAP adjust for kidney function DC antibiotics if procalcitonin is neg.  Last echo from September 2023 normal EF.  Severe pulmonary hypertension.  Heart failure team evaluation.  IV Lasix 40 mg today, reassess in a.m. repeat chest x-ray in AM. strict I's and O's and daily weight  Elevated alk phos and GGT from previous admission follow-up with hepatology as outpatient  History of A-fib on beta-blocker and Eliquis  CKD GFR 44  Chronic microcytic anemia stable check folate and vitamin B-12 level The patient is seen and examined the history labs and imaging are reviewed. I agree with the resident note unless otherwise noted.  88-year-old female was admitted for altered  mental status.  UA positive.  Rule out UTI.  s/p  Rocephin check urine culture and sensitivities.  ID evaluation.  History of resistant Klebsiella bacteremia.  Was on Avycaz.  Check bladder scan.  check with ID if need to resume Avycaz   Hypercapnic respiratory failure on BiPAP.  COPD exacerbation secondary to pneumonia rule out pulmonary edema  Respiratory acidosis, nebs and IV steroids. Pulmonary evaluation.   Chest x-ray shows bilateral opacities and pleural effusions increased from prior.  Check procalcitonin start ttt HCAP adjust for kidney function DC antibiotics if procalcitonin is neg.  Last echo from September 2023 normal EF.  Severe pulmonary hypertension.  Heart failure team evaluation.  IV Lasix 40 mg today, reassess in a.m. repeat chest x-ray in AM. strict I's and O's and daily weight  Elevated alk phos and GGT from previous admission follow-up with hepatology as outpatient  History of A-fib on beta-blocker and Eliquis  CKD GFR 44  Chronic microcytic anemia stable check folate and vitamin B-12 level The patient is seen and examined the history labs and imaging are reviewed. I agree with the resident note unless otherwise noted.  88-year-old female was admitted for altered  mental status.  UA positive.  Rule out UTI.  s/p  Rocephin check urine culture and sensitivities.  ID evaluation.  History of resistant Klebsiella bacteremia.  Was on Avycaz.  Check bladder scan.  check with ID if need to resume Avycaz   Hypercapnic respiratory failure on BiPAP.  COPD exacerbation secondary to pneumonia rule out pulmonary edema  Respiratory acidosis, nebs and IV steroids. LEft pleurx catheter per not draining., Pulmonary evaluation.   Chest x-ray shows bilateral opacities and pleural effusions increased from prior.  Check procalcitonin start ttt HCAP adjust for kidney function DC antibiotics if procalcitonin is neg.  Last echo from September 2023 normal EF.  Severe pulmonary hypertension.  Heart failure team evaluation.  IV Lasix 40 mg today, reassess in a.m. repeat chest x-ray in AM. strict I's and O's and daily weight  Elevated alk phos and GGT from previous admission follow-up with hepatology as outpatient  History of A-fib on beta-blocker and Eliquis  CKD GFR 44  Chronic microcytic anemia stable check folate and vitamin B-12 level

## 2023-11-24 NOTE — ED ADULT NURSE NOTE - CHIEF COMPLAINT QUOTE
GEE from [facility], was found unresponsive at 11:20 AM, returned to baseline around 20 minutes later. pt c/o generalized weakness, sent to ED by NH staff for increased lethargy. pt is A&Ox3 in triage, on home O2. FS = 97 in triage.

## 2023-11-24 NOTE — H&P ADULT - HISTORY OF PRESENT ILLNESS
88 year old female with pmhx of HTN, Afib, OA, PVD, COPD, Anxiety, Obesity, Acute on chronic systolic congestive heart failure, H/O abdominal hysterectomy, H/O discectomy, H/O total knee replacement, bilateral pleural effusions s/p thoracentesis, presents sent in by NH for altered mental status    Vital Signs Last 24 Hrs  T(C): 36.3 (24 Nov 2023 16:12), Max: 37.1 (24 Nov 2023 14:35)  T(F): 97.4 (24 Nov 2023 16:12), Max: 98.8 (24 Nov 2023 14:35)  HR: 104 (24 Nov 2023 16:12) (104 - 110)  BP: 102/67 (24 Nov 2023 16:12) (102/67 - 113/66)  RR: 20 (24 Nov 2023 16:12) (20 - 24)  SpO2: 96% (24 Nov 2023 16:37) (96% - 99%)  O2 Parameters below as of 24 Nov 2023 16:12  Patient On (Oxygen Delivery Method): nasal cannula  O2 Flow (L/min): 3 and BiPAP       88 year old female with PMH of HTN, Afib, OA, PVD, COPD, Anxiety, Obesity, Acute on chronic systolic congestive heart failure, H/O abdominal hysterectomy, H/O discectomy, H/O total knee replacement, bilateral pleural effusions s/p thoracentesis with a left side drain since March as per daughter, presents sent in by NH for altered mental status for the since yesterday morning, which has been worsening. She was recently discharged on Avycaz for UTI. As per daughter the patient had 2 intubations in the past for copd exacerbation. The left side drain has no output. The daughter mentioned that patient has been mentioning burning of urination and pain in the back( which is attributed to her sacral ulcer).  Denies any recent fevers, chills, N/V/D, headaches, chest pain, SOB.    Vital Signs Last 24 Hrs  T(C): 36.3 (24 Nov 2023 16:12), Max: 37.1 (24 Nov 2023 14:35)  T(F): 97.4 (24 Nov 2023 16:12), Max: 98.8 (24 Nov 2023 14:35)  HR: 104 (24 Nov 2023 16:12) (104 - 110)  BP: 102/67 (24 Nov 2023 16:12) (102/67 - 113/66)  RR: 20 (24 Nov 2023 16:12) (20 - 24)  SpO2: 96% (24 Nov 2023 16:37) (96% - 99%)  O2 Parameters below as of 24 Nov 2023 16:12  Patient On (Oxygen Delivery Method): nasal cannula  O2 Flow (L/min): 3 and BiPAP

## 2023-11-24 NOTE — H&P ADULT - ASSESSMENT
Multi-drug Resistant Klebsiella Bacteriemia with Acute Cystitis,  - Acute hypoxemic and hypercapnic respiratory failure: stable 2-3L NC  - Acute decompensated diastolic HF with pleural effusions likely malignant effusion     - s/p Avycaz q8h end date 11/8   - C/w inhalers, duonebs and steroids    # Persistent Leukocytosis, suspected Malignant etiology   - Heme/onc Eval: ESR , CRP, flow cytometry, BCR/ABL , AMANDA 2 => pt refused blood work.       # Elevated ALP    - RuQ Sono: Grossly unremarkable with Gallbladder wall 2.9 cm.         Chronic AFIB   - c/w BB and Eliquis    DVT PPX: Eliquis  GI PPX: PPI  Diet: puree  Code Status: DNR/I  Dispo: Acute   88 year old female with PMH of HTN, Afib, OA, PVD, COPD, Anxiety, Obesity, Acute on chronic systolic congestive heart failure, H/O abdominal hysterectomy, H/O discectomy, H/O total knee replacement, bilateral pleural effusions s/p thoracentesis with a left side drain since March as per daughter, presents sent in by NH for altered mental status for the since yesterday morning, which has been worsening. She was recently discharged on Avycaz for UTI. As per daughter the patient had 2 intubations in the past for copd exacerbation. The left side drain has no output. The daughter mentioned that patient has been mentioning burning of urination and pain in the back( which is attributed to her sacral ulcer).        # Hypercapnic respiratory failure on BiPAP.  COPD exacerbation secondary to pneumonia rule out pulmonary edema  # Not septic on admission  - f/u RVP   - CXR/CT chest: Bilateral opacities and pleural effusions, increased from prior.  - Pulse ox q8 hrs  - Nebs q 6 hrs PRN  - Solumedrol 60 mg BID  - On Biapap  - f/u sputum cx  - f/u MRSA PCR  - Pulmonary consult   - f/u ID consult  - c/w vanc and cefepime for now to cover for hospital acquired  - had thoracen in May 2023 on admission    # Multi-drug Resistant Klebsiella Bacteriemia with Acute Cystitis,  - UA positive due to Klebsiella  - f/u UC  - has chronic Ba  - s/p Avycaz q8h end date 11/8     # Last echo from September 2023 normal EF.  - c/w Lasix 40 mg BID  - follows with Dr Pacheco    # Hx of persistent leucocytosis  - resolved  - not sure if work up was done    # Elevated ALP    - RuQ Sono: Grossly unremarkable with Gallbladder wall 2.9 cm.      # Chronic AFIB   - holding  BB c/w Eliquis    # Chronic microcytic anemia with elevated RCDW  - f/u vit b12 and folate    # Hypomagnesemia  - repleted    DVT PPX: Eliquis  GI PPX: PPI  Diet: puree  Code Status: DNR/But intubate  Dispo: Acute

## 2023-11-24 NOTE — ED ADULT TRIAGE NOTE - CHIEF COMPLAINT QUOTE
GEE from [facility], was found unresponsive at 11:20 AM, returned to baseline around 20 minutes later. pt c/o generalized weakness. pt is A&Ox3 in triage, on home O2. FS = 97 in triage. as per EMS, pt has been "in and out of the hospital" for sepsis GEE from [facility], was found unresponsive at 11:20 AM, returned to baseline around 20 minutes later. pt c/o generalized weakness, sent to ED by NH staff for increased lethargy. pt is A&Ox3 in triage, on home O2. FS = 97 in triage.

## 2023-11-24 NOTE — H&P ADULT - NSHPREVIEWOFSYSTEMS_GEN_ALL_CORE
CONSTITUTIONAL: No weakness, fevers or chills  EYES/ENT: No visual changes;  No vertigo or throat pain   NECK: No pain or stiffness  RESPIRATORY: No cough, wheezing, hemoptysis; No shortness of breath  CARDIOVASCULAR: No chest pain or palpitations  GASTROINTESTINAL: No abdominal or epigastric pain. No nausea, vomiting, or hematemesis; No diarrhea or constipation. No melena or hematochezia.  GENITOURINARY: No dysuria, frequency or hematuria  NEUROLOGICAL: No numbness or weakness  SKIN: No itching, rashes CONSTITUTIONAL: No weakness, fevers or chills  EYES/ENT: No visual changes;  No vertigo or throat pain   NECK: No pain or stiffness  RESPIRATORY: No cough, wheezing, hemoptysis; + shortness of breath  CARDIOVASCULAR: No chest pain or palpitations  GASTROINTESTINAL: No abdominal or epigastric pain. No nausea, vomiting, or hematemesis; No diarrhea or constipation. No melena or hematochezia.  GENITOURINARY: No dysuria, frequency or hematuria  NEUROLOGICAL: No numbness or weakness  SKIN: No itching, rashes

## 2023-11-24 NOTE — H&P ADULT - NSHPLABSRESULTS_GEN_ALL_CORE
10.6   8.36  )-----------( 278      ( 2023 14:34 )             36.7         144  |  99  |  25<H>  ----------------------------<  94  4.9   |  35<H>  |  1.2    Ca    9.2      2023 14:34  Mg     1.4         TPro  6.6  /  Alb  3.4<L>  /  TBili  0.3  /  DBili  x   /  AST  17  /  ALT  <5  /  AlkPhos  179<H>            Urinalysis Basic - ( 2023 14:34 )    Color: Dark Yellow / Appearance: Turbid / S.018 / pH: x  Gluc: 94 mg/dL / Ketone: Trace mg/dL  / Bili: Negative / Urobili: 1.0 mg/dL   Blood: x / Protein: 300 mg/dL / Nitrite: Negative   Leuk Esterase: Large / RBC: 364 /HPF / WBC >998 /HPF   Sq Epi: x / Non Sq Epi: 3 /HPF / Bacteria: Many /HPF        Lactate Trend   @ 14:34 Lactate:1.1         CAPILLARY BLOOD GLUCOSE      POCT Blood Glucose.: 97 mg/dL (2023 12:19)      Culture Results:   No growth at 5 days (10-28 @ 06:18)  Culture Results:   No growth at 5 days (10-26 @ 05:57)

## 2023-11-24 NOTE — ED PROVIDER NOTE - ATTENDING APP SHARED VISIT CONTRIBUTION OF CARE
88-year-old female, past medical history of COPD, CHF, A-fib on Eliquis, PVD, OA, presenting from [facility] for lethargy and decreased responsiveness.  Patient unable to give history at this time.

## 2023-11-24 NOTE — ED ADULT NURSE NOTE - NSFALLRISKINTERV_ED_ALL_ED

## 2023-11-25 NOTE — PROGRESS NOTE ADULT - ASSESSMENT
88-year-old female PMH of HTN, Afib, OA, PVD, COPD, Anxiety, Obesity, Acute on chronic systolic congestive heart failure, H/O abdominal hysterectomy, H/O discectomy, H/O total knee replacement, bilateral pleural effusions s/p thoracentesis with a left side drain since March admitted for hypercapnic respiratory failure, worsening pleural effusions, left pleurex catheter malfunction.    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  # Pleurex catheter malfunction  CXR 11/25 bilateral opacities and effusions  s/p BIPAP on admission, now on 2L NC saturating 95-97%  Pulm eval appreciated  - Cont solumedrol 40mg qD  - Continue lasix 60 BID  - Continue duonebs PRN  - Pulm to evaluate left pleurex catheter and possibly do thoracentesis if pleural effusions persist  - DC vanc  - Cont cefepime  - ID f/u given history of Klebsiella MDR    #Suspected UTI in setting of chronic al  #Hx of Multi-drug Resistant Klebsiella Bacteriemia with Acute Cystitis,  UA w pyuria on this admission  s/p Avycaz q8h recently (end date 11/8 )  - f/u UCx  - may need to exchange al, will f/u ID    #CKD Stage 3  - Monitor    # Elevated ALP    - RuQ Sono: Grossly unremarkable with Gallbladder wall 2.9 cm.    # Chronic AFIB   - Resume metoprolol 25 BID  - Home Eliquis switched to therapeutic lovenox (Crcl >30)    # Chronic microcytic anemia with elevated RCDW  - f/u vit b12 and folate    DVT PPX, Lovenox    #Progress Note Handoff  Pending (specify): Pulm f/u, cont IV steroids and IV lasix. wean o2 as tolearted  Family discussion: jose c pt regarding tx for COPD exacerbation and eval for pleural effusions  Disposition: NH

## 2023-11-25 NOTE — CONSULT NOTE ADULT - SUBJECTIVE AND OBJECTIVE BOX
INFECTIOUS DISEASE CONSULT NOTE    Patient is a 88y old  Female who presents with a chief complaint of COPD (25 Nov 2023 06:49)    HPI:  88 year old female with PMH of HTN, Afib, OA, PVD, COPD, Anxiety, Obesity, Acute on chronic systolic congestive heart failure, H/O abdominal hysterectomy, H/O discectomy, H/O total knee replacement, bilateral pleural effusions s/p thoracentesis with a left side drain since March as per daughter, presents sent in by NH for altered mental status for the since yesterday morning, which has been worsening. She was recently discharged on Avycaz for UTI. As per daughter the patient had 2 intubations in the past for copd exacerbation. The left side drain has no output. The daughter mentioned that patient has been mentioning burning of urination and pain in the back( which is attributed to her sacral ulcer).  Denies any recent fevers, chills, N/V/D, headaches, chest pain, SOB.    Vital Signs Last 24 Hrs  T(C): 36.3 (24 Nov 2023 16:12), Max: 37.1 (24 Nov 2023 14:35)  T(F): 97.4 (24 Nov 2023 16:12), Max: 98.8 (24 Nov 2023 14:35)  HR: 104 (24 Nov 2023 16:12) (104 - 110)  BP: 102/67 (24 Nov 2023 16:12) (102/67 - 113/66)  RR: 20 (24 Nov 2023 16:12) (20 - 24)  SpO2: 96% (24 Nov 2023 16:37) (96% - 99%)  O2 Parameters below as of 24 Nov 2023 16:12  Patient On (Oxygen Delivery Method): nasal cannula  O2 Flow (L/min): 3 and BiPAP       (24 Nov 2023 16:57)         Prior hospital charts reviewed [Yes]  Primary team notes reviewed [Yes]  Other consultant notes reviewed [Yes]    REVIEW OF SYSTEMS:      PAST MEDICAL & SURGICAL HISTORY:  HTN (hypertension)      Afib  s/p ablation 2001      Goiter  no meds      Cellulitis  chronic      OA (osteoarthritis)      PVD (peripheral vascular disease)      COPD (chronic obstructive pulmonary disease)      Anxiety      Obesity      Acute on chronic systolic congestive heart failure      Edema of both lower legs      Required emergent intubation      H/O abdominal hysterectomy      H/O discectomy      H/O total knee replacement, bilateral          SOCIAL HISTORY:  - Born in [place], migrated [place] in 19XX  - Currently working as / Retired  - Lives with _____; no pets  - No recent travel  - Denies tobacco use  - Denies alcohol use  - Denies illicit drug use  - Currently sexually active, has one male/female sexual partner    FAMILY HISTORY:      Allergies:  penicillin (Other)  contrast media (iodine-based) (Other)  codeine (Other)  sulfa drugs (Hives; Other)  aspirin (Other)      ANTIMICROBIALS:  cefepime   IVPB 1000 every 12 hours  vancomycin  IVPB 1000 every 24 hours      ANTIMICROBIALS (past 90 days):  MEDICATIONS  (STANDING):  cefepime   IVPB   100 mL/Hr IV Intermittent (11-25-23 @ 06:27)    vancomycin  IVPB   250 mL/Hr IV Intermittent (11-25-23 @ 06:27)        OTHER MEDS:   MEDICATIONS  (STANDING):  acetaminophen     Tablet .. 650 every 6 hours PRN  albuterol    90 MICROgram(s) HFA Inhaler 2 four times a day  albuterol/ipratropium for Nebulization 3 every 4 hours  aluminum hydroxide/magnesium hydroxide/simethicone Suspension 30 every 4 hours PRN  enoxaparin Injectable 70 every 12 hours  furosemide   Injectable 60 two times a day  gabapentin 100 three times a day  melatonin 3 at bedtime PRN  methylPREDNISolone sodium succinate Injectable 40 daily  ondansetron Injectable 4 every 8 hours PRN  pantoprazole    Tablet 40 before breakfast  polyethylene glycol 3350 17 daily PRN  senna 2 at bedtime      VITALS:  Vital Signs Last 24 Hrs  T(F): 98.1 (11-25-23 @ 07:25), Max: 98.8 (11-24-23 @ 14:35)    Vital Signs Last 24 Hrs  HR: 102 (11-25-23 @ 07:25) (85 - 110)  BP: 137/77 (11-25-23 @ 07:25) (102/67 - 137/77)  RR: 18 (11-25-23 @ 07:25)  SpO2: 95% (11-25-23 @ 07:25) (94% - 99%)  Wt(kg): --    EXAM:    Labs:                        10.8   9.71  )-----------( 226      ( 25 Nov 2023 06:42 )             37.2     11-25    145  |  102  |  26<H>  ----------------------------<  108<H>  4.9   |  25  |  1.2    Ca    9.1      25 Nov 2023 06:42  Mg     1.6     11-25    TPro  6.5  /  Alb  2.9<L>  /  TBili  0.4  /  DBili  x   /  AST  10  /  ALT  <5  /  AlkPhos  174<H>  11-25      WBC Trend:  WBC Count: 9.71 (11-25-23 @ 06:42)  WBC Count: 8.36 (11-24-23 @ 14:34)      Auto Neutrophil #: 6.22 K/uL (11-24-23 @ 14:34)  Auto Neutrophil #: 8.44 K/uL (11-09-23 @ 04:30)  Auto Neutrophil #: 11.19 K/uL (11-07-23 @ 05:49)  Auto Neutrophil #: 19.17 K/uL (11-01-23 @ 06:54)  Auto Neutrophil #: 26.69 K/uL (10-31-23 @ 04:30)      Creatine Trend:  Creatinine: 1.2 (11-25)  Creatinine: 1.2 (11-24)      Liver Biochemical Testing Trend:  Alanine Aminotransferase (ALT/SGPT): <5 (11-25)  Alanine Aminotransferase (ALT/SGPT): <5 (11-24)  Alanine Aminotransferase (ALT/SGPT): 12 (11-07)  Alanine Aminotransferase (ALT/SGPT): 20 (10-31)  Alanine Aminotransferase (ALT/SGPT): 19 (10-30)  Aspartate Aminotransferase (AST/SGOT): 10 (11-25-23 @ 06:42)  Aspartate Aminotransferase (AST/SGOT): 17 (11-24-23 @ 14:34)  Aspartate Aminotransferase (AST/SGOT): 16 (11-07-23 @ 05:49)  Aspartate Aminotransferase (AST/SGOT): 37 (10-31-23 @ 04:30)  Aspartate Aminotransferase (AST/SGOT): 33 (10-30-23 @ 04:30)  Bilirubin Total: 0.4 (11-25)  Bilirubin Total: 0.3 (11-24)  Bilirubin Total: 0.4 (11-07)  Bilirubin Total: 0.3 (10-31)  Bilirubin Total: 0.3 (10-30)      Trend LDH  09-10-23 @ 19:21  398<H>  03-07-23 @ 21:06  211      Auto Eosinophil %: 1.3 % (11-24-23 @ 14:34)      Urinalysis Basic - ( 25 Nov 2023 06:42 )    Color: x / Appearance: x / SG: x / pH: x  Gluc: 108 mg/dL / Ketone: x  / Bili: x / Urobili: x   Blood: x / Protein: x / Nitrite: x   Leuk Esterase: x / RBC: x / WBC x   Sq Epi: x / Non Sq Epi: x / Bacteria: x        MICROBIOLOGY:    MRSA PCR Result.: Negative (10-24-23 @ 16:15)  MRSA PCR Result.: Positive (09-05-23 @ 00:00)  MRSA PCR Result.: Negative (07-05-23 @ 15:45)  MRSA PCR Result.: Negative (03-02-23 @ 15:27)  MRSA PCR Result.: Negative (03-02-23 @ 07:58)      COVID-19 PCR: NotDetec (11-08-23 @ 13:19)  COVID-19 PCR: NotDetec (11-01-23 @ 10:30)  COVID-19 PCR: NotDetec (09-28-23 @ 19:00)  COVID-19 PCR: NotDetec (09-26-23 @ 15:30)      Blood Gas Arterial, Lactate: 0.80 (11-24 @ 22:41)  Blood Gas Venous - Lactate: 1.80 (11-24 @ 15:46)  Lactate, Blood: 1.1 (11-24 @ 14:34)    A1C with Estimated Average Glucose Result: 5.2 % (09-24-23 @ 07:42)  A1C with Estimated Average Glucose Result: 6.1 % (07-03-23 @ 05:51)      RADIOLOGY:  imaging below personally reviewed    < from: Xray Chest 1 View-PORTABLE IMMEDIATE (Xray Chest 1 View-PORTABLE IMMEDIATE .) (11.24.23 @ 14:05) >  Impression:    Bilateral opacities and pleural effusions, increased from prior.      < end of copied text >    < from: Xray Chest 1 View- PORTABLE-Routine (11.25.23 @ 04:21) >  Findings/  impression:    Rotated to the left. Low lung volumes    Support devices: None    Cardiac/ Mediastinum: Rotated. Calcified aorta.    Lungs/ Pleura: Stable bilateral opacities/effusions. No pneumothorax    Skeletal/ soft tissues: Stable degenerative changes    < end of copied text >   INFECTIOUS DISEASE CONSULT NOTE    Patient is a 88y old  Female who presents with a chief complaint of COPD (25 Nov 2023 06:49)    HPI:  88 year old female with PMH of HTN, Afib, OA, PVD, COPD, Anxiety, Obesity, Acute on chronic systolic congestive heart failure, H/O abdominal hysterectomy, H/O discectomy, H/O total knee replacement, bilateral pleural effusions s/p thoracentesis with a left side drain since March as per daughter, presents sent in by NH for altered mental status for the since yesterday morning, which has been worsening. She was recently discharged on Avycaz for UTI. As per daughter the patient had 2 intubations in the past for copd exacerbation. The left side drain has no output. The daughter mentioned that patient has been mentioning burning of urination and pain in the back( which is attributed to her sacral ulcer).  Denies any recent fevers, chills, N/V/D, headaches, chest pain, SOB.    Vital Signs Last 24 Hrs  T(C): 36.3 (24 Nov 2023 16:12), Max: 37.1 (24 Nov 2023 14:35)  T(F): 97.4 (24 Nov 2023 16:12), Max: 98.8 (24 Nov 2023 14:35)  HR: 104 (24 Nov 2023 16:12) (104 - 110)  BP: 102/67 (24 Nov 2023 16:12) (102/67 - 113/66)  RR: 20 (24 Nov 2023 16:12) (20 - 24)  SpO2: 96% (24 Nov 2023 16:37) (96% - 99%)  O2 Parameters below as of 24 Nov 2023 16:12  Patient On (Oxygen Delivery Method): nasal cannula  O2 Flow (L/min): 3 and BiPAP       (24 Nov 2023 16:57)         Prior hospital charts reviewed [Yes]  Primary team notes reviewed [Yes]  Other consultant notes reviewed [Yes]    REVIEW OF SYSTEMS:  CONSTITUTIONAL: No fever or chills  HEAD: No lesion on scalp  EYES: No visual disturbance  ENT: No sore throat  RESPIRATORY: No cough, no shortness of breath  CARDIOVASCULAR: No chest pain or palpitations  GASTROINTESTINAL: No abdominal or epigastric pain  GENITOURINARY: No dysuria  NEUROLOGICAL: No headache/dizziness  MUSCULOSKELETAL: No joint pain, erythema, or swelling; no back pain  SKIN: No itching, rashes  All other ROS negative except noted above      PAST MEDICAL & SURGICAL HISTORY:  HTN (hypertension)      Afib  s/p ablation 2001      Goiter  no meds      Cellulitis  chronic      OA (osteoarthritis)      PVD (peripheral vascular disease)      COPD (chronic obstructive pulmonary disease)      Anxiety      Obesity      Acute on chronic systolic congestive heart failure      Edema of both lower legs      Required emergent intubation      H/O abdominal hysterectomy      H/O discectomy      H/O total knee replacement, bilateral          SOCIAL HISTORY:  - No recent travel  - Denies tobacco use  - Denies alcohol use  - Denies illicit drug use    FAMILY HISTORY:  No family history of HF    Allergies:  penicillin (Other)  contrast media (iodine-based) (Other)  codeine (Other)  sulfa drugs (Hives; Other)  aspirin (Other)      ANTIMICROBIALS:  cefepime   IVPB 1000 every 12 hours  vancomycin  IVPB 1000 every 24 hours      ANTIMICROBIALS (past 90 days):  MEDICATIONS  (STANDING):  cefepime   IVPB   100 mL/Hr IV Intermittent (11-25-23 @ 06:27)    vancomycin  IVPB   250 mL/Hr IV Intermittent (11-25-23 @ 06:27)        OTHER MEDS:   MEDICATIONS  (STANDING):  acetaminophen     Tablet .. 650 every 6 hours PRN  albuterol    90 MICROgram(s) HFA Inhaler 2 four times a day  albuterol/ipratropium for Nebulization 3 every 4 hours  aluminum hydroxide/magnesium hydroxide/simethicone Suspension 30 every 4 hours PRN  enoxaparin Injectable 70 every 12 hours  furosemide   Injectable 60 two times a day  gabapentin 100 three times a day  melatonin 3 at bedtime PRN  methylPREDNISolone sodium succinate Injectable 40 daily  ondansetron Injectable 4 every 8 hours PRN  pantoprazole    Tablet 40 before breakfast  polyethylene glycol 3350 17 daily PRN  senna 2 at bedtime      VITALS:  Vital Signs Last 24 Hrs  T(F): 98.1 (11-25-23 @ 07:25), Max: 98.8 (11-24-23 @ 14:35)    Vital Signs Last 24 Hrs  HR: 102 (11-25-23 @ 07:25) (85 - 110)  BP: 137/77 (11-25-23 @ 07:25) (102/67 - 137/77)  RR: 18 (11-25-23 @ 07:25)  SpO2: 95% (11-25-23 @ 07:25) (94% - 99%)  Wt(kg): --    EXAM:  GENERAL: NAD, lying in bed  HEAD: No head lesions  EYES: Conjunctiva pink and cornea white  EAR, NOSE, MOUTH, THROAT: Normal external ears and nose, no discharges; moist mucous membranes  NECK: Supple, nontender to palpation; no JVD  RESPIRATORY: Decreased  bibasilar lung sounds  CARDIOVASCULAR: S1 S2  GASTROINTESTINAL: Soft, nontender, nondistended; normoactive bowel sounds  EXTREMITIES: No clubbing, cyanosis, or petal edema  NERVOUS SYSTEM: Alert and oriented to person, time, place and situation, speech clear. No focal deficits   MUSCULOSKELETAL: No joint erythema, swelling or pain  SKIN: No rashes or lesions, no superficial thrombophlebitis  PSYCH: Normal affect      Labs:                        10.8   9.71  )-----------( 226      ( 25 Nov 2023 06:42 )             37.2     11-25    145  |  102  |  26<H>  ----------------------------<  108<H>  4.9   |  25  |  1.2    Ca    9.1      25 Nov 2023 06:42  Mg     1.6     11-25    TPro  6.5  /  Alb  2.9<L>  /  TBili  0.4  /  DBili  x   /  AST  10  /  ALT  <5  /  AlkPhos  174<H>  11-25      WBC Trend:  WBC Count: 9.71 (11-25-23 @ 06:42)  WBC Count: 8.36 (11-24-23 @ 14:34)      Auto Neutrophil #: 6.22 K/uL (11-24-23 @ 14:34)  Auto Neutrophil #: 8.44 K/uL (11-09-23 @ 04:30)  Auto Neutrophil #: 11.19 K/uL (11-07-23 @ 05:49)  Auto Neutrophil #: 19.17 K/uL (11-01-23 @ 06:54)  Auto Neutrophil #: 26.69 K/uL (10-31-23 @ 04:30)      Creatine Trend:  Creatinine: 1.2 (11-25)  Creatinine: 1.2 (11-24)      Liver Biochemical Testing Trend:  Alanine Aminotransferase (ALT/SGPT): <5 (11-25)  Alanine Aminotransferase (ALT/SGPT): <5 (11-24)  Alanine Aminotransferase (ALT/SGPT): 12 (11-07)  Alanine Aminotransferase (ALT/SGPT): 20 (10-31)  Alanine Aminotransferase (ALT/SGPT): 19 (10-30)  Aspartate Aminotransferase (AST/SGOT): 10 (11-25-23 @ 06:42)  Aspartate Aminotransferase (AST/SGOT): 17 (11-24-23 @ 14:34)  Aspartate Aminotransferase (AST/SGOT): 16 (11-07-23 @ 05:49)  Aspartate Aminotransferase (AST/SGOT): 37 (10-31-23 @ 04:30)  Aspartate Aminotransferase (AST/SGOT): 33 (10-30-23 @ 04:30)  Bilirubin Total: 0.4 (11-25)  Bilirubin Total: 0.3 (11-24)  Bilirubin Total: 0.4 (11-07)  Bilirubin Total: 0.3 (10-31)  Bilirubin Total: 0.3 (10-30)      Trend LDH  09-10-23 @ 19:21  398<H>  03-07-23 @ 21:06  211      Auto Eosinophil %: 1.3 % (11-24-23 @ 14:34)      Urinalysis Basic - ( 25 Nov 2023 06:42 )    Color: x / Appearance: x / SG: x / pH: x  Gluc: 108 mg/dL / Ketone: x  / Bili: x / Urobili: x   Blood: x / Protein: x / Nitrite: x   Leuk Esterase: x / RBC: x / WBC x   Sq Epi: x / Non Sq Epi: x / Bacteria: x        MICROBIOLOGY:    MRSA PCR Result.: Negative (10-24-23 @ 16:15)  MRSA PCR Result.: Positive (09-05-23 @ 00:00)  MRSA PCR Result.: Negative (07-05-23 @ 15:45)  MRSA PCR Result.: Negative (03-02-23 @ 15:27)  MRSA PCR Result.: Negative (03-02-23 @ 07:58)      COVID-19 PCR: NotDetec (11-08-23 @ 13:19)  COVID-19 PCR: NotDetec (11-01-23 @ 10:30)  COVID-19 PCR: NotDetec (09-28-23 @ 19:00)  COVID-19 PCR: NotDetec (09-26-23 @ 15:30)      Blood Gas Arterial, Lactate: 0.80 (11-24 @ 22:41)  Blood Gas Venous - Lactate: 1.80 (11-24 @ 15:46)  Lactate, Blood: 1.1 (11-24 @ 14:34)    A1C with Estimated Average Glucose Result: 5.2 % (09-24-23 @ 07:42)  A1C with Estimated Average Glucose Result: 6.1 % (07-03-23 @ 05:51)      RADIOLOGY:  imaging below personally reviewed    < from: Xray Chest 1 View-PORTABLE IMMEDIATE (Xray Chest 1 View-PORTABLE IMMEDIATE .) (11.24.23 @ 14:05) >  Impression:    Bilateral opacities and pleural effusions, increased from prior.      < end of copied text >    < from: Xray Chest 1 View- PORTABLE-Routine (11.25.23 @ 04:21) >  Findings/  impression:    Rotated to the left. Low lung volumes    Support devices: None    Cardiac/ Mediastinum: Rotated. Calcified aorta.    Lungs/ Pleura: Stable bilateral opacities/effusions. No pneumothorax    Skeletal/ soft tissues: Stable degenerative changes    < end of copied text >

## 2023-11-25 NOTE — CONSULT NOTE ADULT - SUBJECTIVE AND OBJECTIVE BOX
Patient is a 88y old  Female who presents with a chief complaint of COPD (2023 16:57)      HPI:  88 year old female with PMH of HTN, Afib, OA, PVD, COPD, Anxiety, Obesity, Acute on chronic systolic congestive heart failure, H/O abdominal hysterectomy, H/O discectomy, H/O total knee replacement, bilateral pleural effusions s/p thoracentesis with a left side drain since March as per daughter, presents sent in by NH for altered mental status for the since yesterday morning, which has been worsening. She was recently discharged on Avycaz for UTI. As per daughter the patient had 2 intubations in the past for copd exacerbation. The left side drain has no output. The daughter mentioned that patient has been mentioning burning of urination and pain in the back( which is attributed to her sacral ulcer).  Denies any recent fevers, chills, N/V/D, headaches, chest pain, SOB.    Vital Signs Last 24 Hrs  T(C): 36.3 (2023 16:12), Max: 37.1 (2023 14:35)  T(F): 97.4 (2023 16:12), Max: 98.8 (2023 14:35)  HR: 104 (2023 16:12) (104 - 110)  BP: 102/67 (2023 16:12) (102/67 - 113/66)  RR: 20 (2023 16:12) (20 - 24)  SpO2: 96% (2023 16:37) (96% - 99%)  O2 Parameters below as of 2023 16:12  Patient On (Oxygen Delivery Method): nasal cannula  O2 Flow (L/min): 3 and BiPAP       (2023 16:57)      PAST MEDICAL & SURGICAL HISTORY:  HTN (hypertension)      Afib  s/p ablation       Goiter  no meds      Cellulitis  chronic      OA (osteoarthritis)      PVD (peripheral vascular disease)      COPD (chronic obstructive pulmonary disease)      Anxiety      Obesity      Acute on chronic systolic congestive heart failure      Edema of both lower legs      Required emergent intubation      H/O abdominal hysterectomy      H/O discectomy      H/O total knee replacement, bilateral            FAMILY HISTORY:  :  No known cardiovacular family hisotry     ROS:  See HPI     Allergies    penicillin (Other)  contrast media (iodine-based) (Other)  codeine (Other)  sulfa drugs (Hives; Other)  aspirin (Other)    Intolerances          PHYSICAL EXAM    ICU Vital Signs Last 24 Hrs  T(C): 36.7 (2023 05:28), Max: 37.1 (2023 14:35)  T(F): 98.1 (2023 05:28), Max: 98.8 (2023 14:35)  HR: 97 (2023 05:28) (85 - 110)  BP: 111/58 (2023 05:28) (102/67 - 133/62)  BP(mean): 81 (2023 05:28) (81 - 92)  ABP: --  ABP(mean): --  RR: 18 (2023 06:30) (18 - 24)  SpO2: 97% (2023 06:30) (94% - 99%)    O2 Parameters below as of 2023 06:30  Patient On (Oxygen Delivery Method): nasal cannula  O2 Flow (L/min): 2          General: In NAD   HEENT:  KAT              Lymphatic system: No cervical LN   Lungs: Bilateral BS, decreased   Cardiovascular: Regular  Gastrointestinal: Soft, Positive BS  Musculoskeletal: No clubbing.  Moves all extremities.  Full range of motion   Skin: Warm.  Intact  Neurological: No motor or sensory deficit         LABS:                          10.6   8.36  )-----------( 278      ( 2023 14:34 )             36.7                                               11-24    144  |  99  |  25<H>  ----------------------------<  94  4.9   |  35<H>  |  1.2    Ca    9.2      2023 14:34  Mg     1.4     11-24    TPro  6.6  /  Alb  3.4<L>  /  TBili  0.3  /  DBili  x   /  AST  17  /  ALT  <5  /  AlkPhos  179<H>  11-24                                             Urinalysis Basic - ( 2023 14:34 )    Color: Dark Yellow / Appearance: Turbid / S.018 / pH: x  Gluc: 94 mg/dL / Ketone: Trace mg/dL  / Bili: Negative / Urobili: 1.0 mg/dL   Blood: x / Protein: 300 mg/dL / Nitrite: Negative   Leuk Esterase: Large / RBC: 364 /HPF / WBC >998 /HPF   Sq Epi: x / Non Sq Epi: 3 /HPF / Bacteria: Many /HPF                                                  LIVER FUNCTIONS - ( 2023 14:34 )  Alb: 3.4 g/dL / Pro: 6.6 g/dL / ALK PHOS: 179 U/L / ALT: <5 U/L / AST: 17 U/L / GGT: x                                                                                                                                   ABG - ( 2023 22:41 )  pH, Arterial: 7.35  pH, Blood: x     /  pCO2: 66    /  pO2: 129   / HCO3: 36    / Base Excess: 8.3   /  SaO2: 99.8                X-Ray edema     MEDICATIONS  (STANDING):  albuterol    90 MICROgram(s) HFA Inhaler 2 Puff(s) Inhalation four times a day  albuterol/ipratropium for Nebulization 3 milliLiter(s) Nebulizer every 4 hours  apixaban 5 milliGRAM(s) Oral two times a day  cefepime   IVPB 1000 milliGRAM(s) IV Intermittent every 12 hours  furosemide   Injectable 40 milliGRAM(s) IV Push two times a day  gabapentin 100 milliGRAM(s) Oral three times a day  methylPREDNISolone sodium succinate Injectable 60 milliGRAM(s) IV Push two times a day  pantoprazole    Tablet 40 milliGRAM(s) Oral before breakfast  senna 2 Tablet(s) Oral at bedtime  vancomycin  IVPB 1000 milliGRAM(s) IV Intermittent every 24 hours    MEDICATIONS  (PRN):  acetaminophen     Tablet .. 650 milliGRAM(s) Oral every 6 hours PRN Temp greater or equal to 38C (100.4F), Mild Pain (1 - 3)  aluminum hydroxide/magnesium hydroxide/simethicone Suspension 30 milliLiter(s) Oral every 4 hours PRN Dyspepsia  melatonin 3 milliGRAM(s) Oral at bedtime PRN Insomnia  ondansetron Injectable 4 milliGRAM(s) IV Push every 8 hours PRN Nausea and/or Vomiting  polyethylene glycol 3350 17 Gram(s) Oral daily PRN for constipation

## 2023-11-25 NOTE — PROGRESS NOTE ADULT - SUBJECTIVE AND OBJECTIVE BOX
GIOVANNY BUCK  88y, Female  Allergy: penicillin (Other)  contrast media (iodine-based) (Other)  codeine (Other)  sulfa drugs (Hives; Other)  aspirin (Other)    Hospital Day: 1d    Patient seen and examined. No acute events overnight    PMH/PSH:  PAST MEDICAL & SURGICAL HISTORY:  HTN (hypertension)      Afib  s/p ablation 2001      Goiter  no meds      Cellulitis  chronic      OA (osteoarthritis)      PVD (peripheral vascular disease)      COPD (chronic obstructive pulmonary disease)      Anxiety      Obesity      Acute on chronic systolic congestive heart failure      Edema of both lower legs      Required emergent intubation      H/O abdominal hysterectomy      H/O discectomy      H/O total knee replacement, bilateral          VITALS:  T(F): 98.1 (11-25-23 @ 07:25), Max: 98.8 (11-24-23 @ 14:35)  HR: 102 (11-25-23 @ 07:25)  BP: 137/77 (11-25-23 @ 07:25) (102/67 - 137/77)  RR: 18 (11-25-23 @ 07:25)  SpO2: 95% (11-25-23 @ 07:25)    TESTS & MEASUREMENTS:  Weight (Kg): 72.6 (11-24-23 @ 12:16)  BMI (kg/m2): 25.1 (11-24)                          10.8   9.71  )-----------( 226      ( 25 Nov 2023 06:42 )             37.2       11-25    145  |  102  |  26<H>  ----------------------------<  108<H>  4.9   |  25  |  1.2    Ca    9.1      25 Nov 2023 06:42  Mg     1.6     11-25    TPro  6.5  /  Alb  2.9<L>  /  TBili  0.4  /  DBili  x   /  AST  10  /  ALT  <5  /  AlkPhos  174<H>  11-25    LIVER FUNCTIONS - ( 25 Nov 2023 06:42 )  Alb: 2.9 g/dL / Pro: 6.5 g/dL / ALK PHOS: 174 U/L / ALT: <5 U/L / AST: 10 U/L / GGT: x                 Urinalysis Basic - ( 25 Nov 2023 06:42 )    Color: x / Appearance: x / SG: x / pH: x  Gluc: 108 mg/dL / Ketone: x  / Bili: x / Urobili: x   Blood: x / Protein: x / Nitrite: x   Leuk Esterase: x / RBC: x / WBC x   Sq Epi: x / Non Sq Epi: x / Bacteria: x        RADIOLOGY & ADDITIONAL TESTS:    RECENT DIAGNOSTIC ORDERS:  Pro-Brain Natriuretic Peptide: 11:00 (11-25-23 @ 07:54)  Blood Gas Profile w/Lytes - Venous: Routine (11-24-23 @ 18:30)  MRSA/MSSA PCR: Routine (11-24-23 @ 18:16)  Procalcitonin, Serum: AM Sched. Collection: 25-Nov-2023 04:30 (11-24-23 @ 18:05)  Culture - Sputum: Routine  Specimen Source: Sputum (11-24-23 @ 17:15)  Streptococcus pneumoniae IgG Antibody: Routine  Addl Info: Urine (11-24-23 @ 17:15)  Legionella pneumophila Antigen, Urine: Routine (11-24-23 @ 17:15)  Folate, Serum: AM Sched. Collection: 25-Nov-2023 04:30 (11-24-23 @ 17:02)  Vitamin B12, Serum: AM Sched. Collection: 25-Nov-2023 04:30 (11-24-23 @ 17:02)  Culture - Urine: Routine  Specimen Source: Clean Catch (Midstream) (11-24-23 @ 13:23)      MEDICATIONS:  MEDICATIONS  (STANDING):  albuterol    90 MICROgram(s) HFA Inhaler 2 Puff(s) Inhalation four times a day  albuterol/ipratropium for Nebulization 3 milliLiter(s) Nebulizer every 4 hours  cefepime   IVPB 1000 milliGRAM(s) IV Intermittent every 12 hours  enoxaparin Injectable 70 milliGRAM(s) SubCutaneous every 12 hours  furosemide   Injectable 60 milliGRAM(s) IV Push two times a day  gabapentin 100 milliGRAM(s) Oral three times a day  methylPREDNISolone sodium succinate Injectable 40 milliGRAM(s) IV Push daily  pantoprazole    Tablet 40 milliGRAM(s) Oral before breakfast  senna 2 Tablet(s) Oral at bedtime  vancomycin  IVPB 1000 milliGRAM(s) IV Intermittent every 24 hours    MEDICATIONS  (PRN):  acetaminophen     Tablet .. 650 milliGRAM(s) Oral every 6 hours PRN Temp greater or equal to 38C (100.4F), Mild Pain (1 - 3)  aluminum hydroxide/magnesium hydroxide/simethicone Suspension 30 milliLiter(s) Oral every 4 hours PRN Dyspepsia  melatonin 3 milliGRAM(s) Oral at bedtime PRN Insomnia  ondansetron Injectable 4 milliGRAM(s) IV Push every 8 hours PRN Nausea and/or Vomiting  polyethylene glycol 3350 17 Gram(s) Oral daily PRN for constipation      HOME MEDICATIONS:  budesonide-formoterol 80 mcg-4.5 mcg/inh inhalation aerosol (11-24)  Eliquis 5 mg oral tablet (11-24)  furosemide 20 mg oral tablet (11-24)  gabapentin 100 mg oral tablet (11-24)  metoprolol tartrate 25 mg oral tablet (11-24)  polyethylene glycol 3350 oral powder for reconstitution (11-24)  Senna 8.6 mg oral tablet (11-24)  tiotropium 18 mcg inhalation capsule (11-24)      REVIEW OF SYSTEMS:  All other review of systems is negative unless indicated above.     PHYSICAL EXAM:  PHYSICAL EXAM:  GENERAL: NAD  HEAD:  Atraumatic, Normocephalic  NECK: Supple, No JVD  CHEST/LUNG: Clear to auscultation bilaterally; No wheeze  HEART: Regular rate and rhythm; No murmurs, rubs, or gallops  ABDOMEN: Soft, Nontender, Nondistended; Bowel sounds present  EXTREMITIES:  2+ Peripheral Pulses, No clubbing, cyanosis, or edema  SKIN: No rashes or lesions

## 2023-11-25 NOTE — CONSULT NOTE ADULT - ASSESSMENT
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 AAO on the BIPAP.     HEENT: Oral care    PULMONARY:  HOB @ 45 degrees. CXR with pulm edema and effusions. BIPAP on and off and during sleep. Goal Spo2 88-92%. ABG improved. Solumedrol 40mg daily, wean down slowly. Albuterol as needed. LEft pleurx catheter per daughter has not been draining. Bedside US of the right and left lung today. Iff effusions do not improve with diuresis then will need right sided thoracentesis. Attempt to drain left pleurx today.     CARDIOVASCULAR: Lasix 60mg IV BID. Check BNP. Cardiology evaluation.     GI: GI prophylaxis.  Feeding: when off BIPAP.    RENAL:  Follow up lytes.  Correct as needed.    INFECTIOUS DISEASE: Check procal. Check pna cultures. Check Nasal MRSA. DC Vancomycin. ID evaluation given positive UA and history of Klebsiella MDR.     HEMATOLOGICAL:  DVT prophylaxis: On Eliquis.     ENDOCRINE:  Follow up FS.  Insulin protocol if needed.    MUSCULOSKELETAL: bedrest for now.     DNR but not DNI. poor prognosis. Pall care eval.     SDU.

## 2023-11-25 NOTE — CONSULT NOTE ADULT - ASSESSMENT
ID is consulted for UTI  Recently admitted to [facility] for KPC Klebsiella bacteremia 2/2 UTI  S/p 14 days of Avycaz  Afebrile on admission, no leukocytosis  UA showed pyuria and hematuria, but unclear if the sample was collected on old al catheter (per note patient has chronic al)  BCx pending  CXR showed bilateral opacities and pleural effusions, increased from prior    Antibiotics:  Vancomycin 11/25  Cefepime 11/25      IMPRESSION:      RECOMMENDATIONS:        * THIS IS AN INCOMPLETE NOTE. FINAL RECOMMENDATION IS PENDING *   88 year old female with PMH of HTN, Afib, OA, PVD, COPD, Anxiety, Obesity, Acute on chronic systolic congestive heart failure, H/O abdominal hysterectomy, H/O discectomy, H/O total knee replacement, bilateral pleural effusions s/p thoracentesis with a left side drain since March as per daughter, presents sent in by NH for altered mental status    ID is consulted for UTI  Recently admitted to [facility]note 3[facility] for KPC Klebsiella bacteremia 2/2 UTI  S/p 14 days of Avycaz  Afebrile on admission, no leukocytosis  UA showed pyuria and hematuria, but unclear if the sample was collected on old al catheter (per note patient has chronic al)  BCx pending  CXR showed bilateral opacities and pleural effusions, increased from prior    Antibiotics:  Vancomycin 11/25  Cefepime 11/25      IMPRESSION:  COPD exacerbation  Bilateral pleural effusions  Chronic al    RECOMMENDATIONS:  - Continue IV cefepime 1gm q12hrs for now  - Follow up UCx, BCx  - Pulm follow up for possible thoracentesis  - Offloading and frequent position changes, aspiration precaution  - Trend WBC, fever curve, transaminases, creatinine daily  - Will continue to follow      Micaela Melo D.O.  Attending Physician  Division of Infectious Diseases  [facility]  Please contact me via [employer] Teams

## 2023-11-26 NOTE — PROGRESS NOTE ADULT - SUBJECTIVE AND OBJECTIVE BOX
Patient is a 88y old  Female who presents with a chief complaint of COPD (25 Nov 2023 11:49)        Over Night Events:    No events   Confused   On NC         ROS:  See HPI    PHYSICAL EXAM    ICU Vital Signs Last 24 Hrs  T(C): 37.1 (26 Nov 2023 06:30), Max: 37.1 (26 Nov 2023 06:30)  T(F): 98.7 (26 Nov 2023 06:30), Max: 98.7 (26 Nov 2023 06:30)  HR: 97 (26 Nov 2023 06:30) (97 - 130)  BP: 114/67 (26 Nov 2023 06:30) (109/71 - 159/89)  BP(mean): 116 (25 Nov 2023 22:00) (85 - 116)  ABP: --  ABP(mean): --  RR: 19 (26 Nov 2023 06:30) (18 - 22)  SpO2: 97% (26 Nov 2023 06:30) (95% - 100%)    O2 Parameters below as of 26 Nov 2023 06:30  Patient On (Oxygen Delivery Method): room air            General: NAD   HEENT: KAT             Lymphatic system: No cervical LN   Lungs: Bilateral BS, decreased   Cardiovascular: Regular   Gastrointestinal: Soft, Positive BS  Extremities: No clubbing.  Moves extremities.  Full Range of motion   Skin: Warm, intact  Neurological: No motor or sensory deficit         LABS:                            10.8   9.71  )-----------( 226      ( 25 Nov 2023 06:42 )             37.2                                               11-25    145  |  102  |  26<H>  ----------------------------<  108<H>  4.9   |  25  |  1.2    Ca    9.1      25 Nov 2023 06:42  Mg     1.6     11-25    TPro  6.5  /  Alb  2.9<L>  /  TBili  0.4  /  DBili  x   /  AST  10  /  ALT  <5  /  AlkPhos  174<H>  11-25                                             Urinalysis Basic - ( 25 Nov 2023 06:42 )    Color: x / Appearance: x / SG: x / pH: x  Gluc: 108 mg/dL / Ketone: x  / Bili: x / Urobili: x   Blood: x / Protein: x / Nitrite: x   Leuk Esterase: x / RBC: x / WBC x   Sq Epi: x / Non Sq Epi: x / Bacteria: x                                                  LIVER FUNCTIONS - ( 25 Nov 2023 06:42 )  Alb: 2.9 g/dL / Pro: 6.5 g/dL / ALK PHOS: 174 U/L / ALT: <5 U/L / AST: 10 U/L / GGT: x                                                  Culture - Urine (collected 24 Nov 2023 14:35)  Source: Clean Catch Clean Catch (Midstream)  Preliminary Report (25 Nov 2023 20:12):    >100,000 CFU/ml Gram Negative Rods                                                                                       ABG - ( 24 Nov 2023 22:41 )  pH, Arterial: 7.35  pH, Blood: x     /  pCO2: 66    /  pO2: 129   / HCO3: 36    / Base Excess: 8.3   /  SaO2: 99.8                MEDICATIONS  (STANDING):  albuterol    90 MICROgram(s) HFA Inhaler 2 Puff(s) Inhalation four times a day  albuterol/ipratropium for Nebulization 3 milliLiter(s) Nebulizer every 4 hours  cefepime   IVPB 1000 milliGRAM(s) IV Intermittent every 12 hours  enoxaparin Injectable 70 milliGRAM(s) SubCutaneous every 12 hours  furosemide   Injectable 60 milliGRAM(s) IV Push two times a day  gabapentin 100 milliGRAM(s) Oral three times a day  methylPREDNISolone sodium succinate Injectable 40 milliGRAM(s) IV Push daily  metoprolol tartrate 25 milliGRAM(s) Oral two times a day  pantoprazole    Tablet 40 milliGRAM(s) Oral before breakfast  senna 2 Tablet(s) Oral at bedtime    MEDICATIONS  (PRN):  acetaminophen     Tablet .. 650 milliGRAM(s) Oral every 6 hours PRN Temp greater or equal to 38C (100.4F), Mild Pain (1 - 3)  aluminum hydroxide/magnesium hydroxide/simethicone Suspension 30 milliLiter(s) Oral every 4 hours PRN Dyspepsia  melatonin 3 milliGRAM(s) Oral at bedtime PRN Insomnia  ondansetron Injectable 4 milliGRAM(s) IV Push every 8 hours PRN Nausea and/or Vomiting  polyethylene glycol 3350 17 Gram(s) Oral daily PRN for constipation

## 2023-11-26 NOTE — PROGRESS NOTE ADULT - ASSESSMENT
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     HEENT: Oral care    PULMONARY:  HOB @ 45 degrees. CXR with pulm edema and effusions. BIPAP on and off and during sleep. Goal Spo2 88-92%. ABG improved. Solumedrol 40mg daily, wean down slowly. Albuterol as needed. Left pleurx catheter per daughter has not been draining; drain today. US of the right lung; may need right thoracentesis.     CARDIOVASCULAR: Lasix 40mg IV BID. Check BNP. Cardiology evaluation.     GI: GI prophylaxis.  Feeding: when off BIPAP.    RENAL:  Follow up lytes.  Correct as needed.    INFECTIOUS DISEASE: U cx positive. ID following. Abx per their recommendations. Follwo culture results.     HEMATOLOGICAL:  DVT prophylaxis: On Eliquis; Eliquis needs to be held 24-48 hour prior to thoracentesis.     ENDOCRINE:  Follow up FS.  Insulin protocol if needed.    MUSCULOSKELETAL: bedrest for now.     DNR but not DNI. Poor prognosis. Pall care eval.     SDU.

## 2023-11-26 NOTE — PATIENT PROFILE ADULT - FALL HARM RISK - HARM RISK INTERVENTIONS

## 2023-11-26 NOTE — PROGRESS NOTE ADULT - ASSESSMENT
88-year-old female PMH of HTN, Afib, OA, PVD, COPD, Anxiety, Obesity, Acute on chronic systolic congestive heart failure, H/O abdominal hysterectomy, H/O discectomy, H/O total knee replacement, bilateral pleural effusions s/p thoracentesis with a left side drain since March admitted for hypercapnic respiratory failure, worsening pleural effusions, left pleurex catheter malfunction.    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  # Pleurex catheter malfunction  CXR 11/25 bilateral opacities and effusions  s/p BIPAP on admission, now on 2L NC saturating 95-97%  Pulm eval appreciated  Procal 0.2  - Cont solumedrol 40mg qD  - Continue lasix 60 BID  - Continue duonebs PRN  - Pulm to possibly do Rt thoracentesis tomorrow if pleural effusions persist  - Cont cefepime  - ID f/u given history of Klebsiella MDR    #Suspected UTI in setting of chronic al  #Hx of Multi-drug Resistant Klebsiella Bacteriemia with Acute Cystitis,  UA w pyuria on this admission (contaminated)  s/p Avycaz q8h recently (end date 11/8 )  UCx >3 organisms  - f/u BCx    #CKD Stage 3  - Monitor    # Elevated ALP    - RuQ Sono: Grossly unremarkable with Gallbladder wall 2.9 cm.    # Chronic AFIB   - Resume metoprolol 25 BID  - Home Eliquis switched to therapeutic lovenox (Crcl >30)    # Chronic microcytic anemia with elevated RCDW  - f/u vit b12 and folate    DVT PPX, Lovenox    #Progress Note Handoff  Pending (specify): Pulm f/u, cont IV steroids and IV lasix. wean o2 as tolearted  Family discussion: jose c pt regarding tx for COPD exacerbation and eval for pleural effusions  Disposition: NH   88-year-old female PMH of HTN, Afib, OA, PVD, COPD, Anxiety, Obesity, Acute on chronic systolic congestive heart failure, H/O abdominal hysterectomy, H/O discectomy, H/O total knee replacement, bilateral pleural effusions s/p thoracentesis with a left side drain since March admitted for hypercapnic respiratory failure, worsening pleural effusions, left pleurex catheter malfunction.    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  # Pleurex catheter malfunction  Previous pleural effusion cytology suspicious for breast cancer. Needing outpt f/u and workup  CXR 11/25 bilateral opacities and effusions  s/p BIPAP on admission, now on 2L NC saturating 95-97%  Pulm eval appreciated  Procal 0.2  - Cont solumedrol 40mg qD  - Continue lasix 60 BID  - Continue duonebs PRN  - Pulm to possibly do Rt thoracentesis tomorrow if pleural effusions persist  - Cont cefepime  - ID f/u given history of Klebsiella MDR    #Suspected UTI in setting of chronic al  #Hx of Multi-drug Resistant Klebsiella Bacteriemia with Acute Cystitis,  UA w pyuria on this admission (contaminated)  s/p Avycaz q8h recently (end date 11/8 )  UCx >3 organisms  - f/u BCx    #CKD Stage 3  - Monitor    # Elevated ALP    - RuQ Sono: Grossly unremarkable with Gallbladder wall 2.9 cm.    # Chronic AFIB   - Resume metoprolol 25 BID  - Home Eliquis switched to therapeutic lovenox (Crcl >30)    # Chronic microcytic anemia with elevated RCDW  - f/u vit b12 and folate    DVT PPX, Lovenox    #Progress Note Handoff  Pending (specify): Pulm f/u, cont IV steroids and IV lasix. wean o2 as tolearted  Family discussion: jose c pt regarding tx for COPD exacerbation and eval for pleural effusions  Disposition: NH

## 2023-11-26 NOTE — PROGRESS NOTE ADULT - SUBJECTIVE AND OBJECTIVE BOX
GIOVANNY BUCK  88y, Female  Allergy: penicillin (Other)  contrast media (iodine-based) (Other)  codeine (Other)  sulfa drugs (Hives; Other)  aspirin (Other)    Hospital Day: 2d    Patient seen and examined. No acute events overnight    PMH/PSH:  PAST MEDICAL & SURGICAL HISTORY:  HTN (hypertension)      Afib  s/p ablation 2001      Goiter  no meds      Cellulitis  chronic      OA (osteoarthritis)      PVD (peripheral vascular disease)      COPD (chronic obstructive pulmonary disease)      Anxiety      Obesity      Acute on chronic systolic congestive heart failure      Edema of both lower legs      Required emergent intubation      H/O abdominal hysterectomy      H/O discectomy      H/O total knee replacement, bilateral          VITALS:  T(F): 97.6 (11-26-23 @ 08:12), Max: 98.7 (11-26-23 @ 06:30)  HR: 108 (11-26-23 @ 08:12)  BP: 154/85 (11-26-23 @ 08:12) (109/71 - 159/89)  RR: 19 (11-26-23 @ 08:12)  SpO2: 97% (11-26-23 @ 08:12)    TESTS & MEASUREMENTS:  Weight (Kg):   BMI (kg/m2): 25.1 (11-24)                          10.8   9.71  )-----------( 226      ( 25 Nov 2023 06:42 )             37.2       11-25    145  |  102  |  26<H>  ----------------------------<  108<H>  4.9   |  25  |  1.2    Ca    9.1      25 Nov 2023 06:42  Mg     1.6     11-25    TPro  6.5  /  Alb  2.9<L>  /  TBili  0.4  /  DBili  x   /  AST  10  /  ALT  <5  /  AlkPhos  174<H>  11-25    LIVER FUNCTIONS - ( 25 Nov 2023 06:42 )  Alb: 2.9 g/dL / Pro: 6.5 g/dL / ALK PHOS: 174 U/L / ALT: <5 U/L / AST: 10 U/L / GGT: x                 Culture - Urine (collected 11-24-23 @ 14:35)  Source: Clean Catch Clean Catch (Midstream)  Final Report (11-26-23 @ 12:04):    >=3 organisms. Probable collection contamination.      Urinalysis Basic - ( 25 Nov 2023 06:42 )    Color: x / Appearance: x / SG: x / pH: x  Gluc: 108 mg/dL / Ketone: x  / Bili: x / Urobili: x   Blood: x / Protein: x / Nitrite: x   Leuk Esterase: x / RBC: x / WBC x   Sq Epi: x / Non Sq Epi: x / Bacteria: x        RADIOLOGY & ADDITIONAL TESTS:    RECENT DIAGNOSTIC ORDERS:  Pro-Brain Natriuretic Peptide: AM Sched. Collection: 27-Nov-2023 04:30 (11-26-23 @ 12:04)  Magnesium: AM Sched. Collection: 27-Nov-2023 04:30 (11-26-23 @ 10:20)  Comprehensive Metabolic Panel: AM Sched. Collection: 27-Nov-2023 04:30 (11-26-23 @ 10:20)  Complete Blood Count + Automated Diff: AM Sched. Collection: 27-Nov-2023 04:30 (11-26-23 @ 10:20)  Diet, Pureed:   DASH/TLC Sodium & Cholesterol Restricted (11-25-23 @ 19:00)      MEDICATIONS:  MEDICATIONS  (STANDING):  albuterol    90 MICROgram(s) HFA Inhaler 2 Puff(s) Inhalation four times a day  albuterol/ipratropium for Nebulization 3 milliLiter(s) Nebulizer every 4 hours  cefepime   IVPB 1000 milliGRAM(s) IV Intermittent every 12 hours  enoxaparin Injectable 70 milliGRAM(s) SubCutaneous every 12 hours  furosemide   Injectable 60 milliGRAM(s) IV Push two times a day  gabapentin 100 milliGRAM(s) Oral three times a day  methylPREDNISolone sodium succinate Injectable 40 milliGRAM(s) IV Push daily  metoprolol tartrate 25 milliGRAM(s) Oral two times a day  pantoprazole    Tablet 40 milliGRAM(s) Oral before breakfast  senna 2 Tablet(s) Oral at bedtime    MEDICATIONS  (PRN):  acetaminophen     Tablet .. 650 milliGRAM(s) Oral every 6 hours PRN Temp greater or equal to 38C (100.4F), Mild Pain (1 - 3)  aluminum hydroxide/magnesium hydroxide/simethicone Suspension 30 milliLiter(s) Oral every 4 hours PRN Dyspepsia  melatonin 3 milliGRAM(s) Oral at bedtime PRN Insomnia  ondansetron Injectable 4 milliGRAM(s) IV Push every 8 hours PRN Nausea and/or Vomiting  polyethylene glycol 3350 17 Gram(s) Oral daily PRN for constipation      HOME MEDICATIONS:  budesonide-formoterol 80 mcg-4.5 mcg/inh inhalation aerosol (11-24)  Eliquis 5 mg oral tablet (11-24)  furosemide 20 mg oral tablet (11-24)  gabapentin 100 mg oral tablet (11-24)  metoprolol tartrate 25 mg oral tablet (11-24)  polyethylene glycol 3350 oral powder for reconstitution (11-24)  Senna 8.6 mg oral tablet (11-24)  tiotropium 18 mcg inhalation capsule (11-24)      REVIEW OF SYSTEMS:  All other review of systems is negative unless indicated above.     PHYSICAL EXAM:  PHYSICAL EXAM:  GENERAL: NAD  HEAD:  Atraumatic, Normocephalic  NECK: Supple, No JVD  CHEST/LUNG: Bibasilar crackles  HEART: Regular rate and rhythm; No murmurs, rubs, or gallops  ABDOMEN: Soft, Nontender, Nondistended; Bowel sounds present  EXTREMITIES:  2+ Peripheral Pulses, No clubbing, cyanosis, or edema  SKIN: No rashes or lesions

## 2023-11-26 NOTE — PATIENT PROFILE ADULT - FUNCTIONAL ASSESSMENT - BASIC MOBILITY 6.
1-calculated by average/Not able to assess (calculate score using [facility] averaging method)

## 2023-11-27 NOTE — CONSULT NOTE ADULT - SUBJECTIVE AND OBJECTIVE BOX
HPI:  88 year old female with PMH of HTN, Afib, OA, PVD, COPD, Anxiety, Obesity, Acute on chronic systolic congestive heart failure, H/O abdominal hysterectomy, H/O discectomy, H/O total knee replacement, bilateral pleural effusions s/p thoracentesis with a left side drain since March as per daughter, presents sent in by NH for altered mental status for the since yesterday morning, which has been worsening. She was recently discharged on Avycaz for UTI. As per daughter the patient had 2 intubations in the past for copd exacerbation. The left side drain has no output. The daughter mentioned that patient has been mentioning burning of urination and pain in the back( which is attributed to her sacral ulcer).  Denies any recent fevers, chills, N/V/D, headaches, chest pain, SOB.    Vital Signs Last 24 Hrs  T(C): 36.3 (24 Nov 2023 16:12), Max: 37.1 (24 Nov 2023 14:35)  T(F): 97.4 (24 Nov 2023 16:12), Max: 98.8 (24 Nov 2023 14:35)  HR: 104 (24 Nov 2023 16:12) (104 - 110)  BP: 102/67 (24 Nov 2023 16:12) (102/67 - 113/66)  RR: 20 (24 Nov 2023 16:12) (20 - 24)  SpO2: 96% (24 Nov 2023 16:37) (96% - 99%)  O2 Parameters below as of 24 Nov 2023 16:12  Patient On (Oxygen Delivery Method): nasal cannula  O2 Flow (L/min): 3 and BiPAP       (24 Nov 2023 16:57)    Patient on bipap, somnolent. Discussed GOC with patient's daughter Karishma.     PERTINENT PM/SXH:   HTN (hypertension)    Afib    Goiter    Cellulitis    OA (osteoarthritis)    PVD (peripheral vascular disease)    COPD (chronic obstructive pulmonary disease)    Anxiety    Obesity    Acute on chronic systolic congestive heart failure    Lymphatic edema    Edema of both lower legs    Required emergent intubation      H/O abdominal hysterectomy    H/O discectomy    H/O total knee replacement, bilateral      FAMILY HISTORY:    ITEMS NOT CHECKED ARE NOT PRESENT    SOCIAL HISTORY:   Significant other/partner[ ]  Children[ ]  Sabianist/Spirituality:  Substance hx:  [ ]   Tobacco hx:  [ ]   Alcohol hx: [ ]   Living Situation: [ ]Home  [ ]Long term care  [ ]Rehab [ ]Other  Home Services: [ ] HHA [ ] Eli RN [ ] Hospice  Occupation:  Home Opioid hx:  [ ] Y [ ] N [ ] I-Stop Reference No:     ADVANCE DIRECTIVES:    [ ] Full Code [x ] DNR  MOLST  [ ]  Living Will  [ ]   DECISION MAKER(s):  [x ] Health Care Proxy(s)  [ ] Surrogate(s)  [ ] Guardian           Name(s): Karishma Pearson    BASELINE (I)ADL(s) (prior to admission):  [place]: [ ]Total  [ ] Moderate [ ]Dependent  Palliative Performance Status Version 2:         %    http://npcrc.org/files/news/palliative_performance_scale_ppsv2.pdf    Allergies    penicillin (Other)  contrast media (iodine-based) (Other)  codeine (Other)  sulfa drugs (Hives; Other)  aspirin (Other)    Intolerances    MEDICATIONS  (STANDING):  albuterol    90 MICROgram(s) HFA Inhaler 2 Puff(s) Inhalation four times a day  albuterol/ipratropium for Nebulization 3 milliLiter(s) Nebulizer every 4 hours  cefepime   IVPB 1000 milliGRAM(s) IV Intermittent every 12 hours  chlorhexidine 2% Cloths 1 Application(s) Topical <User Schedule>  gabapentin 100 milliGRAM(s) Oral three times a day  methylPREDNISolone sodium succinate Injectable 40 milliGRAM(s) IV Push daily  metoprolol tartrate 25 milliGRAM(s) Oral two times a day  pantoprazole    Tablet 40 milliGRAM(s) Oral before breakfast  senna 2 Tablet(s) Oral at bedtime    MEDICATIONS  (PRN):  acetaminophen     Tablet .. 650 milliGRAM(s) Oral every 6 hours PRN Temp greater or equal to 38C (100.4F), Mild Pain (1 - 3)  aluminum hydroxide/magnesium hydroxide/simethicone Suspension 30 milliLiter(s) Oral every 4 hours PRN Dyspepsia  melatonin 3 milliGRAM(s) Oral at bedtime PRN Insomnia  ondansetron Injectable 4 milliGRAM(s) IV Push every 8 hours PRN Nausea and/or Vomiting  polyethylene glycol 3350 17 Gram(s) Oral daily PRN for constipation      PRESENT SYMPTOMS: [ ]Unable to obtain due to poor mentation   Source if other than patient:  [ ]Family   [ ]Team     Pain: [ ]yes [x ]no  QOL impact -   Location -                    Aggravating factors -  Alleviating factors -   Quality -  Radiation -  Timing-  Severity (0-10 scale):  Minimal acceptable level (0-10 scale):     CPOT:    https://www.[email or web address]/getattachment/[email or web address]/Critical-Care-Pain-Observation-Tool-(CPOT)    PAIN AD Score:   http://geriatrictoolkit.[facility]/cog/painad.pdf     Dyspnea:                           [x ]None[ ]Mild [ ]Moderate [ ]Severe     Respiratory Distress Observation Scale (RDOS):   A score of 0 to 2 signifies little or no respiratory distress, 3 signifies mild distress, scores 4 to 6 indicate moderate distress, and scores greater than 7 signify severe distress  https://[email or web address]/sites/default/files/PDFS/683573-rojdxduwzem-rhjxqsph-pocabyjsawq-ntnyg.pdf    Anxiety:                             [ ]None[ ]Mild [ ]Moderate [ ]Severe   Fatigue:                             [ ]None[ ]Mild [ ]Moderate [ ]Severe   Nausea:                             [ ]None[ ]Mild [ ]Moderate [ ]Severe   Loss of appetite:              [ ]None[ ]Mild [ ]Moderate [ ]Severe   Constipation:                    [ ]None[ ]Mild [ ]Moderate [ ]Severe    Other Symptoms:  [ ]All other review of systems negative     Palliative Performance Status Version 2:         %    http://[facility].org/files/news/palliative_performance_scale_ppsv2.pdf  PHYSICAL EXAM:  Vital Signs Last 24 Hrs  T(C): 36.2 (27 Nov 2023 11:50), Max: 36.7 (26 Nov 2023 19:26)  T(F): 97.1 (27 Nov 2023 11:50), Max: 98 (26 Nov 2023 19:26)  HR: 94 (27 Nov 2023 11:50) (89 - 112)  BP: 100/55 (27 Nov 2023 11:50) (100/55 - 111/75)  BP(mean): 70 (27 Nov 2023 11:50) (70 - 82)  RR: 18 (27 Nov 2023 11:50) (18 - 20)  SpO2: 100% (27 Nov 2023 11:50) (97% - 100%)    Parameters below as of 27 Nov 2023 11:50  Patient On (Oxygen Delivery Method): nasal cannula     I&O's Summary    26 Nov 2023 07:01  -  27 Nov 2023 07:00  --------------------------------------------------------  IN: 0 mL / OUT: 575 mL / NET: -575 mL    27 Nov 2023 07:01  -  27 Nov 2023 16:09  --------------------------------------------------------  IN: 0 mL / OUT: 500 mL / NET: -500 mL        GENERAL:  NAD   EYES: EOMI, no scleral icterus  ENMT:  No external oral lesions  CARDIOVASCULAR:  RRR  PULMONARY:  Non labored breathing  GASTROINTESTINAL: Non distended  NEUROLOGIC: Grossly intact. Somnolent  BEHAVIORAL/PSYCH:  Calm.  SKIN: No rash on exposed skin    CRITICAL CARE:  [ ] Shock Present  [ ]Septic [ ]Cardiogenic [ ]Neurologic [ ]Hypovolemic  [ ]  Vasopressors [ ]  Inotropes   [ ]Respiratory failure present [ ]Mechanical ventilation [ ]Non-invasive ventilatory support [ ]High flow  [ ]Acute  [ ]Chronic [ ]Hypoxic  [ ]Hypercarbic [ ]Other  [ ]Other organ failure     LABS: reviewed by me                        9.7    10.11 )-----------( 210      ( 27 Nov 2023 06:07 )             33.1   11-27    143  |  100  |  34<H>  ----------------------------<  108<H>  4.4   |  35<H>  |  1.1    Ca    9.0      27 Nov 2023 06:07  Mg     1.8     11-27    TPro  6.4  /  Alb  3.4<L>  /  TBili  0.3  /  DBili  x   /  AST  12  /  ALT  <5  /  AlkPhos  152<H>  11-27      Urinalysis Basic - ( 27 Nov 2023 06:07 )    Color: x / Appearance: x / SG: x / pH: x  Gluc: 108 mg/dL / Ketone: x  / Bili: x / Urobili: x   Blood: x / Protein: x / Nitrite: x   Leuk Esterase: x / RBC: x / WBC x   Sq Epi: x / Non Sq Epi: x / Bacteria: x      RADIOLOGY & ADDITIONAL STUDIES: reviewed by me  CXR 11/27  No pneumothorax.    Bilateral opacities/effusions, unchanged    PROTEIN CALORIE MALNUTRITION PRESENT: [ ]mild [ ]moderate [ ]severe [ ]underweight [ ]morbid obesity  https://www.andeal.org/vault/2440/web/files/ONC/Table_Clinical%20Characteristics%20to%20Document%20Malnutrition-White%20JV%20et%20al%202012.pdf    Height (cm): 170.2 (11-24-23 @ 12:16), 170.2 (10-23-23 @ 12:51), 170.2 (09-28-23 @ 17:04)  Weight (kg): 72.6 (11-24-23 @ 12:16), 80.2 (10-23-23 @ 12:51), 88.3 (09-28-23 @ 17:04)  BMI (kg/m2): 25.1 (11-24-23 @ 12:16), 27.7 (10-23-23 @ 12:51), 30.5 (09-28-23 @ 17:04)    [ ]PPSV2 < or = to 30% [ ]significant weight loss  [ ]poor nutritional intake  [ ]anasarca      [ ]Artificial Nutrition      Palliative Care Spiritual/Emotional Screening Tool Question  Severity (0-4):                    OR                    [ x] Unable to determine. Will assess at later time if appropriate.  Score of 2 or greater indicates recommendation of Chaplaincy and/or SW referral  Chaplaincy Referral: [ ] Yes [ ] Refused [ ] Following     Caregiver [place]:  [ ] Yes [ ] No    OR    [x ] Unable to determine. Will assess at later time if appropriate.  Social Work Referral [ ]  Patient and Family Centered Care Referral [ ]    Anticipatory Grief Present: [ ] Yes [ ] No    OR     [ x] Unable to determine. Will assess at later time if appropriate.  Social Work Referral [ ]  Patient and Family Centered Care Referral [ ]    REFERRALS:   [ ]Chaplaincy  [ ]Hospice  [ ]Child Life  [ ]Social Work  [ ]Case management [ ]Holistic Therapy     Palliative care education provided to patient and/or family    _____________  He Mario Seals MD  Palliative Medicine  [facility]   of Geriatric and Palliative Medicine  [phone]

## 2023-11-27 NOTE — PROGRESS NOTE ADULT - ASSESSMENT
88 year old female with PMH of HTN, Afib, OA, PVD, COPD, Anxiety, Obesity, Acute on chronic systolic congestive heart failure, H/O abdominal hysterectomy, H/O discectomy, H/O total knee replacement, bilateral pleural effusions s/p thoracentesis with a left side drain since March as per daughter, presents sent in by NH for altered mental status for the since yesterday morning, which has been worsening. She was recently discharged on Avycaz for UTI. As per daughter the patient had 2 intubations in the past for copd exacerbation. The left side drain has no output. The daughter mentioned that patient has been mentioning burning of urination and pain in the back( which is attributed to her sacral ulcer).        # Hypercapnic respiratory failure on BiPAP.  COPD exacerbation secondary to pneumonia rule out pulmonary edema  # Not septic on admission  -  RVP negative   - CXR/CT chest: Bilateral opacities and pleural effusions, increased from prior.  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  -Currently of lasix   - follows with Dr Pacheco    # Hx of persistent leucocytosis  - resolved  - not sure if work up was done    # Elevated ALP    - RuQ Sono: Grossly unremarkable with Gallbladder wall 2.9 cm.      # Chronic AFIB   - holding  BB  eliquis stopped  and switched to enoxaparin   Enoxaparin stopped for pleural tap done today         # Hypomagnesemia  - repleted    DVT PPX: to be restarted on therapeutic lovenox   GI PPX: PPI  Diet: puree  Code Status: DNR/ intubate  Dispo: Acute

## 2023-11-27 NOTE — CONSULT NOTE ADULT - SUBJECTIVE AND OBJECTIVE BOX
Patient is a 88y old  Female who presents with a chief complaint of COPD (27 Nov 2023 16:08)      HPI:  88 year old female with PMH of HTN, Afib, OA, PVD, COPD, Anxiety, Obesity, Acute on chronic systolic congestive heart failure, H/O abdominal hysterectomy, H/O discectomy, H/O total knee replacement, bilateral pleural effusions s/p thoracentesis with a left side drain since March as per daughter, presents sent in by NH for altered mental status for the since yesterday morning, which has been worsening. She was recently discharged on Avycaz for UTI. As per daughter the patient had 2 intubations in the past for copd exacerbation. The left side drain has no output. The daughter mentioned that patient has been mentioning burning of urination and pain in the back( which is attributed to her sacral ulcer).  Denies any recent fevers, chills, N/V/D, headaches, chest pain, SOB.    Vital Signs Last 24 Hrs  T(C): 36.3 (24 Nov 2023 16:12), Max: 37.1 (24 Nov 2023 14:35)  T(F): 97.4 (24 Nov 2023 16:12), Max: 98.8 (24 Nov 2023 14:35)  HR: 104 (24 Nov 2023 16:12) (104 - 110)  BP: 102/67 (24 Nov 2023 16:12) (102/67 - 113/66)  RR: 20 (24 Nov 2023 16:12) (20 - 24)  SpO2: 96% (24 Nov 2023 16:37) (96% - 99%)  O2 Parameters below as of 24 Nov 2023 16:12  Patient On (Oxygen Delivery Method): nasal cannula  O2 Flow (L/min): 3 and BiPAP       (24 Nov 2023 16:57)      PAST MEDICAL & SURGICAL HISTORY:  HTN (hypertension)      Afib  s/p ablation 2001      Goiter  no meds      Cellulitis  chronic      OA (osteoarthritis)      PVD (peripheral vascular disease)      COPD (chronic obstructive pulmonary disease)      Anxiety      Obesity      Acute on chronic systolic congestive heart failure      Edema of both lower legs      Required emergent intubation      H/O abdominal hysterectomy      H/O discectomy      H/O total knee replacement, bilateral          PREVIOUS DIAGNOSTIC TESTING:      ECHO  FINDINGS:    STRESS  FINDINGS:    CATHETERIZATION  FINDINGS:    MEDICATIONS  (STANDING):  albuterol    90 MICROgram(s) HFA Inhaler 2 Puff(s) Inhalation four times a day  albuterol/ipratropium for Nebulization 3 milliLiter(s) Nebulizer every 4 hours  cefepime   IVPB 1000 milliGRAM(s) IV Intermittent every 12 hours  chlorhexidine 2% Cloths 1 Application(s) Topical <User Schedule>  gabapentin 100 milliGRAM(s) Oral three times a day  methylPREDNISolone sodium succinate Injectable 40 milliGRAM(s) IV Push daily  metoprolol tartrate 25 milliGRAM(s) Oral two times a day  pantoprazole    Tablet 40 milliGRAM(s) Oral before breakfast  senna 2 Tablet(s) Oral at bedtime    MEDICATIONS  (PRN):  acetaminophen     Tablet .. 650 milliGRAM(s) Oral every 6 hours PRN Temp greater or equal to 38C (100.4F), Mild Pain (1 - 3)  aluminum hydroxide/magnesium hydroxide/simethicone Suspension 30 milliLiter(s) Oral every 4 hours PRN Dyspepsia  melatonin 3 milliGRAM(s) Oral at bedtime PRN Insomnia  ondansetron Injectable 4 milliGRAM(s) IV Push every 8 hours PRN Nausea and/or Vomiting  polyethylene glycol 3350 17 Gram(s) Oral daily PRN for constipation      FAMILY HISTORY:      SOCIAL HISTORY:  CIGARETTES:    ALCOHOL:    REVIEW OF SYSTEMS:  CONSTITUTIONAL: No fever, weight loss, or fatigue  NECK: No pain or stiffness  RESPIRATORY: No cough, wheezing, chills or hemoptysis; No shortness of breath  CARDIOVASCULAR: No chest pain, palpitations, dizziness, or leg swelling  GASTROINTESTINAL: No abdominal or epigastric pain. No nausea, vomiting, or hematemesis; No diarrhea or constipation. No melena or hematochezia.  GENITOURINARY: No dysuria, frequency, hematuria, or incontinence  NEUROLOGICAL: No headaches, memory loss, loss of strength, numbness, or tremors  SKIN: No itching, burning, rashes, or lesions   ENDOCRINE: No heat or cold intolerance; No hair loss  MUSCULOSKELETAL: No joint pain or swelling; No muscle, back, or extremity pain  HEME/LYMPH: No easy bruising, or bleeding gums          Vital Signs Last 24 Hrs  T(C): 35.7 (27 Nov 2023 16:00), Max: 36.4 (26 Nov 2023 21:05)  T(F): 96.3 (27 Nov 2023 16:00), Max: 97.5 (26 Nov 2023 21:05)  HR: 95 (27 Nov 2023 16:00) (89 - 115)  BP: 123/76 (27 Nov 2023 16:00) (100/55 - 123/76)  BP(mean): 94 (27 Nov 2023 16:00) (70 - 94)  RR: 18 (27 Nov 2023 16:00) (18 - 20)  SpO2: 96% (27 Nov 2023 16:00) (96% - 100%)    Parameters below as of 27 Nov 2023 16:00  Patient On (Oxygen Delivery Method): BiPAP/CPAP            PHYSICAL EXAM:  GENERAL: NAD, well-groomed, well-developed  HEAD:  Atraumatic, Normocephalic  NECK: Supple, No JVD, Normal thyroid  NERVOUS SYSTEM:  Alert & Oriented X3, Good concentration  CHEST/LUNG: Clear to percussion bilaterally; No rales, rhonchi, wheezing, or rubs  HEART: Regular rate and rhythm; No murmurs, rubs, or gallops  ABDOMEN: Soft, Nontender, Nondistended; Bowel sounds present  EXTREMITIES:  2+ Peripheral Pulses, No clubbing, cyanosis, or edema  SKIN: No rashes or lesions    INTERPRETATION OF TELEMETRY:    ECG:    I&O's Detail    26 Nov 2023 07:01  -  27 Nov 2023 07:00  --------------------------------------------------------  IN:  Total IN: 0 mL    OUT:    Indwelling Catheter - Urethral (mL): 575 mL  Total OUT: 575 mL    Total NET: -575 mL      27 Nov 2023 07:01  -  27 Nov 2023 20:21  --------------------------------------------------------  IN:  Total IN: 0 mL    OUT:    Indwelling Catheter - Urethral (mL): 500 mL  Total OUT: 500 mL    Total NET: -500 mL          LABS:                        9.7    10.11 )-----------( 210      ( 27 Nov 2023 06:07 )             33.1     11-27    143  |  100  |  34<H>  ----------------------------<  108<H>  4.4   |  35<H>  |  1.1    Ca    9.0      27 Nov 2023 06:07  Mg     1.8     11-27    TPro  6.4  /  Alb  3.4<L>  /  TBili  0.3  /  DBili  x   /  AST  12  /  ALT  <5  /  AlkPhos  152<H>  11-27          Urinalysis Basic - ( 27 Nov 2023 06:07 )    Color: x / Appearance: x / SG: x / pH: x  Gluc: 108 mg/dL / Ketone: x  / Bili: x / Urobili: x   Blood: x / Protein: x / Nitrite: x   Leuk Esterase: x / RBC: x / WBC x   Sq Epi: x / Non Sq Epi: x / Bacteria: x      I&O's Summary    26 Nov 2023 07:01 - 27 Nov 2023 07:00  --------------------------------------------------------  IN: 0 mL / OUT: 575 mL / NET: -575 mL    27 Nov 2023 07:01  -  27 Nov 2023 20:21  --------------------------------------------------------  IN: 0 mL / OUT: 500 mL / NET: -500 mL        RADIOLOGY & ADDITIONAL STUDIES:

## 2023-11-27 NOTE — PROGRESS NOTE ADULT - SUBJECTIVE AND OBJECTIVE BOX
SUBJECTIVE:    Patient is a 88y old Female who presents with a chief complaint of COPD (27 Nov 2023 10:55)      HPI:  88 year old female with PMH of HTN, Afib, OA, PVD, COPD, Anxiety, Obesity, Acute on chronic systolic congestive heart failure, H/O abdominal hysterectomy, H/O discectomy, H/O total knee replacement, bilateral pleural effusions s/p thoracentesis with a left side drain since March as per daughter, presents sent in by NH for altered mental status for the since yesterday morning, which has been worsening. She was recently discharged on Avycaz for UTI. As per daughter the patient had 2 intubations in the past for copd exacerbation. The left side drain has no output. The daughter mentioned that patient has been mentioning burning of urination and pain in the back( which is attributed to her sacral ulcer).  Denies any recent fevers, chills, N/V/D, headaches, chest pain, SOB.    Vital Signs Last 24 Hrs  T(C): 36.3 (24 Nov 2023 16:12), Max: 37.1 (24 Nov 2023 14:35)  T(F): 97.4 (24 Nov 2023 16:12), Max: 98.8 (24 Nov 2023 14:35)  HR: 104 (24 Nov 2023 16:12) (104 - 110)  BP: 102/67 (24 Nov 2023 16:12) (102/67 - 113/66)  RR: 20 (24 Nov 2023 16:12) (20 - 24)  SpO2: 96% (24 Nov 2023 16:37) (96% - 99%)  O2 Parameters below as of 24 Nov 2023 16:12  Patient On (Oxygen Delivery Method): nasal cannula  O2 Flow (L/min): 3 and BiPAP       (24 Nov 2023 16:57)      Currently admitted to medicine with the primary diagnosis of Altered mental status    squeezes hands but lethargic, not speaking    Besides the pertinent positives and negatives described above, the ROS was within normal limits.    PAST MEDICAL & SURGICAL HISTORY  HTN (hypertension)    Afib  s/p ablation 2001    Goiter  no meds    Cellulitis  chronic    OA (osteoarthritis)    PVD (peripheral vascular disease)    COPD (chronic obstructive pulmonary disease)    Anxiety    Obesity    Acute on chronic systolic congestive heart failure    Edema of both lower legs    Required emergent intubation    H/O abdominal hysterectomy    H/O discectomy    H/O total knee replacement, bilateral      SOCIAL HISTORY:    ALLERGIES:  penicillin (Other)  contrast media (iodine-based) (Other)  codeine (Other)  sulfa drugs (Hives; Other)  aspirin (Other)    MEDICATIONS:  STANDING MEDICATIONS  albuterol    90 MICROgram(s) HFA Inhaler 2 Puff(s) Inhalation four times a day  albuterol/ipratropium for Nebulization 3 milliLiter(s) Nebulizer every 4 hours  cefepime   IVPB 1000 milliGRAM(s) IV Intermittent every 12 hours  chlorhexidine 2% Cloths 1 Application(s) Topical <User Schedule>  gabapentin 100 milliGRAM(s) Oral three times a day  methylPREDNISolone sodium succinate Injectable 40 milliGRAM(s) IV Push daily  metoprolol tartrate 25 milliGRAM(s) Oral two times a day  pantoprazole    Tablet 40 milliGRAM(s) Oral before breakfast  senna 2 Tablet(s) Oral at bedtime    PRN MEDICATIONS  acetaminophen     Tablet .. 650 milliGRAM(s) Oral every 6 hours PRN  aluminum hydroxide/magnesium hydroxide/simethicone Suspension 30 milliLiter(s) Oral every 4 hours PRN  melatonin 3 milliGRAM(s) Oral at bedtime PRN  ondansetron Injectable 4 milliGRAM(s) IV Push every 8 hours PRN  polyethylene glycol 3350 17 Gram(s) Oral daily PRN    VITALS:   T(F): 97.1  HR: 94  BP: 100/55  RR: 18  SpO2: 100%    LABS:                        9.7    10.11 )-----------( 210      ( 27 Nov 2023 06:07 )             33.1     11-27    143  |  100  |  34<H>  ----------------------------<  108<H>  4.4   |  35<H>  |  1.1    Ca    9.0      27 Nov 2023 06:07  Mg     1.8     11-27    TPro  6.4  /  Alb  3.4<L>  /  TBili  0.3  /  DBili  x   /  AST  12  /  ALT  <5  /  AlkPhos  152<H>  11-27      Urinalysis Basic - ( 27 Nov 2023 06:07 )    Color: x / Appearance: x / SG: x / pH: x  Gluc: 108 mg/dL / Ketone: x  / Bili: x / Urobili: x   Blood: x / Protein: x / Nitrite: x   Leuk Esterase: x / RBC: x / WBC x   Sq Epi: x / Non Sq Epi: x / Bacteria: x      ABG - ( 27 Nov 2023 10:03 )  pH, Arterial: 7.32  pH, Blood: x     /  pCO2: 76    /  pO2: 106   / HCO3: 39    / Base Excess: 9.9   /  SaO2: 98.4                  Culture - Urine (collected 24 Nov 2023 14:35)  Source: Clean Catch Clean Catch (Midstream)  Final Report (26 Nov 2023 12:04):    >=3 organisms. Probable collection contamination.          Altered mental status      RADIOLOGY:    PHYSICAL EXAM:  General: WN/WD NAD  Neurology: A&Ox0, nonfocal, squeezes hands, lethargic  Head:  Normocephalic, atraumatic  ENT:  Mucosa moist, no ulcerations  Neck:  Supple, no sinuses or palpable masses  Lymphatic:  No palpable cervical, supraclavicular, axillary or inguinal adenopathy  Respiratory: CTA B/L  CV: RRR, S1S2, no murmur  Abdominal: Soft, NT, ND no palpable mass  MSK: No edema, + peripheral pulses  Incisions: intact, no erythema or drainage    Intravenous access: yes  NG tube: no  Ba Catheter:   Indwelling Urethral Catheter:     Connect To:  Straight Drainage/[place]    Indication:  Urinary Retention / Obstruction (11-27-23 @ 06:42) (not performed) [Active]

## 2023-11-27 NOTE — CONSULT NOTE ADULT - CONVERSATION DETAILS
Patient known to the palliative care service. Discussed GOC with patient's daughter Karishma. She reports that goals are unchanged. Patient is DNR, but agreeable to trial of intubation because she has been successfully extubated in the past. If she is not able to get better, she would not want trach/PEG.

## 2023-11-27 NOTE — PROGRESS NOTE ADULT - SUBJECTIVE AND OBJECTIVE BOX
Cardiac Catheterization Procedure Note   Patient: Chana Shi  MRN: [id number]  SSN: [id number]   YOB: 1944 Age: 68 y.o. Sex: female    Date of Procedure: 8/13/2018   Pre-procedure Diagnosis: NSTEMI  Post-procedure Diagnosis: Coronary Artery Disease  Procedure: Left Heart Cath, PCI of OM2  Access: Rt radial  :  Dr. Magdaleno Rudd MD    Assistant(s):  None  Anesthesia: Moderate Sedation   Estimated Blood Loss: Less than 10 mL   Specimens Removed: None  Findings:     LM ok  LAD mild proximal disease  LCx - large dominant vessel.  OM2 99% proximal stenosis (culpit)  Rca - small, non-dom, no sign disease    PCI: direct stented w [id number] Resolute Robert, excellent result    Complications: None   Implants:  DESx1  Signed by:  Magdaleno Rudd MD  8/13/2018  7:37 AM   GIOVANNY BUCK  88y, Female    All available historical data reviewed    OVERNIGHT EVENTS:  no fevers  poorly communicative      ROS:  unable to obtain history secondary to patient's mental status     VITALS:  T(F): 96.7, Max: 98 (11-26-23 @ 19:26)  HR: 89  BP: 104/66  RR: 18Vital Signs Last 24 Hrs  T(C): 35.9 (27 Nov 2023 08:33), Max: 36.7 (26 Nov 2023 19:26)  T(F): 96.7 (27 Nov 2023 08:33), Max: 98 (26 Nov 2023 19:26)  HR: 89 (27 Nov 2023 08:33) (89 - 112)  BP: 104/66 (27 Nov 2023 08:33) (101/72 - 123/72)  BP(mean): 80 (27 Nov 2023 08:33) (78 - 82)  RR: 18 (27 Nov 2023 08:33) (18 - 20)  SpO2: 99% (27 Nov 2023 08:33) (97% - 100%)    Parameters below as of 27 Nov 2023 08:33  Patient On (Oxygen Delivery Method): nasal cannula        TESTS & MEASUREMENTS:                        9.7    10.11 )-----------( 210      ( 27 Nov 2023 06:07 )             33.1     11-27    143  |  100  |  34<H>  ----------------------------<  108<H>  4.4   |  35<H>  |  1.1    Ca    9.0      27 Nov 2023 06:07  Mg     1.8     11-27    TPro  6.4  /  Alb  3.4<L>  /  TBili  0.3  /  DBili  x   /  AST  12  /  ALT  <5  /  AlkPhos  152<H>  11-27    LIVER FUNCTIONS - ( 27 Nov 2023 06:07 )  Alb: 3.4 g/dL / Pro: 6.4 g/dL / ALK PHOS: 152 U/L / ALT: <5 U/L / AST: 12 U/L / GGT: x             Culture - Urine (collected 11-24-23 @ 14:35)  Source: Clean Catch Clean Catch (Midstream)  Final Report (11-26-23 @ 12:04):    >=3 organisms. Probable collection contamination.      Urinalysis Basic - ( 27 Nov 2023 06:07 )    Color: x / Appearance: x / SG: x / pH: x  Gluc: 108 mg/dL / Ketone: x  / Bili: x / Urobili: x   Blood: x / Protein: x / Nitrite: x   Leuk Esterase: x / RBC: x / WBC x   Sq Epi: x / Non Sq Epi: x / Bacteria: x          RADIOLOGY & ADDITIONAL TESTS:  Personal review of radiological diagnostics performed  Echo and EKG results noted when applicable.     MEDICATIONS:  acetaminophen     Tablet .. 650 milliGRAM(s) Oral every 6 hours PRN  albuterol    90 MICROgram(s) HFA Inhaler 2 Puff(s) Inhalation four times a day  albuterol/ipratropium for Nebulization 3 milliLiter(s) Nebulizer every 4 hours  aluminum hydroxide/magnesium hydroxide/simethicone Suspension 30 milliLiter(s) Oral every 4 hours PRN  cefepime   IVPB 1000 milliGRAM(s) IV Intermittent every 12 hours  gabapentin 100 milliGRAM(s) Oral three times a day  melatonin 3 milliGRAM(s) Oral at bedtime PRN  methylPREDNISolone sodium succinate Injectable 40 milliGRAM(s) IV Push daily  metoprolol tartrate 25 milliGRAM(s) Oral two times a day  ondansetron Injectable 4 milliGRAM(s) IV Push every 8 hours PRN  pantoprazole    Tablet 40 milliGRAM(s) Oral before breakfast  polyethylene glycol 3350 17 Gram(s) Oral daily PRN  senna 2 Tablet(s) Oral at bedtime      ANTIBIOTICS:  cefepime   IVPB 1000 milliGRAM(s) IV Intermittent every 12 hours

## 2023-11-27 NOTE — PROGRESS NOTE ADULT - ASSESSMENT
88 year old female with PMH of HTN, Afib, OA, PVD, COPD, Anxiety, Obesity, Acute on chronic systolic congestive heart failure, H/O abdominal hysterectomy, H/O discectomy, H/O total knee replacement, bilateral pleural effusions s/p thoracentesis with a left side drain since March as per daughter, presents sent in by NH for altered mental status for the since yesterday morning, which has been worsening    Assessment    ·	Metabolic encephalopathy likely from CO2 narcosis from acute on chronic hypercapneic and hypoxemic respiratory failure vs. UTI  ·	Bilateral pleural effusions, currently s/p right sided thoracentesis today with 1050 mL removed, has left sided drain with no output  ·	Urinary retention, had positive UA on admission  ·	Hx of malignant pleural effusion of breast origin (as per last heme onc note)  ·	Hx of HFrEF but now recovered EF?  last echo EF 64%  ·	Severe pulm HTN  ·	Afib  ·	HTN  ·	COPD    Plan    - s/p thoracentesis today with 1L removed, ABG done hypercapnea improved with bipap, will do 4 on 4 off and at night // c/w lasix 40 iv qd, f/u cardio, repeat chest xray shows improvement on right side, c/w solumedrol 40 iv qd  - c/w al as patient retaining, having some suprapubic tenderness but urine culture shows contamination, c/w cefepime for now as per ID  - lopressor 25 bid, restart AC post procedure as per cc  - c/w duonebs    Pending: further improvement in mental status, following chest xrays, needing IV abx and solumedrol, eventual TOV    # DVT PPX: lovenox  DNR but intubate if necessary

## 2023-11-27 NOTE — PROGRESS NOTE ADULT - SUBJECTIVE AND OBJECTIVE BOX
24H events:    Patient is a 88y old Female who presents with a chief complaint of COPD (27 Nov 2023 09:35)    Primary diagnosis of Altered mental status      Today is hospital day 3d. This morning patient was seen and examined at bedside, resting comfortably in bed.    No acute or major events overnight.      PAST MEDICAL & SURGICAL HISTORY  HTN (hypertension)    Afib  s/p ablation 2001    Goiter  no meds    Cellulitis  chronic    OA (osteoarthritis)    PVD (peripheral vascular disease)    COPD (chronic obstructive pulmonary disease)    Anxiety    Obesity    Acute on chronic systolic congestive heart failure    Edema of both lower legs    Required emergent intubation    H/O abdominal hysterectomy    H/O discectomy    H/O total knee replacement, bilateral      SOCIAL HISTORY:  Social History:      ALLERGIES:  penicillin (Other)  contrast media (iodine-based) (Other)  codeine (Other)  sulfa drugs (Hives; Other)  aspirin (Other)    MEDICATIONS:  STANDING MEDICATIONS  albuterol    90 MICROgram(s) HFA Inhaler 2 Puff(s) Inhalation four times a day  albuterol/ipratropium for Nebulization 3 milliLiter(s) Nebulizer every 4 hours  cefepime   IVPB 1000 milliGRAM(s) IV Intermittent every 12 hours  chlorhexidine 2% Cloths 1 Application(s) Topical <User Schedule>  gabapentin 100 milliGRAM(s) Oral three times a day  methylPREDNISolone sodium succinate Injectable 40 milliGRAM(s) IV Push daily  metoprolol tartrate 25 milliGRAM(s) Oral two times a day  pantoprazole    Tablet 40 milliGRAM(s) Oral before breakfast  senna 2 Tablet(s) Oral at bedtime    PRN MEDICATIONS  acetaminophen     Tablet .. 650 milliGRAM(s) Oral every 6 hours PRN  aluminum hydroxide/magnesium hydroxide/simethicone Suspension 30 milliLiter(s) Oral every 4 hours PRN  melatonin 3 milliGRAM(s) Oral at bedtime PRN  ondansetron Injectable 4 milliGRAM(s) IV Push every 8 hours PRN  polyethylene glycol 3350 17 Gram(s) Oral daily PRN    VITALS:   T(F): 96.7  HR: 89  BP: 104/66  RR: 18  SpO2: 99%    PHYSICAL EXAM:  GENERAL:   (  ) NAD, lying in bed comfortably     ( x ) obtunded     (  ) lethargic     (  ) somnolent    HEAD:   (x) Atraumatic     (  ) hematoma     (  ) laceration (specify location:       )     NECK:  ( x ) Supple     (  ) neck stiffness     (  ) nuchal rigidity     (  )  no JVD     (  ) JVD present ( -- cm)    HEART:  Rate -->     ( x ) normal rate     (  ) bradycardic     (  ) tachycardic  Rhythm -->     (  ) regular     (  ) regularly irregular     ( x ) irregularly irregular  Murmurs -->     (  x) normal s1s2     (  ) systolic murmur     (  ) diastolic murmur     (  ) continuous murmur      (  ) S3 present     (  ) S4 present    LUNGS:   (x  )Unlabored respirations     (  ) tachypnea  (  ) B/L air entry     (x  ) decreased breath sounds in left base   ( x ) no adventitious sound     (  ) crackles     (  ) wheezing      (  ) rhonchi      (specify location:       )  (  ) chest wall tenderness (specify location:       )    ABDOMEN:   (x  ) Soft     (  ) tense   |   (x  ) nondistended     (  ) distended   |   (x  ) +BS     (  ) hypoactive bowel sounds     (  ) hyperactive bowel sounds  (x  ) nontender     (  ) RUQ tenderness     (  ) RLQ tenderness     (  ) LLQ tenderness     (  ) epigastric tenderness     (  ) diffuse tenderness  (  ) Splenomegaly      (  ) Hepatomegaly      (  ) Jaundice     (  ) ecchymosis     EXTREMITIES:  ( x ) Normal     (  ) Rash     (  ) ecchymosis     (  ) varicose veins      (  ) pitting edema     (  ) non-pitting edema   (  ) ulceration     (  ) gangrene:     (location:     )    NERVOUS SYSTEM:    (  ) A&Ox3     (  ) confused     ( x ) lethargic  further neuro exam could not be done due to the patient state     SKIN:   ( x ) No rashes or lesions     (  ) maculopapular rash     (  ) pustules     (  ) vesicles     (  ) ulcer     (  ) ecchymosis     (specify location:     )    AMPAC score:        LABS:                        9.7    10.11 )-----------( 210      ( 27 Nov 2023 06:07 )             33.1     11-27    143  |  100  |  34<H>  ----------------------------<  108<H>  4.4   |  35<H>  |  1.1    Ca    9.0      27 Nov 2023 06:07  Mg     1.8     11-27    TPro  6.4  /  Alb  3.4<L>  /  TBili  0.3  /  DBili  x   /  AST  12  /  ALT  <5  /  AlkPhos  152<H>  11-27      Urinalysis Basic - ( 27 Nov 2023 06:07 )    Color: x / Appearance: x / SG: x / pH: x  Gluc: 108 mg/dL / Ketone: x  / Bili: x / Urobili: x   Blood: x / Protein: x / Nitrite: x   Leuk Esterase: x / RBC: x / WBC x   Sq Epi: x / Non Sq Epi: x / Bacteria: x      ABG - ( 27 Nov 2023 10:03 )  pH, Arterial: 7.32  pH, Blood: x     /  pCO2: 76    /  pO2: 106   / HCO3: 39    / Base Excess: 9.9   /  SaO2: 98.4                  Culture - Urine (collected 24 Nov 2023 14:35)  Source: Clean Catch Clean Catch (Midstream)  Final Report (26 Nov 2023 12:04):    >=3 organisms. Probable collection contamination.          RADIOLOGY:    no new imaging studies

## 2023-11-27 NOTE — CONSULT NOTE ADULT - ASSESSMENT
88 year old female with PMH of HTN, Afib, OA, PVD, COPD, Anxiety, Obesity, Acute on chronic systolic congestive heart failure, H/O abdominal hysterectomy, H/O discectomy, H/O total knee replacement, bilateral pleural effusions s/p thoracentesis with a left side drain presenting with encephalopathy and respiratory failure.     Patient known to the palliative care service. Discussed GOC with patient's daughter Karishma. She reports that goals are unchanged. Patient is DNR, but agreeable to trial of intubation because she has been successfully extubated in the past. If she is not able to get better, she would not want trach/PEG.     Plan:  - patient appears comfortable  - GOC as above  - DNR; ok with trial of intubation     Palliative care will continue to follow.   Please call [phone] with questions or concerns 24/7.

## 2023-11-27 NOTE — PROGRESS NOTE ADULT - SUBJECTIVE AND OBJECTIVE BOX
Patient is a 88y old  Female who presents with a chief complaint of COPD (26 Nov 2023 12:33)        Over Night Events: no acute events overnight, on 4L [id number]%, remains lethargic does not follow commands      Vital Signs Last 24 Hrs  T(C): 35.9 (27 Nov 2023 08:33), Max: 36.7 (26 Nov 2023 19:26)  T(F): 96.7 (27 Nov 2023 08:33), Max: 98 (26 Nov 2023 19:26)  HR: 89 (27 Nov 2023 08:33) (89 - 112)  BP: 104/66 (27 Nov 2023 08:33) (101/72 - 123/72)  BP(mean): 80 (27 Nov 2023 08:33) (78 - 82)  RR: 18 (27 Nov 2023 08:33) (18 - 20)  SpO2: 99% (27 Nov 2023 08:33) (97% - 100%)    O2 Parameters below as of 27 Nov 2023 08:33  Patient On (Oxygen Delivery Method): nasal cannula            General: NAD   HEENT: KAT             Lymphatic system: No cervical LN   Lungs: Bilateral BS, decreased   Cardiovascular: Regular   Gastrointestinal: Soft, Positive BS  Extremities: No clubbing.  Moves extremities.    Skin: Warm, intact  Neurological: No motor or sensory deficit         11-26-23 @ 07:01  -  11-27-23 @ 07:00  --------------------------------------------------------  IN:  Total IN: 0 mL    OUT:    Indwelling Catheter - Urethral (mL): 575 mL  Total OUT: 575 mL    Total NET: -575 mL          LABS:                            9.7    10.11 )-----------( 210      ( 27 Nov 2023 06:07 )             33.1                                               11-27    143  |  100  |  34<H>  ----------------------------<  108<H>  4.4   |  35<H>  |  1.1    Ca    9.0      27 Nov 2023 06:07  Mg     1.8     11-27    TPro  6.4  /  Alb  3.4<L>  /  TBili  0.3  /  DBili  x   /  AST  12  /  ALT  <5  /  AlkPhos  152<H>  11-27                                             Urinalysis Basic - ( 27 Nov 2023 06:07 )    Color: x / Appearance: x / SG: x / pH: x  Gluc: 108 mg/dL / Ketone: x  / Bili: x / Urobili: x   Blood: x / Protein: x / Nitrite: x   Leuk Esterase: x / RBC: x / WBC x   Sq Epi: x / Non Sq Epi: x / Bacteria: x                                                  LIVER FUNCTIONS - ( 27 Nov 2023 06:07 )  Alb: 3.4 g/dL / Pro: 6.4 g/dL / ALK PHOS: 152 U/L / ALT: <5 U/L / AST: 12 U/L / GGT: x                                                  Culture - Urine (collected 24 Nov 2023 14:35)  Source: Clean Catch Clean Catch (Midstream)  Final Report (26 Nov 2023 12:04):    >=3 organisms. Probable collection contamination.                                                                                       ABG - ( 26 Nov 2023 17:23 )  pH, Arterial: 7.35  pH, Blood: x     /  pCO2: 72    /  pO2: 74    / HCO3: 40    / Base Excess: 11.8  /  SaO2: 96.7                MEDICATIONS  (STANDING):  albuterol    90 MICROgram(s) HFA Inhaler 2 Puff(s) Inhalation four times a day  albuterol/ipratropium for Nebulization 3 milliLiter(s) Nebulizer every 4 hours  cefepime   IVPB 1000 milliGRAM(s) IV Intermittent every 12 hours  furosemide   Injectable 60 milliGRAM(s) IV Push two times a day  gabapentin 100 milliGRAM(s) Oral three times a day  methylPREDNISolone sodium succinate Injectable 40 milliGRAM(s) IV Push daily  metoprolol tartrate 25 milliGRAM(s) Oral two times a day  pantoprazole    Tablet 40 milliGRAM(s) Oral before breakfast  senna 2 Tablet(s) Oral at bedtime    MEDICATIONS  (PRN):  acetaminophen     Tablet .. 650 milliGRAM(s) Oral every 6 hours PRN Temp greater or equal to 38C (100.4F), Mild Pain (1 - 3)  aluminum hydroxide/magnesium hydroxide/simethicone Suspension 30 milliLiter(s) Oral every 4 hours PRN Dyspepsia  melatonin 3 milliGRAM(s) Oral at bedtime PRN Insomnia  ondansetron Injectable 4 milliGRAM(s) IV Push every 8 hours PRN Nausea and/or Vomiting  polyethylene glycol 3350 17 Gram(s) Oral daily PRN for constipation      CXR reviewed      Patient is a 88y old  Female who presents with a chief complaint of COPD (26 Nov 2023 12:33)        Over Night Events: no acute events overnight, on 4L [id number]%, remains lethargic does not follow commands      Vital Signs Last 24 Hrs  T(C): 35.9 (27 Nov 2023 08:33), Max: 36.7 (26 Nov 2023 19:26)  T(F): 96.7 (27 Nov 2023 08:33), Max: 98 (26 Nov 2023 19:26)  HR: 89 (27 Nov 2023 08:33) (89 - 112)  BP: 104/66 (27 Nov 2023 08:33) (101/72 - 123/72)  BP(mean): 80 (27 Nov 2023 08:33) (78 - 82)  RR: 18 (27 Nov 2023 08:33) (18 - 20)  SpO2: 99% (27 Nov 2023 08:33) (97% - 100%)    O2 Parameters below as of 27 Nov 2023 08:33  Patient On (Oxygen Delivery Method): nasal cannula            General: ill looking  large neck mass  Lungs: Bilateral BS, decreased r side  Cardiovascular: Regular   Gastrointestinal: Soft, Positive BS  Extremities: No clubbing.  Moves extremities.    Skin: Warm, intact  Neurological: No motor or sensory deficit         11-26-23 @ 07:01  -  11-27-23 @ 07:00  --------------------------------------------------------  IN:  Total IN: 0 mL    OUT:    Indwelling Catheter - Urethral (mL): 575 mL  Total OUT: 575 mL    Total NET: -575 mL          LABS:                            9.7    10.11 )-----------( 210      ( 27 Nov 2023 06:07 )             33.1                                               11-27    143  |  100  |  34<H>  ----------------------------<  108<H>  4.4   |  35<H>  |  1.1    Ca    9.0      27 Nov 2023 06:07  Mg     1.8     11-27    TPro  6.4  /  Alb  3.4<L>  /  TBili  0.3  /  DBili  x   /  AST  12  /  ALT  <5  /  AlkPhos  152<H>  11-27                                             Urinalysis Basic - ( 27 Nov 2023 06:07 )    Color: x / Appearance: x / SG: x / pH: x  Gluc: 108 mg/dL / Ketone: x  / Bili: x / Urobili: x   Blood: x / Protein: x / Nitrite: x   Leuk Esterase: x / RBC: x / WBC x   Sq Epi: x / Non Sq Epi: x / Bacteria: x                                                  LIVER FUNCTIONS - ( 27 Nov 2023 06:07 )  Alb: 3.4 g/dL / Pro: 6.4 g/dL / ALK PHOS: 152 U/L / ALT: <5 U/L / AST: 12 U/L / GGT: x                                                  Culture - Urine (collected 24 Nov 2023 14:35)  Source: Clean Catch Clean Catch (Midstream)  Final Report (26 Nov 2023 12:04):    >=3 organisms. Probable collection contamination.                                                                                       ABG - ( 26 Nov 2023 17:23 )  pH, Arterial: 7.35  pH, Blood: x     /  pCO2: 72    /  pO2: 74    / HCO3: 40    / Base Excess: 11.8  /  SaO2: 96.7                MEDICATIONS  (STANDING):  albuterol    90 MICROgram(s) HFA Inhaler 2 Puff(s) Inhalation four times a day  albuterol/ipratropium for Nebulization 3 milliLiter(s) Nebulizer every 4 hours  cefepime   IVPB 1000 milliGRAM(s) IV Intermittent every 12 hours  furosemide   Injectable 60 milliGRAM(s) IV Push two times a day  gabapentin 100 milliGRAM(s) Oral three times a day  methylPREDNISolone sodium succinate Injectable 40 milliGRAM(s) IV Push daily  metoprolol tartrate 25 milliGRAM(s) Oral two times a day  pantoprazole    Tablet 40 milliGRAM(s) Oral before breakfast  senna 2 Tablet(s) Oral at bedtime    MEDICATIONS  (PRN):  acetaminophen     Tablet .. 650 milliGRAM(s) Oral every 6 hours PRN Temp greater or equal to 38C (100.4F), Mild Pain (1 - 3)  aluminum hydroxide/magnesium hydroxide/simethicone Suspension 30 milliLiter(s) Oral every 4 hours PRN Dyspepsia  melatonin 3 milliGRAM(s) Oral at bedtime PRN Insomnia  ondansetron Injectable 4 milliGRAM(s) IV Push every 8 hours PRN Nausea and/or Vomiting  polyethylene glycol 3350 17 Gram(s) Oral daily PRN for constipation      CXR reviewed

## 2023-11-27 NOTE — PROGRESS NOTE ADULT - ASSESSMENT
88 year old female with PMH of HTN, Afib, OA, PVD, COPD, Anxiety, Obesity, Acute on chronic systolic congestive heart failure, H/O abdominal hysterectomy, H/O discectomy, H/O total knee replacement, bilateral pleural effusions s/p thoracentesis with a left side drain since March as per daughter, presents sent in by NH for altered mental status    ID is consulted for UTI  Recently admitted to [facility] for [facility] Klebsiella bacteremia 2/2 UTI  S/p 14 days of Avycaz  Afebrile on admission, no leukocytosis  UA showed pyuria and hematuria, but unclear if the sample was collected on old al catheter (per note patient has chronic al)  BCx pending  CXR showed bilateral opacities and pleural effusions, increased from prior    Antibiotics:  Vancomycin 11/25  Cefepime 11/25      IMPRESSION:  COPD exacerbation  Bilateral pleural effusions  Chronic al  11/24 UCx NG    RECOMMENDATIONS:  - Continue IV cefepime 1gm q12hrs for now  - BCx  - Pulm follow up for possible thoracentesis  - Offloading and frequent position changes, aspiration precaution  - Trend WBC, fever curve, transaminases, creatinine daily  - Will continue to follow

## 2023-11-27 NOTE — PROGRESS NOTE ADULT - ASSESSMENT
IMPRESSION:     Acute on Chronic Hypercapnic Respiratory Failure   Acute Hypoxemic Respiratory Failure   UTI  History of klebsiella MDR  HFPEF and pulm htn  COPD   AFIB was on AC   Pulmonary edema with bilateral effusions   Left malignant pleural effusion SP pleurx placement       PLAN:    CNS: avoid depressants, CT head, EEG    HEENT: Oral care    PULMONARY:  HOB @ 45 degrees. CXR with pulm edema and effusions. BIPAP on and off and during sleep, did not tolerated BiPAP overnight, repeat ABG today, Goal Spo2 88-92%. Solumedrol 40mg daily, wean down slowly. Albuterol as needed. Left pleurx catheter per daughter has not been draining, bedside lung POCUS revealing of large anechoic right sided pleural effusion, minimal left side, planned for right sided thoracentesis     CARDIOVASCULAR: Continue Lasix 40mg IV Q24H. BNP 11K. Cardiology evaluation.     GI: GI prophylaxis.  feeding per speech and swallow.     RENAL:  Follow up lytes.  Correct as needed.    INFECTIOUS DISEASE: f/u cultures, Cefepime per ID following would change secondary to mental status.  procalcitonin 0.2     HEMATOLOGICAL:  Resume AC post-procedure     ENDOCRINE:  Follow up FS.  Insulin protocol if needed.    MUSCULOSKELETAL: bedrest for now.     DNR but not DNI    Poor prognosis    Palliative care evaluation     SDU monitoring

## 2023-11-28 NOTE — PROGRESS NOTE ADULT - SUBJECTIVE AND OBJECTIVE BOX
24H events:    Patient is a 88y old Female who presents with a chief complaint of COPD (28 Nov 2023 08:11)    Primary diagnosis of Altered mental status      Day 1:  Day 2:  Day 3:     Today is hospital day 4d. This morning patient was seen and examined at bedside, resting comfortably in bed.    No acute or major events overnight.    Code Status:    Family communication:  Contact date:  Name of person contacted:  Relationship to patient:  Communication details:  What matters most:    PAST MEDICAL & SURGICAL HISTORY  HTN (hypertension)    Afib  s/p ablation 2001    Goiter  no meds    Cellulitis  chronic    OA (osteoarthritis)    PVD (peripheral vascular disease)    COPD (chronic obstructive pulmonary disease)    Anxiety    Obesity    Acute on chronic systolic congestive heart failure    Edema of both lower legs    Required emergent intubation    H/O abdominal hysterectomy    H/O discectomy    H/O total knee replacement, bilateral      SOCIAL HISTORY:  Social History:      ALLERGIES:  penicillin (Other)  contrast media (iodine-based) (Other)  codeine (Other)  sulfa drugs (Hives; Other)  aspirin (Other)    MEDICATIONS:  STANDING MEDICATIONS  albuterol    90 MICROgram(s) HFA Inhaler 2 Puff(s) Inhalation four times a day  albuterol/ipratropium for Nebulization 3 milliLiter(s) Nebulizer every 4 hours  cefepime   IVPB 1000 milliGRAM(s) IV Intermittent every 12 hours  chlorhexidine 2% Cloths 1 Application(s) Topical <User Schedule>  enoxaparin Injectable 70 milliGRAM(s) SubCutaneous every 12 hours  furosemide   Injectable 40 milliGRAM(s) IV Push two times a day  gabapentin 100 milliGRAM(s) Oral three times a day  methylPREDNISolone sodium succinate Injectable 40 milliGRAM(s) IV Push daily  metoprolol tartrate 25 milliGRAM(s) Oral two times a day  nystatin Powder 1 Application(s) Topical two times a day  pantoprazole    Tablet 40 milliGRAM(s) Oral before breakfast  senna 2 Tablet(s) Oral at bedtime    PRN MEDICATIONS  acetaminophen     Tablet .. 650 milliGRAM(s) Oral every 6 hours PRN  aluminum hydroxide/magnesium hydroxide/simethicone Suspension 30 milliLiter(s) Oral every 4 hours PRN  melatonin 3 milliGRAM(s) Oral at bedtime PRN  ondansetron Injectable 4 milliGRAM(s) IV Push every 8 hours PRN  polyethylene glycol 3350 17 Gram(s) Oral daily PRN    VITALS:   T(F): 98  HR: 91  BP: 103/63  RR: 18  SpO2: 100%    PHYSICAL EXAM:  GENERAL:   (  ) NAD, lying in bed comfortably     (  ) obtunded     (  ) lethargic     (  ) somnolent    HEAD:   (  ) Atraumatic     (  ) hematoma     (  ) laceration (specify location:       )     NECK:  (  ) Supple     (  ) neck stiffness     (  ) nuchal rigidity     (  )  no JVD     (  ) JVD present ( -- cm)    HEART:  Rate -->     (  ) normal rate     (  ) bradycardic     (  ) tachycardic  Rhythm -->     (  ) regular     (  ) regularly irregular     (  ) irregularly irregular  Murmurs -->     (  ) normal s1s2     (  ) systolic murmur     (  ) diastolic murmur     (  ) continuous murmur      (  ) S3 present     (  ) S4 present    LUNGS:   (  )Unlabored respirations     (  ) tachypnea  (  ) B/L air entry     (  ) decreased breath sounds in:  (location     )    (  ) no adventitious sound     (  ) crackles     (  ) wheezing      (  ) rhonchi      (specify location:       )  (  ) chest wall tenderness (specify location:       )    ABDOMEN:   (  ) Soft     (  ) tense   |   (  ) nondistended     (  ) distended   |   (  ) +BS     (  ) hypoactive bowel sounds     (  ) hyperactive bowel sounds  (  ) nontender     (  ) RUQ tenderness     (  ) RLQ tenderness     (  ) LLQ tenderness     (  ) epigastric tenderness     (  ) diffuse tenderness  (  ) Splenomegaly      (  ) Hepatomegaly      (  ) Jaundice     (  ) ecchymosis     EXTREMITIES:  (  ) Normal     (  ) Rash     (  ) ecchymosis     (  ) varicose veins      (  ) pitting edema     (  ) non-pitting edema   (  ) ulceration     (  ) gangrene:     (location:     )    NERVOUS SYSTEM:    (  ) A&Ox3     (  ) confused     (  ) lethargic  CN II-XII:     (  ) Intact     (  ) deficits found     (Specify:     )   Upper extremities:     (  ) no sensorimotor deficits     (  ) weakness     (  ) loss of proprioception/vibration     (  ) loss of touch/temperature (specify:    )  Lower extremities:     (  ) no sensorimotor deficits     (  ) weakness     (  ) loss of proprioception/vibration     (  ) loss of touch/temperature (specify:    )    SKIN:   (  ) No rashes or lesions     (  ) maculopapular rash     (  ) pustules     (  ) vesicles     (  ) ulcer     (  ) ecchymosis     (specify location:     )    AMPAC score:    (  ) Indwelling Ba Catheter:   Date insterted:    Reason (  ) Critical illness     (  ) urinary retention    (  ) Accurate Ins/Outs Monitoring     (  ) CMO patient    (  ) Central Line:   Date inserted:  Location: (  ) Right IJ     (  ) Left IJ     (  ) Right Fem     (  ) Left Fem    (  ) SPC        (  ) pigtail       (  ) PEG tube       (  ) colostomy       (  ) jejunostomy  (  ) U-Dall    LABS:                        11.0   10.69 )-----------( 166      ( 28 Nov 2023 05:19 )             37.4     11-28    144  |  98  |  35<H>  ----------------------------<  75  4.9   |  32  |  1.1    Ca    8.7      28 Nov 2023 05:19  Mg     1.8     11-27    TPro  6.1  /  Alb  3.1<L>  /  TBili  0.4  /  DBili  x   /  AST  13  /  ALT  <5  /  AlkPhos  142<H>  11-28      Urinalysis Basic - ( 28 Nov 2023 05:19 )    Color: x / Appearance: x / SG: x / pH: x  Gluc: 75 mg/dL / Ketone: x  / Bili: x / Urobili: x   Blood: x / Protein: x / Nitrite: x   Leuk Esterase: x / RBC: x / WBC x   Sq Epi: x / Non Sq Epi: x / Bacteria: x      ABG - ( 27 Nov 2023 18:04 )  pH, Arterial: 7.37  pH, Blood: x     /  pCO2: 69    /  pO2: 79    / HCO3: 40    / Base Excess: 11.6  /  SaO2: 97.7                  Culture - Body Fluid with Gram Stain (collected 27 Nov 2023 10:14)  Source: Pleural Fl Pleural Fluid  Gram Stain (28 Nov 2023 06:19):    No polymorphonuclear cells seen    No organisms seen    by cytocentrifuge          RADIOLOGY:           24H events:    Patient is a 88y old Female who presents with a chief complaint of COPD (28 Nov 2023 08:11)    Primary diagnosis of Altered mental status    Today is hospital day 4d. This morning patient was seen and examined at bedside, resting comfortably in bed.    No acute or major events overnight.    PAST MEDICAL & SURGICAL HISTORY  HTN (hypertension)    Afib  s/p ablation 2001    Goiter  no meds    Cellulitis  chronic    OA (osteoarthritis)    PVD (peripheral vascular disease)    COPD (chronic obstructive pulmonary disease)    Anxiety    Obesity    Acute on chronic systolic congestive heart failure    Edema of both lower legs    Required emergent intubation    H/O abdominal hysterectomy    H/O discectomy    H/O total knee replacement, bilateral      SOCIAL HISTORY:  Social History:      ALLERGIES:  penicillin (Other)  contrast media (iodine-based) (Other)  codeine (Other)  sulfa drugs (Hives; Other)  aspirin (Other)    MEDICATIONS:  STANDING MEDICATIONS  albuterol    90 MICROgram(s) HFA Inhaler 2 Puff(s) Inhalation four times a day  albuterol/ipratropium for Nebulization 3 milliLiter(s) Nebulizer every 4 hours  cefepime   IVPB 1000 milliGRAM(s) IV Intermittent every 12 hours  chlorhexidine 2% Cloths 1 Application(s) Topical <User Schedule>  enoxaparin Injectable 70 milliGRAM(s) SubCutaneous every 12 hours  furosemide   Injectable 40 milliGRAM(s) IV Push two times a day  gabapentin 100 milliGRAM(s) Oral three times a day  methylPREDNISolone sodium succinate Injectable 40 milliGRAM(s) IV Push daily  metoprolol tartrate 25 milliGRAM(s) Oral two times a day  nystatin Powder 1 Application(s) Topical two times a day  pantoprazole    Tablet 40 milliGRAM(s) Oral before breakfast  senna 2 Tablet(s) Oral at bedtime    PRN MEDICATIONS  acetaminophen     Tablet .. 650 milliGRAM(s) Oral every 6 hours PRN  aluminum hydroxide/magnesium hydroxide/simethicone Suspension 30 milliLiter(s) Oral every 4 hours PRN  melatonin 3 milliGRAM(s) Oral at bedtime PRN  ondansetron Injectable 4 milliGRAM(s) IV Push every 8 hours PRN  polyethylene glycol 3350 17 Gram(s) Oral daily PRN    VITALS:   T(F): 98  HR: 91  BP: 103/63  RR: 18  SpO2: 100%    PHYSICAL EXAM:    CONSTITUTIONAL: No acute distress.  SKIN: Skin exam is warm and dry, no acute rash.  HEAD: Normocephalic; atraumatic.  EYES: Pupils equal round reactive to light; conjunctiva and sclera clear.  NECK: Supple; non tender. No rigidity  CARD: Regular rate and rhythm. Normal S1, S2; no murmurs, gallops, or rubs.  RESP: Lungs clear to auscultation bilaterally. No wheezes, rales or rhonchi.  ABD: Abdomen soft; non-tender; non-distended;    EXT: No clubbing or cyanosis  NEURO: Awake and alert. No focal deficits.  PSYCH: Cooperative    LABS:                        11.0   10.69 )-----------( 166      ( 28 Nov 2023 05:19 )             37.4     11-28    144  |  98  |  35<H>  ----------------------------<  75  4.9   |  32  |  1.1    Ca    8.7      28 Nov 2023 05:19  Mg     1.8     11-27    TPro  6.1  /  Alb  3.1<L>  /  TBili  0.4  /  DBili  x   /  AST  13  /  ALT  <5  /  AlkPhos  142<H>  11-28      Urinalysis Basic - ( 28 Nov 2023 05:19 )    Color: x / Appearance: x / SG: x / pH: x  Gluc: 75 mg/dL / Ketone: x  / Bili: x / Urobili: x   Blood: x / Protein: x / Nitrite: x   Leuk Esterase: x / RBC: x / WBC x   Sq Epi: x / Non Sq Epi: x / Bacteria: x      ABG - ( 27 Nov 2023 18:04 )  pH, Arterial: 7.37  pH, Blood: x     /  pCO2: 69    /  pO2: 79    / HCO3: 40    / Base Excess: 11.6  /  SaO2: 97.7      Culture - Body Fluid with Gram Stain (collected 27 Nov 2023 10:14)  Source: Pleural Fl Pleural Fluid  Gram Stain (28 Nov 2023 06:19):    No polymorphonuclear cells seen    No organisms seen    by cytocentrifuge      RADIOLOGY:           24H events:    Patient is a 88y old Female who presents with a chief complaint of COPD (28 Nov 2023 08:11)    Primary diagnosis of Altered mental status    Today is hospital day 4d. This morning patient was seen and examined at bedside, resting comfortably in bed.    No acute or major events overnight.    PAST MEDICAL & SURGICAL HISTORY  HTN (hypertension)    Afib  s/p ablation 2001    Goiter  no meds    Cellulitis  chronic    OA (osteoarthritis)    PVD (peripheral vascular disease)    COPD (chronic obstructive pulmonary disease)    Anxiety    Obesity    Acute on chronic systolic congestive heart failure    Edema of both lower legs    Required emergent intubation    H/O abdominal hysterectomy    H/O discectomy    H/O total knee replacement, bilateral      SOCIAL HISTORY:  Social History:      ALLERGIES:  penicillin (Other)  contrast media (iodine-based) (Other)  codeine (Other)  sulfa drugs (Hives; Other)  aspirin (Other)    MEDICATIONS:  STANDING MEDICATIONS  albuterol    90 MICROgram(s) HFA Inhaler 2 Puff(s) Inhalation four times a day  albuterol/ipratropium for Nebulization 3 milliLiter(s) Nebulizer every 4 hours  cefepime   IVPB 1000 milliGRAM(s) IV Intermittent every 12 hours  chlorhexidine 2% Cloths 1 Application(s) Topical <User Schedule>  enoxaparin Injectable 70 milliGRAM(s) SubCutaneous every 12 hours  furosemide   Injectable 40 milliGRAM(s) IV Push two times a day  gabapentin 100 milliGRAM(s) Oral three times a day  methylPREDNISolone sodium succinate Injectable 40 milliGRAM(s) IV Push daily  metoprolol tartrate 25 milliGRAM(s) Oral two times a day  nystatin Powder 1 Application(s) Topical two times a day  pantoprazole    Tablet 40 milliGRAM(s) Oral before breakfast  senna 2 Tablet(s) Oral at bedtime    PRN MEDICATIONS  acetaminophen     Tablet .. 650 milliGRAM(s) Oral every 6 hours PRN  aluminum hydroxide/magnesium hydroxide/simethicone Suspension 30 milliLiter(s) Oral every 4 hours PRN  melatonin 3 milliGRAM(s) Oral at bedtime PRN  ondansetron Injectable 4 milliGRAM(s) IV Push every 8 hours PRN  polyethylene glycol 3350 17 Gram(s) Oral daily PRN    VITALS:   T(F): 98  HR: 91  BP: 103/63  RR: 18  SpO2: 100%    PHYSICAL EXAM:    CONSTITUTIONAL: No acute distress. On 2L  NC  SKIN: Stage 2 sacral ulcer  HEAD: Normocephalic; atraumatic.  EYES: Pupils equal round reactive to light; conjunctiva and sclera clear.  NECK: Supple; non tender. No rigidity  CARD: Regular rate and rhythm. Normal S1, S2; no murmurs, gallops, or rubs.  RESP: Lungs clear to auscultation bilaterally. No wheezes, rales or rhonchi.  ABD: Abdomen soft; non-tender; non-distended;    EXT: No clubbing or cyanosis  NEURO: Awake and alert. No focal deficits.  PSYCH: Cooperative    LABS:                        11.0   10.69 )-----------( 166      ( 28 Nov 2023 05:19 )             37.4     11-28    144  |  98  |  35<H>  ----------------------------<  75  4.9   |  32  |  1.1    Ca    8.7      28 Nov 2023 05:19  Mg     1.8     11-27    TPro  6.1  /  Alb  3.1<L>  /  TBili  0.4  /  DBili  x   /  AST  13  /  ALT  <5  /  AlkPhos  142<H>  11-28      Urinalysis Basic - ( 28 Nov 2023 05:19 )    Color: x / Appearance: x / SG: x / pH: x  Gluc: 75 mg/dL / Ketone: x  / Bili: x / Urobili: x   Blood: x / Protein: x / Nitrite: x   Leuk Esterase: x / RBC: x / WBC x   Sq Epi: x / Non Sq Epi: x / Bacteria: x      ABG - ( 27 Nov 2023 18:04 )  pH, Arterial: 7.37  pH, Blood: x     /  pCO2: 69    /  pO2: 79    / HCO3: 40    / Base Excess: 11.6  /  SaO2: 97.7      Culture - Body Fluid with Gram Stain (collected 27 Nov 2023 10:14)  Source: Pleural Fl Pleural Fluid  Gram Stain (28 Nov 2023 06:19):    No polymorphonuclear cells seen    No organisms seen    by cytocentrifuge      RADIOLOGY:

## 2023-11-28 NOTE — PROGRESS NOTE ADULT - ASSESSMENT
· Assessment	  88 year old female with PMH of HTN, Afib, OA, PVD, COPD, Anxiety, Obesity, Acute on chronic systolic congestive heart failure, H/O abdominal hysterectomy, H/O discectomy, H/O total knee replacement, bilateral pleural effusions s/p thoracentesis with a left side drain since March as per daughter, presents sent in by NH for altered mental status    ID is consulted for UTI  Recently admitted to [facility] for [facility] Klebsiella bacteremia 2/2 UTI  S/p 14 days of Avycaz  Afebrile on admission, no leukocytosis  UA showed pyuria and hematuria, but unclear if the sample was collected on old al catheter (per note patient has chronic al)  CXR showed bilateral opacities and pleural effusions, increased from prior    Antibiotics:  Vancomycin 11/25  Cefepime 11/25      IMPRESSION:  COPD exacerbation  Bilateral pleural effusions  11/24 UCx NG  11/27 s/p right thoracocentesis : no empyema  Chronic al  11/24 UCx NG    RECOMMENDATIONS:  - Cefepime 1gm q12hrs   - BCx  - Offloading and frequent position changes, aspiration precaution  - Trend WBC, fever curve, transaminases, creatinine daily  - Will continue to follow

## 2023-11-28 NOTE — DIETITIAN INITIAL EVALUATION ADULT - NUTRITIONGOAL OUTCOME1
Pt to demonstrate tolerance to diet order, with at least 50% po intake achieved & maintained over next 3-5 days.    Pt at high nutrition risk; RD to follow-up in 3-5 days.    Monitor: Skin, labs, BM, wt, nutrition focused physical findings, body composition, GI, swallow.

## 2023-11-28 NOTE — DIETITIAN INITIAL EVALUATION ADULT - NS FNS DIET ORDER
DASH/TLC + pureed diet. No SLP eval this admit; previous admit followed by SLP, 10/27/2023 eval noted + toleration observed without overt symptoms of penetration/aspiration for Puree/thins. Pt is insistent on using straw despite SLP recs for NO STRAWS; recommended puree/thin liquids, no straws. Poor po intake reported, with 25% po intake this admit.

## 2023-11-28 NOTE — DIETITIAN INITIAL EVALUATION ADULT - ADD RECOMMEND
Recommendation: Consult SLP services to evaluate diet order texture/consistency; diet order per SLP. If not contraindicated by SLP recommendations, would order Prosource Gelatein Plus twice daily (160 kcal, 20 g protein per serving) & Magic Cup twice daily (290 kcal, 9 g protein per serving). Order 250 mg Vit C q24hrs. Remove DASH/TLC restriction to optimize energy intake.

## 2023-11-28 NOTE — DIETITIAN INITIAL EVALUATION ADULT - ORAL INTAKE PTA/DIET HISTORY
Pt confused at time of RD visit & unable to participate in nutrition interview at this time. Unable to reach emergency contact at this time. No signs of significant muscle wasting/subcutaneous fat loss observed.

## 2023-11-28 NOTE — PROGRESS NOTE ADULT - SUBJECTIVE AND OBJECTIVE BOX
GIOVANNY BUCK  88y, Female    All available historical data reviewed    OVERNIGHT EVENTS:  feels well and has no new complaints  No fevers  alert  98% NC 2 lit  no pressors  no BM    ROS:  not reliable    VITALS:  T(F): 98, Max: 98 (11-28-23 @ 04:00)  HR: 91  BP: 113/61  RR: 17Vital Signs Last 24 Hrs  T(C): 36.7 (28 Nov 2023 04:00), Max: 36.7 (28 Nov 2023 04:00)  T(F): 98 (28 Nov 2023 04:00), Max: 98 (28 Nov 2023 04:00)  HR: 91 (28 Nov 2023 10:00) (87 - 110)  BP: 113/61 (28 Nov 2023 10:00) (99/54 - 138/84)  BP(mean): 81 (28 Nov 2023 10:00) (70 - 106)  RR: 17 (28 Nov 2023 10:00) (16 - 36)  SpO2: 100% (28 Nov 2023 10:00) (95% - 100%)    Parameters below as of 28 Nov 2023 10:00  Patient On (Oxygen Delivery Method): nasal cannula  O2 Flow (L/min): 2      TESTS & MEASUREMENTS:                        11.0   10.69 )-----------( 166      ( 28 Nov 2023 05:19 )             37.4     11-28    144  |  98  |  35<H>  ----------------------------<  75  4.9   |  32  |  1.1    Ca    8.7      28 Nov 2023 05:19  Mg     1.8     11-27    TPro  6.1  /  Alb  3.1<L>  /  TBili  0.4  /  DBili  x   /  AST  13  /  ALT  <5  /  AlkPhos  142<H>  11-28    LIVER FUNCTIONS - ( 28 Nov 2023 05:19 )  Alb: 3.1 g/dL / Pro: 6.1 g/dL / ALK PHOS: 142 U/L / ALT: <5 U/L / AST: 13 U/L / GGT: x             Culture - Body Fluid with Gram Stain (collected 11-27-23 @ 10:14)  Source: Pleural Fl Pleural Fluid  Gram Stain (11-28-23 @ 06:19):    No polymorphonuclear cells seen    No organisms seen    by cytocentrifuge    Culture - Urine (collected 11-24-23 @ 14:35)  Source: Clean Catch Clean Catch (Midstream)  Final Report (11-26-23 @ 12:04):    >=3 organisms. Probable collection contamination.      Urinalysis Basic - ( 28 Nov 2023 05:19 )    Color: x / Appearance: x / SG: x / pH: x  Gluc: 75 mg/dL / Ketone: x  / Bili: x / Urobili: x   Blood: x / Protein: x / Nitrite: x   Leuk Esterase: x / RBC: x / WBC x   Sq Epi: x / Non Sq Epi: x / Bacteria: x          RADIOLOGY & ADDITIONAL TESTS:  Personal review of radiological diagnostics performed  Echo and EKG results noted when applicable.     MEDICATIONS:  acetaminophen     Tablet .. 650 milliGRAM(s) Oral every 6 hours PRN  albuterol    90 MICROgram(s) HFA Inhaler 2 Puff(s) Inhalation four times a day  albuterol/ipratropium for Nebulization 3 milliLiter(s) Nebulizer every 4 hours  aluminum hydroxide/magnesium hydroxide/simethicone Suspension 30 milliLiter(s) Oral every 4 hours PRN  cefepime   IVPB 1000 milliGRAM(s) IV Intermittent every 12 hours  chlorhexidine 2% Cloths 1 Application(s) Topical <User Schedule>  enoxaparin Injectable 70 milliGRAM(s) SubCutaneous every 12 hours  furosemide   Injectable 40 milliGRAM(s) IV Push two times a day  gabapentin 100 milliGRAM(s) Oral three times a day  melatonin 3 milliGRAM(s) Oral at bedtime PRN  methylPREDNISolone sodium succinate Injectable 40 milliGRAM(s) IV Push daily  metoprolol tartrate 25 milliGRAM(s) Oral two times a day  nystatin Powder 1 Application(s) Topical two times a day  ondansetron Injectable 4 milliGRAM(s) IV Push every 8 hours PRN  pantoprazole    Tablet 40 milliGRAM(s) Oral before breakfast  polyethylene glycol 3350 17 Gram(s) Oral daily PRN  senna 2 Tablet(s) Oral at bedtime      ANTIBIOTICS:  cefepime   IVPB 1000 milliGRAM(s) IV Intermittent every 12 hours

## 2023-11-28 NOTE — PROGRESS NOTE ADULT - ASSESSMENT
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 NIV at night, NC during day    CARDIOVASCULAR: Continue Lasix 40mg IV Q24H.    GI: GI prophylaxis.  feeding per speech and swallow.     RENAL:  Follow up lytes.  Correct as needed.    INFECTIOUS DISEASE: f/u cultures, Cefepime per ID following would change secondary to mental status.  procalcitonin 0.2     HEMATOLOGICAL:  Resume AC post-procedure     ENDOCRINE:  Follow up FS.  Insulin protocol if needed.    MUSCULOSKELETAL: bedrest for now.     DNR but not DNI    Poor prognosis    Palliative care evaluation     SDU

## 2023-11-28 NOTE — DIETITIAN INITIAL EVALUATION ADULT - PERTINENT MEDS FT
MEDICATIONS  (STANDING):  albuterol    90 MICROgram(s) HFA Inhaler 2 Puff(s) Inhalation four times a day  albuterol/ipratropium for Nebulization 3 milliLiter(s) Nebulizer every 4 hours  cefepime   IVPB 1000 milliGRAM(s) IV Intermittent every 12 hours  chlorhexidine 2% Cloths 1 Application(s) Topical <User Schedule>  enoxaparin Injectable 70 milliGRAM(s) SubCutaneous every 12 hours  furosemide   Injectable 40 milliGRAM(s) IV Push two times a day  gabapentin 100 milliGRAM(s) Oral three times a day  methylPREDNISolone sodium succinate Injectable 40 milliGRAM(s) IV Push daily  metoprolol tartrate 25 milliGRAM(s) Oral two times a day  nystatin Powder 1 Application(s) Topical two times a day  pantoprazole    Tablet 40 milliGRAM(s) Oral before breakfast  polyethylene glycol 3350 17 Gram(s) Oral daily  senna 2 Tablet(s) Oral at bedtime    MEDICATIONS  (PRN):  acetaminophen     Tablet .. 650 milliGRAM(s) Oral every 6 hours PRN Temp greater or equal to 38C (100.4F), Mild Pain (1 - 3)  aluminum hydroxide/magnesium hydroxide/simethicone Suspension 30 milliLiter(s) Oral every 4 hours PRN Dyspepsia  melatonin 3 milliGRAM(s) Oral at bedtime PRN Insomnia  ondansetron Injectable 4 milliGRAM(s) IV Push every 8 hours PRN Nausea and/or Vomiting

## 2023-11-28 NOTE — DIETITIAN INITIAL EVALUATION ADULT - PERTINENT LABORATORY DATA
11-28    144  |  98  |  35<H>  ----------------------------<  75  4.9   |  32  |  1.1    Ca    8.7      28 Nov 2023 05:19  Mg     1.8     11-27    TPro  6.1  /  Alb  3.1<L>  /  TBili  0.4  /  DBili  x   /  AST  13  /  ALT  <5  /  AlkPhos  142<H>  11-28  A1C with Estimated Average Glucose Result: 5.2 % (09-24-23 @ 07:42)  A1C with Estimated Average Glucose Result: 6.1 % (07-03-23 @ 05:51)  A1C with Estimated Average Glucose Result: 6.0 % (02-28-23 @ 06:53)

## 2023-11-28 NOTE — PROGRESS NOTE ADULT - ASSESSMENT
88 year old female with PMH of HTN, Afib, OA, PVD, COPD, Anxiety, Obesity, Acute on chronic systolic congestive heart failure, H/O abdominal hysterectomy, H/O discectomy, H/O total knee replacement, bilateral pleural effusions s/p thoracentesis with a left side drain since March as per daughter, presents sent in by NH for altered mental status for the since yesterday morning, which has been worsening. She was recently discharged on Avycaz for UTI. As per daughter the patient had 2 intubations in the past for copd exacerbation. The left side drain has no output. The daughter mentioned that patient has been mentioning burning of urination and pain in the back( which is attributed to her sacral ulcer).        # Hypercapnic respiratory failure on BiPAP.  COPD exacerbation secondary to pneumonia rule out pulmonary edema  # Not septic on admission  -  RVP negative   - CXR/CT chest: Bilateral opacities and pleural effusions, increased from prior.  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  -Currently of lasix   - follows with Dr Pacheco    # Hx of persistent leucocytosis  - resolved  - not sure if work up was done    # Elevated ALP    - RuQ Sono: Grossly unremarkable with Gallbladder wall 2.9 cm.      # Chronic AFIB   - holding  BB  eliquis stopped  and switched to enoxaparin   Enoxaparin stopped for pleural tap done today         # Hypomagnesemia  - repleted    DVT PPX: to be restarted on therapeutic lovenox   GI PPX: PPI  Diet: puree  Code Status: DNR/ intubate  Dispo: Acute   88 year old female with PMH of HTN, Afib, OA, PVD, COPD, Anxiety, Obesity, Acute on chronic systolic congestive heart failure, H/O abdominal hysterectomy, H/O discectomy, H/O total knee replacement, bilateral pleural effusions s/p thoracentesis with a left side drain since March as per daughter, presents sent in by NH for altered mental status for the since yesterday morning, which has been worsening. She was recently discharged on Avycaz for UTI. As per daughter the patient had 2 intubations in the past for copd exacerbation. The left side drain has no output. The daughter mentioned that patient has been mentioning burning of urination and pain in the back( which is attributed to her sacral ulcer).      # Hypercapnic respiratory failure on BiPAP.  COPD exacerbation secondary to pneumonia rule out pulmonary edema  # Not septic on admission  -  RVP negative   - CXR/CT chest: Bilateral opacities and pleural effusions, increased from prior.  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  -Currently of lasix   - follows with Dr Pacheco    # Hx of persistent leucocytosis  - resolved  - not sure if work up was done    # Elevated ALP    - RuQ Sono: Grossly unremarkable with Gallbladder wall 2.9 cm.      # Chronic AFIB   - holding  BB  eliquis stopped  and switched to enoxaparin   Enoxaparin stopped for pleural tap done today         # Hypomagnesemia  - repleted    DVT PPX: to be restarted on therapeutic lovenox   GI PPX: PPI  Diet: puree  Code Status: DNR/ intubate  Dispo: Acute   88 year old female with PMH of HTN, Afib, OA, PVD, COPD, Anxiety, Obesity, Acute on chronic systolic congestive heart failure, H/O abdominal hysterectomy, H/O discectomy, H/O total knee replacement, bilateral pleural effusions s/p thoracentesis with a left side drain since March as per daughter, presents sent in by NH for altered mental status for the since yesterday morning, which has been worsening. She was recently discharged on Avycaz for UTI. As per daughter the patient had 2 intubations in the past for copd exacerbation. The left side drain has no output. The daughter mentioned that patient has been mentioning burning of urination and pain in the back( which is attributed to her sacral ulcer).      # Hypercapnic respiratory failure on BiPAP.  COPD exacerbation secondary to pneumonia rule out pulmonary edema  # Not septic on admission  -  RVP negative   - CXR/CT chest: Bilateral opacities and pleural effusions, increased from prior.  - Solumedrol 60 mg BID on admissions switched to 40  mg daily   - On 11/27 patient was found to be more lethargic , Abg showing PH of 7.29 and pCO2 of 83 , patient started on bipap   - f/u sputum cx  -  MRSA PCR negative   - Pulmonary consult: right sided tap done 11/27, yielding 1L of Clear fluids   - ID consult : continue cefepime , monitor cultures , fever and WBC curves   - F/u CT head    # Multi-drug Resistant Klebsiella Bacteriemia with Acute Cystitis,  - UA positive due to Klebsiella  - UC showing carbapenem resistant  KP   - has chronic Ba  - s/p Avycaz q8h end date 11/8   - On cefepime for pneumonia   - NIV at night, NC during day    #Acute on Chronic systolic congestive heart failure  # Last echo from September 2023 normal EF.  - EF 64%  -Currently of lasix   - follows with Dr Pacheco    # Elevated ALP    - RuQ Sono: Grossly unremarkable with Gallbladder wall 2.9 cm.    # Chronic AFIB   - holding  BB  eliquis stopped  and switched to enoxaparin   - Enoxaparin stopped for pleural tap on 11/27 -> resumed 11/28    # Hypomagnesemia  - repleted    DVT PPX: lovenox   GI PPX: PPI  Diet: puree  Code Status: DNR/ intubate  Dispo: Acute    #Pending  - F/u wound consult for sacral ulcer  - F/u CT head   88 year old female with PMH of HTN, Afib, OA, PVD, COPD, Anxiety, Obesity, Acute on chronic systolic congestive heart failure, H/O abdominal hysterectomy, H/O discectomy, H/O total knee replacement, bilateral pleural effusions s/p thoracentesis with a left side drain since March as per daughter, presents sent in by NH for altered mental status for the since yesterday morning, which has been worsening. She was recently discharged on Avycaz for UTI. As per daughter the patient had 2 intubations in the past for copd exacerbation. The left side drain has no output. The daughter mentioned that patient has been mentioning burning of urination and pain in the back( which is attributed to her sacral ulcer).      # Hypercapnic respiratory failure on BiPAP.  COPD exacerbation secondary to pneumonia rule out pulmonary edema  # Not septic on admission  -  RVP negative   - CXR/CT chest: Bilateral opacities and pleural effusions, increased from prior.  - Solumedrol 60 mg BID on admissions switched to 40  mg daily   - On 11/27 patient was found to be more lethargic , Abg showing PH of 7.29 and pCO2 of 83 , patient started on bipap   - f/u sputum cx  -  MRSA PCR negative   - Pulmonary consult: right sided tap done 11/27, yielding 1L of Clear fluids   - ID consult : continue cefepime , monitor cultures , fever and WBC curves   - F/u CT head    # Multi-drug Resistant Klebsiella Bacteriemia with Acute Cystitis,  - UA positive due to Klebsiella  - UC showing carbapenem resistant  KP   - has chronic Ba  - s/p Avycaz q8h end date 11/8   - On cefepime for pneumonia   - NIV at night, NC during day    #Acute on Chronic systolic congestive heart failure  # Last echo from September 2023 normal EF.  - EF 64%  -Currently of lasix   - follows with Dr Pacheco    # Elevated ALP    - RuQ Sono: Grossly unremarkable with Gallbladder wall 2.9 cm.    # Chronic AFIB   - holding  BB  eliquis stopped  and switched to enoxaparin   - Enoxaparin stopped for pleural tap on 11/27 -> resumed 11/28    # Hypomagnesemia  - repleted    DVT PPX: lovenox   GI PPX: PPI  Diet: puree (Place on D5 + LR as patient has not been eating)  Code Status: DNR/ intubate  Dispo: Acute    #Pending  - F/u wound consult for sacral ulcer  - F/u CT head   88 year old female with PMH of HTN, Afib, OA, PVD, COPD, Anxiety, Obesity, Acute on chronic systolic congestive heart failure, H/O abdominal hysterectomy, H/O discectomy, H/O total knee replacement, bilateral pleural effusions s/p thoracentesis with a left side drain since March as per daughter, presents sent in by NH for altered mental status for the since yesterday morning, which has been worsening. She was recently discharged on Avycaz for UTI. As per daughter the patient had 2 intubations in the past for copd exacerbation. The left side drain has no output. The daughter mentioned that patient has been mentioning burning of urination and pain in the back( which is attributed to her sacral ulcer).      # Hypercapnic respiratory failure on BiPAP.  COPD exacerbation secondary to pneumonia rule out pulmonary edema  # Not septic on admission  -  RVP negative   - CXR/CT chest: Bilateral opacities and pleural effusions, increased from prior.  - Solumedrol 60 mg BID on admissions switched to 40  mg daily   - On 11/27 patient was found to be more lethargic , Abg showing PH of 7.29 and pCO2 of 83 , patient started on bipap   - f/u sputum cx  -  MRSA PCR negative   - Pulmonary consult: right sided tap done 11/27, yielding 1L of Clear fluids   - ID consult : continue cefepime , monitor cultures , fever and WBC curves   - F/u CT head    # Multi-drug Resistant Klebsiella Bacteriemia with Acute Cystitis,  - UA positive due to Klebsiella  - UC showing carbapenem resistant  KP   - has chronic Ba  - s/p Avycaz q8h end date 11/8   - On cefepime for pneumonia   - NIV at night, NC during day    #Acute on Chronic systolic congestive heart failure  # Last echo from September 2023 normal EF.  - EF 64%  -Currently of lasix   - follows with Dr Pacheco    # Elevated ALP    - RuQ Sono: Grossly unremarkable with Gallbladder wall 2.9 cm.    # Chronic AFIB   - holding  BB  eliquis stopped  and switched to enoxaparin   - Enoxaparin stopped for pleural tap on 11/27 -> resumed 11/28    # Hypomagnesemia  - repleted    DVT PPX: lovenox   GI PPX: PPI  Diet: puree   Code Status: DNR/ intubate  Dispo: Acute    #Pending  - F/u wound consult for sacral ulcer  - F/u CT head

## 2023-11-28 NOTE — EEG REPORT - NS EEG TEXT BOX
[place] Department of Neurology  Inpatient Routine-EEG Report      Patient Name:	GIOVANNY BUCK    :	1935  MRN:	[id number]  Study Date/Time:	2023, 1:33:11 PM  Referred by:	-    Brief Clinical History:  GIOVANNY BUCK is a 88 year old Female; study performed to investigate for seizures or markers of epilepsy.   Diagnosis Code: R40.4 Transient alteration of awareness    Patient Medication:  MAXIPIME    NEUROTINE    LOPRESSOR    TYLENOL    ZOFRAN    MIRALAX    PROVENTIL    DUONEB      Acquisition Details:  Electroencephalography was acquired using a minimum of 21 channels on an [facility] system v 9.3.1 with electrode placement according to the standard International 10-20 system following ACNS (American Clinical Neurophysiology Society) guidelines.  Anterior temporal T1 and T2 electrodes were utilized whenever possible.   The [employer] automated spike & seizure detections were all reviewed in detail, in addition to the entire raw EEG.    Findings:  Background:  continuous.   Voltage:  Normal (20uV)  Organization:  Appropriate anterior-posterior gradient  Posterior Dominant Rhythm:  7-8 Hz symmetric, well-organized, and well-modulated  Variability:  Yes	Reactivity:  Yes  Sleep:  Absent.  Focal abnormalities:  No persistent asymmetries of voltage or frequency.  Interictal Activity:  None  Focal Slowing:  None  Generalized Slowing:  Mild  Events:  1)	No electrographic seizures or significant clinical events.  Provocations:  1)	Hyperventilation: was not performed.  2)	Photic stimulation: was not performed.  Impression:  Abnormal due to generalized slowing as above    Clinical Correlation:  Findings consistent with diffuse electrocerebral dysfunction secondary to nonspecific etiology    Zach Ashley MD  Attending Neurologist, Division of Epilepsy

## 2023-11-28 NOTE — PROGRESS NOTE ADULT - SUBJECTIVE AND OBJECTIVE BOX
Over Night Events: events noted, on nC, no drips    PHYSICAL EXAM    ICU Vital Signs Last 24 Hrs  T(C): 36.7 (28 Nov 2023 04:00), Max: 36.7 (28 Nov 2023 04:00)  T(F): 98 (28 Nov 2023 04:00), Max: 98 (28 Nov 2023 04:00)  HR: 90 (28 Nov 2023 07:00) (88 - 115)  BP: 131/72 (28 Nov 2023 07:00) (99/54 - 138/84)  BP(mean): 95 (28 Nov 2023 07:00) (70 - 106)  RR: 23 (28 Nov 2023 07:00) (16 - 36)  SpO2: 99% (28 Nov 2023 07:00) (95% - 100%)    O2 Parameters below as of 27 Nov 2023 16:00  Patient On (Oxygen Delivery Method): BiPAP/CPAP            General: ill looking  Lungs: dec bs bothbases  Cardiovascular: SRI 2.6  Abdomen: Soft, Positive BS  Extremities: No clubbing   Neurological: Non focal       11-27-23 @ 07:01  -  11-28-23 @ 07:00  --------------------------------------------------------  IN:    IV PiggyBack: 50 mL    Oral Fluid: 120 mL  Total IN: 170 mL    OUT:    Indwelling Catheter - Urethral (mL): 1050 mL  Total OUT: 1050 mL    Total NET: -880 mL          LABS:                          11.0   10.69 )-----------( 166      ( 28 Nov 2023 05:19 )             37.4                                               11-28    144  |  98  |  35<H>  ----------------------------<  75  4.9   |  32  |  1.1    Ca    8.7      28 Nov 2023 05:19  Mg     1.8     11-27    TPro  6.1  /  Alb  3.1<L>  /  TBili  0.4  /  DBili  x   /  AST  13  /  ALT  <5  /  AlkPhos  142<H>  11-28                                             Urinalysis Basic - ( 28 Nov 2023 05:19 )    Color: x / Appearance: x / SG: x / pH: x  Gluc: 75 mg/dL / Ketone: x  / Bili: x / Urobili: x   Blood: x / Protein: x / Nitrite: x   Leuk Esterase: x / RBC: x / WBC x   Sq Epi: x / Non Sq Epi: x / Bacteria: x                                                  LIVER FUNCTIONS - ( 28 Nov 2023 05:19 )  Alb: 3.1 g/dL / Pro: 6.1 g/dL / ALK PHOS: 142 U/L / ALT: <5 U/L / AST: 13 U/L / GGT: x                                                  Culture - Body Fluid with Gram Stain (collected 27 Nov 2023 10:14)  Source: Pleural Fl Pleural Fluid  Gram Stain (28 Nov 2023 06:19):    No polymorphonuclear cells seen    No organisms seen    by cytocentrifuge                                                                                       ABG - ( 27 Nov 2023 18:04 )  pH, Arterial: 7.37  pH, Blood: x     /  pCO2: 69    /  pO2: 79    / HCO3: 40    / Base Excess: 11.6  /  SaO2: 97.7                MEDICATIONS  (STANDING):  albuterol    90 MICROgram(s) HFA Inhaler 2 Puff(s) Inhalation four times a day  albuterol/ipratropium for Nebulization 3 milliLiter(s) Nebulizer every 4 hours  cefepime   IVPB 1000 milliGRAM(s) IV Intermittent every 12 hours  chlorhexidine 2% Cloths 1 Application(s) Topical <User Schedule>  furosemide   Injectable 40 milliGRAM(s) IV Push daily  gabapentin 100 milliGRAM(s) Oral three times a day  methylPREDNISolone sodium succinate Injectable 40 milliGRAM(s) IV Push daily  metoprolol tartrate 25 milliGRAM(s) Oral two times a day  nystatin Powder 1 Application(s) Topical two times a day  pantoprazole    Tablet 40 milliGRAM(s) Oral before breakfast  senna 2 Tablet(s) Oral at bedtime    MEDICATIONS  (PRN):  acetaminophen     Tablet .. 650 milliGRAM(s) Oral every 6 hours PRN Temp greater or equal to 38C (100.4F), Mild Pain (1 - 3)  aluminum hydroxide/magnesium hydroxide/simethicone Suspension 30 milliLiter(s) Oral every 4 hours PRN Dyspepsia  melatonin 3 milliGRAM(s) Oral at bedtime PRN Insomnia  ondansetron Injectable 4 milliGRAM(s) IV Push every 8 hours PRN Nausea and/or Vomiting  polyethylene glycol 3350 17 Gram(s) Oral daily PRN for constipation

## 2023-11-28 NOTE — DIETITIAN INITIAL EVALUATION ADULT - OTHER INFO
Pertinent Medical Information: Presented w/ AMS. Hypercapnic respiratory failure on BiPAP.  COPD exacerbation secondary to pneumonia rule out pulmonary edema. Multi-drug Resistant Klebsiella Bacteriemia with Acute Cystitis noted. Acute on Chronic systolic congestive heart failure noted.    PMH includes TN, Afib, OA, PVD, COPD, Anxiety, Obesity, Acute on chronic systolic congestive heart failure, H/O abdominal hysterectomy, H/O discectomy, H/O total knee replacement, bilateral pleural effusions s/p thoracentesis with a left side drain since March as per daughter.

## 2023-11-29 NOTE — ADVANCED PRACTICE NURSE CONSULT - RECOMMEDATIONS
Cleanse wounds with soap and water.   Pat dry apply triad and Allevyn twice a day and prn for soiling.   Maintain pressure injury prevention.   Keep skin clean.   Maintain incontinence care.   Monitor wound for changes and notify provider   Case discussed with primary RN Cleanse wounds with soap and water.   Pat dry apply triad and Allevyn twice a day and prn for soiling.   Maintain pressure injury prevention.   Keep skin clean.     Maintain incontinence care.   Monitor wound for changes and notify provider   Case discussed with primary RN

## 2023-11-29 NOTE — PHARMACOTHERAPY INTERVENTION NOTE - INTERVENTION TYPE RECOOMEND
Timing/Frequency of Administration Recommended

## 2023-11-29 NOTE — PROGRESS NOTE ADULT - ASSESSMENT
88 year old female with PMH of HTN, Afib, OA, PVD, COPD, Anxiety, Obesity, Acute on chronic systolic congestive heart failure, H/O abdominal hysterectomy, H/O discectomy, H/O total knee replacement, bilateral pleural effusions s/p thoracentesis with a left side drain presenting with encephalopathy and respiratory failure.     Patient known to the palliative care service. Discussed GOC with patient's daughter Karishma. She reports that goals are unchanged. Patient is DNR, but agreeable to trial of intubation because she has been successfully extubated in the past. If she is not able to get better, she would not want trach/PEG.     11/29: Patient AOx2. Requested me to speak with her children about code status. Spoke to Karishma and Anderson in the afternoon. They were able to rediscuss code status with patient. She continues to be DNR but OK with trial of intubation.     Plan:  - patient appears comfortable  - GOC as above  - DNR; ok with trial of intubation     Palliative care will sign off.   Please call [phone] with questions or concerns 24/7.

## 2023-11-29 NOTE — PROGRESS NOTE ADULT - ASSESSMENT
88 year old female with PMH of HTN, Afib, OA, PVD, COPD, Anxiety, Obesity, Acute on chronic systolic congestive heart failure, H/O abdominal hysterectomy, H/O discectomy, H/O total knee replacement, bilateral pleural effusions s/p thoracentesis with a left side drain since March as per daughter, presents sent in by NH for altered mental status for the since yesterday morning, which has been worsening. She was recently discharged on Avycaz for UTI. As per daughter the patient had 2 intubations in the past for copd exacerbation. The left side drain has no output. The daughter mentioned that patient has been mentioning burning of urination and pain in the back( which is attributed to her sacral ulcer).      # Hypercapnic respiratory failure on BiPAP.  COPD exacerbation secondary to pneumonia rule out pulmonary edema  # Not septic on admission  -  RVP negative   - CXR/CT chest: Bilateral opacities and pleural effusions, increased from prior.  - Solumedrol 60 mg BID on admissions switched to 40  mg daily   - On 11/27 patient was found to be more lethargic , Abg showing PH of 7.29 and pCO2 of 83 , patient started on bipap   - f/u sputum cx  -  MRSA PCR negative   - Pulmonary consult: right sided tap done 11/27, yielding 1L of Clear fluids   - ID consult : continue cefepime , monitor cultures , fever and WBC curves   - F/u CT head    # Multi-drug Resistant Klebsiella Bacteriemia with Acute Cystitis,  - UA positive due to Klebsiella  - UC showing carbapenem resistant  KP   - has chronic Ba  - s/p Avycaz q8h end date 11/8   - On cefepime for pneumonia   - NIV at night, NC during day    #Acute on Chronic systolic congestive heart failure  # Last echo from September 2023 normal EF.  - EF 64%  -Currently of lasix   - follows with Dr Pacheco    # Elevated ALP    - RuQ Sono: Grossly unremarkable with Gallbladder wall 2.9 cm.    # Chronic AFIB   - holding  BB  eliquis stopped  and switched to enoxaparin   - Enoxaparin stopped for pleural tap on 11/27 -> resumed 11/28    # Hypomagnesemia  - repleted    DVT PPX: lovenox   GI PPX: PPI  Diet: puree   Code Status: DNR/ intubate  Dispo: Acute    #Pending  - F/u wound consult for sacral ulcer  - F/u CT head   88 year old female with PMH of HTN, Afib, OA, PVD, COPD, Anxiety, Obesity, Acute on chronic systolic congestive heart failure, H/O abdominal hysterectomy, H/O discectomy, H/O total knee replacement, bilateral pleural effusions s/p thoracentesis with a left side drain since March as per daughter, presents sent in by NH for altered mental status for the since yesterday morning, which has been worsening. She was recently discharged on Avycaz for UTI. As per daughter the patient had 2 intubations in the past for copd exacerbation. The left side drain has no output. The daughter mentioned that patient has been mentioning burning of urination and pain in the back( which is attributed to her sacral ulcer).      # Hypercapnic respiratory failure on BiPAP.  COPD exacerbation secondary to pneumonia rule out pulmonary edema  # Not septic on admission  -  RVP negative   - CXR/CT chest: Bilateral opacities and pleural effusions, increased from prior.  - Solumedrol 60 mg BID on admissions switched to 40  mg daily   - On 11/27 patient was found to be more lethargic , Abg showing PH of 7.29 and pCO2 of 83 , patient started on bipap   - f/u sputum cx  -  MRSA PCR negative   - Pulmonary consult: right sided tap done 11/27, yielding 1L of Clear fluids   - ID consult : continue cefepime , monitor cultures , fever and WBC curves   - CT head -> negative    # Multi-drug Resistant Klebsiella Bacteriemia with Acute Cystitis,  - UA positive due to Klebsiella  - UC showing carbapenem resistant  KP   - has chronic Ba  - s/p Avycaz q8h end date 11/8   - On cefepime for pneumonia   - NIV at night, NC during day    #Acute on Chronic systolic congestive heart failure  # Last echo from September 2023 normal EF.  - EF 64%  -Currently on lasix   - follows with Dr Pacheco    # Elevated ALP    - RuQ Sono: Grossly unremarkable with Gallbladder wall 2.9 cm.    # Chronic AFIB   - holding  BB  eliquis stopped  and switched to enoxaparin   - Enoxaparin stopped for pleural tap on 11/27 -> resumed 11/28    # Hypomagnesemia  - repleted    DVT PPX: lovenox   GI PPX: PPI  Diet: puree   Code Status: DNR/ intubate  Dispo: Acute    #Pending  - F/u wound consult for sacral ulcer

## 2023-11-29 NOTE — PHARMACOTHERAPY INTERVENTION NOTE - COMMENTS
adjust furosemide 40mg IV to daily, pt received dose earlier, start 11/30
plan-resume enoxaparin 70mg sc q12h, (CrCl ~34.4, adj BW-60.4kg ), d/w team recommended starting 10:00am
no BM, recommended changing Miralax prn to ATC
Modified penicillin allergy to state patient tolerated cefepime during this admission.    Aron Yanes, PharmD  Clinical Pharmacy Specialist, Infectious Diseases  Tele-Antimicrobial Stewardship Program (Tele-ASP)  Tele-ASP Phone: [phone]

## 2023-11-29 NOTE — PROGRESS NOTE ADULT - SUBJECTIVE AND OBJECTIVE BOX
Over Night Events: events noted, NIV overnight, on NC    PHYSICAL EXAM    ICU Vital Signs Last 24 Hrs  T(C): 36.7 (29 Nov 2023 04:00), Max: 37.3 (29 Nov 2023 00:00)  T(F): 98 (29 Nov 2023 04:00), Max: 99.1 (29 Nov 2023 00:00)  HR: 91 (29 Nov 2023 07:00) (87 - 114)  BP: 130/58 (29 Nov 2023 07:00) (91/46 - 149/73)  BP(mean): 84 (29 Nov 2023 07:00) (66 - 104)  RR: 18 (29 Nov 2023 07:00) (15 - 36)  SpO2: 93% (29 Nov 2023 07:00) (92% - 100%)    O2 Parameters below as of 28 Nov 2023 18:00  Patient On (Oxygen Delivery Method): nasal cannula  O2 Flow (L/min): 2          General: ill looking  Lungs: dec  bs both bases  Cardiovascular: SRI 2.6  Abdomen: Soft, Positive BS  Extremities: No clubbing   Neurological: Non focal       11-28-23 @ 07:01  -  11-29-23 @ 07:00  --------------------------------------------------------  IN:    IV PiggyBack: 50 mL    Oral Fluid: 240 mL  Total IN: 290 mL    OUT:    Indwelling Catheter - Urethral (mL): 1330 mL  Total OUT: 1330 mL    Total NET: -1040 mL          LABS:                          9.9    8.02  )-----------( 135      ( 29 Nov 2023 05:00 )             33.2                                               11-29    144  |  99  |  35<H>  ----------------------------<  90  3.9   |  37<H>  |  1.1    Ca    8.7      29 Nov 2023 05:00  Phos  2.2     11-29  Mg     1.6     11-29    TPro  5.8<L>  /  Alb  3.2<L>  /  TBili  0.4  /  DBili  x   /  AST  17  /  ALT  6   /  AlkPhos  164<H>  11-29                                             Urinalysis Basic - ( 29 Nov 2023 05:00 )    Color: x / Appearance: x / SG: x / pH: x  Gluc: 90 mg/dL / Ketone: x  / Bili: x / Urobili: x   Blood: x / Protein: x / Nitrite: x   Leuk Esterase: x / RBC: x / WBC x   Sq Epi: x / Non Sq Epi: x / Bacteria: x                                                  LIVER FUNCTIONS - ( 29 Nov 2023 05:00 )  Alb: 3.2 g/dL / Pro: 5.8 g/dL / ALK PHOS: 164 U/L / ALT: 6 U/L / AST: 17 U/L / GGT: x                                                  Culture - Body Fluid with Gram Stain (collected 27 Nov 2023 10:14)  Source: Pleural Fl Pleural Fluid  Gram Stain (28 Nov 2023 06:19):    No polymorphonuclear cells seen    No organisms seen    by cytocentrifuge    Culture - Fungal, Body Fluid (collected 27 Nov 2023 10:14)  Source: Pleural Fl Pleural Fluid  Preliminary Report (29 Nov 2023 06:50):    Testing in progress    Culture - Blood (collected 27 Nov 2023 06:07)  Source: .Blood None  Preliminary Report (28 Nov 2023 15:02):    No growth at 24 hours                                                                                       ABG - ( 27 Nov 2023 18:04 )  pH, Arterial: 7.37  pH, Blood: x     /  pCO2: 69    /  pO2: 79    / HCO3: 40    / Base Excess: 11.6  /  SaO2: 97.7                MEDICATIONS  (STANDING):  albuterol/ipratropium for Nebulization 3 milliLiter(s) Nebulizer every 4 hours  cefepime   IVPB 1000 milliGRAM(s) IV Intermittent every 12 hours  chlorhexidine 2% Cloths 1 Application(s) Topical <User Schedule>  enoxaparin Injectable 70 milliGRAM(s) SubCutaneous every 12 hours  furosemide   Injectable 40 milliGRAM(s) IV Push two times a day  gabapentin 100 milliGRAM(s) Oral three times a day  methylPREDNISolone sodium succinate Injectable 40 milliGRAM(s) IV Push daily  metoprolol tartrate 25 milliGRAM(s) Oral two times a day  nystatin Powder 1 Application(s) Topical two times a day  pantoprazole    Tablet 40 milliGRAM(s) Oral before breakfast  polyethylene glycol 3350 17 Gram(s) Oral daily  senna 2 Tablet(s) Oral at bedtime    MEDICATIONS  (PRN):  acetaminophen     Tablet .. 650 milliGRAM(s) Oral every 6 hours PRN Temp greater or equal to 38C (100.4F), Mild Pain (1 - 3)  aluminum hydroxide/magnesium hydroxide/simethicone Suspension 30 milliLiter(s) Oral every 4 hours PRN Dyspepsia  melatonin 3 milliGRAM(s) Oral at bedtime PRN Insomnia  ondansetron Injectable 4 milliGRAM(s) IV Push every 8 hours PRN Nausea and/or Vomiting

## 2023-11-29 NOTE — ADVANCED PRACTICE NURSE CONSULT - ASSESSMENT
History of Present Illness:   88 year old female with PMH of HTN, Afib, OA, PVD, COPD, Anxiety, Obesity, Acute on chronic systolic congestive heart failure, H/O abdominal hysterectomy, H/O discectomy, H/O total knee replacement, bilateral pleural effusions s/p thoracentesis with a left side drain since March as per daughter, presents sent in by NH for altered mental status for the since yesterday morning, which has been worsening. She was recently discharged on Avycaz for UTI. As per daughter the patient had 2 intubations in the past for copd exacerbation. The left side drain has no output. The daughter mentioned that patient has been mentioning burning of urination and pain in the back( which is attributed to her sacral ulcer).    Patient received lying in bed. Alert and awake. Limited mobility. Incontinent of stool, al in place. High risk for pressure injury.    Type of Wound: Moisture Associated Skin Damage  Location: Sacrum and bilateral buttocks  Tunneling /Undermining: No  Wound bed: Multiple areas of pink partial thickness skin loss  Wound edges: Irregular, macerated  Periwound: Macerated  Wound exudate: None  Wound odor: No  Induration, erythema, warmth: No  Wound pain: No    Skin to bilateral heels intact at time of assessment.

## 2023-11-29 NOTE — PROGRESS NOTE ADULT - ASSESSMENT
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 NIV at night, NC during day    CARDIOVASCULAR: Continue Lasix 40mg IV Q24H.    GI: GI prophylaxis.  feeding per speech and swallow.     RENAL:  Follow up lytes.  Correct as needed.    INFECTIOUS DISEASE: f/u cultures, Cefepime per ID following would change secondary to mental status.  procalcitonin 0.2     HEMATOLOGICAL:  Resume AC post-procedure     ENDOCRINE:  Follow up FS.  Insulin protocol if needed.    MUSCULOSKELETAL: pt/ot    DNR but not DNI    Poor prognosis    Palliative care evaluation     SDU  DIYA CHAPMAN

## 2023-11-29 NOTE — PROGRESS NOTE ADULT - CONVERSATION DETAILS
Patient AOx2. Requested me to speak with her children about code status. Spoke to Karishma and Anderson in the afternoon. They were able to rediscuss code status with patient. She continues to be DNR but OK with trial of intubation.

## 2023-11-29 NOTE — PROGRESS NOTE ADULT - SUBJECTIVE AND OBJECTIVE BOX
24H events:    Patient is a 88y old Female who presents with a chief complaint of COPD (29 Nov 2023 07:53)    Primary diagnosis of Altered mental status    Today is hospital day 5d. This morning patient was seen and examined at bedside, resting comfortably in bed.    No acute or major events overnight.    PAST MEDICAL & SURGICAL HISTORY  HTN (hypertension)    Afib  s/p ablation 2001    Goiter  no meds    Cellulitis  chronic    OA (osteoarthritis)    PVD (peripheral vascular disease)    COPD (chronic obstructive pulmonary disease)    Anxiety    Obesity    Acute on chronic systolic congestive heart failure    Edema of both lower legs    Required emergent intubation    H/O abdominal hysterectomy    H/O discectomy    H/O total knee replacement, bilateral      SOCIAL HISTORY:  Social History:      ALLERGIES:  penicillin (Other)  contrast media (iodine-based) (Other)  codeine (Other)  sulfa drugs (Hives; Other)  aspirin (Other)    MEDICATIONS:  STANDING MEDICATIONS  albuterol/ipratropium for Nebulization 3 milliLiter(s) Nebulizer every 4 hours  cefepime   IVPB 1000 milliGRAM(s) IV Intermittent every 12 hours  chlorhexidine 2% Cloths 1 Application(s) Topical <User Schedule>  enoxaparin Injectable 70 milliGRAM(s) SubCutaneous every 12 hours  furosemide   Injectable 40 milliGRAM(s) IV Push daily  gabapentin 100 milliGRAM(s) Oral three times a day  magnesium sulfate  IVPB 2 Gram(s) IV Intermittent Once  methylPREDNISolone sodium succinate Injectable 40 milliGRAM(s) IV Push daily  metoprolol tartrate 25 milliGRAM(s) Oral two times a day  nystatin Powder 1 Application(s) Topical two times a day  pantoprazole    Tablet 40 milliGRAM(s) Oral before breakfast  polyethylene glycol 3350 17 Gram(s) Oral daily  senna 2 Tablet(s) Oral at bedtime    PRN MEDICATIONS  acetaminophen     Tablet .. 650 milliGRAM(s) Oral every 6 hours PRN  aluminum hydroxide/magnesium hydroxide/simethicone Suspension 30 milliLiter(s) Oral every 4 hours PRN  melatonin 3 milliGRAM(s) Oral at bedtime PRN  ondansetron Injectable 4 milliGRAM(s) IV Push every 8 hours PRN    VITALS:   T(F): 97.8  HR: 99  BP: 121/55  RR: 19  SpO2: 98%    PHYSICAL EXAM:  CONSTITUTIONAL: No acute distress. On 2L  NC  SKIN: Stage 2 sacral ulcer  HEAD: Normocephalic; atraumatic.  EYES: Pupils equal round reactive to light; conjunctiva and sclera clear.  NECK: Supple; non tender. No rigidity  CARD: Regular rate and rhythm. Normal S1, S2; no murmurs, gallops, or rubs.  RESP: Lungs clear to auscultation bilaterally. No wheezes, rales or rhonchi.  ABD: Abdomen soft; non-tender; non-distended;    EXT: No clubbing or cyanosis  NEURO: Awake and alert. No focal deficits.  PSYCH: Cooperative    LABS:                        9.9    8.02  )-----------( 135      ( 29 Nov 2023 05:00 )             33.2     11-29    144  |  99  |  35<H>  ----------------------------<  90  3.9   |  37<H>  |  1.1    Ca    8.7      29 Nov 2023 05:00  Phos  2.2     11-29  Mg     1.6     11-29    TPro  5.8<L>  /  Alb  3.2<L>  /  TBili  0.4  /  DBili  x   /  AST  17  /  ALT  6   /  AlkPhos  164<H>  11-29      Urinalysis Basic - ( 29 Nov 2023 05:00 )    Color: x / Appearance: x / SG: x / pH: x  Gluc: 90 mg/dL / Ketone: x  / Bili: x / Urobili: x   Blood: x / Protein: x / Nitrite: x   Leuk Esterase: x / RBC: x / WBC x   Sq Epi: x / Non Sq Epi: x / Bacteria: x      ABG - ( 27 Nov 2023 18:04 )  pH, Arterial: 7.37  pH, Blood: x     /  pCO2: 69    /  pO2: 79    / HCO3: 40    / Base Excess: 11.6  /  SaO2: 97.7                  Culture - Body Fluid with Gram Stain (collected 27 Nov 2023 10:14)  Source: Pleural Fl Pleural Fluid  Gram Stain (28 Nov 2023 06:19):    No polymorphonuclear cells seen    No organisms seen    by cytocentrifuge  Preliminary Report (29 Nov 2023 09:33):    No growth to date.    Culture - Fungal, Body Fluid (collected 27 Nov 2023 10:14)  Source: Pleural Fl Pleural Fluid  Preliminary Report (29 Nov 2023 06:50):    Testing in progress    Culture - Blood (collected 27 Nov 2023 06:07)  Source: .Blood None  Preliminary Report (28 Nov 2023 15:02):    No growth at 24 hours        RADIOLOGY:

## 2023-11-29 NOTE — PROGRESS NOTE ADULT - SUBJECTIVE AND OBJECTIVE BOX
HPI:  88 year old female with PMH of HTN, Afib, OA, PVD, COPD, Anxiety, Obesity, Acute on chronic systolic congestive heart failure, H/O abdominal hysterectomy, H/O discectomy, H/O total knee replacement, bilateral pleural effusions s/p thoracentesis with a left side drain since March as per daughter, presents sent in by NH for altered mental status for the since yesterday morning, which has been worsening. She was recently discharged on Avycaz for UTI. As per daughter the patient had 2 intubations in the past for copd exacerbation. The left side drain has no output. The daughter mentioned that patient has been mentioning burning of urination and pain in the back( which is attributed to her sacral ulcer).  Denies any recent fevers, chills, N/V/D, headaches, chest pain, SOB.    Vital Signs Last 24 Hrs  T(C): 36.3 (24 Nov 2023 16:12), Max: 37.1 (24 Nov 2023 14:35)  T(F): 97.4 (24 Nov 2023 16:12), Max: 98.8 (24 Nov 2023 14:35)  HR: 104 (24 Nov 2023 16:12) (104 - 110)  BP: 102/67 (24 Nov 2023 16:12) (102/67 - 113/66)  RR: 20 (24 Nov 2023 16:12) (20 - 24)  SpO2: 96% (24 Nov 2023 16:37) (96% - 99%)  O2 Parameters below as of 24 Nov 2023 16:12  Patient On (Oxygen Delivery Method): nasal cannula  O2 Flow (L/min): 3 and BiPAP       (24 Nov 2023 16:57)    Patient aox2. comfortable. Asked me to speak to her family about code status.     ITEMS NOT CHECKED ARE NOT PRESENT    SOCIAL HISTORY:   Significant other/partner[ ]  Children[ ]  Judaism/Spirituality:  Substance hx:  [ ]   Tobacco hx:  [ ]   Alcohol hx: [ ]   Living Situation: [ ]Home  [ ]Long term care  [ ]Rehab [ ]Other  Home Services: [ ] HHA [ ] Eli RN [ ] Hospice  Occupation:  Home Opioid hx:  [ ] Y [ ] N [ ] I-Stop Reference No:     ADVANCE DIRECTIVES:    [ ] Full Code [x ] DNR  MOLST  [ ]  Living Will  [ ]   DECISION MAKER(s):  [x ] Health Care Proxy(s)  [ ] Surrogate(s)  [ ] Guardian           Name(s): Karishma Pearson    BASELINE (I)ADL(s) (prior to admission):  [place]: [ ]Total  [ ] Moderate [ ]Dependent  Palliative Performance Status Version 2:         %    [email or web address]    Allergies    penicillin (Other)  contrast media (iodine-based) (Other)  codeine (Other)  sulfa drugs (Hives; Other)  aspirin (Other)    Intolerances    MEDICATIONS  (STANDING):  albuterol/ipratropium for Nebulization 3 milliLiter(s) Nebulizer every 4 hours  cefepime   IVPB 1000 milliGRAM(s) IV Intermittent every 12 hours  chlorhexidine 2% Cloths 1 Application(s) Topical <User Schedule>  enoxaparin Injectable 70 milliGRAM(s) SubCutaneous every 12 hours  furosemide   Injectable 40 milliGRAM(s) IV Push daily  gabapentin 100 milliGRAM(s) Oral three times a day  methylPREDNISolone sodium succinate Injectable 40 milliGRAM(s) IV Push daily  metoprolol tartrate 25 milliGRAM(s) Oral two times a day  nystatin Powder 1 Application(s) Topical two times a day  pantoprazole    Tablet 40 milliGRAM(s) Oral before breakfast  polyethylene glycol 3350 17 Gram(s) Oral daily  senna 2 Tablet(s) Oral at bedtime    MEDICATIONS  (PRN):  acetaminophen     Tablet .. 650 milliGRAM(s) Oral every 6 hours PRN Temp greater or equal to 38C (100.4F), Mild Pain (1 - 3)  aluminum hydroxide/magnesium hydroxide/simethicone Suspension 30 milliLiter(s) Oral every 4 hours PRN Dyspepsia  melatonin 3 milliGRAM(s) Oral at bedtime PRN Insomnia  ondansetron Injectable 4 milliGRAM(s) IV Push every 8 hours PRN Nausea and/or Vomiting      PRESENT SYMPTOMS: [ ]Unable to obtain due to poor mentation   Source if other than patient:  [ ]Family   [ ]Team     Pain: [ ]yes [x ]no  QOL impact -   Location -                    Aggravating factors -  Alleviating factors -   Quality -  Radiation -  Timing-  Severity (0-10 scale):  Minimal acceptable level (0-10 scale):     CPOT:    https://www.[facility].org/getattachment/omf54u50-4v5x-2h4l-6x4a-0669v1556w4n/Critical-Care-Pain-Observation-Tool-(CPOT)    PAIN AD Score:   http://geriatrictoolkit.[facility]/cog/painad.pdf     Dyspnea:                           [x ]None[ ]Mild [ ]Moderate [ ]Severe     Respiratory Distress Observation Scale (RDOS):   A score of 0 to 2 signifies little or no respiratory distress, 3 signifies mild distress, scores 4 to 6 indicate moderate distress, and scores greater than 7 signify severe distress  https://[email or web address]/sites/default/files/PDFS/671161-sgeqsbxrmgn-iqpqzewc-iqmtvhgohuk-zphvg.pdf    Anxiety:                             [ ]None[ ]Mild [ ]Moderate [ ]Severe   Fatigue:                             [ ]None[ ]Mild [ ]Moderate [ ]Severe   Nausea:                             [ ]None[ ]Mild [ ]Moderate [ ]Severe   Loss of appetite:              [ ]None[ ]Mild [ ]Moderate [ ]Severe   Constipation:                    [ ]None[ ]Mild [ ]Moderate [ ]Severe    Other Symptoms:  [ ]All other review of systems negative     Palliative Performance Status Version 2:         %    [email or web address]_[email or web address]  PHYSICAL EXAM:          GENERAL:  NAD   EYES: EOMI, no scleral icterus  ENMT:  No external oral lesions  CARDIOVASCULAR:  RRR  PULMONARY:  Non labored breathing  GASTROINTESTINAL: Non distended  NEUROLOGIC: nonfocal  BEHAVIORAL/PSYCH:  Calm. AOx2  SKIN: No rash on exposed skin    CRITICAL CARE:  [ ] Shock Present  [ ]Septic [ ]Cardiogenic [ ]Neurologic [ ]Hypovolemic  [ ]  Vasopressors [ ]  Inotropes   [ ]Respiratory failure present [ ]Mechanical ventilation [ ]Non-invasive ventilatory support [ ]High flow  [ ]Acute  [ ]Chronic [ ]Hypoxic  [ ]Hypercarbic [ ]Other  [ ]Other organ failure     LABS: reviewed by me                        9.9    8.02  )-----------( 135      ( 29 Nov 2023 05:00 )             33.2       11-29    144  |  99  |  35<H>  ----------------------------<  90  3.9   |  37<H>  |  1.1    Ca    8.7      29 Nov 2023 05:00  Phos  2.2     11-29  Mg     1.6     11-29    TPro  5.8<L>  /  Alb  3.2<L>  /  TBili  0.4  /  DBili  x   /  AST  17  /  ALT  6   /  AlkPhos  164<H>  11-29          RADIOLOGY & ADDITIONAL STUDIES: reviewed by me  CXR 11/29    Bilateral opacities and effusions without significant change    Tracheal deviation to the left redemonstrated secondary to the right neck mass-see prior chest CT.    CXR 11/27  No pneumothorax.    Bilateral opacities/effusions, unchanged    PROTEIN CALORIE MALNUTRITION PRESENT: [ ]mild [ ]moderate [ ]severe [ ]underweight [ ]morbid obesity  https://www.andeal.org/vault/2440/web/files/ONC/Table_Clinical%20Characteristics%20to%20Document%20Malnutrition-White%20JV%20et%20al%202012.pdf    Height (cm): 170.2 (11-24-23 @ 12:16), 170.2 (10-23-23 @ 12:51), 170.2 (09-28-23 @ 17:04)  Weight (kg): 72.6 (11-24-23 @ 12:16), 80.2 (10-23-23 @ 12:51), 88.3 (09-28-23 @ 17:04)  BMI (kg/m2): 25.1 (11-24-23 @ 12:16), 27.7 (10-23-23 @ 12:51), 30.5 (09-28-23 @ 17:04)    [ ]PPSV2 < or = to 30% [ ]significant weight loss  [ ]poor nutritional intake  [ ]anasarca      [ ]Artificial Nutrition      Palliative Care Spiritual/Emotional Screening Tool Question  Severity (0-4):                    OR                    [ x] Unable to determine. Will assess at later time if appropriate.  Score of 2 or greater indicates recommendation of Chaplaincy and/or SW referral  Chaplaincy Referral: [ ] Yes [ ] Refused [ ] Following     Caregiver Sidney:  [ ] Yes [ ] No    OR    [x ] Unable to determine. Will assess at later time if appropriate.  Social Work Referral [ ]  Patient and Family Centered Care Referral [ ]    Anticipatory Grief Present: [ ] Yes [ ] No    OR     [ x] Unable to determine. Will assess at later time if appropriate.  Social Work Referral [ ]  Patient and Family Centered Care Referral [ ]    REFERRALS:   [ ]Chaplaincy  [ ]Hospice  [ ]Child Life  [ ]Social Work  [ ]Case management [ ]Holistic Therapy     Palliative care education provided to patient and/or family    _____________  He Mario Seals MD  Palliative Medicine  [facility]   of Geriatric and Palliative Medicine  [phone]

## 2023-11-30 NOTE — PROGRESS NOTE ADULT - ASSESSMENT
88 year old female with PMH of HTN, Afib, OA, PVD, COPD, Anxiety, Obesity, Acute on chronic systolic congestive heart failure, H/O abdominal hysterectomy, H/O discectomy, H/O total knee replacement, bilateral pleural effusions s/p thoracentesis with a left side drain since March as per daughter, presents sent in by NH for altered mental status    ID is consulted for UTI  Recently admitted to [facility] for [facility] Klebsiella bacteremia 2/2 UTI  S/p 14 days of Avycaz  Afebrile on admission, no leukocytosis  UA showed pyuria and hematuria, but unclear if the sample was collected on old al catheter (per note patient has chronic al)  CXR showed bilateral opacities and pleural effusions, increased from prior        IMPRESSION:  COPD exacerbation  Bilateral pleural effusions  11/27 BCx NG  11/24 UCx NG  11/27 s/p right thoracocentesis : no empyema. NG cultures  Chronic al with NG UCx  WBC 8.7    RECOMMENDATIONS:  - Cefepime 1gm q12hrs > could hold for now. No need for po ABx  - Offloading and frequent position changes, aspiration precaution    Please do not hesitate to recall ID if any questions arise either through [employer] or through [employer]

## 2023-11-30 NOTE — PROGRESS NOTE ADULT - GASTROINTESTINAL
soft/no guarding/no rigidity
normal/soft/nontender/nondistended/normal active bowel sounds
soft/nontender/no guarding/no rigidity

## 2023-11-30 NOTE — PROGRESS NOTE ADULT - SUBJECTIVE AND OBJECTIVE BOX
GIOVANNY BUCK  88y, Female    All available historical data reviewed    OVERNIGHT EVENTS:  no fevers  in good spirits    ROS:  General: Denies rigors, nightsweats  HEENT: Denies headache, rhinorrhea, sore throat, eye pain  CV: Denies CP, palpitations  PULM: Denies wheezing, hemoptysis  GI: Denies hematemesis, hematochezia, melena  : Denies discharge, hematuria  MSK: Denies arthralgias, myalgias  SKIN: Denies rash, lesions  NEURO: weakness  PSYCH: Denies depression, anxiety    VITALS:  T(F): 97.3, Max: 98 (11-29-23 @ 12:00)  HR: 97  BP: 117/76  RR: 20Vital Signs Last 24 Hrs  T(C): 36.3 (30 Nov 2023 04:00), Max: 36.7 (29 Nov 2023 12:00)  T(F): 97.3 (30 Nov 2023 04:00), Max: 98 (29 Nov 2023 12:00)  HR: 97 (30 Nov 2023 04:00) (97 - 104)  BP: 117/76 (30 Nov 2023 04:00) (103/57 - 131/84)  BP(mean): 103 (29 Nov 2023 20:30) (73 - 103)  RR: 20 (30 Nov 2023 04:00) (18 - 22)  SpO2: 97% (30 Nov 2023 04:00) (93% - 100%)    Parameters below as of 29 Nov 2023 20:30  Patient On (Oxygen Delivery Method): nasal cannula  O2 Flow (L/min): 3      TESTS & MEASUREMENTS:                        9.9    8.71  )-----------( 135      ( 30 Nov 2023 05:37 )             33.1     11-30    143  |  96<L>  |  34<H>  ----------------------------<  78  4.0   |  38<H>  |  0.9    Ca    8.8      30 Nov 2023 05:37  Phos  1.9     11-30  Mg     1.6     11-30    TPro  6.0  /  Alb  3.0<L>  /  TBili  0.4  /  DBili  x   /  AST  16  /  ALT  7   /  AlkPhos  162<H>  11-30    LIVER FUNCTIONS - ( 30 Nov 2023 05:37 )  Alb: 3.0 g/dL / Pro: 6.0 g/dL / ALK PHOS: 162 U/L / ALT: 7 U/L / AST: 16 U/L / GGT: x             Culture - Fungal, Body Fluid (collected 11-27-23 @ 10:14)  Source: Pleural Fl Pleural Fluid  Preliminary Report (11-29-23 @ 15:05):    Culture is being performed. Fungal cultures are held for 4 weeks.    Culture - Body Fluid with Gram Stain (collected 11-27-23 @ 10:14)  Source: Pleural Fl Pleural Fluid  Gram Stain (11-28-23 @ 06:19):    No polymorphonuclear cells seen    No organisms seen    by cytocentrifuge  Preliminary Report (11-29-23 @ 09:33):    No growth to date.    Culture - Blood (collected 11-27-23 @ 06:07)  Source: .Blood None  Preliminary Report (11-29-23 @ 15:11):    No growth at 48 Hours    Culture - Urine (collected 11-24-23 @ 14:35)  Source: Clean Catch Clean Catch (Midstream)  Final Report (11-26-23 @ 12:04):    >=3 organisms. Probable collection contamination.      Urinalysis Basic - ( 30 Nov 2023 05:37 )    Color: x / Appearance: x / SG: x / pH: x  Gluc: 78 mg/dL / Ketone: x  / Bili: x / Urobili: x   Blood: x / Protein: x / Nitrite: x   Leuk Esterase: x / RBC: x / WBC x   Sq Epi: x / Non Sq Epi: x / Bacteria: x          RADIOLOGY & ADDITIONAL TESTS:  Personal review of radiological diagnostics performed  Echo and EKG results noted when applicable.     MEDICATIONS:  acetaminophen     Tablet .. 650 milliGRAM(s) Oral every 6 hours PRN  albuterol/ipratropium for Nebulization 3 milliLiter(s) Nebulizer every 4 hours  aluminum hydroxide/magnesium hydroxide/simethicone Suspension 30 milliLiter(s) Oral every 4 hours PRN  cefepime   IVPB 1000 milliGRAM(s) IV Intermittent every 12 hours  chlorhexidine 2% Cloths 1 Application(s) Topical <User Schedule>  enoxaparin Injectable 70 milliGRAM(s) SubCutaneous every 12 hours  furosemide   Injectable 40 milliGRAM(s) IV Push daily  gabapentin 100 milliGRAM(s) Oral three times a day  magnesium sulfate  IVPB 2 Gram(s) IV Intermittent every 2 hours  melatonin 3 milliGRAM(s) Oral at bedtime PRN  methylPREDNISolone sodium succinate Injectable 40 milliGRAM(s) IV Push daily  metoprolol tartrate 25 milliGRAM(s) Oral two times a day  metoprolol tartrate 25 milliGRAM(s) Oral once PRN  nystatin Powder 1 Application(s) Topical two times a day  ondansetron Injectable 4 milliGRAM(s) IV Push every 8 hours PRN  pantoprazole    Tablet 40 milliGRAM(s) Oral before breakfast  polyethylene glycol 3350 17 Gram(s) Oral daily  senna 2 Tablet(s) Oral at bedtime  sodium phosphate 30 milliMole(s)/500 mL IVPB 30 milliMole(s) IV Intermittent once      ANTIBIOTICS:  cefepime   IVPB 1000 milliGRAM(s) IV Intermittent every 12 hours

## 2023-11-30 NOTE — PHYSICAL THERAPY INITIAL EVALUATION ADULT - GENERAL OBSERVATIONS, REHAB EVAL
10:15-10:24 pt encounter d in bed in NAD.  She adamantly refused any PT or movement, despite max encouragement by PT.   Pt was educated benefits of mobility and risks  of staying in bed.  PT to follow

## 2023-11-30 NOTE — OCCUPATIONAL THERAPY INITIAL EVALUATION ADULT - RANGE OF MOTION EXAMINATION, UPPER EXTREMITY
LUE: no AROM at shoulder, PROM to approx 1/2 range with Pt reporting discomfort, distally WFL; RUE AROM shoulder flexion ~1/4 range, PROM ~1/2 range, distally AROM WFL

## 2023-11-30 NOTE — PROGRESS NOTE ADULT - ATTENDING COMMENTS
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

## 2023-11-30 NOTE — OCCUPATIONAL THERAPY INITIAL EVALUATION ADULT - SPECIFY REASON(S)
Attempted OT eval, Pt desaturated w activity, SpO2 decreased 87% during LUE AROM assessment, discontinued activity and educated on breathing exercises, SpO2 returned to 95%. PARUL Taylor made aware

## 2023-11-30 NOTE — PROGRESS NOTE ADULT - ASSESSMENT
88 year old female with PMH of HTN, Afib, OA, PVD, COPD, Anxiety, Obesity, Acute on chronic systolic congestive heart failure, H/O abdominal hysterectomy, H/O discectomy, H/O total knee replacement, bilateral pleural effusions s/p thoracentesis with a left side drain since March as per daughter, presents sent in by NH for altered mental status for the since yesterday morning, which has been worsening. She was recently discharged on Avycaz for UTI. As per daughter the patient had 2 intubations in the past for copd exacerbation. The left side drain has no output. The daughter mentioned that patient has been mentioning burning of urination and pain in the back( which is attributed to her sacral ulcer).      # Hypercapnic respiratory failure on BiPAP.  COPD exacerbation secondary to pneumonia rule out pulmonary edema  # Not septic on admission  -  RVP negative   - CXR/CT chest: Bilateral opacities and pleural effusions, increased from prior.  - Solumedrol 60 mg BID on admissions switched to 40  mg daily   - On 11/27 patient was found to be more lethargic , Abg showing PH of 7.29 and pCO2 of 83 , patient started on bipap   - f/u sputum cx  -  MRSA PCR negative   - Pulmonary consult: right sided tap done 11/27, yielding 1L of Clear fluids   - ID consult : continue cefepime , monitor cultures , fever and WBC curves   - CT head -> negative    # Multi-drug Resistant Klebsiella Bacteriemia with Acute Cystitis,  - UA positive due to Klebsiella  - UC showing carbapenem resistant  KP   - has chronic Ba  - s/p Avycaz q8h end date 11/8   - On cefepime for pneumonia , ID consult, can hold   - NIV at night, NC during day    #Acute on Chronic systolic congestive heart failure  # Last echo from September 2023 normal EF.  - EF 64%  -Currently on lasix   - follows with Dr Pacheco    # Elevated ALP    - RuQ Sono: Grossly unremarkable with Gallbladder wall 2.9 cm.    # Chronic AFIB   - holding  BB  eliquis stopped  and switched to enoxaparin   - Enoxaparin stopped for pleural tap on 11/27 -> resumed 11/28    # Hypomagnesemia  - repleted    DVT PPX: lovenox   GI PPX: PPI  Diet: puree   Code Status: DNR/ intubate  Dispo: floor     #Pending  - F/u wound consult for sacral ulcer.     88 year old female with PMH of HTN, Afib, OA, PVD, COPD, Anxiety, Obesity, Acute on chronic systolic congestive heart failure, H/O abdominal hysterectomy, H/O discectomy, H/O total knee replacement, bilateral pleural effusions s/p thoracentesis with a left side drain since March as per daughter, presents sent in by NH for altered mental status. She was recently discharged on Avycaz for UTI. As per daughter the patient had 2 intubations in the past for copd exacerbation.     Acute on CHronic Hypercapnic respiratory failure s/p BiPAP  COPD exacerbation   R Pleural effusion sp thora/ transudate  Sepsis  not POA  -  RVP negative, Urine, Blood and pleural fluid cx negative. Nasal MRSA negative   - s/p Thoracentesis 11/27 with 1L removed, transudative  - dc abx per ID, tapering solumedrol now on 40 Q24H   - Solumedrol 60 mg BID on admissions switched to 40  mg daily   - taper down supplemental oxygen. Check AM xray   - PT fu    Multi-drug Resistant Klebsiella Bacteriemia with Acute Cystitis - prior admission october 2023  Acute Urinary retention s/p al placement 11/30 AM  - s/p Avycaz q8h end date 11/8   - per documentation, patient has a chronic al     Acute on Chronic systolic congestive heart failure - improving   Echo with EF 64%  -Currently on lasix 40 IV daily. Likely switch to PO in 24 hours   - follows with Dr Pacheco    Elevated ALP  - RuQ Sono: Grossly unremarkable with Gallbladder wall 2.9 cm. Monitor     Chronic AFIB  - c/w Therapeutic lovenox and BB. Eliquis on discharge    Neck Mass - chronic, had prior work up/biopsy/no further interventions at this time. Family aware    Sacral Ulcer POA - s/p wound care eval, wound care per recs     DNR with a trial of intubation    Pending: monitor resp status, taper down supplemental 02, labs, AM xray, PT     Patient seen at bedside, total time spent to evaluate and treat the patient's acute illness and chronic medical conditions as well as time spent reviewing labs, radiology, discussing medical plan with covering medical team was more than ___ minutes with >50% of time spent face to face with patient, discussing with patient/family as well as coordination of care

## 2023-11-30 NOTE — PROGRESS NOTE ADULT - SUBJECTIVE AND OBJECTIVE BOX
GIOVANNY BUCK  88y Female    CHIEF COMPLAINT:    Patient is a 88y old  Female who presents with a chief complaint of COPD (30 Nov 2023 09:04)    INTERVAL HPI/OVERNIGHT EVENTS:    Patient seen and examined. No acute events overnight. downgraded from MICU, comfortable on NC     ROS: All other systems are negative.    Vital Signs:    T(F): 96.8 (11-30-23 @ 08:00), Max: 98 (11-29-23 @ 12:00)  HR: 97 (11-30-23 @ 08:00) (97 - 104)  BP: 139/80 (11-30-23 @ 08:00) (113/56 - 139/80)  RR: 20 (11-30-23 @ 08:00) (18 - 22)  SpO2: 96% (11-30-23 @ 08:00) (93% - 100%)    29 Nov 2023 07:01  -  30 Nov 2023 07:00  --------------------------------------------------------  IN: 480 mL / OUT: 1450 mL / NET: -970 mL    PHYSICAL EXAM:    GENERAL:  NAD  SKIN: No rashes or lesions  HEENT: Atraumatic. Normocephalic.   NECK: Supple, No JVD.    PULMONARY: decreased breath sounds B/L. No wheezing.  CVS: Normal S1, S2. Rate and Rhythm are regular.    ABDOMEN/GI: Soft, Nontender, Nondistended   MSK:  No clubbing or cyanosis   NEUROLOGIC:  No motor or sensory deficit.  PSYCH: Alert & oriented x 3, normal affect    Consultant(s) Notes Reviewed:  [x ] YES  [ ] NO  Care Discussed with Consultants/Other Providers [ x] YES  [ ] NO    LABS:                        9.9    8.71  )-----------( 135      ( 30 Nov 2023 05:37 )             33.1     11-30    143  |  96<L>  |  34<H>  ----------------------------<  78  4.0   |  38<H>  |  0.9    Ca    8.8      30 Nov 2023 05:37  Phos  1.9     11-30  Mg     1.6     11-30    TPro  6.0  /  Alb  3.0<L>  /  TBili  0.4  /  DBili  x   /  AST  16  /  ALT  7   /  AlkPhos  162<H>  11-30              RADIOLOGY & ADDITIONAL TESTS:      Imaging or report Personally Reviewed:  [x] YES  [ ] NO  EKG reviewed: [x] YES  [ ] NO    Medications:  Standing  albuterol/ipratropium for Nebulization 3 milliLiter(s) Nebulizer every 4 hours  chlorhexidine 2% Cloths 1 Application(s) Topical <User Schedule>  enoxaparin Injectable 70 milliGRAM(s) SubCutaneous every 12 hours  furosemide   Injectable 40 milliGRAM(s) IV Push daily  gabapentin 100 milliGRAM(s) Oral three times a day  magnesium sulfate  IVPB 2 Gram(s) IV Intermittent every 2 hours  methylPREDNISolone sodium succinate Injectable 40 milliGRAM(s) IV Push daily  metoprolol tartrate 25 milliGRAM(s) Oral two times a day  nystatin Powder 1 Application(s) Topical two times a day  pantoprazole    Tablet 40 milliGRAM(s) Oral before breakfast  polyethylene glycol 3350 17 Gram(s) Oral daily  senna 2 Tablet(s) Oral at bedtime    PRN Meds  acetaminophen     Tablet .. 650 milliGRAM(s) Oral every 6 hours PRN  aluminum hydroxide/magnesium hydroxide/simethicone Suspension 30 milliLiter(s) Oral every 4 hours PRN  melatonin 3 milliGRAM(s) Oral at bedtime PRN  metoprolol tartrate 25 milliGRAM(s) Oral once PRN  ondansetron Injectable 4 milliGRAM(s) IV Push every 8 hours PRN             GIOVANNY BUCK  88y Female    CHIEF COMPLAINT:    Patient is a 88y old  Female who presents with a chief complaint of COPD (30 Nov 2023 09:04)    INTERVAL HPI/OVERNIGHT EVENTS:    Patient seen and examined. No acute events overnight. downgraded from MICU, comfortable on NC     ROS: All other systems are negative.    Vital Signs:    T(F): 96.8 (11-30-23 @ 08:00), Max: 98 (11-29-23 @ 12:00)  HR: 97 (11-30-23 @ 08:00) (97 - 104)  BP: 139/80 (11-30-23 @ 08:00) (113/56 - 139/80)  RR: 20 (11-30-23 @ 08:00) (18 - 22)  SpO2: 96% (11-30-23 @ 08:00) (93% - 100%)    29 Nov 2023 07:01  -  30 Nov 2023 07:00  --------------------------------------------------------  IN: 480 mL / OUT: 1450 mL / NET: -970 mL    PHYSICAL EXAM:    GENERAL:  NAD  SKIN: No rashes or lesions  HEENT: Atraumatic. Normocephalic. THyroid mass palpable   NECK: Supple, No JVD.    PULMONARY: decreased breath sounds B/L. No wheezing.  CVS: Normal S1, S2. Rate and Rhythm are regular.    ABDOMEN/GI: Soft, Nontender, Nondistended   MSK:  No clubbing or cyanosis   NEUROLOGIC:  No motor or sensory deficit.  PSYCH: Alert & oriented x 3, normal affect    Consultant(s) Notes Reviewed:  [x ] YES  [ ] NO  Care Discussed with Consultants/Other Providers [ x] YES  [ ] NO    LABS:                        9.9    8.71  )-----------( 135      ( 30 Nov 2023 05:37 )             33.1    143  |  96<L>  |  34<H>  ----------------------------<  78  4.0   |  38<H>  |  0.9    Ca    8.8      30 Nov 2023 05:37  Phos  1.9     11-30  Mg     1.6     11-30    TPro  6.0  /  Alb  3.0<L>  /  TBili  0.4  /  DBili  x   /  AST  16  /  ALT  7   /  AlkPhos  162<H>  11-30    RADIOLOGY & ADDITIONAL TESTS:  Imaging or report Personally Reviewed:  [x] YES  [ ] NO  EKG reviewed: [x] YES  [ ] NO    Medications:  Standing  albuterol/ipratropium for Nebulization 3 milliLiter(s) Nebulizer every 4 hours  chlorhexidine 2% Cloths 1 Application(s) Topical <User Schedule>  enoxaparin Injectable 70 milliGRAM(s) SubCutaneous every 12 hours  furosemide   Injectable 40 milliGRAM(s) IV Push daily  gabapentin 100 milliGRAM(s) Oral three times a day  magnesium sulfate  IVPB 2 Gram(s) IV Intermittent every 2 hours  methylPREDNISolone sodium succinate Injectable 40 milliGRAM(s) IV Push daily  metoprolol tartrate 25 milliGRAM(s) Oral two times a day  nystatin Powder 1 Application(s) Topical two times a day  pantoprazole    Tablet 40 milliGRAM(s) Oral before breakfast  polyethylene glycol 3350 17 Gram(s) Oral daily  senna 2 Tablet(s) Oral at bedtime    PRN Meds  acetaminophen     Tablet .. 650 milliGRAM(s) Oral every 6 hours PRN  aluminum hydroxide/magnesium hydroxide/simethicone Suspension 30 milliLiter(s) Oral every 4 hours PRN  melatonin 3 milliGRAM(s) Oral at bedtime PRN  metoprolol tartrate 25 milliGRAM(s) Oral once PRN  ondansetron Injectable 4 milliGRAM(s) IV Push every 8 hours PRN

## 2023-11-30 NOTE — PROGRESS NOTE ADULT - ASSESSMENT
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 NIV QHS and PRN, wean oxygen as tolerated target SaO2 88-92%, incentive spirometry, repeat CXR tomorrow     CARDIOVASCULAR: avoid volume overload, -970/24hrs, continue Lasix 40mg IV Q24H, 2D-Echo EF 64%, Severe TR, BNP 11K    GI: GI prophylaxis.  feeding per speech and swallow. bowel regimen     RENAL:  Follow up lytes.  Correct as needed. replete Mg and Phosphate     INFECTIOUS DISEASE: cultures negative to date, Cefepime day 7, procalcitonin 0.2     HEMATOLOGICAL:  on therapeutic Lovenox for AFIB     ENDOCRINE:  Follow up FS.  Insulin protocol if needed.    MUSCULOSKELETAL: PT/OT, activity as tolerated     DNR but not DNI    Poor prognosis    No lines, Ba replaced 11/30 AM    Palliative care following    Downgrade to Floor

## 2023-11-30 NOTE — PROGRESS NOTE ADULT - SUBJECTIVE AND OBJECTIVE BOX
Patient is a 88y old  Female who presents with a chief complaint of COPD (29 Nov 2023 15:19)        Over Night Events: downgraded from ICU overnight, on 2L NC sating 99%, IV-locked, comfortable, refused BIPAP overnight, retained 383cc urine SP al overnight      Vital Signs Last 24 Hrs  T(C): 36.3 (30 Nov 2023 04:00), Max: 36.7 (29 Nov 2023 12:00)  T(F): 97.3 (30 Nov 2023 04:00), Max: 98 (29 Nov 2023 12:00)  HR: 97 (30 Nov 2023 04:00) (97 - 104)  BP: 117/76 (30 Nov 2023 04:00) (103/57 - 145/58)  BP(mean): 103 (29 Nov 2023 20:30) (73 - 103)  RR: 20 (30 Nov 2023 04:00) (18 - 22)  SpO2: 97% (30 Nov 2023 04:00) (93% - 100%)    O2 Parameters below as of 29 Nov 2023 20:30  Patient On (Oxygen Delivery Method): nasal cannula  O2 Flow (L/min): 3          General: ill looking, elderly   Lungs: dec  bs both bases  Cardiovascular: SRI 2.6  Abdomen: Soft, Positive BS  Extremities: No clubbing   Neurological: Non focal, A&Ox3      11-29-23 @ 07:01  -  11-30-23 @ 07:00  --------------------------------------------------------  IN:    Oral Fluid: 480 mL  Total IN: 480 mL    OUT:    Indwelling Catheter - Urethral (mL): 550 mL    Intermittent Catheterization - Urethral (mL): 900 mL  Total OUT: 1450 mL    Total NET: -970 mL          LABS:                            9.9    8.71  )-----------( 135      ( 30 Nov 2023 05:37 )             33.1                                               11-30    143  |  96<L>  |  34<H>  ----------------------------<  78  4.0   |  38<H>  |  0.9    Ca    8.8      30 Nov 2023 05:37  Phos  1.9     11-30  Mg     1.6     11-30    TPro  6.0  /  Alb  3.0<L>  /  TBili  0.4  /  DBili  x   /  AST  16  /  ALT  7   /  AlkPhos  162<H>  11-30                                             Urinalysis Basic - ( 30 Nov 2023 05:37 )    Color: x / Appearance: x / SG: x / pH: x  Gluc: 78 mg/dL / Ketone: x  / Bili: x / Urobili: x   Blood: x / Protein: x / Nitrite: x   Leuk Esterase: x / RBC: x / WBC x   Sq Epi: x / Non Sq Epi: x / Bacteria: x                                                  LIVER FUNCTIONS - ( 30 Nov 2023 05:37 )  Alb: 3.0 g/dL / Pro: 6.0 g/dL / ALK PHOS: 162 U/L / ALT: 7 U/L / AST: 16 U/L / GGT: x                                                  Culture - Fungal, Body Fluid (collected 27 Nov 2023 10:14)  Source: Pleural Fl Pleural Fluid  Preliminary Report (29 Nov 2023 15:05):    Culture is being performed. Fungal cultures are held for 4 weeks.    Culture - Body Fluid with Gram Stain (collected 27 Nov 2023 10:14)  Source: Pleural Fl Pleural Fluid  Gram Stain (28 Nov 2023 06:19):    No polymorphonuclear cells seen    No organisms seen    by cytocentrifuge  Preliminary Report (29 Nov 2023 09:33):    No growth to date.                                                                                           MEDICATIONS  (STANDING):  albuterol/ipratropium for Nebulization 3 milliLiter(s) Nebulizer every 4 hours  cefepime   IVPB 1000 milliGRAM(s) IV Intermittent every 12 hours  chlorhexidine 2% Cloths 1 Application(s) Topical <User Schedule>  enoxaparin Injectable 70 milliGRAM(s) SubCutaneous every 12 hours  furosemide   Injectable 40 milliGRAM(s) IV Push daily  gabapentin 100 milliGRAM(s) Oral three times a day  methylPREDNISolone sodium succinate Injectable 40 milliGRAM(s) IV Push daily  metoprolol tartrate 25 milliGRAM(s) Oral two times a day  nystatin Powder 1 Application(s) Topical two times a day  pantoprazole    Tablet 40 milliGRAM(s) Oral before breakfast  polyethylene glycol 3350 17 Gram(s) Oral daily  senna 2 Tablet(s) Oral at bedtime    MEDICATIONS  (PRN):  acetaminophen     Tablet .. 650 milliGRAM(s) Oral every 6 hours PRN Temp greater or equal to 38C (100.4F), Mild Pain (1 - 3)  aluminum hydroxide/magnesium hydroxide/simethicone Suspension 30 milliLiter(s) Oral every 4 hours PRN Dyspepsia  melatonin 3 milliGRAM(s) Oral at bedtime PRN Insomnia  metoprolol tartrate 25 milliGRAM(s) Oral once PRN tachycardia  ondansetron Injectable 4 milliGRAM(s) IV Push every 8 hours PRN Nausea and/or Vomiting      CXR reviewed

## 2023-12-01 NOTE — PROGRESS NOTE ADULT - SUBJECTIVE AND OBJECTIVE BOX
GIOVANNY BUCK  88y Female    CHIEF COMPLAINT:    Patient is a 88y old  Female who presents with a chief complaint of COPD (01 Dec 2023 09:45)    INTERVAL HPI/OVERNIGHT EVENTS:    Patient seen and examined. No acute events overnight. Overall unchanged, on 3L NC     ROS: All other systems are negative.    Vital Signs:    T(F): 98.7 (12-01-23 @ 16:19), Max: 98.7 (12-01-23 @ 16:19)  HR: 108 (12-01-23 @ 16:19) (84 - 108)  BP: 102/61 (12-01-23 @ 16:19) (102/61 - 124/78)  RR: 18 (12-01-23 @ 16:19) (18 - 20)  SpO2: 97% (12-01-23 @ 16:19) (88% - 100%)    30 Nov 2023 07:01  -  01 Dec 2023 07:00  --------------------------------------------------------  IN: 240 mL / OUT: 800 mL / NET: -560 mL    01 Dec 2023 07:01  -  01 Dec 2023 16:28  --------------------------------------------------------  IN: 0 mL / OUT: 725 mL / NET: -725 mL    PHYSICAL EXAM:    GENERAL:  NAD  SKIN: No rashes or lesions  HEENT: Atraumatic. Normocephalic.   NECK: Supple, No JVD.   PULMONARY: decreased breath sounds B/L. No wheezing.   CVS: Normal S1, S2. Rate and Rhythm are regular.    ABDOMEN/GI: Soft, Nontender, Nondistended   MSK:  No clubbing or cyanosis   NEUROLOGIC: moves all extremities   PSYCH: Alert & oriented x 3     Consultant(s) Notes Reviewed:  [x ] YES  [ ] NO  Care Discussed with Consultants/Other Providers [ x] YES  [ ] NO    LABS:                        10.1   9.55  )-----------( 130      ( 01 Dec 2023 04:33 )             33.2     143  |  97<L>  |  31<H>  ----------------------------<  75  4.1   |  37<H>  |  0.9    Ca    8.5      01 Dec 2023 04:33  Phos  3.1     12-01  Mg     2.3     12-01    TPro  5.6<L>  /  Alb  3.0<L>  /  TBili  0.4  /  DBili  x   /  AST  29  /  ALT  14  /  AlkPhos  196<H>  12-01    RADIOLOGY & ADDITIONAL TESTS:  Imaging or report Personally Reviewed:  [x] YES  [ ] NO  EKG reviewed: [x] YES  [ ] NO    Medications:  Standing  albuterol/ipratropium for Nebulization 3 milliLiter(s) Nebulizer every 4 hours  chlorhexidine 2% Cloths 1 Application(s) Topical <User Schedule>  enoxaparin Injectable 70 milliGRAM(s) SubCutaneous every 12 hours  gabapentin 100 milliGRAM(s) Oral three times a day  methylPREDNISolone sodium succinate Injectable 40 milliGRAM(s) IV Push daily  metoprolol tartrate 25 milliGRAM(s) Oral two times a day  nystatin Powder 1 Application(s) Topical two times a day  pantoprazole    Tablet 40 milliGRAM(s) Oral before breakfast  polyethylene glycol 3350 17 Gram(s) Oral daily  senna 2 Tablet(s) Oral at bedtime    PRN Meds  acetaminophen     Tablet .. 650 milliGRAM(s) Oral every 6 hours PRN  aluminum hydroxide/magnesium hydroxide/simethicone Suspension 30 milliLiter(s) Oral every 4 hours PRN  melatonin 3 milliGRAM(s) Oral at bedtime PRN  metoprolol tartrate 25 milliGRAM(s) Oral once PRN  ondansetron Injectable 4 milliGRAM(s) IV Push every 8 hours PRN

## 2023-12-01 NOTE — PROGRESS NOTE ADULT - ASSESSMENT
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 NIV QHS and PRN, wean oxygen as tolerated target SaO2 88-92%, incentive spirometry, CXR stable      CARDIOVASCULAR: avoid volume overload, -560/24hrs, hold Lasix today, 2D-Echo EF 64%, Severe TR, BNP 11K    GI: GI prophylaxis.  feeding per speech and swallow. bowel regimen     RENAL:  Follow up lytes.  Correct as needed.    INFECTIOUS DISEASE: cultures negative to date, SP Cefepime course, procalcitonin 0.2     HEMATOLOGICAL:  on therapeutic Lovenox for AFIB     ENDOCRINE:  Follow up FS.  Insulin protocol if needed.    MUSCULOSKELETAL: PT/OT, OOBTC    DNR but not DNI    Poor prognosis    No lines, Chronic Ba    Palliative care following    Downgrade to Floor

## 2023-12-01 NOTE — OCCUPATIONAL THERAPY INITIAL EVALUATION ADULT - RANGE OF MOTION EXAMINATION
bilateral shoulders minimal active movement noted; PROM at shoulders ~1/2 ROM/deficits as listed below

## 2023-12-01 NOTE — PROGRESS NOTE ADULT - ASSESSMENT
88 year old female with PMH of HTN, Afib, OA, PVD, COPD, Anxiety, Obesity, Acute on chronic systolic congestive heart failure, H/O abdominal hysterectomy, H/O discectomy, H/O total knee replacement, bilateral pleural effusions s/p thoracentesis with a left side drain since March as per daughter, presents sent in by NH for altered mental status for the since yesterday morning, which has been worsening. She was recently discharged on Avycaz for UTI. As per daughter the patient had 2 intubations in the past for copd exacerbation. The left side drain has no output. The daughter mentioned that patient has been mentioning burning of urination and pain in the back( which is attributed to her sacral ulcer).      # Hypercapnic respiratory failure on BiPAP.  COPD exacerbation secondary to pneumonia rule out pulmonary edema  # Not septic on admission  -  RVP negative   - CXR/CT chest: Bilateral opacities and pleural effusions, increased from prior.  - Solumedrol 60 mg BID on admissions switched to 40  mg daily   - On 11/27 patient was found to be more lethargic , Abg showing PH of 7.29 and pCO2 of 83 , patient started on bipap   - f/u sputum cx  -  MRSA PCR negative   - Pulmonary consult: right sided tap done 11/27, yielding 1L of Clear fluids   - ID consult : continue cefepime , monitor cultures , fever and WBC curves   - CT head -> negative    # Multi-drug Resistant Klebsiella Bacteriemia with Acute Cystitis,  - UA positive due to Klebsiella  - UC showing carbapenem resistant  KP   - has chronic Ba  - s/p Avycaz q8h end date 11/8   - On cefepime for pneumonia , ID consult, can hold   - NIV at night, NC during day    #Acute on Chronic systolic congestive heart failure  # Last echo from September 2023 normal EF.  - EF 64%  -Currently on lasix   - follows with Dr Pacheco    # Elevated ALP    - RuQ Sono: Grossly unremarkable with Gallbladder wall 2.9 cm.    # Chronic AFIB   - holding  BB  eliquis stopped  and switched to enoxaparin   - Enoxaparin stopped for pleural tap on 11/27 -> resumed 11/28    # Hypomagnesemia  - repleted    DVT PPX: lovenox   GI PPX: PPI  Diet: puree   Code Status: DNR/ intubate  Dispo: floor

## 2023-12-01 NOTE — OCCUPATIONAL THERAPY INITIAL EVALUATION ADULT - ADDITIONAL COMMENTS
Pt admitted from Subacute rehab where she required total assistance for all needs.   Prior to recent hospitalizations pt resided in alone in private house, has stair lift, walk in shower with seat

## 2023-12-01 NOTE — OCCUPATIONAL THERAPY INITIAL EVALUATION ADULT - GENERAL OBSERVATIONS, REHAB EVAL
Pt encountered semireclined in bed (bed 45 degree), +IV, +al, +O2 via NC (2L/min), +BP cuff. /80
Pt encountered semi reese in bed, + IV, + 3L O2, + al, + bilateral sequentials. Pt agreeable to bedside OT assessment, may be seen as confirmed with RN. Pt returned to bed as found, + IV, + 3L O2, + al, + bilateral sequentials.

## 2023-12-01 NOTE — PROGRESS NOTE ADULT - SUBJECTIVE AND OBJECTIVE BOX
24H events:    Patient is a 88y old Female who presents with a chief complaint of COPD (01 Dec 2023 08:08)    Primary diagnosis of Altered mental status        Today is 7d of hospitalization. This morning patient was seen and examined at bedside, resting comfortably in bed.    No acute or major events overnight.      PAST MEDICAL & SURGICAL HISTORY  HTN (hypertension)    Afib  s/p ablation 2001    Goiter  no meds    Cellulitis  chronic    OA (osteoarthritis)    PVD (peripheral vascular disease)    COPD (chronic obstructive pulmonary disease)    Anxiety    Obesity    Acute on chronic systolic congestive heart failure    Edema of both lower legs    Required emergent intubation    H/O abdominal hysterectomy    H/O discectomy    H/O total knee replacement, bilateral      SOCIAL HISTORY:  Social History:      ALLERGIES:  penicillin (Other)  contrast media (iodine-based) (Other)  codeine (Other)  sulfa drugs (Hives; Other)  aspirin (Other)    MEDICATIONS:  STANDING MEDICATIONS  albuterol/ipratropium for Nebulization 3 milliLiter(s) Nebulizer every 4 hours  chlorhexidine 2% Cloths 1 Application(s) Topical <User Schedule>  enoxaparin Injectable 70 milliGRAM(s) SubCutaneous every 12 hours  gabapentin 100 milliGRAM(s) Oral three times a day  methylPREDNISolone sodium succinate Injectable 40 milliGRAM(s) IV Push daily  metoprolol tartrate 25 milliGRAM(s) Oral two times a day  nystatin Powder 1 Application(s) Topical two times a day  pantoprazole    Tablet 40 milliGRAM(s) Oral before breakfast  polyethylene glycol 3350 17 Gram(s) Oral daily  senna 2 Tablet(s) Oral at bedtime    PRN MEDICATIONS  acetaminophen     Tablet .. 650 milliGRAM(s) Oral every 6 hours PRN  aluminum hydroxide/magnesium hydroxide/simethicone Suspension 30 milliLiter(s) Oral every 4 hours PRN  melatonin 3 milliGRAM(s) Oral at bedtime PRN  metoprolol tartrate 25 milliGRAM(s) Oral once PRN  ondansetron Injectable 4 milliGRAM(s) IV Push every 8 hours PRN    VITALS:   T(F): 97.9  HR: 100  BP: 124/78  RR: 18  SpO2: 92%    PHYSICAL EXAM:    GENERAL:  NAD  SKIN: No rashes or lesions  HEENT: Atraumatic. Normocephalic. THyroid mass palpable   NECK: Supple, No JVD.    PULMONARY: decreased breath sounds B/L. No wheezing.  CVS: Normal S1, S2. Rate and Rhythm are regular.    ABDOMEN/GI: Soft, Nontender, Nondistended   MSK:  No clubbing or cyanosis   NEUROLOGIC:  No motor or sensory deficit.  PSYCH: Alert & oriented x 3, normal affect          LABS:                        10.1   9.55  )-----------( 130      ( 01 Dec 2023 04:33 )             33.2     12-01    143  |  97<L>  |  31<H>  ----------------------------<  75  4.1   |  37<H>  |  0.9    Ca    8.5      01 Dec 2023 04:33  Phos  3.1     12-01  Mg     2.3     12-01    TPro  5.6<L>  /  Alb  3.0<L>  /  TBili  0.4  /  DBili  x   /  AST  29  /  ALT  14  /  AlkPhos  196<H>  12-01      Urinalysis Basic - ( 01 Dec 2023 04:33 )    Color: x / Appearance: x / SG: x / pH: x  Gluc: 75 mg/dL / Ketone: x  / Bili: x / Urobili: x   Blood: x / Protein: x / Nitrite: x   Leuk Esterase: x / RBC: x / WBC x   Sq Epi: x / Non Sq Epi: x / Bacteria: x

## 2023-12-01 NOTE — PROGRESS NOTE ADULT - SUBJECTIVE AND OBJECTIVE BOX
Patient is a 88y old  Female who presents with a chief complaint of COPD (30 Nov 2023 11:13)        Over Night Events: no acute events overnight, tolerated AVAPs on 3L NC sating 97%, IV-locked       Vital Signs Last 24 Hrs  T(C): 36.6 (01 Dec 2023 04:00), Max: 36.6 (30 Nov 2023 12:00)  T(F): 97.8 (01 Dec 2023 04:00), Max: 97.9 (30 Nov 2023 12:00)  HR: 84 (01 Dec 2023 04:00) (84 - 102)  BP: 123/80 (01 Dec 2023 04:00) (108/66 - 140/78)  BP(mean): 101 (30 Nov 2023 12:00) (101 - 101)  RR: 18 (01 Dec 2023 04:00) (18 - 20)  SpO2: 100% (01 Dec 2023 04:00) (88% - 100%)    O2 Parameters below as of 30 Nov 2023 12:00  Patient On (Oxygen Delivery Method): nasal cannula        General: ill looking, elderly   Lungs: dec  bs both bases  Cardiovascular: SRI 2.6  Abdomen: Soft, Positive BS  Extremities: No clubbing   Neurological: Non focal, A&Ox2-3      11-30-23 @ 07:01  -  12-01-23 @ 07:00  --------------------------------------------------------  IN:    Oral Fluid: 240 mL  Total IN: 240 mL    OUT:    Indwelling Catheter - Urethral (mL): 800 mL  Total OUT: 800 mL    Total NET: -560 mL          LABS:                            10.1   9.55  )-----------( 130      ( 01 Dec 2023 04:33 )             33.2                                               12-01    143  |  97<L>  |  31<H>  ----------------------------<  75  4.1   |  37<H>  |  0.9    Ca    8.5      01 Dec 2023 04:33  Phos  3.1     12-01  Mg     2.3     12-01    TPro  5.6<L>  /  Alb  3.0<L>  /  TBili  0.4  /  DBili  x   /  AST  29  /  ALT  14  /  AlkPhos  196<H>  12-01                                             Urinalysis Basic - ( 01 Dec 2023 04:33 )    Color: x / Appearance: x / SG: x / pH: x  Gluc: 75 mg/dL / Ketone: x  / Bili: x / Urobili: x   Blood: x / Protein: x / Nitrite: x   Leuk Esterase: x / RBC: x / WBC x   Sq Epi: x / Non Sq Epi: x / Bacteria: x                                                  LIVER FUNCTIONS - ( 01 Dec 2023 04:33 )  Alb: 3.0 g/dL / Pro: 5.6 g/dL / ALK PHOS: 196 U/L / ALT: 14 U/L / AST: 29 U/L / GGT: x                                                                                                                                       MEDICATIONS  (STANDING):  albuterol/ipratropium for Nebulization 3 milliLiter(s) Nebulizer every 4 hours  chlorhexidine 2% Cloths 1 Application(s) Topical <User Schedule>  enoxaparin Injectable 70 milliGRAM(s) SubCutaneous every 12 hours  furosemide   Injectable 40 milliGRAM(s) IV Push daily  gabapentin 100 milliGRAM(s) Oral three times a day  methylPREDNISolone sodium succinate Injectable 40 milliGRAM(s) IV Push daily  metoprolol tartrate 25 milliGRAM(s) Oral two times a day  nystatin Powder 1 Application(s) Topical two times a day  pantoprazole    Tablet 40 milliGRAM(s) Oral before breakfast  polyethylene glycol 3350 17 Gram(s) Oral daily  senna 2 Tablet(s) Oral at bedtime    MEDICATIONS  (PRN):  acetaminophen     Tablet .. 650 milliGRAM(s) Oral every 6 hours PRN Temp greater or equal to 38C (100.4F), Mild Pain (1 - 3)  aluminum hydroxide/magnesium hydroxide/simethicone Suspension 30 milliLiter(s) Oral every 4 hours PRN Dyspepsia  melatonin 3 milliGRAM(s) Oral at bedtime PRN Insomnia  metoprolol tartrate 25 milliGRAM(s) Oral once PRN tachycardia  ondansetron Injectable 4 milliGRAM(s) IV Push every 8 hours PRN Nausea and/or Vomiting      CXR reviewed

## 2023-12-01 NOTE — OCCUPATIONAL THERAPY INITIAL EVALUATION ADULT - LEVEL OF INDEPENDENCE: STAND/SIT, REHAB EVAL
Pt unable to achieve lift off from bed despite 3 attempts, recommend use of full body lift for OOBTC/dependent (less than 25% patients effort)

## 2023-12-01 NOTE — PROGRESS NOTE ADULT - ASSESSMENT
88 year old female with PMH of HTN, Afib, OA, PVD, COPD, Anxiety, Obesity, Acute on chronic systolic congestive heart failure, H/O abdominal hysterectomy, H/O discectomy, H/O total knee replacement, bilateral pleural effusions s/p thoracentesis with a left side drain since March as per daughter, presents sent in by NH for altered mental status. She was recently discharged on Avycaz for UTI. As per daughter the patient had 2 intubations in the past for copd exacerbation.     Acute on CHronic Hypercapnic respiratory failure s/p BiPAP  COPD exacerbation   R Pleural effusion sp thora/ transudate  Sepsis  not POA  -  RVP negative, Urine, Blood and pleural fluid cx negative. Nasal MRSA negative   - s/p Thoracentesis 11/27 with 1L removed, transudative  - dc abx per ID, switch solumedrol to prednisone in AM   - taper down supplemental oxygen   - PT fu    Multi-drug Resistant Klebsiella Bacteriemia with Acute Cystitis - prior admission october 2023  Acute Urinary retention s/p al placement 11/30 AM  - s/p Avycaz q8h end date 11/8   - per documentation, patient has a chronic al     Acute on Chronic systolic congestive heart failure - improving   Echo with EF 64%  - s/p Iv lasix. Diuresis  now on hold   - follows with Dr Pacheco    Elevated ALP  - RuQ Sono: Grossly unremarkable with Gallbladder wall 2.9 cm. Monitor     Chronic AFIB  - switch Lovenox to Eliquis 5mg Q12H. C/w BB     Neck Mass - chronic, had prior work up/biopsy/no further interventions at this time. Family aware    Sacral Ulcer POA - s/p wound care eval, wound care per recs     DNR with a trial of intubation    Pending: monitor resp status, taper down supplemental 02, labs,   PT     Patient seen at bedside, total time spent to evaluate and treat the patient's acute illness and chronic medical conditions as well as time spent reviewing labs, radiology, discussing medical plan with covering medical team was more than 35 minutes with >50% of time spent face to face with patient, discussing with patient/family as well as coordination of care

## 2023-12-01 NOTE — OCCUPATIONAL THERAPY INITIAL EVALUATION ADULT - PERTINENT HX OF CURRENT PROBLEM, REHAB EVAL
88 year old female with PMH of HTN, Afib, OA, PVD, COPD, Anxiety, Obesity, Acute on chronic systolic congestive heart failure, H/O abdominal hysterectomy, H/O discectomy, H/O total knee replacement, bilateral pleural effusions s/p thoracentesis with a left side drain since March as per daughter, presents sent in by NH for altered mental status for the since yesterday morning, which has been worsening. She was recently discharged on Avycaz for UTI. As per daughter the patient had 2 intubations in the past for copd exacerbation. The left side drain has no output. The daughter mentioned that patient has been mentioning burning of urination and pain in the back( which is attributed to her sacral ulcer).  Denies any recent fevers, chills, N/V/D, headaches, chest pain, SOB

## 2023-12-02 NOTE — PROGRESS NOTE ADULT - SUBJECTIVE AND OBJECTIVE BOX
GIOVANNY BUCK  88y Female    CHIEF COMPLAINT:    Patient is a 88y old  Female who presents with a chief complaint of COPD (02 Dec 2023 07:47)    INTERVAL HPI/OVERNIGHT EVENTS:    Patient seen and examined. No acute events overnight. Overall unchanged    ROS: All other systems are negative.    Vital Signs:    T(F): 98 (12-02-23 @ 07:00), Max: 98.7 (12-01-23 @ 16:19)  HR: 92 (12-02-23 @ 07:00) (92 - 108)  BP: 118/67 (12-02-23 @ 07:00) (102/61 - 119/70)  RR: 20 (12-02-23 @ 07:00) (18 - 20)  SpO2: 97% (12-02-23 @ 07:00) (97% - 98%)    01 Dec 2023 07:01  -  02 Dec 2023 07:00  --------------------------------------------------------  IN: 0 mL / OUT: 725 mL / NET: -725 mL    PHYSICAL EXAM:    GENERAL:  NAD chronically ill appearing   SKIN: No rashes or lesions  HEENT: Atraumatic. Normocephalic.    NECK: Supple, No JVD.    PULMONARY: decreased breath sounds B/L. No wheezing   CVS: Normal S1, S2. Rate and Rhythm are regular   ABDOMEN/GI: Soft, Nontender, Nondistended   MSK:  No clubbing or cyanosis   NEUROLOGIC: moves all extremities   PSYCH: Awake and alert    Consultant(s) Notes Reviewed:  [x ] YES  [ ] NO  Care Discussed with Consultants/Other Providers [ x] YES  [ ] NO    LABS:                        9.2    11.21 )-----------( 142      ( 02 Dec 2023 05:00 )             31.1     141  |  94<L>  |  31<H>  ----------------------------<  72  4.1   |  39<H>  |  0.9    Ca    8.3<L>      02 Dec 2023 05:00  Phos  3.1     12-01  Mg     2.3     12-01    TPro  5.2<L>  /  Alb  3.0<L>  /  TBili  0.4  /  DBili  x   /  AST  23  /  ALT  13  /  AlkPhos  175<H>  12-02    RADIOLOGY & ADDITIONAL TESTS:  Imaging or report Personally Reviewed:  [x] YES  [ ] NO  EKG reviewed: [x] YES  [ ] NO    Medications:  Standing  albuterol/ipratropium for Nebulization 3 milliLiter(s) Nebulizer every 4 hours  apixaban 5 milliGRAM(s) Oral every 12 hours  chlorhexidine 2% Cloths 1 Application(s) Topical <User Schedule>  gabapentin 100 milliGRAM(s) Oral three times a day  methylPREDNISolone sodium succinate Injectable 40 milliGRAM(s) IV Push daily  metoprolol tartrate 25 milliGRAM(s) Oral two times a day  nystatin Powder 1 Application(s) Topical two times a day  pantoprazole    Tablet 40 milliGRAM(s) Oral before breakfast  polyethylene glycol 3350 17 Gram(s) Oral daily  senna 2 Tablet(s) Oral at bedtime    PRN Meds  acetaminophen     Tablet .. 650 milliGRAM(s) Oral every 6 hours PRN  aluminum hydroxide/magnesium hydroxide/simethicone Suspension 30 milliLiter(s) Oral every 4 hours PRN  melatonin 3 milliGRAM(s) Oral at bedtime PRN  metoprolol tartrate 25 milliGRAM(s) Oral once PRN  ondansetron Injectable 4 milliGRAM(s) IV Push every 8 hours PRN

## 2023-12-02 NOTE — PROGRESS NOTE ADULT - ASSESSMENT
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 NIV QHS and PRN, wean oxygen as tolerated target SaO2 88-92%, incentive spirometry, CXR stable  switch solumedrol to Prednisone 40 daily and taper    CARDIOVASCULAR: avoid volume overload, -560/24hrs,  2D-Echo EF 64%, Severe TR, BNP 11K. lasix as needed    GI: GI prophylaxis.  feeding per speech and swallow. bowel regimen     RENAL:  Follow up lytes.  Correct as needed.    INFECTIOUS DISEASE: cultures negative to date, SP Cefepime course, procalcitonin 0.2     HEMATOLOGICAL:  on therapeutic Lovenox for AFIB     ENDOCRINE:  Follow up FS.  Insulin protocol if needed.    MUSCULOSKELETAL: PT/OT, OOBTC    DNR but not DNI    Poor prognosis    No lines, Chronic Ba    Palliative care following    Floor   IMPRESSION:     Acute on Chronic Hypercapnic Respiratory Failure, improved  Acute Hypoxemic Respiratory Failure, improved   R Pleural effusion sp thora/ transudate  CRE Klebsiella UTI  History of klebsiella MDR  HFPEF and pulm htn  COPD   AFIB was on AC   Pulmonary edema with bilateral effusions   ?Left malignant pleural effusion SP pleurx placement  and removal      PLAN:    CNS: avoid depressants, CT head negative 11/27,mental status at baseline     HEENT: Oral care    PULMONARY:  HOB @ 45 degrees. NIV QHS and PRN, wean oxygen as tolerated target SaO2 88-92%, incentive spirometry, CXR stable  switch solumedrol to Prednisone 40 daily and taper    CARDIOVASCULAR: avoid volume overload, -560/24hrs,  2D-Echo EF 64%, Severe TR, BNP 11K. lasix as needed    GI: GI prophylaxis.  feeding per speech and swallow. bowel regimen     RENAL:  Follow up lytes.  Correct as needed.    INFECTIOUS DISEASE: cultures negative to date, SP Cefepime course, procalcitonin 0.2     HEMATOLOGICAL:  start home AC. monitor CBC    ENDOCRINE:  Follow up FS.  Insulin protocol if needed.    MUSCULOSKELETAL: PT/OT, OOBTC    DNR but not DNI    Poor prognosis    No lines, Chronic Ba    Palliative care following    Floor

## 2023-12-02 NOTE — PROGRESS NOTE ADULT - SUBJECTIVE AND OBJECTIVE BOX
Patient is a 88y old  Female who presents with a chief complaint of COPD (01 Dec 2023 16:27)      overnight events: patient tolerated AVAPS overnight. laying comfortably in bed. on NC. no overnight events                ROS: as in HPI; All other systems reviewed are negative        PHYSICAL EXAM  Vital Signs Last 24 Hrs  T(C): 36.7 (02 Dec 2023 07:00), Max: 37.1 (01 Dec 2023 16:19)  T(F): 98 (02 Dec 2023 07:00), Max: 98.7 (01 Dec 2023 16:19)  HR: 92 (02 Dec 2023 07:00) (92 - 108)  BP: 118/67 (02 Dec 2023 07:00) (102/61 - 124/78)  BP(mean): 87 (02 Dec 2023 07:00) (79 - 93)  RR: 20 (02 Dec 2023 07:00) (18 - 20)  SpO2: 97% (02 Dec 2023 07:00) (92% - 98%)    Parameters below as of 01 Dec 2023 12:00  Patient On (Oxygen Delivery Method): nasal cannula          CONSTITUTIONAL:   NAD    ENT:   Airway patent,     EYES:   Clear bilaterally,   pupils equal,   round and reactive to light.    CARDIAC:   rate controlled  SRI  no edema    RESPIRATORY:   No wheezing   dec BS at bases  Normal chest expansion  Not tachypneic,    GASTROINTESTINAL:  Abdomen soft, non-tender,   No guarding,   Positive BS    MUSCULOSKELETAL:   Range of motion is not limited,    NEUROLOGICAL:   Alert and oriented   No motor deficits.    SKIN:   Skin normal color for race,   No evidence of rash.                I&O's Detail    01 Dec 2023 07:01  -  02 Dec 2023 07:00  --------------------------------------------------------  IN:  Total IN: 0 mL    OUT:    Indwelling Catheter - Urethral (mL): 725 mL  Total OUT: 725 mL    Total NET: -725 mL            LABS:                        9.2    11.21 )-----------( 142      ( 02 Dec 2023 05:00 )             31.1     02 Dec 2023 05:00    141    |  94     |  31     ----------------------------<  72     4.1     |  39     |  0.9      Ca    8.3        02 Dec 2023 05:00  Phos  3.1       01 Dec 2023 04:33  Mg     2.3       01 Dec 2023 04:33    TPro  5.2    /  Alb  3.0    /  TBili  0.4    /  DBili  x      /  AST  23     /  ALT  13     /  AlkPhos  175    02 Dec 2023 05:00  Amylase x     lipase x              CAPILLARY BLOOD GLUCOSE          Urinalysis Basic - ( 02 Dec 2023 05:00 )    Color: x / Appearance: x / SG: x / pH: x  Gluc: 72 mg/dL / Ketone: x  / Bili: x / Urobili: x   Blood: x / Protein: x / Nitrite: x   Leuk Esterase: x / RBC: x / WBC x   Sq Epi: x / Non Sq Epi: x / Bacteria: x      Culture        MEDICATIONS  (STANDING):  albuterol/ipratropium for Nebulization 3 milliLiter(s) Nebulizer every 4 hours  chlorhexidine 2% Cloths 1 Application(s) Topical <User Schedule>  enoxaparin Injectable 70 milliGRAM(s) SubCutaneous every 12 hours  gabapentin 100 milliGRAM(s) Oral three times a day  methylPREDNISolone sodium succinate Injectable 40 milliGRAM(s) IV Push daily  metoprolol tartrate 25 milliGRAM(s) Oral two times a day  nystatin Powder 1 Application(s) Topical two times a day  pantoprazole    Tablet 40 milliGRAM(s) Oral before breakfast  polyethylene glycol 3350 17 Gram(s) Oral daily  senna 2 Tablet(s) Oral at bedtime    MEDICATIONS  (PRN):  acetaminophen     Tablet .. 650 milliGRAM(s) Oral every 6 hours PRN Temp greater or equal to 38C (100.4F), Mild Pain (1 - 3)  aluminum hydroxide/magnesium hydroxide/simethicone Suspension 30 milliLiter(s) Oral every 4 hours PRN Dyspepsia  melatonin 3 milliGRAM(s) Oral at bedtime PRN Insomnia  metoprolol tartrate 25 milliGRAM(s) Oral once PRN tachycardia  ondansetron Injectable 4 milliGRAM(s) IV Push every 8 hours PRN Nausea and/or Vomiting

## 2023-12-02 NOTE — PROGRESS NOTE ADULT - ATTENDING COMMENTS
IMPRESSION:     Acute on Chronic Hypercapnic Respiratory Failure, improved  Acute Hypoxemic Respiratory Failure, improved   R Pleural effusion sp thora/ transudate  CRE Klebsiella UTI  History of klebsiella MDR  HFPEF and pulm htn  COPD   AFIB was on AC   Pulmonary edema with bilateral effusions   Left malignant pleural effusion SP pleurx placement     Plan as outlined above  [place] to floor IMPRESSION:     Acute on Chronic Hypercapnic Respiratory Failure, improved  Acute Hypoxemic Respiratory Failure, improved   R Pleural effusion sp thora/ transudate  CRE Klebsiella UTI  History of klebsiella MDR  HFPEF and pulm htn  COPD   AFIB was on AC   Pulmonary edema with bilateral effusions   Left malignant pleural effusion SP pleurx placement     Plan as outlined above  downgrade to tele

## 2023-12-02 NOTE — PROGRESS NOTE ADULT - ASSESSMENT
88 year old female with PMH of HTN, Afib, OA, PVD, COPD, Anxiety, Obesity, Acute on chronic systolic congestive heart failure, H/O abdominal hysterectomy, H/O discectomy, H/O total knee replacement, bilateral pleural effusions s/p thoracentesis with a left side drain since March as per daughter, presents sent in by NH for altered mental status for the since yesterday morning, which has been worsening. She was recently discharged on Avycaz for UTI. As per daughter the patient had 2 intubations in the past for copd exacerbation. The left side drain has no output. The daughter mentioned that patient has been mentioning burning of urination and pain in the back( which is attributed to her sacral ulcer).      # Hypercapnic respiratory failure on BiPAP.  COPD exacerbation secondary to pneumonia rule out pulmonary edema  # Not septic on admission  -  RVP negative   - CXR/CT chest: Bilateral opacities and pleural effusions, increased from prior.  - Solumedrol 60 mg BID on admissions switched to 40  mg prednisone daily   - On 11/27 patient was found to be more lethargic , Abg showing PH of 7.29 and pCO2 of 83 , patient started on bipap   -  MRSA PCR negative   - Pulmonary consult: right sided tap done 11/27, yielding 1L of Clear fluids    - CT head -> negative    # Multi-drug Resistant Klebsiella Bacteriemia with Acute Cystitis,  - UA positive due to Klebsiella  - UC showing carbapenem resistant  KP   - has chronic Ba  - s/p Avycaz q8h end date 11/8   - On cefepime for pneumonia , ID consult, can hold   - NIV at night, NC during day    #Acute on Chronic systolic congestive heart failure  # Last echo from September 2023 normal EF.  - EF 64%  -Currently on lasix   - follows with Dr Pacheco    # Elevated ALP    - RuQ Sono: Grossly unremarkable with Gallbladder wall 2.9 cm.    # Chronic AFIB   - eliqsui 5 BID tresumed today         DVT PPX: eliquis   GI PPX: PPI  Diet: puree   Code Status: DNR/ intubate  Dispo: floor

## 2023-12-02 NOTE — PROGRESS NOTE ADULT - ASSESSMENT
88 year old female with PMH of HTN, Afib, OA, PVD, COPD, Anxiety, Obesity, Acute on chronic systolic congestive heart failure, H/O abdominal hysterectomy, H/O discectomy, H/O total knee replacement, bilateral pleural effusions s/p thoracentesis with a left side drain since March as per daughter, presents sent in by NH for altered mental status. She was recently discharged on Avycaz for UTI. As per daughter the patient had 2 intubations in the past for copd exacerbation.     Acute on CHronic Hypercapnic respiratory failure s/p BiPAP  COPD exacerbation   R Pleural effusion sp thora/ transudate  Prior history of pleural effusion/s/p pigtail catheter, 1 cytology suspicious for malignant cells, multiple thoras afterwards with negative cytology  Sepsis  not POA  -  RVP negative, Urine, Blood and pleural fluid cx negative. Nasal MRSA negative   - s/p Thoracentesis 11/27 with 1L removed, transudative  - dc abx per ID, start prednisone 40 q24h for 5 days  - taper down supplemental oxygen   - PT fu    Multi-drug Resistant Klebsiella Bacteriemia with Acute Cystitis - prior admission october 2023  Acute Urinary retention s/p al placement 11/30 AM  - s/p Avycaz q8h end date 11/8   - per documentation, patient has a chronic al     Acute on Chronic systolic congestive heart failure - improving   Echo with EF 64%  - s/p Iv lasix. Diuresis  now on hold   - follows with Dr Pacheco    Elevated ALP  - RuQ Sono: Grossly unremarkable with Gallbladder wall 2.9 cm. Monitor     Chronic AFIB  - switch Lovenox to Eliquis 5mg Q12H. C/w BB     Neck Mass - chronic, had prior work up/biopsy/no further interventions at this time. Family aware    Sacral Ulcer POA - s/p wound care eval, wound care per recs     DNR with a trial of intubation    Pending: monitor resp status, taper down supplemental 02, labs,   PT     Patient seen at bedside, total time spent to evaluate and treat the patient's acute illness and chronic medical conditions as well as time spent reviewing labs, radiology, discussing medical plan with covering medical team was more than 35 minutes with >50% of time spent face to face with patient, discussing with patient/family as well as coordination of care

## 2023-12-03 NOTE — PROGRESS NOTE ADULT - ASSESSMENT
IMPRESSION:     Acute on Chronic Hypercapnic Respiratory Failure, improved  Acute Hypoxemic Respiratory Failure, improved   R Pleural effusion sp thora/ transudate  CRE Klebsiella UTI  History of klebsiella MDR  HFPEF and pulm htn  COPD   AFIB was on AC   Pulmonary edema with bilateral effusions   ?Left malignant pleural effusion SP pleurx placement  and removal      PLAN:    CNS: avoid depressants, CT head negative 11/27,mental status at baseline     HEENT: Oral care    PULMONARY:  HOB @ 45 degrees. NIV QHS and PRN, wean oxygen as tolerated target SaO2 88-92%, incentive spirometry, CXR stable  switch solumedrol to Prednisone 40 daily and taper    CARDIOVASCULAR: avoid volume overload, 2D-Echo EF 64%, Severe TR, BNP 11K. lasix as needed    GI: GI prophylaxis.  feeding per speech and swallow. bowel regimen     RENAL:  Follow up lytes.  Correct as needed.    INFECTIOUS DISEASE: cultures negative to date, SP Cefepime course, procalcitonin 0.2     HEMATOLOGICAL:  start home AC. monitor CBC    ENDOCRINE:  Follow up FS.  Insulin protocol if needed.    MUSCULOSKELETAL: PT/OT, OOBTC    DNR but not DNI    Poor prognosis    No lines, Chronic Ba    Palliative care following    Floor

## 2023-12-03 NOTE — PROGRESS NOTE ADULT - ATTENDING COMMENTS
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

## 2023-12-03 NOTE — PROGRESS NOTE ADULT - ASSESSMENT
88 year old female with PMH of HTN, Afib, OA, PVD, COPD, Anxiety, Obesity, Acute on chronic systolic congestive heart failure, H/O abdominal hysterectomy, H/O discectomy, H/O total knee replacement, bilateral pleural effusions s/p thoracentesis with a left side drain since March as per daughter, presents sent in by NH for altered mental status. She was recently discharged on Avycaz for UTI. As per daughter the patient had 2 intubations in the past for copd exacerbation.     Acute on Chronic Hypercapnic respiratory failure s/p BiPAP  COPD exacerbation   R Pleural effusion sp thora/ transudate  Prior history of pleural effusion/s/p pigtail catheter, 1 cytology suspicious for malignant cells, multiple thoras afterwards with negative cytology  Sepsis  not POA  -  RVP negative, Urine, Blood and pleural fluid cx negative. Nasal MRSA negative   - s/p Thoracentesis 11/27 with 1L removed, transudative  - off  abx per ID,prednisone 40 q24h for 5 days  - taper down supplemental oxygen, currently on 4L   - PT fu    Multi-drug Resistant Klebsiella Bacteriemia with Acute Cystitis - prior admission october 2023  Acute Urinary retention s/p al placement 11/30 AM  - s/p Avycaz q8h end date 11/8   - per documentation, patient has a chronic al     Acute on Chronic systolic congestive heart failure - improving   Echo with EF 64%  - s/p Iv lasix. Diuresis  now on hold   - follows with Dr Pacheco    Elevated ALP  - RuQ Sono: Grossly unremarkable with Gallbladder wall 2.9 cm. Monitor     Chronic AFIB  - switch Lovenox to Eliquis 5mg Q12H. C/w BB     Neck Mass - chronic, had prior work up/biopsy/no further interventions at this time. Family aware    Sacral Ulcer POA - s/p wound care eval, wound care per recs     DNR with a trial of intubation    Pending: monitor resp status, taper down supplemental 02, labs, PT     Patient seen at bedside, total time spent to evaluate and treat the patient's acute illness and chronic medical conditions as well as time spent reviewing labs, radiology, discussing medical plan with covering medical team was more than 35 minutes with >50% of time spent face to face with patient, discussing with patient/family as well as coordination of care

## 2023-12-03 NOTE — PROGRESS NOTE ADULT - SUBJECTIVE AND OBJECTIVE BOX
Patient is a 88y old  Female who presents with a chief complaint of COPD (02 Dec 2023 09:43)    Over Night Events: no overnight events    ROS:     All ROS are negative except HPI       PHYSICAL EXAM    ICU Vital Signs Last 24 Hrs  T(C): 36.8 (03 Dec 2023 04:00), Max: 36.8 (03 Dec 2023 04:00)  T(F): 98.2 (03 Dec 2023 04:00), Max: 98.2 (03 Dec 2023 04:00)  HR: 99 (03 Dec 2023 04:00) (94 - 104)  BP: 107/62 (03 Dec 2023 04:00) (103/68 - 112/66)  BP(mean): 79 (02 Dec 2023 11:00) (79 - 79)  ABP: --  ABP(mean): --  RR: 20 (03 Dec 2023 04:00) (20 - 20)  SpO2: 98% (03 Dec 2023 04:00) (96% - 98%)    O2 Parameters below as of 02 Dec 2023 11:00  Patient On (Oxygen Delivery Method): nasal cannula      CONSTITUTIONAL:  Well nourished.  NAD    CARDIAC:   Normal rate,   Regular rhythm.    No edema    Vascular:  Normal systolic impulse  No Carotid bruits    RESPIRATORY:   No wheezing  Bilateral BS  Normal chest expansion  Not tachypneic,  No use of accessory muscles    GASTROINTESTINAL:  Abdomen soft,   Non-tender,   No guarding,     MUSCULOSKELETAL:   Range of motion is not limited,  No clubbing, cyanosis    NEUROLOGICAL:   Alert and oriented   No motor  deficits.    SKIN:   Skin normal color for race,   Warm and dry and intact.   No evidence of rash.    PSYCHIATRIC:   Normal mood and affect.   No apparent risk to self or others.    HEMATOLOGICAL:  No cervical  lymphadenopathy.  no inguinal lymphadenopathy      12-02-23 @ 07:01  -  12-03-23 @ 07:00  --------------------------------------------------------  IN:  Total IN: 0 mL    OUT:    Indwelling Catheter - Urethral (mL): 500 mL  Total OUT: 500 mL    Total NET: -500 mL      LABS:                          9.2    11.21 )-----------( 142      ( 02 Dec 2023 05:00 )             31.1                                               12-02    141  |  94<L>  |  31<H>  ----------------------------<  72  4.1   |  39<H>  |  0.9    Ca    8.3<L>      02 Dec 2023 05:00    TPro  5.2<L>  /  Alb  3.0<L>  /  TBili  0.4  /  DBili  x   /  AST  23  /  ALT  13  /  AlkPhos  175<H>  12-02                                             Urinalysis Basic - ( 02 Dec 2023 05:00 )    Color: x / Appearance: x / SG: x / pH: x  Gluc: 72 mg/dL / Ketone: x  / Bili: x / Urobili: x   Blood: x / Protein: x / Nitrite: x   Leuk Esterase: x / RBC: x / WBC x   Sq Epi: x / Non Sq Epi: x / Bacteria: x                                                  LIVER FUNCTIONS - ( 02 Dec 2023 05:00 )  Alb: 3.0 g/dL / Pro: 5.2 g/dL / ALK PHOS: 175 U/L / ALT: 13 U/L / AST: 23 U/L / GGT: x                                                                                                                                     MEDICATIONS  (STANDING):  albuterol/ipratropium for Nebulization 3 milliLiter(s) Nebulizer every 4 hours  apixaban 5 milliGRAM(s) Oral every 12 hours  chlorhexidine 2% Cloths 1 Application(s) Topical <User Schedule>  gabapentin 100 milliGRAM(s) Oral three times a day  metoprolol tartrate 25 milliGRAM(s) Oral two times a day  nystatin Powder 1 Application(s) Topical two times a day  pantoprazole    Tablet 40 milliGRAM(s) Oral before breakfast  polyethylene glycol 3350 17 Gram(s) Oral daily  predniSONE   Tablet 40 milliGRAM(s) Oral daily  senna 2 Tablet(s) Oral at bedtime    MEDICATIONS  (PRN):  acetaminophen     Tablet .. 650 milliGRAM(s) Oral every 6 hours PRN Temp greater or equal to 38C (100.4F), Mild Pain (1 - 3)  aluminum hydroxide/magnesium hydroxide/simethicone Suspension 30 milliLiter(s) Oral every 4 hours PRN Dyspepsia  melatonin 3 milliGRAM(s) Oral at bedtime PRN Insomnia  metoprolol tartrate 25 milliGRAM(s) Oral once PRN tachycardia  ondansetron Injectable 4 milliGRAM(s) IV Push every 8 hours PRN Nausea and/or Vomiting      New X-rays reviewed:                                                                                  ECHO    CXR interpreted by me:

## 2023-12-03 NOTE — PROGRESS NOTE ADULT - SUBJECTIVE AND OBJECTIVE BOX
GIOVANNY BUCK  88y Female    CHIEF COMPLAINT:    Patient is a 88y old  Female who presents with a chief complaint of COPD (02 Dec 2023 09:43)    INTERVAL HPI/OVERNIGHT EVENTS:    Patient seen and examined. No acute events overnight. REmains on NC, overall unchanged     ROS: All other systems are negative.    Vital Signs:    T(F): 98.2 (12-03-23 @ 04:00), Max: 98.2 (12-03-23 @ 04:00)  HR: 99 (12-03-23 @ 04:00) (94 - 104)  BP: 107/62 (12-03-23 @ 04:00) (103/68 - 112/66)  RR: 20 (12-03-23 @ 04:00) (20 - 20)  SpO2: 98% (12-03-23 @ 04:00) (96% - 98%)  I&O's Summary    02 Dec 2023 07:01  -  03 Dec 2023 07:00  --------------------------------------------------------  IN: 0 mL / OUT: 500 mL / NET: -500 mL    PHYSICAL EXAM:    GENERAL:  NAD  SKIN: No rashes or lesions  HEENT: Atraumatic. Normocephalic.   NECK: Supple, No JVD.    PULMONARY: decreased breath sounds B/L. No wheezing.   CVS: Normal S1, S2. Rate and Rhythm are regular.   ABDOMEN/GI: Soft, Nontender, Nondistended   MSK:  No clubbing or cyanosis   NEUROLOGIC:  No motor or sensory deficit.   PSYCH: Awake and alert     Consultant(s) Notes Reviewed:  [x ] YES  [ ] NO  Care Discussed with Consultants/Other Providers [ x] YES  [ ] NO    LABS:                        9.2    11.21 )-----------( 142      ( 02 Dec 2023 05:00 )             31.1     12-02    141  |  94<L>  |  31<H>  ----------------------------<  72  4.1   |  39<H>  |  0.9    Ca    8.3<L>      02 Dec 2023 05:00    TPro  5.2<L>  /  Alb  3.0<L>  /  TBili  0.4  /  DBili  x   /  AST  23  /  ALT  13  /  AlkPhos  175<H>  12-02              RADIOLOGY & ADDITIONAL TESTS:      Imaging or report Personally Reviewed:  [x] YES  [ ] NO  EKG reviewed: [x] YES  [ ] NO    Medications:  Standing  albuterol/ipratropium for Nebulization 3 milliLiter(s) Nebulizer every 4 hours  apixaban 5 milliGRAM(s) Oral every 12 hours  chlorhexidine 2% Cloths 1 Application(s) Topical <User Schedule>  gabapentin 100 milliGRAM(s) Oral three times a day  metoprolol tartrate 25 milliGRAM(s) Oral two times a day  nystatin Powder 1 Application(s) Topical two times a day  pantoprazole    Tablet 40 milliGRAM(s) Oral before breakfast  polyethylene glycol 3350 17 Gram(s) Oral daily  predniSONE   Tablet 40 milliGRAM(s) Oral daily  senna 2 Tablet(s) Oral at bedtime    PRN Meds  acetaminophen     Tablet .. 650 milliGRAM(s) Oral every 6 hours PRN  aluminum hydroxide/magnesium hydroxide/simethicone Suspension 30 milliLiter(s) Oral every 4 hours PRN  melatonin 3 milliGRAM(s) Oral at bedtime PRN  metoprolol tartrate 25 milliGRAM(s) Oral once PRN  ondansetron Injectable 4 milliGRAM(s) IV Push every 8 hours PRN

## 2023-12-04 NOTE — PROGRESS NOTE ADULT - ASSESSMENT
88 year old female with PMH of HTN, Afib, OA, PVD, COPD, Anxiety, Obesity, Acute on chronic systolic congestive heart failure, H/O abdominal hysterectomy, H/O discectomy, H/O total knee replacement, bilateral pleural effusions s/p thoracentesis with a left side drain since March as per daughter, presents sent in by NH for altered mental status. She was recently discharged on Avycaz for UTI. As per daughter the patient had 2 intubations in the past for copd exacerbation.     Acute on Chronic Hypercapnic respiratory failure s/p BiPAP  COPD exacerbation   R Pleural effusion sp thora/ transudate  Prior history of pleural effusion/s/p pigtail catheter, 1 cytology suspicious for malignant cells, multiple thoras afterwards with negative cytology  Sepsis  not POA  -  RVP negative, Urine, Blood and pleural fluid cx negative. Nasal MRSA negative   - s/p Thoracentesis 11/27 with 1L removed, transudative  - off  abx per ID,prednisone 40 q24h for 5 days  - taper down supplemental oxygen, currently on 3L   - PT fu, pending     Multi-drug Resistant Klebsiella Bacteriemia with Acute Cystitis - prior admission october 2023  Acute Urinary retention s/p al placement 11/30 AM  - s/p Avycaz q8h end date 11/8   - per documentation, patient has a chronic al     Acute on Chronic systolic congestive heart failure - improving   Echo with EF 64%  - s/p Iv lasix. Diuresis  now on hold   - follows with Dr Pacheco    Elevated ALP  - RuQ Sono: Grossly unremarkable with Gallbladder wall 2.9 cm. Monitor     Chronic AFIB  - on Eliquis 5mg Q12H. C/w BB     Neck Mass - chronic, had prior work up/biopsy/no further interventions at this time. Family aware    Sacral Ulcer POA - s/p wound care eval, wound care per recs     DNR with a trial of intubation    Pending: monitor resp status, taper down supplemental 02, labs, PT . duplex left UE is still pending.     lasix as needed.     discussed in length with family present at bedside         88 year old female with PMH of HTN, Afib, OA, PVD, COPD, Anxiety, Obesity, Acute on chronic systolic congestive heart failure, H/O abdominal hysterectomy, H/O discectomy, H/O total knee replacement, bilateral pleural effusions s/p thoracentesis with a left side drain since March as per daughter, presents sent in by NH for altered mental status. She was recently discharged on Avycaz for UTI. As per daughter the patient had 2 intubations in the past for copd exacerbation.     Acute on Chronic Hypercapnic respiratory failure s/p BiPAP  COPD exacerbation   R Pleural effusion sp thora/ transudate  Prior history of pleural effusion/s/p pigtail catheter, 1 cytology suspicious for malignant cells, multiple thoras afterwards with negative cytology  Sepsis  not POA  -  RVP negative, Urine, Blood and pleural fluid cx negative. Nasal MRSA negative   - s/p Thoracentesis 11/27 with 1L removed, transudative  - off  abx per ID,prednisone 40 q24h for 5 days  - taper down supplemental oxygen, currently on 3L   - PT fu, pending     Multi-drug Resistant Klebsiella Bacteriemia with Acute Cystitis - prior admission october 2023  Acute Urinary retention s/p al placement 11/30 AM  - s/p Avycaz q8h end date 11/8   - per documentation, patient has a chronic al     Acute on Chronic systolic congestive heart failure - improving   Echo with EF 64%  - s/p Iv lasix. Diuresis  now on hold   - follows with Dr Pacheco    Elevated ALP  - RuQ Sono: Grossly unremarkable with Gallbladder wall 2.9 cm. Monitor     Chronic AFIB  - on Eliquis 5mg Q12H. C/w BB     Neck Mass - chronic, had prior work up/biopsy/no further interventions at this time. Family aware    Sacral Ulcer POA - s/p wound care eval, wound care per recs     DNR with a trial of intubation    Pending: downgrade from CEU. monitor resp status, taper down supplemental 02, labs, PT . duplex left UE is still pending.     lasix as needed.     discussed in length with family present at bedside

## 2023-12-04 NOTE — PHYSICAL THERAPY INITIAL EVALUATION ADULT - ADDITIONAL COMMENTS
Pt lives alone in apt with elevator access, ambulates with RW, reports no hx of falls. Pt has granddaughter as home health aide 4 hours/day, M, W and F to assist with ADLs.

## 2023-12-04 NOTE — PHYSICAL THERAPY INITIAL EVALUATION ADULT - GENERAL OBSERVATIONS, REHAB EVAL
[phone] Pt attempted for PT IE, deferring at this time, stating "I walked earlier this morning." Pt able to provide prior level of function, see below. Pt educated pt on importance of OOB mobility, POC and role of PT, continued to defer. PT to f/u at next available session. [phone] Pt attempted for PT IE, deferring at this time, stating "I walked earlier this morning." Pt able to provide prior level of function, see below. Pt educated pt on importance of OOB mobility, POC and role of PT, continued to defer. PT to f/u at next available session.

## 2023-12-04 NOTE — PHYSICAL THERAPY INITIAL EVALUATION ADULT - GENERAL OBSERVATIONS, REHAB EVAL
[phone] Pt attempted for PT IE, deferring at this time 2/2 fatigue and requesting to rest. Pt educated on importance of OOB mobility, POC and role of PT as well as performing therex in bed. Pt continued to defer stating "I can do it later". PT to f/u at next available session. [phone] Pt attempted for PT IE, deferring at this time 2/2 fatigue and requesting to rest. Pt educated on importance of OOB mobility, POC and role of PT as well as performing therex in bed. Pt continued to defer stating "I can do it later". PT to f/u at next available session.

## 2023-12-04 NOTE — PROGRESS NOTE ADULT - SUBJECTIVE AND OBJECTIVE BOX
CIELO GIOVANNY  88y Female    CHIEF COMPLAINT:    Patient is a 88y old  Female who presents with a chief complaint of COPD (02 Dec 2023 09:43)    INTERVAL HPI/OVERNIGHT EVENTS:    Patient seen and examined. No acute events overnight. REmains on NC, overall unchanged   discussed at length with daughter present at bedside. she told me that she is going to update Irma as well.     ROS: All other systems are negative.    Vital Signs:    Vital Signs Last 24 Hrs  T(C): 36.7 (04 Dec 2023 12:24), Max: 36.9 (04 Dec 2023 05:00)  T(F): 98 (04 Dec 2023 12:24), Max: 98.4 (04 Dec 2023 05:00)  HR: 96 (04 Dec 2023 16:34) (90 - 98)  BP: 109/57 (04 Dec 2023 16:34) (99/55 - 113/60)  BP(mean): 74 (04 Dec 2023 16:34) (74 - 74)  RR: 18 (04 Dec 2023 12:24) (16 - 18)  SpO2: 99% (04 Dec 2023 12:24) (99% - 100%)    Parameters below as of 04 Dec 2023 12:24  Patient On (Oxygen Delivery Method): nasal cannula  O2 Flow (L/min): 3      PHYSICAL EXAM:    GENERAL:  NAD  SKIN: No rashes or lesions  HEENT: Atraumatic. Normocephalic.   NECK: Supple, No JVD.    PULMONARY: decreased breath sounds B/L. No wheezing.   CVS: Normal S1, S2. Rate and Rhythm are regular.   ABDOMEN/GI: Soft, Nontender, Nondistended   MSK:  left arm swelling   NEUROLOGIC:  No motor or sensory deficit.   PSYCH: Awake and alert     Consultant(s) Notes Reviewed:  [x ] YES  [ ] NO  Care Discussed with Consultants/Other Providers [ x] YES  [ ] NO    LABS:               LABS:                          RADIOLOGY & ADDITIONAL TESTS:      Imaging or report Personally Reviewed:  [x] YES  [ ] NO  EKG reviewed: [x] YES  [ ] NO    Medications:  Standing  albuterol/ipratropium for Nebulization 3 milliLiter(s) Nebulizer every 4 hours  apixaban 5 milliGRAM(s) Oral every 12 hours  chlorhexidine 2% Cloths 1 Application(s) Topical <User Schedule>  gabapentin 100 milliGRAM(s) Oral three times a day  metoprolol tartrate 25 milliGRAM(s) Oral two times a day  nystatin Powder 1 Application(s) Topical two times a day  pantoprazole    Tablet 40 milliGRAM(s) Oral before breakfast  polyethylene glycol 3350 17 Gram(s) Oral daily  predniSONE   Tablet 40 milliGRAM(s) Oral daily  senna 2 Tablet(s) Oral at bedtime    PRN Meds  acetaminophen     Tablet .. 650 milliGRAM(s) Oral every 6 hours PRN  aluminum hydroxide/magnesium hydroxide/simethicone Suspension 30 milliLiter(s) Oral every 4 hours PRN  melatonin 3 milliGRAM(s) Oral at bedtime PRN  metoprolol tartrate 25 milliGRAM(s) Oral once PRN  ondansetron Injectable 4 milliGRAM(s) IV Push every 8 hours PRN             ICELO GIOVANNY  88y Female    CHIEF COMPLAINT:    Patient is a 88y old  Female who presents with a chief complaint of COPD (02 Dec 2023 09:43)    INTERVAL HPI/OVERNIGHT EVENTS:    Patient seen and examined. No acute events overnight. REmains on NC, overall unchanged   discussed at length with daughter present at bedside. she told me that she is going to update Irma as well.     ROS: All other systems are negative.    Vital Signs:    Vital Signs Last 24 Hrs  T(C): 36.7 (04 Dec 2023 12:24), Max: 36.9 (04 Dec 2023 05:00)  T(F): 98 (04 Dec 2023 12:24), Max: 98.4 (04 Dec 2023 05:00)  HR: 96 (04 Dec 2023 16:34) (90 - 98)  BP: 109/57 (04 Dec 2023 16:34) (99/55 - 113/60)  BP(mean): 74 (04 Dec 2023 16:34) (74 - 74)  RR: 18 (04 Dec 2023 12:24) (16 - 18)  SpO2: 99% (04 Dec 2023 12:24) (99% - 100%)    Parameters below as of 04 Dec 2023 12:24  Patient On (Oxygen Delivery Method): nasal cannula  O2 Flow (L/min): 3      PHYSICAL EXAM:    GENERAL:  NAD  SKIN: No rashes or lesions  HEENT: Atraumatic. Normocephalic.   NECK: Supple, No JVD.    PULMONARY: decreased breath sounds B/L. No wheezing.   CVS: Normal S1, S2. Rate and Rhythm are regular.   ABDOMEN/GI: Soft, Nontender, Nondistended   MSK:  left arm swelling   NEUROLOGIC:  No motor or sensory deficit.   PSYCH: Awake and alert     Consultant(s) Notes Reviewed:  [x ] YES  [ ] NO  Care Discussed with Consultants/Other Providers [ x] YES  [ ] NO    LABS:               LABS:                          RADIOLOGY & ADDITIONAL TESTS:      Imaging or report Personally Reviewed:  [x] YES  [ ] NO  EKG reviewed: [x] YES  [ ] NO    Medications:  Standing  albuterol/ipratropium for Nebulization 3 milliLiter(s) Nebulizer every 4 hours  apixaban 5 milliGRAM(s) Oral every 12 hours  chlorhexidine 2% Cloths 1 Application(s) Topical <User Schedule>  gabapentin 100 milliGRAM(s) Oral three times a day  metoprolol tartrate 25 milliGRAM(s) Oral two times a day  nystatin Powder 1 Application(s) Topical two times a day  pantoprazole    Tablet 40 milliGRAM(s) Oral before breakfast  polyethylene glycol 3350 17 Gram(s) Oral daily  predniSONE   Tablet 40 milliGRAM(s) Oral daily  senna 2 Tablet(s) Oral at bedtime    PRN Meds  acetaminophen     Tablet .. 650 milliGRAM(s) Oral every 6 hours PRN  aluminum hydroxide/magnesium hydroxide/simethicone Suspension 30 milliLiter(s) Oral every 4 hours PRN  melatonin 3 milliGRAM(s) Oral at bedtime PRN  metoprolol tartrate 25 milliGRAM(s) Oral once PRN  ondansetron Injectable 4 milliGRAM(s) IV Push every 8 hours PRN

## 2023-12-05 NOTE — PHYSICAL THERAPY INITIAL EVALUATION ADULT - PERTINENT HX OF CURRENT PROBLEM, REHAB EVAL
pt adm for AMS, acute on chronic hypercapnic respiratory failure, s/p BIPAP, COPD exacerbation, acute cystitis, R pleural effusion s/p thoracentesis, CHF

## 2023-12-05 NOTE — PROGRESS NOTE ADULT - SUBJECTIVE AND OBJECTIVE BOX
CHIEF COMPLAINT:    Patient is a 88y old  Female who presents with a chief complaint of COPD     INTERVAL HPI/OVERNIGHT EVENTS:    Patient seen and examined at bedside. No acute overnight events occurred.    ROS: Denies SOB, chest pain. Reports arm swelling. All other systems are negative.    Medications:  Standing  albuterol/ipratropium for Nebulization 3 milliLiter(s) Nebulizer every 6 hours  apixaban 5 milliGRAM(s) Oral every 12 hours  chlorhexidine 2% Cloths 1 Application(s) Topical <User Schedule>  gabapentin 100 milliGRAM(s) Oral three times a day  metoprolol tartrate 25 milliGRAM(s) Oral two times a day  nystatin Powder 1 Application(s) Topical two times a day  pantoprazole    Tablet 40 milliGRAM(s) Oral before breakfast  polyethylene glycol 3350 17 Gram(s) Oral daily  predniSONE   Tablet 40 milliGRAM(s) Oral daily  senna 2 Tablet(s) Oral at bedtime    PRN Meds  acetaminophen     Tablet .. 650 milliGRAM(s) Oral every 6 hours PRN  aluminum hydroxide/magnesium hydroxide/simethicone Suspension 30 milliLiter(s) Oral every 4 hours PRN  melatonin 3 milliGRAM(s) Oral at bedtime PRN  metoprolol tartrate 25 milliGRAM(s) Oral once PRN  ondansetron Injectable 4 milliGRAM(s) IV Push every 8 hours PRN        Vital Signs:    T(F): 96.7 (12-05-23 @ 04:40), Max: 97.2 (12-04-23 @ 20:05)  HR: 54 (12-05-23 @ 04:40) (54 - 99)  BP: 95/52 (12-05-23 @ 04:40) (95/52 - 111/58)  RR: 19 (12-05-23 @ 04:40) (18 - 19)  SpO2: 100% (12-05-23 @ 04:40) (100% - 100%)  I&O's Summary    04 Dec 2023 07:01  -  05 Dec 2023 07:00  --------------------------------------------------------  IN: 118 mL / OUT: 1250 mL / NET: -1132 mL    05 Dec 2023 07:01  -  05 Dec 2023 12:56  --------------------------------------------------------  IN: 120 mL / OUT: 0 mL / NET: 120 mL      PHYSICAL EXAM:  GENERAL:  NAD  SKIN: No rashes or lesions  HEENT: Atraumatic. Normocephalic. Anicteric  NECK:  No JVD.   PULMONARY: Clear to ausculation bilaterally. No wheezing. No rales  CVS: Normal S1, S2. Regular rate and rhythm. No murmurs.  ABDOMEN/GI: Soft, Nontender, Nondistended; Bowel sounds are present  EXTREMITIES:  LUE swelling. Arm tender. Arm warm, pulses in tact, no pallor  NEUROLOGIC:  No motor deficit.  PSYCH: Alert & oriented x 3, normal affect      LABS:                    RADIOLOGY & ADDITIONAL TESTS:  Imaging or report Personally Reviewed:  [ ] YES  [ ] NO -->no new images    Telemetry reviewed independently - NSR, no acute events  EKG reviewed independently -->no new EKGs    Consultant(s) Notes Reviewed:  [ ] YES  [ ] NO  Care Discussed with Consultants/Other Providers [ ] YES  [ ] NO    Case discussed with resident  Care discussed with pt

## 2023-12-05 NOTE — PHYSICAL THERAPY INITIAL EVALUATION ADULT - PATIENT PROFILE REVIEW, REHAB EVAL
pt chart reviewed/yes
pt chart reviewed/yes
PT on hold, pending OOB activity order/yes
Chart Reviewed/yes
Chart reviewed/yes

## 2023-12-05 NOTE — PROGRESS NOTE ADULT - ASSESSMENT
87 yo F PMHx HTN, chronic Afib, OA, PVD, COPD requiring two intubations, anxiety, obesity, chronic systolic congestive heart failure, abdominal hysterectomy, discectomy, total knee replacement, bilateral pleural effusions s/p thoracentesis with a left side drain since March as per daughter, presents from NH for evaluation of altered mental status. She was recently discharged on Avycaz for UTI.     Acute on Chronic Hypercapnic respiratory failure s/p BiPAP  COPD exacerbation   R Pleural effusion sp thoracentesis (transudate)  Prior history of pleural effusion/s/p pigtail catheter, 1 cytology suspicious for malignant cells, multiple thoras afterwards with negative cytology  Sepsis  not POA  - RVP negative, urine, blood and pleural fluid cx negative. 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   - start PO lasix  - follows with Dr Tara chaney  - had IV in that arm  - duplex pending  - no evidence of compartment syndrome  - elevate arm    Elevated ALP    - RuQ Sono: Grossly unremarkable with Gallbladder wall 2.9 cm. Monitor     Chronic AFIB    - on Eliquis 5mg Q12H. C/w BB     Neck Mass   - chronic, had prior work up/biopsy/no further interventions at this time. Family aware    Sacral Ulcer POA   - s/p wound care eval, wound care per recs     DNR with a trial of intubation    #Progress Note Handoff:  Pending (specify):  ALONZO edema work up  Family discussion: as above with pt and son  Disposition: Home___/SNF___/Other________/Unknown at this time___x_____       89 yo F PMHx HTN, chronic Afib, OA, PVD, COPD requiring two intubations, anxiety, obesity, chronic systolic congestive heart failure, abdominal hysterectomy, discectomy, total knee replacement, bilateral pleural effusions s/p thoracentesis with a left side drain since March as per daughter, presents from NH for evaluation of altered mental status. She was recently discharged on Avycaz for UTI.     Acute on Chronic Hypercapnic respiratory failure s/p BiPAP  COPD exacerbation   R Pleural effusion sp thoracentesis (transudate)  Prior history of pleural effusion/s/p pigtail catheter, 1 cytology suspicious for malignant cells, multiple thoras afterwards with negative cytology  Sepsis  not POA  - RVP negative, urine, blood and pleural fluid cx negative. 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   - start PO lasix  - follows with Dr Tara ROSADO edema  - had IV in that arm  - duplex pending  - no evidence of compartment syndrome  - elevate arm    Elevated ALP    - RuQ Sono: Grossly unremarkable with Gallbladder wall 2.9 cm. Monitor     Chronic AFIB    - on Eliquis 5mg Q12H. C/w BB     Neck Mass   - chronic, had prior work up/biopsy/no further interventions at this time. Family aware    Sacral Ulcer POA   - s/p wound care eval, wound care per recs     DNR with a trial of intubation    #Progress Note Handoff:  Pending (specify):  LUE edema work up  Family discussion: as above with pt and son  Disposition: Home___/SNF___/Other________/Unknown at this time___x_____       87 yo F PMHx HTN, chronic Afib, OA, PVD, COPD requiring two intubations, anxiety, obesity, chronic systolic congestive heart failure, abdominal hysterectomy, discectomy, total knee replacement, bilateral pleural effusions s/p thoracentesis with a left side drain since March as per daughter, presents from NH for evaluation of altered mental status. She was recently discharged on Avycaz for UTI.     Acute on Chronic Hypercapnic respiratory failure s/p BiPAP  COPD exacerbation   R Pleural effusion sp thoracentesis (transudate)  Prior history of pleural effusion/s/p pigtail catheter, 1 cytology suspicious for malignant cells, multiple thoras afterwards with negative cytology  Sepsis  not POA  - RVP negative, urine, blood and pleural fluid cx negative. 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   - start PO lasix  - follows with Dr Tara ROSADO edema  - had IV in that arm  - duplex pending  - no evidence of compartment syndrome  - elevate arm    Elevated ALP    - RuQ Sono: Grossly unremarkable with Gallbladder wall 2.9 cm. Monitor     Chronic AFIB    - on Eliquis 5mg Q12H. C/w BB     Neck Mass   - chronic, had prior work up/biopsy/no further interventions at this time. Family aware    Sacral Ulcer POA   - s/p wound care eval, wound care per recs     DNR with a trial of intubation    #Progress Note Handoff:  Pending (specify):  LUE edema work up  Family discussion: as above with pt and son  Disposition: Home___/SNF___/Other________/Unknown at this time___x_____

## 2023-12-05 NOTE — PHYSICAL THERAPY INITIAL EVALUATION ADULT - ADDITIONAL COMMENTS
pt was adm from [facility], pt was there for rehab, as per daughter pt has not amb since March 2023 and required assist with ADLs, prior to [facility], pt lived alone in a private house, amb with RW and assist and has HHAs 24/7 for ADLs, 3 steps to enter, no steps inside pt was adm from [facility], pt was there for rehab, as per daughter pt has not amb since March 2023 and required assist with ADLs, prior to [facility], pt lived alone in a private house, amb with RW and assist and has HHAs 24/7 for ADLs, 3 steps to enter, no steps inside

## 2023-12-05 NOTE — PHYSICAL THERAPY INITIAL EVALUATION ADULT - GENERAL OBSERVATIONS, REHAB EVAL
13:28-14:10 42 min  pt received in bed in NAD, +O2 and B sequentials intact, daughter at the b/s, pt agreeable to PT with encouragement, L UE with ++swelling/ecchymosis, RN aware, L UE left elevated on pillow

## 2023-12-05 NOTE — PHYSICAL THERAPY INITIAL EVALUATION ADULT - LEVEL OF INDEPENDENCE, REHAB EVAL
likely dependent, pt declined movement in bed, despite education on the importance of PT/mobility/position changes, pt declined to move, stated she felt like she "pooped" PCA informed

## 2023-12-05 NOTE — PHYSICAL THERAPY INITIAL EVALUATION ADULT - RANGE OF MOTION EXAMINATION, REHAB EVAL
R UE AAROM shoulder flex, elb flex/ext WFL, L UE +shoulder shrug noted, no AROM at shoulder, elb, wrist, minimal movement at digits noted 2* ++swelling/ecchymosis t/o UE, B LE hip/knee flex AAROM <90*, ankle DF grossly WFL

## 2023-12-06 NOTE — PROGRESS NOTE ADULT - ASSESSMENT
89 yo F PMHx HTN, chronic Afib, OA, PVD, COPD requiring two intubations, anxiety, obesity, chronic systolic congestive heart failure, abdominal hysterectomy, discectomy, total knee replacement, bilateral pleural effusions s/p thoracentesis with a left side drain since March as per daughter, presents from NH for evaluation of altered mental status. She was recently discharged on Avycaz for UTI.     #Anemia  - Hb dropped to 6.7, transfused 1u prbc, AC held   - No signs of bledding  - Repeat Hb pending  - Continue off AC pending Hb    #Acute on Chronic Hypercapnic respiratory failure s/p BiPAP  #COPD exacerbation   #R Pleural effusion sp thoracentesis (transudate)  #Prior history of pleural effusion/s/p pigtail catheter, 1 cytology suspicious for malignant cells, multiple thoras afterwards with negative cytology  #Sepsis  not POA  - RVP negative, urine, blood and pleural fluid cx negative. 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   - On PO lasix  - follows with Dr Pacheco    #LUE edema  - had IV in that arm  - duplex negative for DVT  - no evidence of compartment syndrome  - elevate arm    #Elevated ALP    - RuQ Sono: Grossly unremarkable with Gallbladder wall 2.9 cm. Monitor     #Chronic AFIB    - on Eliquis 5mg Q12H. C/w BB     #Neck Mass   - chronic, had prior work up/biopsy/no further interventions at this time. Family aware    #Sacral Ulcer POA   - s/p wound care eval, wound care per recs     #MISC  Diet: DASH, puree  DVT: none for now   Dispo: DC planning, pending PT and repeat Hb

## 2023-12-06 NOTE — PROGRESS NOTE ADULT - SUBJECTIVE AND OBJECTIVE BOX
24H events:    Patient is a 88y old Female who presents with a chief complaint of COPD (05 Dec 2023 12:56)    Primary diagnosis of Altered mental status      Today is hospital day 12d. This morning patient was seen and examined at bedside, resting comfortably in bed.    No acute or major events overnight.    Code Status: DNR    PAST MEDICAL & SURGICAL HISTORY  HTN (hypertension)    Afib  s/p ablation 2001    Goiter  no meds    Cellulitis  chronic    OA (osteoarthritis)    PVD (peripheral vascular disease)    COPD (chronic obstructive pulmonary disease)    Anxiety    Obesity    Acute on chronic systolic congestive heart failure    Edema of both lower legs    Required emergent intubation    H/O abdominal hysterectomy    H/O discectomy    H/O total knee replacement, bilateral      SOCIAL HISTORY:  Social History: na    ALLERGIES:  penicillin (Other)  contrast media (iodine-based) (Other)  codeine (Other)  sulfa drugs (Hives; Other)  aspirin (Other)    MEDICATIONS:  STANDING MEDICATIONS  albuterol/ipratropium for Nebulization 3 milliLiter(s) Nebulizer every 6 hours  chlorhexidine 2% Cloths 1 Application(s) Topical <User Schedule>  furosemide    Tablet 40 milliGRAM(s) Oral daily  gabapentin 100 milliGRAM(s) Oral three times a day  metoprolol tartrate 25 milliGRAM(s) Oral two times a day  nystatin Powder 1 Application(s) Topical two times a day  pantoprazole    Tablet 40 milliGRAM(s) Oral before breakfast  polyethylene glycol 3350 17 Gram(s) Oral daily  predniSONE   Tablet 40 milliGRAM(s) Oral daily  senna 2 Tablet(s) Oral at bedtime    PRN MEDICATIONS  acetaminophen     Tablet .. 650 milliGRAM(s) Oral every 6 hours PRN  aluminum hydroxide/magnesium hydroxide/simethicone Suspension 30 milliLiter(s) Oral every 4 hours PRN  HYDROmorphone   Tablet 2 milliGRAM(s) Oral two times a day PRN  melatonin 3 milliGRAM(s) Oral at bedtime PRN  metoprolol tartrate 25 milliGRAM(s) Oral once PRN  ondansetron Injectable 4 milliGRAM(s) IV Push every 8 hours PRN    ROS:   no headaches, no dizziness, no fevers, no chills, no CP/SOB, no abdominal pain, no n/v/d, no hematochezia, no burning with urination, no hematuria, no weakness or tingling    I&O:  I&O's Summary    05 Dec 2023 07:01  -  06 Dec 2023 07:00  --------------------------------------------------------  IN: 840 mL / OUT: 1000 mL / NET: -160 mL    VITALS:   ICU Vital Signs Last 24 Hrs  T(C): 36.2 (06 Dec 2023 04:00), Max: 36.7 (05 Dec 2023 17:57)  T(F): 97.1 (06 Dec 2023 04:00), Max: 98 (05 Dec 2023 17:57)  HR: 99 (06 Dec 2023 08:00) (94 - 113)  BP: 99/58 (06 Dec 2023 08:00) (86/53 - 116/76)  BP(mean): 72 (06 Dec 2023 08:00) (72 - 90)  ABP: --  ABP(mean): --  RR: 18 (06 Dec 2023 08:00) (18 - 18)  SpO2: 98% (06 Dec 2023 08:00) (97% - 99%)    O2 Parameters below as of 06 Dec 2023 08:00  Patient On (Oxygen Delivery Method): nasal cannula  O2 Flow (L/min): 3          PHYSICAL EXAM:  GENERAL:  NAD  SKIN: No rashes or lesions  HEENT: Atraumatic. Normocephalic. Anicteric  NECK:  No JVD.   PULMONARY: Clear to ausculation bilaterally. No wheezing. No rales  CVS: Normal S1, S2. Regular rate and rhythm. No murmurs.  ABDOMEN/GI: Soft, Nontender, Nondistended; Bowel sounds are present  EXTREMITIES:  LUE swelling. Arm tender. Arm warm, pulses in tact, no pallor  NEUROLOGIC:  No motor deficit.  PSYCH: Alert & oriented x 3, normal affect    Lines: none    LABS:                        6.7    16.56 )-----------( 194      ( 05 Dec 2023 11:46 )             22.2     12-05    135  |  91<L>  |  35<H>  ----------------------------<  220<H>  4.7   |  37<H>  |  1.1    Ca    8.2<L>      05 Dec 2023 11:46  Phos  3.5     12-05  Mg     1.8     12-05    TPro  5.0<L>  /  Alb  2.9<L>  /  TBili  0.4  /  DBili  x   /  AST  22  /  ALT  14  /  AlkPhos  167<H>  12-05      Urinalysis Basic - ( 05 Dec 2023 11:46 )    Color: x / Appearance: x / SG: x / pH: x  Gluc: 220 mg/dL / Ketone: x  / Bili: x / Urobili: x   Blood: x / Protein: x / Nitrite: x   Leuk Esterase: x / RBC: x / WBC x   Sq Epi: x / Non Sq Epi: x / Bacteria: x      RADIOLOGY:    < from: [facility] Duplex Upper Ext Vein Scan, Left (12.04.23 @ 18:07) >  IMPRESSION:  No evidence of left upper extremity deep venous thrombosis.  Brachial vein not visualized.  Large Fluid collection seen in the left upper arm.    < end of copied text >

## 2023-12-06 NOTE — PROGRESS NOTE ADULT - ATTENDING COMMENTS
Anemia  - s/p 1u PRBC with lower than expected hgb gain.  6.7----->7.2  - mildly concerning for potentially progressive blood loss.  - recheck hgb in AM  - may need further bleeding and anemia studies

## 2023-12-07 NOTE — PROGRESS NOTE ADULT - ATTENDING COMMENTS
Patient was seen independently along with residents & house-staff.  Latest vital signs and labs were reviewed  Consults were reviewed where present  Case was discussed with house staff including /social workers.  Agree with resident's assessment and plan above .Additions/modifications made where necessary

## 2023-12-07 NOTE — PROGRESS NOTE ADULT - ASSESSMENT
89 yo F PMHx HTN, chronic Afib, OA, PVD, COPD requiring two intubations, anxiety, obesity, chronic systolic congestive heart failure, abdominal hysterectomy, discectomy, total knee replacement, bilateral pleural effusions s/p thoracentesis with a left side drain since March as per daughter, presents from NH for evaluation of altered mental status. She was recently discharged on Avycaz for UTI.     #Anemia  - Hb dropped to 6.7, transfused 1u prbc, AC held   - No signs of bleeding  - 12/6: Hb 7.2  - 12/7: Hb 7.1     #Acute on Chronic Hypercapnic respiratory failure s/p BiPAP  #COPD exacerbation   #R Pleural effusion sp thoracentesis (transudate)  #Prior history of pleural effusion/s/p pigtail catheter, 1 cytology suspicious for malignant cells, multiple thoras afterwards with negative cytology  #Sepsis  not POA  - RVP negative, urine, blood and pleural fluid cx negative. 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   - On PO lasix  - follows with Dr Pacheco    #LUE edema  - had IV in that arm  - duplex negative for DVT  - no evidence of compartment syndrome  - elevate arm    #Elevated ALP    - RuQ Sono: Grossly unremarkable with Gallbladder wall 2.9 cm. Monitor     #Chronic AFIB    - on Eliquis 5mg Q12H. C/w BB     #Neck Mass   - chronic, had prior work up/biopsy/no further interventions at this time. Family aware    #Sacral Ulcer POA   - s/p wound care eval, wound care per recs     #MISC  Diet: DASH, puree  DVT: none for now   Dispo: DC planning, pending PT     87 yo F PMHx HTN, chronic Afib, OA, PVD, COPD requiring two intubations, anxiety, obesity, chronic systolic congestive heart failure, abdominal hysterectomy, discectomy, total knee replacement, bilateral pleural effusions s/p thoracentesis with a left side drain since March as per daughter, presents from NH for evaluation of altered mental status. She was recently discharged on Avycaz for UTI.     #Anemia  - Hb dropped to 6.7, transfused 1u prbc, AC held   - No signs of bleeding  - 12/6: Hb 7.2  - 12/7: Hb 7.1   - Possible hematoma in LUE  - Vascular consult pending     #Acute on Chronic Hypercapnic respiratory failure s/p BiPAP  #COPD exacerbation   #R Pleural effusion sp thoracentesis (transudate)  #Prior history of pleural effusion/s/p pigtail catheter, 1 cytology suspicious for malignant cells, multiple thoras afterwards with negative cytology  #Sepsis  not POA  - RVP negative, urine, blood and pleural fluid cx negative. 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   - On PO lasix  - follows with Dr Pacheco    #LUE edema  - had IV in that arm  - duplex negative for DVT  - no evidence of compartment syndrome  - elevate arm    #Elevated ALP    - RuQ Sono: Grossly unremarkable with Gallbladder wall 2.9 cm. Monitor     #Chronic AFIB    - on Eliquis 5mg Q12H. C/w BB     #Neck Mass   - chronic, had prior work up/biopsy/no further interventions at this time. Family aware    #Sacral Ulcer POA   - s/p wound care eval, wound care per recs     #MISC  Diet: DASH, puree  DVT: none for now   Dispo: DC planning, pending PT, vascular consult      89 yo F PMHx HTN, chronic Afib, OA, PVD, COPD requiring two intubations, anxiety, obesity, chronic systolic congestive heart failure, abdominal hysterectomy, discectomy, total knee replacement, bilateral pleural effusions s/p thoracentesis with a left side drain since March as per daughter, presents from NH for evaluation of altered mental status. She was recently discharged on Avycaz for UTI.     #Anemia  - Hb dropped to 6.7, transfused 1u prbc, AC held   - No signs of bleeding  - 12/6: Hb 7.2  - 12/7: Hb 7.1   - Possible hematoma in LUE  - Vascular consult pending     #Acute on Chronic Hypercapnic respiratory failure s/p BiPAP  #COPD exacerbation   #R Pleural effusion sp thoracentesis (transudate)  #Prior history of pleural effusion/s/p pigtail catheter, 1 cytology suspicious for malignant cells, multiple thoras afterwards with negative cytology  #Sepsis  not POA  - RVP negative, urine, blood and pleural fluid cx negative. 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   - On PO lasix  - follows with Dr Pacheco    #LUE edema  - had IV in that arm  - duplex negative for DVT  - no evidence of compartment syndrome  - elevate arm    #Elevated ALP    - RuQ Sono: Grossly unremarkable with Gallbladder wall 2.9 cm. Monitor     #Chronic AFIB    - on Eliquis 5mg Q12H. C/w BB     #Neck Mass   - chronic, had prior work up/biopsy/no further interventions at this time. Family aware    #Sacral Ulcer POA   - s/p wound care eval, wound care per recs     #MISC  Diet: DASH, puree  DVT: none for now   Dispo: DC planning, pending PT, vascular consult

## 2023-12-07 NOTE — PROGRESS NOTE ADULT - SUBJECTIVE AND OBJECTIVE BOX
24H events:    Patient is a 88y old Female who presents with a chief complaint of COPD (06 Dec 2023 11:38)    Primary diagnosis of Altered mental status      Today is hospital day 13d. This morning patient was seen and examined at bedside, resting comfortably in bed.    No acute or major events overnight.    Code Status: DNR    PAST MEDICAL & SURGICAL HISTORY  HTN (hypertension)    Afib  s/p ablation 2001    Goiter  no meds    Cellulitis  chronic    OA (osteoarthritis)    PVD (peripheral vascular disease)    COPD (chronic obstructive pulmonary disease)    Anxiety    Obesity    Acute on chronic systolic congestive heart failure    Edema of both lower legs    Required emergent intubation    H/O abdominal hysterectomy    H/O discectomy    H/O total knee replacement, bilateral      SOCIAL HISTORY:  Social History: na    ALLERGIES:  penicillin (Other)  contrast media (iodine-based) (Other)  codeine (Other)  sulfa drugs (Hives; Other)  aspirin (Other)    MEDICATIONS:  STANDING MEDICATIONS  albuterol/ipratropium for Nebulization 3 milliLiter(s) Nebulizer every 6 hours  chlorhexidine 2% Cloths 1 Application(s) Topical <User Schedule>  furosemide    Tablet 40 milliGRAM(s) Oral daily  gabapentin 100 milliGRAM(s) Oral three times a day  metoprolol tartrate 25 milliGRAM(s) Oral two times a day  nystatin Powder 1 Application(s) Topical two times a day  pantoprazole    Tablet 40 milliGRAM(s) Oral before breakfast  polyethylene glycol 3350 17 Gram(s) Oral daily  predniSONE   Tablet 40 milliGRAM(s) Oral daily  senna 2 Tablet(s) Oral at bedtime    PRN MEDICATIONS  acetaminophen     Tablet .. 650 milliGRAM(s) Oral every 6 hours PRN  aluminum hydroxide/magnesium hydroxide/simethicone Suspension 30 milliLiter(s) Oral every 4 hours PRN  HYDROmorphone   Tablet 2 milliGRAM(s) Oral two times a day PRN  melatonin 3 milliGRAM(s) Oral at bedtime PRN  metoprolol tartrate 25 milliGRAM(s) Oral once PRN  ondansetron Injectable 4 milliGRAM(s) IV Push every 8 hours PRN    ROS:   no headaches, no dizziness, no fevers, no chills, no CP/SOB, no abdominal pain, no n/v/d, no hematochezia, no burning with urination, no hematuria, no weakness or tingling    I&O:  I&O's Summary    06 Dec 2023 07:01  -  07 Dec 2023 07:00  --------------------------------------------------------  IN: 0 mL / OUT: 625 mL / NET: -625 mL        VITALS:   ICU Vital Signs Last 24 Hrs  T(C): 37.2 (07 Dec 2023 04:00), Max: 37.2 (07 Dec 2023 04:00)  T(F): 98.9 (07 Dec 2023 04:00), Max: 98.9 (07 Dec 2023 04:00)  HR: 109 (07 Dec 2023 05:57) (100 - 109)  BP: 113/74 (07 Dec 2023 05:57) (98/55 - 113/74)  BP(mean): --  ABP: --  ABP(mean): --  RR: 18 (07 Dec 2023 04:00) (18 - 18)  SpO2: 99% (07 Dec 2023 04:00) (98% - 99%)    O2 Parameters below as of 07 Dec 2023 04:00  Patient On (Oxygen Delivery Method): nasal cannula  O2 Flow (L/min): 4    PHYSICAL EXAM:  GENERAL: lying in bed comfortably  HEAD: normal  HEART: RRR  LUNGS: CTA b/l  ABDOMEN: soft nontender nondistended  EXTREMITIES: LUE edema  NERVOUS SYSTEM:  A&OX3    Lines: none    LABS:                        7.1    18.72 )-----------( 216      ( 07 Dec 2023 06:13 )             23.4     12-07    141  |  95<L>  |  37<H>  ----------------------------<  86  5.1<H>   |  38<H>  |  1.0    Ca    8.3<L>      07 Dec 2023 06:13  Phos  3.5     12-05  Mg     1.8     12-05    TPro  5.1<L>  /  Alb  2.9<L>  /  TBili  0.5  /  DBili  x   /  AST  21  /  ALT  14  /  AlkPhos  175<H>  12-07      Urinalysis Basic - ( 07 Dec 2023 06:13 )    Color: x / Appearance: x / SG: x / pH: x  Gluc: 86 mg/dL / Ketone: x  / Bili: x / Urobili: x   Blood: x / Protein: x / Nitrite: x   Leuk Esterase: x / RBC: x / WBC x   Sq Epi: x / Non Sq Epi: x / Bacteria: x                RADIOLOGY:    < from: [facility] Duplex Upper Ext Vein Scan, Left (12.04.23 @ 18:07) >  IMPRESSION:  No evidence of left upper extremity deep venous thrombosis.  Brachial vein not visualized.  Large Fluid collection seen in the left upper arm.    < end of copied text >

## 2023-12-08 NOTE — CONSULT NOTE ADULT - SUBJECTIVE AND OBJECTIVE BOX
VASCULAR SURGERY CONSULT NOTE      HPI:  88 year old female with PMH of HTN, Afib, OA, PVD, COPD, Anxiety, Obesity, Acute on chronic systolic congestive heart failure, H/O abdominal hysterectomy, H/O discectomy, H/O total knee replacement, bilateral pleural effusions s/p thoracentesis with a left side drain since March as per daughter, presents sent in by NH for altered mental status for the since yesterday morning, which has been worsening. She was recently discharged on Avycaz for UTI. As per daughter the patient had 2 intubations in the past for copd exacerbation. The left side drain has no output. The daughter mentioned that patient has been mentioning burning of urination and pain in the back( which is attributed to her sacral ulcer).  Denies any recent fevers, chills, N/V/D, headaches, chest pain, SOB.    Vital Signs Last 24 Hrs  T(C): 36.3 (24 Nov 2023 16:12), Max: 37.1 (24 Nov 2023 14:35)  T(F): 97.4 (24 Nov 2023 16:12), Max: 98.8 (24 Nov 2023 14:35)  HR: 104 (24 Nov 2023 16:12) (104 - 110)  BP: 102/67 (24 Nov 2023 16:12) (102/67 - 113/66)  RR: 20 (24 Nov 2023 16:12) (20 - 24)  SpO2: 96% (24 Nov 2023 16:37) (96% - 99%)  O2 Parameters below as of 24 Nov 2023 16:12  Patient On (Oxygen Delivery Method): nasal cannula  O2 Flow (L/min): 3 and BiPAP       (24 Nov 2023 16:57)        PAST MEDICAL & SURGICAL HISTORY:  HTN (hypertension)      Afib  s/p ablation 2001      Goiter  no meds      Cellulitis  chronic      OA (osteoarthritis)      PVD (peripheral vascular disease)      COPD (chronic obstructive pulmonary disease)      Anxiety      Obesity      Acute on chronic systolic congestive heart failure      Edema of both lower legs      Required emergent intubation      H/O abdominal hysterectomy      H/O discectomy      H/O total knee replacement, bilateral        penicillin (Other)  contrast media (iodine-based) (Other)  codeine (Other)  sulfa drugs (Hives; Other)  aspirin (Other)    Home Medications:  budesonide-formoterol 80 mcg-4.5 mcg/inh inhalation aerosol: 2 puff(s) inhaled 2 times a day (24 Nov 2023 18:37)  Eliquis 5 mg oral tablet: 1 tab(s) orally 2 times a day (24 Nov 2023 18:34)  furosemide 20 mg oral tablet: 1 tab(s) orally once a day (24 Nov 2023 18:37)  gabapentin 100 mg oral tablet: 1 cap(s) orally 3 times a day (24 Nov 2023 18:37)  metoprolol tartrate 25 mg oral tablet: 1 tab(s) orally 2 times a day (24 Nov 2023 18:36)  polyethylene glycol 3350 oral powder for reconstitution: 17 gram(s) orally once a day as needed for  constipation (24 Nov 2023 18:37)  Senna 8.6 mg oral tablet: 2 tab(s) orally once a day (at bedtime) (24 Nov 2023 18:37)  tiotropium 18 mcg inhalation capsule: 1 cap(s) inhaled once a day (24 Nov 2023 18:34)    No permtinent family history of PVD    REVIEW OF SYSTEMS:  GENERAL:                                         negative  SKIN:                                                 negative  OPTHALMOLOGIC:                          negative  ENMT:                                               negative  RESPIRATORY AND THORAX:        see HPI  CARDIOVASCULAR:                         see HPI  GASTROINTESTINAL:                       negative  NEPHROLOGY:                                  negative  MUSCULOSKELETAL:                       negative  NEUROLOGIC:                                   negative  PSYCHIATRIC:                                    negative  HEMATOLOGY/LYMPHATICS:         negative  ENDOCRINE:                                     negative  ALLERGIC/IMMUNOLOGIC:            negative    12 point ROS otherwise normal except as stated in HPI  FHx: none  SHX:  [ ]  smoking     [ ] alcohol use    PHYSICAL EXAM  Vital Signs Last 24 Hrs  T(C): 37 (08 Dec 2023 13:32), Max: 37 (08 Dec 2023 13:32)  T(F): 98.6 (08 Dec 2023 13:32), Max: 98.6 (08 Dec 2023 13:32)  HR: 104 (08 Dec 2023 13:32) (94 - 105)  BP: 129/60 (08 Dec 2023 13:32) (94/55 - 129/60)  BP(mean): --  RR: 18 (08 Dec 2023 13:32) (16 - 18)  SpO2: 94% (07 Dec 2023 21:51) (94% - 94%)    Parameters below as of 07 Dec 2023 21:51  Patient On (Oxygen Delivery Method): nasal cannula  O2 Flow (L/min): 4      Appearance: Normal	  HEENT:   Normal oral mucosa, PERRL, EOMI	  Neck: Supple, - JVD;   Cardiovascular: Normal S1 S2, No JVD, No murmurs,   Respiratory: Lungs clear to auscultation, No Rales, Rhonchi, Wheezing	  Gastrointestinal:  Soft, Non-tender, positive BS	  Skin: No rashes, No ecchymoses, No cyanosis  Extremities: Normal range of motion, No clubbing, cyanosis or edema  Left upper extremity pitting edema 2+ below elbow, hematoma and non-pitting edema at the anterior fossa and inner upper arm  painful to touch, intact sensation but cannot    Neurologic: Non-focal  Psychiatry: A & O x 3, Mood & affect appropriate      PULSES:  left radial palpable pulse      MEDICATIONS:   MEDICATIONS  (STANDING):  albuterol/ipratropium for Nebulization 3 milliLiter(s) Nebulizer every 6 hours  chlorhexidine 2% Cloths 1 Application(s) Topical <User Schedule>  diphenhydrAMINE 25 milliGRAM(s) Oral every 6 hours  furosemide    Tablet 40 milliGRAM(s) Oral daily  gabapentin 100 milliGRAM(s) Oral three times a day  magnesium sulfate  IVPB 2 Gram(s) IV Intermittent once  metoprolol tartrate 25 milliGRAM(s) Oral two times a day  nystatin Powder 1 Application(s) Topical two times a day  pantoprazole    Tablet 40 milliGRAM(s) Oral before breakfast  polyethylene glycol 3350 17 Gram(s) Oral daily  predniSONE   Tablet 40 milliGRAM(s) Oral daily  senna 2 Tablet(s) Oral at bedtime    MEDICATIONS  (PRN):  acetaminophen     Tablet .. 650 milliGRAM(s) Oral every 6 hours PRN Temp greater or equal to 38C (100.4F), Mild Pain (1 - 3)  aluminum hydroxide/magnesium hydroxide/simethicone Suspension 30 milliLiter(s) Oral every 4 hours PRN Dyspepsia  HYDROmorphone   Tablet 2 milliGRAM(s) Oral two times a day PRN Mild Pain (1 - 3)  melatonin 3 milliGRAM(s) Oral at bedtime PRN Insomnia  metoprolol tartrate 25 milliGRAM(s) Oral once PRN tachycardia  ondansetron Injectable 4 milliGRAM(s) IV Push every 8 hours PRN Nausea and/or Vomiting      LAB/STUDIES:                        6.5    14.15 )-----------( 219      ( 08 Dec 2023 04:30 )             21.2     12-08    139  |  95<L>  |  41<H>  ----------------------------<  108<H>  5.2<H>   |  34<H>  |  1.0    Ca    8.5      08 Dec 2023 04:30  Mg     1.6     12-08    TPro  5.3<L>  /  Alb  2.7<L>  /  TBili  0.5  /  DBili  0.2  /  AST  22  /  ALT  15  /  AlkPhos  168<H>  12-08      LIVER FUNCTIONS - ( 08 Dec 2023 13:00 )  Alb: 2.7 g/dL / Pro: 5.3 g/dL / ALK PHOS: 168 U/L / ALT: 15 U/L / AST: 22 U/L / GGT: x             Urinalysis Basic - ( 08 Dec 2023 04:30 )    Color: x / Appearance: x / SG: x / pH: x  Gluc: 108 mg/dL / Ketone: x  / Bili: x / Urobili: x   Blood: x / Protein: x / Nitrite: x   Leuk Esterase: x / RBC: x / WBC x   Sq Epi: x / Non Sq Epi: x / Bacteria: x                    IMAGING:

## 2023-12-08 NOTE — PROGRESS NOTE ADULT - SUBJECTIVE AND OBJECTIVE BOX
24H events:    Patient is a 88y old Female who presents with a chief complaint of COPD (07 Dec 2023 08:11)    Primary diagnosis of Altered mental status      Today is hospital day 14d. This morning patient was seen and examined at bedside, resting comfortably in bed.    No acute or major events overnight.    Code Status: DNR    PAST MEDICAL & SURGICAL HISTORY  HTN (hypertension)    Afib  s/p ablation 2001    Goiter  no meds    Cellulitis  chronic    OA (osteoarthritis)    PVD (peripheral vascular disease)    COPD (chronic obstructive pulmonary disease)    Anxiety    Obesity    Acute on chronic systolic congestive heart failure    Edema of both lower legs    Required emergent intubation    H/O abdominal hysterectomy    H/O discectomy    H/O total knee replacement, bilateral      SOCIAL HISTORY:  Social History: na    ALLERGIES:  penicillin (Other)  contrast media (iodine-based) (Other)  codeine (Other)  sulfa drugs (Hives; Other)  aspirin (Other)    MEDICATIONS:  STANDING MEDICATIONS  albuterol/ipratropium for Nebulization 3 milliLiter(s) Nebulizer every 6 hours  chlorhexidine 2% Cloths 1 Application(s) Topical <User Schedule>  furosemide    Tablet 40 milliGRAM(s) Oral daily  gabapentin 100 milliGRAM(s) Oral three times a day  metoprolol tartrate 25 milliGRAM(s) Oral two times a day  nystatin Powder 1 Application(s) Topical two times a day  pantoprazole    Tablet 40 milliGRAM(s) Oral before breakfast  polyethylene glycol 3350 17 Gram(s) Oral daily  predniSONE   Tablet 40 milliGRAM(s) Oral daily  senna 2 Tablet(s) Oral at bedtime    PRN MEDICATIONS  acetaminophen     Tablet .. 650 milliGRAM(s) Oral every 6 hours PRN  aluminum hydroxide/magnesium hydroxide/simethicone Suspension 30 milliLiter(s) Oral every 4 hours PRN  HYDROmorphone   Tablet 2 milliGRAM(s) Oral two times a day PRN  melatonin 3 milliGRAM(s) Oral at bedtime PRN  metoprolol tartrate 25 milliGRAM(s) Oral once PRN  ondansetron Injectable 4 milliGRAM(s) IV Push every 8 hours PRN    ROS:   no headaches, no dizziness, no fevers, no chills, no CP/SOB, no abdominal pain, no n/v/d, no hematochezia, no burning with urination, no hematuria, no weakness or tingling    I&O:  I&O's Summary    07 Dec 2023 07:01  -  08 Dec 2023 07:00  --------------------------------------------------------  IN: 0 mL / OUT: 825 mL / NET: -825 mL    VITALS:   ICU Vital Signs Last 24 Hrs  T(C): 36.7 (08 Dec 2023 04:58), Max: 36.9 (07 Dec 2023 20:00)  T(F): 98 (08 Dec 2023 04:58), Max: 98.4 (07 Dec 2023 20:00)  HR: 94 (08 Dec 2023 04:58) (94 - 105)  BP: 98/54 (08 Dec 2023 04:58) (94/55 - 100/54)  BP(mean): --  ABP: --  ABP(mean): --  RR: 18 (08 Dec 2023 04:58) (16 - 18)  SpO2: 94% (07 Dec 2023 21:51) (94% - 94%)    O2 Parameters below as of 07 Dec 2023 21:51  Patient On (Oxygen Delivery Method): nasal cannula  O2 Flow (L/min): 4    PHYSICAL EXAM:  GENERAL: lying in bed comfortably  HEAD: normal  HEART: RRR  LUNGS: CTA b/l  ABDOMEN: soft nontender nondistended  EXTREMITIES: LUE edema  NERVOUS SYSTEM:  A&OX3    Lines: none    LABS:                        6.5    14.15 )-----------( 219      ( 08 Dec 2023 04:30 )             21.2     12-07    141  |  95<L>  |  37<H>  ----------------------------<  86  5.1<H>   |  38<H>  |  1.0    Ca    8.3<L>      07 Dec 2023 06:13    TPro  5.1<L>  /  Alb  2.9<L>  /  TBili  0.5  /  DBili  x   /  AST  21  /  ALT  14  /  AlkPhos  175<H>  12-07      Urinalysis Basic - ( 07 Dec 2023 06:13 )    Color: x / Appearance: x / SG: x / pH: x  Gluc: 86 mg/dL / Ketone: x  / Bili: x / Urobili: x   Blood: x / Protein: x / Nitrite: x   Leuk Esterase: x / RBC: x / WBC x   Sq Epi: x / Non Sq Epi: x / Bacteria: x                RADIOLOGY:  < from: [facility] Duplex Upper Ext Vein Scan, Left (12.04.23 @ 18:07) >  IMPRESSION:  No evidence of left upper extremity deep venous thrombosis.  Brachial vein not visualized.  Large Fluid collection seen in the left upper arm.    < end of copied text >

## 2023-12-08 NOTE — CONSULT NOTE ADULT - ASSESSMENT
88 year old female with PMH of HTN, Afib, OA, PVD, COPD, Anxiety, Obesity, Acute on chronic systolic congestive heart failure, H/O abdominal hysterectomy, H/O discectomy, H/O total knee replacement, bilateral pleural effusions s/p thoracentesis with a left side drain since March as per daughter, presents sent in by NH for altered mental status for the since yesterday morning, which has been worsening. She was recently discharged on Avycaz for UTI. As per daughter the patient had 2 intubations in the past for copd exacerbation. The left side drain has no output. The daughter mentioned that patient has been mentioning burning of urination and pain in the back( which is attributed to her sacral ulcer).    Vascular surgery  called to evaluate left upper extremity swelling and concern for active bleeding due to spontaneous hematoma due to Eliquis  Non-contrast CT evaluated however it does not give us information if there is an active bleed   On exam: there is a concern for bleed    - I reviewed today's labs  - I reviewed and personally visualized all the radiology imagings  - I discussed the plan with attending Dr. Montana:  - CTA left upper extremity is recommended  - left arm elevation and ace wrap to improve swelling (at this time pt cannot tolerate ace wrap)  - transfuse as needed and repeat CBC  will follow    SPECTRA [id number] 88 year old female with PMH of HTN, Afib, OA, PVD, COPD, Anxiety, Obesity, Acute on chronic systolic congestive heart failure, H/O abdominal hysterectomy, H/O discectomy, H/O total knee replacement, bilateral pleural effusions s/p thoracentesis with a left side drain since March as per daughter, presents sent in by NH for altered mental status for the since yesterday morning, which has been worsening. She was recently discharged on Avycaz for UTI. As per daughter the patient had 2 intubations in the past for copd exacerbation. The left side drain has no output. The daughter mentioned that patient has been mentioning burning of urination and pain in the back( which is attributed to her sacral ulcer).    Vascular surgery  called to evaluate left upper extremity swelling and concern for active bleeding due to spontaneous hematoma due to Eliquis  Non-contrast CT evaluated however it does not give us information if there is an active bleed   On exam: there is a concern for bleed    - I reviewed today's labs  - I reviewed and personally visualized all the radiology imagings  - I discussed the plan with attending Dr. Montana:  - CTA left upper extremity is recommended  - left arm elevation and ace wrap to improve swelling (at this time pt cannot tolerate ace wrap)  - transfuse as needed and repeat CBC  will follow    SPECTRA [id number]

## 2023-12-08 NOTE — PROGRESS NOTE ADULT - ATTENDING COMMENTS
anemia due to blood loss in L arm hematoma  - CT shows probable hematoma  - no evidence of any retroperitoneal bleeding  - since CT L arm done without contrast so unknown if actively extravasating, will repeat with contrast  - known hx of hives from contrast 30 years ago, will premedicate with benadryl, already on prednisone

## 2023-12-08 NOTE — PROGRESS NOTE ADULT - ASSESSMENT
87 yo F PMHx HTN, chronic Afib, OA, PVD, COPD requiring two intubations, anxiety, obesity, chronic systolic congestive heart failure, abdominal hysterectomy, discectomy, total knee replacement, bilateral pleural effusions s/p thoracentesis with a left side drain since March as per daughter, presents from NH for evaluation of altered mental status. She was recently discharged on Avycaz for UTI.     #Anemia  - Hb dropped to 6.7, transfused 1u prbc, AC held   - No signs of bleeding  - 12/6: Hb 7.2  - 12/7: Hb 7.1   - Possible hematoma in LUE  - 12/8: Hb 6.5, will transfuse 1u prbc  - CT LUE stat to evaluate for bleed  - F/u vascular   - F/u reticulocyte count    #Acute on Chronic Hypercapnic respiratory failure s/p BiPAP  #COPD exacerbation   #R Pleural effusion sp thoracentesis (transudate)  #Prior history of pleural effusion/s/p pigtail catheter, 1 cytology suspicious for malignant cells, multiple thoras afterwards with negative cytology  #Sepsis  not POA  - RVP negative, urine, blood and pleural fluid cx negative. Nasal MRSA negative   - s/p thoracentesis 11/27 with 1L removed, transudative  - off abx per ID,prednisone 40 q24h for 5 days  - taper down supplemental oxygen, currently on 3L   - PT F/U    #Multi-drug Resistant Klebsiella Bacteriemia with Acute Cystitis - prior admission october 2023  #Acute Urinary retention s/p al placement 11/30 AM  - s/p Avycaz q8h end date 11/8   - per documentation, patient has a chronic al     #Leuckotyosis  - likely reactive from steroids    #Acute on Chronic systolic congestive heart failure   - improving   - Echo with EF 64%  - BNP 11,224  - s/p Iv lasix.   - On PO lasix  - follows with Dr Pacheco    #LUE edema  - had IV in that arm  - duplex negative for DVT  - no evidence of compartment syndrome  - elevate arm    #Elevated ALP    - RuQ Sono: Grossly unremarkable with Gallbladder wall 2.9 cm. Monitor     #Chronic AFIB    - on Eliquis 5mg Q12H. C/w BB     #Neck Mass   - chronic, had prior work up/biopsy/no further interventions at this time. Family aware    #Sacral Ulcer POA   - s/p wound care eval, wound care per recs     #MISC  Diet: DASH, puree  DVT: none for now   Pending: Vascular consult, CT LUE, 1u prbc, retic count

## 2023-12-09 NOTE — PROGRESS NOTE ADULT - ASSESSMENT
88F w/ PMHx of PVD, chronic systolic CHF, Afib (on Eliquis) who is being evaluated by vascular surgery for spontaneous LUE hematoma due to Eliquis.      #Left upper extremity hematoma   - CTA LUE was recommended - patient is allergic to iodine and unable to tolerate contrast for this exam   - If Hgb remains stable, can continue to monitor without need for further imaging   - If Hgb begins to drop, we recommend premedicating the patient for CTA   - Transfuse as needed and repeat CBC   - Continue left arm elevation; if patient is able to tolerate, we also recommend Ace wrap to improve swelling (patient is unable to tolerate at this time 2/2 pain)   - Hold AC   - Will continue to follow     Vascular Surgery   SPECTRA: [id number] After the bone density study, we recommended    \"RECOMMENDATIONS:  1.  Continue current therapy as tolerated.  2.  Weightbearing exercise 3x/week as tolerated.   3.  Calcium 1000 mg daily in divided doses with 800 -1000 IU vitamin D if  dietary intake inadequate.  4.  Followup DEXA scan in 2 years.\"       88F w/ PMHx of PVD, chronic systolic CHF, Afib (on Eliquis) who is being evaluated by vascular surgery for spontaneous LUE hematoma due to Eliquis.      #Left upper extremity hematoma   - CTA LUE was recommended - patient is allergic to iodine and unable to tolerate contrast for this exam   - If Hgb remains stable, can continue to monitor without need for further imaging   - If Hgb begins to drop, we recommend premedicating the patient for CTA   - Transfuse as needed and repeat CBC   - Continue left arm elevation; if patient is able to tolerate, we also recommend Ace wrap to improve swelling (patient is unable to tolerate at this time 2/2 pain)   - Hold AC   - Will continue to follow     Vascular Surgery   SPECTRA: [id number]

## 2023-12-09 NOTE — PROGRESS NOTE ADULT - SUBJECTIVE AND OBJECTIVE BOX
VASCULAR SURGERY PROGRESS NOTE    Hospital Day #: 15d    Procedure/Dx:   - Left upper extremity hematoma/swelling     Events of past 24 hours:  Patient received 2U PRBC 12/8 for Hgb 6.5, now 8.8 today. Patient is stable. Physical exam shows persistent swelling in the LUE, unchanged from yesterday. No other acute events overnight.     PAST MEDICAL & SURGICAL HISTORY:  ·	HTN (hypertension)  ·	Afib s/p ablation 2001  ·	Goiter  ·	Chronic cellulitis  ·	OA (osteoarthritis)  ·	PVD (peripheral vascular disease)  ·	COPD (chronic obstructive pulmonary disease)  ·	Anxiety  ·	Obesity  ·	Acute on chronic systolic congestive heart failure  ·	Edema of both lower legs  ·	H/O abdominal hysterectomy  ·	H/O discectomy  ·	H/O total knee replacement, bilateral    Vital Signs Last 24 Hrs  T(C): 36.5 (09 Dec 2023 05:00), Max: 36.5 (09 Dec 2023 05:00)  T(F): 97.7 (09 Dec 2023 05:00), Max: 97.7 (09 Dec 2023 05:00)  HR: 95 (09 Dec 2023 05:00) (95 - 98)  BP: 115/62 (09 Dec 2023 05:00) (115/62 - 126/60)  BP(mean): 79 (09 Dec 2023 05:00) (79 - 79)  RR: 17 (09 Dec 2023 05:00) (16 - 18)  SpO2: 97% (09 Dec 2023 09:44) (97% - 98%)    Parameters below as of 09 Dec 2023 09:44  Patient On (Oxygen Delivery Method): nasal cannula  O2 Flow (L/min): 2      Pain (0-10):  5          Pain Control Adequate: [X] YES [] N    Diet, Pureed:   DASH/TLC Sodium & Cholesterol Restricted (11-25-23 @ 19:00) [Active]      I&O's     08 Dec 2023 07:01  -  09 Dec 2023 07:00  --------------------------------------------------------  IN:    Oral Fluid: 1020 mL    PRBCs (Packed Red Blood Cells): 600 mL  Total IN: 1620 mL    OUT:    Indwelling Catheter - Urethral (mL): 1350 mL  Total OUT: 1350 mL    Total NET: 270 mL      09 Dec 2023 07:01  -  09 Dec 2023 13:59  --------------------------------------------------------  IN:    Oral Fluid: 320 mL  Total IN: 320 mL    OUT:  Total OUT: 0 mL    Total NET: 320 mL    PHYSICAL EXAM  GENERAL: NAD   CARDIO: Regular rate   LUNGS: Equal chest rise bilaterally, no signs of respiratory distress  ABD: Soft, nontender, nondistended   EXT: LUE pitting edema 2+ below elbow, hematoma and non-pitting edema at the anterior fossa and inner arm, +TTP, sensation intact but motor function affected w/ inability to    SKIN: No rashes, no ecchymoses, no cyanosis     MEDICATIONS  (STANDING):  albuterol/ipratropium for Nebulization 3 milliLiter(s) Nebulizer every 6 hours  chlorhexidine 2% Cloths 1 Application(s) Topical <User Schedule>  furosemide    Tablet 40 milliGRAM(s) Oral daily  gabapentin 100 milliGRAM(s) Oral three times a day  metoprolol tartrate 25 milliGRAM(s) Oral two times a day  nystatin Powder 1 Application(s) Topical two times a day  pantoprazole    Tablet 40 milliGRAM(s) Oral before breakfast  polyethylene glycol 3350 17 Gram(s) Oral daily  predniSONE   Tablet 40 milliGRAM(s) Oral daily  senna 2 Tablet(s) Oral at bedtime    MEDICATIONS  (PRN):  acetaminophen     Tablet .. 650 milliGRAM(s) Oral every 6 hours PRN Temp greater or equal to 38C (100.4F), Mild Pain (1 - 3)  aluminum hydroxide/magnesium hydroxide/simethicone Suspension 30 milliLiter(s) Oral every 4 hours PRN Dyspepsia  HYDROmorphone   Tablet 2 milliGRAM(s) Oral two times a day PRN Mild Pain (1 - 3)  melatonin 3 milliGRAM(s) Oral at bedtime PRN Insomnia  metoprolol tartrate 25 milliGRAM(s) Oral once PRN tachycardia  ondansetron Injectable 4 milliGRAM(s) IV Push every 8 hours PRN Nausea and/or Vomiting      DVT PROPHYLAXIS: [] YES [X] NO   GI PROPHYLAXIS: [X] YES [] NO pantoprazole    Tablet 40 milliGRAM(s)  ANTIPLATELETS: [] YES [X] NO   ANTICOAGULATION: [] YES [X] NO   ANTIBIOTICS: [] YES [X] NO     LAB/STUDIES:                        8.8    14.11 )-----------( 227      ( 09 Dec 2023 06:34 )             27.4     12-09    139  |  96<L>  |  40<H>  ----------------------------<  82  5.3<H>   |  35<H>  |  1.0    Ca    8.5      09 Dec 2023 06:34  Mg     1.8     12-09    TPro  5.3<L>  /  Alb  2.7<L>  /  TBili  0.5  /  DBili  0.2  /  AST  22  /  ALT  15  /  AlkPhos  168<H>  12-08      LIVER FUNCTIONS - ( 08 Dec 2023 13:00 )  Alb: 2.7 g/dL / Pro: 5.3 g/dL / ALK PHOS: 168 U/L / ALT: 15 U/L / AST: 22 U/L / GGT: x           Urinalysis Basic - ( 09 Dec 2023 06:34 )    Color: x / Appearance: x / SG: x / pH: x  Gluc: 82 mg/dL / Ketone: x  / Bili: x / Urobili: x   Blood: x / Protein: x / Nitrite: x   Leuk Esterase: x / RBC: x / WBC x   Sq Epi: x / Non Sq Epi: x / Bacteria: x      IMAGING:  < from: CT Upper Extremity No Cont, Left (12.08.23 @ 13:20) >  IMPRESSION:    Large deep subcutaneous/intramuscular hematoma throughout the entire   length of the arm at the anterior aspect measuring 8 x 7 x 25 cm,   corresponding to the ultrasound findings.    --- End of Report ---

## 2023-12-09 NOTE — PROGRESS NOTE ADULT - SUBJECTIVE AND OBJECTIVE BOX
GIOVANNY BUCK  88y, Female  Allergy: penicillin (Other)  contrast media (iodine-based) (Other)  codeine (Other)  sulfa drugs (Hives; Other)  aspirin (Other)    Hospital Day: 15d    Patient seen and examined. No acute events overnight    PMH/PSH:  PAST MEDICAL & SURGICAL HISTORY:  HTN (hypertension)      Afib  s/p ablation 2001      Goiter  no meds      Cellulitis  chronic      OA (osteoarthritis)      PVD (peripheral vascular disease)      COPD (chronic obstructive pulmonary disease)      Anxiety      Obesity      Acute on chronic systolic congestive heart failure      Edema of both lower legs      Required emergent intubation      H/O abdominal hysterectomy      H/O discectomy      H/O total knee replacement, bilateral          VITALS:  T(F): 97.7 (12-09-23 @ 05:00), Max: 98.6 (12-08-23 @ 13:32)  HR: 95 (12-09-23 @ 05:00)  BP: 115/62 (12-09-23 @ 05:00) (115/62 - 129/60)  RR: 17 (12-09-23 @ 05:00)  SpO2: 97% (12-09-23 @ 09:44)    TESTS & MEASUREMENTS:  Weight (Kg):       12-07-23 @ 07:01  -  12-08-23 @ 07:00  --------------------------------------------------------  IN: 0 mL / OUT: 825 mL / NET: -825 mL    12-08-23 @ 07:01  -  12-09-23 @ 07:00  --------------------------------------------------------  IN: 1620 mL / OUT: 1350 mL / NET: 270 mL                            8.8    14.11 )-----------( 227      ( 09 Dec 2023 06:34 )             27.4       12-09    139  |  96<L>  |  40<H>  ----------------------------<  82  5.3<H>   |  35<H>  |  1.0    Ca    8.5      09 Dec 2023 06:34  Mg     1.8     12-09    TPro  5.3<L>  /  Alb  2.7<L>  /  TBili  0.5  /  DBili  0.2  /  AST  22  /  ALT  15  /  AlkPhos  168<H>  12-08    LIVER FUNCTIONS - ( 08 Dec 2023 13:00 )  Alb: 2.7 g/dL / Pro: 5.3 g/dL / ALK PHOS: 168 U/L / ALT: 15 U/L / AST: 22 U/L / GGT: x                 Urinalysis Basic - ( 09 Dec 2023 06:34 )    Color: x / Appearance: x / SG: x / pH: x  Gluc: 82 mg/dL / Ketone: x  / Bili: x / Urobili: x   Blood: x / Protein: x / Nitrite: x   Leuk Esterase: x / RBC: x / WBC x   Sq Epi: x / Non Sq Epi: x / Bacteria: x        RADIOLOGY & ADDITIONAL TESTS:    RECENT DIAGNOSTIC ORDERS:  Magnesium: AM Sched. Collection: 11-Dec-2023 04:30 (12-08-23 @ 12:11)  Magnesium: AM Sched. Collection: 10-Dec-2023 04:30 (12-08-23 @ 12:11)  Basic Metabolic Panel: AM Sched. Collection: 11-Dec-2023 04:30 (12-08-23 @ 12:11)  Basic Metabolic Panel: AM Sched. Collection: 10-Dec-2023 04:30 (12-08-23 @ 12:11)  Complete Blood Count: AM Sched. Collection: 11-Dec-2023 04:30 (12-08-23 @ 12:11)  Complete Blood Count: AM Sched. Collection: 10-Dec-2023 04:30 (12-08-23 @ 12:11)  ABO Rh Confirmatory Specimen: AM  Addl Info: Conditional: ABO Rh Confirmatory Specimen (12-08-23 @ 12:11)  ABO Rh Confirmatory Specimen: AM  Addl Info: Conditional: ABO Rh Confirmatory Specimen (12-08-23 @ 12:11)  Type + Screen: AM  Sched. Collection:14-Dec-2023 04:30 (12-08-23 @ 12:11)  Type + Screen: AM  Sched. Collection:12-Dec-2023 04:30 (12-08-23 @ 12:11)  ABO Rh Confirmatory Specimen: AM  Addl Info: Conditional: ABO Rh Confirmatory Specimen (12-08-23 @ 12:11)  Type + Screen: AM  Sched. Collection:10-Dec-2023 04:30 (12-08-23 @ 12:11)      MEDICATIONS:  MEDICATIONS  (STANDING):  albuterol/ipratropium for Nebulization 3 milliLiter(s) Nebulizer every 6 hours  chlorhexidine 2% Cloths 1 Application(s) Topical <User Schedule>  furosemide    Tablet 40 milliGRAM(s) Oral daily  gabapentin 100 milliGRAM(s) Oral three times a day  metoprolol tartrate 25 milliGRAM(s) Oral two times a day  nystatin Powder 1 Application(s) Topical two times a day  pantoprazole    Tablet 40 milliGRAM(s) Oral before breakfast  polyethylene glycol 3350 17 Gram(s) Oral daily  predniSONE   Tablet 40 milliGRAM(s) Oral daily  senna 2 Tablet(s) Oral at bedtime    MEDICATIONS  (PRN):  acetaminophen     Tablet .. 650 milliGRAM(s) Oral every 6 hours PRN Temp greater or equal to 38C (100.4F), Mild Pain (1 - 3)  aluminum hydroxide/magnesium hydroxide/simethicone Suspension 30 milliLiter(s) Oral every 4 hours PRN Dyspepsia  HYDROmorphone   Tablet 2 milliGRAM(s) Oral two times a day PRN Mild Pain (1 - 3)  melatonin 3 milliGRAM(s) Oral at bedtime PRN Insomnia  metoprolol tartrate 25 milliGRAM(s) Oral once PRN tachycardia  ondansetron Injectable 4 milliGRAM(s) IV Push every 8 hours PRN Nausea and/or Vomiting      HOME MEDICATIONS:  budesonide-formoterol 80 mcg-4.5 mcg/inh inhalation aerosol (11-24)  Eliquis 5 mg oral tablet (11-24)  furosemide 20 mg oral tablet (11-24)  gabapentin 100 mg oral tablet (11-24)  metoprolol tartrate 25 mg oral tablet (11-24)  polyethylene glycol 3350 oral powder for reconstitution (11-24)  Senna 8.6 mg oral tablet (11-24)  tiotropium 18 mcg inhalation capsule (11-24)      REVIEW OF SYSTEMS:  All other review of systems is negative unless indicated above.     PHYSICAL EXAM:  PHYSICAL EXAM:  GENERAL: NAD  HEAD:  Atraumatic, Normocephalic  NECK: Supple, No JVD  CHEST/LUNG: Clear to auscultation bilaterally; No wheeze  HEART: Regular rate and rhythm; No murmurs, rubs, or gallops  ABDOMEN: Soft, Nontender, Nondistended; Bowel sounds present  EXTREMITIES:  2+ Peripheral Pulses, No clubbing, cyanosis, or edema; LUE swelling/ bruising noted around elbow fold  SKIN: No rashes or lesions

## 2023-12-09 NOTE — PROGRESS NOTE ADULT - ATTENDING COMMENTS
I personally reviewed the non-contrast CT- large hematoma is visualized.  Would recommend symptomatic treatment with elevation, ace wrap and correction of Hg and coagulation.

## 2023-12-09 NOTE — PROGRESS NOTE ADULT - ASSESSMENT
89 yo F PMHx HTN, chronic Afib, OA, PVD, COPD requiring two intubations, anxiety, obesity, chronic systolic congestive heart failure, abdominal hysterectomy, discectomy, total knee replacement, bilateral pleural effusions s/p thoracentesis with a left side drain since March as per daughter, presents from NH for evaluation of altered mental status. She was recently discharged on Avycaz for UTI.     #Acute blood loss anemia, likely LUE hematoma  s/p 2U PRBC 12/08 for hg 6.5, hg 8.8 today  CT LUE 12/08: Large hematoma throughout entire length of the arm, measuring 8*7*25 cm  - Vascular f/u regarding evacuation  - If no intervention from vascular team, would repeat CT LUE NC prior to DC ensure hematoma is stable  - Monitor CBC BID for now    #Acute on Chronic Hypercapnic respiratory failure s/p BiPAP  #COPD exacerbation   #R Pleural effusion sp thoracentesis (transudate)  #Prior history of pleural effusion/s/p pigtail catheter, 1 cytology suspicious for malignant cells, multiple thoras afterwards with negative cytology  #Sepsis  not POA  - RVP negative, urine, blood and pleural fluid cx negative. Nasal MRSA negative   - s/p thoracentesis 11/27 with 1L removed, transudative  - off abx per ID,prednisone 40 q24h for 5 days  - taper down supplemental oxygen, currently on 3L   - PT F/U    #Multi-drug Resistant Klebsiella Bacteriemia with Acute Cystitis - prior admission october 2023  #Acute Urinary retention s/p al placement 11/30 AM  - s/p Avycaz q8h end date 11/8   - per documentation, patient has a chronic al     #Leuckotyosis  - likely reactive from steroids    #Acute on Chronic systolic congestive heart failure   - improving   - Echo with EF 64%  - BNP 11,224  - s/p Iv lasix.   - On PO lasix  - follows with Dr Pacheco    #Elevated ALP    - RuQ Sono: Grossly unremarkable with Gallbladder wall 2.9 cm. Monitor     #Chronic AFIB    - on Eliquis 5mg Q12H. C/w BB     #Neck Mass   - chronic, had prior work up/biopsy/no further interventions at this time. Family aware    #Sacral Ulcer POA   - s/p wound care eval, wound care per recs     DVT PPX, held for now    #Progress Note Handoff  Pending (specify): Vascular f/u, monitor hg  Family discussion: Team dw son regarding plan of care  Disposition: Home

## 2023-12-10 NOTE — PROGRESS NOTE ADULT - ASSESSMENT
89 yo F PMHx HTN, chronic Afib, OA, PVD, COPD requiring two intubations, anxiety, obesity, chronic systolic congestive heart failure, abdominal hysterectomy, discectomy, total knee replacement, bilateral pleural effusions s/p thoracentesis with a left side drain since March as per daughter, presents from NH for evaluation of altered mental status. She was recently discharged on Avycaz for UTI.     #Acute blood loss anemia 2/2 LUE hematoma  s/p 2U PRBC 12/08 for hg 6.5   CT LUE 12/08: Large hematoma throughout entire length of the arm, measuring 8*7*25 cm  CT abd negative   Plan:   - Vascular following - recommend CTA if hgb dropping, holding off now as hgb is stable   - ACE wrap if can tolerate   - Monitor CBC daily   - Hold Eliquis     #Acute on Chronic Hypercapnic respiratory failure s/p BiPAP  #COPD exacerbation   #R Pleural effusion sp thoracentesis (transudate)  #Prior history of pleural effusion/s/p pigtail catheter, 1 cytology suspicious for malignant cells, multiple thoras afterwards with negative cytology  #Sepsis  not POA  - RVP negative, urine, blood and pleural fluid cx negative. Nasal MRSA negative   - s/p thoracentesis 11/27 with 1L removed, transudative  - off abx per ID  - s/p course of prednisone    - taper down supplemental oxygen   - PT F/U    #Multi-drug Resistant Klebsiella Bacteriemia with Acute Cystitis - prior admission october 2023  #Acute Urinary retention s/p al placement 11/30 AM  - s/p Avycaz q8h end date 11/8   - per documentation, patient has a chronic Al     #Leukocytosis  - likely due to prednisone     #Acute on Chronic systolic congestive heart failure   - hold Lasix due to contraction alkalosis   - Diamox 250mg daily   - follows with Dr. Pacheco    #Elevated ALP    - RuQ Sono: Grossly unremarkable with Gallbladder wall 2.9 cm. Monitor     #Chronic AFIB    - C/w BB   - Holding Eliquis     #Neck Mass   - chronic, had prior work up/biopsy/no further interventions at this time. Family aware    #Sacral Ulcer POA   - s/p wound care eval, wound care per recs     DVT Ppx: SCDs     Prognosis guarded     #Progress Note Handoff  Pending (specify):  monitor hgb, resume AC once cleared by vascular       87 yo F PMHx HTN, chronic Afib, OA, PVD, COPD requiring two intubations, anxiety, obesity, chronic systolic congestive heart failure, abdominal hysterectomy, discectomy, total knee replacement, bilateral pleural effusions s/p thoracentesis with a left side drain since March as per daughter, presents from NH for evaluation of altered mental status. She was recently discharged on Avycaz for UTI.     #Acute blood loss anemia 2/2 LUE hematoma  s/p 2U PRBC 12/08 for hg 6.5   CT LUE 12/08: Large hematoma throughout entire length of the arm, measuring 8*7*25 cm  CT abd negative   Plan:   - Vascular following - recommend CTA if hgb dropping, holding off now as hgb is stable   - ACE wrap if can tolerate   - Monitor CBC daily   - Hold Eliquis     #Acute on Chronic Hypercapnic respiratory failure s/p BiPAP  #COPD exacerbation   #R Pleural effusion sp thoracentesis (transudate)  #Prior history of pleural effusion/s/p pigtail catheter, 1 cytology suspicious for malignant cells, multiple thoras afterwards with negative cytology  #Sepsis  not POA  - RVP negative, urine, blood and pleural fluid cx negative. Nasal MRSA negative   - s/p thoracentesis 11/27 with 1L removed, transudative  - off abx per ID  - s/p course of prednisone    - taper down supplemental oxygen   - PT F/U    #Multi-drug Resistant Klebsiella Bacteriemia with Acute Cystitis - prior admission october 2023  #Acute Urinary retention s/p al placement 11/30 AM  - s/p Avycaz q8h end date 11/8   - per documentation, patient has a chronic Al     #Leukocytosis  - likely due to prednisone     #Acute on Chronic systolic congestive heart failure   - hold Lasix due to contraction alkalosis   - Diamox 250mg daily   - follows with Dr. Pacheco    #Elevated ALP    - RuQ Sono: Grossly unremarkable with Gallbladder wall 2.9 cm. Monitor     #Chronic AFIB    - C/w BB   - Holding Eliquis     #Neck Mass   - chronic, had prior work up/biopsy/no further interventions at this time. Family aware    #Sacral Ulcer POA   - s/p wound care eval, wound care per recs     DVT Ppx: SCDs     Prognosis guarded     #Progress Note Handoff  Pending (specify):  monitor hgb, resume AC once cleared by vascular

## 2023-12-11 NOTE — PROGRESS NOTE ADULT - ATTENDING COMMENTS
87 yo F PMHx HTN, chronic Afib, OA, PVD, COPD requiring two intubations, anxiety, obesity, chronic systolic congestive heart failure, abdominal hysterectomy, discectomy, total knee replacement, bilateral pleural effusions s/p thoracentesis with a left side drain since March as per daughter, presents from NH for evaluation of altered mental status. She was recently discharged on Avycaz for UTI.     #Acute blood loss anemia 2/2 LUE hematoma  s/p 2U PRBC 12/08 for hg 6.5   CT LUE 12/08: Large hematoma throughout entire length of the arm, measuring 8*7*25 cm  CT abd negative   Plan:   - Vascular following - recommend CTA if hgb dropping, holding off now as hgb is stable   - ACE wrap if can tolerate   - Elevate arm at all times   - Monitor CBC daily   - Hold Eliquis      #Acute on Chronic Hypercapnic respiratory failure s/p BiPAP  #COPD exacerbation   #R Pleural effusion sp thoracentesis (transudate)  #Prior history of pleural effusion/s/p pigtail catheter, 1 cytology suspicious for malignant cells, multiple thoras afterwards with negative cytology  #Sepsis  not POA  - RVP negative, urine, blood and pleural fluid cx negative. Nasal MRSA negative   - s/p thoracentesis 11/27 with 1L removed, transudative  - off abx per ID  - s/p course of prednisone    - taper down supplemental oxygen   - PT F/U    #Multi-drug Resistant Klebsiella Bacteriemia with Acute Cystitis - prior admission october 2023  #Acute Urinary retention s/p al placement 11/30 AM  - s/p Avycaz q8h end date 11/8   - per documentation, patient has a chronic Al     #Leukocytosis  - likely due to prednisone     #Acute on Chronic systolic congestive heart failure   - hold Lasix due to contraction alkalosis   - Diamox 250mg daily   - follows with Dr. Pacheco    #Elevated ALP    - RuQ Sono: Grossly unremarkable with Gallbladder wall 2.9 cm. Monitor     #Chronic AFIB    - C/w BB   - Holding Eliquis     #Neck Mass   - chronic, had prior work up/biopsy/no further interventions at this time. Family aware    #Sacral Ulcer POA   - s/p wound care eval, wound care per recs     DVT Ppx: SCDs, Heparin     Prognosis guarded     #Progress Note Handoff  Pending (specify):  monitor hgb, resume AC once cleared by vascular   Updated daughter Karishma Pearson over the phone   Dispo: [facility] 89 yo F PMHx HTN, chronic Afib, OA, PVD, COPD requiring two intubations, anxiety, obesity, chronic systolic congestive heart failure, abdominal hysterectomy, discectomy, total knee replacement, bilateral pleural effusions s/p thoracentesis with a left side drain since March as per daughter, presents from NH for evaluation of altered mental status. She was recently discharged on Avycaz for UTI.     #Acute blood loss anemia 2/2 LUE hematoma  s/p 2U PRBC 12/08 for hg 6.5   CT LUE 12/08: Large hematoma throughout entire length of the arm, measuring 8*7*25 cm  CT abd negative   Plan:   - Vascular following - recommend CTA if hgb dropping, holding off now as hgb is stable   - ACE wrap if can tolerate   - Elevate arm at all times   - Monitor CBC daily   - Hold Eliquis      #Acute on Chronic Hypercapnic respiratory failure s/p BiPAP  #COPD exacerbation   #R Pleural effusion sp thoracentesis (transudate)  #Prior history of pleural effusion/s/p pigtail catheter, 1 cytology suspicious for malignant cells, multiple thoras afterwards with negative cytology  #Sepsis  not POA  - RVP negative, urine, blood and pleural fluid cx negative. Nasal MRSA negative   - s/p thoracentesis 11/27 with 1L removed, transudative  - off abx per ID  - s/p course of prednisone    - taper down supplemental oxygen   - PT F/U    #Multi-drug Resistant Klebsiella Bacteriemia with Acute Cystitis - prior admission october 2023  #Acute Urinary retention s/p al placement 11/30 AM  - s/p Avycaz q8h end date 11/8   - per documentation, patient has a chronic Al     #Leukocytosis  - likely due to prednisone     #Acute on Chronic systolic congestive heart failure   - hold Lasix due to contraction alkalosis   - Diamox 250mg daily   - follows with Dr. Pacheco    #Elevated ALP    - RuQ Sono: Grossly unremarkable with Gallbladder wall 2.9 cm. Monitor     #Chronic AFIB    - C/w BB   - Holding Eliquis     #Neck Mass   - chronic, had prior work up/biopsy/no further interventions at this time. Family aware    #Sacral Ulcer POA   - s/p wound care eval, wound care per recs     DVT Ppx: SCDs, Heparin     Prognosis guarded     #Progress Note Handoff  Pending (specify):  monitor hgb, resume AC once cleared by vascular   Updated daughter Karishma Pearson over the phone   Dispo: [facility]

## 2023-12-11 NOTE — PROGRESS NOTE ADULT - ASSESSMENT
89 yo F PMHx HTN, chronic Afib, OA, PVD, COPD requiring two intubations, anxiety, obesity, chronic systolic congestive heart failure, abdominal hysterectomy, discectomy, total knee replacement, bilateral pleural effusions s/p thoracentesis with a left side drain since March as per daughter, presents from NH for evaluation of altered mental status. She was recently discharged on Avycaz for UTI.     #Anemia secondary to Left upper arm hematoma   - No signs of bleeding  - 12/6: Hb 7.2  - 12/7: Hb 7.1   - 12/8: Hb 6.5, will transfuse 1u prbc ( 2nd Transfusion this Hospitalization )   - 12/09: 9.3  - vascular Following; Continue monitoring with Hb trends , and transfuse as needed   - ACE wrap recommended to bring down the swelling but not possible due to pain   - AC on hold  - follow Vascular recs for restarting AC ( indication : Afib )     #Acute on Chronic Hypercapnic respiratory failure s/p BiPAP  #COPD exacerbation   #R Pleural effusion sp thoracentesis (transudate)  #Prior history of pleural effusion/s/p pigtail catheter, 1 cytology suspicious for malignant cells, multiple thoras afterwards with negative cytology  #Sepsis  not POA  - RVP negative, urine, blood and pleural fluid cx negative. Nasal MRSA negative   - s/p thoracentesis 11/27 with 1L removed, transudative  - s/p Prednisone   - Currently on Baseline O2 maintaining saturation , no shortness of breath , no fever       #Multi-drug Resistant Klebsiella Bacteriemia with Acute Cystitis - prior admission october 2023  #Acute Urinary retention s/p al placement 11/30 AM  - s/p Avycaz q8h end date 11/8   - per documentation, patient has a chronic al     #Leuckotyosis  - likely reactive from steroids    #Acute on Chronic systolic congestive heart failure   - improving   - Echo with EF 64%  - BNP 11,224  - s/p Iv lasix.   - Stopped Lasix due to contractiobn alkalosis, DIAMOX 250 mg OD started , follow up BMP   - No overload features on examination       #Elevated ALP    - RuQ Sono: Grossly unremarkable with Gallbladder wall 2.9 cm. Monitor     #Chronic AFIB    - On Metoprolol 25 mg BID   - Eliquis on Hold due to LUE hematoma   - rate controlled     #Neck Mass   - chronic, had prior work up/biopsy/no further interventions at this time. Family aware    #Sacral Ulcer POA   - s/p wound care eval, wound care per recs     #MISC  Diet: DASH, puree  DVT: none for now   Pending:   Follow up with Vascular for anticoagulation   hemoglobin trending , Transfuse less than 8

## 2023-12-11 NOTE — PROGRESS NOTE ADULT - SUBJECTIVE AND OBJECTIVE BOX
24H events:    Patient is a 88y old Female who presents with a chief complaint of COPD (10 Dec 2023 12:56)    Primary diagnosis of Altered mental status        Today is hospital day 17d. This morning patient was seen and examined at bedside, resting comfortably in bed.    No acute or major events overnight.      PAST MEDICAL & SURGICAL HISTORY  HTN (hypertension)    Afib  s/p ablation 2001    Goiter  no meds    Cellulitis  chronic    OA (osteoarthritis)    PVD (peripheral vascular disease)    COPD (chronic obstructive pulmonary disease)    Anxiety    Obesity    Acute on chronic systolic congestive heart failure    Edema of both lower legs    Required emergent intubation    H/O abdominal hysterectomy    H/O discectomy    H/O total knee replacement, bilateral      SOCIAL HISTORY:  Social History:      ALLERGIES:  penicillin (Other)  contrast media (iodine-based) (Other)  codeine (Other)  sulfa drugs (Hives; Other)  aspirin (Other)    MEDICATIONS:  STANDING MEDICATIONS  acetaZOLAMIDE    Tablet 250 milliGRAM(s) Oral daily  chlorhexidine 2% Cloths 1 Application(s) Topical <User Schedule>  gabapentin 100 milliGRAM(s) Oral three times a day  heparin   Injectable 5000 Unit(s) SubCutaneous every 12 hours  metoprolol tartrate 25 milliGRAM(s) Oral two times a day  nystatin Powder 1 Application(s) Topical two times a day  pantoprazole    Tablet 40 milliGRAM(s) Oral before breakfast  polyethylene glycol 3350 17 Gram(s) Oral daily  senna 2 Tablet(s) Oral at bedtime    PRN MEDICATIONS  acetaminophen     Tablet .. 650 milliGRAM(s) Oral every 6 hours PRN  aluminum hydroxide/magnesium hydroxide/simethicone Suspension 30 milliLiter(s) Oral every 4 hours PRN  HYDROmorphone   Tablet 2 milliGRAM(s) Oral two times a day PRN  melatonin 3 milliGRAM(s) Oral at bedtime PRN  metoprolol tartrate 25 milliGRAM(s) Oral once PRN  ondansetron Injectable 4 milliGRAM(s) IV Push every 8 hours PRN    VITALS:   T(F): 97.2  HR: 94  BP: 128/69  RR: 17  SpO2: --    PHYSICAL EXAM:  GENERAL:   ( x ) NAD, lying in bed comfortably     (  ) obtunded     (  ) lethargic     (  ) somnolent    HEAD:   ( x ) Atraumatic     (  ) hematoma     (  ) laceration (specify location:       )     NECK:  ( x ) Supple     (  ) neck stiffness     (  ) nuchal rigidity     (  )  no JVD     (  ) JVD present ( -- cm)    HEART:  Rate -->     (  ) normal rate     (  ) bradycardic     ( x ) tachycardic  Rhythm -->     (  ) regular     (  ) regularly irregular     ( x ) irregularly irregular  Murmurs -->     (x  ) normal s1s2     (  ) systolic murmur     (  ) diastolic murmur     (  ) continuous murmur      (  ) S3 present     (  ) S4 present    LUNGS: On 2.5 L NC of O2   ( x )Unlabored respirations     (  ) tachypnea  ( x ) B/L air entry     (  ) decreased breath sounds in:  (location     )    (  ) no adventitious sound     (  ) crackles     (  ) wheezing      (  ) rhonchi      (specify location:       )  (  ) chest wall tenderness (specify location:       )    ABDOMEN:   (x  ) Soft     (  ) tense   |   ( x ) nondistended     (  ) distended   |   ( x ) +BS     (  ) hypoactive bowel sounds     (  ) hyperactive bowel sounds  (  ) nontender     (  ) RUQ tenderness     (  ) RLQ tenderness     (  ) LLQ tenderness     (  ) epigastric tenderness     (  ) diffuse tenderness  (  ) Splenomegaly      (  ) Hepatomegaly      (  ) Jaundice     (  ) ecchymosis     EXTREMITIES:  Left arm swelling with ecchymosis , painful on palpation , restricted range of motion     NERVOUS SYSTEM:    ( x ) A&Ox3     (  ) confused     (  ) lethargic  CN II-XII:     (  ) Intact     (  ) deficits found     (Specify:     )   Upper extremities:     (  ) no sensorimotor deficits     (  ) weakness     (  ) loss of proprioception/vibration     (  ) loss of touch/temperature (specify:    )  Lower extremities:     (  ) no sensorimotor deficits     (  ) weakness     (  ) loss of proprioception/vibration     (  ) loss of touch/temperature (specify:    )        ( x ) Indwelling Ba Catheter: Chronic Foleys present         LABS:                        9.3    14.15 )-----------( 243      ( 10 Dec 2023 07:34 )             29.2     12-10    139  |  95<L>  |  37<H>  ----------------------------<  82  5.2<H>   |  37<H>  |  1.0    Ca    8.9      10 Dec 2023 07:34  Mg     1.7     12-10        Urinalysis Basic - ( 10 Dec 2023 07:34 )    Color: x / Appearance: x / SG: x / pH: x  Gluc: 82 mg/dL / Ketone: x  / Bili: x / Urobili: x   Blood: x / Protein: x / Nitrite: x   Leuk Esterase: x / RBC: x / WBC x   Sq Epi: x / Non Sq Epi: x / Bacteria: x                RADIOLOGY:    < from: CT Abdomen and Pelvis No Cont (12.08.23 @ 13:20) >  FINDINGS: Evaluation is limited due to patient's arm positioning causing   streak artifact through the upper abdomen.    LOWER CHEST: Stable bilateral pleural effusions with adjacent   atelectasis/consolidation.    HEPATOBILIARY: Unremarkable.    SPLEEN: Unremarkable.    PANCREAS: Unremarkable.    ADRENAL GLANDS: Unremarkable.    KIDNEYS: Redemonstrated bilateral simple and hemorrhagic cysts. No stones   or hydronephrosis.    ABDOMINOPELVIC NODES: Unremarkable.    PELVIC ORGANS: Ba catheteris in the urinary bladder. There is gas in   the bladder likely from Ba placement. Status post hysterectomy.    PERITONEUM/MESENTERY/BOWEL: Unremarkable.    BONES/SOFT TISSUES: Diffuse soft tissue anasarca. Osteopenia.   Degenerative changes throughout the spine.        IMPRESSION:    No acute abnormality in the abdomen and pelvis. No intra-abdominal or   intramuscular hemorrhage.    < end of copied text >  < from: CT Upper Extremity No Cont, Left (12.08.23 @ 13:20) >  FINDINGS:    Corresponding to the ultrasound findings, there is deep   subcutaneous/intramuscular hematoma throughout the entire length of the   anterior aspect of the arm measuring 8 x 7 x 25 cm (AP by transverse by   CC). The hematoma extends into the radial aspect of the proximal forearm.    There is diffuse subcutaneous edema throughout the left upper extremity.   There is also dependent edema at the left breast and left flank region.    Partially imaged chest and abdomen pelvis demonstrate left pleural   effusion and basilar atelectasis. There is a left-sided chest tube in   place, partially imaged.    There are severe degenerative changes of the left glenohumeral joint and   left hip joint. Chronic remodeling of the glenoid fossa and humeral head   is noted. No acute displaced fracture or dislocation.    IMPRESSION:    Large deep subcutaneous/intramuscular hematoma throughout the entire   length of the arm at the anterior aspect measuring 8 x 7 x 25 cm,   corresponding to the ultrasound findings.    < end of copied text >  < from: [facility] Duplex Upper Ext Vein Scan, Left (12.04.23 @ 18:07) >    IMPRESSION:  No evidence of left upper extremity deep venous thrombosis.  Brachial vein not visualized.  Large Fluid collection seen in the left upper arm.    < end of copied text >  < from: Xray Chest 1 View- PORTABLE-Routine (Xray Chest 1 View- PORTABLE-Routine in AM.) (12.04.23 @ 06:07) >  Findings/  impression:        Support devices: Stable left basilar chest tube    Cardiac/ Mediastinum: Stable cardiomegaly    Lungs/ Pleura: Stable opacities/effusions. No pneumothorax    Skeletal/ soft tissues: Stable    < end of copied text >  < from: CT Head No Cont (11.27.23 @ 16:42) >    IMPRESSION:    No acute intracranial pathology.    Mild chronic microvascular ischemic changes.    --- End of Report ---    < end of copied text >

## 2023-12-12 NOTE — PROGRESS NOTE ADULT - SUBJECTIVE AND OBJECTIVE BOX
VASCULAR SURGERY PROGRESS NOTE    CC:   Hospital Day #  Post-Op Day #    Procedure:    Events of past 24 hours:          ROS otherwise negative except per subjective and HPI      PAST MEDICAL & SURGICAL HISTORY:  HTN (hypertension)      Afib  s/p ablation 2001      Goiter  no meds      Cellulitis  chronic      OA (osteoarthritis)      PVD (peripheral vascular disease)      COPD (chronic obstructive pulmonary disease)      Anxiety      Obesity      Acute on chronic systolic congestive heart failure      Edema of both lower legs      Required emergent intubation      H/O abdominal hysterectomy      H/O discectomy      H/O total knee replacement, bilateral          Vital Signs Last 24 Hrs  T(C): 36.2 (12 Dec 2023 05:37), Max: 36.3 (11 Dec 2023 19:54)  T(F): 97.1 (12 Dec 2023 05:37), Max: 97.3 (11 Dec 2023 19:54)  HR: 94 (12 Dec 2023 09:00) (94 - 108)  BP: 104/76 (12 Dec 2023 09:00) (101/72 - 110/63)  BP(mean): 80 (11 Dec 2023 19:54) (80 - 80)  RR: 18 (12 Dec 2023 05:37) (17 - 18)  SpO2: 98% (12 Dec 2023 09:08) (95% - 100%)    Parameters below as of 12 Dec 2023 09:08  Patient On (Oxygen Delivery Method): nasal cannula  O2 Flow (L/min): 1      Pain (0-10):            Pain Control Adequate: [] YES [] N    Diet:    I&O's Detail    11 Dec 2023 07:01  -  12 Dec 2023 07:00  --------------------------------------------------------  IN:  Total IN: 0 mL    OUT:    Voided (mL): 700 mL  Total OUT: 700 mL    Total NET: -700 mL            PHYSICAL EXAM    Appearance: Normal	  HEENT:   Normal oral mucosa, PERRL, EOMI	  Neck: Supple, - JVD;   Cardiovascular: Normal S1 S2, No JVD, No murmurs,   Respiratory: Lungs clear to auscultation/No Rales, Rhonchi, Wheezing	  Gastrointestinal:  Soft, Non-tender, + BS	  Skin: No rashes, No ecchymoses, No cyanosis  Extremities: Normal range of motion, No clubbing, cyanosis  Left upper extremity edema  Neurologic: Non-focal  Psychiatry: A & O x 3, Mood & affect appropriate    PULSES:  left radial: palpable      MEDICATIONS:   MEDICATIONS  (STANDING):  acetaZOLAMIDE    Tablet 250 milliGRAM(s) Oral daily  chlorhexidine 2% Cloths 1 Application(s) Topical <User Schedule>  gabapentin 100 milliGRAM(s) Oral three times a day  heparin   Injectable 5000 Unit(s) SubCutaneous every 8 hours  metoprolol tartrate 25 milliGRAM(s) Oral two times a day  nystatin Powder 1 Application(s) Topical two times a day  pantoprazole    Tablet 40 milliGRAM(s) Oral before breakfast  polyethylene glycol 3350 17 Gram(s) Oral daily  senna 2 Tablet(s) Oral at bedtime    MEDICATIONS  (PRN):  acetaminophen     Tablet .. 650 milliGRAM(s) Oral every 6 hours PRN Temp greater or equal to 38C (100.4F), Mild Pain (1 - 3)  aluminum hydroxide/magnesium hydroxide/simethicone Suspension 30 milliLiter(s) Oral every 4 hours PRN Dyspepsia  HYDROmorphone   Tablet 2 milliGRAM(s) Oral two times a day PRN Mild Pain (1 - 3)  melatonin 3 milliGRAM(s) Oral at bedtime PRN Insomnia  metoprolol tartrate 25 milliGRAM(s) Oral once PRN tachycardia  ondansetron Injectable 4 milliGRAM(s) IV Push every 8 hours PRN Nausea and/or Vomiting      DVT PROPHYLAXIS: [x] YES [] NO  GI PROPHYLAXIS: [x] YES [] NO  ANTIPLATELETS: [] YES [x] NO  ANTICOAGULATION: [] YES [x] NO  ANTIBIOTICS: [] YES [x] NO    LAB/STUDIES:                        10.3   15.89 )-----------( 259      ( 12 Dec 2023 07:27 )             33.1     12-12    141  |  100  |  36<H>  ----------------------------<  71  4.3   |  31  |  1.1    Ca    8.4      12 Dec 2023 07:27  Mg     1.8     12-11    TPro  5.0<L>  /  Alb  2.8<L>  /  TBili  0.8  /  DBili  x   /  AST  50<H>  /  ALT  49<H>  /  AlkPhos  427<H>  12-12      LIVER FUNCTIONS - ( 12 Dec 2023 07:27 )  Alb: 2.8 g/dL / Pro: 5.0 g/dL / ALK PHOS: 427 U/L / ALT: 49 U/L / AST: 50 U/L / GGT: x             Urinalysis Basic - ( 12 Dec 2023 07:27 )    Color: x / Appearance: x / SG: x / pH: x  Gluc: 71 mg/dL / Ketone: x  / Bili: x / Urobili: x   Blood: x / Protein: x / Nitrite: x   Leuk Esterase: x / RBC: x / WBC x   Sq Epi: x / Non Sq Epi: x / Bacteria: x        IMAGING:    CT Abdomen and Pelvis No Cont (12.08.23 @ 13:20) >  No acute abnormality in the abdomen and pelvis. No intra-abdominal or   intramuscular hemorrhage.

## 2023-12-12 NOTE — PROGRESS NOTE ADULT - ATTENDING COMMENTS
87 yo F PMHx HTN, chronic Afib, OA, PVD, COPD requiring two intubations, anxiety, obesity, chronic systolic congestive heart failure, abdominal hysterectomy, discectomy, total knee replacement, bilateral pleural effusions s/p thoracentesis with a left side drain since March as per daughter, presents from NH for evaluation of altered mental status. She was recently discharged on Avycaz for UTI.     #Acute blood loss anemia 2/2 LUE hematoma  s/p 2U PRBC 12/08 for hg 6.5   CT LUE 12/08: Large hematoma throughout entire length of the arm, measuring 8*7*25 cm  CT abd negative   Plan:   - Vascular following - recommend holding AC for 10 days total   - ACE wrap if can tolerate   - Elevate arm at all times   - Monitor CBC daily   - Hold Eliquis    - c/w heparin for dvt ppx   - BURN eval for hematoma evacuation     #Acute on Chronic Hypercapnic respiratory failure s/p BiPAP  #COPD exacerbation   #R Pleural effusion sp thoracentesis (transudate)  #Prior history of pleural effusion/s/p pigtail catheter, 1 cytology suspicious for malignant cells, multiple thoras afterwards with negative cytology  #Sepsis  not POA  - RVP negative, urine, blood and pleural fluid cx negative. Nasal MRSA negative   - s/p thoracentesis 11/27 with 1L removed, transudative  - off abx per ID  - s/p course of prednisone    - taper down supplemental oxygen   - PT F/U    #Multi-drug Resistant Klebsiella Bacteriemia with Acute Cystitis - prior admission october 2023  #Acute Urinary retention s/p al placement 11/30 AM  - s/p Avycaz q8h end date 11/8   - per documentation, patient has a chronic Al     #Leukocytosis  - likely due to prednisone     #Acute on Chronic systolic congestive heart failure   - c/w Lasix    - Diamox 250mg daily   - follows with Dr. Pacheco    #Elevated ALP    - RuQ Sono: Grossly unremarkable with Gallbladder wall 2.9 cm. Monitor     #Chronic AFIB    - C/w BB   - Holding Eliquis per vascular     #Neck Mass   - chronic, had prior work up/biopsy/no further interventions at this time. Family aware    #Sacral Ulcer POA   - s/p wound care eval, wound care per recs     DVT Ppx:  Heparin     Prognosis guarded     #Progress Note Handoff  Pending (specify):  BURN eval, monitor hgb, resume AC    Updated daughter Karishma Pearson over the phone 12/11   Dispo: [facility]

## 2023-12-12 NOTE — PROGRESS NOTE ADULT - SUBJECTIVE AND OBJECTIVE BOX
24H events:    Patient is a 88y old Female who presents with a chief complaint of COPD (11 Dec 2023 09:24)    Primary diagnosis of Altered mental status      Today is hospital day 18d. This morning patient was seen and examined at bedside, resting comfortably in bed.    No acute or major events overnight.      PAST MEDICAL & SURGICAL HISTORY  HTN (hypertension)    Afib  s/p ablation 2001    Goiter  no meds    Cellulitis  chronic    OA (osteoarthritis)    PVD (peripheral vascular disease)    COPD (chronic obstructive pulmonary disease)    Anxiety    Obesity    Acute on chronic systolic congestive heart failure    Edema of both lower legs    Required emergent intubation    H/O abdominal hysterectomy    H/O discectomy    H/O total knee replacement, bilateral      SOCIAL HISTORY:  Social History:      ALLERGIES:  penicillin (Other)  contrast media (iodine-based) (Other)  codeine (Other)  sulfa drugs (Hives; Other)  aspirin (Other)    MEDICATIONS:  STANDING MEDICATIONS  acetaZOLAMIDE    Tablet 250 milliGRAM(s) Oral daily  chlorhexidine 2% Cloths 1 Application(s) Topical <User Schedule>  gabapentin 100 milliGRAM(s) Oral three times a day  heparin   Injectable 5000 Unit(s) SubCutaneous every 8 hours  metoprolol tartrate 25 milliGRAM(s) Oral two times a day  nystatin Powder 1 Application(s) Topical two times a day  pantoprazole    Tablet 40 milliGRAM(s) Oral before breakfast  polyethylene glycol 3350 17 Gram(s) Oral daily  senna 2 Tablet(s) Oral at bedtime    PRN MEDICATIONS  acetaminophen     Tablet .. 650 milliGRAM(s) Oral every 6 hours PRN  aluminum hydroxide/magnesium hydroxide/simethicone Suspension 30 milliLiter(s) Oral every 4 hours PRN  HYDROmorphone   Tablet 2 milliGRAM(s) Oral two times a day PRN  melatonin 3 milliGRAM(s) Oral at bedtime PRN  metoprolol tartrate 25 milliGRAM(s) Oral once PRN  ondansetron Injectable 4 milliGRAM(s) IV Push every 8 hours PRN    VITALS:   T(F): 97.1  HR: 94  BP: 104/76  RR: 18  SpO2: 98%    PHYSICAL EXAM:  GENERAL:   ( x ) NAD, lying in bed comfortably     (  ) obtunded     (  ) lethargic     (  ) somnolent    HEAD:   ( x ) Atraumatic     (  ) hematoma     (  ) laceration (specify location:       )     NECK:  ( x ) Supple     (  ) neck stiffness     (  ) nuchal rigidity     (  )  no JVD     (  ) JVD present ( -- cm)    HEART:  Rate -->     (  ) normal rate     (  ) bradycardic     ( x ) tachycardic  Rhythm -->     (  ) regular     (  ) regularly irregular     ( x ) irregularly irregular  Murmurs -->     (x  ) normal s1s2     (  ) systolic murmur     (  ) diastolic murmur     (  ) continuous murmur      (  ) S3 present     (  ) S4 present    LUNGS: On 2.5 L NC of O2   ( x )Unlabored respirations     (  ) tachypnea  ( x ) B/L air entry     (  ) decreased breath sounds in:  (location     )    (  ) no adventitious sound     (  ) crackles     (  ) wheezing      (  ) rhonchi      (specify location:       )  (  ) chest wall tenderness (specify location:       )    ABDOMEN:   (x  ) Soft     (  ) tense   |   ( x ) nondistended     (  ) distended   |   ( x ) +BS     (  ) hypoactive bowel sounds     (  ) hyperactive bowel sounds  (  ) nontender     (  ) RUQ tenderness     (  ) RLQ tenderness     (  ) LLQ tenderness     (  ) epigastric tenderness     (  ) diffuse tenderness  (  ) Splenomegaly      (  ) Hepatomegaly      (  ) Jaundice     (  ) ecchymosis     EXTREMITIES:  Left arm swelling with ecchymosis , painful on palpation , restricted range of motion     NERVOUS SYSTEM:    ( x ) A&Ox3     (  ) confused     (  ) lethargic  CN II-XII:     (  ) Intact     (  ) deficits found     (Specify:     )   Upper extremities:     (  ) no sensorimotor deficits     (  ) weakness     (  ) loss of proprioception/vibration     (  ) loss of touch/temperature (specify:    )  Lower extremities:     (  ) no sensorimotor deficits     (  ) weakness     (  ) loss of proprioception/vibration     (  ) loss of touch/temperature (specify:    )        ( x ) Indwelling Ba Catheter: Chronic Foleys present     LABS:                        10.3   15.89 )-----------( 259      ( 12 Dec 2023 07:27 )             33.1     12-12    141  |  100  |  36<H>  ----------------------------<  71  4.3   |  31  |  1.1    Ca    8.4      12 Dec 2023 07:27  Mg     1.8     12-11    TPro  5.0<L>  /  Alb  2.8<L>  /  TBili  0.8  /  DBili  x   /  AST  50<H>  /  ALT  49<H>  /  AlkPhos  427<H>  12-12      Urinalysis Basic - ( 12 Dec 2023 07:27 )    Color: x / Appearance: x / SG: x / pH: x  Gluc: 71 mg/dL / Ketone: x  / Bili: x / Urobili: x   Blood: x / Protein: x / Nitrite: x   Leuk Esterase: x / RBC: x / WBC x   Sq Epi: x / Non Sq Epi: x / Bacteria: x

## 2023-12-12 NOTE — CONSULT NOTE ADULT - SUBJECTIVE AND OBJECTIVE BOX
88y  Female  HPI:  88 year old female with PMH of HTN, Afib, OA, PVD, COPD, Anxiety, Obesity, Acute on chronic systolic congestive heart failure, H/O abdominal hysterectomy, H/O discectomy, H/O total knee replacement, bilateral pleural effusions s/p thoracentesis with a left side drain since March as per daughter, presents sent in by NH for altered mental status for the since yesterday morning, which has been worsening. She was recently discharged on Avycaz for UTI. As per daughter the patient had 2 intubations in the past for copd exacerbation. The left side drain has no output. The daughter mentioned that patient has been mentioning burning of urination and pain in the back( which is attributed to her sacral ulcer).  Denies any recent fevers, chills, N/V/D, headaches, chest pain, SOB.    Burn consulted for LUE hematoma evaluation and recommendations.     Vital Signs Last 24 Hrs  T(C): 36.3 (24 Nov 2023 16:12), Max: 37.1 (24 Nov 2023 14:35)  T(F): 97.4 (24 Nov 2023 16:12), Max: 98.8 (24 Nov 2023 14:35)  HR: 104 (24 Nov 2023 16:12) (104 - 110)  BP: 102/67 (24 Nov 2023 16:12) (102/67 - 113/66)  RR: 20 (24 Nov 2023 16:12) (20 - 24)  SpO2: 96% (24 Nov 2023 16:37) (96% - 99%)  O2 Parameters below as of 24 Nov 2023 16:12  Patient On (Oxygen Delivery Method): nasal cannula  O2 Flow (L/min): 3 and BiPAP       (24 Nov 2023 16:57)    Allergies    penicillin (Other)  contrast media (iodine-based) (Other)  codeine (Other)  sulfa drugs (Hives; Other)  aspirin (Other)    Intolerances      PAST MEDICAL & SURGICAL HISTORY:  HTN (hypertension)      Afib  s/p ablation 2001      Goiter  no meds      Cellulitis  chronic      OA (osteoarthritis)      PVD (peripheral vascular disease)      COPD (chronic obstructive pulmonary disease)      Anxiety      Obesity      Acute on chronic systolic congestive heart failure      Edema of both lower legs      Required emergent intubation      H/O abdominal hysterectomy      H/O discectomy      H/O total knee replacement, bilateral                            10.3   15.89 )-----------( 259      ( 12 Dec 2023 07:27 )             33.1       PHYSICAL EXAM: I have reviewed current vital signs.  GENERAL: NAD  CHEST/LUNG: no acute cardiopulmonary distress   PSYCH: Cooperative; appropriate.  NEUROLOGY: AAO x 3.   SKIN: LUE edema with ecchymosis. Area of tense induration near axilla. + radial pulse and cap refill.      88y  Female  HPI:  88 year old female with PMH of HTN, Afib, OA, PVD, COPD, Anxiety, Obesity, Acute on chronic systolic congestive heart failure, H/O abdominal hysterectomy, H/O discectomy, H/O total knee replacement, bilateral pleural effusions s/p thoracentesis with a left side drain since March as per daughter, presents sent in by NH for altered mental status for the since yesterday morning, which has been worsening. She was recently discharged on Avycaz for UTI. As per daughter the patient had 2 intubations in the past for copd exacerbation. The left side drain has no output. The daughter mentioned that patient has been mentioning burning of urination and pain in the back( which is attributed to her sacral ulcer).  Denies any recent fevers, chills, N/V/D, headaches, chest pain, SOB.    Burn consulted for LUE hematoma evaluation and recommendations.     Vital Signs Last 24 Hrs  T(C): 36.3 (24 Nov 2023 16:12), Max: 37.1 (24 Nov 2023 14:35)  T(F): 97.4 (24 Nov 2023 16:12), Max: 98.8 (24 Nov 2023 14:35)  HR: 104 (24 Nov 2023 16:12) (104 - 110)  BP: 102/67 (24 Nov 2023 16:12) (102/67 - 113/66)  RR: 20 (24 Nov 2023 16:12) (20 - 24)  SpO2: 96% (24 Nov 2023 16:37) (96% - 99%)  O2 Parameters below as of 24 Nov 2023 16:12  Patient On (Oxygen Delivery Method): nasal cannula  O2 Flow (L/min): 3 and BiPAP       (24 Nov 2023 16:57)    Allergies    penicillin (Other)  contrast media (iodine-based) (Other)  codeine (Other)  sulfa drugs (Hives; Other)  aspirin (Other)    Intolerances      PAST MEDICAL & SURGICAL HISTORY:  HTN (hypertension)      Afib  s/p ablation 2001      Goiter  no meds      Cellulitis  chronic      OA (osteoarthritis)      PVD (peripheral vascular disease)      COPD (chronic obstructive pulmonary disease)      Anxiety      Obesity      Acute on chronic systolic congestive heart failure      Edema of both lower legs      Required emergent intubation      H/O abdominal hysterectomy      H/O discectomy      H/O total knee replacement, bilateral                            10.3   15.89 )-----------( 259      ( 12 Dec 2023 07:27 )             33.1     < from: CT Upper Extremity No Cont, Left (12.08.23 @ 13:20) >  IMPRESSION:    Large deep subcutaneous/intramuscular hematoma throughout the entire   length of the arm at the anterior aspect measuring 8 x 7 x 25 cm,   corresponding to the ultrasound findings.    --- End of Report ---        PHYSICAL EXAM: I have reviewed current vital signs.  GENERAL: NAD  CHEST/LUNG: no acute cardiopulmonary distress   PSYCH: Cooperative; appropriate.  NEUROLOGY: AAO x 3.   SKIN: LUE edema with ecchymosis arm to wrist / hand ;   large area of tense, tender,  soft swelling and ecchymosis medial upper arm to hand  ecchymosis and  induration medial elbow and forearm  near axilla. + radial pulse and cap refill.

## 2023-12-12 NOTE — PROGRESS NOTE ADULT - ASSESSMENT
89 yo F PMHx HTN, chronic Afib, OA, PVD, COPD requiring two intubations, anxiety, obesity, chronic systolic congestive heart failure, abdominal hysterectomy, discectomy, total knee replacement, bilateral pleural effusions s/p thoracentesis with a left side drain since March as per daughter, presents from NH for evaluation of altered mental status. She was recently discharged on Avycaz for UTI.     #Anemia secondary to Left upper arm hematoma   - No signs of bleeding  - 12/6: Hb 7.2  - 12/7: Hb 7.1   - 12/8: Hb 6.5, will transfuse 1u prbc ( 2nd Transfusion this Hospitalization )   - 12/09: 9.3  - 12/12: 10.3   - vascular Following; Continue monitoring with Hb trends , and transfuse as needed   - ACE wrap recommended to bring down the swelling but not possible due to pain   - AC on hold  - follow Vascular recs for restarting AC ( indication : Afib ) ; Vascular surgery aware     #Acute on Chronic Hypercapnic respiratory failure s/p BiPAP  #COPD exacerbation   #R Pleural effusion sp thoracentesis (transudate)  #Prior history of pleural effusion/s/p pigtail catheter, 1 cytology suspicious for malignant cells, multiple thoras afterwards with negative cytology  #Sepsis  not POA  - RVP negative, urine, blood and pleural fluid cx negative. 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   - Stopped Lasix due to contractiobn alkalosis, DIAMOX 250 mg OD started , follow up BMP   - No overload features on examination       #Elevated ALP    - RuQ Sono: Grossly unremarkable with Gallbladder wall 2.9 cm. Monitor     #Chronic AFIB    - On Metoprolol 25 mg BID   - Eliquis on Hold due to LUE hematoma ; waiting for vascular recs   - rate controlled     #Neck Mass   - chronic, had prior work up/biopsy/no further interventions at this time. Family aware    #Sacral Ulcer POA   - s/p wound care eval, wound care per recs     #MISC  Diet: DASH, puree  DVT: none for now   Pending:   Follow up with Vascular for anticoagulation   hemoglobin trending , Transfuse less than 8

## 2023-12-12 NOTE — PROGRESS NOTE ADULT - ASSESSMENT
88F w/ PMHx of PVD, chronic systolic CHF, Afib (on Eliquis) who is being evaluated by vascular surgery for spontaneous LUE hematoma due to Eliquis.    Pt received blood tx. Since she's had stable Hgb levels.  Currently on DVT prophylaxis    Plan:  - I reviewed labs  - I reviewed radiology imagings  - I personally visualized the imagings  - I discussed the findings and imagings with Dr. Montana:  - elevate the left arm, ace wrap  - avoid full AC for at least 10 days and hematoma has improved  - continue DVT prophylaxis  - follow up with Dr. Montana in 2 weeks    SPECTRA [id number]   88F w/ PMHx of PVD, chronic systolic CHF, Afib (on Eliquis) who is being evaluated by vascular surgery for spontaneous LUE hematoma due to Eliquis.    Pt received blood tx. Since she's had stable Hgb levels.  Currently on DVT prophylaxis    Plan:  - I reviewed labs  - I reviewed radiology imagings  - I personally visualized the imagings  - I discussed the findings and imagings with Dr. Montana:  - elevate the left arm, ace wrap  - avoid full AC for at least 10 days and hematoma has improved  - continue DVT prophylaxis  - follow up with Dr. Montana in 2 weeks    SPECTRA [id number]

## 2023-12-12 NOTE — CONSULT NOTE ADULT - ASSESSMENT
assessment:  88 year old female with extensive pmhx presented for AMS, now with LUE hemamatoma.       suggestion:  elevation and compression  LUE vessel imaging ( unable to get contrast)   likely candidate for hematoma aspiration after day 12  medical clearance for anesthesia for potential evacuation if patient does not improve  medical management per primary team  attending note to follow assessment:  88 year old female with extensive pmhx presented for AMS, now with LUE hemamatoma.       suggestion:  elevation and compression  ot consult for arm splinting  vascular recs appreciated  LUE vessel imaging ( unable to get contrast)   likely candidate for hematoma aspiration after day 12  medical clearance for anesthesia for potential evacuation if patient does not improve  medical management per primary team  attending note to follow assessment:  88 year old female with extensive pmhx presented for AMS, now with LUE hemamatoma.       suggestion:  elevation and compression  ot consult for arm splinting  vascular recs appreciated  LUE vessel imaging ( unable to get contrast)   likely candidate for hematoma aspiration after day 15  medical clearance for anesthesia for potential evacuation if patient does not improve ( family does not think patient will handle surgery)   medical management per primary team  attending note to follow assessment:  88 year old female with extensive pmhx presented for AMS, now with LUE hemamatoma.       suggestion:  elevation ; per vascular pt does not tolerate compression wrap  OT for splint to facilitate elevation if possible  vascular recs appreciated  LUE vessel imaging ( unable to get contrast)   likely candidate for hematoma aspiration after day 15  medical clearance for anesthesia for potential evacuation if patient does not improve ( family does not think patient will handle surgery)   medical management per primary team  attending note to follow

## 2023-12-12 NOTE — CONSULT NOTE ADULT - NS ATTEND AMEND GEN_ALL_CORE FT
I personally reviewed the venous duplex- No DVT is visualized. Large collection, possible hematoma is seen in LUE.  Would recommend arm elevation, correction of Hb and CT to better visualize the tissue planes.
History and chart reviewed and discussed with medical team   Pt s/w lethargic Responds appropriately to questions   significant ecchymosis and swelling of the left upper extremity extending from the upper medial arm to the wrist and hand     increase localized swelling of the medial arm ;  tender to touch  painful with movement   no evidence of blistering or skin necrosis;     Large hematoma LUE - no inciting factors - lines etc reported _   continue to discuss surgical intervention with patient   however due to her condition it was deferred   daughter was contacted by phone-  as she states that arm appears stable and slightly improved ( pt had similar episode in past )    discussed surgical intervention  versus continued observation  as recommended by vascular surgery   she opts for the latter given patient's clinical condition-  concerns addressed   given no immediate threat to the skin-  and apparent clinical improvement  continued observation is reasonable   surgical intervention may will be required if deterioration/ threat to the skin is noted   above discussed with medical team

## 2023-12-13 NOTE — PROGRESS NOTE ADULT - SUBJECTIVE AND OBJECTIVE BOX
24H events:    Patient is a 88y old Female who presents with a chief complaint of COPD (12 Dec 2023 15:45)    Primary diagnosis of Altered mental status    Today is hospital day 19d. This morning patient was seen and examined at bedside, resting comfortably in bed.    No acute or major events overnight.        PAST MEDICAL & SURGICAL HISTORY  HTN (hypertension)    Afib  s/p ablation 2001    Goiter  no meds    Cellulitis  chronic    OA (osteoarthritis)    PVD (peripheral vascular disease)    COPD (chronic obstructive pulmonary disease)    Anxiety    Obesity    Acute on chronic systolic congestive heart failure    Edema of both lower legs    Required emergent intubation    H/O abdominal hysterectomy    H/O discectomy    H/O total knee replacement, bilateral      SOCIAL HISTORY:  Social History:      ALLERGIES:  penicillin (Other)  contrast media (iodine-based) (Other)  codeine (Other)  sulfa drugs (Hives; Other)  aspirin (Other)    MEDICATIONS:  STANDING MEDICATIONS  acetaZOLAMIDE    Tablet 250 milliGRAM(s) Oral daily  chlorhexidine 2% Cloths 1 Application(s) Topical <User Schedule>  furosemide    Tablet 40 milliGRAM(s) Oral daily  gabapentin 100 milliGRAM(s) Oral three times a day  heparin   Injectable 5000 Unit(s) SubCutaneous every 8 hours  metoprolol tartrate 25 milliGRAM(s) Oral two times a day  nystatin Powder 1 Application(s) Topical two times a day  pantoprazole    Tablet 40 milliGRAM(s) Oral before breakfast  polyethylene glycol 3350 17 Gram(s) Oral daily  senna 2 Tablet(s) Oral at bedtime    PRN MEDICATIONS  acetaminophen     Tablet .. 650 milliGRAM(s) Oral every 6 hours PRN  aluminum hydroxide/magnesium hydroxide/simethicone Suspension 30 milliLiter(s) Oral every 4 hours PRN  HYDROmorphone   Tablet 2 milliGRAM(s) Oral two times a day PRN  melatonin 3 milliGRAM(s) Oral at bedtime PRN  metoprolol tartrate 25 milliGRAM(s) Oral once PRN  ondansetron Injectable 4 milliGRAM(s) IV Push every 8 hours PRN    VITALS:   T(F): 98.1  HR: 113  BP: 104/58  RR: 18  SpO2: 99%    PHYSICAL EXAM:  GENERAL:   ( x ) NAD, lying in bed comfortably     (  ) obtunded     (  ) lethargic     (  ) somnolent    HEAD:   ( x ) Atraumatic     (  ) hematoma     (  ) laceration (specify location:       )     NECK:  ( x ) Supple     (  ) neck stiffness     (  ) nuchal rigidity     (  )  no JVD     (  ) JVD present ( -- cm)    HEART:  Rate -->     (  ) normal rate     (  ) bradycardic     ( x ) tachycardic  Rhythm -->     (  ) regular     (  ) regularly irregular     ( x ) irregularly irregular  Murmurs -->     (x  ) normal s1s2     (  ) systolic murmur     (  ) diastolic murmur     (  ) continuous murmur      (  ) S3 present     (  ) S4 present    LUNGS: On 2.5 L NC of O2   ( x )Unlabored respirations     (  ) tachypnea  ( x ) B/L air entry     (  ) decreased breath sounds in:  (location     )    (  ) no adventitious sound     (  ) crackles     (  ) wheezing      (  ) rhonchi      (specify location:       )  (  ) chest wall tenderness (specify location:       )    ABDOMEN:   (x  ) Soft     (  ) tense   |   ( x ) nondistended     (  ) distended   |   ( x ) +BS     (  ) hypoactive bowel sounds     (  ) hyperactive bowel sounds  (  ) nontender     (  ) RUQ tenderness     (  ) RLQ tenderness     (  ) LLQ tenderness     (  ) epigastric tenderness     (  ) diffuse tenderness  (  ) Splenomegaly      (  ) Hepatomegaly      (  ) Jaundice     (  ) ecchymosis     EXTREMITIES:  Left arm swelling with ecchymosis , painful on palpation , restricted range of motion     NERVOUS SYSTEM:    ( x ) A&Ox3     (  ) confused     (  ) lethargic  CN II-XII:     (  ) Intact     (  ) deficits found     (Specify:     )   Upper extremities:     (  ) no sensorimotor deficits     (  ) weakness     (  ) loss of proprioception/vibration     (  ) loss of touch/temperature (specify:    )  Lower extremities:     (  ) no sensorimotor deficits     (  ) weakness     (  ) loss of proprioception/vibration     (  ) loss of touch/temperature (specify:    )        ( x ) Indwelling Ba Catheter: Chronic Foleys present       LABS:                        9.5    14.49 )-----------( 208      ( 13 Dec 2023 09:01 )             30.3     12-12    141  |  100  |  36<H>  ----------------------------<  71  4.3   |  31  |  1.1    Ca    8.4      12 Dec 2023 07:27  Mg     1.8     12-11    TPro  5.0<L>  /  Alb  2.8<L>  /  TBili  0.8  /  DBili  x   /  AST  50<H>  /  ALT  49<H>  /  AlkPhos  427<H>  12-12      Urinalysis Basic - ( 12 Dec 2023 07:27 )    Color: x / Appearance: x / SG: x / pH: x  Gluc: 71 mg/dL / Ketone: x  / Bili: x / Urobili: x   Blood: x / Protein: x / Nitrite: x   Leuk Esterase: x / RBC: x / WBC x   Sq Epi: x / Non Sq Epi: x / Bacteria: x        RADIOLOGY:       < from: CT Abdomen and Pelvis No Cont (12.08.23 @ 13:20) >  FINDINGS: Evaluation is limited due to patient's arm positioning causing   streak artifact through the upper abdomen.    LOWER CHEST: Stable bilateral pleural effusions with adjacent   atelectasis/consolidation.    HEPATOBILIARY: Unremarkable.    SPLEEN: Unremarkable.    PANCREAS: Unremarkable.    ADRENAL GLANDS: Unremarkable.    KIDNEYS: Redemonstrated bilateral simple and hemorrhagic cysts. No stones   or hydronephrosis.    ABDOMINOPELVIC NODES: Unremarkable.    PELVIC ORGANS: Ba catheteris in the urinary bladder. There is gas in   the bladder likely from Ba placement. Status post hysterectomy.    PERITONEUM/MESENTERY/BOWEL: Unremarkable.    BONES/SOFT TISSUES: Diffuse soft tissue anasarca. Osteopenia.   Degenerative changes throughout the spine.        IMPRESSION:    No acute abnormality in the abdomen and pelvis. No intra-abdominal or   intramuscular hemorrhage.    < end of copied text >  < from: CT Upper Extremity No Cont, Left (12.08.23 @ 13:20) >  FINDINGS:    Corresponding to the ultrasound findings, there is deep   subcutaneous/intramuscular hematoma throughout the entire length of the   anterior aspect of the arm measuring 8 x 7 x 25 cm (AP by transverse by   CC). The hematoma extends into the radial aspect of the proximal forearm.    There is diffuse subcutaneous edema throughout the left upper extremity.   There is also dependent edema at the left breast and left flank region.    Partially imaged chest and abdomen pelvis demonstrate left pleural   effusion and basilar atelectasis. There is a left-sided chest tube in   place, partially imaged.    There are severe degenerative changes of the left glenohumeral joint and   left hip joint. Chronic remodeling of the glenoid fossa and humeral head   is noted. No acute displaced fracture or dislocation.    IMPRESSION:    Large deep subcutaneous/intramuscular hematoma throughout the entire   length of the arm at the anterior aspect measuring 8 x 7 x 25 cm,   corresponding to the ultrasound findings.    < end of copied text >  < from: [facility] Duplex Upper Ext Vein Scan, Left (12.04.23 @ 18:07) >    IMPRESSION:  No evidence of left upper extremity deep venous thrombosis.  Brachial vein not visualized.  Large Fluid collection seen in the left upper arm.    < end of copied text >  < from: Xray Chest 1 View- PORTABLE-Routine (Xray Chest 1 View- PORTABLE-Routine in AM.) (12.04.23 @ 06:07) >  Findings/  impression:        Support devices: Stable left basilar chest tube    Cardiac/ Mediastinum: Stable cardiomegaly    Lungs/ Pleura: Stable opacities/effusions. No pneumothorax    Skeletal/ soft tissues: Stable    < end of copied text >  < from: CT Head No Cont (11.27.23 @ 16:42) >    IMPRESSION:    No acute intracranial pathology.    Mild chronic microvascular ischemic changes.    --- End of Report ---    < end of copied text >

## 2023-12-13 NOTE — PROGRESS NOTE ADULT - ATTENDING COMMENTS
89 yo F PMHx HTN, chronic Afib, OA, PVD, COPD requiring two intubations, anxiety, obesity, chronic systolic congestive heart failure, abdominal hysterectomy, discectomy, total knee replacement, bilateral pleural effusions s/p thoracentesis with a left side drain since March as per daughter, presents from NH for evaluation of altered mental status. She was recently discharged on Avycaz for UTI.     #Acute blood loss anemia 2/2 LUE hematoma  s/p 2U PRBC 12/08 for hg 6.5   CT LUE 12/08: Large hematoma throughout entire length of the arm, measuring 8*7*25 cm  CT abd negative   Plan:   - Vascular following - recommend holding AC for 10 days total   - ACE wrap if can tolerate   - Elevate arm at all times   - Monitor CBC daily   - Hold Eliquis    - c/w heparin for dvt ppx   - BURN eval for hematoma evacuation     #Acute on Chronic Hypercapnic respiratory failure s/p BiPAP  #COPD exacerbation   #R Pleural effusion sp thoracentesis (transudate)  #Prior history of pleural effusion/s/p pigtail catheter, 1 cytology suspicious for malignant cells, multiple thoras afterwards with negative cytology  #Sepsis  not POA  - RVP negative, urine, blood and pleural fluid cx negative. Nasal MRSA negative   - s/p thoracentesis 11/27 with 1L removed, transudative  - off abx per ID  - s/p course of prednisone    - PT f/u     #Multi-drug Resistant Klebsiella Bacteriemia with Acute Cystitis - prior admission october 2023  #Acute Urinary retention s/p Ba placement 11/30 AM  - s/p Avycaz q8h end date 11/8   - per documentation, patient has a chronic Ba     #Leukocytosis  - likely due to prednisone     #Acute on Chronic systolic congestive heart failure   - Hold Lasix    - follows with Dr. Pacheco    #Elevated ALP    - RuQ Sono: Grossly unremarkable with Gallbladder wall 2.9 cm. Monitor     #Chronic AFIB    - C/w BB   - Holding Eliquis per vascular     #Neck Mass   - chronic, had prior work up/biopsy/no further interventions at this time. Family aware    #Sacral Ulcer POA   - s/p wound care eval, wound care per recs     DVT Ppx:  Heparin     Prognosis guarded     #Progress Note Handoff  Pending (specify):   monitor hgb, resume AC    Updated daughter Karishma Pearson over the phone 12/11   Dispo: [facility]

## 2023-12-13 NOTE — PROGRESS NOTE ADULT - ASSESSMENT
87 yo F PMHx HTN, chronic Afib, OA, PVD, COPD requiring two intubations, anxiety, obesity, chronic systolic congestive heart failure, abdominal hysterectomy, discectomy, total knee replacement, bilateral pleural effusions s/p thoracentesis with a left side drain since March as per daughter, presents from NH for evaluation of altered mental status. She was recently discharged on Avycaz for UTI.     #Anemia secondary to Left upper arm hematoma   - No signs of bleeding  - 12/6: Hb 7.2  - 12/7: Hb 7.1   - 12/8: Hb 6.5, will transfuse 1u prbc ( 2nd Transfusion this Hospitalization )   - 12/09: 9.3  - 12/13: 9.5  - vascular Following; Continue monitoring with Hb trends , and transfuse as needed   - ACE wrap recommended to bring down the swelling but not possible due to pain   - follow Vascular recs for restarting AC ( indication : Afib )   - As per Vascular, restart AC from 15th.   - Patient's main complaints is pain on the Left arm.   - Burn was consulted for possible evacuation of Hematoma , But the patient will likely not be able to handle anesthesia and family does not want any surgical intervention.     #Acute on Chronic Hypercapnic respiratory failure s/p BiPAP  #COPD exacerbation   #R Pleural effusion sp thoracentesis (transudate)  #Prior history of pleural effusion/s/p pigtail catheter, 1 cytology suspicious for malignant cells, multiple thoras afterwards with negative cytology  #Sepsis  not POA  - RVP negative, urine, blood and pleural fluid cx negative. Nasal MRSA negative   - s/p thoracentesis 11/27 with 1L removed, transudative  - s/p Prednisone   - Currently on Baseline O2 maintaining saturation , no shortness of breath , no fever       #Multi-drug Resistant Klebsiella Bacteriemia with Acute Cystitis - prior admission october 2023  #Acute Urinary retention s/p al placement 11/30 AM  - s/p Avycaz q8h end date 11/8   - per documentation, patient has a chronic al    #Leuckotyosis  - likely reactive from the hematoma     #Acute on Chronic systolic congestive heart failure   - improving   - Echo with EF 64%  - BNP 11,224  - s/p Iv lasix.  - restarted lasix 40 mg OD along with Diamox   - No overload features on examination , no Pleural effusion noted on Chest Xray       #Elevated ALP    - RuQ Sono: Grossly unremarkable with Gallbladder wall 2.9 cm. Monitor     #Chronic AFIB    - On Metoprolol 25 mg BID   - Eliquis on Hold due to LUE hematoma   - rate controlled     #Neck Mass   - chronic, had prior work up/biopsy/no further interventions at this time. Family aware    #Sacral Ulcer POA   - s/p wound care eval, wound care per recs     #MISC  Diet: DASH, puree  DVT: none for now ( will restart from 12/15)   Pending :     hemoglobin trending , Transfuse less than 8    89 yo F PMHx HTN, chronic Afib, OA, PVD, COPD requiring two intubations, anxiety, obesity, chronic systolic congestive heart failure, abdominal hysterectomy, discectomy, total knee replacement, bilateral pleural effusions s/p thoracentesis with a left side drain since March as per daughter, presents from NH for evaluation of altered mental status. She was recently discharged on Avycaz for UTI.     #Anemia secondary to Left upper arm hematoma   - No signs of bleeding  - 12/6: Hb 7.2  - 12/7: Hb 7.1   - 12/8: Hb 6.5, will transfuse 1u prbc ( 2nd Transfusion this Hospitalization )   - 12/09: 9.3  - 12/13: 9.5  - vascular Following; Continue monitoring with Hb trends , and transfuse as needed   - ACE wrap recommended to bring down the swelling but not possible due to pain   - follow Vascular recs for restarting AC ( indication : Afib )   - As per Vascular, restart AC from 15th.   - Patient's main complaints is pain on the Left arm.   - Burn was consulted for possible evacuation of Hematoma , But the patient will likely not be able to handle anesthesia and family does not want any surgical intervention.     #Acute on Chronic Hypercapnic respiratory failure s/p BiPAP  #COPD exacerbation   #R Pleural effusion sp thoracentesis (transudate)  #Prior history of pleural effusion/s/p pigtail catheter, 1 cytology suspicious for malignant cells, multiple thoras afterwards with negative cytology  #Sepsis  not POA  - RVP negative, urine, blood and pleural fluid cx negative. Nasal MRSA negative   - s/p thoracentesis 11/27 with 1L removed, transudative  - s/p Prednisone   - Currently on Baseline O2 maintaining saturation , no shortness of breath , no fever       #Multi-drug Resistant Klebsiella Bacteriemia with Acute Cystitis - prior admission october 2023  #Acute Urinary retention s/p al placement 11/30 AM  - s/p Avycaz q8h end date 11/8   - per documentation, patient has a chronic al    #Leukocytosis  - likely reactive from the hematoma     #Acute on Chronic systolic congestive heart failure   - improving   - Echo with EF 64%  - BNP 11,224  - s/p Iv lasix.  - restarted lasix 40 mg OD along with Diamox   - No overload features on examination , no Pleural effusion noted on Chest Xray       #Elevated ALP    - RuQ Sono: Grossly unremarkable with Gallbladder wall 2.9 cm. Monitor     #Chronic AFIB    - On Metoprolol 25 mg BID   - Eliquis on Hold due to LUE hematoma   - rate controlled     #Neck Mass   - chronic, had prior work up/biopsy/no further interventions at this time. Family aware    #Sacral Ulcer POA   - s/p wound care eval, wound care per recs     #MISC  Diet: DASH, puree  DVT: none for now ( will restart from 12/15)   Pending :     hemoglobin trending , Transfuse less than 8

## 2023-12-14 NOTE — PROGRESS NOTE ADULT - SUBJECTIVE AND OBJECTIVE BOX
24H events:    Patient is a 88y old Female who presents with a chief complaint of COPD (13 Dec 2023 09:32)    Primary diagnosis of Altered mental status      Day 1:  Day 2:  Day 3:     Today is hospital day 20d. This morning patient was seen and examined at bedside, resting comfortably in bed.    No acute or major events overnight.    Code Status:    Family communication:  Contact date:  Name of person contacted:  Relationship to patient:  Communication details:  What matters most:    PAST MEDICAL & SURGICAL HISTORY  HTN (hypertension)    Afib  s/p ablation 2001    Goiter  no meds    Cellulitis  chronic    OA (osteoarthritis)    PVD (peripheral vascular disease)    COPD (chronic obstructive pulmonary disease)    Anxiety    Obesity    Acute on chronic systolic congestive heart failure    Edema of both lower legs    Required emergent intubation    H/O abdominal hysterectomy    H/O discectomy    H/O total knee replacement, bilateral      SOCIAL HISTORY:  Social History:      ALLERGIES:  penicillin (Other)  contrast media (iodine-based) (Other)  codeine (Other)  sulfa drugs (Hives; Other)  aspirin (Other)    MEDICATIONS:  STANDING MEDICATIONS  chlorhexidine 2% Cloths 1 Application(s) Topical <User Schedule>  heparin   Injectable 5000 Unit(s) SubCutaneous every 8 hours  metoprolol tartrate 25 milliGRAM(s) Oral two times a day  nystatin Powder 1 Application(s) Topical two times a day  pantoprazole    Tablet 40 milliGRAM(s) Oral before breakfast  polyethylene glycol 3350 17 Gram(s) Oral daily  senna 2 Tablet(s) Oral at bedtime    PRN MEDICATIONS  acetaminophen     Tablet .. 650 milliGRAM(s) Oral every 6 hours PRN  aluminum hydroxide/magnesium hydroxide/simethicone Suspension 30 milliLiter(s) Oral every 4 hours PRN  melatonin 3 milliGRAM(s) Oral at bedtime PRN  metoprolol tartrate 25 milliGRAM(s) Oral once PRN  ondansetron Injectable 4 milliGRAM(s) IV Push every 8 hours PRN    VITALS:   T(F): 97  HR: 118  BP: 112/57  RR: 18  SpO2: 96%    PHYSICAL EXAM:  GENERAL:   (  ) NAD, lying in bed comfortably     (  ) obtunded     (  ) lethargic     (  ) somnolent    HEAD:   (  ) Atraumatic     (  ) hematoma     (  ) laceration (specify location:       )     NECK:  (  ) Supple     (  ) neck stiffness     (  ) nuchal rigidity     (  )  no JVD     (  ) JVD present ( -- cm)    HEART:  Rate -->     (  ) normal rate     (  ) bradycardic     (  ) tachycardic  Rhythm -->     (  ) regular     (  ) regularly irregular     (  ) irregularly irregular  Murmurs -->     (  ) normal s1s2     (  ) systolic murmur     (  ) diastolic murmur     (  ) continuous murmur      (  ) S3 present     (  ) S4 present    LUNGS:   (  )Unlabored respirations     (  ) tachypnea  (  ) B/L air entry     (  ) decreased breath sounds in:  (location     )    (  ) no adventitious sound     (  ) crackles     (  ) wheezing      (  ) rhonchi      (specify location:       )  (  ) chest wall tenderness (specify location:       )    ABDOMEN:   (  ) Soft     (  ) tense   |   (  ) nondistended     (  ) distended   |   (  ) +BS     (  ) hypoactive bowel sounds     (  ) hyperactive bowel sounds  (  ) nontender     (  ) RUQ tenderness     (  ) RLQ tenderness     (  ) LLQ tenderness     (  ) epigastric tenderness     (  ) diffuse tenderness  (  ) Splenomegaly      (  ) Hepatomegaly      (  ) Jaundice     (  ) ecchymosis     EXTREMITIES:  (  ) Normal     (  ) Rash     (  ) ecchymosis     (  ) varicose veins      (  ) pitting edema     (  ) non-pitting edema   (  ) ulceration     (  ) gangrene:     (location:     )    NERVOUS SYSTEM:    (  ) A&Ox3     (  ) confused     (  ) lethargic  CN II-XII:     (  ) Intact     (  ) deficits found     (Specify:     )   Upper extremities:     (  ) no sensorimotor deficits     (  ) weakness     (  ) loss of proprioception/vibration     (  ) loss of touch/temperature (specify:    )  Lower extremities:     (  ) no sensorimotor deficits     (  ) weakness     (  ) loss of proprioception/vibration     (  ) loss of touch/temperature (specify:    )    SKIN:   (  ) No rashes or lesions     (  ) maculopapular rash     (  ) pustules     (  ) vesicles     (  ) ulcer     (  ) ecchymosis     (specify location:     )    AMPAC score:    (  ) Indwelling Ba Catheter:   Date insterted:    Reason (  ) Critical illness     (  ) urinary retention    (  ) Accurate Ins/Outs Monitoring     (  ) CMO patient    (  ) Central Line:   Date inserted:  Location: (  ) Right IJ     (  ) Left IJ     (  ) Right Fem     (  ) Left Fem    (  ) SPC        (  ) pigtail       (  ) PEG tube       (  ) colostomy       (  ) jejunostomy  (  ) U-Dall    LABS:                        10.0   13.33 )-----------( 203      ( 14 Dec 2023 07:27 )             32.2     12-14    141  |  101  |  33<H>  ----------------------------<  76  3.4<L>   |  28  |  1.1    Ca    8.3<L>      14 Dec 2023 07:27  Mg     1.7     12-14    TPro  5.1<L>  /  Alb  2.8<L>  /  TBili  1.0  /  DBili  x   /  AST  17  /  ALT  24  /  AlkPhos  345<H>  12-14      Urinalysis Basic - ( 14 Dec 2023 07:27 )    Color: x / Appearance: x / SG: x / pH: x  Gluc: 76 mg/dL / Ketone: x  / Bili: x / Urobili: x   Blood: x / Protein: x / Nitrite: x   Leuk Esterase: x / RBC: x / WBC x   Sq Epi: x / Non Sq Epi: x / Bacteria: x      ABG - ( 13 Dec 2023 10:38 )  pH, Arterial: 7.52  pH, Blood: x     /  pCO2: 41    /  pO2: 123   / HCO3: 34    / Base Excess: 9.7   /  SaO2: 100.0                     RADIOLOGY:           24H events:    Patient is a 88y old Female who presents with a chief complaint of COPD (13 Dec 2023 09:32)    Primary diagnosis of Altered mental status      Today is hospital day 20d. This morning patient was seen and examined at bedside, resting comfortably in bed.    No acute or major events overnight.      PAST MEDICAL & SURGICAL HISTORY  HTN (hypertension)    Afib  s/p ablation 2001    Goiter  no meds    Cellulitis  chronic    OA (osteoarthritis)    PVD (peripheral vascular disease)    COPD (chronic obstructive pulmonary disease)    Anxiety    Obesity    Acute on chronic systolic congestive heart failure    Edema of both lower legs    Required emergent intubation    H/O abdominal hysterectomy    H/O discectomy    H/O total knee replacement, bilateral      SOCIAL HISTORY:  Social History:      ALLERGIES:  penicillin (Other)  contrast media (iodine-based) (Other)  codeine (Other)  sulfa drugs (Hives; Other)  aspirin (Other)    MEDICATIONS:  STANDING MEDICATIONS  chlorhexidine 2% Cloths 1 Application(s) Topical <User Schedule>  heparin   Injectable 5000 Unit(s) SubCutaneous every 8 hours  metoprolol tartrate 25 milliGRAM(s) Oral two times a day  nystatin Powder 1 Application(s) Topical two times a day  pantoprazole    Tablet 40 milliGRAM(s) Oral before breakfast  polyethylene glycol 3350 17 Gram(s) Oral daily  senna 2 Tablet(s) Oral at bedtime    PRN MEDICATIONS  acetaminophen     Tablet .. 650 milliGRAM(s) Oral every 6 hours PRN  aluminum hydroxide/magnesium hydroxide/simethicone Suspension 30 milliLiter(s) Oral every 4 hours PRN  melatonin 3 milliGRAM(s) Oral at bedtime PRN  metoprolol tartrate 25 milliGRAM(s) Oral once PRN  ondansetron Injectable 4 milliGRAM(s) IV Push every 8 hours PRN    VITALS:   T(F): 97  HR: 118  BP: 112/57  RR: 18  SpO2: 96%    PHYSICAL EXAM:  GENERAL:   ( x ) NAD, lying in bed comfortably     (  ) obtunded     (  ) lethargic     (  ) somnolent    HEAD:   ( x ) Atraumatic     (  ) hematoma     (  ) laceration (specify location:       )     NECK:  ( x ) Supple     (  ) neck stiffness     (  ) nuchal rigidity     (  )  no JVD     (  ) JVD present ( -- cm)    HEART:  Rate -->     (  ) normal rate     (  ) bradycardic     ( x ) tachycardic  Rhythm -->     (  ) regular     (  ) regularly irregular     ( x ) irregularly irregular  Murmurs -->     (x  ) normal s1s2     (  ) systolic murmur     (  ) diastolic murmur     (  ) continuous murmur      (  ) S3 present     (  ) S4 present    LUNGS: On 2.5 L NC of O2   ( x )Unlabored respirations     (  ) tachypnea  ( x ) B/L air entry     (  ) decreased breath sounds in:  (location     )    (  ) no adventitious sound     (  ) crackles     (  ) wheezing      (  ) rhonchi      (specify location:       )  (  ) chest wall tenderness (specify location:       )    ABDOMEN:   (x  ) Soft     (  ) tense   |   ( x ) nondistended     (  ) distended   |   ( x ) +BS     (  ) hypoactive bowel sounds     (  ) hyperactive bowel sounds  (  ) nontender     (  ) RUQ tenderness     (  ) RLQ tenderness     (  ) LLQ tenderness     (  ) epigastric tenderness     (  ) diffuse tenderness  (  ) Splenomegaly      (  ) Hepatomegaly      (  ) Jaundice     (  ) ecchymosis     EXTREMITIES:  Left arm swelling with ecchymosis , painful on palpation , restricted range of motion     NERVOUS SYSTEM:    ( x ) A&Ox3     (  ) confused     (  ) lethargic  CN II-XII:     (  ) Intact     (  ) deficits found     (Specify:     )   Upper extremities:     (  ) no sensorimotor deficits     (  ) weakness     (  ) loss of proprioception/vibration     (  ) loss of touch/temperature (specify:    )  Lower extremities:     (  ) no sensorimotor deficits     (  ) weakness     (  ) loss of proprioception/vibration     (  ) loss of touch/temperature (specify:    )        ( x ) Indwelling Ba Catheter: Chronic Foleys present         LABS:                        10.0   13.33 )-----------( 203      ( 14 Dec 2023 07:27 )             32.2     12-14    141  |  101  |  33<H>  ----------------------------<  76  3.4<L>   |  28  |  1.1    Ca    8.3<L>      14 Dec 2023 07:27  Mg     1.7     12-14    TPro  5.1<L>  /  Alb  2.8<L>  /  TBili  1.0  /  DBili  x   /  AST  17  /  ALT  24  /  AlkPhos  345<H>  12-14      Urinalysis Basic - ( 14 Dec 2023 07:27 )    Color: x / Appearance: x / SG: x / pH: x  Gluc: 76 mg/dL / Ketone: x  / Bili: x / Urobili: x   Blood: x / Protein: x / Nitrite: x   Leuk Esterase: x / RBC: x / WBC x   Sq Epi: x / Non Sq Epi: x / Bacteria: x      ABG - ( 13 Dec 2023 10:38 )  pH, Arterial: 7.52  pH, Blood: x     /  pCO2: 41    /  pO2: 123   / HCO3: 34    / Base Excess: 9.7   /  SaO2: 100.0                 RADIOLOGY:    < from: CT Abdomen and Pelvis No Cont (12.08.23 @ 13:20) >  FINDINGS: Evaluation is limited due to patient's arm positioning causing   streak artifact through the upper abdomen.    LOWER CHEST: Stable bilateral pleural effusions with adjacent   atelectasis/consolidation.    HEPATOBILIARY: Unremarkable.    SPLEEN: Unremarkable.    PANCREAS: Unremarkable.    ADRENAL GLANDS: Unremarkable.    KIDNEYS: Redemonstrated bilateral simple and hemorrhagic cysts. No stones   or hydronephrosis.    ABDOMINOPELVIC NODES: Unremarkable.    PELVIC ORGANS: Ba catheteris in the urinary bladder. There is gas in   the bladder likely from Ba placement. Status post hysterectomy.    PERITONEUM/MESENTERY/BOWEL: Unremarkable.    BONES/SOFT TISSUES: Diffuse soft tissue anasarca. Osteopenia.   Degenerative changes throughout the spine.        IMPRESSION:    No acute abnormality in the abdomen and pelvis. No intra-abdominal or   intramuscular hemorrhage.    < end of copied text >  < from: CT Upper Extremity No Cont, Left (12.08.23 @ 13:20) >  FINDINGS:    Corresponding to the ultrasound findings, there is deep   subcutaneous/intramuscular hematoma throughout the entire length of the   anterior aspect of the arm measuring 8 x 7 x 25 cm (AP by transverse by   CC). The hematoma extends into the radial aspect of the proximal forearm.    There is diffuse subcutaneous edema throughout the left upper extremity.   There is also dependent edema at the left breast and left flank region.    Partially imaged chest and abdomen pelvis demonstrate left pleural   effusion and basilar atelectasis. There is a left-sided chest tube in   place, partially imaged.    There are severe degenerative changes of the left glenohumeral joint and   left hip joint. Chronic remodeling of the glenoid fossa and humeral head   is noted. No acute displaced fracture or dislocation.    IMPRESSION:    Large deep subcutaneous/intramuscular hematoma throughout the entire   length of the arm at the anterior aspect measuring 8 x 7 x 25 cm,   corresponding to the ultrasound findings.    < end of copied text >  < from: [facility] Duplex Upper Ext Vein Scan, Left (12.04.23 @ 18:07) >    IMPRESSION:  No evidence of left upper extremity deep venous thrombosis.  Brachial vein not visualized.  Large Fluid collection seen in the left upper arm.    < end of copied text >  < from: Xray Chest 1 View- PORTABLE-Routine (Xray Chest 1 View- PORTABLE-Routine in AM.) (12.04.23 @ 06:07) >  Findings/  impression:        Support devices: Stable left basilar chest tube    Cardiac/ Mediastinum: Stable cardiomegaly    Lungs/ Pleura: Stable opacities/effusions. No pneumothorax    Skeletal/ soft tissues: Stable    < end of copied text >  < from: CT Head No Cont (11.27.23 @ 16:42) >    IMPRESSION:    No acute intracranial pathology.    Mild chronic microvascular ischemic changes.    --- End of Report ---    < end of copied text >

## 2023-12-14 NOTE — PROGRESS NOTE ADULT - ASSESSMENT
89 yo F PMHx HTN, chronic Afib, OA, PVD, COPD requiring two intubations, anxiety, obesity, chronic systolic congestive heart failure, abdominal hysterectomy, discectomy, total knee replacement, bilateral pleural effusions s/p thoracentesis with a left side drain since March as per daughter, presents from NH for evaluation of altered mental status. She was recently discharged on Avycaz for UTI.     #Anemia secondary to Left upper arm hematoma   - No signs of bleeding  - 12/6: Hb 7.2  - 12/7: Hb 7.1   - 12/8: Hb 6.5, will transfuse 1u prbc ( 2nd Transfusion this Hospitalization )   - 12/09: 9.3  - 12/13: 9.5  - vascular Following; Continue monitoring with Hb trends , and transfuse as needed   - ACE wrap recommended to bring down the swelling but not possible due to pain   - follow Vascular recs for restarting AC ( indication : Afib )   - As per Vascular, restart AC from 15th.   - Patient's main complaints is pain on the Left arm.   - Burn was consulted for possible evacuation of Hematoma , But the patient will likely not be able to handle anesthesia and family does not want any surgical intervention.     #Acute on Chronic Hypercapnic respiratory failure s/p BiPAP  #COPD exacerbation   #R Pleural effusion sp thoracentesis (transudate)  #Prior history of pleural effusion/s/p pigtail catheter, 1 cytology suspicious for malignant cells, multiple thoras afterwards with negative cytology  #Sepsis  not POA  - RVP negative, urine, blood and pleural fluid cx negative. 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  - restarted lasix 40 mg OD along with Diamox   - No overload features on examination , no Pleural effusion noted on Chest Xray       #Elevated ALP    - RuQ Sono: Grossly unremarkable with Gallbladder wall 2.9 cm. Monitor     #Chronic AFIB    - On Metoprolol 25 mg BID   - Eliquis on Hold due to LUE hematoma   - rate controlled     #Neck Mass   - chronic, had prior work up/biopsy/no further interventions at this time. Family aware    #Sacral Ulcer POA   - s/p wound care eval, wound care per recs     #MISC  Diet: DASH, puree  DVT: none for now ( will restart from 12/15)   Pending :     hemoglobin trending , Transfuse less than 8    87 yo F PMHx HTN, chronic Afib, OA, PVD, COPD requiring two intubations, anxiety, obesity, chronic systolic congestive heart failure, abdominal hysterectomy, discectomy, total knee replacement, bilateral pleural effusions s/p thoracentesis with a left side drain since March as per daughter, presents from NH for evaluation of altered mental status. She was recently discharged on Avycaz for UTI.     #Anemia secondary to Left upper arm hematoma   - No signs of bleeding  - 12/6: Hb 7.2  - 12/7: Hb 7.1   - 12/8: Hb 6.5, will transfuse 1u prbc ( 2nd Transfusion this Hospitalization )   - 12/09: 9.3  - 12/13: 9.5  - vascular Following; Continue monitoring with Hb trends , and transfuse as needed   - ACE wrap recommended to bring down the swelling but not possible due to pain   - follow Vascular recs for restarting AC ( indication : Afib )   - As per Vascular, restart AC from 15th.   - Patient's main complaints is pain on the Left arm.   - Burn was consulted for possible evacuation of Hematoma , But the patient will likely not be able to handle anesthesia and family does not want any surgical intervention.     #Acute on Chronic Hypercapnic respiratory failure s/p BiPAP  #COPD exacerbation   #R Pleural effusion sp thoracentesis (transudate)  #Prior history of pleural effusion/s/p pigtail catheter, 1 cytology suspicious for malignant cells, multiple thoras afterwards with negative cytology  #Sepsis  not POA  - RVP negative, urine, blood and pleural fluid cx negative. Nasal MRSA negative   - s/p thoracentesis 11/27 with 1L removed, transudative  - s/p Prednisone   - Currently on Baseline O2 maintaining saturation , no shortness of breath , no fever   - NO need of BIPAP for COPD       #Multi-drug Resistant Klebsiella Bacteriemia with Acute Cystitis - prior admission october 2023  #Acute Urinary retention s/p al placement 11/30 AM  - s/p Avycaz q8h end date 11/8   - per documentation, patient has a chronic la    #Leukocytosis  - likely reactive from the hematoma     #Acute on Chronic systolic congestive heart failure   - improving   - Echo with EF 64%  - BNP 11,224  - s/p Iv lasix.  - restarted lasix 40 mg OD along with Diamox   - No overload features on examination , no Pleural effusion noted on Chest Xray       #Elevated ALP    - RuQ Sono: Grossly unremarkable with Gallbladder wall 2.9 cm. Monitor     #Chronic AFIB    - On Metoprolol 25 mg BID   - Eliquis on Hold due to LUE hematoma   - rate controlled     #Neck Mass   - chronic, had prior work up/biopsy/no further interventions at this time. Family aware    #Sacral Ulcer POA   - s/p wound care eval, wound care per recs     #MISC  Diet: DASH, puree  DVT: none for now ( will restart from 12/15)   Pending :     hemoglobin trending , Transfuse less than 8

## 2023-12-14 NOTE — PROGRESS NOTE ADULT - ATTENDING COMMENTS
89 yo F PMHx HTN, chronic Afib, OA, PVD, COPD requiring two intubations, anxiety, obesity, chronic systolic congestive heart failure, abdominal hysterectomy, discectomy, total knee replacement, bilateral pleural effusions s/p thoracentesis with a left side drain since March as per daughter, presents from NH for evaluation of altered mental status. She was recently discharged on Avycaz for UTI.     #Acute blood loss anemia 2/2 LUE hematoma  s/p 2U PRBC 12/08 for hg 6.5   CT LUE 12/08: Large hematoma throughout entire length of the arm, measuring 8*7*25 cm  CT abd negative   Plan:   - Vascular following - recommend holding AC for 10 days total   - ACE wrap if can tolerate   - Elevate arm at all times   - Monitor CBC daily   - Hold Eliquis    - c/w heparin for dvt ppx     #Acute on Chronic Hypercapnic respiratory failure s/p BiPAP  #COPD exacerbation   #R Pleural effusion sp thoracentesis (transudate)  #Prior history of pleural effusion/s/p pigtail catheter, 1 cytology suspicious for malignant cells, multiple thoras afterwards with negative cytology  #Sepsis  not POA  - RVP negative, urine, blood and pleural fluid cx negative. Nasal MRSA negative   - s/p thoracentesis 11/27 with 1L removed, transudative  - off abx per ID  - s/p course of prednisone    - PT f/u     #Multi-drug Resistant Klebsiella Bacteriemia with Acute Cystitis - prior admission october 2023  #Acute Urinary retention s/p Ba placement 11/30 AM  - s/p Avycaz q8h end date 11/8   - per documentation, patient has a chronic Ba     #Leukocytosis  - likely due to hematoma     #Acute on Chronic systolic congestive heart failure   - Hold Lasix    - follows with Dr. Pacheco    #Elevated ALP    - RuQ Sono: Grossly unremarkable with Gallbladder wall 2.9 cm. Monitor     #Chronic AFIB    - C/w BB   - Holding Eliquis per vascular     #Neck Mass   - chronic, had prior work up/biopsy/no further interventions at this time. Family aware    #Sacral Ulcer POA   - s/p wound care eval, wound care per recs     DVT Ppx:  Heparin     Prognosis guarded     #Progress Note Handoff  Pending (specify):   monitor hgb, resume AC  tomorrow, STR placement   Updated by team   Dispo: [facility]

## 2023-12-15 NOTE — PROGRESS NOTE ADULT - SUBJECTIVE AND OBJECTIVE BOX
24H events:    Patient is a 88y old Female who presents with a chief complaint of COPD (14 Dec 2023 10:48)    Primary diagnosis of Altered mental status    Today is hospital day 21d. This morning patient was seen and examined at bedside, resting comfortably in bed.    No acute or major events overnight.      PAST MEDICAL & SURGICAL HISTORY  HTN (hypertension)    Afib  s/p ablation 2001    Goiter  no meds    Cellulitis  chronic    OA (osteoarthritis)    PVD (peripheral vascular disease)    COPD (chronic obstructive pulmonary disease)    Anxiety    Obesity    Acute on chronic systolic congestive heart failure    Edema of both lower legs    Required emergent intubation    H/O abdominal hysterectomy    H/O discectomy    H/O total knee replacement, bilateral      SOCIAL HISTORY:  Social History:      ALLERGIES:  penicillin (Other)  contrast media (iodine-based) (Other)  codeine (Other)  sulfa drugs (Hives; Other)  aspirin (Other)    MEDICATIONS:  STANDING MEDICATIONS  apixaban 5 milliGRAM(s) Oral two times a day  chlorhexidine 2% Cloths 1 Application(s) Topical <User Schedule>  magnesium oxide 400 milliGRAM(s) Oral three times a day with meals  metoprolol tartrate 25 milliGRAM(s) Oral two times a day  nystatin Powder 1 Application(s) Topical two times a day  pantoprazole    Tablet 40 milliGRAM(s) Oral before breakfast  polyethylene glycol 3350 17 Gram(s) Oral daily  senna 2 Tablet(s) Oral at bedtime    PRN MEDICATIONS  acetaminophen     Tablet .. 650 milliGRAM(s) Oral every 6 hours PRN  aluminum hydroxide/magnesium hydroxide/simethicone Suspension 30 milliLiter(s) Oral every 4 hours PRN  melatonin 3 milliGRAM(s) Oral at bedtime PRN  metoprolol tartrate 25 milliGRAM(s) Oral once PRN  ondansetron Injectable 4 milliGRAM(s) IV Push every 8 hours PRN    VITALS:   T(F): 97.4  HR: 102  BP: 128/60  RR: 18  SpO2: 98%    PHYSICAL EXAM:  GENERAL:   ( x ) NAD, lying in bed comfortably     (  ) obtunded     (  ) lethargic     (  ) somnolent    HEAD:   ( x ) Atraumatic     (  ) hematoma     (  ) laceration (specify location:       )     NECK:  ( x ) Supple     (  ) neck stiffness     (  ) nuchal rigidity     (  )  no JVD     (  ) JVD present ( -- cm)    HEART:  Rate -->     (  ) normal rate     (  ) bradycardic     ( x ) tachycardic  Rhythm -->     (  ) regular     (  ) regularly irregular     ( x ) irregularly irregular  Murmurs -->     (x  ) normal s1s2     (  ) systolic murmur     (  ) diastolic murmur     (  ) continuous murmur      (  ) S3 present     (  ) S4 present    LUNGS: On 1 L NC of O2   ( x )Unlabored respirations     (  ) tachypnea  ( x ) B/L air entry     (  ) decreased breath sounds in:  (location     )    (  ) no adventitious sound     (  ) crackles     (  ) wheezing      (  ) rhonchi      (specify location:       )  (  ) chest wall tenderness (specify location:       )    ABDOMEN:   (x  ) Soft     (  ) tense   |   ( x ) nondistended     (  ) distended   |   ( x ) +BS     (  ) hypoactive bowel sounds     (  ) hyperactive bowel sounds  (  ) nontender     (  ) RUQ tenderness     (  ) RLQ tenderness     (  ) LLQ tenderness     (  ) epigastric tenderness     (  ) diffuse tenderness  (  ) Splenomegaly      (  ) Hepatomegaly      (  ) Jaundice     (  ) ecchymosis     EXTREMITIES:  Left arm swelling with ecchymosis , painful on palpation , restricted range of motion     NERVOUS SYSTEM:    ( x ) A&Ox3     (  ) confused     (  ) lethargic  CN II-XII:     (  ) Intact     (  ) deficits found     (Specify:     )   Upper extremities:     (  ) no sensorimotor deficits     (  ) weakness     (  ) loss of proprioception/vibration     (  ) loss of touch/temperature (specify:    )  Lower extremities:     (  ) no sensorimotor deficits     (  ) weakness     (  ) loss of proprioception/vibration     (  ) loss of touch/temperature (specify:    )        ( x ) Indwelling Ba Catheter: Chronic Foleys present         LABS:                        9.6    10.82 )-----------( 194      ( 15 Dec 2023 07:27 )             30.3     12-15    141  |  104  |  31<H>  ----------------------------<  80  3.6   |  27  |  1.0    Ca    8.4      15 Dec 2023 07:27  Mg     1.7     12-14    TPro  4.9<L>  /  Alb  2.6<L>  /  TBili  0.8  /  DBili  x   /  AST  18  /  ALT  20  /  AlkPhos  333<H>  12-15      Urinalysis Basic - ( 15 Dec 2023 07:27 )    Color: x / Appearance: x / SG: x / pH: x  Gluc: 80 mg/dL / Ketone: x  / Bili: x / Urobili: x   Blood: x / Protein: x / Nitrite: x   Leuk Esterase: x / RBC: x / WBC x   Sq Epi: x / Non Sq Epi: x / Bacteria: x      ABG - ( 13 Dec 2023 10:38 )  pH, Arterial: 7.52  pH, Blood: x     /  pCO2: 41    /  pO2: 123   / HCO3: 34    / Base Excess: 9.7   /  SaO2: 100.0                     RADIOLOGY:

## 2023-12-15 NOTE — PROGRESS NOTE ADULT - ATTENDING COMMENTS
89 yo F PMHx HTN, chronic Afib, OA, PVD, COPD requiring two intubations, anxiety, obesity, chronic systolic congestive heart failure, abdominal hysterectomy, discectomy, total knee replacement, bilateral pleural effusions s/p thoracentesis with a left side drain since March as per daughter, presents from NH for evaluation of altered mental status. She was recently discharged on Avycaz for UTI.     #Acute blood loss anemia 2/2 LUE hematoma  s/p 2U PRBC 12/08 for hg 6.5   CT LUE 12/08: Large hematoma throughout entire length of the arm, measuring 8*7*25 cm  CT abd negative   Plan:   - Vascular following - recommend holding AC for 10 days total   - ACE wrap if can tolerate   - Elevate arm at all times   - Monitor CBC daily   - Resumed Eliquis 12/15     #Acute on Chronic Hypercapnic respiratory failure s/p BiPAP  #COPD exacerbation   #R Pleural effusion sp thoracentesis (transudate)  #Prior history of pleural effusion/s/p pigtail catheter, 1 cytology suspicious for malignant cells, multiple thoras afterwards with negative cytology  #Sepsis  not POA  - RVP negative, urine, blood and pleural fluid cx negative. 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 Pacheco    #Elevated ALP    - RuQ Sono: Grossly unremarkable with Gallbladder wall 2.9 cm. Monitor     #Chronic AFIB    - C/w BB   - resumed Eliquis      #Neck Mass   - chronic, had prior work up/biopsy/no further interventions at this time. Family aware    #Sacral Ulcer POA   - s/p wound care eval, wound care per recs     DVT Ppx:  Heparin     Prognosis guarded     #Progress Note Handoff  Pending (specify):   monitor hgb, resume AC  today, STR placement 87 yo F PMHx HTN, chronic Afib, OA, PVD, COPD requiring two intubations, anxiety, obesity, chronic systolic congestive heart failure, abdominal hysterectomy, discectomy, total knee replacement, bilateral pleural effusions s/p thoracentesis with a left side drain since March as per daughter, presents from NH for evaluation of altered mental status. She was recently discharged on Avycaz for UTI.     #Acute blood loss anemia 2/2 LUE hematoma  s/p 2U PRBC 12/08 for hg 6.5   CT LUE 12/08: Large hematoma throughout entire length of the arm, measuring 8*7*25 cm  CT abd negative   Plan:   - Vascular following - recommend holding AC for 10 days total   - ACE wrap if can tolerate   - Elevate arm at all times   - Monitor CBC daily   - Resumed Eliquis 12/15     #Acute on Chronic Hypercapnic respiratory failure s/p BiPAP  #COPD exacerbation   #R Pleural effusion sp thoracentesis (transudate)  #Prior history of pleural effusion/s/p pigtail catheter, 1 cytology suspicious for malignant cells, multiple thoras afterwards with negative cytology  #Sepsis  not POA  - RVP negative, urine, blood and pleural fluid cx negative. 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 Pacheco    #Elevated ALP    - RuQ Sono: Grossly unremarkable with Gallbladder wall 2.9 cm. Monitor     #Chronic AFIB    - C/w BB   - resumed Eliquis      #Neck Mass   - chronic, had prior work up/biopsy/no further interventions at this time. Family aware    #Sacral Ulcer POA   - s/p wound care eval, wound care per recs     DVT Ppx:  Heparin     Prognosis guarded     #Progress Note Handoff  Pending (specify):   monitor hgb, resume AC  today, STR placement

## 2023-12-15 NOTE — PROGRESS NOTE ADULT - ASSESSMENT
87 yo F PMHx HTN, chronic Afib, OA, PVD, COPD requiring two intubations, anxiety, obesity, chronic systolic congestive heart failure, abdominal hysterectomy, discectomy, total knee replacement, bilateral pleural effusions s/p thoracentesis with a left side drain since March as per daughter, presents from NH for evaluation of altered mental status. She was recently discharged on Avycaz for UTI.     #Anemia secondary to Left upper arm hematoma   - Hemoglobin stable   - ACE wrap recommended to bring down the swelling but not possible due to pain   - Restarted on Eliquis , No bleeding signs   - Trend hb , transfuse < 8     #Acute on Chronic Hypercapnic respiratory failure s/p BiPAP  #COPD exacerbation   #R Pleural effusion sp thoracentesis (transudate)  #Prior history of pleural effusion/s/p pigtail catheter, 1 cytology suspicious for malignant cells, multiple thoras afterwards with negative cytology  #Sepsis  not POA  - RVP negative, urine, blood and pleural fluid cx negative. Nasal MRSA negative   - s/p thoracentesis 11/27 with 1L removed, transudative  - s/p Prednisone   - Currently on Baseline O2 maintaining saturation , no shortness of breath , no fever   - Chest Examination clear     #Multi-drug Resistant Klebsiella Bacteriemia with Acute Cystitis - prior admission october 2023  #Acute Urinary retention s/p al placement 11/30 AM  - s/p Avycaz q8h end date 11/8   - per documentation, patient has a chronic al    #Leukocytosis- RESOLVING   - likely reactive from the hematoma     #Acute on Chronic  congestive heart failure   - improving   - Echo with EF 64%  - BNP 11,224  - Lasix 40 mg PO started   - No overload features on examination , no Pleural effusion noted on Chest Xray   -   #Elevated ALP    - RuQ Sono: Grossly unremarkable with Gallbladder wall 2.9 cm.  - Follow up Outpatient     #Chronic AFIB    - On Metoprolol 25 mg BID and on Eliquis  - rate controlled     #Neck Mass   - chronic, had prior work up/biopsy/no further interventions at this time. Family aware    #Sacral Ulcer POA   - s/p wound care eval, wound care per recs     #MISC  Diet: DASH, puree  DVT: Eliquis   Pending :     hemoglobin trending , Transfuse less than 8  DC planning

## 2023-12-15 NOTE — PROGRESS NOTE ADULT - ASSESSMENT
assessment:  88 year old female with extensive pmhx presented for AMS, now with LUE hemamatoma.     suggestion:  improving, continue elevation ; per vascular pt does not tolerate compression wrap  likely candidate for hematoma aspiration after day 15  medical clearance for anesthesia for potential evacuation if patient does not improve ( family does not think patient will handle surgery)   medical management per primary team

## 2023-12-15 NOTE — PROGRESS NOTE ADULT - SUBJECTIVE AND OBJECTIVE BOX
INTERVAL HISTORY:  pt seen on rounds with attending  son at bedside    Events past 24 hrs***  Vital Signs Last 24 Hrs  T(C): 36.3 (15 Dec 2023 05:11), Max: 36.4 (14 Dec 2023 19:40)  T(F): 97.4 (15 Dec 2023 05:11), Max: 97.6 (14 Dec 2023 19:40)  HR: 102 (15 Dec 2023 05:11) (56 - 102)  BP: 128/60 (15 Dec 2023 05:11) (108/55 - 131/70)  RR: 18 (15 Dec 2023 05:11) (18 - 18)  SpO2: 98% (14 Dec 2023 17:40) (98% - 98%)    Parameters below as of 14 Dec 2023 17:40  Patient On (Oxygen Delivery Method): nasal cannula  O2 Flow (L/min): 1    MEDICATIONS  (STANDING):  apixaban 5 milliGRAM(s) Oral two times a day  chlorhexidine 2% Cloths 1 Application(s) Topical <User Schedule>  furosemide    Tablet 40 milliGRAM(s) Oral daily  magnesium oxide 400 milliGRAM(s) Oral three times a day with meals  metoprolol tartrate 25 milliGRAM(s) Oral two times a day  nystatin Powder 1 Application(s) Topical two times a day  pantoprazole    Tablet 40 milliGRAM(s) Oral before breakfast  polyethylene glycol 3350 17 Gram(s) Oral daily  senna 2 Tablet(s) Oral at bedtime    MEDICATIONS  (PRN):  acetaminophen     Tablet .. 650 milliGRAM(s) Oral every 6 hours PRN Temp greater or equal to 38C (100.4F), Mild Pain (1 - 3)  aluminum hydroxide/magnesium hydroxide/simethicone Suspension 30 milliLiter(s) Oral every 4 hours PRN Dyspepsia  melatonin 3 milliGRAM(s) Oral at bedtime PRN Insomnia  metoprolol tartrate 25 milliGRAM(s) Oral once PRN tachycardia  ondansetron Injectable 4 milliGRAM(s) IV Push every 8 hours PRN Nausea and/or Vomiting    Allergies    penicillin (Other)  contrast media (iodine-based) (Other)  codeine (Other)  sulfa drugs (Hives; Other)  aspirin (Other)    Lab Results:                        9.6    10.82 )-----------( 194      ( 15 Dec 2023 07:27 )             30.3       PHYSICAL EXAM: I have reviewed current vital signs.  GENERAL: NAD  CHEST/LUNG: no acute cardiopulmonary distress   PSYCH: Cooperative; appropriate.  NEUROLOGY: AAO x 3.   SKIN: LUE edema with ecchymosis arm to wrist / hand ; pt laying in bed with arm not elevated  area of swelling and ecchymosis medial upper arm to hand slightly improved  ecchymosis and  induration medial elbow and forearm  near axilla. + radial pulse and cap refill.

## 2023-12-16 NOTE — PROGRESS NOTE ADULT - ASSESSMENT
87 yo F PMHx HTN, chronic Afib, OA, PVD, COPD requiring two intubations, anxiety, obesity, chronic systolic congestive heart failure, abdominal hysterectomy, discectomy, total knee replacement, bilateral pleural effusions s/p thoracentesis with a left side drain since March as per daughter, presents from NH for evaluation of altered mental status. She was recently discharged on Avycaz for UTI.     #Acute blood loss anemia 2/2 LUE hematoma  s/p 2U PRBC 12/08 for hg 6.5   CT LUE 12/08: Large hematoma throughout entire length of the arm, measuring 8*7*25 cm  CT abd negative   Plan:   - Vascular following - recommend holding AC for 10 days total   - ACE wrap if can tolerate   - Elevate arm at all times   - Monitor CBC daily   - Resumed Eliquis 12/15     #Acute on Chronic Hypercapnic respiratory failure s/p BiPAP  #COPD exacerbation   #R Pleural effusion sp thoracentesis (transudate)  #Prior history of pleural effusion/s/p pigtail catheter, 1 cytology suspicious for malignant cells, multiple thoras afterwards with negative cytology  #Sepsis  not POA  - RVP negative, urine, blood and pleural fluid cx negative. 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 Pacheco    #Elevated ALP    - RuQ Sono: Grossly unremarkable with Gallbladder wall 2.9 cm. Monitor     #Chronic AFIB    - C/w BB   - resumed Eliquis      #Neck Mass   - chronic, had prior work up/biopsy/no further interventions at this time. Family aware    #Sacral Ulcer POA   - s/p wound care eval, wound care per recs     DVT Ppx:  Heparin     Prognosis guarded     #Progress Note Handoff  Pending (specify):   monitor hgb, c/w AC, BURN planning for evacuation on day 15  Plan of care d/w pt   Dispo: family wants STR, pt wants to go home  89 yo F PMHx HTN, chronic Afib, OA, PVD, COPD requiring two intubations, anxiety, obesity, chronic systolic congestive heart failure, abdominal hysterectomy, discectomy, total knee replacement, bilateral pleural effusions s/p thoracentesis with a left side drain since March as per daughter, presents from NH for evaluation of altered mental status. She was recently discharged on Avycaz for UTI.     #Acute blood loss anemia 2/2 LUE hematoma  s/p 2U PRBC 12/08 for hg 6.5   CT LUE 12/08: Large hematoma throughout entire length of the arm, measuring 8*7*25 cm  CT abd negative   Plan:   - Vascular following - recommend holding AC for 10 days total   - ACE wrap if can tolerate   - Elevate arm at all times   - Monitor CBC daily   - Resumed Eliquis 12/15     #Acute on Chronic Hypercapnic respiratory failure s/p BiPAP  #COPD exacerbation   #R Pleural effusion sp thoracentesis (transudate)  #Prior history of pleural effusion/s/p pigtail catheter, 1 cytology suspicious for malignant cells, multiple thoras afterwards with negative cytology  #Sepsis  not POA  - RVP negative, urine, blood and pleural fluid cx negative. 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 Pacheco    #Elevated ALP    - RuQ Sono: Grossly unremarkable with Gallbladder wall 2.9 cm. Monitor     #Chronic AFIB    - C/w BB   - resumed Eliquis      #Neck Mass   - chronic, had prior work up/biopsy/no further interventions at this time. Family aware    #Sacral Ulcer POA   - s/p wound care eval, wound care per recs     DVT Ppx:  Heparin     Prognosis guarded     #Progress Note Handoff  Pending (specify):   monitor hgb, c/w AC, BURN planning for evacuation on day 15  Plan of care d/w pt   Dispo: family wants STR, pt wants to go home

## 2023-12-16 NOTE — PROGRESS NOTE ADULT - SUBJECTIVE AND OBJECTIVE BOX
Pt seen and examined at bedside. Continues to have arm pain.     VITAL SIGNS (Last 24 hrs):  T(C): 36.6 (12-16-23 @ 04:51), Max: 36.6 (12-15-23 @ 14:21)  HR: 118 (12-16-23 @ 04:51) (100 - 118)  BP: 113/56 (12-16-23 @ 04:51) (101/57 - 113/56)  RR: 18 (12-16-23 @ 04:51) (18 - 19)  SpO2: 93% (12-15-23 @ 14:21) (93% - 93%)  Wt(kg): --  Daily     Daily     I&O's Summary    15 Dec 2023 07:01  -  16 Dec 2023 07:00  --------------------------------------------------------  IN: 0 mL / OUT: 800 mL / NET: -800 mL        PHYSICAL EXAM:  GENERAL: NAD, obese  HEAD:  Atraumatic, Normocephalic  EYES:  conjunctiva and sclera clear  NECK: Supple, No JVD  CHEST/LUNG: Clear to auscultation bilaterally; No wheeze  HEART: Regular rate and rhythm; No murmurs, rubs, or gallops  ABDOMEN: Soft, Nontender, Nondistended; Bowel sounds present  EXTREMITIES:  LUE diffuse edema  SKIN: No rashes or lesions    Labs Reviewed        CBC Full  -  ( 15 Dec 2023 07:27 )  WBC Count : 10.82 K/uL  Hemoglobin : 9.6 g/dL  Hematocrit : 30.3 %  Platelet Count - Automated : 194 K/uL  Mean Cell Volume : 93.5 fL  Mean Cell Hemoglobin : 29.6 pg  Mean Cell Hemoglobin Concentration : 31.7 g/dL  Auto Neutrophil # : 8.39 K/uL  Auto Lymphocyte # : 1.18 K/uL  Auto Monocyte # : 0.94 K/uL  Auto Eosinophil # : 0.23 K/uL  Auto Basophil # : 0.01 K/uL  Auto Neutrophil % : 77.6 %  Auto Lymphocyte % : 10.9 %  Auto Monocyte % : 8.7 %  Auto Eosinophil % : 2.1 %  Auto Basophil % : 0.1 %    BMP:    12-15 @ 07:27    Blood Urea Nitrogen - 31  Calcium - 8.4  Carbond Dioxide - 27  Chloride - 104  Creatinine - 1.0  Glucose - 80  Potassium - 3.6  Sodium - 141      Hemoglobin A1c -     Urine Culture:            MEDICATIONS  (STANDING):  apixaban 5 milliGRAM(s) Oral two times a day  chlorhexidine 2% Cloths 1 Application(s) Topical <User Schedule>  furosemide    Tablet 40 milliGRAM(s) Oral daily  magnesium oxide 400 milliGRAM(s) Oral three times a day with meals  metoprolol tartrate 25 milliGRAM(s) Oral every 8 hours  nystatin Powder 1 Application(s) Topical two times a day  pantoprazole    Tablet 40 milliGRAM(s) Oral before breakfast  polyethylene glycol 3350 17 Gram(s) Oral daily  senna 2 Tablet(s) Oral at bedtime    MEDICATIONS  (PRN):  acetaminophen     Tablet .. 650 milliGRAM(s) Oral every 6 hours PRN Temp greater or equal to 38C (100.4F), Mild Pain (1 - 3)  aluminum hydroxide/magnesium hydroxide/simethicone Suspension 30 milliLiter(s) Oral every 4 hours PRN Dyspepsia  melatonin 3 milliGRAM(s) Oral at bedtime PRN Insomnia  metoprolol tartrate 25 milliGRAM(s) Oral once PRN tachycardia  ondansetron Injectable 4 milliGRAM(s) IV Push every 8 hours PRN Nausea and/or Vomiting

## 2023-12-17 NOTE — PROGRESS NOTE ADULT - ASSESSMENT
89 yo F PMHx HTN, chronic Afib, OA, PVD, COPD requiring two intubations, anxiety, obesity, chronic systolic congestive heart failure, abdominal hysterectomy, discectomy, total knee replacement, bilateral pleural effusions s/p thoracentesis with a left side drain since March as per daughter, presents from NH for evaluation of altered mental status. She was recently discharged on Avycaz for UTI.     #Anemia secondary to Left upper arm hematoma - STABLE   - Hemoglobin stable   - ACE wrap recommended to bring down the swelling but not possible due to pain   - Restarted on Eliquis , No bleeding signs   - Trend hb , transfuse < 8     #Acute on Chronic Hypercapnic respiratory failure s/p BiPAP  #COPD exacerbation - RESOLVED   #R Pleural effusion sp thoracentesis (transudate)  #Prior history of pleural effusion/s/p pigtail catheter, 1 cytology suspicious for malignant cells, multiple thoras afterwards with negative cytology  #Sepsis  not POA  - RVP negative, urine, blood and pleural fluid cx negative. Nasal MRSA negative   - s/p thoracentesis 11/27 with 1L removed, transudative  - s/p Prednisone   - Currently on Baseline O2 maintaining saturation , no shortness of breath , no fever   - Chest Examination clear     #Multi-drug Resistant Klebsiella Bacteriemia with Acute Cystitis - prior admission october 2023  #Acute Urinary retention s/p al placement 11/30 AM  - s/p Avycaz q8h end date 11/8   - per documentation, patient has a chronic al    #Leukocytosis- RESOLVED   - likely reactive from the hematoma     #Acute on Chronic  congestive heart failure   - improving   - Echo with EF 64%  - BNP 11,224  - Lasix 40 mg PO started   - No overload features on examination , no Pleural effusion noted on Chest Xray   -   #Elevated ALP    - RuQ Sono: Grossly unremarkable with Gallbladder wall 2.9 cm.  - Follow up Outpatient     #Chronic AFIB    - On Metoprolol 25 mg BID and on Eliquis  - rate controlled     #Neck Mass   - chronic, had prior work up/biopsy/no further interventions at this time. Family aware    #Sacral Ulcer POA   - s/p wound care eval, wound care per recs     #MISC  Diet: DASH, puree  DVT: Eliquis   Pending :     hemoglobin trending , Transfuse less than 8  DC planning ,STR placement    87 yo F PMHx HTN, chronic Afib, OA, PVD, COPD requiring two intubations, anxiety, obesity, chronic systolic congestive heart failure, abdominal hysterectomy, discectomy, total knee replacement, bilateral pleural effusions s/p thoracentesis with a left side drain since March as per daughter, presents from NH for evaluation of altered mental status. She was recently discharged on Avycaz for UTI.     #Anemia secondary to Left upper arm hematoma - STABLE   - Hemoglobin stable   - ACE wrap recommended to bring down the swelling but not possible due to pain   - Restarted on Eliquis , No bleeding signs   - Trend hb , transfuse < 8     #Acute on Chronic Hypercapnic respiratory failure s/p BiPAP  #COPD exacerbation - RESOLVED   #R Pleural effusion sp thoracentesis (transudate)  #Prior history of pleural effusion/s/p pigtail catheter, 1 cytology suspicious for malignant cells, multiple thoras afterwards with negative cytology  #Sepsis  not POA  - RVP negative, urine, blood and pleural fluid cx negative. Nasal MRSA negative   - s/p thoracentesis 11/27 with 1L removed, transudative  - s/p Prednisone   - Currently on Baseline O2 maintaining saturation , no shortness of breath , no fever   - Chest Examination clear     #Multi-drug Resistant Klebsiella Bacteriemia with Acute Cystitis - prior admission october 2023  #Acute Urinary retention s/p al placement 11/30 AM  - s/p Avycaz q8h end date 11/8   - per documentation, patient has a chronic al    #Leukocytosis- RESOLVED   - likely reactive from the hematoma     #Acute on Chronic  congestive heart failure   - improving   - Echo with EF 64%  - BNP 11,224  - Lasix 40 mg PO started   - No overload features on examination , no Pleural effusion noted on Chest Xray   -   #Elevated ALP    - RuQ Sono: Grossly unremarkable with Gallbladder wall 2.9 cm.  - Follow up Outpatient     #Chronic AFIB    - On Metoprolol 25 mg BID and on Eliquis  - rate controlled     #Neck Mass   - chronic, had prior work up/biopsy/no further interventions at this time. Family aware    #Sacral Ulcer POA   - s/p wound care eval, wound care per recs     #MISC  Diet: DASH, puree  DVT: Eliquis   Pending :     hemoglobin trending , Transfuse less than 8  DC planning ,STR placement

## 2023-12-17 NOTE — PROGRESS NOTE ADULT - SUBJECTIVE AND OBJECTIVE BOX
24H events:    Patient is a 88y old Female who presents with a chief complaint of COPD (16 Dec 2023 13:40)    Primary diagnosis of Altered mental status    Today is hospital day 23d. This morning patient was seen and examined at bedside, resting comfortably in bed.    No acute or major events overnight.      PAST MEDICAL & SURGICAL HISTORY  HTN (hypertension)    Afib  s/p ablation 2001    Goiter  no meds    Cellulitis  chronic    OA (osteoarthritis)    PVD (peripheral vascular disease)    COPD (chronic obstructive pulmonary disease)    Anxiety    Obesity    Acute on chronic systolic congestive heart failure    Edema of both lower legs    Required emergent intubation    H/O abdominal hysterectomy    H/O discectomy    H/O total knee replacement, bilateral      SOCIAL HISTORY:  Social History:      ALLERGIES:  penicillin (Other)  contrast media (iodine-based) (Other)  codeine (Other)  sulfa drugs (Hives; Other)  aspirin (Other)    MEDICATIONS:  STANDING MEDICATIONS  apixaban 5 milliGRAM(s) Oral two times a day  chlorhexidine 2% Cloths 1 Application(s) Topical <User Schedule>  furosemide    Tablet 40 milliGRAM(s) Oral daily  magnesium oxide 400 milliGRAM(s) Oral three times a day with meals  metoprolol tartrate 25 milliGRAM(s) Oral every 8 hours  nystatin Powder 1 Application(s) Topical two times a day  pantoprazole    Tablet 40 milliGRAM(s) Oral before breakfast  polyethylene glycol 3350 17 Gram(s) Oral daily  senna 2 Tablet(s) Oral at bedtime    PRN MEDICATIONS  acetaminophen     Tablet .. 650 milliGRAM(s) Oral every 6 hours PRN  aluminum hydroxide/magnesium hydroxide/simethicone Suspension 30 milliLiter(s) Oral every 4 hours PRN  melatonin 3 milliGRAM(s) Oral at bedtime PRN  metoprolol tartrate 25 milliGRAM(s) Oral once PRN  ondansetron Injectable 4 milliGRAM(s) IV Push every 8 hours PRN    VITALS:   T(F): 96.8  HR: 98  BP: 104/74  RR: 18  SpO2: 94%    PHYSICAL EXAM:  GENERAL:   ( x ) NAD, lying in bed comfortably     (  ) obtunded     (  ) lethargic     (  ) somnolent    HEAD:   ( x ) Atraumatic     (  ) hematoma     (  ) laceration (specify location:       )     NECK:  ( x ) Supple     (  ) neck stiffness     (  ) nuchal rigidity     (  )  no JVD     (  ) JVD present ( -- cm)    HEART:  Rate -->     (  ) normal rate     (  ) bradycardic     ( x ) tachycardic  Rhythm -->     (  ) regular     (  ) regularly irregular     ( x ) irregularly irregular  Murmurs -->     (x  ) normal s1s2     (  ) systolic murmur     (  ) diastolic murmur     (  ) continuous murmur      (  ) S3 present     (  ) S4 present    LUNGS: On 1 L NC of O2   ( x )Unlabored respirations     (  ) tachypnea  ( x ) B/L air entry     (  ) decreased breath sounds in:  (location     )    (  ) no adventitious sound     (  ) crackles     (  ) wheezing      (  ) rhonchi      (specify location:       )  (  ) chest wall tenderness (specify location:       )    ABDOMEN:   (x  ) Soft     (  ) tense   |   ( x ) nondistended     (  ) distended   |   ( x ) +BS     (  ) hypoactive bowel sounds     (  ) hyperactive bowel sounds  (  ) nontender     (  ) RUQ tenderness     (  ) RLQ tenderness     (  ) LLQ tenderness     (  ) epigastric tenderness     (  ) diffuse tenderness  (  ) Splenomegaly      (  ) Hepatomegaly      (  ) Jaundice     (  ) ecchymosis     EXTREMITIES:  Left arm swelling with ecchymosis , painful on palpation , restricted range of motion     NERVOUS SYSTEM:    ( x ) A&Ox3     (  ) confused     (  ) lethargic  CN II-XII:     (  ) Intact     (  ) deficits found     (Specify:     )   Upper extremities:     (  ) no sensorimotor deficits     (  ) weakness     (  ) loss of proprioception/vibration     (  ) loss of touch/temperature (specify:    )  Lower extremities:     (  ) no sensorimotor deficits     (  ) weakness     (  ) loss of proprioception/vibration     (  ) loss of touch/temperature (specify:    )        ( x ) Indwelling Ba Catheter: Chronic Foleys present       LABS:                        10.1   10.44 )-----------( 223      ( 17 Dec 2023 08:34 )             31.1     12-17    139  |  100  |  25<H>  ----------------------------<  77  4.0   |  26  |  1.0    Ca    8.6      17 Dec 2023 08:34  Mg     1.9     12-17    TPro  5.3<L>  /  Alb  2.9<L>  /  TBili  1.0  /  DBili  x   /  AST  16  /  ALT  15  /  AlkPhos  297<H>  12-17      Urinalysis Basic - ( 17 Dec 2023 08:34 )    Color: x / Appearance: x / SG: x / pH: x  Gluc: 77 mg/dL / Ketone: x  / Bili: x / Urobili: x   Blood: x / Protein: x / Nitrite: x   Leuk Esterase: x / RBC: x / WBC x   Sq Epi: x / Non Sq Epi: x / Bacteria: x

## 2023-12-17 NOTE — PROGRESS NOTE ADULT - ATTENDING COMMENTS
89 yo F PMHx HTN, chronic Afib, OA, PVD, COPD requiring two intubations, anxiety, obesity, chronic systolic congestive heart failure, abdominal hysterectomy, discectomy, total knee replacement, bilateral pleural effusions s/p thoracentesis with a left side drain since March as per daughter, presents from NH for evaluation of altered mental status. She was recently discharged on Avycaz for UTI.     #Acute blood loss anemia 2/2 LUE hematoma  s/p 2U PRBC 12/08 for hg 6.5   CT LUE 12/08: Large hematoma throughout entire length of the arm, measuring 8*7*25 cm  CT abd negative   Plan:   - Vascular following - recommended holding AC for 10 days, now resumed   - ACE wrap if can tolerate   - Elevate arm at all times   - Monitor CBC daily   - Resumed Eliquis 12/15     #Acute on Chronic Hypercapnic respiratory failure s/p BiPAP  #COPD exacerbation   #R Pleural effusion sp thoracentesis (transudate)  #Prior history of pleural effusion/s/p pigtail catheter, 1 cytology suspicious for malignant cells, multiple thoras afterwards with negative cytology  #Sepsis  not POA  - RVP negative, urine, blood and pleural fluid cx negative. 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 Pacheco    #Elevated ALP    - RuQ Sono: Grossly unremarkable with Gallbladder wall 2.9 cm. Monitor     #Chronic AFIB    - C/w BB, increased to 50mg BID  - resumed Eliquis      #Neck Mass   - chronic, had prior work up/biopsy/no further interventions at this time. Family aware    #Sacral Ulcer POA   - s/p wound care eval, wound care per recs     DVT Ppx:  Heparin     Prognosis guarded     #Progress Note Handoff  Pending (specify):   monitor hgb, c/w AC, BURN planning for evacuation on day 15  Plan of care d/w pt and son   Dispo: [facility] 87 yo F PMHx HTN, chronic Afib, OA, PVD, COPD requiring two intubations, anxiety, obesity, chronic systolic congestive heart failure, abdominal hysterectomy, discectomy, total knee replacement, bilateral pleural effusions s/p thoracentesis with a left side drain since March as per daughter, presents from NH for evaluation of altered mental status. She was recently discharged on Avycaz for UTI.     #Acute blood loss anemia 2/2 LUE hematoma  s/p 2U PRBC 12/08 for hg 6.5   CT LUE 12/08: Large hematoma throughout entire length of the arm, measuring 8*7*25 cm  CT abd negative   Plan:   - Vascular following - recommended holding AC for 10 days, now resumed   - ACE wrap if can tolerate   - Elevate arm at all times   - Monitor CBC daily   - Resumed Eliquis 12/15     #Acute on Chronic Hypercapnic respiratory failure s/p BiPAP  #COPD exacerbation   #R Pleural effusion sp thoracentesis (transudate)  #Prior history of pleural effusion/s/p pigtail catheter, 1 cytology suspicious for malignant cells, multiple thoras afterwards with negative cytology  #Sepsis  not POA  - RVP negative, urine, blood and pleural fluid cx negative. 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 Pacheco    #Elevated ALP    - RuQ Sono: Grossly unremarkable with Gallbladder wall 2.9 cm. Monitor     #Chronic AFIB    - C/w BB, increased to 50mg BID  - resumed Eliquis      #Neck Mass   - chronic, had prior work up/biopsy/no further interventions at this time. Family aware    #Sacral Ulcer POA   - s/p wound care eval, wound care per recs     DVT Ppx:  Heparin     Prognosis guarded     #Progress Note Handoff  Pending (specify):   monitor hgb, c/w AC, BURN planning for evacuation on day 15  Plan of care d/w pt and son   Dispo: [facility]

## 2023-12-18 NOTE — DISCHARGE NOTE PROVIDER - NSDCMRMEDTOKEN_GEN_ALL_CORE_FT
budesonide-formoterol 80 mcg-4.5 mcg/inh inhalation aerosol: 2 puff(s) inhaled 2 times a day  Eliquis 5 mg oral tablet: 1 tab(s) orally 2 times a day  ferrous sulfate 325 mg (65 mg elemental iron) oral tablet: 1 tab(s) orally once a day  furosemide 20 mg oral tablet: 1 tab(s) orally once a day  gabapentin 100 mg oral capsule: 1 cap(s) orally 3 times a day  gabapentin 100 mg oral tablet: 1 cap(s) orally 3 times a day  lidocaine 4% topical film: Apply topically to affected area once a day  magnesium oxide 400 mg oral tablet: 1 tab(s) orally 3 times a day (with meals)  Metoprolol Tartrate 25 mg oral tablet: 1 tab(s) orally 2 times a day  metoprolol tartrate 25 mg oral tablet: 1 tab(s) orally 2 times a day  nystatin 100,000 units/g topical powder: Apply topically to affected area 2 times a day 1 Apply topically to affected area 2 times a day  pantoprazole 40 mg oral delayed release tablet: 1 tab(s) orally once a day (before a meal)  polyethylene glycol 3350 oral powder for reconstitution: 17 gram(s) orally once a day as needed for  constipation  saccharomyces boulardii lyo 250 mg oral capsule: 1 cap(s) orally 2 times a day  Senna 8.6 mg oral tablet: 2 tab(s) orally once a day (at bedtime)  Spiriva HandiHaler 18 mcg inhalation capsule: 1 cap(s) inhaled once a day  tiotropium 18 mcg inhalation capsule: 1 cap(s) inhaled once a day   budesonide-formoterol 80 mcg-4.5 mcg/inh inhalation aerosol: 2 puff(s) inhaled 2 times a day  Eliquis 5 mg oral tablet: 1 tab(s) orally 2 times a day  ferrous sulfate 325 mg (65 mg elemental iron) oral tablet: 1 tab(s) orally once a day  furosemide 20 mg oral tablet: 1 tab(s) orally once a day  gabapentin 100 mg oral capsule: 1 cap(s) orally 3 times a day  gabapentin 100 mg oral tablet: 1 cap(s) orally 3 times a day  lidocaine 4% topical film: Apply topically to affected area once a day  magnesium oxide 400 mg oral tablet: 1 tab(s) orally 3 times a day (with meals)  metoprolol tartrate 75 mg oral tablet: 1 tab(s) orally 2 times a day Holding Parameter  Systolic BP &lt; 100   Diastolic BP &lt; 60   HR &lt; 60  nystatin 100,000 units/g topical powder: Apply topically to affected area 2 times a day 1 Apply topically to affected area 2 times a day  pantoprazole 40 mg oral delayed release tablet: 1 tab(s) orally once a day (before a meal)  polyethylene glycol 3350 oral powder for reconstitution: 17 gram(s) orally once a day as needed for  constipation  saccharomyces boulardii lyo 250 mg oral capsule: 1 cap(s) orally 2 times a day  Senna 8.6 mg oral tablet: 2 tab(s) orally once a day (at bedtime)  Spiriva HandiHaler 18 mcg inhalation capsule: 1 cap(s) inhaled once a day  tiotropium 18 mcg inhalation capsule: 1 cap(s) inhaled once a day

## 2023-12-18 NOTE — DISCHARGE NOTE PROVIDER - PROVIDER TOKENS
PROVIDER:[TOKEN:[[id number]],FOLLOWUP:[1 week]],PROVIDER:[TOKEN:[[id number]],FOLLOWUP:[2 weeks]],PROVIDER:[TOKEN:[[id number]],FOLLOWUP:[2 weeks]],PROVIDER:[TOKEN:[[id number]],FOLLOWUP:[1 week]] PROVIDER:[TOKEN:[[id number]],FOLLOWUP:[1 week]],PROVIDER:[TOKEN:[[id number]],FOLLOWUP:[2 weeks]],PROVIDER:[TOKEN:[[id number]],FOLLOWUP:[2 weeks]],PROVIDER:[TOKEN:[[id number]],FOLLOWUP:[1 week]]

## 2023-12-18 NOTE — DISCHARGE NOTE PROVIDER - NPI NUMBER (FOR SYSADMIN USE ONLY) :
[[id number]],[[id number]] [[id number]],[[id number]],[[id number]]

## 2023-12-18 NOTE — PROGRESS NOTE ADULT - ASSESSMENT
89 yo F PMHx HTN, chronic Afib, OA, PVD, COPD requiring two intubations, anxiety, obesity, chronic systolic congestive heart failure, abdominal hysterectomy, discectomy, total knee replacement, bilateral pleural effusions s/p thoracentesis with a left side drain since March as per daughter, presents from NH for evaluation of altered mental status. She was recently discharged on Avycaz for UTI.  87 yo F PMHx HTN, chronic Afib, OA, PVD, COPD requiring two intubations, anxiety, obesity, chronic systolic congestive heart failure, abdominal hysterectomy, discectomy, total knee replacement, bilateral pleural effusions s/p thoracentesis with a left side drain since March as per daughter, presents from NH for evaluation of altered mental status. She was recently discharged on Avycaz for UTI.

## 2023-12-18 NOTE — PROGRESS NOTE ADULT - ATTENDING SUPERVISION STATEMENT
Fellow
Resident
Fellow

## 2023-12-18 NOTE — PROGRESS NOTE ADULT - SUBJECTIVE AND OBJECTIVE BOX
24H events:    Patient is a 88y old Female who presents with a chief complaint of COPD (17 Dec 2023 11:57)    Primary diagnosis of Altered mental status      Today is hospital day 24d. This morning patient was seen and examined at bedside, resting comfortably in bed.    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    Cellulitis  chronic    OA (osteoarthritis)    PVD (peripheral vascular disease)    COPD (chronic obstructive pulmonary disease)    Anxiety    Obesity    Acute on chronic systolic congestive heart failure    Edema of both lower legs    Required emergent intubation    H/O abdominal hysterectomy    H/O discectomy    H/O total knee replacement, bilateral      SOCIAL HISTORY:  Social History:      ALLERGIES:  penicillin (Other)  contrast media (iodine-based) (Other)  codeine (Other)  sulfa drugs (Hives; Other)  aspirin (Other)    MEDICATIONS:  STANDING MEDICATIONS  apixaban 5 milliGRAM(s) Oral two times a day  chlorhexidine 2% Cloths 1 Application(s) Topical <User Schedule>  furosemide    Tablet 40 milliGRAM(s) Oral daily  magnesium oxide 400 milliGRAM(s) Oral three times a day with meals  metoprolol tartrate 50 milliGRAM(s) Oral two times a day  nystatin Powder 1 Application(s) Topical two times a day  pantoprazole    Tablet 40 milliGRAM(s) Oral before breakfast  polyethylene glycol 3350 17 Gram(s) Oral daily  senna 2 Tablet(s) Oral at bedtime    PRN MEDICATIONS  acetaminophen     Tablet .. 650 milliGRAM(s) Oral every 6 hours PRN  aluminum hydroxide/magnesium hydroxide/simethicone Suspension 30 milliLiter(s) Oral every 4 hours PRN  melatonin 3 milliGRAM(s) Oral at bedtime PRN  metoprolol tartrate 25 milliGRAM(s) Oral once PRN  ondansetron Injectable 4 milliGRAM(s) IV Push every 8 hours PRN    VITALS:   T(F): 96.9  HR: 112  BP: 86/54  RR: 17  SpO2: 95%    PHYSICAL EXAM:  GENERAL:   ( x ) NAD, lying in bed comfortably     (  ) obtunded     (  ) lethargic     (  ) somnolent    HEAD:   ( x ) Atraumatic     (  ) hematoma     (  ) laceration (specify location:       )     NECK:  ( x ) Supple     (  ) neck stiffness     (  ) nuchal rigidity     (  )  no JVD     (  ) JVD present ( -- cm)    HEART:  Rate -->     (  ) normal rate     (  ) bradycardic     ( x ) tachycardic  Rhythm -->     (  ) regular     (  ) regularly irregular     ( x ) irregularly irregular  Murmurs -->     (x  ) normal s1s2     (  ) systolic murmur     (  ) diastolic murmur     (  ) continuous murmur      (  ) S3 present     (  ) S4 present    LUNGS: On 1 L NC of O2   ( x )Unlabored respirations     (  ) tachypnea  ( x ) B/L air entry     (  ) decreased breath sounds in:  (location     )    (  ) no adventitious sound     (  ) crackles     (  ) wheezing      (  ) rhonchi      (specify location:       )  (  ) chest wall tenderness (specify location:       )    ABDOMEN:   (x  ) Soft     (  ) tense   |   ( x ) nondistended     (  ) distended   |   ( x ) +BS     (  ) hypoactive bowel sounds     (  ) hyperactive bowel sounds  (  ) nontender     (  ) RUQ tenderness     (  ) RLQ tenderness     (  ) LLQ tenderness     (  ) epigastric tenderness     (  ) diffuse tenderness  (  ) Splenomegaly      (  ) Hepatomegaly      (  ) Jaundice     (  ) ecchymosis     EXTREMITIES:  Left arm swelling with ecchymosis , painful on palpation , restricted range of motion     NERVOUS SYSTEM:    ( x ) A&Ox3     (  ) confused     (  ) lethargic  CN II-XII:     (  ) Intact     (  ) deficits found     (Specify:     )   Upper extremities:     (  ) no sensorimotor deficits     (  ) weakness     (  ) loss of proprioception/vibration     (  ) loss of touch/temperature (specify:    )  Lower extremities:     (  ) no sensorimotor deficits     (  ) weakness     (  ) loss of proprioception/vibration     (  ) loss of touch/temperature (specify:    )        ( x ) Indwelling Ba Catheter: Chronic Foleys present   LABS:                        10.1   10.49 )-----------( 239      ( 18 Dec 2023 08:07 )             31.4     12-17    139  |  100  |  25<H>  ----------------------------<  77  4.0   |  26  |  1.0    Ca    8.6      17 Dec 2023 08:34  Mg     1.9     12-17    TPro  5.3<L>  /  Alb  2.9<L>  /  TBili  1.0  /  DBili  x   /  AST  16  /  ALT  15  /  AlkPhos  297<H>  12-17      Urinalysis Basic - ( 17 Dec 2023 08:34 )    Color: x / Appearance: x / SG: x / pH: x  Gluc: 77 mg/dL / Ketone: x  / Bili: x / Urobili: x   Blood: x / Protein: x / Nitrite: x   Leuk Esterase: x / RBC: x / WBC x   Sq Epi: x / Non Sq Epi: x / Bacteria: x

## 2023-12-18 NOTE — PROGRESS NOTE ADULT - REASON FOR ADMISSION
COPD

## 2023-12-18 NOTE — DISCHARGE NOTE PROVIDER - CARE PROVIDER_API CALL
Barron King  Internal Medicine  [address] [place] [address]  Phone: [phone]  Fax: [phone]  Follow Up Time: 1 week    Edwige Singleton  Internal Medicine  [address] [place] [address]  Phone: [phone]  Fax: [phone]  Follow Up Time: 2 weeks    Pierre Trimlbe  Pulmonary Disease  [address] [place] [address]  Phone: [phone]  Fax: [phone]  Follow Up Time: 2 weeks    Kaushik Pacheco  Cardiology  [address] [address]  Phone: [phone]  Fax: [phone]  Follow Up Time: 1 week   Barron King  Internal Medicine  [address] [place] [address]  Phone: [phone]  Fax: [phone]  Follow Up Time: 1 week    Edwige Singleton  Internal Medicine  [address] [place] [address]  Phone: [phone]  Fax: [phone]  Follow Up Time: 2 weeks    Pierre Trimble  Pulmonary Disease  [address] [place] [address]  Phone: [phone]  Fax: [phone]  Follow Up Time: 2 weeks    Kaushik Pacheco  Cardiology  [address] [address]  Phone: [phone]  Fax: [phone]  Follow Up Time: 1 week

## 2023-12-18 NOTE — PROGRESS NOTE ADULT - ATTENDING COMMENTS
COVID  - s/p vent  - improved    Afib  - stable    Neck mass  - outpt f/u    medically stable for d/c, pending auth to [facility]

## 2023-12-18 NOTE — DISCHARGE NOTE PROVIDER - ATTENDING DISCHARGE PHYSICAL EXAMINATION:
daughter at bedside    T(F): , Max: 98.2 (12-18-23 @ 19:16)  HR: 101 (12-18-23 @ 19:16) (101 - 135)  BP: 117/74 (12-18-23 @ 19:16)  RR: 17 (12-18-23 @ 19:16)  SpO2: --  IN: 0 mL / OUT: 900 mL / NET: -900 mL      General: No apparent distress  Cardiovascular: S1, S2  Gastrointestinal: Soft, Non-tender, Non-distended  Respiratory: Good air entry bilaterally  Musculoskeletal: Moves all extremities  Lymphatic: No edema  Neurologic: No gross motor deficit  Dermatologic: Skin dry                          10.1   10.49 )-----------( 239      ( 18 Dec 2023 08:07 )             31.4     12-18    138  |  100  |  28<H>  ----------------------------<  91  4.0   |  27  |  1.2    Ca    8.7      18 Dec 2023 08:07  Mg     1.8     12-18    TPro  5.3<L>  /  Alb  2.9<L>  /  TBili  1.0  /  DBili  x   /  AST  16  /  ALT  15  /  AlkPhos  297<H>  12-17

## 2023-12-18 NOTE — PROGRESS NOTE ADULT - PROVIDER SPECIALTY LIST ADULT
Critical Care
Hospitalist
Hospitalist
Internal Medicine
Critical Care
Internal Medicine
Vascular Surgery
Vascular Surgery
Critical Care
Internal Medicine
MICU
Critical Care
Hospitalist
Internal Medicine
Internal Medicine
MICU
Pulmonology
Burn
Internal Medicine
Internal Medicine
Hospitalist
Infectious Disease
Internal Medicine
Internal Medicine
Palliative Care
Critical Care
Infectious Disease
Infectious Disease

## 2023-12-18 NOTE — DISCHARGE NOTE PROVIDER - HOSPITAL COURSE
History of Presenting Illness:   88 year old female with PMH of HTN, Afib, OA, PVD, COPD, Anxiety, Obesity, Acute on chronic systolic congestive heart failure, H/O abdominal hysterectomy, H/O discectomy, H/O total knee replacement, bilateral pleural effusions s/p thoracentesis with a left side drain since March as per daughter, presents sent in by NH for altered mental status for the since yesterday morning, which has been worsening. She was recently discharged on Avycaz for UTI. As per daughter the patient had 2 intubations in the past for copd exacerbation. The left side drain has no output. The daughter mentioned that patient has been mentioning burning of urination and pain in the back( which is attributed to her sacral ulcer).  Denies any recent fevers, chills, N/V/D, headaches, chest pain, SOB.      Hospital Course:   87 yo F PMHx HTN, chronic Afib, OA, PVD, COPD requiring two intubations, anxiety, obesity, chronic systolic congestive heart failure, abdominal hysterectomy, discectomy, total knee replacement, bilateral pleural effusions s/p thoracentesis with a left side drain since March as per daughter, presents from NH for evaluation of altered mental status. She was recently discharged on Avycaz for UTI.     #Anemia secondary to Left upper arm hematoma - STABLE   - Hemoglobin stable   - ACE wrap recommended to bring down the swelling but not possible due to pain   - Restarted on Eliquis , No bleeding signs   - Trend hb , transfuse < 8     #Acute on Chronic Hypercapnic respiratory failure s/p BiPAP  #COPD exacerbation - RESOLVED   #R Pleural effusion sp thoracentesis (transudate)  #Prior history of pleural effusion/s/p pigtail catheter, 1 cytology suspicious for malignant cells, multiple thoras afterwards with negative cytology  #Sepsis  not POA  - RVP negative, urine, blood and pleural fluid cx negative. Nasal MRSA negative   - s/p thoracentesis 11/27 with 1L removed, transudative  - s/p Prednisone   - Currently on Baseline O2 maintaining saturation , no shortness of breath , no fever   - Chest Examination clear     #Multi-drug Resistant Klebsiella Bacteriemia with Acute Cystitis - prior admission october 2023  #Acute Urinary retention s/p al placement 11/30 AM  - s/p Avycaz q8h end date 11/8   - per documentation, patient has a chronic al    #Leukocytosis- RESOLVED   - likely reactive from the hematoma     #Acute on Chronic  congestive heart failure   - improving   - Echo with EF 64%  - BNP 11,224  - Lasix 40 mg PO started   - No overload features on examination , no Pleural effusion noted on Chest Xray   -   #Elevated ALP    - RuQ Sono: Grossly unremarkable with Gallbladder wall 2.9 cm.  - Follow up Outpatient     #Chronic AFIB    - On Metoprolol 25 mg BID and on Eliquis  - rate controlled     #Neck Mass   - chronic, had prior work up/biopsy/no further interventions at this time. Family aware    #Sacral Ulcer POA   - s/p wound care eval, wound care per recs          History of Presenting Illness:   88 year old female with PMH of HTN, Afib, OA, PVD, COPD, Anxiety, Obesity, Acute on chronic systolic congestive heart failure, H/O abdominal hysterectomy, H/O discectomy, H/O total knee replacement, bilateral pleural effusions s/p thoracentesis with a left side drain since March as per daughter, presents sent in by NH for altered mental status for the since yesterday morning, which has been worsening. She was recently discharged on Avycaz for UTI. As per daughter the patient had 2 intubations in the past for copd exacerbation. The left side drain has no output. The daughter mentioned that patient has been mentioning burning of urination and pain in the back( which is attributed to her sacral ulcer).  Denies any recent fevers, chills, N/V/D, headaches, chest pain, SOB.      Hospital Course:   89 yo F PMHx HTN, chronic Afib, OA, PVD, COPD requiring two intubations, anxiety, obesity, chronic systolic congestive heart failure, abdominal hysterectomy, discectomy, total knee replacement, bilateral pleural effusions s/p thoracentesis with a left side drain since March as per daughter, presents from NH for evaluation of altered mental status. She was recently discharged on Avycaz for UTI.     #Anemia secondary to Left upper arm hematoma - STABLE   - Hemoglobin stable   - ACE wrap recommended to bring down the swelling but not possible due to pain   - Restarted on Eliquis , No bleeding signs   - Trend hb , transfuse < 8     #Acute on Chronic Hypercapnic respiratory failure s/p BiPAP  #COPD exacerbation - RESOLVED   #R Pleural effusion sp thoracentesis (transudate)  #Prior history of pleural effusion/s/p pigtail catheter, 1 cytology suspicious for malignant cells, multiple thoras afterwards with negative cytology  #Sepsis  not POA  - RVP negative, urine, blood and pleural fluid cx negative. Nasal MRSA negative   - s/p thoracentesis 11/27 with 1L removed, transudative  - s/p Prednisone   - Currently on Baseline O2 maintaining saturation , no shortness of breath , no fever   - Chest Examination clear     #Multi-drug Resistant Klebsiella Bacteriemia with Acute Cystitis - prior admission october 2023  #Acute Urinary retention s/p al placement 11/30 AM  - s/p Avycaz q8h end date 11/8   - per documentation, patient has a chronic al    #Leukocytosis- RESOLVED   - likely reactive from the hematoma     #Acute on Chronic  congestive heart failure   - improving   - Echo with EF 64%  - BNP 11,224  - Lasix 40 mg PO started   - No overload features on examination , no Pleural effusion noted on Chest Xray   -   #Elevated ALP    - RuQ Sono: Grossly unremarkable with Gallbladder wall 2.9 cm.  - Follow up Outpatient     #Chronic AFIB    - On Metoprolol 25 mg BID and on Eliquis  - rate controlled     #Neck Mass   - chronic, had prior work up/biopsy/no further interventions at this time. Family aware    #Sacral Ulcer POA   - s/p wound care eval, wound care per recs          History of Presenting Illness:   88 year old female with PMH of HTN, Afib, OA, PVD, COPD, Anxiety, Obesity, Acute on chronic systolic congestive heart failure, H/O abdominal hysterectomy, H/O discectomy, H/O total knee replacement, bilateral pleural effusions s/p thoracentesis with a left side drain since March as per daughter, presents sent in by NH for altered mental status for the since yesterday morning, which has been worsening. She was recently discharged on Avycaz for UTI. As per daughter the patient had 2 intubations in the past for copd exacerbation. The left side drain has no output. The daughter mentioned that patient has been mentioning burning of urination and pain in the back( which is attributed to her sacral ulcer).  Denies any recent fevers, chills, N/V/D, headaches, chest pain, SOB.      Hospital Course:   89 yo F PMHx HTN, chronic Afib, OA, PVD, COPD requiring two intubations, anxiety, obesity, chronic systolic congestive heart failure, abdominal hysterectomy, discectomy, total knee replacement, bilateral pleural effusions s/p thoracentesis with a left side drain since March as per daughter, presents from NH for evaluation of altered mental status. She was recently discharged on Avycaz for UTI.     #Anemia secondary to Left upper arm hematoma - STABLE   - Hemoglobin stable   - ACE wrap recommended to bring down the swelling but not possible due to pain   - Restarted on Eliquis , No bleeding signs   - Trend hb , transfuse < 8     #Acute on Chronic Hypercapnic respiratory failure s/p BiPAP  #COPD exacerbation - RESOLVED   #R Pleural effusion sp thoracentesis (transudate)  #Prior history of pleural effusion/s/p pigtail catheter, 1 cytology suspicious for malignant cells, multiple thoras afterwards with negative cytology  #Sepsis  not POA  - RVP negative, urine, blood and pleural fluid cx negative. Nasal MRSA negative   - s/p thoracentesis 11/27 with 1L removed, transudative  - s/p Prednisone   - Currently on Baseline O2 maintaining saturation , no shortness of breath , no fever   - Chest Examination clear     #Multi-drug Resistant Klebsiella Bacteriemia with Acute Cystitis - prior admission october 2023  #Acute Urinary retention s/p al placement 11/30 AM  - s/p Avycaz q8h end date 11/8   - per documentation, patient has a chronic al    #Leukocytosis- RESOLVED   - likely reactive from the hematoma     #Acute on Chronic  congestive heart failure   - improving   - Echo with EF 64%  - BNP 11,224  - Lasix 40 mg PO started   - No overload features on examination , no Pleural effusion noted on Chest Xray   -   #Elevated ALP    - RuQ Sono: Grossly unremarkable with Gallbladder wall 2.9 cm.  - Follow up Outpatient     #Chronic AFIB  - Increased Metoprolol to 75 mg BID      -on Eliquis  - rate controlled     #Neck Mass   - chronic, had prior work up/biopsy/no further interventions at this time. Family aware    #Sacral Ulcer POA   - s/p wound care eval, wound care per recs          History of Presenting Illness:   88 year old female with PMH of HTN, Afib, OA, PVD, COPD, Anxiety, Obesity, Acute on chronic systolic congestive heart failure, H/O abdominal hysterectomy, H/O discectomy, H/O total knee replacement, bilateral pleural effusions s/p thoracentesis with a left side drain since March as per daughter, presents sent in by NH for altered mental status for the since yesterday morning, which has been worsening. She was recently discharged on Avycaz for UTI. As per daughter the patient had 2 intubations in the past for copd exacerbation. The left side drain has no output. The daughter mentioned that patient has been mentioning burning of urination and pain in the back( which is attributed to her sacral ulcer).  Denies any recent fevers, chills, N/V/D, headaches, chest pain, SOB.      Hospital Course:   87 yo F PMHx HTN, chronic Afib, OA, PVD, COPD requiring two intubations, anxiety, obesity, chronic systolic congestive heart failure, abdominal hysterectomy, discectomy, total knee replacement, bilateral pleural effusions s/p thoracentesis with a left side drain since March as per daughter, presents from NH for evaluation of altered mental status. She was recently discharged on Avycaz for UTI.     #Anemia secondary to Left upper arm hematoma - STABLE   - Hemoglobin stable   - ACE wrap recommended to bring down the swelling but not possible due to pain   - Restarted on Eliquis , No bleeding signs   - Trend hb , transfuse < 8     #Acute on Chronic Hypercapnic respiratory failure s/p BiPAP  #COPD exacerbation - RESOLVED   #R Pleural effusion sp thoracentesis (transudate)  #Prior history of pleural effusion/s/p pigtail catheter, 1 cytology suspicious for malignant cells, multiple thoras afterwards with negative cytology  #Sepsis  not POA  - RVP negative, urine, blood and pleural fluid cx negative. Nasal MRSA negative   - s/p thoracentesis 11/27 with 1L removed, transudative  - s/p Prednisone   - Currently on Baseline O2 maintaining saturation , no shortness of breath , no fever   - Chest Examination clear     #Multi-drug Resistant Klebsiella Bacteriemia with Acute Cystitis - prior admission october 2023  #Acute Urinary retention s/p al placement 11/30 AM  - s/p Avycaz q8h end date 11/8   - per documentation, patient has a chronic al    #Leukocytosis- RESOLVED   - likely reactive from the hematoma     #Acute on Chronic  congestive heart failure   - improving   - Echo with EF 64%  - BNP 11,224  - Lasix 40 mg PO started   - No overload features on examination , no Pleural effusion noted on Chest Xray   -   #Elevated ALP    - RuQ Sono: Grossly unremarkable with Gallbladder wall 2.9 cm.  - Follow up Outpatient     #Chronic AFIB  - Increased Metoprolol to 75 mg BID      -on Eliquis  - rate controlled     #Neck Mass   - chronic, had prior work up/biopsy/no further interventions at this time. Family aware    #Sacral Ulcer POA   - s/p wound care eval, wound care per recs

## 2023-12-19 NOTE — CDI QUERY NOTE - NSCDINOTECODERNAME_GEN_A_CORE_FT
Georgette Gentile [facility] CCDS Georgette Gentile [facility] CCDS

## 2023-12-19 NOTE — DISCHARGE NOTE NURSING/CASE MANAGEMENT/SOCIAL WORK - NSDCPEFALRISK_GEN_ALL_CORE
For information on Fall & Injury Prevention, visit: [email or web address] OR  [email or web address] OR  https://www.cdc.gov/steadi/patient.html For information on Fall & Injury Prevention, visit: [email or web address] OR  [email or web address] OR  https://www.cdc.gov/steadi/patient.html

## 2023-12-19 NOTE — CDI QUERY NOTE - NSCDIOTHERTXTBX_GEN_ALL_CORE_HH
Based on your professional judgment and the clinical indicators, please clarify if the documentation of suspected GNR PNA can be further specified as:    • Suspected gram negative claritza pneumonia was treated and resolved  • Suspected gram negative claritza pneumonia was ruled out  • Other (please specify):  • Clinically unable to determine    CLINICAL INDICATORS  11/24  H&P Adult: … COPD exacerbation secondary to pneumonia …  c/w vanc and cefepime for now to cover for hospital acquired    11/25-11/26 Progress Note Adult-Hospitalist Attending: … Suspected GNR PNA    12/19 Discharge Note Provider : … COPD exacerbation: -Admitted in the CEU and due to your worsening condition you had to be on AVAPS and even transferred to the ICU. You were treated for pneumonia with antibiotics and also steroids for the flare.     Per MAR: Vancomycin IVPB. Indication: Empirirc dosing. Administered 11/25                 Cefepime IVPB. Indication: pneumonia. Administered 11/25-11/30    Thank you,  Georgette Gentile RN [facility] CCDS  [phone] Based on your professional judgment and the clinical indicators, please clarify if the documentation of suspected GNR PNA can be further specified as:    • Suspected gram negative claritza pneumonia was treated and resolved  • Suspected gram negative claritza pneumonia was ruled out  • Other (please specify):  • Clinically unable to determine    CLINICAL INDICATORS  11/24  H&P Adult: … COPD exacerbation secondary to pneumonia …  c/w vanc and cefepime for now to cover for hospital acquired    11/25-11/26 Progress Note Adult-Hospitalist Attending: … Suspected GNR PNA    12/19 Discharge Note Provider : … COPD exacerbation: -Admitted in the CEU and due to your worsening condition you had to be on AVAPS and even transferred to the ICU. You were treated for pneumonia with antibiotics and also steroids for the flare.     Per MAR: Vancomycin IVPB. Indication: Empirirc dosing. Administered 11/25                 Cefepime IVPB. Indication: pneumonia. Administered 11/25-11/30    Thank you,  Georgette Gentile RN [facility] CCDS  [phone]

## 2023-12-19 NOTE — DISCHARGE NOTE PROVIDER - PROVIDER TOKENS
PROVIDER:[TOKEN:[[id number]],FOLLOWUP:[2 weeks]],PROVIDER:[TOKEN:[[id number]],FOLLOWUP:[1 month]] [Negative] : Heme/Lymph

## 2023-12-19 NOTE — DISCHARGE NOTE NURSING/CASE MANAGEMENT/SOCIAL WORK - PATIENT PORTAL LINK FT
You can access the FollowMyHealth Patient Portal offered by [facility] by registering at the following website: [email or web address]. By joining [facility]’s FollowMyHealth portal, you will also be able to view your health information using other applications (apps) compatible with our system. You can access the FollowMyHealth Patient Portal offered by [facility] by registering at the following website: [email or web address]. By joining [facility]’s FollowMyHealth portal, you will also be able to view your health information using other applications (apps) compatible with our system.

## 2023-12-20 NOTE — CHART NOTE - NSCHARTNOTEFT_GEN_A_CORE
MICU DOWN GRADE NOTE      Patient is a 88y old  Female who presents with a chief complaint of COPD (27 Nov 2023 13:39)      HPI:88 year old female with PMH of HTN, Afib, OA, PVD, COPD, Anxiety, Obesity, Acute on chronic systolic congestive heart failure, H/O abdominal hysterectomy, H/O discectomy, H/O total knee replacement, bilateral pleural effusions s/p thoracentesis with a left side drain since March as per daughter, presents sent in by NH for altered mental status for the since yesterday morning, which has been worsening. She was recently discharged on Avycaz for UTI. As per daughter the patient had 2 intubations in the past for copd exacerbation. The left side drain has no output. The daughter mentioned that patient has been mentioning burning of urination and pain in the back( which is attributed to her sacral ulcer).  Denies any recent fevers, chills, N/V/D, headaches, chest pain, SOB.        INTERVAL HPI/OVERNIGHT EVENTS:  Patient admitted to CEU due to COpD exacerbation /HF exacerbation , PAtient was started on steroids 60 mg BID of solumedrol switched to 40 mg daily . And furosemide for her fluid overload ( initially on 60 mg BID , now on 40 mg daily IV ) .  Patient was seen By ID and started on cefepime for pneumonia .  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  The same bacteria showed up in the urine CX on this admission ( most probably culture taken from contaminated catheter ) .  Patient on therapeutic lovenox for AFIB . stopped for pleural tap   On 27/11 patient was lethargic in AM , Abg taken showed pH 7.29 , pco2 of 83 , patient started on Bipap , pleural tap on the right side of the lung was done yielding 1 L of fluid.  repeat Abg in PM showed Ph of 7.35 and pco2 of 72, patient switched  to AVAPS .  Patient was also still lethargic , CT brain ordered , Patient to be upgraded to ICU for further monitoring   Family informed , family (daughter) at bedside               REVIEW OF SYSTEMS:  CONSTITUTIONAL: lethargic   HEENT:  No blurry vision No sinus or throat pain  NECK: No pain or stiffness  RESPIRATORY: No cough, wheezing, chills or hemoptysis;  CARDIOVASCULAR: No chest pain, palpitations  GASTROINTESTINAL: No abdominal pain. No nausea, vomiting, or diarrhea  GENITOURINARY: No dysuria  NEUROLOGICAL: No HA, No focal weakness  SKIN: No itching, burning, rashes, or lesions   MUSCULOSKELETAL: No joint pain or swelling; No muscle, back, or extremity pain    MEDICATIONS:  acetaminophen     Tablet .. 650 milliGRAM(s) Oral every 6 hours PRN  albuterol    90 MICROgram(s) HFA Inhaler 2 Puff(s) Inhalation four times a day  albuterol/ipratropium for Nebulization 3 milliLiter(s) Nebulizer every 4 hours  aluminum hydroxide/magnesium hydroxide/simethicone Suspension 30 milliLiter(s) Oral every 4 hours PRN  cefepime   IVPB 1000 milliGRAM(s) IV Intermittent every 12 hours  chlorhexidine 2% Cloths 1 Application(s) Topical <User Schedule>  gabapentin 100 milliGRAM(s) Oral three times a day  melatonin 3 milliGRAM(s) Oral at bedtime PRN  methylPREDNISolone sodium succinate Injectable 40 milliGRAM(s) IV Push daily  metoprolol tartrate 25 milliGRAM(s) Oral two times a day  ondansetron Injectable 4 milliGRAM(s) IV Push every 8 hours PRN  pantoprazole    Tablet 40 milliGRAM(s) Oral before breakfast  polyethylene glycol 3350 17 Gram(s) Oral daily PRN  senna 2 Tablet(s) Oral at bedtime      T(C): 36.2 (11-27-23 @ 11:50), Max: 36.7 (11-26-23 @ 19:26)  HR: 94 (11-27-23 @ 11:50) (89 - 112)  BP: 100/55 (11-27-23 @ 11:50) (100/55 - 123/72)  RR: 18 (11-27-23 @ 11:50) (18 - 20)  SpO2: 100% (11-27-23 @ 11:50) (97% - 100%)  Wt(kg): --Vital Signs Last 24 Hrs  T(C): 36.2 (27 Nov 2023 11:50), Max: 36.7 (26 Nov 2023 19:26)  T(F): 97.1 (27 Nov 2023 11:50), Max: 98 (26 Nov 2023 19:26)  HR: 94 (27 Nov 2023 11:50) (89 - 112)  BP: 100/55 (27 Nov 2023 11:50) (100/55 - 123/72)  BP(mean): 70 (27 Nov 2023 11:50) (70 - 82)  RR: 18 (27 Nov 2023 11:50) (18 - 20)  SpO2: 100% (27 Nov 2023 11:50) (97% - 100%)    Parameters below as of 27 Nov 2023 11:50  Patient On (Oxygen Delivery Method): nasal cannula        PHYSICAL EXAM:  HEAD:  Atraumatic, Normocephalic  ENMT:  Moist mucous membranes  NECK: Supple, No JVD,  CHEST/LUNG: CBilateral crackles , decreased air entry at both bases   HEART: Irregular rate   ABDOMEN: Soft, Nontender, Nondistended; Bowel sounds present  NEURO: lethargic , arousable briefly to verbal stimuli   EXTREMITIES: No LE edema, no calf tenderness  LYMPH: No lymphadenopathy noted  SKIN: No rashes or lesions    Consultant(s) Notes Reviewed:  [x ] YES  [ ] NO  Care Discussed with Consultants/Other Providers [ x] YES  [ ] NO    LABS:                        9.7    10.11 )-----------( 210      ( 27 Nov 2023 06:07 )             33.1     11-27    143  |  100  |  34<H>  ----------------------------<  108<H>  4.4   |  35<H>  |  1.1    Ca    9.0      27 Nov 2023 06:07  Mg     1.8     11-27    TPro  6.4  /  Alb  3.4<L>  /  TBili  0.3  /  DBili  x   /  AST  12  /  ALT  <5  /  AlkPhos  152<H>  11-27      Urinalysis Basic - ( 27 Nov 2023 06:07 )    Color: x / Appearance: x / SG: x / pH: x  Gluc: 108 mg/dL / Ketone: x  / Bili: x / Urobili: x   Blood: x / Protein: x / Nitrite: x   Leuk Esterase: x / RBC: x / WBC x   Sq Epi: x / Non Sq Epi: x / Bacteria: x      CAPILLARY BLOOD GLUCOSE          ABG - ( 27 Nov 2023 14:00 )  pH, Arterial: 7.35  pH, Blood: x     /  pCO2: 72    /  pO2: 96    / HCO3: 40    / Base Excess: 11.0  /  SaO2: 99.4              Urinalysis Basic - ( 27 Nov 2023 06:07 )    Color: x / Appearance: x / SG: x / pH: x  Gluc: 108 mg/dL / Ketone: x  / Bili: x / Urobili: x   Blood: x / Protein: x / Nitrite: x   Leuk Esterase: x / RBC: x / WBC x   Sq Epi: x / Non Sq Epi: x / Bacteria: x        RADIOLOGY & ADDITIONAL TESTS:    Imaging Personally Reviewed:  [x ] YES  [ ] NO
PALLIATIVE MEDICINE INTERDISCIPLINARY TEAM NOTE    Provider:                                   Met with: [  x ] Patient  [   ] Family  [   ] Other:    Primary Language: [  x ] English [   ] Other*:                      *Interpretation provided by:    SUPPORT DIAGNOSES            (Check all that apply)    [   ] EOL issues  [ x  ] Advanced Illness  [   ] Cultural / spiritual concerns  [   ] Pain / suffering  [   ] Dementia / AMS  [   ] Other:  [   ] AD issues  [   ] Grief / loss / sadness  [   ] Discharge issues  [ x  ] Distress / coping    PSYCHOSOCIAL ASSESSMENT OF PATIENT         (Check all that apply)    [  x ] Initial Assessment            [   ] Reassessment          [   ] Not Applicable this visit    Pain/suffering acuity:  [ x  ] None to mild (0-3)           [   ] Moderate (4-6)        [   ] High (7-10)    Mental Status:  [   ] Alert/oriented (x3)          [ x  ] Confused/Altered(x2)         [   ] Non-resp     Functional status:  [   ] Independent w ADLs      [   ] Needs Assistance             [  x ] Bedbound/Full Care    Coping:  [   ] Coping well                     [   ] Coping w/difficulty            [   ] Poor coping   [ x ] unable to assess     Support system:  [  x ] Strong                              [   ] Adequate                        [   ] Inadequate      Past history and medications for:     [ ] Anxiety       [ ] Depression    [ ] Sleep disorders       SERVICE PROVIDED  [   ]Discharge support / facilitation  [   ]AD / goals of care counseling                                  [   ]EOL / death / bereavement counseling  [  x ]Counseling / support                                                [   ] Family meeting  [   ]Prayer / sacrament / ritual                                      [   ] Referral   [   ]Other                                                                       NOTE and Plan of Care (PoC):    patient is a 87 y/o F with pmhx of HTN, afib, OA, PVD, COPD, anxiety, obesity, acute on chronic systolic congestive heart failure, bilateral pleural effusions, presenting from NH for AMS. visited patient earlier today. patient known to team from prior admissions. patient on bipap at time visiti. no non verbal signs of pain or discomfort. no family at bedside at time of visit. will f/u [phone]
Suspected gram negative claritza pneumonia was treated and resolved
Transfer Note    Transfer from: ICU    Transfer to: (  ) Medicine    (  ) Telemetry     (  ) RCU       (X) Stepdown    (  ) VENT                        (  ) Palliative    (  ) Stroke Unit    (  ) MICU    (  ) CCU    ----------------------------------------------------------------------------------------------------------  HPI / ICU COURSE:    HPI:  88 year old female with PMH of HTN, Afib, OA, PVD, COPD, Anxiety, Obesity, Acute on chronic systolic congestive heart failure, H/O abdominal hysterectomy, H/O discectomy, H/O total knee replacement, bilateral pleural effusions s/p thoracentesis with a left side drain since March as per daughter, presents sent in by NH for altered mental status for the since yesterday morning, which has been worsening. She was recently discharged on Avycaz for UTI. As per daughter the patient had 2 intubations in the past for copd exacerbation. The left side drain has no output. The daughter mentioned that patient has been mentioning burning of urination and pain in the back( which is attributed to her sacral ulcer).  Denies any recent fevers, chills, N/V/D, headaches, chest pain, SOB.    CEU Coarse  Patient admitted to CEU due to COpD exacerbation /HF exacerbation , PAtient was started on steroids 60 mg BID of solumedrol switched to 40 mg daily . And furosemide for her fluid overload ( initially on 60 mg BID , now on 40 mg daily IV ) .  Patient was seen By ID and started on cefepime for pneumonia .  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  The same bacteria showed up in the urine CX on this admission ( most probably culture taken from contaminated catheter ) .  Patient on therapeutic lovenox for AFIB . stopped for pleural tap   On 27/11 patient was lethargic in AM , Abg taken showed pH 7.29 , pco2 of 83 , patient started on Bipap , pleural tap on the right side of the lung was done yielding 1 L of fluid.  repeat Abg in PM showed Ph of 7.35 and pco2 of 72, patient switched  to AVAPS .  Patient was also still lethargic , CT brain ordered , Patient to be upgraded to ICU for further monitoring   Family informed , family (daughter) at bedside     ICU:    In the ICU patient was closely monitored and found to have improved an improved mental status and oxygenation. Wound consult was placed for sacral ulcer. AC restarted s/p tap procedure.     --------------------------------------------------------------------------------------------------------  PMH/PSH:  PAST MEDICAL & SURGICAL HISTORY:  HTN (hypertension)    Afib  s/p ablation 2001    Goiter  no meds    Cellulitis  chronic    OA (osteoarthritis)    PVD (peripheral vascular disease)    COPD (chronic obstructive pulmonary disease)    Anxiety    Obesity    Acute on chronic systolic congestive heart failure    Edema of both lower legs    Required emergent intubation    H/O abdominal hysterectomy    H/O discectomy    H/O total knee replacement, bilateral      -------------------------------------------------------------------------------------------------------  PHYSICAL EXAM:  CONSTITUTIONAL: No acute distress. On 2L  NC  SKIN: Stage 2 sacral ulcer  HEAD: Normocephalic; atraumatic.  EYES: Pupils equal round reactive to light; conjunctiva and sclera clear.  NECK: Supple; non tender. No rigidity  CARD: Regular rate and rhythm. Normal S1, S2; no murmurs, gallops, or rubs.  RESP: Lungs clear to auscultation bilaterally. No wheezes, rales or rhonchi.  ABD: Abdomen soft; non-tender; non-distended;    EXT: No clubbing or cyanosis  NEURO: Awake and alert. No focal deficits.  PSYCH: Cooperative      Vital Signs Last 24 Hrs  T(C): 36.7 (28 Nov 2023 04:00), Max: 36.7 (28 Nov 2023 04:00)  T(F): 98 (28 Nov 2023 04:00), Max: 98 (28 Nov 2023 04:00)  HR: 99 (28 Nov 2023 11:00) (87 - 108)  BP: 100/51 (28 Nov 2023 11:00) (99/54 - 138/84)  BP(mean): 74 (28 Nov 2023 11:00) (72 - 106)  RR: 15 (28 Nov 2023 11:00) (15 - 36)  SpO2: 100% (28 Nov 2023 11:00) (95% - 100%)    Parameters below as of 28 Nov 2023 10:00  Patient On (Oxygen Delivery Method): nasal cannula  O2 Flow (L/min): 2      I&O's Summary    27 Nov 2023 07:01  -  28 Nov 2023 07:00  --------------------------------------------------------  IN: 170 mL / OUT: 1050 mL / NET: -880 mL    28 Nov 2023 07:01  -  28 Nov 2023 13:25  --------------------------------------------------------  IN: 240 mL / OUT: 240 mL / NET: 0 mL        --------------------------------------------------------------------------------------------------------  LABS:                               11.0   10.69 )-----------( 166      ( 28 Nov 2023 05:19 )             37.4       11-28    144  |  98  |  35<H>  ----------------------------<  75  4.9   |  32  |  1.1    Ca    8.7      28 Nov 2023 05:19  Mg     1.8     11-27    TPro  6.1  /  Alb  3.1<L>  /  TBili  0.4  /  DBili  x   /  AST  13  /  ALT  <5  /  AlkPhos  142<H>  11-28          ABG - ( 27 Nov 2023 18:04 )  pH, Arterial: 7.37  pH, Blood: x     /  pCO2: 69    /  pO2: 79    / HCO3: 40    / Base Excess: 11.6  /  SaO2: 97.7                CULTURE RESULTS:                11-24-23 @ 14:35  Specimen Source: --  Method Type: --  Gram Stain - RRL: --  Gram Stain - Wound: --  Bacteria: --  Culture Results:   >=3 organisms. Probable collection contamination.      Specimen Source:   Method Type:   Gram Stain:   Culture Results: Culture Results:   >=3 organisms. Probable collection contamination. (11-24-23 @ 14:35)    Bacteria: Bacteria: Many /HPF (11-24-23 @ 14:34)        -------------------------------------------------------------------------------------------------  RADIOLOGY:      ---------------------------------------------------------------------------------------------------  ASSESSMENT & PLAN:     88 year old female with PMH of HTN, Afib, OA, PVD, COPD, Anxiety, Obesity, Acute on chronic systolic congestive heart failure, H/O abdominal hysterectomy, H/O discectomy, H/O total knee replacement, bilateral pleural effusions s/p thoracentesis with a left side drain since March as per daughter, presents sent in by NH for altered mental status for the since yesterday morning, which has been worsening. She was recently discharged on Avycaz for UTI. As per daughter the patient had 2 intubations in the past for copd exacerbation. The left side drain has no output. The daughter mentioned that patient has been mentioning burning of urination and pain in the back( which is attributed to her sacral ulcer).      # Hypercapnic respiratory failure on BiPAP.  COPD exacerbation secondary to pneumonia rule out pulmonary edema  # Not septic on admission  -  RVP negative   - CXR/CT chest: Bilateral opacities and pleural effusions, increased from prior.  - Solumedrol 60 mg BID on admissions switched to 40  mg daily   - On 11/27 patient was found to be more lethargic , Abg showing PH of 7.29 and pCO2 of 83 , patient started on bipap   - f/u sputum cx  -  MRSA PCR negative   - Pulmonary consult: right sided tap done 11/27, yielding 1L of Clear fluids   - ID consult : continue cefepime , monitor cultures , fever and WBC curves   - F/u CT head    # Multi-drug Resistant Klebsiella Bacteriemia with Acute Cystitis,  - UA positive due to Klebsiella  - UC showing carbapenem resistant  KP   - has chronic Ba  - s/p Avycaz q8h end date 11/8   - On cefepime for pneumonia   - NIV at night, NC during day    #Acute on Chronic systolic congestive heart failure  # Last echo from September 2023 normal EF.  - EF 64%  -Currently of lasix   - follows with Dr Pacheco    # Elevated ALP    - RuQ Sono: Grossly unremarkable with Gallbladder wall 2.9 cm.    # Chronic AFIB   - holding  BB  eliquis stopped  and switched to enoxaparin   - Enoxaparin stopped for pleural tap on 11/27 -> resumed 11/28    # Hypomagnesemia  - repleted    DVT PPX: lovenox   GI PPX: PPI  Diet: puree  Code Status: DNR/ intubate  Dispo: Acute      FOR FOLLOW UP:  [ ] F/u wound consult for sacral ulcer    [ ] F/u CT head
Spoke with family and patient at bedside regarding R neck mass. 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
Transfer from:  CEU   Transfer to:  FLOOR   ----------------------------------------------------  HPI:  88 year old female with PMH of HTN, Afib, OA, PVD, COPD, Anxiety, Obesity, Acute on chronic systolic congestive heart failure, H/O abdominal hysterectomy, H/O discectomy, H/O total knee replacement, bilateral pleural effusions s/p thoracentesis with a left side drain since March as per daughter, presents sent in by NH for altered mental status for the since yesterday morning, which has been worsening. She was recently discharged on Avycaz for UTI. As per daughter the patient had 2 intubations in the past for copd exacerbation. The left side drain has no output. The daughter mentioned that patient has been mentioning burning of urination and pain in the back( which is attributed to her sacral ulcer).  Denies any recent fevers, chills, N/V/D, headaches, chest pain, SOB.    Vital Signs Last 24 Hrs  T(C): 36.3 (24 Nov 2023 16:12), Max: 37.1 (24 Nov 2023 14:35)  T(F): 97.4 (24 Nov 2023 16:12), Max: 98.8 (24 Nov 2023 14:35)  HR: 104 (24 Nov 2023 16:12) (104 - 110)  BP: 102/67 (24 Nov 2023 16:12) (102/67 - 113/66)  RR: 20 (24 Nov 2023 16:12) (20 - 24)  SpO2: 96% (24 Nov 2023 16:37) (96% - 99%)  O2 Parameters below as of 24 Nov 2023 16:12  Patient On (Oxygen Delivery Method): nasal cannula  O2 Flow (L/min): 3 and BiPAP       (24 Nov 2023 16:57)    ----------------------------------------------------  ICU course      CEU COURSE:  -downgraded from ICU last night  -repeat cxr tmrw to fu   - ID said could hold abx, no elevated WBC, afebrile  -stable for DGTF        ----------------------------------------------------  MEDICATIONS:  STANDING MEDICATIONS  albuterol/ipratropium for Nebulization 3 milliLiter(s) Nebulizer every 4 hours  chlorhexidine 2% Cloths 1 Application(s) Topical <User Schedule>  enoxaparin Injectable 70 milliGRAM(s) SubCutaneous every 12 hours  furosemide   Injectable 40 milliGRAM(s) IV Push daily  gabapentin 100 milliGRAM(s) Oral three times a day  magnesium sulfate  IVPB 2 Gram(s) IV Intermittent every 2 hours  methylPREDNISolone sodium succinate Injectable 40 milliGRAM(s) IV Push daily  metoprolol tartrate 25 milliGRAM(s) Oral two times a day  nystatin Powder 1 Application(s) Topical two times a day  pantoprazole    Tablet 40 milliGRAM(s) Oral before breakfast  polyethylene glycol 3350 17 Gram(s) Oral daily  senna 2 Tablet(s) Oral at bedtime  sodium phosphate 30 milliMole(s)/500 mL IVPB 30 milliMole(s) IV Intermittent once    PRN MEDICATIONS  acetaminophen     Tablet .. 650 milliGRAM(s) Oral every 6 hours PRN  aluminum hydroxide/magnesium hydroxide/simethicone Suspension 30 milliLiter(s) Oral every 4 hours PRN  melatonin 3 milliGRAM(s) Oral at bedtime PRN  metoprolol tartrate 25 milliGRAM(s) Oral once PRN  ondansetron Injectable 4 milliGRAM(s) IV Push every 8 hours PRN      VITAL SIGNS: Last 24 Hours  T(C): 36 (30 Nov 2023 08:00), Max: 36.7 (29 Nov 2023 12:00)  T(F): 96.8 (30 Nov 2023 08:00), Max: 98 (29 Nov 2023 12:00)  HR: 97 (30 Nov 2023 08:00) (97 - 104)  BP: 139/80 (30 Nov 2023 08:00) (103/57 - 139/80)  BP(mean): 103 (30 Nov 2023 08:00) (73 - 103)  RR: 20 (30 Nov 2023 08:00) (18 - 22)  SpO2: 96% (30 Nov 2023 08:00) (93% - 100%)    LABS:                        9.9    8.71  )-----------( 135      ( 30 Nov 2023 05:37 )             33.1     11-30    143  |  96<L>  |  34<H>  ----------------------------<  78  4.0   |  38<H>  |  0.9    Ca    8.8      30 Nov 2023 05:37  Phos  1.9     11-30  Mg     1.6     11-30    TPro  6.0  /  Alb  3.0<L>  /  TBili  0.4  /  DBili  x   /  AST  16  /  ALT  7   /  AlkPhos  162<H>  11-30      Urinalysis Basic - ( 30 Nov 2023 05:37 )    Color: x / Appearance: x / SG: x / pH: x  Gluc: 78 mg/dL / Ketone: x  / Bili: x / Urobili: x   Blood: x / Protein: x / Nitrite: x   Leuk Esterase: x / RBC: x / WBC x   Sq Epi: x / Non Sq Epi: x / Bacteria: x              RADIOLOGY:      ----------------------------------------------------  ASSESSMENT & PLAN:    88 year old female with PMH of HTN, Afib, OA, PVD, COPD, Anxiety, Obesity, Acute on chronic systolic congestive heart failure, H/O abdominal hysterectomy, H/O discectomy, H/O total knee replacement, bilateral pleural effusions s/p thoracentesis with a left side drain since March as per daughter, presents sent in by NH for altered mental status for the since yesterday morning, which has been worsening. She was recently discharged on Avycaz for UTI. As per daughter the patient had 2 intubations in the past for copd exacerbation. The left side drain has no output. The daughter mentioned that patient has been mentioning burning of urination and pain in the back( which is attributed to her sacral ulcer).      # Hypercapnic respiratory failure on BiPAP.  COPD exacerbation secondary to pneumonia rule out pulmonary edema  # Not septic on admission  -  RVP negative   - CXR/CT chest: Bilateral opacities and pleural effusions, increased from prior.  - Solumedrol 60 mg BID on admissions switched to 40  mg daily   - On 11/27 patient was found to be more lethargic , Abg showing PH of 7.29 and pCO2 of 83 , patient started on bipap   - f/u sputum cx  -  MRSA PCR negative   - Pulmonary consult: right sided tap done 11/27, yielding 1L of Clear fluids   - ID consult : continue cefepime , monitor cultures , fever and WBC curves   - CT head -> negative    # Multi-drug Resistant Klebsiella Bacteriemia with Acute Cystitis,  - UA positive due to Klebsiella  - UC showing carbapenem resistant  KP   - has chronic Ba  - s/p Avycaz q8h end date 11/8   - On cefepime for pneumonia , ID consult, can hold   - NIV at night, NC during day    #Acute on Chronic systolic congestive heart failure  # Last echo from September 2023 normal EF.  - EF 64%  -Currently on lasix   - follows with Dr Pacheco    # Elevated ALP    - RuQ Sono: Grossly unremarkable with Gallbladder wall 2.9 cm.    # Chronic AFIB   - holding  BB  eliquis stopped  and switched to enoxaparin   - Enoxaparin stopped for pleural tap on 11/27 -> resumed 11/28    # Hypomagnesemia  - repleted    DVT PPX: lovenox   GI PPX: PPI  Diet: puree   Code Status: DNR/ intubate  Dispo: floor     #Pending  - F/u wound consult for sacral ulcer
US lung for the right side done   showing large right sided pleural effusion   Patient lacks decision making capacity   Son Anderson who is making decision regarding His mom care was called   Risks and benefits for the procedures explained   Mr franklin agreed on the proceed   6 am dose of lovenox held

## 2023-12-20 NOTE — CHART NOTE - NSCHARTNOTESELECT_GEN_ALL_CORE
Event Note
Event Note
Transfer Note
Palliative Care - Social Work/Event Note
Transfer Note
Transfer Note
Off Service Note

## 2024-01-01 NOTE — BRIEF OPERATIVE NOTE - ESTIMATED BLOOD LOSS
[FreeTextEntry1] : The 2-month-old patient was brought in for a head shape evaluation, as the mother expressed concern about the flatness on the back and elongation on the sides. These concerns have been noted since birth, and despite following the pediatrician's advice to rotate the baby's position and encourage tummy time, there has been little improvement. The patient was born at 38 weeks via vaginal delivery, with a birth weight of 6.14 lb and currently weighs 11 lb.  100

## 2024-01-04 ENCOUNTER — NON-APPOINTMENT (OUTPATIENT)
Age: 89
End: 2024-01-04

## 2024-02-21 NOTE — PATIENT PROFILE ADULT - FUNCTIONAL SCREEN CURRENT LEVEL: COMMUNICATION, MLM
Initiate Treatment: . Render In Strict Bullet Format?: No Samples Given: Eucerin Eczema Relief Body Wash & Body Cream Detail Level: Zone Plan: RTC in 1 month 0 = understands/communicates without difficulty

## 2024-03-06 NOTE — CONSULT NOTE ADULT - CONVERSATION DETAILS
Spoke with Cristi via telehealth at bedside. Palliative care discussed at length. He was able to provide a medial history and hospital course. Cristi noted he is aware of the patient's poor prognosis, that he's discussed it with his 3 siblings, and the plan is for full code and aggressive measures.    We discussed code status, specifically DNR, and comfort measures only should the patient not improve. All questions answered.
97.8

## 2024-03-08 NOTE — PROGRESS NOTE ADULT - ATTENDING COMMENTS
yes
Continuing care and SG vs wound care and healing by secondary intention  discussed with pt. She opts to proceed with STSG. Procedure reviewed with pt. Concerns addressed.    VAC dressing change tomorrow
Continuing care and planned surgery- SG discussed with pt. Concerns addressed
Continuing care discussed with pt. concerns addressed
Continuing care and discharge planning discussed with pt. Concerns addressed.
Discussed graft appearance with pt and daughter Miranda by telephone. Pt feels that dressing and graft displaced because of her   " moving around" in bed - 'trying to get comfortable"     Discussed continued wound care and dressing changes with VNS support; healing by secondary intention and rec by ID specialist for continued IV abx.     Concerns addressed.

## 2024-04-08 NOTE — ED ADULT NURSE NOTE - NS ED NURSE LEVEL OF CONSCIOUSNESS ORIENTATION
Acute lesia abnormal CT 89yo M with gallstone on imaging, referred to IR for cholecystitis evaluation goc Oriented - self; Oriented - place; Oriented - time

## 2024-04-15 NOTE — H&P ADULT - ATTENDING SUPERVISION STATEMENT
Medical Necessity Information: It is in your best interest to select a reason for this procedure from the list below. All of these items fulfill various CMS LCD requirements except the new and changing color options. Medical Necessity Clause: This procedure was medically necessary because the lesion that was treated was: Lab: 988 Detail Level: Detailed Was A Bandage Applied: Yes Size Of Lesion In Cm (Required): 0.5 X Size Of Lesion In Cm (Optional): 0 Depth Of Shave: dermis Biopsy Method: 15 blade Anesthesia Type: 1% lidocaine without epinephrine Anesthesia Volume In Cc: 1 Hemostasis: Electrodesiccation Wound Care: Petrolatum Path Notes (To The Dermatopathologist): within tattoo Resident Render Path Notes In Note?: No Consent was obtained from the patient. The risks and benefits to therapy were discussed in detail. Specifically, the risks of infection, scarring, bleeding, prolonged wound healing, incomplete removal, allergy to anesthesia, nerve injury and recurrence were addressed. Prior to the procedure, the treatment site was clearly identified and confirmed by the patient. All components of Universal Protocol/PAUSE Rule completed. Post-Care Instructions: I reviewed with the patient in detail post-care instructions. Patient is to keep the biopsy site dry overnight, and then apply bacitracin twice daily until healed. Patient may apply hydrogen peroxide soaks to remove any crusting. Notification Instructions: Patient will be notified of pathology results. However, patient instructed to call the office if not contacted within 2 weeks. Billing Type: Third-Party Bill

## 2024-04-16 NOTE — H&P ADULT - NSHPPHYSICALEXAM_GEN_ALL_CORE
GENERAL:  89y/o W  obese Female . Pt's sedated orally intubated   HEAD:  Atraumatic, Normocephalic  EYES: EOMI, PERRLA, conjunctiva and sclera clear  NECK: Supple, No JVD, no cervical lymphadenopathy, non-tender  CHEST/LUNG: Course breath so bilaterally; No wheeze  HEART: Regular rate and rhythm; S1&S2  ABDOMEN: Soft, Nontender, Nondistended x 4 quadrants; Bowel sounds present  EXTREMITIES:   Peripheral Pulses Present, No clubbing, no cyanosis, or no edema, no calf tenderness  PSYCH: sedated intubated  NEUROLOGY: was awake in ER  SKIN: WNL
The patient is a 54y Male complaining of trauma.

## 2024-05-04 NOTE — DISCHARGE NOTE PROVIDER - POSTFACE STATEMENT FOR MINUTES SPENT
LOS: 9 days   Patient Care Team:  Paul Granados MD as PCP - General (Family Medicine)  Richardson Willis MD as Cardiologist (Cardiology)  Rafy Rodriguez MD as Consulting Physician (Hematology and Oncology)  Christianne Phillips, AMARILSI as Licensed Practical Nurse  Salome Fleming MD as Consulting Physician (Nephrology)    Subjective     Interval History:Stable    Patient Complaints: Weak but no other complaints; diarrhea has resolved    History taken from: patient    Review of Systems   Constitutional:  Positive for activity change and appetite change.   HENT:  Negative for facial swelling.    Eyes:  Negative for visual disturbance.   Respiratory:  Positive for shortness of breath. Negative for cough.    Cardiovascular:  Negative for chest pain, palpitations and leg swelling.   Gastrointestinal:  Negative for abdominal pain, constipation and diarrhea.   Genitourinary:  Negative for enuresis.   Musculoskeletal:  Positive for arthralgias.   Neurological:  Positive for weakness. Negative for tremors and numbness.   Psychiatric/Behavioral:  Negative for confusion.            Objective     Vital Signs  Temp:  [97.4 °F (36.3 °C)-98.3 °F (36.8 °C)] 97.4 °F (36.3 °C)  Heart Rate:  [70-89] 73  Resp:  [13-21] 14  BP: ()/(48-74) 77/48    Physical Exam:     General Appearance:    Alert, cooperative, in no acute distress, sitting on edge of bed   Head:    Normocephalic, without obvious abnormality, atraumatic   Eyes:            Lids and lashes normal, conjunctivae and sclerae normal, no   icterus, no pallor, corneas clear, PERRLA   Ears:    Ears appear intact with no abnormalities noted   Throat:   No oral lesions, no thrush, oral mucosa moist   Neck:   No adenopathy, supple, trachea midline, no thyromegaly, no   carotid bruit, no JVD   Lungs:     Few scattered rhonchi    Heart:    Irregularly irregular   Chest Wall:    No abnormalities observed   Abdomen:     Normal bowel sounds, no masses, no organomegaly, soft         Non-tender non-distended, no guarding,   Extremities:   Moves all extremities well, no edema, no cyanosis, no             Redness   Pulses:   Pulses palpable and equal bilaterally   Skin:   No bleeding, bruising or rash   Lymph nodes:   No palpable adenopathy   Neurologic:   Cranial nerves 2 - 12 grossly intact, sensation intact, DTR       present and equal bilaterally        Results Review:    Lab Results (last 24 hours)       Procedure Component Value Units Date/Time    Comprehensive Metabolic Panel [[id number]]  (Abnormal) Collected: 05/04/24 0032    Specimen: Blood Updated: 05/04/24 0110     Glucose 97 mg/dL      BUN 45 mg/dL      Creatinine 1.77 mg/dL      Sodium 144 mmol/L      Potassium 4.0 mmol/L      Chloride 108 mmol/L      CO2 26.0 mmol/L      Calcium 8.9 mg/dL      Total Protein 5.3 g/dL      Albumin 3.2 g/dL      ALT (SGPT) 22 U/L      AST (SGOT) 20 U/L      Alkaline Phosphatase 62 U/L      Total Bilirubin 1.6 mg/dL      Globulin 2.1 gm/dL      A/G Ratio 1.5 g/dL      BUN/Creatinine Ratio 25.4     Anion Gap 10.0 mmol/L      eGFR 28.2 mL/min/1.73     Narrative:      GFR Normal >60  Chronic Kidney Disease <60  Kidney Failure <15    The GFR formula is only valid for adults with stable renal function between ages 18 and 70.    Magnesium [[id number]]  (Normal) Collected: 05/04/24 0032    Specimen: Blood Updated: 05/04/24 0110     Magnesium 1.9 mg/dL     Phosphorus [[id number]]  (Normal) Collected: 05/04/24 0032    Specimen: Blood Updated: 05/04/24 0110     Phosphorus 2.9 mg/dL     Calcium, Ionized [[id number]]  (Normal) Collected: 05/04/24 0032    Specimen: Blood Updated: 05/04/24 0055     Ionized Calcium 1.24 mmol/L     CBC (No Diff) [[id number]]  (Abnormal) Collected: 05/04/24 0032    Specimen: Blood Updated: 05/04/24 0047     WBC 10.43 10*3/mm3      RBC 3.97 10*6/mm3      Hemoglobin 10.3 g/dL      Hematocrit 34.7 %      MCV 87.4 fL      MCH 25.9 pg      MCHC 29.7 g/dL      RDW 23.3 %      RDW-SD 66.4 fl       MPV 11.3 fL      Platelets 172 10*3/mm3              Imaging Results (Last 24 Hours)       ** No results found for the last 24 hours. **                 I reviewed the patient's new clinical results.    Medication Review:   Scheduled Meds:apixaban, 2.5 mg, Oral, Q12H  bumetanide, 1 mg, Oral, Daily  carvedilol, 6.25 mg, Oral, BID With Meals  febuxostat, 40 mg, Oral, Daily  ipratropium-albuterol, 3 mL, Nebulization, 4x Daily - RT  levothyroxine, 75 mcg, Oral, Q AM  midodrine, 5 mg, Oral, TID AC  pantoprazole, 40 mg, Oral, BID AC  potassium chloride, 20 mEq, Oral, Daily  sildenafil, 20 mg, Oral, TID  sodium chloride, 10 mL, Intravenous, Q12H      Continuous Infusions:   PRN Meds:.  acetaminophen    senna-docusate sodium **AND** polyethylene glycol **AND** bisacodyl **AND** bisacodyl    Calcium Replacement - Follow Nurse / BPA Driven Protocol    loperamide    Magnesium Standard Dose Replacement - Follow Nurse / BPA Driven Protocol    ondansetron ODT **OR** ondansetron    Phosphorus Replacement - Follow Nurse / BPA Driven Protocol    Potassium Replacement - Follow Nurse / BPA Driven Protocol    promethazine    promethazine    sodium chloride    sodium chloride     Assessment & Plan       Dyspnea    Moderate malnutrition  COPD  Pulmonary hypertension  CKD - creatinine is stable; Bumex restarted  Acute gastroenteritis - resolving  Acute on chronic diastolic CHF  PAF - rate controlled; anti-coagulated  Chronic CAD - no anginal symptoms    Awaiting placement for SNF.    Plan for disposition:    Rosamaria Jin MD  05/04/24  16:01 EDT           minutes on the discharge service.

## 2024-05-13 NOTE — PHYSICAL THERAPY INITIAL EVALUATION ADULT - ASR WT BEARING STATUS EVAL
Primary Care Physician: Pravin Ramsay MD    Date of Visit: 05/13/2024     Chief Complaint:   Chief Complaint   Patient presents with    Follow-up     6 months       Subjective:   Zenobia Cornejo is a 72 y.o. female presents     HPI:  HPI  Hypothyroidism--on levothyroxine.   Oa in shoulders and upper back.  She has started to do work in her garden.  Noting mild discomfort.  States that the meloxicam helps significantly.  Allergic rhinitis   Gerd  Htn  High cholesterol  Osteopenia  Takes her medications daily.  Needs medication refills.    Past Medical History:  Past Medical History:   Diagnosis Date    Anesthesia of skin 11/29/2018    Numbness and tingling in left hand    Angioneurotic edema, initial encounter 11/29/2018    Angioedema of lips    Contusion of right knee, initial encounter 11/29/2018    Contusion of knee, right    Personal history of diseases of the skin and subcutaneous tissue 11/29/2018    History of abscess of breast    Personal history of diseases of the skin and subcutaneous tissue 11/29/2018    History of urticaria    Personal history of other (healed) physical injury and trauma 11/29/2018    History of whiplash injury to neck    Personal history of other diseases of the respiratory system 11/29/2018    History of acute sinusitis    Personal history of other endocrine, nutritional and metabolic disease 11/29/2018    History of vitamin D deficiency    Personal history of traumatic brain injury 11/29/2018    History of concussion    Unspecified nonsuppurative otitis media, right ear 11/29/2018    Fluid level behind tympanic membrane of right ear    Unspecified nonsuppurative otitis media, unspecified ear 11/29/2018    SAMREEN (middle ear effusion)    Unspecified osteoarthritis, unspecified site 11/29/2018    DJD (degenerative joint disease)        Social History:   reports that she has quit smoking. Her smoking use included cigarettes. She has never used smokeless tobacco. She reports that she does  "not drink alcohol and does not use drugs.     Family History:  family history is not on file.      Allergies:  Allergies   Allergen Reactions    House Dust Unknown    Quinapril Unknown    Ragweed Unknown       Outpatient Medications:  Current Outpatient Medications   Medication Instructions    calcium carbonate (CALCIUM 500) 1,250 mg, oral, 2 times daily    cholecalciferol (Vitamin D-3) 50 mcg (2,000 unit) capsule oral    diclofenac (VOLTAREN) 75 mg, oral, 2 times daily    ezetimibe (ZETIA) 10 mg, oral, Daily    famotidine (PEPCID) 40 mg, oral, Daily    levothyroxine (SYNTHROID, LEVOXYL) 75 mcg, oral, Daily    loratadine (Claritin) 10 mg tablet oral    meloxicam (MOBIC) 15 mg, oral, Daily    montelukast (SINGULAIR) 10 mg, oral, Nightly    triamterene-hydrochlorothiazid (Dyazide) 37.5-25 mg capsule 1 capsule, oral, Daily         ROS:   Review of Systems   Musculoskeletal:  Positive for arthralgias.        Vitals:  /72 (BP Location: Left arm, Patient Position: Sitting, BP Cuff Size: Adult)   Pulse 75   Temp 36.3 °C (97.4 °F) (Temporal)   Ht 1.6 m (5' 2.99\")   Wt 86.1 kg (189 lb 12.8 oz)   SpO2 98%   BMI 33.63 kg/m²   Wt Readings from Last 2 Encounters:   05/30/24 85.7 kg (189 lb)   05/13/24 86.1 kg (189 lb 12.8 oz)       Physical Exam:  Physical Exam  Vitals reviewed.   Constitutional:       Appearance: Normal appearance.   HENT:      Head: Normocephalic and atraumatic.      Right Ear: Tympanic membrane and ear canal normal.      Left Ear: Tympanic membrane and ear canal normal.      Nose: Nose normal.      Mouth/Throat:      Mouth: Mucous membranes are moist.      Pharynx: Oropharynx is clear.   Eyes:      Conjunctiva/sclera: Conjunctivae normal.      Pupils: Pupils are equal, round, and reactive to light.   Cardiovascular:      Rate and Rhythm: Normal rate and regular rhythm.      Heart sounds: Normal heart sounds, S1 normal and S2 normal. No murmur heard.  Pulmonary:      Effort: Pulmonary effort is " normal.      Breath sounds: Normal breath sounds. No wheezing, rhonchi or rales.   Abdominal:      General: Bowel sounds are normal.      Palpations: Abdomen is soft.   Musculoskeletal:         General: Normal range of motion.      Cervical back: Neck supple.   Skin:     General: Skin is warm and dry.   Neurological:      Mental Status: She is alert and oriented to person, place, and time.               Assessment/Plan   Diagnoses and all orders for this visit:  Mixed hyperlipidemia  -     CBC and Auto Differential; Future  -     Comprehensive Metabolic Panel; Future  -     Lipid Panel; Future  -     Urinalysis with Reflex Microscopic; Future  -     Vitamin D 25-Hydroxy,Total (for eval of Vitamin D levels); Future  -     TSH with reflex to Free T4 if abnormal; Future  -     Hemoglobin A1C; Future  -     ezetimibe (Zetia) 10 mg tablet; Take 1 tablet (10 mg) by mouth once daily.  Essential (primary) hypertension  -     triamterene-hydrochlorothiazid (Dyazide) 37.5-25 mg capsule; Take 1 capsule by mouth once daily.  Hypothyroidism, unspecified type  -     CBC and Auto Differential; Future  -     Comprehensive Metabolic Panel; Future  -     Lipid Panel; Future  -     Urinalysis with Reflex Microscopic; Future  -     Vitamin D 25-Hydroxy,Total (for eval of Vitamin D levels); Future  -     TSH with reflex to Free T4 if abnormal; Future  -     Hemoglobin A1C; Future  -     levothyroxine (Synthroid, Levoxyl) 75 mcg tablet; Take 1 tablet (75 mcg) by mouth once daily.  Gastroesophageal reflux disease without esophagitis  -     famotidine (Pepcid) 40 mg tablet; Take 1 tablet (40 mg) by mouth once daily.  Acute pain of both shoulders  -     meloxicam (Mobic) 15 mg tablet; Take 1 tablet (15 mg) by mouth once daily.  Osteopenia, unspecified location  -     calcium carbonate (Calcium 500) 500 mg calcium (1,250 mg) chewable tablet; Chew 1 tablet (1,250 mg) 2 times a day.  Annual physical exam  Anemia, unspecified type  -     CBC  and Auto Differential; Future  Vitamin D deficiency  -     Vitamin D 25-Hydroxy,Total (for eval of Vitamin D levels); Future  Screening for diabetes mellitus  -     Hemoglobin A1C; Future  Hyperglycemia  -     Hemoglobin A1C; Future  Capsulitis of metatarsophalangeal (MTP) joint of left foot  -     diclofenac (Voltaren) 75 mg EC tablet; Take 1 tablet (75 mg) by mouth 2 times a day.  Pain in thoracic spine  -     meloxicam (Mobic) 15 mg tablet; Take 1 tablet (15 mg) by mouth once daily.  Encounter for screening mammogram for malignant neoplasm of breast  -     BI mammo bilateral screening tomosynthesis; Future        Orders:  Orders Placed This Encounter   Procedures    BI mammo bilateral screening tomosynthesis    CBC and Auto Differential    Comprehensive Metabolic Panel    Lipid Panel    Urinalysis with Reflex Microscopic    Vitamin D 25-Hydroxy,Total (for eval of Vitamin D levels)    TSH with reflex to Free T4 if abnormal    Hemoglobin A1C        Followup Appts:  Future Appointments   Date Time Provider Department Center   10/14/2024 10:20 AM [id number]           ____________________________________________________________  Pravin Ramsay MD   no weight-bearing restrictions

## 2024-05-28 NOTE — SWALLOW BEDSIDE ASSESSMENT ADULT - SWALLOW EVAL: FEEDING ASSISTANCE
SUBJECTIVE:  Patient resting in bed.  No new events overnight.  He reports feeling better but still is wheezing.  He is not quite at his baseline breathing though he is at his baseline oxygen use of 4L. Pulmonology is not clear for discharge. Cardiology consulted to assess for tachycardia with SOB, chest discomfort. The patient's heart rate got up to 158 when ambulating to the bathroom. He said he just did not feel right in his chest and was significantly short of breath which is above his baseline.      OBJECTIVE:  I/O's    Intake/Output Summary (Last 24 hours) at 5/28/2024 0939  Last data filed at 5/28/2024 0901  Gross per 24 hour   Intake 698.31 ml   Output 1950 ml   Net -1251.69 ml       SCHEDULED MEDS:  Current Facility-Administered Medications   Medication Dose Route Frequency Provider Last Rate Last Admin    azithromycin (ZITHROMAX) 500 mg in sodium chloride 0.9 % 250 mL IVPB  500 mg Intravenous Daily Fanny Zarate  mL/hr at 05/28/24 0901 Rate Verify at 05/28/24 0901    cefTRIAXone (ROCEPHIN) syringe 1,000 mg  1,000 mg Intravenous Daily Fanny Zarate CNP   1,000 mg at 05/28/24 0840    enoxaparin (LOVENOX) injection 30 mg  30 mg Subcutaneous Daily Margy Gardner CNP   30 mg at 05/28/24 0841    budesonide (PULMICORT) nebulizer suspension 0.5 mg  0.5 mg Nebulization BID Resp Fanny Zarate CNP   0.5 mg at 05/28/24 0805    guaiFENesin (MUCINEX) ER tablet 1,200 mg  1,200 mg Oral 2 times per day Fanny Zarate CNP   1,200 mg at 05/28/24 0838    ipratropium (ATROVENT) 0.02 % nebulizer solution 0.5 mg  0.5 mg Nebulization 4x Daily Resp Fanny Zarate CNP   0.5 mg at 05/28/24 0755    polyethylene glycol (MIRALAX) packet 17 g  17 g Oral Daily Fanny Zarate CNP   17 g at 05/27/24 0852    sodium chloride 0.9 % injection 2 mL  2 mL Intracatheter 2 times per day Margy Gardner CNP   2 mL at 05/28/24 0840    Potassium Standard Replacement Protocol (Levels 3.5 and lower)   Does not apply See Admin 
Instructions Margy Gardner CNP        Magnesium Standard Replacement Protocol   Does not apply See Admin Instructions Margy Gardner CNP        methylPREDNISolone (SOLU-Medrol) injection 40 mg  40 mg Intravenous 3 times per day Margy Gardner CNP   40 mg at 05/28/24 0555    famotidine (PEPCID) tablet 20 mg  20 mg Oral Nightly Margy Gardner CNP   20 mg at 05/27/24 2008    fluticasone-vilanterol (BREO ELLIPTA) 200-25 MCG/ACT inhaler 1 puff  1 puff Inhalation Daily Margy Gardner CNP   1 puff at 05/28/24 0832    roflumilast (DALIRESP) tablet 500 mcg  500 mcg Oral Daily Margy Gardner CNP   500 mcg at 05/28/24 0838    umeclidinium bromide (INCRUSE ELLIPTA) 62.5 MCG/ACT inhaler 1 puff  1 puff Inhalation Daily Resp Fanny Zarate CNP   1 puff at 05/28/24 0830       PRN MEDS:  Current Facility-Administered Medications   Medication Dose Route Frequency Provider Last Rate Last Admin    ketorolac (TORADOL) injection 15 mg  15 mg Intravenous Q6H PRN Fanny Zarate CNP        calcium carbonate (TUMS) chewable tablet 500 mg  500 mg Oral Q4H PRN Ninosak Belle MD        aluminum-magnesium hydroxide-simethicone (MAALOX) 200-200-20 MG/5ML suspension 30 mL  30 mL Oral Q4H PRN Ninoska Belle MD   30 mL at 05/27/24 2129    ondansetron (ZOFRAN ODT) disintegrating tablet 4 mg  4 mg Oral Q6H PRN Fanny Zarate CNP        Or    ondansetron (ZOFRAN) injection 4 mg  4 mg Intravenous Q6H PRN Fanny Zarate CNP        benzonatate (TESSALON PERLES) capsule 100 mg  100 mg Oral TID PRN Fanny Zarate CNP   100 mg at 05/28/24 0555    sodium chloride 0.9 % flush bag 25 mL  25 mL Intravenous PRN Margy Gardner CNP        acetaminophen (TYLENOL) tablet 650 mg  650 mg Oral Q4H PRN Margy Gardner CNP   650 mg at 05/27/24 0904    Or    acetaminophen (TYLENOL) suppository 650 mg  650 mg Rectal Q4H PRN Margy Gardner, CNP        melatonin tablet 3 mg  3 mg Oral Nightly PRN Margy Gardner, MYRNA        ipratropium-albuterol (DUONEB) 0.5-2.5 
(3) MG/3ML nebulizer solution 3 mL  3 mL Nebulization Q4H Resp PRN Margy Gardner, CNP           Last Recorded Vitals  Blood pressure 139/83, pulse 74, temperature 97.9 °F (36.6 °C), resp. rate 16, height 5' 10\" (1.778 m), weight (!) 41.2 kg (90 lb 13.3 oz), SpO2 100%.  Body mass index is 13.03 kg/m².    Physical Exam  Constitutional:       Comments: Underweight appearing   HENT:      Head: Normocephalic and atraumatic.      Neck: Neck supple.   Eyes:      Extraocular Movements: Extraocular movements intact.      Pupils: Pupils are equal, round, and reactive to light.   Cardiovascular:      Rate and Rhythm: Normal rate and regular rhythm.      Pulses: Normal pulses.      Heart sounds: Normal heart sounds.   Pulmonary:      Effort: Pulmonary effort is normal.      Breath sounds: Wheezing and rhonchi present.      Comments: bilateral diminished breath sounds with wheezing/rhonchi- improving  Abdominal:      General: Bowel sounds are normal.      Palpations: Abdomen is soft.   Musculoskeletal:         General: Normal range of motion.   Skin:     General: Skin is warm and dry.      Capillary Refill: Capillary refill takes less than 2 seconds.   Neurological:      General: No focal deficit present.      Mental Status: He is alert and oriented to person, place, and time. Mental status is at baseline.   Psychiatric:         Mood and Affect: Mood normal.         Behavior: Behavior normal.         Labs     Recent Results (from the past 24 hour(s))   Basic Metabolic Panel    Collection Time: 05/28/24  6:41 AM   Result Value Ref Range    Fasting Status      Sodium 137 135 - 145 mmol/L    Potassium 4.2 3.4 - 5.1 mmol/L    Chloride 101 97 - 110 mmol/L    Carbon Dioxide 28 21 - 32 mmol/L    Anion Gap 12 7 - 19 mmol/L    Glucose 124 (H) 70 - 99 mg/dL    BUN 17 6 - 20 mg/dL    Creatinine 0.49 (L) 0.67 - 1.17 mg/dL    Glomerular Filtration Rate >90 >=60    BUN/Cr 35 (H) 7 - 25    Calcium 8.7 8.4 - 10.2 mg/dL   CBC with Automated 
Differential (performable only)    Collection Time: 05/28/24  6:41 AM   Result Value Ref Range    WBC 4.5 4.2 - 11.0 K/mcL    RBC 5.27 4.50 - 5.90 mil/mcL    HGB 13.3 13.0 - 17.0 g/dL    HCT 41.5 39.0 - 51.0 %    MCV 78.7 78.0 - 100.0 fl    MCH 25.2 (L) 26.0 - 34.0 pg    MCHC 32.0 32.0 - 36.5 g/dL    RDW-CV 14.5 11.0 - 15.0 %    RDW-SD 41.3 39.0 - 50.0 fL     140 - 450 K/mcL    NRBC 0 <=0 /100 WBC    Neutrophil, Percent 86 %    Lymphocytes, Percent 9 %    Mono, Percent 4 %    Eosinophils, Percent 0 %    Basophils, Percent 0 %    Immature Granulocytes 1 %    Absolute Neutrophils 3.9 1.8 - 7.7 K/mcL    Absolute Lymphocytes 0.4 (L) 1.0 - 4.0 K/mcL    Absolute Monocytes 0.2 (L) 0.3 - 0.9 K/mcL    Absolute Eosinophils  0.0 0.0 - 0.5 K/mcL    Absolute Basophils 0.0 0.0 - 0.3 K/mcL    Absolute Immature Granulocytes 0.0 0.0 - 0.2 K/mcL        Imaging  LAST CT:  === 05/23/24 ===    CTA CHEST PULMONARY EMBOLISM    - Narrative -  Examination: CT angiography of the chest with contrast, per pulmonary  embolism protocol.    Clinical Indication: Chest pain, shortness of breath    Comparison: CT chest 1/30/2023.    Technique: Helical CT scan of the thorax was performed with IV contrast in  pulmonary arterial phase. Axial, coronal, and sagittal reconstructions were  reviewed. Post-processing was performed on the acquisition workstation with  3D maximum intensity projection (MIP) images produced.  IV contrast: The dose and type of IV contrast utilized for this  examination are recorded in the imaging encounter of the patient's medical  record.  Dose reduction: This CT exam was performed using one or more of the  following dose-reduction techniques: Automated exposure control, adjustment  of the mA and/or kV according to patient size, and/or use of iterative  reconstruction technique.    Findings:    PULMONARY ARTERIES:  Exam quality: Motion artifact limits evaluation of distal segmental and  subsegmental pulmonary 
arteries.  Pulmonary embolism: No acute or chronic pulmonary embolism in the  central through proximal segmental pulmonary arteries.  Central pulmonary arteries: Normal caliber.    HEART:  Heart: No CT evidence of right heart strain. Normal size.  Aorta: Normal caliber.  Coronary arteries: Mild to moderate calcifications.  Pericardium: Normal. No effusion, thickening, or calcification.    MEDIASTINUM:  Support tubes and lines: None.    Base of neck/thyroid: Normal.    Lymph nodes: No supraclavicular, axillary, internal mammary,  mediastinal, or hilar adenopathy.  Trachea: Normal.  Esophagus: Normal.    LUNGS AND PLEURA:  Lungs: Large bulla in the right apex similar to prior study. Severe  emphysematous changes throughout the lungs. New area of consolidation in  the superior aspect of the right lower lobe with increased and new inferior  posterior consolidative densities.  Pleura: No effusion, thickening, or calcification.    OTHER FINDINGS: None.    UPPER ABDOMEN: Partially visualized IVC filter. No acute abnormality.    BONES/SOFT TISSUES: No focal lesion. Vertebroplasty changes in the mid to  lower thoracic spine. Mild to moderate degenerative changes in the spine.    - Impression -  Impression:    1. No acute pulmonary embolism in the central through proximal segmental  arteries, evaluation of more distal vasculature is limited by motion  artifact.  2. Background severe emphysematous changes and large right apical bulla  similar to prior CT.  3. New focal consolidation in the superior segment of the right lower lobe  as well as in the inferior dependent right lower lobe suspicious for  infectious process. Short-term follow-up CT chest after treatment is  recommended in 6 to 12 weeks to ensure resolution.      Electronically Signed by: BERNARD HOBSON M.D.  Signed on: 5/23/2024 7:28 PM  Workstation ID: [id number]    LAST EKG:    Encounter Date: 05/23/24   Electrocardiogram 12-Lead   Result Value    Ventricular 
Rate EKG/Min (BPM) 92    Atrial Rate (BPM) 92    NC-Interval (MSEC) 154    QRS-Interval (MSEC) 68    QT-Interval (MSEC) 350    QTc 433    P Axis (Degrees) 82    R Axis (Degrees) 53    T Axis (Degrees) 78    REPORT TEXT      Normal sinus rhythm  Possible  Left atrial enlargement  Septal infarct  , age undetermined  Abnormal ECG  When compared with ECG of  16-OCT-2023 20:09,  Septal infarct  is now  present  Confirmed by MILTON ADAMS MD ([id number]) on 5/26/2024 7:44:45 AM       LAST X-RAY:  === 05/23/24 ===    XR CHEST AP OR PA    - Narrative -  Examination: Chest radiograph, 1 view.    Clinical Information: Chest pain    Comparison: Chest x-ray 10/16/2023, CT lung cancer screening 1/30/2023    Findings:    Support tubes and lines: EKG leads overlie the chest    Heart, mediastinum, and pulmonary vasculature: The cardiomediastinal  silhouette is within normal limits for size and contour. No pulmonary  vascular congestion.    Lungs and pleura: Large bulla at the right apex is similar to prior  studies. Multifocal areas of architectural distortion. New right midlung  consolidative opacity. No significant pleural effusions or pneumothorax.    Bones: No acute abnormality.    Other findings: None.    - Impression -  Impression:    1. New right midlung consolidative opacity, may reflect infectious process.  Clinical correlation and follow-up to ensure resolution is recommended.  2. Similar chronic fibrotic changes.    Electronically Signed by: BERNARD HOBSON M.D.  Signed on: 5/23/2024 6:32 PM  Workstation ID: [id number]    EXAM: US VASC LOWER EXTREMITY VENOUS DUPLEX BILATERAL     CLINICAL INDICATION: Chest pain     COMPARISON: Bilateral lower extremity venous Doppler 10/5/2020.     FINDINGS: Color duplex sonography of bilateral lower extremity veins.  Compressibility and color flow is demonstrated in the common femoral, deep  femoral, superficial femoral and popliteal veins bilaterally.  Patent  sapheno-femoral junction. 
1:1 feed/dependent
The Doppler spectral waveforms show phasic  variation bilaterally.     IMPRESSION:  No evidence of deep venous thrombosis.      Electronically Signed by: JAZZ ORANTES M.D.   Signed on: 2024 8:15 AM   Workstation ID: [id number]      *[facility]*  [address] [address]  [phone]  Transthoracic Echocardiogram (TTE)     Patient: Supa Connell   Study Date/Time:          May 25 2024 1:02PM  MRN:     [id number]        FIN#:                     [id number]  :     1950     Ht/Wt:                    177.8cm 40.8kg  Age:     74             BSA/BMI:                  1.39m^2 12.9kg/m^2  Gender:  M              Baseline BP:              157 / 87  Ordering Physician:    Fanny Zarate     Referring Physician:   Fanny Zarate     Attending Physician:   Marylou Nolasco  Performing Physician:  Duane, Cardiology  Diagnostic Physician:  Albin Hammonds MD  Sonographer:           RAMSEY Daly     ------------------------------------------------------------------------------  INDICATIONS:  Tachycardial with chest discomfort.     ------------------------------------------------------------------------------  STUDY CONCLUSIONS  SUMMARY:     1. Left ventricle: The cavity size is normal. Wall thickness is normal.     Systolic function is normal. The ejection fraction was measured by visual     estimation. Doppler parameters are consistent with abnormal left     ventricular relaxation (grade 1 diastolic dysfunction). The ejection     fraction is 60%.  2. Right ventricle: The cavity size is normal. Wall thickness is normal.     Systolic function is normal.     ------------------------------------------------------------------------------  STUDY DATA:   Procedure:  A transthoracic echocardiogram was performed. Image  quality was good.  M-mode, complete 2D, complete spectral Doppler, and color  Doppler.  Study status:  Routine.  Study completion:  There were 
no  complications.     FINDINGS     BASELINE ECG:   Normal sinus rhythm.  LEFT VENTRICLE:  The cavity size is normal. Wall thickness is normal. There is  no evidence of hypertrophy. Systolic function is normal. Wall motion is  normal; there are no regional wall motion abnormalities.    The ejection  fraction was measured by visual estimation. The ejection fraction is 60%. E/A  is decreased. Doppler parameters are consistent with abnormal left ventricular  relaxation (grade 1 diastolic dysfunction).     AORTIC VALVE:  The annulus is normal. The valve is structurally normal. The  valve is trileaflet. The leaflets are normal thickness. Cusp separation is  normal. Velocity is within the normal range. There is no stenosis. There is no  regurgitation. The mean systolic gradient is 2mm Hg. The peak systolic  gradient is 4mm Hg. The LVOT to aortic valve VTI ratio is 0.92. The valve area  is 2.6cm^2. The valve area index is 1.84cm^2/m^2. The ratio of LVOT to aortic  valve peak velocity is 0.87. The valve area is 2.5cm^2. The valve area index  is 1.77cm^2/m^2.     AORTA:  Aortic root: The root is normal-sized.     MITRAL VALVE:  The valve is structurally normal. The annulus is normal. The  leaflets are normal thickness. Leaflet separation is normal. No evidence for  prolapse. Inflow velocity is within the normal range. There is no evidence for  stenosis. There is trivial regurgitation. The valve area by pressure half-time  is 3.0cm^2. The valve area index by pressure half-time is 2.16cm^2/m^2.     ATRIAL SEPTUM:   Color doppler shows no obvious shunt.     LEFT ATRIUM:  The atrium is normal in size.     RIGHT VENTRICLE:  The cavity size is normal. Wall thickness is normal.  Systolic function is normal.       The RV pressure during systole is 19mm Hg.     VENTRICULAR SEPTUM:   Thickness is normal.  Normal septal motion.    Normal  contour.    There is no evidence of a ventricular septal defect.     PULMONIC VALVE:   The valve 
is structurally normal. Cusp separation is normal.  Velocity is within the normal range. There is no evidence for stenosis. There  is no regurgitation.     TRICUSPID VALVE:  The valve is structurally normal. Leaflet separation is  normal. No evidence for tricuspid prolapse. Inflow velocity is within the  normal range. There is no evidence for stenosis. There is trivial  regurgitation.     PULMONARY ARTERIES:  The main pulmonary artery is normal-sized. Systolic pressure is within the  normal range.     RIGHT ATRIUM:  The atrium is normal in size.     PERICARDIUM:   There is no pericardial effusion.     SYSTEMIC VEINS:  Inferior vena cava: The IVC is normal-sized.  Respirophasic diameter changes  are in the normal range (>= 50%).     ------------------------------------------------------------------------------  Measurements      Left ventricle         Value          Ref       10/05/2020  Left atrium continued     Value          Ref       10/05/2020   JOSÉ MIGUEL, LAX chord     (N) 4.2   cm       4.2 - 5.8 4.1         Vol/bsa, ES, 1-p A4C  (L) 7     ml/m^2   12 - 37   ----------   ESD, LAX chord     (L) 2.2   cm       2.5 - 4.0 2.6         Vol, ES, 1-p A2C      (L) 15    ml       18 - 58   ----------   JOSÉ MIGUEL/bsa, LAX chord (N) 3.0   cm/m^2   2.2 - 3.0 2.5         Vol/bsa, ES, 1-p A2C  (L) 10    ml/m^2   11 - 43   ----------   ESD/bsa, LAX chord (N) 1.6   cm/m^2   1.3 - 2.1 1.6         Vol, ES, 2-p              13    ml       --------- ----------   PW, ED, LAX        (N) 0.7   cm       0.6 - 1.0 0.8         Vol/bsa, ES, 2-p      (L) 9     ml/m^2   16 - 34   ----------   PW thickening, LAX     71    %        --------- 50   IVS, ED            (N) 0.9   cm       0.6 - 1.0 0.8         Aortic valve              Value          Ref       10/05/2020   IVS, ES                1.3   cm       --------- 1.0         Leaflet sep, MM           1.7   cm       --------- 1.7   IVS thickening         44    %        --------- 25          Peak v, S   
              0.9   m/sec    --------- 1.1   PW, ED             (N) 0.7   cm       0.6 - 1.0 0.8         Mean v, S                 0.68  m/sec    --------- 0.82   PW, ES                 1.2   cm       --------- 1.2         Mean grad, S              2     mm Hg    --------- 3   PW thickening          71    %        --------- 50          Peak grad, S              4     mm Hg    --------- 5   IVS/PW, ED             1.29           --------- 1           LVOT/AV, VTI ratio        0.92           --------- 0.63   EDV                (N) 74    ml       62 - 150  69          AMADO, VTI                  2.6   cm^2     --------- 2.0   ESV                (L) 11    ml       21 - 61   18          AMADO/bsa, VTI              1.84  cm^2/m^2 --------- 1.2   EF                 (N) 60    %        52 - 72   65          LVOT/AV, Vpeak ratio      0.87           --------- 0.73   SV                     62    ml       --------- 50          AMADO, Vmax                 2.5   cm^2     --------- 2.3   EDV/bsa            (N) 53    ml/m^2   34 - 74   42          AMADO/bsa, Vmax             1.77  cm^2/m^2 --------- 1.4   ESV/bsa            (L) 8     ml/m^2   11 - 31   11   SV/bsa                 45    ml/m^2   --------- 30          Mitral valve              Value          Ref       10/05/2020   E', lat rudy, TDI   (L) 6.1   cm/sec   >=10.0    10.3        Peak E                    0.57  m/sec    --------- 0.6   E/e', lat rudy, TDI (N) 9              <=13      ----------  Peak A                    0.91  m/sec    --------- 0.66   E', med rudy, TDI   (N) 7.1   cm/sec   >=7.0     7.8         Decel time                250   ms       --------- 285   E/e', med rudy, TDI     8              --------- ----------  PHT                       73    ms       --------- 84   E', avg, TDI           6.58  cm/sec   --------- ----------  Peak E/A ratio            0.6            --------- ----------   E/e', avg, TDI     (N) 9              <=14      ----------  CARROLL, T                
  3.0   cm^2     --------- 2.6                                                               MVA/bsa, PHT              2.16  cm^2/m^2 --------- 1.61   LVOT                   Value          Ref       10/05/2020  MR peak v                 1.72  m/sec    --------- ----------   Diam, S                1.9   cm       --------- ----------  Peak LV-LA grad S         12    mm Hg    --------- ----------   Area                   2.8   cm^2     --------- 3.1         Max MR v                  1.72  m/sec    --------- ----------   Peak fabian, S            0.81  m/sec    --------- 0.82        Peak LV-LA grad S         12    mm Hg    --------- ----------   Peak grad, S           3     mm Hg    --------- 3                                                               Pulmonic valve            Value          Ref       10/05/2020   Right ventricle        Value          Ref       10/05/2020  Peak v, S                 0.9   m/sec    --------- ----------   JOSÉ MIGUEL, LAX               1.8   cm       --------- ----------   Pressure, S            19    mm Hg    --------- 18          Tricuspid valve           Value          Ref       10/05/2020                                                               TR peak v             (N) 2     m/sec    <=2.8     1.8   Left atrium            Value          Ref       10/05/2020  Peak RV-RA grad, S        16    mm Hg    --------- 13   AP dim, ES         (L) 2.7   cm       3.0 - 4.0 2.4   AP dim index, ES   (N) 1.9   cm/m^2   1.5 - 2.3 ----------  Aortic root               Value          Ref       10/05/2020   Area ES, A4C       (N) 6     cm^2     <=20      ----------  Root diam, ED         (N) 2.7   cm       2.2 - 3.7 ----------   Area/bsa ES, A4C       4.26  cm^2/m^2 --------- ----------   Area ES, A2C           8     cm^2     --------- ----------  Pulmonary artery          Value          Ref       10/05/2020   Area/bsa ES, A2C       5.47  cm^2/m^2 --------- ----------  Pressure, S               19    mm Hg 
   --------- ----------   Vol, ES, 1-p A4C   (L) 9     ml       18 - 58   ----------  Legend:  (L)  and  (H)  césar values outside specified reference range.     (N)  marks values inside specified reference range.     Prepared and electronically signed by  Albin Hammonds MD  05/25/2024 15:08      Narrative & Impression   EXAM: XR CHEST PA AND LATERAL 2 VIEWS     CLINICAL INDICATION: Follow-up pneumonia     COMPARISON: Chest x-ray 5/23/2024, CT chest 1/30/2023     IMPRESSION:  Impression:  The cardiomediastinal silhouette is unremarkable. Postsurgical changes in  the left upper lung. Hyperinflation secondary to bullous emphysema. Stable  blunting of the costophrenic angles probably secondary to chronic pleural  thickening. Interval improved airspace opacity in the right midlung may  represent pneumonia. Follow-up to resolution is advised to exclude  underlying lesion. No detectable pneumothorax.  No acute osseous  abnormality.       ASSESSMENT/PLAN:    COPD exacerbation w/bullous emphysema 2/2 pneumonia with known h/o chronic hypoxic respiratory failure & bronchiectasis  -(5/23) CXR as above  -(5/23) CTA chest pulmonary embolism as above  -(5/27) CXR as above  -Lactic acid 1.9  -Procalcitonin 0.10  -Sputum culture notes rare normal respiratory tom  -Blood cultures x2, NGTD  ---PLAN:  -Pulmonary consulted  -PT/OT to evaluate and treat  -IS & acapella use encouraged  -Baseline O2: 4L NC  -Currently at oxygen baseline use  -Continue maintenance inhalers  -Continue daliresp  -Continue rocephin & IV azithromycin  -Continue solu-medrol, budesonide neb, guaifenesin  -Stop duonebs & start ipratropium bromide nebs d/t tachycardia    Intermittent chest discomfort with tachycardia suspect d/t above dx, dehydration, & pneumonia  -Troponins x3 negative   -EKGs as above  -TSH 0.834  -Orthostatics borderline positive & noted an elevation in HR  -Repeat orthostatics negative but HR still becomes elevated  -(5/25) TTE LVEF 60%, 
G1DD, as above  -S/p gentle IV hydration  -Cardiology consulted  -Should at least consider a Zio patch/Holter monitor at DC, needed discussed with cardiology  -Patient is also never had a stress test, though he currently may not be optimized at this point from a pulmonary standpoint which they can speak to more of in greater detail if the patient were to need one in the future     Elevated D-dimer  -D-dimer 0.87  -CTA chest pulmonary embolism negative for PE  -(5/25) BLE venous duplex as above  -Monitor labs    Severe protein/calorie deficient malnutrition  -Nutrition consulted    H/o Dysphagia  -ST to evaluate and treat  -(5/28) video swallow study results pending     H/o DVT  Anemia  Osteoarthritis  GERD      DVT:   Lovenox 30 milligrams subcutaneous two times daily (BID) and Serial Compression Device (SCD)    DISPO: Pending response to treatment and clearance from consults    PCP:  Gavino Cabrera MD Mary C Johansen, CNP     Time spent: 30 minutes    The case was reviewed with Dr. Minaya. A substantive portion of the medical decision making and plan of care was performed by Dr. Minaya.    
pt needs reminders for NO STRAWS and small single sips/frequent cues/help required

## 2024-06-03 NOTE — PHYSICAL THERAPY INITIAL EVALUATION ADULT - REHAB POTENTIAL, PT EVAL
Return for pain, fever not resolving with motrin or tylenol, shortness of breath, vomiting, decreased fluid intake, weakness, numbness, dizziness, or any change or concerns and for transfer to higher level of care, ie [facility], for admission as recommended   
good, to achieve stated therapy goals

## 2024-08-11 NOTE — PROCEDURAL SAFETY CHECKLIST WITH OR WITHOUT SEDATION - NSPREVERIFYSED_GEN_ALL_CORE
done
Airway patent, Nasal mucosa clear. Mouth with normal mucosa. Throat has no vesicles, no oropharyngeal exudates and uvula is midline.

## 2024-08-16 NOTE — PROGRESS NOTE ADULT - SUBJECTIVE AND OBJECTIVE BOX
24H events:    Patient is a 88y old Female who presents with a chief complaint of COPD (02 Dec 2023 09:04)    Primary diagnosis of Altered mental status        Today is 8d of hospitalization. This morning patient was seen and examined at bedside, resting comfortably in bed.    No acute or major events overnight.      PAST MEDICAL & SURGICAL HISTORY  HTN (hypertension)    Afib  s/p ablation 2001    Goiter  no meds    Cellulitis  chronic    OA (osteoarthritis)    PVD (peripheral vascular disease)    COPD (chronic obstructive pulmonary disease)    Anxiety    Obesity    Acute on chronic systolic congestive heart failure    Edema of both lower legs    Required emergent intubation    H/O abdominal hysterectomy    H/O discectomy    H/O total knee replacement, bilateral      SOCIAL HISTORY:  Social History:      ALLERGIES:  penicillin (Other)  contrast media (iodine-based) (Other)  codeine (Other)  sulfa drugs (Hives; Other)  aspirin (Other)    MEDICATIONS:  STANDING MEDICATIONS  albuterol/ipratropium for Nebulization 3 milliLiter(s) Nebulizer every 4 hours  apixaban 5 milliGRAM(s) Oral every 12 hours  chlorhexidine 2% Cloths 1 Application(s) Topical <User Schedule>  gabapentin 100 milliGRAM(s) Oral three times a day  metoprolol tartrate 25 milliGRAM(s) Oral two times a day  nystatin Powder 1 Application(s) Topical two times a day  pantoprazole    Tablet 40 milliGRAM(s) Oral before breakfast  polyethylene glycol 3350 17 Gram(s) Oral daily  predniSONE   Tablet 40 milliGRAM(s) Oral daily  senna 2 Tablet(s) Oral at bedtime    PRN MEDICATIONS  acetaminophen     Tablet .. 650 milliGRAM(s) Oral every 6 hours PRN  aluminum hydroxide/magnesium hydroxide/simethicone Suspension 30 milliLiter(s) Oral every 4 hours PRN  melatonin 3 milliGRAM(s) Oral at bedtime PRN  metoprolol tartrate 25 milliGRAM(s) Oral once PRN  ondansetron Injectable 4 milliGRAM(s) IV Push every 8 hours PRN    VITALS:   T(F): 98  HR: 92  BP: 118/67  RR: 20  SpO2: 97%    PHYSICAL EXAM:      GENERAL:  NAD  SKIN: No rashes or lesions  HEENT: Atraumatic. Normocephalic. THyroid mass palpable   NECK: Supple, No JVD.    PULMONARY: decreased breath sounds B/L. No wheezing.  CVS: Normal S1, S2. Rate and Rhythm are regular.    ABDOMEN/GI: Soft, Nontender, Nondistended   MSK:  No clubbing or cyanosis   NEUROLOGIC:  No motor or sensory deficit.  PSYCH: Alert & oriented x 3, normal affect        LABS:                        9.2    11.21 )-----------( 142      ( 02 Dec 2023 05:00 )             31.1     12-02    141  |  94<L>  |  31<H>  ----------------------------<  72  4.1   |  39<H>  |  0.9    Ca    8.3<L>      02 Dec 2023 05:00  Phos  3.1     12-01  Mg     2.3     12-01    TPro  5.2<L>  /  Alb  3.0<L>  /  TBili  0.4  /  DBili  x   /  AST  23  /  ALT  13  /  AlkPhos  175<H>  12-02      Urinalysis Basic - ( 02 Dec 2023 05:00 )    Color: x / Appearance: x / SG: x / pH: x  Gluc: 72 mg/dL / Ketone: x  / Bili: x / Urobili: x   Blood: x / Protein: x / Nitrite: x   Leuk Esterase: x / RBC: x / WBC x   Sq Epi: x / Non Sq Epi: x / Bacteria: x                               Consult acknowledged, to be formally evaluated in the am. Please call with urgent questions or concerns.    Nelson Cano, DO

## 2024-12-18 NOTE — H&P ADULT - SOCIAL HISTORY
No What Type Of Note Output Would You Prefer (Optional)?: Bullet Format How Severe Are Your Spot(S)?: moderate Have Your Spot(S) Been Treated In The Past?: has not been treated Hpi Title: Evaluation of Skin Lesions Additional History: FBSE

## 2025-01-21 NOTE — ED ADULT TRIAGE NOTE - IDEAL BODY WEIGHT(KG)
Patient's mother called to move up visit with Dr. Arthur, scheduled 2/21.     Upon chart review, no sooner visits available; already added to waitlist, mother was appreciative; suggested she see PCP in the meantime; patient's mother verbalized understanding and confirmed recently saw PCP for headaches.  
50

## 2025-02-18 NOTE — DIETITIAN INITIAL EVALUATION ADULT. - PATIENT MEETS CRITERIA FOR MALNUTRITION
Same as above  
We reviewed our findings today and her questions were answered.  She understands that her exams have remained stable (and show nothing concerning).  She is comfortable being followed in a conservative fashion.      She understands the importance of monthly self-breast examination and knows to report any and all changes as they occur.'  
no

## 2025-03-10 NOTE — PROCEDURE NOTE - NSINFORMCONSENT_GEN_A_CORE
obtained from son Anderson and verbally from patient/Benefits, risks, and possible complications of procedure explained to patient/caregiver who verbalized understanding and gave verbal consent. T(C): 37.3 (03-10-25 @ 22:56), Max: 37.3 (03-10-25 @ 22:56)  HR: 88 (03-10-25 @ 22:56) (88 - 109)  BP: 136/85 (03-10-25 @ 22:56) (136/85 - 169/83)  RR: 19 (03-10-25 @ 22:56) (18 - 19)  SpO2: 95% (03-10-25 @ 22:56) (89% - 95%)    CONSTITUTIONAL: Well groomed, no apparent distress, obese   EYES: PERRLA and symmetric, EOMI, No conjunctival or scleral injection, non-icteric  ENMT: Oral mucosa with moist membranes. Normal dentition; no pharyngeal injection or exudates             NECK: Supple, symmetric and without tracheal deviation   RESP: No respiratory distress, no use of accessory muscles; CTA b/l, no WRR  CV: RRR, +S1S2, no MRG; no JVD; no peripheral edema  GI: Soft, NT, ND, no rebound, no guarding; no palpable masses; no hepatosplenomegaly; no hernia palpated  SKIN: No rashes or ulcers noted; no subcutaneous nodules or induration palpable  PSYCH: Appropriate insight/judgment; A+O x 3, mood and affect appropriate, recent/remote memory intact T(C): 37.3 (03-10-25 @ 22:56), Max: 37.3 (03-10-25 @ 22:56)  HR: 88 (03-10-25 @ 22:56) (88 - 109)  BP: 136/85 (03-10-25 @ 22:56) (136/85 - 169/83)  RR: 19 (03-10-25 @ 22:56) (18 - 19)  SpO2: 95% (03-10-25 @ 22:56) (89% - 95%)    CONSTITUTIONAL: Well groomed, no apparent distress, obese   EYES: PERRLA and symmetric, EOMI, No conjunctival or scleral injection, non-icteric  ENMT: Oral mucosa with moist membranes. Normal dentition; no pharyngeal injection or exudates             NECK: Supple, symmetric and without tracheal deviation   RESP: No respiratory distress, no use of accessory muscles; CTA b/l, no WRR  CV: RRR, +S1S2, no MRG; no JVD; no peripheral edema  GI: Soft, NT, ND, no rebound, no guarding; no palpable masses; no hepatosplenomegaly; no hernia palpated  SKIN: Left leg larger than right   PSYCH: Appropriate insight/judgment; A+O x 3, mood and affect appropriate, recent/remote memory intact

## 2025-05-22 NOTE — DIETITIAN INITIAL EVALUATION ADULT. - SIGNS/SYMPTOMS
Detail Level: Detailed Quality 226: Preventive Care And Screening: Tobacco Use: Screening And Cessation Intervention: Patient screened for tobacco use and is an ex/non-smoker <50% PO intake

## 2025-06-13 NOTE — DIETITIAN INITIAL EVALUATION ADULT - PERTINENT MEDS FT
"Pt not taking meds  very uncontrolled did not like valsartan felt it made her \"whoozy\" will try metoprolol 50m g xl follow up 1 mo      Orders:    metoprolol succinate (TOPROL-XL) 50 mg 24 hr tablet; Take 1 tablet (50 mg total) by mouth daily    "
Check routine labs declines immunizations     Orders:    CBC and differential; Future    Comprehensive metabolic panel; Future    Lipid panel; Future    TSH, 3rd generation; Future    Urinalysis with microscopic    Hemoglobin A1C; Future    
Needs dexa  Orders:    DXA bone density spine hip and pelvis; Future    
Resolved needs note  to rtw no restrictions          
MEDICATIONS  (STANDING):  acetaZOLAMIDE  IVPB 500 milliGRAM(s) IV Intermittent daily  albuterol    0.083% 2.5 milliGRAM(s) Nebulizer every 6 hours  azithromycin  IVPB 500 milliGRAM(s) IV Intermittent every 24 hours  aztreonam  IVPB 2000 milliGRAM(s) IV Intermittent every 8 hours  budesonide 160 MICROgram(s)/formoterol 4.5 MICROgram(s) Inhaler 2 Puff(s) Inhalation two times a day    furosemide   Injectable 40 milliGRAM(s) IV Push every 12 hours  glucagon  Injectable 1 milliGRAM(s) IntraMuscular once  insulin lispro (ADMELOG) corrective regimen sliding scale   SubCutaneous three times a day before meals  nystatin Powder 1 Application(s) Topical every 12 hours  pantoprazole  Injectable 40 milliGRAM(s) IV Push daily  propofol Infusion 10 MICROgram(s)/kG/Min (4.62 mL/Hr) IV Continuous <Continuous>    MEDICATIONS  (PRN):  dextrose Oral Gel 15 Gram(s) Oral once PRN Blood Glucose LESS THAN 70 milliGRAM(s)/deciliter  midazolam Injectable 2 milliGRAM(s) IV Push every 2 hours PRN severe agitation

## 2025-06-17 NOTE — CHART NOTE - NSCHARTNOTEFT_GEN_A_CORE
Medication passed protocol.     Medication:     atorvastatin (LIPITOR) 40 MG tablet Take 1 tablet by mouth daily. 90 tablet    passed protocol.   Last office visit date: 4/8/25  Next appointment scheduled?: No      ptt 192 from 3 pm   pt off heparin drip  will monitor for bleeding

## 2025-07-15 NOTE — ED PROVIDER NOTE - WR ORDER STATUS 1
Called pt for update- pt's GFR is 30 and has remained stable. Talked about removing patient from list as GFR is too well and she is able to petition her waiting time back if she were to ever need a kidney in the future. Pt agrees and wishes to be removed from the kidney transplant list as she is feeling great and is too well. Will inform Dr Chavez as well. Pt verbalized understanding of information and has no further questions. Encouraged to reach out if questions arise.      Resulted

## 2025-07-20 NOTE — PROGRESS NOTE ADULT - ATTENDING COMMENTS
Attending Statement: I have personally performed a face to face diagnostic evaluation on this patient. The patient is suffering from:  acute hypercapnic res failure s/p intubation   COPD exacerbation   Sepsis present on admission   CAP  afib on eliquis  recurrent pleural effusions s/p pleurex   malignant pleural effusion   H/O HFpEF  I have made amendments to the documentation where necessary. I have personally seen and examined this patient.  I have fully participated in the care of this patient.  I have reviewed all pertinent clinical information, including history, physical exam, plan and note.
Attending Statement: I have personally performed a face to face diagnostic evaluation on this patient. The patient is suffering from:  acute hypercapnic/ hypoxic respiratory failure s/p intubation/extubation  Acute on Chronic Normocytic Anemia SP 2 units PRBC  COPD exacerbation  Sepsis present on admission   CAP  AFIB on Eliquis  recurrent pleural effusions s/p pleurex   malignant pleural effusion   I have made amendments to the documentation where necessary. I have personally seen and examined this patient.  I have fully participated in the care of this patient.  I have reviewed all pertinent clinical information, including history, physical exam, plan and note.
Attending Statement: I have personally performed a face to face diagnostic evaluation on this patient. The patient is suffering from:  acute hypercapnic/ hypoxic respiratory failure s/p intubation/extubation  Acute on Chronic Normocytic Anemia SP 6 units PRBC  COPD exacerbation  Sepsis present on admission   CAP  AFIB on Eliquis  recurrent pleural effusions s/p pleurex   malignant pleural effusion   I have made amendments to the documentation where necessary. I have personally seen and examined this patient.  I have fully participated in the care of this patient.  I have reviewed all pertinent clinical information, including history, physical exam, plan and note.
Attending Statement: I have personally performed a face to face diagnostic evaluation on this patient. The patient is suffering from:  acute hypercapnic/ hypoxic res failure s/p intubation   COPD exacerbation?  Sepsis present on admission   CAP  afib on eliquis  recurrent pleural effusions s/p pleurex   malignant pleural effusion   I have made amendments to the documentation where necessary. I have personally seen and examined this patient.  I have fully participated in the care of this patient.  I have reviewed all pertinent clinical information, including history, physical exam, plan and note.
Attending Statement: I have personally performed a face to face diagnostic evaluation on this patient. The patient is suffering from:  acute hypercapnic/ hypoxic respiratory failure s/p intubation/extubation  Acute on Chronic Normocytic Anemia SP 6 units PRBC  COPD exacerbation  Sepsis present on admission   CAP  AFIB on Eliquis  recurrent pleural effusions s/p pleurex   malignant pleural effusion   I have made amendments to the documentation where necessary. I have personally seen and examined this patient.  I have fully participated in the care of this patient.  I have reviewed all pertinent clinical information, including history, physical exam, plan and note.
Attending Statement: I have personally performed a face to face diagnostic evaluation on this patient. The patient is suffering from:  acute hypercapnic/ hypoxic respiratory failure s/p intubation/extubation  Acute on Chronic Normocytic Anemia SP 6 units PRBC  COPD exacerbation  Sepsis present on admission   CAP  AFIB on Eliquis  recurrent pleural effusions s/p pleurex   malignant pleural effusion   I have made amendments to the documentation where necessary. I have personally seen and examined this patient.  I have fully participated in the care of this patient.  I have reviewed all pertinent clinical information, including history, physical exam, plan and note.
Attending Statement: I have personally performed a face to face diagnostic evaluation on this patient. The patient is suffering from:  acute hypercapnic res failure s/p intubation   COPD exacerbation   Sepsis present on admission   CAP  afib on eliquis  recurrent pleural effusions s/p pleurex   malignant pleural effusion  I have made amendments to the documentation where necessary. I have personally seen and examined this patient.  I have fully participated in the care of this patient.  I have reviewed all pertinent clinical information, including history, physical exam, plan and note.
20-Jul-2025 20:56